# Patient Record
Sex: MALE | Race: BLACK OR AFRICAN AMERICAN | URBAN - METROPOLITAN AREA
[De-identification: names, ages, dates, MRNs, and addresses within clinical notes are randomized per-mention and may not be internally consistent; named-entity substitution may affect disease eponyms.]

---

## 2017-01-04 DIAGNOSIS — R53.1 LEFT-SIDED WEAKNESS: ICD-10-CM

## 2017-01-05 RX ORDER — GABAPENTIN 300 MG/1
CAPSULE ORAL
Qty: 90 CAPSULE | Refills: 0 | Status: SHIPPED | OUTPATIENT
Start: 2017-01-05 | End: 2017-02-11 | Stop reason: SDUPTHER

## 2017-01-05 RX ORDER — GABAPENTIN 300 MG/1
CAPSULE ORAL
Qty: 90 CAPSULE | Refills: 0 | Status: SHIPPED | OUTPATIENT
Start: 2017-01-05 | End: 2017-04-10 | Stop reason: SDUPTHER

## 2017-01-12 ENCOUNTER — OFFICE VISIT (OUTPATIENT)
Dept: INTERNAL MEDICINE CLINIC | Age: 52
End: 2017-01-12

## 2017-01-12 VITALS
SYSTOLIC BLOOD PRESSURE: 140 MMHG | OXYGEN SATURATION: 98 % | WEIGHT: 223 LBS | DIASTOLIC BLOOD PRESSURE: 80 MMHG | HEIGHT: 74 IN | BODY MASS INDEX: 28.62 KG/M2 | HEART RATE: 85 BPM

## 2017-01-12 DIAGNOSIS — F17.200 TOBACCO DEPENDENCE SYNDROME: ICD-10-CM

## 2017-01-12 DIAGNOSIS — E11.22 TYPE 2 DIABETES MELLITUS WITH STAGE 3 CHRONIC KIDNEY DISEASE, WITH LONG-TERM CURRENT USE OF INSULIN (HCC): Primary | Chronic | ICD-10-CM

## 2017-01-12 DIAGNOSIS — N52.01 ERECTILE DYSFUNCTION DUE TO ARTERIAL INSUFFICIENCY: ICD-10-CM

## 2017-01-12 DIAGNOSIS — S22.32XD CLOSED FRACTURE OF ONE RIB OF LEFT SIDE WITH ROUTINE HEALING, SUBSEQUENT ENCOUNTER: ICD-10-CM

## 2017-01-12 DIAGNOSIS — I10 ESSENTIAL HYPERTENSION: Chronic | ICD-10-CM

## 2017-01-12 DIAGNOSIS — I42.9 CARDIOMYOPATHY (HCC): ICD-10-CM

## 2017-01-12 DIAGNOSIS — N18.30 TYPE 2 DIABETES MELLITUS WITH STAGE 3 CHRONIC KIDNEY DISEASE, WITH LONG-TERM CURRENT USE OF INSULIN (HCC): Primary | Chronic | ICD-10-CM

## 2017-01-12 DIAGNOSIS — Z79.4 TYPE 2 DIABETES MELLITUS WITH STAGE 3 CHRONIC KIDNEY DISEASE, WITH LONG-TERM CURRENT USE OF INSULIN (HCC): Primary | Chronic | ICD-10-CM

## 2017-01-12 DIAGNOSIS — R09.81 NASAL CONGESTION: ICD-10-CM

## 2017-01-12 DIAGNOSIS — K59.09 OTHER CONSTIPATION: ICD-10-CM

## 2017-01-12 DIAGNOSIS — R53.1 LEFT-SIDED WEAKNESS: ICD-10-CM

## 2017-01-12 LAB
A/G RATIO: 1.4 (ref 1.1–2.2)
ALBUMIN SERPL-MCNC: 4.1 G/DL (ref 3.4–5)
ALP BLD-CCNC: 93 U/L (ref 40–129)
ALT SERPL-CCNC: 23 U/L (ref 10–40)
ANION GAP SERPL CALCULATED.3IONS-SCNC: 15 MMOL/L (ref 3–16)
AST SERPL-CCNC: 21 U/L (ref 15–37)
BILIRUB SERPL-MCNC: <0.2 MG/DL (ref 0–1)
BILIRUBIN URINE: NEGATIVE
BLOOD, URINE: NEGATIVE
BUN BLDV-MCNC: 26 MG/DL (ref 7–20)
CALCIUM SERPL-MCNC: 9.2 MG/DL (ref 8.3–10.6)
CHLORIDE BLD-SCNC: 102 MMOL/L (ref 99–110)
CHOLESTEROL, TOTAL: 181 MG/DL (ref 0–199)
CLARITY: ABNORMAL
CO2: 23 MMOL/L (ref 21–32)
COLOR: YELLOW
COMMENT UA: NORMAL
CREAT SERPL-MCNC: 2.1 MG/DL (ref 0.9–1.3)
EPITHELIAL CELLS, UA: 1 /HPF (ref 0–5)
GFR AFRICAN AMERICAN: 40
GFR NON-AFRICAN AMERICAN: 33
GLOBULIN: 3 G/DL
GLUCOSE BLD-MCNC: 165 MG/DL (ref 70–99)
GLUCOSE URINE: NEGATIVE MG/DL
HDLC SERPL-MCNC: 33 MG/DL (ref 40–60)
HYALINE CASTS: 1 /HPF (ref 0–8)
KETONES, URINE: NEGATIVE MG/DL
LDL CHOLESTEROL CALCULATED: ABNORMAL MG/DL
LDL CHOLESTEROL DIRECT: 103 MG/DL
LEUKOCYTE ESTERASE, URINE: NEGATIVE
MICROSCOPIC EXAMINATION: YES
NITRITE, URINE: NEGATIVE
PH UA: 5
POTASSIUM SERPL-SCNC: 4.8 MMOL/L (ref 3.5–5.1)
PRO-BNP: 390 PG/ML (ref 0–124)
PROTEIN UA: 30 MG/DL
RBC UA: 2 /HPF (ref 0–4)
SODIUM BLD-SCNC: 140 MMOL/L (ref 136–145)
SPECIFIC GRAVITY UA: 1.01
TOTAL PROTEIN: 7.1 G/DL (ref 6.4–8.2)
TRIGL SERPL-MCNC: 317 MG/DL (ref 0–150)
URINE TYPE: ABNORMAL
UROBILINOGEN, URINE: 0.2 E.U./DL
VLDLC SERPL CALC-MCNC: ABNORMAL MG/DL
WBC UA: 1 /HPF (ref 0–5)

## 2017-01-12 PROCEDURE — 99214 OFFICE O/P EST MOD 30 MIN: CPT | Performed by: INTERNAL MEDICINE

## 2017-01-12 RX ORDER — SILDENAFIL CITRATE 20 MG/1
100 TABLET ORAL PRN
Qty: 15 TABLET | Refills: 3 | Status: SHIPPED | OUTPATIENT
Start: 2017-01-12 | End: 2018-04-30 | Stop reason: SDUPTHER

## 2017-01-12 RX ORDER — DIAZEPAM 5 MG/1
5 TABLET ORAL EVERY 12 HOURS PRN
Qty: 30 TABLET | Refills: 0 | Status: SHIPPED | OUTPATIENT
Start: 2017-01-12 | End: 2017-03-16 | Stop reason: SDUPTHER

## 2017-01-12 ASSESSMENT — ENCOUNTER SYMPTOMS
RESPIRATORY NEGATIVE: 1
EYES NEGATIVE: 1
GASTROINTESTINAL NEGATIVE: 1

## 2017-01-13 LAB
ESTIMATED AVERAGE GLUCOSE: 197.3 MG/DL
HBA1C MFR BLD: 8.5 %

## 2017-01-14 DIAGNOSIS — I10 ESSENTIAL HYPERTENSION: Chronic | ICD-10-CM

## 2017-01-15 DIAGNOSIS — S22.32XD CLOSED FRACTURE OF ONE RIB OF LEFT SIDE WITH ROUTINE HEALING, SUBSEQUENT ENCOUNTER: ICD-10-CM

## 2017-01-16 DIAGNOSIS — S22.32XD CLOSED FRACTURE OF ONE RIB OF LEFT SIDE WITH ROUTINE HEALING, SUBSEQUENT ENCOUNTER: ICD-10-CM

## 2017-01-16 DIAGNOSIS — I10 ESSENTIAL HYPERTENSION: Chronic | ICD-10-CM

## 2017-01-16 RX ORDER — CLONIDINE HYDROCHLORIDE 0.2 MG/1
TABLET ORAL
Qty: 90 TABLET | Refills: 0 | Status: SHIPPED | OUTPATIENT
Start: 2017-01-16 | End: 2017-02-02 | Stop reason: SDUPTHER

## 2017-01-18 RX ORDER — TRAMADOL HYDROCHLORIDE 50 MG/1
50 TABLET ORAL EVERY 8 HOURS PRN
Qty: 90 TABLET | Refills: 0 | OUTPATIENT
Start: 2017-01-18

## 2017-01-18 RX ORDER — TRAMADOL HYDROCHLORIDE 50 MG/1
TABLET ORAL
Qty: 90 TABLET | Refills: 0 | Status: ON HOLD | OUTPATIENT
Start: 2017-01-18 | End: 2018-09-10

## 2017-01-18 RX ORDER — CLONIDINE HYDROCHLORIDE 0.2 MG/1
TABLET ORAL
Qty: 90 TABLET | Refills: 0 | OUTPATIENT
Start: 2017-01-18

## 2017-01-21 DIAGNOSIS — E11.49 TYPE 2 DIABETES MELLITUS WITH OTHER DIABETIC NEUROLOGICAL COMPLICATION (HCC): ICD-10-CM

## 2017-01-21 RX ORDER — GLIPIZIDE 5 MG/1
TABLET ORAL
Qty: 60 TABLET | Refills: 0 | Status: SHIPPED | OUTPATIENT
Start: 2017-01-21 | End: 2018-01-29 | Stop reason: SDUPTHER

## 2017-02-01 ENCOUNTER — TELEPHONE (OUTPATIENT)
Dept: INTERNAL MEDICINE CLINIC | Age: 52
End: 2017-02-01

## 2017-02-02 DIAGNOSIS — I10 ESSENTIAL HYPERTENSION: Chronic | ICD-10-CM

## 2017-02-03 RX ORDER — CLONIDINE HYDROCHLORIDE 0.2 MG/1
TABLET ORAL
Qty: 90 TABLET | Refills: 0 | Status: SHIPPED | OUTPATIENT
Start: 2017-02-03 | End: 2017-07-14 | Stop reason: SDUPTHER

## 2017-02-11 DIAGNOSIS — R53.1 LEFT-SIDED WEAKNESS: ICD-10-CM

## 2017-02-13 RX ORDER — GABAPENTIN 300 MG/1
CAPSULE ORAL
Qty: 90 CAPSULE | Refills: 0 | Status: SHIPPED | OUTPATIENT
Start: 2017-02-13 | End: 2017-03-14 | Stop reason: SDUPTHER

## 2017-03-08 DIAGNOSIS — I10 MALIGNANT HYPERTENSION: ICD-10-CM

## 2017-03-09 RX ORDER — HYDRALAZINE HYDROCHLORIDE 100 MG/1
TABLET, FILM COATED ORAL
Qty: 90 TABLET | Refills: 0 | Status: SHIPPED | OUTPATIENT
Start: 2017-03-09 | End: 2017-04-16 | Stop reason: SDUPTHER

## 2017-03-09 RX ORDER — ISOSORBIDE MONONITRATE 30 MG/1
TABLET, EXTENDED RELEASE ORAL
Qty: 30 TABLET | Refills: 0 | Status: SHIPPED | OUTPATIENT
Start: 2017-03-09 | End: 2017-04-16 | Stop reason: SDUPTHER

## 2017-03-14 DIAGNOSIS — R53.1 LEFT-SIDED WEAKNESS: ICD-10-CM

## 2017-03-15 ENCOUNTER — TELEPHONE (OUTPATIENT)
Dept: INTERNAL MEDICINE CLINIC | Age: 52
End: 2017-03-15

## 2017-03-15 DIAGNOSIS — S22.32XD CLOSED FRACTURE OF ONE RIB OF LEFT SIDE WITH ROUTINE HEALING, SUBSEQUENT ENCOUNTER: ICD-10-CM

## 2017-03-15 RX ORDER — GABAPENTIN 300 MG/1
CAPSULE ORAL
Qty: 90 CAPSULE | Refills: 0 | Status: SHIPPED | OUTPATIENT
Start: 2017-03-15 | End: 2017-04-09 | Stop reason: SDUPTHER

## 2017-03-16 RX ORDER — DIAZEPAM 5 MG/1
5 TABLET ORAL EVERY 12 HOURS PRN
Qty: 30 TABLET | Refills: 0 | Status: SHIPPED | OUTPATIENT
Start: 2017-03-16 | End: 2017-05-19 | Stop reason: SDUPTHER

## 2017-03-27 DIAGNOSIS — I42.9 CARDIOMYOPATHY (HCC): ICD-10-CM

## 2017-03-27 DIAGNOSIS — I10 ESSENTIAL HYPERTENSION: ICD-10-CM

## 2017-03-27 RX ORDER — LABETALOL 200 MG/1
200 TABLET, FILM COATED ORAL EVERY 8 HOURS SCHEDULED
Qty: 90 TABLET | Refills: 2 | Status: SHIPPED | OUTPATIENT
Start: 2017-03-27 | End: 2017-08-30 | Stop reason: SDUPTHER

## 2017-04-02 DIAGNOSIS — I42.9 CARDIOMYOPATHY (HCC): ICD-10-CM

## 2017-04-03 RX ORDER — CLOPIDOGREL BISULFATE 75 MG/1
TABLET ORAL
Qty: 30 TABLET | Refills: 0 | Status: SHIPPED | OUTPATIENT
Start: 2017-04-03 | End: 2017-05-05 | Stop reason: SDUPTHER

## 2017-04-09 DIAGNOSIS — R53.1 LEFT-SIDED WEAKNESS: ICD-10-CM

## 2017-04-10 ENCOUNTER — OFFICE VISIT (OUTPATIENT)
Dept: INTERNAL MEDICINE CLINIC | Age: 52
End: 2017-04-10

## 2017-04-10 ENCOUNTER — TELEPHONE (OUTPATIENT)
Dept: INTERNAL MEDICINE CLINIC | Age: 52
End: 2017-04-10

## 2017-04-10 VITALS
HEART RATE: 81 BPM | BODY MASS INDEX: 28 KG/M2 | DIASTOLIC BLOOD PRESSURE: 84 MMHG | WEIGHT: 218.2 LBS | OXYGEN SATURATION: 98 % | HEIGHT: 74 IN | SYSTOLIC BLOOD PRESSURE: 122 MMHG

## 2017-04-10 DIAGNOSIS — N18.30 TYPE 2 DIABETES MELLITUS WITH STAGE 3 CHRONIC KIDNEY DISEASE, WITH LONG-TERM CURRENT USE OF INSULIN (HCC): Primary | Chronic | ICD-10-CM

## 2017-04-10 DIAGNOSIS — N18.2 CKD STAGE 2 DUE TO TYPE 2 DIABETES MELLITUS (HCC): ICD-10-CM

## 2017-04-10 DIAGNOSIS — I63.9 CEREBRAL INFARCTION, UNSPECIFIED MECHANISM (HCC): ICD-10-CM

## 2017-04-10 DIAGNOSIS — R53.1 LEFT-SIDED WEAKNESS: ICD-10-CM

## 2017-04-10 DIAGNOSIS — E11.22 CKD STAGE 2 DUE TO TYPE 2 DIABETES MELLITUS (HCC): ICD-10-CM

## 2017-04-10 DIAGNOSIS — N52.01 ERECTILE DYSFUNCTION DUE TO ARTERIAL INSUFFICIENCY: ICD-10-CM

## 2017-04-10 DIAGNOSIS — I10 ESSENTIAL HYPERTENSION: Chronic | ICD-10-CM

## 2017-04-10 DIAGNOSIS — E11.22 TYPE 2 DIABETES MELLITUS WITH STAGE 3 CHRONIC KIDNEY DISEASE, WITH LONG-TERM CURRENT USE OF INSULIN (HCC): Primary | Chronic | ICD-10-CM

## 2017-04-10 DIAGNOSIS — Z79.4 TYPE 2 DIABETES MELLITUS WITH STAGE 3 CHRONIC KIDNEY DISEASE, WITH LONG-TERM CURRENT USE OF INSULIN (HCC): Primary | Chronic | ICD-10-CM

## 2017-04-10 DIAGNOSIS — I42.9 CARDIOMYOPATHY (HCC): ICD-10-CM

## 2017-04-10 DIAGNOSIS — F17.200 TOBACCO DEPENDENCE SYNDROME: ICD-10-CM

## 2017-04-10 LAB
A/G RATIO: 1.1 (ref 1.1–2.2)
ALBUMIN SERPL-MCNC: 4 G/DL (ref 3.4–5)
ALP BLD-CCNC: 94 U/L (ref 40–129)
ALT SERPL-CCNC: 15 U/L (ref 10–40)
ANION GAP SERPL CALCULATED.3IONS-SCNC: 13 MMOL/L (ref 3–16)
AST SERPL-CCNC: 11 U/L (ref 15–37)
BASOPHILS ABSOLUTE: 0 K/UL (ref 0–0.2)
BASOPHILS RELATIVE PERCENT: 0.3 %
BILIRUB SERPL-MCNC: 0.3 MG/DL (ref 0–1)
BUN BLDV-MCNC: 25 MG/DL (ref 7–20)
CALCIUM SERPL-MCNC: 9 MG/DL (ref 8.3–10.6)
CHLORIDE BLD-SCNC: 98 MMOL/L (ref 99–110)
CO2: 24 MMOL/L (ref 21–32)
CREAT SERPL-MCNC: 2 MG/DL (ref 0.9–1.3)
EOSINOPHILS ABSOLUTE: 0.3 K/UL (ref 0–0.6)
EOSINOPHILS RELATIVE PERCENT: 2.4 %
GFR AFRICAN AMERICAN: 43
GFR NON-AFRICAN AMERICAN: 35
GLOBULIN: 3.7 G/DL
GLUCOSE BLD-MCNC: 333 MG/DL (ref 70–99)
HCT VFR BLD CALC: 37.9 % (ref 40.5–52.5)
HEMOGLOBIN: 12.1 G/DL (ref 13.5–17.5)
LYMPHOCYTES ABSOLUTE: 1.6 K/UL (ref 1–5.1)
LYMPHOCYTES RELATIVE PERCENT: 15.1 %
MCH RBC QN AUTO: 28.5 PG (ref 26–34)
MCHC RBC AUTO-ENTMCNC: 32 G/DL (ref 31–36)
MCV RBC AUTO: 89.2 FL (ref 80–100)
MONOCYTES ABSOLUTE: 0.6 K/UL (ref 0–1.3)
MONOCYTES RELATIVE PERCENT: 6 %
NEUTROPHILS ABSOLUTE: 8.2 K/UL (ref 1.7–7.7)
NEUTROPHILS RELATIVE PERCENT: 76.2 %
PARATHYROID HORMONE INTACT: 107.4 PG/ML (ref 14–72)
PDW BLD-RTO: 13.2 % (ref 12.4–15.4)
PLATELET # BLD: 223 K/UL (ref 135–450)
PMV BLD AUTO: 10.2 FL (ref 5–10.5)
POTASSIUM SERPL-SCNC: 4.9 MMOL/L (ref 3.5–5.1)
PROTEIN PROTEIN: 0.12 G/DL
PROTEIN PROTEIN: 121 MG/DL
RBC # BLD: 4.25 M/UL (ref 4.2–5.9)
SODIUM BLD-SCNC: 135 MMOL/L (ref 136–145)
TOTAL PROTEIN: 7.7 G/DL (ref 6.4–8.2)
WBC # BLD: 10.8 K/UL (ref 4–11)

## 2017-04-10 PROCEDURE — 99214 OFFICE O/P EST MOD 30 MIN: CPT | Performed by: INTERNAL MEDICINE

## 2017-04-10 RX ORDER — SILDENAFIL 100 MG/1
100 TABLET, FILM COATED ORAL PRN
Qty: 10 TABLET | Refills: 3 | Status: SHIPPED | OUTPATIENT
Start: 2017-04-10 | End: 2018-01-29 | Stop reason: SDUPTHER

## 2017-04-10 RX ORDER — GABAPENTIN 300 MG/1
300 CAPSULE ORAL 3 TIMES DAILY
Qty: 90 CAPSULE | Refills: 3 | Status: SHIPPED | OUTPATIENT
Start: 2017-04-10 | End: 2018-04-12 | Stop reason: SDUPTHER

## 2017-04-10 RX ORDER — ACETAMINOPHEN 500 MG
1000 TABLET ORAL EVERY 6 HOURS PRN
Qty: 120 TABLET | Refills: 3 | Status: SHIPPED | OUTPATIENT
Start: 2017-04-10 | End: 2017-08-11 | Stop reason: SDUPTHER

## 2017-04-10 RX ORDER — GABAPENTIN 300 MG/1
CAPSULE ORAL
Qty: 90 CAPSULE | Refills: 0 | Status: SHIPPED | OUTPATIENT
Start: 2017-04-10 | End: 2018-01-29 | Stop reason: SDUPTHER

## 2017-04-10 ASSESSMENT — ENCOUNTER SYMPTOMS
EYES NEGATIVE: 1
GASTROINTESTINAL NEGATIVE: 1
RESPIRATORY NEGATIVE: 1

## 2017-04-11 LAB
ESTIMATED AVERAGE GLUCOSE: 246 MG/DL
HBA1C MFR BLD: 10.2 %
KAPPA, FREE LIGHT CHAINS, SERUM: 109.75 MG/L (ref 3.3–19.4)
KAPPA/LAMBDA RATIO: 2.04 (ref 0.26–1.65)
KAPPA/LAMBDA TEST COMMENT: ABNORMAL
LAMBDA, FREE LIGHT CHAINS, SERUM: 53.79 MG/L (ref 5.71–26.3)

## 2017-04-12 LAB
ALBUMIN SERPL-MCNC: 3.6 G/DL (ref 3.1–4.9)
ALPHA-1-GLOBULIN: 0.2 G/DL (ref 0.2–0.4)
ALPHA-2-GLOBULIN: 0.9 G/DL (ref 0.4–1.1)
BETA GLOBULIN: 1 G/DL (ref 0.9–1.6)
GAMMA GLOBULIN: 2 G/DL (ref 0.6–1.8)
SPE/IFE INTERPRETATION: NORMAL
URINE ELECTROPHORESIS INTERP: NORMAL

## 2017-04-16 DIAGNOSIS — I10 MALIGNANT HYPERTENSION: ICD-10-CM

## 2017-04-17 RX ORDER — HYDRALAZINE HYDROCHLORIDE 100 MG/1
TABLET, FILM COATED ORAL
Qty: 90 TABLET | Refills: 0 | Status: SHIPPED | OUTPATIENT
Start: 2017-04-17 | End: 2017-06-16 | Stop reason: SDUPTHER

## 2017-04-17 RX ORDER — ISOSORBIDE MONONITRATE 30 MG/1
TABLET, EXTENDED RELEASE ORAL
Qty: 30 TABLET | Refills: 0 | Status: SHIPPED | OUTPATIENT
Start: 2017-04-17 | End: 2017-06-05 | Stop reason: SDUPTHER

## 2017-04-24 ENCOUNTER — TELEPHONE (OUTPATIENT)
Dept: INTERNAL MEDICINE CLINIC | Age: 52
End: 2017-04-24

## 2017-05-05 DIAGNOSIS — I42.9 CARDIOMYOPATHY (HCC): ICD-10-CM

## 2017-05-05 RX ORDER — CLOPIDOGREL BISULFATE 75 MG/1
TABLET ORAL
Qty: 30 TABLET | Refills: 0 | Status: SHIPPED | OUTPATIENT
Start: 2017-05-05 | End: 2017-06-05 | Stop reason: SDUPTHER

## 2017-05-19 DIAGNOSIS — I10 MALIGNANT HYPERTENSION: ICD-10-CM

## 2017-05-19 DIAGNOSIS — S22.32XD CLOSED FRACTURE OF ONE RIB OF LEFT SIDE WITH ROUTINE HEALING, SUBSEQUENT ENCOUNTER: ICD-10-CM

## 2017-05-19 RX ORDER — HYDRALAZINE HYDROCHLORIDE 100 MG/1
TABLET, FILM COATED ORAL
Qty: 90 TABLET | Refills: 0 | Status: SHIPPED | OUTPATIENT
Start: 2017-05-19 | End: 2017-08-30

## 2017-05-19 RX ORDER — DIAZEPAM 5 MG/1
TABLET ORAL
Qty: 30 TABLET | Refills: 0 | Status: SHIPPED | OUTPATIENT
Start: 2017-05-19 | End: 2017-09-08 | Stop reason: SDUPTHER

## 2017-06-05 DIAGNOSIS — I42.9 CARDIOMYOPATHY (HCC): ICD-10-CM

## 2017-06-05 DIAGNOSIS — I10 MALIGNANT HYPERTENSION: ICD-10-CM

## 2017-06-06 RX ORDER — CLOPIDOGREL BISULFATE 75 MG/1
TABLET ORAL
Qty: 30 TABLET | Refills: 0 | Status: SHIPPED | OUTPATIENT
Start: 2017-06-06 | End: 2017-07-01 | Stop reason: SDUPTHER

## 2017-06-06 RX ORDER — ISOSORBIDE MONONITRATE 30 MG/1
TABLET, EXTENDED RELEASE ORAL
Qty: 30 TABLET | Refills: 0 | Status: SHIPPED | OUTPATIENT
Start: 2017-06-06 | End: 2017-07-01 | Stop reason: SDUPTHER

## 2017-06-16 DIAGNOSIS — I10 MALIGNANT HYPERTENSION: ICD-10-CM

## 2017-06-16 DIAGNOSIS — E11.49 TYPE 2 DIABETES MELLITUS WITH OTHER DIABETIC NEUROLOGICAL COMPLICATION (HCC): ICD-10-CM

## 2017-06-16 RX ORDER — HUMAN INSULIN 100 [IU]/ML
INJECTION, SUSPENSION SUBCUTANEOUS
Qty: 10 VIAL | Refills: 5 | Status: ON HOLD | OUTPATIENT
Start: 2017-06-16 | End: 2018-09-13 | Stop reason: HOSPADM

## 2017-06-16 RX ORDER — HYDRALAZINE HYDROCHLORIDE 100 MG/1
TABLET, FILM COATED ORAL
Qty: 90 TABLET | Refills: 5 | Status: SHIPPED | OUTPATIENT
Start: 2017-06-16 | End: 2017-08-30 | Stop reason: SDUPTHER

## 2017-07-01 DIAGNOSIS — I10 MALIGNANT HYPERTENSION: ICD-10-CM

## 2017-07-01 DIAGNOSIS — E11.49 TYPE 2 DIABETES MELLITUS WITH OTHER DIABETIC NEUROLOGICAL COMPLICATION (HCC): ICD-10-CM

## 2017-07-01 DIAGNOSIS — I42.9 CARDIOMYOPATHY (HCC): ICD-10-CM

## 2017-07-03 RX ORDER — GLIPIZIDE 5 MG/1
TABLET ORAL
Qty: 60 TABLET | Refills: 0 | Status: SHIPPED | OUTPATIENT
Start: 2017-07-03 | End: 2017-09-04 | Stop reason: SDUPTHER

## 2017-07-03 RX ORDER — ISOSORBIDE MONONITRATE 30 MG/1
TABLET, EXTENDED RELEASE ORAL
Qty: 30 TABLET | Refills: 0 | Status: SHIPPED | OUTPATIENT
Start: 2017-07-03 | End: 2017-07-28 | Stop reason: SDUPTHER

## 2017-07-03 RX ORDER — CLOPIDOGREL BISULFATE 75 MG/1
TABLET ORAL
Qty: 30 TABLET | Refills: 0 | Status: SHIPPED | OUTPATIENT
Start: 2017-07-03 | End: 2017-08-20 | Stop reason: SDUPTHER

## 2017-07-09 DIAGNOSIS — I10 MALIGNANT HYPERTENSION: ICD-10-CM

## 2017-07-10 DIAGNOSIS — I10 MALIGNANT HYPERTENSION: ICD-10-CM

## 2017-07-10 RX ORDER — FUROSEMIDE 40 MG/1
TABLET ORAL
Qty: 90 TABLET | Refills: 0 | OUTPATIENT
Start: 2017-07-10

## 2017-07-10 RX ORDER — FUROSEMIDE 40 MG/1
40 TABLET ORAL DAILY
Qty: 90 TABLET | Refills: 3 | Status: SHIPPED | OUTPATIENT
Start: 2017-07-10 | End: 2017-08-30 | Stop reason: SDUPTHER

## 2017-07-14 DIAGNOSIS — I10 ESSENTIAL HYPERTENSION: Chronic | ICD-10-CM

## 2017-07-14 RX ORDER — CLONIDINE HYDROCHLORIDE 0.2 MG/1
TABLET ORAL
Qty: 150 TABLET | Refills: 0 | Status: SHIPPED | OUTPATIENT
Start: 2017-07-14 | End: 2017-08-08 | Stop reason: SDUPTHER

## 2017-07-28 DIAGNOSIS — I10 MALIGNANT HYPERTENSION: ICD-10-CM

## 2017-07-28 RX ORDER — ISOSORBIDE MONONITRATE 30 MG/1
TABLET, EXTENDED RELEASE ORAL
Qty: 30 TABLET | Refills: 0 | Status: SHIPPED | OUTPATIENT
Start: 2017-07-28 | End: 2017-08-30 | Stop reason: ALTCHOICE

## 2017-07-31 DIAGNOSIS — R53.1 LEFT-SIDED WEAKNESS: ICD-10-CM

## 2017-08-01 RX ORDER — GABAPENTIN 300 MG/1
CAPSULE ORAL
Qty: 90 CAPSULE | Refills: 0 | Status: SHIPPED | OUTPATIENT
Start: 2017-08-01 | End: 2017-08-28 | Stop reason: SDUPTHER

## 2017-08-08 DIAGNOSIS — I10 ESSENTIAL HYPERTENSION: Chronic | ICD-10-CM

## 2017-08-08 RX ORDER — CLONIDINE HYDROCHLORIDE 0.2 MG/1
TABLET ORAL
Qty: 150 TABLET | Refills: 3 | Status: SHIPPED | OUTPATIENT
Start: 2017-08-08 | End: 2017-08-30 | Stop reason: SDUPTHER

## 2017-08-11 DIAGNOSIS — R53.1 LEFT-SIDED WEAKNESS: ICD-10-CM

## 2017-08-11 RX ORDER — ACETAMINOPHEN 500 MG/1
TABLET ORAL
Qty: 120 TABLET | Refills: 3 | Status: SHIPPED | OUTPATIENT
Start: 2017-08-11 | End: 2018-01-06 | Stop reason: SDUPTHER

## 2017-08-20 DIAGNOSIS — I42.9 CARDIOMYOPATHY (HCC): ICD-10-CM

## 2017-08-20 DIAGNOSIS — I10 MALIGNANT HYPERTENSION: ICD-10-CM

## 2017-08-21 RX ORDER — ISOSORBIDE MONONITRATE 30 MG/1
TABLET, EXTENDED RELEASE ORAL
Qty: 30 TABLET | Refills: 0 | Status: SHIPPED | OUTPATIENT
Start: 2017-08-21 | End: 2017-09-25 | Stop reason: SDUPTHER

## 2017-08-21 RX ORDER — CLOPIDOGREL BISULFATE 75 MG/1
TABLET ORAL
Qty: 30 TABLET | Refills: 0 | Status: SHIPPED | OUTPATIENT
Start: 2017-08-21 | End: 2017-10-06 | Stop reason: SDUPTHER

## 2017-08-28 DIAGNOSIS — R53.1 LEFT-SIDED WEAKNESS: ICD-10-CM

## 2017-08-28 RX ORDER — GABAPENTIN 300 MG/1
CAPSULE ORAL
Qty: 90 CAPSULE | Refills: 0 | Status: SHIPPED | OUTPATIENT
Start: 2017-08-28 | End: 2017-09-22 | Stop reason: SDUPTHER

## 2017-08-30 ENCOUNTER — OFFICE VISIT (OUTPATIENT)
Dept: CARDIOLOGY CLINIC | Age: 52
End: 2017-08-30

## 2017-08-30 VITALS
OXYGEN SATURATION: 97 % | DIASTOLIC BLOOD PRESSURE: 64 MMHG | WEIGHT: 218 LBS | HEIGHT: 74 IN | HEART RATE: 79 BPM | SYSTOLIC BLOOD PRESSURE: 100 MMHG | BODY MASS INDEX: 27.98 KG/M2

## 2017-08-30 DIAGNOSIS — E11.9 CONTROLLED TYPE 2 DIABETES MELLITUS WITHOUT COMPLICATION, WITH LONG-TERM CURRENT USE OF INSULIN (HCC): ICD-10-CM

## 2017-08-30 DIAGNOSIS — I10 MALIGNANT HYPERTENSION: ICD-10-CM

## 2017-08-30 DIAGNOSIS — I63.9 CEREBROVASCULAR ACCIDENT (CVA), UNSPECIFIED MECHANISM (HCC): ICD-10-CM

## 2017-08-30 DIAGNOSIS — F17.200 SMOKING ADDICTION: ICD-10-CM

## 2017-08-30 DIAGNOSIS — Z79.4 CONTROLLED TYPE 2 DIABETES MELLITUS WITHOUT COMPLICATION, WITH LONG-TERM CURRENT USE OF INSULIN (HCC): ICD-10-CM

## 2017-08-30 DIAGNOSIS — I42.9 CARDIOMYOPATHY, UNSPECIFIED TYPE (HCC): Primary | ICD-10-CM

## 2017-08-30 DIAGNOSIS — E78.2 MIXED HYPERLIPIDEMIA: ICD-10-CM

## 2017-08-30 PROCEDURE — 93000 ELECTROCARDIOGRAM COMPLETE: CPT | Performed by: INTERNAL MEDICINE

## 2017-08-30 PROCEDURE — 99214 OFFICE O/P EST MOD 30 MIN: CPT | Performed by: INTERNAL MEDICINE

## 2017-08-30 RX ORDER — FUROSEMIDE 20 MG/1
40 TABLET ORAL DAILY
Qty: 180 TABLET | Refills: 3 | Status: SHIPPED | OUTPATIENT
Start: 2017-08-30 | End: 2018-09-24 | Stop reason: SDUPTHER

## 2017-08-30 RX ORDER — LABETALOL 200 MG/1
200 TABLET, FILM COATED ORAL EVERY 8 HOURS SCHEDULED
Qty: 90 TABLET | Refills: 3
Start: 2017-08-30 | End: 2018-01-29 | Stop reason: SDUPTHER

## 2017-08-30 RX ORDER — CLONIDINE HYDROCHLORIDE 0.2 MG/1
TABLET ORAL
Qty: 270 TABLET | Refills: 3
Start: 2017-08-30 | End: 2018-02-09 | Stop reason: SDUPTHER

## 2017-08-30 RX ORDER — HYDRALAZINE HYDROCHLORIDE 100 MG/1
TABLET, FILM COATED ORAL
Qty: 270 TABLET | Refills: 3
Start: 2017-08-30 | End: 2018-01-29 | Stop reason: SDUPTHER

## 2017-09-04 DIAGNOSIS — E11.49 TYPE 2 DIABETES MELLITUS WITH OTHER DIABETIC NEUROLOGICAL COMPLICATION (HCC): ICD-10-CM

## 2017-09-05 RX ORDER — GLIPIZIDE 5 MG/1
TABLET ORAL
Qty: 60 TABLET | Refills: 0 | Status: SHIPPED | OUTPATIENT
Start: 2017-09-05 | End: 2018-04-30 | Stop reason: SDUPTHER

## 2017-09-08 DIAGNOSIS — S22.32XD CLOSED FRACTURE OF ONE RIB OF LEFT SIDE WITH ROUTINE HEALING, SUBSEQUENT ENCOUNTER: ICD-10-CM

## 2017-09-08 RX ORDER — DIAZEPAM 5 MG/1
TABLET ORAL
Qty: 30 TABLET | Refills: 0 | Status: SHIPPED | OUTPATIENT
Start: 2017-09-08 | End: 2017-11-06 | Stop reason: SDUPTHER

## 2017-09-22 DIAGNOSIS — R53.1 LEFT-SIDED WEAKNESS: ICD-10-CM

## 2017-09-25 DIAGNOSIS — I10 MALIGNANT HYPERTENSION: ICD-10-CM

## 2017-09-25 RX ORDER — GABAPENTIN 300 MG/1
CAPSULE ORAL
Qty: 90 CAPSULE | Refills: 0 | Status: SHIPPED | OUTPATIENT
Start: 2017-09-25 | End: 2017-10-20 | Stop reason: SDUPTHER

## 2017-09-25 RX ORDER — ISOSORBIDE MONONITRATE 30 MG/1
30 TABLET, EXTENDED RELEASE ORAL DAILY
Qty: 30 TABLET | Refills: 5 | Status: SHIPPED | OUTPATIENT
Start: 2017-09-25 | End: 2018-03-09 | Stop reason: SDUPTHER

## 2017-10-06 DIAGNOSIS — I10 ESSENTIAL HYPERTENSION: ICD-10-CM

## 2017-10-06 DIAGNOSIS — I42.9 CARDIOMYOPATHY (HCC): ICD-10-CM

## 2017-10-06 RX ORDER — CLOPIDOGREL BISULFATE 75 MG/1
TABLET ORAL
Qty: 30 TABLET | Refills: 0 | Status: SHIPPED | OUTPATIENT
Start: 2017-10-06 | End: 2017-11-06 | Stop reason: SDUPTHER

## 2017-10-06 RX ORDER — LABETALOL 200 MG/1
TABLET, FILM COATED ORAL
Qty: 34 TABLET | Refills: 10 | Status: SHIPPED | OUTPATIENT
Start: 2017-10-06 | End: 2018-04-30 | Stop reason: SDUPTHER

## 2017-10-20 DIAGNOSIS — R53.1 LEFT-SIDED WEAKNESS: ICD-10-CM

## 2017-10-20 RX ORDER — GABAPENTIN 300 MG/1
CAPSULE ORAL
Qty: 90 CAPSULE | Refills: 0 | Status: SHIPPED | OUTPATIENT
Start: 2017-10-20 | End: 2017-11-17 | Stop reason: SDUPTHER

## 2017-11-02 DIAGNOSIS — I10 ESSENTIAL HYPERTENSION: Chronic | ICD-10-CM

## 2017-11-02 RX ORDER — CLONIDINE HYDROCHLORIDE 0.2 MG/1
TABLET ORAL
Qty: 150 TABLET | Refills: 3 | Status: SHIPPED | OUTPATIENT
Start: 2017-11-02 | End: 2018-02-09 | Stop reason: SDUPTHER

## 2017-11-06 DIAGNOSIS — S22.32XD CLOSED FRACTURE OF ONE RIB OF LEFT SIDE WITH ROUTINE HEALING, SUBSEQUENT ENCOUNTER: ICD-10-CM

## 2017-11-06 DIAGNOSIS — I42.9 CARDIOMYOPATHY (HCC): ICD-10-CM

## 2017-11-06 RX ORDER — CLOPIDOGREL BISULFATE 75 MG/1
TABLET ORAL
Qty: 30 TABLET | Refills: 0 | Status: SHIPPED | OUTPATIENT
Start: 2017-11-06 | End: 2017-12-15 | Stop reason: SDUPTHER

## 2017-11-06 RX ORDER — DIAZEPAM 5 MG/1
TABLET ORAL
Qty: 30 TABLET | Refills: 0 | Status: SHIPPED | OUTPATIENT
Start: 2017-11-06 | End: 2018-02-09 | Stop reason: SDUPTHER

## 2017-11-07 DIAGNOSIS — I10 MALIGNANT HYPERTENSION: ICD-10-CM

## 2017-11-08 RX ORDER — HYDRALAZINE HYDROCHLORIDE 100 MG/1
TABLET, FILM COATED ORAL
Qty: 90 TABLET | Refills: 0 | Status: SHIPPED | OUTPATIENT
Start: 2017-11-08 | End: 2017-11-28 | Stop reason: SDUPTHER

## 2017-11-17 DIAGNOSIS — R53.1 LEFT-SIDED WEAKNESS: ICD-10-CM

## 2017-11-17 RX ORDER — GABAPENTIN 300 MG/1
CAPSULE ORAL
Qty: 90 CAPSULE | Refills: 0 | Status: SHIPPED | OUTPATIENT
Start: 2017-11-17 | End: 2017-12-15 | Stop reason: SDUPTHER

## 2017-11-28 DIAGNOSIS — I10 MALIGNANT HYPERTENSION: ICD-10-CM

## 2017-11-28 RX ORDER — HYDRALAZINE HYDROCHLORIDE 100 MG/1
TABLET, FILM COATED ORAL
Qty: 90 TABLET | Refills: 0 | Status: SHIPPED | OUTPATIENT
Start: 2017-11-28 | End: 2017-12-15 | Stop reason: SDUPTHER

## 2017-12-15 DIAGNOSIS — R53.1 LEFT-SIDED WEAKNESS: ICD-10-CM

## 2017-12-15 DIAGNOSIS — I42.9 CARDIOMYOPATHY (HCC): ICD-10-CM

## 2017-12-15 DIAGNOSIS — I10 MALIGNANT HYPERTENSION: ICD-10-CM

## 2017-12-18 RX ORDER — GABAPENTIN 300 MG/1
CAPSULE ORAL
Qty: 90 CAPSULE | Refills: 0 | Status: SHIPPED | OUTPATIENT
Start: 2017-12-18 | End: 2018-01-06 | Stop reason: SDUPTHER

## 2017-12-18 RX ORDER — CLOPIDOGREL BISULFATE 75 MG/1
TABLET ORAL
Qty: 30 TABLET | Refills: 0 | Status: SHIPPED | OUTPATIENT
Start: 2017-12-18 | End: 2018-01-15 | Stop reason: SDUPTHER

## 2017-12-18 RX ORDER — HYDRALAZINE HYDROCHLORIDE 100 MG/1
TABLET, FILM COATED ORAL
Qty: 90 TABLET | Refills: 0 | Status: SHIPPED | OUTPATIENT
Start: 2017-12-18 | End: 2017-12-29 | Stop reason: SDUPTHER

## 2017-12-29 DIAGNOSIS — I10 MALIGNANT HYPERTENSION: ICD-10-CM

## 2017-12-29 RX ORDER — HYDRALAZINE HYDROCHLORIDE 100 MG/1
TABLET, FILM COATED ORAL
Qty: 90 TABLET | Refills: 0 | Status: SHIPPED | OUTPATIENT
Start: 2017-12-29 | End: 2018-01-25 | Stop reason: SDUPTHER

## 2018-01-06 DIAGNOSIS — R53.1 LEFT-SIDED WEAKNESS: ICD-10-CM

## 2018-01-08 RX ORDER — PSEUDOEPHED/ACETAMINOPH/DIPHEN 30MG-500MG
TABLET ORAL
Qty: 120 TABLET | Refills: 3 | Status: SHIPPED | OUTPATIENT
Start: 2018-01-08 | End: 2018-05-31

## 2018-01-08 RX ORDER — GABAPENTIN 300 MG/1
CAPSULE ORAL
Qty: 90 CAPSULE | Refills: 3 | Status: SHIPPED | OUTPATIENT
Start: 2018-01-08 | End: 2018-04-30 | Stop reason: SDUPTHER

## 2018-01-15 DIAGNOSIS — I42.9 CARDIOMYOPATHY (HCC): ICD-10-CM

## 2018-01-15 RX ORDER — CLOPIDOGREL BISULFATE 75 MG/1
TABLET ORAL
Qty: 30 TABLET | Refills: 0 | Status: SHIPPED | OUTPATIENT
Start: 2018-01-15 | End: 2018-02-09 | Stop reason: SDUPTHER

## 2018-01-25 DIAGNOSIS — I10 MALIGNANT HYPERTENSION: ICD-10-CM

## 2018-01-25 RX ORDER — HYDRALAZINE HYDROCHLORIDE 100 MG/1
TABLET, FILM COATED ORAL
Qty: 90 TABLET | Refills: 3 | Status: SHIPPED | OUTPATIENT
Start: 2018-01-25 | End: 2018-04-30 | Stop reason: SDUPTHER

## 2018-01-25 NOTE — TELEPHONE ENCOUNTER
hydrALAZINE (APRESOLINE) 100 MG tablet TAKE ONE TABLET BY MOUTH EVERY 8 HOURS     [facility] [address] [place] [address] - [address] [place] [address] -  [phone] - F [phone]    LOV 4/10/17

## 2018-01-29 ENCOUNTER — OFFICE VISIT (OUTPATIENT)
Dept: INTERNAL MEDICINE CLINIC | Age: 53
End: 2018-01-29

## 2018-01-29 VITALS
OXYGEN SATURATION: 98 % | TEMPERATURE: 97.3 F | HEART RATE: 91 BPM | BODY MASS INDEX: 25.8 KG/M2 | RESPIRATION RATE: 16 BRPM | DIASTOLIC BLOOD PRESSURE: 84 MMHG | SYSTOLIC BLOOD PRESSURE: 130 MMHG | WEIGHT: 201 LBS | HEIGHT: 74 IN

## 2018-01-29 DIAGNOSIS — I42.9 CARDIOMYOPATHY, UNSPECIFIED TYPE (HCC): ICD-10-CM

## 2018-01-29 DIAGNOSIS — Z79.4 TYPE 2 DIABETES MELLITUS WITH STAGE 3 CHRONIC KIDNEY DISEASE, WITH LONG-TERM CURRENT USE OF INSULIN (HCC): Chronic | ICD-10-CM

## 2018-01-29 DIAGNOSIS — N18.30 TYPE 2 DIABETES MELLITUS WITH STAGE 3 CHRONIC KIDNEY DISEASE, WITH LONG-TERM CURRENT USE OF INSULIN (HCC): Primary | Chronic | ICD-10-CM

## 2018-01-29 DIAGNOSIS — I10 MALIGNANT HYPERTENSION: ICD-10-CM

## 2018-01-29 DIAGNOSIS — N18.30 TYPE 2 DIABETES MELLITUS WITH STAGE 3 CHRONIC KIDNEY DISEASE, WITH LONG-TERM CURRENT USE OF INSULIN (HCC): Chronic | ICD-10-CM

## 2018-01-29 DIAGNOSIS — R53.1 LEFT-SIDED WEAKNESS: ICD-10-CM

## 2018-01-29 DIAGNOSIS — E11.22 TYPE 2 DIABETES MELLITUS WITH STAGE 3 CHRONIC KIDNEY DISEASE, WITH LONG-TERM CURRENT USE OF INSULIN (HCC): Primary | Chronic | ICD-10-CM

## 2018-01-29 DIAGNOSIS — E11.22 TYPE 2 DIABETES MELLITUS WITH STAGE 3 CHRONIC KIDNEY DISEASE, WITH LONG-TERM CURRENT USE OF INSULIN (HCC): Chronic | ICD-10-CM

## 2018-01-29 DIAGNOSIS — N52.01 ERECTILE DYSFUNCTION DUE TO ARTERIAL INSUFFICIENCY: ICD-10-CM

## 2018-01-29 DIAGNOSIS — Z79.4 TYPE 2 DIABETES MELLITUS WITH STAGE 3 CHRONIC KIDNEY DISEASE, WITH LONG-TERM CURRENT USE OF INSULIN (HCC): Primary | Chronic | ICD-10-CM

## 2018-01-29 DIAGNOSIS — N52.01 ERECTILE DYSFUNCTION DUE TO ARTERIAL INSUFFICIENCY: Primary | ICD-10-CM

## 2018-01-29 LAB
A/G RATIO: 1.3 (ref 1.1–2.2)
ALBUMIN SERPL-MCNC: 3.7 G/DL (ref 3.4–5)
ALP BLD-CCNC: 73 U/L (ref 40–129)
ALT SERPL-CCNC: 10 U/L (ref 10–40)
ANION GAP SERPL CALCULATED.3IONS-SCNC: 14 MMOL/L (ref 3–16)
AST SERPL-CCNC: 10 U/L (ref 15–37)
BASOPHILS ABSOLUTE: 0.1 K/UL (ref 0–0.2)
BASOPHILS RELATIVE PERCENT: 0.5 %
BILIRUB SERPL-MCNC: <0.2 MG/DL (ref 0–1)
BUN BLDV-MCNC: 23 MG/DL (ref 7–20)
CALCIUM SERPL-MCNC: 8.9 MG/DL (ref 8.3–10.6)
CHLORIDE BLD-SCNC: 104 MMOL/L (ref 99–110)
CHOLESTEROL, TOTAL: 169 MG/DL (ref 0–199)
CO2: 25 MMOL/L (ref 21–32)
CREAT SERPL-MCNC: 1.8 MG/DL (ref 0.9–1.3)
EOSINOPHILS ABSOLUTE: 0.6 K/UL (ref 0–0.6)
EOSINOPHILS RELATIVE PERCENT: 4.2 %
FOLATE: 10.05 NG/ML (ref 4.78–24.2)
GFR AFRICAN AMERICAN: 48
GFR NON-AFRICAN AMERICAN: 40
GLOBULIN: 2.8 G/DL
GLUCOSE BLD-MCNC: 112 MG/DL (ref 70–99)
HCT VFR BLD CALC: 33.7 % (ref 40.5–52.5)
HDLC SERPL-MCNC: 38 MG/DL (ref 40–60)
HEMOGLOBIN: 11.2 G/DL (ref 13.5–17.5)
LDL CHOLESTEROL CALCULATED: 100 MG/DL
LYMPHOCYTES ABSOLUTE: 2.1 K/UL (ref 1–5.1)
LYMPHOCYTES RELATIVE PERCENT: 15.8 %
MCH RBC QN AUTO: 29.4 PG (ref 26–34)
MCHC RBC AUTO-ENTMCNC: 33.4 G/DL (ref 31–36)
MCV RBC AUTO: 88.2 FL (ref 80–100)
MONOCYTES ABSOLUTE: 0.8 K/UL (ref 0–1.3)
MONOCYTES RELATIVE PERCENT: 5.8 %
NEUTROPHILS ABSOLUTE: 10 K/UL (ref 1.7–7.7)
NEUTROPHILS RELATIVE PERCENT: 73.7 %
PDW BLD-RTO: 13.8 % (ref 12.4–15.4)
PLATELET # BLD: 219 K/UL (ref 135–450)
PMV BLD AUTO: 10.3 FL (ref 5–10.5)
POTASSIUM SERPL-SCNC: 3.8 MMOL/L (ref 3.5–5.1)
PRO-BNP: 2716 PG/ML (ref 0–124)
RBC # BLD: 3.82 M/UL (ref 4.2–5.9)
SODIUM BLD-SCNC: 143 MMOL/L (ref 136–145)
TOTAL PROTEIN: 6.5 G/DL (ref 6.4–8.2)
TRIGL SERPL-MCNC: 153 MG/DL (ref 0–150)
TSH SERPL DL<=0.05 MIU/L-ACNC: 2.36 UIU/ML (ref 0.27–4.2)
VITAMIN B-12: 395 PG/ML (ref 211–911)
VLDLC SERPL CALC-MCNC: 31 MG/DL
WBC # BLD: 13.5 K/UL (ref 4–11)

## 2018-01-29 PROCEDURE — 99214 OFFICE O/P EST MOD 30 MIN: CPT | Performed by: INTERNAL MEDICINE

## 2018-01-29 RX ORDER — LABETALOL 200 MG/1
200 TABLET, FILM COATED ORAL EVERY 8 HOURS SCHEDULED
Qty: 90 TABLET | Refills: 5 | Status: ON HOLD | OUTPATIENT
Start: 2018-01-29 | End: 2018-09-13 | Stop reason: HOSPADM

## 2018-01-29 RX ORDER — GABAPENTIN 300 MG/1
CAPSULE ORAL
Qty: 90 CAPSULE | Refills: 5 | Status: SHIPPED | OUTPATIENT
Start: 2018-01-29 | End: 2018-04-30 | Stop reason: SDUPTHER

## 2018-01-29 RX ORDER — HYDRALAZINE HYDROCHLORIDE 100 MG/1
TABLET, FILM COATED ORAL
Qty: 270 TABLET | Refills: 3 | Status: SHIPPED | OUTPATIENT
Start: 2018-01-29 | End: 2018-04-30 | Stop reason: SDUPTHER

## 2018-01-29 RX ORDER — SILDENAFIL 100 MG/1
100 TABLET, FILM COATED ORAL PRN
Qty: 30 TABLET | Refills: 11 | Status: SHIPPED | OUTPATIENT
Start: 2018-01-29 | End: 2019-10-23 | Stop reason: SDUPTHER

## 2018-01-29 RX ORDER — GLIPIZIDE 5 MG/1
5 TABLET ORAL
Qty: 60 TABLET | Refills: 5 | Status: ON HOLD | OUTPATIENT
Start: 2018-01-29 | End: 2018-09-10 | Stop reason: ALTCHOICE

## 2018-01-29 ASSESSMENT — ENCOUNTER SYMPTOMS
EYES NEGATIVE: 1
GASTROINTESTINAL NEGATIVE: 1
RESPIRATORY NEGATIVE: 1

## 2018-01-29 ASSESSMENT — PATIENT HEALTH QUESTIONNAIRE - PHQ9
SUM OF ALL RESPONSES TO PHQ9 QUESTIONS 1 & 2: 0
2. FEELING DOWN, DEPRESSED OR HOPELESS: 0
SUM OF ALL RESPONSES TO PHQ QUESTIONS 1-9: 0
1. LITTLE INTEREST OR PLEASURE IN DOING THINGS: 0

## 2018-01-30 LAB
ESTIMATED AVERAGE GLUCOSE: 326.4 MG/DL
HBA1C MFR BLD: 13 %

## 2018-01-30 RX ORDER — VARDENAFIL HYDROCHLORIDE 20 MG/1
TABLET ORAL
Qty: 30 TABLET | Refills: 3 | Status: ON HOLD | COMMUNITY
Start: 2018-01-30 | End: 2018-09-10

## 2018-02-09 DIAGNOSIS — I42.9 CARDIOMYOPATHY (HCC): ICD-10-CM

## 2018-02-09 DIAGNOSIS — S22.32XD CLOSED FRACTURE OF ONE RIB OF LEFT SIDE WITH ROUTINE HEALING, SUBSEQUENT ENCOUNTER: ICD-10-CM

## 2018-02-09 DIAGNOSIS — I10 ESSENTIAL HYPERTENSION: Chronic | ICD-10-CM

## 2018-02-09 RX ORDER — CLONIDINE HYDROCHLORIDE 0.2 MG/1
TABLET ORAL
Qty: 270 TABLET | Refills: 5 | Status: SHIPPED | OUTPATIENT
Start: 2018-02-09 | End: 2018-12-24 | Stop reason: SDUPTHER

## 2018-02-09 RX ORDER — CLOPIDOGREL BISULFATE 75 MG/1
75 TABLET ORAL DAILY
Qty: 30 TABLET | Refills: 5 | Status: SHIPPED | OUTPATIENT
Start: 2018-02-09 | End: 2018-04-30 | Stop reason: SDUPTHER

## 2018-02-09 RX ORDER — DIAZEPAM 5 MG/1
TABLET ORAL
Qty: 30 TABLET | Refills: 5 | Status: SHIPPED | OUTPATIENT
Start: 2018-02-09 | End: 2018-03-11

## 2018-02-09 RX ORDER — CLOPIDOGREL BISULFATE 75 MG/1
TABLET ORAL
Qty: 30 TABLET | Refills: 0 | Status: SHIPPED | OUTPATIENT
Start: 2018-02-09 | End: 2018-04-30 | Stop reason: SDUPTHER

## 2018-02-09 RX ORDER — CLONIDINE HYDROCHLORIDE 0.2 MG/1
TABLET ORAL
Qty: 40 TABLET | Refills: 14 | Status: ON HOLD | OUTPATIENT
Start: 2018-02-09 | End: 2018-09-10

## 2018-03-09 DIAGNOSIS — I10 MALIGNANT HYPERTENSION: ICD-10-CM

## 2018-03-09 RX ORDER — ISOSORBIDE MONONITRATE 30 MG/1
TABLET, EXTENDED RELEASE ORAL
Qty: 30 TABLET | Refills: 5 | Status: SHIPPED | OUTPATIENT
Start: 2018-03-09 | End: 2018-06-15 | Stop reason: SDUPTHER

## 2018-04-12 DIAGNOSIS — R53.1 LEFT-SIDED WEAKNESS: ICD-10-CM

## 2018-04-12 RX ORDER — GABAPENTIN 300 MG/1
300 CAPSULE ORAL 3 TIMES DAILY
Qty: 90 CAPSULE | Refills: 2 | Status: SHIPPED | OUTPATIENT
Start: 2018-04-12 | End: 2018-07-06 | Stop reason: SDUPTHER

## 2018-04-30 ENCOUNTER — OFFICE VISIT (OUTPATIENT)
Dept: INTERNAL MEDICINE CLINIC | Age: 53
End: 2018-04-30

## 2018-04-30 VITALS
HEART RATE: 80 BPM | RESPIRATION RATE: 16 BRPM | WEIGHT: 206 LBS | DIASTOLIC BLOOD PRESSURE: 54 MMHG | SYSTOLIC BLOOD PRESSURE: 92 MMHG | OXYGEN SATURATION: 99 % | TEMPERATURE: 98.3 F | HEIGHT: 74 IN | BODY MASS INDEX: 26.44 KG/M2

## 2018-04-30 DIAGNOSIS — R53.1 LEFT-SIDED WEAKNESS: Primary | ICD-10-CM

## 2018-04-30 DIAGNOSIS — I42.9 CARDIOMYOPATHY (HCC): ICD-10-CM

## 2018-04-30 DIAGNOSIS — I42.9 CARDIOMYOPATHY, UNSPECIFIED TYPE (HCC): ICD-10-CM

## 2018-04-30 DIAGNOSIS — I10 MALIGNANT HYPERTENSION: ICD-10-CM

## 2018-04-30 DIAGNOSIS — Z79.4 TYPE 2 DIABETES MELLITUS WITH STAGE 3 CHRONIC KIDNEY DISEASE, WITH LONG-TERM CURRENT USE OF INSULIN (HCC): Chronic | ICD-10-CM

## 2018-04-30 DIAGNOSIS — E11.22 CKD STAGE 2 DUE TO TYPE 2 DIABETES MELLITUS (HCC): ICD-10-CM

## 2018-04-30 DIAGNOSIS — N18.30 TYPE 2 DIABETES MELLITUS WITH STAGE 3 CHRONIC KIDNEY DISEASE, WITH LONG-TERM CURRENT USE OF INSULIN (HCC): Chronic | ICD-10-CM

## 2018-04-30 DIAGNOSIS — N18.2 CKD STAGE 2 DUE TO TYPE 2 DIABETES MELLITUS (HCC): ICD-10-CM

## 2018-04-30 DIAGNOSIS — E11.22 TYPE 2 DIABETES MELLITUS WITH STAGE 3 CHRONIC KIDNEY DISEASE, WITH LONG-TERM CURRENT USE OF INSULIN (HCC): Chronic | ICD-10-CM

## 2018-04-30 PROCEDURE — 99214 OFFICE O/P EST MOD 30 MIN: CPT | Performed by: INTERNAL MEDICINE

## 2018-04-30 RX ORDER — HYDRALAZINE HYDROCHLORIDE 100 MG/1
TABLET, FILM COATED ORAL
Qty: 90 TABLET | Refills: 3 | Status: SHIPPED | OUTPATIENT
Start: 2018-04-30 | End: 2018-10-01 | Stop reason: SDUPTHER

## 2018-04-30 RX ORDER — CLOPIDOGREL BISULFATE 75 MG/1
TABLET ORAL
Qty: 90 TABLET | Refills: 3 | Status: SHIPPED | OUTPATIENT
Start: 2018-04-30 | End: 2019-02-22 | Stop reason: SDUPTHER

## 2018-04-30 ASSESSMENT — ENCOUNTER SYMPTOMS
RESPIRATORY NEGATIVE: 1
GASTROINTESTINAL NEGATIVE: 1
EYES NEGATIVE: 1

## 2018-05-02 RX ORDER — HYDRALAZINE HYDROCHLORIDE 100 MG/1
TABLET, FILM COATED ORAL
Qty: 45 TABLET | Refills: 1 | Status: ON HOLD | OUTPATIENT
Start: 2018-05-02 | End: 2018-09-10

## 2018-05-02 RX ORDER — CLOPIDOGREL BISULFATE 75 MG/1
TABLET ORAL
Qty: 30 TABLET | Refills: 0 | Status: ON HOLD | OUTPATIENT
Start: 2018-05-02 | End: 2018-09-10

## 2018-05-09 RX ORDER — VARDENAFIL HYDROCHLORIDE 20 MG/1
TABLET ORAL
Qty: 30 TABLET | Refills: 3 | Status: ON HOLD | COMMUNITY
Start: 2018-05-09 | End: 2018-09-10

## 2018-05-31 DIAGNOSIS — R53.1 LEFT-SIDED WEAKNESS: ICD-10-CM

## 2018-05-31 RX ORDER — PSEUDOEPHED/ACETAMINOPH/DIPHEN 30MG-500MG
TABLET ORAL
Qty: 120 TABLET | Refills: 3 | Status: SHIPPED | OUTPATIENT
Start: 2018-05-31 | End: 2019-01-24 | Stop reason: SDUPTHER

## 2018-06-15 DIAGNOSIS — I10 MALIGNANT HYPERTENSION: ICD-10-CM

## 2018-06-15 RX ORDER — ISOSORBIDE MONONITRATE 30 MG/1
TABLET, EXTENDED RELEASE ORAL
Qty: 30 TABLET | Refills: 5 | Status: ON HOLD | OUTPATIENT
Start: 2018-06-15 | End: 2018-09-13 | Stop reason: HOSPADM

## 2018-07-06 DIAGNOSIS — R53.1 LEFT-SIDED WEAKNESS: ICD-10-CM

## 2018-07-06 RX ORDER — GABAPENTIN 300 MG/1
300 CAPSULE ORAL 3 TIMES DAILY
Qty: 90 CAPSULE | Refills: 2 | Status: SHIPPED | OUTPATIENT
Start: 2018-07-06 | End: 2018-10-01 | Stop reason: SDUPTHER

## 2018-09-09 PROBLEM — T46.5X1A: Status: ACTIVE | Noted: 2018-09-09

## 2018-09-09 PROBLEM — R94.31 ACUTE ELECTROCARDIOGRAM CHANGES: Status: ACTIVE | Noted: 2018-09-09

## 2018-09-09 PROBLEM — T46.5X1A OVERDOSE OF ANTIHYPERTENSIVE AGENT: Status: ACTIVE | Noted: 2018-09-09

## 2018-09-10 PROBLEM — I10 UNCONTROLLED HYPERTENSION: Status: ACTIVE | Noted: 2018-09-10

## 2018-09-11 PROBLEM — I70.1 RENAL ARTERY STENOSIS (HCC): Status: ACTIVE | Noted: 2018-09-11

## 2018-09-13 ENCOUNTER — TELEPHONE (OUTPATIENT)
Dept: INTERNAL MEDICINE CLINIC | Age: 53
End: 2018-09-13

## 2018-09-13 DIAGNOSIS — I10 MALIGNANT HYPERTENSION: ICD-10-CM

## 2018-09-13 NOTE — TELEPHONE ENCOUNTER
Sindy calling in, cardiology and nephrology have agreed that it is ok for pt to be discharged.  Just a FYI      HA#[phone]

## 2018-09-14 RX ORDER — ISOSORBIDE MONONITRATE 30 MG/1
TABLET, EXTENDED RELEASE ORAL
Qty: 30 TABLET | Refills: 5 | Status: SHIPPED | OUTPATIENT
Start: 2018-09-14 | End: 2018-11-28 | Stop reason: SDUPTHER

## 2018-09-15 DIAGNOSIS — I10 MALIGNANT HYPERTENSION: ICD-10-CM

## 2018-09-17 RX ORDER — FUROSEMIDE 20 MG/1
TABLET ORAL
Qty: 17 TABLET | Refills: 42 | OUTPATIENT
Start: 2018-09-17

## 2018-11-28 DIAGNOSIS — I10 MALIGNANT HYPERTENSION: ICD-10-CM

## 2018-11-29 RX ORDER — ISOSORBIDE MONONITRATE 30 MG/1
TABLET, EXTENDED RELEASE ORAL
Qty: 90 TABLET | Refills: 1 | Status: ON HOLD | OUTPATIENT
Start: 2018-11-29 | End: 2019-02-05 | Stop reason: HOSPADM

## 2019-01-30 ENCOUNTER — HOSPITAL ENCOUNTER (OUTPATIENT)
Age: 54
Discharge: HOME | End: 2019-01-30

## 2019-01-30 ENCOUNTER — HOSPITAL ENCOUNTER (INPATIENT)
Age: 54
LOS: 6 days | Discharge: HOME | DRG: 074 | End: 2019-02-05
Attending: INTERNAL MEDICINE | Admitting: INTERNAL MEDICINE
Payer: COMMERCIAL

## 2019-01-30 VITALS
HEART RATE: 87 BPM | TEMPERATURE: 99.1 F | SYSTOLIC BLOOD PRESSURE: 160 MMHG | DIASTOLIC BLOOD PRESSURE: 78 MMHG | RESPIRATION RATE: 18 BRPM | OXYGEN SATURATION: 98 %

## 2019-01-30 PROBLEM — R11.0 NAUSEA: Status: ACTIVE | Noted: 2019-01-30

## 2019-01-30 PROBLEM — R73.9 HYPERGLYCEMIA: Status: ACTIVE | Noted: 2019-01-30

## 2019-01-30 PROBLEM — I50.32 CHRONIC DIASTOLIC CONGESTIVE HEART FAILURE (HCC): Status: ACTIVE | Noted: 2019-01-30

## 2019-01-30 LAB
A/G RATIO: 0.9 (ref 1.1–2.2)
ALBUMIN SERPL-MCNC: 3.5 G/DL (ref 3.4–5)
ALP BLD-CCNC: 88 U/L (ref 40–129)
ALT SERPL-CCNC: 10 U/L (ref 10–40)
ANION GAP SERPL CALCULATED.3IONS-SCNC: 14 MMOL/L (ref 3–16)
AST SERPL-CCNC: 9 U/L (ref 15–37)
BASOPHILS ABSOLUTE: 0 K/UL (ref 0–0.2)
BASOPHILS RELATIVE PERCENT: 0.3 %
BILIRUB SERPL-MCNC: 0.3 MG/DL (ref 0–1)
BILIRUBIN DIRECT: <0.2 MG/DL (ref 0–0.3)
BILIRUBIN, INDIRECT: NORMAL MG/DL (ref 0–1)
BUN BLDV-MCNC: 38 MG/DL (ref 7–20)
CALCIUM SERPL-MCNC: 9 MG/DL (ref 8.3–10.6)
CHLORIDE BLD-SCNC: 90 MMOL/L (ref 99–110)
CO2: 16 MMOL/L (ref 21–32)
CREAT SERPL-MCNC: 3.3 MG/DL (ref 0.9–1.3)
EOSINOPHILS ABSOLUTE: 0.2 K/UL (ref 0–0.6)
EOSINOPHILS RELATIVE PERCENT: 1.4 %
GFR AFRICAN AMERICAN: 24
GFR NON-AFRICAN AMERICAN: 20
GLOBULIN: 3.7 G/DL
GLUCOSE BLD-MCNC: 760 MG/DL (ref 70–99)
GLUCOSE BLD-MCNC: >600 MG/DL (ref 70–99)
GLUCOSE BLD-MCNC: >600 MG/DL (ref 70–99)
HCT VFR BLD CALC: 30.7 % (ref 40.5–52.5)
HEMOGLOBIN: 9.9 G/DL (ref 13.5–17.5)
LACTIC ACID: 1 MMOL/L (ref 0.4–2)
LYMPHOCYTES ABSOLUTE: 1.3 K/UL (ref 1–5.1)
LYMPHOCYTES RELATIVE PERCENT: 11 %
MAGNESIUM: 1.9 MG/DL (ref 1.8–2.4)
MCH RBC QN AUTO: 28.9 PG (ref 26–34)
MCHC RBC AUTO-ENTMCNC: 32.5 G/DL (ref 31–36)
MCV RBC AUTO: 89 FL (ref 80–100)
MONOCYTES ABSOLUTE: 0.7 K/UL (ref 0–1.3)
MONOCYTES RELATIVE PERCENT: 6.2 %
NEUTROPHILS ABSOLUTE: 9.3 K/UL (ref 1.7–7.7)
NEUTROPHILS RELATIVE PERCENT: 81.1 %
PDW BLD-RTO: 12.8 % (ref 12.4–15.4)
PERFORMED ON: ABNORMAL
PERFORMED ON: ABNORMAL
PLATELET # BLD: 206 K/UL (ref 135–450)
PMV BLD AUTO: 10.3 FL (ref 5–10.5)
POTASSIUM REFLEX MAGNESIUM: 4.4 MMOL/L (ref 3.5–5.1)
RBC # BLD: 3.44 M/UL (ref 4.2–5.9)
REASON FOR REJECTION: NORMAL
REJECTED TEST: NORMAL
SODIUM BLD-SCNC: 120 MMOL/L (ref 136–145)
TOTAL PROTEIN: 7.2 G/DL (ref 6.4–8.2)
WBC # BLD: 11.5 K/UL (ref 4–11)

## 2019-01-30 PROCEDURE — 83605 ASSAY OF LACTIC ACID: CPT

## 2019-01-30 PROCEDURE — 80053 COMPREHEN METABOLIC PANEL: CPT

## 2019-01-30 PROCEDURE — 99223 1ST HOSP IP/OBS HIGH 75: CPT | Performed by: INTERNAL MEDICINE

## 2019-01-30 PROCEDURE — 85025 COMPLETE CBC W/AUTO DIFF WBC: CPT

## 2019-01-30 PROCEDURE — 6370000000 HC RX 637 (ALT 250 FOR IP): Performed by: INTERNAL MEDICINE

## 2019-01-30 PROCEDURE — 83735 ASSAY OF MAGNESIUM: CPT

## 2019-01-30 PROCEDURE — 36415 COLL VENOUS BLD VENIPUNCTURE: CPT

## 2019-01-30 PROCEDURE — 2580000003 HC RX 258: Performed by: INTERNAL MEDICINE

## 2019-01-30 PROCEDURE — 1200000000 HC SEMI PRIVATE

## 2019-01-30 RX ORDER — INSULIN GLARGINE 100 [IU]/ML
20 INJECTION, SOLUTION SUBCUTANEOUS ONCE
Status: COMPLETED | OUTPATIENT
Start: 2019-01-30 | End: 2019-01-30

## 2019-01-30 RX ORDER — LABETALOL 200 MG/1
400 TABLET, FILM COATED ORAL EVERY 8 HOURS SCHEDULED
Status: DISCONTINUED | OUTPATIENT
Start: 2019-01-30 | End: 2019-02-01

## 2019-01-30 RX ORDER — DOXAZOSIN MESYLATE 4 MG/1
4 TABLET ORAL 2 TIMES DAILY
Status: DISCONTINUED | OUTPATIENT
Start: 2019-01-30 | End: 2019-01-31

## 2019-01-30 RX ORDER — GABAPENTIN 300 MG/1
300 CAPSULE ORAL 3 TIMES DAILY
Status: DISCONTINUED | OUTPATIENT
Start: 2019-01-30 | End: 2019-02-05 | Stop reason: HOSPADM

## 2019-01-30 RX ORDER — ACETAMINOPHEN 500 MG
500 TABLET ORAL EVERY 6 HOURS PRN
Status: DISCONTINUED | OUTPATIENT
Start: 2019-01-30 | End: 2019-02-05 | Stop reason: HOSPADM

## 2019-01-30 RX ORDER — CALCITRIOL 0.25 UG/1
0.25 CAPSULE, LIQUID FILLED ORAL DAILY
Status: DISCONTINUED | OUTPATIENT
Start: 2019-01-31 | End: 2019-02-05 | Stop reason: HOSPADM

## 2019-01-30 RX ORDER — LISINOPRIL 5 MG/1
5 TABLET ORAL DAILY
Status: DISCONTINUED | OUTPATIENT
Start: 2019-01-31 | End: 2019-01-31

## 2019-01-30 RX ORDER — HYDRALAZINE HYDROCHLORIDE 50 MG/1
100 TABLET, FILM COATED ORAL EVERY 8 HOURS SCHEDULED
Status: DISCONTINUED | OUTPATIENT
Start: 2019-01-30 | End: 2019-02-05 | Stop reason: HOSPADM

## 2019-01-30 RX ORDER — CLOPIDOGREL BISULFATE 75 MG/1
75 TABLET ORAL DAILY
Status: DISCONTINUED | OUTPATIENT
Start: 2019-01-31 | End: 2019-02-05 | Stop reason: HOSPADM

## 2019-01-30 RX ORDER — ISOSORBIDE MONONITRATE 30 MG/1
30 TABLET, EXTENDED RELEASE ORAL DAILY
Status: DISCONTINUED | OUTPATIENT
Start: 2019-01-31 | End: 2019-02-04

## 2019-01-30 RX ORDER — INSULIN GLARGINE 100 [IU]/ML
10 INJECTION, SOLUTION SUBCUTANEOUS NIGHTLY
Status: DISCONTINUED | OUTPATIENT
Start: 2019-01-30 | End: 2019-02-01

## 2019-01-30 RX ORDER — ATORVASTATIN CALCIUM 40 MG/1
40 TABLET, FILM COATED ORAL DAILY
Status: DISCONTINUED | OUTPATIENT
Start: 2019-01-31 | End: 2019-02-05 | Stop reason: HOSPADM

## 2019-01-30 RX ORDER — SODIUM CHLORIDE 9 MG/ML
INJECTION, SOLUTION INTRAVENOUS CONTINUOUS
Status: DISCONTINUED | OUTPATIENT
Start: 2019-01-30 | End: 2019-01-31

## 2019-01-30 RX ORDER — NICOTINE POLACRILEX 4 MG
15 LOZENGE BUCCAL PRN
Status: DISCONTINUED | OUTPATIENT
Start: 2019-01-30 | End: 2019-02-05 | Stop reason: HOSPADM

## 2019-01-30 RX ORDER — DEXTROSE MONOHYDRATE 50 MG/ML
100 INJECTION, SOLUTION INTRAVENOUS PRN
Status: DISCONTINUED | OUTPATIENT
Start: 2019-01-30 | End: 2019-02-05 | Stop reason: HOSPADM

## 2019-01-30 RX ORDER — CLONIDINE HYDROCHLORIDE 0.1 MG/1
0.2 TABLET ORAL 3 TIMES DAILY
Status: DISCONTINUED | OUTPATIENT
Start: 2019-01-30 | End: 2019-02-05 | Stop reason: HOSPADM

## 2019-01-30 RX ORDER — NICOTINE 21 MG/24HR
1 PATCH, TRANSDERMAL 24 HOURS TRANSDERMAL DAILY
Status: DISCONTINUED | OUTPATIENT
Start: 2019-01-30 | End: 2019-01-30

## 2019-01-30 RX ORDER — DEXTROSE MONOHYDRATE 25 G/50ML
12.5 INJECTION, SOLUTION INTRAVENOUS PRN
Status: DISCONTINUED | OUTPATIENT
Start: 2019-01-30 | End: 2019-02-05 | Stop reason: HOSPADM

## 2019-01-30 RX ORDER — SODIUM CHLORIDE 0.9 % (FLUSH) 0.9 %
10 SYRINGE (ML) INJECTION EVERY 12 HOURS SCHEDULED
Status: DISCONTINUED | OUTPATIENT
Start: 2019-01-30 | End: 2019-02-05 | Stop reason: HOSPADM

## 2019-01-30 RX ORDER — PROMETHAZINE HYDROCHLORIDE 25 MG/ML
25 INJECTION, SOLUTION INTRAMUSCULAR; INTRAVENOUS EVERY 4 HOURS PRN
Status: DISCONTINUED | OUTPATIENT
Start: 2019-01-30 | End: 2019-01-30

## 2019-01-30 RX ORDER — SODIUM CHLORIDE 0.9 % (FLUSH) 0.9 %
10 SYRINGE (ML) INJECTION PRN
Status: DISCONTINUED | OUTPATIENT
Start: 2019-01-30 | End: 2019-02-05 | Stop reason: HOSPADM

## 2019-01-30 RX ORDER — 0.9 % SODIUM CHLORIDE 0.9 %
1000 INTRAVENOUS SOLUTION INTRAVENOUS ONCE
Status: COMPLETED | OUTPATIENT
Start: 2019-01-30 | End: 2019-01-31

## 2019-01-30 RX ORDER — ONDANSETRON 2 MG/ML
4 INJECTION INTRAMUSCULAR; INTRAVENOUS EVERY 6 HOURS PRN
Status: DISCONTINUED | OUTPATIENT
Start: 2019-01-30 | End: 2019-01-31

## 2019-01-30 RX ADMIN — GABAPENTIN 300 MG: 300 CAPSULE ORAL at 23:29

## 2019-01-30 RX ADMIN — Medication 10 ML: at 23:42

## 2019-01-30 RX ADMIN — SODIUM CHLORIDE: 9 INJECTION, SOLUTION INTRAVENOUS at 23:41

## 2019-01-30 RX ADMIN — CLONIDINE HYDROCHLORIDE 0.2 MG: 0.1 TABLET ORAL at 23:40

## 2019-01-30 RX ADMIN — INSULIN LISPRO 10 UNITS: 100 INJECTION, SOLUTION INTRAVENOUS; SUBCUTANEOUS at 23:33

## 2019-01-30 RX ADMIN — HYDRALAZINE HYDROCHLORIDE 100 MG: 50 TABLET, FILM COATED ORAL at 23:28

## 2019-01-30 RX ADMIN — INSULIN LISPRO 9 UNITS: 100 INJECTION, SOLUTION INTRAVENOUS; SUBCUTANEOUS at 23:30

## 2019-01-30 RX ADMIN — LABETALOL HCL 400 MG: 200 TABLET, FILM COATED ORAL at 23:28

## 2019-01-30 RX ADMIN — SODIUM CHLORIDE 1000 ML: 9 INJECTION, SOLUTION INTRAVENOUS at 23:27

## 2019-01-30 RX ADMIN — INSULIN GLARGINE 20 UNITS: 100 INJECTION, SOLUTION SUBCUTANEOUS at 23:30

## 2019-01-30 RX ADMIN — INSULIN GLARGINE 10 UNITS: 100 INJECTION, SOLUTION SUBCUTANEOUS at 23:32

## 2019-01-31 ENCOUNTER — APPOINTMENT (OUTPATIENT)
Dept: ULTRASOUND IMAGING | Age: 54
DRG: 074 | End: 2019-01-31
Attending: INTERNAL MEDICINE
Payer: COMMERCIAL

## 2019-01-31 PROBLEM — E44.0 MODERATE MALNUTRITION (HCC): Chronic | Status: ACTIVE | Noted: 2019-01-31

## 2019-01-31 LAB
AMPHETAMINE SCREEN, URINE: POSITIVE
ANION GAP SERPL CALCULATED.3IONS-SCNC: 13 MMOL/L (ref 3–16)
ANION GAP SERPL CALCULATED.3IONS-SCNC: 13 MMOL/L (ref 3–16)
ANION GAP SERPL CALCULATED.3IONS-SCNC: 15 MMOL/L (ref 3–16)
BACTERIA: ABNORMAL /HPF
BARBITURATE SCREEN URINE: ABNORMAL
BENZODIAZEPINE SCREEN, URINE: ABNORMAL
BILIRUBIN URINE: NEGATIVE
BLOOD, URINE: NEGATIVE
BUN BLDV-MCNC: 36 MG/DL (ref 7–20)
BUN BLDV-MCNC: 36 MG/DL (ref 7–20)
BUN BLDV-MCNC: 38 MG/DL (ref 7–20)
CALCIUM SERPL-MCNC: 8.9 MG/DL (ref 8.3–10.6)
CALCIUM SERPL-MCNC: 9 MG/DL (ref 8.3–10.6)
CALCIUM SERPL-MCNC: 9.1 MG/DL (ref 8.3–10.6)
CANNABINOID SCREEN URINE: ABNORMAL
CHLORIDE BLD-SCNC: 100 MMOL/L (ref 99–110)
CHLORIDE BLD-SCNC: 101 MMOL/L (ref 99–110)
CHLORIDE BLD-SCNC: 102 MMOL/L (ref 99–110)
CHLORIDE URINE RANDOM: 29 MMOL/L
CLARITY: ABNORMAL
CO2: 19 MMOL/L (ref 21–32)
CO2: 20 MMOL/L (ref 21–32)
CO2: 21 MMOL/L (ref 21–32)
COCAINE METABOLITE SCREEN URINE: ABNORMAL
COLOR: YELLOW
CREAT SERPL-MCNC: 3.2 MG/DL (ref 0.9–1.3)
CREAT SERPL-MCNC: 3.2 MG/DL (ref 0.9–1.3)
CREAT SERPL-MCNC: 3.3 MG/DL (ref 0.9–1.3)
CREATININE URINE: 128.3 MG/DL (ref 39–259)
EPITHELIAL CELLS, UA: 1 /HPF (ref 0–5)
FERRITIN: 507.5 NG/ML (ref 30–400)
FOLATE: 15.91 NG/ML (ref 4.78–24.2)
GFR AFRICAN AMERICAN: 24
GFR AFRICAN AMERICAN: 25
GFR AFRICAN AMERICAN: 25
GFR NON-AFRICAN AMERICAN: 20
GLUCOSE BLD-MCNC: 253 MG/DL (ref 70–99)
GLUCOSE BLD-MCNC: 258 MG/DL (ref 70–99)
GLUCOSE BLD-MCNC: 266 MG/DL (ref 70–99)
GLUCOSE BLD-MCNC: 277 MG/DL (ref 70–99)
GLUCOSE BLD-MCNC: 278 MG/DL (ref 70–99)
GLUCOSE BLD-MCNC: 292 MG/DL (ref 70–99)
GLUCOSE BLD-MCNC: 372 MG/DL (ref 70–99)
GLUCOSE BLD-MCNC: 391 MG/DL (ref 70–99)
GLUCOSE URINE: 250 MG/DL
HYALINE CASTS: 10 /LPF (ref 0–8)
IRON SATURATION: 18 % (ref 20–50)
IRON: 44 UG/DL (ref 59–158)
KETONES, URINE: NEGATIVE MG/DL
LEUKOCYTE ESTERASE, URINE: NEGATIVE
Lab: ABNORMAL
METHADONE SCREEN, URINE: ABNORMAL
MICROSCOPIC EXAMINATION: YES
NITRITE, URINE: NEGATIVE
OPIATE SCREEN URINE: ABNORMAL
OXYCODONE URINE: ABNORMAL
PERFORMED ON: ABNORMAL
PH UA: 5
PH UA: 5
PHENCYCLIDINE SCREEN URINE: ABNORMAL
POTASSIUM SERPL-SCNC: 3.5 MMOL/L (ref 3.5–5.1)
POTASSIUM SERPL-SCNC: 3.9 MMOL/L (ref 3.5–5.1)
POTASSIUM SERPL-SCNC: 4 MMOL/L (ref 3.5–5.1)
POTASSIUM, UR: 21.5 MMOL/L
PROPOXYPHENE SCREEN: ABNORMAL
PROTEIN PROTEIN: 346 MG/DL
PROTEIN UA: >=300 MG/DL
PROTEIN/CREAT RATIO: 2.7 MG/DL
RBC UA: 0 /HPF (ref 0–4)
SODIUM BLD-SCNC: 133 MMOL/L (ref 136–145)
SODIUM BLD-SCNC: 134 MMOL/L (ref 136–145)
SODIUM BLD-SCNC: 134 MMOL/L (ref 136–145)
SODIUM BLD-SCNC: 136 MMOL/L (ref 136–145)
SODIUM URINE: 38 MMOL/L
SPECIFIC GRAVITY UA: 1.02
TOTAL IRON BINDING CAPACITY: 238 UG/DL (ref 260–445)
UROBILINOGEN, URINE: 0.2 E.U./DL
VITAMIN B-12: 685 PG/ML (ref 211–911)
WBC UA: 6 /HPF (ref 0–5)

## 2019-01-31 PROCEDURE — 84300 ASSAY OF URINE SODIUM: CPT

## 2019-01-31 PROCEDURE — 80048 BASIC METABOLIC PNL TOTAL CA: CPT

## 2019-01-31 PROCEDURE — 6370000000 HC RX 637 (ALT 250 FOR IP): Performed by: INTERNAL MEDICINE

## 2019-01-31 PROCEDURE — 36415 COLL VENOUS BLD VENIPUNCTURE: CPT

## 2019-01-31 PROCEDURE — 6360000002 HC RX W HCPCS: Performed by: INTERNAL MEDICINE

## 2019-01-31 PROCEDURE — 83540 ASSAY OF IRON: CPT

## 2019-01-31 PROCEDURE — 94760 N-INVAS EAR/PLS OXIMETRY 1: CPT

## 2019-01-31 PROCEDURE — 82436 ASSAY OF URINE CHLORIDE: CPT

## 2019-01-31 PROCEDURE — 2580000003 HC RX 258: Performed by: INTERNAL MEDICINE

## 2019-01-31 PROCEDURE — 81001 URINALYSIS AUTO W/SCOPE: CPT

## 2019-01-31 PROCEDURE — 82728 ASSAY OF FERRITIN: CPT

## 2019-01-31 PROCEDURE — 82746 ASSAY OF FOLIC ACID SERUM: CPT

## 2019-01-31 PROCEDURE — 83935 ASSAY OF URINE OSMOLALITY: CPT

## 2019-01-31 PROCEDURE — 99233 SBSQ HOSP IP/OBS HIGH 50: CPT | Performed by: INTERNAL MEDICINE

## 2019-01-31 PROCEDURE — 84156 ASSAY OF PROTEIN URINE: CPT

## 2019-01-31 PROCEDURE — 82570 ASSAY OF URINE CREATININE: CPT

## 2019-01-31 PROCEDURE — 84133 ASSAY OF URINE POTASSIUM: CPT

## 2019-01-31 PROCEDURE — 80307 DRUG TEST PRSMV CHEM ANLYZR: CPT

## 2019-01-31 PROCEDURE — 76770 US EXAM ABDO BACK WALL COMP: CPT

## 2019-01-31 PROCEDURE — 1200000000 HC SEMI PRIVATE

## 2019-01-31 PROCEDURE — 83550 IRON BINDING TEST: CPT

## 2019-01-31 PROCEDURE — 84295 ASSAY OF SERUM SODIUM: CPT

## 2019-01-31 PROCEDURE — 82607 VITAMIN B-12: CPT

## 2019-01-31 RX ORDER — ONDANSETRON 2 MG/ML
4 INJECTION INTRAMUSCULAR; INTRAVENOUS EVERY 6 HOURS PRN
Status: DISCONTINUED | OUTPATIENT
Start: 2019-01-31 | End: 2019-02-05 | Stop reason: HOSPADM

## 2019-01-31 RX ORDER — SODIUM CHLORIDE 450 MG/100ML
INJECTION, SOLUTION INTRAVENOUS CONTINUOUS
Status: DISCONTINUED | OUTPATIENT
Start: 2019-01-31 | End: 2019-02-01

## 2019-01-31 RX ORDER — AMLODIPINE BESYLATE 5 MG/1
5 TABLET ORAL DAILY
Status: DISCONTINUED | OUTPATIENT
Start: 2019-01-31 | End: 2019-02-01

## 2019-01-31 RX ADMIN — Medication 10 ML: at 20:58

## 2019-01-31 RX ADMIN — INSULIN LISPRO 15 UNITS: 100 INJECTION, SOLUTION INTRAVENOUS; SUBCUTANEOUS at 18:21

## 2019-01-31 RX ADMIN — CALCITRIOL 0.25 MCG: 0.25 CAPSULE ORAL at 09:28

## 2019-01-31 RX ADMIN — LABETALOL HCL 400 MG: 200 TABLET, FILM COATED ORAL at 17:28

## 2019-01-31 RX ADMIN — SODIUM CHLORIDE 125 ML/HR: 9 INJECTION, SOLUTION INTRAVENOUS at 09:26

## 2019-01-31 RX ADMIN — CLOPIDOGREL BISULFATE 75 MG: 75 TABLET ORAL at 09:28

## 2019-01-31 RX ADMIN — ONDANSETRON 4 MG: 2 INJECTION INTRAMUSCULAR; INTRAVENOUS at 11:28

## 2019-01-31 RX ADMIN — ISOSORBIDE MONONITRATE 30 MG: 30 TABLET, EXTENDED RELEASE ORAL at 09:27

## 2019-01-31 RX ADMIN — SODIUM CHLORIDE 100 ML/HR: 4.5 INJECTION, SOLUTION INTRAVENOUS at 18:20

## 2019-01-31 RX ADMIN — CLONIDINE HYDROCHLORIDE 0.2 MG: 0.1 TABLET ORAL at 17:26

## 2019-01-31 RX ADMIN — HYDRALAZINE HYDROCHLORIDE 100 MG: 50 TABLET, FILM COATED ORAL at 05:20

## 2019-01-31 RX ADMIN — INSULIN LISPRO 9 UNITS: 100 INJECTION, SOLUTION INTRAVENOUS; SUBCUTANEOUS at 09:28

## 2019-01-31 RX ADMIN — HYDRALAZINE HYDROCHLORIDE 100 MG: 50 TABLET, FILM COATED ORAL at 17:27

## 2019-01-31 RX ADMIN — PROMETHAZINE HYDROCHLORIDE 25 MG: 25 INJECTION INTRAMUSCULAR; INTRAVENOUS at 13:40

## 2019-01-31 RX ADMIN — GABAPENTIN 300 MG: 300 CAPSULE ORAL at 20:55

## 2019-01-31 RX ADMIN — CLONIDINE HYDROCHLORIDE 0.2 MG: 0.1 TABLET ORAL at 20:55

## 2019-01-31 RX ADMIN — LABETALOL HCL 400 MG: 200 TABLET, FILM COATED ORAL at 05:20

## 2019-01-31 RX ADMIN — AMLODIPINE BESYLATE 5 MG: 5 TABLET ORAL at 18:20

## 2019-01-31 RX ADMIN — ATORVASTATIN CALCIUM 40 MG: 40 TABLET, FILM COATED ORAL at 09:27

## 2019-01-31 RX ADMIN — LABETALOL HCL 400 MG: 200 TABLET, FILM COATED ORAL at 20:55

## 2019-01-31 RX ADMIN — GABAPENTIN 300 MG: 300 CAPSULE ORAL at 17:27

## 2019-01-31 RX ADMIN — PROMETHAZINE HYDROCHLORIDE 25 MG: 25 INJECTION INTRAMUSCULAR; INTRAVENOUS at 05:52

## 2019-01-31 RX ADMIN — INSULIN LISPRO 9 UNITS: 100 INJECTION, SOLUTION INTRAVENOUS; SUBCUTANEOUS at 13:39

## 2019-01-31 RX ADMIN — GABAPENTIN 300 MG: 300 CAPSULE ORAL at 09:27

## 2019-01-31 RX ADMIN — ONDANSETRON 4 MG: 2 INJECTION INTRAMUSCULAR; INTRAVENOUS at 21:10

## 2019-01-31 RX ADMIN — HYDRALAZINE HYDROCHLORIDE 100 MG: 50 TABLET, FILM COATED ORAL at 20:55

## 2019-01-31 RX ADMIN — LINAGLIPTIN 5 MG: 5 TABLET, FILM COATED ORAL at 09:27

## 2019-01-31 RX ADMIN — CLONIDINE HYDROCHLORIDE 0.2 MG: 0.1 TABLET ORAL at 09:28

## 2019-01-31 RX ADMIN — INSULIN LISPRO 5 UNITS: 100 INJECTION, SOLUTION INTRAVENOUS; SUBCUTANEOUS at 21:02

## 2019-01-31 RX ADMIN — INSULIN GLARGINE 10 UNITS: 100 INJECTION, SOLUTION SUBCUTANEOUS at 21:02

## 2019-01-31 RX ADMIN — LISINOPRIL 5 MG: 5 TABLET ORAL at 09:28

## 2019-01-31 RX ADMIN — ENOXAPARIN SODIUM 30 MG: 30 INJECTION SUBCUTANEOUS at 09:27

## 2019-01-31 RX ADMIN — DOXAZOSIN 4 MG: 4 TABLET ORAL at 09:27

## 2019-02-01 LAB
ALBUMIN SERPL-MCNC: 3.4 G/DL (ref 3.4–5)
ANION GAP SERPL CALCULATED.3IONS-SCNC: 11 MMOL/L (ref 3–16)
ANION GAP SERPL CALCULATED.3IONS-SCNC: 11 MMOL/L (ref 3–16)
ANION GAP SERPL CALCULATED.3IONS-SCNC: 12 MMOL/L (ref 3–16)
ANION GAP SERPL CALCULATED.3IONS-SCNC: 12 MMOL/L (ref 3–16)
ANION GAP SERPL CALCULATED.3IONS-SCNC: 13 MMOL/L (ref 3–16)
BILIRUBIN URINE: NEGATIVE
BLOOD, URINE: NEGATIVE
BUN BLDV-MCNC: 34 MG/DL (ref 7–20)
BUN BLDV-MCNC: 35 MG/DL (ref 7–20)
BUN BLDV-MCNC: 37 MG/DL (ref 7–20)
CALCIUM SERPL-MCNC: 8.6 MG/DL (ref 8.3–10.6)
CALCIUM SERPL-MCNC: 8.7 MG/DL (ref 8.3–10.6)
CALCIUM SERPL-MCNC: 8.7 MG/DL (ref 8.3–10.6)
CALCIUM SERPL-MCNC: 8.8 MG/DL (ref 8.3–10.6)
CALCIUM SERPL-MCNC: 9 MG/DL (ref 8.3–10.6)
CASTS UA: ABNORMAL /LPF
CHLORIDE BLD-SCNC: 103 MMOL/L (ref 99–110)
CHLORIDE BLD-SCNC: 104 MMOL/L (ref 99–110)
CHLORIDE BLD-SCNC: 104 MMOL/L (ref 99–110)
CHLORIDE URINE RANDOM: <20 MMOL/L
CLARITY: ABNORMAL
CO2: 18 MMOL/L (ref 21–32)
CO2: 18 MMOL/L (ref 21–32)
CO2: 21 MMOL/L (ref 21–32)
CO2: 21 MMOL/L (ref 21–32)
CO2: 22 MMOL/L (ref 21–32)
COLOR: YELLOW
COMMENT UA: ABNORMAL
CREAT SERPL-MCNC: 3 MG/DL (ref 0.9–1.3)
CREAT SERPL-MCNC: 3.1 MG/DL (ref 0.9–1.3)
EPITHELIAL CELLS, UA: ABNORMAL /HPF
ESTIMATED AVERAGE GLUCOSE: 372.3 MG/DL
FERRITIN: 358.1 NG/ML (ref 30–400)
GFR AFRICAN AMERICAN: 26
GFR AFRICAN AMERICAN: 27
GFR NON-AFRICAN AMERICAN: 21
GFR NON-AFRICAN AMERICAN: 22
GLUCOSE BLD-MCNC: 120 MG/DL (ref 70–99)
GLUCOSE BLD-MCNC: 125 MG/DL (ref 70–99)
GLUCOSE BLD-MCNC: 192 MG/DL (ref 70–99)
GLUCOSE BLD-MCNC: 216 MG/DL (ref 70–99)
GLUCOSE BLD-MCNC: 303 MG/DL (ref 70–99)
GLUCOSE BLD-MCNC: 304 MG/DL (ref 70–99)
GLUCOSE BLD-MCNC: 326 MG/DL (ref 70–99)
GLUCOSE BLD-MCNC: 350 MG/DL (ref 70–99)
GLUCOSE BLD-MCNC: 353 MG/DL (ref 70–99)
GLUCOSE URINE: 500 MG/DL
HBA1C MFR BLD: 14.6 %
KETONES, URINE: NEGATIVE MG/DL
LEUKOCYTE ESTERASE, URINE: NEGATIVE
MICROSCOPIC EXAMINATION: YES
NITRITE, URINE: NEGATIVE
OSMOLALITY URINE: 267 MOSM/KG (ref 390–1070)
OSMOLALITY URINE: 295 MOSM/KG (ref 390–1070)
PARATHYROID HORMONE INTACT: 49.8 PG/ML (ref 14–72)
PERFORMED ON: ABNORMAL
PH UA: 5
PHOSPHORUS: 4 MG/DL (ref 2.5–4.9)
POTASSIUM SERPL-SCNC: 3.8 MMOL/L (ref 3.5–5.1)
POTASSIUM SERPL-SCNC: 3.9 MMOL/L (ref 3.5–5.1)
POTASSIUM SERPL-SCNC: 4 MMOL/L (ref 3.5–5.1)
POTASSIUM SERPL-SCNC: 4.2 MMOL/L (ref 3.5–5.1)
POTASSIUM SERPL-SCNC: 4.2 MMOL/L (ref 3.5–5.1)
POTASSIUM, UR: 11.8 MMOL/L
PROTEIN UA: >=300 MG/DL
RBC UA: ABNORMAL /HPF (ref 0–2)
SODIUM BLD-SCNC: 134 MMOL/L (ref 136–145)
SODIUM BLD-SCNC: 134 MMOL/L (ref 136–145)
SODIUM BLD-SCNC: 135 MMOL/L (ref 136–145)
SODIUM BLD-SCNC: 136 MMOL/L (ref 136–145)
SODIUM BLD-SCNC: 137 MMOL/L (ref 136–145)
SODIUM BLD-SCNC: 139 MMOL/L (ref 136–145)
SODIUM URINE: 32 MMOL/L
SPECIFIC GRAVITY UA: 1.01
URINE TYPE: ABNORMAL
UROBILINOGEN, URINE: 0.2 E.U./DL
VITAMIN D 25-HYDROXY: 23.7 NG/ML
WBC UA: ABNORMAL /HPF (ref 0–5)

## 2019-02-01 PROCEDURE — 97530 THERAPEUTIC ACTIVITIES: CPT

## 2019-02-01 PROCEDURE — 97116 GAIT TRAINING THERAPY: CPT

## 2019-02-01 PROCEDURE — 99233 SBSQ HOSP IP/OBS HIGH 50: CPT | Performed by: INTERNAL MEDICINE

## 2019-02-01 PROCEDURE — 97162 PT EVAL MOD COMPLEX 30 MIN: CPT

## 2019-02-01 PROCEDURE — 97166 OT EVAL MOD COMPLEX 45 MIN: CPT

## 2019-02-01 PROCEDURE — 82306 VITAMIN D 25 HYDROXY: CPT

## 2019-02-01 PROCEDURE — 6370000000 HC RX 637 (ALT 250 FOR IP): Performed by: INTERNAL MEDICINE

## 2019-02-01 PROCEDURE — 83935 ASSAY OF URINE OSMOLALITY: CPT

## 2019-02-01 PROCEDURE — G8988 SELF CARE GOAL STATUS: HCPCS

## 2019-02-01 PROCEDURE — 82436 ASSAY OF URINE CHLORIDE: CPT

## 2019-02-01 PROCEDURE — 6360000002 HC RX W HCPCS: Performed by: INTERNAL MEDICINE

## 2019-02-01 PROCEDURE — 83970 ASSAY OF PARATHORMONE: CPT

## 2019-02-01 PROCEDURE — 2580000003 HC RX 258: Performed by: INTERNAL MEDICINE

## 2019-02-01 PROCEDURE — 36415 COLL VENOUS BLD VENIPUNCTURE: CPT

## 2019-02-01 PROCEDURE — G8987 SELF CARE CURRENT STATUS: HCPCS

## 2019-02-01 PROCEDURE — 82728 ASSAY OF FERRITIN: CPT

## 2019-02-01 PROCEDURE — 81001 URINALYSIS AUTO W/SCOPE: CPT

## 2019-02-01 PROCEDURE — 83036 HEMOGLOBIN GLYCOSYLATED A1C: CPT

## 2019-02-01 PROCEDURE — 94760 N-INVAS EAR/PLS OXIMETRY 1: CPT

## 2019-02-01 PROCEDURE — 1200000000 HC SEMI PRIVATE

## 2019-02-01 PROCEDURE — 97535 SELF CARE MNGMENT TRAINING: CPT

## 2019-02-01 PROCEDURE — 84295 ASSAY OF SERUM SODIUM: CPT

## 2019-02-01 PROCEDURE — 84133 ASSAY OF URINE POTASSIUM: CPT

## 2019-02-01 PROCEDURE — 80069 RENAL FUNCTION PANEL: CPT

## 2019-02-01 PROCEDURE — 84300 ASSAY OF URINE SODIUM: CPT

## 2019-02-01 RX ORDER — AMLODIPINE BESYLATE 10 MG/1
10 TABLET ORAL DAILY
Status: DISCONTINUED | OUTPATIENT
Start: 2019-02-01 | End: 2019-02-04

## 2019-02-01 RX ORDER — METOPROLOL TARTRATE 50 MG/1
50 TABLET, FILM COATED ORAL 2 TIMES DAILY
Status: DISCONTINUED | OUTPATIENT
Start: 2019-02-01 | End: 2019-02-05 | Stop reason: HOSPADM

## 2019-02-01 RX ORDER — INSULIN GLARGINE 100 [IU]/ML
24 INJECTION, SOLUTION SUBCUTANEOUS NIGHTLY
Status: DISCONTINUED | OUTPATIENT
Start: 2019-02-01 | End: 2019-02-02

## 2019-02-01 RX ORDER — TAMSULOSIN HYDROCHLORIDE 0.4 MG/1
0.4 CAPSULE ORAL NIGHTLY
Status: DISCONTINUED | OUTPATIENT
Start: 2019-02-01 | End: 2019-02-05 | Stop reason: HOSPADM

## 2019-02-01 RX ORDER — LOSARTAN POTASSIUM 25 MG/1
25 TABLET ORAL DAILY
Status: DISCONTINUED | OUTPATIENT
Start: 2019-02-01 | End: 2019-02-02

## 2019-02-01 RX ADMIN — METOPROLOL TARTRATE 50 MG: 50 TABLET ORAL at 11:00

## 2019-02-01 RX ADMIN — CLONIDINE HYDROCHLORIDE 0.2 MG: 0.1 TABLET ORAL at 20:45

## 2019-02-01 RX ADMIN — GABAPENTIN 300 MG: 300 CAPSULE ORAL at 20:45

## 2019-02-01 RX ADMIN — INSULIN GLARGINE 24 UNITS: 100 INJECTION, SOLUTION SUBCUTANEOUS at 20:45

## 2019-02-01 RX ADMIN — INSULIN LISPRO 8 UNITS: 100 INJECTION, SOLUTION INTRAVENOUS; SUBCUTANEOUS at 16:34

## 2019-02-01 RX ADMIN — SODIUM CHLORIDE: 4.5 INJECTION, SOLUTION INTRAVENOUS at 04:16

## 2019-02-01 RX ADMIN — AMLODIPINE BESYLATE 10 MG: 10 TABLET ORAL at 11:00

## 2019-02-01 RX ADMIN — INSULIN LISPRO 8 UNITS: 100 INJECTION, SOLUTION INTRAVENOUS; SUBCUTANEOUS at 12:12

## 2019-02-01 RX ADMIN — GABAPENTIN 300 MG: 300 CAPSULE ORAL at 14:46

## 2019-02-01 RX ADMIN — LINAGLIPTIN 5 MG: 5 TABLET, FILM COATED ORAL at 08:08

## 2019-02-01 RX ADMIN — INSULIN LISPRO 8 UNITS: 100 INJECTION, SOLUTION INTRAVENOUS; SUBCUTANEOUS at 10:18

## 2019-02-01 RX ADMIN — CLONIDINE HYDROCHLORIDE 0.2 MG: 0.1 TABLET ORAL at 08:08

## 2019-02-01 RX ADMIN — METOPROLOL TARTRATE 50 MG: 50 TABLET ORAL at 20:46

## 2019-02-01 RX ADMIN — TAMSULOSIN HYDROCHLORIDE 0.4 MG: 0.4 CAPSULE ORAL at 20:45

## 2019-02-01 RX ADMIN — HYDRALAZINE HYDROCHLORIDE 100 MG: 50 TABLET, FILM COATED ORAL at 14:46

## 2019-02-01 RX ADMIN — ONDANSETRON 4 MG: 2 INJECTION INTRAMUSCULAR; INTRAVENOUS at 14:47

## 2019-02-01 RX ADMIN — CALCITRIOL 0.25 MCG: 0.25 CAPSULE ORAL at 08:08

## 2019-02-01 RX ADMIN — CLOPIDOGREL BISULFATE 75 MG: 75 TABLET ORAL at 08:08

## 2019-02-01 RX ADMIN — CLONIDINE HYDROCHLORIDE 0.2 MG: 0.1 TABLET ORAL at 14:46

## 2019-02-01 RX ADMIN — LOSARTAN POTASSIUM 25 MG: 25 TABLET ORAL at 11:00

## 2019-02-01 RX ADMIN — INSULIN LISPRO 1 UNITS: 100 INJECTION, SOLUTION INTRAVENOUS; SUBCUTANEOUS at 20:45

## 2019-02-01 RX ADMIN — IRON SUCROSE 200 MG: 20 INJECTION, SOLUTION INTRAVENOUS at 11:12

## 2019-02-01 RX ADMIN — HYDRALAZINE HYDROCHLORIDE 100 MG: 50 TABLET, FILM COATED ORAL at 20:45

## 2019-02-01 RX ADMIN — INSULIN LISPRO 12 UNITS: 100 INJECTION, SOLUTION INTRAVENOUS; SUBCUTANEOUS at 08:04

## 2019-02-01 RX ADMIN — ATORVASTATIN CALCIUM 40 MG: 40 TABLET, FILM COATED ORAL at 08:08

## 2019-02-01 RX ADMIN — GABAPENTIN 300 MG: 300 CAPSULE ORAL at 08:08

## 2019-02-01 RX ADMIN — Medication 10 ML: at 20:50

## 2019-02-01 RX ADMIN — INSULIN LISPRO 8 UNITS: 100 INJECTION, SOLUTION INTRAVENOUS; SUBCUTANEOUS at 12:10

## 2019-02-01 RX ADMIN — ENOXAPARIN SODIUM 30 MG: 30 INJECTION SUBCUTANEOUS at 08:09

## 2019-02-01 RX ADMIN — HYDRALAZINE HYDROCHLORIDE 100 MG: 50 TABLET, FILM COATED ORAL at 06:21

## 2019-02-01 RX ADMIN — LABETALOL HCL 400 MG: 200 TABLET, FILM COATED ORAL at 06:21

## 2019-02-01 RX ADMIN — AMLODIPINE BESYLATE 5 MG: 5 TABLET ORAL at 08:08

## 2019-02-01 RX ADMIN — ISOSORBIDE MONONITRATE 30 MG: 30 TABLET, EXTENDED RELEASE ORAL at 08:09

## 2019-02-01 ASSESSMENT — PAIN SCALES - GENERAL: PAINLEVEL_OUTOF10: 0

## 2019-02-02 LAB
ANION GAP SERPL CALCULATED.3IONS-SCNC: 11 MMOL/L (ref 3–16)
ANION GAP SERPL CALCULATED.3IONS-SCNC: 12 MMOL/L (ref 3–16)
ANION GAP SERPL CALCULATED.3IONS-SCNC: 13 MMOL/L (ref 3–16)
ANION GAP SERPL CALCULATED.3IONS-SCNC: 15 MMOL/L (ref 3–16)
BUN BLDV-MCNC: 33 MG/DL (ref 7–20)
BUN BLDV-MCNC: 35 MG/DL (ref 7–20)
BUN BLDV-MCNC: 36 MG/DL (ref 7–20)
BUN BLDV-MCNC: 36 MG/DL (ref 7–20)
CALCIUM SERPL-MCNC: 8.7 MG/DL (ref 8.3–10.6)
CALCIUM SERPL-MCNC: 8.7 MG/DL (ref 8.3–10.6)
CALCIUM SERPL-MCNC: 8.9 MG/DL (ref 8.3–10.6)
CALCIUM SERPL-MCNC: 9 MG/DL (ref 8.3–10.6)
CHLORIDE BLD-SCNC: 104 MMOL/L (ref 99–110)
CHLORIDE BLD-SCNC: 105 MMOL/L (ref 99–110)
CHLORIDE BLD-SCNC: 105 MMOL/L (ref 99–110)
CHLORIDE BLD-SCNC: 107 MMOL/L (ref 99–110)
CO2: 19 MMOL/L (ref 21–32)
CO2: 19 MMOL/L (ref 21–32)
CO2: 21 MMOL/L (ref 21–32)
CO2: 22 MMOL/L (ref 21–32)
CREAT SERPL-MCNC: 2.8 MG/DL (ref 0.9–1.3)
CREAT SERPL-MCNC: 3 MG/DL (ref 0.9–1.3)
CREAT SERPL-MCNC: 3.2 MG/DL (ref 0.9–1.3)
CREAT SERPL-MCNC: 3.2 MG/DL (ref 0.9–1.3)
GFR AFRICAN AMERICAN: 25
GFR AFRICAN AMERICAN: 25
GFR AFRICAN AMERICAN: 27
GFR AFRICAN AMERICAN: 29
GFR NON-AFRICAN AMERICAN: 20
GFR NON-AFRICAN AMERICAN: 20
GFR NON-AFRICAN AMERICAN: 22
GFR NON-AFRICAN AMERICAN: 24
GLUCOSE BLD-MCNC: 209 MG/DL (ref 70–99)
GLUCOSE BLD-MCNC: 217 MG/DL (ref 70–99)
GLUCOSE BLD-MCNC: 233 MG/DL (ref 70–99)
GLUCOSE BLD-MCNC: 252 MG/DL (ref 70–99)
GLUCOSE BLD-MCNC: 274 MG/DL (ref 70–99)
GLUCOSE BLD-MCNC: 275 MG/DL (ref 70–99)
GLUCOSE BLD-MCNC: 296 MG/DL (ref 70–99)
GLUCOSE BLD-MCNC: 389 MG/DL (ref 70–99)
GLUCOSE BLD-MCNC: 392 MG/DL (ref 70–99)
GLUCOSE BLD-MCNC: 411 MG/DL (ref 70–99)
GLUCOSE BLD-MCNC: 62 MG/DL (ref 70–99)
PERFORMED ON: ABNORMAL
POTASSIUM SERPL-SCNC: 3.9 MMOL/L (ref 3.5–5.1)
POTASSIUM SERPL-SCNC: 4 MMOL/L (ref 3.5–5.1)
POTASSIUM SERPL-SCNC: 4 MMOL/L (ref 3.5–5.1)
POTASSIUM SERPL-SCNC: 4.1 MMOL/L (ref 3.5–5.1)
PRO-BNP: 1337 PG/ML (ref 0–124)
SODIUM BLD-SCNC: 135 MMOL/L (ref 136–145)
SODIUM BLD-SCNC: 136 MMOL/L (ref 136–145)
SODIUM BLD-SCNC: 137 MMOL/L (ref 136–145)
SODIUM BLD-SCNC: 137 MMOL/L (ref 136–145)
SODIUM BLD-SCNC: 139 MMOL/L (ref 136–145)
SODIUM BLD-SCNC: 140 MMOL/L (ref 136–145)
SODIUM BLD-SCNC: 141 MMOL/L (ref 136–145)
URIC ACID, SERUM: 7.8 MG/DL (ref 3.5–7.2)

## 2019-02-02 PROCEDURE — 6370000000 HC RX 637 (ALT 250 FOR IP): Performed by: INTERNAL MEDICINE

## 2019-02-02 PROCEDURE — 36415 COLL VENOUS BLD VENIPUNCTURE: CPT

## 2019-02-02 PROCEDURE — 94760 N-INVAS EAR/PLS OXIMETRY 1: CPT

## 2019-02-02 PROCEDURE — 80048 BASIC METABOLIC PNL TOTAL CA: CPT

## 2019-02-02 PROCEDURE — 51798 US URINE CAPACITY MEASURE: CPT

## 2019-02-02 PROCEDURE — 6360000002 HC RX W HCPCS: Performed by: INTERNAL MEDICINE

## 2019-02-02 PROCEDURE — 99233 SBSQ HOSP IP/OBS HIGH 50: CPT | Performed by: INTERNAL MEDICINE

## 2019-02-02 PROCEDURE — 84295 ASSAY OF SERUM SODIUM: CPT

## 2019-02-02 PROCEDURE — 1200000000 HC SEMI PRIVATE

## 2019-02-02 PROCEDURE — 83880 ASSAY OF NATRIURETIC PEPTIDE: CPT

## 2019-02-02 PROCEDURE — 2580000003 HC RX 258: Performed by: INTERNAL MEDICINE

## 2019-02-02 PROCEDURE — 84550 ASSAY OF BLOOD/URIC ACID: CPT

## 2019-02-02 RX ORDER — INSULIN GLARGINE 100 [IU]/ML
30 INJECTION, SOLUTION SUBCUTANEOUS NIGHTLY
Status: DISCONTINUED | OUTPATIENT
Start: 2019-02-02 | End: 2019-02-05 | Stop reason: HOSPADM

## 2019-02-02 RX ORDER — LOSARTAN POTASSIUM 50 MG/1
50 TABLET ORAL DAILY
Status: DISCONTINUED | OUTPATIENT
Start: 2019-02-03 | End: 2019-02-03

## 2019-02-02 RX ADMIN — INSULIN LISPRO 8 UNITS: 100 INJECTION, SOLUTION INTRAVENOUS; SUBCUTANEOUS at 08:44

## 2019-02-02 RX ADMIN — IRON SUCROSE 200 MG: 20 INJECTION, SOLUTION INTRAVENOUS at 13:02

## 2019-02-02 RX ADMIN — MAGNESIUM HYDROXIDE 30 ML: 400 SUSPENSION ORAL at 21:38

## 2019-02-02 RX ADMIN — HYDRALAZINE HYDROCHLORIDE 100 MG: 50 TABLET, FILM COATED ORAL at 05:03

## 2019-02-02 RX ADMIN — CLONIDINE HYDROCHLORIDE 0.2 MG: 0.1 TABLET ORAL at 13:02

## 2019-02-02 RX ADMIN — ONDANSETRON 4 MG: 2 INJECTION INTRAMUSCULAR; INTRAVENOUS at 05:03

## 2019-02-02 RX ADMIN — INSULIN LISPRO 12 UNITS: 100 INJECTION, SOLUTION INTRAVENOUS; SUBCUTANEOUS at 08:41

## 2019-02-02 RX ADMIN — INSULIN LISPRO 10 UNITS: 100 INJECTION, SOLUTION INTRAVENOUS; SUBCUTANEOUS at 16:33

## 2019-02-02 RX ADMIN — INSULIN LISPRO 3 UNITS: 100 INJECTION, SOLUTION INTRAVENOUS; SUBCUTANEOUS at 21:39

## 2019-02-02 RX ADMIN — CLOPIDOGREL BISULFATE 75 MG: 75 TABLET ORAL at 08:54

## 2019-02-02 RX ADMIN — Medication 10 ML: at 21:40

## 2019-02-02 RX ADMIN — ENOXAPARIN SODIUM 30 MG: 30 INJECTION SUBCUTANEOUS at 08:54

## 2019-02-02 RX ADMIN — MAGNESIUM HYDROXIDE 30 ML: 400 SUSPENSION ORAL at 14:25

## 2019-02-02 RX ADMIN — GABAPENTIN 300 MG: 300 CAPSULE ORAL at 08:54

## 2019-02-02 RX ADMIN — METOPROLOL TARTRATE 50 MG: 50 TABLET ORAL at 21:39

## 2019-02-02 RX ADMIN — Medication 10 ML: at 08:42

## 2019-02-02 RX ADMIN — TAMSULOSIN HYDROCHLORIDE 0.4 MG: 0.4 CAPSULE ORAL at 21:39

## 2019-02-02 RX ADMIN — GABAPENTIN 300 MG: 300 CAPSULE ORAL at 13:02

## 2019-02-02 RX ADMIN — INSULIN LISPRO 10 UNITS: 100 INJECTION, SOLUTION INTRAVENOUS; SUBCUTANEOUS at 12:54

## 2019-02-02 RX ADMIN — INSULIN LISPRO 4 UNITS: 100 INJECTION, SOLUTION INTRAVENOUS; SUBCUTANEOUS at 12:51

## 2019-02-02 RX ADMIN — ATORVASTATIN CALCIUM 40 MG: 40 TABLET, FILM COATED ORAL at 08:54

## 2019-02-02 RX ADMIN — INSULIN GLARGINE 30 UNITS: 100 INJECTION, SOLUTION SUBCUTANEOUS at 21:39

## 2019-02-02 RX ADMIN — INSULIN LISPRO 4 UNITS: 100 INJECTION, SOLUTION INTRAVENOUS; SUBCUTANEOUS at 16:33

## 2019-02-02 RX ADMIN — ISOSORBIDE MONONITRATE 30 MG: 30 TABLET, EXTENDED RELEASE ORAL at 08:54

## 2019-02-02 RX ADMIN — GABAPENTIN 300 MG: 300 CAPSULE ORAL at 21:38

## 2019-02-02 RX ADMIN — METOPROLOL TARTRATE 50 MG: 50 TABLET ORAL at 08:54

## 2019-02-02 RX ADMIN — LOSARTAN POTASSIUM 25 MG: 25 TABLET ORAL at 08:54

## 2019-02-02 RX ADMIN — AMLODIPINE BESYLATE 10 MG: 10 TABLET ORAL at 08:54

## 2019-02-02 RX ADMIN — CLONIDINE HYDROCHLORIDE 0.2 MG: 0.1 TABLET ORAL at 08:54

## 2019-02-02 RX ADMIN — HYDRALAZINE HYDROCHLORIDE 100 MG: 50 TABLET, FILM COATED ORAL at 21:38

## 2019-02-02 RX ADMIN — CLONIDINE HYDROCHLORIDE 0.2 MG: 0.1 TABLET ORAL at 21:38

## 2019-02-02 RX ADMIN — HYDRALAZINE HYDROCHLORIDE 100 MG: 50 TABLET, FILM COATED ORAL at 13:02

## 2019-02-02 RX ADMIN — CALCITRIOL 0.25 MCG: 0.25 CAPSULE ORAL at 08:54

## 2019-02-02 ASSESSMENT — PAIN SCALES - GENERAL: PAINLEVEL_OUTOF10: 0

## 2019-02-03 LAB
ANION GAP SERPL CALCULATED.3IONS-SCNC: 12 MMOL/L (ref 3–16)
BUN BLDV-MCNC: 32 MG/DL (ref 7–20)
CALCIUM SERPL-MCNC: 8.6 MG/DL (ref 8.3–10.6)
CHLORIDE BLD-SCNC: 107 MMOL/L (ref 99–110)
CHLORIDE URINE RANDOM: 29 MMOL/L
CO2: 20 MMOL/L (ref 21–32)
CREAT SERPL-MCNC: 2.7 MG/DL (ref 0.9–1.3)
GFR AFRICAN AMERICAN: 30
GFR NON-AFRICAN AMERICAN: 25
GLUCOSE BLD-MCNC: 124 MG/DL (ref 70–99)
GLUCOSE BLD-MCNC: 130 MG/DL (ref 70–99)
GLUCOSE BLD-MCNC: 159 MG/DL (ref 70–99)
GLUCOSE BLD-MCNC: 205 MG/DL (ref 70–99)
GLUCOSE BLD-MCNC: 223 MG/DL (ref 70–99)
GLUCOSE BLD-MCNC: 88 MG/DL (ref 70–99)
OSMOLALITY URINE: 304 MOSM/KG (ref 390–1070)
PERFORMED ON: ABNORMAL
PERFORMED ON: NORMAL
POTASSIUM SERPL-SCNC: 4.1 MMOL/L (ref 3.5–5.1)
POTASSIUM, UR: 24.1 MMOL/L
SODIUM BLD-SCNC: 139 MMOL/L (ref 136–145)
SODIUM BLD-SCNC: 139 MMOL/L (ref 136–145)
SODIUM URINE: 43 MMOL/L

## 2019-02-03 PROCEDURE — 94760 N-INVAS EAR/PLS OXIMETRY 1: CPT

## 2019-02-03 PROCEDURE — 6370000000 HC RX 637 (ALT 250 FOR IP): Performed by: INTERNAL MEDICINE

## 2019-02-03 PROCEDURE — 84295 ASSAY OF SERUM SODIUM: CPT

## 2019-02-03 PROCEDURE — 1200000000 HC SEMI PRIVATE

## 2019-02-03 PROCEDURE — 82436 ASSAY OF URINE CHLORIDE: CPT

## 2019-02-03 PROCEDURE — 6360000002 HC RX W HCPCS: Performed by: INTERNAL MEDICINE

## 2019-02-03 PROCEDURE — 80048 BASIC METABOLIC PNL TOTAL CA: CPT

## 2019-02-03 PROCEDURE — 2580000003 HC RX 258: Performed by: INTERNAL MEDICINE

## 2019-02-03 PROCEDURE — 84300 ASSAY OF URINE SODIUM: CPT

## 2019-02-03 PROCEDURE — 36415 COLL VENOUS BLD VENIPUNCTURE: CPT

## 2019-02-03 PROCEDURE — 83935 ASSAY OF URINE OSMOLALITY: CPT

## 2019-02-03 PROCEDURE — 84133 ASSAY OF URINE POTASSIUM: CPT

## 2019-02-03 PROCEDURE — 99232 SBSQ HOSP IP/OBS MODERATE 35: CPT | Performed by: INTERNAL MEDICINE

## 2019-02-03 RX ORDER — LOSARTAN POTASSIUM 50 MG/1
100 TABLET ORAL DAILY
Status: DISCONTINUED | OUTPATIENT
Start: 2019-02-04 | End: 2019-02-05 | Stop reason: HOSPADM

## 2019-02-03 RX ORDER — LOSARTAN POTASSIUM 50 MG/1
50 TABLET ORAL ONCE
Status: COMPLETED | OUTPATIENT
Start: 2019-02-03 | End: 2019-02-03

## 2019-02-03 RX ADMIN — ENOXAPARIN SODIUM 30 MG: 30 INJECTION SUBCUTANEOUS at 08:37

## 2019-02-03 RX ADMIN — INSULIN GLARGINE 30 UNITS: 100 INJECTION, SOLUTION SUBCUTANEOUS at 21:36

## 2019-02-03 RX ADMIN — Medication 10 ML: at 08:50

## 2019-02-03 RX ADMIN — HYDRALAZINE HYDROCHLORIDE 100 MG: 50 TABLET, FILM COATED ORAL at 21:35

## 2019-02-03 RX ADMIN — INSULIN LISPRO 10 UNITS: 100 INJECTION, SOLUTION INTRAVENOUS; SUBCUTANEOUS at 08:05

## 2019-02-03 RX ADMIN — GABAPENTIN 300 MG: 300 CAPSULE ORAL at 08:36

## 2019-02-03 RX ADMIN — CALCITRIOL 0.25 MCG: 0.25 CAPSULE ORAL at 08:36

## 2019-02-03 RX ADMIN — INSULIN LISPRO 10 UNITS: 100 INJECTION, SOLUTION INTRAVENOUS; SUBCUTANEOUS at 13:05

## 2019-02-03 RX ADMIN — LOSARTAN POTASSIUM 50 MG: 50 TABLET ORAL at 14:22

## 2019-02-03 RX ADMIN — HYDRALAZINE HYDROCHLORIDE 100 MG: 50 TABLET, FILM COATED ORAL at 06:20

## 2019-02-03 RX ADMIN — GABAPENTIN 300 MG: 300 CAPSULE ORAL at 21:35

## 2019-02-03 RX ADMIN — Medication 10 ML: at 21:36

## 2019-02-03 RX ADMIN — METOPROLOL TARTRATE 50 MG: 50 TABLET ORAL at 21:35

## 2019-02-03 RX ADMIN — CLOPIDOGREL BISULFATE 75 MG: 75 TABLET ORAL at 08:36

## 2019-02-03 RX ADMIN — ISOSORBIDE MONONITRATE 30 MG: 30 TABLET, EXTENDED RELEASE ORAL at 08:36

## 2019-02-03 RX ADMIN — IRON SUCROSE 200 MG: 20 INJECTION, SOLUTION INTRAVENOUS at 11:47

## 2019-02-03 RX ADMIN — LOSARTAN POTASSIUM 50 MG: 50 TABLET ORAL at 08:36

## 2019-02-03 RX ADMIN — TAMSULOSIN HYDROCHLORIDE 0.4 MG: 0.4 CAPSULE ORAL at 21:35

## 2019-02-03 RX ADMIN — AMLODIPINE BESYLATE 10 MG: 10 TABLET ORAL at 08:36

## 2019-02-03 RX ADMIN — CLONIDINE HYDROCHLORIDE 0.2 MG: 0.1 TABLET ORAL at 08:36

## 2019-02-03 RX ADMIN — GABAPENTIN 300 MG: 300 CAPSULE ORAL at 14:22

## 2019-02-03 RX ADMIN — INSULIN LISPRO 4 UNITS: 100 INJECTION, SOLUTION INTRAVENOUS; SUBCUTANEOUS at 08:00

## 2019-02-03 RX ADMIN — HYDRALAZINE HYDROCHLORIDE 100 MG: 50 TABLET, FILM COATED ORAL at 14:24

## 2019-02-03 RX ADMIN — CLONIDINE HYDROCHLORIDE 0.2 MG: 0.1 TABLET ORAL at 21:35

## 2019-02-03 RX ADMIN — CLONIDINE HYDROCHLORIDE 0.2 MG: 0.1 TABLET ORAL at 14:22

## 2019-02-03 RX ADMIN — METOPROLOL TARTRATE 50 MG: 50 TABLET ORAL at 08:36

## 2019-02-03 RX ADMIN — ATORVASTATIN CALCIUM 40 MG: 40 TABLET, FILM COATED ORAL at 08:36

## 2019-02-03 RX ADMIN — INSULIN LISPRO 10 UNITS: 100 INJECTION, SOLUTION INTRAVENOUS; SUBCUTANEOUS at 18:00

## 2019-02-03 ASSESSMENT — PAIN SCALES - GENERAL: PAINLEVEL_OUTOF10: 0

## 2019-02-04 ENCOUNTER — APPOINTMENT (OUTPATIENT)
Dept: NUCLEAR MEDICINE | Age: 54
DRG: 074 | End: 2019-02-04
Attending: INTERNAL MEDICINE
Payer: COMMERCIAL

## 2019-02-04 LAB
ANION GAP SERPL CALCULATED.3IONS-SCNC: 12 MMOL/L (ref 3–16)
BUN BLDV-MCNC: 29 MG/DL (ref 7–20)
CALCIUM SERPL-MCNC: 8.6 MG/DL (ref 8.3–10.6)
CHLORIDE BLD-SCNC: 108 MMOL/L (ref 99–110)
CO2: 18 MMOL/L (ref 21–32)
CREAT SERPL-MCNC: 2.6 MG/DL (ref 0.9–1.3)
GFR AFRICAN AMERICAN: 31
GFR NON-AFRICAN AMERICAN: 26
GLUCOSE BLD-MCNC: 108 MG/DL (ref 70–99)
GLUCOSE BLD-MCNC: 195 MG/DL (ref 70–99)
GLUCOSE BLD-MCNC: 240 MG/DL (ref 70–99)
GLUCOSE BLD-MCNC: 263 MG/DL (ref 70–99)
GLUCOSE BLD-MCNC: 66 MG/DL (ref 70–99)
GLUCOSE BLD-MCNC: 82 MG/DL (ref 70–99)
PERFORMED ON: ABNORMAL
PERFORMED ON: NORMAL
POTASSIUM SERPL-SCNC: 4.8 MMOL/L (ref 3.5–5.1)
SODIUM BLD-SCNC: 138 MMOL/L (ref 136–145)

## 2019-02-04 PROCEDURE — A9541 TC99M SULFUR COLLOID: HCPCS | Performed by: INTERNAL MEDICINE

## 2019-02-04 PROCEDURE — 2580000003 HC RX 258: Performed by: INTERNAL MEDICINE

## 2019-02-04 PROCEDURE — 1200000000 HC SEMI PRIVATE

## 2019-02-04 PROCEDURE — 6370000000 HC RX 637 (ALT 250 FOR IP): Performed by: INTERNAL MEDICINE

## 2019-02-04 PROCEDURE — 36415 COLL VENOUS BLD VENIPUNCTURE: CPT

## 2019-02-04 PROCEDURE — 6360000002 HC RX W HCPCS: Performed by: INTERNAL MEDICINE

## 2019-02-04 PROCEDURE — 3430000000 HC RX DIAGNOSTIC RADIOPHARMACEUTICAL: Performed by: INTERNAL MEDICINE

## 2019-02-04 PROCEDURE — 99233 SBSQ HOSP IP/OBS HIGH 50: CPT | Performed by: INTERNAL MEDICINE

## 2019-02-04 PROCEDURE — 97530 THERAPEUTIC ACTIVITIES: CPT

## 2019-02-04 PROCEDURE — 78264 GASTRIC EMPTYING IMG STUDY: CPT

## 2019-02-04 PROCEDURE — 94760 N-INVAS EAR/PLS OXIMETRY 1: CPT

## 2019-02-04 PROCEDURE — 97116 GAIT TRAINING THERAPY: CPT

## 2019-02-04 PROCEDURE — 80048 BASIC METABOLIC PNL TOTAL CA: CPT

## 2019-02-04 RX ORDER — NIFEDIPINE 90 MG/1
90 TABLET, EXTENDED RELEASE ORAL DAILY
Status: DISCONTINUED | OUTPATIENT
Start: 2019-02-04 | End: 2019-02-04

## 2019-02-04 RX ORDER — ISOSORBIDE MONONITRATE 60 MG/1
60 TABLET, EXTENDED RELEASE ORAL DAILY
Status: DISCONTINUED | OUTPATIENT
Start: 2019-02-04 | End: 2019-02-05 | Stop reason: HOSPADM

## 2019-02-04 RX ORDER — NIFEDIPINE 90 MG/1
90 TABLET, EXTENDED RELEASE ORAL 2 TIMES DAILY
Status: DISCONTINUED | OUTPATIENT
Start: 2019-02-04 | End: 2019-02-05 | Stop reason: HOSPADM

## 2019-02-04 RX ADMIN — INSULIN LISPRO 6 UNITS: 100 INJECTION, SOLUTION INTRAVENOUS; SUBCUTANEOUS at 13:22

## 2019-02-04 RX ADMIN — IRON SUCROSE 200 MG: 20 INJECTION, SOLUTION INTRAVENOUS at 20:20

## 2019-02-04 RX ADMIN — INSULIN GLARGINE 30 UNITS: 100 INJECTION, SOLUTION SUBCUTANEOUS at 23:07

## 2019-02-04 RX ADMIN — ONDANSETRON 4 MG: 2 INJECTION INTRAMUSCULAR; INTRAVENOUS at 20:16

## 2019-02-04 RX ADMIN — CLONIDINE HYDROCHLORIDE 0.2 MG: 0.1 TABLET ORAL at 13:31

## 2019-02-04 RX ADMIN — HYDRALAZINE HYDROCHLORIDE 100 MG: 50 TABLET, FILM COATED ORAL at 06:18

## 2019-02-04 RX ADMIN — HYDRALAZINE HYDROCHLORIDE 100 MG: 50 TABLET, FILM COATED ORAL at 20:19

## 2019-02-04 RX ADMIN — CLONIDINE HYDROCHLORIDE 0.2 MG: 0.1 TABLET ORAL at 20:19

## 2019-02-04 RX ADMIN — TAMSULOSIN HYDROCHLORIDE 0.4 MG: 0.4 CAPSULE ORAL at 20:19

## 2019-02-04 RX ADMIN — NIFEDIPINE 90 MG: 90 TABLET, FILM COATED, EXTENDED RELEASE ORAL at 23:18

## 2019-02-04 RX ADMIN — Medication 500 MICRO CURIE: at 08:30

## 2019-02-04 RX ADMIN — GABAPENTIN 300 MG: 300 CAPSULE ORAL at 20:18

## 2019-02-04 RX ADMIN — METOPROLOL TARTRATE 50 MG: 50 TABLET ORAL at 20:19

## 2019-02-04 RX ADMIN — HYDRALAZINE HYDROCHLORIDE 100 MG: 50 TABLET, FILM COATED ORAL at 13:31

## 2019-02-04 RX ADMIN — INSULIN LISPRO 10 UNITS: 100 INJECTION, SOLUTION INTRAVENOUS; SUBCUTANEOUS at 19:12

## 2019-02-04 RX ADMIN — GABAPENTIN 300 MG: 300 CAPSULE ORAL at 13:31

## 2019-02-04 RX ADMIN — Medication 10 ML: at 20:17

## 2019-02-04 RX ADMIN — INSULIN LISPRO 10 UNITS: 100 INJECTION, SOLUTION INTRAVENOUS; SUBCUTANEOUS at 13:23

## 2019-02-04 ASSESSMENT — PAIN SCALES - GENERAL
PAINLEVEL_OUTOF10: 0
PAINLEVEL_OUTOF10: 0

## 2019-02-05 VITALS
OXYGEN SATURATION: 99 % | TEMPERATURE: 97.9 F | RESPIRATION RATE: 18 BRPM | SYSTOLIC BLOOD PRESSURE: 166 MMHG | HEART RATE: 96 BPM | BODY MASS INDEX: 23.34 KG/M2 | DIASTOLIC BLOOD PRESSURE: 86 MMHG | WEIGHT: 181.88 LBS | HEIGHT: 74 IN

## 2019-02-05 LAB
ANION GAP SERPL CALCULATED.3IONS-SCNC: 11 MMOL/L (ref 3–16)
BUN BLDV-MCNC: 30 MG/DL (ref 7–20)
CALCIUM SERPL-MCNC: 8.4 MG/DL (ref 8.3–10.6)
CHLORIDE BLD-SCNC: 105 MMOL/L (ref 99–110)
CHLORIDE URINE RANDOM: 45 MMOL/L
CO2: 20 MMOL/L (ref 21–32)
CREAT SERPL-MCNC: 2.4 MG/DL (ref 0.9–1.3)
GFR AFRICAN AMERICAN: 34
GFR NON-AFRICAN AMERICAN: 28
GLUCOSE BLD-MCNC: 108 MG/DL (ref 70–99)
GLUCOSE BLD-MCNC: 230 MG/DL (ref 70–99)
GLUCOSE BLD-MCNC: 237 MG/DL (ref 70–99)
GLUCOSE BLD-MCNC: 257 MG/DL (ref 70–99)
GLUCOSE BLD-MCNC: 33 MG/DL (ref 70–99)
OSMOLALITY URINE: 305 MOSM/KG (ref 390–1070)
PERFORMED ON: ABNORMAL
POTASSIUM SERPL-SCNC: 4.6 MMOL/L (ref 3.5–5.1)
POTASSIUM, UR: 20.7 MMOL/L
SODIUM BLD-SCNC: 136 MMOL/L (ref 136–145)
SODIUM URINE: 57 MMOL/L

## 2019-02-05 PROCEDURE — 94760 N-INVAS EAR/PLS OXIMETRY 1: CPT

## 2019-02-05 PROCEDURE — 83935 ASSAY OF URINE OSMOLALITY: CPT

## 2019-02-05 PROCEDURE — 84133 ASSAY OF URINE POTASSIUM: CPT

## 2019-02-05 PROCEDURE — 2580000003 HC RX 258: Performed by: INTERNAL MEDICINE

## 2019-02-05 PROCEDURE — 6360000002 HC RX W HCPCS: Performed by: INTERNAL MEDICINE

## 2019-02-05 PROCEDURE — 99239 HOSP IP/OBS DSCHRG MGMT >30: CPT | Performed by: INTERNAL MEDICINE

## 2019-02-05 PROCEDURE — 80048 BASIC METABOLIC PNL TOTAL CA: CPT

## 2019-02-05 PROCEDURE — 82436 ASSAY OF URINE CHLORIDE: CPT

## 2019-02-05 PROCEDURE — 6370000000 HC RX 637 (ALT 250 FOR IP): Performed by: INTERNAL MEDICINE

## 2019-02-05 PROCEDURE — 36415 COLL VENOUS BLD VENIPUNCTURE: CPT

## 2019-02-05 PROCEDURE — 84300 ASSAY OF URINE SODIUM: CPT

## 2019-02-05 RX ORDER — METOPROLOL TARTRATE 50 MG/1
50 TABLET, FILM COATED ORAL 2 TIMES DAILY
Qty: 60 TABLET | Refills: 3 | Status: SHIPPED | OUTPATIENT
Start: 2019-02-05 | End: 2019-06-02 | Stop reason: SDUPTHER

## 2019-02-05 RX ORDER — TAMSULOSIN HYDROCHLORIDE 0.4 MG/1
0.4 CAPSULE ORAL NIGHTLY
Qty: 30 CAPSULE | Refills: 3 | Status: SHIPPED | OUTPATIENT
Start: 2019-02-05 | End: 2019-07-11 | Stop reason: SDUPTHER

## 2019-02-05 RX ORDER — INSULIN GLARGINE 100 [IU]/ML
30 INJECTION, SOLUTION SUBCUTANEOUS NIGHTLY
Qty: 1 VIAL | Refills: 3 | Status: SHIPPED | OUTPATIENT
Start: 2019-02-05 | End: 2019-08-02 | Stop reason: SDUPTHER

## 2019-02-05 RX ORDER — LOSARTAN POTASSIUM 100 MG/1
100 TABLET ORAL DAILY
Qty: 30 TABLET | Refills: 3 | Status: SHIPPED | OUTPATIENT
Start: 2019-02-06 | End: 2019-05-22 | Stop reason: SDUPTHER

## 2019-02-05 RX ORDER — ISOSORBIDE MONONITRATE 60 MG/1
60 TABLET, EXTENDED RELEASE ORAL DAILY
Qty: 30 TABLET | Refills: 3 | Status: SHIPPED | OUTPATIENT
Start: 2019-02-06 | End: 2019-05-22 | Stop reason: SDUPTHER

## 2019-02-05 RX ORDER — NIFEDIPINE 90 MG/1
90 TABLET, EXTENDED RELEASE ORAL 2 TIMES DAILY
Qty: 30 TABLET | Refills: 3 | Status: SHIPPED | OUTPATIENT
Start: 2019-02-05 | End: 2019-04-09 | Stop reason: SDUPTHER

## 2019-02-05 RX ADMIN — CALCITRIOL 0.25 MCG: 0.25 CAPSULE ORAL at 08:49

## 2019-02-05 RX ADMIN — NIFEDIPINE 90 MG: 90 TABLET, FILM COATED, EXTENDED RELEASE ORAL at 08:49

## 2019-02-05 RX ADMIN — ONDANSETRON 4 MG: 2 INJECTION INTRAMUSCULAR; INTRAVENOUS at 03:22

## 2019-02-05 RX ADMIN — Medication 10 ML: at 08:51

## 2019-02-05 RX ADMIN — Medication 10 ML: at 03:22

## 2019-02-05 RX ADMIN — GABAPENTIN 300 MG: 300 CAPSULE ORAL at 08:49

## 2019-02-05 RX ADMIN — HYDRALAZINE HYDROCHLORIDE 100 MG: 50 TABLET, FILM COATED ORAL at 05:24

## 2019-02-05 RX ADMIN — CLONIDINE HYDROCHLORIDE 0.2 MG: 0.1 TABLET ORAL at 08:58

## 2019-02-05 RX ADMIN — INSULIN LISPRO 4 UNITS: 100 INJECTION, SOLUTION INTRAVENOUS; SUBCUTANEOUS at 08:51

## 2019-02-05 RX ADMIN — ATORVASTATIN CALCIUM 40 MG: 40 TABLET, FILM COATED ORAL at 08:49

## 2019-02-05 RX ADMIN — LOSARTAN POTASSIUM 100 MG: 50 TABLET ORAL at 08:50

## 2019-02-05 RX ADMIN — CLOPIDOGREL BISULFATE 75 MG: 75 TABLET ORAL at 08:49

## 2019-02-05 RX ADMIN — METOPROLOL TARTRATE 50 MG: 50 TABLET ORAL at 08:50

## 2019-02-05 RX ADMIN — HYDRALAZINE HYDROCHLORIDE 100 MG: 50 TABLET, FILM COATED ORAL at 14:40

## 2019-02-05 RX ADMIN — ISOSORBIDE MONONITRATE 60 MG: 60 TABLET, EXTENDED RELEASE ORAL at 08:49

## 2019-02-05 RX ADMIN — INSULIN LISPRO 10 UNITS: 100 INJECTION, SOLUTION INTRAVENOUS; SUBCUTANEOUS at 12:05

## 2019-02-05 RX ADMIN — GABAPENTIN 300 MG: 300 CAPSULE ORAL at 14:40

## 2019-02-05 RX ADMIN — IRON SUCROSE 200 MG: 20 INJECTION, SOLUTION INTRAVENOUS at 12:05

## 2019-02-05 RX ADMIN — CLONIDINE HYDROCHLORIDE 0.2 MG: 0.1 TABLET ORAL at 14:40

## 2019-02-05 RX ADMIN — ENOXAPARIN SODIUM 30 MG: 30 INJECTION SUBCUTANEOUS at 08:51

## 2019-02-05 RX ADMIN — INSULIN LISPRO 10 UNITS: 100 INJECTION, SOLUTION INTRAVENOUS; SUBCUTANEOUS at 08:52

## 2019-02-05 ASSESSMENT — PAIN SCALES - GENERAL: PAINLEVEL_OUTOF10: 0

## 2019-03-04 DIAGNOSIS — E08.01 DIABETES MELLITUS DUE TO UNDERLYING CONDITION WITH HYPEROSMOLARITY AND COMA, WITHOUT LONG-TERM CURRENT USE OF INSULIN (HCC): ICD-10-CM

## 2019-07-09 ENCOUNTER — OFFICE VISIT (OUTPATIENT)
Dept: CARDIOLOGY CLINIC | Age: 54
End: 2019-07-09
Payer: COMMERCIAL

## 2019-07-09 VITALS
HEART RATE: 71 BPM | WEIGHT: 188 LBS | HEIGHT: 74 IN | BODY MASS INDEX: 24.13 KG/M2 | DIASTOLIC BLOOD PRESSURE: 70 MMHG | SYSTOLIC BLOOD PRESSURE: 128 MMHG | OXYGEN SATURATION: 97 %

## 2019-07-09 DIAGNOSIS — I10 ESSENTIAL HYPERTENSION: ICD-10-CM

## 2019-07-09 DIAGNOSIS — R07.9 CHEST PAIN, UNSPECIFIED TYPE: ICD-10-CM

## 2019-07-09 DIAGNOSIS — F17.210 CIGARETTE NICOTINE DEPENDENCE WITHOUT COMPLICATION: ICD-10-CM

## 2019-07-09 DIAGNOSIS — I49.8 FLUTTERING HEART: ICD-10-CM

## 2019-07-09 DIAGNOSIS — Z86.73 HISTORY OF STROKE: ICD-10-CM

## 2019-07-09 DIAGNOSIS — E78.2 MIXED HYPERLIPIDEMIA: ICD-10-CM

## 2019-07-09 DIAGNOSIS — I42.8 OTHER CARDIOMYOPATHY (HCC): Primary | ICD-10-CM

## 2019-07-09 PROCEDURE — 99214 OFFICE O/P EST MOD 30 MIN: CPT | Performed by: INTERNAL MEDICINE

## 2019-07-09 PROCEDURE — 93000 ELECTROCARDIOGRAM COMPLETE: CPT | Performed by: INTERNAL MEDICINE

## 2019-07-09 RX ORDER — NIFEDIPINE 90 MG/1
90 TABLET, EXTENDED RELEASE ORAL DAILY
Qty: 180 TABLET | Refills: 3 | Status: SHIPPED | OUTPATIENT
Start: 2019-07-09 | End: 2019-10-23 | Stop reason: SDUPTHER

## 2019-07-09 NOTE — PROGRESS NOTES
(IMDUR) 60 MG extended release tablet Take 1 tablet by mouth daily 30 tablet 3    losartan (COZAAR) 100 MG tablet Take 1 tablet by mouth daily 30 tablet 3    omeprazole (PRILOSEC) 20 MG delayed release capsule Take 1 capsule by mouth 2 times daily (before meals) 180 capsule 1    hydrALAZINE (APRESOLINE) 100 MG tablet TAKE 2 TABLET BY MOUTH EVERY 8 HOURS 180 tablet 3    atorvastatin (LIPITOR) 40 MG tablet TAKE 1 TABLET BY MOUTH ONCE DAILY 60 tablet 1    NIFEdipine (PROCARDIA XL) 90 MG extended release tablet TAKE 1 TABLET BY MOUTH TWICE DAILY 180 tablet 0    clopidogrel (PLAVIX) 75 MG tablet TAKE ONE TABLET BY MOUTH ONCE DAILY 90 tablet 3    Continuous Blood Gluc  (FREESTYLE CECI 14 DAY READER) LEV 1 each by Does not apply route daily 1 Device 0    Continuous Blood Gluc Sensor (FREESTYLE CECI 14 DAY SENSOR) MISC 1 each by Does not apply route daily 1 each 0    insulin glargine (LANTUS) 100 UNIT/ML injection vial Inject 30 Units into the skin nightly 1 vial 3    insulin lispro (HUMALOG) 100 UNIT/ML injection vial Inject 10 Units into the skin 3 times daily (with meals) 1 vial 3    tamsulosin (FLOMAX) 0.4 MG capsule Take 1 capsule by mouth nightly 30 capsule 3    furosemide (LASIX) 20 MG tablet Take 2 tablets by mouth daily 60 tablet 3    Insulin Pen Needle 31G X 6 MM MISC 1 each by Does not apply route daily 100 each 3    sildenafil (VIAGRA) 100 MG tablet Take 1 tablet by mouth as needed for Erectile Dysfunction 30 tablet 11    Insulin Syringe-Needle U-100 31G X 5/16\" 0.3 ML MISC 1 each by Does not apply route daily 100 each 3    Compression Stockings MISC by Does not apply route Bilateral knee high compression stockings, 15-20 mmHg daily for edema 2 each 1    Peak Flow Meter LEV 1 each by Does not apply route every 2 hours 1 Device 0    Respiratory Therapy Supplies (ONE FLOW SPIROMETER) ELV 1 each by Does not apply route every 2 hours 1 Device 0    Handicap Placard MISC by Does not apply route Five Year handicapped placard. 1 each 0    Glucose Blood (BLOOD GLUCOSE TEST STRIPS) STRP Inject 1 each into the skin 4 times daily 200 strip 0    gabapentin (NEURONTIN) 300 MG capsule Take 1 capsule by mouth 3 times daily for 30 days. 90 capsule 3     No current facility-administered medications for this visit. Labs: All reviewed   Lab Results   Component Value Date    HGB 9.9 01/30/2019    HCT 30.7 01/30/2019     Lab Results   Component Value Date     02/05/2019     Lab Results   Component Value Date    K 4.6 02/05/2019    K 4.4 01/30/2019     Lab Results   Component Value Date    BUN 30 02/05/2019    CREATININE 2.4 02/05/2019    No components found for: HGBA1C  Lab Results   Component Value Date    TSH 2.36 01/29/2018      Lab Results   Component Value Date    TRIG 86 06/05/2019    HDL 31 06/05/2019    LDLCALC 51 06/05/2019    LDLDIRECT 103 01/12/2017    LABVLDL 17 06/05/2019       Imaging: all reviewed     MPI 7/21/2015:  Dilated LV with reduced LV systolic function. There is normal isotope uptake at stress and rest. There is no evidence of   myocardial ischemia or scar. Non-diagnostic EKG response due to failure to reach target heart rate . ECHO 7/20/2015:  Enlarged left ventricle. Concentric left ventricle hypertrophy. Severely  decreased left ventricular systolic function with an estimated ejection  fraction of 25 to 30% . Global hypokinesis, no regional wall motion  abnormalities. Trivial MR. Bubble study negative for PFO/ASD    CAROTID DOPPLER 7/21/2015:  Duplex sonography with color flow enhancement was performed bilaterally on   the cervical carotid system. No evidence of hemodynamically significant   stenosis in the bilateral carotid and vertebral arteries.      ECHO:2/22/2016  Summary   Left ventricle size is normal.   Mild to moderate concentric left ventricular hypertrophy is present.   Ejection fraction is visually estimated to be 50-55 %.   Normal right ventricular participate in the care of your patient. Hilda Rudd MD, MPH      This note was scribed in the presence of Dr. Beny Ceron MD by Sergei Romero RN. Physician Attestation:  The scribes documentation has been prepared under my direction and personally reviewed by me in its entirety. I confirm that the note above accurately reflects all work, treatment, procedures, and medical decision making performed by me.

## 2019-07-09 NOTE — PATIENT INSTRUCTIONS
appointments, and call your doctor if you are having problems. It's also a good idea to know your test results and keep a list of the medicines you take. How can you care for yourself at home? Watch for changes in your weight and condition  · Weigh yourself without clothing at the same time each day. Record your weight. Call your doctor if you have sudden weight gain, such as more than 2 to 3 pounds in a day or 5 pounds in a week. (Your doctor may suggest a different range of weight gain.) A sudden weight gain may mean that your heart failure is getting worse. · Keep a daily record of your symptoms. Write down any changes in how you feel, such as new shortness of breath, cough, or problems eating. Also record if your ankles are more swollen than usual and if you feel more tired than usual. Note anything that you ate or did that could have triggered these changes. Limit sodium  Sodium causes your body to hold on to extra water. This may cause your heart failure symptoms to get worse. People get most of their sodium from processed foods. Fast food and restaurant meals also tend to be very high in sodium. · Your doctor may suggest that you limit sodium to 2,000 milligrams (mg) a day or less. That is less than 1 teaspoon of salt a day, including all the salt you eat in cooking or in packaged foods. · Read food labels on cans and food packages. They tell you how much sodium you get in one serving. Check the serving size. If you eat more than one serving, you are getting more sodium. · Be aware that sodium can come in forms other than salt, including monosodium glutamate (MSG), sodium citrate, and sodium bicarbonate (baking soda). MSG is often added to Asian food. You can sometimes ask for food without MSG or salt. · Slowly reducing salt will help you adjust to the taste. Take the salt shaker off the table. · Flavor your food with garlic, lemon juice, onion, vinegar, herbs, and spices instead of salt.  Do not use problems. Processed foods, fast food, and restaurant foods are the major sources of dietary sodium. The most common name for sodium is salt. Try to limit how much sodium you eat to less than 2,300 milligrams (mg) a day. If you limit your sodium to 1,500 mg a day, you can lower your blood pressure even more. This limit counts all the salt that you eat in foods you cook or in packaged foods. Keep a list of everything you eat and drink. Follow-up care is a key part of your treatment and safety. Be sure to make and go to all appointments, and call your doctor if you are having problems. It's also a good idea to know your test results and keep a list of the medicines you take. How can you care for yourself at home? Read ingredient lists on food labels  · Read the list of ingredients on food labels to help you find how much sodium is in a food. The label lists the ingredients in a food in descending order (from the most to the least). If salt or sodium is high on the list, there may be a lot of sodium in the food. · Know that sodium has different names. Sodium is also called monosodium glutamate (MSG, common in [facility] food), sodium citrate, sodium alginate, sodium hydroxide, and sodium phosphate. Read Nutrition Facts labels  · On most foods, there is a Nutrition Facts label. This will tell you how much sodium is in one serving of food. Look at both the serving size and the sodium amount. The serving size is located at the top of the label, usually right under the \"Nutrition Facts\" title. The amount of sodium is given in the list under the title. It is given in milligrams (mg). ? Check the serving size carefully. A single serving is often very small, and you may eat more than one serving. If this is the case, you will eat more sodium than listed on the label. For example, if the serving size for a canned soup is 1 cup and the sodium amount is 470 mg, if you have 2 cups you will eat 940 mg of sodium.   · The

## 2019-07-11 PROBLEM — Z86.73 HISTORY OF STROKE: Status: ACTIVE | Noted: 2019-07-11

## 2019-07-11 PROBLEM — E78.2 MIXED HYPERLIPIDEMIA: Status: ACTIVE | Noted: 2019-07-11

## 2019-07-11 PROBLEM — I49.8 FLUTTERING HEART: Status: ACTIVE | Noted: 2019-07-11

## 2019-10-08 ENCOUNTER — TELEPHONE (OUTPATIENT)
Dept: CARDIOLOGY CLINIC | Age: 54
End: 2019-10-08

## 2019-10-23 ENCOUNTER — OFFICE VISIT (OUTPATIENT)
Dept: INTERNAL MEDICINE CLINIC | Age: 54
End: 2019-10-23
Payer: COMMERCIAL

## 2019-10-23 ENCOUNTER — TELEPHONE (OUTPATIENT)
Dept: INTERNAL MEDICINE CLINIC | Age: 54
End: 2019-10-23

## 2019-10-23 VITALS
OXYGEN SATURATION: 98 % | HEIGHT: 72 IN | WEIGHT: 183.4 LBS | DIASTOLIC BLOOD PRESSURE: 96 MMHG | SYSTOLIC BLOOD PRESSURE: 176 MMHG | BODY MASS INDEX: 24.84 KG/M2 | TEMPERATURE: 99.1 F

## 2019-10-23 DIAGNOSIS — E78.2 MIXED HYPERLIPIDEMIA: ICD-10-CM

## 2019-10-23 DIAGNOSIS — N18.30 TYPE 2 DIABETES MELLITUS WITH STAGE 3 CHRONIC KIDNEY DISEASE, WITH LONG-TERM CURRENT USE OF INSULIN (HCC): Chronic | ICD-10-CM

## 2019-10-23 DIAGNOSIS — Z79.4 TYPE 2 DIABETES MELLITUS WITH STAGE 3 CHRONIC KIDNEY DISEASE, WITH LONG-TERM CURRENT USE OF INSULIN (HCC): Chronic | ICD-10-CM

## 2019-10-23 DIAGNOSIS — R53.1 LEFT-SIDED WEAKNESS: ICD-10-CM

## 2019-10-23 DIAGNOSIS — E11.22 TYPE 2 DIABETES MELLITUS WITH STAGE 3 CHRONIC KIDNEY DISEASE, WITH LONG-TERM CURRENT USE OF INSULIN (HCC): Chronic | ICD-10-CM

## 2019-10-23 DIAGNOSIS — N52.01 ERECTILE DYSFUNCTION DUE TO ARTERIAL INSUFFICIENCY: ICD-10-CM

## 2019-10-23 DIAGNOSIS — E11.49 OTHER DIABETIC NEUROLOGICAL COMPLICATION ASSOCIATED WITH TYPE 2 DIABETES MELLITUS (HCC): ICD-10-CM

## 2019-10-23 DIAGNOSIS — I50.32 CHRONIC DIASTOLIC CONGESTIVE HEART FAILURE (HCC): Primary | ICD-10-CM

## 2019-10-23 DIAGNOSIS — I10 ESSENTIAL HYPERTENSION: ICD-10-CM

## 2019-10-23 DIAGNOSIS — Z13.29 SCREENING FOR THYROID DISORDER: ICD-10-CM

## 2019-10-23 DIAGNOSIS — Z12.5 SCREENING PSA (PROSTATE SPECIFIC ANTIGEN): ICD-10-CM

## 2019-10-23 LAB
BASOPHILS ABSOLUTE: 0 K/UL (ref 0–0.2)
BASOPHILS RELATIVE PERCENT: 0.5 %
EOSINOPHILS ABSOLUTE: 0.2 K/UL (ref 0–0.6)
EOSINOPHILS RELATIVE PERCENT: 2.7 %
HBA1C MFR BLD: 5.4 %
HCT VFR BLD CALC: 30 % (ref 40.5–52.5)
HEMOGLOBIN: 9.8 G/DL (ref 13.5–17.5)
LYMPHOCYTES ABSOLUTE: 1.4 K/UL (ref 1–5.1)
LYMPHOCYTES RELATIVE PERCENT: 16.1 %
MCH RBC QN AUTO: 30.1 PG (ref 26–34)
MCHC RBC AUTO-ENTMCNC: 32.7 G/DL (ref 31–36)
MCV RBC AUTO: 91.9 FL (ref 80–100)
MONOCYTES ABSOLUTE: 0.6 K/UL (ref 0–1.3)
MONOCYTES RELATIVE PERCENT: 7 %
NEUTROPHILS ABSOLUTE: 6.4 K/UL (ref 1.7–7.7)
NEUTROPHILS RELATIVE PERCENT: 73.7 %
PDW BLD-RTO: 14.9 % (ref 12.4–15.4)
PLATELET # BLD: 225 K/UL (ref 135–450)
PMV BLD AUTO: 9.6 FL (ref 5–10.5)
RBC # BLD: 3.26 M/UL (ref 4.2–5.9)
WBC # BLD: 8.7 K/UL (ref 4–11)

## 2019-10-23 PROCEDURE — 36415 COLL VENOUS BLD VENIPUNCTURE: CPT | Performed by: NURSE PRACTITIONER

## 2019-10-23 PROCEDURE — 83036 HEMOGLOBIN GLYCOSYLATED A1C: CPT | Performed by: NURSE PRACTITIONER

## 2019-10-23 PROCEDURE — 99214 OFFICE O/P EST MOD 30 MIN: CPT | Performed by: NURSE PRACTITIONER

## 2019-10-23 RX ORDER — ISOSORBIDE MONONITRATE 60 MG/1
60 TABLET, EXTENDED RELEASE ORAL DAILY
Qty: 90 TABLET | Refills: 1 | Status: SHIPPED | OUTPATIENT
Start: 2019-10-23 | End: 2020-06-01

## 2019-10-23 RX ORDER — FUROSEMIDE 20 MG/1
20 TABLET ORAL DAILY
Qty: 90 TABLET | Refills: 1 | Status: SHIPPED | OUTPATIENT
Start: 2019-10-23 | End: 2020-05-26

## 2019-10-23 RX ORDER — GABAPENTIN 300 MG/1
300 CAPSULE ORAL 3 TIMES DAILY
Qty: 270 CAPSULE | Refills: 1 | Status: SHIPPED | OUTPATIENT
Start: 2019-10-23 | End: 2020-01-20 | Stop reason: SDUPTHER

## 2019-10-23 RX ORDER — INSULIN GLARGINE 100 [IU]/ML
30 INJECTION, SOLUTION SUBCUTANEOUS NIGHTLY
Qty: 1 VIAL | Refills: 3 | Status: SHIPPED | OUTPATIENT
Start: 2019-10-23 | End: 2019-11-13 | Stop reason: ALTCHOICE

## 2019-10-23 RX ORDER — CLONIDINE HYDROCHLORIDE 0.2 MG/1
TABLET ORAL
Qty: 270 TABLET | Refills: 0 | Status: SHIPPED | OUTPATIENT
Start: 2019-10-23 | End: 2019-12-17 | Stop reason: SDUPTHER

## 2019-10-23 RX ORDER — NIFEDIPINE 90 MG/1
90 TABLET, EXTENDED RELEASE ORAL DAILY
Qty: 180 TABLET | Refills: 3 | Status: SHIPPED | OUTPATIENT
Start: 2019-10-23 | End: 2020-01-20 | Stop reason: SDUPTHER

## 2019-10-23 RX ORDER — SILDENAFIL 100 MG/1
100 TABLET, FILM COATED ORAL PRN
Qty: 30 TABLET | Refills: 1 | Status: SHIPPED | OUTPATIENT
Start: 2019-10-23 | End: 2019-11-07

## 2019-10-23 RX ORDER — LOSARTAN POTASSIUM 100 MG/1
100 TABLET ORAL DAILY
Qty: 90 TABLET | Refills: 1 | Status: SHIPPED | OUTPATIENT
Start: 2019-10-23 | End: 2020-05-18

## 2019-10-23 SDOH — HEALTH STABILITY: MENTAL HEALTH: HOW OFTEN DO YOU HAVE A DRINK CONTAINING ALCOHOL?: MONTHLY OR LESS

## 2019-10-24 LAB
A/G RATIO: 1.5 (ref 1.1–2.2)
ALBUMIN SERPL-MCNC: 4.3 G/DL (ref 3.4–5)
ALP BLD-CCNC: 71 U/L (ref 40–129)
ALT SERPL-CCNC: 14 U/L (ref 10–40)
ANION GAP SERPL CALCULATED.3IONS-SCNC: 15 MMOL/L (ref 3–16)
AST SERPL-CCNC: 21 U/L (ref 15–37)
BILIRUB SERPL-MCNC: <0.2 MG/DL (ref 0–1)
BUN BLDV-MCNC: 35 MG/DL (ref 7–20)
CALCIUM SERPL-MCNC: 9.2 MG/DL (ref 8.3–10.6)
CHLORIDE BLD-SCNC: 109 MMOL/L (ref 99–110)
CHOLESTEROL, FASTING: 106 MG/DL (ref 0–199)
CO2: 18 MMOL/L (ref 21–32)
CREAT SERPL-MCNC: 3.6 MG/DL (ref 0.9–1.3)
GFR AFRICAN AMERICAN: 22
GFR NON-AFRICAN AMERICAN: 18
GLOBULIN: 2.9 G/DL
GLUCOSE FASTING: 94 MG/DL (ref 70–99)
HDLC SERPL-MCNC: 31 MG/DL (ref 40–60)
LDL CHOLESTEROL CALCULATED: 51 MG/DL
POTASSIUM SERPL-SCNC: 5.2 MMOL/L (ref 3.5–5.1)
PROSTATE SPECIFIC ANTIGEN: 0.28 NG/ML (ref 0–4)
SODIUM BLD-SCNC: 142 MMOL/L (ref 136–145)
TOTAL PROTEIN: 7.2 G/DL (ref 6.4–8.2)
TRIGLYCERIDE, FASTING: 118 MG/DL (ref 0–150)
TSH SERPL DL<=0.05 MIU/L-ACNC: 3.04 UIU/ML (ref 0.27–4.2)
VLDLC SERPL CALC-MCNC: 24 MG/DL

## 2019-10-24 ASSESSMENT — ENCOUNTER SYMPTOMS
DIARRHEA: 0
CONSTIPATION: 0
CHEST TIGHTNESS: 0
SHORTNESS OF BREATH: 0
ABDOMINAL PAIN: 0

## 2019-11-07 ENCOUNTER — TELEPHONE (OUTPATIENT)
Dept: INTERNAL MEDICINE CLINIC | Age: 54
End: 2019-11-07

## 2019-11-13 ENCOUNTER — OFFICE VISIT (OUTPATIENT)
Dept: INTERNAL MEDICINE CLINIC | Age: 54
End: 2019-11-13
Payer: COMMERCIAL

## 2019-11-13 VITALS
BODY MASS INDEX: 23.23 KG/M2 | SYSTOLIC BLOOD PRESSURE: 129 MMHG | HEIGHT: 74 IN | HEART RATE: 71 BPM | WEIGHT: 181 LBS | OXYGEN SATURATION: 99 % | DIASTOLIC BLOOD PRESSURE: 75 MMHG

## 2019-11-13 DIAGNOSIS — Z79.4 TYPE 2 DIABETES MELLITUS WITH STAGE 3 CHRONIC KIDNEY DISEASE, WITH LONG-TERM CURRENT USE OF INSULIN (HCC): Chronic | ICD-10-CM

## 2019-11-13 DIAGNOSIS — K31.84 GASTROPARESIS: ICD-10-CM

## 2019-11-13 DIAGNOSIS — I10 ESSENTIAL HYPERTENSION: Primary | Chronic | ICD-10-CM

## 2019-11-13 DIAGNOSIS — N18.30 TYPE 2 DIABETES MELLITUS WITH STAGE 3 CHRONIC KIDNEY DISEASE, WITH LONG-TERM CURRENT USE OF INSULIN (HCC): Chronic | ICD-10-CM

## 2019-11-13 DIAGNOSIS — E11.22 TYPE 2 DIABETES MELLITUS WITH STAGE 3 CHRONIC KIDNEY DISEASE, WITH LONG-TERM CURRENT USE OF INSULIN (HCC): Chronic | ICD-10-CM

## 2019-11-13 LAB — HBA1C MFR BLD: 5.3 %

## 2019-11-13 PROCEDURE — 99214 OFFICE O/P EST MOD 30 MIN: CPT | Performed by: NURSE PRACTITIONER

## 2019-11-13 PROCEDURE — 83036 HEMOGLOBIN GLYCOSYLATED A1C: CPT | Performed by: NURSE PRACTITIONER

## 2019-11-13 RX ORDER — METOPROLOL TARTRATE 50 MG/1
TABLET, FILM COATED ORAL
Qty: 180 TABLET | Refills: 1 | Status: SHIPPED | OUTPATIENT
Start: 2019-11-13 | End: 2020-05-08

## 2019-11-13 RX ORDER — INSULIN GLARGINE 100 [IU]/ML
20 INJECTION, SOLUTION SUBCUTANEOUS NIGHTLY
COMMUNITY
End: 2019-11-13 | Stop reason: SDUPTHER

## 2019-11-13 ASSESSMENT — ENCOUNTER SYMPTOMS
DIARRHEA: 1
SHORTNESS OF BREATH: 0
CONSTIPATION: 1
ABDOMINAL PAIN: 0
CHEST TIGHTNESS: 0

## 2019-11-14 DIAGNOSIS — E11.22 TYPE 2 DIABETES MELLITUS WITH STAGE 3 CHRONIC KIDNEY DISEASE, WITH LONG-TERM CURRENT USE OF INSULIN (HCC): Primary | Chronic | ICD-10-CM

## 2019-11-14 DIAGNOSIS — Z79.4 TYPE 2 DIABETES MELLITUS WITH STAGE 3 CHRONIC KIDNEY DISEASE, WITH LONG-TERM CURRENT USE OF INSULIN (HCC): Primary | Chronic | ICD-10-CM

## 2019-11-14 DIAGNOSIS — N18.30 TYPE 2 DIABETES MELLITUS WITH STAGE 3 CHRONIC KIDNEY DISEASE, WITH LONG-TERM CURRENT USE OF INSULIN (HCC): Primary | Chronic | ICD-10-CM

## 2019-12-09 DIAGNOSIS — I10 MALIGNANT HYPERTENSION: ICD-10-CM

## 2019-12-09 RX ORDER — HYDRALAZINE HYDROCHLORIDE 100 MG/1
100 TABLET, FILM COATED ORAL 3 TIMES DAILY
Qty: 90 TABLET | Refills: 1 | Status: CANCELLED | OUTPATIENT
Start: 2019-12-09

## 2019-12-10 DIAGNOSIS — I10 MALIGNANT HYPERTENSION: ICD-10-CM

## 2019-12-10 RX ORDER — HYDRALAZINE HYDROCHLORIDE 100 MG/1
100 TABLET, FILM COATED ORAL 3 TIMES DAILY
Qty: 90 TABLET | Refills: 3 | Status: SHIPPED | OUTPATIENT
Start: 2019-12-10 | End: 2020-01-20 | Stop reason: SDUPTHER

## 2019-12-16 NOTE — PATIENT INSTRUCTIONS
Patient Education        Learning About the Mediterranean Diet  What is the [address]? The Mediterranean diet is a style of eating rather than a diet plan. It features foods eaten in [place], [place], [place] and [place], and other countries along the [facility]. It emphasizes eating foods like fish, fruits, vegetables, beans, high-fiber breads and whole grains, nuts, and olive oil. This style of eating includes limited red meat, cheese, and sweets. Why choose the Mediterranean diet? A Mediterranean-style diet may improve heart health. It contains more fat than other heart-healthy diets. But the fats are mainly from nuts, unsaturated oils (such as fish oils and olive oil), and certain nut or seed oils (such as canola, soybean, or flaxseed oil). These fats may help protect the heart and blood vessels. How can you get started on the Mediterranean diet? Here are some things you can do to switch to a more Mediterranean way of eating. What to eat  · Eat a variety of fruits and vegetables each day, such as grapes, blueberries, tomatoes, broccoli, peppers, figs, olives, spinach, eggplant, beans, lentils, and chickpeas. · Eat a variety of whole-grain foods each day, such as oats, brown rice, and whole wheat bread, pasta, and couscous. · Eat fish at least 2 times a week. Try tuna, salmon, mackerel, lake trout, herring, or sardines. · Eat moderate amounts of low-fat dairy products, such as milk, cheese, or yogurt. · Eat moderate amounts of poultry and eggs. · Choose healthy (unsaturated) fats, such as nuts, olive oil, and certain nut or seed oils like canola, soybean, and flaxseed. · Limit unhealthy (saturated) fats, such as butter, palm oil, and coconut oil. And limit fats found in animal products, such as meat and dairy products made with whole milk. Try to eat red meat only a few times a month in very small amounts. · Limit sweets and desserts to only a few times a week.  This includes [Mucoid Discharge] : mucoid discharge [NL] : warm [de-identified] : PND [FreeTextEntry2] : full ROM, negative Brudzinski and negative Kernig

## 2019-12-18 RX ORDER — CLONIDINE HYDROCHLORIDE 0.2 MG/1
TABLET ORAL
Qty: 270 TABLET | Refills: 0 | Status: SHIPPED | OUTPATIENT
Start: 2019-12-18 | End: 2020-02-10

## 2020-01-22 RX ORDER — HYDRALAZINE HYDROCHLORIDE 100 MG/1
100 TABLET, FILM COATED ORAL 3 TIMES DAILY
Qty: 90 TABLET | Refills: 1 | Status: SHIPPED | OUTPATIENT
Start: 2020-01-22 | End: 2020-03-12

## 2020-01-22 RX ORDER — GABAPENTIN 300 MG/1
300 CAPSULE ORAL 3 TIMES DAILY
Qty: 270 CAPSULE | Refills: 1 | Status: SHIPPED | OUTPATIENT
Start: 2020-01-22 | End: 2020-11-30

## 2020-01-22 RX ORDER — NIFEDIPINE 90 MG/1
90 TABLET, EXTENDED RELEASE ORAL DAILY
Qty: 180 TABLET | Refills: 1 | Status: SHIPPED | OUTPATIENT
Start: 2020-01-22 | End: 2020-07-01 | Stop reason: SDUPTHER

## 2020-01-22 NOTE — TELEPHONE ENCOUNTER
LOV: 16-79-20    Future Appointments   Date Time Provider Goran Roche   2/12/2020  9:15 AM ROQUE Garza CNP   2/13/2020  9:10 AM Mariella Velasquez MD  [facility]

## 2020-02-10 RX ORDER — CLONIDINE HYDROCHLORIDE 0.2 MG/1
TABLET ORAL
Qty: 90 TABLET | Refills: 0 | Status: SHIPPED | OUTPATIENT
Start: 2020-02-10 | End: 2020-02-13 | Stop reason: SDUPTHER

## 2020-02-10 NOTE — TELEPHONE ENCOUNTER
Lov: 86-34-87    Future Appointments   Date Time Provider Goran Roche   2/12/2020  9:15 AM Jorge Hilts, APRN - CNP [facility]   2/13/2020  9:10 AM MD FELICIA Friend [facility]

## 2020-02-13 ENCOUNTER — OFFICE VISIT (OUTPATIENT)
Dept: ENDOCRINOLOGY | Age: 55
End: 2020-02-13
Payer: COMMERCIAL

## 2020-02-13 VITALS
OXYGEN SATURATION: 98 % | BODY MASS INDEX: 24.13 KG/M2 | HEART RATE: 70 BPM | SYSTOLIC BLOOD PRESSURE: 136 MMHG | DIASTOLIC BLOOD PRESSURE: 78 MMHG | RESPIRATION RATE: 16 BRPM | WEIGHT: 188 LBS | HEIGHT: 74 IN

## 2020-02-13 LAB — HBA1C MFR BLD: 5.9 %

## 2020-02-13 PROCEDURE — 83036 HEMOGLOBIN GLYCOSYLATED A1C: CPT | Performed by: INTERNAL MEDICINE

## 2020-02-13 PROCEDURE — 99204 OFFICE O/P NEW MOD 45 MIN: CPT | Performed by: INTERNAL MEDICINE

## 2020-02-13 RX ORDER — FLASH GLUCOSE SCANNING READER
EACH MISCELLANEOUS
Qty: 1 DEVICE | Refills: 0 | Status: SHIPPED | OUTPATIENT
Start: 2020-02-13 | End: 2020-05-26

## 2020-02-13 RX ORDER — FLASH GLUCOSE SENSOR
KIT MISCELLANEOUS
Qty: 2 EACH | Refills: 2 | Status: SHIPPED | OUTPATIENT
Start: 2020-02-13 | End: 2020-07-30

## 2020-02-13 RX ORDER — CLONIDINE HYDROCHLORIDE 0.2 MG/1
TABLET ORAL
Qty: 90 TABLET | Refills: 1 | Status: CANCELLED | OUTPATIENT
Start: 2020-02-13

## 2020-02-13 RX ORDER — CLONIDINE HYDROCHLORIDE 0.2 MG/1
TABLET ORAL
Qty: 90 TABLET | Refills: 1 | Status: SHIPPED | OUTPATIENT
Start: 2020-02-13 | End: 2020-03-18 | Stop reason: SDUPTHER

## 2020-02-13 NOTE — PROGRESS NOTES
Seen as new patient for diabetes    Diagnosed with Type 2 diabetes mellitus in  2002  Known diabetic complications: Nephropathy, neuropathy, Retinopathy  Uncontrolled    Current diabetic medications     On insulin since 2016    Lantus 20-30 units    Has been on humalog in the past    Last A1c   5.3 % <--- 5.8 <--- 14.6 <------11.1    Prior visit with dietician: Yes   Current diet: on average, 3 meals per day   Current exercise: walking   Current monitoring regimen: home blood tests - 1-3 times daily     Has brought blood glucose log/meter: No   Home blood sugar records: 100s  Any episodes of hypoglycemia? 46s    H/o CVA, and CHF    Hyperlipidemia:   For   Years  Takes  Controlled  LDL 51 on 10.19    Hypertension for yrs  Stable  Takes Nifedipine 90mg clonidine 0.2mg TID hydralazine 100mg TID Losartan 100mg metoprolol 50mg BID    Last eye exam: 12/19  Last foot exam: 2/20  Last microalbumin to creatinine ratio:  CKD  Cr 3.6    Reports gastroparesis    Gastric emptying is normal within the first 2 hours however subsequently   there is only minimal gastric emptying with increased retention noted at 4   hours as measured above. FH: Adopted    Past Medical History:   Diagnosis Date    [facility]     [facility]estive [facility] failure[facility]     CVA (cerebral infarction) 5/2015    Diabetes mellitus ([facility]     Gastroparesis     Hypercholesteremia     Hypertension     Kidney disease     Unspecified cerebral artery occlusion with cerebral infarction     5/15, 7/15     No past surgical history on file. Current Outpatient Medications   Medication Sig Dispense Refill    cloNIDine (CATAPRES) 0.2 MG tablet TAKE 1 TABLET BY MOUTH EVERY 8 HOURS BEFORE MEAL(S) SPACING OUT FROM ALL BLOOD PRESSURE MEDICATIONS 90 tablet 0    gabapentin (NEURONTIN) 300 MG capsule Take 1 capsule by mouth 3 times daily for 82 days.  270 capsule 1    NIFEdipine (PROCARDIA XL) 90 MG extended release tablet Take 1 tablet by mouth daily 180 tablet 1    insulin glargine (LANTUS) 100 UNIT/ML injection pen Inject 20 Units into the skin nightly 5 pen 1    hydrALAZINE (APRESOLINE) 100 MG tablet Take 1 tablet by mouth 3 times daily 90 tablet 1    clopidogrel (PLAVIX) 75 MG tablet TAKE ONE TABLET BY MOUTH ONCE DAILY 90 tablet 3    calcitRIOL (ROCALTROL) 0.25 MCG capsule Take 1 capsule by mouth daily 30 capsule 3    atorvastatin (LIPITOR) 40 MG tablet TAKE 1 TABLET BY MOUTH ONCE DAILY 60 tablet 3    Insulin Pen Needle 31G X 6 MM MISC 1 each by Does not apply route daily 100 each 3    metoprolol tartrate (LOPRESSOR) 50 MG tablet TAKE 1 TABLET BY MOUTH TWICE DAILY 180 tablet 1    Famotidine (PEPCID AC PO) Take by mouth daily      furosemide (LASIX) 20 MG tablet Take 1 tablet by mouth daily 90 tablet 1    isosorbide mononitrate (IMDUR) 60 MG extended release tablet Take 1 tablet by mouth daily 90 tablet 1    losartan (COZAAR) 100 MG tablet Take 1 tablet by mouth daily 90 tablet 1    ergocalciferol (ERGOCALCIFEROL) 53178 units capsule Take 1 capsule by mouth once a week 12 capsule 3    acetaminophen (ACETAMINOPHEN EXTRA STRENGTH) 500 MG tablet TAKE TWO TABLETS BY MOUTH EVERY 6 HOURS AS NEEDED FOR PAIN (Patient taking differently: Take 500 mg by mouth every 6 hours as needed TAKE TWO TABLETS BY MOUTH EVERY 6 HOURS AS NEEDED FOR PAIN) 120 tablet 3    Insulin Syringe-Needle U-100 31G X 5/16\" 0.3 ML MISC 1 each by Does not apply route daily 100 each 3    Handicap Placard MISC by Does not apply route Five Year handicapped placard. 1 each 0     No current facility-administered medications for this visit. Review of Systems  Please see scanned document dated and signed      Objective: There were no vitals taken for this visit.   Wt Readings from Last 3 Encounters:   11/13/19 181 lb (82.1 kg)   10/23/19 183 lb 6.4 oz (83.2 kg)   07/09/19 188 lb (85.3 kg)     Constitutional: Well-developed, alert, appears stated age, cooperative, in no acute distress  H/E/N/M/T:atraumatic, normocephalic, external ears, nose, lips normal without lesions  Eyes: Arcus Senilis is not present, extraocular muscles are intact  Neck: supple, trachea midline, acanthosis nigricance is not present. Thyroid: gland size is normal, non-tender on palpation  Respiratory: breathing is unlabored, lungs are clear to auscultations. Cardiovascular: regular rate and rhythm, S1, S2, regular rate and rhythm, no murmur, rub or gallop. Skeletal muscular: no kyphosis, no gross abnormalities  Skin: Xanthoma/Xanthelasmas are  not present  Psychiatric: Judgement and Insight:  judgement and insight appear normal  Neuro: Normal without focal findings, speech is spontaneous, and movements are coordinated, alert and oriented x3   Skeletal foot exam is normal, no skin lesions, toenails are normal, 10 g monofilament is 0/10    Lab Reviewed   No components found for: CHLPL  Lab Results   Component Value Date    TRIG 86 06/05/2019    TRIG 153 (H) 01/29/2018    TRIG 317 (H) 01/12/2017     Lab Results   Component Value Date    HDL 31 (L) 10/23/2019    HDL 31 (L) 06/05/2019    HDL 38 (L) 01/29/2018     Lab Results   Component Value Date    LDLCALC 51 10/23/2019    LDLCALC 51 06/05/2019    LDLCALC 100 (H) 01/29/2018     Lab Results   Component Value Date    LABVLDL 24 10/23/2019    LABVLDL 17 06/05/2019    LABVLDL 31 01/29/2018     Lab Results   Component Value Date    LABA1C 5.3 11/13/2019       Assessment:     Shilpa Zamudio is a 48 y.o. male with :    1.T2DM : Longstanding, controlled. Discussed goals. He has frequent lows. Needs lower dose of basal. Will adjust with self titration. Advised low CHO diet. Check CGM coverage given difficulty checking  2. HTN : Blood pressure at goal  3. HLD ; LDL at goal. HDL low. Advise exercise and smoking cessation  4. H/o CVA  5. CHF  6. CKD  7. Neuropathy: Advised foot care    Plan:      Lantus 20 units   Advised self titration   Advised to check blood sugar 2 times a day   Patient to send blood sugar log for titration. . Advise to low simple carbohydrate and protein with each  meal diet. Diabetes Care: recommend yearly eye exam, foot exam and urine microalbumin to   creatinine ratio.

## 2020-02-14 RX ORDER — CLONIDINE HYDROCHLORIDE 0.2 MG/1
TABLET ORAL
Qty: 90 TABLET | Refills: 1 | Status: CANCELLED | OUTPATIENT
Start: 2020-02-14

## 2020-03-12 RX ORDER — HYDRALAZINE HYDROCHLORIDE 100 MG/1
TABLET, FILM COATED ORAL
Qty: 90 TABLET | Refills: 0 | Status: SHIPPED | OUTPATIENT
Start: 2020-03-12 | End: 2020-03-18

## 2020-03-18 ENCOUNTER — TELEPHONE (OUTPATIENT)
Dept: INTERNAL MEDICINE CLINIC | Age: 55
End: 2020-03-18

## 2020-03-18 ENCOUNTER — OFFICE VISIT (OUTPATIENT)
Dept: INTERNAL MEDICINE CLINIC | Age: 55
End: 2020-03-18
Payer: COMMERCIAL

## 2020-03-18 VITALS
BODY MASS INDEX: 23.33 KG/M2 | RESPIRATION RATE: 12 BRPM | TEMPERATURE: 98.4 F | SYSTOLIC BLOOD PRESSURE: 132 MMHG | HEART RATE: 68 BPM | HEIGHT: 74 IN | OXYGEN SATURATION: 96 % | DIASTOLIC BLOOD PRESSURE: 74 MMHG | WEIGHT: 181.8 LBS

## 2020-03-18 PROBLEM — R73.9 HYPERGLYCEMIA: Status: RESOLVED | Noted: 2019-01-30 | Resolved: 2020-03-18

## 2020-03-18 PROBLEM — T46.5X1A: Status: RESOLVED | Noted: 2018-09-09 | Resolved: 2020-03-18

## 2020-03-18 PROBLEM — R11.0 NAUSEA: Status: RESOLVED | Noted: 2019-01-30 | Resolved: 2020-03-18

## 2020-03-18 PROCEDURE — 99214 OFFICE O/P EST MOD 30 MIN: CPT | Performed by: NURSE PRACTITIONER

## 2020-03-18 RX ORDER — METHOCARBAMOL 500 MG/1
500 TABLET, FILM COATED ORAL 2 TIMES DAILY PRN
Qty: 40 TABLET | Refills: 0 | Status: SHIPPED | OUTPATIENT
Start: 2020-03-18 | End: 2020-03-28

## 2020-03-18 RX ORDER — HYDRALAZINE HYDROCHLORIDE 100 MG/1
200 TABLET, FILM COATED ORAL 3 TIMES DAILY
Qty: 270 TABLET | Refills: 1 | Status: SHIPPED | OUTPATIENT
Start: 2020-03-18 | End: 2020-07-01 | Stop reason: SDUPTHER

## 2020-03-18 RX ORDER — CLONIDINE HYDROCHLORIDE 0.2 MG/1
TABLET ORAL
Qty: 270 TABLET | Refills: 1 | Status: SHIPPED | OUTPATIENT
Start: 2020-03-18 | End: 2020-07-01 | Stop reason: SDUPTHER

## 2020-03-18 SDOH — ECONOMIC STABILITY: TRANSPORTATION INSECURITY
IN THE PAST 12 MONTHS, HAS THE LACK OF TRANSPORTATION KEPT YOU FROM MEDICAL APPOINTMENTS OR FROM GETTING MEDICATIONS?: NO

## 2020-03-18 SDOH — ECONOMIC STABILITY: INCOME INSECURITY: HOW HARD IS IT FOR YOU TO PAY FOR THE VERY BASICS LIKE FOOD, HOUSING, MEDICAL CARE, AND HEATING?: NOT HARD AT ALL

## 2020-03-18 SDOH — ECONOMIC STABILITY: FOOD INSECURITY: WITHIN THE PAST 12 MONTHS, THE FOOD YOU BOUGHT JUST DIDN'T LAST AND YOU DIDN'T HAVE MONEY TO GET MORE.: NEVER TRUE

## 2020-03-18 SDOH — ECONOMIC STABILITY: FOOD INSECURITY: WITHIN THE PAST 12 MONTHS, YOU WORRIED THAT YOUR FOOD WOULD RUN OUT BEFORE YOU GOT MONEY TO BUY MORE.: NEVER TRUE

## 2020-03-18 SDOH — ECONOMIC STABILITY: TRANSPORTATION INSECURITY
IN THE PAST 12 MONTHS, HAS LACK OF TRANSPORTATION KEPT YOU FROM MEETINGS, WORK, OR FROM GETTING THINGS NEEDED FOR DAILY LIVING?: NO

## 2020-03-18 ASSESSMENT — ENCOUNTER SYMPTOMS
CHEST TIGHTNESS: 0
SHORTNESS OF BREATH: 0
ABDOMINAL PAIN: 0
BACK PAIN: 1
DIARRHEA: 0
BOWEL INCONTINENCE: 0
CONSTIPATION: 0

## 2020-03-18 ASSESSMENT — PATIENT HEALTH QUESTIONNAIRE - PHQ9
1. LITTLE INTEREST OR PLEASURE IN DOING THINGS: 0
SUM OF ALL RESPONSES TO PHQ QUESTIONS 1-9: 0
SUM OF ALL RESPONSES TO PHQ QUESTIONS 1-9: 0
2. FEELING DOWN, DEPRESSED OR HOPELESS: 0
SUM OF ALL RESPONSES TO PHQ9 QUESTIONS 1 & 2: 0

## 2020-03-18 NOTE — TELEPHONE ENCOUNTER
Pt was prescribed hydrALAZINE normal max is 100ml a day the script was 600ml a normal amount was 300 ml scripted pharmacy wanted to know if that was the right dosage

## 2020-03-18 NOTE — PROGRESS NOTES
[address] [facility]  [address] [address]  Dept: [phone]       3/18/2020     Suman Baum (:  1965)is a 48 y.o. male, here for evaluation of the following medical concerns:    Back Pain   This is a new problem. The current episode started in the past 7 days. The problem occurs daily. The pain is present in the thoracic spine. The pain is moderate. The pain is worse during the day. The symptoms are aggravated by bending. Pertinent negatives include no abdominal pain, bladder incontinence, bowel incontinence, chest pain, dysuria, fever, headaches, leg pain, paresis, perianal numbness or weakness. Treatment Adherence:   Medication compliance:  compliant most of the time  Diet compliance:  compliant most of the time  Weight trend: stable  Current exercise: no regular exercise  Barriers: difficulty ambulating due to CVA    Diabetes Mellitus Type 2: Current symptoms/problems include none. Home blood sugar records: fasting range: 120's  Any episodes of hypoglycemia? no  Eye exam current (within one year): unknown  Tobacco history: He  reports that he has been smoking cigars. He started smoking about 41 years ago. He has been smoking about 0.00 packs per day. He has never used smokeless tobacco.   Daily Aspirin? Yes    Hypertension:  Home blood pressure monitoring: Yes - WNL. He is adherent to a low sodium diet. Patient denies chest pain, shortness of breath, lightheadedness, peripheral edema and dry cough. Antihypertensive medication side effects: no medication side effects noted. Use of agents associated with hypertension: none. Hyperlipidemia:  No new myalgias or GI upset on atorvastatin (Lipitor).        Lab Results   Component Value Date    LABA1C 5.9 2020    LABA1C 5.3 2019    LABA1C 5.4 10/23/2019     Lab Results   Component Value Date    LABMICR YES 2019    CREATININE 3.6 (H) 10/23/2019     Lab Results   Component Value Date    ALT 14 10/23/2019    AST 21 10/23/2019     Lab Results   Component Value Date    CHOL 99 06/05/2019    TRIG 86 06/05/2019    HDL 31 (L) 10/23/2019    LDLCALC 51 10/23/2019    LDLDIRECT 103 (H) 01/12/2017        Lab Results   Component Value Date    CHOL 99 06/05/2019    CHOL 169 01/29/2018    CHOL 181 01/12/2017     Lab Results   Component Value Date    TRIG 86 06/05/2019    TRIG 153 (H) 01/29/2018    TRIG 317 (H) 01/12/2017     Lab Results   Component Value Date    HDL 31 (L) 10/23/2019    HDL 31 (L) 06/05/2019    HDL 38 (L) 01/29/2018     Lab Results   Component Value Date    LDLCALC 51 10/23/2019    LDLCALC 51 06/05/2019    LDLCALC 100 (H) 01/29/2018     Lab Results   Component Value Date    LABVLDL 24 10/23/2019    LABVLDL 17 06/05/2019    LABVLDL 31 01/29/2018       Lab Results   Component Value Date    WBC 8.7 10/23/2019    HGB 9.8 (L) 10/23/2019    HCT 30.0 (L) 10/23/2019    MCV 91.9 10/23/2019     10/23/2019     Lab Results   Component Value Date     10/23/2019    K 5.2 (H) 10/23/2019     10/23/2019    CO2 18 (L) 10/23/2019    BUN 35 (H) 10/23/2019    CREATININE 3.6 (H) 10/23/2019    GLUCOSE 237 (H) 02/05/2019    CALCIUM 9.2 10/23/2019    PROT 7.2 10/23/2019    LABALBU 4.3 10/23/2019    BILITOT <0.2 10/23/2019    ALKPHOS 71 10/23/2019    AST 21 10/23/2019    ALT 14 10/23/2019    LABGLOM 18 (A) 10/23/2019    GFRAA 22 (A) 10/23/2019    AGRATIO 1.5 10/23/2019    GLOB 2.9 10/23/2019       Review of Systems   Constitutional: Negative for activity change and fever. HENT: Negative for congestion. Eyes: Negative for visual disturbance. Respiratory: Negative for chest tightness and shortness of breath. Cardiovascular: Negative for chest pain, palpitations and leg swelling. Gastrointestinal: Negative for abdominal pain, bowel incontinence, constipation and diarrhea. Endocrine: Negative for polyuria. Genitourinary: Negative for bladder incontinence and dysuria. Musculoskeletal: Positive for back pain. Negative for arthralgias and myalgias. Skin: Negative for rash. Neurological: Negative for dizziness, weakness, light-headedness and headaches. Psychiatric/Behavioral: Negative for agitation, decreased concentration and sleep disturbance. The patient is not nervous/anxious. Prior to Visit Medications    Medication Sig Taking? Authorizing Provider   cloNIDine (CATAPRES) 0.2 MG tablet Take 2 tab by mouth every 8 hours before meals spacing out from all blood pressure medications Yes ROQUE Varela CNP   hydrALAZINE (APRESOLINE) 100 MG tablet Take 2 tablets by mouth 3 times daily Yes ROQUE Varela CNP   methocarbamol (ROBAXIN) 500 MG tablet Take 1 tablet by mouth 2 times daily as needed (spasm) Yes ROQUE Varela CNP   Continuous Blood Gluc  (FREESTYLE CECI 14 DAY READER) LEV As needed Yes Darrion Smith MD   Continuous Blood Gluc Sensor (FREESTYLE CECI 14 DAY SENSOR) MISC As needed Yes Darrion Smith MD   gabapentin (NEURONTIN) 300 MG capsule Take 1 capsule by mouth 3 times daily for 82 days.  Yes ROQUE Varela CNP   NIFEdipine (PROCARDIA XL) 90 MG extended release tablet Take 1 tablet by mouth daily Yes ROQUE Varela CNP   insulin glargine (LANTUS) 100 UNIT/ML injection pen Inject 20 Units into the skin nightly  Patient taking differently: Inject 20 Units into the skin nightly 20 to 30 units depending on blood sugar Yes ROQUE Varela CNP   clopidogrel (PLAVIX) 75 MG tablet TAKE ONE TABLET BY MOUTH ONCE DAILY Yes ROQUE Varela CNP   calcitRIOL (ROCALTROL) 0.25 MCG capsule Take 1 capsule by mouth daily Yes ROQUE Varela CNP   atorvastatin (LIPITOR) 40 MG tablet TAKE 1 TABLET BY MOUTH ONCE DAILY Yes ROQUE Varela CNP   Insulin Pen Needle 31G X 6 MM MISC 1 each by Does not apply route daily Yes ROQUE Varela CNP   metoprolol tartrate (LOPRESSOR) 50 MG tablet TAKE 1 TABLET BY MOUTH TWICE DAILY Yes Baltazar Guzman APRN - CNP   Famotidine (PEPCID AC PO) Take by mouth daily Yes Historical Provider, MD   furosemide (LASIX) 20 MG tablet Take 1 tablet by mouth daily Yes Baltazar Guzman APRN - ANDREA   isosorbide mononitrate (IMDUR) 60 MG extended release tablet Take 1 tablet by mouth daily Yes Baltazar Guzman APRN - CNP   losartan (COZAAR) 100 MG tablet Take 1 tablet by mouth daily Yes [place] MYRANDA GuzmanN - CNP   acetaminophen (ACETAMINOPHEN EXTRA STRENGTH) 500 MG tablet TAKE TWO TABLETS BY MOUTH EVERY 6 HOURS AS NEEDED FOR PAIN  Patient taking differently: Take 500 mg by mouth every 6 hours as needed TAKE TWO TABLETS BY MOUTH EVERY 6 HOURS AS NEEDED FOR PAIN Yes Raf Pina MD   Insulin Syringe-Needle U-100 31G X \" 0.3 ML MISC 1 each by Does not apply route daily Yes Jacob Sosa MD   Handicap Placard MISC by Does not apply route Five Year handicapped placard. Yes Jacob Sosa MD        Social History     Tobacco Use    Smoking status: Current Some Day Smoker     Packs/day: 0.00     Types: Cigars     Start date: 1/3/1979     Last attempt to quit: 2019     Years since quittin.1    Smokeless tobacco: Never Used    Tobacco comment: 3 black and milds a week   Substance Use Topics    Alcohol use: Yes     Frequency: Monthly or less     Comment: occasional        Vitals:    20 1328   BP: 132/74   Site: Left Upper Arm   Position: Sitting   Cuff Size: Medium Adult   Pulse: 68   Resp: 12   Temp: 98.4 °F (36.9 °C)   TempSrc: Oral   SpO2: 96%   Weight: 181 lb 12.8 oz (82.5 kg)   Height: 6' 2\" (1.88 m)     Estimated body mass index is 23.34 kg/m² as calculated from the following:    Height as of this encounter: 6' 2\" (1.88 m). Weight as of this encounter: 181 lb 12.8 oz (82.5 kg). Physical Exam  Vitals signs reviewed. Constitutional:       Appearance: He is well-developed. He is not diaphoretic.    HENT: Head: Normocephalic. Right Ear: Hearing and external ear normal. No tenderness. Left Ear: Hearing and external ear normal. No tenderness. Nose: Nose normal.   Eyes:      General: Lids are normal.   Cardiovascular:      Rate and Rhythm: Normal rate and regular rhythm. Heart sounds: Normal heart sounds, S1 normal and S2 normal.   Pulmonary:      Effort: Pulmonary effort is normal.      Breath sounds: Normal breath sounds. Musculoskeletal: Normal range of motion. Right shoulder: He exhibits tenderness. He exhibits normal range of motion, no swelling, no effusion, no crepitus and no deformity. Arms:    Lymphadenopathy:      Head:      Right side of head: No submental or submandibular adenopathy. Left side of head: No submental or submandibular adenopathy. Cervical:      Right cervical: No superficial cervical adenopathy. Left cervical: No superficial cervical adenopathy. Skin:     General: Skin is warm and dry. Findings: No rash. Nails: There is no clubbing. Neurological:      Mental Status: He is alert and oriented to person, place, and time. GCS: GCS eye subscore is 4. GCS verbal subscore is 5. GCS motor subscore is 6. Psychiatric:         Speech: Speech normal.         Behavior: Behavior normal. Behavior is cooperative. Judgment: Judgment normal.         ASSESSMENT/PLAN:  1. Essential hypertension  -Continue current medications. - cloNIDine (CATAPRES) 0.2 MG tablet; Take 2 tab by mouth every 8 hours before meals spacing out from all blood pressure medications  Dispense: 270 tablet; Refill: 1  - hydrALAZINE (APRESOLINE) 100 MG tablet; Take 2 tablets by mouth 3 times daily  Dispense: 270 tablet; Refill: 1    2. Type 2 diabetes mellitus with stage 3 chronic kidney disease, with long-term current use of insulin (HCC)  -Continue current medications. 3. Mixed hyperlipidemia  -Continue current medications.     4. Acute right-sided thoracic back pain    - methocarbamol (ROBAXIN) 500 MG tablet; Take 1 tablet by mouth 2 times daily as needed (spasm)  Dispense: 40 tablet;  Refill: 0      Return in about 3 months (around 6/18/2020) for HTN.        --ROQUE De La Torre - CNP on 3/18/2020 at 3:22 PM

## 2020-05-08 RX ORDER — METOPROLOL TARTRATE 50 MG/1
TABLET, FILM COATED ORAL
Qty: 180 TABLET | Refills: 1 | Status: SHIPPED | OUTPATIENT
Start: 2020-05-08 | End: 2020-11-19 | Stop reason: SDUPTHER

## 2020-05-18 RX ORDER — LOSARTAN POTASSIUM 100 MG/1
100 TABLET ORAL DAILY
Qty: 90 TABLET | Refills: 1 | Status: ON HOLD | OUTPATIENT
Start: 2020-05-18 | End: 2020-11-03 | Stop reason: HOSPADM

## 2020-05-26 RX ORDER — FUROSEMIDE 20 MG/1
20 TABLET ORAL DAILY
Qty: 90 TABLET | Refills: 1 | Status: ON HOLD | OUTPATIENT
Start: 2020-05-26 | End: 2020-11-03 | Stop reason: HOSPADM

## 2020-06-01 RX ORDER — ISOSORBIDE MONONITRATE 60 MG/1
TABLET, EXTENDED RELEASE ORAL
Qty: 90 TABLET | Refills: 0 | Status: SHIPPED | OUTPATIENT
Start: 2020-06-01 | End: 2020-09-29 | Stop reason: SDUPTHER

## 2020-06-08 ENCOUNTER — HOSPITAL ENCOUNTER (OUTPATIENT)
Dept: NON INVASIVE DIAGNOSTICS | Age: 55
Discharge: HOME | End: 2020-06-08
Payer: MEDICARE

## 2020-06-08 LAB
LV EF: 38 %
LVEF MODALITY: NORMAL

## 2020-06-08 PROCEDURE — 93306 TTE W/DOPPLER COMPLETE: CPT

## 2020-07-01 ENCOUNTER — VIRTUAL VISIT (OUTPATIENT)
Dept: INTERNAL MEDICINE CLINIC | Age: 55
End: 2020-07-01
Payer: MEDICARE

## 2020-07-01 PROCEDURE — 3044F HG A1C LEVEL LT 7.0%: CPT | Performed by: NURSE PRACTITIONER

## 2020-07-01 PROCEDURE — G8420 CALC BMI NORM PARAMETERS: HCPCS | Performed by: NURSE PRACTITIONER

## 2020-07-01 PROCEDURE — 2022F DILAT RTA XM EVC RTNOPTHY: CPT | Performed by: NURSE PRACTITIONER

## 2020-07-01 PROCEDURE — 99214 OFFICE O/P EST MOD 30 MIN: CPT | Performed by: NURSE PRACTITIONER

## 2020-07-01 PROCEDURE — G8427 DOCREV CUR MEDS BY ELIG CLIN: HCPCS | Performed by: NURSE PRACTITIONER

## 2020-07-01 PROCEDURE — 4004F PT TOBACCO SCREEN RCVD TLK: CPT | Performed by: NURSE PRACTITIONER

## 2020-07-01 PROCEDURE — 3017F COLORECTAL CA SCREEN DOC REV: CPT | Performed by: NURSE PRACTITIONER

## 2020-07-01 RX ORDER — FUROSEMIDE 20 MG/1
20 TABLET ORAL DAILY
Qty: 90 TABLET | Refills: 1 | Status: CANCELLED | OUTPATIENT
Start: 2020-07-01

## 2020-07-01 RX ORDER — HYDRALAZINE HYDROCHLORIDE 100 MG/1
200 TABLET, FILM COATED ORAL 3 TIMES DAILY
Qty: 270 TABLET | Refills: 1 | Status: SHIPPED | OUTPATIENT
Start: 2020-07-01 | End: 2020-11-19 | Stop reason: SDUPTHER

## 2020-07-01 RX ORDER — ATORVASTATIN CALCIUM 40 MG/1
TABLET, FILM COATED ORAL
Qty: 60 TABLET | Refills: 3 | Status: SHIPPED | OUTPATIENT
Start: 2020-07-01 | End: 2021-04-07 | Stop reason: SDUPTHER

## 2020-07-01 RX ORDER — NIFEDIPINE 90 MG/1
90 TABLET, EXTENDED RELEASE ORAL DAILY
Qty: 180 TABLET | Refills: 1 | Status: SHIPPED | OUTPATIENT
Start: 2020-07-01 | End: 2020-09-29 | Stop reason: SDUPTHER

## 2020-07-01 RX ORDER — CLONIDINE HYDROCHLORIDE 0.2 MG/1
TABLET ORAL
Qty: 270 TABLET | Refills: 1 | Status: SHIPPED | OUTPATIENT
Start: 2020-07-01 | End: 2020-10-20

## 2020-07-01 ASSESSMENT — PATIENT HEALTH QUESTIONNAIRE - PHQ9
2. FEELING DOWN, DEPRESSED OR HOPELESS: 0
SUM OF ALL RESPONSES TO PHQ QUESTIONS 1-9: 0
1. LITTLE INTEREST OR PLEASURE IN DOING THINGS: 0
SUM OF ALL RESPONSES TO PHQ9 QUESTIONS 1 & 2: 0
SUM OF ALL RESPONSES TO PHQ QUESTIONS 1-9: 0

## 2020-07-01 NOTE — PATIENT INSTRUCTIONS
Treat low blood sugar (hypoglycemia) with juice- below 100  Discuss with Endocrinology Lispro dosage  Only take Lispro with meals  Check blood sugar before meals or at least 2 hours after meal  Monitor BP daily  Schedule appointment for mobility scooter in 4 weeks  Make appointment for 3 months OV  Have handicap placard-due to his Cardiomyopathy,  Congestive Heart Failure and CVA with left-sided weakness he is unable to ambulate greater than 200 ft.

## 2020-07-01 NOTE — PROGRESS NOTES
[address]   [address]  [address] [facility] [place] [address]  Dept: [phone]       2020   Due to the COVID-19 pandemic restrictions on close contact interactions as recommended by CDC and health authorities, the patient's visit was conducted via telemedicine in lieu of a face to face visit. Patient verbally consented and agreed to proceed    Magali Alston (:  1965)is a 48 y.o. male, here for evaluation of the following medical concerns:  Hypertension, Diabetes, Hyperlipidemia. He states he is submitting paperwork for social security. He is requesting a renewal of his handicap placard and requesting an evaluation for an electric scooter. HPI   Treatment Adherence:   Medication compliance:  compliant most of the time  Diet compliance:  compliant most of the time  Weight trend: unable to access due to virtual visit  Current exercise: he reports walking regularly  Barriers: none    Diabetes Mellitus Type 2: Current symptoms/problems include none. Home blood sugar records: fasting range: 161-166, 122, 146  Any episodes of hypoglycemia? He is not reporting any true hypoglycemic events, but is treating blood sugars from 120-150 with orange juice believing these are too low. He is not symptomatic. He also states he is taking 5 units of lispro between meals for elevated blood sugars @ 250. He is monitoring his blood sugar several times daily sometimes within 1 hour of eating and treating with additional insulin. Eye exam current (within one year): no  Tobacco history: He  reports that he has been smoking cigars. He started smoking about 41 years ago. He has been smoking about 0.00 packs per day. He has never used smokeless tobacco.   Daily Aspirin? Yes    Hypertension:  Home blood pressure monitoring: yes, 161/91 P 71. He is adherent to a low sodium diet. Patient denies chest pain, headache, peripheral edema and dry cough.   Antihypertensive medication side effects: no medication side effects noted. Use of agents associated with hypertension: none. Hyperlipidemia:  No new myalgias or GI upset on atorvastatin (Lipitor).      Social History     Socioeconomic History    Marital status:      Spouse name: Not on file    Number of children: 11    Years of education: Not on file    Highest education level: Not on file   Occupational History    Not on file   Social Needs    Financial resource strain: Not hard at all   [place] insecurity     Worry: Never true     Inability: Never true   [employer] needs     Medical: No     Non-medical: No   Tobacco Use    Smoking status: Current Some Day Smoker     Packs/day: 0.00     Types: Cigars     Start date: 1/3/1979     Last attempt to quit: 2019     Years since quittin.4    Smokeless tobacco: Never Used    Tobacco comment: 3 black and milds a week   Substance and Sexual Activity    Alcohol use: Yes     Frequency: Monthly or less     Comment: occasional    Drug use: Yes     Types: Marijuana     Comment: previously used cocaine, stopped May 2015    Sexual activity: Yes     Partners: Female   Lifestyle    Physical activity     Days per week: Not on file     Minutes per session: Not on file    Stress: Not on file   Relationships    Social connections     Talks on phone: Not on file     Gets together: Not on file     Attends Rastafarian service: Not on file     Active member of club or organization: Not on file     Attends meetings of clubs or organizations: Not on file     Relationship status: Not on file    Intimate partner violence     Fear of current or ex partner: Not on file     Emotionally abused: Not on file     Physically abused: Not on file     Forced sexual activity: Not on file   Other Topics Concern    Not on file   Social History Narrative    Lives with my spouse, cousin, daughter     Family History   Adopted: Yes     Lab Results   Component Value Date    LABA1C 5.9 2020    LABA1C 5.3 11/13/2019    LABA1C 5.4 10/23/2019     Lab Results   Component Value Date    LABMICR YES 02/01/2019    CREATININE 3.6 (H) 10/23/2019     Lab Results   Component Value Date    ALT 14 10/23/2019    AST 21 10/23/2019     Lab Results   Component Value Date    CHOL 99 06/05/2019    TRIG 86 06/05/2019    HDL 31 (L) 10/23/2019    LDLCALC 51 10/23/2019    LDLDIRECT 103 (H) 01/12/2017          Lab Results   Component Value Date    CHOL 99 06/05/2019    CHOL 169 01/29/2018    CHOL 181 01/12/2017     Lab Results   Component Value Date    TRIG 86 06/05/2019    TRIG 153 (H) 01/29/2018    TRIG 317 (H) 01/12/2017     Lab Results   Component Value Date    HDL 31 (L) 10/23/2019    HDL 31 (L) 06/05/2019    HDL 38 (L) 01/29/2018     Lab Results   Component Value Date    LDLCALC 51 10/23/2019    LDLCALC 51 06/05/2019    LDLCALC 100 (H) 01/29/2018     Lab Results   Component Value Date    LABVLDL 24 10/23/2019    LABVLDL 17 06/05/2019    LABVLDL 31 01/29/2018     Lab Results   Component Value Date    WBC 8.7 10/23/2019    HGB 9.8 (L) 10/23/2019    HCT 30.0 (L) 10/23/2019    MCV 91.9 10/23/2019     10/23/2019     Lab Results   Component Value Date     10/23/2019    K 5.2 (H) 10/23/2019     10/23/2019    CO2 18 (L) 10/23/2019    BUN 35 (H) 10/23/2019    CREATININE 3.6 (H) 10/23/2019    GLUCOSE 237 (H) 02/05/2019    CALCIUM 9.2 10/23/2019    PROT 7.2 10/23/2019    LABALBU 4.3 10/23/2019    BILITOT <0.2 10/23/2019    ALKPHOS 71 10/23/2019    AST 21 10/23/2019    ALT 14 10/23/2019    LABGLOM 18 (A) 10/23/2019    GFRAA 22 (A) 10/23/2019    AGRATIO 1.5 10/23/2019    GLOB 2.9 10/23/2019       Review of Systems   Constitutional: Negative for activity change and fever. HENT: Negative for congestion. Eyes: Negative for visual disturbance. Respiratory: Negative for chest tightness and shortness of breath. Cardiovascular: Negative for chest pain, palpitations and leg swelling.    Gastrointestinal: Negative for abdominal pain, constipation and diarrhea. Endocrine: Negative for polyuria. Genitourinary: Negative for dysuria. Musculoskeletal: Negative for arthralgias and myalgias. Skin: Negative for rash. Neurological: Negative for dizziness, light-headedness and headaches. Psychiatric/Behavioral: Negative for agitation, decreased concentration and sleep disturbance. The patient is not nervous/anxious. Prior to Visit Medications    Medication Sig Taking? Authorizing Provider   cloNIDine (CATAPRES) 0.2 MG tablet Take 2 tab by mouth every 8 hours before meals spacing out from all blood pressure medications Yes ROQUE Shirley CNP   hydrALAZINE (APRESOLINE) 100 MG tablet Take 2 tablets by mouth 3 times daily Yes ROQUE Shirley CNP   NIFEdipine (PROCARDIA XL) 90 MG extended release tablet Take 1 tablet by mouth daily Yes ROQUE Shirley CNP   atorvastatin (LIPITOR) 40 MG tablet TAKE 1 TABLET BY MOUTH ONCE DAILY Yes ROQUE Shirley CNP   insulin glargine (LANTUS;BASAGLAR) 100 UNIT/ML injection pen Inject 20 Units into the skin nightly Yes ROQUE Shirley CNP   isosorbide mononitrate (IMDUR) 60 MG extended release tablet Take 1 tablet by mouth once daily  ROQUE Shirley CNP   furosemide (LASIX) 20 MG tablet Take 1 tablet by mouth daily  ROQUE Shirley CNP   losartan (COZAAR) 100 MG tablet Take 1 tablet by mouth daily  ROQUE Shirley CNP   metoprolol tartrate (LOPRESSOR) 50 MG tablet Take 1 tablet by mouth twice daily  ROQUE Shirley CNP   Continuous Blood Gluc Sensor (FREESTYLE CECI 14 DAY SENSOR) [facility] As [facility]  Howard Vasquez MD   gabapentin (NEURONTIN) 300 MG capsule Take 1 capsule by mouth 3 times daily for 82 days.   ROQUE Shirley CNP   clopidogrel (PLAVIX) 75 MG tablet TAKE ONE TABLET BY MOUTH ONCE DAILY  ROQUE Shirley CNP   calcitRIOL (ROCALTROL) 0.25 MCG capsule Take 1 capsule by mouth daily  ROQUE Ford - CNP   Insulin Pen Needle 31G X 6 MM MISC 1 each by Does not apply route daily  ROQUE Frod - CNP   Famotidine (PEPCID AC PO) Take by mouth daily  Historical Provider, MD   acetaminophen (ACETAMINOPHEN EXTRA STRENGTH) 500 MG tablet TAKE TWO TABLETS BY MOUTH EVERY 6 HOURS AS NEEDED FOR PAIN  Patient taking differently: Take 500 mg by mouth every 6 hours as needed TAKE TWO TABLETS BY MOUTH EVERY 6 HOURS AS NEEDED FOR PAIN  Raina Banuelos MD   Insulin Syringe-Needle U-100 31G X \" 0.3 ML MISC 1 each by Does not apply route daily  Alexandra Mcmahon MD        Social History     Tobacco Use    Smoking status: Current Some Day Smoker     Packs/day: 0.00     Types: Cigars     Start date: 1/3/1979     Last attempt to quit: 2019     Years since quittin.4    Smokeless tobacco: Never Used    Tobacco comment: 3 black and milds a week   Substance Use Topics    Alcohol use: Yes     Frequency: Monthly or less     Comment: occasional        There were no vitals filed for this visit. Estimated body mass index is 23.34 kg/m² as calculated from the following:    Height as of 3/18/20: 6' 2\" (1.88 m). Weight as of 3/18/20: 181 lb 12.8 oz (82.5 kg). Physical exam limited due to virtual visit  Physical Exam  Constitutional:       Appearance: Normal appearance. He is well-developed and well-groomed. HENT:      Head: Normocephalic. Neurological:      Mental Status: He is alert and oriented to person, place, and time. GCS: GCS eye subscore is 4. GCS verbal subscore is 5. GCS motor subscore is 6. Psychiatric:         Attention and Perception: Attention normal.         Mood and Affect: Mood normal.         Speech: Speech normal.         Behavior: Behavior is cooperative. ASSESSMENT/PLAN:  1. Essential hypertension  -Continue current medications. - cloNIDine (CATAPRES) 0.2 MG tablet;  Take 2 tab by mouth every 8 hours before meals spacing out from all blood pressure medications  Dispense: 270 tablet; Refill: 1  - hydrALAZINE (APRESOLINE) 100 MG tablet; Take 2 tablets by mouth 3 times daily  Dispense: 270 tablet; Refill: 1  - NIFEdipine (PROCARDIA XL) 90 MG extended release tablet; Take 1 tablet by mouth daily  Dispense: 180 tablet; Refill: 1  - BASIC METABOLIC PANEL; Future    2. Chronic diastolic congestive heart failure (HCC)  -Continue current medications. - hydrALAZINE (APRESOLINE) 100 MG tablet; Take 2 tablets by mouth 3 times daily  Dispense: 270 tablet; Refill: 1  - NIFEdipine (PROCARDIA XL) 90 MG extended release tablet; Take 1 tablet by mouth daily  Dispense: 180 tablet; Refill: 1    3. Type 2 diabetes mellitus with other diabetic neurological complication (HCC)  -Continue current medications. - insulin glargine (LANTUS;BASAGLAR) 100 UNIT/ML injection pen; Inject 20 Units into the skin nightly  Dispense: 5 pen; Refill: 1  - Hemoglobin A1C; Future  -Monitor fasting blood sugars (NPO for at least 4 hours)  -If taking Postprandial blood sugar await at least 2 hours after meal.  -Do not take Lispro insulin especially without meal.  -Follow up with Endocrinologist    4. Mixed hyperlipidemia  -Continue current medications. - atorvastatin (LIPITOR) 40 MG tablet; TAKE 1 TABLET BY MOUTH ONCE DAILY  Dispense: 60 tablet; Refill: 3  - Lipid Panel; Future      No follow-ups on file. Zeynep Reyna is a 48 y.o. male being evaluated by a Virtual Visit (video visit) encounter to address concerns as mentioned above. A caregiver was present when appropriate. Due to this being a TeleHealth encounter (During GUAFE-72 public health emergency), evaluation of the following organ systems was limited: Vitals/Constitutional/EENT/Resp/CV/GI//MS/Neuro/Skin/Heme-Lymph-Imm.   Pursuant to the emergency declaration under the [address] [place] [address], [address] [place] [address] and the [employer] General Act, this Virtual Visit was conducted with patient's (and/or legal guardian's) consent, to reduce the patient's risk of exposure to COVID-19 and provide necessary medical care. The patient (and/or legal guardian) has also been advised to contact this office for worsening conditions or problems, and seek emergency medical treatment and/or call 911 if deemed necessary. Patient identification was verified at the start of the visit: Yes    Total time spent for this encounter: Not billed by time    Services were provided through a video synchronous discussion virtually to substitute for in-person clinic visit. Patient and provider were located at their individual homes. An electronic signature was used to authenticate this note.     --ROQUE Pak - CNP on 7/5/2020 at 10:57 AM

## 2020-07-05 ASSESSMENT — ENCOUNTER SYMPTOMS
ABDOMINAL PAIN: 0
DIARRHEA: 0
CHEST TIGHTNESS: 0
SHORTNESS OF BREATH: 0
CONSTIPATION: 0

## 2020-07-10 ENCOUNTER — TELEPHONE (OUTPATIENT)
Dept: INTERNAL MEDICINE CLINIC | Age: 55
End: 2020-07-10

## 2020-07-10 NOTE — TELEPHONE ENCOUNTER
Called patient. Needs appointment!  Left message on machine per HIPPA  Please transfer call to office if the call back

## 2020-07-28 NOTE — PROGRESS NOTES
[address] [facility] - Cardiology      CC: \"I am doing well. \"     History of present illness: Yadira Sinhg is a 48 y.o. male with past medical history significant for diabetes mellitus II and presents for management of his hypertension, hyperlipidemia, dilated CMP,   CVA 2015 with residual left sided weakness and paresthesias. Today, he is here accompanied by his wife. He says that he has been checking his blood pressure at home and it is stable for the most part. His blood sugar has been running low. He does have shortness of breath with exertion at times. Patient denies exertional chest pain/pressure, dyspnea at rest,PND, orthopnea, palpitations, lightheadedness, weight changes, changes in LE edema, and syncope. Patient reports compliance to his medications. Past Medical History:   Diagnosis Date    Cardiomyopathy [facility])     [facility] [address]     CVA (cerebral infarction) 2015    Diabetes mellitus ([facility] [address]     Gastroparesis     Hypercholesteremia     Hypertension     Kidney disease     Unspecified cerebral artery occlusion with cerebral infarction     5/15, 7/15     No past surgical history on file. Social History:  Social History     Tobacco Use    Smoking status: Current Some Day Smoker     Packs/day: 0.00     Types: Cigars     Start date: 1/3/1979     Last attempt to quit: 2019     Years since quittin.5    Smokeless tobacco: Never Used    Tobacco comment: 3 black and milds a week   Substance Use Topics    Alcohol use: Yes     Frequency: Monthly or less     Comment: occasional       Review of Systems:   Constitutional: No night sweats or fever. HEENT: No new vision difficulties or ringing in the ears. Respiratory: + BROWN, resolves with rest. No new SOB, PND, orthopnea or cough. Cardiovascular: See HPI. GI: No n/v, diarrhea, constipation, abdominal pain or changes in bowel habits.   No melena, no hematochezia  : No urinary frequency, urgency, incontinence, hematuria or dysuria. Skin: No cyanosis or skin lesions. Musculoskeletal: No new muscle or joint pain. Mild ankle edema BLE. Neurological: No syncope or TIA-like symptoms. Psychiatric: No insomnia or depression    Physical Exam:  /88   Pulse 68   Temp 97.9 °F (36.6 °C)   Ht 6' 2\" (1.88 m)   Wt 180 lb (81.6 kg)   SpO2 97%   BMI 23.11 kg/m²     General:  Awake, alert, oriented in NAD  Skin:  Warm and dry. No excessive bruising. No rash  Neck:  Supple. No JVD or carotid bruit appreciated  Chest:  Normal effort. Clear to auscultation, no wheezes/rhonchi/rales  Cardiovascular:  RRR, normal S1/S2. No murmur/gallop/rub  Abdomen:  Soft, nontender, +bowel sounds  Extremities:  No edema  Neurological:  Left sided weakness  Psychological: Normal mood and affect      Current Outpatient Medications   Medication Sig Dispense Refill    gabapentin (NEURONTIN) 300 MG capsule Take 300 mg by mouth 3 times daily.       cloNIDine (CATAPRES) 0.2 MG tablet Take 2 tab by mouth every 8 hours before meals spacing out from all blood pressure medications 270 tablet 1    hydrALAZINE (APRESOLINE) 100 MG tablet Take 2 tablets by mouth 3 times daily 270 tablet 1    NIFEdipine (PROCARDIA XL) 90 MG extended release tablet Take 1 tablet by mouth daily 180 tablet 1    atorvastatin (LIPITOR) 40 MG tablet TAKE 1 TABLET BY MOUTH ONCE DAILY 60 tablet 3    insulin glargine (LANTUS;BASAGLAR) 100 UNIT/ML injection pen Inject 20 Units into the skin nightly 5 pen 1    isosorbide mononitrate (IMDUR) 60 MG extended release tablet Take 1 tablet by mouth once daily 90 tablet 0    furosemide (LASIX) 20 MG tablet Take 1 tablet by mouth daily 90 tablet 1    losartan (COZAAR) 100 MG tablet Take 1 tablet by mouth daily 90 tablet 1    metoprolol tartrate (LOPRESSOR) 50 MG tablet Take 1 tablet by mouth twice daily 180 tablet 1    clopidogrel (PLAVIX) 75 MG tablet TAKE ONE TABLET BY MOUTH ONCE DAILY 90 tablet 3    Insulin Pen Needle 31G X 6 MM MISC 1 each by Does not apply route daily 100 each 3    acetaminophen (ACETAMINOPHEN EXTRA STRENGTH) 500 MG tablet TAKE TWO TABLETS BY MOUTH EVERY 6 HOURS AS NEEDED FOR PAIN (Patient taking differently: Take 500 mg by mouth every 6 hours as needed TAKE TWO TABLETS BY MOUTH EVERY 6 HOURS AS NEEDED FOR PAIN) 120 tablet 3    Insulin Syringe-Needle U-100 31G X 5/16\" 0.3 ML MISC 1 each by Does not apply route daily 100 each 3    gabapentin (NEURONTIN) 300 MG capsule Take 1 capsule by mouth 3 times daily for 82 days. 270 capsule 1     No current facility-administered medications for this visit. Labs: All reviewed   Lab Results   Component Value Date    HGB 9.8 10/23/2019    HCT 30.0 10/23/2019     Lab Results   Component Value Date     10/23/2019     Lab Results   Component Value Date    K 5.2 10/23/2019    K 4.4 01/30/2019     Lab Results   Component Value Date    BUN 35 10/23/2019    CREATININE 3.6 10/23/2019    No components found for: HGBA1C  Lab Results   Component Value Date    TSH 3.04 10/23/2019      Lab Results   Component Value Date    TRIG 86 06/05/2019    HDL 31 10/23/2019    LDLCALC 51 10/23/2019    LDLDIRECT 103 01/12/2017    LABVLDL 24 10/23/2019       Imaging: all reviewed     MPI 7/21/2015:  Dilated LV with reduced LV systolic function. There is normal isotope uptake at stress and rest. There is no evidence of   myocardial ischemia or scar. Non-diagnostic EKG response due to failure to reach target heart rate . ECHO 7/20/2015:  Enlarged left ventricle. Concentric left ventricle hypertrophy. Severely  decreased left ventricular systolic function with an estimated ejection  fraction of 25 to 30% . Global hypokinesis, no regional wall motion  abnormalities. Trivial MR.   Bubble study negative for PFO/ASD    CAROTID DOPPLER 7/21/2015:  Duplex sonography with color flow enhancement was performed bilaterally on   the cervical carotid system. No evidence of hemodynamically significant   stenosis in the bilateral carotid and vertebral arteries. ECHO:2/22/2016  Summary  Left ventricle size is normal.  Mild to moderate concentric left ventricular hypertrophy is present. Ejection fraction is visually estimated to be 50-55 %. Normal right ventricular size.     ECHO:9/10/18  Summary  Normal left ventricular chamber size. Moderate left ventricular hypertrophy. Slight global decrease in wall motion. Ejection fraction is visually estimated to be 50%. The right ventricle is normal in size and function. ECHO 6/8/2020  Overall left ventricular systolic function appears moderately reduced. Ejection fraction is visually estimated to be 35-40% with diffuse  hypokinesis. There is moderately increased left ventricular wall thickness. Diastolic filling parameters suggest grade II diastolic dysfunction. Normal right ventricular size and function. The left atrium is mildly dilated. Mild mitral and tricuspid regurgitation. Trivial aortic regurgitation. Moderate sized pericardial effusion. Angiography:9/10/18  ANGIOGRAPHY FINDINGS:  1. Left renal artery is normal without any significant stenosis. 2.  Right renal artery is normal without any significant stenosis. SUMMARY:  Normal renal artery without any significant stenosis. Assessment & Plan:    Cardiomyopathy. LVEF 35-40%. Most likely due to uncontrolled hypertension. Continue Losartan, b-blocker and lasix. Will get exercise nuclear stress test to assess for ischemia. Hypertension, essential   Controlled. Continue current medical management. Multiple CVA. Believed most likely related to HTN and small vessel disease. Continue risk factor modifciations including statin and Plavix (per neurologist recommendations). BP control     Hyperlipidemia  Continue high intensity statin therapy.      Nicotine addiction   We again discussed smoking cessation and recommend maintaining a smoke-free lifestyle. Diabetes Mellitus II  Managed by PCP. Follow up in 6 months. Thank you very much for allowing me to participate in the care of your patient. Maura Fang MD, MPH    This note was scribed in the presence of Dr Sam Eaton, by Tae Plaza RN  Physician Attestation:  The scribes documentation has been prepared under my direction and personally reviewed by me in its entirety. I confirm that the note above accurately reflects all work, treatment, procedures, and medical decision making performed by me.

## 2020-07-30 ENCOUNTER — OFFICE VISIT (OUTPATIENT)
Dept: CARDIOLOGY CLINIC | Age: 55
End: 2020-07-30
Payer: MEDICARE

## 2020-07-30 VITALS
OXYGEN SATURATION: 97 % | DIASTOLIC BLOOD PRESSURE: 88 MMHG | TEMPERATURE: 97.9 F | HEART RATE: 68 BPM | WEIGHT: 180 LBS | HEIGHT: 74 IN | SYSTOLIC BLOOD PRESSURE: 138 MMHG | BODY MASS INDEX: 23.1 KG/M2

## 2020-07-30 PROCEDURE — G8427 DOCREV CUR MEDS BY ELIG CLIN: HCPCS | Performed by: INTERNAL MEDICINE

## 2020-07-30 PROCEDURE — 4004F PT TOBACCO SCREEN RCVD TLK: CPT | Performed by: INTERNAL MEDICINE

## 2020-07-30 PROCEDURE — 3017F COLORECTAL CA SCREEN DOC REV: CPT | Performed by: INTERNAL MEDICINE

## 2020-07-30 PROCEDURE — 93000 ELECTROCARDIOGRAM COMPLETE: CPT | Performed by: INTERNAL MEDICINE

## 2020-07-30 PROCEDURE — 99213 OFFICE O/P EST LOW 20 MIN: CPT | Performed by: INTERNAL MEDICINE

## 2020-07-30 PROCEDURE — G8420 CALC BMI NORM PARAMETERS: HCPCS | Performed by: INTERNAL MEDICINE

## 2020-07-30 RX ORDER — HYDRALAZINE HYDROCHLORIDE 100 MG/1
100 TABLET, FILM COATED ORAL 2 TIMES DAILY
Qty: 60 TABLET | Refills: 3 | Status: SHIPPED | OUTPATIENT
Start: 2020-07-30 | End: 2020-09-29 | Stop reason: CLARIF

## 2020-07-30 RX ORDER — GABAPENTIN 300 MG/1
300 CAPSULE ORAL 2 TIMES DAILY
Qty: 180 CAPSULE | Refills: 1 | Status: CANCELLED | OUTPATIENT
Start: 2020-07-30 | End: 2021-01-26

## 2020-07-30 RX ORDER — CLOPIDOGREL BISULFATE 75 MG/1
75 TABLET ORAL DAILY
Qty: 90 TABLET | Refills: 5 | Status: SHIPPED | OUTPATIENT
Start: 2020-07-30 | End: 2021-02-21 | Stop reason: ALTCHOICE

## 2020-07-30 RX ORDER — GABAPENTIN 300 MG/1
300 CAPSULE ORAL 3 TIMES DAILY
COMMUNITY
End: 2020-08-20

## 2020-08-03 RX ORDER — INSULIN GLARGINE 100 [IU]/ML
20 INJECTION, SOLUTION SUBCUTANEOUS NIGHTLY
Qty: 6 ML | Refills: 2 | Status: SHIPPED | OUTPATIENT
Start: 2020-08-03 | End: 2020-11-19

## 2020-08-15 NOTE — PROGRESS NOTES
throughout the day. Lab Results   Component Value Date    CHOL 99 06/05/2019    CHOL 169 01/29/2018    CHOL 181 01/12/2017     Lab Results   Component Value Date    TRIG 86 06/05/2019    TRIG 153 (H) 01/29/2018    TRIG 317 (H) 01/12/2017     Lab Results   Component Value Date    HDL 31 (L) 10/23/2019    HDL 31 (L) 06/05/2019    HDL 38 (L) 01/29/2018     Lab Results   Component Value Date    LDLCALC 51 10/23/2019    LDLCALC 51 06/05/2019    LDLCALC 100 (H) 01/29/2018     Lab Results   Component Value Date    LABVLDL 24 10/23/2019    LABVLDL 17 06/05/2019    LABVLDL 31 01/29/2018     Lab Results   Component Value Date    WBC 8.7 10/23/2019    HGB 9.8 (L) 10/23/2019    HCT 30.0 (L) 10/23/2019    MCV 91.9 10/23/2019     10/23/2019     Lab Results   Component Value Date     10/23/2019    K 5.2 (H) 10/23/2019     10/23/2019    CO2 18 (L) 10/23/2019    BUN 35 (H) 10/23/2019    CREATININE 3.6 (H) 10/23/2019    GLUCOSE 237 (H) 02/05/2019    CALCIUM 9.2 10/23/2019    PROT 7.2 10/23/2019    LABALBU 4.3 10/23/2019    BILITOT <0.2 10/23/2019    ALKPHOS 71 10/23/2019    AST 21 10/23/2019    ALT 14 10/23/2019    LABGLOM 18 (A) 10/23/2019    GFRAA 22 (A) 10/23/2019    AGRATIO 1.5 10/23/2019    GLOB 2.9 10/23/2019       Review of Systems   Constitutional: Negative for activity change and fever. HENT: Negative for congestion. Eyes: Negative for visual disturbance. Respiratory: Negative for chest tightness and shortness of breath. Cardiovascular: Positive for leg swelling (trace ankle edema). Negative for chest pain and palpitations. Gastrointestinal: Negative for abdominal pain, constipation and diarrhea. Endocrine: Negative for polyuria. Genitourinary: Negative for dysuria. Musculoskeletal: Negative for arthralgias and myalgias. Skin: Negative for rash. Neurological: Negative for dizziness, light-headedness and headaches.    Psychiatric/Behavioral: Negative for agitation, decreased concentration and sleep disturbance. The patient is not nervous/anxious. Prior to Visit Medications    Medication Sig Taking? Authorizing Provider   insulin glargine (LANTUS) 100 UNIT/ML injection vial Inject 20 Units into the skin nightly Yes ROQUE Barbosa CNP   clopidogrel (PLAVIX) 75 MG tablet Take 1 tablet by mouth daily  Maura Fang MD   hydrALAZINE (APRESOLINE) 100 MG tablet Take 1 tablet by mouth 2 times daily  Maura Fang MD   cloNIDine (CATAPRES) 0.2 MG tablet Take 2 tab by mouth every 8 hours before meals spacing out from all blood pressure medications  ROQUE Barbosa CNP   hydrALAZINE (APRESOLINE) 100 MG tablet Take 2 tablets by mouth 3 times daily  ROQUE Barbosa CNP   NIFEdipine (PROCARDIA XL) 90 MG extended release tablet Take 1 tablet by mouth daily  ROQUE Barbosa CNP   atorvastatin (LIPITOR) 40 MG tablet TAKE 1 TABLET BY MOUTH ONCE DAILY  ROQUE Barbosa CNP   isosorbide mononitrate (IMDUR) 60 MG extended release tablet Take 1 tablet by mouth once daily  ROQUE Barbosa CNP   furosemide (LASIX) 20 MG tablet Take 1 tablet by mouth daily  ROQUE Barbosa CNP   losartan (COZAAR) 100 MG tablet Take 1 tablet by mouth daily  ROQUE Barbosa CNP   metoprolol tartrate (LOPRESSOR) 50 MG tablet Take 1 tablet by mouth twice daily  ROQUE Barbosa CNP   gabapentin (NEURONTIN) 300 MG capsule Take 1 capsule by mouth 3 times daily for 82 days.   ROQUE Barbosa CNP   Insulin Pen Needle 31G X 6 MM MISC 1 each by Does not apply route daily  ROQUE Barbosa CNP   acetaminophen (ACETAMINOPHEN EXTRA STRENGTH) 500 MG tablet TAKE TWO TABLETS BY MOUTH EVERY 6 HOURS AS NEEDED FOR PAIN  Patient taking differently: Take 500 mg by mouth every 6 hours as needed TAKE TWO TABLETS BY MOUTH EVERY 6 HOURS AS NEEDED FOR PAIN  Chiki Sousa MD   Insulin Syringe-Needle U-100 31G X 5/16\" 0.3 ML MISC 1 each by Does not apply route daily  Abner Ruffin MD        Social History     Tobacco Use    Smoking status: Current Some Day Smoker     Packs/day: 0.00     Types: Cigars     Start date: 1/3/1979     Last attempt to quit: 2019     Years since quittin.5    Smokeless tobacco: Never Used    Tobacco comment: 3 black and milds a week   Substance Use Topics    Alcohol use: Yes     Frequency: Monthly or less     Comment: occasional        Vitals:    20 1419   BP: 132/66   Pulse: 70   Resp: 18   Temp: 99 °F (37.2 °C)   TempSrc: Temporal   SpO2: 98%   Weight: 178 lb (80.7 kg)     Estimated body mass index is 22.85 kg/m² as calculated from the following:    Height as of 20: 6' 2\" (1.88 m). Weight as of this encounter: 178 lb (80.7 kg). Physical Exam  Vitals signs reviewed. Constitutional:       Appearance: He is well-developed. He is not diaphoretic. HENT:      Head: Normocephalic. Right Ear: Hearing and external ear normal. No tenderness. Left Ear: Hearing and external ear normal. No tenderness. Nose: Nose normal.   Eyes:      General: Lids are normal.   Cardiovascular:      Rate and Rhythm: Normal rate and regular rhythm. Heart sounds: S1 normal and S2 normal. Murmur present. Systolic murmur present with a grade of 1/6. Pulmonary:      Effort: Pulmonary effort is normal.      Breath sounds: Normal breath sounds. Musculoskeletal: Normal range of motion. Lymphadenopathy:      Head:      Right side of head: No submental or submandibular adenopathy. Left side of head: No submental or submandibular adenopathy. Cervical:      Right cervical: No superficial cervical adenopathy. Left cervical: No superficial cervical adenopathy. Skin:     General: Skin is warm and dry. Findings: No rash. Nails: There is no clubbing. Neurological:      Mental Status: He is alert and oriented to person, place, and time. GCS: GCS eye subscore is 4.  GCS verbal subscore is 5. GCS motor subscore is 6. Psychiatric:         Speech: Speech normal.         Behavior: Behavior normal. Behavior is cooperative. Judgment: Judgment normal.         ASSESSMENT/PLAN:  1. Type 2 diabetes mellitus with stage 3 chronic kidney disease, with long-term current use of insulin (HCC)  -Continue current medications. -Make appointment with Endocrinology  - POCT glycosylated hemoglobin (Hb A1C): 6.0% today    2. Chronic diastolic congestive heart failure (HCC)  -Continue current medications. [facility] Out[facility] Physical Therapy [facility]    3. Left-sided weakness  -Continue current medications. [facility] Out[facility] Physical Therapy [facility]    4. Cardiomyopathy, unspecified type ([address]  -Continue current medications. - [facility] - [facility]      Return in about 3 months (around 11/20/2020) for HTN, DIABETES. Reviewed patient's pertinent medical history, relevant laboratory results, imaging studies, and health maintenance. Medications have been reviewed and discussed with the patient, refills otherwise up-to-date. Discussed the importance of adhering to current medication regimen. Advised:  (1) continue to work on eating a healthy balanced diet; (2) stay active by exercising within your personal limits. Patient was advised to keep future appointments with their respective specialty care team(s). Questions and concerns addressed, care plan reviewed and patient is agreeable with the care plan following today's visit.       --ROQUE Millan - CNP on 8/21/2020 at 10:38 AM

## 2020-08-20 ENCOUNTER — OFFICE VISIT (OUTPATIENT)
Dept: INTERNAL MEDICINE CLINIC | Age: 55
End: 2020-08-20
Payer: MEDICARE

## 2020-08-20 VITALS
BODY MASS INDEX: 22.85 KG/M2 | RESPIRATION RATE: 18 BRPM | SYSTOLIC BLOOD PRESSURE: 132 MMHG | WEIGHT: 178 LBS | OXYGEN SATURATION: 98 % | DIASTOLIC BLOOD PRESSURE: 66 MMHG | HEART RATE: 70 BPM | TEMPERATURE: 99 F

## 2020-08-20 LAB — HBA1C MFR BLD: 6 %

## 2020-08-20 PROCEDURE — 4004F PT TOBACCO SCREEN RCVD TLK: CPT | Performed by: NURSE PRACTITIONER

## 2020-08-20 PROCEDURE — 83036 HEMOGLOBIN GLYCOSYLATED A1C: CPT | Performed by: NURSE PRACTITIONER

## 2020-08-20 PROCEDURE — 2022F DILAT RTA XM EVC RTNOPTHY: CPT | Performed by: NURSE PRACTITIONER

## 2020-08-20 PROCEDURE — 99214 OFFICE O/P EST MOD 30 MIN: CPT | Performed by: NURSE PRACTITIONER

## 2020-08-20 PROCEDURE — G8427 DOCREV CUR MEDS BY ELIG CLIN: HCPCS | Performed by: NURSE PRACTITIONER

## 2020-08-20 PROCEDURE — G8420 CALC BMI NORM PARAMETERS: HCPCS | Performed by: NURSE PRACTITIONER

## 2020-08-20 PROCEDURE — 3017F COLORECTAL CA SCREEN DOC REV: CPT | Performed by: NURSE PRACTITIONER

## 2020-08-20 PROCEDURE — 3044F HG A1C LEVEL LT 7.0%: CPT | Performed by: NURSE PRACTITIONER

## 2020-08-20 ASSESSMENT — ENCOUNTER SYMPTOMS
ABDOMINAL PAIN: 0
SHORTNESS OF BREATH: 0
CONSTIPATION: 0
CHEST TIGHTNESS: 0
DIARRHEA: 0

## 2020-08-24 ENCOUNTER — HOSPITAL ENCOUNTER (OUTPATIENT)
Dept: PHYSICAL THERAPY | Age: 55
Setting detail: THERAPIES SERIES
Discharge: HOME | End: 2020-08-24
Payer: MEDICARE

## 2020-08-24 PROCEDURE — 97162 PT EVAL MOD COMPLEX 30 MIN: CPT

## 2020-08-24 PROCEDURE — 97530 THERAPEUTIC ACTIVITIES: CPT

## 2020-08-24 PROCEDURE — 97110 THERAPEUTIC EXERCISES: CPT

## 2020-08-24 NOTE — PROGRESS NOTES
endurance         Plan   Plan  Times per week: 2x/week x 8 weeks  Current Treatment Recommendations: Strengthening, Home Exercise Program, Safety Education & Training, Balance Training, Functional Mobility Training, Gait Training, Transfer Training    OutComes Score  LEFS Total Score: 25 (08/24/20 1413)                Goals  Short term goals  Time Frame for Short term goals: 8 weeks  Short term goal 1:  Increase LE strength to 4+/5 to get up and down from chair without UE support  Short term goal 2: Modified tandem balance 5 sec to decrease risk of falls during gait  Patient Goals   Patient goals : Regain some of my strenght to be able to have more function and be able to drive/walk more        Delmus Necessary, PT 0004 (DPT, MS)

## 2020-08-24 NOTE — FLOWSHEET NOTE
Physical Therapy Daily Treatment Note  Date:  2020    Patient Name:  Ana Lilia Guillaume    :  1965  MRN: [id number]    Restrictions/Precautions: Position Activity Restriction  Other position/activity restrictions: high fall risk      Pertinent Medical History: Additional Pertinent Hx: DM. CKD, CHF, CVA with left sided weakness, gastroparesis, cardiomyopathy, neuropathy, glaucoma    Medical/Treatment Diagnosis Information:  · Diagnosis: General weakness  · Treatment Diagnosis: Decreased functional mobility 2/2 weakness/ imbalance    Insurance/Certification information:  PT Insurance Information: [employer] [employer]  Physician Information:  Referring Practitioner: Asuncion Rodriguez CNP  Plan of care signed (Y/N):  sent on eval date    Visit# / total visits:    Pain level: 4/10     History of Injury:  Subjective: Seenn my MD , working on assessment for [employer]. Referred to PT for strengthening and conditioning. Subjective:   Has been having issues with general weakness,  imbalance for years 2/2 multiple medical issues. Has lost weight and been feeling weaker for the past few months. Objective:   Decreased LE strength.   Fair balance, gait is unstable., poor core stabiltiy    TUG test - unable to perform    Exercise/Equipment Resistance/Repetitions Other comments        Supine  Bridge  SLR  clamshell   2 x 5  2 x 5  add         Sit to stand  22\" witihout UE x 5         Step ups Add 2-4\"         LegPress add    Knee extension add    Knee flexion add          ll bars  Side step  Tandem-modified  Gait       Seated on Mat Rocker board bal add Or blue disc   Seated reclines add    Quadruped  Extremity extension  Opp UE/LE   2 each  Add as francesca Try on floor on mat   Tall kneeling  To heels and up Able  unable         Nustep UE 12, seat 16 5 min      HEP Slr, bridge, eccentric stand to sit with wife next to him           Other Therapeutic Activities:  Pt was educated on PT POC, Diagnosis, Prognosis, pathomechanics as well as frequency and duration of scheduling future physical therapy appointments. Time was also taken on this day to answer all patient questions and participation in PT. Reviewed appointment policy in detail with patient and patient verbalized understanding. Home Exercise Program:  Patient instructed in the following for HEP as noted above. Patient verbalized/demonstrated understanding . Treatment/Activity Tolerance:  [] Patient tolerated treatment well [] Patient limited by fatigue  [] Patient limited by pain  [] Patient limited by other medical complications  [] Other:     Functional Progress - see Subjective    Prognosis: [x] Good [] Fair  [] Poor    Patient Requires Follow-up: [x] Yes  [] No    Plan:   [] Continue per plan of care [] Alter current plan (see comments)  [x] Plan of care initiated [] Hold pending MD visit [] Discharge    Plan for Next Session:  Add above as stated      Goals:    Short term goals  Time Frame for Short term goals: 8 weeks  Short term goal 1: Increase LE strength to 4+/5 to get up and down from chair without UE support  Short term goal 2: Modified tandem balance 5 sec to decrease risk of falls during gait              Functional Assessment:    25      Charges: Therapeutic Exercise:  [x] (99310) Provided verbal/tactile cueing for activities to restore or maintain strength, flexibility, endurance, ROM for improvements with self-care, mobility, lifting and ambulation. Neuromuscular Re-Education  [x] (09276) Provided verbal/tactile cueing for activities to restore or maintain balance, coordination, kinesthetic sense, posture, motor skill, proprioception for self-care, mobility, lifting, and ambulation. Therapeutic Activities:    [x] (28571) Provided verbal/tactile cueing to address functional limitations related to loss of mobility, strength, balance, and coordination.      Gait Training:  [x] (20074) Provided training and instruction to the

## 2020-08-26 PROBLEM — I49.8 FLUTTERING HEART: Status: RESOLVED | Noted: 2019-07-11 | Resolved: 2020-08-26

## 2020-08-26 PROBLEM — I10 UNCONTROLLED HYPERTENSION: Status: RESOLVED | Noted: 2018-09-10 | Resolved: 2020-08-26

## 2020-08-26 PROBLEM — R94.31 ACUTE ELECTROCARDIOGRAM CHANGES: Status: RESOLVED | Noted: 2018-09-09 | Resolved: 2020-08-26

## 2020-08-26 PROBLEM — E44.0 MODERATE MALNUTRITION (HCC): Chronic | Status: RESOLVED | Noted: 2019-01-31 | Resolved: 2020-08-26

## 2020-08-26 PROBLEM — T46.5X1A OVERDOSE OF ANTIHYPERTENSIVE AGENT: Status: RESOLVED | Noted: 2018-09-09 | Resolved: 2020-08-26

## 2020-08-27 PROBLEM — F33.1 MAJOR DEPRESSIVE DISORDER, RECURRENT EPISODE, MODERATE (HCC): Status: ACTIVE | Noted: 2020-08-27

## 2020-08-28 ENCOUNTER — HOSPITAL ENCOUNTER (OUTPATIENT)
Dept: PHYSICAL THERAPY | Age: 55
Setting detail: THERAPIES SERIES
Discharge: HOME | End: 2020-08-28
Payer: MEDICARE

## 2020-08-28 NOTE — FLOWSHEET NOTE
Physical Therapy  Cancellation-late cancel  low blood sugar       Patient Name:  He Rinaldi  :  1965   Date:  2020  Cancelled visits to date: 0  No-shows to date: 0    For today's appointment patient:  [x]  Cancelled  []  Rescheduled appointment  []  No-show     Reason given by patient:  [x]  Patient ill  []  Conflicting appointment  []  No transportation    []  Conflict with work  []  No reason given  []  Other:     Comments:      Electronically signed by:  Afsaneh Hernandez, [address] [facility]

## 2020-09-01 ENCOUNTER — HOSPITAL ENCOUNTER (OUTPATIENT)
Dept: PHYSICAL THERAPY | Age: 55
Setting detail: THERAPIES SERIES
Discharge: HOME | End: 2020-09-01
Payer: MEDICARE

## 2020-09-01 PROCEDURE — 97110 THERAPEUTIC EXERCISES: CPT

## 2020-09-01 NOTE — FLOWSHEET NOTE
Physical Therapy Daily Treatment Note  Date:  2020    Patient Name:  Zeynep Reyna    :  1965  MRN: [id number]    Restrictions/Precautions: Position Activity Restriction  Other position/activity restrictions: high fall risk      Pertinent Medical History: Additional Pertinent Hx: DM. 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 Referred to PT for strengthening and conditioning. Subjective:   Has been having issues with general weakness,  imbalance for years 2/2 multiple medical issues. Has lost weight and been feeling weaker for the past few months. Objective:   Decreased LE strength. Fair balance, gait is unstable., poor core stabiltiy    TUG test - unable to perform    Exercise/Equipment Resistance/Repetitions Other comments     General weakness     Nustep Seat 16 UE 12, self paced 5 min  0 resistance Pt needs rests in bet ex and water.  - to bring H2O bottle        GSs incline 30 sec x 2    HSS 30 sec x 2         fitter 1 thin f/b, s/s x 10  R/L         Leg Press  Seat 6 30#  X 10         Mat on floor  Quadruped    Extend UE and LE each x 3         Tall kneeling Assumes with min assist Wobbly throughout    Turn head R/L slowly    With hands on PT shoulders Buttocks towards heels and back up with min assist         From floor to mat With min assist         Sit to stand From chair with arms using arms - then do eccentric sitting without UE's  X 5 Supine bridge  SLR ASSESS next visit    Seated reclines add         Supine  Bridge  SLR  clamshell   2 x 5  2 x 5  add ADD these exercises as tolerated        Sit to stand  22\" witihout UE x 5         Step ups Add 2-4\"              Knee extension add    Knee flexion add          ll bars  Side step  Tandem-modified  Gait       Seated on Mat Rocker board bal add Or blue disc   Seated reclines add    Quadruped  Extremity extension  Opp UE/LE   2 each  Add as francesca Try on floor on mat   Tall kneeling  To heels and up Able  unable              HEP Slr, bridge, eccentric stand to sit with wife next to him           Other Therapeutic Activities:  Pt was educated on PT POC, Diagnosis, Prognosis, pathomechanics as well as frequency and duration of scheduling future physical therapy appointments. Time was also taken on this day to answer all patient questions and participation in PT. Reviewed appointment policy in detail with patient and patient verbalized understanding. Home Exercise Program:  Patient instructed in the following for HEP as noted above. Patient verbalized/demonstrated understanding . Treatment/Activity Tolerance:  [] Patient tolerated treatment well [] Patient limited by fatigue  [] Patient limited by pain  [] Patient limited by other medical complications  [] Other:     Functional Progress - see Subjective    Prognosis: [x] Good [] Fair  [] Poor    Patient Requires Follow-up: [x] Yes  [] No    Plan:   [] Continue per plan of care [] Alter current plan (see comments)  [x] Plan of care initiated [] Hold pending MD visit [] Discharge    Plan for Next Session:  Add above as stated      Goals:    Short term goals  Time Frame for Short term goals: 8 weeks  Short term goal 1: Increase LE strength to 4+/5 to get up and down from chair without UE support  Short term goal 2: Modified tandem balance 5 sec to decrease risk of falls during gait              Functional Assessment:    25      Charges:   Therapeutic Exercise:  [x] (11209) Provided verbal/tactile cueing for activities to restore or maintain strength, flexibility, endurance, ROM for improvements with self-care, mobility, lifting and ambulation. Neuromuscular Re-Education  [x] (25783) Provided verbal/tactile cueing for activities to restore or maintain balance, coordination, kinesthetic sense, posture, motor skill, proprioception for self-care, mobility, lifting, and ambulation. Therapeutic Activities:    [x] (61851) Provided verbal/tactile cueing to address functional limitations related to loss of mobility, strength, balance, and coordination. Gait Training:  [x] (98211) Provided training and instruction to the patient for proper postural muscle recruitment and positioning with ambulation re-education     Home Exercise Program:    [x] (16545) Reviewed/Progressed HEP activities related to strengthening, flexibility, endurance, ROM for functional self-care, mobility, lifting and ambulation   [] (70931) Reviewed/Progressed HEP activities related to improving balance, coordination, kinesthetic sense, posture, motor skill, proprioception for self-care, mobility, lifting, and ambulation      Manual Treatments:  MFR / STM / Oscillations-Mobs:  G-I, II, III, IV / Manipulation / MLD  [] (50203) Provided manual therapy to mobilize  soft tissue/joints/fluid for the purpose of modulating pain, promoting relaxation, increasing ROM, reducing/eliminating soft tissue swelling/inflammation/restriction, improving soft tissue extensibility and allowing for proper ROM for normal function with self- care, mobility, lifting and ambulation.       Timed Code Treatment Minutes: 45   Total Treatment Minutes: 45     [] EVAL (LOW) 47783   [] EVAL (MOD) 22320   [] EVAL (HIGH) 11305   [] RE-EVAL   [x] TE (37043) x   3  [] Aquatic (17332) x  [] NMR (99833)   x  [] Aquatic Group (36415) x  [] Manual (35087) x    [] Ultrasound (53821) x  [x] TA (76832) x  2  [] Mech Traction (73188)  [] Ionto (58605)           [] ES () (09533):   [] Vasopump (63527) [] Other:            Electronically signed by:  MISTY Dalton.Ken (DPT, MS)

## 2020-09-03 ENCOUNTER — HOSPITAL ENCOUNTER (OUTPATIENT)
Dept: PHYSICAL THERAPY | Age: 55
Setting detail: THERAPIES SERIES
Discharge: HOME | End: 2020-09-03
Payer: MEDICARE

## 2020-09-03 PROCEDURE — 97110 THERAPEUTIC EXERCISES: CPT | Performed by: CHIROPRACTOR

## 2020-09-03 NOTE — FLOWSHEET NOTE
Physical Therapy Daily Treatment Note  Date:  9/3/2020    Patient Name:  Herrera Guerra    :  1965  MRN: [id number]    Restrictions/Precautions: Position Activity Restriction  Other position/activity restrictions: high fall risk      Pertinent Medical History: Additional Pertinent Hx: DM. 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 Referred to PT for strengthening and conditioning. Subjective:   Has been having issues with general weakness,  imbalance for years 2/2 multiple medical issues. Has lost weight and been feeling weaker for the past few months. Objective:   Decreased LE strength. Fair balance, gait is unstable., poor core stabiltiy    TUG test - unable to perform    Exercise/Equipment Resistance/Repetitions Other comments     General weakness     Nustep Seat 14 UE 10, self paced 5 min  0 resistance Pt needs rests in bet ex and water.  - to bring H2O bottle        GSs incline 30 sec x 2    HSS 30 sec x 2         fitter 1 thin f/b, s/s x 15  R/L         Leg Press  Seat 6 30#  X 10         Mat on floor  Quadruped    Extend UE and LE each 2x3         Tall kneeling Assumes with min assist Wobbly throughout    Turn head R/L slowly    With hands on PT shoulders Buttocks towards heels and back up with min assist x2         From floor to mat With min assist         Sit to stand From chair with arms using arms - then do eccentric sitting without UE's  X 5 verbal/tactile cueing for activities to restore or maintain strength, flexibility, endurance, ROM for improvements with self-care, mobility, lifting and ambulation. Neuromuscular Re-Education  [x] (94473) Provided verbal/tactile cueing for activities to restore or maintain balance, coordination, kinesthetic sense, posture, motor skill, proprioception for self-care, mobility, lifting, and ambulation. Therapeutic Activities:    [x] (17178) Provided verbal/tactile cueing to address functional limitations related to loss of mobility, strength, balance, and coordination. Gait Training:  [x] (93053) Provided training and instruction to the patient for proper postural muscle recruitment and positioning with ambulation re-education     Home Exercise Program:    [x] (57318) Reviewed/Progressed HEP activities related to strengthening, flexibility, endurance, ROM for functional self-care, mobility, lifting and ambulation   [] (71602) Reviewed/Progressed HEP activities related to improving balance, coordination, kinesthetic sense, posture, motor skill, proprioception for self-care, mobility, lifting, and ambulation      Manual Treatments:  MFR / STM / Oscillations-Mobs:  G-I, II, III, IV / Manipulation / MLD  [] (16827) Provided manual therapy to mobilize  soft tissue/joints/fluid for the purpose of modulating pain, promoting relaxation, increasing ROM, reducing/eliminating soft tissue swelling/inflammation/restriction, improving soft tissue extensibility and allowing for proper ROM for normal function with self- care, mobility, lifting and ambulation.       Timed Code Treatment Minutes: 45   Total Treatment Minutes: 45     [] EVAL (LOW) 69335   [] EVAL (MOD) 30410   [] EVAL (HIGH) 56260   [] RE-EVAL   [x] TE (92478) x   3  [] Aquatic (34207) x  [] NMR (81150)   x  [] Aquatic Group (79656) x  [] Manual (39946) x    [] Ultrasound (29383) x  [] TA (69233) x    [] Mech Traction (33662)  [] Ionto (52747)           [] ES (un) (88334):   [] Vasopump (17436) [] Other:            Electronically signed by:  Angeli Kwan  #[id number]

## 2020-09-08 ENCOUNTER — HOSPITAL ENCOUNTER (OUTPATIENT)
Dept: PHYSICAL THERAPY | Age: 55
Setting detail: THERAPIES SERIES
Discharge: HOME | End: 2020-09-08
Payer: MEDICARE

## 2020-09-08 PROCEDURE — 97110 THERAPEUTIC EXERCISES: CPT

## 2020-09-11 ENCOUNTER — HOSPITAL ENCOUNTER (OUTPATIENT)
Dept: PHYSICAL THERAPY | Age: 55
Setting detail: THERAPIES SERIES
Discharge: HOME | End: 2020-09-11
Payer: MEDICARE

## 2020-09-11 PROCEDURE — 97110 THERAPEUTIC EXERCISES: CPT

## 2020-09-11 NOTE — FLOWSHEET NOTE
Physical Therapy Daily Treatment Note  Date:  2020    Patient Name:  Magali Alston    :  1965  MRN: [id number]    Restrictions/Precautions: Position Activity Restriction  Other position/activity restrictions: high fall risk      Pertinent Medical History: Additional Pertinent Hx: DM. 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 Referred to PT for strengthening and conditioning. Subjective:   Has been having issues with general weakness,  imbalance for years 2/2 multiple medical issues. Has lost weight and been feeling weaker for the past few months. 20: Normal soreness L lateral side. Feels he might have pushed it a little too much last time and was pretty tired after session. Pt very interested in Aquatic therapy. Objective:   Decreased LE strength. Fair balance, gait is unstable., poor core stabiltiy    TUG test - unable to perform    Exercise/Equipment Resistance/Repetitions Other comments     General weakness     Nustep Seat 14 UE 10, self paced 6 min  0 resistance Pt needs rests in bet ex and water.  - to bring H2O bottle        GSs incline 30 sec x 2    HSS 30 sec x 2         fitter 1 thin f/b, s/s x 15  R/L         Leg Press  Seat 6 30#  X 10         Mat on floor  Quadruped    Extend UE and LE each 1x5 each and then 1x3 each    Prone on elbows Chest push up x 5    Tall kneeling Assumes with min assist More stable    Turn head R/L slowly x 10    With hands on PT shoulders      Walk fwd and back 4 ft x 2 fwd 4 feet back    Roll side to side   Buttocks towards heels and back up with min assist x2      With CG      X 5 R/L with VC/PC         From floor to mat With min assist         Sit to stand From chair with arms using arms - then do eccentric sitting without UE's  X 5    Supine bridge  SLR ASSESS next visit    Seated reclines  Seated opp elbow to knee 2 x 5  2 x 5         Supine  Bridge  SLR  clamshell   1x10  0# - 2x7   R/L  add    abd cr level 1 2 x 5      Sit to stand  22\" witihout UE x 5         Step ups Add 2-4\"              Knee extension add    Knee flexion add          ll bars  Side step  Tandem-modified  Gait       Seated on Mat Blue disc      S/s. f/b    Seated reclines add    Quadruped  Extremity extension  Opp UE/LE   2 each  Add as francesca Try on floor on mat   Tall kneeling  To heels and up Able  unable              HEP Slr, bridge, eccentric stand to sit with wife next to him           Other Therapeutic Activities:  Pt was educated on PT POC, Diagnosis, Prognosis, pathomechanics as well as frequency and duration of scheduling future physical therapy appointments. Time was also taken on this day to answer all patient questions and participation in PT. Reviewed appointment policy in detail with patient and patient verbalized understanding. Home Exercise Program:  Patient instructed in the following for HEP as noted above. Patient verbalized/demonstrated understanding .     Treatment/Activity Tolerance:  [] Patient tolerated treatment well [] Patient limited by fatigue  [] Patient limited by pain  [] Patient limited by other medical complications  [] Other:     Functional Progress - see Subjective    Prognosis: [x] Good [] Fair  [] Poor    Patient Requires Follow-up: [x] Yes  [] No    Plan:   [] Continue per plan of care [] Alter current plan (see comments)  [x] Plan of care initiated [] Hold pending MD visit [] Discharge    Plan for Next Session:  Add above as stated      Goals:    Short term goals  Time Frame for Short term goals: 8 weeks  Short term goal 1: Increase LE strength to 4+/5 to get up and down from chair without UE support  Short term goal 2: Modified tandem balance 5 sec to decrease risk of falls during gait              Functional Assessment:    25      Charges: Therapeutic Exercise:  [x] (33873) Provided verbal/tactile cueing for activities to restore or maintain strength, flexibility, endurance, ROM for improvements with self-care, mobility, lifting and ambulation. Neuromuscular Re-Education  [x] (33491) Provided verbal/tactile cueing for activities to restore or maintain balance, coordination, kinesthetic sense, posture, motor skill, proprioception for self-care, mobility, lifting, and ambulation. Therapeutic Activities:    [x] (10794) Provided verbal/tactile cueing to address functional limitations related to loss of mobility, strength, balance, and coordination.      Gait Training:  [x] (47433) Provided training and instruction to the patient for proper postural muscle recruitment and positioning with ambulation re-education     Home Exercise Program:    [x] (48224) Reviewed/Progressed HEP activities related to strengthening, flexibility, endurance, ROM for functional self-care, mobility, lifting and ambulation   [] (17047) Reviewed/Progressed HEP activities related to improving balance, coordination, kinesthetic sense, posture, motor skill, proprioception for self-care, mobility, lifting, and ambulation      Manual Treatments:  MFR / STM / Oscillations-Mobs:  G-I, II, III, IV / Manipulation / MLD  [] (28350) Provided manual therapy to mobilize  soft tissue/joints/fluid for the purpose of modulating pain, promoting relaxation, increasing ROM, reducing/eliminating soft tissue swelling/inflammation/restriction, improving soft tissue extensibility and allowing for proper ROM for normal function with self- care, mobility, lifting and ambulation.       Timed Code Treatment Minutes: 45   Total Treatment Minutes: 45     [] EVAL (LOW) 22732   [] EVAL (MOD) 53971   [] EVAL (HIGH) 14351   [] RE-EVAL   [x] TE (48360) x   3  [] Aquatic (74654) x  [] NMR (14923)   x  [] Aquatic Group (31566) x  [] Manual (58076) x    [] Ultrasound (89111) x  [] TA (29969) x    [] Mech Traction (73089)  [] Ionto (01709)           [] ES (un) (45387):   [] Vasopump (82963) [] Other:            Electronically signed by:  Jg Dunn, [address] [facility] [place]

## 2020-09-14 ENCOUNTER — HOSPITAL ENCOUNTER (OUTPATIENT)
Dept: NON INVASIVE DIAGNOSTICS | Age: 55
Discharge: HOME | End: 2020-09-14
Payer: MEDICARE

## 2020-09-14 LAB
LV EF: 51 %
LVEF MODALITY: NORMAL

## 2020-09-14 PROCEDURE — 6360000002 HC RX W HCPCS: Performed by: INTERNAL MEDICINE

## 2020-09-14 PROCEDURE — A9502 TC99M TETROFOSMIN: HCPCS | Performed by: INTERNAL MEDICINE

## 2020-09-14 PROCEDURE — 3430000000 HC RX DIAGNOSTIC RADIOPHARMACEUTICAL: Performed by: INTERNAL MEDICINE

## 2020-09-14 PROCEDURE — 93017 CV STRESS TEST TRACING ONLY: CPT

## 2020-09-14 PROCEDURE — 78452 HT MUSCLE IMAGE SPECT MULT: CPT

## 2020-09-14 RX ADMIN — REGADENOSON 0.4 MG: 0.08 INJECTION, SOLUTION INTRAVENOUS at 09:36

## 2020-09-14 RX ADMIN — TETROFOSMIN 10 MILLICURIE: 1.38 INJECTION, POWDER, LYOPHILIZED, FOR SOLUTION INTRAVENOUS at 08:17

## 2020-09-14 RX ADMIN — TETROFOSMIN 30 MILLICURIE: 1.38 INJECTION, POWDER, LYOPHILIZED, FOR SOLUTION INTRAVENOUS at 09:34

## 2020-09-15 ENCOUNTER — HOSPITAL ENCOUNTER (OUTPATIENT)
Dept: PHYSICAL THERAPY | Age: 55
Setting detail: THERAPIES SERIES
Discharge: HOME | End: 2020-09-15
Payer: MEDICARE

## 2020-09-15 PROCEDURE — 97530 THERAPEUTIC ACTIVITIES: CPT

## 2020-09-15 PROCEDURE — 97110 THERAPEUTIC EXERCISES: CPT

## 2020-09-15 NOTE — FLOWSHEET NOTE
Physical Therapy Daily Treatment Note  Date:  9/15/2020    Patient Name:  Rex Bob    :  1965  MRN: [id number]    Restrictions/Precautions: Position Activity Restriction  Other position/activity restrictions: high fall risk      Pertinent Medical History: Additional Pertinent Hx: DM. 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 Referred to PT for strengthening and conditioning. Subjective:   Has been having issues with general weakness,  imbalance for years 2/2 multiple medical issues. Has lost weight and been feeling weaker for the past few months. 20: Normal soreness L lateral side. Feels he might have pushed it a little too much last time and was pretty tired after session. Pt very interested in Aquatic therapy. Objective:   Decreased LE strength. Fair balance, gait is unstable., poor core stabiltiy    TUG test - unable to perform    Exercise/Equipment Resistance/Repetitions Other comments     General weakness     Nustep Seat 14 UE 10, self paced 6 min  0 resistance Pt needs rests in bet ex and water.  - to bring H2O bottle        GSs incline 30 sec x 2    HSS 30 sec x 2         fitter 1 thin f/b, s/s x 15  R/L         Leg Press  Seat 6 30#  X 10         Mat on floor  Quadruped    Extend UE and LE each 1x5 each and then 1x3 each    Prone on elbows Chest push up x 5    Tall kneeling Assumes with min assist More stable    Turn head R/L relaxation, increasing ROM, reducing/eliminating soft tissue swelling/inflammation/restriction, improving soft tissue extensibility and allowing for proper ROM for normal function with self- care, mobility, lifting and ambulation.       Timed Code Treatment Minutes: 60   Total Treatment Minutes: 60     [] EVAL (LOW) 63103   [] EVAL (MOD) 03639   [] EVAL (HIGH) 90948   [] RE-EVAL   [x] TE (28706) x   3  [] Aquatic (06285) x  [] NMR (95381)   x  [] Aquatic Group (99443) x  [] Manual (51000) x    [] Ultrasound (24115) x  [x] TA (54267) x  1   [] Mech Traction (22693)  [] Ionto (79427)           [] ES (un) (60790):   [] Vasopump (50904) [] Other:            Electronically signed by:  Evin Cordero, PT DPT, [address]

## 2020-09-18 ENCOUNTER — HOSPITAL ENCOUNTER (OUTPATIENT)
Dept: PHYSICAL THERAPY | Age: 55
Setting detail: THERAPIES SERIES
Discharge: HOME | End: 2020-09-18
Payer: MEDICARE

## 2020-09-22 ENCOUNTER — HOSPITAL ENCOUNTER (OUTPATIENT)
Dept: PHYSICAL THERAPY | Age: 55
Setting detail: THERAPIES SERIES
Discharge: HOME | End: 2020-09-22
Payer: MEDICARE

## 2020-09-22 PROCEDURE — 97113 AQUATIC THERAPY/EXERCISES: CPT

## 2020-09-22 NOTE — FLOWSHEET NOTE
Physical Therapy Aquatic Flow Sheet   Date:  2020    Patient Name:  Herrera Guerra    :  1965     Restrictions/Precautions: Position Activity Restriction  Other position/activity restrictions: high fall risk      Pertinent Medical History: Additional Pertinent Hx: DM. 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 Referred to PT for strengthening and conditioning. Progress Note: []  Yes  [x]  No      Subjective:   Reports has not requested Motorized scooter yet. C/o of L UE pain. Objective:   To Pool w/o AD   Observation:  See eval   Test measurements:      Key  B= Belt DB= Dumbells T= Theratube   G=Gloves N= Noodles W= Weights   P= Paddles WW=Water Walker K= Kickboard        Transfers:   lift chair for safety      % Immersion: 75%            Ambulation:  laps  Exercises UE:  standing balance/ erect posture    Forwards x1 HHA  x2  Min Assist for balance Shoulder Shrugs     Lateral  Shoulder circles     Marching    Scapular Retraction      Retro   Rolling  10    [place]  Push Downs 10     IR/ER 10     Punching    Stretching: B Rowing 10   Gastroc/Soleus   Elbow Flex/Ext 10   Hamstring   Shldr Flex/Ext     Knee flex stretch   Shldr Abd/Add    Piriformis   Horiz Abd/Add     Hip flexor    Arm Circles     SKTC   PNF Diagonals    DKTC  UE oscillations f/b, s/s    ITB   Wall Push-ups    Quad  Figure 8's    Mid back   [place] pull/push downs    UT  Tband rows    Rhom  Tband lats    Post Shoulder  Lumbar Rot w/ paddles    Pec   Small ball pull downs                   diagonals             Cervical:       AROM Flex       AROM Extension     LEs exercises:  B AROM Side Bending    Marching  10 AROM Rotation    Heel Raises  10 Chin tucks    Toe Raises  10 Chin nods     Squats  10      Hamstring Curls  10      Hip Flexion  10 Balance:      Hip Abduction 10 SLS    Hip Circles 10 Tandem stance Modified 30 sec r/L   TA set  WBOS eyes open               Eyes closed 30 sec  30 sec    Glut Set  NBOS eyes open 30 sec    Hip Extension  Hand to Opposite Knee    Hip Adduction    Box Step     Hip IR   Noodle Stance     Hip ER  Stop/Go Gait    Fig 8's  Switch Gait                Seated: B Functional:    Ankle Pumps 10 Step up forward    Ankle circles         cw 10 Step up lateral    Knee flex & ext 10 Step down    Hip Abd & Adduction 10 [place] squats    Bicycle  10 Crate Lifts    Add Set with ball 10 Lunges forward    LX stab with med ball throws  Lunges lateral    Ankle INV  Lunges retro    Ankle EV  Lower ab curl with noodle      Upper ab curl with ball      Med ball straight lifts      Med ball diagonal lifts      Hydrorider          Noodle:      Leg Press  Deep Water:    Noodle hang at wall  Jog    Noodle hang deep water  Jumping Jacks    Noodle Bicycle  Heel to toe      Hand to opposite knee      Cross country skier      Rocking Horse        Charges:  Individual Aquatic Therapy:  [] (14407) Provided verbal and tactile cueing for strengthening, flexibility, ROM using the therapeutic properties of water (buoyancy, resistance) for improvements in core control, mobility and ambulation     Aquatic Group:  [] (71774) Provided intermittent verbal and tactile cueing for strengthening, endurance, flexibility and ROM for 2-4 individuals that do not require one-on one patient contact by the therapist     Individual Aquatic Minutes: 45   Aquatic Group Minutes:      [x] Aquatic (79239) x3  [] Aquatic Group ([phone]) x    Treatment/Activity Tolerance:  [x] Patient tolerated treatment well [] Patient limited by fatique  [] Patient limited by pain  [] Patient limited by other medical complications  [x] Other:  Repetitive cues for proper tech with ex, erect standing posture, min assist/ CGA with gt due to balance/ buoyancy,    Prognosis: [x] Good [] Fair  [] Poor    Patient Requires Follow-up: [x] Yes  [] No    Plan:   [x] Continue per plan of care [] Alter current plan (see comments)  [] Plan of care initiated [] Hold pending MD visit [] Discharge    Plan for Next Session:   Increase seated ex, UE ex with standing balance.      Electronically signed by: Shaye Gómez,

## 2020-09-23 RX ORDER — HYDRALAZINE HYDROCHLORIDE 100 MG/1
TABLET, FILM COATED ORAL
Qty: 270 TABLET | Refills: 0 | OUTPATIENT
Start: 2020-09-23

## 2020-09-24 ENCOUNTER — HOSPITAL ENCOUNTER (OUTPATIENT)
Dept: PHYSICAL THERAPY | Age: 55
Setting detail: THERAPIES SERIES
Discharge: HOME | End: 2020-09-24
Payer: MEDICARE

## 2020-09-24 PROCEDURE — 97113 AQUATIC THERAPY/EXERCISES: CPT

## 2020-09-24 NOTE — FLOWSHEET NOTE
Physical Therapy Aquatic Flow Sheet   Date:  2020    Patient Name:  Yadira Singh    :  1965     Restrictions/Precautions: Position Activity Restriction  Other position/activity restrictions: high fall risk      Pertinent Medical History: Additional Pertinent Hx: DM. CKD, CHF, CVA with left sided weakness, gastroparesis, cardiomyopathy, neuropathy, glaucoma     Medical/Treatment Diagnosis Information:  · Diagnosis: General weakness  · Treatment Diagnosis: Decreased functional mobility 2/2 weakness/ imbalance     Insurance/Certification information:  PT Insurance Information: [place] Advantage  Physician Information:  Referring Practitioner: Sol Meza CNP  Plan of care signed (Y/N):  sent on eval date     Visit# / total visits:   Pain level:    L UE   2/10           After Rx 2/10     History of Injury:  Subjective: Seenn my MD , working on assessment for [employer]. Referred to PT for strengthening and conditioning. Progress Note: []  Yes  [x]  No      Subjective:  francesca first aquatic ex well. Tired afterwards. Objective:   To Pool w/o AD   Observation:  See eval   Test measurements:      Key  B= Belt DB= Dumbells T= Theratube   G=Gloves N= Noodles W= Weights   P= Paddles WW=Water Walker K= Kickboard        Transfers:   lift chair for safety      % Immersion: 75%            Ambulation:  laps  Exercises UE:  standing balance/ erect posture    Forwards   x2  Min Assist for balance Shoulder Shrugs     Lateral  Shoulder circles     Marching    Scapular Retraction      Retro   Rolling  10    Cariocas  Push Downs 10     IR/ER 10     Punching    Stretching:  Rowing 10   Gastroc/Soleus   Elbow Flex/Ext 10   Hamstring   Shldr Flex/Ext  10   Knee flex stretch   Shldr Abd/Add 10   Piriformis   Horiz Abd/Add  10   Hip flexor    Arm Circles  10   SKTC   PNF Diagonals 10   DKTC  UE oscillations f/b, s/s    ITB   Wall Push-ups    Quad  Figure 8's    Mid back   [employer] downs [place]  Tband rows    Rhom  Tband lats    Post Shoulder  Lumbar Rot w/ paddles    Pec   Small ball pull downs                   diagonals             Cervical:       AROM Flex       AROM Extension     LEs exercises:  B AROM Side Bending    Marching  10 AROM Rotation    Heel Raises  10 Chin tucks    Toe Raises  10 Chin nods     Squats  10      Hamstring Curls  10      Hip Flexion  10 Balance:      Hip Abduction 10 SLS    Hip Circles 10 Tandem stance Modified 30 sec r/L   TA set  WBOS eyes open               Eyes closed 30 sec  30 sec    Glut Set  NBOS eyes open 30 sec    Hip Extension 10 Hand to Opposite Knee    Hip Adduction    Box Step     Hip IR   Noodle Stance     Hip ER  Stop/Go Gait    Fig 8's  Switch Gait                Seated: B Functional: B   Ankle Pumps 15 Step up forward    Ankle circles    cw/ccw 10 Step up lateral    Knee flex & ext 15 Step down    Hip Abd & Adduction 15 [place] squats    Bicycle  15 Crate Lifts    Add Set with ball 10 Lunges forward    LX stab with med ball throws  Lunges lateral    Ankle INV  Lunges retro    Ankle EV  Lower ab curl with noodle      Upper ab curl with ball      Med ball straight lifts      Med ball diagonal lifts      Hydrorider          Noodle:      Leg Press  Deep Water:    Noodle hang at wall  Jog    Noodle hang deep water  Jumping Jacks    Noodle Bicycle  Heel to toe      Hand to opposite knee      Cross country skier      Rocking Horse        Charges:  Individual Aquatic Therapy:  [] (92996) Provided verbal and tactile cueing for strengthening, flexibility, ROM using the therapeutic properties of water (buoyancy, resistance) for improvements in core control, mobility and ambulation     Aquatic Group:  [] ([id number]) Provided intermittent verbal and tactile cueing for strengthening, endurance, flexibility and ROM for 2-4 individuals that do not require one-on one patient contact by the therapist     Individual Aquatic Minutes: 45   Aquatic Group Minutes:      [x] Aquatic ([id number]) x3  [] Aquatic Group ([id number]) x    Treatment/Activity Tolerance:  [x] Patient tolerated treatment well [] Patient limited by fatique  [] Patient limited by pain  [] Patient limited by other medical complications  [x] Other:  Repetitive cues for proper tech with ex, erect standing posture, min assist/ CGA with gt due to balance/ buoyancy. occasional LOB with standing UE's    Prognosis: [x] Good [] Fair  [] Poor    Patient Requires Follow-up: [x] Yes  [] No    Plan:   [x] Continue per plan of care [] Alter current plan (see comments)  [] Plan of care initiated [] Hold pending MD visit [] Discharge    Plan for Next Session:   Increase seated ex, step ups fwds.  Step stance     Electronically signed by: Nayan Davis,

## 2020-09-25 ENCOUNTER — APPOINTMENT (OUTPATIENT)
Dept: PHYSICAL THERAPY | Age: 55
End: 2020-09-25
Payer: MEDICARE

## 2020-09-29 ENCOUNTER — OFFICE VISIT (OUTPATIENT)
Dept: INTERNAL MEDICINE CLINIC | Age: 55
End: 2020-09-29
Payer: MEDICARE

## 2020-09-29 VITALS
OXYGEN SATURATION: 98 % | WEIGHT: 173.8 LBS | DIASTOLIC BLOOD PRESSURE: 58 MMHG | BODY MASS INDEX: 22.31 KG/M2 | HEART RATE: 68 BPM | SYSTOLIC BLOOD PRESSURE: 124 MMHG | RESPIRATION RATE: 18 BRPM | TEMPERATURE: 97.4 F

## 2020-09-29 PROCEDURE — G8427 DOCREV CUR MEDS BY ELIG CLIN: HCPCS | Performed by: NURSE PRACTITIONER

## 2020-09-29 PROCEDURE — 3017F COLORECTAL CA SCREEN DOC REV: CPT | Performed by: NURSE PRACTITIONER

## 2020-09-29 PROCEDURE — 99213 OFFICE O/P EST LOW 20 MIN: CPT | Performed by: NURSE PRACTITIONER

## 2020-09-29 PROCEDURE — 3044F HG A1C LEVEL LT 7.0%: CPT | Performed by: NURSE PRACTITIONER

## 2020-09-29 PROCEDURE — 4004F PT TOBACCO SCREEN RCVD TLK: CPT | Performed by: NURSE PRACTITIONER

## 2020-09-29 PROCEDURE — G8420 CALC BMI NORM PARAMETERS: HCPCS | Performed by: NURSE PRACTITIONER

## 2020-09-29 PROCEDURE — 2022F DILAT RTA XM EVC RTNOPTHY: CPT | Performed by: NURSE PRACTITIONER

## 2020-09-29 RX ORDER — NIFEDIPINE 90 MG/1
90 TABLET, EXTENDED RELEASE ORAL DAILY
Qty: 90 TABLET | Refills: 1 | Status: SHIPPED | OUTPATIENT
Start: 2020-09-29 | End: 2021-05-11 | Stop reason: SDUPTHER

## 2020-09-29 RX ORDER — ISOSORBIDE MONONITRATE 60 MG/1
60 TABLET, EXTENDED RELEASE ORAL DAILY
Qty: 90 TABLET | Refills: 1 | Status: SHIPPED | OUTPATIENT
Start: 2020-09-29 | End: 2021-02-18 | Stop reason: SDUPTHER

## 2020-09-29 NOTE — PROGRESS NOTES
[address] [facility]  [address] [address]  Dept: [phone]       2020     Nancy Paiz (:  1965)is a 48 y.o. male, here for evaluation of an electric mobility scooter. HPI   I performed a specialty face to face examination on Nancy Paiz for a power mobility scooter. Nancy Paiz has a medical history of Chronic Diastolic Congestive Heart Failure and Cardiomyopathy causing dyspnea on exertion; Cerebral Infarct with residual left sided weakness; and type II Diabetes Mellitus with chronic kidney disease and peripheral neuropathy. This patients co-morbidities causes difficulty with ambulation and  shortness of breath with prolonged ambulation to the point where assistive equipment is required. Patient cannot accomplish the following mobility activities outside the home:  frequent medical appointments, grocery shopping, within a reasonable time and with a degree of safety. Due to left sided weakness the patient cannot sufficiently ambulate with the help of an appropriately fitted cane or a walker. Due to decreased endurance, the patient is unable to self-propel an optimally configured manual wheelchair. Patient is capable of transfering safely to and from [facility] and can maintain postural stability. Patient has the mental and physical capabilities to operate an electric scooter and is willing to use the scooter.          Lab Results   Component Value Date    CHOL 99 2019    CHOL 169 2018    CHOL 181 2017     Lab Results   Component Value Date    TRIG 86 2019    TRIG 153 (H) 2018    TRIG 317 (H) 2017     Lab Results   Component Value Date    HDL 31 (L) 10/23/2019    HDL 31 (L) 2019    HDL 38 (L) 2018     Lab Results   Component Value Date    LDLCALC 51 10/23/2019    LDLCALC 51 2019    LDLCALC 100 (H) 2018     Lab Results   Component Value Date    LABVLDL 24 10/23/2019    LABVLDL 17 06/05/2019    LABVLDL 31 01/29/2018     No results found for: [facility]  Lab Results   Component Value Date    WBC 8.7 10/23/2019    HGB 9.8 (L) 10/23/2019    HCT 30.0 (L) 10/23/2019    MCV 91.9 10/23/2019     10/23/2019     Lab Results   Component Value Date     10/23/2019    K 5.2 (H) 10/23/2019     10/23/2019    CO2 18 (L) 10/23/2019    BUN 35 (H) 10/23/2019    CREATININE 3.6 (H) 10/23/2019    GLUCOSE 237 (H) 02/05/2019    CALCIUM 9.2 10/23/2019    PROT 7.2 10/23/2019    LABALBU 4.3 10/23/2019    BILITOT <0.2 10/23/2019    ALKPHOS 71 10/23/2019    AST 21 10/23/2019    ALT 14 10/23/2019    LABGLOM 18 (A) 10/23/2019    GFRAA 22 (A) 10/23/2019    AGRATIO 1.5 10/23/2019    GLOB 2.9 10/23/2019       Review of Systems   Respiratory: Positive for shortness of breath (dyspnea on exertion). Negative for chest tightness. Cardiovascular: Negative for chest pain and leg swelling. Neurological: Positive for weakness (left sided weakness) and numbness (feet). Prior to Visit Medications    Medication Sig Taking?  Authorizing Provider   isosorbide mononitrate (IMDUR) 60 MG extended release tablet Take 1 tablet by mouth daily Yes ORQUE Luna CNP   NIFEdipine (PROCARDIA XL) 90 MG extended release tablet Take 1 tablet by mouth daily Yes ROQUE Luna CNP   clopidogrel (PLAVIX) 75 MG tablet Take 1 tablet by mouth daily Yes Ti Robles MD   cloNIDine (CATAPRES) 0.2 MG tablet Take 2 tab by mouth every 8 hours before meals spacing out from all blood pressure medications Yes ROQUE Luna CNP   atorvastatin (LIPITOR) 40 MG tablet TAKE 1 TABLET BY MOUTH ONCE DAILY Yes ROQUE Luna CNP   furosemide (LASIX) 20 MG tablet Take 1 tablet by mouth daily Yes ROQUE Luna CNP   insulin glargine (LANTUS) 100 UNIT/ML injection vial Inject 20 Units into the skin nightly  ROQUE Luna - CNP   hydrALAZINE (APRESOLINE) 100 MG tablet Take 2 Breath sounds: Normal breath sounds. Neurological:      General: No focal deficit present. Mental Status: He is alert and oriented to person, place, and time. GCS: GCS eye subscore is 4. GCS verbal subscore is 5. GCS motor subscore is 6. Motor: Weakness (left arm and leg) present. Gait: Gait abnormal.   Psychiatric:         Attention and Perception: Attention normal.         Mood and Affect: Mood normal.         Speech: Speech normal.         Behavior: Behavior is cooperative. Thought Content: Thought content normal.         Cognition and Memory: Cognition and memory normal.         ASSESSMENT/PLAN:  1. Chronic diastolic congestive heart failure (HCC)    - isosorbide mononitrate (IMDUR) 60 MG extended release tablet; Take 1 tablet by mouth daily  Dispense: 90 tablet; Refill: 1  - NIFEdipine (PROCARDIA XL) 90 MG extended release tablet; Take 1 tablet by mouth daily  Dispense: 90 tablet; Refill: 1    2. Cerebral infarction, unspecified mechanism ([address]      3. Left-sided weakness      4. Type 2 diabetes mellitus with stage 3 chronic kidney disease, with long-term current use of insulin, unspecified whether stage 3a or 3b CKD ([address]      5. Cardiomyopathy, unspecified type ([address]        Return in about 3 months (around 12/29/2020) for HTN, DIABETES.         --ROQUE Steel - CNP on 10/13/2020 at 8:36 AM

## 2020-10-01 ENCOUNTER — HOSPITAL ENCOUNTER (OUTPATIENT)
Dept: PHYSICAL THERAPY | Age: 55
Setting detail: THERAPIES SERIES
Discharge: HOME | End: 2020-10-01
Payer: MEDICARE

## 2020-10-01 PROCEDURE — 97113 AQUATIC THERAPY/EXERCISES: CPT

## 2020-10-01 NOTE — FLOWSHEET NOTE
Physical Therapy Aquatic Flow Sheet   Date:  10/1/2020    Patient Name:  Isabella Wallace    :  1965     Restrictions/Precautions: Position Activity Restriction  Other position/activity restrictions: high fall risk      Pertinent Medical History: Additional Pertinent Hx: DM. CKD, CHF, CVA with left sided weakness, gastroparesis, cardiomyopathy, neuropathy, glaucoma     Medical/Treatment Diagnosis Information:  · Diagnosis: General weakness  · Treatment Diagnosis: Decreased functional mobility 2/2 weakness/ imbalance     Insurance/Certification information:  PT Insurance Information: [place] [employer]age  Physician Information:  Referring Practitioner: Diandra El CNP  Plan of care signed (Y/N):  sent on eval date     Visit# / total visits:   Pain level:    L UE   2/10           After Rx 2/10     History of Injury:  Subjective: Seenn my MD , working on assessment for [employer]. Referred to PT for strengthening and conditioning. Progress Note: []  Yes  [x]  No      Subjective:  Reports tired after aquatic ex, with some difficulty walking to car due to L leg fatigue. Reports at home improved walking endurance and able to do some yard work easier. Objective: To Pool w/o AD   Observation:  See eval   Test measurements:      Key  B= Belt DB= Dumbells T= Theratube   G=Gloves N= Noodles W= Weights   P= Paddles WW=Water Walker K= Kickboard        Transfers:   lift chair for safety      % Immersion: 75%            Ambulation:  laps  Exercises UE:  standing balance/ erect posture    Forwards   x2  Min Assist for balance  Cues to keep legs from adducting.   Shoulder Shrugs 10    Lateral  Shoulder circles 10    Marching    Scapular Retraction  10    Retro   Rolling  10    Cariocas  Push Downs 10     IR/ER 10     Punching    Stretching:  Rowing 10   Gastroc/Soleus   Elbow Flex/Ext 10   Hamstring   Shldr Flex/Ext  10   Knee flex stretch   Shldr Abd/Add 10   Piriformis   Horiz Abd/Add  10 Hip flexor    Arm Circles  10   SKTC   PNF Diagonals 10   DKTC  UE oscillations f/b, s/s    ITB   Wall Push-ups    Quad  Figure 8's    Mid back   Buoy pull/push downs    UT  Tband rows    Rhom  Tband lats    Post Shoulder  Lumbar Rot w/ paddles    Pec   Small ball pull downs                   diagonals             Cervical:       AROM Flex       AROM Extension     LEs exercises:  B AROM Side Bending    Marching  10 AROM Rotation    Heel Raises  10 Chin tucks    Toe Raises  10 Chin nods     Squats  10      Hamstring Curls  10      Hip Flexion  10 Balance:      Hip Abduction 10 SLS    Hip Circles 10 Tandem stance Modified 30 sec R/L   TA set  WBOS eyes open               Eyes closed 30 sec  30 sec    Glut Set  NBOS eyes open 30 sec    Hip Extension 10 Hand to Opposite Knee    Hip Adduction    Box Step     Hip IR   Noodle Stance     Hip ER  Stop/Go Gait    Fig 8's  Switch Gait      Step stance 30 sec R/L  More difficulty L         Seated: B Functional: B   Ankle Pumps 20 Step up forward 5 R/L   Ankle circles    cw/ccw 10 Step up lateral    Knee flex & ext 20 Step down    Hip Abd & Adduction 20 [place] squats    Bicycle  20 Crate Lifts    Add Set with ball 10 Lunges forward    LX stab with med ball throws  Lunges lateral    Ankle INV  Lunges retro    Ankle EV  Lower ab curl with noodle      Upper ab curl with ball      Med ball straight lifts      Med ball diagonal lifts      Hydrorider          Noodle:      Leg Press  Deep Water:    Noodle hang at wall  Jog    Noodle hang deep water  Jumping Jacks    Noodle Bicycle  Heel to toe      Hand to opposite knee      Cross country skier      Rocking Horse        Charges:  Individual Aquatic Therapy:  [] ([id number]) Provided verbal and tactile cueing for strengthening, flexibility, ROM using the therapeutic properties of water (buoyancy, resistance) for improvements in core control, mobility and ambulation     Aquatic Group:  [] ([id number]) Provided intermittent verbal and tactile cueing for strengthening, endurance, flexibility and ROM for 2-4 individuals that do not require one-on one patient contact by the therapist     Individual Aquatic Minutes: 45   Aquatic Group Minutes:      [x] Aquatic ([id number])   x3  [] Aquatic Group ([id number]) x    Treatment/Activity Tolerance:  [x] Patient tolerated treatment well [] Patient limited by fatique  [] Patient limited by pain  [] Patient limited by other medical complications  [x] Other:  Repetitive cues for proper tech with ex, erect standing posture, min assist/ CGA with gt due to balance/ buoyancy, tendency to adduct LE's. occasional LOB with standing UE's    Prognosis: [x] Good [] Fair  [] Poor    Patient Requires Follow-up: [x] Yes  [] No    Plan:   [x] Continue per plan of care [] Alter current plan (see comments)  [] Plan of care initiated [] Hold pending MD visit [] Discharge    Plan for Next Session:   Increase seated ex, step ups fwds.  Squats, box step, alternate step taps as tolerated     Electronically signed by: Urbano Gaines,

## 2020-10-02 ENCOUNTER — APPOINTMENT (OUTPATIENT)
Dept: PHYSICAL THERAPY | Age: 55
End: 2020-10-02
Payer: MEDICARE

## 2020-10-06 ENCOUNTER — HOSPITAL ENCOUNTER (OUTPATIENT)
Dept: PHYSICAL THERAPY | Age: 55
Setting detail: THERAPIES SERIES
Discharge: HOME | End: 2020-10-06
Payer: MEDICARE

## 2020-10-06 ENCOUNTER — APPOINTMENT (OUTPATIENT)
Dept: PHYSICAL THERAPY | Age: 55
End: 2020-10-06
Payer: MEDICARE

## 2020-10-06 PROCEDURE — 97113 AQUATIC THERAPY/EXERCISES: CPT

## 2020-10-06 NOTE — FLOWSHEET NOTE
Physical Therapy Aquatic Flow Sheet   Date:  10/6/2020    Patient Name:  Raissa Valdes    :  1965     Restrictions/Precautions: Position Activity Restriction  Other position/activity restrictions: high fall risk      Pertinent Medical History: Additional Pertinent Hx: DM. CKD, CHF, CVA with left sided weakness, gastroparesis, cardiomyopathy, neuropathy, glaucoma     Medical/Treatment Diagnosis Information:  · Diagnosis: General weakness  · Treatment Diagnosis: Decreased functional mobility 2/2 weakness/ imbalance     Insurance/Certification information:  PT Insurance Information: [place] [employer]age  Physician Information:  Referring Practitioner: Mark Blackmon CNP  Plan of care signed (Y/N):  sent on eval date     Visit# / total visits: 10/16  Pain level:    L UE   2/10           After Rx 2/10     History of Injury:  Subjective: Seen my MD , working on assessment for [employer]. Referred to PT for strengthening and conditioning. Progress Note: []  Yes  [x]  No      Subjective:  Reports tired after aquatic ex, with some difficulty walking to car due to L leg fatigue. Reports at home improved walking endurance and able to do some yard work easier. 10/6/20: Sore after therapy, but it was the good soreness    Objective: To Pool w/o AD   Observation:  See eval   Test measurements:      Key  B= Belt DB= Dumbells T= Theratube   G=Gloves N= Noodles W= Weights   P= Paddles WW=Water Walker K= Kickboard        Transfers:   lift chair for safety      % Immersion: 75%            Ambulation:  laps  Exercises UE:  standing balance/ erect posture    Forwards   x2  Min Assist for balance  Cues to keep legs from adducting.   Shoulder Shrugs 10    Lateral  Shoulder circles 10    Marching    Scapular Retraction  10    Retro   Rolling  10    Cariocas  Push Downs 10     IR/ER 10     Punching 10   Stretching:  Rowing 10   Gastroc/Soleus   Elbow Flex/Ext 10   Hamstring   Shldr Flex/Ext  10   Knee flex stretch   Shldr Abd/Add 10   Piriformis   Horiz Abd/Add  10   Hip flexor    Arm Circles  10   SKTC   PNF Diagonals 10   DKTC  UE oscillations f/b, s/s    ITB   Wall Push-ups    Quad  Figure 8's    Mid back   Buoy pull/push downs    UT  Tband rows    Rhom  Tband lats    Post Shoulder  Lumbar Rot w/ paddles    Pec   Small ball pull downs                   diagonals             Cervical:       AROM Flex       AROM Extension     LEs exercises:  B AROM Side Bending    Marching  10 AROM Rotation    Heel Raises  10 Chin tucks    Toe Raises  10 Chin nods     Squats  10      Hamstring Curls  10      Hip Flexion  10 Balance:      Hip Abduction 10 SLS    Hip Circles 10 Tandem stance Modified 30 sec R/L   TA set  WBOS eyes open               Eyes closed 30 sec  30 sec    Glut Set  NBOS eyes open 30 sec    Hip Extension 10 Hand to Opposite Knee    Hip Adduction    Box Step     Hip IR   Noodle Stance     Hip ER  Stop/Go Gait    Fig 8's  Switch Gait      Step stance 30 sec R/L  More difficulty L         Seated: B Functional: B   Ankle Pumps 30 Step up forward 5 R/L   Ankle circles    cw/ccw 10 Step up lateral 5 R/L (step over and back)   Knee flex & ext 30 Step down    Hip Abd & Adduction 30 [place] squats    Bicycle  30 Crate Lifts    Add Set with ball 10 Lunges forward    LX stab with med ball throws  Lunges lateral    Ankle INV  Lunges retro    Ankle EV  Lower ab curl with noodle      Upper ab curl with ball      Med ball straight lifts      Med ball diagonal lifts      Hydrorider          Noodle:      Leg Press  Deep Water:    Noodle hang at wall  Jog    Noodle hang deep water  Jumping Jacks    Noodle Bicycle  Heel to toe      Hand to opposite knee      Cross country skier      Rocking Horse        Charges:  Individual Aquatic Therapy:  [] ([id number]) Provided verbal and tactile cueing for strengthening, flexibility, ROM using the therapeutic properties of water (buoyancy, resistance) for improvements in core control, mobility and ambulation     Aquatic Group:  [] ([id number]) Provided intermittent verbal and tactile cueing for strengthening, endurance, flexibility and ROM for 2-4 individuals that do not require one-on one patient contact by the therapist     Individual Aquatic Minutes: 50   Aquatic Group Minutes:      [x] Aquatic ([id number])   x3  [] Aquatic Group ([id number]) x    Treatment/Activity Tolerance:  [x] Patient tolerated treatment well [] Patient limited by fatique  [] Patient limited by pain  [] Patient limited by other medical complications  [x] Other:  Repetitive cues for proper tech with ex, erect standing posture, min assist/ CGA with gt due to balance/ buoyancy, tendency to adduct LE's.    occasional LOB with standing UE's    Prognosis: [x] Good [] Fair  [] Poor    Patient Requires Follow-up: [x] Yes  [] No    Plan:   [x] Continue per plan of care [] Alter current plan (see comments)  [] Plan of care initiated [] Hold pending MD visit [] Discharge    Plan for Next Session:    Squats, box step, alternate step taps as tolerated     Electronically signed by: Araceli Smith PTA

## 2020-10-09 ENCOUNTER — APPOINTMENT (OUTPATIENT)
Dept: PHYSICAL THERAPY | Age: 55
End: 2020-10-09
Payer: MEDICARE

## 2020-10-13 ENCOUNTER — HOSPITAL ENCOUNTER (OUTPATIENT)
Dept: PHYSICAL THERAPY | Age: 55
Setting detail: THERAPIES SERIES
Discharge: HOME | End: 2020-10-13
Payer: MEDICARE

## 2020-10-13 PROCEDURE — 97113 AQUATIC THERAPY/EXERCISES: CPT

## 2020-10-13 PROCEDURE — 97164 PT RE-EVAL EST PLAN CARE: CPT

## 2020-10-13 PROCEDURE — 97110 THERAPEUTIC EXERCISES: CPT

## 2020-10-13 PROCEDURE — 97112 NEUROMUSCULAR REEDUCATION: CPT

## 2020-10-13 ASSESSMENT — ENCOUNTER SYMPTOMS
CHEST TIGHTNESS: 0
SHORTNESS OF BREATH: 1

## 2020-10-13 NOTE — FLOWSHEET NOTE
Flex/Ext 10   Hamstring   Shldr Flex/Ext  10   Knee flex stretch   Shldr Abd/Add 10   Piriformis   Horiz Abd/Add  10   Hip flexor    Arm Circles  10   SKTC   PNF Diagonals 10   DKTC  UE oscillations f/b, s/s    ITB   Wall Push-ups 10   Quad  Figure 8's    Mid back   Buoy pull/push downs    UT  Tband rows    Rhom  Tband lats    Post Shoulder  Lumbar Rot w/ paddles    Pec   Small ball pull downs                   diagonals             Cervical:       AROM Flex       AROM Extension     LEs exercises:  B AROM Side Bending    Marching  10 AROM Rotation    Heel Raises  10 Chin tucks    Toe Raises  10 Chin nods     Squats  10      Hamstring Curls  10      Hip Flexion  10 Balance:      Hip Abduction 10 SLS    Hip Circles 10 Tandem stance Modified 30 sec R/L   TA set  WBOS eyes open               Eyes closed 30 sec  30 sec    Glut Set  NBOS eyes open 30 sec    Hip Extension 10 Hand to Opposite Knee    Hip Adduction    Box Step     Hip IR   Noodle Stance     Hip ER  Stop/Go Gait    Fig 8's  Switch Gait      Step stance 30 sec R/L  More difficulty L     Step Taps 10   Seated: B Functional: B   Ankle Pumps 30 Step up forward 5 R/L   Ankle circles    cw/ccw 10 Step up lateral 5 R/L (step over and back)   Knee flex & ext 30 Step down    Hip Abd & Adduction 30 [place] squats    Bicycle  30 Crate Lifts    Add Set with ball 10 Lunges forward    LX stab with med ball throws  Lunges lateral    Ankle INV  Lunges retro    Ankle EV  Lower ab curl with noodle      Upper ab curl with ball      Med ball straight lifts      Med ball diagonal lifts      Hydrorider          Noodle:      Leg Press  Deep Water:    Noodle hang at wall  Jog    Noodle hang deep water  Jumping Jacks    Noodle Bicycle  Heel to toe      Hand to opposite knee      Cross country skier      Rocking Horse        Charges:  Individual Aquatic Therapy:  [] ([id number]) Provided verbal and tactile cueing for strengthening, flexibility, ROM using the therapeutic properties of water (buoyancy, resistance) for improvements in core control, mobility and ambulation     Aquatic Group:  [] ([id number]) Provided intermittent verbal and tactile cueing for strengthening, endurance, flexibility and ROM for 2-4 individuals that do not require one-on one patient contact by the therapist     Individual Aquatic Minutes: 45   Aquatic Group Minutes:      [x] Aquatic ([id number])   x3  [] Aquatic Group ([id number]) x    Treatment/Activity Tolerance:  [x] Patient tolerated treatment well [] Patient limited by fatique  [] Patient limited by pain  [] Patient limited by other medical complications  [x] Other:  Repetitive cues for proper tech with ex, erect standing posture, min assist/ CGA with gt due to balance/ buoyancy, tendency to adduct LE's.    occasional LOB with standing UE's    Prognosis: [x] Good [] Fair  [] Poor    Patient Requires Follow-up: [x] Yes  [] No    Plan:   [x] Continue per plan of care [] Alter current plan (see comments)  [] Plan of care initiated [] Hold pending MD visit [] Discharge    Plan for Next Session:    Squats, box step as tolerated     Electronically signed by: Austen Van, PTA

## 2020-10-13 NOTE — FLOWSHEET NOTE
Physical Therapy Daily Treatment Note  Date:  10/13/2020    Patient Name:  Jose C Tidwell    :  1965  MRN: [id number]    Restrictions/Precautions: Position Activity Restriction  Other position/activity restrictions: high fall risk      Pertinent Medical History: Additional Pertinent Hx: DM. CKD, CHF, CVA with left sided weakness, gastroparesis, cardiomyopathy, neuropathy, glaucoma    Medical/Treatment Diagnosis Information:  · Diagnosis: General weakness  · Treatment Diagnosis: Decreased functional mobility 2/2 weakness/ imbalance    Insurance/Certification information:  PT Insurance Information: [place] [employer]  Physician Information:  Referring Practitioner: Camron Noguera CNP  Plan of care signed (Y/N):  sent on eval date    Visit# / total visits:   Pain level: 3/10     History of Injury:  Subjective: Seen by MD , working on assessment for [employer]. Referred to PT for strengthening and conditioning. Subjective:      10/13 - notes that he can walk from the parking lot to the pool and to the gym - is feeling a little stronger  Feels that the neuropathy is  Limiting him the most at this time. Objective:   Decreased LE strength. Fair balance, gait is unstable., poor core stabiltiy  Hip flexion 4/5, knee extension  4/5,  Ankle df 4-/5 maximo. (all improved 1/2 grade)   Sit to stand Mod I with arms of chair for leverage  Balance - feet together - no sway.       Exercise/Equipment Resistance/Repetitions Other comments     General weakness     Mat on floor  Quadruped    Extend UE and LE each 1x5 each and then 1x3 each 10/13  Pt able to assume and maintain indep        Tall kneeling Able to assume and maintain without assist   indep    Turn head R/L slowly x 10    good stability          Bridge   Buttocks towards heels and back up without assist.    X 10                     From floor to mat   Without assist    Supine  Bridge  SLR  clamshell   1x10  0# - 2x7   R/L  5 HEP-  Review of exercises to the left with patient - to be continued. Review of pools in the area for continued exercise at DC   abd cr level 1 2 x 5           HEP Slr, bridge, eccentric stand to sit with wife next to him           Other Therapeutic Activities:  Pt was educated on PT POC, Diagnosis, Prognosis, pathomechanics as well as frequency and duration of scheduling future physical therapy appointments. Time was also taken on this day to answer all patient questions and participation in PT. Reviewed appointment policy in detail with patient and patient verbalized understanding. Home Exercise Program:  Patient instructed in the following for HEP as noted above. Patient verbalized/demonstrated understanding . Treatment/Activity Tolerance:  [] Patient tolerated treatment well [] Patient limited by fatigue  [] Patient limited by pain  [] Patient limited by other medical complications  [] Other:     Functional Progress - see Subjective    Prognosis: [x] Good [] Fair  [] Poor    Patient Requires Follow-up: [x] Yes  [] No    Plan:   [] Continue per plan of care [] Alter current plan (see comments)  [x] Plan of care initiated [] Hold pending MD visit [] Discharge    Plan for Next Session:  Add above as stated      Goals:    Short term goals  Time Frame for Short term goals: 8 weeks  Short term goal 1: Increase LE strength to 4+/5 to get up and down from chair without UE support  Short term goal 2: Modified tandem balance 5 sec to decrease risk of falls during gait              Functional Assessment:    25      Charges: Therapeutic Exercise:  [x] (27087) Provided verbal/tactile cueing for activities to restore or maintain strength, flexibility, endurance, ROM for improvements with self-care, mobility, lifting and ambulation.     Neuromuscular Re-Education  [x] (21871) Provided verbal/tactile cueing for activities to restore or maintain balance, coordination, kinesthetic sense, posture, motor skill, proprioception for

## 2020-10-20 ENCOUNTER — HOSPITAL ENCOUNTER (OUTPATIENT)
Dept: PHYSICAL THERAPY | Age: 55
Setting detail: THERAPIES SERIES
Discharge: HOME | End: 2020-10-20
Payer: MEDICARE

## 2020-10-20 PROCEDURE — 97150 GROUP THERAPEUTIC PROCEDURES: CPT

## 2020-10-20 PROCEDURE — 97113 AQUATIC THERAPY/EXERCISES: CPT

## 2020-10-20 RX ORDER — CLONIDINE HYDROCHLORIDE 0.2 MG/1
TABLET ORAL
Qty: 90 TABLET | Refills: 1 | Status: ON HOLD | OUTPATIENT
Start: 2020-10-20 | End: 2020-11-03 | Stop reason: HOSPADM

## 2020-10-20 NOTE — FLOWSHEET NOTE
Physical Therapy Aquatic Flow Sheet   Date:  10/20/2020    Patient Name:  Willy Wang    :  1965     Restrictions/Precautions: Position Activity Restriction  Other position/activity restrictions: high fall risk      Pertinent Medical History: Additional Pertinent Hx: DM. CKD, CHF, CVA with left sided weakness, gastroparesis, cardiomyopathy, neuropathy, glaucoma     Medical/Treatment Diagnosis Information:  · Diagnosis: General weakness  · Treatment Diagnosis: Decreased functional mobility 2/2 weakness/ imbalance     Insurance/Certification information:  PT Insurance Information: [place] [employer]  Physician Information:  Referring Practitioner: Todd King CNP  Plan of care signed (Y/N):  sent on eval date     Visit# / total visits:   30 min early  Pain level:    L UE   2/10           After Rx 2/10     History of Injury:  Subjective: Seen my MD , working on assessment for [employer]. Referred to PT for strengthening and conditioning. Progress Note: []  Yes  [x]  No      Subjective:  Reports tired after aquatic ex, with some difficulty walking to car due to L leg fatigue. Reports at home improved walking endurance and able to do some yard work easier. 10/6/20: Sore after therapy, but it was the good soreness  10/13/20:  Left leg is tired. More tired from having re-eval today  10-20-20 reports less fatigue with ADL's. Objective: To Pool w/o AD   Observation:  See eval   Test measurements:      Key  B= Belt DB= Dumbells T= Theratube   G=Gloves N= Noodles W= Weights   P= Paddles WW=Water Walker K= Kickboard        Transfers:   Steps in w/CGA/Lift out      % Immersion: 75%            Ambulation:  laps  Exercises UE:  standing balance/ erect posture    Forwards   x2 CGA/SBA  Cues to keep legs from adducting.   Shoulder Shrugs 10    Lateral x1 CGA reports L leg fatugue Shoulder circles 10    Marching    Scapular Retraction  10    Retro   Rolling  10    [place]  Push Noreen Spicer 10     IR/ER 10     Punching 10   Stretching:  Rowing 10   Gastroc/Soleus   Elbow Flex/Ext 10   Hamstring   Shldr Flex/Ext  10   Knee flex stretch   Shldr Abd/Add 10   Piriformis   Horiz Abd/Add  10   Hip flexor    Arm Circles  10   SKTC   PNF Diagonals 10   DKTC  UE oscillations f/b, s/s    ITB   Wall Push-ups 10   Quad  Figure 8's    Mid back   Buoy pull/push downs    UT  Tband rows    Rhom  Tband lats    Post Shoulder  Lumbar Rot w/ paddles    Pec   Small ball pull downs                   diagonals             Cervical:       AROM Flex       AROM Extension     LEs exercises:  B AROM Side Bending    Marching  10 AROM Rotation    Heel Raises  10 Chin tucks    Toe Raises  10 Chin nods     Squats  10      Hamstring Curls  10      Hip Flexion  10 Balance:      Hip Abduction 10 SLS    Hip Circles 10 Tandem stance Modified 30 sec R/L   TA set  WBOS eyes open               Eyes closed 30 sec  30 sec    Glut Set  NBOS eyes open 30 sec    Hip Extension 10 Hand to Opposite Knee    Hip Adduction    Box Step     Hip IR   Noodle Stance     Hip ER  Stop/Go Gait    Fig 8's  Switch Gait      Step stance 30 sec R/L  More difficulty L     Step Taps 10   Seated: B Functional: B   Ankle Pumps 30 Step up forward 5 R/L   Ankle circles    cw/ccw 10 Step up lateral 5 R/L (step over and back)   Knee flex & ext 30 Step down 5 R/L   Hip Abd & Adduction 30 [place] squats 10    Bicycle  30 [place] Lifts    Add Set with ball 10 Lunges forward    LX stab with med ball throws  Lunges lateral    Ankle INV  Lunges retro    Ankle EV  Lower ab curl with noodle      Upper ab curl with ball      Med ball straight lifts      Med ball diagonal lifts      Hydrorider          Noodle:      Leg Press  Deep Water:    Noodle hang at wall  Jog    Noodle hang deep water  Jumping Jacks    Noodle Bicycle  Heel to toe      Hand to opposite knee      Cross country skier      Rocking Horse        Charges:  Individual Aquatic Therapy:  [] (05626) Provided verbal and tactile cueing for strengthening, flexibility, ROM using the therapeutic properties of water (buoyancy, resistance) for improvements in core control, mobility and ambulation     Aquatic Group:  [] (47429) Provided intermittent verbal and tactile cueing for strengthening, endurance, flexibility and ROM for 2-4 individuals that do not require one-on one patient contact by the therapist     Individual Aquatic Minutes: 45   Aquatic Group Minutes:      [x] Aquatic (37412)   X 1  [x] Aquatic Group (10212) x1    Treatment/Activity Tolerance:  [x] Patient tolerated treatment well [] Patient limited by fatique  [] Patient limited by pain  [] Patient limited by other medical complications  [x] Other:  Repetitive cues for proper tech with ex, erect standing posture, min assist/ CGA with gt due to balance/ buoyancy, tendency to adduct LE's. occasional LOB with standing UE's    Prognosis: [x] Good [] Fair  [] Poor    Patient Requires Follow-up: [x] Yes  [] No    Plan:   [x] Continue per plan of care [] Alter current plan (see comments)  [] Plan of care initiated [] Hold pending MD visit [] Discharge    Plan for Next Session:    Issue aquatic ex written HEP.   Pt to cont aquatic ex at [facility] upon D/C per PT    Electronically signed by: Diane Griffin, PTA  [id number]

## 2020-10-22 ENCOUNTER — HOSPITAL ENCOUNTER (OUTPATIENT)
Dept: PHYSICAL THERAPY | Age: 55
Setting detail: THERAPIES SERIES
Discharge: HOME | End: 2020-10-22
Payer: MEDICARE

## 2020-10-22 NOTE — FLOWSHEET NOTE
Physical Therapy                                                     Cancellation - upset stomach    Patient Name:  Deepti Lao  :  1965   Date:  10/22/2020  Cancelled visits to date: 4  No-shows to date: 1    For today's appointment patient:  [x]  Cancelled  []  Rescheduled appointment  []  No-show     Reason given by patient:  []  Patient ill - upset stomach  []  Conflicting appointment  []  No transportation    []  Conflict with work  []  No reason given  []  Other:     Comments: If pt does not re-schedule last aquatic ex, send written aquatic HEP and [facility] 30 day pass in mail.         Electronically signed by:  Ana Tolbert, PTA  [id number]

## 2020-10-25 ENCOUNTER — HOSPITAL ENCOUNTER (INPATIENT)
Age: 55
LOS: 9 days | Discharge: HOME | DRG: 444 | End: 2020-11-03
Attending: EMERGENCY MEDICINE | Admitting: INTERNAL MEDICINE
Payer: MEDICARE

## 2020-10-25 ENCOUNTER — APPOINTMENT (OUTPATIENT)
Dept: CT IMAGING | Age: 55
DRG: 444 | End: 2020-10-25
Payer: MEDICARE

## 2020-10-25 PROBLEM — I31.39 PERICARDIAL EFFUSION: Status: ACTIVE | Noted: 2020-10-25

## 2020-10-25 PROBLEM — N18.9 ACUTE RENAL FAILURE SUPERIMPOSED ON CHRONIC KIDNEY DISEASE, ON CHRONIC DIALYSIS (HCC): Status: ACTIVE | Noted: 2020-10-25

## 2020-10-25 PROBLEM — N18.9 ACUTE ON CHRONIC RENAL FAILURE (HCC): Status: ACTIVE | Noted: 2020-10-25

## 2020-10-25 PROBLEM — Z99.2 ACUTE RENAL FAILURE SUPERIMPOSED ON CHRONIC KIDNEY DISEASE, ON CHRONIC DIALYSIS (HCC): Status: ACTIVE | Noted: 2020-10-25

## 2020-10-25 PROBLEM — N17.9 ACUTE RENAL FAILURE SUPERIMPOSED ON CHRONIC KIDNEY DISEASE, ON CHRONIC DIALYSIS (HCC): Status: ACTIVE | Noted: 2020-10-25

## 2020-10-25 PROBLEM — N17.9 ACUTE ON CHRONIC RENAL FAILURE (HCC): Status: ACTIVE | Noted: 2020-10-25

## 2020-10-25 LAB
A/G RATIO: 1.1 (ref 1.1–2.2)
ALBUMIN SERPL-MCNC: 4.3 G/DL (ref 3.4–5)
ALP BLD-CCNC: 84 U/L (ref 40–129)
ALT SERPL-CCNC: 10 U/L (ref 10–40)
ANION GAP SERPL CALCULATED.3IONS-SCNC: 16 MMOL/L (ref 3–16)
AST SERPL-CCNC: 10 U/L (ref 15–37)
BASOPHILS ABSOLUTE: 0.1 K/UL (ref 0–0.2)
BASOPHILS RELATIVE PERCENT: 0.8 %
BILIRUB SERPL-MCNC: <0.2 MG/DL (ref 0–1)
BILIRUBIN URINE: NEGATIVE
BLOOD, URINE: NEGATIVE
BUN BLDV-MCNC: 67 MG/DL (ref 7–20)
CALCIUM SERPL-MCNC: 8.7 MG/DL (ref 8.3–10.6)
CHLORIDE BLD-SCNC: 111 MMOL/L (ref 99–110)
CLARITY: CLEAR
CO2: 15 MMOL/L (ref 21–32)
COLOR: YELLOW
COMMENT UA: NORMAL
CREAT SERPL-MCNC: 5 MG/DL (ref 0.9–1.3)
EOSINOPHILS ABSOLUTE: 0.1 K/UL (ref 0–0.6)
EOSINOPHILS RELATIVE PERCENT: 0.7 %
EPITHELIAL CELLS, UA: 1 /HPF (ref 0–5)
GFR AFRICAN AMERICAN: 15
GFR NON-AFRICAN AMERICAN: 12
GLOBULIN: 4 G/DL
GLUCOSE BLD-MCNC: 147 MG/DL (ref 70–99)
GLUCOSE BLD-MCNC: 201 MG/DL (ref 70–99)
GLUCOSE BLD-MCNC: 232 MG/DL (ref 70–99)
GLUCOSE URINE: NEGATIVE MG/DL
HCT VFR BLD CALC: 25.9 % (ref 40.5–52.5)
HEMOGLOBIN: 8.1 G/DL (ref 13.5–17.5)
HYALINE CASTS: 2 /LPF (ref 0–8)
KETONES, URINE: NEGATIVE MG/DL
LEUKOCYTE ESTERASE, URINE: NEGATIVE
LYMPHOCYTES ABSOLUTE: 0.4 K/UL (ref 1–5.1)
LYMPHOCYTES RELATIVE PERCENT: 3.2 %
MCH RBC QN AUTO: 28.3 PG (ref 26–34)
MCHC RBC AUTO-ENTMCNC: 31.3 G/DL (ref 31–36)
MCV RBC AUTO: 90.4 FL (ref 80–100)
MICROSCOPIC EXAMINATION: YES
MONOCYTES ABSOLUTE: 0.5 K/UL (ref 0–1.3)
MONOCYTES RELATIVE PERCENT: 4.5 %
NEUTROPHILS ABSOLUTE: 10.7 K/UL (ref 1.7–7.7)
NEUTROPHILS RELATIVE PERCENT: 90.8 %
NITRITE, URINE: NEGATIVE
PDW BLD-RTO: 14.3 % (ref 12.4–15.4)
PERFORMED ON: ABNORMAL
PERFORMED ON: ABNORMAL
PH UA: 5 (ref 5–8)
PLATELET # BLD: 157 K/UL (ref 135–450)
PMV BLD AUTO: 9.9 FL (ref 5–10.5)
POTASSIUM REFLEX MAGNESIUM: 4.8 MMOL/L (ref 3.5–5.1)
PRO-BNP: 3063 PG/ML (ref 0–124)
PROTEIN UA: >=300 MG/DL
RBC # BLD: 2.87 M/UL (ref 4.2–5.9)
RBC UA: 4 /HPF (ref 0–4)
SODIUM BLD-SCNC: 142 MMOL/L (ref 136–145)
SPECIFIC GRAVITY UA: 1.01 (ref 1–1.03)
TOTAL PROTEIN: 8.3 G/DL (ref 6.4–8.2)
TROPONIN: 0.08 NG/ML
TROPONIN: 0.1 NG/ML
URINE TYPE: ABNORMAL
UROBILINOGEN, URINE: 0.2 E.U./DL
WBC # BLD: 11.8 K/UL (ref 4–11)
WBC UA: 3 /HPF (ref 0–5)

## 2020-10-25 PROCEDURE — 36415 COLL VENOUS BLD VENIPUNCTURE: CPT

## 2020-10-25 PROCEDURE — 2580000003 HC RX 258: Performed by: EMERGENCY MEDICINE

## 2020-10-25 PROCEDURE — 85025 COMPLETE CBC W/AUTO DIFF WBC: CPT

## 2020-10-25 PROCEDURE — 96374 THER/PROPH/DIAG INJ IV PUSH: CPT

## 2020-10-25 PROCEDURE — 83036 HEMOGLOBIN GLYCOSYLATED A1C: CPT

## 2020-10-25 PROCEDURE — 83880 ASSAY OF NATRIURETIC PEPTIDE: CPT

## 2020-10-25 PROCEDURE — 2580000003 HC RX 258: Performed by: INTERNAL MEDICINE

## 2020-10-25 PROCEDURE — 6370000000 HC RX 637 (ALT 250 FOR IP): Performed by: INTERNAL MEDICINE

## 2020-10-25 PROCEDURE — 1200000000 HC SEMI PRIVATE

## 2020-10-25 PROCEDURE — 84484 ASSAY OF TROPONIN QUANT: CPT

## 2020-10-25 PROCEDURE — 6360000002 HC RX W HCPCS: Performed by: NURSE PRACTITIONER

## 2020-10-25 PROCEDURE — 74176 CT ABD & PELVIS W/O CONTRAST: CPT

## 2020-10-25 PROCEDURE — 81001 URINALYSIS AUTO W/SCOPE: CPT

## 2020-10-25 PROCEDURE — 80053 COMPREHEN METABOLIC PANEL: CPT

## 2020-10-25 PROCEDURE — 93005 ELECTROCARDIOGRAM TRACING: CPT | Performed by: EMERGENCY MEDICINE

## 2020-10-25 PROCEDURE — 99285 EMERGENCY DEPT VISIT HI MDM: CPT

## 2020-10-25 PROCEDURE — 6360000002 HC RX W HCPCS: Performed by: INTERNAL MEDICINE

## 2020-10-25 PROCEDURE — 6360000004 HC RX CONTRAST MEDICATION: Performed by: EMERGENCY MEDICINE

## 2020-10-25 PROCEDURE — 6370000000 HC RX 637 (ALT 250 FOR IP)

## 2020-10-25 PROCEDURE — 6360000002 HC RX W HCPCS: Performed by: EMERGENCY MEDICINE

## 2020-10-25 PROCEDURE — 96375 TX/PRO/DX INJ NEW DRUG ADDON: CPT

## 2020-10-25 RX ORDER — MORPHINE SULFATE 4 MG/ML
4 INJECTION, SOLUTION INTRAMUSCULAR; INTRAVENOUS ONCE
Status: COMPLETED | OUTPATIENT
Start: 2020-10-25 | End: 2020-10-25

## 2020-10-25 RX ORDER — ATORVASTATIN CALCIUM 40 MG/1
40 TABLET, FILM COATED ORAL DAILY
Status: DISCONTINUED | OUTPATIENT
Start: 2020-10-25 | End: 2020-11-03 | Stop reason: HOSPADM

## 2020-10-25 RX ORDER — ACETAMINOPHEN 650 MG/1
650 SUPPOSITORY RECTAL EVERY 6 HOURS PRN
Status: DISCONTINUED | OUTPATIENT
Start: 2020-10-25 | End: 2020-11-03 | Stop reason: HOSPADM

## 2020-10-25 RX ORDER — CLONIDINE HYDROCHLORIDE 0.1 MG/1
0.1 TABLET ORAL 3 TIMES DAILY
Status: DISCONTINUED | OUTPATIENT
Start: 2020-10-25 | End: 2020-10-27

## 2020-10-25 RX ORDER — HEPARIN SODIUM 5000 [USP'U]/ML
5000 INJECTION, SOLUTION INTRAVENOUS; SUBCUTANEOUS EVERY 8 HOURS SCHEDULED
Status: DISCONTINUED | OUTPATIENT
Start: 2020-10-25 | End: 2020-11-03 | Stop reason: HOSPADM

## 2020-10-25 RX ORDER — GABAPENTIN 300 MG/1
300 CAPSULE ORAL 3 TIMES DAILY
Status: DISCONTINUED | OUTPATIENT
Start: 2020-10-25 | End: 2020-11-03 | Stop reason: HOSPADM

## 2020-10-25 RX ORDER — NIFEDIPINE 90 MG/1
90 TABLET, EXTENDED RELEASE ORAL DAILY
Status: DISCONTINUED | OUTPATIENT
Start: 2020-10-25 | End: 2020-10-26

## 2020-10-25 RX ORDER — POLYETHYLENE GLYCOL 3350 17 G/17G
17 POWDER, FOR SOLUTION ORAL DAILY
Status: DISCONTINUED | OUTPATIENT
Start: 2020-10-26 | End: 2020-10-27

## 2020-10-25 RX ORDER — SODIUM CHLORIDE 0.9 % (FLUSH) 0.9 %
10 SYRINGE (ML) INJECTION PRN
Status: DISCONTINUED | OUTPATIENT
Start: 2020-10-25 | End: 2020-11-03 | Stop reason: HOSPADM

## 2020-10-25 RX ORDER — SENNA AND DOCUSATE SODIUM 50; 8.6 MG/1; MG/1
2 TABLET, FILM COATED ORAL 2 TIMES DAILY PRN
Status: DISCONTINUED | OUTPATIENT
Start: 2020-10-25 | End: 2020-11-03 | Stop reason: HOSPADM

## 2020-10-25 RX ORDER — DEXTROSE MONOHYDRATE 25 G/50ML
12.5 INJECTION, SOLUTION INTRAVENOUS PRN
Status: DISCONTINUED | OUTPATIENT
Start: 2020-10-25 | End: 2020-11-03 | Stop reason: HOSPADM

## 2020-10-25 RX ORDER — ONDANSETRON 2 MG/ML
4 INJECTION INTRAMUSCULAR; INTRAVENOUS EVERY 6 HOURS PRN
Status: DISCONTINUED | OUTPATIENT
Start: 2020-10-25 | End: 2020-11-03 | Stop reason: HOSPADM

## 2020-10-25 RX ORDER — ISOSORBIDE MONONITRATE 60 MG/1
60 TABLET, EXTENDED RELEASE ORAL DAILY
Status: DISCONTINUED | OUTPATIENT
Start: 2020-10-25 | End: 2020-11-03 | Stop reason: HOSPADM

## 2020-10-25 RX ORDER — KETOROLAC TROMETHAMINE 30 MG/ML
15 INJECTION, SOLUTION INTRAMUSCULAR; INTRAVENOUS ONCE
Status: COMPLETED | OUTPATIENT
Start: 2020-10-25 | End: 2020-10-25

## 2020-10-25 RX ORDER — ATORVASTATIN CALCIUM 40 MG/1
TABLET, FILM COATED ORAL
Status: COMPLETED
Start: 2020-10-25 | End: 2020-10-25

## 2020-10-25 RX ORDER — PROMETHAZINE HYDROCHLORIDE 25 MG/1
12.5 TABLET ORAL EVERY 6 HOURS PRN
Status: DISCONTINUED | OUTPATIENT
Start: 2020-10-25 | End: 2020-11-03 | Stop reason: HOSPADM

## 2020-10-25 RX ORDER — INSULIN GLARGINE 100 [IU]/ML
20 INJECTION, SOLUTION SUBCUTANEOUS NIGHTLY
Status: DISCONTINUED | OUTPATIENT
Start: 2020-10-25 | End: 2020-11-03

## 2020-10-25 RX ORDER — POLYETHYLENE GLYCOL 3350 17 G/17G
17 POWDER, FOR SOLUTION ORAL DAILY PRN
Status: DISCONTINUED | OUTPATIENT
Start: 2020-10-25 | End: 2020-11-03 | Stop reason: HOSPADM

## 2020-10-25 RX ORDER — NICOTINE POLACRILEX 4 MG
15 LOZENGE BUCCAL PRN
Status: DISCONTINUED | OUTPATIENT
Start: 2020-10-25 | End: 2020-11-03 | Stop reason: HOSPADM

## 2020-10-25 RX ORDER — CLOPIDOGREL BISULFATE 75 MG/1
75 TABLET ORAL DAILY
Status: DISCONTINUED | OUTPATIENT
Start: 2020-10-25 | End: 2020-10-27

## 2020-10-25 RX ORDER — DEXTROSE MONOHYDRATE 50 MG/ML
100 INJECTION, SOLUTION INTRAVENOUS PRN
Status: DISCONTINUED | OUTPATIENT
Start: 2020-10-25 | End: 2020-11-03 | Stop reason: HOSPADM

## 2020-10-25 RX ORDER — ACETAMINOPHEN 325 MG/1
650 TABLET ORAL EVERY 6 HOURS PRN
Status: DISCONTINUED | OUTPATIENT
Start: 2020-10-25 | End: 2020-11-03 | Stop reason: HOSPADM

## 2020-10-25 RX ORDER — ONDANSETRON 2 MG/ML
4 INJECTION INTRAMUSCULAR; INTRAVENOUS ONCE
Status: COMPLETED | OUTPATIENT
Start: 2020-10-25 | End: 2020-10-25

## 2020-10-25 RX ORDER — METOPROLOL TARTRATE 50 MG/1
50 TABLET, FILM COATED ORAL 2 TIMES DAILY
Status: DISCONTINUED | OUTPATIENT
Start: 2020-10-25 | End: 2020-11-03 | Stop reason: HOSPADM

## 2020-10-25 RX ORDER — SODIUM CHLORIDE 0.9 % (FLUSH) 0.9 %
10 SYRINGE (ML) INJECTION EVERY 12 HOURS SCHEDULED
Status: DISCONTINUED | OUTPATIENT
Start: 2020-10-25 | End: 2020-11-03 | Stop reason: HOSPADM

## 2020-10-25 RX ORDER — 0.9 % SODIUM CHLORIDE 0.9 %
1000 INTRAVENOUS SOLUTION INTRAVENOUS ONCE
Status: COMPLETED | OUTPATIENT
Start: 2020-10-25 | End: 2020-10-25

## 2020-10-25 RX ADMIN — ATORVASTATIN CALCIUM: 40 TABLET, FILM COATED ORAL at 16:09

## 2020-10-25 RX ADMIN — SODIUM CHLORIDE, PRESERVATIVE FREE 10 ML: 5 INJECTION INTRAVENOUS at 21:37

## 2020-10-25 RX ADMIN — INSULIN GLARGINE 20 UNITS: 100 INJECTION, SOLUTION SUBCUTANEOUS at 21:33

## 2020-10-25 RX ADMIN — GABAPENTIN 300 MG: 300 CAPSULE ORAL at 21:17

## 2020-10-25 RX ADMIN — INSULIN LISPRO 2 UNITS: 100 INJECTION, SOLUTION INTRAVENOUS; SUBCUTANEOUS at 17:52

## 2020-10-25 RX ADMIN — CLOPIDOGREL BISULFATE 75 MG: 75 TABLET ORAL at 16:06

## 2020-10-25 RX ADMIN — ACETAMINOPHEN 650 MG: 325 TABLET ORAL at 18:08

## 2020-10-25 RX ADMIN — KETOROLAC TROMETHAMINE 15 MG: 30 INJECTION, SOLUTION INTRAMUSCULAR at 11:39

## 2020-10-25 RX ADMIN — INSULIN LISPRO 1 UNITS: 100 INJECTION, SOLUTION INTRAVENOUS; SUBCUTANEOUS at 21:34

## 2020-10-25 RX ADMIN — METOPROLOL TARTRATE 50 MG: 50 TABLET, FILM COATED ORAL at 21:17

## 2020-10-25 RX ADMIN — CLONIDINE HYDROCHLORIDE 0.1 MG: 0.1 TABLET ORAL at 21:17

## 2020-10-25 RX ADMIN — ATORVASTATIN CALCIUM 40 MG: 40 TABLET, FILM COATED ORAL at 16:10

## 2020-10-25 RX ADMIN — ISOSORBIDE MONONITRATE 60 MG: 60 TABLET, EXTENDED RELEASE ORAL at 16:06

## 2020-10-25 RX ADMIN — ONDANSETRON 4 MG: 2 INJECTION INTRAMUSCULAR; INTRAVENOUS at 11:39

## 2020-10-25 RX ADMIN — CLONIDINE HYDROCHLORIDE 0.1 MG: 0.1 TABLET ORAL at 16:06

## 2020-10-25 RX ADMIN — GABAPENTIN 300 MG: 300 CAPSULE ORAL at 16:06

## 2020-10-25 RX ADMIN — NIFEDIPINE 90 MG: 90 TABLET, FILM COATED, EXTENDED RELEASE ORAL at 16:08

## 2020-10-25 RX ADMIN — MORPHINE SULFATE 4 MG: 4 INJECTION INTRAVENOUS at 21:18

## 2020-10-25 RX ADMIN — HEPARIN SODIUM 5000 UNITS: 5000 INJECTION INTRAVENOUS; SUBCUTANEOUS at 21:35

## 2020-10-25 RX ADMIN — IOHEXOL 50 ML: 240 INJECTION, SOLUTION INTRATHECAL; INTRAVASCULAR; INTRAVENOUS; ORAL at 11:28

## 2020-10-25 RX ADMIN — SODIUM CHLORIDE 1000 ML: 9 INJECTION, SOLUTION INTRAVENOUS at 11:34

## 2020-10-25 ASSESSMENT — PAIN DESCRIPTION - LOCATION
LOCATION: BACK;ABDOMEN
LOCATION: BACK
LOCATION: BACK
LOCATION: FLANK;ABDOMEN

## 2020-10-25 ASSESSMENT — PAIN DESCRIPTION - PROGRESSION
CLINICAL_PROGRESSION: GRADUALLY WORSENING
CLINICAL_PROGRESSION: GRADUALLY IMPROVING
CLINICAL_PROGRESSION: NOT CHANGED
CLINICAL_PROGRESSION: GRADUALLY WORSENING

## 2020-10-25 ASSESSMENT — PAIN DESCRIPTION - DESCRIPTORS
DESCRIPTORS: SHARP;STABBING

## 2020-10-25 ASSESSMENT — PAIN DESCRIPTION - ORIENTATION
ORIENTATION: LOWER

## 2020-10-25 ASSESSMENT — PAIN SCALES - GENERAL
PAINLEVEL_OUTOF10: 6
PAINLEVEL_OUTOF10: 7
PAINLEVEL_OUTOF10: 8
PAINLEVEL_OUTOF10: 5
PAINLEVEL_OUTOF10: 8

## 2020-10-25 ASSESSMENT — PAIN DESCRIPTION - PAIN TYPE
TYPE: ACUTE PAIN

## 2020-10-25 ASSESSMENT — PAIN DESCRIPTION - FREQUENCY
FREQUENCY: CONTINUOUS

## 2020-10-25 ASSESSMENT — PAIN - FUNCTIONAL ASSESSMENT
PAIN_FUNCTIONAL_ASSESSMENT: PREVENTS OR INTERFERES SOME ACTIVE ACTIVITIES AND ADLS
PAIN_FUNCTIONAL_ASSESSMENT: PREVENTS OR INTERFERES WITH MANY ACTIVE NOT PASSIVE ACTIVITIES
PAIN_FUNCTIONAL_ASSESSMENT: PREVENTS OR INTERFERES SOME ACTIVE ACTIVITIES AND ADLS
PAIN_FUNCTIONAL_ASSESSMENT: PREVENTS OR INTERFERES SOME ACTIVE ACTIVITIES AND ADLS

## 2020-10-25 ASSESSMENT — PAIN DESCRIPTION - ONSET
ONSET: ON-GOING

## 2020-10-25 NOTE — PROGRESS NOTES
Patient to room [id number] from ED by [address] transport . Patient is alert and oriented X4. Fluids: infusing. Vitals: he is hypertensive other viatals stable. Review of floor, room, call light, phone and hospital policies complete. Patient and family verbalized understanding. All questions have been answered.  Will monitor

## 2020-10-25 NOTE — H&P
Hospital Medicine History & Physical      PCP: ROQUE Adams CNP    Date of Admission: 10/25/2020    Date of Service: Pt seen/examined on 10/25/2020 and Admitted to Inpatient with expected LOS greater than two midnights due to medical therapy. Chief Complaint:  Abdominal pain, nausea, vomiting      History Of Present Illness:      48 y.o. male who presented to [facility] with 1 day of abdominal pain, associated with nausea and vomiting and generalized weakness, abdominal pain vague 2-3 out of 10 nonradiating no obvious alleviating or aggravating factors associated with constipation also, did have 1 or 2 episodes of vomiting this morning no blood in the vomit, he has been constipated for the last few days, denies any recent change to his medication denies fever headache chest pain or shortness of breath. In emergency department found to have creatinine above his baseline mildly elevated troponin and CT scan did show pericardial effusion. Past Medical History:          Diagnosis Date    Cardiomyopathy [facility])     [facility]     CVA (cerebral infarction) 5/2015    Diabetes mellitus [facility]     Gastroparesis     Hypercholesteremia     Hypertension     Kidney disease     Unspecified cerebral artery occlusion with cerebral infarction     5/15, 7/15       Past Surgical History:      History reviewed. No pertinent surgical history. Medications Prior to Admission:      Prior to Admission medications    Medication Sig Start Date End Date Taking?  Authorizing Provider   cloNIDine (CATAPRES) 0.2 MG tablet TAKE 1 TABLET BY MOUTH EVERY 8 HOURS BEFORE MEALS SPACING OUT FROM ALL BLOOD PRESSUE MEDICATIONS 10/20/20   ROQUE Adams CNP   isosorbide mononitrate (IMDUR) 60 MG extended release tablet Take 1 tablet by mouth daily 9/29/20   ROQUE Adams CNP   NIFEdipine (PROCARDIA XL) 90 MG extended release tablet Take 1 tablet by mouth daily 9/29/20 [facility], APRN - CNP   insulin glargine (LANTUS) 100 UNIT/ML injection vial Inject 20 Units into the skin nightly 8/3/20   [facility], APRN - CNP   clopidogrel (PLAVIX) 75 MG tablet Take 1 tablet by mouth daily 7/30/20   Elliott White MD   hydrALAZINE (APRESOLINE) 100 MG tablet Take 2 tablets by mouth 3 times daily 7/1/20   [facility], ROQUE Owusu CNP   atorvastatin (LIPITOR) 40 MG tablet TAKE 1 TABLET BY MOUTH ONCE DAILY 7/1/20   [facility], ROQUE - CNP   furosemide (LASIX) 20 MG tablet Take 1 tablet by mouth daily 5/26/20   [facility], ROQUE - CNP   losartan (COZAAR) 100 MG tablet Take 1 tablet by mouth daily 5/18/20   [facility], ROQUE - ANDREA   metoprolol tartrate (LOPRESSOR) 50 MG tablet Take 1 tablet by mouth twice daily 5/8/20   [facility], APRN - CNP   gabapentin (NEURONTIN) 300 MG capsule Take 1 capsule by mouth 3 times daily for 82 days. 1/22/20 4/13/20  [facility] ROQUE Mcintyre CNP   Insulin Pen Needle 31G X 6 MM MISC 1 each by Does not apply route daily 11/14/19   [facility]   acetaminophen (ACETAMINOPHEN EXTRA STRENGTH) 500 MG tablet TAKE TWO TABLETS BY MOUTH EVERY 6 HOURS AS NEEDED FOR PAIN  Patient taking differently: Take 500 mg by mouth every 6 hours as needed TAKE TWO TABLETS BY MOUTH EVERY 6 HOURS AS NEEDED FOR PAIN 6/22/19   Alka Barajas MD   Insulin Syringe-Needle U-100 31G X 5/16\" 0.3 ML MISC 1 each by Does not apply route daily 8/11/16   Elliott Soliman MD       Allergies:  Patient has no known allergies. Social History:      The patient currently lives at home    TOBACCO:   reports that he has been smoking cigars. He started smoking about 41 years ago. He has been smoking about 0.00 packs per day. He has never used smokeless tobacco.  ETOH:   reports current alcohol use. E-Cigarettes Vaping or Juuling     Questions Responses    Vaping Use Never User    Start Date     Does device contain nicotine?      Quit Date     Vaping Type Family History:      Reviewed in detail and negative for DM, CAD        Adopted: Yes       REVIEW OF SYSTEMS:   Pertinent positives as noted in the HPI. All other systems reviewed and negative. PHYSICAL EXAM PERFORMED:    BP (!) 177/79   Pulse 80   Temp 99 °F (37.2 °C)   Resp 15   Ht 6' 2\" (1.88 m)   Wt 177 lb (80.3 kg)   SpO2 99%   BMI 22.73 kg/m²     General appearance:  No apparent distress  HEENT:  Normal cephalic  Neck: Supple  Respiratory:  Normal respiratory effort. Clear to auscultation, bilaterally without Rales/Wheezes/Rhonchi. Cardiovascular:  Regular rate and rhythm with normal S1/S2 without murmurs, rubs or gallops. Abdomen: Soft, non-tender, non-distended   Musculoskeletal:  No clubbing, cyanosis   Skin: Skin color, texture, turgor normal.  No rashes or lesions. Neurologic:  No focal weakness   Psychiatric:  Alert and oriented  Capillary Refill: Brisk,< 3 seconds   Peripheral Pulses: +2 palpable, equal bilaterally       Labs:     Recent Labs     10/25/20  1137   WBC 11.8*   HGB 8.1*   HCT 25.9*        Recent Labs     10/25/20  1137      K 4.8   *   CO2 15*   BUN 67*   CREATININE 5.0*   CALCIUM 8.7     Recent Labs     10/25/20  1137   AST 10*   ALT 10   BILITOT <0.2   ALKPHOS 84     No results for input(s): INR in the last 72 hours. Recent Labs     10/25/20  1137   TROPONINI 0.10*       Urinalysis:      Lab Results   Component Value Date    NITRU Negative 10/25/2020    WBCUA 3 10/25/2020    BACTERIA Rare 01/31/2019    RBCUA 4 10/25/2020    BLOODU Negative 10/25/2020    SPECGRAV 1.012 10/25/2020    GLUCOSEU Negative 10/25/2020    GLUCOSEU 250 12/14/2010       Radiology:     CXR: I have reviewed the CXR with the following interpretation: no infiltrate  EKG:  I have reviewed the EKG with the following interpretation: no ST elevation     CT CHEST ABDOMEN PELVIS WO CONTRAST   Final Result   Chest-      Moderate to large pericardial effusion.       Mild atelectasis in the lingula and left lower lobe. Pneumonia is considered   less likely. Abdomen pelvis-      No acute inflammatory abnormality is identified in the abdomen or pelvis. Mild prominence of stool in the colon. Correlation for constipation   recommended. Polycystic kidney disease. Bilateral nonobstructive nephrolithiasis. Caliceal stones measure up to 5 mm   on the left. ASSESSMENT:    Active Hospital Problems    Diagnosis Date Noted    Acute on chronic renal failure ([facility] [N17.9, N18.9] 10/25/2020    Pericardial effusion [I31.3] 10/25/2020    Acute renal failure superimposed on chronic kidney disease, on chronic dialysis ([facility] [N17.9, N18.9, Z99.2] 10/25/2020    History of stroke [Z86.73] 07/11/2019    Mixed hyperlipidemia [E78.2] 07/11/2019    Cardiomyopathy ([facility] [I42.9] 07/21/2015    Essential hypertension [I10] 07/20/2015    Type 2 diabetes mellitus with stage 3 chronic kidney disease, with long-term current use of insulin [facility] [E11.22, N18.30, Z79.4] 07/20/2015         PLAN:    1. Acute to chronic renal failure, likely due to to decreased p.o. intake and vomiting, will hold diuretics for now we will hold losartan we will not start IV fluid will repeat labs in a.m. based on that we will decide, will also consult nephrology at this time, repeat complete metabolic panel in a.m., plan of care discussed with patient in detail, all questions answered. 2.  Pericardial effusion, appears chronic, hemodynamically stable, will consult cardiology. 3.  Diabetes mellitus, short and long-acting insulin  4. Essential hypertension, resume p.o. medications hold losartan though  5.  Elevated troponin, due to acute on chronic kidney disease, no chest pain at this time  6. Cardiomyopathy, with systolic and diastolic heart failure, appears compensated clinically, even though BNP above baseline at this time, I will hold on diuretics  7. Hyperlipidemia, on statin  8.   Kidney stone, nonobstructing, follow-up as outpatient    DVT Prophylaxis: heparin  Diet: No diet orders on file  Code Status: Prior        Dispo - inpatient 2-3 days       Williams Shi MD    Thank you ROQUE Brandon CNP for the opportunity to be involved in this patient's care. If you have any questions or concerns please feel free to contact me at [phone].

## 2020-10-25 NOTE — ED PROVIDER NOTES
No nasal congestion. No sore throat. Respiratory: No cough, No shortness of breath, No sputum production. Cardiovascular: No chest pain. No palpitations. Gastrointestinal: Lower abdominal pain. Nausea and vomiting  Genitourinary: No dysuria. No hematuria. Hematology/Lymphatics: No bleeding or bruising tendency. Immunologic: No malaise. No swollen glands. Musculoskeletal: Right lower back pain. No joint pain. Integumentary: No rash. No abrasions. Neurologic: No headache. No focal numbness or weakness. PAST MEDICAL HISTORY     Past Medical History:   Diagnosis Date    Cardiomyopathy [facility])     [facility]     CVA (cerebral infarction) 5/2015    Diabetes mellitus ([facility] [address] [facility]     Gastroparesis     Hypercholesteremia     Hypertension     Kidney disease     Unspecified cerebral artery occlusion with cerebral infarction     5/15, 7/15         SURGICALHISTORY     History reviewed. No pertinent surgical history. CURRENT MEDICATIONS       Previous Medications    ACETAMINOPHEN (ACETAMINOPHEN EXTRA STRENGTH) 500 MG TABLET    TAKE TWO TABLETS BY MOUTH EVERY 6 HOURS AS NEEDED FOR PAIN    ATORVASTATIN (LIPITOR) 40 MG TABLET    TAKE 1 TABLET BY MOUTH ONCE DAILY    CLONIDINE (CATAPRES) 0.2 MG TABLET    TAKE 1 TABLET BY MOUTH EVERY 8 HOURS BEFORE MEALS SPACING OUT FROM ALL BLOOD PRESSUE MEDICATIONS    CLOPIDOGREL (PLAVIX) 75 MG TABLET    Take 1 tablet by mouth daily    FUROSEMIDE (LASIX) 20 MG TABLET    Take 1 tablet by mouth daily    GABAPENTIN (NEURONTIN) 300 MG CAPSULE    Take 1 capsule by mouth 3 times daily for 82 days.     HYDRALAZINE (APRESOLINE) 100 MG TABLET    Take 2 tablets by mouth 3 times daily    INSULIN GLARGINE (LANTUS) 100 UNIT/ML INJECTION VIAL    Inject 20 Units into the skin nightly    INSULIN PEN NEEDLE 31G X 6 MM MISC    1 each by Does not apply route daily    INSULIN SYRINGE-NEEDLE U-100 31G X 5/16\" 0.3 ML MISC    1 each by Does not apply route daily    ISOSORBIDE Narrative    Lives with my spouse, cousin, daughter       SCREENINGS             PHYSICAL EXAM    (up to 7 for level 4, 8 or more for level 5)     ED Triage Vitals [10/25/20 1110]   BP Temp Temp Source Pulse Resp SpO2 Height Weight   (!) 180/85 99.2 °F (37.3 °C) Oral 81 18 98 % 6' 2\" (1.88 m) 177 lb (80.3 kg)       General: Alert and oriented, No acute distress. Eye: Normal conjunctiva. Pupils equal and reactive. HENT: Oral mucosa is moist.  Respiratory: Lungs are clear to auscultation, Respirations are non-labored. Cardiovascular: Normal rate, Regular rhythm. Gastrointestinal: Soft,  Non-distended. Mild tenderness palpation in the suprapubic region, no other abdominal tenderness noted. No rebound or guarding. Tenderness to palpation in the right CVA region as well  Musculoskeletal: No line spinal tenderness or step-off. Strength and sensation normal in the bilateral lower extremities. Integumentary: Warm, Dry. Neurologic: Alert, Oriented, No focal defects. Psychiatric: Cooperative. DIAGNOSTIC RESULTS     EKG: All EKG's are interpreted by the Emergency Department Physician who either signs or Co-signsthis chart in the absence of a cardiologist.    Per my interpretation:    Electrocardiogram (ECG) 10/25/2020 1238  RATE: normal  RHYTHM: normal sinus  AXIS: normal  INTERVALS: normal  ST-T WAVE CHANGES: No evidence of ST segment elevation or T-wave inversion Q waves V1 V2 not changed from prior  Prior for comparison 7/30/2020    RADIOLOGY:   Non-plain filmimages such as CT, Ultrasound and MRI are read by the radiologist. Plain radiographic images are visualized and preliminarily interpreted by the emergency physician with the below findings:      Interpretation per the Radiologist below, if available at the time ofthis note:    CT CHEST [address]   Final Result   Chest-      Moderate to large pericardial effusion. Mild atelectasis in the lingula and left lower lobe.   Pneumonia is considered   less likely. Abdomen pelvis-      No acute inflammatory abnormality is identified in the abdomen or pelvis. Mild prominence of stool in the colon. Correlation for constipation   recommended. Polycystic kidney disease. Bilateral nonobstructive nephrolithiasis. Caliceal stones measure up to 5 mm   on the left.                ED BEDSIDE ULTRASOUND:   Performed by ED Physician - none    LABS:  Labs Reviewed   CBC WITH AUTO DIFFERENTIAL - Abnormal; Notable for the following components:       Result Value    WBC 11.8 (*)     RBC 2.87 (*)     Hemoglobin 8.1 (*)     Hematocrit 25.9 (*)     Neutrophils Absolute 10.7 (*)     Lymphocytes Absolute 0.4 (*)     All other components within normal limits    Narrative:     Performed at:  [facility]  [address] [employer]   Phone [phone]   COMPREHENSIVE METABOLIC PANEL W/ REFLEX TO MG FOR LOW K - Abnormal; Notable for the following components:    Chloride 111 (*)     CO2 15 (*)     Glucose 147 (*)     BUN 67 (*)     CREATININE 5.0 (*)     GFR Non- 12 (*)     GFR  15 (*)     Total Protein 8.3 (*)     AST 10 (*)     All other components within normal limits    Narrative:     Performed at:  [facility]  [address], [address] [employer] [address]   Phone [phone]   URINALYSIS - Abnormal; Notable for the following components:    Protein, UA >=300 (*)     All other components within normal limits    Narrative:     Performed at:  [facility]  [address], [address] [employer] [address]   Phone [phone]   BRAIN NATRIURETIC PEPTIDE - Abnormal; Notable for the following components:    Pro-BNP 3,063 (*)     All other components within normal limits    Narrative:     Performed at:  [facility]  [address], [address] [employer] [address]   Phone [phone] history of polycystic kidney disease however previous creatinines in the systems have been 2-3. BUN is elevated at 67, so may have a component of dehydration as he has been vomiting. Received 1 L of IV fluids in the emergency department. Otherwise electrolytes are normal.  Urinalysis was without evidence of infection. CT of the abdomen and pelvis was ordered. I did receive a call from the CT technician that while the patient was having a CT of his abdomen performed, they noted that the base of the heart was significantly enlarged and there was fluid in this area. Therefore, we did decide to proceed with CT imaging of the chest as well. CT shows a moderate to large pericardial effusion. Medical record was reviewed, from echocardiogram in June 2020 there was a moderate pericardial effusion noted at that time as well. EF of 35 to 40%, therefore, this appears to be chronic. There is no acute inflammatory abnormality in the abdomen or pelvis, constipation and findings consistent with polycystic kidney disease are noted. EKG shows no evidence of acute ischemia. I did add a troponin and BNP after CT findings, BNP is elevated at 3000, which does appear higher than the patient's baseline. Troponin is 0.10. Patient has had a chronically elevated troponin, but this is slightly above his baseline as well. Admitting for serial troponins, further management of acute renal insufficiency. CRITICAL CARE TIME   Total Critical Care time was 0 minutes, excluding separately reportable procedures. There was a high probability of clinically significant/life threatening deterioration in the patient's condition which required my urgent intervention. CONSULTS:  None    PROCEDURES:  Unless otherwise noted below, none         FINAL IMPRESSION      1. Lower abdominal pain    2. KISHORE (acute kidney injury) ([address]    3. Pericardial effusion    4.  Elevated troponin          DISPOSITION/PLAN   DISPOSITION Decision To Admit 10/25/2020 02:19:57 PM      PATIENT REFERRED TO:  No follow-up provider specified.     DISCHARGE MEDICATIONS:  New Prescriptions    No medications on file          (Please note that portions of this note were completed with a voice recognition program.Efforts were made to edit the dictations but occasionally words are mis-transcribed.)    Vivienne Harrington MD (electronically signed)  Attending Emergency Physician        Vivienne Harrington MD  10/25/20 9331

## 2020-10-25 NOTE — ED TRIAGE NOTES
Pt here with complaints of right lower back pain that started about a week ago, with pain to his lower abdomen and some vomiting after taking some OTC relief medications, denies constipation and or diarrhea. He denies any injury to the back.     He is mobile

## 2020-10-26 LAB
A/G RATIO: 0.9 (ref 1.1–2.2)
ALBUMIN SERPL-MCNC: 3.5 G/DL (ref 3.4–5)
ALP BLD-CCNC: 75 U/L (ref 40–129)
ALT SERPL-CCNC: 7 U/L (ref 10–40)
ANION GAP SERPL CALCULATED.3IONS-SCNC: 13 MMOL/L (ref 3–16)
AST SERPL-CCNC: 8 U/L (ref 15–37)
BASOPHILS ABSOLUTE: 0 K/UL (ref 0–0.2)
BASOPHILS RELATIVE PERCENT: 0.5 %
BILIRUB SERPL-MCNC: <0.2 MG/DL (ref 0–1)
BUN BLDV-MCNC: 62 MG/DL (ref 7–20)
C-REACTIVE PROTEIN: 2.9 MG/L (ref 0–5.1)
C3 COMPLEMENT: 88 MG/DL (ref 90–180)
C4 COMPLEMENT: 26.9 MG/DL (ref 10–40)
CALCIUM SERPL-MCNC: 8.3 MG/DL (ref 8.3–10.6)
CHLORIDE BLD-SCNC: 112 MMOL/L (ref 99–110)
CO2: 15 MMOL/L (ref 21–32)
CREAT SERPL-MCNC: 5 MG/DL (ref 0.9–1.3)
EKG ATRIAL RATE: 85 BPM
EKG DIAGNOSIS: NORMAL
EKG P AXIS: 51 DEGREES
EKG P-R INTERVAL: 174 MS
EKG Q-T INTERVAL: 372 MS
EKG QRS DURATION: 94 MS
EKG QTC CALCULATION (BAZETT): 442 MS
EKG R AXIS: 32 DEGREES
EKG T AXIS: 103 DEGREES
EKG VENTRICULAR RATE: 85 BPM
EOSINOPHILS ABSOLUTE: 0.3 K/UL (ref 0–0.6)
EOSINOPHILS RELATIVE PERCENT: 2.9 %
ESTIMATED AVERAGE GLUCOSE: 137 MG/DL
GFR AFRICAN AMERICAN: 15
GFR NON-AFRICAN AMERICAN: 12
GLOBULIN: 3.7 G/DL
GLUCOSE BLD-MCNC: 118 MG/DL (ref 70–99)
GLUCOSE BLD-MCNC: 122 MG/DL (ref 70–99)
GLUCOSE BLD-MCNC: 128 MG/DL (ref 70–99)
GLUCOSE BLD-MCNC: 129 MG/DL (ref 70–99)
GLUCOSE BLD-MCNC: 134 MG/DL (ref 70–99)
HBA1C MFR BLD: 6.4 %
HCT VFR BLD CALC: 24.2 % (ref 40.5–52.5)
HEMOGLOBIN: 7.8 G/DL (ref 13.5–17.5)
IRON SATURATION: 22 % (ref 20–50)
IRON: 61 UG/DL (ref 59–158)
LV EF: 48 %
LVEF MODALITY: NORMAL
LYMPHOCYTES ABSOLUTE: 1.2 K/UL (ref 1–5.1)
LYMPHOCYTES RELATIVE PERCENT: 14 %
MCH RBC QN AUTO: 28.9 PG (ref 26–34)
MCHC RBC AUTO-ENTMCNC: 32.2 G/DL (ref 31–36)
MCV RBC AUTO: 89.8 FL (ref 80–100)
MONOCYTES ABSOLUTE: 0.8 K/UL (ref 0–1.3)
MONOCYTES RELATIVE PERCENT: 8.5 %
NEUTROPHILS ABSOLUTE: 6.6 K/UL (ref 1.7–7.7)
NEUTROPHILS RELATIVE PERCENT: 74.1 %
PDW BLD-RTO: 14.4 % (ref 12.4–15.4)
PERFORMED ON: ABNORMAL
PLATELET # BLD: 144 K/UL (ref 135–450)
PMV BLD AUTO: 10.3 FL (ref 5–10.5)
POTASSIUM REFLEX MAGNESIUM: 4.9 MMOL/L (ref 3.5–5.1)
RBC # BLD: 2.7 M/UL (ref 4.2–5.9)
SEDIMENTATION RATE, ERYTHROCYTE: 64 MM/HR (ref 0–20)
SODIUM BLD-SCNC: 140 MMOL/L (ref 136–145)
TOTAL IRON BINDING CAPACITY: 281 UG/DL (ref 260–445)
TOTAL PROTEIN: 7.2 G/DL (ref 6.4–8.2)
WBC # BLD: 8.9 K/UL (ref 4–11)

## 2020-10-26 PROCEDURE — 83540 ASSAY OF IRON: CPT

## 2020-10-26 PROCEDURE — 86160 COMPLEMENT ANTIGEN: CPT

## 2020-10-26 PROCEDURE — 80053 COMPREHEN METABOLIC PANEL: CPT

## 2020-10-26 PROCEDURE — 94760 N-INVAS EAR/PLS OXIMETRY 1: CPT

## 2020-10-26 PROCEDURE — 80074 ACUTE HEPATITIS PANEL: CPT

## 2020-10-26 PROCEDURE — 93010 ELECTROCARDIOGRAM REPORT: CPT | Performed by: INTERNAL MEDICINE

## 2020-10-26 PROCEDURE — 6370000000 HC RX 637 (ALT 250 FOR IP): Performed by: INTERNAL MEDICINE

## 2020-10-26 PROCEDURE — 2500000003 HC RX 250 WO HCPCS: Performed by: INTERNAL MEDICINE

## 2020-10-26 PROCEDURE — 97116 GAIT TRAINING THERAPY: CPT

## 2020-10-26 PROCEDURE — 86780 TREPONEMA PALLIDUM: CPT

## 2020-10-26 PROCEDURE — 36415 COLL VENOUS BLD VENIPUNCTURE: CPT

## 2020-10-26 PROCEDURE — 93306 TTE W/DOPPLER COMPLETE: CPT

## 2020-10-26 PROCEDURE — 86140 C-REACTIVE PROTEIN: CPT

## 2020-10-26 PROCEDURE — 2580000003 HC RX 258: Performed by: INTERNAL MEDICINE

## 2020-10-26 PROCEDURE — 6360000002 HC RX W HCPCS: Performed by: INTERNAL MEDICINE

## 2020-10-26 PROCEDURE — 97165 OT EVAL LOW COMPLEX 30 MIN: CPT

## 2020-10-26 PROCEDURE — 97161 PT EVAL LOW COMPLEX 20 MIN: CPT

## 2020-10-26 PROCEDURE — 97530 THERAPEUTIC ACTIVITIES: CPT

## 2020-10-26 PROCEDURE — 85025 COMPLETE CBC W/AUTO DIFF WBC: CPT

## 2020-10-26 PROCEDURE — 1200000000 HC SEMI PRIVATE

## 2020-10-26 PROCEDURE — 86038 ANTINUCLEAR ANTIBODIES: CPT

## 2020-10-26 PROCEDURE — 99254 IP/OBS CNSLTJ NEW/EST MOD 60: CPT | Performed by: INTERNAL MEDICINE

## 2020-10-26 PROCEDURE — 85652 RBC SED RATE AUTOMATED: CPT

## 2020-10-26 PROCEDURE — 0065U SYFLS TST NONTREPONEMAL ANTB: CPT

## 2020-10-26 PROCEDURE — 83550 IRON BINDING TEST: CPT

## 2020-10-26 RX ORDER — METHOCARBAMOL 500 MG/1
500 TABLET, FILM COATED ORAL 3 TIMES DAILY
Status: DISCONTINUED | OUTPATIENT
Start: 2020-10-26 | End: 2020-11-03 | Stop reason: HOSPADM

## 2020-10-26 RX ORDER — TORSEMIDE 20 MG/1
20 TABLET ORAL DAILY
Status: DISCONTINUED | OUTPATIENT
Start: 2020-10-26 | End: 2020-11-02

## 2020-10-26 RX ORDER — SODIUM BICARBONATE 650 MG/1
1300 TABLET ORAL 4 TIMES DAILY
Status: DISCONTINUED | OUTPATIENT
Start: 2020-10-26 | End: 2020-10-28

## 2020-10-26 RX ORDER — DOXAZOSIN MESYLATE 4 MG/1
4 TABLET ORAL DAILY
Status: DISCONTINUED | OUTPATIENT
Start: 2020-10-26 | End: 2020-10-27

## 2020-10-26 RX ORDER — LABETALOL HYDROCHLORIDE 5 MG/ML
10 INJECTION, SOLUTION INTRAVENOUS ONCE
Status: COMPLETED | OUTPATIENT
Start: 2020-10-26 | End: 2020-10-26

## 2020-10-26 RX ORDER — NIFEDIPINE 90 MG/1
90 TABLET, EXTENDED RELEASE ORAL 2 TIMES DAILY
Status: DISCONTINUED | OUTPATIENT
Start: 2020-10-26 | End: 2020-11-03 | Stop reason: HOSPADM

## 2020-10-26 RX ORDER — LIDOCAINE 4 G/G
1 PATCH TOPICAL DAILY
Status: DISCONTINUED | OUTPATIENT
Start: 2020-10-26 | End: 2020-11-03 | Stop reason: HOSPADM

## 2020-10-26 RX ADMIN — NIFEDIPINE 90 MG: 90 TABLET, FILM COATED, EXTENDED RELEASE ORAL at 08:57

## 2020-10-26 RX ADMIN — GABAPENTIN 300 MG: 300 CAPSULE ORAL at 14:04

## 2020-10-26 RX ADMIN — SODIUM BICARBONATE 1300 MG: 650 TABLET ORAL at 21:47

## 2020-10-26 RX ADMIN — SODIUM CHLORIDE, PRESERVATIVE FREE 10 ML: 5 INJECTION INTRAVENOUS at 22:58

## 2020-10-26 RX ADMIN — METOPROLOL TARTRATE 50 MG: 50 TABLET, FILM COATED ORAL at 08:58

## 2020-10-26 RX ADMIN — TORSEMIDE 20 MG: 20 TABLET ORAL at 14:04

## 2020-10-26 RX ADMIN — CLONIDINE HYDROCHLORIDE 0.1 MG: 0.1 TABLET ORAL at 14:04

## 2020-10-26 RX ADMIN — METHOCARBAMOL TABLETS 500 MG: 500 TABLET, COATED ORAL at 14:04

## 2020-10-26 RX ADMIN — GABAPENTIN 300 MG: 300 CAPSULE ORAL at 08:56

## 2020-10-26 RX ADMIN — PROMETHAZINE HYDROCHLORIDE 12.5 MG: 25 TABLET ORAL at 11:24

## 2020-10-26 RX ADMIN — LABETALOL HYDROCHLORIDE 10 MG: 5 INJECTION INTRAVENOUS at 13:02

## 2020-10-26 RX ADMIN — SODIUM CHLORIDE, PRESERVATIVE FREE 10 ML: 5 INJECTION INTRAVENOUS at 08:59

## 2020-10-26 RX ADMIN — SODIUM BICARBONATE 1300 MG: 650 TABLET ORAL at 18:27

## 2020-10-26 RX ADMIN — POLYETHYLENE GLYCOL 3350 17 G: 17 POWDER, FOR SOLUTION ORAL at 08:59

## 2020-10-26 RX ADMIN — CLONIDINE HYDROCHLORIDE 0.1 MG: 0.1 TABLET ORAL at 21:47

## 2020-10-26 RX ADMIN — METHOCARBAMOL TABLETS 500 MG: 500 TABLET, COATED ORAL at 21:47

## 2020-10-26 RX ADMIN — ACETAMINOPHEN 650 MG: 325 TABLET ORAL at 11:16

## 2020-10-26 RX ADMIN — CLOPIDOGREL BISULFATE 75 MG: 75 TABLET ORAL at 08:58

## 2020-10-26 RX ADMIN — METOPROLOL TARTRATE 50 MG: 50 TABLET, FILM COATED ORAL at 21:47

## 2020-10-26 RX ADMIN — ATORVASTATIN CALCIUM 40 MG: 40 TABLET, FILM COATED ORAL at 08:58

## 2020-10-26 RX ADMIN — ISOSORBIDE MONONITRATE 60 MG: 60 TABLET, EXTENDED RELEASE ORAL at 08:56

## 2020-10-26 RX ADMIN — HEPARIN SODIUM 5000 UNITS: 5000 INJECTION INTRAVENOUS; SUBCUTANEOUS at 14:04

## 2020-10-26 RX ADMIN — HEPARIN SODIUM 5000 UNITS: 5000 INJECTION INTRAVENOUS; SUBCUTANEOUS at 06:00

## 2020-10-26 RX ADMIN — GABAPENTIN 300 MG: 300 CAPSULE ORAL at 21:47

## 2020-10-26 RX ADMIN — NIFEDIPINE 90 MG: 90 TABLET, FILM COATED, EXTENDED RELEASE ORAL at 21:47

## 2020-10-26 RX ADMIN — INSULIN GLARGINE 20 UNITS: 100 INJECTION, SOLUTION SUBCUTANEOUS at 22:58

## 2020-10-26 RX ADMIN — EPOETIN ALFA-EPBX 10000 UNITS: 10000 INJECTION, SOLUTION INTRAVENOUS; SUBCUTANEOUS at 16:10

## 2020-10-26 RX ADMIN — CLONIDINE HYDROCHLORIDE 0.1 MG: 0.1 TABLET ORAL at 08:56

## 2020-10-26 ASSESSMENT — PAIN DESCRIPTION - ORIENTATION
ORIENTATION: LOWER

## 2020-10-26 ASSESSMENT — PAIN DESCRIPTION - ONSET
ONSET: ON-GOING

## 2020-10-26 ASSESSMENT — PAIN DESCRIPTION - FREQUENCY
FREQUENCY: CONTINUOUS
FREQUENCY: CONTINUOUS
FREQUENCY: INTERMITTENT
FREQUENCY: CONTINUOUS
FREQUENCY: INTERMITTENT

## 2020-10-26 ASSESSMENT — PAIN SCALES - GENERAL
PAINLEVEL_OUTOF10: 8
PAINLEVEL_OUTOF10: 8
PAINLEVEL_OUTOF10: 3
PAINLEVEL_OUTOF10: 0
PAINLEVEL_OUTOF10: 7
PAINLEVEL_OUTOF10: 3
PAINLEVEL_OUTOF10: 3
PAINLEVEL_OUTOF10: 7

## 2020-10-26 ASSESSMENT — PAIN DESCRIPTION - PAIN TYPE
TYPE: ACUTE PAIN

## 2020-10-26 ASSESSMENT — PAIN DESCRIPTION - LOCATION
LOCATION: BACK

## 2020-10-26 ASSESSMENT — PAIN DESCRIPTION - DESCRIPTORS
DESCRIPTORS: STABBING

## 2020-10-26 ASSESSMENT — PAIN DESCRIPTION - PROGRESSION
CLINICAL_PROGRESSION: GRADUALLY WORSENING
CLINICAL_PROGRESSION: GRADUALLY WORSENING
CLINICAL_PROGRESSION: NOT CHANGED
CLINICAL_PROGRESSION: GRADUALLY WORSENING
CLINICAL_PROGRESSION: NOT CHANGED

## 2020-10-26 NOTE — PROGRESS NOTES
Pt's blood pressure still running high at 198/94 after receiving morning blood pressure meds. Dr. Raya Gomes made aware. Awaiting a response.   Electronically signed by Dwayne Guthrie RN on 10/26/2020 at 11:59 AM

## 2020-10-26 NOTE — PROGRESS NOTES
Occupational Therapy   Occupational Therapy Initial Assessment and Discharge  Date: 10/26/2020   Patient Name: Mark Garza  MRN: [id number]     : 1965    Date of Service: 10/26/2020    Discharge Recommendations:  Home with assist PRN  OT Equipment Recommendations  Equipment Needed: No     Mark Garza scored a 24/24 on the AM-PAC ADL Inpatient form. At this time, no further OT is recommended upon discharge due to pt functioning at/near baseline. Recommend patient returns to prior setting with prior services. Assessment   Assessment: Pt presents to be functioning at baseline level of occupational performance despite c/o back pain, and does not require any additional acute OT. Pt UAL in room managing ADLs independently and safe to return home with wife's assist prn. No acute OT services indicated, will discharge. Prognosis: Good  Decision Making: Low Complexity  History: see above for PMHx. Social hx: Pt lives with wife and independent ADLs and fxl mobility  Exam: self-care, ROM, balance/fxl mobility  Assistance / Modification: Independent  OT Education: OT Role  No Skilled OT: Independent with functional mobility; Independent with ADL's;At baseline function; Safe to return home; No OT goals identified  REQUIRES OT FOLLOW UP: No  Activity Tolerance  Activity Tolerance: Patient Tolerated treatment well  Safety Devices  Safety Devices in place: Not Applicable(pt UAL, left seated on couch as found upon OT arrival)           Patient Diagnosis(es): The primary encounter diagnosis was Lower abdominal pain. Diagnoses of KISHORE (acute kidney injury) ([address] Pericardial effusion, and Elevated troponin were also pertinent to this visit.      has a past medical history of Cardiomyopathy ([address] CHF (congestive heart failure) ([address] CVA (cerebral infarction), Diabetes mellitus ([address] Gastroparesis, Hypercholesteremia, Hypertension, Kidney disease, and Unspecified cerebral artery occlusion with cerebral infarction. has no past surgical history on file. Restrictions  Restrictions/Precautions  Restrictions/Precautions: Up [address] [place]    Subjective   General  Chart Reviewed: Yes  Additional Pertinent Hx: Per H&P: \"49 y.o. male who presented to [facility] with 1 day of abdominal pain, associated with nausea and vomiting and generalized weakness, abdominal pain vague 2-3 out of 10 nonradiating no obvious alleviating or aggravating factors associated with constipation also, did have 1 or 2 episodes of vomiting this morning no blood in the vomit, he has been constipated for the last few days, denies any recent change to his medication denies fever headache chest pain or shortness of breath. In emergency department found to have creatinine above his baseline mildly elevated troponin and CT scan did show pericardial effusion. \"  Family / Caregiver Present: Yes(wife)  Referring Practitioner: Camelia Vaughn MD  Subjective  Subjective: Pt met b/s for OT eval/tx with PT. Pt seated on couch on arrival, agreeable to participate in therapy. Pt c/o back pain d/t pulled muscle, did not rate but wife rubbing vicks vapor rub on pt's back. Pt requesting heating pad, RN notified. Social/Functional History  Social/Functional History  Lives With: Spouse(and dgt)  Type of Home: House  Home Layout: One level  Home Access: Level entry  Bathroom Shower/Tub: Walk-in shower  Bathroom Toilet: Standard(vanity close by)  [employer]: Built-in shower seat, Grab bars in shower  Bathroom Accessibility: Walker accessible  Home Equipment: Rolling walker, Cane  ADL Assistance: Independent  Homemaking Assistance: (uses electric scooter in store, pt and wife share laundry, cooking.  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 ADL  LE Dressing: Independent(to don/doff socks)  Toileting: (pt demo'd toilet transfer independently, anticipate independent toileting)  Additional Comments: Anticipate pt is independent/modified independent with all ADLs based on ROM/strength, balance, endurance. Bed mobility  Comment: n/a, the pt sleeps in a recliner at home     Transfers  Sit to stand: Independent  Stand to sit: Independent   Toilet Transfers  Toilet - Technique: Ambulating  Toilet Transfer: Independent    Cognition  Overall Cognitive Status: WFL     Sensation  Overall Sensation Status: Impaired  Additional Comments: reports bottoms of feet are numb from neuropathy     LUE AROM (degrees)  LUE AROM : WFL  RUE AROM (degrees)  RUE AROM : WFL     LUE Strength  LUE Strength Comment: deferred d/t back pain  RUE Strength  RUE Strength Comment: deferred d/t back pain                   Plan   Plan  Times per week: d/c acute OT      [facility] Score        [facility] Inpatient Daily Activity Raw Score: 24 (10/26/20 1545)  AM-PAC Inpatient ADL T-Scale Score : 57.54 (10/26/20 1545)  ADL Inpatient CMS 0-100% Score: 0 (10/26/20 1545)  ADL Inpatient CMS G-Code Modifier : CH (10/26/20 1545)    Goals  Short term goals  Time Frame for Short term goals: no acute OT goals identified.        Therapy Time   Individual Concurrent Group Co-treatment   Time In 1520         Time Out 1544         Minutes 24 Timed Code Treatment Minutes: [facility], OTR/L 7905

## 2020-10-26 NOTE — CONSULTS
fair.  Rest of the review of systems negative. Past Medical History:   Diagnosis Date    Cardiomyopathy [facility])     [facility] [address] [facility]     CVA (cerebral infarction) 2015    Diabetes mellitus ([facility] [address]     Gastroparesis     Hypercholesteremia     Hypertension     Kidney disease     Unspecified cerebral artery occlusion with cerebral infarction     5/15, 7/15       History reviewed. No pertinent surgical history.     Social History     Socioeconomic History    Marital status:      Spouse name: Not on file    Number of children: 11    Years of education: Not on file    Highest education level: Not on file   Occupational History    Not on file   Social Needs    Financial resource strain: Not hard at all   [place] insecurity     Worry: Never true     Inability: Never true   [place] needs     Medical: No     Non-medical: No   Tobacco Use    Smoking status: Current Some Day Smoker     Packs/day: 0.00     Types: Cigars     Start date: 1/3/1979     Last attempt to quit: 2019     Years since quittin.7    Smokeless tobacco: Never Used    Tobacco comment: 3 black and milds a week   Substance and Sexual Activity    Alcohol use: Yes     Frequency: Monthly or less     Comment: occasional    Drug use: Yes     Types: Marijuana     Comment: previously used cocaine, stopped May 2015    Sexual activity: Yes     Partners: Female   Lifestyle    Physical activity     Days per week: Not on file     Minutes per session: Not on file    Stress: Not on file   Relationships    Social connections     Talks on phone: Not on file     Gets together: Not on file     Attends Anabaptism service: Not on file     Active member of club or organization: Not on file     Attends meetings of clubs or organizations: Not on file     Relationship status: Not on file    Intimate partner violence     Fear of current or ex partner: Not on file     Emotionally abused: Not on file     Physically abused: (!) 198/94 (!) 199/99   Pulse: 79 79 100 99   Resp: 14  16    Temp: 98.3 °F (36.8 °C)  99.5 °F (37.5 °C)    TempSrc: Oral  Oral    SpO2: 92%  90%    Weight: 179 lb 0.2 oz (81.2 kg)      Height:           Gen appearance: Alert, appears stated age and cooperative   Eyes: Eyelids,conjunctiva and pupils look normal   ENT: External inspection of the ears and nose are within normal limits             Oral mucosa  Is moist   Neuro: Grossly no focal neurological deficits, normal sensation, grossly cranial nerves intact   Neck:  No JVD, no mass, no thyroid enlargement   Cardio: S1 S2 normal, No added sounds   Resp: normal effort, clear to auscultation     GI:  Soft, mildly distended, nontender no rebound tenderness, non-tender, BS +          No palpable kidney, no renal angle tenderness   MS: No swollen or tender joints, no cyanosis, clubbing   DERM: no rashes, thickening   EDEMA: Trace pedal edema. I/Os:          Intake/Output Summary (Last 24 hours) at 10/26/2020 1302  Last data filed at 10/26/2020 1120  Gross per 24 hour   Intake 1960 ml   Output 625 ml   Net 1335 ml        In: -   Out: 250 [Urine:250]     I/O last 3 completed shifts: In: 1960 [P.O.:960; IV Piggyback:1000]  Out: 375 [Urine:375]    LABS:    CBC:   Recent Labs     10/25/20  1137 10/26/20  0711   WBC 11.8* 8.9   HGB 8.1* 7.8*   HCT 25.9* 24.2*    144     BMP:    Recent Labs     10/25/20  1137 10/26/20  0711    140   K 4.8 4.9   * 112*   CO2 15* 15*   BUN 67* 62*   CREATININE 5.0* 5.0*   GLUCOSE 147* 118*     ABGs: No results found for: PHART, PO2ART, NRS2IKF      Assessment / Plan:    - KISHORE / CKD. Baseline 3.6 (10/2019)  Has polycystic kidney disease. Likely diastolic progression with uncontrolled hypertension use of cocaine among other things. UA bland except for significant proteinuria. To be quantified. No peripheral eosinophilia. CT scan with PKD. Some indeterminate cysts. Bladder scan. R/o obstructive uropathy. Serologies: AUDRA, ANCA, C3, C4, previously negative  Hepatitis panel,   SPEP negative 2018. Avoid nephrotoxins. No NSAIDS. No IV dyes. Avoid hypotension, keep SBP > 110 or above. Strict i/o charting. Daily weights. No acute indication of HD at this time. I did discuss with him that is a kidney function getting advanced and he will think about renal replacement therapy including dialysis. Because of his addiction issue we did not refer him to kidney transplant but now that he has been sober for the last 7 to 8 months this can become an option to. I think he would be an okay candidate for peritoneal dialysis. We will talk to him about home modalities including peritoneal dialysis and home hemodialysis. Hypertension. Noncompliant. Tells me that he is off cocaine for the last several months. Check drug screen. Increase nifedipine 90 mg twice a day. Add Cardura 4 mg nightly  Taper off clonidine as patient is already noncompliant will not be able to take it 3 times a day and we will run into the problem with reflux hypertension. Proteinuria quantified. May benefit from ACE inhibitor as well have to watch regarding hyperkalemia. Significant anemia. Check nutrients. May need Epogen. Previous history of stroke x2 because of cocaine abuse was hypertensive at that time    Large pericardial effusion. AUDRA previously negative. May need pericardiocentesis. Smoker counseled. Constipation abdominal pain. Okay to give enema. Full code. I thank Dr. Carlos Leach MD for consulting [place] [id number]. [facility] in the care of your patient. Please call with any questions or concerns. [place]. [facility]. Off: [phone]  Cell: [phone].  ( until 5 pm)

## 2020-10-26 NOTE — PROGRESS NOTES
Progress Note  Admit Date: 10/25/2020      PCP: ROQUE Aden CNP     CC: F/U for nominal pain nausea vomiting    Days in hospital:  1    SUBJECTIVE / Interval History:  Patient complaining of severe P-sided lower back pain which limits his mobility    No other complaints        Allergies  Patient has no known allergies. Medications    Scheduled Meds:   atorvastatin  40 mg Oral Daily    cloNIDine  0.1 mg Oral TID    clopidogrel  75 mg Oral Daily    gabapentin  300 mg Oral TID    insulin glargine  20 Units Subcutaneous Nightly    isosorbide mononitrate  60 mg Oral Daily    metoprolol tartrate  50 mg Oral BID    NIFEdipine  90 mg Oral Daily    sodium chloride flush  10 mL Intravenous 2 times per day    heparin (porcine)  5,000 Units Subcutaneous 3 times per day    insulin lispro  0-6 Units Subcutaneous TID WC    insulin lispro  0-3 Units Subcutaneous Nightly    polyethylene glycol  17 g Oral Daily     Continuous Infusions:   dextrose         PRN Meds:  sodium chloride flush, acetaminophen **OR** acetaminophen, polyethylene glycol, promethazine **OR** ondansetron, glucose, dextrose, glucagon (rDNA), dextrose, sennosides-docusate sodium    Vitals    BP (!) 199/91   Pulse 79   Temp 98.3 °F (36.8 °C) (Oral)   Resp 14   Ht 6' 2\" (1.88 m)   Wt 179 lb 0.2 oz (81.2 kg)   SpO2 92%   BMI 22.98 kg/m²     Exam:    Gen: No distress. Eyes: PERRL. No sclera icterus. No conjunctival injection. ENT: No discharge. Pharynx clear. External appearance of ears and nose normal.  Neck: Trachea midline. No obvious mass. Resp: No accessory muscle use. No crackles. No wheezes. No rhonchi. No dullness on percussion. CV: Regular rate. Regular rhythm. No murmur or rub. No edema. GI: Non-tender. Non-distended. No hernia. Skin: Warm, dry, normal texture and turgor. No nodule on exposed extremities. Lymph: No cervical LAD. No supraclavicular LAD. M/S: No cyanosis. No clubbing.   Tenderness present on palpation of right lower lumbar region  Neuro: Moves all four extremities. CN 2-12 tested, no defect noted. Psych: Oriented x 3. No anxiety. Awake. Alert. Intact judgement and insight. Data    LABS  CBC:   Recent Labs     10/25/20  1137 10/26/20  0711   WBC 11.8* 8.9   HGB 8.1* 7.8*   HCT 25.9* 24.2*   MCV 90.4 89.8    144     BMP:   Recent Labs     10/25/20  1137 10/26/20  0711    140   K 4.8 4.9   * 112*   CO2 15* 15*   BUN 67* 62*   CREATININE 5.0* 5.0*   GLUCOSE 147* 118*     POC GLUCOSE:    Recent Labs     10/25/20  1613 10/25/20  2111 10/26/20  0817   POCGLU 201* 232* 134*     LIVER PROFILE:   Recent Labs     10/25/20  1137 10/26/20  0711   AST 10* 8*   ALT 10 7*   LABALBU 4.3 3.5   BILITOT <0.2 <0.2   ALKPHOS 84 75     PT/INR: No results for input(s): PROTIME, INR in the last 72 hours. APTT: No results for input(s): APTT in the last 72 hours. UA:  Recent Labs     10/25/20  1137   COLORU YELLOW   PHUR 5.0   WBCUA 3   RBCUA 4   CLARITYU Clear   SPECGRAV 1.012   LEUKOCYTESUR Negative   UROBILINOGEN 0.2   BILIRUBINUR Negative   BLOODU Negative   GLUCOSEU Negative   KETUA Negative     Microbiology:  Wound Culture: No results for input(s): WNDABS, ORG in the last 72 hours. Invalid input(s):  LABGRAM  Nasal Culture: No results for input(s): ORG, MRSAPCR in the last 72 hours. Blood Culture: No results for input(s): BC, BLOODCULT2 in the last 72 hours. Fungal Culture:   No results for input(s): FUNGSM in the last 72 hours. No results for input(s): FUNCXBLD in the last 72 hours. CSF Culture:  No results for input(s): COLORCSF, APPEARCSF, CFTUBE, CLOTCSF, WBCCSF, RBCCSF, NEUTCSF, NUMCELLSCSF, LYMPHSCSF, MONOCSF, GLUCCSF, VOLCSF in the last 72 hours. Respiratory Culture:  No results for input(s): Magi Leong in the last 72 hours. AFB:No results for input(s): AFBSMEAR in the last 72 hours. Urine Culture  No results for input(s): LABURIN in the last 72 hours.     RADIOLOGY:    CT CHEST ABDOMEN PELVIS WO CONTRAST   Final Result   Chest-      Moderate to large pericardial effusion. Mild atelectasis in the lingula and left lower lobe. Pneumonia is considered   less likely. Abdomen pelvis-      No acute inflammatory abnormality is identified in the abdomen or pelvis. Mild prominence of stool in the colon. Correlation for constipation   recommended. Polycystic kidney disease. Bilateral nonobstructive nephrolithiasis. Caliceal stones measure up to 5 mm   on the left. CONSULTS:    IP CONSULT TO NEPHROLOGY  IP CONSULT TO CARDIOLOGY    ASSESSMENT AND PLAN:      Principal Problem:    Acute renal failure superimposed on chronic kidney disease, on chronic dialysis ([facility])  Active Problems:    Essential hypertension    Type 2 diabetes mellitus with stage 3 chronic kidney disease, with long-term current use of insulin ([facility])    Cardiomyopathy ([facility] [address]    History of stroke    Mixed hyperlipidemia    Acute on chronic renal failure ([facility])    Pericardial effusion  Resolved Problems:    * No resolved hospital problems. *    Patient is a 64-year-old male with a past medical history of CHF, CVA, diabetes, gastroparesis who presented with vague abdominal pain with vomiting and constipation. In the ER patient was found to have an elevated creatinine and CT scan showed pericardial effusion    Acute on chronic renal failure 3  -Baseline creatinine is around 3. On admission creatinine 5. Will consult nephrology  -Hold nephrotoxic medication  -Patient got Toradol in the ER.   Hold off any NSAIDs    Pericardial effusion  -CT shows moderate to large pericardial effusion  -Echo ordered  -Cardiology consulted    Diabetes mellitus  -Continue Lantus and sliding scale  -Monitor blood sugars at least like every time I tolerate-    Hypertension-uncontrolled  -Unclear if patient is compliant with his medications  -Monitor blood pressure  -Continue home meds  -Urine drug screen to rule out cocaine abuse    Elevated troponin  -Most likely secondary to CKD  --Normal recent stress test    Cardiomyopathy  -Last echo on 6/20 showed a EF of 35 to 40% with grade 2 diastolic dysfunction  -Hold diuresis and ACE/ARB due to KISHORE  -cardiology following    Hyperlipidemia  -Tinea statin    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  -Continue home meds    Right lower back pain  -Seems more musculoskeletal  -We will add muscle relaxer and Lidoderm patch    DVT Prophylaxis: lovenox  Diet: DIET CARB CONTROL; Low Sodium (2 GM); Daily Fluid Restriction: 2000 ml  Code Status: Full Code    PT/OT Eval Status:ordered    Discharge plan -continue care    The patient and / or the family were informed of the results of any tests, a time was given to answer questions, a plan was proposed and they agreed with plan. Discussed with consulting physicians, nursing and social work     The note was completed using EMR. Every effort was made to ensure accuracy; however, inadvertent computerized transcription errors may be present.        Leeann Villagomez MD

## 2020-10-26 NOTE — PROGRESS NOTES
Medication Reconciliation    List of medications patient is currently taking is complete. Source of information: 1. Conversation with patient and patient's family at bedside                                      2. EPIC records      Allergies  Patient has no known allergies. Notes regarding home medications:   1. Patient states he is now taking clonidine 0.4 mg TID (taking 2 tablets of 0.2 mg TID).     Michelle Canada, PharmD, 10/26/2020 1:49 PM

## 2020-10-26 NOTE — PROGRESS NOTES
Physical Therapy    Facility/Department: [address] MED SURG  Initial and Discharge Assessment    NAME: Nelson Lynn  : 1965  MRN: [id number]    Date of Service: 10/26/2020    Discharge Recommendations:  Home with assist PRN   Nelson Lynn scored a 23/24 on the AM-PAC short mobility form. At this time, no further PT is recommended upon discharge. Recommend patient returns to prior setting with prior services. PT Equipment Recommendations  Equipment Needed: No    Assessment   Assessment: The pt is a 49 yo male who came to the ED \"with 1 day of abdominal pain, associated with nausea and vomiting and generalized weakness\". \"In emergency department found to have creatinine above his baseline mildly elevated troponin and CT scan did show pericardial effusion\"  The pt reports he lives with his family in a ranch style house; he reports he is indep in mobility and in self-care PTA. He reports he does not work any longer and is applying for disability. PMHx: cardiomyopathy, CHF, CVA, DM, gastroparesis, HTN     Today, the pt demonstrated that he is functioning slightly below his baseline due to back pain but anticiate that he will be safe for home at d/c and he does not need further skilled PT services. Will d/c from acute care PT services. Prognosis: Good  Decision Making: Low Complexity  History: see above  Exam: see above  Clinical Presentation: stable  PT Education: PT Role;General Safety  Barriers to Learning: none  No Skilled PT: Safe to return home  REQUIRES PT FOLLOW UP: No  Activity Tolerance  Activity Tolerance: Patient Tolerated treatment well;Patient limited by pain  Activity Tolerance: limited by back pain       Patient Diagnosis(es): The primary encounter diagnosis was Lower abdominal pain. Diagnoses of KISHORE (acute kidney injury) ([address] Pericardial effusion, and Elevated troponin were also pertinent to this visit.      has a past medical history of Cardiomyopathy [facility]), CHF (congestive heart failure) ([facility] [address] CVA (cerebral infarction), Diabetes mellitus ([facility] [address] Gastroparesis, Hypercholesteremia, Hypertension, Kidney disease, and Unspecified cerebral artery occlusion with cerebral infarction. has no past surgical history on file. Restrictions  Restrictions/Precautions  Restrictions/Precautions: Up Ad Lucrecia  Vision/Hearing  Vision: Impaired  Vision Exceptions: Wears glasses for distance  Hearing: Within functional limits     Subjective  General  Chart Reviewed: Yes  Additional Pertinent Hx: Per H&P on 10- of Cherrie Landaverde MD: The pt is a 49 yo male who came to the ED \"with 1 day of abdominal pain, associated with nausea and vomiting and generalized weakness\". \"In emergency department found to have creatinine above his baseline mildly elevated troponin and CT scan did show pericardial effusion\"     PMHx: cardiomyopathy, CHF, CVA, DM, gastroparesis, HTN  Response To Previous Treatment: Not applicable  Family / Caregiver Present: Yes(wife)  Referring Practitioner: Cherrie Landaverde MD  Referral Date : 10/26/20  Diagnosis: Acute to chronic renal failure, Elevated troponin, due to acute on chronic kidney disease  Follows Commands: Within Functional Limits  Subjective  Subjective: the pt foudn to be on the couch with his wife; the pt reporting pain in his back from a pulled muscle; did not rate  Pain Screening  Patient Currently in Pain: Yes          Orientation  Orientation  Overall Orientation Status: Within Functional Limits  Social/Functional History  Social/Functional History  Lives With: Spouse(and dgt)  Type of Home: House  Home Layout: One level  Home Access: Level entry  Bathroom Shower/Tub: Walk-in shower  Bathroom Toilet: Standard(vanity close by)  [employer]: Built-in shower seat, Grab bars in shower  Bathroom Accessibility: Walker accessible  Home Equipment: Rolling walker, Cane  ADL Assistance: Independent  Homemaking Assistance: (uses electric scooter in store, pt and wife share laundry, cooking. Daughter does cleaning.)  Ambulation Assistance: Independent(cane in community)  Transfer Assistance: Independent  Active : No  Patient's  Info: wife drives   Occupation: On disability  Type of occupation: customer service dispatch  Cognition   Cognition  Overall Cognitive Status: WFL    Objective  AROM RLE (degrees)  RLE AROM: WFL  AROM LLE (degrees)  LLE AROM : WFL  Strength RLE  Strength RLE: WFL  Strength LLE  Strength LLE: WFL     Sensation  Overall Sensation Status: Impaired  Additional Comments: reports bottoms of feet are numb from neuropathy  Bed mobility  Comment: n/a, the pt sleeps in reclinerat home  Transfers  Sit to Stand: Independent  Stand to sit:  Independent  Ambulation  Ambulation?: Yes  Ambulation 1  Surface: level tile  Device: No Device  Assistance: Supervision  Quality of Gait: the pt demonstrated that he is safe to be up ad avery in the room and was able to walk to the bathroom and back; had one small LOB when entering the bathroom but able to self-correct  Distance: short distances in the room  Stairs/Curb  Stairs?: No     Balance  Sitting - Static: Good  Sitting - Dynamic: Good  Standing - Static: Good  Standing - Dynamic: Good;-        Plan   Plan  Plan Comment: d/c from acute care PT  Safety Devices  Type of devices: Call light within reach, Nurse notified(the pt was left on the couch with wife and he is up ad avery in the room)      AM-PAC Score  AM-PAC Inpatient Mobility Raw Score : 23 (10/26/20 1541)  AM-PAC Inpatient T-Scale Score : 56.93 (10/26/20 1541)  Mobility Inpatient CMS 0-100% Score: 11.2 (10/26/20 1541)  Mobility Inpatient CMS G-Code Modifier : CI (10/26/20 1541)          Goals  Short term goals  Time Frame for Short term goals: no acute care PT goals identified  Patient Goals   Patient goals : to go home       Therapy Time   Individual Concurrent Group Co-treatment   Time In 1520         Time Out 1545         Minutes 25         Timed Code Treatment

## 2020-10-26 NOTE — CARE COORDINATION
INITIAL CASE MANAGEMENT ASSESSMENT    Reviewed chart, spoke with patients sposue to assess possible discharge needs. Explained Case Management role/services. Living Situation: pt lives with spouse in ranch style home, no steps to enter. ADLs: independent     DME: pt has 2 wheeled walker, cane, accucheck, needled and B/P cuff. PT/OT Recs: none     Active Services: none     Transportation: pt is a non  per spouse. Spouse will transport pt home at dc. Medications: [employer] on [address]. PCP: Tesfaye Sotomayor NP      HD/PD: per spouse , none needed at this time. PLAN/COMMENTS: pt plans to return home with spouse at dc,  Denies any dc needs at this time. ,Electronically signed by DANDY Gates on 10/26/2020 at 11:50 AM    SW/JOSE provided contact information for patient or family to call with any questions. KAVON/CM will follow and assist as needed.

## 2020-10-26 NOTE — CONSULTS
[facility]  Cardiology Consult    Anamika Quinn  1965    October 26, 2020      CC: Abdominal pain and nausea and vomiting       Subjective:     History of Present Illness:    Anamika Quinn is a 48 y.o. patient with a PMH significant for HTN, LVH, CMP, CKD  presented with complaints of nausea vomiting. Lower back pain. No chest pain shortness breath palpitation. Past Medical History:   has a past medical history of Cardiomyopathy ([address] CHF (congestive heart failure) ([facility] [address] CVA (cerebral infarction), Diabetes mellitus ([facility] [address] Gastroparesis, Hypercholesteremia, Hypertension, Kidney disease, and Unspecified cerebral artery occlusion with cerebral infarction. Surgical History:   has no past surgical history on file. Social History:   reports that he has been smoking cigars. He started smoking about 41 years ago. He has been smoking about 0.00 packs per day. He has never used smokeless tobacco. He reports current alcohol use. He reports current drug use. Drug: Marijuana. Family History:  family history is not on file. He was adopted. Home Medications:  Were reviewed and are listed in nursing record and/or below  Prior to Admission medications    Medication Sig Start Date End Date Taking?  Authorizing Provider   cloNIDine (CATAPRES) 0.2 MG tablet TAKE 1 TABLET BY MOUTH EVERY 8 HOURS BEFORE MEALS SPACING OUT FROM ALL BLOOD PRESSUE MEDICATIONS  Patient taking differently: Take 0.4 mg by mouth every 8 hours TAKE 1 TABLET BY MOUTH EVERY 8 HOURS BEFORE MEALS SPACING OUT FROM ALL BLOOD PRESSUE MEDICATIONS 10/20/20  Yes ROQUE Galvan CNP   isosorbide mononitrate (IMDUR) 60 MG extended release tablet Take 1 tablet by mouth daily  Patient taking differently: Take 60 mg by mouth nightly  9/29/20  Yes ROQUE Galvan CNP   NIFEdipine (PROCARDIA XL) 90 MG extended release tablet Take 1 tablet by mouth daily 9/29/20  Yes ROQUE Galvan CNP   insulin glargine (LANTUS) 100 UNIT/ML injection vial Inject 20 Units into the skin nightly 8/3/20  Yes ROQUE Medrano CNP   clopidogrel (PLAVIX) 75 MG tablet Take 1 tablet by mouth daily  Patient taking differently: Take 75 mg by mouth nightly  7/30/20  Yes Milton Dillard MD   hydrALAZINE (APRESOLINE) 100 MG tablet Take 2 tablets by mouth 3 times daily 7/1/20  Yes ROQUE Medrano CNP   atorvastatin (LIPITOR) 40 MG tablet TAKE 1 TABLET BY MOUTH ONCE DAILY  Patient taking differently: Take 40 mg by mouth nightly TAKE 1 TABLET BY MOUTH ONCE DAILY 7/1/20  Yes ROQUE Medrano CNP   furosemide (LASIX) 20 MG tablet Take 1 tablet by mouth daily  Patient taking differently: Take 40 mg by mouth daily  5/26/20  Yes ROQUE Medrano CNP   losartan (COZAAR) 100 MG tablet Take 1 tablet by mouth daily  Patient taking differently: Take 100 mg by mouth nightly  5/18/20  Yes ROQUE Medrano CNP   metoprolol tartrate (LOPRESSOR) 50 MG tablet Take 1 tablet by mouth twice daily 5/8/20  Yes ROQUE Medrano CNP   gabapentin (NEURONTIN) 300 MG capsule Take 1 capsule by mouth 3 times daily for 82 days.  1/22/20 10/26/20 Yes ROQUE Medrano CNP   acetaminophen (ACETAMINOPHEN EXTRA STRENGTH) 500 MG tablet TAKE TWO TABLETS BY MOUTH EVERY 6 HOURS AS NEEDED FOR PAIN 6/22/19  Yes Álvaro Cunha MD   Insulin Pen Needle 31G X 6 MM MISC 1 each by Does not apply route daily 11/14/19   ROQUE Medrano CNP   Insulin Syringe-Needle U-100 31G X 5/16\" 0.3 ML MISC 1 each by Does not apply route daily 8/11/16   Qiana Mandujano MD        CURRENT Medications:  NIFEdipine (PROCARDIA XL) extended release tablet 90 mg, BID  doxazosin (CARDURA) tablet 4 mg, Daily  sodium bicarbonate tablet 1,300 mg, 4x Daily  epoetin robert-epbx (RETACRIT) injection 10,000 Units, Once per day on Mon Wed Fri  torsemide (DEMADEX) tablet 20 mg, Daily  methocarbamol (ROBAXIN) tablet 500 mg, TID  lidocaine 4 % external patch 1 patch, Daily  atorvastatin (LIPITOR) tablet 40 mg, Daily  cloNIDine (CATAPRES) tablet 0.1 mg, TID  clopidogrel (PLAVIX) tablet 75 mg, Daily  gabapentin (NEURONTIN) capsule 300 mg, TID  insulin glargine (LANTUS) injection vial 20 Units, Nightly  isosorbide mononitrate (IMDUR) extended release tablet 60 mg, Daily  metoprolol tartrate (LOPRESSOR) tablet 50 mg, BID  sodium chloride flush 0.9 % injection 10 mL, 2 times per day  sodium chloride flush 0.9 % injection 10 mL, PRN  acetaminophen (TYLENOL) tablet 650 mg, Q6H PRN    Or  acetaminophen (TYLENOL) suppository 650 mg, Q6H PRN  polyethylene glycol (GLYCOLAX) packet 17 g, Daily PRN  promethazine (PHENERGAN) tablet 12.5 mg, Q6H PRN    Or  ondansetron (ZOFRAN) injection 4 mg, Q6H PRN  glucose (GLUTOSE) 40 % oral gel 15 g, PRN  dextrose 50 % IV solution, PRN  glucagon (rDNA) injection 1 mg, PRN  dextrose 5 % solution, PRN  heparin (porcine) injection 5,000 Units, 3 times per day  insulin lispro (HUMALOG) injection vial 0-6 Units, TID WC  insulin lispro (HUMALOG) injection vial 0-3 Units, Nightly  sennosides-docusate sodium (SENOKOT-S) 8.6-50 MG tablet 2 tablet, BID PRN  polyethylene glycol (GLYCOLAX) packet 17 g, Daily        Allergies:  Patient has no known allergies. Review of Systems: SEE HPI   · Constitutional: no unanticipated weight loss. There's been no change in energy level, sleep pattern, or activity level. No fevers, chills. · Eyes: No visual changes or diplopia. No scleral icterus. · ENT: No Headaches, hearing loss or vertigo. No mouth sores or sore throat. · Cardiovascular: No Chest pain, tightness or discomfort.  No Shortness of breath. No Dyspnea on exertion, Orthopnea, Paroxysmal nocturnal dyspnea or breathlessness at rest.   No Palpitations.  No Syncope ('blackouts', 'faints', 'collapse') or dizziness. · Respiratory: No cough or wheezing, no sputum production. No hematemesis.     · Gastrointestinal: No abdominal pain, appetite Normal gross motor and sensory exam.       Labs     All labs have been reviewed    Lab Results   Component Value Date    WBC 8.9 10/26/2020    RBC 2.70 10/26/2020    HGB 7.8 10/26/2020    HCT 24.2 10/26/2020    MCV 89.8 10/26/2020    RDW 14.4 10/26/2020     10/26/2020     Lab Results   Component Value Date     10/26/2020    K 4.9 10/26/2020     10/26/2020    CO2 15 10/26/2020    BUN 62 10/26/2020    CREATININE 5.0 10/26/2020    GFRAA 15 10/26/2020    GFRAA >60 12/14/2010    AGRATIO 0.9 10/26/2020    LABGLOM 12 10/26/2020    GLUCOSE 118 10/26/2020    PROT 7.2 10/26/2020    CALCIUM 8.3 10/26/2020    BILITOT <0.2 10/26/2020    ALKPHOS 75 10/26/2020    AST 8 10/26/2020    ALT 7 10/26/2020     No results found for: PTINR  Lab Results   Component Value Date    LABA1C 6.4 10/25/2020     Lab Results   Component Value Date    TROPONINI 0.08 (H) 10/25/2020       Cardiac, Vascular and Imaging Data all Personally Reviewed in Detail by Myself      EKG: Normal sinus rhythmPossible Left atrial enlargementSeptal infarct (cited on or before 18-AUG-2015)Nonspecific T wave abnormalityConfirmed by HOLLY YE, Thomas White ([id number]) on 10/26/2020 9:05:31 AM    Echocardiogram: Left ventricular cavity size is dilated. There is moderate concentric left ventricular hypertrophy. Ejection fraction is visually estimated to be 45-50%. There is moderate diffuse hypokinesis. Diastolic filling parameters suggest grade II diastolic dysfunction. The left atrium is severely dilated. The right ventricle is normal   Right ventricular systolic function is normal.   mild-Moderate circumferential pericardial effusion without tamponade   physiology. Assessment and Plan     -Moderate pericardial effusion seen on echocardiogram which is not new. He has no symptoms of pericardial effusion, pericardial tamponade. Continue to monitor. No intervention required at present.     Dilated hypertensive cardiomyopathy secondary to uncontrolled hypertension. Essential hypertension. Continue blood pressure management and control. Ejection fraction seems to have improved from 35 to 45%. Acute on chronic kidney injury followed by nephrology. Thank you for allowing us to participate in the care of [facility] Please do not hesitate to contact me if you have any questions.     Darrin Ayala MD, MPH    [address]   [place] [address]  Ph: [phone]  Fax: [phone]    10/26/2020 2:19 PM

## 2020-10-27 LAB
AMPHETAMINE SCREEN, URINE: ABNORMAL
ANION GAP SERPL CALCULATED.3IONS-SCNC: 14 MMOL/L (ref 3–16)
ANTI-NUCLEAR ANTIBODY (ANA): NEGATIVE
BARBITURATE SCREEN URINE: ABNORMAL
BENZODIAZEPINE SCREEN, URINE: ABNORMAL
BUN BLDV-MCNC: 66 MG/DL (ref 7–20)
CALCIUM SERPL-MCNC: 9 MG/DL (ref 8.3–10.6)
CANNABINOID SCREEN URINE: ABNORMAL
CHLORIDE BLD-SCNC: 110 MMOL/L (ref 99–110)
CO2: 18 MMOL/L (ref 21–32)
COCAINE METABOLITE SCREEN URINE: ABNORMAL
CREAT SERPL-MCNC: 5.8 MG/DL (ref 0.9–1.3)
FERRITIN: 230.3 NG/ML (ref 30–400)
GFR AFRICAN AMERICAN: 12
GFR NON-AFRICAN AMERICAN: 10
GLUCOSE BLD-MCNC: 152 MG/DL (ref 70–99)
GLUCOSE BLD-MCNC: 168 MG/DL (ref 70–99)
GLUCOSE BLD-MCNC: 181 MG/DL (ref 70–99)
GLUCOSE BLD-MCNC: 211 MG/DL (ref 70–99)
GLUCOSE BLD-MCNC: 274 MG/DL (ref 70–99)
HAV IGM SER IA-ACNC: ABNORMAL
HEPATITIS B CORE IGM ANTIBODY: ABNORMAL
HEPATITIS B SURFACE ANTIGEN INTERPRETATION: ABNORMAL
HEPATITIS C ANTIBODY INTERPRETATION: REACTIVE
Lab: ABNORMAL
METHADONE SCREEN, URINE: ABNORMAL
OPIATE SCREEN URINE: POSITIVE
OXYCODONE URINE: ABNORMAL
PERFORMED ON: ABNORMAL
PH UA: 6
PHENCYCLIDINE SCREEN URINE: ABNORMAL
POTASSIUM SERPL-SCNC: 4.8 MMOL/L (ref 3.5–5.1)
PROPOXYPHENE SCREEN: ABNORMAL
SARS-COV-2, NAAT: NOT DETECTED
SODIUM BLD-SCNC: 142 MMOL/L (ref 136–145)
TOTAL SYPHILLIS IGG/IGM: NORMAL

## 2020-10-27 PROCEDURE — 99253 IP/OBS CNSLTJ NEW/EST LOW 45: CPT | Performed by: SURGERY

## 2020-10-27 PROCEDURE — 6370000000 HC RX 637 (ALT 250 FOR IP): Performed by: INTERNAL MEDICINE

## 2020-10-27 PROCEDURE — 80048 BASIC METABOLIC PNL TOTAL CA: CPT

## 2020-10-27 PROCEDURE — 94760 N-INVAS EAR/PLS OXIMETRY 1: CPT

## 2020-10-27 PROCEDURE — 82728 ASSAY OF FERRITIN: CPT

## 2020-10-27 PROCEDURE — 1200000000 HC SEMI PRIVATE

## 2020-10-27 PROCEDURE — 6360000002 HC RX W HCPCS: Performed by: INTERNAL MEDICINE

## 2020-10-27 PROCEDURE — U0002 COVID-19 LAB TEST NON-CDC: HCPCS

## 2020-10-27 PROCEDURE — 99232 SBSQ HOSP IP/OBS MODERATE 35: CPT | Performed by: NURSE PRACTITIONER

## 2020-10-27 PROCEDURE — 80307 DRUG TEST PRSMV CHEM ANLYZR: CPT

## 2020-10-27 PROCEDURE — 36415 COLL VENOUS BLD VENIPUNCTURE: CPT

## 2020-10-27 PROCEDURE — 2580000003 HC RX 258: Performed by: INTERNAL MEDICINE

## 2020-10-27 RX ORDER — POLYETHYLENE GLYCOL 3350 17 G/17G
17 POWDER, FOR SOLUTION ORAL 2 TIMES DAILY
Status: DISCONTINUED | OUTPATIENT
Start: 2020-10-27 | End: 2020-11-03 | Stop reason: HOSPADM

## 2020-10-27 RX ORDER — BISACODYL 10 MG
10 SUPPOSITORY, RECTAL RECTAL DAILY
Status: DISPENSED | OUTPATIENT
Start: 2020-10-27 | End: 2020-10-29

## 2020-10-27 RX ORDER — CLONIDINE HYDROCHLORIDE 0.1 MG/1
0.2 TABLET ORAL 3 TIMES DAILY
Status: DISCONTINUED | OUTPATIENT
Start: 2020-10-27 | End: 2020-10-27

## 2020-10-27 RX ORDER — CLONIDINE HYDROCHLORIDE 0.1 MG/1
0.1 TABLET ORAL 3 TIMES DAILY
Status: DISCONTINUED | OUTPATIENT
Start: 2020-10-27 | End: 2020-11-02

## 2020-10-27 RX ORDER — DOXAZOSIN MESYLATE 4 MG/1
4 TABLET ORAL EVERY 12 HOURS SCHEDULED
Status: DISCONTINUED | OUTPATIENT
Start: 2020-10-27 | End: 2020-11-03 | Stop reason: HOSPADM

## 2020-10-27 RX ORDER — HYDRALAZINE HYDROCHLORIDE 50 MG/1
50 TABLET, FILM COATED ORAL EVERY 8 HOURS SCHEDULED
Status: DISCONTINUED | OUTPATIENT
Start: 2020-10-27 | End: 2020-10-30

## 2020-10-27 RX ADMIN — DOXAZOSIN 4 MG: 4 TABLET ORAL at 21:48

## 2020-10-27 RX ADMIN — NIFEDIPINE 90 MG: 90 TABLET, FILM COATED, EXTENDED RELEASE ORAL at 09:14

## 2020-10-27 RX ADMIN — NIFEDIPINE 90 MG: 90 TABLET, FILM COATED, EXTENDED RELEASE ORAL at 21:48

## 2020-10-27 RX ADMIN — HYDRALAZINE HYDROCHLORIDE 50 MG: 50 TABLET, FILM COATED ORAL at 15:13

## 2020-10-27 RX ADMIN — POLYETHYLENE GLYCOL 3350 17 G: 17 POWDER, FOR SOLUTION ORAL at 09:16

## 2020-10-27 RX ADMIN — GABAPENTIN 300 MG: 300 CAPSULE ORAL at 21:48

## 2020-10-27 RX ADMIN — SODIUM BICARBONATE 1300 MG: 650 TABLET ORAL at 12:52

## 2020-10-27 RX ADMIN — SODIUM CHLORIDE, PRESERVATIVE FREE 10 ML: 5 INJECTION INTRAVENOUS at 21:58

## 2020-10-27 RX ADMIN — SODIUM BICARBONATE 1300 MG: 650 TABLET ORAL at 09:14

## 2020-10-27 RX ADMIN — CLONIDINE HYDROCHLORIDE 0.1 MG: 0.1 TABLET ORAL at 09:15

## 2020-10-27 RX ADMIN — HEPARIN SODIUM 5000 UNITS: 5000 INJECTION INTRAVENOUS; SUBCUTANEOUS at 21:47

## 2020-10-27 RX ADMIN — METOPROLOL TARTRATE 50 MG: 50 TABLET, FILM COATED ORAL at 09:15

## 2020-10-27 RX ADMIN — CLONIDINE HYDROCHLORIDE 0.1 MG: 0.1 TABLET ORAL at 15:13

## 2020-10-27 RX ADMIN — POLYETHYLENE GLYCOL 3350 17 G: 17 POWDER, FOR SOLUTION ORAL at 21:48

## 2020-10-27 RX ADMIN — INSULIN LISPRO 1 UNITS: 100 INJECTION, SOLUTION INTRAVENOUS; SUBCUTANEOUS at 12:52

## 2020-10-27 RX ADMIN — ATORVASTATIN CALCIUM 40 MG: 40 TABLET, FILM COATED ORAL at 09:15

## 2020-10-27 RX ADMIN — METHOCARBAMOL TABLETS 500 MG: 500 TABLET, COATED ORAL at 21:48

## 2020-10-27 RX ADMIN — SODIUM BICARBONATE 1300 MG: 650 TABLET ORAL at 17:05

## 2020-10-27 RX ADMIN — METHOCARBAMOL TABLETS 500 MG: 500 TABLET, COATED ORAL at 09:16

## 2020-10-27 RX ADMIN — METHOCARBAMOL TABLETS 500 MG: 500 TABLET, COATED ORAL at 15:14

## 2020-10-27 RX ADMIN — METOPROLOL TARTRATE 50 MG: 50 TABLET, FILM COATED ORAL at 21:48

## 2020-10-27 RX ADMIN — INSULIN LISPRO 1 UNITS: 100 INJECTION, SOLUTION INTRAVENOUS; SUBCUTANEOUS at 21:47

## 2020-10-27 RX ADMIN — SODIUM CHLORIDE, PRESERVATIVE FREE 10 ML: 5 INJECTION INTRAVENOUS at 09:18

## 2020-10-27 RX ADMIN — ISOSORBIDE MONONITRATE 60 MG: 60 TABLET, EXTENDED RELEASE ORAL at 09:14

## 2020-10-27 RX ADMIN — GABAPENTIN 300 MG: 300 CAPSULE ORAL at 15:14

## 2020-10-27 RX ADMIN — INSULIN GLARGINE 20 UNITS: 100 INJECTION, SOLUTION SUBCUTANEOUS at 21:47

## 2020-10-27 RX ADMIN — HEPARIN SODIUM 5000 UNITS: 5000 INJECTION INTRAVENOUS; SUBCUTANEOUS at 15:14

## 2020-10-27 RX ADMIN — CLOPIDOGREL BISULFATE 75 MG: 75 TABLET ORAL at 09:15

## 2020-10-27 RX ADMIN — CLONIDINE HYDROCHLORIDE 0.1 MG: 0.1 TABLET ORAL at 21:48

## 2020-10-27 RX ADMIN — SODIUM BICARBONATE 1300 MG: 650 TABLET ORAL at 21:48

## 2020-10-27 RX ADMIN — TORSEMIDE 20 MG: 20 TABLET ORAL at 09:15

## 2020-10-27 RX ADMIN — INSULIN LISPRO 3 UNITS: 100 INJECTION, SOLUTION INTRAVENOUS; SUBCUTANEOUS at 17:06

## 2020-10-27 RX ADMIN — GABAPENTIN 300 MG: 300 CAPSULE ORAL at 09:14

## 2020-10-27 RX ADMIN — INSULIN LISPRO 1 UNITS: 100 INJECTION, SOLUTION INTRAVENOUS; SUBCUTANEOUS at 09:16

## 2020-10-27 RX ADMIN — HYDRALAZINE HYDROCHLORIDE 50 MG: 50 TABLET, FILM COATED ORAL at 21:48

## 2020-10-27 RX ADMIN — POLYETHYLENE GLYCOL 3350 17 G: 17 POWDER, FOR SOLUTION ORAL at 12:52

## 2020-10-27 ASSESSMENT — PAIN DESCRIPTION - PAIN TYPE
TYPE: ACUTE PAIN

## 2020-10-27 ASSESSMENT — PAIN DESCRIPTION - PROGRESSION
CLINICAL_PROGRESSION: GRADUALLY WORSENING
CLINICAL_PROGRESSION: NOT CHANGED
CLINICAL_PROGRESSION: GRADUALLY WORSENING
CLINICAL_PROGRESSION: NOT CHANGED
CLINICAL_PROGRESSION: GRADUALLY WORSENING
CLINICAL_PROGRESSION: NOT CHANGED
CLINICAL_PROGRESSION: GRADUALLY WORSENING

## 2020-10-27 ASSESSMENT — PAIN DESCRIPTION - FREQUENCY
FREQUENCY: CONTINUOUS

## 2020-10-27 ASSESSMENT — PAIN DESCRIPTION - DESCRIPTORS
DESCRIPTORS: STABBING
DESCRIPTORS: THROBBING
DESCRIPTORS: STABBING

## 2020-10-27 ASSESSMENT — PAIN DESCRIPTION - ONSET
ONSET: ON-GOING

## 2020-10-27 ASSESSMENT — PAIN DESCRIPTION - LOCATION
LOCATION: BACK

## 2020-10-27 ASSESSMENT — PAIN DESCRIPTION - ORIENTATION
ORIENTATION: LOWER

## 2020-10-27 ASSESSMENT — PAIN - FUNCTIONAL ASSESSMENT
PAIN_FUNCTIONAL_ASSESSMENT: PREVENTS OR INTERFERES SOME ACTIVE ACTIVITIES AND ADLS

## 2020-10-27 ASSESSMENT — PAIN SCALES - GENERAL
PAINLEVEL_OUTOF10: 7
PAINLEVEL_OUTOF10: 6
PAINLEVEL_OUTOF10: 7
PAINLEVEL_OUTOF10: 0
PAINLEVEL_OUTOF10: 8
PAINLEVEL_OUTOF10: 7
PAINLEVEL_OUTOF10: 8
PAINLEVEL_OUTOF10: 8

## 2020-10-27 NOTE — PROGRESS NOTES
Patient Name: Ad Leggett                                                    Primary Physician: Damian Dan MD  Admitting Dx: [facility]                 Inpatient Progress Note                                         Plan for today:     BP Still high,. Inc. cardura further. May need inc. Clonidine in the short term. Continue diuretics. Appreciate caradiology assistance. No need  For pericardiocentesis. No tamponade physiology. Creat 5.0--> 5.8  Hep C ab positive, C3 low 88. Check cryo. Check Hep C PCR. Pt does not remember if he was treated. Getting closer to needing HD and anticipate further reduction in GFR with BP control. He wants to avoid needles and wants to do home modality. abd benign. Never had surgeries done. Will get surgery involved for eval of PD catheter placement. Prefer inpatient. Cardio will need cardio clearance for surgery. He has good support system at home. Wife does not work. Renting a house 2 bedroom. Has storage place  Dogs will have to be barred from bedroom. Also interested in transplant. Check urien drug screen. Assessment / Plan:       KISHORE / CKD. Baseline 3.6 (10/2019)  Has polycystic kidney disease. Likely diastolic progression with uncontrolled hypertension use of cocaine among other things.     UA bland except for significant proteinuria. To be quantified. No peripheral eosinophilia. CT scan with PKD. Some indeterminate cysts. Bladder scan. R/o obstructive uropathy. Serologies: AUDRA, ANCA, C3, C4, previously negative  Hepatitis panel,   SPEP negative 2018. Avoid nephrotoxins. No NSAIDS. No IV dyes. Avoid hypotension, keep SBP > 110 or above. Strict i/o charting. Daily weights. No acute indication of HD at this time.     I did discuss with him that is a kidney function getting advanced and he will think about renal replacement therapy including dialysis.   Because of his addiction issue we did not refer him to kidney transplant but now that he has been sober for the last 7 to 8 months this can become an option to. I think he would be an okay candidate for peritoneal dialysis. We will talk to him about home modalities including peritoneal dialysis and home hemodialysis.       Hypertension. Noncompliant. Tells me that he is off cocaine for the last several months. Check drug screen. Increase nifedipine 90 mg twice a day. Add Cardura 4 mg nightly  Taper off clonidine as patient is already noncompliant will not be able to take it 3 times a day and we will run into the problem with reflux hypertension.     Proteinuria quantified. May benefit from ACE inhibitor as well have to watch regarding hyperkalemia.     Significant anemia. Check nutrients. May need Epogen.     Previous history of stroke x2 because of cocaine abuse was hypertensive at that time     Large pericardial effusion. AUDRA previously negative. May need pericardiocentesis.     Smoker counseled. Constipation abdominal pain. Okay to give enema. Full code. Please call with any questions or concerns. [phone]. Flori Chang        Subjective: Interval hx:   Seen in room   still with abd pain and constipated. Tells me may feel better if able to have BM  Afebrile    ml.   creat 5.0--> 5.8. BP still high. CC:  KISHORE ,abd pain, nausea. HPI: The patient is a 52-year-old -American male with a past medical history of hypertension, hyperlipidemia, history of polycystic kidney disease, previous history of stroke x2 was thought to be because of cocaine abuse I saw him more than a year and a half ago at that time creatinine was 3.5 estimated GFR of around 20 at that time he was then lost to follow-up. Showed up to the ER yesterday with abdominal pain right groin pain and flank pain and concern for a kidney stone.   Was found to be constipated did not show any hydronephrosis or stones but does have significant polycystic kidney disease and kidney stones but no hydronephrosis. He tells me that he has been off of cocaine for the last 8 months. Creatinine is up to 5.0    PMH:  Past Medical History:   Diagnosis Date    Cardiomyopathy [facility])     CHF (congestive heart failure) (HCC)     CVA (cerebral infarction) 5/2015    Diabetes mellitus ([facility] [address]     Gastroparesis     Hypercholesteremia     Hypertension     Kidney disease     Unspecified cerebral artery occlusion with cerebral infarction     5/15, 7/15         Objective:     Vitals:   Vitals:    10/27/20 0600 10/27/20 0909 10/27/20 1000 10/27/20 1043   BP:  (!) 179/99 (!) 174/86    Pulse:  92 92    Resp:   18    Temp:   99.5 °F (37.5 °C)    TempSrc:   Oral    SpO2:   92% 92%   Weight: 181 lb 7 oz (82.3 kg)      Height:             PE:  Gen appearance: Alert, appears stated age and cooperative   Eyes: Eyelids,conjunctiva and pupils look normal   ENT: External inspection of the ears and nose are within normal limits             Oral mucosa  Is moist   Neuro: Grossly no focal neurological deficits, normal sensation, grossly cranial nerves intact   Neck:  No JVD, no mass, no thyroid enlargement   Cardio: S1 S2 normal, No added sounds   Resp: normal effort, clear to auscultation     GI:  Soft, mildly distended, nontender no rebound tenderness, non-tender, BS +          No palpable kidney, no renal angle tenderness   MS: No swollen or tender joints, no cyanosis, clubbing   DERM: no rashes, thickening   EDEMA: Trace pedal edema.       I/Os:         Intake/Output Summary (Last 24 hours) at 10/27/2020 1152  Last data filed at 10/27/2020 0757  Gross per 24 hour   Intake 1494 ml   Output 800 ml   Net 694 ml       Meds:     Scheduled Meds:   hydrALAZINE  50 mg Oral 3 times per day    cloNIDine  0.2 mg Oral TID    doxazosin  4 mg Oral 2 times per day    NIFEdipine  90 mg Oral BID    sodium bicarbonate  1,300 mg Oral 4x Daily    epoetin robert-epbx  10,000 Units Subcutaneous Once per day on Mon Wed Fri    torsemide  20 mg Oral Daily    methocarbamol  500 mg Oral TID    lidocaine  1 patch Transdermal Daily    atorvastatin  40 mg Oral Daily    clopidogrel  75 mg Oral Daily    gabapentin  300 mg Oral TID    insulin glargine  20 Units Subcutaneous Nightly    isosorbide mononitrate  60 mg Oral Daily    metoprolol tartrate  50 mg Oral BID    sodium chloride flush  10 mL Intravenous 2 times per day    heparin (porcine)  5,000 Units Subcutaneous 3 times per day    insulin lispro  0-6 Units Subcutaneous TID     insulin lispro  0-3 Units Subcutaneous Nightly    polyethylene glycol  17 g Oral Daily     Continuous Infusions:   dextrose       PRN Meds:sodium chloride flush, acetaminophen **OR** acetaminophen, polyethylene glycol, promethazine **OR** ondansetron, glucose, dextrose, glucagon (rDNA), dextrose, sennosides-docusate sodium    Diet:  DIET CARB CONTROL; Low Sodium (2 GM); Daily Fluid Restriction: 2000 ml    CBC:   Recent Labs     10/25/20  1137 10/26/20  0711   WBC 11.8* 8.9   HGB 8.1* 7.8*   HCT 25.9* 24.2*    144     BMP:    Recent Labs     10/25/20  1137 10/26/20  0711 10/27/20  0929    140 142   K 4.8 4.9 4.8   * 112* 110   CO2 15* 15* 18*   BUN 67* 62* 66*   CREATININE 5.0* 5.0* 5.8*   GLUCOSE 147* 118* 152*     ABGs: No results found for: PHART, PO2ART, [facility]. [facility]. Off: [phone]  Cell: [phone].  ( until 5 pm)

## 2020-10-27 NOTE — PROGRESS NOTES
Progress Note  Admit Date: 10/25/2020      PCP: ROQUE Infante CNP     CC: F/U for nominal pain nausea vomiting    Days in hospital:  2    SUBJECTIVE / Interval History:  Patient still complaining of a lot of back pain. Otherwise not much complaint    No other complaints        Allergies  Patient has no known allergies. Medications    Scheduled Meds:   hydrALAZINE  50 mg Oral 3 times per day    NIFEdipine  90 mg Oral BID    doxazosin  4 mg Oral Daily    sodium bicarbonate  1,300 mg Oral 4x Daily    epoetin robert-epbx  10,000 Units Subcutaneous Once per day on Mon Wed Fri    torsemide  20 mg Oral Daily    methocarbamol  500 mg Oral TID    lidocaine  1 patch Transdermal Daily    atorvastatin  40 mg Oral Daily    cloNIDine  0.1 mg Oral TID    clopidogrel  75 mg Oral Daily    gabapentin  300 mg Oral TID    insulin glargine  20 Units Subcutaneous Nightly    isosorbide mononitrate  60 mg Oral Daily    metoprolol tartrate  50 mg Oral BID    sodium chloride flush  10 mL Intravenous 2 times per day    heparin (porcine)  5,000 Units Subcutaneous 3 times per day    insulin lispro  0-6 Units Subcutaneous TID     insulin lispro  0-3 Units Subcutaneous Nightly    polyethylene glycol  17 g Oral Daily     Continuous Infusions:   dextrose         PRN Meds:  sodium chloride flush, acetaminophen **OR** acetaminophen, polyethylene glycol, promethazine **OR** ondansetron, glucose, dextrose, glucagon (rDNA), dextrose, sennosides-docusate sodium    Vitals    BP (!) 179/99   Pulse 92   Temp 98.2 °F (36.8 °C) (Oral)   Resp 18   Ht 6' 2\" (1.88 m)   Wt 181 lb 7 oz (82.3 kg)   SpO2 90%   BMI 23.30 kg/m²     Exam:    Gen: No distress. Eyes: PERRL. No sclera icterus. No conjunctival injection. ENT: No discharge. Pharynx clear. External appearance of ears and nose normal.  Neck: Trachea midline. No obvious mass. Resp: No accessory muscle use. No crackles. No wheezes. No rhonchi.  No dullness on percussion. CV: Regular rate. Regular rhythm. No murmur or rub. No edema. GI: Non-tender. Non-distended. No hernia. Skin: Warm, dry, normal texture and turgor. No nodule on exposed extremities. Lymph: No cervical LAD. No supraclavicular LAD. M/S: No cyanosis. No clubbing. Tenderness present on palpation of right lower lumbar region  Neuro: Moves all four extremities. CN 2-12 tested, no defect noted. Psych: Oriented x 3. No anxiety. Awake. Alert. Intact judgement and insight. Data    LABS  CBC:   Recent Labs     10/25/20  1137 10/26/20  0711   WBC 11.8* 8.9   HGB 8.1* 7.8*   HCT 25.9* 24.2*   MCV 90.4 89.8    144     BMP:   Recent Labs     10/25/20  1137 10/26/20  0711    140   K 4.8 4.9   * 112*   CO2 15* 15*   BUN 67* 62*   CREATININE 5.0* 5.0*   GLUCOSE 147* 118*     POC GLUCOSE:    Recent Labs     10/26/20  0817 10/26/20  1211 10/26/20  1643 10/26/20  2027 10/27/20  0740   POCGLU 134* 128* 122* 129* 181*     LIVER PROFILE:   Recent Labs     10/25/20  1137 10/26/20  0711   AST 10* 8*   ALT 10 7*   LABALBU 4.3 3.5   BILITOT <0.2 <0.2   ALKPHOS 84 75     PT/INR: No results for input(s): PROTIME, INR in the last 72 hours. APTT: No results for input(s): APTT in the last 72 hours. UA:  Recent Labs     10/25/20  1137   COLORU YELLOW   PHUR 5.0   WBCUA 3   RBCUA 4   CLARITYU Clear   SPECGRAV 1.012   LEUKOCYTESUR Negative   UROBILINOGEN 0.2   BILIRUBINUR Negative   BLOODU Negative   GLUCOSEU Negative   KETUA Negative     Microbiology:  Wound Culture: No results for input(s): WNDABS, ORG in the last 72 hours. Invalid input(s):  LABGRAM  Nasal Culture: No results for input(s): ORG, MRSAPCR in the last 72 hours. Blood Culture: No results for input(s): BC, BLOODCULT2 in the last 72 hours. Fungal Culture:   No results for input(s): FUNGSM in the last 72 hours. No results for input(s): FUNCXBLD in the last 72 hours.   CSF Culture:  No results for input(s): COLORCSF, APPEARCSF, CFTUBE, CLOTCSF, WBCCSF, RBCCSF, NEUTCSF, NUMCELLSCSF, LYMPHSCSF, MONOCSF, GLUCCSF, VOLCSF in the last 72 hours. Respiratory Culture:  No results for input(s): Miriam Pore in the last 72 hours. AFB:No results for input(s): AFBSMEAR in the last 72 hours. Urine Culture  No results for input(s): LABURIN in the last 72 hours. RADIOLOGY:    CT CHEST ABDOMEN PELVIS WO CONTRAST   Final Result   Chest-      Moderate to large pericardial effusion. Mild atelectasis in the lingula and left lower lobe. Pneumonia is considered   less likely. Abdomen pelvis-      No acute inflammatory abnormality is identified in the abdomen or pelvis. Mild prominence of stool in the colon. Correlation for constipation   recommended. Polycystic kidney disease. Bilateral nonobstructive nephrolithiasis. Caliceal stones measure up to 5 mm   on the left. CONSULTS:    IP CONSULT TO NEPHROLOGY  IP CONSULT TO CARDIOLOGY    ASSESSMENT AND PLAN:      Principal Problem:    Acute renal failure superimposed on chronic kidney disease, on chronic dialysis ([facility])  Active Problems:    Essential hypertension    Type 2 diabetes mellitus with stage 3 chronic kidney disease, with long-term current use of insulin ([facility])    Cardiomyopathy ([facility] [address]    History of stroke    Mixed hyperlipidemia    Acute on chronic renal failure ([facility])    Pericardial effusion  Resolved Problems:    * No resolved hospital problems. *    Patient is a 27-year-old male with a past medical history of CHF, CVA, diabetes, gastroparesis who presented with vague abdominal pain with vomiting and constipation. In the ER patient was found to have an elevated creatinine and CT scan showed pericardial effusion    Acute on chronic renal failure 3- creatinine trending up  -Baseline creatinine is around 3. On admission creatinine 5. Will consult nephrology  -Hold nephrotoxic medication  -Patient got Toradol in the ER.   Hold off any NSAIDs    Pericardial effusion  -CT shows moderate to large pericardial effusion,Echo ordered shows mild to moderate effusion with no tamponade-Cardiology consulted    Diabetes mellitus  -Continue Lantus and sliding scale  -Monitor blood sugars     Hypertension-uncontrolled, add hydralazine. Try to wean off clonidine  -Unclear if patient is compliant with his medications  -Monitor blood pressure  -Continue home meds  -Urine drug screen to rule out cocaine abuse    Elevated troponin  -Most likely secondary to CKD  --Normal recent stress test    Cardiomyopathy  -Last echo on 6/20 showed a EF of 35 to 40% with grade 2 diastolic dysfunction  -Hold diuresis and ACE/ARB due to KISHORE  -cardiology following    Hyperlipidemia  -Tinea statin    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  -Continue home meds    Right lower back pain  -Seems more musculoskeletal  -We will add muscle relaxer and Lidoderm patch    DVT Prophylaxis: lovenox  Diet: DIET CARB CONTROL; Low Sodium (2 GM); Daily Fluid Restriction: 2000 ml  Code Status: Full Code    PT/OT Eval Status:ordered    Discharge plan -continue care    The patient and / or the family were informed of the results of any tests, a time was given to answer questions, a plan was proposed and they agreed with plan. Discussed with consulting physicians, nursing and social work     The note was completed using EMR. Every effort was made to ensure accuracy; however, inadvertent computerized transcription errors may be present.        Byron Douglas MD

## 2020-10-27 NOTE — PLAN OF CARE
Problem: Falls - Risk of:  Goal: Will remain free from falls  Description: Will remain free from falls  Outcome: Ongoing     Problem: Falls - Risk of:  Goal: Absence of physical injury  Description: Absence of physical injury  Outcome: Ongoing     Problem: OXYGENATION/RESPIRATORY FUNCTION  Goal: Patient will maintain patent airway  Outcome: Ongoing     Problem: OXYGENATION/RESPIRATORY FUNCTION  Goal: Patient will achieve/maintain normal respiratory rate/effort  Description: Respiratory rate and effort will be within normal limits for the patient  Outcome: Ongoing     Problem: HEMODYNAMIC STATUS  Goal: Patient has stable vital signs and fluid balance  Outcome: Ongoing     Problem: FLUID AND ELECTROLYTE IMBALANCE  Goal: Fluid and electrolyte balance are achieved/maintained  Outcome: Ongoing     Problem: ACTIVITY INTOLERANCE/IMPAIRED MOBILITY  Goal: Mobility/activity is maintained at optimum level for patient  Outcome: Ongoing     Problem: Pain:  Goal: Pain level will decrease  Description: Pain level will decrease  Outcome: Ongoing     Problem: Pain:  Goal: Control of acute pain  Description: Control of acute pain  Outcome: Ongoing     Problem: Pain:  Goal: Control of chronic pain  Description: Control of chronic pain  Outcome: Ongoing

## 2020-10-27 NOTE — PROGRESS NOTES
Pt with critical creatinine of 5.8. Dr. Shira Wells made aware. Awaiting orders. Pt's BP is also still running high after his morning BP meds; new order for hydralazine. Pt had 7 beats of v-tach this morning; asymptomatic. Dr. Shira Wells made aware. Will continue to monitor.   Electronically signed by Joslyn Shaver RN on 10/27/2020 at 10:33 AM

## 2020-10-27 NOTE — CONSULTS
Surgery Consult Note     Ashley Miller MD  Pt Name: Gege De La Rosa  MRN: [id number]  [place]: 1965  Date of evaluation: 10/27/2020  Primary Care Physician: ROQUE Kaye - CNP  Referring Physician. Samina Carlos  Reason for Consultation: Need for access for dialysis  Chief Complaint: Admitted with abdominal pain nausea and vomiting  IMPRESSIONS:   1. Worsening renal failure GFR of 10 lack of access for peritoneal or hemodialysis  2. Gastroparesis  3. Constipation  4. Nausea and vomiting  5. Fluid around his heart seen on CAT scan cardiology on board no paracentesis recommended  6. does not have any pertinent problems on file. PLANS:   1. Have secured a time for Thursday morning at 7:30 AM for laparoscopic placement of peritoneal dialysis catheter and umbilical hernia repair  SUBJECTIVE:   History of Chief Complaint:    Gege De La Rosa is a 48 y.o. male who presents with abdominal pain nausea and vomiting. Symptom onset has been acute for a time period of 3 day(s). Severity is described as moderate to severe. Course of his symptoms over time is acute. . This has been occuring for 3 days, decreasing and have gradually worsened. Aggravating factors include eating. Alleviating factors include none. Associated symptoms include constipation. He admits to history of alcohol abuse seems to have stopped Diabetes and polycystic kidney and denies history of hepatitis, inflammatory bowel disease, pancreatitis, jaundice, colitis and ulcer disease. Previous studies include CT scan. Past Medical History  Reviewed  has a past medical history of Cardiomyopathy ([address] CHF (congestive heart failure) ([address] CVA (cerebral infarction), Diabetes mellitus ([address] Gastroparesis, Hypercholesteremia, Hypertension, Kidney disease, and Unspecified cerebral artery occlusion with cerebral infarction. Past Surgical History  Reviewed has no past surgical history on file.   Medications  Prior to Admission medications    Medication (ACETAMINOPHEN EXTRA STRENGTH) 500 MG tablet TAKE TWO TABLETS BY MOUTH EVERY 6 HOURS AS NEEDED FOR PAIN 6/22/19  Yes Myke Kendrick MD   Insulin Pen Needle 31G X 6 MM MISC 1 each by Does not apply route daily 11/14/19   ROQUE Alves - CNP   Insulin Syringe-Needle U-100 31G X 5/16\" 0.3 ML MISC 1 each by Does not apply route daily 8/11/16   Erik Maza MD    Scheduled Meds:   hydrALAZINE  50 mg Oral 3 times per day    doxazosin  4 mg Oral 2 times per day    polyethylene glycol  17 g Oral BID    bisacodyl  10 mg Rectal Daily    cloNIDine  0.1 mg Oral TID    NIFEdipine  90 mg Oral BID    sodium bicarbonate  1,300 mg Oral 4x Daily    epoetin robert-epbx  10,000 Units Subcutaneous Once per day on Mon Wed Fri    torsemide  20 mg Oral Daily    methocarbamol  500 mg Oral TID    lidocaine  1 patch Transdermal Daily    atorvastatin  40 mg Oral Daily    gabapentin  300 mg Oral TID    insulin glargine  20 Units Subcutaneous Nightly    isosorbide mononitrate  60 mg Oral Daily    metoprolol tartrate  50 mg Oral BID    sodium chloride flush  10 mL Intravenous 2 times per day    heparin (porcine)  5,000 Units Subcutaneous 3 times per day    insulin lispro  0-6 Units Subcutaneous TID WC    insulin lispro  0-3 Units Subcutaneous Nightly     Continuous Infusions:   dextrose       PRN Meds:.sodium chloride flush, acetaminophen **OR** acetaminophen, polyethylene glycol, promethazine **OR** ondansetron, glucose, dextrose, glucagon (rDNA), dextrose, sennosides-docusate sodium  Allergies  has No Known Allergies. Family History  Reviewedfamily history is not on file. He was adopted. Social History   reports that he has been smoking cigars. He started smoking about 41 years ago. He has been smoking about 0.00 packs per day. He has never used smokeless tobacco. He reports current alcohol use. He reports current drug use. Drug: Marijuana.   EDUCATION  Patient educated about their illness/diagnosis, stated above, and all questions answered. We discussed the importance of nutrition, medications they are taking, and healthy lifestyle. Review of Systems:  General Denies any fever or chills  HEENT Denies any diplopia, tinnitus or vertigo  Resp Denies any shortness of breath, cough or wheezing  Cardiac Denies any chest pain, palpitations, claudication or edema  GI Denies any melena, hematochezia, hematemesis or pyrosis   Denies any frequency, urgency, hesitancy or incontinence  Heme Denies bruising or bleeding easily  Neuro Denies any focal motor or sensory deficits  OBJECTIVE:   VITALS:  height is 6' 2\" (1.88 m) and weight is 181 lb 7 oz (82.3 kg). His oral temperature is 98.5 °F (36.9 °C). His blood pressure is 181/84 (abnormal) and his pulse is 96. His respiration is 18 and oxygen saturation is 95%. CONSTITUTIONAL: Alert and oriented times 3, no acute distress and cooperative to examination with proper mood and affect. SKIN: Skin color, texture, turgor normal. No rashes or lesions. LYMPH: no cervical nodes, no inguinal nodes  HEENT: Head is normocephalic, atraumatic. EOMI, PERRLA. NECK: Supple, symmetrical, trachea midline, no adenopathy, thyroid symmetric, not enlarged and no tenderness, skin normal.  CHEST/LUNGS: chest symmetric with normal A/P diameter, normal respiratory rate and rhythm, lungs clear to auscultation without wheezes, rales or rhonchi. No accessory muscle use. Scars None   CARDIOVASCULAR: Heart sounds are normal.  Regular rate and rhythm without murmur, gallop or rub. Normal S1 and S2. Benedetta Ou Distant carotid and femoral pulses 2+/4 and equal bilaterally popliteals +1 bilaterally no pedal pulses appreciated. ABDOMEN: Normal shape. No and Laparoscopic scar(s) present. Normal bowel sounds. No bruits. . soft, nontender, nondistended, no masses or organomegaly. umbilical hernia, small reducible. Percussion: Normal without hepatosplenomegally. Tenderness: absent.   RECTAL: deferred, not clinically report  Thank you for the interesting evaluation. Further recommendations to follow.       General and Vascular Surgery [phone]  Electronically signed by Kaley King MD on 10/27/2020 at 2:34 PM

## 2020-10-27 NOTE — PROGRESS NOTES
Sent to hospitalist:    pt bp at start of my shift was 200/90 manual. I gave scheduled bp meds. midnight vitals his bp is 195/91. Pt states he takes hydralazine 3x per day at home and we are not giving it to him here. .. His home med list even shows he takes 100mg 3x per day.  His pressure has been through the roof since admission

## 2020-10-27 NOTE — PROGRESS NOTES
Physician Progress Note      PATIENT:               Krzysztof Guzman  CSN #:                  [id number]  :                       1965  ADMIT DATE:       10/25/2020 11:10 AM  [address] [place] DATE:  RESPONDING  PROVIDER #:        Mulu Rothman MD          QUERY TEXT:    Dear Dr. Shaylee Patel,  Pt admitted with KISHORE on CKD 3 and has Hx CHF documented. If possible, please   document in progress notes and discharge summary further specificity regarding   the type and acuity of CHF:    The medical record reflects the following:  Risk Factors: Hx CKD, Pericardial effusion, DM, CHF, Cardiomyopathy, HTN  Clinical Indicators: last ECHO 2020 EF=35-40% with grade 2 diastolic   dysfunction, Cardiology notes Hx CHF and \"Dilated hypertensive cardiomyopathy   secondary to uncontrolled hypertension\"  Treatment: on Demadex, 10/26 PCP notes Hold diuresis and ACE/ARB due to KISHORE,   Cardiology consult  Thank you,  Heide Ruiz RN, CDS  [email or web address]  Options provided:  -- Chronic Systolic and Diastolic CHF  -- Chronic Systolic CHF/HFrEF  -- Chronic Diastolic CHF/HFpEF  -- Other - I will add my own diagnosis  -- Disagree - Not applicable / Not valid  -- Disagree - Clinically unable to determine / Unknown  -- Refer to Clinical Documentation Reviewer    PROVIDER RESPONSE TEXT:    This patient has chronic systolic and diastolic CHF. Query created by:  [id number] [place] on 10/27/2020 12:03 PM      Electronically signed by:  Mulu Rothman MD 10/27/2020 12:43 PM

## 2020-10-27 NOTE — PROGRESS NOTES
[facility] [place]   Daily Progress Note      Admit Date:  10/25/2020    Reason for follow up visit: Pericardial effusion    CC: \"I'm constipated and would feel a lot better if I could go. \"    47 y/o male with PMH significant for HTN, LVH, cardiomyopathy and CKD admitted after presenting with mid back pain, N/V and constipation. He has known cardiomyopathy and echo this admit demonstrated pericardial effusion and asked to evaluate. Interval History:  Pt. seen and examined; records reviewed  BP elevated. Cr elevated: nephrology following  + constipation  + back pain  Denies SOB or chest pain    Subjective:  Pt with no acute overnight cardiac events. Denies chest pain, SOB, palpitations or dizziness    Review of Systems:   · Constitutional:+ fatigue and weight loss. No fevers, chills. · Eyes: No visual changes or diplopia. No scleral icterus. · ENT: No Headaches, hearing loss or vertigo. No mouth sores or sore throat. · Cardiovascular: as reviewed in HPI  · Respiratory: No cough or wheezing, no sputum production. No hematemesis. · Gastrointestinal: + abdominal pain, N/V and constipation. · Genitourinary: No dysuria, trouble voiding, or hematuria. · Musculoskeletal:  No gait disturbance. + back pain  · Integumentary: No rash or pruritis. · Neurological: No headache, diplopia, change in muscle strength, numbness or tingling. · Psychiatric: No anxiety or depression. · Endocrine: No temperature intolerance. No excessive thirst, fluid intake, or urination. No tremor. · Hematologic/Lymphatic: No abnormal bruising or bleeding, blood clots or swollen lymph nodes. · Allergic/Immunologic: No nasal congestion or hives.     Objective:   BP (!) 174/86 Comment: Rn notified  Pulse 92 Comment: RN notified  Temp 99.5 °F (37.5 °C) (Oral)   Resp 18   Ht 6' 2\" (1.88 m)   Wt 181 lb 7 oz (82.3 kg)   SpO2 92%   BMI 23.30 kg/m²       Intake/Output Summary (Last 24 hours) at 10/27/2020 1054  Last data filed at 10/27/2020 0757  Gross per 24 hour   Intake 1494 ml   Output 1050 ml   Net 444 ml     Wt Readings from Last 3 Encounters:   10/27/20 181 lb 7 oz (82.3 kg)   09/29/20 173 lb 12.8 oz (78.8 kg)   08/20/20 178 lb (80.7 kg)       Physical Exam:  General: In no acute distress. Awake, alert, and oriented X4. Resting in bed  Skin:  Warm and dry. No new appearing rashes or lesions. Neck:  Supple. No JVD or carotid bruit appreciated. Chest: Lungs clear to auscultation. No wheezes/rhonchi/rales  Cardiovascular:  RRR. Normal S1 and S2. No murmur/gallop or rub  Abdomen:  soft, nontender, +bowel sounds. No hepatomegaly  Extremities:  No LE edema. No clubbing or cyanosis. 2+ bilateral radial/DP/PT pulses. Cap refill brisk.      Medications:    hydrALAZINE  50 mg Oral 3 times per day    NIFEdipine  90 mg Oral BID    doxazosin  4 mg Oral Daily    sodium bicarbonate  1,300 mg Oral 4x Daily    epoetin robert-epbx  10,000 Units Subcutaneous Once per day on Mon Wed Fri    torsemide  20 mg Oral Daily    methocarbamol  500 mg Oral TID    lidocaine  1 patch Transdermal Daily    atorvastatin  40 mg Oral Daily    cloNIDine  0.1 mg Oral TID    clopidogrel  75 mg Oral Daily    gabapentin  300 mg Oral TID    insulin glargine  20 Units Subcutaneous Nightly    isosorbide mononitrate  60 mg Oral Daily    metoprolol tartrate  50 mg Oral BID    sodium chloride flush  10 mL Intravenous 2 times per day    heparin (porcine)  5,000 Units Subcutaneous 3 times per day    insulin lispro  0-6 Units Subcutaneous TID     insulin lispro  0-3 Units Subcutaneous Nightly    polyethylene glycol  17 g Oral Daily      dextrose       sodium chloride flush, acetaminophen **OR** acetaminophen, polyethylene glycol, promethazine **OR** ondansetron, glucose, dextrose, glucagon (rDNA), dextrose, sennosides-docusate sodium    Lab Data:  CBC:   Recent Labs     10/25/20  1137 10/26/20  0711   WBC 11.8* 8.9   HGB 8.1* 7.8*    144     BMP: Recent Labs     10/25/20  1137 10/26/20  0711 10/27/20  0929    140 142   K 4.8 4.9 4.8   CO2 15* 15* 18*   BUN 67* 62* 66*   CREATININE 5.0* 5.0* 5.8*     LIVR:   Recent Labs     10/25/20  1137 10/26/20  0711   AST 10* 8*   ALT 10 7*     Results for Tan Mask (MRN [id number]) as of 10/27/2020 11:01   Ref. Range 10/25/2020 11:37 10/25/2020 15:57   Pro-BNP Latest Ref Range: 0 - 124 pg/mL 3,063 (H)    Troponin Latest Ref Range: <0.01 ng/mL 0.10 (H) 0.08 (H)       10/26/2020 Echo:   Left ventricular cavity size is dilated. There is moderate concentric left ventricular hypertrophy. Ejection fraction is visually estimated to be 45-50%. There is moderate diffuse hypokinesis. Diastolic filling parameters suggest grade II diastolic dysfunction. The left atrium is severely dilated. The right ventricle is normal   Right ventricular systolic function is normal.   mild-Moderate circumferential pericardial effusion without tamponade   physiology. 10/25/2020 CT abdomen: Moderate to large pericardial effusion.         Mild atelectasis in the lingula and left lower lobe.  Pneumonia is considered    less likely.         Abdomen pelvis-         No acute inflammatory abnormality is identified in the abdomen or pelvis.         Mild prominence of stool in the colon.  Correlation for constipation    recommended.         Polycystic kidney disease.         Bilateral nonobstructive nephrolithiasis.  Caliceal stones measure up to 5 mm    on the left. 9/14/2020 Lexiscan Myoview:  Pharmacological Stress/MPI Results:          1. Technically a satisfactory study.     2. Normal pharmacological stress portion of the study.     3. No evidence of Ischemia by Myocardial Perfusion Imaging.     4. Gated Study shows Dilated LV: EF is 51 %. 6/8/2020 Echo:  Overall left ventricular systolic function appears moderately reduced. Ejection fraction is visually estimated to be 35-40% with diffuse   hypokinesis.

## 2020-10-28 ENCOUNTER — ANESTHESIA EVENT (OUTPATIENT)
Dept: OPERATING ROOM | Age: 55
DRG: 444 | End: 2020-10-28
Payer: MEDICARE

## 2020-10-28 LAB
ANION GAP SERPL CALCULATED.3IONS-SCNC: 16 MMOL/L (ref 3–16)
BUN BLDV-MCNC: 69 MG/DL (ref 7–20)
CALCIUM SERPL-MCNC: 8.4 MG/DL (ref 8.3–10.6)
CHLORIDE BLD-SCNC: 107 MMOL/L (ref 99–110)
CO2: 15 MMOL/L (ref 21–32)
CREAT SERPL-MCNC: 6.9 MG/DL (ref 0.9–1.3)
GFR AFRICAN AMERICAN: 10
GFR NON-AFRICAN AMERICAN: 8
GLUCOSE BLD-MCNC: 117 MG/DL (ref 70–99)
GLUCOSE BLD-MCNC: 135 MG/DL (ref 70–99)
GLUCOSE BLD-MCNC: 135 MG/DL (ref 70–99)
GLUCOSE BLD-MCNC: 189 MG/DL (ref 70–99)
GLUCOSE BLD-MCNC: 194 MG/DL (ref 70–99)
PERFORMED ON: ABNORMAL
POTASSIUM SERPL-SCNC: 5.2 MMOL/L (ref 3.5–5.1)
SODIUM BLD-SCNC: 138 MMOL/L (ref 136–145)

## 2020-10-28 PROCEDURE — 2580000003 HC RX 258: Performed by: INTERNAL MEDICINE

## 2020-10-28 PROCEDURE — 99232 SBSQ HOSP IP/OBS MODERATE 35: CPT | Performed by: NURSE PRACTITIONER

## 2020-10-28 PROCEDURE — 82595 ASSAY OF CRYOGLOBULIN: CPT

## 2020-10-28 PROCEDURE — 6370000000 HC RX 637 (ALT 250 FOR IP): Performed by: INTERNAL MEDICINE

## 2020-10-28 PROCEDURE — 6360000002 HC RX W HCPCS: Performed by: INTERNAL MEDICINE

## 2020-10-28 PROCEDURE — 87522 HEPATITIS C REVRS TRNSCRPJ: CPT

## 2020-10-28 PROCEDURE — 1200000000 HC SEMI PRIVATE

## 2020-10-28 PROCEDURE — 36415 COLL VENOUS BLD VENIPUNCTURE: CPT

## 2020-10-28 PROCEDURE — 80048 BASIC METABOLIC PNL TOTAL CA: CPT

## 2020-10-28 PROCEDURE — 94760 N-INVAS EAR/PLS OXIMETRY 1: CPT

## 2020-10-28 PROCEDURE — 2500000003 HC RX 250 WO HCPCS: Performed by: INTERNAL MEDICINE

## 2020-10-28 RX ADMIN — CLONIDINE HYDROCHLORIDE 0.1 MG: 0.1 TABLET ORAL at 08:42

## 2020-10-28 RX ADMIN — GABAPENTIN 300 MG: 300 CAPSULE ORAL at 21:24

## 2020-10-28 RX ADMIN — METOPROLOL TARTRATE 50 MG: 50 TABLET, FILM COATED ORAL at 21:24

## 2020-10-28 RX ADMIN — METOPROLOL TARTRATE 50 MG: 50 TABLET, FILM COATED ORAL at 08:42

## 2020-10-28 RX ADMIN — ATORVASTATIN CALCIUM 40 MG: 40 TABLET, FILM COATED ORAL at 08:42

## 2020-10-28 RX ADMIN — HEPARIN SODIUM 5000 UNITS: 5000 INJECTION INTRAVENOUS; SUBCUTANEOUS at 06:50

## 2020-10-28 RX ADMIN — SODIUM ZIRCONIUM CYCLOSILICATE 10 G: 5 POWDER, FOR SUSPENSION ORAL at 21:25

## 2020-10-28 RX ADMIN — SODIUM BICARBONATE: 84 INJECTION, SOLUTION INTRAVENOUS at 18:50

## 2020-10-28 RX ADMIN — METHOCARBAMOL TABLETS 500 MG: 500 TABLET, COATED ORAL at 08:42

## 2020-10-28 RX ADMIN — GABAPENTIN 300 MG: 300 CAPSULE ORAL at 08:42

## 2020-10-28 RX ADMIN — DOXAZOSIN 4 MG: 4 TABLET ORAL at 08:42

## 2020-10-28 RX ADMIN — CLONIDINE HYDROCHLORIDE 0.1 MG: 0.1 TABLET ORAL at 16:38

## 2020-10-28 RX ADMIN — METHOCARBAMOL TABLETS 500 MG: 500 TABLET, COATED ORAL at 16:39

## 2020-10-28 RX ADMIN — HEPARIN SODIUM 5000 UNITS: 5000 INJECTION INTRAVENOUS; SUBCUTANEOUS at 21:25

## 2020-10-28 RX ADMIN — POLYETHYLENE GLYCOL 3350 17 G: 17 POWDER, FOR SOLUTION ORAL at 21:24

## 2020-10-28 RX ADMIN — HYDRALAZINE HYDROCHLORIDE 50 MG: 50 TABLET, FILM COATED ORAL at 21:24

## 2020-10-28 RX ADMIN — SODIUM BICARBONATE 1300 MG: 650 TABLET ORAL at 16:38

## 2020-10-28 RX ADMIN — NIFEDIPINE 90 MG: 90 TABLET, FILM COATED, EXTENDED RELEASE ORAL at 08:42

## 2020-10-28 RX ADMIN — ISOSORBIDE MONONITRATE 60 MG: 60 TABLET, EXTENDED RELEASE ORAL at 08:42

## 2020-10-28 RX ADMIN — HEPARIN SODIUM 5000 UNITS: 5000 INJECTION INTRAVENOUS; SUBCUTANEOUS at 16:39

## 2020-10-28 RX ADMIN — INSULIN LISPRO 1 UNITS: 100 INJECTION, SOLUTION INTRAVENOUS; SUBCUTANEOUS at 08:46

## 2020-10-28 RX ADMIN — METHOCARBAMOL TABLETS 500 MG: 500 TABLET, COATED ORAL at 21:24

## 2020-10-28 RX ADMIN — POLYETHYLENE GLYCOL 3350 17 G: 17 POWDER, FOR SOLUTION ORAL at 08:41

## 2020-10-28 RX ADMIN — CLONIDINE HYDROCHLORIDE 0.1 MG: 0.1 TABLET ORAL at 21:23

## 2020-10-28 RX ADMIN — HYDRALAZINE HYDROCHLORIDE 50 MG: 50 TABLET, FILM COATED ORAL at 16:38

## 2020-10-28 RX ADMIN — INSULIN GLARGINE 20 UNITS: 100 INJECTION, SOLUTION SUBCUTANEOUS at 21:25

## 2020-10-28 RX ADMIN — NIFEDIPINE 90 MG: 90 TABLET, FILM COATED, EXTENDED RELEASE ORAL at 21:24

## 2020-10-28 RX ADMIN — HYDRALAZINE HYDROCHLORIDE 50 MG: 50 TABLET, FILM COATED ORAL at 06:50

## 2020-10-28 RX ADMIN — INSULIN LISPRO 1 UNITS: 100 INJECTION, SOLUTION INTRAVENOUS; SUBCUTANEOUS at 18:50

## 2020-10-28 RX ADMIN — SODIUM BICARBONATE 1300 MG: 650 TABLET ORAL at 08:42

## 2020-10-28 RX ADMIN — GABAPENTIN 300 MG: 300 CAPSULE ORAL at 16:38

## 2020-10-28 RX ADMIN — TORSEMIDE 20 MG: 20 TABLET ORAL at 08:42

## 2020-10-28 RX ADMIN — EPOETIN ALFA-EPBX 10000 UNITS: 10000 INJECTION, SOLUTION INTRAVENOUS; SUBCUTANEOUS at 10:36

## 2020-10-28 ASSESSMENT — PAIN SCALES - GENERAL: PAINLEVEL_OUTOF10: 0

## 2020-10-28 NOTE — PROGRESS NOTES
history on file. Medications  Prior to Admission medications    Medication Sig Start Date End Date Taking? Authorizing Provider   cloNIDine (CATAPRES) 0.2 MG tablet TAKE 1 TABLET BY MOUTH EVERY 8 HOURS BEFORE MEALS SPACING OUT FROM ALL BLOOD PRESSUE MEDICATIONS  Patient taking differently: Take 0.4 mg by mouth every 8 hours TAKE 1 TABLET BY MOUTH EVERY 8 HOURS BEFORE MEALS SPACING OUT FROM ALL BLOOD PRESSUE MEDICATIONS 10/20/20  Yes ROQUE Rausch CNP   isosorbide mononitrate (IMDUR) 60 MG extended release tablet Take 1 tablet by mouth daily  Patient taking differently: Take 60 mg by mouth nightly  9/29/20  Yes ROQUE Rausch CNP   NIFEdipine (PROCARDIA XL) 90 MG extended release tablet Take 1 tablet by mouth daily 9/29/20  Yes ROQUE Rausch CNP   insulin glargine (LANTUS) 100 UNIT/ML injection vial Inject 20 Units into the skin nightly 8/3/20  Yes ROQUE Rausch CNP   clopidogrel (PLAVIX) 75 MG tablet Take 1 tablet by mouth daily  Patient taking differently: Take 75 mg by mouth nightly  7/30/20  Yes Fallon Rodriguez MD   hydrALAZINE (APRESOLINE) 100 MG tablet Take 2 tablets by mouth 3 times daily 7/1/20  Yes ROQUE Rausch CNP   atorvastatin (LIPITOR) 40 MG tablet TAKE 1 TABLET BY MOUTH ONCE DAILY  Patient taking differently: Take 40 mg by mouth nightly TAKE 1 TABLET BY MOUTH ONCE DAILY 7/1/20  Yes ROQUE Rausch CNP   furosemide (LASIX) 20 MG tablet Take 1 tablet by mouth daily  Patient taking differently: Take 40 mg by mouth daily  5/26/20  Yes ROQUE Rausch CNP   losartan (COZAAR) 100 MG tablet Take 1 tablet by mouth daily  Patient taking differently: Take 100 mg by mouth nightly  5/18/20  Yes ROQUE Rausch CNP   metoprolol tartrate (LOPRESSOR) 50 MG tablet Take 1 tablet by mouth twice daily 5/8/20  Yes ROQUE Rausch CNP   gabapentin (NEURONTIN) 300 MG capsule Take 1 capsule by mouth 3 times daily for 82 days. 1/22/20 10/26/20 Yes ROQUE Alberto CNP   acetaminophen (ACETAMINOPHEN EXTRA STRENGTH) 500 MG tablet TAKE TWO TABLETS BY MOUTH EVERY 6 HOURS AS NEEDED FOR PAIN 6/22/19  Yes Emerson Guzman MD   Insulin Pen Needle 31G X 6 MM MISC 1 each by Does not apply route daily 11/14/19   ROQUE Alberto CNP   Insulin Syringe-Needle U-100 31G X 5/16\" 0.3 ML MISC 1 each by Does not apply route daily 8/11/16   Mar Dailey MD    Scheduled Meds:   hydrALAZINE  50 mg Oral 3 times per day    doxazosin  4 mg Oral 2 times per day    polyethylene glycol  17 g Oral BID    bisacodyl  10 mg Rectal Daily    cloNIDine  0.1 mg Oral TID    NIFEdipine  90 mg Oral BID    sodium bicarbonate  1,300 mg Oral 4x Daily    epoetin robert-epbx  10,000 Units Subcutaneous Once per day on Mon Wed Fri    torsemide  20 mg Oral Daily    methocarbamol  500 mg Oral TID    lidocaine  1 patch Transdermal Daily    atorvastatin  40 mg Oral Daily    gabapentin  300 mg Oral TID    insulin glargine  20 Units Subcutaneous Nightly    isosorbide mononitrate  60 mg Oral Daily    metoprolol tartrate  50 mg Oral BID    sodium chloride flush  10 mL Intravenous 2 times per day    heparin (porcine)  5,000 Units Subcutaneous 3 times per day    insulin lispro  0-6 Units Subcutaneous TID WC    insulin lispro  0-3 Units Subcutaneous Nightly     Continuous Infusions:   dextrose       PRN Meds:.sodium chloride flush, acetaminophen **OR** acetaminophen, polyethylene glycol, promethazine **OR** ondansetron, glucose, dextrose, glucagon (rDNA), dextrose, sennosides-docusate sodium  Allergies  has No Known Allergies. Family History  Reviewedfamily history is not on file. He was adopted. Social History   reports that he has been smoking cigars. He started smoking about 41 years ago. He has been smoking about 0.00 packs per day. He has never used smokeless tobacco. He reports current alcohol use. He reports current drug use.  Drug: Marijuana. EDUCATION  Patient educated about their illness/diagnosis, stated above, and all questions answered. We discussed the importance of nutrition, medications they are taking, and healthy lifestyle. Review of Systems:  General Denies any fever or chills  HEENT Denies any diplopia, tinnitus or vertigo  Resp Denies any shortness of breath, cough or wheezing  Cardiac Denies any chest pain, palpitations, claudication or edema  GI Denies any melena, hematochezia, hematemesis or pyrosis   Denies any frequency, urgency, hesitancy or incontinence  Heme Denies bruising or bleeding easily  Neuro Denies any focal motor or sensory deficits  OBJECTIVE:   VITALS:  height is 6' 2\" (1.88 m) and weight is 170 lb 13.7 oz (77.5 kg). His oral temperature is 98.4 °F (36.9 °C). His blood pressure is 112/65 and his pulse is 84. His respiration is 18 and oxygen saturation is 94%. CONSTITUTIONAL: Alert and oriented times 3, no acute distress and cooperative to examination with proper mood and affect. SKIN: Skin color, texture, turgor normal. No rashes or lesions. LYMPH: no cervical nodes, no inguinal nodes  HEENT: Head is normocephalic, atraumatic. EOMI, PERRLA. NECK: Supple, symmetrical, trachea midline, no adenopathy, thyroid symmetric, not enlarged and no tenderness, skin normal.  CHEST/LUNGS: chest symmetric with normal A/P diameter, normal respiratory rate and rhythm, lungs clear to auscultation without wheezes, rales or rhonchi. No accessory muscle use. Scars None   CARDIOVASCULAR: Heart sounds are normal.  Regular rate and rhythm without murmur, gallop or rub. Normal S1 and S2. Suzanne Graven Distant carotid and femoral pulses 2+/4 and equal bilaterally popliteals +1 bilaterally no pedal pulses appreciated. ABDOMEN: Normal shape. No and Laparoscopic scar(s) present. Normal bowel sounds. No bruits. . soft, nontender, nondistended, no masses or organomegaly. umbilical hernia, small reducible.  Percussion: Normal without hepatosplenomegally. Tenderness: absent. RECTAL: deferred, not clinically indicated  NEUROLOGIC: There are no focalizing motor or sensory deficits. CN II-XII are grossly intact. [facility] EXTREMITIES: no cyanosis, no clubbing and no edema.   LABS:     Recent Labs     10/25/20  1137 10/26/20  0711 10/27/20  0929   WBC 11.8* 8.9  --    HGB 8.1* 7.8*  --    HCT 25.9* 24.2*  --     144  --     140 142   K 4.8 4.9 4.8   * 112* 110   CO2 15* 15* 18*   BUN 67* 62* 66*   CREATININE 5.0* 5.0* 5.8*   CALCIUM 8.7 8.3 9.0   AST 10* 8*  --    ALT 10 7*  --    BILITOT <0.2 <0.2  --    NITRU Negative  --   --    COLORU YELLOW  --   --      Recent Labs     10/26/20  0711   ALKPHOS 75   ALT 7*   AST 8*   BILITOT <0.2   LABALBU 3.5     CBC:   Lab Results   Component Value Date    WBC 8.9 10/26/2020    RBC 2.70 10/26/2020    HGB 7.8 10/26/2020    HCT 24.2 10/26/2020    MCV 89.8 10/26/2020    MCH 28.9 10/26/2020    MCHC 32.2 10/26/2020    RDW 14.4 10/26/2020     10/26/2020    MPV 10.3 10/26/2020     CMP:    Lab Results   Component Value Date     10/27/2020    K 4.8 10/27/2020    K 4.9 10/26/2020     10/27/2020    CO2 18 10/27/2020    BUN 66 10/27/2020    CREATININE 5.8 10/27/2020    GFRAA 12 10/27/2020    GFRAA >60 12/14/2010    AGRATIO 0.9 10/26/2020    LABGLOM 10 10/27/2020    GLUCOSE 152 10/27/2020    PROT 7.2 10/26/2020    LABALBU 3.5 10/26/2020    CALCIUM 9.0 10/27/2020    BILITOT <0.2 10/26/2020    ALKPHOS 75 10/26/2020    AST 8 10/26/2020    ALT 7 10/26/2020     BMP:    Lab Results   Component Value Date     10/27/2020    K 4.8 10/27/2020    K 4.9 10/26/2020     10/27/2020    CO2 18 10/27/2020    BUN 66 10/27/2020    LABALBU 3.5 10/26/2020    CREATININE 5.8 10/27/2020    CALCIUM 9.0 10/27/2020    GFRAA 12 10/27/2020    GFRAA >60 12/14/2010    LABGLOM 10 10/27/2020    GLUCOSE 152 10/27/2020     Urine Culture:  No components found for: TOM  RADIOLOGY:     CT scan abd/pelvis: Films reviewed dilated bladder noted umbilical hernia confirmed see radiology report  Thank you for the interesting evaluation. Further recommendations to follow.       General and Vascular Surgery [phone]  Electronically signed by Kaley King MD on 10/28/2020 at 7:32 AM

## 2020-10-28 NOTE — PROGRESS NOTES
including dialysis. Because of his addiction issue we did not refer him to kidney transplant but now that he has been sober for the last 7 to 8 months this can become an option to. I think he would be an okay candidate for peritoneal dialysis. We will talk to him about home modalities including peritoneal dialysis and home hemodialysis.       Hypertension. Noncompliant. Tells me that he is off cocaine for the last several months. Check drug screen. Increase nifedipine 90 mg twice a day. Add Cardura 4 mg nightly  Taper off clonidine as patient is already noncompliant will not be able to take it 3 times a day and we will run into the problem with reflux hypertension.     Proteinuria quantified. May benefit from ACE inhibitor as well have to watch regarding hyperkalemia.     Significant anemia. Check nutrients. May need Epogen.     Previous history of stroke x2 because of cocaine abuse was hypertensive at that time     Large pericardial effusion. AUDRA previously negative. May need pericardiocentesis.     Smoker counseled. Constipation abdominal pain. Okay to give enema. Full code. Please call with any questions or concerns. [phone]. Flori Chang        Subjective: Interval hx:   Seen in room   still with abd pain and constipated. Tells me may feel better if able to have BM  Afebrile    ml.   creat 5.0--> 5.8. BP still high. CC:  KISHORE ,abd pain, nausea. HPI: The patient is a 60-year-old -American male with a past medical history of hypertension, hyperlipidemia, history of polycystic kidney disease, previous history of stroke x2 was thought to be because of cocaine abuse I saw him more than a year and a half ago at that time creatinine was 3.5 estimated GFR of around 20 at that time he was then lost to follow-up. Showed up to the ER yesterday with abdominal pain right groin pain and flank pain and concern for a kidney stone.   Was found to be constipated did not show any hydronephrosis or stones but does have significant polycystic kidney disease and kidney stones but no hydronephrosis. He tells me that he has been off of cocaine for the last 8 months. Creatinine is up to 5.0    PMH:  Past Medical History:   Diagnosis Date    Cardiomyopathy [facility])     [facility] [address]     CVA (cerebral infarction) 5/2015    Diabetes mellitus ([facility] [address] [facility]     Gastroparesis     Hypercholesteremia     Hypertension     Kidney disease     Unspecified cerebral artery occlusion with cerebral infarction     5/15, 7/15         Objective:     Vitals:   Vitals:    10/28/20 0840 10/28/20 1108 10/28/20 1413 10/28/20 1710   BP:  117/74 135/78 (!) 153/78   Pulse:  78 84 84   Resp:  16 16 16   Temp:  98.4 °F (36.9 °C) 98 °F (36.7 °C) 98.6 °F (37 °C)   TempSrc:  Oral Oral Oral   SpO2: 95% 96% 93% 91%   Weight:       Height:             PE:  Gen appearance: Alert, appears stated age and cooperative   Eyes: Eyelids,conjunctiva and pupils look normal   ENT: External inspection of the ears and nose are within normal limits             Oral mucosa  Is moist   Neuro: Grossly no focal neurological deficits, normal sensation, grossly cranial nerves intact   Neck:  No JVD, no mass, no thyroid enlargement   Cardio: S1 S2 normal, No added sounds   Resp: normal effort, clear to auscultation     GI:  Soft, mildly distended, nontender no rebound tenderness, non-tender, BS +          No palpable kidney, no renal angle tenderness   MS: No swollen or tender joints, no cyanosis, clubbing   DERM: no rashes, thickening   EDEMA: Trace pedal edema.       I/Os:         Intake/Output Summary (Last 24 hours) at 10/28/2020 1713  Last data filed at 10/27/2020 1822  Gross per 24 hour   Intake 300 ml   Output --   Net 300 ml       Meds:     Scheduled Meds:   hydrALAZINE  50 mg Oral 3 times per day    doxazosin  4 mg Oral 2 times per day    polyethylene glycol  17 g Oral BID    bisacodyl  10 mg Rectal Daily    cloNIDine  0.1 mg Oral TID    NIFEdipine  90 mg Oral BID    sodium bicarbonate  1,300 mg Oral 4x Daily    epoetin orbert-epbx  10,000 Units Subcutaneous Once per day on Mon Wed Fri    torsemide  20 mg Oral Daily    methocarbamol  500 mg Oral TID    lidocaine  1 patch Transdermal Daily    atorvastatin  40 mg Oral Daily    gabapentin  300 mg Oral TID    insulin glargine  20 Units Subcutaneous Nightly    isosorbide mononitrate  60 mg Oral Daily    metoprolol tartrate  50 mg Oral BID    sodium chloride flush  10 mL Intravenous 2 times per day    heparin (porcine)  5,000 Units Subcutaneous 3 times per day    insulin lispro  0-6 Units Subcutaneous TID WC    insulin lispro  0-3 Units Subcutaneous Nightly     Continuous Infusions:   dextrose       PRN Meds:sodium chloride flush, acetaminophen **OR** acetaminophen, polyethylene glycol, promethazine **OR** ondansetron, glucose, dextrose, glucagon (rDNA), dextrose, sennosides-docusate sodium    Diet:  DIET CARB CONTROL; Low Sodium (2 GM); Daily Fluid Restriction: 2000 ml    CBC:   Recent Labs     10/26/20  0711   WBC 8.9   HGB 7.8*   HCT 24.2*        BMP:    Recent Labs     10/26/20  0711 10/27/20  0929 10/28/20  0607    142 138   K 4.9 4.8 5.2*   * 110 107   CO2 15* 18* 15*   BUN 62* 66* 69*   CREATININE 5.0* 5.8* 6.9*   GLUCOSE 118* 152* 135*     ABGs: No results found for: PHART, PO2ART, [address] [facility]. [address] [facility]. Off: [phone]  Cell: [phone].  ( until 5 pm)

## 2020-10-28 NOTE — PROGRESS NOTES
[facility]   Daily Progress Note      Admit Date:  10/25/2020    Reason for follow up visit: Pericardial effusion    CC: \"I'm fine. \"    47 y/o male with PMH significant for HTN, LVH, cardiomyopathy and CKD admitted after presenting with mid back pain, N/V and constipation. He has known cardiomyopathy and echo this admit demonstrated pericardial effusion and asked to evaluate. Nephrology following and plan is for peritoneal dialysis catheter tomorrow. Interval History:  Pt. seen and examined; records reviewed  BP stable. Awaiting PD cath placement today  Denies chest pain  SOB improving    Subjective:  Pt with no acute overnight cardiac events. Denies chest pain, SOB, cough, palpitations or dizziness. Review of Systems:   · Constitutional:+ fatigue and weight loss. There's been no change in energy level, sleep pattern, or activity level. No fevers, chills. · Eyes: No visual changes or diplopia. No scleral icterus. · ENT: No Headaches, hearing loss or vertigo. No mouth sores or sore throat. · Cardiovascular: as reviewed in HPI  · Respiratory: No cough or wheezing, no sputum production. No hematemesis. · Gastrointestinal: + abdominal pain, N/V and constipation  · Genitourinary: No dysuria, trouble voiding, or hematuria. · Musculoskeletal:  No gait disturbance, no joint complaints. + back pain  · Integumentary: No rash or pruritis. · Neurological: No headache, diplopia, change in muscle strength, numbness or tingling. · Psychiatric: No anxiety or depression. · Endocrine: No temperature intolerance. No excessive thirst, fluid intake, or urination. No tremor. · Hematologic/Lymphatic: No abnormal bruising or bleeding, blood clots or swollen lymph nodes. · Allergic/Immunologic: No nasal congestion or hives.     Objective:   /65   Pulse 84   Temp 98.4 °F (36.9 °C) (Oral)   Resp 18   Ht 6' 2\" (1.88 m)   Wt 170 lb 13.7 oz (77.5 kg)   SpO2 95%   BMI 21.94 kg/m² Intake/Output Summary (Last 24 hours) at 10/28/2020 1018  Last data filed at 10/27/2020 1822  Gross per 24 hour   Intake 540 ml   Output --   Net 540 ml     Wt Readings from Last 3 Encounters:   10/28/20 170 lb 13.7 oz (77.5 kg)   09/29/20 173 lb 12.8 oz (78.8 kg)   08/20/20 178 lb (80.7 kg)       Physical Exam:  General: In no acute distress. Awake, alert, and oriented X4. Drowsy and opens eyes to verbal stimuli. Skin:  Warm and dry. No new appearing rashes or lesions. Neck:  Supple. No JVD appreciated. Chest: Lungs clear to auscultation. No wheezes/rhonchi/rales  Cardiovascular:  RRR. Normal S1 and S2. No murmur/gallop or rub   Abdomen:  soft, nontender, nondistended, +bowel sounds. No hepatomegaly  Extremities:  No LE edema. No clubbing or cyanosis.  2+ bilateral radial/DP/PT pulses    Medications:    hydrALAZINE  50 mg Oral 3 times per day    doxazosin  4 mg Oral 2 times per day    polyethylene glycol  17 g Oral BID    bisacodyl  10 mg Rectal Daily    cloNIDine  0.1 mg Oral TID    NIFEdipine  90 mg Oral BID    sodium bicarbonate  1,300 mg Oral 4x Daily    epoetin robret-epbx  10,000 Units Subcutaneous Once per day on Mon Wed Fri    torsemide  20 mg Oral Daily    methocarbamol  500 mg Oral TID    lidocaine  1 patch Transdermal Daily    atorvastatin  40 mg Oral Daily    gabapentin  300 mg Oral TID    insulin glargine  20 Units Subcutaneous Nightly    isosorbide mononitrate  60 mg Oral Daily    metoprolol tartrate  50 mg Oral BID    sodium chloride flush  10 mL Intravenous 2 times per day    heparin (porcine)  5,000 Units Subcutaneous 3 times per day    insulin lispro  0-6 Units Subcutaneous TID WC    insulin lispro  0-3 Units Subcutaneous Nightly      dextrose       sodium chloride flush, acetaminophen **OR** acetaminophen, polyethylene glycol, promethazine **OR** ondansetron, glucose, dextrose, glucagon (rDNA), dextrose, sennosides-docusate sodium    Lab Data:  CBC:   Recent Labs 10/25/20  1137 10/26/20  0711   WBC 11.8* 8.9   HGB 8.1* 7.8*    144     BMP:    Recent Labs     10/25/20  1137 10/26/20  0711 10/27/20  0929    140 142   K 4.8 4.9 4.8   CO2 15* 15* 18*   BUN 67* 62* 66*   CREATININE 5.0* 5.0* 5.8*     LIVR:   Recent Labs     10/25/20  1137 10/26/20  0711   AST 10* 8*   ALT 10 7*     10/26/2020 Echo:   Left ventricular cavity size is dilated.   There is moderate concentric left ventricular hypertrophy.   Ejection fraction is visually estimated to be 45-50%.   There is moderate diffuse hypokinesis.   Diastolic filling parameters suggest grade II diastolic dysfunction.   The left atrium is severely dilated.   The right ventricle is normal   Right ventricular systolic function is normal.   mild-Moderate circumferential pericardial effusion without tamponade   physiology.     10/25/2020 CT abdomen: Moderate to large pericardial effusion.         Mild atelectasis in the lingula and left lower lobe.  Pneumonia is considered    less likely.         Abdomen pelvis-         No acute inflammatory abnormality is identified in the abdomen or pelvis.         Mild prominence of stool in the colon.  Correlation for constipation    recommended.         Polycystic kidney disease.         Bilateral nonobstructive nephrolithiasis.  Caliceal stones measure up to 5 mm    on the left.          9/14/2020 Lexiscan Myoview:  Pharmacological Stress/MPI Results:          1. Technically a satisfactory study.     2. Normal pharmacological stress portion of the study.     3. No evidence of Ischemia by Myocardial Perfusion Imaging.     4. Gated Study shows Dilated LV: EF is 51 %.    6/8/2020 Echo:  Overall left ventricular systolic function appears moderately reduced.   Ejection fraction is visually estimated to be 35-40% with diffuse   hypokinesis.  There is moderately increased left ventricular wall thickness.   Diastolic filling parameters suggest grade II diastolic dysfunction.   Normal right ventricular size and function.   The left atrium is mildly dilated.   Mild mitral and tricuspid regurgitation.   Trivial aortic regurgitation.   Moderate sized pericardial effusion.     9/12/2018 Renal angiogram:  ANGIOGRAPHY FINDINGS:  1.  Left renal artery is normal without any significant stenosis. 2.  Right renal artery is normal without any significant stenosis.     SUMMARY:  Normal renal artery without any significant stenosis. Telemetry: Sinus rhythm    Assessment/Plan:    1. Pericardial effusion  -moderate on echo (noted on echo in June 2020)  -on evidence of tamponade on echo  -no intervention at this time     2. Hypertensive cardiomyopathy/Diastolic dysfunction (grade II on echo)  -LVEF 45-50% ( improved from 35-40%)  -goal BP < 130/80  -BP improving  -continue medical management: continue imdur, BB and hydralazine     3. Acute on chronic kidney disease  -nephrology following  -plan is for placement of PD catheter     4. Elevated troponin  -secondary to elevated Cr/CKD  -no evidence of an ACS  -negative Lexiscan-Myoview in Sept. 2020    Stable from cardiac standpoint. Awaiting placement of PD catheter. Will follow more peripherally as an inpatient.      Continue Imdur, metoprolol, procardia, torsemide and hydralazine    Will arrange for outpatient follow up in our office on discharge    Electronically signed by RQOUE Marcelino CNP on 10/28/2020 at 10:18 AM

## 2020-10-28 NOTE — PLAN OF CARE
Problem: Falls - Risk of:  Goal: Will remain free from falls  Description: Will remain free from falls  Outcome: Ongoing     Problem: OXYGENATION/RESPIRATORY FUNCTION  Goal: Patient will achieve/maintain normal respiratory rate/effort  Description: Respiratory rate and effort will be within normal limits for the patient  Outcome: Ongoing     Problem: ACTIVITY INTOLERANCE/IMPAIRED MOBILITY  Goal: Mobility/activity is maintained at optimum level for patient  Outcome: Ongoing     Problem: Pain:  Goal: Control of acute pain  Description: Control of acute pain  Outcome: Ongoing

## 2020-10-28 NOTE — PROGRESS NOTES
Progress Note  Admit Date: 10/25/2020      PCP: ROQUE Ness CNP     CC: F/U for nominal pain nausea vomiting    Days in hospital:  3    SUBJECTIVE / Interval History:  Patient does not have much complaints. Apprehensive about his condition          Allergies  Patient has no known allergies. Medications    Scheduled Meds:   hydrALAZINE  50 mg Oral 3 times per day    doxazosin  4 mg Oral 2 times per day    polyethylene glycol  17 g Oral BID    bisacodyl  10 mg Rectal Daily    cloNIDine  0.1 mg Oral TID    NIFEdipine  90 mg Oral BID    sodium bicarbonate  1,300 mg Oral 4x Daily    epoetin robert-epbx  10,000 Units Subcutaneous Once per day on Mon Wed Fri    torsemide  20 mg Oral Daily    methocarbamol  500 mg Oral TID    lidocaine  1 patch Transdermal Daily    atorvastatin  40 mg Oral Daily    gabapentin  300 mg Oral TID    insulin glargine  20 Units Subcutaneous Nightly    isosorbide mononitrate  60 mg Oral Daily    metoprolol tartrate  50 mg Oral BID    sodium chloride flush  10 mL Intravenous 2 times per day    heparin (porcine)  5,000 Units Subcutaneous 3 times per day    insulin lispro  0-6 Units Subcutaneous TID WC    insulin lispro  0-3 Units Subcutaneous Nightly     Continuous Infusions:   dextrose         PRN Meds:  sodium chloride flush, acetaminophen **OR** acetaminophen, polyethylene glycol, promethazine **OR** ondansetron, glucose, dextrose, glucagon (rDNA), dextrose, sennosides-docusate sodium    Vitals    /65   Pulse 84   Temp 98.4 °F (36.9 °C) (Oral)   Resp 18   Ht 6' 2\" (1.88 m)   Wt 170 lb 13.7 oz (77.5 kg)   SpO2 95%   BMI 21.94 kg/m²     Exam:    Gen: No distress. Eyes: PERRL. No sclera icterus. No conjunctival injection. ENT: No discharge. Pharynx clear. External appearance of ears and nose normal.  Neck: Trachea midline. No obvious mass. Resp: No accessory muscle use. No crackles. No wheezes. No rhonchi. No dullness on percussion.   CV: Regular rate. Regular rhythm. No murmur or rub. No edema. GI: Non-tender. Non-distended. No hernia. Skin: Warm, dry, normal texture and turgor. No nodule on exposed extremities. Lymph: No cervical LAD. No supraclavicular LAD. M/S: No cyanosis. No clubbing. Tenderness present on palpation of right lower lumbar region  Neuro: Moves all four extremities. CN 2-12 tested, no defect noted. Psych: Oriented x 3. No anxiety. Awake. Alert. Intact judgement and insight. Data    LABS  CBC:   Recent Labs     10/25/20  1137 10/26/20  0711   WBC 11.8* 8.9   HGB 8.1* 7.8*   HCT 25.9* 24.2*   MCV 90.4 89.8    144     BMP:   Recent Labs     10/25/20  1137 10/26/20  0711 10/27/20  0929    140 142   K 4.8 4.9 4.8   * 112* 110   CO2 15* 15* 18*   BUN 67* 62* 66*   CREATININE 5.0* 5.0* 5.8*   GLUCOSE 147* 118* 152*     POC GLUCOSE:    Recent Labs     10/27/20  0740 10/27/20  1140 10/27/20  1655 10/27/20  2057 10/28/20  0759   POCGLU 181* 168* 274* 211* 189*     LIVER PROFILE:   Recent Labs     10/25/20  1137 10/26/20  0711   AST 10* 8*   ALT 10 7*   LABALBU 4.3 3.5   BILITOT <0.2 <0.2   ALKPHOS 84 75     PT/INR: No results for input(s): PROTIME, INR in the last 72 hours. APTT: No results for input(s): APTT in the last 72 hours. UA:  Recent Labs     10/25/20  1137 10/27/20  1520   COLORU YELLOW  --    PHUR 5.0 6.0   WBCUA 3  --    RBCUA 4  --    CLARITYU Clear  --    SPECGRAV 1.012  --    LEUKOCYTESUR Negative  --    UROBILINOGEN 0.2  --    BILIRUBINUR Negative  --    BLOODU Negative  --    GLUCOSEU Negative  --    KETUA Negative  --      Microbiology:  Wound Culture: No results for input(s): WNDABS, ORG in the last 72 hours. Invalid input(s):  LABGRAM  Nasal Culture: No results for input(s): ORG, MRSAPCR in the last 72 hours. Blood Culture: No results for input(s): BC, BLOODCULT2 in the last 72 hours. Fungal Culture:   No results for input(s): FUNGSM in the last 72 hours.   No results for input(s): FUNCXBLD in the last 72 hours. CSF Culture:  No results for input(s): COLORCSF, APPEARCSF, CFTUBE, CLOTCSF, WBCCSF, RBCCSF, NEUTCSF, NUMCELLSCSF, LYMPHSCSF, MONOCSF, GLUCCSF, VOLCSF in the last 72 hours. Respiratory Culture:  No results for input(s): Wilhelminia Rhody in the last 72 hours. AFB:No results for input(s): AFBSMEAR in the last 72 hours. Urine Culture  No results for input(s): LABURIN in the last 72 hours. RADIOLOGY:    CT CHEST ABDOMEN PELVIS WO CONTRAST   Final Result   Chest-      Moderate to large pericardial effusion. Mild atelectasis in the lingula and left lower lobe. Pneumonia is considered   less likely. Abdomen pelvis-      No acute inflammatory abnormality is identified in the abdomen or pelvis. Mild prominence of stool in the colon. Correlation for constipation   recommended. Polycystic kidney disease. Bilateral nonobstructive nephrolithiasis. Caliceal stones measure up to 5 mm   on the left. VL PRE OP VEIN MAPPING    (Results Pending)       CONSULTS:    IP CONSULT TO NEPHROLOGY  IP CONSULT TO CARDIOLOGY  IP CONSULT TO VASCULAR SURGERY    ASSESSMENT AND PLAN:      Principal Problem:    Acute renal failure superimposed on chronic kidney disease, on chronic dialysis (HCC)  Active Problems:    Essential hypertension    Type 2 diabetes mellitus with stage 3 chronic kidney disease, with long-term current use of insulin (HCC)    Cardiomyopathy ([facility] [address]    Elevated troponin    History of stroke    Mixed hyperlipidemia    Acute on chronic renal failure (HCC)    Pericardial effusion    Lower abdominal pain  Resolved Problems:    * No resolved hospital problems. *    Patient is a 67-year-old male with a past medical history of CHF, CVA, diabetes, gastroparesis who presented with vague abdominal pain with vomiting and constipation.   In the ER patient was found to have an elevated creatinine and CT scan showed pericardial effusion    Acute on chronic renal failure 3- am labs pending, For PD cath placement tomorrow  -Baseline creatinine is around 3. On admission creatinine 5.   -nephrology consult appreciated   -Hold nephrotoxic medication  -Patient got Toradol in the ER. Hold off any NSAIDs    Pericardial effusion- no active management, ct diuretic  -CT shows moderate to large pericardial effusion,Echo ordered shows mild to moderate effusion with no tamponade-Cardiology consulted    Diabetes mellitus  -Continue Lantus and sliding scale  -Monitor blood sugars     Hypertension- now improving   -Unclear if patient is compliant with his medications  -Monitor blood pressure  -Continue home meds    Elevated troponin  -Most likely secondary to CKD  --Normal recent stress test    Cardiomyopathy  -Last echo on 6/20 showed a EF of 35 to 40% with grade 2 diastolic dysfunction  -Hold  ACE/ARB due to KISHORE  -cardiology following    Hyperlipidemia  -Tinea statin    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  -Continue home meds    Right lower back pain  -Seems more musculoskeletal  -We will add muscle relaxer and Lidoderm patch    Hep c ab +  -Rna pending    DVT Prophylaxis: lovenox  Diet: DIET CARB CONTROL; Low Sodium (2 GM); Daily Fluid Restriction: 2000 ml  Code Status: Full Code    PT/OT Eval Status:ordered    Discharge plan -continue care    The patient and / or the family were informed of the results of any tests, a time was given to answer questions, a plan was proposed and they agreed with plan. Discussed with consulting physicians, nursing and social work     The note was completed using EMR. Every effort was made to ensure accuracy; however, inadvertent computerized transcription errors may be present.        Quique Johnson MD

## 2020-10-29 ENCOUNTER — APPOINTMENT (OUTPATIENT)
Dept: GENERAL RADIOLOGY | Age: 55
DRG: 444 | End: 2020-10-29
Payer: MEDICARE

## 2020-10-29 ENCOUNTER — ANESTHESIA (OUTPATIENT)
Dept: OPERATING ROOM | Age: 55
DRG: 444 | End: 2020-10-29
Payer: MEDICARE

## 2020-10-29 VITALS
SYSTOLIC BLOOD PRESSURE: 127 MMHG | OXYGEN SATURATION: 100 % | DIASTOLIC BLOOD PRESSURE: 76 MMHG | RESPIRATION RATE: 3 BRPM

## 2020-10-29 LAB
ANION GAP SERPL CALCULATED.3IONS-SCNC: 16 MMOL/L (ref 3–16)
BUN BLDV-MCNC: 63 MG/DL (ref 7–20)
CALCIUM SERPL-MCNC: 8.3 MG/DL (ref 8.3–10.6)
CHLORIDE BLD-SCNC: 105 MMOL/L (ref 99–110)
CO2: 17 MMOL/L (ref 21–32)
CREAT SERPL-MCNC: 6.8 MG/DL (ref 0.9–1.3)
GFR AFRICAN AMERICAN: 10
GFR NON-AFRICAN AMERICAN: 8
GLUCOSE BLD-MCNC: 124 MG/DL (ref 70–99)
GLUCOSE BLD-MCNC: 124 MG/DL (ref 70–99)
GLUCOSE BLD-MCNC: 126 MG/DL (ref 70–99)
GLUCOSE BLD-MCNC: 178 MG/DL (ref 70–99)
GLUCOSE BLD-MCNC: 187 MG/DL (ref 70–99)
GLUCOSE BLD-MCNC: 202 MG/DL (ref 70–99)
GLUCOSE BLD-MCNC: 216 MG/DL (ref 70–99)
PERFORMED ON: ABNORMAL
POTASSIUM SERPL-SCNC: 4.8 MMOL/L (ref 3.5–5.1)
SODIUM BLD-SCNC: 138 MMOL/L (ref 136–145)

## 2020-10-29 PROCEDURE — 6370000000 HC RX 637 (ALT 250 FOR IP): Performed by: SURGERY

## 2020-10-29 PROCEDURE — 2500000003 HC RX 250 WO HCPCS: Performed by: NURSE ANESTHETIST, CERTIFIED REGISTERED

## 2020-10-29 PROCEDURE — 2580000003 HC RX 258: Performed by: SURGERY

## 2020-10-29 PROCEDURE — 5A1D70Z PERFORMANCE OF URINARY FILTRATION, INTERMITTENT, LESS THAN 6 HOURS PER DAY: ICD-10-PCS | Performed by: INTERNAL MEDICINE

## 2020-10-29 PROCEDURE — 7100000001 HC PACU RECOVERY - ADDTL 15 MIN: Performed by: SURGERY

## 2020-10-29 PROCEDURE — 49585 REPAIR UMBILICAL HERN,5+Y/O,REDUC: CPT | Performed by: SURGERY

## 2020-10-29 PROCEDURE — 0WHG43Z INSERTION OF INFUSION DEVICE INTO PERITONEAL CAVITY, PERCUTANEOUS ENDOSCOPIC APPROACH: ICD-10-PCS | Performed by: SURGERY

## 2020-10-29 PROCEDURE — 36415 COLL VENOUS BLD VENIPUNCTURE: CPT

## 2020-10-29 PROCEDURE — 3600000004 HC SURGERY LEVEL 4 BASE: Performed by: SURGERY

## 2020-10-29 PROCEDURE — 90935 HEMODIALYSIS ONE EVALUATION: CPT

## 2020-10-29 PROCEDURE — 7100000000 HC PACU RECOVERY - FIRST 15 MIN: Performed by: SURGERY

## 2020-10-29 PROCEDURE — 6370000000 HC RX 637 (ALT 250 FOR IP): Performed by: INTERNAL MEDICINE

## 2020-10-29 PROCEDURE — 6360000002 HC RX W HCPCS: Performed by: SURGERY

## 2020-10-29 PROCEDURE — 94761 N-INVAS EAR/PLS OXIMETRY MLT: CPT

## 2020-10-29 PROCEDURE — 2500000003 HC RX 250 WO HCPCS

## 2020-10-29 PROCEDURE — 2720000010 HC SURG SUPPLY STERILE: Performed by: SURGERY

## 2020-10-29 PROCEDURE — 71045 X-RAY EXAM CHEST 1 VIEW: CPT

## 2020-10-29 PROCEDURE — 6360000002 HC RX W HCPCS: Performed by: NURSE ANESTHETIST, CERTIFIED REGISTERED

## 2020-10-29 PROCEDURE — 1200000000 HC SEMI PRIVATE

## 2020-10-29 PROCEDURE — C1881 DIALYSIS ACCESS SYSTEM: HCPCS | Performed by: SURGERY

## 2020-10-29 PROCEDURE — 2500000003 HC RX 250 WO HCPCS: Performed by: SURGERY

## 2020-10-29 PROCEDURE — 88302 TISSUE EXAM BY PATHOLOGIST: CPT

## 2020-10-29 PROCEDURE — 0WQF4ZZ REPAIR ABDOMINAL WALL, PERCUTANEOUS ENDOSCOPIC APPROACH: ICD-10-PCS | Performed by: SURGERY

## 2020-10-29 PROCEDURE — C1750 CATH, HEMODIALYSIS,LONG-TERM: HCPCS | Performed by: SURGERY

## 2020-10-29 PROCEDURE — 3600000014 HC SURGERY LEVEL 4 ADDTL 15MIN: Performed by: SURGERY

## 2020-10-29 PROCEDURE — 0JH63XZ INSERTION OF TUNNELED VASCULAR ACCESS DEVICE INTO CHEST SUBCUTANEOUS TISSUE AND FASCIA, PERCUTANEOUS APPROACH: ICD-10-PCS | Performed by: SURGERY

## 2020-10-29 PROCEDURE — 2700000000 HC OXYGEN THERAPY PER DAY

## 2020-10-29 PROCEDURE — 2580000003 HC RX 258: Performed by: NURSE ANESTHETIST, CERTIFIED REGISTERED

## 2020-10-29 PROCEDURE — 36558 INSERT TUNNELED CV CATH: CPT | Performed by: SURGERY

## 2020-10-29 PROCEDURE — 3700000000 HC ANESTHESIA ATTENDED CARE: Performed by: SURGERY

## 2020-10-29 PROCEDURE — 6360000002 HC RX W HCPCS: Performed by: ANESTHESIOLOGY

## 2020-10-29 PROCEDURE — 2709999900 HC NON-CHARGEABLE SUPPLY: Performed by: SURGERY

## 2020-10-29 PROCEDURE — 80048 BASIC METABOLIC PNL TOTAL CA: CPT

## 2020-10-29 PROCEDURE — 3700000001 HC ADD 15 MINUTES (ANESTHESIA): Performed by: SURGERY

## 2020-10-29 PROCEDURE — 77001 FLUOROGUIDE FOR VEIN DEVICE: CPT

## 2020-10-29 PROCEDURE — 49324 LAP INSERT TUNNEL IP CATH: CPT | Performed by: SURGERY

## 2020-10-29 RX ORDER — SODIUM CHLORIDE 9 MG/ML
INJECTION, SOLUTION INTRAVENOUS CONTINUOUS
Status: DISCONTINUED | OUTPATIENT
Start: 2020-10-29 | End: 2020-10-29

## 2020-10-29 RX ORDER — HEPARIN SODIUM (PORCINE) LOCK FLUSH IV SOLN 100 UNIT/ML 100 UNIT/ML
SOLUTION INTRAVENOUS
Status: COMPLETED | OUTPATIENT
Start: 2020-10-29 | End: 2020-10-29

## 2020-10-29 RX ORDER — SODIUM CHLORIDE 0.9 % (FLUSH) 0.9 %
10 SYRINGE (ML) INJECTION EVERY 12 HOURS SCHEDULED
Status: DISCONTINUED | OUTPATIENT
Start: 2020-10-29 | End: 2020-10-29 | Stop reason: HOSPADM

## 2020-10-29 RX ORDER — PROMETHAZINE HYDROCHLORIDE 25 MG/ML
6.25 INJECTION, SOLUTION INTRAMUSCULAR; INTRAVENOUS
Status: DISCONTINUED | OUTPATIENT
Start: 2020-10-29 | End: 2020-10-29 | Stop reason: HOSPADM

## 2020-10-29 RX ORDER — HYDROCODONE BITARTRATE AND ACETAMINOPHEN 5; 325 MG/1; MG/1
2 TABLET ORAL PRN
Status: DISCONTINUED | OUTPATIENT
Start: 2020-10-29 | End: 2020-10-29 | Stop reason: HOSPADM

## 2020-10-29 RX ORDER — SODIUM CHLORIDE 0.9 % (FLUSH) 0.9 %
10 SYRINGE (ML) INJECTION PRN
Status: DISCONTINUED | OUTPATIENT
Start: 2020-10-29 | End: 2020-10-29 | Stop reason: HOSPADM

## 2020-10-29 RX ORDER — MORPHINE SULFATE 2 MG/ML
2 INJECTION, SOLUTION INTRAMUSCULAR; INTRAVENOUS EVERY 4 HOURS PRN
Status: DISCONTINUED | OUTPATIENT
Start: 2020-10-29 | End: 2020-10-30

## 2020-10-29 RX ORDER — OXYCODONE HYDROCHLORIDE AND ACETAMINOPHEN 5; 325 MG/1; MG/1
2 TABLET ORAL EVERY 4 HOURS PRN
Status: DISCONTINUED | OUTPATIENT
Start: 2020-10-29 | End: 2020-10-30

## 2020-10-29 RX ORDER — LIDOCAINE HYDROCHLORIDE 20 MG/ML
INJECTION, SOLUTION EPIDURAL; INFILTRATION; INTRACAUDAL; PERINEURAL PRN
Status: DISCONTINUED | OUTPATIENT
Start: 2020-10-29 | End: 2020-10-29 | Stop reason: SDUPTHER

## 2020-10-29 RX ORDER — FENTANYL CITRATE 50 UG/ML
INJECTION, SOLUTION INTRAMUSCULAR; INTRAVENOUS PRN
Status: DISCONTINUED | OUTPATIENT
Start: 2020-10-29 | End: 2020-10-29 | Stop reason: SDUPTHER

## 2020-10-29 RX ORDER — OXYCODONE HYDROCHLORIDE AND ACETAMINOPHEN 5; 325 MG/1; MG/1
2 TABLET ORAL EVERY 4 HOURS PRN
Status: DISCONTINUED | OUTPATIENT
Start: 2020-10-29 | End: 2020-10-29

## 2020-10-29 RX ORDER — DEXAMETHASONE SODIUM PHOSPHATE 4 MG/ML
INJECTION, SOLUTION INTRA-ARTICULAR; INTRALESIONAL; INTRAMUSCULAR; INTRAVENOUS; SOFT TISSUE PRN
Status: DISCONTINUED | OUTPATIENT
Start: 2020-10-29 | End: 2020-10-29 | Stop reason: SDUPTHER

## 2020-10-29 RX ORDER — HYDRALAZINE HYDROCHLORIDE 20 MG/ML
5 INJECTION INTRAMUSCULAR; INTRAVENOUS EVERY 10 MIN PRN
Status: DISCONTINUED | OUTPATIENT
Start: 2020-10-29 | End: 2020-10-29 | Stop reason: HOSPADM

## 2020-10-29 RX ORDER — HYDROCODONE BITARTRATE AND ACETAMINOPHEN 5; 325 MG/1; MG/1
1 TABLET ORAL PRN
Status: DISCONTINUED | OUTPATIENT
Start: 2020-10-29 | End: 2020-10-29 | Stop reason: HOSPADM

## 2020-10-29 RX ORDER — MIDAZOLAM HYDROCHLORIDE 1 MG/ML
INJECTION INTRAMUSCULAR; INTRAVENOUS PRN
Status: DISCONTINUED | OUTPATIENT
Start: 2020-10-29 | End: 2020-10-29 | Stop reason: SDUPTHER

## 2020-10-29 RX ORDER — SODIUM CHLORIDE 9 MG/ML
INJECTION, SOLUTION INTRAVENOUS CONTINUOUS PRN
Status: DISCONTINUED | OUTPATIENT
Start: 2020-10-29 | End: 2020-10-29 | Stop reason: SDUPTHER

## 2020-10-29 RX ORDER — MAGNESIUM HYDROXIDE 1200 MG/15ML
LIQUID ORAL CONTINUOUS PRN
Status: COMPLETED | OUTPATIENT
Start: 2020-10-29 | End: 2020-10-29

## 2020-10-29 RX ORDER — FENTANYL CITRATE 50 UG/ML
50 INJECTION, SOLUTION INTRAMUSCULAR; INTRAVENOUS EVERY 5 MIN PRN
Status: DISCONTINUED | OUTPATIENT
Start: 2020-10-29 | End: 2020-10-29 | Stop reason: HOSPADM

## 2020-10-29 RX ORDER — MEPERIDINE HYDROCHLORIDE 25 MG/ML
12.5 INJECTION INTRAMUSCULAR; INTRAVENOUS; SUBCUTANEOUS EVERY 5 MIN PRN
Status: DISCONTINUED | OUTPATIENT
Start: 2020-10-29 | End: 2020-10-29 | Stop reason: HOSPADM

## 2020-10-29 RX ORDER — PROPOFOL 10 MG/ML
INJECTION, EMULSION INTRAVENOUS PRN
Status: DISCONTINUED | OUTPATIENT
Start: 2020-10-29 | End: 2020-10-29 | Stop reason: SDUPTHER

## 2020-10-29 RX ORDER — FENTANYL CITRATE 50 UG/ML
25 INJECTION, SOLUTION INTRAMUSCULAR; INTRAVENOUS EVERY 5 MIN PRN
Status: DISCONTINUED | OUTPATIENT
Start: 2020-10-29 | End: 2020-10-29 | Stop reason: HOSPADM

## 2020-10-29 RX ORDER — ONDANSETRON 2 MG/ML
INJECTION INTRAMUSCULAR; INTRAVENOUS PRN
Status: DISCONTINUED | OUTPATIENT
Start: 2020-10-29 | End: 2020-10-29 | Stop reason: SDUPTHER

## 2020-10-29 RX ORDER — BUPIVACAINE HYDROCHLORIDE 5 MG/ML
INJECTION, SOLUTION EPIDURAL; INTRACAUDAL
Status: COMPLETED | OUTPATIENT
Start: 2020-10-29 | End: 2020-10-29

## 2020-10-29 RX ORDER — ROCURONIUM BROMIDE 10 MG/ML
INJECTION, SOLUTION INTRAVENOUS PRN
Status: DISCONTINUED | OUTPATIENT
Start: 2020-10-29 | End: 2020-10-29 | Stop reason: SDUPTHER

## 2020-10-29 RX ORDER — OXYCODONE HYDROCHLORIDE AND ACETAMINOPHEN 5; 325 MG/1; MG/1
1 TABLET ORAL EVERY 4 HOURS PRN
Status: DISCONTINUED | OUTPATIENT
Start: 2020-10-29 | End: 2020-11-03 | Stop reason: HOSPADM

## 2020-10-29 RX ORDER — ONDANSETRON 2 MG/ML
4 INJECTION INTRAMUSCULAR; INTRAVENOUS
Status: DISCONTINUED | OUTPATIENT
Start: 2020-10-29 | End: 2020-10-29 | Stop reason: HOSPADM

## 2020-10-29 RX ADMIN — MORPHINE SULFATE 2 MG: 2 INJECTION, SOLUTION INTRAMUSCULAR; INTRAVENOUS at 11:07

## 2020-10-29 RX ADMIN — NIFEDIPINE 90 MG: 90 TABLET, FILM COATED, EXTENDED RELEASE ORAL at 21:54

## 2020-10-29 RX ADMIN — SUGAMMADEX 150 MG: 100 INJECTION, SOLUTION INTRAVENOUS at 08:56

## 2020-10-29 RX ADMIN — METOPROLOL TARTRATE 50 MG: 50 TABLET, FILM COATED ORAL at 21:54

## 2020-10-29 RX ADMIN — SODIUM CHLORIDE: 9 INJECTION, SOLUTION INTRAVENOUS at 06:59

## 2020-10-29 RX ADMIN — ISOSORBIDE MONONITRATE 60 MG: 60 TABLET, EXTENDED RELEASE ORAL at 16:37

## 2020-10-29 RX ADMIN — HEPARIN SODIUM 5000 UNITS: 5000 INJECTION INTRAVENOUS; SUBCUTANEOUS at 21:54

## 2020-10-29 RX ADMIN — GABAPENTIN 300 MG: 300 CAPSULE ORAL at 21:54

## 2020-10-29 RX ADMIN — POLYETHYLENE GLYCOL 3350 17 G: 17 POWDER, FOR SOLUTION ORAL at 21:55

## 2020-10-29 RX ADMIN — CLONIDINE HYDROCHLORIDE 0.1 MG: 0.1 TABLET ORAL at 21:54

## 2020-10-29 RX ADMIN — INSULIN LISPRO 2 UNITS: 100 INJECTION, SOLUTION INTRAVENOUS; SUBCUTANEOUS at 18:09

## 2020-10-29 RX ADMIN — FENTANYL CITRATE 50 MCG: 50 INJECTION, SOLUTION INTRAMUSCULAR; INTRAVENOUS at 09:30

## 2020-10-29 RX ADMIN — ONDANSETRON 4 MG: 2 INJECTION INTRAMUSCULAR; INTRAVENOUS at 08:49

## 2020-10-29 RX ADMIN — FENTANYL CITRATE 100 MCG: 50 INJECTION INTRAMUSCULAR; INTRAVENOUS at 07:29

## 2020-10-29 RX ADMIN — FENTANYL CITRATE 50 MCG: 50 INJECTION, SOLUTION INTRAMUSCULAR; INTRAVENOUS at 09:40

## 2020-10-29 RX ADMIN — EPOETIN ALFA-EPBX 10000 UNITS: 10000 INJECTION, SOLUTION INTRAVENOUS; SUBCUTANEOUS at 15:00

## 2020-10-29 RX ADMIN — INSULIN LISPRO 1 UNITS: 100 INJECTION, SOLUTION INTRAVENOUS; SUBCUTANEOUS at 12:14

## 2020-10-29 RX ADMIN — PROPOFOL 160 MG: 10 INJECTION, EMULSION INTRAVENOUS at 07:33

## 2020-10-29 RX ADMIN — ATORVASTATIN CALCIUM 40 MG: 40 TABLET, FILM COATED ORAL at 16:37

## 2020-10-29 RX ADMIN — ROCURONIUM BROMIDE 40 MG: 10 INJECTION INTRAVENOUS at 07:33

## 2020-10-29 RX ADMIN — SODIUM ZIRCONIUM CYCLOSILICATE 10 G: 5 POWDER, FOR SUSPENSION ORAL at 16:38

## 2020-10-29 RX ADMIN — MEPERIDINE HYDROCHLORIDE 12.5 MG: 25 INJECTION INTRAMUSCULAR; INTRAVENOUS; SUBCUTANEOUS at 09:27

## 2020-10-29 RX ADMIN — CEFAZOLIN 2 G: 10 INJECTION, POWDER, FOR SOLUTION INTRAVENOUS at 07:28

## 2020-10-29 RX ADMIN — LIDOCAINE HYDROCHLORIDE 100 MG: 20 INJECTION, SOLUTION EPIDURAL; INFILTRATION; INTRACAUDAL; PERINEURAL at 07:33

## 2020-10-29 RX ADMIN — METHOCARBAMOL TABLETS 500 MG: 500 TABLET, COATED ORAL at 16:37

## 2020-10-29 RX ADMIN — INSULIN GLARGINE 20 UNITS: 100 INJECTION, SOLUTION SUBCUTANEOUS at 21:56

## 2020-10-29 RX ADMIN — HYDRALAZINE HYDROCHLORIDE 50 MG: 50 TABLET, FILM COATED ORAL at 21:54

## 2020-10-29 RX ADMIN — MIDAZOLAM 2 MG: 1 INJECTION INTRAMUSCULAR; INTRAVENOUS at 07:25

## 2020-10-29 RX ADMIN — ACETAMINOPHEN 650 MG: 325 TABLET ORAL at 00:15

## 2020-10-29 RX ADMIN — OXYCODONE HYDROCHLORIDE AND ACETAMINOPHEN 2 TABLET: 5; 325 TABLET ORAL at 14:44

## 2020-10-29 RX ADMIN — CLONIDINE HYDROCHLORIDE 0.1 MG: 0.1 TABLET ORAL at 16:37

## 2020-10-29 RX ADMIN — METHOCARBAMOL TABLETS 500 MG: 500 TABLET, COATED ORAL at 21:54

## 2020-10-29 RX ADMIN — HYDRALAZINE HYDROCHLORIDE 50 MG: 50 TABLET, FILM COATED ORAL at 16:37

## 2020-10-29 RX ADMIN — OXYCODONE HYDROCHLORIDE AND ACETAMINOPHEN 2 TABLET: 5; 325 TABLET ORAL at 21:55

## 2020-10-29 RX ADMIN — SODIUM CHLORIDE, PRESERVATIVE FREE 10 ML: 5 INJECTION INTRAVENOUS at 21:55

## 2020-10-29 RX ADMIN — GABAPENTIN 300 MG: 300 CAPSULE ORAL at 16:37

## 2020-10-29 RX ADMIN — DEXAMETHASONE SODIUM PHOSPHATE 8 MG: 4 INJECTION, SOLUTION INTRAMUSCULAR; INTRAVENOUS at 07:41

## 2020-10-29 RX ADMIN — INSULIN LISPRO 1 UNITS: 100 INJECTION, SOLUTION INTRAVENOUS; SUBCUTANEOUS at 21:55

## 2020-10-29 ASSESSMENT — PAIN DESCRIPTION - DESCRIPTORS
DESCRIPTORS: ACHING;CONSTANT;DISCOMFORT
DESCRIPTORS: ACHING;BURNING

## 2020-10-29 ASSESSMENT — PULMONARY FUNCTION TESTS
PIF_VALUE: 17
PIF_VALUE: 18
PIF_VALUE: 18
PIF_VALUE: 17
PIF_VALUE: 1
PIF_VALUE: 17
PIF_VALUE: 26
PIF_VALUE: 23
PIF_VALUE: 25
PIF_VALUE: 18
PIF_VALUE: 18
PIF_VALUE: 17
PIF_VALUE: 20
PIF_VALUE: 17
PIF_VALUE: 17
PIF_VALUE: 0
PIF_VALUE: 17
PIF_VALUE: 27
PIF_VALUE: 6
PIF_VALUE: 17
PIF_VALUE: 20
PIF_VALUE: 17
PIF_VALUE: 27
PIF_VALUE: 17
PIF_VALUE: 17
PIF_VALUE: 0
PIF_VALUE: 21
PIF_VALUE: 17
PIF_VALUE: 18
PIF_VALUE: 17
PIF_VALUE: 17
PIF_VALUE: 18
PIF_VALUE: 1
PIF_VALUE: 20
PIF_VALUE: 1
PIF_VALUE: 18
PIF_VALUE: 18
PIF_VALUE: 17
PIF_VALUE: 2
PIF_VALUE: 18
PIF_VALUE: 26
PIF_VALUE: 17
PIF_VALUE: 17
PIF_VALUE: 18
PIF_VALUE: 25
PIF_VALUE: 17
PIF_VALUE: 17
PIF_VALUE: 20
PIF_VALUE: 14
PIF_VALUE: 18
PIF_VALUE: 0
PIF_VALUE: 18
PIF_VALUE: 25
PIF_VALUE: 0
PIF_VALUE: 17
PIF_VALUE: 18
PIF_VALUE: 17
PIF_VALUE: 17
PIF_VALUE: 25
PIF_VALUE: 17
PIF_VALUE: 0
PIF_VALUE: 24
PIF_VALUE: 17
PIF_VALUE: 17
PIF_VALUE: 18
PIF_VALUE: 17
PIF_VALUE: 25
PIF_VALUE: 18
PIF_VALUE: 17
PIF_VALUE: 19
PIF_VALUE: 5
PIF_VALUE: 26
PIF_VALUE: 18
PIF_VALUE: 17
PIF_VALUE: 18
PIF_VALUE: 17
PIF_VALUE: 18
PIF_VALUE: 7
PIF_VALUE: 25
PIF_VALUE: 0
PIF_VALUE: 17
PIF_VALUE: 18
PIF_VALUE: 19
PIF_VALUE: 17
PIF_VALUE: 3
PIF_VALUE: 1
PIF_VALUE: 17

## 2020-10-29 ASSESSMENT — LIFESTYLE VARIABLES: SMOKING_STATUS: 1

## 2020-10-29 ASSESSMENT — PAIN DESCRIPTION - PROGRESSION
CLINICAL_PROGRESSION: GRADUALLY IMPROVING
CLINICAL_PROGRESSION: NOT CHANGED

## 2020-10-29 ASSESSMENT — PAIN SCALES - GENERAL
PAINLEVEL_OUTOF10: 8
PAINLEVEL_OUTOF10: 0
PAINLEVEL_OUTOF10: 7
PAINLEVEL_OUTOF10: 0
PAINLEVEL_OUTOF10: 0
PAINLEVEL_OUTOF10: 8
PAINLEVEL_OUTOF10: 8
PAINLEVEL_OUTOF10: 0
PAINLEVEL_OUTOF10: 7
PAINLEVEL_OUTOF10: 8
PAINLEVEL_OUTOF10: 8
PAINLEVEL_OUTOF10: 7
PAINLEVEL_OUTOF10: 0
PAINLEVEL_OUTOF10: 5

## 2020-10-29 ASSESSMENT — PAIN DESCRIPTION - PAIN TYPE
TYPE: SURGICAL PAIN

## 2020-10-29 ASSESSMENT — PAIN DESCRIPTION - LOCATION
LOCATION: NECK
LOCATION: ABDOMEN

## 2020-10-29 ASSESSMENT — PAIN DESCRIPTION - ORIENTATION
ORIENTATION: MID

## 2020-10-29 ASSESSMENT — PAIN DESCRIPTION - ONSET
ONSET: ON-GOING
ONSET: GRADUAL
ONSET: ON-GOING
ONSET: ON-GOING

## 2020-10-29 ASSESSMENT — PAIN DESCRIPTION - FREQUENCY
FREQUENCY: CONTINUOUS

## 2020-10-29 ASSESSMENT — PAIN - FUNCTIONAL ASSESSMENT: PAIN_FUNCTIONAL_ASSESSMENT: PREVENTS OR INTERFERES SOME ACTIVE ACTIVITIES AND ADLS

## 2020-10-29 NOTE — PROGRESS NOTES
Patient Name: Keven uDarte                                                    Primary Physician: Rosaura Guthrie MD  Admitting Dx: [facility]                 Inpatient Progress Note                                         Plan for today:     Seen in PACU got PD and HD catheter without incidence. In some pain, feeling cold. BMP pending. Will arrange for HD today and tomorrow. HD unit notified. Hopefully can be discharged either Friday or Saturday. Hep C ab positive, C3 low 88. Check cryo. Check Hep C PCR. Pt does not know if he had it and for how long. He has good support system at home. Wife does not work. Will have office place close to [address] [place] [address]. Assessment / Plan:       KISHORE / CKD. Baseline 3.6 (10/2019)  Has polycystic kidney disease. Likely diastolic progression with uncontrolled hypertension use of cocaine among other things.     UA bland except for significant proteinuria. To be quantified. No peripheral eosinophilia. CT scan with PKD. Some indeterminate cysts. Bladder scan. R/o obstructive uropathy. Serologies: AUDRA, ANCA, C3, C4, previously negative  Hepatitis panel,   SPEP negative 2018. Avoid nephrotoxins. No NSAIDS. No IV dyes. Avoid hypotension, keep SBP > 110 or above. Strict i/o charting. Daily weights. No acute indication of HD at this time.     I did discuss with him that is a kidney function getting advanced and he will think about renal replacement therapy including dialysis. Because of his addiction issue we did not refer him to kidney transplant but now that he has been sober for the last 7 to 8 months this can become an option to. I think he would be an okay candidate for peritoneal dialysis. We will talk to him about home modalities including peritoneal dialysis and home hemodialysis.       Hypertension. Noncompliant. Tells me that he is off cocaine for the last several months.   Check drug screen. Increase nifedipine 90 mg twice a day. Add Cardura 4 mg nightly  Taper off clonidine as patient is already noncompliant will not be able to take it 3 times a day and we will run into the problem with reflux hypertension.     Proteinuria quantified. May benefit from ACE inhibitor as well have to watch regarding hyperkalemia.     Significant anemia. Check nutrients. May need Epogen.     Previous history of stroke x2 because of cocaine abuse was hypertensive at that time     Large pericardial effusion. AUDRA previously negative. May need pericardiocentesis.     Smoker counseled. Constipation abdominal pain. Okay to give enema. Full code. Please call with any questions or concerns. [phone]. Flori Chang        Subjective: Interval hx:   Seen in PACU  UOP 1025 ml.   creat 5.0--> 5.8--> 6.9.   BP still high. CC:  KISHORE ,abd pain, nausea. HPI: The patient is a 64-year-old -American male with a past medical history of hypertension, hyperlipidemia, history of polycystic kidney disease, previous history of stroke x2 was thought to be because of cocaine abuse I saw him more than a year and a half ago at that time creatinine was 3.5 estimated GFR of around 20 at that time he was then lost to follow-up. Showed up to the ER yesterday with abdominal pain right groin pain and flank pain and concern for a kidney stone. Was found to be constipated did not show any hydronephrosis or stones but does have significant polycystic kidney disease and kidney stones but no hydronephrosis. He tells me that he has been off of cocaine for the last 8 months.   Creatinine is up to 5.0    PMH:  Past Medical History:   Diagnosis Date    Cardiomyopathy [facility])     [facility] [address]     CVA (cerebral infarction) 5/2015    Diabetes mellitus ([facility] [address]     Gastroparesis     Hypercholesteremia     Hypertension     Kidney disease     Unspecified cerebral artery occlusion with cerebral mononitrate  60 mg Oral Daily    metoprolol tartrate  50 mg Oral BID    sodium chloride flush  10 mL Intravenous 2 times per day    heparin (porcine)  5,000 Units Subcutaneous 3 times per day    insulin lispro  0-6 Units Subcutaneous TID WC    insulin lispro  0-3 Units Subcutaneous Nightly     Continuous Infusions:   sodium chloride      sodium bicarbonate infusion Stopped (10/29/20 0625)    dextrose       PRN Meds:sodium chloride flush, fentanNYL, fentanNYL, HYDROcodone 5 mg - acetaminophen **OR** HYDROcodone 5 mg - acetaminophen, ondansetron, promethazine, hydrALAZINE, meperidine, sodium chloride flush, acetaminophen **OR** acetaminophen, polyethylene glycol, promethazine **OR** ondansetron, glucose, dextrose, glucagon (rDNA), dextrose, sennosides-docusate sodium    Diet:  DIET RENAL; Low Sodium (2 GM); Daily Fluid Restriction: 2000 ml    CBC:   No results for input(s): WBC, HGB, HCT, PLT in the last 72 hours. BMP:    Recent Labs     10/27/20  0929 10/28/20  0607    138   K 4.8 5.2*    107   CO2 18* 15*   BUN 66* 69*   CREATININE 5.8* 6.9*   GLUCOSE 152* 135*     ABGs: No results found for: PHART, ART, [address] [facility]. Off: [phone]  Cell: [phone].  ( until 5 pm)

## 2020-10-29 NOTE — BRIEF OP NOTE
Brief Postoperative Note      Patient: Mark Garza  YOB: 1965  MRN: [id number]    Date of Procedure: 10/29/2020    Pre-Op Diagnosis: ACUTE RENAL FAILURE SUPERIMPOSED ON CHRONIC KIDNEY DISEASE, NOT ON CHRONIC DIALYSIS    Post-Op Diagnosis: Same       Procedure(s):  LAPAROSCOPIC PERITONEAL DIALYSIS CATHETER PLACEMENT  UMBILICAL HERNIA REPAIR  PLACEMENT OF A TUNNELED DIALYSIS CATHETER c US and flouroscopy    Surgeon(s):  Madeleine Hargrove MD    Assistant:  Surgical Assistant: María Maradiaga    Anesthesia: General    Estimated Blood Loss (mL): Minimal    Complications: None    Specimens:   ID Type Source Tests Collected by Time Destination   A : A  umbilical hernia contents Tissue Tissue SURGICAL PATHOLOGY Madeleine Hargrove MD 10/29/2020 4915        Implants:  * No implants in log *      Drains: * No LDAs found *    Findings: one adhesion taken down    Electronically signed by Madeleine Hargrove MD on 10/29/2020 at 8:56 AM

## 2020-10-29 NOTE — PLAN OF CARE
Problem: Falls - Risk of:  Goal: Will remain free from falls  Description: Will remain free from falls  Outcome: Ongoing  Goal: Absence of physical injury  Description: Absence of physical injury  Outcome: Ongoing     Problem: OXYGENATION/RESPIRATORY FUNCTION  Goal: Patient will maintain patent airway  Outcome: Ongoing  Goal: Patient will achieve/maintain normal respiratory rate/effort  Description: Respiratory rate and effort will be within normal limits for the patient  Outcome: Ongoing     Problem: HEMODYNAMIC STATUS  Goal: Patient has stable vital signs and fluid balance  Outcome: Ongoing     Problem: FLUID AND ELECTROLYTE IMBALANCE  Goal: Fluid and electrolyte balance are achieved/maintained  Outcome: Ongoing     Problem: ACTIVITY INTOLERANCE/IMPAIRED MOBILITY  Goal: Mobility/activity is maintained at optimum level for patient  Outcome: Ongoing     Problem: Pain:  Goal: Pain level will decrease  Description: Pain level will decrease  Outcome: Ongoing  Goal: Control of acute pain  Description: Control of acute pain  Outcome: Ongoing  Goal: Control of chronic pain  Description: Control of chronic pain  Outcome: Ongoing

## 2020-10-29 NOTE — PLAN OF CARE
Problem: Falls - Risk of:  Goal: Will remain free from falls  Description: Will remain free from falls  10/29/2020 1132 by Corwin Schultz RN  Outcome: Ongoing  Note: Pt free from falls this shift. Fall precautions in place at all times. Call light always within reach. Pt able and agreeable to contact for safety appropriately. Problem: OXYGENATION/RESPIRATORY FUNCTION  Goal: Patient will maintain patent airway  10/29/2020 1132 by Corwin Schultz RN  Outcome: Ongoing  Note: Pt's o2 sats will be checked every 4 hours and as needed. Pt will be provided with supplemental oxygen as needed. Problem: Pain:  Goal: Control of acute pain  Description: Control of acute pain  10/29/2020 1132 by Corwin Schultz RN  Outcome: Ongoing  Note: Pain/discomfort being managed with PRN analgesics per MD orders. Pt able to express presence and absence of pain and rate pain appropriately using numerical scale.

## 2020-10-29 NOTE — ANESTHESIA POSTPROCEDURE EVALUATION
Department of Anesthesiology  Postprocedure Note    Patient: Ad Leggett  MRN: [id number]  YOB: 1965  Date of evaluation: 10/29/2020  Time:  12:03 PM     Procedure Summary     Date:  10/29/20 Room / Location:  [address]    Anesthesia Start:  2656 Anesthesia Stop:  0918    Procedures:       LAPAROSCOPIC PERITONEAL DIALYSIS CATHETER PLACEMENT WITH FLEURO AND ULTRASOUND (N/A Abdomen)      UMBILICAL HERNIA REPAIR (N/A Abdomen)      PLACEMENT OF A TUNNELED DIALYSIS CATHETER WITH FLEURO AND ULTRASOUND (N/A Chest) Diagnosis:       Acute renal failure superimposed on stage 4 chronic kidney disease, unspecified acute renal failure type ([facility] [address]      Chronic kidney disease, unspecified CKD stage      Encounter for dialysis ([facility] [address]      (ACUTE RENAL FAILURE SUPERIMPOSED ON CHRONIC KIDNEY DISEASE, ON CHRONIC DIALYSIS)    Surgeon:  Cici Sanchez MD Responsible Provider:  Jese Bee MD    Anesthesia Type:  general ASA Status:  3          Anesthesia Type: general    Kasey Phase I: Kasey Score: 8    Kasey Phase II:      Last vitals: Reviewed and per EMR flowsheets.        Anesthesia Post Evaluation    Patient location during evaluation: PACU  Patient participation: complete - patient participated  Level of consciousness: awake and alert  Pain score: 3  Airway patency: patent  Nausea & Vomiting: no nausea and no vomiting  Complications: no  Cardiovascular status: blood pressure returned to baseline  Respiratory status: acceptable  Hydration status: euvolemic

## 2020-10-29 NOTE — FLOWSHEET NOTE
10/29/20 1307 10/29/20 1509   Treatment   Time On 1307  --    Time Off  --  1509   Treatment Goal 1000  --    Observations & Evaluations   Level of Consciousness 0 0   Oriented X 3 3   Vital Signs   BP (!) 172/87 (!) 191/92   Temp 99.2 °F (37.3 °C) 99.4 °F (37.4 °C)   Pulse 88 89   Resp 20 20   Weight 179 lb 10.8 oz (81.5 kg) 178 lb 9.2 oz (81 kg)   Weight Method Bed scale Bed scale   Percent Weight Change 3.3 -0.61   Dry Weight 170 lb 13.7 oz (77.5 kg)  --    Pain Assessment   Pain Assessment 0-10 0-10   Pain Level 0 0   Technical Checks   RO Machine Log Sheet Completed Yes  --    Machine Alarm Self Test Completed  --    Machine Autotest Completed  --    Air Foam Detector Tested  --    Extracorporeal Circuit Tested for Integrity Yes  --    Treatment Initiation   Dialyze Hours 2  --    Treatment  Initiation Universal Precautions maintained;Lines secured to patient; Connections secured;Prime given;Venous Parameters set; Arterial Parameters set; Air foam detector engaged; Hemosafe Device; Saline line double clamped; Hemo-Safe Applied;Dialyzer;F160  --    During Hemodialysis Assessment   Blood Flow Rate (ml/min) 250 ml/min  --    Ultrafiltration Rate (ml/hr) 700 ml/hr  --    Arterial Pressure (mmHg) -70 mmHg  --    Venous Pressure (mmHg) 70  --    TMP 70  --      --    Access Visible Yes  --    Dialysis Bath   K+ (Potassium) 3  --    Ca+ (Calcium) 2.5  --    Na+ (Sodium) 138  --    HCO3 (Bicarb) 32  --    Post-Hemodialysis Assessment   Post-Treatment Procedures  --  Blood returned;Catheter capped, clamped and heparinized x 2 ports   Machine Disinfection Process  --  Acid/Vinegar Clean;Heat Disinfect; Exterior Machine Disinfection   Rinseback Volume (ml)  --  400 ml   Total Liters Processed (l/min)  --  29.4 l/min   Dialyzer Clearance  --  Lightly streaked   Duration of Treatment (minutes)  --  120 minutes   Hemodialysis Intake (ml)  --  400 ml   Hemodialysis Output (ml)  --  1396 ml   NET Removed (ml)  --  996 ml Tolerated Treatment  --  Good

## 2020-10-29 NOTE — PROGRESS NOTES
Progress Note  Admit Date: 10/25/2020      PCP: ROQUE Morris CNP     CC: F/U for nominal pain nausea vomiting    Days in hospital:  4    SUBJECTIVE / Interval History:  Patient feels okay.   S/p lap PD cath placement  Some bleeding from the Vas-Cath site          Allergies  Doxazosin    Medications    Scheduled Meds:   sodium chloride flush  10 mL Intravenous 2 times per day    sodium zirconium cyclosilicate  10 g Oral Daily    hydrALAZINE  50 mg Oral 3 times per day    doxazosin  4 mg Oral 2 times per day    polyethylene glycol  17 g Oral BID    cloNIDine  0.1 mg Oral TID    NIFEdipine  90 mg Oral BID    epoetin robert-epbx  10,000 Units Subcutaneous Once per day on Mon Wed Fri    torsemide  20 mg Oral Daily    methocarbamol  500 mg Oral TID    lidocaine  1 patch Transdermal Daily    atorvastatin  40 mg Oral Daily    gabapentin  300 mg Oral TID    insulin glargine  20 Units Subcutaneous Nightly    isosorbide mononitrate  60 mg Oral Daily    metoprolol tartrate  50 mg Oral BID    sodium chloride flush  10 mL Intravenous 2 times per day    heparin (porcine)  5,000 Units Subcutaneous 3 times per day    insulin lispro  0-6 Units Subcutaneous TID WC    insulin lispro  0-3 Units Subcutaneous Nightly     Continuous Infusions:   sodium chloride      sodium chloride      sodium bicarbonate infusion Stopped (10/29/20 0625)    dextrose         PRN Meds:  sodium chloride flush, fentanNYL, fentanNYL, HYDROcodone 5 mg - acetaminophen **OR** HYDROcodone 5 mg - acetaminophen, ondansetron, promethazine, hydrALAZINE, meperidine, sodium chloride, sodium chloride flush, acetaminophen **OR** acetaminophen, polyethylene glycol, promethazine **OR** ondansetron, glucose, dextrose, glucagon (rDNA), dextrose, sennosides-docusate sodium    Vitals    BP (!) 177/88   Pulse 86   Temp 99.5 °F (37.5 °C) (Oral)   Resp 16   Ht 6' 2\" (1.88 m)   Wt 173 lb 15.1 oz (78.9 kg)   SpO2 99%   BMI 22.33 kg/m² Exam:    Gen: No distress. Eyes: PERRL. No sclera icterus. No conjunctival injection. ENT: No discharge. Pharynx clear. External appearance of ears and nose normal.  Neck: Trachea midline. No obvious mass. Resp: No accessory muscle use. No crackles. No wheezes. No rhonchi. No dullness on percussion. CV: Regular rate. Regular rhythm. No murmur or rub. No edema. GI: Non-tender. Non-distended. No hernia. Skin: Warm, dry, normal texture and turgor. No nodule on exposed extremities. Lymph: No cervical LAD. No supraclavicular LAD. M/S: No cyanosis. No clubbing. Tenderness present on palpation of right lower lumbar region  Neuro: Moves all four extremities. CN 2-12 tested, no defect noted. Psych: Oriented x 3. No anxiety. Awake. Alert. Intact judgement and insight. Data    LABS  CBC:   No results for input(s): WBC, HGB, HCT, MCV, PLT in the last 72 hours. BMP:   Recent Labs     10/27/20  0929 10/28/20  0607    138   K 4.8 5.2*    107   CO2 18* 15*   BUN 66* 69*   CREATININE 5.8* 6.9*   GLUCOSE 152* 135*     POC GLUCOSE:    Recent Labs     10/28/20  0759 10/28/20  1223 10/28/20  1704 10/28/20  2007 10/29/20  0657   POCGLU 189* 117* 194* 135* 126*     LIVER PROFILE:   No results for input(s): AST, ALT, LIPASE, AMYLASE, LABALBU, BILIDIR, BILITOT, ALKPHOS in the last 72 hours. PT/INR: No results for input(s): PROTIME, INR in the last 72 hours. APTT: No results for input(s): APTT in the last 72 hours. UA:  Recent Labs     10/27/20  1520   PHUR 6.0     Microbiology:  Wound Culture: No results for input(s): WNDABS, ORG in the last 72 hours. Invalid input(s):  LABGRAM  Nasal Culture: No results for input(s): ORG, MRSAPCR in the last 72 hours. Blood Culture: No results for input(s): BC, BLOODCULT2 in the last 72 hours. Fungal Culture:   No results for input(s): FUNGSM in the last 72 hours. No results for input(s): FUNCXBLD in the last 72 hours.   CSF Culture:  No results for input(s): COLORCSF, APPEARCSF, CFTUBE, CLOTCSF, WBCCSF, RBCCSF, NEUTCSF, NUMCELLSCSF, LYMPHSCSF, MONOCSF, GLUCCSF, VOLCSF in the last 72 hours. Respiratory Culture:  No results for input(s): Rosalia Janes in the last 72 hours. AFB:No results for input(s): AFBSMEAR in the last 72 hours. Urine Culture  No results for input(s): LABURIN in the last 72 hours. RADIOLOGY:    FL VASCULAR ACCESS GUIDANCE   Final Result      CT CHEST ABDOMEN PELVIS WO CONTRAST   Final Result   Chest-      Moderate to large pericardial effusion. Mild atelectasis in the lingula and left lower lobe. Pneumonia is considered   less likely. Abdomen pelvis-      No acute inflammatory abnormality is identified in the abdomen or pelvis. Mild prominence of stool in the colon. Correlation for constipation   recommended. Polycystic kidney disease. Bilateral nonobstructive nephrolithiasis. Caliceal stones measure up to 5 mm   on the left. CONSULTS:    IP CONSULT TO NEPHROLOGY  IP CONSULT TO CARDIOLOGY  IP CONSULT TO VASCULAR SURGERY  IP CONSULT TO PHARMACY    ASSESSMENT AND PLAN:      Principal Problem:    Acute renal failure superimposed on chronic kidney disease, on chronic dialysis (HCC)  Active Problems:    Essential hypertension    Type 2 diabetes mellitus with stage 3 chronic kidney disease, with long-term current use of insulin (HCC)    Cardiomyopathy ([facility] [address]    Elevated troponin    History of stroke    Mixed hyperlipidemia    Acute on chronic renal failure (HCC)    Pericardial effusion    Lower abdominal pain  Resolved Problems:    * No resolved hospital problems. *    Patient is a 61-year-old male with a past medical history of CHF, CVA, diabetes, gastroparesis who presented with vague abdominal pain with vomiting and constipation. In the ER patient was found to have an elevated creatinine and CT scan showed pericardial effusion patient was admitted with KISHORE on CKD.   Creatinine continued to go up was in the hospital.  He underwent PD and HD catheter placement on 10/29/2020    Acute on chronic renal failure 30 for dialysis today  - s/p  PD and hemodialysis cath placement 10/29/2020  -Baseline creatinine is around 3. On admission creatinine 5.   -nephrology consult appreciated   -Hold nephrotoxic medication  -Patient got Toradol in the ER. Hold off any NSAIDs    Pericardial effusion- no active management  -CT shows moderate to large pericardial effusion,Echo ordered shows mild to moderate effusion with no tamponade-Cardiology consulted    Diabetes mellitus  -Continue Lantus and sliding scale  -Monitor blood sugars     Hypertension- now improving   -Unclear if patient is compliant with his medications  -Monitor blood pressure  -Continue home meds    Elevated troponin  -Most likely secondary to CKD  --Normal recent stress test    Cardiomyopathy  -Last echo on 6/20 showed a EF of 35 to 40% with grade 2 diastolic dysfunction  -Hold  ACE/ARB due to KISHORE  -hold diuretic  -cardiology following    Hyperlipidemia  -Tinea statin    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  -Continue home meds    Right lower back pain  -Seems more musculoskeletal  -We will add muscle relaxer and Lidoderm patch    Hep c ab +  -Rna pending    Slight bleeding from the hemodialysis catheter site  -Change dressing monitor    DVT Prophylaxis: lovenox  Diet: DIET RENAL; Low Sodium (2 GM); Daily Fluid Restriction: 2000 ml  Code Status: Full Code    PT/OT Eval Status:ordered    Discharge plan -continue care    The patient and / or the family were informed of the results of any tests, a time was given to answer questions, a plan was proposed and they agreed with plan. Discussed with consulting physicians, nursing and social work     The note was completed using EMR. Every effort was made to ensure accuracy; however, inadvertent computerized transcription errors may be present.        Esteban Smith MD

## 2020-10-29 NOTE — PROGRESS NOTES
Pt returned from OR. Pt alert and oriented, c/o pain. Pt to be medicated per orders. R chest vas cath with bloody drainage, Dr. Aditya Lmeus aware. Abdominal dressing clean, dry, and intact. Umbilical incision clean, dry, and intact. Family at bedside. Call light with in reach. Bed alarm on. Will cont to monitor. Electronically signed by Omer Bell RN on 10/29/2020 at 11:20 AM

## 2020-10-29 NOTE — PROGRESS NOTES
Agree with H&P from Int notes, no changes noted . The risk, benefits, and alternatives of the proposed procedure have been explained to the patient (or appropriate guardian) and understanding verbalized. All questions answered. Patient wishes to proceed. Plan tunneled line and PD cath today

## 2020-10-29 NOTE — PROGRESS NOTES
Pt given percocet for pain, while in dialysis. Will monitor when pt returns to room. .Electronically signed by Martita Morse RN on 10/29/2020 at 2:47 PM

## 2020-10-29 NOTE — PROGRESS NOTES
Pt's wife concerned pt maybe over medicated. Pt awake and alert, making jokes, staring off into space at times. . Pt states he is comfortable with no pain at the moment. Pt forgets he has surgical incisions attempting to scratch at those areas. Call out to Dr. Mary Michel, Dr. Diony Dempsey on call to adjust pain medicine. Awaiting return phone call.

## 2020-10-29 NOTE — ANESTHESIA PRE PROCEDURE
milds a week   Substance Use Topics    Alcohol use: Yes     Frequency: Monthly or less     Comment: occasional    Drug use: Yes     Types: Marijuana     Comment: previously used cocaine, stopped May 2015     Medications  No current facility-administered medications on file prior to encounter. Current Outpatient Medications on File Prior to Encounter   Medication Sig Dispense Refill    cloNIDine (CATAPRES) 0.2 MG tablet TAKE 1 TABLET BY MOUTH EVERY 8 HOURS BEFORE MEALS SPACING OUT FROM ALL BLOOD PRESSUE MEDICATIONS (Patient taking differently: Take 0.4 mg by mouth every 8 hours TAKE 1 TABLET BY MOUTH EVERY 8 HOURS BEFORE MEALS SPACING OUT FROM ALL BLOOD PRESSUE MEDICATIONS) 90 tablet 1    isosorbide mononitrate (IMDUR) 60 MG extended release tablet Take 1 tablet by mouth daily (Patient taking differently: Take 60 mg by mouth nightly ) 90 tablet 1    NIFEdipine (PROCARDIA XL) 90 MG extended release tablet Take 1 tablet by mouth daily 90 tablet 1    insulin glargine (LANTUS) 100 UNIT/ML injection vial Inject 20 Units into the skin nightly 6 mL 2    clopidogrel (PLAVIX) 75 MG tablet Take 1 tablet by mouth daily (Patient taking differently: Take 75 mg by mouth nightly ) 90 tablet 5    hydrALAZINE (APRESOLINE) 100 MG tablet Take 2 tablets by mouth 3 times daily 270 tablet 1    atorvastatin (LIPITOR) 40 MG tablet TAKE 1 TABLET BY MOUTH ONCE DAILY (Patient taking differently: Take 40 mg by mouth nightly TAKE 1 TABLET BY MOUTH ONCE DAILY) 60 tablet 3    furosemide (LASIX) 20 MG tablet Take 1 tablet by mouth daily (Patient taking differently: Take 40 mg by mouth daily ) 90 tablet 1    losartan (COZAAR) 100 MG tablet Take 1 tablet by mouth daily (Patient taking differently: Take 100 mg by mouth nightly ) 90 tablet 1    metoprolol tartrate (LOPRESSOR) 50 MG tablet Take 1 tablet by mouth twice daily 180 tablet 1    gabapentin (NEURONTIN) 300 MG capsule Take 1 capsule by mouth 3 times daily for 82 days.  [address] capsule 1    acetaminophen (ACETAMINOPHEN EXTRA STRENGTH) 500 MG tablet TAKE TWO TABLETS BY MOUTH EVERY 6 HOURS AS NEEDED FOR PAIN 120 tablet 3    Insulin Pen Needle 31G X 6 MM MISC 1 each by Does not apply route daily 100 each 3    Insulin Syringe-Needle U-100 31G X 5/16\" 0.3 ML MISC 1 each by Does not apply route daily 100 each 3     Current Facility-Administered Medications   Medication Dose Route Frequency Provider Last Rate Last Dose    sodium bicarbonate 100 mEq in dextrose 5 % 1,000 mL infusion   Intravenous Continuous Stephanie Rice MD   Stopped at 10/29/20 1577    sodium zirconium cyclosilicate (LOKELMA) oral suspension 10 g  10 g Oral Daily Stephanie Rice MD   10 g at 10/28/20 2125    hydrALAZINE (APRESOLINE) tablet 50 mg  50 mg Oral 3 times per day Weston Boykin MD   50 mg at 10/28/20 2124    doxazosin (CARDURA) tablet 4 mg  4 mg Oral 2 times per day Stephanie Rice MD   4 mg at 10/28/20 0842    polyethylene glycol (GLYCOLAX) packet 17 g  17 g Oral BID Stephanie Rice MD   17 g at 10/28/20 2124    bisacodyl (DULCOLAX) suppository 10 mg  10 mg Rectal Daily Stephanie Rice MD        cloNIDine (CATAPRES) tablet 0.1 mg  0.1 mg Oral TID Stephanie Rice MD   0.1 mg at 10/28/20 2123    NIFEdipine (PROCARDIA XL) extended release tablet 90 mg  90 mg Oral BID Stephanie Rice MD   90 mg at 10/28/20 2124    epoetin robert-epbx (RETACRIT) injection 10,000 Units  10,000 Units Subcutaneous Once per day on Mon Wed Fri Stephanie Rice MD   10,000 Units at 10/28/20 1036    torsemide (DEMADEX) tablet 20 mg  20 mg Oral Daily Stephanie Rice MD   20 mg at 10/28/20 9809    methocarbamol (ROBAXIN) tablet 500 mg  500 mg Oral TID Weston Boykin MD   500 mg at 10/28/20 2124    lidocaine 4 % external patch 1 patch  1 patch Transdermal Daily Weston Boykin MD   1 patch at 10/27/20 0916    atorvastatin (LIPITOR) tablet 40 mg  40 mg Oral Daily Jermaine Rivera MD 40 mg at 10/28/20 0842    gabapentin (NEURONTIN) capsule 300 mg  300 mg Oral TID Jermaine Silva MD   300 mg at 10/28/20 2124    insulin glargine (LANTUS) injection vial 20 Units  20 Units Subcutaneous Nightly Jermaine Silva MD   20 Units at 10/28/20 2125    isosorbide mononitrate (IMDUR) extended release tablet 60 mg  60 mg Oral Daily Jermaine Silva MD   60 mg at 10/28/20 0842    metoprolol tartrate (LOPRESSOR) tablet 50 mg  50 mg Oral BID Jermaine Silva MD   50 mg at 10/28/20 2124    sodium chloride flush 0.9 % injection 10 mL  10 mL Intravenous 2 times per day Jermaine Mathis MD   10 mL at 10/27/20 2158    sodium chloride flush 0.9 % injection 10 mL  10 mL Intravenous PRN Jermaine Silva MD        acetaminophen (TYLENOL) tablet 650 mg  650 mg Oral Q6H PRN Jermaine Silva MD   650 mg at 10/29/20 0015    Or    acetaminophen (TYLENOL) suppository 650 mg  650 mg Rectal Q6H PRN Jermaine Silva MD        polyethylene glycol (GLYCOLAX) packet 17 g  17 g Oral Daily PRN Jermaine Silva MD        promethazine (PHENERGAN) tablet 12.5 mg  12.5 mg Oral Q6H PRN Jermaine Silva MD   12.5 mg at 10/26/20 1124    Or    ondansetron (ZOFRAN) injection 4 mg  4 mg Intravenous Q6H PRN Jermaine Silva MD        glucose (GLUTOSE) 40 % oral gel 15 g  15 g Oral PRN Jermaine Silva MD        dextrose 50 % IV solution  12.5 g Intravenous PRN Jermaine Silva MD        glucagon (rDNA) injection 1 mg  1 mg Intramuscular PRN Jermaine Silva MD        dextrose 5 % solution  100 mL/hr Intravenous PRN Jermaine Silva MD        heparin (porcine) injection 5,000 Units  5,000 Units Subcutaneous 3 times per day Dana Marx MD   5,000 Units at 10/28/20 2125    insulin lispro (HUMALOG) injection vial 0-6 Units  0-6 Units Subcutaneous TID  Jermaine Silva MD   1 Units at 10/28/20 1850    insulin lispro (HUMALOG) injection vial 0-3 Units  0-3 Units Subcutaneous Nightly Jermaine (+) CVA (L sided weakness): residual symptoms, depression/anxiety    (-) seizures           GI/Hepatic/Renal:   (+) renal disease: ESRD,      (-) GERD and PUD       Endo/Other:    (+) DiabetesType II DM, , .    (-) hypothyroidism               Abdominal:           Vascular:     - DVT and PE. Anesthesia Plan      general     ASA 3       Induction: intravenous. MIPS: Postoperative opioids intended and Prophylactic antiemetics administered. Anesthetic plan and risks discussed with patient. Plan discussed with CRNA. This pre-anesthesia assessment may be used as a history and physical.    DOS STAFF ADDENDUM:    Pt seen and examined, chart reviewed (including anesthesia, drug and allergy history). No interval changes to history and physical examination. Anesthetic plan, risks, benefits, alternatives, and personnel involved discussed with patient. Patient verbalized an understanding and agrees to proceed.       Jese Bee MD  October 29, 2020  6:43 AM

## 2020-10-30 LAB
ABO/RH: NORMAL
ALBUMIN SERPL-MCNC: 3.4 G/DL (ref 3.4–5)
ANION GAP SERPL CALCULATED.3IONS-SCNC: 10 MMOL/L (ref 3–16)
ANION GAP SERPL CALCULATED.3IONS-SCNC: 11 MMOL/L (ref 3–16)
ANTIBODY SCREEN: NORMAL
ANTIBODY SCREEN: NORMAL
BASOPHILS ABSOLUTE: 0 K/UL (ref 0–0.2)
BASOPHILS RELATIVE PERCENT: 0.3 %
BLOOD BANK DISPENSE STATUS: NORMAL
BLOOD BANK PRODUCT CODE: NORMAL
BPU ID: NORMAL
BUN BLDV-MCNC: 48 MG/DL (ref 7–20)
BUN BLDV-MCNC: 48 MG/DL (ref 7–20)
CALCIUM SERPL-MCNC: 8.4 MG/DL (ref 8.3–10.6)
CALCIUM SERPL-MCNC: 8.4 MG/DL (ref 8.3–10.6)
CHLORIDE BLD-SCNC: 100 MMOL/L (ref 99–110)
CHLORIDE BLD-SCNC: 102 MMOL/L (ref 99–110)
CO2: 22 MMOL/L (ref 21–32)
CO2: 23 MMOL/L (ref 21–32)
CREAT SERPL-MCNC: 6.1 MG/DL (ref 0.9–1.3)
CREAT SERPL-MCNC: 6.1 MG/DL (ref 0.9–1.3)
DESCRIPTION BLOOD BANK: NORMAL
EOSINOPHILS ABSOLUTE: 0 K/UL (ref 0–0.6)
EOSINOPHILS RELATIVE PERCENT: 0.3 %
GFR AFRICAN AMERICAN: 12
GFR AFRICAN AMERICAN: 12
GFR NON-AFRICAN AMERICAN: 10
GFR NON-AFRICAN AMERICAN: 10
GLUCOSE BLD-MCNC: 102 MG/DL (ref 70–99)
GLUCOSE BLD-MCNC: 108 MG/DL (ref 70–99)
GLUCOSE BLD-MCNC: 110 MG/DL (ref 70–99)
GLUCOSE BLD-MCNC: 145 MG/DL (ref 70–99)
GLUCOSE BLD-MCNC: 88 MG/DL (ref 70–99)
GLUCOSE BLD-MCNC: 90 MG/DL (ref 70–99)
HCT VFR BLD CALC: 20.4 % (ref 40.5–52.5)
HEMOGLOBIN: 6.7 G/DL (ref 13.5–17.5)
LYMPHOCYTES ABSOLUTE: 1.4 K/UL (ref 1–5.1)
LYMPHOCYTES RELATIVE PERCENT: 13.4 %
MCH RBC QN AUTO: 29 PG (ref 26–34)
MCHC RBC AUTO-ENTMCNC: 32.8 G/DL (ref 31–36)
MCV RBC AUTO: 88.3 FL (ref 80–100)
MONOCYTES ABSOLUTE: 1.2 K/UL (ref 0–1.3)
MONOCYTES RELATIVE PERCENT: 11.8 %
NEUTROPHILS ABSOLUTE: 7.8 K/UL (ref 1.7–7.7)
NEUTROPHILS RELATIVE PERCENT: 74.2 %
PDW BLD-RTO: 14.2 % (ref 12.4–15.4)
PERFORMED ON: ABNORMAL
PHOSPHORUS: 3.6 MG/DL (ref 2.5–4.9)
PLATELET # BLD: 153 K/UL (ref 135–450)
PMV BLD AUTO: 10.9 FL (ref 5–10.5)
POTASSIUM SERPL-SCNC: 4.6 MMOL/L (ref 3.5–5.1)
POTASSIUM SERPL-SCNC: 4.7 MMOL/L (ref 3.5–5.1)
RBC # BLD: 2.31 M/UL (ref 4.2–5.9)
SODIUM BLD-SCNC: 133 MMOL/L (ref 136–145)
SODIUM BLD-SCNC: 135 MMOL/L (ref 136–145)
WBC # BLD: 10.5 K/UL (ref 4–11)

## 2020-10-30 PROCEDURE — 6370000000 HC RX 637 (ALT 250 FOR IP): Performed by: SURGERY

## 2020-10-30 PROCEDURE — APPSS30 APP SPLIT SHARED TIME 16-30 MINUTES: Performed by: NURSE PRACTITIONER

## 2020-10-30 PROCEDURE — 86901 BLOOD TYPING SEROLOGIC RH(D): CPT

## 2020-10-30 PROCEDURE — 90935 HEMODIALYSIS ONE EVALUATION: CPT

## 2020-10-30 PROCEDURE — P9016 RBC LEUKOCYTES REDUCED: HCPCS

## 2020-10-30 PROCEDURE — 6360000002 HC RX W HCPCS: Performed by: SURGERY

## 2020-10-30 PROCEDURE — 1200000000 HC SEMI PRIVATE

## 2020-10-30 PROCEDURE — 86900 BLOOD TYPING SEROLOGIC ABO: CPT

## 2020-10-30 PROCEDURE — 80069 RENAL FUNCTION PANEL: CPT

## 2020-10-30 PROCEDURE — 86850 RBC ANTIBODY SCREEN: CPT

## 2020-10-30 PROCEDURE — 2580000003 HC RX 258: Performed by: INTERNAL MEDICINE

## 2020-10-30 PROCEDURE — 86923 COMPATIBILITY TEST ELECTRIC: CPT

## 2020-10-30 PROCEDURE — 36430 TRANSFUSION BLD/BLD COMPNT: CPT

## 2020-10-30 PROCEDURE — 99024 POSTOP FOLLOW-UP VISIT: CPT | Performed by: SURGERY

## 2020-10-30 PROCEDURE — 85025 COMPLETE CBC W/AUTO DIFF WBC: CPT

## 2020-10-30 PROCEDURE — APPNB30 APP NON BILLABLE TIME 0-30 MINS: Performed by: NURSE PRACTITIONER

## 2020-10-30 PROCEDURE — 36415 COLL VENOUS BLD VENIPUNCTURE: CPT

## 2020-10-30 PROCEDURE — 6370000000 HC RX 637 (ALT 250 FOR IP): Performed by: INTERNAL MEDICINE

## 2020-10-30 RX ORDER — 0.9 % SODIUM CHLORIDE 0.9 %
20 INTRAVENOUS SOLUTION INTRAVENOUS ONCE
Status: COMPLETED | OUTPATIENT
Start: 2020-10-30 | End: 2020-10-30

## 2020-10-30 RX ORDER — HYDRALAZINE HYDROCHLORIDE 50 MG/1
100 TABLET, FILM COATED ORAL EVERY 8 HOURS SCHEDULED
Status: DISCONTINUED | OUTPATIENT
Start: 2020-10-30 | End: 2020-11-03 | Stop reason: HOSPADM

## 2020-10-30 RX ORDER — HEPARIN SODIUM 1000 [USP'U]/ML
4300 INJECTION, SOLUTION INTRAVENOUS; SUBCUTANEOUS PRN
Status: DISCONTINUED | OUTPATIENT
Start: 2020-10-30 | End: 2020-11-03 | Stop reason: HOSPADM

## 2020-10-30 RX ADMIN — HEPARIN SODIUM 5000 UNITS: 5000 INJECTION INTRAVENOUS; SUBCUTANEOUS at 05:49

## 2020-10-30 RX ADMIN — OXYCODONE HYDROCHLORIDE AND ACETAMINOPHEN 1 TABLET: 5; 325 TABLET ORAL at 16:39

## 2020-10-30 RX ADMIN — CLONIDINE HYDROCHLORIDE 0.1 MG: 0.1 TABLET ORAL at 14:12

## 2020-10-30 RX ADMIN — SODIUM CHLORIDE 20 ML: 9 INJECTION, SOLUTION INTRAVENOUS at 16:26

## 2020-10-30 RX ADMIN — METHOCARBAMOL TABLETS 500 MG: 500 TABLET, COATED ORAL at 14:12

## 2020-10-30 RX ADMIN — NIFEDIPINE 90 MG: 90 TABLET, FILM COATED, EXTENDED RELEASE ORAL at 22:45

## 2020-10-30 RX ADMIN — HYDRALAZINE HYDROCHLORIDE 100 MG: 50 TABLET, FILM COATED ORAL at 22:18

## 2020-10-30 RX ADMIN — GABAPENTIN 300 MG: 300 CAPSULE ORAL at 14:12

## 2020-10-30 RX ADMIN — ISOSORBIDE MONONITRATE 60 MG: 60 TABLET, EXTENDED RELEASE ORAL at 12:28

## 2020-10-30 RX ADMIN — DOXAZOSIN 4 MG: 4 TABLET ORAL at 22:18

## 2020-10-30 RX ADMIN — HYDRALAZINE HYDROCHLORIDE 50 MG: 50 TABLET, FILM COATED ORAL at 14:12

## 2020-10-30 RX ADMIN — OXYCODONE HYDROCHLORIDE AND ACETAMINOPHEN 2 TABLET: 5; 325 TABLET ORAL at 12:28

## 2020-10-30 RX ADMIN — INSULIN LISPRO 1 UNITS: 100 INJECTION, SOLUTION INTRAVENOUS; SUBCUTANEOUS at 18:02

## 2020-10-30 RX ADMIN — CLONIDINE HYDROCHLORIDE 0.1 MG: 0.1 TABLET ORAL at 22:18

## 2020-10-30 RX ADMIN — METHOCARBAMOL TABLETS 500 MG: 500 TABLET, COATED ORAL at 22:18

## 2020-10-30 RX ADMIN — POLYETHYLENE GLYCOL 3350 17 G: 17 POWDER, FOR SOLUTION ORAL at 22:18

## 2020-10-30 RX ADMIN — HEPARIN SODIUM 5000 UNITS: 5000 INJECTION INTRAVENOUS; SUBCUTANEOUS at 22:19

## 2020-10-30 RX ADMIN — HEPARIN SODIUM 5000 UNITS: 5000 INJECTION INTRAVENOUS; SUBCUTANEOUS at 14:13

## 2020-10-30 RX ADMIN — OXYCODONE HYDROCHLORIDE AND ACETAMINOPHEN 1 TABLET: 5; 325 TABLET ORAL at 02:22

## 2020-10-30 RX ADMIN — EPOETIN ALFA-EPBX 10000 UNITS: 10000 INJECTION, SOLUTION INTRAVENOUS; SUBCUTANEOUS at 14:13

## 2020-10-30 RX ADMIN — ATORVASTATIN CALCIUM 40 MG: 40 TABLET, FILM COATED ORAL at 12:28

## 2020-10-30 RX ADMIN — GABAPENTIN 300 MG: 300 CAPSULE ORAL at 22:18

## 2020-10-30 RX ADMIN — TORSEMIDE 20 MG: 20 TABLET ORAL at 12:28

## 2020-10-30 RX ADMIN — INSULIN GLARGINE 20 UNITS: 100 INJECTION, SOLUTION SUBCUTANEOUS at 22:20

## 2020-10-30 RX ADMIN — HYDRALAZINE HYDROCHLORIDE 50 MG: 50 TABLET, FILM COATED ORAL at 05:49

## 2020-10-30 RX ADMIN — METOPROLOL TARTRATE 50 MG: 50 TABLET, FILM COATED ORAL at 22:44

## 2020-10-30 ASSESSMENT — PAIN - FUNCTIONAL ASSESSMENT
PAIN_FUNCTIONAL_ASSESSMENT: PREVENTS OR INTERFERES SOME ACTIVE ACTIVITIES AND ADLS
PAIN_FUNCTIONAL_ASSESSMENT: PREVENTS OR INTERFERES SOME ACTIVE ACTIVITIES AND ADLS

## 2020-10-30 ASSESSMENT — PAIN DESCRIPTION - LOCATION
LOCATION: NECK
LOCATION: ABDOMEN
LOCATION: NECK
LOCATION: ABDOMEN

## 2020-10-30 ASSESSMENT — PAIN SCALES - GENERAL
PAINLEVEL_OUTOF10: 0
PAINLEVEL_OUTOF10: 3
PAINLEVEL_OUTOF10: 9
PAINLEVEL_OUTOF10: 0
PAINLEVEL_OUTOF10: 0
PAINLEVEL_OUTOF10: 6
PAINLEVEL_OUTOF10: 10
PAINLEVEL_OUTOF10: 0
PAINLEVEL_OUTOF10: 0

## 2020-10-30 ASSESSMENT — PAIN DESCRIPTION - FREQUENCY
FREQUENCY: CONTINUOUS
FREQUENCY: CONTINUOUS

## 2020-10-30 ASSESSMENT — PAIN DESCRIPTION - ORIENTATION: ORIENTATION: MID

## 2020-10-30 ASSESSMENT — PAIN DESCRIPTION - PAIN TYPE
TYPE: SURGICAL PAIN

## 2020-10-30 ASSESSMENT — PAIN DESCRIPTION - PROGRESSION
CLINICAL_PROGRESSION: GRADUALLY IMPROVING
CLINICAL_PROGRESSION: GRADUALLY IMPROVING

## 2020-10-30 ASSESSMENT — PAIN DESCRIPTION - DESCRIPTORS
DESCRIPTORS: CONSTANT;ACHING
DESCRIPTORS: ACHING
DESCRIPTORS: ACHING
DESCRIPTORS: CONSTANT;ACHING

## 2020-10-30 NOTE — PLAN OF CARE
Problem: Falls - Risk of:  Goal: Will remain free from falls  Description: Will remain free from falls  10/29/2020 2339 by Kasey Gerardo RN  Outcome: Ongoing  10/29/2020 1132 by Glenna Schultz RN  Outcome: Ongoing  Note: Pt free from falls this shift. Fall precautions in place at all times. Call light always within reach. Pt able and agreeable to contact for safety appropriately. Goal: Absence of physical injury  Description: Absence of physical injury  10/29/2020 2339 by Kasey Gerardo RN  Outcome: Ongoing  10/29/2020 1132 by Glenna Schultz RN  Outcome: Ongoing     Problem: HEMODYNAMIC STATUS  Goal: Patient has stable vital signs and fluid balance  10/29/2020 2339 by Kasey Gerardo RN  Outcome: Ongoing  10/29/2020 1132 by Glenna Schultz RN  Outcome: Ongoing     Problem: Pain:  Goal: Pain level will decrease  Description: Pain level will decrease  10/29/2020 2339 by Kasey Gerardo RN  Outcome: Ongoing  10/29/2020 1132 by Glenna Schultz RN  Outcome: Ongoing  Goal: Control of acute pain  Description: Control of acute pain  10/29/2020 2339 by Kasey Gerardo RN  Outcome: Ongoing  10/29/2020 1132 by Glenna Schultz RN  Outcome: Ongoing  Note: Pain/discomfort being managed with PRN analgesics per MD orders. Pt able to express presence and absence of pain and rate pain appropriately using numerical scale.    Goal: Control of chronic pain  Description: Control of chronic pain  10/29/2020 2339 by Kasey Gerardo RN  Outcome: Ongoing  10/29/2020 1132 by Glenna Schultz RN  Outcome: Ongoing

## 2020-10-30 NOTE — PROGRESS NOTES
[facility]                                                 [phone]                                      [facility]                                        Inpatient Progress note    Summary:   Ad Leggett is being seen by nephrology for KISHORE on CKD. Admitted with abdominal pain nausea and vomiting. Had been having issues with consitpation. He was found to have worsening kidney function at that time. He has a h/o polycystic kidney disease, HTN, prior strokes, cocaine use. Interval History  S/p TDC and PD catheter placement 10/29. Having a little abdominal discomfort. No nausea or vomiting. No SOB or chest pain. No edema. /87  UF 1.5 L     Plan:   - HD today, 2nd session.   - HD tomorrow for 3rd session.   - PD catheter has been placed. - BP elevated, exacerbated by pain issues. - strict IO  - renal diet    - increased hydral to 100 mg TID. - ideally would like him to not be on BB and clonidine. Will try to adjust this over the coming days. Assessment:   Renal function:   KISHORE / CKD. Baseline 3.6 (10/2019)  Has polycystic kidney disease. Likely diastolic progression with uncontrolled hypertension use of cocaine among other things. UA bland except for significant proteinuria. No peripheral eosinophilia. CT scan with PKD. Some indeterminate cysts. Bladder scan. R/o obstructive uropathy. Serologies: AUDRA, ANCA, C3, C4, previously negative  Hepatitis panel,   SPEP negative 2018. Avoid nephrotoxins. No NSAIDS.    Because of his addiction issue we did not refer him to kidney transplant but now that he has been sober for the last 7 to 8 months this can become an option to. I think he would be an okay candidate for peritoneal dialysis. S/p PD catheter placement. HD started 10/29, 10/30    Electrolytes/acid base:   Hyponatremia  No other issues. CKD-BMD:   Needs updated PTH.  And vit D   Lab Results   Component Value Date    PTH 49.8 02/01/2019    CALCIUM rise.   Cardiovascular: Ausculation- No M/R/G, no edema  Abdomen: No visible mass or tenderness, PD catheter  Musculoskeletal:  No clubbing,cyanosis, joints with no swelling or deformity. Skin: no rashes, ulcers, induration, no jaundice. Neuro: face symmetric, no focal deficits. Appropriate responses.  CN 2-12 grossly intact  TDC right chest

## 2020-10-30 NOTE — PROGRESS NOTES
Progress Note  Admit Date: 10/25/2020      PCP: ORQUE Ahumada - CNP     CC: F/U for nominal pain nausea vomiting    Days in hospital:  5    SUBJECTIVE / Interval History:    Patient seen in dialysis. Has no complaints      Allergies  Doxazosin    Medications    Scheduled Meds:   hydrALAZINE  50 mg Oral 3 times per day    doxazosin  4 mg Oral 2 times per day    polyethylene glycol  17 g Oral BID    cloNIDine  0.1 mg Oral TID    NIFEdipine  90 mg Oral BID    epoetin robert-epbx  10,000 Units Subcutaneous Once per day on Mon Wed Fri    torsemide  20 mg Oral Daily    methocarbamol  500 mg Oral TID    lidocaine  1 patch Transdermal Daily    atorvastatin  40 mg Oral Daily    gabapentin  300 mg Oral TID    insulin glargine  20 Units Subcutaneous Nightly    isosorbide mononitrate  60 mg Oral Daily    metoprolol tartrate  50 mg Oral BID    sodium chloride flush  10 mL Intravenous 2 times per day    heparin (porcine)  5,000 Units Subcutaneous 3 times per day    insulin lispro  0-6 Units Subcutaneous TID WC    insulin lispro  0-3 Units Subcutaneous Nightly     Continuous Infusions:   dextrose         PRN Meds:  morphine, oxyCODONE-acetaminophen **OR** oxyCODONE-acetaminophen, sodium chloride flush, acetaminophen **OR** acetaminophen, polyethylene glycol, promethazine **OR** ondansetron, glucose, dextrose, glucagon (rDNA), dextrose, sennosides-docusate sodium    Vitals    BP (!) 166/85   Pulse 76   Temp 98.7 °F (37.1 °C) (Oral)   Resp 18   Ht 6' 2\" (1.88 m)   Wt 170 lb 13.7 oz (77.5 kg)   SpO2 91%   BMI 21.94 kg/m²     Exam:    Gen: No distress. Eyes: PERRL. No sclera icterus. No conjunctival injection. ENT: No discharge. Pharynx clear. External appearance of ears and nose normal.  Neck: Trachea midline. No obvious mass. Resp: No accessory muscle use. No crackles. No wheezes. No rhonchi. No dullness on percussion. CV: Regular rate. Regular rhythm. No murmur or rub. No edema.    GI: Non-tender. Non-distended. No hernia. Skin: Warm, dry, normal texture and turgor. No nodule on exposed extremities. Lymph: No cervical LAD. No supraclavicular LAD. M/S: No cyanosis. No clubbing. Tenderness present on palpation of right lower lumbar region  Neuro: Moves all four extremities. CN 2-12 tested, no defect noted. Psych: Oriented x 3. No anxiety. Awake. Alert. Intact judgement and insight. Data    LABS  CBC:   No results for input(s): WBC, HGB, HCT, MCV, PLT in the last 72 hours. BMP:   Recent Labs     10/27/20  0929 10/28/20  0607 10/29/20  1000    138 138   K 4.8 5.2* 4.8    107 105   CO2 18* 15* 17*   BUN 66* 69* 63*   CREATININE 5.8* 6.9* 6.8*   GLUCOSE 152* 135* 124*     POC GLUCOSE:    Recent Labs     10/29/20  0918 10/29/20  1041 10/29/20  1120 10/29/20  1702 10/29/20  2146   POCGLU 124* 178* 187* 202* 216*     LIVER PROFILE:   No results for input(s): AST, ALT, LIPASE, AMYLASE, LABALBU, BILIDIR, BILITOT, ALKPHOS in the last 72 hours. PT/INR: No results for input(s): PROTIME, INR in the last 72 hours. APTT: No results for input(s): APTT in the last 72 hours. UA:  Recent Labs     10/27/20  1520   PHUR 6.0     Microbiology:  Wound Culture: No results for input(s): WNDABS, ORG in the last 72 hours. Invalid input(s):  LABGRAM  Nasal Culture: No results for input(s): ORG, MRSAPCR in the last 72 hours. Blood Culture: No results for input(s): BC, BLOODCULT2 in the last 72 hours. Fungal Culture:   No results for input(s): FUNGSM in the last 72 hours. No results for input(s): FUNCXBLD in the last 72 hours. CSF Culture:  No results for input(s): COLORCSF, APPEARCSF, CFTUBE, CLOTCSF, WBCCSF, RBCCSF, NEUTCSF, NUMCELLSCSF, LYMPHSCSF, MONOCSF, GLUCCSF, VOLCSF in the last 72 hours. Respiratory Culture:  No results for input(s): Shelly Lakeashi in the last 72 hours. AFB:No results for input(s): AFBSMEAR in the last 72 hours.   Urine Culture  No results for input(s): LABURIN in the last 72 hours. RADIOLOGY:    XR CHEST PORTABLE   Final Result   Right internal jugular tunneled hemodialysis catheter with tip at the   cavoatrial junction. Cardiomegaly with bilateral airspace disease suggestive of mild edema. FL VASCULAR ACCESS GUIDANCE   Final Result      CT CHEST ABDOMEN PELVIS WO CONTRAST   Final Result   Chest-      Moderate to large pericardial effusion. Mild atelectasis in the lingula and left lower lobe. Pneumonia is considered   less likely. Abdomen pelvis-      No acute inflammatory abnormality is identified in the abdomen or pelvis. Mild prominence of stool in the colon. Correlation for constipation   recommended. Polycystic kidney disease. Bilateral nonobstructive nephrolithiasis. Caliceal stones measure up to 5 mm   on the left. CONSULTS:    IP CONSULT TO NEPHROLOGY  IP CONSULT TO CARDIOLOGY  IP CONSULT TO VASCULAR SURGERY  IP CONSULT TO PHARMACY    ASSESSMENT AND PLAN:      Principal Problem:    Acute renal failure superimposed on chronic kidney disease, on chronic dialysis ([facility])  Active Problems:    Essential hypertension    Type 2 diabetes mellitus with stage 3 chronic kidney disease, with long-term current use of insulin ([facility])    Cardiomyopathy ([facility])    Elevated troponin    History of stroke    Mixed hyperlipidemia    Acute on chronic renal failure ([facility])    Pericardial effusion    Lower abdominal pain  Resolved Problems:    * No resolved hospital problems. *    Patient is a 71-year-old male with a past medical history of CHF, CVA, diabetes, gastroparesis who presented with vague abdominal pain with vomiting and constipation. In the ER patient was found to have an elevated creatinine and CT scan showed pericardial effusion patient was admitted with KISHORE on CKD. Creatinine continued to go up was in the hospital.  He underwent PD and HD catheter placement on 10/29/2020. Hemodialysis is initiated.   Plans to transition to peritoneal dialysis in outpatient    Acute on chronic renal failure-   - s/p  PD and hemodialysis cath placement 10/29/2020  -Baseline creatinine is around 3. On admission creatinine 5. Peaked at around 6.9  -nephrology consult appreciated   -Hold nephrotoxic medication  -Patient got Toradol in the ER. Hold off any NSAIDs    Acute blood loss anemia  -Patient had a lot of postop bleeding from the tunneled dialysis catheter.  -Hemoglobin is less than 7. Will transfuse 1 unit  -Baseline hemoglobin around 8      Pericardial effusion- no active management  -CT shows moderate to large pericardial effusion,Echo ordered shows mild to moderate effusion with no tamponade-Cardiology consulted    Diabetes mellitus  -Continue Lantus and sliding scale  -Monitor blood sugars     Hypertension- Bp on the higher side  -Unclear if patient is compliant with his medications  -Monitor blood pressure  -Continue home meds    Elevated troponin  -Most likely secondary to CKD  --Normal recent stress test    Cardiomyopathy  -Last echo on 6/20 showed a EF of 35 to 40% with grade 2 diastolic dysfunction  -Hold  ACE/ARB due to KISHORE  -hold diuretic  -cardiology following    Hyperlipidemia  -Tinea statin    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  -Continue home meds    Right lower back pain- improved   -Seems more musculoskeletal  -We will add muscle relaxer and Lidoderm patch    Hep c ab +  -Rna pending    Slight bleeding from the hemodialysis catheter site  -Change dressing monitor    DVT Prophylaxis: lovenox  Diet: DIET RENAL; Low Sodium (2 GM); Daily Fluid Restriction: 2000 ml  Code Status: Full Code    PT/OT Eval Status:ordered    Discharge plan -needs dialysis spot    The patient and / or the family were informed of the results of any tests, a time was given to answer questions, a plan was proposed and they agreed with plan. Discussed with consulting physicians, nursing and social work     The note was completed using EMR.  Every effort was made to ensure accuracy; however, inadvertent computerized transcription errors may be present.        Lorene Fallon MD

## 2020-10-30 NOTE — OP NOTE
[address] [place] [address]                                OPERATIVE REPORT    PATIENT NAME: Beatrice Sigala                    :        1965  MED REC NO:   [id number]                          ROOM:       4118  ACCOUNT NO:   [de-identified]                           ADMIT DATE: 10/25/2020  PROVIDER:     Kaley King MD    DATE OF PROCEDURE:  10/29/2020    PREOPERATIVE DIAGNOSIS:  Renal failure. POSTOPERATIVE DIAGNOSIS:  Renal failure. OPERATION PERFORMED:  Placement of right jugular tunneled dialysis  catheter; placement of left peritoneal dialysis catheter, curl cath,  double-cuffed;and repair of umbilical hernia. SURGEON:  Lenny Silva. Bisi Ramirez MD    ASSISTANT:  Ana Cavanaugh. ANESTHESIA:  General endotracheal.    ESTIMATED BLOOD LOSS:  Minimal.    INDICATIONS:  This is a very pleasant 23-year-old black male, born on  1965, who has worsening polycystic kidney disease leading to renal  failure. He is here because his GFR has dropped, his creatinine has  gone up and his potassium started to go up. The plan was to do a  peritoneal dialysis catheter. We found a hernia in the umbilicus, so we  figured we would fix this at the same time. In addition, we were asked  to put in a tunneled dialysis line, so they could start hemodialysis  rather urgently. He is a patient of Dr. Delio Matthew. OPERATIVE PROCEDURE:  We started with the neck, first scanned it with  the ultrasound, made sure the jugular vein was open and it was. He has  had no previous manipulation of this vein. So, the right neck was  prepped and draped in the usual sterile fashion. Using sterile  ultrasound guidance, we were able to see the jugular vein and put the  large needle directly into the vein per stick with good blood return. I  manipulated the wire down, checked with fluoros in good position.     We then made an incision in the neck and catheter onto this and straightened it out  bearing the second cuff in the subcutaneous tissue. We then connected the tube to the liter of saline, ran in easily while  we closed the umbilical hernia, freed up a little more dissection. When  we looked around the abdomen, the only part that was adhesed to the  front wall was this hernia because there was still attachment to the  left side. We freed this up, allowed it to drop back in, let me close  the hernia with 2-0 Ethibond sutures starting at each corner, running  towards middle and tying across. We then tacked the umbilical skin down with 3-0 Vicryl x2, subcu with  3-0 Vicryl, skin with 4-0 Vicryl subcuticular, followed by glue and then  the 8-mm incision closed with two layers of 4-0 Vicryl and followed by  glue. The fluid ran in and then drained well. We capped, clamped, it  coiled and secured it to the abdomen, so the patient would not have to  manipulate the catheter.         Lisy Campos MD    D: 10/29/2020 13:21:49       T: 10/29/2020 13:54:17     [email or web address]  Job#: [id number]     Doc#: [id number]    CC:  MD Kaley Quinn MD       _____ Dr. Yaz Awan

## 2020-10-30 NOTE — PROGRESS NOTES
[facility]       Post-op note        Patient Identification  Raissa Valdes  :  1965  Admit Date:  10/25/2020    CC: surgical pain    Post-op Day #1    Subjective:      No acute events overnight    Pain is : Mild        Objective:  Patient Vitals for the past 24 hrs:   BP Temp Temp src Pulse Resp SpO2 Weight   10/30/20 0520 -- -- -- -- -- -- 170 lb 13.7 oz (77.5 kg)   10/30/20 0453 (!) 166/85 98.7 °F (37.1 °C) Oral 76 18 91 % --   10/30/20 0110 (!) 181/82 99.5 °F (37.5 °C) -- 85 -- 94 % --   10/30/20 0017 (!) 182/92 100 °F (37.8 °C) Oral 89 18 90 % --   10/29/20 2139 (!) 193/89 98.7 °F (37.1 °C) Oral 92 18 91 % --   10/29/20 1758 (!) 197/104 98.7 °F (37.1 °C) Oral 90 16 90 % --   10/29/20 1602 (!) 165/87 98.8 °F (37.1 °C) Oral 96 18 91 % --   10/29/20 1509 (!) 191/92 99.4 °F (37.4 °C) -- 89 20 -- 178 lb 9.2 oz (81 kg)   10/29/20 1307 (!) 172/87 99.2 °F (37.3 °C) -- 88 20 -- 179 lb 10.8 oz (81.5 kg)   10/29/20 1036 (!) 174/89 98.2 °F (36.8 °C) Oral 91 16 96 % --   10/29/20 1015 (!) 164/78 -- -- 89 16 98 % --   10/29/20 1010 (!) 161/83 -- -- 90 11 97 % --   10/29/20 1005 (!) 171/77 97 °F (36.1 °C) Temporal 96 23 92 % --   10/29/20 1000 (!) 166/85 -- -- 98 19 -- --   10/29/20 0955 (!) 163/88 -- -- 95 16 -- --   10/29/20 0950 (!) 177/90 -- -- 91 13 92 % --   10/29/20 0945 (!) 157/90 -- -- 93 18 90 % --   10/29/20 0940 (!) 163/126 -- -- 96 16 94 % --   10/29/20 0935 (!) 174/86 -- -- 100 11 94 % --   10/29/20 0930 (!) 176/83 -- -- 103 21 97 % --   10/29/20 0925 (!) 198/80 -- -- 105 16 97 % --   10/29/20 0920 (!) 198/80 -- -- 102 18 94 % --   10/29/20 0915 (!) 178/86 -- -- 99 15 (!) 87 % --   10/29/20 0913 (!) 178/86 98 °F (36.7 °C) Temporal 100 16 94 % --        Drain/tube Output:         General:  alert, no apparent distress and normal weight  Resp: no increased WOB  Abd: soft, NT  Wound: umbilical incision well approx, dressing over PD cath c/d/i, right TDC working, dressing sennosides-docusate sodium    Assessment/Plan:      48 y.o. male admitted with   1. Lower abdominal pain    2. KISHORE (acute kidney injury) ([address]    3. Pericardial effusion    4. Elevated troponin    5. Acute renal failure superimposed on stage 4 chronic kidney disease, unspecified acute renal failure type ([address]    6. Chronic kidney disease, unspecified CKD stage    7. Encounter for dialysis [facility])     with a history of    Patient Active Problem List   Diagnosis    Left-sided weakness    Chest pain    Essential hypertension    Type 2 diabetes mellitus with stage 3 chronic kidney disease, with long-term current use of insulin ([address]    Cardiomyopathy ([address]    Cerebral infarction ([address]    Cigarette nicotine dependence without complication    Foot ulcer, left ([address]    Erectile dysfunction due to arterial insufficiency    Elevated troponin    Renal artery stenosis (HCC)    Chronic diastolic congestive heart failure (HCC)    History of stroke    Mixed hyperlipidemia    Major depressive disorder, recurrent episode, moderate (HCC)    Acute on chronic renal failure (HCC)    Pericardial effusion    Acute renal failure superimposed on chronic kidney disease, on chronic dialysis ([address]    Lower abdominal pain       status post LAPAROSCOPIC PERITONEAL DIALYSIS CATHETER PLACEMENT  UMBILICAL HERNIA REPAIR  PLACEMENT OF A TUNNELED DIALYSIS CATHETER c US and flouroscopy       tolerating HD - no s/s of bleeding at [facility] site, can change dry dressing  abd soft, minimally tender  Prn pain meds, pain fairly well controlled  Continue medical care  DC when medically stable      Discussed with patient and nursing. Discussed with Dr. Flores [place]:  Educated patient on plan of care and disease process. - all questions answered    · Recommend a smoke-free lifestyle. Yazan Schuler CNP  Pt seen and examined. for DIAGNOSIS OF failure agree with ROQUE Haynes note. Labs reviewed. Vitals   Vitals:    10/30/20 1135 10/30/20 1342 10/30/20 1511 10/30/20 1606   BP: (!) 194/93  (!) 173/86 (!) 179/86   Pulse: 93  79 81   Resp:   16 18   Temp: 99.1 °F (37.3 °C)  98.5 °F (36.9 °C) 99.1 °F (37.3 °C)   TempSrc:   Oral Oral   SpO2:   93% 96%   Weight: 167 lb 15.9 oz (76.2 kg)      Height:  6' 2\" (1.88 m)     Multiple questions regarding his dialysis treatments and I deferred to nephrology we will not need to see him over the weekend can be discharged when medically stable

## 2020-10-30 NOTE — PLAN OF CARE
Problem: Falls - Risk of:  Goal: Will remain free from falls  Description: Will remain free from falls  10/30/2020 0151 by Cara South RN  Outcome: Ongoing     Problem: Falls - Risk of:  Goal: Absence of physical injury  Description: Absence of physical injury  10/30/2020 0151 by Cara South RN  Outcome: Ongoing     Problem: OXYGENATION/RESPIRATORY FUNCTION  Goal: Patient will maintain patent airway  10/30/2020 0151 by Cara South RN  Outcome: Ongoing     Problem: OXYGENATION/RESPIRATORY FUNCTION  Goal: Patient will achieve/maintain normal respiratory rate/effort  Description: Respiratory rate and effort will be within normal limits for the patient  10/30/2020 0151 by Cara South RN  Outcome: Ongoing     Problem: HEMODYNAMIC STATUS  Goal: Patient has stable vital signs and fluid balance  10/30/2020 0151 by Cara South RN  Outcome: Ongoing     Problem: FLUID AND ELECTROLYTE IMBALANCE  Goal: Fluid and electrolyte balance are achieved/maintained  10/30/2020 0151 by Cara South RN  Outcome: Ongoing     Problem: ACTIVITY INTOLERANCE/IMPAIRED MOBILITY  Goal: Mobility/activity is maintained at optimum level for patient  10/30/2020 0151 by Cara South RN  Outcome: Ongoing     Problem: Pain:  Goal: Pain level will decrease  Description: Pain level will decrease  10/30/2020 0151 by Cara South RN  Outcome: Ongoing     Problem: Pain:  Goal: Control of acute pain  Description: Control of acute pain  10/30/2020 0151 by Cara South RN  Outcome: Ongoing     Problem: Pain:  Goal: Control of chronic pain  Description: Control of chronic pain  10/30/2020 0151 by Cara South RN  Outcome: Ongoing

## 2020-10-30 NOTE — FLOWSHEET NOTE
Treatment time: 2. 5 hours   Net UF: 1 liter    Pre weight: 77.2 kg   Post weight: 76.2 kg   EDW: tbd    Access used: R TDC   Access function: good, new dressing appolied     Medications or blood products given: none     Regular outpatient schedule: New start, next HD tmro     Summary of response to treatment:1 liter removed. Pt tolerated tx well. No meds given. Post VSS   Report to primary nurse Princess Interiano RN     Copy of dialysis treatment record placed in chart, to be scanned into EMR. 10/30/20 0858 10/30/20 1135   Vital Signs   BP (!) 165/84 (!) 194/93   Temp 98.8 °F (37.1 °C) 99.1 °F (37.3 °C)   Pulse 78 93   Weight 170 lb 3.1 oz (77.2 kg) 167 lb 15.9 oz (76.2 kg)   Weight Method Standing scale Bed scale   Treatment Initiation   Dialyze Hours 2.5  --    Treatment  Initiation Universal Precautions maintained;Lines secured to patient; Connections secured;Prime given;Venous Parameters set; Arterial Parameters set; Air foam detector engaged; Hemosafe Device; Dialysate;Saline line double clamped; Hemo-Safe Applied;F160  --    Post-Hemodialysis Assessment   Hemodialysis Intake (ml)  --  500 ml   Hemodialysis Output (ml)  --  1500 ml   NET Removed (ml)  --  1000 ml   Tolerated Treatment  --  Good

## 2020-10-30 NOTE — PLAN OF CARE
Problem: Falls - Risk of:  Goal: Will remain free from falls  Description: Will remain free from falls  10/30/2020 0838 by Abi Diane RN  Outcome: Ongoing  10/30/2020 0151 by Yuri Evans RN  Outcome: Ongoing  10/29/2020 2339 by Ezequiel Sullivan RN  Outcome: Ongoing  Goal: Absence of physical injury  Description: Absence of physical injury  10/30/2020 0838 by Abi Diane RN  Outcome: Ongoing  10/30/2020 0151 by Yuri Evans RN  Outcome: Ongoing  10/29/2020 2339 by Ezequiel Sullivan RN  Outcome: Ongoing     Problem: OXYGENATION/RESPIRATORY FUNCTION  Goal: Patient will maintain patent airway  10/30/2020 0838 by Abi Diane RN  Outcome: Ongoing  10/30/2020 0151 by Yuri Evans RN  Outcome: Ongoing  10/29/2020 2339 by Ezequiel Sullivan RN  Outcome: Ongoing  Goal: Patient will achieve/maintain normal respiratory rate/effort  Description: Respiratory rate and effort will be within normal limits for the patient  10/30/2020 2515 by Abi Diane RN  Outcome: Ongoing  10/30/2020 0151 by Yuri Evans RN  Outcome: Ongoing  10/29/2020 2339 by Ezequiel Sullivan RN  Outcome: Ongoing     Problem: HEMODYNAMIC STATUS  Goal: Patient has stable vital signs and fluid balance  10/30/2020 0838 by Abi Diane RN  Outcome: Ongoing  10/30/2020 0151 by Yuri Evans RN  Outcome: Ongoing  10/29/2020 2339 by Ezequiel Sullivan RN  Outcome: Ongoing     Problem: FLUID AND ELECTROLYTE IMBALANCE  Goal: Fluid and electrolyte balance are achieved/maintained  10/30/2020 0838 by Abi Diane RN  Outcome: Ongoing  10/30/2020 0151 by Yuri Evans RN  Outcome: Ongoing  10/29/2020 2339 by Ezequiel Sullivan RN  Outcome: Ongoing     Problem: ACTIVITY INTOLERANCE/IMPAIRED MOBILITY  Goal: Mobility/activity is maintained at optimum level for patient  10/30/2020 7862 by Abi Diane RN  Outcome: Ongoing  10/30/2020 0151 by Yuri Evans RN  Outcome: Ongoing  10/29/2020 2339 by Ezequiel Sullivan RN  Outcome: Ongoing     Problem: Pain:  Goal: Pain level will decrease  Description: Pain level will decrease  10/30/2020 0838 by Abi Diane RN  Outcome: Ongoing  10/30/2020 0151 by Yuri Evans RN  Outcome: Ongoing  10/29/2020 2339 by Ezequiel Sullivan RN  Outcome: Ongoing  Goal: Control of acute pain  Description: Control of acute pain  10/30/2020 0838 by Abi Diane RN  Outcome: Ongoing  10/30/2020 0151 by Yuri Evans RN  Outcome: Ongoing  10/29/2020 2339 by Ezequiel Sullivan RN  Outcome: Ongoing  Goal: Control of chronic pain  Description: Control of chronic pain  10/30/2020 0838 by Abi Diane RN  Outcome: Ongoing  10/30/2020 0151 by Yuri Evans RN  Outcome: Ongoing  10/29/2020 2339 by Ezequiel Sullivan RN  Outcome: Ongoing

## 2020-10-31 LAB
ALBUMIN SERPL-MCNC: 3.5 G/DL (ref 3.4–5)
ANION GAP SERPL CALCULATED.3IONS-SCNC: 13 MMOL/L (ref 3–16)
ANION GAP SERPL CALCULATED.3IONS-SCNC: 14 MMOL/L (ref 3–16)
BUN BLDV-MCNC: 33 MG/DL (ref 7–20)
BUN BLDV-MCNC: 35 MG/DL (ref 7–20)
CALCIUM SERPL-MCNC: 8.4 MG/DL (ref 8.3–10.6)
CALCIUM SERPL-MCNC: 8.4 MG/DL (ref 8.3–10.6)
CHLORIDE BLD-SCNC: 100 MMOL/L (ref 99–110)
CHLORIDE BLD-SCNC: 98 MMOL/L (ref 99–110)
CO2: 22 MMOL/L (ref 21–32)
CO2: 23 MMOL/L (ref 21–32)
CREAT SERPL-MCNC: 5 MG/DL (ref 0.9–1.3)
CREAT SERPL-MCNC: 5 MG/DL (ref 0.9–1.3)
GFR AFRICAN AMERICAN: 15
GFR AFRICAN AMERICAN: 15
GFR NON-AFRICAN AMERICAN: 12
GFR NON-AFRICAN AMERICAN: 12
GLUCOSE BLD-MCNC: 127 MG/DL (ref 70–99)
GLUCOSE BLD-MCNC: 143 MG/DL (ref 70–99)
GLUCOSE BLD-MCNC: 74 MG/DL (ref 70–99)
GLUCOSE BLD-MCNC: 79 MG/DL (ref 70–99)
GLUCOSE BLD-MCNC: 82 MG/DL (ref 70–99)
GLUCOSE BLD-MCNC: 94 MG/DL (ref 70–99)
HCT VFR BLD CALC: 25.1 % (ref 40.5–52.5)
HEMOGLOBIN: 8.2 G/DL (ref 13.5–17.5)
MCH RBC QN AUTO: 29 PG (ref 26–34)
MCHC RBC AUTO-ENTMCNC: 32.8 G/DL (ref 31–36)
MCV RBC AUTO: 88.3 FL (ref 80–100)
PDW BLD-RTO: 14.8 % (ref 12.4–15.4)
PERFORMED ON: ABNORMAL
PERFORMED ON: ABNORMAL
PERFORMED ON: NORMAL
PERFORMED ON: NORMAL
PHOSPHORUS: 3.3 MG/DL (ref 2.5–4.9)
PLATELET # BLD: 173 K/UL (ref 135–450)
PMV BLD AUTO: 9.9 FL (ref 5–10.5)
POTASSIUM SERPL-SCNC: 4.5 MMOL/L (ref 3.5–5.1)
POTASSIUM SERPL-SCNC: 4.8 MMOL/L (ref 3.5–5.1)
RBC # BLD: 2.84 M/UL (ref 4.2–5.9)
SODIUM BLD-SCNC: 134 MMOL/L (ref 136–145)
SODIUM BLD-SCNC: 136 MMOL/L (ref 136–145)
WBC # BLD: 9.5 K/UL (ref 4–11)

## 2020-10-31 PROCEDURE — 6360000002 HC RX W HCPCS: Performed by: SURGERY

## 2020-10-31 PROCEDURE — 85027 COMPLETE CBC AUTOMATED: CPT

## 2020-10-31 PROCEDURE — 90935 HEMODIALYSIS ONE EVALUATION: CPT

## 2020-10-31 PROCEDURE — 80069 RENAL FUNCTION PANEL: CPT

## 2020-10-31 PROCEDURE — 6370000000 HC RX 637 (ALT 250 FOR IP): Performed by: SURGERY

## 2020-10-31 PROCEDURE — 2580000003 HC RX 258: Performed by: SURGERY

## 2020-10-31 PROCEDURE — 1200000000 HC SEMI PRIVATE

## 2020-10-31 PROCEDURE — 36415 COLL VENOUS BLD VENIPUNCTURE: CPT

## 2020-10-31 PROCEDURE — 6370000000 HC RX 637 (ALT 250 FOR IP): Performed by: INTERNAL MEDICINE

## 2020-10-31 PROCEDURE — 80048 BASIC METABOLIC PNL TOTAL CA: CPT

## 2020-10-31 RX ADMIN — CLONIDINE HYDROCHLORIDE 0.1 MG: 0.1 TABLET ORAL at 14:02

## 2020-10-31 RX ADMIN — HYDRALAZINE HYDROCHLORIDE 100 MG: 50 TABLET, FILM COATED ORAL at 04:50

## 2020-10-31 RX ADMIN — METHOCARBAMOL TABLETS 500 MG: 500 TABLET, COATED ORAL at 21:26

## 2020-10-31 RX ADMIN — DOXAZOSIN 4 MG: 4 TABLET ORAL at 21:28

## 2020-10-31 RX ADMIN — GABAPENTIN 300 MG: 300 CAPSULE ORAL at 14:04

## 2020-10-31 RX ADMIN — NIFEDIPINE 90 MG: 90 TABLET, FILM COATED, EXTENDED RELEASE ORAL at 13:30

## 2020-10-31 RX ADMIN — TORSEMIDE 20 MG: 20 TABLET ORAL at 13:33

## 2020-10-31 RX ADMIN — HYDRALAZINE HYDROCHLORIDE 100 MG: 50 TABLET, FILM COATED ORAL at 21:25

## 2020-10-31 RX ADMIN — CLONIDINE HYDROCHLORIDE 0.1 MG: 0.1 TABLET ORAL at 21:28

## 2020-10-31 RX ADMIN — ACETAMINOPHEN 650 MG: 325 TABLET ORAL at 08:21

## 2020-10-31 RX ADMIN — NIFEDIPINE 90 MG: 90 TABLET, FILM COATED, EXTENDED RELEASE ORAL at 21:28

## 2020-10-31 RX ADMIN — HEPARIN SODIUM 5000 UNITS: 5000 INJECTION INTRAVENOUS; SUBCUTANEOUS at 04:51

## 2020-10-31 RX ADMIN — OXYCODONE HYDROCHLORIDE AND ACETAMINOPHEN 1 TABLET: 5; 325 TABLET ORAL at 21:26

## 2020-10-31 RX ADMIN — ISOSORBIDE MONONITRATE 60 MG: 60 TABLET, EXTENDED RELEASE ORAL at 13:34

## 2020-10-31 RX ADMIN — OXYCODONE HYDROCHLORIDE AND ACETAMINOPHEN 1 TABLET: 5; 325 TABLET ORAL at 14:03

## 2020-10-31 RX ADMIN — SODIUM CHLORIDE, PRESERVATIVE FREE 10 ML: 5 INJECTION INTRAVENOUS at 14:21

## 2020-10-31 RX ADMIN — SODIUM CHLORIDE, PRESERVATIVE FREE 10 ML: 5 INJECTION INTRAVENOUS at 21:29

## 2020-10-31 RX ADMIN — GABAPENTIN 300 MG: 300 CAPSULE ORAL at 21:25

## 2020-10-31 RX ADMIN — METHOCARBAMOL TABLETS 500 MG: 500 TABLET, COATED ORAL at 14:04

## 2020-10-31 RX ADMIN — HEPARIN SODIUM 5000 UNITS: 5000 INJECTION INTRAVENOUS; SUBCUTANEOUS at 15:00

## 2020-10-31 RX ADMIN — METOPROLOL TARTRATE 50 MG: 50 TABLET, FILM COATED ORAL at 21:28

## 2020-10-31 RX ADMIN — OXYCODONE HYDROCHLORIDE AND ACETAMINOPHEN 1 TABLET: 5; 325 TABLET ORAL at 04:51

## 2020-10-31 RX ADMIN — HYDRALAZINE HYDROCHLORIDE 100 MG: 50 TABLET, FILM COATED ORAL at 14:02

## 2020-10-31 RX ADMIN — POLYETHYLENE GLYCOL 3350 17 G: 17 POWDER, FOR SOLUTION ORAL at 14:05

## 2020-10-31 ASSESSMENT — PAIN DESCRIPTION - PAIN TYPE
TYPE: SURGICAL PAIN

## 2020-10-31 ASSESSMENT — PAIN DESCRIPTION - FREQUENCY
FREQUENCY: INTERMITTENT
FREQUENCY: INTERMITTENT

## 2020-10-31 ASSESSMENT — PAIN SCALES - GENERAL
PAINLEVEL_OUTOF10: 0
PAINLEVEL_OUTOF10: 6
PAINLEVEL_OUTOF10: 0
PAINLEVEL_OUTOF10: 8
PAINLEVEL_OUTOF10: 3
PAINLEVEL_OUTOF10: 10
PAINLEVEL_OUTOF10: 0
PAINLEVEL_OUTOF10: 3

## 2020-10-31 ASSESSMENT — PAIN DESCRIPTION - ORIENTATION
ORIENTATION: LOWER
ORIENTATION: MID;LOWER

## 2020-10-31 ASSESSMENT — PAIN DESCRIPTION - DESCRIPTORS
DESCRIPTORS: ACHING;CRAMPING
DESCRIPTORS: ACHING
DESCRIPTORS: SHARP

## 2020-10-31 ASSESSMENT — PAIN - FUNCTIONAL ASSESSMENT
PAIN_FUNCTIONAL_ASSESSMENT: ACTIVITIES ARE NOT PREVENTED
PAIN_FUNCTIONAL_ASSESSMENT: ACTIVITIES ARE NOT PREVENTED

## 2020-10-31 ASSESSMENT — PAIN DESCRIPTION - ONSET
ONSET: GRADUAL
ONSET: GRADUAL

## 2020-10-31 ASSESSMENT — PAIN DESCRIPTION - LOCATION
LOCATION: ABDOMEN

## 2020-10-31 ASSESSMENT — PAIN DESCRIPTION - PROGRESSION
CLINICAL_PROGRESSION: GRADUALLY IMPROVING
CLINICAL_PROGRESSION: GRADUALLY IMPROVING

## 2020-10-31 NOTE — PROGRESS NOTES
Pt francesca earlier transfusion well. No c/o pain at present. RT chest vasc cath intact,dressing dry. LT abd dressing intact Monitor showing NSR. I have attempted to contact this patient by phone with the following results: . LIGHT IN REACH.urinal at bedside.

## 2020-10-31 NOTE — PROGRESS NOTES
[facility]                                                 [phone]                                      [facility] [place]                                        Inpatient Progress note    Summary:   Raissa Valdes is being seen by nephrology for KISHORE on CKD. Admitted with abdominal pain nausea and vomiting. Had been having issues with consitpation. He was found to have worsening kidney function at that time. He has a h/o polycystic kidney disease, HTN, prior strokes, cocaine use. Interval History  Seen and examined at bedside. Has no new complaints. No SOB. No chest pain. /78  UF 1.5 L     Plan:   - no acute indications for dialysis today   - BP acceptable. - strict IO  - renal diet          Assessment:   Renal function:   KISHORE / CKD. Baseline 3.6 (10/2019)  Has polycystic kidney disease. Likely diastolic progression with uncontrolled hypertension use of cocaine among other things. UA bland except for significant proteinuria. No peripheral eosinophilia. CT scan with PKD. Some indeterminate cysts. Bladder scan. R/o obstructive uropathy. Serologies: AUDRA, ANCA, C3, C4, previously negative  Hepatitis panel,   SPEP negative 2018. Avoid nephrotoxins. No NSAIDS.    Because of his addiction issue we did not refer him to kidney transplant but now that he has been sober for the last 7 to 8 months this can become an option to. I think he would be an okay candidate for peritoneal dialysis. S/p PD catheter placement. HD started 10/29, 10/30    Electrolytes/acid base:   Hyponatremia  No other issues. CKD-BMD:   Needs updated PTH. And vit D   Lab Results   Component Value Date    PTH 49.8 02/01/2019    CALCIUM 8.4 10/30/2020    CALCIUM 8.4 10/30/2020    PHOS 3.6 10/30/2020         BP/volume:   Not at goal  No acute volume issue.s   On room air. Procardia 90 mG bid, hydral 100 TID, metop 50 mg BID, clonidine 01. Mg TID, inmdur 60 daily. cardura 4 mg BID.   Torsemide 20 mg Heme:  Severe anemia  Hgb 6.7 s/p blood   Platelets ok   retacrit 10 K units TIW    [facility] thanks you for the opportunity to serve this patient. Please call with any questions of concerns. Denis Thompson MD  [facility]  [address], [address]  Fax: [phone]  Cell: [phone]  24 hrs answering service [phone]      ROS:   [address]. All other remaining systems are negative. Constitutional:  fever, chills, weakness, weight change, fatigue,      Skin:  rash, pruritus, hair loss, bruising, dry skin, petechiae. Head, Face, Neck   headaches, swelling,  cervical adenopathy. Respiratory: shortness of breath, cough, or wheezing  Cardiovascular: chest pain, palpitations, dizzy, edema  Gastrointestinal: nausea, vomiting, diarrhea, constipation,belly pain    Yellow skin, blood in stool  Musculoskeletal:  back pain, muscle weakness, gait problems,       joint pain or swelling. Genitourinary:  dysuria, poor urine flow, flank pain, blood in urine  Neurologic:  vertigo, TIA'S, syncope, seizures, focal weakness  Psychosocial:  insomnia, anxiety, or depression. Additional positive findings: -       Past medical history, surgical history, social history, family history are reviewed and updated as appropriate. Reviewed current medication list.     Vitals:    10/31/20 0405   BP: 135/73   Pulse: 79   Resp: 16   Temp: 98 °F (36.7 °C)   SpO2: 95%       General appearance: male in NAD, fully alert and oriented. Comfortable. HEENT: EOM intact, no icterus. Trachea is midline. Neck : No mass, appears symmetrical, no JVD   Respiratory: Respiratory effort appears normal, bilateral equal chest rise. Cardiovascular: Ausculation- No M/R/G, no edema  Abdomen: No visible mass or tenderness, PD catheter  Musculoskeletal:  No clubbing,cyanosis, joints with no swelling or deformity. Skin: no rashes, ulcers, induration, no jaundice.    Neuro: face symmetric, no focal deficits. Appropriate responses.  CN 2-12 grossly intact  TDC right chest

## 2020-10-31 NOTE — PROGRESS NOTES
Progress Note  Admit Date: 10/25/2020      PCP: ROQUE Colvin CNP     CC: F/U for nominal pain nausea vomiting    Days in hospital:  6    SUBJECTIVE / Interval History:  Patient seen in dialysis. Has no complaints      Allergies  Doxazosin    Medications    Scheduled Meds:   hydrALAZINE  100 mg Oral 3 times per day    doxazosin  4 mg Oral 2 times per day    polyethylene glycol  17 g Oral BID    cloNIDine  0.1 mg Oral TID    NIFEdipine  90 mg Oral BID    epoetin robert-epbx  10,000 Units Subcutaneous Once per day on Mon Wed Fri    torsemide  20 mg Oral Daily    methocarbamol  500 mg Oral TID    lidocaine  1 patch Transdermal Daily    atorvastatin  40 mg Oral Daily    gabapentin  300 mg Oral TID    insulin glargine  20 Units Subcutaneous Nightly    isosorbide mononitrate  60 mg Oral Daily    metoprolol tartrate  50 mg Oral BID    sodium chloride flush  10 mL Intravenous 2 times per day    heparin (porcine)  5,000 Units Subcutaneous 3 times per day    insulin lispro  0-6 Units Subcutaneous TID WC    insulin lispro  0-3 Units Subcutaneous Nightly     Continuous Infusions:   dextrose         PRN Meds:  heparin (porcine), oxyCODONE-acetaminophen **OR** [DISCONTINUED] oxyCODONE-acetaminophen, sodium chloride flush, acetaminophen **OR** acetaminophen, polyethylene glycol, promethazine **OR** ondansetron, glucose, dextrose, glucagon (rDNA), dextrose, sennosides-docusate sodium    Vitals    /73   Pulse 79   Temp 98 °F (36.7 °C) (Oral)   Resp 16   Ht 6' 2\" (1.88 m)   Wt 170 lb 10.2 oz (77.4 kg)   SpO2 95%   BMI 21.91 kg/m²     Exam:    Gen: No distress. Eyes: PERRL. No sclera icterus. No conjunctival injection. ENT: No discharge. Pharynx clear. External appearance of ears and nose normal.  Neck: Trachea midline. No obvious mass. Resp: No accessory muscle use. No crackles. No wheezes. No rhonchi. No dullness on percussion. CV: Regular rate. Regular rhythm. No murmur or rub. No edema. GI: Non-tender. Non-distended. No hernia. Skin: Warm, dry, normal texture and turgor. No nodule on exposed extremities. Lymph: No cervical LAD. No supraclavicular LAD. M/S: No cyanosis. No clubbing. Tenderness present on palpation of right lower lumbar region  Neuro: Moves all four extremities. CN 2-12 tested, no defect noted. Psych: Oriented x 3. No anxiety. Awake. Alert. Intact judgement and insight. Data    LABS  CBC:   Recent Labs     10/30/20  0801   WBC 10.5   HGB 6.7*   HCT 20.4*   MCV 88.3        BMP:   Recent Labs     10/29/20  1000 10/30/20  0801 10/31/20  0734    133*  135* 134*  136   K 4.8 4.6  4.7 4.8  4.5    100  102 98*  100   CO2 17* 22  23 23  22   PHOS  --  3.6 3.3   BUN 63* 48*  48* 35*  33*   CREATININE 6.8* 6.1*  6.1* 5.0*  5.0*   GLUCOSE 124* 90  88 74  79     POC GLUCOSE:    Recent Labs     10/30/20  0822 10/30/20  1215 10/30/20  1651 10/30/20  2034 10/31/20  0802   POCGLU 102* 108* 145* 110* 82     LIVER PROFILE:   Recent Labs     10/30/20  0801 10/31/20  0734   LABALBU 3.4 3.5     PT/INR: No results for input(s): PROTIME, INR in the last 72 hours. APTT: No results for input(s): APTT in the last 72 hours. UA:  No results for input(s): NITRITE, COLORU, PHUR, LABCAST, WBCUA, RBCUA, MUCUS, TRICHOMONAS, YEAST, BACTERIA, CLARITYU, SPECGRAV, LEUKOCYTESUR, UROBILINOGEN, BILIRUBINUR, BLOODU, GLUCOSEU, KETUA, AMORPHOUS in the last 72 hours. Microbiology:  Wound Culture: No results for input(s): WNDABS, ORG in the last 72 hours. Invalid input(s):  LABGRAM  Nasal Culture: No results for input(s): ORG, MRSAPCR in the last 72 hours. Blood Culture: No results for input(s): BC, BLOODCULT2 in the last 72 hours. Fungal Culture:   No results for input(s): FUNGSM in the last 72 hours. No results for input(s): FUNCXBLD in the last 72 hours.   CSF Culture:  No results for input(s): COLORCSF, APPEARCSF, CFTUBE, CLOTCSF, WBCCSF, RBCCSF, NEUTCSF, NUMCELLSCSF, LYMPHSCSF, MONOCSF, GLUCCSF, VOLCSF in the last 72 hours. Respiratory Culture:  No results for input(s): Veronica Maria G in the last 72 hours. AFB:No results for input(s): AFBSMEAR in the last 72 hours. Urine Culture  No results for input(s): LABURIN in the last 72 hours. RADIOLOGY:    XR CHEST PORTABLE   Final Result   Right internal jugular tunneled hemodialysis catheter with tip at the   cavoatrial junction. Cardiomegaly with bilateral airspace disease suggestive of mild edema. FL VASCULAR ACCESS GUIDANCE   Final Result      CT CHEST ABDOMEN PELVIS WO CONTRAST   Final Result   Chest-      Moderate to large pericardial effusion. Mild atelectasis in the lingula and left lower lobe. Pneumonia is considered   less likely. Abdomen pelvis-      No acute inflammatory abnormality is identified in the abdomen or pelvis. Mild prominence of stool in the colon. Correlation for constipation   recommended. Polycystic kidney disease. Bilateral nonobstructive nephrolithiasis. Caliceal stones measure up to 5 mm   on the left. CONSULTS:    IP CONSULT TO NEPHROLOGY  IP CONSULT TO CARDIOLOGY  IP CONSULT TO VASCULAR SURGERY  IP CONSULT TO PHARMACY    ASSESSMENT AND PLAN:      Principal Problem:    Acute renal failure superimposed on chronic kidney disease, on chronic dialysis (HCC)  Active Problems:    Essential hypertension    Type 2 diabetes mellitus with stage 3 chronic kidney disease, with long-term current use of insulin ([facility])    Cardiomyopathy ([facility] [address]    Elevated troponin    History of stroke    Mixed hyperlipidemia    Acute on chronic renal failure (HCC)    Pericardial effusion    Lower abdominal pain  Resolved Problems:    * No resolved hospital problems. *    Patient is a 61-year-old male with a past medical history of CHF, CVA, diabetes, gastroparesis who presented with vague abdominal pain with vomiting and constipation.   In the ER patient was found to have an elevated creatinine and CT scan showed pericardial effusion patient was admitted with KISHORE on CKD. Creatinine continued to go up was in the hospital.  He underwent PD and HD catheter placement on 10/29/2020. Hemodialysis is initiated. Plans to transition to peritoneal dialysis in outpatient    Acute on chronic renal failure-for dialysis today  - s/p  PD and hemodialysis cath placement 10/29/2020  -Baseline creatinine is around 3. On admission creatinine 5. Peaked at around 6.9  -nephrology consult appreciated   -Hold nephrotoxic medication      Acute blood loss anemia-posttransfusion hemoglobin pending, a.m. labs ordered  -Patient had a lot of postop bleeding from the tunneled dialysis catheter. No bleeding at present  -Hemoglobin is less than 7. Will transfuse 1 unit  -Baseline hemoglobin around 8      Pericardial effusion- no active management  -CT shows moderate to large pericardial effusion,Echo ordered shows mild to moderate effusion with no tamponade-Cardiology consulted    Diabetes mellitus  -Continue Lantus and sliding scale  -Monitor blood sugars     Hypertension- Bp on the higher side, Acacian suggested  -Unclear if patient is compliant with his medications  -Monitor blood pressure  -Continue home meds    Elevated troponin  -Most likely secondary to CKD  --Normal recent stress test    Cardiomyopathy  -Last echo on 6/20 showed a EF of 35 to 40% with grade 2 diastolic dysfunction  -Hold  ACE/ARB due to KISHORE  -hold diuretic  -cardiology following    Hyperlipidemia  -Tinea statin    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 Status: Full Code    PT/OT Eval Status:ordered    Discharge plan -needs dialysis spot    The patient and / or the family were informed of the results of any tests, a time was given to answer questions, a plan was proposed and they agreed with plan. Discussed with consulting physicians, nursing and social work     The note was completed using EMR. Every effort was made to ensure accuracy; however, inadvertent computerized transcription errors may be present.        Sj Fletcher MD

## 2020-10-31 NOTE — PLAN OF CARE
Problem: Falls - Risk of:  Goal: Will remain free from falls  Description: Will remain free from falls  Outcome: Ongoing  Goal: Absence of physical injury  Description: Absence of physical injury  Outcome: Ongoing     Problem: OXYGENATION/RESPIRATORY FUNCTION  Goal: Patient will maintain patent airway  Outcome: Ongoing  Goal: Patient will achieve/maintain normal respiratory rate/effort  Description: Respiratory rate and effort will be within normal limits for the patient  Outcome: Ongoing     Problem: HEMODYNAMIC STATUS  Goal: Patient has stable vital signs and fluid balance  Outcome: Ongoing     Problem: FLUID AND ELECTROLYTE IMBALANCE  Goal: Fluid and electrolyte balance are achieved/maintained  Outcome: Ongoing     Problem: ACTIVITY INTOLERANCE/IMPAIRED MOBILITY  Goal: Mobility/activity is maintained at optimum level for patient  Outcome: Ongoing     Problem: Pain:  Goal: Pain level will decrease  Description: Pain level will decrease  Outcome: Ongoing  Goal: Control of acute pain  Description: Control of acute pain  Outcome: Ongoing  Goal: Control of chronic pain  Description: Control of chronic pain  Outcome: Ongoing     Problem: Nutrition  Goal: Optimal nutrition therapy  Outcome: Ongoing

## 2020-10-31 NOTE — FLOWSHEET NOTE
Treatment time: 3 hrs     Net UF: 0.6 kg     Pre weight: 78.6 kg  Post weight: 78.0 kg   EDW: 77.5 kg     Access used: R CVC   Access function: Well     Medications or blood products given: None     Regular outpatient schedule: TBD     Summary of response to treatment: Pt tolerated tx well;     Copy of dialysis treatment record placed in chart, to be scanned into EMR. 10/31/20 1227   Dialysis Bath   HCO3 (Bicarb) 32   Post-Hemodialysis Assessment   Rinseback Volume (ml) 400 ml   Total Liters Processed (l/min) 40.8 l/min   Dialyzer Clearance Lightly streaked   Duration of Treatment (minutes) 180 minutes   Hemodialysis Intake (ml) 500 ml   Hemodialysis Output (ml) 1110 ml   NET Removed (ml) 600 ml   Tolerated Treatment Good   Physician Notified?  Yes

## 2020-11-01 LAB
ANION GAP SERPL CALCULATED.3IONS-SCNC: 15 MMOL/L (ref 3–16)
BUN BLDV-MCNC: 23 MG/DL (ref 7–20)
CALCIUM SERPL-MCNC: 8.7 MG/DL (ref 8.3–10.6)
CHLORIDE BLD-SCNC: 95 MMOL/L (ref 99–110)
CO2: 23 MMOL/L (ref 21–32)
CREAT SERPL-MCNC: 4.5 MG/DL (ref 0.9–1.3)
CRYOGLOBULIN, QUALITATIVE: NORMAL
GFR AFRICAN AMERICAN: 17
GFR NON-AFRICAN AMERICAN: 14
GLUCOSE BLD-MCNC: 115 MG/DL (ref 70–99)
GLUCOSE BLD-MCNC: 122 MG/DL (ref 70–99)
GLUCOSE BLD-MCNC: 124 MG/DL (ref 70–99)
GLUCOSE BLD-MCNC: 77 MG/DL (ref 70–99)
GLUCOSE BLD-MCNC: 79 MG/DL (ref 70–99)
HCT VFR BLD CALC: 26.2 % (ref 40.5–52.5)
HEMOGLOBIN: 8.6 G/DL (ref 13.5–17.5)
MCH RBC QN AUTO: 28.8 PG (ref 26–34)
MCHC RBC AUTO-ENTMCNC: 32.7 G/DL (ref 31–36)
MCV RBC AUTO: 87.8 FL (ref 80–100)
PDW BLD-RTO: 14.5 % (ref 12.4–15.4)
PERFORMED ON: ABNORMAL
PERFORMED ON: NORMAL
PLATELET # BLD: 195 K/UL (ref 135–450)
PMV BLD AUTO: 9.9 FL (ref 5–10.5)
POTASSIUM SERPL-SCNC: 3.8 MMOL/L (ref 3.5–5.1)
RBC # BLD: 2.98 M/UL (ref 4.2–5.9)
SODIUM BLD-SCNC: 133 MMOL/L (ref 136–145)
WBC # BLD: 9.3 K/UL (ref 4–11)

## 2020-11-01 PROCEDURE — 6360000002 HC RX W HCPCS: Performed by: SURGERY

## 2020-11-01 PROCEDURE — 6370000000 HC RX 637 (ALT 250 FOR IP): Performed by: SURGERY

## 2020-11-01 PROCEDURE — 36415 COLL VENOUS BLD VENIPUNCTURE: CPT

## 2020-11-01 PROCEDURE — 1200000000 HC SEMI PRIVATE

## 2020-11-01 PROCEDURE — 2580000003 HC RX 258: Performed by: SURGERY

## 2020-11-01 PROCEDURE — 6370000000 HC RX 637 (ALT 250 FOR IP): Performed by: INTERNAL MEDICINE

## 2020-11-01 PROCEDURE — 80048 BASIC METABOLIC PNL TOTAL CA: CPT

## 2020-11-01 PROCEDURE — 85027 COMPLETE CBC AUTOMATED: CPT

## 2020-11-01 RX ADMIN — NIFEDIPINE 90 MG: 90 TABLET, FILM COATED, EXTENDED RELEASE ORAL at 09:17

## 2020-11-01 RX ADMIN — HEPARIN SODIUM 5000 UNITS: 5000 INJECTION INTRAVENOUS; SUBCUTANEOUS at 14:12

## 2020-11-01 RX ADMIN — GABAPENTIN 300 MG: 300 CAPSULE ORAL at 09:14

## 2020-11-01 RX ADMIN — CLONIDINE HYDROCHLORIDE 0.1 MG: 0.1 TABLET ORAL at 14:07

## 2020-11-01 RX ADMIN — HEPARIN SODIUM 5000 UNITS: 5000 INJECTION INTRAVENOUS; SUBCUTANEOUS at 06:55

## 2020-11-01 RX ADMIN — HYDRALAZINE HYDROCHLORIDE 100 MG: 50 TABLET, FILM COATED ORAL at 06:55

## 2020-11-01 RX ADMIN — METHOCARBAMOL TABLETS 500 MG: 500 TABLET, COATED ORAL at 09:17

## 2020-11-01 RX ADMIN — SODIUM CHLORIDE, PRESERVATIVE FREE 10 ML: 5 INJECTION INTRAVENOUS at 22:46

## 2020-11-01 RX ADMIN — GABAPENTIN 300 MG: 300 CAPSULE ORAL at 22:45

## 2020-11-01 RX ADMIN — INSULIN GLARGINE 20 UNITS: 100 INJECTION, SOLUTION SUBCUTANEOUS at 01:03

## 2020-11-01 RX ADMIN — METHOCARBAMOL TABLETS 500 MG: 500 TABLET, COATED ORAL at 22:45

## 2020-11-01 RX ADMIN — METOPROLOL TARTRATE 50 MG: 50 TABLET, FILM COATED ORAL at 22:46

## 2020-11-01 RX ADMIN — HYDRALAZINE HYDROCHLORIDE 100 MG: 50 TABLET, FILM COATED ORAL at 22:46

## 2020-11-01 RX ADMIN — OXYCODONE HYDROCHLORIDE AND ACETAMINOPHEN 1 TABLET: 5; 325 TABLET ORAL at 01:27

## 2020-11-01 RX ADMIN — METHOCARBAMOL TABLETS 500 MG: 500 TABLET, COATED ORAL at 14:07

## 2020-11-01 RX ADMIN — TORSEMIDE 20 MG: 20 TABLET ORAL at 09:17

## 2020-11-01 RX ADMIN — SODIUM CHLORIDE, PRESERVATIVE FREE 10 ML: 5 INJECTION INTRAVENOUS at 09:17

## 2020-11-01 RX ADMIN — DOXAZOSIN 4 MG: 4 TABLET ORAL at 09:17

## 2020-11-01 RX ADMIN — HEPARIN SODIUM 5000 UNITS: 5000 INJECTION INTRAVENOUS; SUBCUTANEOUS at 22:48

## 2020-11-01 RX ADMIN — METOPROLOL TARTRATE 50 MG: 50 TABLET, FILM COATED ORAL at 09:17

## 2020-11-01 RX ADMIN — POLYETHYLENE GLYCOL 3350 17 G: 17 POWDER, FOR SOLUTION ORAL at 09:13

## 2020-11-01 RX ADMIN — CLONIDINE HYDROCHLORIDE 0.1 MG: 0.1 TABLET ORAL at 22:46

## 2020-11-01 RX ADMIN — HYDRALAZINE HYDROCHLORIDE 100 MG: 50 TABLET, FILM COATED ORAL at 14:07

## 2020-11-01 RX ADMIN — INSULIN GLARGINE 20 UNITS: 100 INJECTION, SOLUTION SUBCUTANEOUS at 22:48

## 2020-11-01 RX ADMIN — GABAPENTIN 300 MG: 300 CAPSULE ORAL at 14:07

## 2020-11-01 RX ADMIN — NIFEDIPINE 90 MG: 90 TABLET, FILM COATED, EXTENDED RELEASE ORAL at 22:46

## 2020-11-01 RX ADMIN — ATORVASTATIN CALCIUM 40 MG: 40 TABLET, FILM COATED ORAL at 09:17

## 2020-11-01 RX ADMIN — HEPARIN SODIUM 5000 UNITS: 5000 INJECTION INTRAVENOUS; SUBCUTANEOUS at 01:04

## 2020-11-01 RX ADMIN — POLYETHYLENE GLYCOL 3350 17 G: 17 POWDER, FOR SOLUTION ORAL at 22:47

## 2020-11-01 RX ADMIN — DOXAZOSIN 4 MG: 4 TABLET ORAL at 22:46

## 2020-11-01 RX ADMIN — ISOSORBIDE MONONITRATE 60 MG: 60 TABLET, EXTENDED RELEASE ORAL at 09:17

## 2020-11-01 RX ADMIN — CLONIDINE HYDROCHLORIDE 0.1 MG: 0.1 TABLET ORAL at 09:14

## 2020-11-01 ASSESSMENT — PAIN SCALES - GENERAL
PAINLEVEL_OUTOF10: 6
PAINLEVEL_OUTOF10: 0
PAINLEVEL_OUTOF10: 8

## 2020-11-01 NOTE — PROGRESS NOTES
Progress Note  Admit Date: 10/25/2020      PCP: ROQUE Alcaraz CNP     CC: F/U for nominal pain nausea vomiting    Days in hospital:  7    SUBJECTIVE / Interval History:    Has some abdominal pain at the PD catheter site otherwise no complaints      Allergies  Doxazosin    Medications    Scheduled Meds:   hydrALAZINE  100 mg Oral 3 times per day    doxazosin  4 mg Oral 2 times per day    polyethylene glycol  17 g Oral BID    cloNIDine  0.1 mg Oral TID    NIFEdipine  90 mg Oral BID    epoetin robert-epbx  10,000 Units Subcutaneous Once per day on Mon Wed Fri    torsemide  20 mg Oral Daily    methocarbamol  500 mg Oral TID    lidocaine  1 patch Transdermal Daily    atorvastatin  40 mg Oral Daily    gabapentin  300 mg Oral TID    insulin glargine  20 Units Subcutaneous Nightly    isosorbide mononitrate  60 mg Oral Daily    metoprolol tartrate  50 mg Oral BID    sodium chloride flush  10 mL Intravenous 2 times per day    heparin (porcine)  5,000 Units Subcutaneous 3 times per day    insulin lispro  0-6 Units Subcutaneous TID WC    insulin lispro  0-3 Units Subcutaneous Nightly     Continuous Infusions:   dextrose         PRN Meds:  heparin (porcine), oxyCODONE-acetaminophen **OR** [DISCONTINUED] oxyCODONE-acetaminophen, sodium chloride flush, acetaminophen **OR** acetaminophen, polyethylene glycol, promethazine **OR** ondansetron, glucose, dextrose, glucagon (rDNA), dextrose, sennosides-docusate sodium    Vitals    /70   Pulse 76   Temp 98.5 °F (36.9 °C) (Oral)   Resp 18   Ht 6' 2\" (1.88 m)   Wt 158 lb 8.2 oz (71.9 kg)   SpO2 96%   BMI 20.35 kg/m²     Exam:    Gen: No distress. Eyes: PERRL. No sclera icterus. No conjunctival injection. ENT: No discharge. Pharynx clear. External appearance of ears and nose normal.  Neck: Trachea midline. No obvious mass. Resp: No accessory muscle use. No crackles. No wheezes. No rhonchi. No dullness on percussion. CV: Regular rate. Regular rhythm. No murmur or rub. No edema. GI: Non-tender. Non-distended. No hernia. Skin: Warm, dry, normal texture and turgor. No nodule on exposed extremities. Lymph: No cervical LAD. No supraclavicular LAD. M/S: No cyanosis. No clubbing. Tenderness present on palpation of right lower lumbar region  Neuro: Moves all four extremities. CN 2-12 tested, no defect noted. Psych: Oriented x 3. No anxiety. Awake. Alert. Intact judgement and insight. Data    LABS  CBC:   Recent Labs     10/30/20  0801 10/31/20  0921   WBC 10.5 9.5   HGB 6.7* 8.2*   HCT 20.4* 25.1*   MCV 88.3 88.3    173     BMP:   Recent Labs     10/30/20  0801 10/31/20  0734   *  135* 134*  136   K 4.6  4.7 4.8  4.5     102 98*  100   CO2 22  23 23  22   PHOS 3.6 3.3   BUN 48*  48* 35*  33*   CREATININE 6.1*  6.1* 5.0*  5.0*   GLUCOSE 90  88 74  79     POC GLUCOSE:    Recent Labs     10/31/20  0802 10/31/20  1408 10/31/20  1651 10/31/20  2140 11/01/20  0812   POCGLU 82 94 127* 143* 79     LIVER PROFILE:   Recent Labs     10/30/20  0801 10/31/20  0734   LABALBU 3.4 3.5     PT/INR: No results for input(s): PROTIME, INR in the last 72 hours. APTT: No results for input(s): APTT in the last 72 hours. UA:  No results for input(s): NITRITE, COLORU, PHUR, LABCAST, WBCUA, RBCUA, MUCUS, TRICHOMONAS, YEAST, BACTERIA, CLARITYU, SPECGRAV, LEUKOCYTESUR, UROBILINOGEN, BILIRUBINUR, BLOODU, GLUCOSEU, KETUA, AMORPHOUS in the last 72 hours. Microbiology:  Wound Culture: No results for input(s): WNDABS, ORG in the last 72 hours. Invalid input(s):  LABGRAM  Nasal Culture: No results for input(s): ORG, MRSAPCR in the last 72 hours. Blood Culture: No results for input(s): BC, BLOODCULT2 in the last 72 hours. Fungal Culture:   No results for input(s): FUNGSM in the last 72 hours. No results for input(s): FUNCXBLD in the last 72 hours.   CSF Culture:  No results for input(s): COLORCSF, APPEARCSF, CFTUBE, CLOTCSF, WBCCSF, patient was found to have an elevated creatinine and CT scan showed pericardial effusion patient was admitted with KISHORE on CKD. Creatinine continued to go up was in the hospital.  He underwent PD and HD catheter placement on 10/29/2020. Hemodialysis is initiated. Plans to transition to peritoneal dialysis in outpatient    Acute on chronic renal failure-dialysis per nephrology  - s/p  PD and hemodialysis cath placement 10/29/2020  -Baseline creatinine is around 3. On admission creatinine 5. Peaked at around 6.9  -nephrology consult appreciated   -Hold nephrotoxic medication      Acute blood loss anemia-posttransfusion hemoglobin stable  -Patient had a lot of postop bleeding from the tunneled dialysis catheter. No bleeding at present  -Hemoglobin is less than 7. Will transfuse 1 unit  -Baseline hemoglobin around 8      Pericardial effusion- no active management  -CT shows moderate to large pericardial effusion,Echo ordered shows mild to moderate effusion with no tamponade-Cardiology consulted    Diabetes mellitus  -Continue Lantus and sliding scale  -Monitor blood sugars     Hypertension- Bp better  -Unclear if patient is compliant with his medications  -Monitor blood pressure  -Continue home meds    Elevated troponin  -Most likely secondary to CKD  --Normal recent stress test    Cardiomyopathy  -Last echo on 6/20 showed a EF of 35 to 40% with grade 2 diastolic dysfunction  -Hold  ACE/ARB due to KISHORE  -hold diuretic  -cardiology following    Hyperlipidemia  -Tinea statin    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

## 2020-11-01 NOTE — PLAN OF CARE
Problem: Pain:  Description: Pain management should include both nonpharmacologic and pharmacologic interventions. Goal: Pain level will decrease  Description: Pain level will decrease  Outcome: Ongoing  Goal: Control of acute pain  Description: Control of acute pain  Outcome: Ongoing  Goal: Control of chronic pain  Description: Control of chronic pain  Outcome: Ongoing  Pain level assessed. Pt able to rate pain on a scale of 0-10. Administered PRN analgesics per order. Encouraged to use heat/ cold and reposition for comfort. Reassessed for effectiveness. Will continue to monitor.

## 2020-11-02 LAB
ANION GAP SERPL CALCULATED.3IONS-SCNC: 12 MMOL/L (ref 3–16)
BUN BLDV-MCNC: 27 MG/DL (ref 7–20)
CALCIUM SERPL-MCNC: 8.8 MG/DL (ref 8.3–10.6)
CHLORIDE BLD-SCNC: 99 MMOL/L (ref 99–110)
CO2: 24 MMOL/L (ref 21–32)
CREAT SERPL-MCNC: 6.1 MG/DL (ref 0.9–1.3)
GFR AFRICAN AMERICAN: 12
GFR NON-AFRICAN AMERICAN: 10
GLUCOSE BLD-MCNC: 110 MG/DL (ref 70–99)
GLUCOSE BLD-MCNC: 127 MG/DL (ref 70–99)
GLUCOSE BLD-MCNC: 163 MG/DL (ref 70–99)
GLUCOSE BLD-MCNC: 91 MG/DL (ref 70–99)
HCT VFR BLD CALC: 24.4 % (ref 40.5–52.5)
HCV QNT BY NAAT IU/ML: ABNORMAL
HCV QNT BY NAAT LOG IU/ML: 5.42 LOG IU/ML
HEMOGLOBIN: 8.1 G/DL (ref 13.5–17.5)
INTERPRETATION: DETECTED
MCH RBC QN AUTO: 29.2 PG (ref 26–34)
MCHC RBC AUTO-ENTMCNC: 33.2 G/DL (ref 31–36)
MCV RBC AUTO: 88 FL (ref 80–100)
PDW BLD-RTO: 14.3 % (ref 12.4–15.4)
PERFORMED ON: ABNORMAL
PERFORMED ON: ABNORMAL
PERFORMED ON: NORMAL
PLATELET # BLD: 209 K/UL (ref 135–450)
PMV BLD AUTO: 10.3 FL (ref 5–10.5)
POTASSIUM SERPL-SCNC: 4.2 MMOL/L (ref 3.5–5.1)
RBC # BLD: 2.78 M/UL (ref 4.2–5.9)
SODIUM BLD-SCNC: 135 MMOL/L (ref 136–145)
WBC # BLD: 8.3 K/UL (ref 4–11)

## 2020-11-02 PROCEDURE — 6370000000 HC RX 637 (ALT 250 FOR IP): Performed by: INTERNAL MEDICINE

## 2020-11-02 PROCEDURE — 6370000000 HC RX 637 (ALT 250 FOR IP): Performed by: SURGERY

## 2020-11-02 PROCEDURE — 2580000003 HC RX 258: Performed by: SURGERY

## 2020-11-02 PROCEDURE — 36415 COLL VENOUS BLD VENIPUNCTURE: CPT

## 2020-11-02 PROCEDURE — 6360000002 HC RX W HCPCS: Performed by: SURGERY

## 2020-11-02 PROCEDURE — 99024 POSTOP FOLLOW-UP VISIT: CPT | Performed by: SURGERY

## 2020-11-02 PROCEDURE — 80048 BASIC METABOLIC PNL TOTAL CA: CPT

## 2020-11-02 PROCEDURE — 85027 COMPLETE CBC AUTOMATED: CPT

## 2020-11-02 PROCEDURE — 1200000000 HC SEMI PRIVATE

## 2020-11-02 PROCEDURE — 90935 HEMODIALYSIS ONE EVALUATION: CPT

## 2020-11-02 PROCEDURE — 94760 N-INVAS EAR/PLS OXIMETRY 1: CPT

## 2020-11-02 RX ORDER — TORSEMIDE 20 MG/1
40 TABLET ORAL DAILY
Status: DISCONTINUED | OUTPATIENT
Start: 2020-11-03 | End: 2020-11-03 | Stop reason: HOSPADM

## 2020-11-02 RX ORDER — CLONIDINE HYDROCHLORIDE 0.1 MG/1
0.1 TABLET ORAL 2 TIMES DAILY
Status: DISCONTINUED | OUTPATIENT
Start: 2020-11-02 | End: 2020-11-03 | Stop reason: HOSPADM

## 2020-11-02 RX ORDER — SPIRONOLACTONE 25 MG/1
50 TABLET ORAL DAILY
Status: DISCONTINUED | OUTPATIENT
Start: 2020-11-02 | End: 2020-11-03 | Stop reason: HOSPADM

## 2020-11-02 RX ADMIN — HEPARIN SODIUM 5000 UNITS: 5000 INJECTION INTRAVENOUS; SUBCUTANEOUS at 21:34

## 2020-11-02 RX ADMIN — TORSEMIDE 20 MG: 20 TABLET ORAL at 08:43

## 2020-11-02 RX ADMIN — HEPARIN SODIUM 5000 UNITS: 5000 INJECTION INTRAVENOUS; SUBCUTANEOUS at 06:15

## 2020-11-02 RX ADMIN — HYDRALAZINE HYDROCHLORIDE 100 MG: 50 TABLET, FILM COATED ORAL at 21:33

## 2020-11-02 RX ADMIN — SODIUM CHLORIDE, PRESERVATIVE FREE 10 ML: 5 INJECTION INTRAVENOUS at 08:42

## 2020-11-02 RX ADMIN — NIFEDIPINE 90 MG: 90 TABLET, FILM COATED, EXTENDED RELEASE ORAL at 21:33

## 2020-11-02 RX ADMIN — CLONIDINE HYDROCHLORIDE 0.1 MG: 0.1 TABLET ORAL at 21:33

## 2020-11-02 RX ADMIN — NIFEDIPINE 90 MG: 90 TABLET, FILM COATED, EXTENDED RELEASE ORAL at 08:43

## 2020-11-02 RX ADMIN — CLONIDINE HYDROCHLORIDE 0.1 MG: 0.1 TABLET ORAL at 08:42

## 2020-11-02 RX ADMIN — ISOSORBIDE MONONITRATE 60 MG: 60 TABLET, EXTENDED RELEASE ORAL at 08:43

## 2020-11-02 RX ADMIN — METHOCARBAMOL TABLETS 500 MG: 500 TABLET, COATED ORAL at 08:43

## 2020-11-02 RX ADMIN — GABAPENTIN 300 MG: 300 CAPSULE ORAL at 08:43

## 2020-11-02 RX ADMIN — GABAPENTIN 300 MG: 300 CAPSULE ORAL at 21:33

## 2020-11-02 RX ADMIN — POLYETHYLENE GLYCOL 3350 17 G: 17 POWDER, FOR SOLUTION ORAL at 08:42

## 2020-11-02 RX ADMIN — METOPROLOL TARTRATE 50 MG: 50 TABLET, FILM COATED ORAL at 21:33

## 2020-11-02 RX ADMIN — ATORVASTATIN CALCIUM 40 MG: 40 TABLET, FILM COATED ORAL at 08:43

## 2020-11-02 RX ADMIN — INSULIN LISPRO 1 UNITS: 100 INJECTION, SOLUTION INTRAVENOUS; SUBCUTANEOUS at 21:34

## 2020-11-02 RX ADMIN — SPIRONOLACTONE 50 MG: 25 TABLET ORAL at 10:55

## 2020-11-02 RX ADMIN — OXYCODONE HYDROCHLORIDE AND ACETAMINOPHEN 1 TABLET: 5; 325 TABLET ORAL at 20:10

## 2020-11-02 RX ADMIN — HYDRALAZINE HYDROCHLORIDE 100 MG: 50 TABLET, FILM COATED ORAL at 06:15

## 2020-11-02 RX ADMIN — ACETAMINOPHEN 650 MG: 325 TABLET ORAL at 21:30

## 2020-11-02 RX ADMIN — DOXAZOSIN 4 MG: 4 TABLET ORAL at 21:33

## 2020-11-02 RX ADMIN — POLYETHYLENE GLYCOL 3350 17 G: 17 POWDER, FOR SOLUTION ORAL at 21:32

## 2020-11-02 RX ADMIN — METHOCARBAMOL TABLETS 500 MG: 500 TABLET, COATED ORAL at 21:33

## 2020-11-02 RX ADMIN — DOXAZOSIN 4 MG: 4 TABLET ORAL at 08:43

## 2020-11-02 RX ADMIN — EPOETIN ALFA-EPBX 10000 UNITS: 10000 INJECTION, SOLUTION INTRAVENOUS; SUBCUTANEOUS at 08:43

## 2020-11-02 RX ADMIN — SODIUM CHLORIDE, PRESERVATIVE FREE 10 ML: 5 INJECTION INTRAVENOUS at 21:34

## 2020-11-02 RX ADMIN — INSULIN GLARGINE 20 UNITS: 100 INJECTION, SOLUTION SUBCUTANEOUS at 21:35

## 2020-11-02 RX ADMIN — METOPROLOL TARTRATE 50 MG: 50 TABLET, FILM COATED ORAL at 08:43

## 2020-11-02 ASSESSMENT — PAIN DESCRIPTION - PAIN TYPE
TYPE: ACUTE PAIN

## 2020-11-02 ASSESSMENT — PAIN DESCRIPTION - ORIENTATION
ORIENTATION: RIGHT;UPPER
ORIENTATION: MID
ORIENTATION: MID

## 2020-11-02 ASSESSMENT — PAIN DESCRIPTION - PROGRESSION
CLINICAL_PROGRESSION: NOT CHANGED
CLINICAL_PROGRESSION: GRADUALLY WORSENING
CLINICAL_PROGRESSION: GRADUALLY WORSENING
CLINICAL_PROGRESSION: NOT CHANGED

## 2020-11-02 ASSESSMENT — PAIN SCALES - GENERAL
PAINLEVEL_OUTOF10: 9
PAINLEVEL_OUTOF10: 6
PAINLEVEL_OUTOF10: 0
PAINLEVEL_OUTOF10: 8
PAINLEVEL_OUTOF10: 3
PAINLEVEL_OUTOF10: 3

## 2020-11-02 ASSESSMENT — PAIN DESCRIPTION - DIRECTION
RADIATING_TOWARDS: MID
RADIATING_TOWARDS: MID

## 2020-11-02 ASSESSMENT — PAIN DESCRIPTION - LOCATION
LOCATION: ABDOMEN;CHEST

## 2020-11-02 ASSESSMENT — PAIN DESCRIPTION - DESCRIPTORS
DESCRIPTORS: ACHING;DULL;DISCOMFORT;SORE
DESCRIPTORS: ACHING;DISCOMFORT;DULL;SHARP
DESCRIPTORS: SHARP;STABBING

## 2020-11-02 ASSESSMENT — PAIN DESCRIPTION - ONSET
ONSET: ON-GOING

## 2020-11-02 ASSESSMENT — PAIN DESCRIPTION - FREQUENCY
FREQUENCY: CONTINUOUS
FREQUENCY: INTERMITTENT
FREQUENCY: INTERMITTENT

## 2020-11-02 ASSESSMENT — PAIN - FUNCTIONAL ASSESSMENT
PAIN_FUNCTIONAL_ASSESSMENT: ACTIVITIES ARE NOT PREVENTED
PAIN_FUNCTIONAL_ASSESSMENT: PREVENTS OR INTERFERES WITH MANY ACTIVE NOT PASSIVE ACTIVITIES
PAIN_FUNCTIONAL_ASSESSMENT: ACTIVITIES ARE NOT PREVENTED

## 2020-11-02 NOTE — FLOWSHEET NOTE
Treatment time: 4 HOURS   Net Uf: 1582 ml     Pre weight: 77.9 kg   Post weight: 75.8 kg  EDW: TBD kg     Access used: R CVC  Access function: 300     Medications or blood products given: NONE    Regular outpatient schedule: MWF     Summary of response to treatment: TOLERATED [facility]. SYSTEM CLOTTED ABLE TO RINSE BLOOD BACK NO BLOOD LOSS. Copy of dialysis treatment record placed in chart, to be scanned into EMR.      11/02/20 1419 11/02/20 1808   Treatment   Time Off 1419 1808   Treatment Goal 2400 2260   Vital Signs   BP (!) 156/95 (!) 160/88   Temp 98 °F (36.7 °C) 97.6 °F (36.4 °C)   Pulse 77 71   Resp 18 18   Weight 171 lb 11.8 oz (77.9 kg) 167 lb 1.7 oz (75.8 kg)   Percent Weight Change 3.04 -2.7

## 2020-11-02 NOTE — PLAN OF CARE
Problem: OXYGENATION/RESPIRATORY FUNCTION  Goal: Patient will maintain patent airway  Outcome: Ongoing  Goal: Patient will achieve/maintain normal respiratory rate/effort  Description: Respiratory rate and effort will be within normal limits for the patient  Outcome: Ongoing     Problem: HEMODYNAMIC STATUS  Goal: Patient has stable vital signs and fluid balance  Outcome: Ongoing     Problem: FLUID AND ELECTROLYTE IMBALANCE  Goal: Fluid and electrolyte balance are achieved/maintained  Outcome: Ongoing  2000 ml fluid restriction        Problem: Pain:  Description: Pain management should include both nonpharmacologic and pharmacologic interventions. Goal: Pain level will decrease  Description: Pain level will decrease  Outcome: Ongoing  Goal: Control of acute pain  Description: Control of acute pain  Outcome: Ongoing  Goal: Control of chronic pain  Description: Control of chronic pain  Outcome: Ongoing  Pain level assessed. Pt able to rate pain on a scale of 0-10. Administered PRN analgesics per order. Reassessed for effectiveness. Will continue to monitor.

## 2020-11-02 NOTE — PROGRESS NOTES
[facility]       Post-op note        Patient Identification  Ranjana Dudley  :  1965  Admit Date:  10/25/2020    CC: surgical pain    Post-op Day #1    Subjective:      No acute events overnight    Pain is : Mild        Objective:  Patient Vitals for the past 24 hrs:   BP Temp Temp src Pulse Resp SpO2 Weight   20 0842 (!) 164/75 98.3 °F (36.8 °C) Oral 89 18 92 % --   20 0615 -- -- -- -- -- -- 166 lb 10.7 oz (75.6 kg)   20 0605 116/67 98.9 °F (37.2 °C) Oral 84 18 100 % --   20 0011 115/67 98.3 °F (36.8 °C) Oral 72 18 97 % --   20 1947 131/70 98.5 °F (36.9 °C) Oral 82 18 97 % --   20 1830 (!) 156/83 97.5 °F (36.4 °C) Axillary 89 16 97 % --   20 1400 (!) 156/78 98.2 °F (36.8 °C) Oral 86 16 96 % --   20 0913 130/74 98.2 °F (36.8 °C) Oral 86 16 97 % --        Drain/tube Output:         General:  alert, no apparent distress and normal weight  Resp: no increased WOB  Abd: soft, NT  Wound: umbilical incision well approx, dressing over PD cath c/d/i, right [facility] working, dressing with old bloody drainage. CBC:   Recent Labs     10/31/20  0921 11/01/20  0924   WBC 9.5 9.3   HGB 8.2* 8.6*   HCT 25.1* 26.2*    195     BMP:    Recent Labs     10/31/20  0734 20   *  136 133*   K 4.8  4.5 3.8   CL 98*  100 95*   CO2 23  22 23   BUN 35*  33* 23*   CREATININE 5.0*  5.0* 4.5*   GLUCOSE 74  79 77     Hepatic: No results for input(s): AST, ALT, ALB, BILITOT, ALKPHOS in the last 72 hours. Mag:    No results for input(s): MG in the last 72 hours. Phos:     Recent Labs     10/31/20  0734   PHOS 3.3      INR: No results for input(s): INR in the last 72 hours. IMAGING: reviewed    CULTURES:   Anaerobic culture  No results for input(s): LABANAE in the last 72 hours. Blood culture  No results for input(s): BC in the last 72 hours.     Wound abscess  No results for input(s): WNDABS in the last 72 hours.    MEDICATIONS:  Scheduled Meds:   hydrALAZINE  100 mg Oral 3 times per day    doxazosin  4 mg Oral 2 times per day    polyethylene glycol  17 g Oral BID    cloNIDine  0.1 mg Oral TID    NIFEdipine  90 mg Oral BID    epoetin robert-epbx  10,000 Units Subcutaneous Once per day on Mon Wed Fri    torsemide  20 mg Oral Daily    methocarbamol  500 mg Oral TID    lidocaine  1 patch Transdermal Daily    atorvastatin  40 mg Oral Daily    gabapentin  300 mg Oral TID    insulin glargine  20 Units Subcutaneous Nightly    isosorbide mononitrate  60 mg Oral Daily    metoprolol tartrate  50 mg Oral BID    sodium chloride flush  10 mL Intravenous 2 times per day    heparin (porcine)  5,000 Units Subcutaneous 3 times per day    insulin lispro  0-6 Units Subcutaneous TID WC    insulin lispro  0-3 Units Subcutaneous Nightly     Continuous Infusions:   dextrose       PRN Meds:.heparin (porcine), oxyCODONE-acetaminophen **OR** [DISCONTINUED] oxyCODONE-acetaminophen, sodium chloride flush, acetaminophen **OR** acetaminophen, polyethylene glycol, promethazine **OR** ondansetron, glucose, dextrose, glucagon (rDNA), dextrose, sennosides-docusate sodium    Assessment/Plan:      48 y.o. male admitted with   1. Lower abdominal pain    2. KISHORE (acute kidney injury) ([address]    3. Pericardial effusion    4. Elevated troponin    5. Acute renal failure superimposed on stage 4 chronic kidney disease, unspecified acute renal failure type ([address]    6. Chronic kidney disease, unspecified CKD stage    7.  Encounter for dialysis [facility])     with a history of    Patient Active Problem List   Diagnosis    Left-sided weakness    Chest pain    Essential hypertension    Type 2 diabetes mellitus with stage 3 chronic kidney disease, with long-term current use of insulin ([address]    Cardiomyopathy ([address]    Cerebral infarction ([address]    Cigarette nicotine dependence without complication    Foot ulcer, left ([address]    Erectile dysfunction due to arterial insufficiency    Elevated troponin    Renal artery stenosis (HCC)    Chronic diastolic congestive heart failure (HCC)    History of stroke    Mixed hyperlipidemia    Major depressive disorder, recurrent episode, moderate (HCC)    Acute on chronic renal failure (HCC)    Pericardial effusion    Acute renal failure superimposed on chronic kidney disease, on chronic dialysis ([address]    Lower abdominal pain       status post LAPAROSCOPIC PERITONEAL DIALYSIS CATHETER PLACEMENT  UMBILICAL HERNIA REPAIR  PLACEMENT OF A TUNNELED DIALYSIS CATHETER c US and flouroscopy       tolerating HD - no s/s of bleeding at [address] site, can change dry dressing  abd soft, minimally tender  Prn pain meds, pain fairly well controlled  Continue medical care  DC when medically stable      Discussed with patient and nursing. Discussed with Dr. Robin Zurita:  Educated patient on plan of care and disease process. - all questions answered    · Recommend a smoke-free lifestyle. Margoth Ferreira MD  Pt seen and examined. for DIAGNOSIS OF failure agree with ROQUE Ying note. Labs reviewed. Vitals abdominal wounds look good no sign of infection still sealed with 'superglue'  Vitals:    11/02/20 0011 11/02/20 0605 11/02/20 0615 11/02/20 0842   BP: 115/67 116/67  (!) 164/75   Pulse: 72 84  89   Resp: 18 18  18   Temp: 98.3 °F (36.8 °C) 98.9 °F (37.2 °C)  98.3 °F (36.8 °C)   TempSrc: Oral Oral  Oral   SpO2: 97% 100%  92%   Weight:   166 lb 10.7 oz (75.6 kg)    Height:       Multiple questions regarding his dialysis treatments and I deferred to nephrology we will  can be discharged when medically stable  As are still waiting for a place to do dialysis as an outpatient

## 2020-11-02 NOTE — PROGRESS NOTES
[facility]                                                 [phone]                                      [facility] [employer]                                        Inpatient Progress note    Summary:   Ranjana Courtney is being seen by nephrology for KISHORE on CKD. Admitted with abdominal pain nausea and vomiting. Had been having issues with consitpation. He was found to have worsening kidney function at that time. He has a h/o polycystic kidney disease, HTN, prior strokes, cocaine use. Interval History  Seen and examined in his room. He has no complaints this AM. He is very slow with his speech and seems to repeat the same questions over and over again. I explained his long term dialysis plan to him again. He will be on HD until PD catheter ready to use and he gets training etc.     /80  No new labs today  He made about 500 cc UO this AM. Not recorded but saw in urinal.   He is not SOB and has no chest pain. Plan:   - HD today, will keep MWF schedule here  - UF only 1 L he is still making some urine.   - Awaiting AM labs. - BP is not at goal. Will reduce clonidine and add aldactone with the goal of coming off clonidine all together.   - send PTH and vit D for tomorrow. - working on outpatient unit confirmation to be done prior to discharge. Assessment:   KISHORE / CKD. Baseline 3.6 (10/2019)  Has polycystic kidney disease. Likely diastolic progression with uncontrolled hypertension use of cocaine among other things. UA bland except for significant proteinuria. No peripheral eosinophilia. CT scan with PKD. Some indeterminate cysts. Serologies: AUDRA, ANCA, C3, C4, previously negative  Hepatitis panel,   SPEP negative 2018. Avoid nephrotoxins. No NSAIDS. Because of his addiction issue we did not refer him to kidney transplant but now that he has been sober for the last 7 to 8 months this can become an option as well. S/p PD catheter placement.    HD started 10/29, 10/30, 10/31  Now MWF    Electrolytes/acid base:   Awaiting AM labs    CKD-BMD:   Needs updated PTH. And vit D >ordered. BP/volume:   BP improved but not at goal.   No acute volume issues   Home regimen: Procardia 90 mg bid, hydral 100 TID, metop 50 mg BID, clonidine 01. Mg TID, inmdur 60 daily. cardura 4 mg BID. Torsemide 20 mg   Changes today > decreased clonidine to BID and added aldactone 25 mg daily. Heme:  Severe anemia, s/p transfusion this admission. Platelets ok   retacrit 10 K units TIW    [facility] thanks you for the opportunity to serve this patient. Please call with any questions of concerns. Sandra Zavala MD  [facility]  [address], [address]  Fax: [phone]  Cell: [phone]  24 hrs answering service [phone]      ROS:   [address]. All other remaining systems are negative. Constitutional:  fever, chills, weakness, weight change, fatigue,      Skin:  rash, pruritus, hair loss, bruising, dry skin, petechiae. Head, Face, Neck   headaches, swelling,  cervical adenopathy. Respiratory: shortness of breath, cough, or wheezing  Cardiovascular: chest pain, palpitations, dizzy, edema  Gastrointestinal: nausea, vomiting, diarrhea, constipation,belly pain    Yellow skin, blood in stool  Musculoskeletal:  back pain, muscle weakness, gait problems,       joint pain or swelling. Genitourinary:  dysuria, poor urine flow, flank pain, blood in urine  Neurologic:  vertigo, TIA'S, syncope, seizures, focal weakness  Psychosocial:  insomnia, anxiety, or depression. Additional positive findings: -         Past medical history, surgical history, social history, family history are reviewed and updated as appropriate. Reviewed current medication list.     Vitals:    11/02/20 0842   BP: (!) 164/75   Pulse: 89   Resp: 18   Temp: 98.3 °F (36.8 °C)   SpO2: 92%     General appearance: male  in NAD, fully alert and oriented. Comfortable. HEENT: EOM intact, no icterus. Trachea is midline. Neck : No mass, appears symmetrical, no JVD   Respiratory: Respiratory effort appears normal, no wheeze, no crackles  Cardiovascular: Ausculation- No M/R/G, no edema  Abdomen: soft non distended abdomen. PD catheter in place. Musculoskeletal:  No clubbing,cyanosis, joints with no swelling or deformity. Skin:no rashes, ulcers, induration, no jaundice. Neuro: face symmetric, no focal deficits. Appropriate responses.  CN 2-12 grossly intact

## 2020-11-02 NOTE — PROGRESS NOTES
[facility]                                                 [phone]                                      [facility] [employer]                                        Inpatient Progress note    Summary:   Antony Qureshi is being seen by nephrology for KISHORE on CKD. Admitted with abdominal pain nausea and vomiting. Had been having issues with consitpation. He was found to have worsening kidney function at that time. He has a h/o polycystic kidney disease, HTN, prior strokes, cocaine use. Interval History  Seen and examined at bedside. Has no new complaints. No SOB. No chest pain. Plan:   - no acute indications for dialysis today   - BP acceptable. - strict IO  - renal diet          Assessment:   Renal function:   KISHORE / CKD. Baseline 3.6 (10/2019)  Has polycystic kidney disease. Likely diastolic progression with uncontrolled hypertension use of cocaine among other things. UA bland except for significant proteinuria. No peripheral eosinophilia. CT scan with PKD. Some indeterminate cysts. Bladder scan. R/o obstructive uropathy. Serologies: AUDRA, ANCA, C3, C4, previously negative  Hepatitis panel,   SPEP negative 2018. Avoid nephrotoxins. No NSAIDS.    Because of his addiction issue we did not refer him to kidney transplant but now that he has been sober for the last 7 to 8 months this can become an option to. I think he would be an okay candidate for peritoneal dialysis. S/p PD catheter placement. HD started 10/29, 10/30    Electrolytes/acid base:   Hyponatremia  No other issues. CKD-BMD:   Needs updated PTH. And vit D   Lab Results   Component Value Date    PTH 49.8 02/01/2019    CALCIUM 8.7 11/01/2020    PHOS 3.3 10/31/2020         BP/volume:   Not at goal  No acute volume issue.s   On room air. Procardia 90 mG bid, hydral 100 TID, metop 50 mg BID, clonidine 01. Mg TID, inmdur 60 daily. cardura 4 mg BID.   Torsemide 20 mg     Heme:  Severe anemia  Hgb 6.7 s/p blood CN 2-12 grossly intact  TDC right chest

## 2020-11-02 NOTE — PLAN OF CARE
Problem: Falls - Risk of:  Goal: Will remain free from falls  Description: Will remain free from falls  11/2/2020 1211 by Levi Huston RN  Outcome: Ongoing  11/2/2020 0031 by Tate Johnson RN  Outcome: Ongoing  Goal: Absence of physical injury  Description: Absence of physical injury  11/2/2020 1211 by Levi Huston RN  Outcome: Ongoing  11/2/2020 0031 by Tate Johnson RN  Outcome: Ongoing     Problem: OXYGENATION/RESPIRATORY FUNCTION  Goal: Patient will maintain patent airway  11/2/2020 1211 by Levi Huston RN  Outcome: Ongoing  11/2/2020 0031 by Tate Johnson RN  Outcome: Ongoing  Goal: Patient will achieve/maintain normal respiratory rate/effort  Description: Respiratory rate and effort will be within normal limits for the patient  11/2/2020 1211 by Levi Huston RN  Outcome: Ongoing  11/2/2020 0031 by Tate Johnson RN  Outcome: Ongoing     Problem: HEMODYNAMIC STATUS  Goal: Patient has stable vital signs and fluid balance  11/2/2020 1211 by Levi Huston RN  Outcome: Ongoing  11/2/2020 0031 by Tate Johnson RN  Outcome: Ongoing     Problem: FLUID AND ELECTROLYTE IMBALANCE  Goal: Fluid and electrolyte balance are achieved/maintained  11/2/2020 1211 by Levi Huston RN  Outcome: Ongoing  11/2/2020 0031 by Tate Johnson RN  Outcome: Ongoing     Problem: ACTIVITY INTOLERANCE/IMPAIRED MOBILITY  Goal: Mobility/activity is maintained at optimum level for patient  11/2/2020 1211 by Levi Huston RN  Outcome: Ongoing  11/2/2020 0031 by Tate Johnson RN  Outcome: Ongoing     Problem: Pain:  Goal: Pain level will decrease  Description: Pain level will decrease  11/2/2020 1211 by Levi Huston RN  Outcome: Ongoing  11/2/2020 0031 by Tate Johnson RN  Outcome: Ongoing  Goal: Control of acute pain  Description: Control of acute pain  11/2/2020 1211 by Levi Huston RN  Outcome: Ongoing  11/2/2020 0031 by Tate Johnson RN  Outcome: Ongoing  Goal: Control of chronic pain  Description: Control of chronic pain  11/2/2020 1211 by Sondra Shaw RN  Outcome: Ongoing  11/2/2020 0031 by Terri Prado RN  Outcome: Ongoing     Problem: Nutrition  Goal: Optimal nutrition therapy  11/2/2020 1211 by Sondra Shaw RN  Outcome: Ongoing  11/2/2020 0031 by Terri Prado RN  Outcome: Ongoing

## 2020-11-02 NOTE — CARE COORDINATION
I spoke with CHAGO KANG [facility] [phone] [phone]  pt has chair spot on tue thurs sat at 600 am. I am to call [phone] when pt is discharged. They do not arrange transportation.   Electronically signed by DANDY Orellana on 11/2/2020 at 3:02 PM

## 2020-11-02 NOTE — PROGRESS NOTES
Progress Note  Admit Date: 10/25/2020      PCP: ROQUE Langley CNP     CC: F/U for nominal pain nausea vomiting    Days in hospital:  8    SUBJECTIVE / Interval History:    Has some abdominal pain at the PD catheter site otherwise no complaints. Not saying much today      Allergies  Doxazosin    Medications    Scheduled Meds:   hydrALAZINE  100 mg Oral 3 times per day    doxazosin  4 mg Oral 2 times per day    polyethylene glycol  17 g Oral BID    cloNIDine  0.1 mg Oral TID    NIFEdipine  90 mg Oral BID    epoetin robert-epbx  10,000 Units Subcutaneous Once per day on Mon Wed Fri    torsemide  20 mg Oral Daily    methocarbamol  500 mg Oral TID    lidocaine  1 patch Transdermal Daily    atorvastatin  40 mg Oral Daily    gabapentin  300 mg Oral TID    insulin glargine  20 Units Subcutaneous Nightly    isosorbide mononitrate  60 mg Oral Daily    metoprolol tartrate  50 mg Oral BID    sodium chloride flush  10 mL Intravenous 2 times per day    heparin (porcine)  5,000 Units Subcutaneous 3 times per day    insulin lispro  0-6 Units Subcutaneous TID WC    insulin lispro  0-3 Units Subcutaneous Nightly     Continuous Infusions:   dextrose         PRN Meds:  heparin (porcine), oxyCODONE-acetaminophen **OR** [DISCONTINUED] oxyCODONE-acetaminophen, sodium chloride flush, acetaminophen **OR** acetaminophen, polyethylene glycol, promethazine **OR** ondansetron, glucose, dextrose, glucagon (rDNA), dextrose, sennosides-docusate sodium    Vitals    BP (!) 164/75   Pulse 89   Temp 98.3 °F (36.8 °C) (Oral)   Resp 18   Ht 6' 2\" (1.88 m)   Wt 166 lb 10.7 oz (75.6 kg)   SpO2 92%   BMI 21.40 kg/m²     Exam:    Gen: No distress. Eyes: PERRL. No sclera icterus. No conjunctival injection. ENT: No discharge. Pharynx clear. External appearance of ears and nose normal.  Neck: Trachea midline. No obvious mass. Resp: No accessory muscle use. No crackles. No wheezes. No rhonchi.  No dullness on VOLCSF in the last 72 hours. Respiratory Culture:  No results for input(s): [place] in the last 72 hours. AFB:No results for input(s): AFBSMEAR in the last 72 hours. Urine Culture  No results for input(s): LABURIN in the last 72 hours. RADIOLOGY:    XR CHEST PORTABLE   Final Result   Right internal jugular tunneled hemodialysis catheter with tip at the   cavoatrial junction. Cardiomegaly with bilateral airspace disease suggestive of mild edema. FL VASCULAR ACCESS GUIDANCE   Final Result      CT CHEST ABDOMEN PELVIS WO CONTRAST   Final Result   Chest-      Moderate to large pericardial effusion. Mild atelectasis in the lingula and left lower lobe. Pneumonia is considered   less likely. Abdomen pelvis-      No acute inflammatory abnormality is identified in the abdomen or pelvis. Mild prominence of stool in the colon. Correlation for constipation   recommended. Polycystic kidney disease. Bilateral nonobstructive nephrolithiasis. Caliceal stones measure up to 5 mm   on the left. CONSULTS:    IP CONSULT TO NEPHROLOGY  IP CONSULT TO CARDIOLOGY  IP CONSULT TO VASCULAR SURGERY  IP CONSULT TO PHARMACY    ASSESSMENT AND PLAN:      Principal Problem:    Acute renal failure superimposed on chronic kidney disease, on chronic dialysis (HCC)  Active Problems:    Essential hypertension    Type 2 diabetes mellitus with stage 3 chronic kidney disease, with long-term current use of insulin (HCC)    Cardiomyopathy ([facility]    Elevated troponin    History of stroke    Mixed hyperlipidemia    Acute on chronic renal failure (HCC)    Pericardial effusion    Lower abdominal pain  Resolved Problems:    * No resolved hospital problems. *    Patient is a 67-year-old male with a past medical history of CHF, CVA, diabetes, gastroparesis who presented with vague abdominal pain with vomiting and constipation.   In the ER patient was found to have an elevated creatinine and CT scan showed pericardial effusion patient was admitted with KISHORE on CKD. Creatinine continued to go up was in the hospital.  He underwent PD and HD catheter placement on 10/29/2020. Hemodialysis is initiated. Plans to transition to peritoneal dialysis in outpatient    Acute on chronic renal failure-dialysis per nephrology, needs outpatient dialysis slot  - s/p  PD and hemodialysis cath placement 10/29/2020t  -Baseline creatinine is around 3. On admission creatinine 5. Peaked at around 6.9  -nephrology consult appreciated   -Hold nephrotoxic medication      Acute blood loss anemia-posttransfusion hemoglobin stable  -Patient had a lot of postop bleeding from the tunneled dialysis catheter. No bleeding at present  -Hemoglobin is less than 7. Will transfuse 1 unit  -Baseline hemoglobin around 8      Pericardial effusion- no active management  -CT shows moderate to large pericardial effusion,Echo ordered shows mild to moderate effusion with no tamponade-Cardiology consulted    Diabetes mellitus  -Continue Lantus and sliding scale  -Monitor blood sugars     Hypertension- Bp better  -Unclear if patient is compliant with his medications  -Monitor blood pressure  -Continue home meds    Elevated troponin  -Most likely secondary to CKD  --Normal recent stress test    Cardiomyopathy  -Last echo on 6/20 showed a EF of 35 to 40% with grade 2 diastolic dysfunction  -Hold  ACE/ARB due to KISHORE  -On Aldactone  -ct diuretic  -cardiology following    Hyperlipidemia  -statin    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  -Continue home meds    Right lower back pain- improved   -Seems more musculoskeletal  -ct muscle relaxer and Lidoderm patch    Hep c ab +  -Rna +  -Consult GI for further management    Slight bleeding from the hemodialysis catheter site- resolved   -Change dressing monitor    DVT Prophylaxis: lovenox  Diet: DIET RENAL; Low Sodium (2 GM);  Daily Fluid Restriction: 2000 ml  Dietary Nutrition Supplements: Renal Oral Supplement  Code Status: Full Code    PT/OT Eval Status:ordered    Discharge plan -needs dialysis spot    The patient and / or the family were informed of the results of any tests, a time was given to answer questions, a plan was proposed and they agreed with plan. Discussed with consulting physicians, nursing and social work     The note was completed using EMR. Every effort was made to ensure accuracy; however, inadvertent computerized transcription errors may be present.        Sosa Cunha MD

## 2020-11-03 VITALS
TEMPERATURE: 98.7 F | BODY MASS INDEX: 21.73 KG/M2 | HEART RATE: 84 BPM | SYSTOLIC BLOOD PRESSURE: 142 MMHG | DIASTOLIC BLOOD PRESSURE: 76 MMHG | WEIGHT: 169.31 LBS | HEIGHT: 74 IN | OXYGEN SATURATION: 97 % | RESPIRATION RATE: 18 BRPM

## 2020-11-03 LAB
ANION GAP SERPL CALCULATED.3IONS-SCNC: 14 MMOL/L (ref 3–16)
BUN BLDV-MCNC: 18 MG/DL (ref 7–20)
CALCIUM SERPL-MCNC: 8.6 MG/DL (ref 8.3–10.6)
CHLORIDE BLD-SCNC: 96 MMOL/L (ref 99–110)
CO2: 25 MMOL/L (ref 21–32)
CREAT SERPL-MCNC: 4.1 MG/DL (ref 0.9–1.3)
GFR AFRICAN AMERICAN: 18
GFR NON-AFRICAN AMERICAN: 15
GLUCOSE BLD-MCNC: 160 MG/DL (ref 70–99)
GLUCOSE BLD-MCNC: 66 MG/DL (ref 70–99)
GLUCOSE BLD-MCNC: 83 MG/DL (ref 70–99)
HBV SURFACE AB TITR SER: <3.5 MIU/ML
HCT VFR BLD CALC: 27.1 % (ref 40.5–52.5)
HEMOGLOBIN: 9 G/DL (ref 13.5–17.5)
MCH RBC QN AUTO: 29.1 PG (ref 26–34)
MCHC RBC AUTO-ENTMCNC: 33.1 G/DL (ref 31–36)
MCV RBC AUTO: 87.9 FL (ref 80–100)
PDW BLD-RTO: 14.2 % (ref 12.4–15.4)
PERFORMED ON: ABNORMAL
PERFORMED ON: NORMAL
PLATELET # BLD: 205 K/UL (ref 135–450)
PMV BLD AUTO: 9.3 FL (ref 5–10.5)
POTASSIUM SERPL-SCNC: 4.3 MMOL/L (ref 3.5–5.1)
RBC # BLD: 3.08 M/UL (ref 4.2–5.9)
SODIUM BLD-SCNC: 135 MMOL/L (ref 136–145)
WBC # BLD: 8.3 K/UL (ref 4–11)

## 2020-11-03 PROCEDURE — 85027 COMPLETE CBC AUTOMATED: CPT

## 2020-11-03 PROCEDURE — 6360000002 HC RX W HCPCS: Performed by: SURGERY

## 2020-11-03 PROCEDURE — 6370000000 HC RX 637 (ALT 250 FOR IP): Performed by: SURGERY

## 2020-11-03 PROCEDURE — 86706 HEP B SURFACE ANTIBODY: CPT

## 2020-11-03 PROCEDURE — 6370000000 HC RX 637 (ALT 250 FOR IP): Performed by: INTERNAL MEDICINE

## 2020-11-03 PROCEDURE — 80048 BASIC METABOLIC PNL TOTAL CA: CPT

## 2020-11-03 PROCEDURE — 36415 COLL VENOUS BLD VENIPUNCTURE: CPT

## 2020-11-03 PROCEDURE — 2580000003 HC RX 258: Performed by: SURGERY

## 2020-11-03 RX ORDER — TORSEMIDE 20 MG/1
40 TABLET ORAL DAILY
Qty: 30 TABLET | Refills: 3 | Status: SHIPPED | OUTPATIENT
Start: 2020-11-04 | End: 2021-04-23 | Stop reason: ALTCHOICE

## 2020-11-03 RX ORDER — DOXAZOSIN MESYLATE 4 MG/1
4 TABLET ORAL EVERY 12 HOURS SCHEDULED
Qty: 30 TABLET | Refills: 3 | Status: SHIPPED | OUTPATIENT
Start: 2020-11-03 | End: 2021-01-29

## 2020-11-03 RX ORDER — INSULIN GLARGINE 100 [IU]/ML
18 INJECTION, SOLUTION SUBCUTANEOUS NIGHTLY
Status: DISCONTINUED | OUTPATIENT
Start: 2020-11-03 | End: 2020-11-03 | Stop reason: HOSPADM

## 2020-11-03 RX ORDER — SPIRONOLACTONE 50 MG/1
50 TABLET, FILM COATED ORAL DAILY
Qty: 30 TABLET | Refills: 3 | Status: SHIPPED | OUTPATIENT
Start: 2020-11-04 | End: 2021-03-19 | Stop reason: SDUPTHER

## 2020-11-03 RX ORDER — OXYCODONE HYDROCHLORIDE AND ACETAMINOPHEN 5; 325 MG/1; MG/1
1 TABLET ORAL EVERY 8 HOURS PRN
Qty: 12 TABLET | Refills: 0 | Status: SHIPPED | OUTPATIENT
Start: 2020-11-03 | End: 2020-11-07

## 2020-11-03 RX ORDER — CLONIDINE HYDROCHLORIDE 0.1 MG/1
0.1 TABLET ORAL 2 TIMES DAILY
Qty: 60 TABLET | Refills: 3 | Status: SHIPPED | OUTPATIENT
Start: 2020-11-03 | End: 2021-04-01 | Stop reason: SDUPTHER

## 2020-11-03 RX ADMIN — CLONIDINE HYDROCHLORIDE 0.1 MG: 0.1 TABLET ORAL at 08:29

## 2020-11-03 RX ADMIN — METHOCARBAMOL TABLETS 500 MG: 500 TABLET, COATED ORAL at 08:29

## 2020-11-03 RX ADMIN — POLYETHYLENE GLYCOL 3350 17 G: 17 POWDER, FOR SOLUTION ORAL at 12:18

## 2020-11-03 RX ADMIN — SPIRONOLACTONE 50 MG: 25 TABLET ORAL at 08:29

## 2020-11-03 RX ADMIN — NIFEDIPINE 90 MG: 90 TABLET, FILM COATED, EXTENDED RELEASE ORAL at 08:29

## 2020-11-03 RX ADMIN — DOCUSATE SODIUM 50 MG AND SENNOSIDES 8.6 MG 2 TABLET: 8.6; 5 TABLET, FILM COATED ORAL at 12:18

## 2020-11-03 RX ADMIN — TORSEMIDE 40 MG: 20 TABLET ORAL at 08:29

## 2020-11-03 RX ADMIN — ATORVASTATIN CALCIUM 40 MG: 40 TABLET, FILM COATED ORAL at 08:29

## 2020-11-03 RX ADMIN — DOXAZOSIN 4 MG: 4 TABLET ORAL at 08:29

## 2020-11-03 RX ADMIN — METOPROLOL TARTRATE 50 MG: 50 TABLET, FILM COATED ORAL at 08:29

## 2020-11-03 RX ADMIN — OXYCODONE HYDROCHLORIDE AND ACETAMINOPHEN 1 TABLET: 5; 325 TABLET ORAL at 08:29

## 2020-11-03 RX ADMIN — GABAPENTIN 300 MG: 300 CAPSULE ORAL at 08:29

## 2020-11-03 RX ADMIN — ISOSORBIDE MONONITRATE 60 MG: 60 TABLET, EXTENDED RELEASE ORAL at 08:29

## 2020-11-03 RX ADMIN — INSULIN LISPRO 1 UNITS: 100 INJECTION, SOLUTION INTRAVENOUS; SUBCUTANEOUS at 08:30

## 2020-11-03 RX ADMIN — SODIUM CHLORIDE, PRESERVATIVE FREE 10 ML: 5 INJECTION INTRAVENOUS at 08:30

## 2020-11-03 RX ADMIN — HEPARIN SODIUM 5000 UNITS: 5000 INJECTION INTRAVENOUS; SUBCUTANEOUS at 07:02

## 2020-11-03 ASSESSMENT — PAIN DESCRIPTION - LOCATION: LOCATION: ABDOMEN;CHEST

## 2020-11-03 ASSESSMENT — PAIN SCALES - GENERAL
PAINLEVEL_OUTOF10: 9
PAINLEVEL_OUTOF10: 6

## 2020-11-03 ASSESSMENT — PAIN DESCRIPTION - ONSET: ONSET: ON-GOING

## 2020-11-03 ASSESSMENT — PAIN DESCRIPTION - FREQUENCY: FREQUENCY: INTERMITTENT

## 2020-11-03 ASSESSMENT — PAIN DESCRIPTION - PROGRESSION: CLINICAL_PROGRESSION: NOT CHANGED

## 2020-11-03 ASSESSMENT — PAIN DESCRIPTION - ORIENTATION: ORIENTATION: LEFT;LOWER;RIGHT;UPPER

## 2020-11-03 ASSESSMENT — PAIN DESCRIPTION - DESCRIPTORS: DESCRIPTORS: STABBING;ACHING;DISCOMFORT

## 2020-11-03 ASSESSMENT — PAIN - FUNCTIONAL ASSESSMENT: PAIN_FUNCTIONAL_ASSESSMENT: ACTIVITIES ARE NOT PREVENTED

## 2020-11-03 ASSESSMENT — PAIN DESCRIPTION - PAIN TYPE: TYPE: ACUTE PAIN

## 2020-11-03 NOTE — PROGRESS NOTES
Progress Note  Admit Date: 10/25/2020      PCP: ROQUE Zamorano CNP     CC: F/U for nominal pain nausea vomiting    Days in hospital:  9    SUBJECTIVE / Interval History:    Patient feels okay. Has no complaints today      Allergies  Doxazosin    Medications    Scheduled Meds:   insulin glargine  18 Units Subcutaneous Nightly    spironolactone  50 mg Oral Daily    cloNIDine  0.1 mg Oral BID    torsemide  40 mg Oral Daily    hydrALAZINE  100 mg Oral 3 times per day    doxazosin  4 mg Oral 2 times per day    polyethylene glycol  17 g Oral BID    NIFEdipine  90 mg Oral BID    epoetin robert-epbx  10,000 Units Subcutaneous Once per day on Mon Wed Fri    methocarbamol  500 mg Oral TID    lidocaine  1 patch Transdermal Daily    atorvastatin  40 mg Oral Daily    gabapentin  300 mg Oral TID    isosorbide mononitrate  60 mg Oral Daily    metoprolol tartrate  50 mg Oral BID    sodium chloride flush  10 mL Intravenous 2 times per day    heparin (porcine)  5,000 Units Subcutaneous 3 times per day    insulin lispro  0-6 Units Subcutaneous TID WC    insulin lispro  0-3 Units Subcutaneous Nightly     Continuous Infusions:   dextrose         PRN Meds:  heparin (porcine), oxyCODONE-acetaminophen **OR** [DISCONTINUED] oxyCODONE-acetaminophen, sodium chloride flush, acetaminophen **OR** acetaminophen, polyethylene glycol, promethazine **OR** ondansetron, glucose, dextrose, glucagon (rDNA), dextrose, sennosides-docusate sodium    Vitals    BP (!) 142/76 Comment: RN notified  Pulse 84   Temp 98.7 °F (37.1 °C) (Oral)   Resp 18   Ht 6' 2\" (1.88 m)   Wt 169 lb 5 oz (76.8 kg)   SpO2 97%   BMI 21.74 kg/m²     Exam:    Gen: No distress. Eyes: PERRL. No sclera icterus. No conjunctival injection. ENT: No discharge. Pharynx clear. External appearance of ears and nose normal.  Neck: Trachea midline. No obvious mass. Resp: No accessory muscle use. No crackles. No wheezes. No rhonchi.  No dullness on percussion. CV: Regular rate. Regular rhythm. No murmur or rub. No edema. GI: Non-tender. Non-distended. No hernia. Skin: Warm, dry, normal texture and turgor. No nodule on exposed extremities. Lymph: No cervical LAD. No supraclavicular LAD. M/S: No cyanosis. No clubbing. Tenderness present on palpation of right lower lumbar region  Neuro: Moves all four extremities. CN 2-12 tested, no defect noted. Psych: Oriented x 3. No anxiety. Awake. Alert. Data    LABS  CBC:   Recent Labs     11/01/20  0924 11/02/20  1151 11/03/20  0556   WBC 9.3 8.3 8.3   HGB 8.6* 8.1* 9.0*   HCT 26.2* 24.4* 27.1*   MCV 87.8 88.0 87.9    209 205     BMP:   Recent Labs     11/01/20  0924 11/02/20  1151 11/03/20  0556   * 135* 135*   K 3.8 4.2 4.3   CL 95* 99 96*   CO2 23 24 25   BUN 23* 27* 18   CREATININE 4.5* 6.1* 4.1*   GLUCOSE 77 110* 66*     POC GLUCOSE:    Recent Labs     11/02/20  0734 11/02/20  1149 11/02/20  2046 11/03/20  0736 11/03/20  1130   POCGLU 91 127* 163* 160* 83     LIVER PROFILE:   No results for input(s): AST, ALT, LIPASE, AMYLASE, LABALBU, BILIDIR, BILITOT, ALKPHOS in the last 72 hours. PT/INR: No results for input(s): PROTIME, INR in the last 72 hours. APTT: No results for input(s): APTT in the last 72 hours. UA:  No results for input(s): NITRITE, COLORU, PHUR, LABCAST, WBCUA, RBCUA, MUCUS, TRICHOMONAS, YEAST, BACTERIA, CLARITYU, SPECGRAV, LEUKOCYTESUR, UROBILINOGEN, BILIRUBINUR, BLOODU, GLUCOSEU, KETUA, AMORPHOUS in the last 72 hours. Microbiology:  Wound Culture: No results for input(s): WNDABS, ORG in the last 72 hours. Invalid input(s):  LABGRAM  Nasal Culture: No results for input(s): ORG, MRSAPCR in the last 72 hours. Blood Culture: No results for input(s): BC, BLOODCULT2 in the last 72 hours. Fungal Culture:   No results for input(s): FUNGSM in the last 72 hours. No results for input(s): FUNCXBLD in the last 72 hours.   CSF Culture:  No results for input(s): COLORCSF, APPEARCSF, CFTUBE, CLOTCSF, WBCCSF, RBCCSF, NEUTCSF, NUMCELLSCSF, LYMPHSCSF, MONOCSF, GLUCCSF, VOLCSF in the last 72 hours. Respiratory Culture:  No results for input(s): Rosalia Janes in the last 72 hours. AFB:No results for input(s): AFBSMEAR in the last 72 hours. Urine Culture  No results for input(s): LABURIN in the last 72 hours. RADIOLOGY:    XR CHEST PORTABLE   Final Result   Right internal jugular tunneled hemodialysis catheter with tip at the   cavoatrial junction. Cardiomegaly with bilateral airspace disease suggestive of mild edema. FL VASCULAR ACCESS GUIDANCE   Final Result      CT CHEST ABDOMEN PELVIS WO CONTRAST   Final Result   Chest-      Moderate to large pericardial effusion. Mild atelectasis in the lingula and left lower lobe. Pneumonia is considered   less likely. Abdomen pelvis-      No acute inflammatory abnormality is identified in the abdomen or pelvis. Mild prominence of stool in the colon. Correlation for constipation   recommended. Polycystic kidney disease. Bilateral nonobstructive nephrolithiasis. Caliceal stones measure up to 5 mm   on the left. CONSULTS:    IP CONSULT TO NEPHROLOGY  IP CONSULT TO CARDIOLOGY  IP CONSULT TO VASCULAR SURGERY  IP CONSULT TO PHARMACY  IP CONSULT TO GI    ASSESSMENT AND PLAN:      Principal Problem:    Acute renal failure superimposed on chronic kidney disease, on chronic dialysis (HCC)  Active Problems:    Essential hypertension    Type 2 diabetes mellitus with stage 3 chronic kidney disease, with long-term current use of insulin (HCC)    Cardiomyopathy ([facility] [address]    Elevated troponin    History of stroke    Mixed hyperlipidemia    Acute on chronic renal failure (HCC)    Pericardial effusion    Lower abdominal pain  Resolved Problems:    * No resolved hospital problems.  *    Patient is a 60-year-old male with a past medical history of CHF, CVA, diabetes, gastroparesis who presented with vague

## 2020-11-03 NOTE — DISCHARGE SUMMARY
Hospital Medicine Discharge Summary      Patient ID: Evette Cooper      Patient's PCP: ROQUE Medrano CNP    Admit Date: 10/25/2020     Discharge Date: 11/3/2020  The patient was seen and examined on day of discharge and this discharge summary is in conjunction with any daily progress note from day of discharge. Admitting Physician: Fabiola Erazo MD    Discharge Physician: Flora John MD     Admitted for   Chief Complaint   Patient presents with    Back Pain     low back pain x 2 days    Abdominal Pain     low abdomnal pain today. + nausea/vomiting       Admitting Diagnosis Acute renal failure superimposed on chronic kidney disease, on chronic dialysis, unspecified acute renal failure type ([facility] [address] [N17.9, N18.9, Z99.2]    Discharge Diagnoses: Active Hospital Problems    Diagnosis Date Noted    Lower abdominal pain [R10.30]     Acute on chronic renal failure (HCC) [N17.9, N18.9] 10/25/2020    Pericardial effusion [I31.3] 10/25/2020    Acute renal failure superimposed on chronic kidney disease, on chronic dialysis ([facility] [address] [N17.9, N18.9, Z99.2] 10/25/2020    History of stroke [Z86.73] 07/11/2019    Mixed hyperlipidemia [E78.2] 07/11/2019    Elevated troponin [R77.8]     Cardiomyopathy ([facility] [address] [I42.9] 07/21/2015    Essential hypertension [I10] 07/20/2015    Type 2 diabetes mellitus with stage 3 chronic kidney disease, with long-term current use of insulin ([facility] [address] [E11.22, N18.30, Z79.4] 07/20/2015       Follow Up: Primary Care Physician in one week    PCP to Follow up on   Post discharge follow-up          Hospital Course:     Patient is a 61-year-old male with a past medical history of CHF, CVA, diabetes, gastroparesis who presented with vague abdominal pain with vomiting and constipation. In the ER patient was found to have an elevated creatinine and CT scan showed pericardial effusion patient was admitted with KISHORE on CKD.   Creatinine continued to go up was in the hospital. He underwent PD and HD catheter placement on 10/29/2020. Hemodialysis is initiated. Dialysis scheduled for Tuesday Thursday Saturday. Plans to transition to peritoneal dialysis in outpatient     Acute on chronic renal failure-dialysis per nephrology,  - s/p  PD and hemodialysis cath placement 10/29/2020t  -Baseline creatinine is around 3. On admission creatinine 5. Peaked at around 6.9  -nephrology consult appreciated  -Due to recent PD catheter placement patient is discharged on pain medicines for 4 days           Acute blood loss anemia-posttransfusion hemoglobin stable  -Patient had a lot of postop bleeding from the tunneled dialysis catheter. No bleeding at present  -Hemoglobin is less than 7. Will transfuse 1 unit  -Baseline hemoglobin around 8        Pericardial effusion- no active management  -CT shows moderate to large pericardial effusion,Echo ordered shows mild to moderate effusion with no tamponade-Cardiology consulted     Diabetes mellitus  -Continue Lantus and sliding scale  -Monitor blood sugars      Hypertension- Bp better  -Medications adjusted. Doxazosin started per nephrology.   Patient has a documented allergy but is tolerating it okay hence we will continue on discharge  -Monitor blood pressure  -Continue home meds     Elevated troponin  -Most likely secondary to CKD  --Normal recent stress test     Cardiomyopathy  -Last echo on 6/20 showed a EF of 35 to 40% with grade 2 diastolic dysfunction  -Hold  ACE/ARB due to KISHORE  -On Aldactone  -ct diuretic  -cardiology following     Hyperlipidemia  -statin     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  -Continue home meds     Right lower back pain- improved        Hep c ab +  -Rna +  -Consult GI for further management, will need outpatient follow-up     Slight bleeding from the hemodialysis catheter site- resolved   -Change dressing monitor      Consults:     IP CONSULT TO NEPHROLOGY  IP CONSULT TO CARDIOLOGY  IP CONSULT TO VASCULAR SURGERY  IP CONSULT TO PHARMACY  IP CONSULT TO GI        Disposition: home    Discharged Condition: Stable    Code Status: Full Code    Activity: activity as tolerated    Diet: renal diet          Labs: For convenience and continuity at follow-up the following most recent labs are provided:    CBC:   Lab Results   Component Value Date    WBC 8.3 11/03/2020    HGB 9.0 11/03/2020    HCT 27.1 11/03/2020     11/03/2020       RENAL:   Lab Results   Component Value Date     11/03/2020    K 4.3 11/03/2020    K 4.9 10/26/2020    CL 96 11/03/2020    CO2 25 11/03/2020    BUN 18 11/03/2020    CREATININE 4.1 11/03/2020           Discharge Medications:    Madison Leon   Home Medication Instructions EVJ:[id number]    Printed on:11/03/20 1218   Medication Information                      acetaminophen (ACETAMINOPHEN EXTRA STRENGTH) 500 MG tablet  TAKE TWO TABLETS BY MOUTH EVERY 6 HOURS AS NEEDED FOR PAIN             atorvastatin (LIPITOR) 40 MG tablet  TAKE 1 TABLET BY MOUTH ONCE DAILY             cloNIDine (CATAPRES) 0.1 MG tablet  Take 1 tablet by mouth 2 times daily             clopidogrel (PLAVIX) 75 MG tablet  Take 1 tablet by mouth daily             doxazosin (CARDURA) 4 MG tablet  Take 1 tablet by mouth every 12 hours             gabapentin (NEURONTIN) 300 MG capsule  Take 1 capsule by mouth 3 times daily for 82 days.              hydrALAZINE (APRESOLINE) 100 MG tablet  Take 2 tablets by mouth 3 times daily             insulin glargine (LANTUS) 100 UNIT/ML injection vial  Inject 20 Units into the skin nightly             Insulin Pen Needle 31G X 6 MM MISC  1 each by Does not apply route daily             Insulin Syringe-Needle U-100 31G X 5/16\" 0.3 ML MISC  1 each by Does not apply route daily             isosorbide mononitrate (IMDUR) 60 MG extended release tablet  Take 1 tablet by mouth daily             metoprolol tartrate (LOPRESSOR) 50 MG tablet  Take 1 tablet by mouth twice daily             NIFEdipine (PROCARDIA XL) 90 MG extended release tablet  Take 1 tablet by mouth daily             oxyCODONE-acetaminophen (PERCOCET) 5-325 MG per tablet  Take 1 tablet by mouth every 8 hours as needed for Pain for up to 4 days. spironolactone (ALDACTONE) 50 MG tablet  Take 1 tablet by mouth daily             torsemide (DEMADEX) 20 MG tablet  Take 2 tablets by mouth daily                 Future Appointments   Date Time Provider Goran Kaley   11/19/2020  2:30 PM Randee Bryan, APRN - CNP [facility]       Time Spent on discharge is more than 45 minutes in the examination, evaluation, counseling and review of medications and discharge plan. Signed:  Angel Anderson MD   11/3/2020    The note was completed using EMR. Every effort was made to ensure accuracy; however, inadvertent computerized transcription errors may be present. Thank you ROQUE Sher CNP for the opportunity to be involved in this patient's care. If you have any questions or concerns please feel free to contact me at [phone].

## 2020-11-03 NOTE — PROGRESS NOTES
Pharmacy Heart Failure Medication Reconciliation Note    Pt discharged from [facility] today after admission for Acute RF on CKD. Rita Pratt has a diagnosis of grade 2 diastolic dysfunction heart failure (last EF = 45-50% on 10/25/20). Pertinent Labs:  BMP:   Lab Results   Component Value Date     11/03/2020    K 4.3 11/03/2020    K 4.9 10/26/2020    CL 96 11/03/2020    CO2 25 11/03/2020    BUN 18 11/03/2020    CREATININE 4.1 11/03/2020     BNP:   Lab Results   Component Value Date    PROBNP 3,063 10/25/2020    PROBNP 1,337 02/02/2019    PROBNP 2,496 04/30/2018       Patient taking an ACEI / ARB / Entresto:  No: EF > 40%   Patient taking a BETA BLOCKER:  Yes EF > 40%  Patient taking a LOOP DIURETIC: Yes  Patient taking a ALDOSTERONE RECEPTOR ANTAGONIST: Yes    Patient has a diagnosis of Atrial Fibrillation: no. If yes, patient is on appropriate anticoagulation:     Patient has a diagnosis of type 2 diabetes:  Yes. If yes, patient prescribed the following anti-hyperglycemic medication at discharge: Insulin. A/C renal failure       Corrections to discharge medications include:      Discharge Medications:         Medication List      START taking these medications    doxazosin 4 MG tablet  Commonly known as:  CARDURA  Take 1 tablet by mouth every 12 hours     oxyCODONE-acetaminophen 5-325 MG per tablet  Commonly known as:  PERCOCET  Take 1 tablet by mouth every 8 hours as needed for Pain for up to 4 days.      spironolactone 50 MG tablet  Commonly known as:  ALDACTONE  Take 1 tablet by mouth daily  Start taking on:  November 4, 2020     torsemide 20 MG tablet  Commonly known as:  DEMADEX  Take 2 tablets by mouth daily  Start taking on:  November 4, 2020        CHANGE how you take these medications    atorvastatin 40 MG tablet  Commonly known as:  LIPITOR  TAKE 1 TABLET BY MOUTH ONCE DAILY  What changed:    · how much to take  · how to take this  · when to take this     cloNIDine 0.1 MG tablet  Commonly known as: CATAPRES  Take 1 tablet by mouth 2 times daily  What changed:    · medication strength  · how much to take  · how to take this  · when to take this  · additional instructions     clopidogrel 75 MG tablet  Commonly known as:  PLAVIX  Take 1 tablet by mouth daily  What changed:  when to take this     isosorbide mononitrate 60 MG extended release tablet  Commonly known as:  IMDUR  Take 1 tablet by mouth daily  What changed:  when to take this        CONTINUE taking these medications    acetaminophen 500 MG tablet  Commonly known as:  Acetaminophen Extra Strength  TAKE TWO TABLETS BY MOUTH EVERY 6 HOURS AS NEEDED FOR PAIN     gabapentin 300 MG capsule  Commonly known as:  NEURONTIN  Take 1 capsule by mouth 3 times daily for 82 days.      hydrALAZINE 100 MG tablet  Commonly known as:  APRESOLINE  Take 2 tablets by mouth 3 times daily     insulin glargine 100 UNIT/ML injection vial  Commonly known as:  Lantus  Inject 20 Units into the skin nightly     Insulin Pen Needle 31G X 6 MM Misc  1 each by Does not apply route daily     Insulin Syringe-Needle U-100 31G X 5/16\" 0.3 ML Misc  1 each by Does not apply route daily     metoprolol tartrate 50 MG tablet  Commonly known as:  LOPRESSOR  Take 1 tablet by mouth twice daily     NIFEdipine 90 MG extended release tablet  Commonly known as:  PROCARDIA XL  Take 1 tablet by mouth daily        STOP taking these medications    furosemide 20 MG tablet  Commonly known as:  LASIX     losartan 100 MG tablet  Commonly known as:  COZAAR           Where to Get Your Medications      These medications were sent to [facility] [address] [place] [address] [phone] Jimmy London [phone] [id number]    Phone:  [phone]   · cloNIDine 0.1 MG tablet  · doxazosin 4 MG tablet  · oxyCODONE-acetaminophen 5-325 MG per tablet  · spironolactone 50 MG tablet  · torsemide 20 MG tablet         DANYA Christopher [facility]  Heart Failure Discharge Medication Reconciliation Program  [phone]

## 2020-11-03 NOTE — PROGRESS NOTES
[facility]                                                 [phone]                                      [facility] [employer]                                        Inpatient Progress note    Summary:   Klaudia Santiago is being seen by nephrology for KISHORE on CKD. Admitted with abdominal pain nausea and vomiting. Had been having issues with consitpation. He was found to have worsening kidney function at that time. He has a h/o polycystic kidney disease, HTN, prior strokes, cocaine use. Interval History  Seen and examined in his room. Says he still has not had BM. Spent about 15 minutes counseling him and his wife about dialysis, PD and next steps with possible transplant referral.   Last HD Monday with 2 L UF  He is making urine. BP much improved.   k 4.3, bicarb 25  He is not SOB and has no chest pain. Plan:   - next HD will be on Thursday at [facility] 1st shift   - increased torsemide to 40 mg daily. - ok to discharge. Assessment:   KISHORE / CKD. Baseline 3.6 (10/2019)  Has polycystic kidney disease. Likely diastolic progression with uncontrolled hypertension use of cocaine among other things. UA bland except for significant proteinuria. No peripheral eosinophilia. CT scan with PKD. Some indeterminate cysts. Serologies: AUDRA, ANCA, C3, C4, previously negative  Hepatitis panel,   SPEP negative 2018. Avoid nephrotoxins. No NSAIDS. Because of his addiction issue we did not refer him to kidney transplant but now that he has been sober for the last 7 to 8 months this can become an option as well. S/p PD catheter placement. HD started 10/29, 10/30, 10/31  Now MWF    Electrolytes/acid base:   No issues.      CKD-BMD:   Ca wnl  Needs PTH and vit D > ordered not done   Lab Results   Component Value Date    PTH 49.8 02/01/2019    CALCIUM 8.6 11/03/2020    PHOS 3.3 10/31/2020         BP/volume:   BP improved  No acute volume issues   Dc regimen: Procardia 90 mg bid, hydral 100 TID, metop 50 mg BID, clonidine 01. Mg BID, inmdur 60 daily. cardura 4 mg BID. Torsemide 40 mg   added aldactone 25 mg daily. Heme:  Severe anemia, s/p transfusion this admission. Platelets ok   retacrit 10 K units TIW    [facility] thanks you for the opportunity to serve this patient. Please call with any questions of concerns. Alivia Palacio MD  [facility]  [address], [address]  Fax: [phone]  Cell: [phone]  24 hrs answering service [phone]      ROS:   [address]. All other remaining systems are negative. Constitutional:  fever, chills, weakness, weight change, fatigue,      Skin:  rash, pruritus, hair loss, bruising, dry skin, petechiae. Head, Face, Neck   headaches, swelling,  cervical adenopathy. Respiratory: shortness of breath, cough, or wheezing  Cardiovascular: chest pain, palpitations, dizzy, edema  Gastrointestinal: nausea, vomiting, diarrhea, constipation,belly pain    Yellow skin, blood in stool  Musculoskeletal:  back pain, muscle weakness, gait problems,       joint pain or swelling. Genitourinary:  dysuria, poor urine flow, flank pain, blood in urine  Neurologic:  vertigo, TIA'S, syncope, seizures, focal weakness  Psychosocial:  insomnia, anxiety, or depression. Additional positive findings: -         Past medical history, surgical history, social history, family history are reviewed and updated as appropriate. Reviewed current medication list.     Vitals:    11/03/20 0900   BP: (!) 142/76   Pulse: 84   Resp: 18   Temp: 98.7 °F (37.1 °C)   SpO2: 97%     General appearance: male  in NAD, fully alert and oriented. Comfortable. HEENT: EOM intact, no icterus. Trachea is midline. Neck : No mass, appears symmetrical, no JVD   Respiratory: Respiratory effort appears normal, no wheeze, no crackles  Cardiovascular: Ausculation- No M/R/G, no edema  Abdomen: soft non distended abdomen. PD catheter in place. Musculoskeletal:  No clubbing,cyanosis, joints with no swelling or deformity. Skin:no rashes, ulcers, induration, no jaundice. Neuro: face symmetric, no focal deficits. Appropriate responses.  CN 2-12 grossly intact

## 2020-11-03 NOTE — PLAN OF CARE
Problem: Falls - Risk of:  Goal: Will remain free from falls  Description: Will remain free from falls  Outcome: Ongoing  Goal: Absence of physical injury  Description: Absence of physical injury  Outcome: Ongoing   Pt free from falls this shift. Fall precautions in place at all times. Call light always within reach. Pt able and agreeable to contact for safety appropriately. Problem: OXYGENATION/RESPIRATORY FUNCTION  Goal: Patient will maintain patent airway  Outcome: Ongoing  Goal: Patient will achieve/maintain normal respiratory rate/effort  Description: Respiratory rate and effort will be within normal limits for the patient  Outcome: Ongoing   On room air. Problem: HEMODYNAMIC STATUS  Goal: Patient has stable vital signs and fluid balance  Outcome: Ongoing   Monitored every shift and prn. Problem: FLUID AND ELECTROLYTE IMBALANCE  Goal: Fluid and electrolyte balance are achieved/maintained  Outcome: Ongoing     Problem: ACTIVITY INTOLERANCE/IMPAIRED MOBILITY  Goal: Mobility/activity is maintained at optimum level for patient  Outcome: Ongoing     Problem: Pain:  Description: Pain management should include both nonpharmacologic and pharmacologic interventions. Goal: Pain level will decrease  Description: Pain level will decrease  Outcome: Ongoing  Goal: Control of acute pain  Description: Control of acute pain  Outcome: Ongoing  Goal: Control of chronic pain  Description: Control of chronic pain  Outcome: Ongoing   Pt able to express presence/absence of pain and rate pain appropriately using numerical scale. Pain/discomfort being managed with PRN analgesics per MD orders (see MAR). Pain assessed every shift and after interventions. Problem: Nutrition  Goal: Optimal nutrition therapy  Outcome: Ongoing   Poor appetite.

## 2020-11-03 NOTE — DISCHARGE INSTR - COC
Continuity of Care Form    Patient Name: Emily Garcia   :  1965  MRN:  [id number]    Admit date:  10/25/2020  Discharge date:  ***    Code Status Order: Full Code   Advance Directives:   Advance Care Flowsheet Documentation     Date/Time Healthcare Directive Type of Healthcare Directive Copy in [address] Agent's Name Healthcare Agent's Phone Number    10/29/20 0540  No, patient does not have an advance directive for healthcare treatment -- -- -- -- --    10/25/20 1918  No, patient does not have an advance directive for healthcare treatment -- -- -- -- --          Admitting Physician:  Dana Marx MD  PCP: ROQUE Rodriguez CNP    Discharging Nurse: [facility] Unit/Room#: H5Y-2827/8131-04  Discharging Unit Phone Number: ***    Emergency Contact:   Extended Emergency Contact Information  Primary Emergency Contact: Finesse Michelle  Address: [address] [place] [address] Phone: [phone]  Mobile Phone: [phone]  Relation: Other    Past Surgical History:  Past Surgical History:   Procedure Laterality Date    HC DIALYSIS CATHETER N/A 10/29/2020    PLACEMENT OF A TUNNELED DIALYSIS CATHETER WITH FLEURO AND ULTRASOUND performed by Rosendo Dave MD at [facility] 10/29/2020    [facility] performed by Rosendo Dave MD at [facility] N/A     [address] performed by Rosendo Dave MD at [facility] History:   Immunization History   Administered Date(s) Administered    Influenza Vaccine, unspecified formulation 2013    Influenza Virus Vaccine 2013, 2015    Influenza, Quadv, IM, PF (6 mo and older Fluzone, Flulaval, Fluarix, and 3 yrs and older Afluria) 10/15/2018    Pneumococcal Conjugate Vaccine 02/13/2015    Pneumococcal Polysaccharide (Anvninhhc94) 11/02/2009       Active Problems:  Patient Active Problem List   Diagnosis Code    Left-sided weakness R53.1    Chest pain R07.9    Essential hypertension I10    Type 2 diabetes mellitus with stage 3 chronic kidney disease, with long-term current use of insulin ([facility]) E11.22, N18.30, Z79.4    Cardiomyopathy ([address] I42.9    Cerebral infarction ([facility] I63.9    Cigarette nicotine dependence without complication T69.247    Foot ulcer, left ([facility] L97.529    Erectile dysfunction due to arterial insufficiency N52.01    Elevated troponin R77.8    Renal artery stenosis ([facility]) I70.1    Chronic diastolic congestive heart failure ([facility]) I50.32    History of stroke Z86.73    Mixed hyperlipidemia E78.2    Major depressive disorder, recurrent episode, moderate ([facility]) F33.1    Acute on chronic renal failure ([facility]) N17.9, N18.9    Pericardial effusion I31.3    Acute renal failure superimposed on chronic kidney disease, on chronic dialysis ([facility]) N17.9, N18.9, Z99.2    Lower abdominal pain R10.30       Isolation/Infection:   Isolation          No Isolation        Patient Infection Status     Infection Onset Added Last Indicated Last Indicated By Review Planned Expiration Resolved Resolved By    None active    Resolved    COVID-19 Rule Out 10/27/20 10/27/20 10/27/20 COVID-19 (Ordered)   10/27/20 Rule-Out Test Resulted          Nurse Assessment:  Last Vital Signs: BP (!) 142/76 Comment: RN notified  Pulse 84   Temp 98.7 °F (37.1 °C) (Oral)   Resp 18   Ht 6' 2\" (1.88 m)   Wt 169 lb 5 oz (76.8 kg)   SpO2 97%   BMI 21.74 kg/m²     Last documented pain score (0-10 scale): Pain Level: 6  Last Weight:   Wt Readings from Last 1 Encounters:   11/03/20 169 lb 5 oz (76.8 kg)     Mental Status:  {IP PT MENTAL STATUS:20030}    IV Access:  { JEFF IV ACCESS:736814386}    Nursing Mobility/ADLs:  Walking   {[facility] DME LVTO:827354424}  Transfer  {[facility] DME JYS:502835638}  Bathing  {[facility] DME AWAT:675148171}  Dressing  {[facility] DME OPTY:088641511}  Toileting  {[facility] DME NKAO:376629201}  Feeding  {[facility] DME FANQ:604441980}  Med Admin  {[facility] DME HGLS:933926652}  Med Delivery   { JEFF MED Delivery:814423657}    Wound Care Documentation and Therapy:        Elimination:  Continence:   · Bowel: {YES / GT:68968}  · Bladder: {YES / JR:48572}  Urinary Catheter: {Urinary Catheter:631901809}   Colostomy/Ileostomy/Ileal Conduit: {YES / ME:07480}       Date of Last BM: ***    Intake/Output Summary (Last 24 hours) at 11/3/2020 1144  Last data filed at 11/3/2020 0829  Gross per 24 hour   Intake 730 ml   Output --   Net 730 ml     I/O last 3 completed shifts:   In: 12 [P.O.:960]  Out: -     Safety Concerns:     [facility] Safety Concerns:562060505}    Impairments/Disabilities:      [address] [facility] Impairments/Disabilities:190166100}    Nutrition Therapy:  Current Nutrition Therapy:   [address] [facility] Diet List:245153048}    Routes of Feeding: {[facility] DME Other Feedings:545865658}  Liquids: {Slp liquid thickness:01578}  Daily Fluid Restriction: {[facility] DME Yes amt example:213586785}  Last Modified Barium Swallow with Video (Video Swallowing Test): {Done Not Done WYKL:362063521}    Treatments at the Time of Hospital Discharge:   Respiratory Treatments: ***  Oxygen Therapy:  {Therapy; copd oxygen:90327}  Ventilator:    { CC Vent JNOZ:421748212}    Rehab Therapies: {THERAPEUTIC INTERVENTION:3137397297}  Weight Bearing Status/Restrictions: [employer] Weight [employer]in}  Other Medical Equipment (for information only, NOT a DME order):  {EQUIPMENT:330142483}  Other Treatments: ***    Patient's personal belongings (please select all that are sent with patient):  {[facility] DME Belongings:897394001}    RN SIGNATURE:  {Esignature:758361218}    CASE MANAGEMENT/SOCIAL WORK SECTION    Inpatient Status Date: ***    Readmission Risk Assessment Score:  Readmission Risk              Risk of Unplanned Readmission:        17 Discharging to Facility/ Agency   · Name:   · Address:  · Phone:  · Fax:    Dialysis Facility (if applicable)   · Name:  · Address:  · Dialysis Schedule:  · Phone:  · Fax:    / signature: {Esignature:659151290}    PHYSICIAN SECTION    Prognosis: {Prognosis:0571954084}    Condition at Discharge: Alexia Garcia Patient Condition:056368384}    Rehab Potential (if transferring to Rehab): {Prognosis:9833478830}    Recommended Labs or Other Treatments After Discharge: ***    Physician Certification: I certify the above information and transfer of Raissa Valdes  is necessary for the continuing treatment of the diagnosis listed and that he requires {Admit to Appropriate Level of Care:06913} for {GREATER/LESS:657423834} 30 days.      Update Admission H&P: {[facility]P DME Changes in YLDDS:147598610}    PHYSICIAN SIGNATURE:  {Esignature:205963747}

## 2020-11-05 ENCOUNTER — TELEPHONE (OUTPATIENT)
Dept: INTERNAL MEDICINE CLINIC | Age: 55
End: 2020-11-05

## 2020-11-05 NOTE — TELEPHONE ENCOUNTER
[facility] Transitions Initial Follow Up Call    Outreach made within 2 business days of discharge: Yes    Patient: Arjun Padilla Patient : 1965   MRN: [id number]  Reason for Admission: There are no discharge diagnoses documented for the most recent discharge.   Discharge Date: 11/3/20       Spoke with: no voicemail available    Discharge department/facility: [facility]    Scheduled appointment with PCP within 7-14 days    Follow Up  Future Appointments   Date Time Provider Goran Roche   2020  2:30 PM ROQUE Monreal - CNP [place], [place]

## 2020-11-06 NOTE — FLOWSHEET NOTE
Physical Therapy Discharge  Date:  2020    Patient Name:  Pedro De Leon    :  1965  MRN: [id number]    Restrictions/Precautions: Position Activity Restriction  Other position/activity restrictions: high fall risk      Pertinent Medical History: Additional Pertinent Hx: DM. 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 Referred to PT for strengthening and conditioning. Subjective:      10/13 - notes that he can walk from the parking lot to the pool and to the gym - is feeling a little stronger  Feels that the neuropathy is  Limiting him the most at this time. Objective:   Decreased LE strength. Fair balance, gait is unstable., poor core stabiltiy  Hip flexion 4/5, knee extension  4/5,  Ankle df 4-/5 maximo. (all improved 1/2 grade)   Sit to stand Mod I with arms of chair for leverage  Balance - feet together - no sway.     Assessment -  PT goals met - Pt plans to continue independently with aquatic exercise in the [facility] and will continue with [address]        Electronically signed by:  Nadia Pablo DPT, [address]

## 2020-11-09 NOTE — PROGRESS NOTES
low during dialysis. He reports taking all his antihypertensive medications as prescribed, except Cardura. Admits to taking only once daily due to consistent low blood pressure. Hesitant to change any blood pressure medication, due to co-morbidities and recent hospitalization. He has a Nephrology appointment, will adjust Cardura until seen by Nephrology. Advised to monitor blood pressure closely, take readings to Nephrology appointment.     Hyperlipidemia:  No new myalgias or GI upset on atorvastatin (Lipitor). Lab Results   Component Value Date    CHOL 99 06/05/2019    CHOL 169 01/29/2018    CHOL 181 01/12/2017     Lab Results   Component Value Date    TRIG 86 06/05/2019    TRIG 153 (H) 01/29/2018    TRIG 317 (H) 01/12/2017     Lab Results   Component Value Date    HDL 31 (L) 10/23/2019    HDL 31 (L) 06/05/2019    HDL 38 (L) 01/29/2018     Lab Results   Component Value Date    LDLCALC 51 10/23/2019    LDLCALC 51 06/05/2019    LDLCALC 100 (H) 01/29/2018     Lab Results   Component Value Date    LABVLDL 24 10/23/2019    LABVLDL 17 06/05/2019    LABVLDL 31 01/29/2018     Lab Results   Component Value Date    WBC 8.3 11/03/2020    HGB 9.0 (L) 11/03/2020    HCT 27.1 (L) 11/03/2020    MCV 87.9 11/03/2020     11/03/2020     Lab Results   Component Value Date     (L) 11/03/2020    K 4.3 11/03/2020    CL 96 (L) 11/03/2020    CO2 25 11/03/2020    BUN 18 11/03/2020    CREATININE 4.1 (H) 11/03/2020    GLUCOSE 66 (L) 11/03/2020    CALCIUM 8.6 11/03/2020    PROT 7.2 10/26/2020    LABALBU 3.5 10/31/2020    BILITOT <0.2 10/26/2020    ALKPHOS 75 10/26/2020    AST 8 (L) 10/26/2020    ALT 7 (L) 10/26/2020    LABGLOM 15 (A) 11/03/2020    GFRAA 18 (A) 11/03/2020    AGRATIO 0.9 (L) 10/26/2020    GLOB 3.7 10/26/2020       Review of Systems   Constitutional: Negative for activity change and fever. HENT: Negative for congestion. Eyes: Negative for visual disturbance.    Respiratory: Negative for chest tightness and shortness of breath. Cardiovascular: Negative for chest pain, palpitations and leg swelling. Gastrointestinal: Negative for abdominal pain, blood in stool, constipation, diarrhea, nausea and vomiting. Endocrine: Negative for polyuria. Genitourinary: Negative for dysuria. Musculoskeletal: Negative for arthralgias and myalgias. Skin: Negative for rash. Neurological: Positive for dizziness and light-headedness. Negative for headaches. Psychiatric/Behavioral: Negative for agitation, decreased concentration and sleep disturbance. The patient is not nervous/anxious. Prior to Visit Medications    Medication Sig Taking?  Authorizing Provider   hydrALAZINE (APRESOLINE) 100 MG tablet Take 2 tablets by mouth 3 times daily Yes ROQUE Waggoner CNP   metoprolol tartrate (LOPRESSOR) 50 MG tablet Take twice daily Yes ROQUE Waggoner CNP   cloNIDine (CATAPRES) 0.1 MG tablet Take 1 tablet by mouth 2 times daily  Sj Fletcher MD   doxazosin (CARDURA) 4 MG tablet Take 1 tablet by mouth every 12 hours  Sj Fletcher MD   spironolactone (ALDACTONE) 50 MG tablet Take 1 tablet by mouth daily  Sj Fletcher MD   torsemide (DEMADEX) 20 MG tablet Take 2 tablets by mouth daily  Sj Fletcher MD   isosorbide mononitrate (IMDUR) 60 MG extended release tablet Take 1 tablet by mouth daily  Patient taking differently: Take 60 mg by mouth nightly   ROQUE Waggoner CNP   NIFEdipine (PROCARDIA XL) 90 MG extended release tablet Take 1 tablet by mouth daily  ROQUE Waggoner CNP   clopidogrel (PLAVIX) 75 MG tablet Take 1 tablet by mouth daily  Patient taking differently: Take 75 mg by mouth nightly   Hina Heart MD   atorvastatin (LIPITOR) 40 MG tablet TAKE 1 TABLET BY MOUTH ONCE DAILY  Patient taking differently: Take 40 mg by mouth nightly TAKE 1 TABLET BY MOUTH ONCE DAILY  ROQUE Waggoner CNP   gabapentin (NEURONTIN) 300 MG capsule Take 1 capsule by mouth 3 times daily for 82 days. Pat Connolly, APRN - CNP   acetaminophen (ACETAMINOPHEN EXTRA STRENGTH) 500 MG tablet TAKE TWO TABLETS BY MOUTH EVERY 6 HOURS AS NEEDED FOR PAIN  Rafi Pretty MD        Social History     Tobacco Use    Smoking status: Current Some Day Smoker     Packs/day: 0.00     Types: Cigars     Start date: 1/3/1979     Last attempt to quit: 2019     Years since quittin.8    Smokeless tobacco: Never Used    Tobacco comment: 3 black and milds a week   Substance Use Topics    Alcohol use: Yes     Frequency: Monthly or less     Comment: occasional        Vitals:    20 1447   BP: 110/70   Pulse: 85   Temp: 97.4 °F (36.3 °C)   TempSrc: Temporal   SpO2: 99%     Estimated body mass index is 21.74 kg/m² as calculated from the following:    Height as of 10/30/20: 6' 2\" (1.88 m). Weight as of 11/3/20: 169 lb 5 oz (76.8 kg). Physical Exam  Vitals signs reviewed. Constitutional:       Appearance: He is well-developed. He is not diaphoretic. HENT:      Head: Normocephalic. Right Ear: Hearing and external ear normal. No tenderness. Left Ear: Hearing and external ear normal. No tenderness. Nose: Nose normal.   Eyes:      General: Lids are normal.   Cardiovascular:      Rate and Rhythm: Normal rate and regular rhythm. Heart sounds: Normal heart sounds, S1 normal and S2 normal.   Pulmonary:      Effort: Pulmonary effort is normal.      Breath sounds: Normal breath sounds. Musculoskeletal: Normal range of motion. Lymphadenopathy:      Head:      Right side of head: No submental or submandibular adenopathy. Left side of head: No submental or submandibular adenopathy. Cervical:      Right cervical: No superficial cervical adenopathy. Left cervical: No superficial cervical adenopathy. Skin:     General: Skin is warm and dry. Findings: No rash. Nails: There is no clubbing.             Neurological:      Mental Status: He is alert and oriented to CNP on 11/24/2020 at 1:17 PM

## 2020-11-19 ENCOUNTER — OFFICE VISIT (OUTPATIENT)
Dept: INTERNAL MEDICINE CLINIC | Age: 55
End: 2020-11-19
Payer: MEDICARE

## 2020-11-19 VITALS
DIASTOLIC BLOOD PRESSURE: 70 MMHG | TEMPERATURE: 97.4 F | OXYGEN SATURATION: 99 % | SYSTOLIC BLOOD PRESSURE: 110 MMHG | HEART RATE: 85 BPM

## 2020-11-19 PROCEDURE — 99213 OFFICE O/P EST LOW 20 MIN: CPT | Performed by: NURSE PRACTITIONER

## 2020-11-19 RX ORDER — METOPROLOL TARTRATE 50 MG/1
TABLET, FILM COATED ORAL
Qty: 180 TABLET | Refills: 1 | Status: SHIPPED | OUTPATIENT
Start: 2020-11-19 | End: 2021-05-11 | Stop reason: SDUPTHER

## 2020-11-19 RX ORDER — HYDRALAZINE HYDROCHLORIDE 100 MG/1
200 TABLET, FILM COATED ORAL 3 TIMES DAILY
Qty: 270 TABLET | Refills: 1 | Status: SHIPPED | OUTPATIENT
Start: 2020-11-19 | End: 2021-02-06

## 2020-11-19 NOTE — PATIENT INSTRUCTIONS
Decrease Cardura to 2 mg daily for 3 days; then discontinue medication. Don't give Cardura if SBP is less than 100.   Make appointment with Cardiology and Nephrology

## 2020-11-24 ASSESSMENT — ENCOUNTER SYMPTOMS
CONSTIPATION: 0
DIARRHEA: 0
ABDOMINAL PAIN: 0
CHEST TIGHTNESS: 0
NAUSEA: 0
SHORTNESS OF BREATH: 0
BLOOD IN STOOL: 0
VOMITING: 0

## 2020-11-30 RX ORDER — GABAPENTIN 300 MG/1
CAPSULE ORAL
Qty: 270 CAPSULE | Refills: 0 | Status: SHIPPED | OUTPATIENT
Start: 2020-11-30 | End: 2021-03-01

## 2021-01-27 NOTE — PROGRESS NOTES
[facility] - Cardiology      CC: \"I need a kidney transplant\"     History of present illness: Daylin Naqvi is a 55 y.o. male with past medical history significant for diabetes mellitus II and presents for management of his hypertension, hyperlipidemia, dilated CMP,   CVA 2015 with residual left sided weakness and paresthesias. Today, he reports feeling well overall. He is now in the process for renal transplant through [facility]. Patient denies exertional chest pain/pressure, dyspnea at rest, BROWN, PND, orthopnea, palpitations, lightheadedness, weight changes, changes in LE edema, and syncope. He reports medication compliance and is tolerating.      Past Medical History:   Diagnosis Date    Cardiomyopathy [facility])     CHF (congestive heart failure) (HCC)     CVA (cerebral infarction) 2015    Diabetes mellitus ([facility]     Gastroparesis     Hypercholesteremia     Hypertension     Kidney disease     Unspecified cerebral artery occlusion with cerebral infarction     5/15, 7/15     Past Surgical History:   Procedure Laterality Date    HC DIALYSIS CATHETER N/A 10/29/2020    PLACEMENT OF A TUNNELED DIALYSIS CATHETER WITH FLEURO AND ULTRASOUND performed by Joselito Mckeon MD at [facility] N/A 10/29/2020    LAPAROSCOPIC PERITONEAL DIALYSIS CATHETER PLACEMENT WITH FLEURO AND ULTRASOUND performed by Joselito Mckeon MD at [facility] N/A     UMBILICAL HERNIA REPAIR performed by Joselito Mckeon MD at [facility] History:  Social History     Tobacco Use    Smoking status: Current Some Day Smoker     Packs/day: 0.00     Types: Cigars     Start date: 1/3/1979     Last attempt to quit: 2019     Years since quittin.0    Smokeless tobacco: Never Used    Tobacco comment: 3 black and milds a week   Substance Use Topics    Alcohol use: Yes     Frequency: Monthly or less     Comment: occasional       Review of Systems:   Constitutional: No night sweats or fever. HEENT: No new vision difficulties or ringing in the ears. Respiratory: + BROWN, resolves with rest. No new SOB, PND, orthopnea or cough. Cardiovascular: See HPI. GI: No n/v, diarrhea, constipation, abdominal pain or changes in bowel habits. No melena, no hematochezia  : No urinary frequency, urgency, incontinence, hematuria or dysuria. Skin: No cyanosis or skin lesions. Musculoskeletal: No new muscle or joint pain. Mild ankle edema BLE. Neurological: No syncope or TIA-like symptoms. Psychiatric: No insomnia or depression    Physical Exam:  /88   Pulse 77   Temp 97.7 °F (36.5 °C)   Ht 6' 2\" (1.88 m)   Wt 168 lb (76.2 kg)   SpO2 99%   BMI 21.57 kg/m²     General:  Awake, alert, oriented in NAD  Skin:  Warm and dry. No excessive bruising. No rash  Neck:  Supple. No JVD or carotid bruit appreciated  Chest:  Normal effort. Clear to auscultation, no wheezes/rhonchi/rales  Cardiovascular:  RRR, normal S1/S2.   No murmur/gallop/rub  Abdomen:  Soft, nontender, +bowel sounds  Extremities:  No edema  Neurological:  Left sided weakness  Psychological: Normal mood and affect      Current Outpatient Medications   Medication Sig Dispense Refill    gabapentin (NEURONTIN) 300 MG capsule TAKE 1 CAPSULE BY MOUTH THREE TIMES DAILY 270 capsule 0    hydrALAZINE (APRESOLINE) 100 MG tablet Take 2 tablets by mouth 3 times daily 270 tablet 1    metoprolol tartrate (LOPRESSOR) 50 MG tablet Take twice daily 180 tablet 1    cloNIDine (CATAPRES) 0.1 MG tablet Take 1 tablet by mouth 2 times daily 60 tablet 3    spironolactone (ALDACTONE) 50 MG tablet Take 1 tablet by mouth daily 30 tablet 3    torsemide (DEMADEX) 20 MG tablet Take 2 tablets by mouth daily 30 tablet 3    isosorbide mononitrate (IMDUR) 60 MG extended release tablet Take 1 tablet by mouth daily (Patient taking differently: Take 60 mg by mouth nightly ) 90 tablet 1    significant   stenosis in the bilateral carotid and vertebral arteries. ECHO:2/22/2016  Summary  Left ventricle size is normal.  Mild to moderate concentric left ventricular hypertrophy is present. Ejection fraction is visually estimated to be 50-55 %. Normal right ventricular size.     ECHO:9/10/18  Summary  Normal left ventricular chamber size. Moderate left ventricular hypertrophy. Slight global decrease in wall motion. Ejection fraction is visually estimated to be 50%. The right ventricle is normal in size and function. ECHO 6/8/2020  Overall left ventricular systolic function appears moderately reduced. Ejection fraction is visually estimated to be 35-40% with diffuse  hypokinesis. There is moderately increased left ventricular wall thickness. Diastolic filling parameters suggest grade II diastolic dysfunction. Normal right ventricular size and function. The left atrium is mildly dilated. Mild mitral and tricuspid regurgitation. Trivial aortic regurgitation. Moderate sized pericardial effusion. Echo 10/26/20   Left ventricular cavity size is dilated. There is moderate concentric left ventricular hypertrophy. Ejection fraction is visually estimated to be 45-50%. There is moderate diffuse hypokinesis. Diastolic filling parameters suggest grade II diastolic dysfunction. The left atrium is severely dilated. The right ventricle is normal   Right ventricular systolic function is normal.   mild-Moderate circumferential pericardial effusion without tamponade   physiology. Angiography:9/10/18  ANGIOGRAPHY FINDINGS:  1. Left renal artery is normal without any significant stenosis. 2.  Right renal artery is normal without any significant stenosis. SUMMARY:  Normal renal artery without any significant stenosis.     Stress perfusion 9/14/20      1. Technically a satisfactory study.    2. Normal pharmacological stress portion of the study.    3. No evidence of Ischemia by Myocardial Perfusion Imaging.    4. Gated Study shows Dilated LV: EF is 51 %. Assessment & Plan:    Cardiomyopathy. LVEF 35-40%. Most likely due to uncontrolled hypertension. Continue Losartan, b-blocker and lasix. Will get exercise nuclear stress test to assess for ischemia. Hypertension, essential   Controlled. Continue current medical management. Multiple CVA. Believed most likely related to HTN and small vessel disease. Continue risk factor modifciations including statin and Plavix (per neurologist recommendations). BP control     Hyperlipidemia  Continue high intensity statin therapy. Nicotine addiction   We again discussed smoking cessation and recommend maintaining a smoke-free lifestyle. Diabetes Mellitus II  Managed by PCP. CKD on dialysis   Now on kidney transplant list. He will need preoperative LHC to assess his coronaries. Follow up in 6 months. Thank you very much for allowing me to participate in the care of your patient. Hyacinth Wright MD, MPH      This note was scribed in the presence of Hyacinth Wright MD by General Short, RN. Physician Attestation:  The scribes documentation has been prepared under my direction and personally reviewed by me in its entirety. I confirm that the note above accurately reflects all work, treatment, procedures, and medical decision making performed by me.

## 2021-01-29 ENCOUNTER — OFFICE VISIT (OUTPATIENT)
Dept: CARDIOLOGY CLINIC | Age: 56
End: 2021-01-29
Payer: MEDICARE

## 2021-01-29 VITALS
SYSTOLIC BLOOD PRESSURE: 136 MMHG | HEIGHT: 74 IN | TEMPERATURE: 97.7 F | BODY MASS INDEX: 21.56 KG/M2 | DIASTOLIC BLOOD PRESSURE: 88 MMHG | WEIGHT: 168 LBS | HEART RATE: 77 BPM | OXYGEN SATURATION: 99 %

## 2021-01-29 DIAGNOSIS — Z86.73 H/O: CVA (CEREBROVASCULAR ACCIDENT): ICD-10-CM

## 2021-01-29 DIAGNOSIS — N18.6 CKD (CHRONIC KIDNEY DISEASE) REQUIRING CHRONIC DIALYSIS (HCC): ICD-10-CM

## 2021-01-29 DIAGNOSIS — E78.2 MIXED HYPERLIPIDEMIA: ICD-10-CM

## 2021-01-29 DIAGNOSIS — Z99.2 CKD (CHRONIC KIDNEY DISEASE) REQUIRING CHRONIC DIALYSIS (HCC): ICD-10-CM

## 2021-01-29 DIAGNOSIS — I42.8 NONISCHEMIC CARDIOMYOPATHY (HCC): Primary | ICD-10-CM

## 2021-01-29 DIAGNOSIS — I10 ESSENTIAL HYPERTENSION: ICD-10-CM

## 2021-01-29 PROCEDURE — G8420 CALC BMI NORM PARAMETERS: HCPCS | Performed by: INTERNAL MEDICINE

## 2021-01-29 PROCEDURE — 3017F COLORECTAL CA SCREEN DOC REV: CPT | Performed by: INTERNAL MEDICINE

## 2021-01-29 PROCEDURE — G8484 FLU IMMUNIZE NO ADMIN: HCPCS | Performed by: INTERNAL MEDICINE

## 2021-01-29 PROCEDURE — G8427 DOCREV CUR MEDS BY ELIG CLIN: HCPCS | Performed by: INTERNAL MEDICINE

## 2021-01-29 PROCEDURE — 99214 OFFICE O/P EST MOD 30 MIN: CPT | Performed by: INTERNAL MEDICINE

## 2021-01-29 PROCEDURE — 4004F PT TOBACCO SCREEN RCVD TLK: CPT | Performed by: INTERNAL MEDICINE

## 2021-02-02 ENCOUNTER — TELEPHONE (OUTPATIENT)
Dept: CARDIOLOGY CLINIC | Age: 56
End: 2021-02-02

## 2021-02-02 NOTE — TELEPHONE ENCOUNTER
Left message for patient to return the call regarding scheduling. Tentative date/time for left heart catheterization on 2/17/21 at 10:30 am with 9:00 arrival time. The morning of your procedure you will park in the hospital parking lot and report directly to the cath lab to check in.     Pre-Procedure Instructions   1. You will need to fast for at least 8 hours prior to procedure. No caffeine the morning of.   2. Hold your diuretic, torsemide the morning of procedure. 3. You will need to take 325 mg aspirin the morning of. If you are currently taking 81 mg please take 4 tablets that morning. 4. Do not use any lotions, creams or perfume the morning of procedure. 5. Pre-procedure lab work will need to be completed 5-7 days prior to procedure. 6. Please have a responsible adult to drive you home after procedure. We advise you have someone stay with you for the first 24 hours for precautionary measures. Depending on your procedure you may require an overnight stay. 7. Cath lab will provide you with all post procedure instructions. If you have any questions regarding the procedure itself or medications, please call [phone] and ask to speak with a nurse.

## 2021-02-03 NOTE — TELEPHONE ENCOUNTER
Discussed with Dr. Celeste Call and due to openings on his schedule for Friday 2/5/21 he would like to have patient complete LHC on this day. Spoke with Serge Joseph and he is agreeable to new date/time. Case updated on [employer] and new form sent to Ángel Allen in the cath lab.

## 2021-02-04 DIAGNOSIS — I42.8 NONISCHEMIC CARDIOMYOPATHY (HCC): ICD-10-CM

## 2021-02-04 LAB
ANION GAP SERPL CALCULATED.3IONS-SCNC: 14 MMOL/L (ref 3–16)
BUN BLDV-MCNC: 44 MG/DL (ref 7–20)
CALCIUM SERPL-MCNC: 8.5 MG/DL (ref 8.3–10.6)
CHLORIDE BLD-SCNC: 106 MMOL/L (ref 99–110)
CO2: 19 MMOL/L (ref 21–32)
CREAT SERPL-MCNC: 4.7 MG/DL (ref 0.9–1.3)
GFR AFRICAN AMERICAN: 16
GFR NON-AFRICAN AMERICAN: 13
GLUCOSE BLD-MCNC: 170 MG/DL (ref 70–99)
HCT VFR BLD CALC: 34.4 % (ref 40.5–52.5)
HEMOGLOBIN: 11.1 G/DL (ref 13.5–17.5)
MCH RBC QN AUTO: 28.7 PG (ref 26–34)
MCHC RBC AUTO-ENTMCNC: 32.2 G/DL (ref 31–36)
MCV RBC AUTO: 89.2 FL (ref 80–100)
PDW BLD-RTO: 16.7 % (ref 12.4–15.4)
PLATELET # BLD: 141 K/UL (ref 135–450)
PMV BLD AUTO: 10 FL (ref 5–10.5)
POTASSIUM SERPL-SCNC: 5.2 MMOL/L (ref 3.5–5.1)
RBC # BLD: 3.85 M/UL (ref 4.2–5.9)
SODIUM BLD-SCNC: 139 MMOL/L (ref 136–145)
WBC # BLD: 6.3 K/UL (ref 4–11)

## 2021-02-05 ENCOUNTER — HOSPITAL ENCOUNTER (OUTPATIENT)
Dept: CARDIAC CATH/INVASIVE PROCEDURES | Age: 56
Discharge: HOME | End: 2021-02-05
Payer: MEDICARE

## 2021-02-05 VITALS
OXYGEN SATURATION: 100 % | TEMPERATURE: 97.9 F | WEIGHT: 168 LBS | HEART RATE: 79 BPM | RESPIRATION RATE: 16 BRPM | HEIGHT: 74 IN | BODY MASS INDEX: 21.56 KG/M2 | SYSTOLIC BLOOD PRESSURE: 151 MMHG | DIASTOLIC BLOOD PRESSURE: 85 MMHG

## 2021-02-05 LAB
EKG ATRIAL RATE: 75 BPM
EKG DIAGNOSIS: NORMAL
EKG P AXIS: 72 DEGREES
EKG P-R INTERVAL: 164 MS
EKG Q-T INTERVAL: 410 MS
EKG QRS DURATION: 92 MS
EKG QTC CALCULATION (BAZETT): 457 MS
EKG R AXIS: 63 DEGREES
EKG T AXIS: 110 DEGREES
EKG VENTRICULAR RATE: 75 BPM

## 2021-02-05 PROCEDURE — C1894 INTRO/SHEATH, NON-LASER: HCPCS

## 2021-02-05 PROCEDURE — 99152 MOD SED SAME PHYS/QHP 5/>YRS: CPT

## 2021-02-05 PROCEDURE — 2500000003 HC RX 250 WO HCPCS

## 2021-02-05 PROCEDURE — 6360000002 HC RX W HCPCS

## 2021-02-05 PROCEDURE — 93010 ELECTROCARDIOGRAM REPORT: CPT | Performed by: INTERNAL MEDICINE

## 2021-02-05 PROCEDURE — 6360000004 HC RX CONTRAST MEDICATION: Performed by: INTERNAL MEDICINE

## 2021-02-05 PROCEDURE — C1769 GUIDE WIRE: HCPCS

## 2021-02-05 PROCEDURE — 93005 ELECTROCARDIOGRAM TRACING: CPT | Performed by: INTERNAL MEDICINE

## 2021-02-05 PROCEDURE — 6370000000 HC RX 637 (ALT 250 FOR IP)

## 2021-02-05 PROCEDURE — 93458 L HRT ARTERY/VENTRICLE ANGIO: CPT | Performed by: INTERNAL MEDICINE

## 2021-02-05 PROCEDURE — 2709999900 HC NON-CHARGEABLE SUPPLY

## 2021-02-05 PROCEDURE — 93458 L HRT ARTERY/VENTRICLE ANGIO: CPT

## 2021-02-05 RX ORDER — SODIUM CHLORIDE 0.9 % (FLUSH) 0.9 %
10 SYRINGE (ML) INJECTION EVERY 12 HOURS SCHEDULED
Status: DISCONTINUED | OUTPATIENT
Start: 2021-02-05 | End: 2021-02-06 | Stop reason: HOSPADM

## 2021-02-05 RX ORDER — ASPIRIN 325 MG
325 TABLET ORAL ONCE
Status: DISCONTINUED | OUTPATIENT
Start: 2021-02-05 | End: 2021-02-06 | Stop reason: HOSPADM

## 2021-02-05 RX ORDER — ONDANSETRON 2 MG/ML
4 INJECTION INTRAMUSCULAR; INTRAVENOUS EVERY 6 HOURS PRN
Status: DISCONTINUED | OUTPATIENT
Start: 2021-02-05 | End: 2021-02-06 | Stop reason: HOSPADM

## 2021-02-05 RX ORDER — SODIUM CHLORIDE 9 MG/ML
INJECTION, SOLUTION INTRAVENOUS CONTINUOUS
Status: DISCONTINUED | OUTPATIENT
Start: 2021-02-05 | End: 2021-02-06 | Stop reason: HOSPADM

## 2021-02-05 RX ORDER — SODIUM CHLORIDE 0.9 % (FLUSH) 0.9 %
10 SYRINGE (ML) INJECTION PRN
Status: DISCONTINUED | OUTPATIENT
Start: 2021-02-05 | End: 2021-02-06 | Stop reason: HOSPADM

## 2021-02-05 RX ORDER — INSULIN GLARGINE 100 [IU]/ML
20 INJECTION, SOLUTION SUBCUTANEOUS NIGHTLY
COMMUNITY
End: 2021-04-23 | Stop reason: ALTCHOICE

## 2021-02-05 RX ORDER — DIPHENHYDRAMINE HYDROCHLORIDE 50 MG/ML
25 INJECTION INTRAMUSCULAR; INTRAVENOUS ONCE
Status: DISCONTINUED | OUTPATIENT
Start: 2021-02-05 | End: 2021-02-06 | Stop reason: HOSPADM

## 2021-02-05 RX ORDER — METHYLPREDNISOLONE SODIUM SUCCINATE 125 MG/2ML
125 INJECTION, POWDER, LYOPHILIZED, FOR SOLUTION INTRAMUSCULAR; INTRAVENOUS ONCE
Status: DISCONTINUED | OUTPATIENT
Start: 2021-02-05 | End: 2021-02-06 | Stop reason: HOSPADM

## 2021-02-05 RX ORDER — ACETAMINOPHEN 325 MG/1
650 TABLET ORAL EVERY 4 HOURS PRN
Status: DISCONTINUED | OUTPATIENT
Start: 2021-02-05 | End: 2021-02-06 | Stop reason: HOSPADM

## 2021-02-05 RX ADMIN — IOPAMIDOL 70 ML: 755 INJECTION, SOLUTION INTRAVENOUS at 13:30

## 2021-02-05 NOTE — H&P
CC: \"I need a kidney transplant\"      History of present illness: Frank Martin is a 55 y.o. male with past medical history significant for diabetes mellitus II and presents for management of his hypertension, hyperlipidemia, dilated CMP,   CVA 2015 with residual left sided weakness and paresthesias.      Today, he reports feeling well overall. He is now in the process for renal transplant through [facility]. Patient denies exertional chest pain/pressure, dyspnea at rest, BROWN, PND, orthopnea, palpitations, lightheadedness, weight changes, changes in LE edema, and syncope. He reports medication compliance and is tolerating.      Past Medical History        Past Medical History:   Diagnosis Date    Cardiomyopathy [facility])      CHF (congestive heart failure) (HCC)      CVA (cerebral infarction) 2015    Diabetes mellitus ([facility] [address]      Gastroparesis      Hypercholesteremia      Hypertension      Kidney disease      Unspecified cerebral artery occlusion with cerebral infarction       5/15, 7/15         Past Surgical History         Past Surgical History:   Procedure Laterality Date    HC DIALYSIS CATHETER N/A 10/29/2020     PLACEMENT OF A TUNNELED DIALYSIS CATHETER WITH FLEURO AND ULTRASOUND performed by Satya Shah MD at [facility] N/A 10/29/2020     LAPAROSCOPIC PERITONEAL DIALYSIS CATHETER PLACEMENT WITH FLEURO AND ULTRASOUND performed by Satya Shah MD at [address] N/A      UMBILICAL HERNIA REPAIR performed by Satya Shah MD at [address] History:  Social History            Tobacco Use    Smoking status: Current Some Day Smoker       Packs/day: 0.00       Types: Cigars       Start date: 1/3/1979       Last attempt to quit: 2019       Years since quittin.0    Smokeless tobacco: Never Used    Tobacco comment: 3 black and milds a week   Substance Use Topics    Alcohol use:  Yes       Frequency: Monthly or less       Comment: occasional         Review of Systems:   Constitutional: No night sweats or fever. HEENT: No new vision difficulties or ringing in the ears. Respiratory: + BROWN, resolves with rest. No new SOB, PND, orthopnea or cough. Cardiovascular: See HPI. GI: No n/v, diarrhea, constipation, abdominal pain or changes in bowel habits. No melena, no hematochezia  : No urinary frequency, urgency, incontinence, hematuria or dysuria. Skin: No cyanosis or skin lesions. Musculoskeletal: No new muscle or joint pain. Mild ankle edema BLE. Neurological: No syncope or TIA-like symptoms. Psychiatric: No insomnia or depression     Physical Exam:  /88   Pulse 77   Temp 97.7 °F (36.5 °C)   Ht 6' 2\" (1.88 m)   Wt 168 lb (76.2 kg)   SpO2 99%   BMI 21.57 kg/m²      General:  Awake, alert, oriented in NAD  Skin:  Warm and dry. No excessive bruising. No rash  Neck:  Supple. No JVD or carotid bruit appreciated  Chest:  Normal effort. Clear to auscultation, no wheezes/rhonchi/rales  Cardiovascular:  RRR, normal S1/S2.   No murmur/gallop/rub  Abdomen:  Soft, nontender, +bowel sounds  Extremities:  No edema  Neurological:  Left sided weakness  Psychological: Normal mood and affect        Current Facility-Administered Medications          Current Outpatient Medications   Medication Sig Dispense Refill    gabapentin (NEURONTIN) 300 MG capsule TAKE 1 CAPSULE BY MOUTH THREE TIMES DAILY 270 capsule 0    hydrALAZINE (APRESOLINE) 100 MG tablet Take 2 tablets by mouth 3 times daily 270 tablet 1    metoprolol tartrate (LOPRESSOR) 50 MG tablet Take twice daily 180 tablet 1    cloNIDine (CATAPRES) 0.1 MG tablet Take 1 tablet by mouth 2 times daily 60 tablet 3    spironolactone (ALDACTONE) 50 MG tablet Take 1 tablet by mouth daily 30 tablet 3    torsemide (DEMADEX) 20 MG tablet Take 2 tablets by mouth daily 30 tablet 3    isosorbide mononitrate (IMDUR) 60 MG extended release tablet Take 1 tablet by 7/21/2015:  Duplex sonography with color flow enhancement was performed bilaterally on   the cervical carotid system. No evidence of hemodynamically significant   stenosis in the bilateral carotid and vertebral arteries.      ECHO:2/22/2016  Summary  Left ventricle size is normal.  Mild to moderate concentric left ventricular hypertrophy is present. Ejection fraction is visually estimated to be 50-55 %. Normal right ventricular size.     ECHO:9/10/18  Summary  Normal left ventricular chamber size. Moderate left ventricular hypertrophy. Slight global decrease in wall motion. Ejection fraction is visually estimated to be 50%. The right ventricle is normal in size and function.     ECHO 6/8/2020  Overall left ventricular systolic function appears moderately reduced. Ejection fraction is visually estimated to be 35-40% with diffuse  hypokinesis. There is moderately increased left ventricular wall thickness. Diastolic filling parameters suggest grade II diastolic dysfunction. Normal right ventricular size and function. The left atrium is mildly dilated. Mild mitral and tricuspid regurgitation. Trivial aortic regurgitation. Moderate sized pericardial effusion.     Echo 10/26/20   Left ventricular cavity size is dilated.   There is moderate concentric left ventricular hypertrophy.   Ejection fraction is visually estimated to be 45-50%.   There is moderate diffuse hypokinesis.   Diastolic filling parameters suggest grade II diastolic dysfunction.   The left atrium is severely dilated.   The right ventricle is normal   Right ventricular systolic function is normal.   mild-Moderate circumferential pericardial effusion without tamponade   physiology.     Angiography:9/10/18  ANGIOGRAPHY FINDINGS:  1.  Left renal artery is normal without any significant stenosis. 2.  Right renal artery is normal without any significant stenosis.   SUMMARY:  Normal renal artery without any significant stenosis.     Stress perfusion 9/14/20       1. Technically a satisfactory study.    2. Normal pharmacological stress portion of the study.    3. No evidence of Ischemia by Myocardial Perfusion Imaging.    4. Gated Study shows Dilated LV: EF is 51 %.       Assessment & Plan:     Cardiomyopathy. LVEF 35-40%. Most likely due to uncontrolled hypertension. Continue Losartan, b-blocker and lasix. Will get exercise nuclear stress test to assess for ischemia.      Hypertension, essential   Controlled. Continue current medical management.      Multiple CVA. Believed most likely related to HTN and small vessel disease. Continue risk factor modifciations including statin and Plavix (per neurologist recommendations). BP control      Hyperlipidemia  Continue high intensity statin therapy.      Nicotine addiction   We again discussed smoking cessation and recommend maintaining a smoke-free lifestyle.      Diabetes Mellitus II  Managed by PCP.       CKD on dialysis   Now on kidney transplant list. He will need preoperative LHC to assess his coronaries.         Follow up in 6 months.      Thank you very much for allowing me to participate in the care of your patient.  Evert Hatch MD, MPH

## 2021-02-09 ENCOUNTER — TELEPHONE (OUTPATIENT)
Dept: CARDIOLOGY CLINIC | Age: 56
End: 2021-02-09

## 2021-02-09 NOTE — TELEPHONE ENCOUNTER
Form received from kidney transplant team, required medical records attached. No recent carotid duplex. Placed in Dr. Theresa Taylor folder for review, further instructions, signature.

## 2021-02-16 NOTE — PROGRESS NOTES
while attached to dialysis machine.      Cleared by Dentist for transplant. Had angiogram, ceared by Cardiology for transplant. History of right sided Lacunar stroke in 2015. MRA of neck in 2015 WNL, MRA of head revealed no stenosis or aneurysm. Collaborated with Dr. Joelle Segura the need for continuing anticoagulation therapy. Since no CVA since 2015 and no significant CAD, Plavix can be discontinued and no additional  Anticoagulation medication recommended at this time. Will continue 81 mg ASA. CT HEAD 2018  No significant interval change from the prior.  If neurologic symptoms   continue, consider MRI. NM SCAN 9/2020  Summary    Pharmacological Stress/MPI Results:    1. Technically a satisfactory study.    2. Normal pharmacological stress portion of the study.    3. No evidence of Ischemia by Myocardial Perfusion Imaging.    4. Gated Study shows Dilated LV: EF is 51 %.        Lab Results   Component Value Date    CHOL 99 06/05/2019    CHOL 169 01/29/2018    CHOL 181 01/12/2017     Lab Results   Component Value Date    TRIG 86 06/05/2019    TRIG 153 (H) 01/29/2018    TRIG 317 (H) 01/12/2017     Lab Results   Component Value Date    HDL 31 (L) 10/23/2019    HDL 31 (L) 06/05/2019    HDL 38 (L) 01/29/2018     Lab Results   Component Value Date    LDLCALC 51 10/23/2019    LDLCALC 51 06/05/2019    LDLCALC 100 (H) 01/29/2018     Lab Results   Component Value Date    LABVLDL 24 10/23/2019    LABVLDL 17 06/05/2019    LABVLDL 31 01/29/2018     No results found for: [facility]  Lab Results   Component Value Date    WBC 6.3 02/04/2021    HGB 11.1 (L) 02/04/2021    HCT 34.4 (L) 02/04/2021    MCV 89.2 02/04/2021     02/04/2021     Lab Results   Component Value Date     02/04/2021    K 5.2 (H) 02/04/2021     02/04/2021    CO2 19 (L) 02/04/2021    BUN 44 (H) 02/04/2021    CREATININE 4.7 (H) 02/04/2021    GLUCOSE 170 (H) 02/04/2021    CALCIUM 8.5 02/04/2021    PROT 7.2 10/26/2020    LABALBU 3.5 10/31/2020 BILITOT <0.2 10/26/2020    ALKPHOS 75 10/26/2020    AST 8 (L) 10/26/2020    ALT 7 (L) 10/26/2020    LABGLOM 13 (A) 02/04/2021    GFRAA 16 (A) 02/04/2021    AGRATIO 0.9 (L) 10/26/2020    GLOB 3.7 10/26/2020       Review of Systems   Constitutional: Negative for activity change and fever. HENT: Negative for congestion. Had recent dental exam, requires deep cleaning. Eyes: Negative for visual disturbance. Respiratory: Negative for chest tightness and shortness of breath. Cardiovascular: Negative for chest pain, palpitations and leg swelling. Hypertension controlled on present medication  Hyperlipidemia managed on present medication     Gastrointestinal: Negative for abdominal pain, constipation and diarrhea. Endocrine: Negative for polyuria. Diabetes managed on insulin. Genitourinary: Negative for dysuria. ESRD, performs peritoneal dialysis nightly. Continues to have urinary output. Being evaluated for Kidney transplant. Musculoskeletal: Negative for arthralgias and myalgias. Skin: Negative for rash. Neurological: Negative for dizziness, light-headedness and headaches. History of Lacunar stroke in 2015. Was placed on Plavix at that time. No new CVA or weakness, Most recent CT WNL. Psychiatric/Behavioral: Negative for agitation, decreased concentration and sleep disturbance. The patient is not nervous/anxious. Prior to Visit Medications    Medication Sig Taking?  Authorizing Provider   aspirin EC 81 MG EC tablet Take 1 tablet by mouth daily Yes ROQUE Toribio CNP   hydrALAZINE (APRESOLINE) 100 MG tablet TAKE 2 TABLETS BY MOUTH THREE TIMES DAILY Yes ROQUE Toribio CNP   isosorbide mononitrate (IMDUR) 60 MG extended release tablet Take 1 tablet by mouth nightly Yes ROQUE Toribio CNP   insulin glargine (LANTUS) 100 UNIT/ML injection vial Inject into the skin nightly  Historical Provider, MD   insulin lispro (HUMALOG) 100 UNIT/ML injection vial Inject into the skin 3 times daily (before meals)  Historical Provider, MD   gabapentin (NEURONTIN) 300 MG capsule TAKE 1 CAPSULE BY MOUTH THREE TIMES DAILY  ROQUE Montana CNP   metoprolol tartrate (LOPRESSOR) 50 MG tablet Take twice daily  ROQUE Montana CNP   cloNIDine (CATAPRES) 0.1 MG tablet Take 1 tablet by mouth 2 times daily  Jes Lacy MD   spironolactone (ALDACTONE) 50 MG tablet Take 1 tablet by mouth daily  Jes Lacy MD   torsemide (DEMADEX) 20 MG tablet Take 2 tablets by mouth daily  Jes Lacy MD   NIFEdipine (PROCARDIA XL) 90 MG extended release tablet Take 1 tablet by mouth daily  ROQUE Montana CNP   atorvastatin (LIPITOR) 40 MG tablet TAKE 1 TABLET BY MOUTH ONCE DAILY  Patient taking differently: Take 40 mg by mouth nightly TAKE 1 TABLET BY MOUTH ONCE DAILY  ROQUE Montana CNP   acetaminophen (ACETAMINOPHEN EXTRA STRENGTH) 500 MG tablet TAKE TWO TABLETS BY MOUTH EVERY 6 HOURS AS NEEDED FOR PAIN  Vazquez Justin MD        Social History     Tobacco Use    Smoking status: Current Some Day Smoker     Packs/day: 0.00     Types: Cigars     Start date: 1/3/1979     Last attempt to quit: 2019     Years since quittin.1    Smokeless tobacco: Never Used    Tobacco comment: 3 black and milds a week   Substance Use Topics    Alcohol use: Yes     Frequency: Monthly or less     Comment: occasional        Vitals:    21 1130   BP: 137/83   Pulse: 77   Temp: 97.5 °F (36.4 °C)   TempSrc: Temporal   SpO2: 96%   Weight: 173 lb (78.5 kg)     Estimated body mass index is 22.21 kg/m² as calculated from the following:    Height as of 21: 6' 2\" (1.88 m). Weight as of this encounter: 173 lb (78.5 kg). Physical Exam  Vitals signs reviewed. Constitutional:       Appearance: He is well-developed. He is not diaphoretic. HENT:      Head: Normocephalic.       Right Ear: Hearing and external ear normal. No tenderness. Left Ear: Hearing and external ear normal. No tenderness. Nose: Nose normal.   Eyes:      General: Lids are normal.   Cardiovascular:      Rate and Rhythm: Normal rate and regular rhythm. Heart sounds: Normal heart sounds, S1 normal and S2 normal.   Pulmonary:      Effort: Pulmonary effort is normal.      Breath sounds: Normal breath sounds. Abdominal:          Comments: 1.5 and 2.5 bag. Musculoskeletal: Normal range of motion. Lymphadenopathy:      Head:      Right side of head: No submental or submandibular adenopathy. Left side of head: No submental or submandibular adenopathy. Cervical:      Right cervical: No superficial cervical adenopathy. Left cervical: No superficial cervical adenopathy. Skin:     General: Skin is warm and dry. Findings: No rash. Nails: There is no clubbing. Neurological:      Mental Status: He is alert and oriented to person, place, and time. GCS: GCS eye subscore is 4. GCS verbal subscore is 5. GCS motor subscore is 6. Psychiatric:         Speech: Speech normal.         Behavior: Behavior normal. Behavior is cooperative. Judgment: Judgment normal.         ASSESSMENT/PLAN:  1. Type 2 diabetes mellitus with stage 3 chronic kidney disease, with long-term current use of insulin, unspecified whether stage 3a or 3b CKD ([facility] [address]  -Continue current medications.  -Follow up with Endocrinology    2. Need for shingles vaccine      3. Screening for deficiency anemia      4. Essential hypertension  -Continue current medications. - hydrALAZINE (APRESOLINE) 100 MG tablet; TAKE 2 TABLETS BY MOUTH THREE TIMES DAILY  Dispense: 270 tablet; Refill: 1  - aspirin EC 81 MG EC tablet; Take 1 tablet by mouth daily  Dispense: 90 tablet; Refill: 1    5. Mixed hyperlipidemia  -Continue current medications. 6. Chronic diastolic congestive heart failure (HCC)  -Continue current medications.   - hydrALAZINE (APRESOLINE) 100 MG tablet; TAKE 2

## 2021-02-18 ENCOUNTER — OFFICE VISIT (OUTPATIENT)
Dept: PRIMARY CARE CLINIC | Age: 56
End: 2021-02-18
Payer: MEDICARE

## 2021-02-18 VITALS
BODY MASS INDEX: 22.21 KG/M2 | DIASTOLIC BLOOD PRESSURE: 83 MMHG | SYSTOLIC BLOOD PRESSURE: 137 MMHG | OXYGEN SATURATION: 96 % | TEMPERATURE: 97.5 F | HEART RATE: 77 BPM | WEIGHT: 173 LBS

## 2021-02-18 DIAGNOSIS — E78.2 MIXED HYPERLIPIDEMIA: ICD-10-CM

## 2021-02-18 DIAGNOSIS — I50.32 CHRONIC DIASTOLIC CONGESTIVE HEART FAILURE (HCC): ICD-10-CM

## 2021-02-18 DIAGNOSIS — Z23 NEED FOR SHINGLES VACCINE: ICD-10-CM

## 2021-02-18 DIAGNOSIS — Z79.4 TYPE 2 DIABETES MELLITUS WITH STAGE 3 CHRONIC KIDNEY DISEASE, WITH LONG-TERM CURRENT USE OF INSULIN, UNSPECIFIED WHETHER STAGE 3A OR 3B CKD (HCC): Primary | Chronic | ICD-10-CM

## 2021-02-18 DIAGNOSIS — Z13.0 SCREENING FOR DEFICIENCY ANEMIA: ICD-10-CM

## 2021-02-18 DIAGNOSIS — N18.30 TYPE 2 DIABETES MELLITUS WITH STAGE 3 CHRONIC KIDNEY DISEASE, WITH LONG-TERM CURRENT USE OF INSULIN, UNSPECIFIED WHETHER STAGE 3A OR 3B CKD (HCC): Primary | Chronic | ICD-10-CM

## 2021-02-18 DIAGNOSIS — I10 ESSENTIAL HYPERTENSION: Chronic | ICD-10-CM

## 2021-02-18 DIAGNOSIS — Z86.73 HISTORY OF STROKE: ICD-10-CM

## 2021-02-18 DIAGNOSIS — E11.22 TYPE 2 DIABETES MELLITUS WITH STAGE 3 CHRONIC KIDNEY DISEASE, WITH LONG-TERM CURRENT USE OF INSULIN, UNSPECIFIED WHETHER STAGE 3A OR 3B CKD (HCC): Primary | Chronic | ICD-10-CM

## 2021-02-18 PROCEDURE — 3046F HEMOGLOBIN A1C LEVEL >9.0%: CPT | Performed by: NURSE PRACTITIONER

## 2021-02-18 PROCEDURE — 3017F COLORECTAL CA SCREEN DOC REV: CPT | Performed by: NURSE PRACTITIONER

## 2021-02-18 PROCEDURE — G8420 CALC BMI NORM PARAMETERS: HCPCS | Performed by: NURSE PRACTITIONER

## 2021-02-18 PROCEDURE — G8427 DOCREV CUR MEDS BY ELIG CLIN: HCPCS | Performed by: NURSE PRACTITIONER

## 2021-02-18 PROCEDURE — G8484 FLU IMMUNIZE NO ADMIN: HCPCS | Performed by: NURSE PRACTITIONER

## 2021-02-18 PROCEDURE — 4004F PT TOBACCO SCREEN RCVD TLK: CPT | Performed by: NURSE PRACTITIONER

## 2021-02-18 PROCEDURE — 2022F DILAT RTA XM EVC RTNOPTHY: CPT | Performed by: NURSE PRACTITIONER

## 2021-02-18 PROCEDURE — 99213 OFFICE O/P EST LOW 20 MIN: CPT | Performed by: NURSE PRACTITIONER

## 2021-02-18 RX ORDER — ISOSORBIDE MONONITRATE 60 MG/1
60 TABLET, EXTENDED RELEASE ORAL NIGHTLY
Qty: 90 TABLET | Refills: 1 | Status: SHIPPED | OUTPATIENT
Start: 2021-02-18 | End: 2021-10-20

## 2021-02-18 RX ORDER — HYDRALAZINE HYDROCHLORIDE 100 MG/1
TABLET, FILM COATED ORAL
Qty: 270 TABLET | Refills: 1 | Status: SHIPPED | OUTPATIENT
Start: 2021-02-18 | End: 2021-03-01

## 2021-02-18 NOTE — PATIENT INSTRUCTIONS
Make appointment with Endocrinology  [facility] Endocrinology and Diabetes- Janes Leslie MD  [address] [place] [address]  Phone: [phone]  Schedule AWV  Stop Plavix  Take ASA 81 mg daily

## 2021-02-21 ENCOUNTER — TELEPHONE (OUTPATIENT)
Dept: PRIMARY CARE CLINIC | Age: 56
End: 2021-02-21

## 2021-02-21 RX ORDER — ASPIRIN 81 MG/1
81 TABLET ORAL DAILY
Qty: 90 TABLET | Refills: 1 | Status: SHIPPED | OUTPATIENT
Start: 2021-02-21 | End: 2021-09-24

## 2021-02-21 ASSESSMENT — PATIENT HEALTH QUESTIONNAIRE - PHQ9: 1. LITTLE INTEREST OR PLEASURE IN DOING THINGS: 0

## 2021-02-21 ASSESSMENT — ENCOUNTER SYMPTOMS
DIARRHEA: 0
CONSTIPATION: 0
ABDOMINAL PAIN: 0
SHORTNESS OF BREATH: 0
CHEST TIGHTNESS: 0

## 2021-02-21 NOTE — TELEPHONE ENCOUNTER
Advised to discontinue Plavix. Take ASA 81 mg daily. Maintain BP less than 130/80. Spoke with spouse and patient.

## 2021-02-22 ENCOUNTER — TELEPHONE (OUTPATIENT)
Dept: PRIMARY CARE CLINIC | Age: 56
End: 2021-02-22

## 2021-02-22 DIAGNOSIS — Z99.2 PERITONEAL DIALYSIS STATUS (HCC): Primary | ICD-10-CM

## 2021-02-22 DIAGNOSIS — I63.9 CEREBROVASCULAR ACCIDENT (CVA), UNSPECIFIED MECHANISM (HCC): ICD-10-CM

## 2021-02-22 DIAGNOSIS — N18.6 END STAGE RENAL DISEASE (HCC): ICD-10-CM

## 2021-02-22 NOTE — TELEPHONE ENCOUNTER
----- Message from ROQUE Riley CNP sent at 2/21/2021 12:08 PM EST -----  Regarding: bedside commode  Please place prescription for bedside commode, determine where we can send prescription.

## 2021-02-22 NOTE — TELEPHONE ENCOUNTER
I spoke with Maria Del Carmen Goldstein on the phone and he stated that he already has a bed side commode. However he is wanting to know how he could go about getting a mobility scooter.

## 2021-03-10 DIAGNOSIS — E11.49 TYPE 2 DIABETES MELLITUS WITH OTHER DIABETIC NEUROLOGICAL COMPLICATION (HCC): ICD-10-CM

## 2021-03-16 RX ORDER — INSULIN GLARGINE 100 [IU]/ML
INJECTION, SOLUTION SUBCUTANEOUS
Qty: 15 ML | Refills: 2 | Status: SHIPPED | OUTPATIENT
Start: 2021-03-16 | End: 2021-06-09

## 2021-03-19 RX ORDER — SPIRONOLACTONE 50 MG/1
50 TABLET, FILM COATED ORAL DAILY
Qty: 90 TABLET | Refills: 1 | Status: SHIPPED | OUTPATIENT
Start: 2021-03-19 | End: 2021-09-20

## 2021-03-31 ENCOUNTER — TELEPHONE (OUTPATIENT)
Dept: PRIMARY CARE CLINIC | Age: 56
End: 2021-03-31

## 2021-03-31 NOTE — TELEPHONE ENCOUNTER
Pt is in need of a refill on clonidine. The pharmacy does not have the refill on file.  Thank you   pls send to [facility]: [phone]

## 2021-04-01 RX ORDER — CLONIDINE HYDROCHLORIDE 0.1 MG/1
0.1 TABLET ORAL 2 TIMES DAILY
Qty: 60 TABLET | Refills: 3 | Status: SHIPPED | OUTPATIENT
Start: 2021-04-01 | End: 2021-07-19

## 2021-04-01 NOTE — TELEPHONE ENCOUNTER
Calling back in regards to the previous message:    Please return call to advise. Thanks    Pt shona: [phone]

## 2021-04-07 DIAGNOSIS — E78.2 MIXED HYPERLIPIDEMIA: ICD-10-CM

## 2021-04-07 RX ORDER — ATORVASTATIN CALCIUM 40 MG/1
40 TABLET, FILM COATED ORAL NIGHTLY
Qty: 90 TABLET | Refills: 1 | Status: SHIPPED | OUTPATIENT
Start: 2021-04-07 | End: 2021-10-20

## 2021-04-23 NOTE — PROGRESS NOTES
fingers or toes      For your safety, please do not wear any jewelry or body piercing's on the day of surgery. All jewelry must be removed. If you have dentures, they will be removed before going to operating room. For your convenience, we will provide you with a container. If you wear contact lenses or glasses, they will be removed, please bring a case for them. If you have a living will and a durable power of  for healthcare, please bring in a copy. As part of our patient safety program to minimize surgical site infections, we ask you to do the following:    · Please notify your surgeon if you develop any illness between         now and the  day of your surgery. · This includes a cough, cold, fever, sore throat, nausea,         or vomiting, and diarrhea, etc.  ·  Please notify your surgeon if you experience dizziness, shortness         of breath or blurred vision between now and the time of your surgery. Do not shave your operative site 96 hours prior to surgery. For face and neck surgery, men may use an electric razor 48 hours   prior to surgery. You may shower the night before surgery or the morning of   your surgery with an antibacterial soap. You will need to bring a photo ID and insurance card    [facility] has an onsite pharmacy, would you like to utilize our pharmacy     If you will be staying overnight and use a C-pap machine, please bring   your C-pap to hospital     Our goal is to provide you with excellent care, therefore, visitors will be limited to two(2) in the room at a time so that we may focus on providing this care for you. Please contact pre-admission testing if you have any further questions.                  [facility] phone number:  [phone] PAT fax number:  [phone]  Please note these are generalized instructions for all surgical cases, you may be provided with more specific instructions according to your surgery. Remember. Peter Chirinos Safety First! Call before you Fall Remember

## 2021-04-23 NOTE — PROGRESS NOTES
Preoperative Screening for Elective Surgery/Invasive Procedures While COVID-19 present in the community     Have you tested positive or have been told to self-isolate for COVID-19 like symptoms within the past 28 days? NO   Do you currently have any of the following symptoms? NO  o Fever >100.0 F or 99.9 F in immunocompromised patients? NO  o New onset cough, shortness of breath or difficulty breathing? NO  o New onset sore throat, myalgia (muscle aches and pains), headache, loss of taste/smell or diarrhea? NO   Have you had a potential exposure to COVID-19 within the past 14 days by: NO  o Close contact with a confirmed case? NO  o Close contact with a healthcare worker,  or essential infrastructure worker (grocery store, [employer], gas station, public utilities or transportation)? YES  o Do you reside in a congregate setting such as; skilled nursing facility, adult home, correctional facility, homeless shelter or other institutional setting? NO  o Have you had recent travel to a known COVID-19 hotspot? YES-DWA[place] [id number]    Indicate if the patient has a positive screen by answering yes to one or more of the above questions. Patients who test positive or screen positive prior to surgery or on the day of surgery should be evaluated in conjunction with the surgeon/proceduralist/anesthesiologist to determine the urgency of the procedure.

## 2021-04-26 ENCOUNTER — ANESTHESIA (OUTPATIENT)
Dept: ENDOSCOPY | Age: 56
End: 2021-04-26
Payer: MEDICARE

## 2021-04-26 ENCOUNTER — ANESTHESIA EVENT (OUTPATIENT)
Dept: ENDOSCOPY | Age: 56
End: 2021-04-26
Payer: MEDICARE

## 2021-04-26 ENCOUNTER — HOSPITAL ENCOUNTER (OUTPATIENT)
Age: 56
Setting detail: OUTPATIENT SURGERY
Discharge: HOME | End: 2021-04-26
Attending: INTERNAL MEDICINE | Admitting: INTERNAL MEDICINE
Payer: MEDICARE

## 2021-04-26 VITALS
TEMPERATURE: 97 F | SYSTOLIC BLOOD PRESSURE: 142 MMHG | HEIGHT: 74 IN | BODY MASS INDEX: 21.52 KG/M2 | HEART RATE: 71 BPM | OXYGEN SATURATION: 98 % | RESPIRATION RATE: 18 BRPM | WEIGHT: 167.66 LBS | DIASTOLIC BLOOD PRESSURE: 75 MMHG

## 2021-04-26 VITALS — DIASTOLIC BLOOD PRESSURE: 80 MMHG | OXYGEN SATURATION: 100 % | SYSTOLIC BLOOD PRESSURE: 131 MMHG

## 2021-04-26 LAB
GLUCOSE BLD-MCNC: 143 MG/DL (ref 70–99)
GLUCOSE BLD-MCNC: 156 MG/DL (ref 70–99)
PERFORMED ON: ABNORMAL
PERFORMED ON: ABNORMAL
SARS-COV-2, NAAT: NOT DETECTED

## 2021-04-26 PROCEDURE — 6370000000 HC RX 637 (ALT 250 FOR IP): Performed by: INTERNAL MEDICINE

## 2021-04-26 PROCEDURE — 7100000011 HC PHASE II RECOVERY - ADDTL 15 MIN: Performed by: INTERNAL MEDICINE

## 2021-04-26 PROCEDURE — 2500000003 HC RX 250 WO HCPCS: Performed by: NURSE ANESTHETIST, CERTIFIED REGISTERED

## 2021-04-26 PROCEDURE — 7100000010 HC PHASE II RECOVERY - FIRST 15 MIN: Performed by: INTERNAL MEDICINE

## 2021-04-26 PROCEDURE — 7100000001 HC PACU RECOVERY - ADDTL 15 MIN: Performed by: INTERNAL MEDICINE

## 2021-04-26 PROCEDURE — 3609017100 HC EGD: Performed by: INTERNAL MEDICINE

## 2021-04-26 PROCEDURE — 2709999900 HC NON-CHARGEABLE SUPPLY: Performed by: INTERNAL MEDICINE

## 2021-04-26 PROCEDURE — 3700000000 HC ANESTHESIA ATTENDED CARE: Performed by: INTERNAL MEDICINE

## 2021-04-26 PROCEDURE — 87635 SARS-COV-2 COVID-19 AMP PRB: CPT

## 2021-04-26 PROCEDURE — 2580000003 HC RX 258: Performed by: NURSE ANESTHETIST, CERTIFIED REGISTERED

## 2021-04-26 PROCEDURE — 6360000002 HC RX W HCPCS: Performed by: NURSE ANESTHETIST, CERTIFIED REGISTERED

## 2021-04-26 PROCEDURE — 7100000000 HC PACU RECOVERY - FIRST 15 MIN: Performed by: INTERNAL MEDICINE

## 2021-04-26 RX ORDER — SODIUM CHLORIDE 9 MG/ML
INJECTION, SOLUTION INTRAVENOUS CONTINUOUS PRN
Status: DISCONTINUED | OUTPATIENT
Start: 2021-04-26 | End: 2021-04-26 | Stop reason: SDUPTHER

## 2021-04-26 RX ORDER — SIMETHICONE 20 MG/.3ML
EMULSION ORAL PRN
Status: DISCONTINUED | OUTPATIENT
Start: 2021-04-26 | End: 2021-04-26 | Stop reason: ALTCHOICE

## 2021-04-26 RX ORDER — PROPOFOL 10 MG/ML
INJECTION, EMULSION INTRAVENOUS PRN
Status: DISCONTINUED | OUTPATIENT
Start: 2021-04-26 | End: 2021-04-26 | Stop reason: SDUPTHER

## 2021-04-26 RX ORDER — LIDOCAINE HYDROCHLORIDE 20 MG/ML
INJECTION, SOLUTION INFILTRATION; PERINEURAL PRN
Status: DISCONTINUED | OUTPATIENT
Start: 2021-04-26 | End: 2021-04-26 | Stop reason: SDUPTHER

## 2021-04-26 RX ADMIN — SODIUM CHLORIDE: 9 INJECTION, SOLUTION INTRAVENOUS at 12:20

## 2021-04-26 RX ADMIN — LIDOCAINE HYDROCHLORIDE 5 ML: 20 INJECTION, SOLUTION INFILTRATION; PERINEURAL at 12:17

## 2021-04-26 RX ADMIN — PROPOFOL 175 MG: 10 INJECTION, EMULSION INTRAVENOUS at 12:24

## 2021-04-26 ASSESSMENT — PAIN SCALES - GENERAL
PAINLEVEL_OUTOF10: 0
PAINLEVEL_OUTOF10: 0

## 2021-04-26 ASSESSMENT — LIFESTYLE VARIABLES: SMOKING_STATUS: 0

## 2021-04-26 ASSESSMENT — PULMONARY FUNCTION TESTS
PIF_VALUE: 1
PIF_VALUE: 1

## 2021-04-26 ASSESSMENT — PAIN - FUNCTIONAL ASSESSMENT: PAIN_FUNCTIONAL_ASSESSMENT: 0-10

## 2021-04-26 NOTE — OP NOTE
Endoscopy Note    Patient: Gregor Chase  : 1965  Acct#:     Procedure: Esophagogastroduodenoscopy                         Date:  2021     Surgeon:   Stephanie Rae MD    Referring Physician:  ROQUE Grider CNP    Indications: This is a 55y.o. year old male who presents today with Pretransplant work-up/Nausea    Postoperative Diagnosis:  1. Normal EGD 2. Melanosis Duodeni     Anesthesia: The patient was administered IV propofol per anesthesiology team.  Please see their operative records for full details. Consent:  The patient or their legal guardian has signed an informed consent, and is aware of the potential risks, benefits, alternatives, and potential complications of this procedure. These include, but are not limited to hemorrhage, bleeding, post procedural pain, perforation, phlebitis, aspiration, hypotension, hypoxia, cardiovascular events such as arryhthmia, and possibly death. Description of Procedure: The patient was then taken to the endoscopy suite, placed in the left lateral decubitus position and the above IV sedation was administrered. The Olympus video endoscope was placed through the patient's oropharynx without difficulty to the extent of the 2nd portion of the duodenum. Both forward and retroflexed views of the stomach were obtained. Findings:    Esophagus: The esophagus appeared normal without evidence of Savage's esophagus or reflux esophagitis. Stomach: The stomach appeared normal on forward and retroflexed views. Duodenum: The first and 2nd portions of the duodenum appeared normal with normal villous pattern. Melanosis duodeni noted. Estimated Blood Loss (mL): None     Complications: None    Specimens: None    The scope was then withdrawn back into the stomach, it was decompressed, and the scope was completely withdrawn.     The patient tolerated the procedure well and was taken to the post anesthesia care unit in good condition. Impression:   1) See post procedure diagnoses    Recommendations:   1) Patient needs a colonoscopy to complete pre-transplant work-up. Will set up with a bowel preparation and colonoscopy.      Laurie Irizarry MD  [address] and [address] [facility]

## 2021-04-26 NOTE — PROGRESS NOTES
Verbal understanding of discharge instructions, vital signs stable, IV d/c per protocol, no c/o at this time.

## 2021-04-26 NOTE — ANESTHESIA POSTPROCEDURE EVALUATION
Department of Anesthesiology  Postprocedure Note    Patient: Pza Hernandez  MRN: [id number]  YOB: 1965  Date of evaluation: 4/26/2021  Time:  1:50 PM     Procedure Summary     Date: 04/26/21 Room / Location: [address] [place] [address]    Anesthesia Start: 1478 Anesthesia Stop: 2864    Procedure: ESOPHAGOGASTRODUODENOSCOPY (N/A ) Diagnosis:       Constipation, unspecified constipation type      Nausea      (CONSTIPATION, NAUSEA)    Surgeons: Paul Pedroza MD Responsible Provider: Meli Saleh MD    Anesthesia Type: MAC ASA Status: 3          Anesthesia Type: MAC    Kasey Phase I: Kasey Score: 8    Kasey Phase II: Kasey Score: 10    Last vitals: Reviewed and per EMR flowsheets.        Anesthesia Post Evaluation    Patient location during evaluation: PACU  Patient participation: complete - patient participated  Level of consciousness: awake and alert  Pain score: 0  Airway patency: patent  Nausea & Vomiting: no nausea and no vomiting  Complications: no  Cardiovascular status: blood pressure returned to baseline  Respiratory status: acceptable  Hydration status: euvolemic

## 2021-04-26 NOTE — H&P
Pre-operative History and Physical    Patient: Gregor Chase  : 1965  Acct#:     Intended Procedure:  EGD/Colonoscopy     HISTORY OF PRESENT ILLNESS:  The patient is a 55 y.o. male  who presents for/due to Pre-Transplant work-up/Nausea. Past Medical History:        Diagnosis Date    Cardiomyopathy [facility])     [facility]     CVA (cerebral infarction) 2015    Diabetes mellitus ([facility]     Gastroparesis     Hemod[facility]     Hypercholesteremia     Hypertension     Kidney disease     Unspecified cerebral artery occlusion with cerebral infarction     5/15, 7/15 LEFT SIFE WEAKNESS     Past Surgical History:        Procedure Laterality Date    HC DIALYSIS CATHETER N/A 10/29/2020    PLACEMENT OF A TUNNELED DIALYSIS CATHETER WITH FLEURO AND ULTRASOUND performed by [facility]icki Zambrano [facility] N/A 10/29/2020    LAPAROSCOPIC PERITONEAL DIALYSIS CATHETER PLACEMENT WITH FLEURO AND ULTRASOUND performed by Vikci Zambrano MD at [address] [facility] N/A     UMBILICAL HERNIA REPAIR performed by Vicki Zambrano MD at [place] [address]     Medications Prior to Admission:   Prior to Admission medications    Medication Sig Start Date End Date Taking?  Authorizing Provider   atorvastatin (LIPITOR) 40 MG tablet Take 1 tablet by mouth nightly TAKE 1 TABLET BY MOUTH ONCE DAILY 21  Yes ROQUE Grider CNP   cloNIDine (CATAPRES) 0.1 MG tablet Take 1 tablet by mouth 2 times daily 21  Yes ROQUE Grider CNP   spironolactone (ALDACTONE) 50 MG tablet Take 1 tablet by mouth daily 3/19/21  Yes MD HAO Moss 100 UNIT/ML injection pen INJECT 20 UNITS SUBCUTANEOUSLY NIGHTLY 3/16/21  Yes ROQUE Grider CNP   gabapentin (NEURONTIN) 300 MG capsule TAKE 1 CAPSULE BY MOUTH THREE TIMES DAILY 3/1/21 4/23/21 Yes ROQUE Grider CNP   hydrALAZINE (APRESOLINE) 100 MG tablet TAKE 1 TABLET BY MOUTH THREE TIMES DAILY 3/1/21  Yes ROQUE Guillen CNP   aspirin EC 81 MG EC tablet Take 1 tablet by mouth daily 2/21/21  Yes ROQUE Guillen CNP   isosorbide mononitrate (IMDUR) 60 MG extended release tablet Take 1 tablet by mouth nightly 2/18/21  Yes ROQUE Guillen CNP   insulin lispro (HUMALOG) 100 UNIT/ML injection vial Inject into the skin 3 times daily (before meals) SLIDING SCALE   Yes Historical Provider, MD   metoprolol tartrate (LOPRESSOR) 50 MG tablet Take twice daily 11/19/20  Yes ROQUE Guillen CNP   NIFEdipine (PROCARDIA XL) 90 MG extended release tablet Take 1 tablet by mouth daily 9/29/20  Yes ROQUE Guillen CNP   acetaminophen (ACETAMINOPHEN EXTRA STRENGTH) 500 MG tablet TAKE TWO TABLETS BY MOUTH EVERY 6 HOURS AS NEEDED FOR PAIN 6/22/19  Yes Marissa Gonzales MD       Allergies:  Doxazosin and Coconut flavor    Social History:   TOBACCO:   reports that he has been smoking cigars. He started smoking about 42 years ago. He has been smoking about 0.00 packs per day. He has never used smokeless tobacco.  ETOH:   reports current alcohol use. DRUGS:   reports current drug use. Drug: Marijuana. PHYSICAL EXAM:      Vital Signs: Ht 6' 2\" (1.88 m)   Wt 175 lb (79.4 kg)   BMI 22.47 kg/m²    Airway: No stridor or wheezing noted. Good air movement  Pulmonary: without wheezes. Clear to auscultation  Cardiac:regular rate and rhythm without loud murmurs  Abdomen:soft, nontender,  Bowel sounds present    Pre-Procedure Assessment / Plan:  1) EGD/Colonoscopy     ASA Grade:  ASA 3 - Patient with moderate systemic disease with functional limitations  Mallampati Classification:  Class II    Level of Sedation Plan:Deep sedation    Post Procedure plan: Return to same level of care    I assessed the patient and find that the patient is in satisfactory condition to proceed with the planned procedure and sedation plan.     I have explained the risk, benefits, and alternatives to the procedure; the patient understands and agrees to proceed.        Judge Cornelia MD  4/26/2021

## 2021-04-26 NOTE — ANESTHESIA PRE PROCEDURE
[facility] Department of Anesthesiology  Pre-Anesthesia Evaluation/Consultation       Name:  Yariel Stroud  : 1965  Age:  55 y. o.                                            MRN:  [id number]  Date: 2021           Surgeon: Surgeon(s):  Mini Fish MD    Procedure: Procedure(s):  COLONOSCOPY  ESOPHAGOGASTRODUODENOSCOPY     Allergies   Allergen Reactions    Doxazosin Nausea And Vomiting     VIOLENT VOMITTING    Coconut Flavor Rash     Patient Active Problem List   Diagnosis    Left-sided weakness    Chest pain    Essential hypertension    Type 2 diabetes mellitus with stage 3 chronic kidney disease, with long-term current use of insulin ([address]    Cardiomyopathy ([address]    Cerebral infarction ([address]    Cigarette nicotine dependence without complication    Foot ulcer, left ([address]    Erectile dysfunction due to arterial insufficiency    Renal artery stenosis (HCC)    Chronic diastolic congestive heart failure (HCC)    History of stroke    Mixed hyperlipidemia    Major depressive disorder, recurrent episode, moderate (HCC)    Acute on chronic renal failure (HCC)    Pericardial effusion    Acute renal failure superimposed on chronic kidney disease, on chronic dialysis ([address]    Lower abdominal pain     Past Medical History:   Diagnosis Date    Cardiomyopathy ([address]     CHF (congestive heart failure) ([address]     CVA (cerebral infarction) 2015    Diabetes mellitus ([address]     Gastroparesis     Hemodialysis patient ([address]     Hypercholesteremia     Hypertension     Kidney disease     Unspecified cerebral artery occlusion with cerebral infarction     5/15, 7/15 LEFT SIFE WEAKNESS     Past Surgical History:   Procedure Laterality Date    HC DIALYSIS CATHETER N/A 10/29/2020    PLACEMENT OF A TUNNELED DIALYSIS CATHETER WITH FLEURO AND ULTRASOUND performed by Will Garcia MD at [facility] N/A 10/29/2020    LAPAROSCOPIC PERITONEAL DIALYSIS CATHETER PLACEMENT WITH FLEURO AND ULTRASOUND performed by Micah Mars MD at [address] N/A     UMBILICAL HERNIA REPAIR performed by Micah Mars MD at . [facility] History     Tobacco Use    Smoking status: Current Some Day Smoker     Packs/day: 0.00     Types: Cigars     Start date: 1/3/1979     Last attempt to quit: 2019     Years since quittin.2    Smokeless tobacco: Never Used    Tobacco comment: 3 black and milds a week   Substance Use Topics    Alcohol use: Yes     Frequency: Monthly or less     Comment: occasional    Drug use: Yes     Types: Marijuana     Comment: previously used cocaine, stopped May 2015 LAST USED MARIJUANA-2020     Medications  No current facility-administered medications on file prior to encounter.       Current Outpatient Medications on File Prior to Encounter   Medication Sig Dispense Refill    atorvastatin (LIPITOR) 40 MG tablet Take 1 tablet by mouth nightly TAKE 1 TABLET BY MOUTH ONCE DAILY 90 tablet 1    cloNIDine (CATAPRES) 0.1 MG tablet Take 1 tablet by mouth 2 times daily 60 tablet 3    spironolactone (ALDACTONE) 50 MG tablet Take 1 tablet by mouth daily 90 tablet 1    LANTUS SOLOSTAR 100 UNIT/ML injection pen INJECT 20 UNITS SUBCUTANEOUSLY NIGHTLY 15 mL 2    gabapentin (NEURONTIN) 300 MG capsule TAKE 1 CAPSULE BY MOUTH THREE TIMES DAILY 270 capsule 1    hydrALAZINE (APRESOLINE) 100 MG tablet TAKE 1 TABLET BY MOUTH THREE TIMES DAILY 270 tablet 1    aspirin EC 81 MG EC tablet Take 1 tablet by mouth daily 90 tablet 1    isosorbide mononitrate (IMDUR) 60 MG extended release tablet Take 1 tablet by mouth nightly 90 tablet 1    insulin lispro (HUMALOG) 100 UNIT/ML injection vial Inject into the skin 3 times daily (before meals) SLIDING SCALE      metoprolol tartrate (LOPRESSOR) 50 MG tablet Take twice daily 180 tablet 1    NIFEdipine (PROCARDIA XL) 90 MG extended release tablet Take 1 tablet by mouth daily 90 tablet 1    acetaminophen (ACETAMINOPHEN EXTRA STRENGTH) 500 MG tablet TAKE TWO TABLETS BY MOUTH EVERY 6 HOURS AS NEEDED FOR PAIN 120 tablet 3     No current facility-administered medications for this encounter. Vital Signs (Current)   Vitals:    21 1256 21   BP:  (!) 141/90   Pulse:  79   Resp:  18   Temp:  98.4 °F (36.9 °C)   TempSrc:  Temporal   SpO2:  99%   Weight: 175 lb (79.4 kg) 167 lb 10.6 oz (76.1 kg)   Height: 6' 2\" (1.88 m) 6' 2\" (1.88 m)                                          BP Readings from Last 3 Encounters:   21 (!) 141/90   21 13783   21 (!) 15185     Vital Signs Statistics (for past 48 hrs)     Temp  Av.4 °F (36.9 °C)  Min: 98.4 °F (36.9 °C)   Min taken time: 21  Max: 98.4 °F (36.9 °C)   Max taken time: 21  Pulse  Av  Min: 79   Min taken time: 21  Max: 78   Max taken time: 21  Resp  Av  Min: 18   Min taken time: 21  Max: 18   Max taken time: 21  BP  Min: 141/90   Min taken time: 21  Max: 141/90   Max taken time: 21  SpO2  Av %  Min: 99 %   Min taken time: 21  Max: 99 %   Max taken time: 21  BP Readings from Last 3 Encounters:   21 (!) 141/90   21 137/83   21 (!) 151/85       BMI  Body mass index is 21.53 kg/m². Estimated body mass index is 21.53 kg/m² as calculated from the following:    Height as of this encounter: 6' 2\" (1.88 m). Weight as of this encounter: 167 lb 10.6 oz (76.1 kg).     CBC   Lab Results   Component Value Date    WBC 6.3 2021    RBC 3.85 2021    HGB 11.1 2021    HCT 34.4 2021    MCV 89.2 2021    RDW 16.7 2021     2021     CMP    Lab Results   Component Value Date     2021    K 5.2 2021    K 4.9 10/26/2020     2021    CO2 19 2021    BUN 44 2021    CREATININE 4.7 2021 GFRAA 16 02/04/2021    GFRAA >60 12/14/2010    AGRATIO 0.9 10/26/2020    LABGLOM 13 02/04/2021    GLUCOSE 170 02/04/2021    PROT 7.2 10/26/2020    CALCIUM 8.5 02/04/2021    BILITOT <0.2 10/26/2020    ALKPHOS 75 10/26/2020    AST 8 10/26/2020    ALT 7 10/26/2020     BMP    Lab Results   Component Value Date     02/04/2021    K 5.2 02/04/2021    K 4.9 10/26/2020     02/04/2021    CO2 19 02/04/2021    BUN 44 02/04/2021    CREATININE 4.7 02/04/2021    CALCIUM 8.5 02/04/2021    GFRAA 16 02/04/2021    GFRAA >60 12/14/2010    LABGLOM 13 02/04/2021    GLUCOSE 170 02/04/2021     POCGlucose  No results for input(s): GLUCOSE in the last 72 hours. Coags    Lab Results   Component Value Date    PROTIME 10.5 08/16/2015    INR 0.97 08/16/2015    APTT 36.1 96/50/5553     HCG (If Applicable) No results found for: PREGTESTUR, PREGSERUM, HCG, HCGQUANT   ABGs No results found for: PHART, PO2ART, OOU4OOP, BTA6HDH, BEART, G0KYHUKD   Type & Screen (If Applicable)  No results found for: LABABO, LABRH                         BMI: Wt Readings from Last 3 Encounters:       NPO Status:   Date of last liquid consumption: 04/26/21   Time of last liquid consumption: 0815   Date of last solid food consumption: 04/25/21      Time of last solid consumption: 1800       Anesthesia Evaluation  Patient summary reviewed no history of anesthetic complications:   Airway: Mallampati: II  TM distance: >3 FB   Neck ROM: full  Mouth opening: > = 3 FB Dental: normal exam         Pulmonary: breath sounds clear to auscultation      (-) COPD, asthma, sleep apnea and not a current smoker                           Cardiovascular:    (+) hypertension:, CHF: diastolic, hyperlipidemia          Rate: normal                    Neuro/Psych:   (+) CVA: residual symptoms, psychiatric history:   (-) seizures and TIA           GI/Hepatic/Renal:   (+) renal disease: ESRD and dialysis,           Endo/Other:    (+) Diabetes, .                  Abdominal: Vascular:     - DVT and PE. Anesthesia Plan      MAC     ASA 3       Induction: intravenous. Anesthetic plan and risks discussed with patient. Plan discussed with CRNA. This pre-anesthesia assessment may be used as a history and physical.    DOS STAFF ADDENDUM:    Pt seen and examined, chart reviewed (including anesthesia, drug and allergy history). No interval changes to history and physical examination. Anesthetic plan, risks, benefits, alternatives, and personnel involved discussed with patient. Patient verbalized an understanding and agrees to proceed.       Donn Penny MD  April 26, 2021  10:33 AM

## 2021-05-05 ENCOUNTER — HOSPITAL ENCOUNTER (EMERGENCY)
Age: 56
Discharge: HOME | End: 2021-05-05
Payer: MEDICAID

## 2021-05-05 ENCOUNTER — APPOINTMENT (OUTPATIENT)
Dept: GENERAL RADIOLOGY | Age: 56
End: 2021-05-05
Payer: MEDICAID

## 2021-05-05 VITALS
BODY MASS INDEX: 21.82 KG/M2 | SYSTOLIC BLOOD PRESSURE: 136 MMHG | HEART RATE: 76 BPM | HEIGHT: 74 IN | RESPIRATION RATE: 16 BRPM | OXYGEN SATURATION: 98 % | WEIGHT: 170 LBS | DIASTOLIC BLOOD PRESSURE: 76 MMHG | TEMPERATURE: 98.1 F

## 2021-05-05 DIAGNOSIS — S22.080A COMPRESSION FRACTURE OF T12 VERTEBRA, INITIAL ENCOUNTER (HCC): Primary | ICD-10-CM

## 2021-05-05 LAB
BILIRUBIN URINE: NEGATIVE
BLOOD, URINE: NEGATIVE
CLARITY: CLEAR
COLOR: YELLOW
COMMENT UA: NORMAL
EPITHELIAL CELLS, UA: 0 /HPF (ref 0–5)
GLUCOSE URINE: NEGATIVE MG/DL
HYALINE CASTS: 1 /LPF (ref 0–8)
KETONES, URINE: NEGATIVE MG/DL
LEUKOCYTE ESTERASE, URINE: NEGATIVE
MICROSCOPIC EXAMINATION: YES
NITRITE, URINE: NEGATIVE
PH UA: 5.5 (ref 5–8)
PROTEIN UA: 100 MG/DL
RBC UA: 2 /HPF (ref 0–4)
SPECIFIC GRAVITY UA: 1.02 (ref 1–1.03)
URINE REFLEX TO CULTURE: ABNORMAL
URINE TYPE: ABNORMAL
UROBILINOGEN, URINE: 0.2 E.U./DL
WBC UA: 4 /HPF (ref 0–5)

## 2021-05-05 PROCEDURE — 6370000000 HC RX 637 (ALT 250 FOR IP): Performed by: NURSE PRACTITIONER

## 2021-05-05 PROCEDURE — 81001 URINALYSIS AUTO W/SCOPE: CPT

## 2021-05-05 PROCEDURE — 99284 EMERGENCY DEPT VISIT MOD MDM: CPT

## 2021-05-05 PROCEDURE — 72100 X-RAY EXAM L-S SPINE 2/3 VWS: CPT

## 2021-05-05 RX ORDER — IBUPROFEN 800 MG/1
800 TABLET ORAL EVERY 8 HOURS PRN
Qty: 20 TABLET | Refills: 0 | Status: SHIPPED | OUTPATIENT
Start: 2021-05-05 | End: 2021-11-10 | Stop reason: ALTCHOICE

## 2021-05-05 RX ORDER — LIDOCAINE 4 G/G
1 PATCH TOPICAL DAILY
Status: DISCONTINUED | OUTPATIENT
Start: 2021-05-05 | End: 2021-05-05 | Stop reason: HOSPADM

## 2021-05-05 RX ORDER — CYCLOBENZAPRINE HCL 10 MG
10 TABLET ORAL 3 TIMES DAILY PRN
Qty: 20 TABLET | Refills: 0 | Status: SHIPPED | OUTPATIENT
Start: 2021-05-05 | End: 2021-05-11 | Stop reason: SDUPTHER

## 2021-05-05 RX ORDER — HYDROCODONE BITARTRATE AND ACETAMINOPHEN 5; 325 MG/1; MG/1
1 TABLET ORAL EVERY 6 HOURS PRN
Qty: 10 TABLET | Refills: 0 | Status: SHIPPED | OUTPATIENT
Start: 2021-05-05 | End: 2021-05-08

## 2021-05-05 RX ORDER — IBUPROFEN 400 MG/1
800 TABLET ORAL ONCE
Status: COMPLETED | OUTPATIENT
Start: 2021-05-05 | End: 2021-05-05

## 2021-05-05 RX ORDER — LIDOCAINE 50 MG/G
1 PATCH TOPICAL DAILY
Qty: 10 PATCH | Refills: 0 | Status: SHIPPED | OUTPATIENT
Start: 2021-05-05 | End: 2021-05-15

## 2021-05-05 RX ORDER — CYCLOBENZAPRINE HCL 10 MG
10 TABLET ORAL ONCE
Status: COMPLETED | OUTPATIENT
Start: 2021-05-05 | End: 2021-05-05

## 2021-05-05 RX ORDER — HYDROCODONE BITARTRATE AND ACETAMINOPHEN 5; 325 MG/1; MG/1
1 TABLET ORAL ONCE
Status: COMPLETED | OUTPATIENT
Start: 2021-05-05 | End: 2021-05-05

## 2021-05-05 RX ADMIN — CYCLOBENZAPRINE 10 MG: 10 TABLET, FILM COATED ORAL at 17:18

## 2021-05-05 RX ADMIN — IBUPROFEN 800 MG: 400 TABLET, FILM COATED ORAL at 17:18

## 2021-05-05 RX ADMIN — HYDROCODONE BITARTRATE AND ACETAMINOPHEN 1 TABLET: 5; 325 TABLET ORAL at 17:18

## 2021-05-05 ASSESSMENT — PAIN SCALES - GENERAL: PAINLEVEL_OUTOF10: 10

## 2021-05-05 NOTE — ED NOTES
Bed: C-21  Expected date:   Expected time:   Means of arrival:   Comments:  Once clean lobby fall     Marilu Vasquez RN  05/05/21 2994

## 2021-05-05 NOTE — ED TRIAGE NOTES
Pt to ED after falling off of the back of a stationary [employer] truck while moving on Saturday. Pt states he did hit his head but no loss of consciousness. Pt complaint of severe back pain around thoracic spine.

## 2021-05-05 NOTE — ED PROVIDER NOTES
[address]  [address] [address]  Dept: [phone]  Loc: [address] ENCOUNTER        This patient was not seen or evaluated by the attending physician. I evaluated this patient, the attending physician was available for consultation. CHIEF COMPLAINT    Chief Complaint   Patient presents with   Lenice Freeze     Was pulling a box and fell back and off of the back of a moving truck on to the concrete. Happened Saturday pain is better, but still bad. Did hit his head, no LOC, no blood thinners       HPI    King Morataya is a 55 y.o. male who presents with back pain, localized in the bilateral lumbar region of the back, non-radiating. The onset was 5 days ago. The duration has been intermittent since the onset. The quality of the pain is sharp. The pain worsens with movement. The context is he was moving boxes off of [employer], 1 to handles broke and he fell backwards off of the [employer]. Did hit his head but no loss of consciousness, no post injury nausea or vomiting, no retrograde amnesia. Is not on any anticoagulants. Denies any dysuria, gross hematuria, urinary urgency or frequency. Has had pain in his lower back since the fall. Worsens with movements and getting up. Has been ambulatory. Has tried Naprosyn and APAP without any relief and therefore came to the ED for further evaluation and treatment. REVIEW OF SYSTEMS    General: No fevers or chills  GI: No abdominal pain or vomiting  : No dysuria or hematuria  Musculoskeletal: see HPI  Neurologic: No bowel or bladder incontinence, No saddle anesthesia, No leg weakness  All other systems reviewed and are negative.     PAST MEDICAL & SURGICAL HISTORY    Past Medical History:   Diagnosis Date    Cardiomyopathy [facility])     [facility]     CVA (cerebral infarction) 5/2015    Diabetes mellitus ([facility]     Gastroparesis  [facility]     Hypercholesteremia     Hypertension     Kidney disease     Unspecified cerebral artery occlusion with cerebral infarction     5/15, 7/15 LEFT SIFE WEAKNESS     Past Surgical History:   Procedure Laterality Date    HC DIALYSIS CATHETER N/A 10/29/2020    PLACEMENT OF A TUNNELED DIALYSIS CATHETER WITH FLEURO AND ULTRASOUND performed by Alonso Wilson MD at [address] [facility] N/A 10/29/2020    LAPAROSCOPIC PERITONEAL DIALYSIS CATHETER PLACEMENT WITH FLEURO AND ULTRASOUND performed by Alonso Wilson MD at [address] [place] [address] N/A 40/83/4164    UMBILICAL HERNIA REPAIR performed by Alonso Wilson MD at [address] N/A 4/26/2021    ESOPHAGOGASTRODUODENOSCOPY performed by Darby Stallworth MD at [address] [facility]  (may include discharge medications prescribed in the ED)  Current Outpatient Rx   Medication Sig Dispense Refill    HYDROcodone-acetaminophen (NORCO) 5-325 MG per tablet Take 1 tablet by mouth every 6 hours as needed for Pain for up to 3 days. Intended supply: 3 days. Take lowest dose possible to manage pain 10 tablet 0    cyclobenzaprine (FLEXERIL) 10 MG tablet Take 1 tablet by mouth 3 times daily as needed for Muscle spasms 20 tablet 0    ibuprofen (IBU) 800 MG tablet Take 1 tablet by mouth every 8 hours as needed for Pain 20 tablet 0    lidocaine (LIDODERM) 5 % Place 1 patch onto the skin daily for 10 days 12 hours on, 12 hours off.  10 patch 0    atorvastatin (LIPITOR) 40 MG tablet Take 1 tablet by mouth nightly TAKE 1 TABLET BY MOUTH ONCE DAILY 90 tablet 1    cloNIDine (CATAPRES) 0.1 MG tablet Take 1 tablet by mouth 2 times daily 60 tablet 3    spironolactone (ALDACTONE) 50 MG tablet Take 1 tablet by mouth daily 90 tablet 1    LANTUS SOLOSTAR 100 UNIT/ML injection pen INJECT 20 UNITS SUBCUTANEOUSLY NIGHTLY 15 mL 2    gabapentin (NEURONTIN) 300 MG capsule TAKE 1 CAPSULE BY MOUTH THREE TIMES DAILY 270 capsule 1    hydrALAZINE (APRESOLINE) 100 MG tablet TAKE 1 TABLET BY MOUTH THREE TIMES DAILY 270 tablet 1    aspirin EC 81 MG EC tablet Take 1 tablet by mouth daily 90 tablet 1    isosorbide mononitrate (IMDUR) 60 MG extended release tablet Take 1 tablet by mouth nightly 90 tablet 1    insulin lispro (HUMALOG) 100 UNIT/ML injection vial Inject into the skin 3 times daily (before meals) SLIDING SCALE      metoprolol tartrate (LOPRESSOR) 50 MG tablet Take twice daily 180 tablet 1    NIFEdipine (PROCARDIA XL) 90 MG extended release tablet Take 1 tablet by mouth daily 90 tablet 1    acetaminophen (ACETAMINOPHEN EXTRA STRENGTH) 500 MG tablet TAKE TWO TABLETS BY MOUTH EVERY 6 HOURS AS NEEDED FOR PAIN 120 tablet 3       ALLERGIES    Allergies   Allergen Reactions    Doxazosin Nausea And Vomiting     VIOLENT VOMITTING    Coconut Flavor Rash       SOCIAL HISTORY    Social History     Socioeconomic History    Marital status:      Spouse name: None    Number of children: 5    Years of education: None    Highest education level: None   Occupational History    None   Social Needs    Financial resource strain: Not hard at all   Lianne-Carlos insecurity     Worry: Never true     Inability: Never true    Transportation needs     Medical: No     Non-medical: No   Tobacco Use    Smoking status: Current Some Day Smoker     Packs/day: 0.00     Types: Cigars     Start date: 1/3/1979     Last attempt to quit: 2019     Years since quittin.3    Smokeless tobacco: Never Used    Tobacco comment: 3 black and milds a week   Substance and Sexual Activity    Alcohol use: Yes     Frequency: Monthly or less     Comment: occasional    Drug use: Yes     Types: Marijuana     Comment: previously used cocaine, stopped May 2015 LAST USED MARIJUANA-2020    Sexual activity: Yes     Partners: Female   Lifestyle    Physical activity     Days per week: None Minutes per session: None    Stress: None   Relationships    Social connections     Talks on phone: None     Gets together: None     Attends Muslim service: None     Active member of club or organization: None     Attends meetings of clubs or organizations: None     Relationship status: None    Intimate partner violence     Fear of current or ex partner: None     Emotionally abused: None     Physically abused: None     Forced sexual activity: None   Other Topics Concern    None   Social History Narrative    Lives with my spouse, cousin, daughter       PHYSICAL EXAM    VITAL SIGNS: /76   Pulse 76   Temp 98.1 °F (36.7 °C)   Resp 16   Ht 6' 2\" (1.88 m)   Wt 170 lb (77.1 kg)   SpO2 98%   BMI 21.83 kg/m²    Constitutional:  Well developed, well nourished, no acute distress  HENT:  Atraumatic, moist mucus membranes  Neck: No JVD, supple   Respiratory:  No respiratory distress, normal breath sounds  Cardiovascular:  regular rate, no murmurs  GI:  Soft, nontender, no pulsatile masses or bruits of the abdomen  Musculoskeletal:  No edema, no acute deformities    Back: + bilateral lumbar paraspinous tenderness to palpation, no bony midline tenderness, negative straight leg raise test bilaterally, no CVA tenderness. Was rolling around in the bed without any need for assistance  Integument:  Well hydrated, no rash  Vascular: Dorsalis pedis pulses are 2+ and equal bilaterally  Neurologic:  Motor testing reveals: intact thigh adduction, knee flexion and extension, ankle dorsiflexion, toe plantarflexion, and great toe dorsiflexion bilaterally, sensation to light touch is intact in the groin and lower extremities bilaterally    RADIOLOGY  XR LUMBAR SPINE (2-3 VIEWS)   Final Result   Acute superior endplate compression fracture T12             ED COURSE & MEDICAL DECISION MAKING    Please see EMR for medications administered in the ED.     Differential Diagnosis: Cauda Equina Syndrome, Spinal Cord Compression, musculoskeletal pain, ligamental strain/sprain, ruptured/dissecting Abdominal Aortic Aneurysm, Fracture or dislocation, other    Patient is afebrile and nontoxic in appearance. No evidence of neurological deficit on exam.    Due to his history of trauma, not responding to NSAIDs and APAP on an outpatient basis I will obtain plain films. Plain films as read by the radiologist as above    UA unremarkable. Due to the absence of neurological deficit, I have low suspicion at this time for epidural compression syndrome. I have a low suspicion for spinal epidural abscess due to the patient not having any red flags in the form of no history of uncontrolled diabetes, IV drug use, fevers or chills, bowel or bladder dysfunction, saddle anesthesia. Patient's pain is most likely musculoskeletal pain, although his pain can also be from the compression fracture noted on imaging. This is to be treated conservatively anyway. I believe the patient is safe for discharge at this time. I'll prescribe symptomatic medications. The patient was instructed to follow up as an outpatient in 2 days with primary care and will also give him the contact information for [facility]. The patient was instructed to return to the ED immediately for any new or worsening symptoms, including bowel or bladder incontinence, saddle anesthesia or leg weakness. The patient verbalized understanding. FINAL IMPRESSION    1.  Compression fracture of T12 vertebra, initial encounter [facility])        PLAN  Discharge with outpatient follow-up (see EMR)      (Please note that this note was completed with a voice recognition program.  Every attempt was made to edit the dictations, but inevitably there remain words that are mis-transcribed.)         Link ROQUE Art CNP  05/05/21 6021

## 2021-05-06 ENCOUNTER — CARE COORDINATION (OUTPATIENT)
Dept: CARE COORDINATION | Age: 56
End: 2021-05-06

## 2021-05-06 NOTE — CARE COORDINATION
ACM unable to  contact Arely Ryan to follow up on [facility] ED visit on 5/5/2021 dx:Compression fracture of T12 vertebra, initial encounter ([address]  Follow up:  1 Schedule an appointment with ROQUE Oropeza CNP (Nurse Practitioner Adult Health) in 2 days (5/7/2021)  2 Schedule an appointment with Blake Herrera in 2 days (5/7/2021)  3 Go to [facility] Emergency Department (Emergency Medicine); As needed, If symptoms worsen    Prescribed:   Cyclobenzaprine HCl 10 mg Oral 3 TIMES DAILY PRN     HYDROcodone-Acetaminophen 5-325 MG 1 tablet Oral EVERY 6 HOURS PRN, Intended supply: 3 days. Take lowest dose possible to manage pain     Ibuprofen 800 mg Oral EVERY 8 HOURS PRN     Lidocaine 5 % 1 patch Transdermal DAILY, 12 hours on, 12 hours off.

## 2021-05-11 ENCOUNTER — OFFICE VISIT (OUTPATIENT)
Dept: PRIMARY CARE CLINIC | Age: 56
End: 2021-05-11
Payer: MEDICAID

## 2021-05-11 VITALS
SYSTOLIC BLOOD PRESSURE: 155 MMHG | WEIGHT: 174 LBS | DIASTOLIC BLOOD PRESSURE: 83 MMHG | OXYGEN SATURATION: 98 % | HEART RATE: 84 BPM | BODY MASS INDEX: 22.33 KG/M2 | TEMPERATURE: 97.4 F | HEIGHT: 74 IN

## 2021-05-11 DIAGNOSIS — S22.080D COMPRESSION FRACTURE OF T12 VERTEBRA WITH ROUTINE HEALING, SUBSEQUENT ENCOUNTER: ICD-10-CM

## 2021-05-11 DIAGNOSIS — I50.32 CHRONIC DIASTOLIC CONGESTIVE HEART FAILURE (HCC): ICD-10-CM

## 2021-05-11 DIAGNOSIS — Z00.00 ROUTINE GENERAL MEDICAL EXAMINATION AT A HEALTH CARE FACILITY: Primary | ICD-10-CM

## 2021-05-11 DIAGNOSIS — E78.5 HYPERLIPIDEMIA, UNSPECIFIED HYPERLIPIDEMIA TYPE: ICD-10-CM

## 2021-05-11 DIAGNOSIS — I10 ESSENTIAL HYPERTENSION: ICD-10-CM

## 2021-05-11 DIAGNOSIS — E08.01 DIABETES MELLITUS DUE TO UNDERLYING CONDITION WITH HYPEROSMOLARITY AND COMA, WITHOUT LONG-TERM CURRENT USE OF INSULIN (HCC): ICD-10-CM

## 2021-05-11 PROCEDURE — G0438 PPPS, INITIAL VISIT: HCPCS | Performed by: NURSE PRACTITIONER

## 2021-05-11 PROCEDURE — 99214 OFFICE O/P EST MOD 30 MIN: CPT | Performed by: NURSE PRACTITIONER

## 2021-05-11 PROCEDURE — G8427 DOCREV CUR MEDS BY ELIG CLIN: HCPCS | Performed by: NURSE PRACTITIONER

## 2021-05-11 PROCEDURE — G8420 CALC BMI NORM PARAMETERS: HCPCS | Performed by: NURSE PRACTITIONER

## 2021-05-11 PROCEDURE — 4004F PT TOBACCO SCREEN RCVD TLK: CPT | Performed by: NURSE PRACTITIONER

## 2021-05-11 PROCEDURE — 3017F COLORECTAL CA SCREEN DOC REV: CPT | Performed by: NURSE PRACTITIONER

## 2021-05-11 RX ORDER — METOPROLOL TARTRATE 50 MG/1
TABLET, FILM COATED ORAL
Qty: 180 TABLET | Refills: 1 | Status: SHIPPED | OUTPATIENT
Start: 2021-05-11 | End: 2021-08-02 | Stop reason: SDUPTHER

## 2021-05-11 RX ORDER — NIFEDIPINE 90 MG/1
90 TABLET, EXTENDED RELEASE ORAL DAILY
Qty: 90 TABLET | Refills: 1 | Status: SHIPPED | OUTPATIENT
Start: 2021-05-11 | End: 2021-08-02 | Stop reason: SDUPTHER

## 2021-05-11 RX ORDER — CYCLOBENZAPRINE HCL 10 MG
10 TABLET ORAL 3 TIMES DAILY PRN
Qty: 90 TABLET | Refills: 1 | Status: SHIPPED | OUTPATIENT
Start: 2021-05-11 | End: 2021-08-10 | Stop reason: SDUPTHER

## 2021-05-11 ASSESSMENT — LIFESTYLE VARIABLES
HOW OFTEN DURING THE LAST YEAR HAVE YOU NEEDED AN ALCOHOLIC DRINK FIRST THING IN THE MORNING TO GET YOURSELF GOING AFTER A NIGHT OF HEAVY DRINKING: 0
HOW OFTEN DO YOU HAVE SIX OR MORE DRINKS ON ONE OCCASION: 0
HAVE YOU OR SOMEONE ELSE BEEN INJURED AS A RESULT OF YOUR DRINKING: 0
HOW OFTEN DO YOU HAVE A DRINK CONTAINING ALCOHOL: 1
AUDIT-C TOTAL SCORE: 1
HOW OFTEN DURING THE LAST YEAR HAVE YOU FAILED TO DO WHAT WAS NORMALLY EXPECTED FROM YOU BECAUSE OF DRINKING: 0

## 2021-05-11 ASSESSMENT — PATIENT HEALTH QUESTIONNAIRE - PHQ9
SUM OF ALL RESPONSES TO PHQ9 QUESTIONS 1 & 2: 0
SUM OF ALL RESPONSES TO PHQ QUESTIONS 1-9: 0
SUM OF ALL RESPONSES TO PHQ QUESTIONS 1-9: 0
2. FEELING DOWN, DEPRESSED OR HOPELESS: 0
1. LITTLE INTEREST OR PLEASURE IN DOING THINGS: 0
SUM OF ALL RESPONSES TO PHQ QUESTIONS 1-9: 0
1. LITTLE INTEREST OR PLEASURE IN DOING THINGS: 0

## 2021-05-11 ASSESSMENT — ENCOUNTER SYMPTOMS
CHEST TIGHTNESS: 0
BOWEL INCONTINENCE: 0
BACK PAIN: 1
ABDOMINAL PAIN: 0
CONSTIPATION: 0
SHORTNESS OF BREATH: 0
DIARRHEA: 0

## 2021-05-11 NOTE — PROGRESS NOTES
Medicare Annual Wellness Visit  Name: Cm Coronel Date: 2021   MRN: [id number] Sex: Male   Age: 55 y.o. Ethnicity: Non-/Non    : 1965 Race: Black      Paz Hernandez is here for Medicare AWV    Screenings for behavioral, psychosocial and functional/safety risks, and cognitive dysfunction are all negative except as indicated below. These results, as well as other patient data from the [address] [facility] [address] form, are documented in Flowsheets linked to this Encounter. Allergies   Allergen Reactions    Doxazosin Nausea And Vomiting     VIOLENT VOMITTING    Coconut Flavor Rash         Prior to Visit Medications    Medication Sig Taking? Authorizing Provider   cyclobenzaprine (FLEXERIL) 10 MG tablet Take 1 tablet by mouth 3 times daily as needed for Muscle spasms Yes ROQUE Yeboah CNP   metoprolol tartrate (LOPRESSOR) 50 MG tablet Take twice daily Yes ROQUE Yeboah - CNP   NIFEdipine (PROCARDIA XL) 90 MG extended release tablet Take 1 tablet by mouth daily Yes ROQUE Yeboah - CNP   ibuprofen (IBU) 800 MG tablet Take 1 tablet by mouth every 8 hours as needed for Pain  ROQUE Donald CNP   lidocaine (LIDODERM) 5 % Place 1 patch onto the skin daily for 10 days 12 hours on, 12 hours off.   ROQUE Donald CNP   atorvastatin (LIPITOR) 40 MG tablet Take 1 tablet by mouth nightly TAKE 1 TABLET BY MOUTH ONCE DAILY  ROQUE Yeboah CNP   cloNIDine (CATAPRES) 0.1 MG tablet Take 1 tablet by mouth 2 times daily  ROQUE Yeboah CNP   spironolactone (ALDACTONE) 50 MG tablet Take 1 tablet by mouth daily  MD HAO James SOLOSTAR 100 UNIT/ML injection pen INJECT 20 UNITS SUBCUTANEOUSLY NIGHTLY  ROQUE Yeboah CNP   gabapentin (NEURONTIN) 300 MG capsule TAKE 1 CAPSULE BY MOUTH THREE TIMES DAILY  ROQUE Yeboah CNP   hydrALAZINE (APRESOLINE) 100 MG tablet TAKE 1 TABLET BY MOUTH THREE TIMES DAILY  ROQUE Howard CNP   aspirin EC 81 MG EC tablet Take 1 tablet by mouth daily  ROQUE Howard CNP   isosorbide mononitrate (IMDUR) 60 MG extended release tablet Take 1 tablet by mouth nightly  ROQUE Howard CNP   insulin lispro (HUMALOG) 100 UNIT/ML injection vial Inject into the skin 3 times daily (before meals) [address] [facility]  Historical Provider, MD         Past Medical History:   Diagnosis Date    [facility]     CHF (congestive heart failure) [facility]     CVA (cerebral infarction) 5/2015    Diabetes mellitus ([facility]     Gastroparesis     Hemodialysis patient ([facility]     Hypercholesteremia     Hypertension     Kidney disease     Unspecified cerebral artery occlusion with cerebral infarction     5/15, 7/15 LEFT SIFE WEAKNESS       Past Surgical History:   Procedure Laterality Date    HC DIALYSIS CATHETER N/A 10/29/2020    PLACEMENT OF A TUNNELED DIALYSIS CATHETER WITH FLEURO AND ULTRASOUND performed by To Hull MD at [facility] N/A 10/29/2020    LAPAROSCOPIC PERITONEAL DIALYSIS CATHETER PLACEMENT WITH FLEURO AND ULTRASOUND performed by To Hull MD at [address] [place] [address] N/A 93/97/1726    UMBILICAL HERNIA REPAIR performed by To Hull MD at [address] [place] [address] N/A 4/26/2021    ESOPHAGOGASTRODUODENOSCOPY performed by Soheila Cifuentes MD at [address] [place] [address] History   Adopted: Yes       CareTeam (Including outside providers/suppliers regularly involved in providing care):   Patient Care Team:  ROQUE Howard CNP as PCP - General (Nurse Practitioner Adult Health)  ROQUE Howard CNP [facility]CP [facility]  Shonda Spicer RN as Care Transitions Nurse    Wt Readings from Last 3 Encounters:   05/11/21 174 lb (78.9 kg)   05/05/21 170 lb (77.1 kg)   04/26/21 167 lb 10.6 oz (76.1 kg)     Vitals:    05/11/21 1402 05/11/21 1420   BP: (!) 165/93 (!) 155/83   Pulse: 84    Temp: 97.4 °F (36.3 °C)    TempSrc: Temporal    SpO2: 98%    Weight: 174 lb (78.9 kg)    Height: 6' 2\" (1.88 m)      Body mass index is 22.34 kg/m². Based upon direct observation of the patient, evaluation of cognition reveals recent and remote memory intact. Patient's complete Health Risk Assessment and screening values have been reviewed and are found in Flowsheets. The following problems were reviewed today and where indicated follow up appointments were made and/or referrals ordered. Positive Risk Factor Screenings with Interventions:     Fall Risk:  2 or more falls in past year?: no  Fall with injury in past year?: (!) yes  Fall Risk Interventions:    · Home safety tips provided       Substance History:  Social History     Tobacco History     Smoking Status  Current Some Day Smoker Smoking Start Date  1/3/1979 Last attempt to quit  1/16/2019 Smoking Frequency  0 packs/day    Smoking Tobacco Type  Cigars    Smokeless Tobacco Use  Never Used    Tobacco Comment  3 black and milds a week          Alcohol History     Alcohol Use Status  Yes Comment  occasional          Drug Use     Drug Use Status  Yes Types  Marijuana Comment  previously used cocaine, stopped May 2015 LAST USED MARIJUANA-NOVEMBER 2020          Sexual Activity     Sexually Active  Yes Partners  Female               Alcohol Screening: Audit-C Score: 1  Total Score: 1    A score of 8 or more is associated with harmful or hazardous drinking. A score of 13 or more in women, and 15 or more in men, is likely to indicate alcohol dependence. Substance Abuse Interventions:  · smokes 1 cigar/day, has decreased significantly    General Health and ACP:  General  In general, how would you say your health is?: Fair  In the past 7 days, have you experienced any of the following?  New or Increased Pain, New or Increased Fatigue, Loneliness, Social Isolation, Stress or Anger?: (!) New or screen colonoscopy  Never done    Diabetic retinal exam  09/15/2018    Lipid screen  10/23/2020    Diabetic foot exam  2021    Shingles Vaccine (2 of 2) 2021    A1C test (Diabetic or Prediabetic)  10/25/2021    Potassium monitoring  2022    Creatinine monitoring  2022    Flu vaccine  Completed    Pneumococcal 0-64 years Vaccine  Completed    Hepatitis C screen  Completed    HIV screen  Completed    Hepatitis A vaccine  Aged Out    Hib vaccine  Aged Out    Meningococcal (ACWY) vaccine  Aged Out     Recommendations for [employer] Due: see orders and patient instructions/AVS.  . Recommended screening schedule for the next 5-10 years is provided to the patient in written form: see Patient Instructions/AVS.    Taniya Cox was seen today for medicare awv. Diagnoses and all orders for this visit:    Routine general medical examination at a health care facility    Essential hypertension  -     metoprolol tartrate (LOPRESSOR) 50 MG tablet; Take twice daily  -     NIFEdipine (PROCARDIA XL) 90 MG extended release tablet; Take 1 tablet by mouth daily    Hyperlipidemia, unspecified hyperlipidemia type    Compression fracture of T12 vertebra with routine healing, subsequent encounter  -     cyclobenzaprine (FLEXERIL) 10 MG tablet; Take 1 tablet by mouth 3 times daily as needed for Muscle spasms    Chronic diastolic congestive heart failure (HCC)  -     NIFEdipine (PROCARDIA XL) 90 MG extended release tablet; Take 1 tablet by mouth daily    Diabetes mellitus due to underlying condition with hyperosmolarity and coma, without long-term current use of insulin [facility])             [address] [id number]  Dept: [phone]    2021     Ofe Harper (:  1965)is a 55 y.o. male, here for evaluation of the following medical concerns:    Hypertension  This is a chronic problem.  The current episode started more than 1 year ago. Pertinent negatives include no anxiety, chest pain, headaches, malaise/fatigue, palpitations, PND or shortness of breath. Agents associated with hypertension include NSAIDs. Risk factors for coronary artery disease include diabetes mellitus, dyslipidemia and male gender. Past treatments include beta blockers, alpha 1 blockers, diuretics and direct vasodilators. The current treatment provides moderate improvement. Compliance problems include diet and exercise. Hypertensive end-organ damage includes kidney disease. Identifiable causes of hypertension include chronic renal disease. Hyperlipidemia  This is a chronic problem. The problem is controlled. Exacerbating diseases include chronic renal disease. Factors aggravating his hyperlipidemia include beta blockers. Pertinent negatives include no chest pain, leg pain, myalgias or shortness of breath. Current antihyperlipidemic treatment includes diet change, exercise and statins. Compliance problems include adherence to diet and adherence to exercise. Risk factors for coronary artery disease include dyslipidemia, family history, diabetes mellitus, hypertension and male sex. Diabetes  He presents for his follow-up diabetic visit. He has type 2 diabetes mellitus. Pertinent negatives for hypoglycemia include no dizziness, headaches, hunger, mood changes, nervousness/anxiousness or seizures. Pertinent negatives for diabetes include no chest pain, no foot paresthesias, no foot ulcerations, no polyuria and no weakness. There are no hypoglycemic complications. Symptoms are stable. There are no diabetic complications. Risk factors for coronary artery disease include diabetes mellitus, dyslipidemia, family history, male sex and hypertension. Current diabetic treatment includes insulin injections. He is following a diabetic diet. Meal planning includes avoidance of concentrated sweets. An ACE inhibitor/angiotensin II receptor blocker is not being taken.    Back Pain  This is a new problem. The current episode started in the past 7 days. The pain is mild. The symptoms are aggravated by bending and sitting. Pertinent negatives include no abdominal pain, bladder incontinence, bowel incontinence, chest pain, dysuria, fever, headaches, leg pain, paresthesias, pelvic pain or weakness. He has tried NSAIDs and muscle relaxant for the symptoms. The treatment provided mild relief. Reports he was pulling a box off a truck, the handle broke and he fell of the truck on 5/1. States pain increased and went to the emergency department on 5/5, diagnosed with T12 compression fracture. Prescribed Flexeril, Norco, Lidoderm patch, and Ibuprofen. Given referral to [address] [place] [address] for treatment. States he has called X1 and will call again. BP Readings from Last 3 Encounters:   05/11/21 (!) 155/83   05/05/21 136/76   04/26/21 (!) 142/75     He reports his Albumin was low per dialysis labs. A1C was 6.0% 4/19/2021. Dressing to RLQ dry and intact. Performs PD 5 days/week, 8.5 hours was decreased from 7 days/week.   Lab Results   Component Value Date    CHOL 99 06/05/2019    CHOL 169 01/29/2018    CHOL 181 01/12/2017     Lab Results   Component Value Date    TRIG 86 06/05/2019    TRIG 153 (H) 01/29/2018    TRIG 317 (H) 01/12/2017     Lab Results   Component Value Date    HDL 31 (L) 10/23/2019    HDL 31 (L) 06/05/2019    HDL 38 (L) 01/29/2018     Lab Results   Component Value Date    LDLCALC 51 10/23/2019    LDLCALC 51 06/05/2019    LDLCALC 100 (H) 01/29/2018     Lab Results   Component Value Date    LABVLDL 24 10/23/2019    LABVLDL 17 06/05/2019    LABVLDL 31 01/29/2018     Lab Results   Component Value Date    WBC 6.3 02/04/2021    HGB 11.1 (L) 02/04/2021    HCT 34.4 (L) 02/04/2021    MCV 89.2 02/04/2021     02/04/2021     Lab Results   Component Value Date     02/04/2021    K 5.2 (H) 02/04/2021     02/04/2021    CO2 19 (L) 02/04/2021    BUN 44 (H) 02/04/2021    CREATININE 4.7 (H) 02/04/2021    GLUCOSE 170 (H) 02/04/2021    CALCIUM 8.5 02/04/2021    PROT 7.2 10/26/2020    LABALBU 3.5 10/31/2020    BILITOT <0.2 10/26/2020    ALKPHOS 75 10/26/2020    AST 8 (L) 10/26/2020    ALT 7 (L) 10/26/2020    LABGLOM 13 (A) 02/04/2021    GFRAA 16 (A) 02/04/2021    AGRATIO 0.9 (L) 10/26/2020    GLOB 3.7 10/26/2020       Review of Systems   Constitutional: Negative for activity change, fever and malaise/fatigue. HENT: Negative for congestion. Had recent dental exam, requires deep cleaning. Eyes: Negative for visual disturbance. Respiratory: Negative for chest tightness and shortness of breath. Cardiovascular: Negative for chest pain, palpitations, leg swelling and PND. Hypertension controlled on present medication  Hyperlipidemia managed on present medication     Gastrointestinal: Negative for abdominal pain, bowel incontinence, constipation and diarrhea. Endocrine: Negative for polyuria. Diabetes managed on insulin. Genitourinary: Negative for bladder incontinence, dysuria and pelvic pain. ESRD, performs peritoneal dialysis nightly. Continues to have urinary output. Being evaluated for Kidney transplant. Musculoskeletal: Positive for back pain (from fall). Negative for arthralgias and myalgias. Skin: Negative for rash. Neurological: Negative for dizziness, seizures, weakness, light-headedness, headaches and paresthesias. History of Lacunar stroke in 2015. Was placed on Plavix at that time. No new CVA or weakness, Most recent CT WNL. Psychiatric/Behavioral: Negative for agitation, decreased concentration and sleep disturbance. The patient is not nervous/anxious. Prior to Visit Medications    Medication Sig Taking?  Authorizing Provider   cyclobenzaprine (FLEXERIL) 10 MG tablet Take 1 tablet by mouth 3 times daily as needed for Muscle spasms Yes ROQUE Grider - CNP   metoprolol tartrate (LOPRESSOR) 50 MG tablet Take twice daily Yes ROQUE Huang CNP   NIFEdipine (PROCARDIA XL) 90 MG extended release tablet Take 1 tablet by mouth daily Yes ROQUE Huang CNP   ibuprofen (IBU) 800 MG tablet Take 1 tablet by mouth every 8 hours as needed for Pain  Geroge NoseROQUE CNP   lidocaine (LIDODERM) 5 % Place 1 patch onto the skin daily for 10 days 12 hours on, 12 hours off.   Geroge NoseROQUE CNP   atorvastatin (LIPITOR) 40 MG tablet Take 1 tablet by mouth nightly TAKE 1 TABLET BY MOUTH ONCE DAILY  ROQUE Huang CNP   cloNIDine (CATAPRES) 0.1 MG tablet Take 1 tablet by mouth 2 times daily  ROQUE Huang CNP   spironolactone (ALDACTONE) 50 MG tablet Take 1 tablet by mouth daily  MD KIMBERLY SchwartzUS SOLOSTAR 100 UNIT/ML injection pen INJECT 20 UNITS SUBCUTANEOUSLY NIGHTLY  ROQUE Huang CNP   gabapentin (NEURONTIN) 300 MG capsule TAKE 1 CAPSULE BY MOUTH THREE TIMES DAILY  ROQUE Huang CNP   hydrALAZINE (APRESOLINE) 100 MG tablet TAKE 1 TABLET BY MOUTH THREE TIMES DAILY  ROQUE Huang CNP   aspirin EC 81 MG EC tablet Take 1 tablet by mouth daily  ROQUE Huang CNP   isosorbide mononitrate (IMDUR) 60 MG extended release tablet Take 1 tablet by mouth nightly  ROQUE Huang CNP   insulin lispro (HUMALOG) 100 UNIT/ML injection vial Inject into the skin 3 times daily (before meals) [facility]  Historical Provider, MD        Social History     Tobacco Use    Smoking status: Current Some Day Smoker     Packs/day: 0.00     Types: Cigars     Start date: 1/3/1979     Last attempt to quit: 2019     Years since quittin.3    Smokeless tobacco: Never Used    Tobacco comment: 3 black and milds a week   Substance Use Topics    Alcohol use: Yes     Frequency: Monthly or less     Comment: occasional        Vitals:    21 1402 21 1420   BP: (!) 165/93 (!) 155/83   Pulse: 84    Temp: 97.4 °F (36.3 °C)    TempSrc: Temporal    SpO2: 98%    Weight: 174 lb (78.9 kg)    Height: 6' 2\" (1.88 m)      Estimated body mass index is 22.34 kg/m² as calculated from the following:    Height as of this encounter: 6' 2\" (1.88 m). Weight as of this encounter: 174 lb (78.9 kg). Physical Exam  Vitals signs reviewed. Constitutional:       Appearance: He is well-developed. He is not diaphoretic. HENT:      Head: Normocephalic. Right Ear: Hearing and external ear normal. No tenderness. Left Ear: Hearing and external ear normal. No tenderness. Nose: Nose normal.   Eyes:      General: Lids are normal.   Cardiovascular:      Rate and Rhythm: Normal rate and regular rhythm. Heart sounds: Normal heart sounds, S1 normal and S2 normal.   Pulmonary:      Effort: Pulmonary effort is normal.      Breath sounds: Normal breath sounds. Musculoskeletal: Normal range of motion. Lumbar back: He exhibits tenderness, bony tenderness and pain. He exhibits no edema and no laceration. Lymphadenopathy:      Head:      Right side of head: No submental or submandibular adenopathy. Left side of head: No submental or submandibular adenopathy. Cervical:      Right cervical: No superficial cervical adenopathy. Left cervical: No superficial cervical adenopathy. Skin:     General: Skin is warm and dry. Findings: No rash. Nails: There is no clubbing. Neurological:      Mental Status: He is alert and oriented to person, place, and time. GCS: GCS eye subscore is 4. GCS verbal subscore is 5. GCS motor subscore is 6. Psychiatric:         Speech: Speech normal.         Behavior: Behavior normal. Behavior is cooperative. Judgment: Judgment normal.         ASSESSMENT/PLAN:  1. Routine general medical examination at a health care facility  -patient being worked up for kidney transplant    2. Essential hypertension  -Continue current medications. - metoprolol tartrate (LOPRESSOR) 50 MG tablet;  Take twice daily  Dispense: 180 tablet; Refill: 1  - NIFEdipine (PROCARDIA XL) 90 MG extended release tablet; Take 1 tablet by mouth daily  Dispense: 90 tablet; Refill: 1    3. Hyperlipidemia, unspecified hyperlipidemia type  -Continue current medications. 4. Compression fracture of T12 vertebra with routine healing, subsequent encounter  -Follow up with [address] [place] [address]  - cyclobenzaprine (FLEXERIL) 10 MG tablet; Take 1 tablet by mouth 3 times daily as needed for Muscle spasms  Dispense: 90 tablet; Refill: 1    5. Chronic diastolic congestive heart failure (HCC)  -Continue current medications.  - NIFEdipine (PROCARDIA XL) 90 MG extended release tablet; Take 1 tablet by mouth daily  Dispense: 90 tablet; Refill: 1    6. Diabetes mellitus due to underlying condition with hyperosmolarity and coma, without long-term current use of insulin (HCC)  -Continue current medications. Return for Medicare Annual Wellness Visit in 1 year. Reviewed patient's pertinent medical history, relevant laboratory results, imaging studies, and health maintenance. Medications have been reviewed and discussed with the patient, refills otherwise up-to-date. Discussed the importance of adhering to current medication regimen. Advised:  (1) continue to work on eating a healthy balanced diet; (2) stay active by exercising within your personal limits. Patient was advised to keep future appointments with their respective specialty care team(s). Questions and concerns addressed, care plan reviewed and patient is agreeable with the care plan following today's visit.     --Raúl Zeng, ROQUE - CNP on 5/11/2021 at 8:33 PM

## 2021-05-14 ENCOUNTER — ANESTHESIA EVENT (OUTPATIENT)
Dept: ENDOSCOPY | Age: 56
End: 2021-05-14
Payer: MEDICAID

## 2021-05-14 NOTE — PROGRESS NOTES
[facility] time__1030__________        Surgery time_1200___________    Take the following medications with a sip of water: Follow your MD/Surgeons pre-procedure instructions regarding your medications    Do not eat or drink anything after 12:00 midnight prior to your surgery. This includes water chewing gum, mints and ice chips. You may brush your teeth and gargle the morning of your surgery, but do not swallow the water     Please see your family doctor/pediatrician for a history and physical and/or concerning medications. Bring any test results/reports from your physicians office. If you are under the care of a heart doctor or specialist doctor, please be aware that you may be asked to them for clearance    You may be asked to stop blood thinners such as Coumadin, Plavix, Fragmin, Lovenox, etc., or any anti-inflammatories such as:  Aspirin, Ibuprofen, Advil, Naproxen prior to your surgery. We also ask that you stop any OTC medications such as fish oil, vitamin E, glucosamine, garlic, Multivitamins, COQ 10, etc.    We ask that you do not smoke 24 hours prior to surgery  We ask that you do not  drink any alcoholic beverages 24 hours prior to surgery     You must make arrangements for a responsible adult to take you home after your surgery. For your safety you will not be allowed to leave alone or drive yourself home. Your surgery will be cancelled if you do not have a ride home. Remember. [place] Safety First! Call before you Fall Remember  Also for your safety, it is strongly suggested that someone stay with you the first 24 hours after your surgery. A parent or legal guardian must accompany a child scheduled for surgery and plan to stay at the hospital until the child is discharged. Please do not bring other children with you. For your comfort, please wear simple loose fitting clothing to the hospital.  Please do not bring valuables.     Do not wear any make-up or nail polish on your fingers or toes      For your safety, please do not wear any jewelry or body piercing's on the day of surgery. All jewelry must be removed. If you have dentures, they will be removed before going to operating room. For your convenience, we will provide you with a container. If you wear contact lenses or glasses, they will be removed, please bring a case for them. If you have a living will and a durable power of  for healthcare, please bring in a copy. As part of our patient safety program to minimize surgical site infections, we ask you to do the following:    · Please notify your surgeon if you develop any illness between         now and the  day of your surgery. · This includes a cough, cold, fever, sore throat, nausea,         or vomiting, and diarrhea, etc.  ·  Please notify your surgeon if you experience dizziness, shortness         of breath or blurred vision between now and the time of your surgery. Do not shave your operative site 96 hours prior to surgery. For face and neck surgery, men may use an electric razor 48 hours   prior to surgery. You may shower the night before surgery or the morning of   your surgery with an antibacterial soap. You will need to bring a photo ID and insurance card    [facility] has an onsite pharmacy, would you like to utilize our pharmacy     If you will be staying overnight and use a C-pap machine, please bring   your C-pap to hospital     Our goal is to provide you with excellent care, therefore, visitors will be limited to two(2) in the room at a time so that we may focus on providing this care for you. Please contact pre-admission testing if you have any further questions.                  [facility] phone number:  [phone] PAT fax number:  [phone]  Please note these are generalized instructions for all surgical cases, you may be provided with more specific instructions according to your surgery.

## 2021-05-17 ENCOUNTER — HOSPITAL ENCOUNTER (OUTPATIENT)
Age: 56
Setting detail: OUTPATIENT SURGERY
Discharge: HOME | End: 2021-05-17
Attending: INTERNAL MEDICINE | Admitting: INTERNAL MEDICINE
Payer: MEDICAID

## 2021-05-17 ENCOUNTER — ANESTHESIA (OUTPATIENT)
Dept: ENDOSCOPY | Age: 56
End: 2021-05-17
Payer: MEDICAID

## 2021-05-17 VITALS
SYSTOLIC BLOOD PRESSURE: 166 MMHG | WEIGHT: 167.55 LBS | HEIGHT: 74 IN | OXYGEN SATURATION: 98 % | BODY MASS INDEX: 21.5 KG/M2 | HEART RATE: 74 BPM | DIASTOLIC BLOOD PRESSURE: 90 MMHG | RESPIRATION RATE: 18 BRPM | TEMPERATURE: 97.5 F

## 2021-05-17 VITALS
SYSTOLIC BLOOD PRESSURE: 134 MMHG | DIASTOLIC BLOOD PRESSURE: 74 MMHG | RESPIRATION RATE: 14 BRPM | OXYGEN SATURATION: 100 %

## 2021-05-17 DIAGNOSIS — Z12.11 COLON CANCER SCREENING: ICD-10-CM

## 2021-05-17 LAB
GLUCOSE BLD-MCNC: 132 MG/DL (ref 70–99)
GLUCOSE BLD-MCNC: 133 MG/DL (ref 70–99)
PERFORMED ON: ABNORMAL
PERFORMED ON: ABNORMAL
POTASSIUM, WHOLE BLOOD: 5.1 MMOL/L (ref 3.5–5.1)

## 2021-05-17 PROCEDURE — 3609010600 HC COLONOSCOPY POLYPECTOMY SNARE/COLD BIOPSY: Performed by: INTERNAL MEDICINE

## 2021-05-17 PROCEDURE — 7100000000 HC PACU RECOVERY - FIRST 15 MIN: Performed by: INTERNAL MEDICINE

## 2021-05-17 PROCEDURE — 6360000002 HC RX W HCPCS: Performed by: NURSE ANESTHETIST, CERTIFIED REGISTERED

## 2021-05-17 PROCEDURE — 2580000003 HC RX 258: Performed by: ANESTHESIOLOGY

## 2021-05-17 PROCEDURE — 88305 TISSUE EXAM BY PATHOLOGIST: CPT

## 2021-05-17 PROCEDURE — 36415 COLL VENOUS BLD VENIPUNCTURE: CPT

## 2021-05-17 PROCEDURE — 7100000010 HC PHASE II RECOVERY - FIRST 15 MIN: Performed by: INTERNAL MEDICINE

## 2021-05-17 PROCEDURE — 7100000011 HC PHASE II RECOVERY - ADDTL 15 MIN: Performed by: INTERNAL MEDICINE

## 2021-05-17 PROCEDURE — 2709999900 HC NON-CHARGEABLE SUPPLY: Performed by: INTERNAL MEDICINE

## 2021-05-17 PROCEDURE — 7100000001 HC PACU RECOVERY - ADDTL 15 MIN: Performed by: INTERNAL MEDICINE

## 2021-05-17 PROCEDURE — 84132 ASSAY OF SERUM POTASSIUM: CPT

## 2021-05-17 PROCEDURE — 3700000000 HC ANESTHESIA ATTENDED CARE: Performed by: INTERNAL MEDICINE

## 2021-05-17 PROCEDURE — 2500000003 HC RX 250 WO HCPCS: Performed by: NURSE ANESTHETIST, CERTIFIED REGISTERED

## 2021-05-17 PROCEDURE — 3700000001 HC ADD 15 MINUTES (ANESTHESIA): Performed by: INTERNAL MEDICINE

## 2021-05-17 RX ORDER — ONDANSETRON 2 MG/ML
4 INJECTION INTRAMUSCULAR; INTRAVENOUS
Status: DISCONTINUED | OUTPATIENT
Start: 2021-05-17 | End: 2021-05-17 | Stop reason: HOSPADM

## 2021-05-17 RX ORDER — SODIUM CHLORIDE 9 MG/ML
INJECTION, SOLUTION INTRAVENOUS CONTINUOUS
Status: DISCONTINUED | OUTPATIENT
Start: 2021-05-17 | End: 2021-05-17 | Stop reason: HOSPADM

## 2021-05-17 RX ORDER — LABETALOL HYDROCHLORIDE 5 MG/ML
5 INJECTION, SOLUTION INTRAVENOUS EVERY 10 MIN PRN
Status: DISCONTINUED | OUTPATIENT
Start: 2021-05-17 | End: 2021-05-17 | Stop reason: HOSPADM

## 2021-05-17 RX ORDER — SODIUM CHLORIDE 9 MG/ML
25 INJECTION, SOLUTION INTRAVENOUS PRN
Status: DISCONTINUED | OUTPATIENT
Start: 2021-05-17 | End: 2021-05-17 | Stop reason: HOSPADM

## 2021-05-17 RX ORDER — PROPOFOL 10 MG/ML
INJECTION, EMULSION INTRAVENOUS PRN
Status: DISCONTINUED | OUTPATIENT
Start: 2021-05-17 | End: 2021-05-17 | Stop reason: SDUPTHER

## 2021-05-17 RX ORDER — SODIUM CHLORIDE 0.9 % (FLUSH) 0.9 %
10 SYRINGE (ML) INJECTION PRN
Status: DISCONTINUED | OUTPATIENT
Start: 2021-05-17 | End: 2021-05-17 | Stop reason: HOSPADM

## 2021-05-17 RX ORDER — SODIUM CHLORIDE 0.9 % (FLUSH) 0.9 %
10 SYRINGE (ML) INJECTION EVERY 12 HOURS SCHEDULED
Status: DISCONTINUED | OUTPATIENT
Start: 2021-05-17 | End: 2021-05-17 | Stop reason: HOSPADM

## 2021-05-17 RX ORDER — PROPOFOL 10 MG/ML
INJECTION, EMULSION INTRAVENOUS CONTINUOUS PRN
Status: DISCONTINUED | OUTPATIENT
Start: 2021-05-17 | End: 2021-05-17 | Stop reason: SDUPTHER

## 2021-05-17 RX ORDER — PROMETHAZINE HYDROCHLORIDE 25 MG/ML
6.25 INJECTION, SOLUTION INTRAMUSCULAR; INTRAVENOUS
Status: DISCONTINUED | OUTPATIENT
Start: 2021-05-17 | End: 2021-05-17 | Stop reason: HOSPADM

## 2021-05-17 RX ORDER — LIDOCAINE HYDROCHLORIDE 20 MG/ML
INJECTION, SOLUTION EPIDURAL; INFILTRATION; INTRACAUDAL; PERINEURAL PRN
Status: DISCONTINUED | OUTPATIENT
Start: 2021-05-17 | End: 2021-05-17 | Stop reason: SDUPTHER

## 2021-05-17 RX ADMIN — LIDOCAINE HYDROCHLORIDE 50 MG: 20 INJECTION, SOLUTION EPIDURAL; INFILTRATION; INTRACAUDAL; PERINEURAL at 11:44

## 2021-05-17 RX ADMIN — PROPOFOL 80 MG: 10 INJECTION, EMULSION INTRAVENOUS at 11:44

## 2021-05-17 RX ADMIN — PROPOFOL 140 MCG/KG/MIN: 10 INJECTION, EMULSION INTRAVENOUS at 11:44

## 2021-05-17 RX ADMIN — SODIUM CHLORIDE: 9 INJECTION, SOLUTION INTRAVENOUS at 11:14

## 2021-05-17 ASSESSMENT — PULMONARY FUNCTION TESTS
PIF_VALUE: 1
PIF_VALUE: 0
PIF_VALUE: 1

## 2021-05-17 ASSESSMENT — PAIN DESCRIPTION - PAIN TYPE: TYPE: ACUTE PAIN

## 2021-05-17 ASSESSMENT — PAIN SCALES - GENERAL: PAINLEVEL_OUTOF10: 0

## 2021-05-17 NOTE — ANESTHESIA PRE PROCEDURE
[facility] Department of Anesthesiology  Pre-Anesthesia Evaluation/Consultation       Name:  Oracio Samuel  : 1965  Age:  55 y. o.                                            MRN:  [id number]  Date: 2021           Surgeon: Surgeon(s):  Triny Sandhu MD    Procedure: Procedure(s):  COLONOSCOPY     Allergies   Allergen Reactions    Doxazosin Nausea And Vomiting     VIOLENT VOMITTING    Coconut Flavor Rash     Patient Active Problem List   Diagnosis    Left-sided weakness    Chest pain    Essential hypertension    Type 2 diabetes mellitus with stage 3 chronic kidney disease, with long-term current use of insulin ([address]    Cardiomyopathy ([address]    Cerebral infarction ([address]    Cigarette nicotine dependence without complication    Foot ulcer, left ([address]    Erectile dysfunction due to arterial insufficiency    Renal artery stenosis (HCC)    Chronic diastolic congestive heart failure (HCC)    History of stroke    Mixed hyperlipidemia    Major depressive disorder, recurrent episode, moderate (HCC)    Acute on chronic renal failure (HCC)    Pericardial effusion    Acute renal failure superimposed on chronic kidney disease, on chronic dialysis ([address]    Lower abdominal pain    Diabetes mellitus due to underlying condition with hyperosmolarity and coma, without long-term current use of insulin (HCC)     Past Medical History:   Diagnosis Date    Cardiomyopathy ([address]     CHF (congestive heart failure) ([address]     CVA (cerebral infarction) 2015    Diabetes mellitus ([address]     Gastroparesis     Hemodialysis patient ([address]     Hypercholesteremia     Hypertension     Kidney disease     Unspecified cerebral artery occlusion with cerebral infarction     5/15, 7/15 LEFT SIDE WEAKNESS     Past Surgical History:   Procedure Laterality Date    HC DIALYSIS CATHETER N/A 10/29/2020    PLACEMENT OF A TUNNELED DIALYSIS CATHETER WITH FLEURO AND ULTRASOUND performed by Les Lisa MD at [facility]    LAPAROSCOPY INSERTION PERITONEAL CATHETER N/A 10/29/2020    LAPAROSCOPIC PERITONEAL DIALYSIS CATHETER PLACEMENT WITH FLEURO AND ULTRASOUND performed by Lupe Ferreira MD at [address] N/A 10/06/2579    UMBILICAL HERNIA REPAIR performed by Lupe Ferreira MD at [address] N/A 2021    ESOPHAGOGASTRODUODENOSCOPY performed by Eulalio Kay MD at [address] [facility] History     Tobacco Use    Smoking status: Current Some Day Smoker     Packs/day: 0.00     Types: Cigars     Start date: 1/3/1979     Last attempt to quit: 2019     Years since quittin.3    Smokeless tobacco: Never Used    Tobacco comment: 3 black and milds a week   Vaping Use    Vaping Use: Never used   Substance Use Topics    Alcohol use: Yes     Comment: occasional    Drug use: Yes     Types: Marijuana     Comment: previously used cocaine, stopped May 2015 LAST USED MARIJUANA-2020     Medications  No current facility-administered medications on file prior to encounter.      Current Outpatient Medications on File Prior to Encounter   Medication Sig Dispense Refill    cyclobenzaprine (FLEXERIL) 10 MG tablet Take 1 tablet by mouth 3 times daily as needed for Muscle spasms 90 tablet 1    metoprolol tartrate (LOPRESSOR) 50 MG tablet Take twice daily 180 tablet 1    NIFEdipine (PROCARDIA XL) 90 MG extended release tablet Take 1 tablet by mouth daily 90 tablet 1    ibuprofen (IBU) 800 MG tablet Take 1 tablet by mouth every 8 hours as needed for Pain 20 tablet 0    atorvastatin (LIPITOR) 40 MG tablet Take 1 tablet by mouth nightly TAKE 1 TABLET BY MOUTH ONCE DAILY 90 tablet 1    cloNIDine (CATAPRES) 0.1 MG tablet Take 1 tablet by mouth 2 times daily 60 tablet 3    spironolactone (ALDACTONE) 50 MG tablet Take 1 tablet by mouth daily 90 tablet 1    LANTUS SOLOSTAR 100 UNIT/ML injection pen INJECT 20 UNITS SUBCUTANEOUSLY NIGHTLY 15 mL 2    gabapentin (NEURONTIN) 300 MG capsule TAKE 1 CAPSULE BY MOUTH THREE TIMES DAILY 270 capsule 1    hydrALAZINE (APRESOLINE) 100 MG tablet TAKE 1 TABLET BY MOUTH THREE TIMES DAILY 270 tablet 1    aspirin EC 81 MG EC tablet Take 1 tablet by mouth daily 90 tablet 1    isosorbide mononitrate (IMDUR) 60 MG extended release tablet Take 1 tablet by mouth nightly 90 tablet 1    insulin lispro (HUMALOG) 100 UNIT/ML injection vial Inject into the skin 3 times daily (before meals) SLIDING SCALE       Current Facility-Administered Medications   Medication Dose Route Frequency Provider Last Rate Last Admin    0.9 % sodium chloride infusion   Intravenous Continuous Chandra Oakes MD        sodium chloride flush 0.9 % injection 10 mL  10 mL Intravenous 2 times per day Chandra Oakes MD        sodium chloride flush 0.9 % injection 10 mL  10 mL Intravenous PRN Chandra Oakes MD        0.9 % sodium chloride infusion  25 mL Intravenous PRN Chandra Oakes MD         Vital Signs (Current) There were no vitals filed for this visit. Vital Signs Statistics (for past 48 hrs)     No data recorded    BP Readings from Last 3 Encounters:   05/11/21 (!) 155/83   05/05/21 136/76   04/26/21 (!) 142/75     BMI  Body mass index is 22.34 kg/m². Estimated body mass index is 22.34 kg/m² as calculated from the following:    Height as of this encounter: 6' 2\" (1.88 m). Weight as of this encounter: 174 lb (78.9 kg).     CBC   Lab Results   Component Value Date    WBC 6.3 02/04/2021    RBC 3.85 02/04/2021    HGB 11.1 02/04/2021    HCT 34.4 02/04/2021    MCV 89.2 02/04/2021    RDW 16.7 02/04/2021     02/04/2021     CMP    Lab Results   Component Value Date     02/04/2021    K 5.2 02/04/2021    K 4.9 10/26/2020     02/04/2021    CO2 19 02/04/2021    BUN 44 02/04/2021    CREATININE 4.7 02/04/2021    GFRAA 16 02/04/2021    GFRAA >60 12/14/2010    AGRATIO 0.9 10/26/2020    LABGLOM 13 02/04/2021    GLUCOSE 170 02/04/2021    PROT 7.2 10/26/2020    CALCIUM 8.5 02/04/2021    BILITOT <0.2 10/26/2020    ALKPHOS 75 10/26/2020    AST 8 10/26/2020    ALT 7 10/26/2020     BMP    Lab Results   Component Value Date     02/04/2021    K 5.2 02/04/2021    K 4.9 10/26/2020     02/04/2021    CO2 19 02/04/2021    BUN 44 02/04/2021    CREATININE 4.7 02/04/2021    CALCIUM 8.5 02/04/2021    GFRAA 16 02/04/2021    GFRAA >60 12/14/2010    LABGLOM 13 02/04/2021    GLUCOSE 170 02/04/2021     POCGlucose  No results for input(s): GLUCOSE in the last 72 hours. Coags    Lab Results   Component Value Date    PROTIME 10.5 08/16/2015    INR 0.97 08/16/2015    APTT 36.1 95/34/2455     HCG (If Applicable) No results found for: PREGTESTUR, PREGSERUM, HCG, HCGQUANT   ABGs No results found for: PHART, PO2ART, SOI1JHF, VID2NKA, BEART, U2HFWCDT   Type & Screen (If Applicable)  No results found for: LABABO, LABRH                         BMI: Wt Readings from Last 3 Encounters:       NPO Status: food over 12 hours, water 5 hours                          Anesthesia Evaluation  Patient summary reviewed no history of anesthetic complications:   Airway: Mallampati: III  TM distance: >3 FB   Neck ROM: full   Dental:          Pulmonary:Negative Pulmonary ROS and normal exam                               Cardiovascular:  Exercise tolerance: good (>4 METS),   (+) hypertension:, CHF:,         Rhythm: regular  Rate: normal           Beta Blocker:  Dose within 24 Hrs         Neuro/Psych:   (+) CVA (left sided hemiparesis):, psychiatric history:            GI/Hepatic/Renal:   (+) GERD: well controlled, renal disease (PD last on Saturday): ESRD,           Endo/Other:    (+) DiabetesType II DM, using insulin, . Abdominal:           Vascular: negative vascular ROS. Anesthesia Plan      MAC     ASA 3       Induction: intravenous. Anesthetic plan and risks discussed with patient. Plan discussed with CRNA.               This pre-anesthesia assessment may be used as a history and physical.    DOS STAFF ADDENDUM:    Pt seen and examined, chart reviewed (including anesthesia, drug and allergy history). No interval changes to history and physical examination. Anesthetic plan, risks, benefits, alternatives, and personnel involved discussed with patient. Questions and concerns addressed. Patient(family) verbalized an understanding and agrees to proceed.       Raoul Urbina MD  May 17, 2021  10:55 AM

## 2021-05-17 NOTE — ANESTHESIA POSTPROCEDURE EVALUATION
[facility] Department of Anesthesiology  Post-Anesthesia Note       Name:  Alyssa King                                  Age:  55 y.o. MRN:  [id number]     Last Vitals & Oxygen Saturation: BP (!) 166/90   Pulse 74   Temp 97.5 °F (36.4 °C) (Temporal)   Resp 18   Ht 6' 2\" (1.88 m)   Wt 167 lb 8.8 oz (76 kg)   SpO2 98%   BMI 21.51 kg/m²   Patient Vitals for the past 4 hrs:   BP Temp Temp src Pulse Resp SpO2 Height Weight   05/17/21 1317 (!) 166/90 -- -- 74 18 98 % -- --   05/17/21 1254 (!) 162/85 97.5 °F (36.4 °C) Temporal 75 18 97 % -- --   05/17/21 1227 (!) 144/82 97.1 °F (36.2 °C) -- 78 11 98 % -- --   05/17/21 1222 131/80 -- -- 78 9 100 % -- --   05/17/21 1217 127/78 -- -- 79 10 100 % -- --   05/17/21 1212 131/78 97.2 °F (36.2 °C) Temporal 80 11 99 % -- --   05/17/21 1100 (!) 146/80 96.6 °F (35.9 °C) Temporal 80 16 98 % 6' 2\" (1.88 m) 167 lb 8.8 oz (76 kg)       Level of consciousness:  Awake, alert    Respiratory: Respirations easy, no distress. Stable. Cardiovascular: Hemodynamically stable. Hydration: Adequate. PONV: Adequately managed. Post-op pain: Adequately controlled. Post-op assessment: Tolerated anesthetic well without complication. Complications:  None.     Monroe Eid MD  May 17, 2021   2:14 PM

## 2021-05-17 NOTE — PROGRESS NOTES
Patient's visitor Oneyda Gomes verbalized understanding of discharge instructions. They have no questions at this time.

## 2021-05-17 NOTE — OP NOTE
Colonoscopy Procedure Note      Patient: Darya Swartz  : 1965  Acct#:     Procedure: Colonoscopy with polypectomy (cold snare)    Date:  2021    Surgeon:  Nelly Hernández MD    Referring Physician:  ROQUE Montoya CNP    Previous Colonoscopy: NO  Date: N/A  Greater than 3 years: N/A    Preoperative Diagnosis:  1. Screening     Postoperative Diagnosis:  1. Transverse Colon Polyp 2. Left Colon Polyps    Consent:  The patient or their legal guardian has signed a consent, and is aware of the potential risks, benefits, alternatives, and potential complications of this procedure. These include, but are not limited to hemorrhage, bleeding, post procedural pain, perforation, phlebitis, aspiration, hypotension, hypoxia, cardiovascular events such as arryhthmia, and possibly death. Additionally, the possibility of missed colonic polyps and interval colon cancer was discussed in the consent. Anesthesia: The patient was administered IV propofol per anesthesiology team.  Please see their operative records for full details. Procedure: An informed consent was obtained from the patient after explanation of indications, benefits, possible risks and complications of the procedure. The patient was then taken to the endoscopy suite, placed in the left lateral decubitus position, and the above IV anesthesia was administered. A digital rectal examination was performed and revealed negative without mass, lesions or tenderness. The Olympus video colonoscope was placed in the patient's rectum under digital direction and advanced to the cecum. The cecum was identified by characteristic anatomy and ballottment. The preparation was good. The ileocecal valve was identified. The scope was then withdrawn back through the cecum, ascending, transverse, descending, sigmoid colon, and rectum. Careful circumferential examination of the mucosa in these areas demonstrated:    1.  A 3 mm polyp in the transverse colon removed completely with cold snare polypectomy  2. A 4 mm polyp in the left colon removed completely with cold snare polypectomy  3. A 4 mm polyp in the left colon removed completely with cold snare polypectomy    The scope was then withdrawn into the rectum and retroflexed. The retroflexed view of the anal verge and rectum demonstrates normal rectum. The scope was straightened, the colon was decompressed and the scope was withdrawn from the patient. The patient tolerated the procedure well and was taken to the PACU in good condition. Estimated Blood Loss (mL): 5 (units unknown)    Complications: None    Specimens:   ID Type Source Tests Collected by Time Destination   A : transverse polyp, left colon polyp X2 Tissue Colon SURGICAL PATHOLOGY Nilda Segal MD 5/17/2021 1201        Impression:  See post-procedure diagnoses. Recommendations:  1. Await pathology results. 2. Recommend repeat colonoscopy in 3 years for surveillance purposes. 3. The patient had biopsies taken today. The patient should call for results in 7 days if they have not heard from our office. Our number is [phone].     [address]  5/17/2021  [phone]

## 2021-05-17 NOTE — H&P
Pre-operative History and Physical    Patient: Imani Jackson  : 1965  Acct#:     Intended Procedure:  Colonoscopy     HISTORY OF PRESENT ILLNESS:  The patient is a 55 y.o. male  who presents for/due to Screening     Past Medical History:        Diagnosis Date    [facility]     CHF (congestive heart failure) ([facility]     CVA (cerebral infarction) 2015    Diabetes mellitus ([facility]     Gastroparesis     Hemodialysis [facility] ([facility]     Hypercholesteremia     Hypertension     Kidney disease     Unspecified cerebral artery occlusion with cerebral infarction     5/15, 7/15 LEFT SIDE WEAKNESS     Past Surgical History:        Procedure Laterality Date    HC DIALYSIS CATHETER N/A 10/29/2020    PLACEMENT OF A TUNNELED DIALYSIS CATHETER WITH FLEURO AND ULTRASOUND performed by Crow Yi MD at [facility] N/A 10/29/2020    LAPAROSCOPIC PERITONEAL DIALYSIS CATHETER PLACEMENT WITH FLEURO AND ULTRASOUND performed by Crow Yi MD at [address] [place] [address] N/A     UMBILICAL HERNIA REPAIR performed by Crow Yi MD at [address] [place] [address] N/A 2021    ESOPHAGOGASTRODUODENOSCOPY performed by Jocelyn Gatuam MD at [address] [place] [address]     Medications Prior to Admission:   Prior to Admission medications    Medication Sig Start Date End Date Taking?  Authorizing Provider   cyclobenzaprine (FLEXERIL) 10 MG tablet Take 1 tablet by mouth 3 times daily as needed for Muscle spasms 5/11/21 6/10/21 Yes ROQUE David CNP   metoprolol tartrate (LOPRESSOR) 50 MG tablet Take twice daily 21  Yes ROQUE David CNP   NIFEdipine (PROCARDIA XL) 90 MG extended release tablet Take 1 tablet by mouth daily 21  Yes ROQUE David CNP   ibuprofen (IBU) 800 MG tablet Take 1 tablet by mouth every 8 hours as needed for Pain 21  Yes ROQUE Alejandro CNP atorvastatin (LIPITOR) 40 MG tablet Take 1 tablet by mouth nightly TAKE 1 TABLET BY MOUTH ONCE DAILY 4/7/21  Yes ROQUE Nieves CNP   cloNIDine (CATAPRES) 0.1 MG tablet Take 1 tablet by mouth 2 times daily 4/1/21  Yes ROQUE Nieves CNP   spironolactone (ALDACTONE) 50 MG tablet Take 1 tablet by mouth daily 3/19/21  Yes Dottie Anne MD   LANTUS SOLOSTAR 100 UNIT/ML injection pen INJECT 20 UNITS SUBCUTANEOUSLY NIGHTLY 3/16/21  Yes ROQUE Nieves CNP   gabapentin (NEURONTIN) 300 MG capsule TAKE 1 CAPSULE BY MOUTH THREE TIMES DAILY 3/1/21 5/14/21 Yes ROQUE Nieves CNP   hydrALAZINE (APRESOLINE) 100 MG tablet TAKE 1 TABLET BY MOUTH THREE TIMES DAILY 3/1/21  Yes ROQUE Nieves CNP   aspirin EC 81 MG EC tablet Take 1 tablet by mouth daily 2/21/21  Yes ROQUE Nieves CNP   isosorbide mononitrate (IMDUR) 60 MG extended release tablet Take 1 tablet by mouth nightly 2/18/21  Yes ROQUE Nieves CNP   insulin lispro (HUMALOG) 100 UNIT/ML injection vial Inject into the skin 3 times daily (before meals) SLIDING SCALE   Yes Historical Provider, MD       Allergies:  Doxazosin and Coconut flavor    Social History:   TOBACCO:   reports that he has been smoking cigars. He started smoking about 42 years ago. He has been smoking about 0.00 packs per day. He has never used smokeless tobacco.  ETOH:   reports current alcohol use. DRUGS:   reports current drug use. Drug: Marijuana. PHYSICAL EXAM:      Vital Signs: Ht 6' 2\" (1.88 m)   Wt 174 lb (78.9 kg)   BMI 22.34 kg/m²    Airway: No stridor or wheezing noted. Good air movement  Pulmonary: without wheezes.   Clear to auscultation  Cardiac:regular rate and rhythm without loud murmurs  Abdomen:soft, nontender,  Bowel sounds present    Pre-Procedure Assessment / Plan:  1) colonoscopy     ASA Grade:  ASA 3 - Patient with moderate systemic disease with functional limitations  Mallampati Classification: Class III    Level of Sedation Plan:Deep sedation    Post Procedure plan: Return to same level of care    I assessed the patient and find that the patient is in satisfactory condition to proceed with the planned procedure and sedation plan. I have explained the risk, benefits, and alternatives to the procedure; the patient understands and agrees to proceed.        Jocelyn Gautam MD  5/17/2021

## 2021-07-03 DIAGNOSIS — I50.32 CHRONIC DIASTOLIC CONGESTIVE HEART FAILURE (HCC): ICD-10-CM

## 2021-07-03 DIAGNOSIS — I10 ESSENTIAL HYPERTENSION: Chronic | ICD-10-CM

## 2021-07-07 NOTE — TELEPHONE ENCOUNTER
Medication:   Requested Prescriptions     Pending Prescriptions Disp Refills    hydrALAZINE (APRESOLINE) 100 MG tablet [Pharmacy Med Name: hydrALAZINE HCl 100 MG Oral Tablet] 270 tablet 0     Sig: TAKE 1 TABLET BY MOUTH THREE TIMES DAILY        Last Filled:      Patient Phone Number: [phone] (home)     Last appt: 5/11/2021   Next appt: 8/10/2021    Last OARRS:   RX Monitoring 2/9/2018   Attestation The Prescription Monitoring Report for this patient was reviewed today. Periodic Controlled Substance Monitoring No signs of potential drug abuse or diversion identified.

## 2021-07-08 RX ORDER — HYDRALAZINE HYDROCHLORIDE 100 MG/1
TABLET, FILM COATED ORAL
Qty: 270 TABLET | Refills: 0 | Status: SHIPPED | OUTPATIENT
Start: 2021-07-08 | End: 2021-10-02

## 2021-08-02 DIAGNOSIS — I50.32 CHRONIC DIASTOLIC CONGESTIVE HEART FAILURE (HCC): ICD-10-CM

## 2021-08-02 DIAGNOSIS — I10 ESSENTIAL HYPERTENSION: ICD-10-CM

## 2021-08-02 RX ORDER — METOPROLOL TARTRATE 50 MG/1
TABLET, FILM COATED ORAL
Qty: 180 TABLET | Refills: 1 | Status: SHIPPED | OUTPATIENT
Start: 2021-08-02

## 2021-08-02 RX ORDER — NIFEDIPINE 90 MG/1
90 TABLET, EXTENDED RELEASE ORAL DAILY
Qty: 90 TABLET | Refills: 1 | Status: SHIPPED | OUTPATIENT
Start: 2021-08-02 | End: 2022-02-11 | Stop reason: SDUPTHER

## 2021-08-02 NOTE — TELEPHONE ENCOUNTER
Medication:   Requested Prescriptions      No prescriptions requested or ordered in this encounter        Last Filled:      Patient Phone Number: [phone] (home)     Last appt: 5/11/2021   Next appt: 8/10/2021    Last OARRS:   RX Monitoring 2/9/2018   Attestation The Prescription Monitoring Report for this patient was reviewed today. Periodic Controlled Substance Monitoring No signs of potential drug abuse or diversion identified.

## 2021-08-07 NOTE — FLOWSHEET NOTE
Physical Therapy Daily Treatment Note  Date:  2020    Patient Name:  Ilda Balderrama    :  1965  MRN: [id number]    Restrictions/Precautions: Position Activity Restriction  Other position/activity restrictions: high fall risk      Pertinent Medical History: Additional Pertinent Hx: DM. 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 Referred to PT for strengthening and conditioning. Subjective:   Has been having issues with general weakness,  imbalance for years 2/2 multiple medical issues. Has lost weight and been feeling weaker for the past few months. 20: Normal soreness L lateral side. Feels he might have pushed it a little too much last time and was pretty tired after session. Pt very interested in Aquatic therapy. Objective:   Decreased LE strength. Fair balance, gait is unstable., poor core stabiltiy    TUG test - unable to perform    Exercise/Equipment Resistance/Repetitions Other comments     General weakness     Nustep Seat 14 UE 10, self paced 6 min  0 resistance Pt needs rests in bet ex and water.  - to bring H2O bottle        GSs incline 30 sec x 2    HSS 30 sec x 2         fitter 1 thin f/b, s/s x 15  R/L         Leg Press  Seat 6 30#  X 10         Mat on floor  Quadruped    Extend UE and LE each 1x5 each and then 1x3 each         Tall kneeling Assumes with min assist Wobbly throughout    Turn head R/L slowly x 10    With hands on PT shoulders Buttocks towards heels and back up with min assist x2         From floor to mat With min assist         Sit to stand From chair with arms using arms - then do eccentric sitting without UE's  X 5    Supine bridge  SLR ASSESS next visit    Seated reclines add         Supine  Bridge  SLR  clamshell   1x10  0# - 2x7   R/L  add         Sit to stand  22\" witihout UE x 5         Step ups Add 2-4\"              Knee extension add    Knee flexion add          ll bars  Side step  Tandem-modified  Gait       Seated on Mat Blue disc      S/s. f/b    Seated reclines add    Quadruped  Extremity extension  Opp UE/LE   2 each  Add as francesca Try on floor on mat   Tall kneeling  To heels and up Able  unable              HEP Slr, bridge, eccentric stand to sit with wife next to him           Other Therapeutic Activities:  Pt was educated on PT POC, Diagnosis, Prognosis, pathomechanics as well as frequency and duration of scheduling future physical therapy appointments. Time was also taken on this day to answer all patient questions and participation in PT. Reviewed appointment policy in detail with patient and patient verbalized understanding. Home Exercise Program:  Patient instructed in the following for HEP as noted above. Patient verbalized/demonstrated understanding . Treatment/Activity Tolerance:  [] Patient tolerated treatment well [] Patient limited by fatigue  [] Patient limited by pain  [] Patient limited by other medical complications  [] Other:     Functional Progress - see Subjective    Prognosis: [x] Good [] Fair  [] Poor    Patient Requires Follow-up: [x] Yes  [] No    Plan:   [] Continue per plan of care [] Alter current plan (see comments)  [x] Plan of care initiated [] Hold pending MD visit [] Discharge    Plan for Next Session:  Add above as stated      Goals:    Short term goals  Time Frame for Short term goals: 8 weeks  Short term goal 1:  Increase LE strength to 4+/5 to get up and down from chair without UE support  Short term goal 2: Modified tandem balance 5 sec to decrease risk of falls during gait              Functional Assessment:    25      Charges: Therapeutic Exercise:  [x] (78685) Provided verbal/tactile cueing for activities to restore or maintain strength, flexibility, endurance, ROM for improvements with self-care, mobility, lifting and ambulation. Neuromuscular Re-Education  [x] (89857) Provided verbal/tactile cueing for activities to restore or maintain balance, coordination, kinesthetic sense, posture, motor skill, proprioception for self-care, mobility, lifting, and ambulation. Therapeutic Activities:    [x] (87286) Provided verbal/tactile cueing to address functional limitations related to loss of mobility, strength, balance, and coordination. Gait Training:  [x] (64290) Provided training and instruction to the patient for proper postural muscle recruitment and positioning with ambulation re-education     Home Exercise Program:    [x] (56534) Reviewed/Progressed HEP activities related to strengthening, flexibility, endurance, ROM for functional self-care, mobility, lifting and ambulation   [] (88167) Reviewed/Progressed HEP activities related to improving balance, coordination, kinesthetic sense, posture, motor skill, proprioception for self-care, mobility, lifting, and ambulation      Manual Treatments:  MFR / STM / Oscillations-Mobs:  G-I, II, III, IV / Manipulation / MLD  [] (81519) Provided manual therapy to mobilize  soft tissue/joints/fluid for the purpose of modulating pain, promoting relaxation, increasing ROM, reducing/eliminating soft tissue swelling/inflammation/restriction, improving soft tissue extensibility and allowing for proper ROM for normal function with self- care, mobility, lifting and ambulation.       Timed Code Treatment Minutes: 45   Total Treatment Minutes: 45     [] EVAL (LOW) 57637   [] EVAL (MOD) 53221   [] EVAL (HIGH) 36915   [] RE-EVAL   [x] TE [phone]) x   3  [] Aquatic (55100) x  [] NMR (71393)   x  [] Aquatic Group (19160) x  [] Manual (18438) x    [] Ultrasound (13149) x  [] TA (66968) x    [] Mech Traction (86474)  [] Ionto (88367)           [] ES (un) (33598):   [] Vasopump (50198) [] Other:            Electronically signed by:  Sofi Yo, [address] [place] Abnormal WBC: < 4,000 OR > 12,000

## 2021-08-09 ASSESSMENT — ENCOUNTER SYMPTOMS
DIARRHEA: 0
CONSTIPATION: 0
SHORTNESS OF BREATH: 0
BACK PAIN: 1
CHEST TIGHTNESS: 0
ABDOMINAL PAIN: 0

## 2021-08-10 ENCOUNTER — OFFICE VISIT (OUTPATIENT)
Dept: PRIMARY CARE CLINIC | Age: 56
End: 2021-08-10
Payer: MEDICAID

## 2021-08-10 VITALS
BODY MASS INDEX: 22.59 KG/M2 | SYSTOLIC BLOOD PRESSURE: 144 MMHG | OXYGEN SATURATION: 98 % | HEART RATE: 79 BPM | HEIGHT: 74 IN | WEIGHT: 176 LBS | DIASTOLIC BLOOD PRESSURE: 89 MMHG | TEMPERATURE: 97.5 F

## 2021-08-10 DIAGNOSIS — I50.32 CHRONIC DIASTOLIC CONGESTIVE HEART FAILURE (HCC): ICD-10-CM

## 2021-08-10 DIAGNOSIS — Z99.2 ACUTE RENAL FAILURE SUPERIMPOSED ON CHRONIC KIDNEY DISEASE, ON CHRONIC DIALYSIS, UNSPECIFIED ACUTE RENAL FAILURE TYPE (HCC): ICD-10-CM

## 2021-08-10 DIAGNOSIS — Z79.4 TYPE 2 DIABETES MELLITUS WITH STAGE 3 CHRONIC KIDNEY DISEASE, WITH LONG-TERM CURRENT USE OF INSULIN, UNSPECIFIED WHETHER STAGE 3A OR 3B CKD (HCC): Chronic | ICD-10-CM

## 2021-08-10 DIAGNOSIS — N18.9 ACUTE RENAL FAILURE SUPERIMPOSED ON CHRONIC KIDNEY DISEASE, ON CHRONIC DIALYSIS, UNSPECIFIED ACUTE RENAL FAILURE TYPE (HCC): ICD-10-CM

## 2021-08-10 DIAGNOSIS — N17.9 ACUTE RENAL FAILURE SUPERIMPOSED ON CHRONIC KIDNEY DISEASE, ON CHRONIC DIALYSIS, UNSPECIFIED ACUTE RENAL FAILURE TYPE (HCC): ICD-10-CM

## 2021-08-10 DIAGNOSIS — E11.22 TYPE 2 DIABETES MELLITUS WITH STAGE 3 CHRONIC KIDNEY DISEASE, WITH LONG-TERM CURRENT USE OF INSULIN, UNSPECIFIED WHETHER STAGE 3A OR 3B CKD (HCC): Chronic | ICD-10-CM

## 2021-08-10 DIAGNOSIS — S22.080D COMPRESSION FRACTURE OF T12 VERTEBRA WITH ROUTINE HEALING, SUBSEQUENT ENCOUNTER: ICD-10-CM

## 2021-08-10 DIAGNOSIS — I10 ESSENTIAL HYPERTENSION: Primary | Chronic | ICD-10-CM

## 2021-08-10 DIAGNOSIS — E78.2 MIXED HYPERLIPIDEMIA: ICD-10-CM

## 2021-08-10 DIAGNOSIS — N18.30 TYPE 2 DIABETES MELLITUS WITH STAGE 3 CHRONIC KIDNEY DISEASE, WITH LONG-TERM CURRENT USE OF INSULIN, UNSPECIFIED WHETHER STAGE 3A OR 3B CKD (HCC): Chronic | ICD-10-CM

## 2021-08-10 LAB — HBA1C MFR BLD: 6.6 %

## 2021-08-10 PROCEDURE — 3017F COLORECTAL CA SCREEN DOC REV: CPT | Performed by: NURSE PRACTITIONER

## 2021-08-10 PROCEDURE — 83036 HEMOGLOBIN GLYCOSYLATED A1C: CPT | Performed by: NURSE PRACTITIONER

## 2021-08-10 PROCEDURE — 3044F HG A1C LEVEL LT 7.0%: CPT | Performed by: NURSE PRACTITIONER

## 2021-08-10 PROCEDURE — 4004F PT TOBACCO SCREEN RCVD TLK: CPT | Performed by: NURSE PRACTITIONER

## 2021-08-10 PROCEDURE — 2022F DILAT RTA XM EVC RTNOPTHY: CPT | Performed by: NURSE PRACTITIONER

## 2021-08-10 PROCEDURE — 99214 OFFICE O/P EST MOD 30 MIN: CPT | Performed by: NURSE PRACTITIONER

## 2021-08-10 PROCEDURE — G8420 CALC BMI NORM PARAMETERS: HCPCS | Performed by: NURSE PRACTITIONER

## 2021-08-10 PROCEDURE — G8427 DOCREV CUR MEDS BY ELIG CLIN: HCPCS | Performed by: NURSE PRACTITIONER

## 2021-08-10 RX ORDER — CYCLOBENZAPRINE HCL 10 MG
10 TABLET ORAL 3 TIMES DAILY PRN
Qty: 90 TABLET | Refills: 1 | Status: SHIPPED | OUTPATIENT
Start: 2021-08-10 | End: 2021-09-09

## 2021-08-10 RX ORDER — INSULIN DETEMIR 100 [IU]/ML
22 INJECTION, SOLUTION SUBCUTANEOUS NIGHTLY
Qty: 5 PEN | Refills: 3 | Status: SHIPPED | OUTPATIENT
Start: 2021-08-10 | End: 2022-04-11 | Stop reason: SDUPTHER

## 2021-08-10 RX ORDER — CLONIDINE HYDROCHLORIDE 0.1 MG/1
0.1 TABLET ORAL 3 TIMES DAILY
Qty: 90 TABLET | Refills: 3 | Status: SHIPPED | OUTPATIENT
Start: 2021-08-10 | End: 2021-11-10 | Stop reason: SDUPTHER

## 2021-08-10 SDOH — ECONOMIC STABILITY: FOOD INSECURITY: WITHIN THE PAST 12 MONTHS, THE FOOD YOU BOUGHT JUST DIDN'T LAST AND YOU DIDN'T HAVE MONEY TO GET MORE.: NEVER TRUE

## 2021-08-10 SDOH — ECONOMIC STABILITY: FOOD INSECURITY: WITHIN THE PAST 12 MONTHS, YOU WORRIED THAT YOUR FOOD WOULD RUN OUT BEFORE YOU GOT MONEY TO BUY MORE.: NEVER TRUE

## 2021-08-10 ASSESSMENT — SOCIAL DETERMINANTS OF HEALTH (SDOH): HOW HARD IS IT FOR YOU TO PAY FOR THE VERY BASICS LIKE FOOD, HOUSING, MEDICAL CARE, AND HEATING?: NOT HARD AT ALL

## 2021-08-10 NOTE — PROGRESS NOTES
Suellen Burks (:  1965) is a 55 y.o. male,Established patient, here for evaluation of the following chief complaint(s):  Hypertension and Diabetes         ASSESSMENT/PLAN:  1. Essential hypertension- Taking clonidine BID, reporting increased blood pressures at home, requesting to increase to 0.2mg- /89 today  Comments:  Increase Clonidine 0.1 mg TID  continue Lopressor  Contine Nifedipine  contiue Imdur  continue aldactone  Continue Hydralazine  2. Type 2 diabetes mellitus with stage 3 chronic kidney disease, with long-term current use of insulin, unspecified whether stage 3a or 3b CKD ([facility] [address]- A1C 6.6%  Comments:  Believes insurance fabiano l pay for Levemir over Lantus  Stop Lantus  Start Levemir  Orders:  -     POCT glycosylated hemoglobin (Hb A1C)6.6%  3. Chronic diastolic congestive heart failure (HCC)  Comments:  Lopressor  ASA  4. Mixed hyperlipidemia  Comments:  Atorvastatin  5. Compression fracture of T12 vertebra with routine healing, subsequent encounter- Intermittent pain from fracture  Comments:  -Refill flexiril to take as needed  Orders:  -     cyclobenzaprine (FLEXERIL) 10 MG tablet; Take 1 tablet by mouth 3 times daily as needed for Muscle spasms, Disp-90 tablet, R-1Normal  6. Acute renal failure superimposed on chronic kidney disease, on chronic dialysis, unspecified acute renal failure [facility]  Comments:  PD dialysis decrased to 5 days weekly  PD site witout redness, tenderness  Dressing not completely intact, provided new dressing  Being worked up for txp    I have discontinued [employer]. I have also changed his cloNIDine. Additionally, I am having him start on Levemir FlexTouch. Lastly, I am having him maintain his insulin lispro, isosorbide mononitrate, aspirin EC, gabapentin, spironolactone, atorvastatin, ibuprofen, hydrALAZINE, NIFEdipine, metoprolol tartrate, and cyclobenzaprine. Return in about 3 months (around 11/10/2021).          Subjective SUBJECTIVE/OBJECTIVE:  Hypertension  This is a chronic problem. The current episode started more than 1 year ago. The problem is uncontrolled. Pertinent negatives include no chest pain, headaches, palpitations or shortness of breath. Risk factors for coronary artery disease include dyslipidemia and diabetes mellitus. Past treatments include direct vasodilators, beta blockers, central alpha agonists and diuretics. The current treatment provides moderate improvement. Hypertensive end-organ damage includes kidney disease. Identifiable causes of hypertension include chronic renal disease. Hyperlipidemia  This is a chronic problem. The problem is controlled. Exacerbating diseases include chronic renal disease. Pertinent negatives include no chest pain, myalgias or shortness of breath. Current antihyperlipidemic treatment includes diet change, exercise and statins. The current treatment provides moderate improvement of lipids. Compliance problems include adherence to diet and adherence to exercise. Risk factors for coronary artery disease include diabetes mellitus, dyslipidemia, family history, male sex and hypertension. Diabetes  He presents for his follow-up diabetic visit. He has type 2 diabetes mellitus. There are no hypoglycemic associated symptoms. Pertinent negatives for hypoglycemia include no dizziness, headaches, nervousness/anxiousness or seizures. Pertinent negatives for diabetes include no chest pain, no polyuria and no weakness. There are no hypoglycemic complications. There are no diabetic complications. Risk factors for coronary artery disease include diabetes mellitus, dyslipidemia, male sex and hypertension. Current diabetic treatment includes insulin injections. He is compliant with treatment most of the time. His weight is increasing rapidly. He is following a generally healthy diet. He rarely participates in exercise. ESRD   Reports performing peritoneal dialysis every evening for 8 hours. States blood sugar increases depending on dialysis fluid concentration 1.5% vs 2.5%. Dressing to abdomen dry, no abdominal tenderness, fever, nausea. PD has been decreased to 5 days/week. Dry weight 79.4 kg     Cleared by Dentist for transplant.     Had angiogram, ceared by Cardiology for transplant.     History of right sided Lacunar stroke in 2015. MRA of neck in 2015 WNL, MRA of head revealed no stenosis or aneurysm. Collaborated with Dr. Carlene Apple the need for continuing anticoagulation therapy. No CVA since 2015 and no significant CAD, Plavix can be, no additional  Anticoagulation medication recommended at this time. Will continue 81 mg ASA. CT HEAD 2018  No significant interval change from the prior.  If neurologic symptoms   continue, consider MRI. NM SCAN 9/2020  Summary    Pharmacological Stress/MPI Results:    1. Technically a satisfactory study.    2. Normal pharmacological stress portion of the study.    3. No evidence of Ischemia by Myocardial Perfusion Imaging.    4. Gated Study shows Dilated LV: EF is 51 %.    Spouse is his support system, she works for [employer] in [place].      Review of Systems   Constitutional: Negative for activity change and fever. HENT: Negative for congestion. Had recent dental exam, requires deep cleaning. Eyes: Negative for visual disturbance. Respiratory: Negative for chest tightness and shortness of breath. Cardiovascular: Negative for chest pain, palpitations and leg swelling. Hypertension controlled on present medication  Hyperlipidemia managed on present medication     Gastrointestinal: Negative for abdominal pain, constipation and diarrhea. Endocrine: Negative for polyuria. Diabetes managed on insulin. Genitourinary: Negative for dysuria. ESRD, performs peritoneal dialysis nightly. Continues to have urinary output. Being evaluated for Kidney transplant. Musculoskeletal: Positive for back pain (from fall). week   Vaping Use    Vaping Use: Never used   Substance Use Topics    Alcohol use: Yes     Comment: occasional    Drug use: Yes     Types: Marijuana     Comment: previously used cocaine, stopped May 2015 LAST USED MARIJUANA-NOVEMBER 2020    height is 6' 2\" (1.88 m) and weight is 176 lb (79.8 kg). His temporal temperature is 97.5 °F (36.4 °C). His blood pressure is 144/89 (abnormal) and his pulse is 79. His oxygen saturation is 98%. Objective   Physical Exam  Vitals reviewed. Constitutional:       Appearance: Normal appearance. He is well-developed and well-groomed. HENT:      Head: Normocephalic. Right Ear: Hearing normal.      Left Ear: Hearing normal.   Eyes:      General: Lids are normal. Vision grossly intact. Neck:      Trachea: Trachea and phonation normal.   Cardiovascular:      Rate and Rhythm: Normal rate and regular rhythm. Heart sounds: Normal heart sounds, S1 normal and S2 normal.   Pulmonary:      Effort: Pulmonary effort is normal.      Breath sounds: Normal breath sounds. Abdominal:      General: Bowel sounds are normal.      Palpations: Abdomen is soft. Tenderness: There is no abdominal tenderness. Musculoskeletal:      Cervical back: Full passive range of motion without pain and normal range of motion. Neurological:      General: No focal deficit present. Mental Status: He is alert and oriented to person, place, and time. GCS: GCS eye subscore is 4. GCS verbal subscore is 5. GCS motor subscore is 6. Psychiatric:         Attention and Perception: Attention normal.         Mood and Affect: Mood normal.         Speech: Speech normal.         Behavior: Behavior normal. Behavior is cooperative.          Cognition and Memory: Cognition and memory normal.            On this date 8/10/2021 I have spent 25 minutes reviewing previous notes, test results and face to face with the patient discussing the diagnosis and importance of compliance with the treatment plan as well as documenting on the day of the visit. Reviewed patient's pertinent medical history, relevant laboratory results, imaging studies, and health maintenance. Medications have been reviewed and discussed with the patient, refills otherwise up-to-date. Discussed the importance of adhering to current medication regimen. Advised:  (1) continue to work on eating a healthy balanced diet; (2) stay active by exercising within your personal limits. Patient was advised to keep future appointments with their respective specialty care team(s). Questions and concerns addressed, care plan reviewed and patient is agreeable with the care plan following today's visit. An electronic signature was used to authenticate this note.     --ROQUE Adams - CNP

## 2021-08-16 ASSESSMENT — VISUAL ACUITY: OU: 1

## 2021-08-20 DIAGNOSIS — E11.49 OTHER DIABETIC NEUROLOGICAL COMPLICATION ASSOCIATED WITH TYPE 2 DIABETES MELLITUS (HCC): ICD-10-CM

## 2021-08-20 DIAGNOSIS — R53.1 LEFT-SIDED WEAKNESS: ICD-10-CM

## 2021-08-20 RX ORDER — GABAPENTIN 300 MG/1
CAPSULE ORAL
Qty: 270 CAPSULE | Refills: 3 | Status: SHIPPED | OUTPATIENT
Start: 2021-08-20 | End: 2022-08-01

## 2021-08-20 NOTE — TELEPHONE ENCOUNTER
Medication:   Requested Prescriptions     Pending Prescriptions Disp Refills    gabapentin (NEURONTIN) 300 MG capsule 270 capsule 2     Sig: TAKE 1 CAPSULE BY MOUTH THREE TIMES DAILY        Last Filled:      Patient Phone Number: [phone] (home)     Last appt: 8/10/2021   Next appt: 11/10/2021    Last OARRS:   RX Monitoring 2/9/2018   Attestation The Prescription Monitoring Report for this patient was reviewed today. Periodic Controlled Substance Monitoring No signs of potential drug abuse or diversion identified.

## 2021-09-20 RX ORDER — SPIRONOLACTONE 50 MG/1
TABLET, FILM COATED ORAL
Qty: 90 TABLET | Refills: 0 | Status: SHIPPED | OUTPATIENT
Start: 2021-09-20 | End: 2021-12-20

## 2021-09-20 NOTE — TELEPHONE ENCOUNTER
Medication:   Requested Prescriptions     Pending Prescriptions Disp Refills    spironolactone (ALDACTONE) 50 MG tablet [Pharmacy Med Name: Spironolactone 50 MG Oral Tablet] 90 tablet 0     Sig: Take 1 tablet by mouth once daily        Last Filled:      Patient Phone Number: [phone] (home)     Last appt: 8/10/2021   Next appt: 11/10/2021    Last OARRS:   RX Monitoring 2/9/2018   Attestation The Prescription Monitoring Report for this patient was reviewed today. Periodic Controlled Substance Monitoring No signs of potential drug abuse or diversion identified.

## 2021-10-20 DIAGNOSIS — I10 ESSENTIAL HYPERTENSION: Chronic | ICD-10-CM

## 2021-10-20 DIAGNOSIS — I50.32 CHRONIC DIASTOLIC CONGESTIVE HEART FAILURE (HCC): ICD-10-CM

## 2021-10-20 RX ORDER — HYDRALAZINE HYDROCHLORIDE 100 MG/1
TABLET, FILM COATED ORAL
Qty: 270 TABLET | Refills: 3 | Status: SHIPPED | OUTPATIENT
Start: 2021-10-20 | End: 2022-01-20

## 2021-11-10 ENCOUNTER — OFFICE VISIT (OUTPATIENT)
Dept: PRIMARY CARE CLINIC | Age: 56
End: 2021-11-10
Payer: MEDICAID

## 2021-11-10 VITALS
DIASTOLIC BLOOD PRESSURE: 87 MMHG | TEMPERATURE: 97.2 F | HEIGHT: 74 IN | BODY MASS INDEX: 23.15 KG/M2 | OXYGEN SATURATION: 95 % | HEART RATE: 101 BPM | WEIGHT: 180.4 LBS | SYSTOLIC BLOOD PRESSURE: 145 MMHG

## 2021-11-10 DIAGNOSIS — E78.2 DM TYPE 2 WITH DIABETIC MIXED HYPERLIPIDEMIA (HCC): ICD-10-CM

## 2021-11-10 DIAGNOSIS — E78.2 MIXED HYPERLIPIDEMIA: Primary | ICD-10-CM

## 2021-11-10 DIAGNOSIS — E11.59 HYPERTENSION ASSOCIATED WITH DIABETES (HCC): ICD-10-CM

## 2021-11-10 DIAGNOSIS — N18.4 DIABETES MELLITUS DUE TO UNDERLYING CONDITION WITH STAGE 4 CHRONIC KIDNEY DISEASE, WITH LONG-TERM CURRENT USE OF INSULIN (HCC): ICD-10-CM

## 2021-11-10 DIAGNOSIS — E11.69 DM TYPE 2 WITH DIABETIC MIXED HYPERLIPIDEMIA (HCC): ICD-10-CM

## 2021-11-10 DIAGNOSIS — I15.2 HYPERTENSION ASSOCIATED WITH DIABETES (HCC): ICD-10-CM

## 2021-11-10 DIAGNOSIS — R22.0 LEFT FACIAL SWELLING: ICD-10-CM

## 2021-11-10 DIAGNOSIS — Z79.4 DIABETES MELLITUS DUE TO UNDERLYING CONDITION WITH STAGE 4 CHRONIC KIDNEY DISEASE, WITH LONG-TERM CURRENT USE OF INSULIN (HCC): ICD-10-CM

## 2021-11-10 DIAGNOSIS — E08.22 DIABETES MELLITUS DUE TO UNDERLYING CONDITION WITH STAGE 4 CHRONIC KIDNEY DISEASE, WITH LONG-TERM CURRENT USE OF INSULIN (HCC): ICD-10-CM

## 2021-11-10 DIAGNOSIS — I10 ESSENTIAL HYPERTENSION: Chronic | ICD-10-CM

## 2021-11-10 PROCEDURE — G8420 CALC BMI NORM PARAMETERS: HCPCS | Performed by: NURSE PRACTITIONER

## 2021-11-10 PROCEDURE — 4004F PT TOBACCO SCREEN RCVD TLK: CPT | Performed by: NURSE PRACTITIONER

## 2021-11-10 PROCEDURE — G8484 FLU IMMUNIZE NO ADMIN: HCPCS | Performed by: NURSE PRACTITIONER

## 2021-11-10 PROCEDURE — 3017F COLORECTAL CA SCREEN DOC REV: CPT | Performed by: NURSE PRACTITIONER

## 2021-11-10 PROCEDURE — 99214 OFFICE O/P EST MOD 30 MIN: CPT | Performed by: NURSE PRACTITIONER

## 2021-11-10 PROCEDURE — G8427 DOCREV CUR MEDS BY ELIG CLIN: HCPCS | Performed by: NURSE PRACTITIONER

## 2021-11-10 PROCEDURE — 3044F HG A1C LEVEL LT 7.0%: CPT | Performed by: NURSE PRACTITIONER

## 2021-11-10 PROCEDURE — 2022F DILAT RTA XM EVC RTNOPTHY: CPT | Performed by: NURSE PRACTITIONER

## 2021-11-10 RX ORDER — ACETAMINOPHEN AND CODEINE PHOSPHATE 300; 30 MG/1; MG/1
TABLET ORAL
COMMUNITY
Start: 2021-11-04 | End: 2022-05-10

## 2021-11-10 RX ORDER — AMOXICILLIN 500 MG/1
500 CAPSULE ORAL 3 TIMES DAILY
Qty: 21 CAPSULE | Refills: 0 | Status: SHIPPED | OUTPATIENT
Start: 2021-11-10 | End: 2021-11-17

## 2021-11-10 RX ORDER — CLONIDINE HYDROCHLORIDE 0.1 MG/1
0.1 TABLET ORAL 3 TIMES DAILY
Qty: 90 TABLET | Refills: 3 | Status: SHIPPED | OUTPATIENT
Start: 2021-11-10 | End: 2022-02-11 | Stop reason: SDUPTHER

## 2021-11-10 ASSESSMENT — ENCOUNTER SYMPTOMS
CONSTIPATION: 0
BACK PAIN: 1
CHEST TIGHTNESS: 0
ABDOMINAL PAIN: 0
DIARRHEA: 0
SHORTNESS OF BREATH: 0

## 2021-11-10 NOTE — PROGRESS NOTES
Anthony Ball (:  1965) is a 55 y.o. male,Established patient, here for evaluation of the following chief complaint(s):  Follow-up         ASSESSMENT/PLAN:  1. Mixed hyperlipidemia  -Continue current medications. 2. Essential hypertension  -Continue current medications. -     cloNIDine (CATAPRES) 0.1 MG tablet; Take 1 tablet by mouth 3 times daily, Disp-90 tablet, R-3Normal  3. Diabetes mellitus due to underlying condition with stage 4 chronic kidney disease, with long-term current use of insulin (HCC)  -Continue current medications. 4. DM type 2 with diabetic mixed hyperlipidemia ([facility] [address]  -Continue current medications. 5. Hypertension associated with diabetes ([facility] [address]  -Continue current medications. 6. Left facial swelling- dental abscess vs generalized swelling  -     amoxicillin (AMOXIL) 500 MG capsule; Take 1 capsule by mouth 3 times daily for 7 days, Disp-21 capsule, R-0Normal  -follow up with Dentist      No follow-ups on file. Subjective   SUBJECTIVE/OBJECTIVE:  HPI  Had deep cleaning of teeth on left side last Thursday, prescribed Tylenol # 3. He is cooperative but drowsy today due to pain medication. Left sided facial swelling noted. Spouse did not go to dental appointment, unsure if he had additional work. This is required for him to be placed on transplant list. Mouth was too painful to open for inspection. Hypertension  This is a chronic problem. The current episode started more than 1 year ago. The problem is uncontrolled. Pertinent negatives include no chest pain, headaches, palpitations or shortness of breath. Risk factors for coronary artery disease include dyslipidemia and diabetes mellitus. Past treatments include direct vasodilators, beta blockers, central alpha agonists and diuretics. The current treatment provides moderate improvement. Hypertensive end-organ damage includes kidney disease. Identifiable causes of hypertension include chronic renal disease. Hyperlipidemia  This is a chronic problem. The problem is controlled. Exacerbating diseases include chronic renal disease. Pertinent negatives include no chest pain, myalgias or shortness of breath. Current antihyperlipidemic treatment includes diet change, exercise and statins. The current treatment provides moderate improvement of lipids. Compliance problems include adherence to diet and adherence to exercise. Risk factors for coronary artery disease include diabetes mellitus, dyslipidemia, family history, male sex and hypertension. Diabetes  He presents for his follow-up diabetic visit. He has type 2 diabetes mellitus. There are no hypoglycemic associated symptoms. Pertinent negatives for hypoglycemia include no dizziness, headaches, nervousness/anxiousness or seizures. Pertinent negatives for diabetes include no chest pain, no polyuria and no weakness. There are no hypoglycemic complications. There are no diabetic complications. Risk factors for coronary artery disease include diabetes mellitus, dyslipidemia, male sex and hypertension. Current diabetic treatment includes insulin injections. He is compliant with treatment most of the time. His weight is increasing rapidly. He is following a generally healthy diet. He rarely participates in exercise.      ESRD   Reports performing peritoneal dialysis every evening for 8 hours. States blood sugar increases depending on dialysis fluid concentration 1.5% vs 2.5%. Dressing to abdomen dry, no abdominal tenderness, fever, nausea. PD has been decreased to 5 days/week. Increased  Dry weight 79.4--> 81 kg     Cleared by Dentist for transplant.     Had angiogram, ceared by Cardiology for transplant.     History of right sided Lacunar stroke in 2015. MRA of neck in 2015 WNL, MRA of head revealed no stenosis or aneurysm. Collaborated with Dr. Lopez Nap the need for continuing anticoagulation therapy.  No CVA since 2015 and no significant CAD, Plavix can be, no additional  Anticoagulation medication recommended at this time. Will continue 81 mg ASA. CT HEAD 2018  No significant interval change from the prior.  If neurologic symptoms   continue, consider MRI. NM SCAN 9/2020  Summary    Pharmacological Stress/MPI Results:    1. Technically a satisfactory study.    2. Normal pharmacological stress portion of the study.    3. No evidence of Ischemia by Myocardial Perfusion Imaging.    4. Gated Study shows Dilated LV: EF is 51 %.    Spouse is his support system, she works for [employer] in [employer].           Review of Systems   Constitutional: Negative for activity change and fever. HENT: Positive for facial swelling (left and pain). Negative for congestion. Had recent dental exam, requires deep cleaning. Eyes: Negative for visual disturbance. Respiratory: Negative for chest tightness and shortness of breath. Cardiovascular: Negative for chest pain, palpitations and leg swelling. Hypertension controlled on present medication  Hyperlipidemia managed on present medication     Gastrointestinal: Negative for abdominal pain, constipation and diarrhea. Endocrine: Negative for polyuria. Diabetes managed on insulin. Genitourinary: Negative for dysuria. ESRD, performs peritoneal dialysis nightly. Continues to have urinary output. Being evaluated for Kidney transplant. Musculoskeletal: Positive for back pain (from fall). Negative for arthralgias and myalgias. Skin: Negative for rash. Neurological: Negative for dizziness, seizures, weakness, light-headedness and headaches. History of Lacunar stroke in 2015. Was placed on Plavix at that time. No new CVA or weakness, Most recent CT WNL. Psychiatric/Behavioral: Negative for agitation, decreased concentration and sleep disturbance. The patient is not nervous/anxious.            Objective    Past Medical History:   Diagnosis Date    [facility] [address]     [facility]congestive heart failure) ([facility] [address] [facility]     CVA (cerebral infarction) 2015    Diabetes mellitus ([facility] [address] [facility]     Foot ulcer, left ([facility] [address] 2016    Gastroparesis     Hemodialysis patient ([facility] [address]     Hypercholesteremia     Hypertension     Kidney disease     Unspecified cerebral artery occlusion with cerebral infarction     5/15, 7/15 LEFT SIDE WEAKNESS     Past Surgical History:   Procedure Laterality Date    COLONOSCOPY N/A 2021    COLONOSCOPY POLYPECTOMY SNARE/COLD BIOPSY performed by Melburn Ganser, MD at [address] [place] [address] N/A 10/29/2020    PLACEMENT OF A TUNNELED DIALYSIS CATHETER WITH FLEURO AND ULTRASOUND performed by Isis Chavira MD at [facility] N/A 10/29/2020    LAPAROSCOPIC PERITONEAL DIALYSIS CATHETER PLACEMENT WITH FLEURO AND ULTRASOUND performed by Isis Chavira MD at [address] [place] [address] N/A     UMBILICAL HERNIA REPAIR performed by Isis Chavira MD at [address] [address] N/A 2021    ESOPHAGOGASTRODUODENOSCOPY performed by Melburn Ganser, MD at [address] [place] [address] History   Adopted: Yes     Social History     Tobacco Use    Smoking status: Current Some Day Smoker     Packs/day: 0.00     Years: 30.00     Pack years: 0.00     Types: Cigars     Start date: 1/3/1979     Last attempt to quit: 2019     Years since quittin.8    Smokeless tobacco: Never Used    Tobacco comment: 3 black and milds a week   Vaping Use    Vaping Use: Never used   Substance Use Topics    Alcohol use: Yes     Comment: occasional    Drug use: Yes     Types: Marijuana Valdene Pinto)     Comment: previously used cocaine, stopped May 2015 LAST USED MARIJUANA-2020    height is 6' 2\" (1.88 m) and weight is 180 lb 6.4 oz (81.8 kg). His temperature is 97.2 °F (36.2 °C). His blood pressure is 145/87 (abnormal) and his pulse is 101. His oxygen saturation is 95%.    I have discontinued Abel Emmanuel's ibuprofen. I am also having him start on amoxicillin. Additionally, I am having him maintain his insulin lispro, NIFEdipine, metoprolol tartrate, Levemir FlexTouch, gabapentin, spironolactone, EQ Aspirin Adult Low Dose, atorvastatin, isosorbide mononitrate, hydrALAZINE, acetaminophen-codeine, and cloNIDine. Physical Exam  Vitals reviewed. Constitutional:       Appearance: He is well-developed. He is not diaphoretic. HENT:      Head: Normocephalic. Jaw: Swelling present. Right Ear: Hearing and external ear normal. No tenderness. Left Ear: Hearing and external ear normal. No tenderness. Nose: Nose normal.   Eyes:      General: Lids are normal.   Cardiovascular:      Rate and Rhythm: Regular rhythm. Tachycardia present. Heart sounds: Normal heart sounds, S1 normal and S2 normal.   Pulmonary:      Effort: Pulmonary effort is normal.      Breath sounds: Normal breath sounds. Abdominal:      General: Bowel sounds are normal.      Palpations: Abdomen is soft. Musculoskeletal:         General: Normal range of motion. Lymphadenopathy:      Head:      Right side of head: No submental or submandibular adenopathy. Left side of head: No submental or submandibular adenopathy. Cervical:      Right cervical: No superficial cervical adenopathy. Left cervical: No superficial cervical adenopathy. Skin:     General: Skin is warm and dry. Findings: No rash. Nails: There is no clubbing. Neurological:      Mental Status: He is alert and oriented to person, place, and time. GCS: GCS eye subscore is 4. GCS verbal subscore is 5. GCS motor subscore is 6. Psychiatric:         Speech: Speech normal.         Behavior: Behavior normal. Behavior is cooperative.          Judgment: Judgment normal.            On this date 11/10/2021 I have spent 25 minutes reviewing previous notes, test results and face to face with the patient discussing the diagnosis and importance of compliance with the treatment plan as well as documenting on the day of the visit. An electronic signature was used to authenticate this note.     --ROQUE Tinajero - CNP

## 2021-11-10 NOTE — LETTER
[facility]  [id number] [id number]  Phone: [phone]  Fax: [address] [place] [address] ROQUE Burnette CNP         November 11, 2021     Patient: Shruthi Lucero   YOB: 1965   Date of Visit: 11/10/2021       To Whom It May Concern: It is my medical opinion that Shruthi Lucero requires a disability parking placard for the following reasons:  He cannot walk 200 feet without stopping to rest.  Duration of need: 5 years    If you have any questions or concerns, please don't hesitate to call.     Sincerely,        ROQUE Umaña Mc CNP

## 2021-11-11 PROBLEM — E11.59 HYPERTENSION ASSOCIATED WITH DIABETES (HCC): Status: ACTIVE | Noted: 2021-11-11

## 2021-11-11 PROBLEM — E08.22 DIABETES MELLITUS DUE TO UNDERLYING CONDITION WITH STAGE 4 CHRONIC KIDNEY DISEASE, WITH LONG-TERM CURRENT USE OF INSULIN (HCC): Status: ACTIVE | Noted: 2021-11-11

## 2021-11-11 PROBLEM — E11.69 DM TYPE 2 WITH DIABETIC MIXED HYPERLIPIDEMIA (HCC): Status: ACTIVE | Noted: 2021-11-11

## 2021-11-11 PROBLEM — I15.2 HYPERTENSION ASSOCIATED WITH DIABETES (HCC): Status: ACTIVE | Noted: 2021-11-11

## 2021-11-11 PROBLEM — N18.4 DIABETES MELLITUS DUE TO UNDERLYING CONDITION WITH STAGE 4 CHRONIC KIDNEY DISEASE, WITH LONG-TERM CURRENT USE OF INSULIN (HCC): Status: ACTIVE | Noted: 2021-11-11

## 2021-11-11 PROBLEM — E08.01 DIABETES MELLITUS DUE TO UNDERLYING CONDITION WITH HYPEROSMOLARITY AND COMA, WITHOUT LONG-TERM CURRENT USE OF INSULIN (HCC): Status: RESOLVED | Noted: 2021-05-11 | Resolved: 2021-11-11

## 2021-11-11 PROBLEM — Z79.4 DIABETES MELLITUS DUE TO UNDERLYING CONDITION WITH STAGE 4 CHRONIC KIDNEY DISEASE, WITH LONG-TERM CURRENT USE OF INSULIN (HCC): Status: ACTIVE | Noted: 2021-11-11

## 2021-11-11 PROBLEM — Z86.73 HISTORY OF STROKE: Status: RESOLVED | Noted: 2019-07-11 | Resolved: 2021-11-11

## 2021-11-11 PROBLEM — E78.2 DM TYPE 2 WITH DIABETIC MIXED HYPERLIPIDEMIA (HCC): Status: ACTIVE | Noted: 2021-11-11

## 2021-11-11 ASSESSMENT — ENCOUNTER SYMPTOMS: FACIAL SWELLING: 1

## 2021-12-07 ENCOUNTER — TELEPHONE (OUTPATIENT)
Dept: CARDIOLOGY CLINIC | Age: 56
End: 2021-12-07

## 2021-12-07 DIAGNOSIS — I42.0 DILATED CARDIOMYOPATHY (HCC): Primary | ICD-10-CM

## 2021-12-07 NOTE — TELEPHONE ENCOUNTER
John Krueger is calling in stating that it has been a year since he has had an echo and stress test. He is on the kidney transplant list and needs to redo them. Please place orders. John Krueger states when scheduling if it is before the end of the year he will need a Wednesday or Thursday afternoon. If it is after the first of the year he will need them to be completed on Monday or a Tuesday afternoon.

## 2021-12-07 NOTE — TELEPHONE ENCOUNTER
I have Lon Sparks scheduled for Tuesday 2/1/22 at [facility] arriving at 9:15 am.     I called and went over date/time an instructions with Lon Sparks, he v/u

## 2021-12-20 RX ORDER — SPIRONOLACTONE 50 MG/1
TABLET, FILM COATED ORAL
Qty: 90 TABLET | Refills: 0 | OUTPATIENT
Start: 2021-12-20

## 2021-12-20 RX ORDER — SPIRONOLACTONE 50 MG/1
TABLET, FILM COATED ORAL
Qty: 90 TABLET | Refills: 1 | Status: SHIPPED | OUTPATIENT
Start: 2021-12-20 | End: 2022-10-11

## 2022-02-01 ENCOUNTER — HOSPITAL ENCOUNTER (OUTPATIENT)
Dept: NON INVASIVE DIAGNOSTICS | Age: 57
Discharge: HOME | End: 2022-02-01
Payer: COMMERCIAL

## 2022-02-01 DIAGNOSIS — I42.0 DILATED CARDIOMYOPATHY (HCC): ICD-10-CM

## 2022-02-01 LAB
LV EF: 40 %
LV EF: 44 %
LVEF MODALITY: NORMAL
LVEF MODALITY: NORMAL

## 2022-02-01 PROCEDURE — 6360000002 HC RX W HCPCS: Performed by: INTERNAL MEDICINE

## 2022-02-01 PROCEDURE — 93017 CV STRESS TEST TRACING ONLY: CPT

## 2022-02-01 PROCEDURE — 93306 TTE W/DOPPLER COMPLETE: CPT

## 2022-02-01 PROCEDURE — 3430000000 HC RX DIAGNOSTIC RADIOPHARMACEUTICAL: Performed by: INTERNAL MEDICINE

## 2022-02-01 PROCEDURE — A9502 TC99M TETROFOSMIN: HCPCS | Performed by: INTERNAL MEDICINE

## 2022-02-01 PROCEDURE — 78452 HT MUSCLE IMAGE SPECT MULT: CPT

## 2022-02-01 RX ADMIN — TETROFOSMIN 10 MILLICURIE: 1.38 INJECTION, POWDER, LYOPHILIZED, FOR SOLUTION INTRAVENOUS at 09:27

## 2022-02-01 RX ADMIN — TETROFOSMIN 30 MILLICURIE: 1.38 INJECTION, POWDER, LYOPHILIZED, FOR SOLUTION INTRAVENOUS at 10:36

## 2022-02-01 RX ADMIN — REGADENOSON 0.4 MG: 0.08 INJECTION, SOLUTION INTRAVENOUS at 10:50

## 2022-02-04 ENCOUNTER — TELEPHONE (OUTPATIENT)
Dept: CARDIOLOGY CLINIC | Age: 57
End: 2022-02-04

## 2022-02-04 NOTE — TELEPHONE ENCOUNTER
[facility] faxed over cardiac evaluation form for Dr. Saw Smith to fill out after assessment. Patient is scheduled for 02/11/2022, papers have been placed in Dr. Joie Mendez folder.

## 2022-02-10 ENCOUNTER — OFFICE VISIT (OUTPATIENT)
Dept: PRIMARY CARE CLINIC | Age: 57
End: 2022-02-10
Payer: MEDICAID

## 2022-02-10 VITALS — HEART RATE: 82 BPM | SYSTOLIC BLOOD PRESSURE: 132 MMHG | DIASTOLIC BLOOD PRESSURE: 85 MMHG | OXYGEN SATURATION: 98 %

## 2022-02-10 DIAGNOSIS — E11.22 TYPE 2 DIABETES MELLITUS WITH STAGE 3 CHRONIC KIDNEY DISEASE, WITH LONG-TERM CURRENT USE OF INSULIN, UNSPECIFIED WHETHER STAGE 3A OR 3B CKD (HCC): Primary | Chronic | ICD-10-CM

## 2022-02-10 DIAGNOSIS — Z79.4 TYPE 2 DIABETES MELLITUS WITH STAGE 3 CHRONIC KIDNEY DISEASE, WITH LONG-TERM CURRENT USE OF INSULIN, UNSPECIFIED WHETHER STAGE 3A OR 3B CKD (HCC): Primary | Chronic | ICD-10-CM

## 2022-02-10 DIAGNOSIS — E11.59 HYPERTENSION ASSOCIATED WITH DIABETES (HCC): ICD-10-CM

## 2022-02-10 DIAGNOSIS — N18.30 TYPE 2 DIABETES MELLITUS WITH STAGE 3 CHRONIC KIDNEY DISEASE, WITH LONG-TERM CURRENT USE OF INSULIN, UNSPECIFIED WHETHER STAGE 3A OR 3B CKD (HCC): Primary | Chronic | ICD-10-CM

## 2022-02-10 DIAGNOSIS — I15.2 HYPERTENSION ASSOCIATED WITH DIABETES (HCC): ICD-10-CM

## 2022-02-10 DIAGNOSIS — I50.32 CHRONIC DIASTOLIC CONGESTIVE HEART FAILURE (HCC): ICD-10-CM

## 2022-02-10 PROBLEM — Z01.810 PREOP CARDIOVASCULAR EXAM: Status: ACTIVE | Noted: 2022-02-10

## 2022-02-10 PROBLEM — N18.6 CKD (CHRONIC KIDNEY DISEASE) REQUIRING CHRONIC DIALYSIS (HCC): Status: ACTIVE | Noted: 2022-02-10

## 2022-02-10 PROBLEM — Z99.2 CKD (CHRONIC KIDNEY DISEASE) REQUIRING CHRONIC DIALYSIS (HCC): Status: ACTIVE | Noted: 2022-02-10

## 2022-02-10 PROCEDURE — 99213 OFFICE O/P EST LOW 20 MIN: CPT | Performed by: NURSE PRACTITIONER

## 2022-02-10 PROCEDURE — 3046F HEMOGLOBIN A1C LEVEL >9.0%: CPT | Performed by: NURSE PRACTITIONER

## 2022-02-10 PROCEDURE — 2022F DILAT RTA XM EVC RTNOPTHY: CPT | Performed by: NURSE PRACTITIONER

## 2022-02-10 PROCEDURE — G8427 DOCREV CUR MEDS BY ELIG CLIN: HCPCS | Performed by: NURSE PRACTITIONER

## 2022-02-10 PROCEDURE — 3017F COLORECTAL CA SCREEN DOC REV: CPT | Performed by: NURSE PRACTITIONER

## 2022-02-10 PROCEDURE — G8420 CALC BMI NORM PARAMETERS: HCPCS | Performed by: NURSE PRACTITIONER

## 2022-02-10 PROCEDURE — 4004F PT TOBACCO SCREEN RCVD TLK: CPT | Performed by: NURSE PRACTITIONER

## 2022-02-10 PROCEDURE — G8484 FLU IMMUNIZE NO ADMIN: HCPCS | Performed by: NURSE PRACTITIONER

## 2022-02-10 RX ORDER — SODIUM BICARBONATE 650 MG/1
650 TABLET ORAL DAILY
COMMUNITY

## 2022-02-10 NOTE — PROGRESS NOTES
[address] [facility] - Cardiology      CC: \"I need a kidney transplant\"     History of present illness: Farzana Batista is a 65 y.o. 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 Today, he presents for preop risk assessment prior to having  He is now in the process for renal transplant through [facility]. Patient denies exertional chest pain/pressure, dyspnea at rest, BROWN, PND, orthopnea, palpitations, lightheadedness, weight changes, changes in LE edema, and syncope. He reports medication compliance and is tolerating.      Past Medical History:   Diagnosis Date    Cardiomyopathy [facility])     CHF (congestive heart failure) ([facility]     CVA (cerebral infarction) 5/2015    Diabetes mellitus ([facility] [address]     Foot ulcer, left ([facility] [address] 1/14/2016    Gastroparesis     Hemodialysis patient ([facility] [address]     Hypercholesteremia     Hypertension     Kidney disease     Unspecified cerebral artery occlusion with cerebral infarction     5/15, 7/15 LEFT SIDE WEAKNESS     Past Surgical History:   Procedure Laterality Date    COLONOSCOPY N/A 5/17/2021    COLONOSCOPY POLYPECTOMY SNARE/COLD BIOPSY performed by Lopez Guidry MD at [address] [place] [address] N/A 10/29/2020    PLACEMENT OF A TUNNELED DIALYSIS CATHETER WITH FLEURO AND ULTRASOUND performed by Helen Awad MD at [facility] N/A 10/29/2020    LAPAROSCOPIC PERITONEAL DIALYSIS CATHETER PLACEMENT WITH FLEURO AND ULTRASOUND performed by Helen Awad MD at [facility] N/A 68/57/2524    UMBILICAL HERNIA REPAIR performed by Helen Awad MD at [address] [facility] N/A 4/26/2021    ESOPHAGOGASTRODUODENOSCOPY performed by Lopez Guidry MD at [address] History:  Social History     Tobacco Use    Smoking status: Current Some Day Smoker     Packs/day: 0.00     Years: 30.00     Pack years: 0.00     Types: Cigars     Start date: 1/3/1979     Last attempt to quit: 1/16/2019     Years since quitting: 3.0    Smokeless tobacco: Never Used    Tobacco comment: 3 black and milds a week   Substance Use Topics    Alcohol use: Yes     Comment: occasional       Review of Systems:   Constitutional: No night sweats or fever. HEENT: No new vision difficulties or ringing in the ears. Respiratory: + BROWN, resolves with rest. No new SOB, PND, orthopnea or cough. Cardiovascular: See HPI. GI: No n/v, diarrhea, constipation, abdominal pain or changes in bowel habits. No melena, no hematochezia  : No urinary frequency, urgency, incontinence, hematuria or dysuria. Skin: No cyanosis or skin lesions. Musculoskeletal: No new muscle or joint pain. Mild ankle edema BLE. Neurological: No syncope or TIA-like symptoms. Psychiatric: No insomnia or depression    Physical Exam:  There were no vitals taken for this visit. General:  Awake, alert, oriented in NAD  Skin:  Warm and dry. No excessive bruising. No rash  Neck:  Supple. No JVD or carotid bruit appreciated  Chest:  Normal effort. Clear to auscultation, no wheezes/rhonchi/rales  Cardiovascular:  RRR, normal S1/S2.   No murmur/gallop/rub  Abdomen:  Soft, nontender, +bowel sounds  Extremities:  No edema  Neurological:  Left sided weakness  Psychological: Normal mood and affect      Current Outpatient Medications   Medication Sig Dispense Refill    hydrALAZINE (APRESOLINE) 100 MG tablet TAKE 2 TABLETS BY MOUTH THREE TIMES DAILY 270 tablet 1    spironolactone (ALDACTONE) 50 MG tablet Take 1 tablet by mouth once daily 90 tablet 1    acetaminophen-codeine (TYLENOL #3) 300-30 MG per tablet       cloNIDine (CATAPRES) 0.1 MG tablet Take 1 tablet by mouth 3 times daily 90 tablet 3    atorvastatin (LIPITOR) 40 MG tablet TAKE 1 TABLET BY MOUTH ONCE DAILY NIGHTLY 90 tablet 3    isosorbide mononitrate (IMDUR) 60 MG extended release tablet Take 1 tablet by mouth once daily 90 tablet 3    EQ ASPIRIN ADULT LOW DOSE 81 MG EC tablet Take 1 tablet by mouth once daily 90 tablet 3    gabapentin (NEURONTIN) 300 MG capsule TAKE 1 CAPSULE BY MOUTH THREE TIMES DAILY 270 capsule 3    insulin detemir (LEVEMIR FLEXTOUCH) 100 UNIT/ML injection pen Inject 22 Units into the skin nightly 5 pen 3    NIFEdipine (PROCARDIA XL) 90 MG extended release tablet Take 1 tablet by mouth daily 90 tablet 1    metoprolol tartrate (LOPRESSOR) 50 MG tablet Take twice daily 180 tablet 1    insulin lispro (HUMALOG) 100 UNIT/ML injection vial Inject into the skin 3 times daily (before meals) SLIDING SCALE       No current facility-administered medications for this visit. Labs: All reviewed   Lab Results   Component Value Date    HGB 11.1 02/04/2021    HCT 34.4 02/04/2021     Lab Results   Component Value Date     02/04/2021     Lab Results   Component Value Date    K 5.1 05/17/2021    K 5.2 02/04/2021    K 4.9 10/26/2020     Lab Results   Component Value Date    BUN 44 02/04/2021    CREATININE 4.7 02/04/2021    No components found for: HGBA1C  Lab Results   Component Value Date    TSH 3.04 10/23/2019      Lab Results   Component Value Date    TRIG 86 06/05/2019    HDL 31 10/23/2019    LDLCALC 51 10/23/2019    LDLDIRECT 103 01/12/2017    LABVLDL 24 10/23/2019       Imaging: all reviewed     MPI 7/21/2015:  Dilated LV with reduced LV systolic function. There is normal isotope uptake at stress and rest. There is no evidence of   myocardial ischemia or scar. Non-diagnostic EKG response due to failure to reach target heart rate . ECHO 7/20/2015:  Enlarged left ventricle. Concentric left ventricle hypertrophy. Severely  decreased left ventricular systolic function with an estimated ejection  fraction of 25 to 30% . Global hypokinesis, no regional wall motion  abnormalities. Trivial MR.   Bubble study negative for PFO/ASD    CAROTID DOPPLER 7/21/2015:  Duplex sonography with color flow enhancement was performed bilaterally on   the cervical carotid system. No evidence of hemodynamically significant   stenosis in the bilateral carotid and vertebral arteries. ECHO:2/22/2016  Summary  Left ventricle size is normal.  Mild to moderate concentric left ventricular hypertrophy is present. Ejection fraction is visually estimated to be 50-55 %. Normal right ventricular size.     ECHO:9/10/18  Summary  Normal left ventricular chamber size. Moderate left ventricular hypertrophy. Slight global decrease in wall motion. Ejection fraction is visually estimated to be 50%. The right ventricle is normal in size and function. ECHO 6/8/2020  Overall left ventricular systolic function appears moderately reduced. Ejection fraction is visually estimated to be 35-40% with diffuse  hypokinesis. There is moderately increased left ventricular wall thickness. Diastolic filling parameters suggest grade II diastolic dysfunction. Normal right ventricular size and function. The left atrium is mildly dilated. Mild mitral and tricuspid regurgitation. Trivial aortic regurgitation. Moderate sized pericardial effusion. Echo 10/26/20   Left ventricular cavity size is dilated. There is moderate concentric left ventricular hypertrophy. Ejection fraction is visually estimated to be 45-50%. There is moderate diffuse hypokinesis. Diastolic filling parameters suggest grade II diastolic dysfunction. The left atrium is severely dilated. The right ventricle is normal   Right ventricular systolic function is normal.   mild-Moderate circumferential pericardial effusion without tamponade   physiology. Angiography:9/10/18  ANGIOGRAPHY FINDINGS:  1. Left renal artery is normal without any significant stenosis. 2.  Right renal artery is normal without any significant stenosis.   SUMMARY:  Normal renal artery without any significant stenosis. Stress perfusion 9/14/20      1. Technically a satisfactory study.    2. Normal pharmacological stress portion of the study.    3. No evidence of Ischemia by Myocardial Perfusion Imaging.    4. Gated Study shows Dilated LV: EF is 51 %. [facility] 2/2021  1. Left main coronary artery is normal and patent. 2.  Left anterior descending artery is patent and normal.  3.  Left circumflex is patent and normal.  4.  Right coronary artery is normal and patent. 5.  LV ejection fraction 60%.     SUMMARY:  Normal coronary arteries. Carotid US 5/2021  1. The bilateral internal carotid arteries reveal no evidence of visible    plaque or measurable stenosis. 2. The bilateral common and external carotid arteries reveal no    significant stenosis. 3. The bilateral vertebral arteries are patent with antegrade flow. 4.  The bilateral subclavian arteries reveal no evidence of significant    stenosis. Echo 2/2022   Left ventricular cavity size is normal.   There is severe concentric left ventricular hypertrophy. Ejection fraction is visually estimated to be 40%. Grade II diastolic dysfunction with elevated LV filling pressures. The right ventricle is normal in size and function. There is a moderate circumferential pericardial effusion noted. Nuc GXT 2/2022  Normal myocardial perfusion study.    Normal myocardial perfusion.    mildly reduced LV systolic function       Assessment & Plan:    Preop risk assessment  Coronary angiogram 2/2021 showed normal coronaries. Recent stress test 2/1/22 showed no ischemia. Nonischemic Cardiomyopathy. LVEF 35-40%. Most likely due to uncontrolled hypertension. Cath 2/2021 showed normal coronaries. Stress test 2/2022 showed no ischemia. Echo 2/2022 showed LVEF 40%. Appears compensated on exam. Continue Losartan, b-blocker and lasix. Hypertension, essential   Controlled. Continue current medical management. Multiple CVA.   Believed most likely related to HTN and small vessel disease. Continue risk factor modifciations including statin and Plavix (per neurologist recommendations). BP control     Hyperlipidemia  Continue high intensity statin therapy. Nicotine addiction   We again discussed smoking cessation and recommend maintaining a smoke-free lifestyle. Diabetes Mellitus II  Managed by PCP. CKD on dialysis   Now on kidney transplant list.       Follow up in 6 months. Thank you very much for allowing me to participate in the care of your patient.       Taylor Hicks MD, MPH

## 2022-02-10 NOTE — PROGRESS NOTES
Carolina Perez (:  1965) is a 65 y.o. male,Established patient, here for evaluation of the following chief complaint(s):  No chief complaint on file. ASSESSMENT/PLAN:  1. Type 2 diabetes mellitus with stage 3 chronic kidney disease, with long-term current use of insulin, unspecified whether stage 3a or 3b CKD [facility]  -Continue Levemir  -Continue Lispro  -Peritoneal Dialysis  2. Chronic diastolic congestive heart failure (HCC)  - Nifedipine  -Hydralazine  -Lopressor  -Aldactone  -Imdur  3. Hypertension associated with diabetes ([facility]  -Clonidine  - Nifedipine  -Hydralazine  -Lopressor  -Aldactone  -Imdur  Return in about 3 months (around 5/10/2022) for HTN, DIABETES. Subjective   SUBJECTIVE/OBJECTIVE:  HPI    Hypertension  This is a chronic problem. The current episode started more than 1 year ago. The problem is uncontrolled. Pertinent negatives include no chest pain, headaches, palpitations or shortness of breath. Risk factors for coronary artery disease include dyslipidemia and diabetes mellitus. Past treatments include direct vasodilators, beta blockers, central alpha agonists and diuretics. The current treatment provides moderate improvement. Hypertensive end-organ damage includes kidney disease. Identifiable causes of hypertension include chronic renal disease.   Hyperlipidemia  This is a chronic problem. The problem is controlled. Exacerbating diseases include chronic renal disease. Pertinent negatives include no chest pain, myalgias or shortness of breath. Current antihyperlipidemic treatment includes diet change, exercise and statins. The current treatment provides moderate improvement of lipids. Compliance problems include adherence to diet and adherence to exercise.  Risk factors for coronary artery disease include diabetes mellitus, dyslipidemia, family history, male sex and hypertension.   Diabetes  He presents for his follow-up diabetic visit. He has type 2 diabetes mellitus. There are no hypoglycemic associated symptoms. Pertinent negatives for hypoglycemia include no dizziness, headaches, nervousness/anxiousness or seizures. Pertinent negatives for diabetes include no chest pain, no polyuria and no weakness. There are no hypoglycemic complications. There are no diabetic complications. Risk factors for coronary artery disease include diabetes mellitus, dyslipidemia, male sex and hypertension. Current diabetic treatment includes insulin injections. He is compliant with treatment most of the time. His weight is increasing rapidly. He is following a generally healthy diet. He rarely participates in exercise. ESRD   Reports performing peritoneal dialysis every evening for 8 hours. States blood sugar increases depending on dialysis fluid concentration 1.5% vs 2.5%. Dressing to abdomen dry, no abdominal tenderness, fever, nausea. PD has been decreased to 5 days/week. Increased  Dry weight 79.4--> 81 kg     Cleared by Dentist for transplant.     Had angiogram, ceared by Cardiology for transplant. Review of Systems   Constitutional: Negative for activity change and fever. HENT: Negative for congestion. Had recent dental exam, requires deep cleaning. Eyes: Negative for visual disturbance. Respiratory: Negative for chest tightness and shortness of breath. Cardiovascular: Negative for chest pain, palpitations and leg swelling. Hypertension controlled on present medication  Hyperlipidemia managed on present medication     Gastrointestinal: Negative for abdominal pain, constipation and diarrhea. Endocrine: Negative for polyuria. Diabetes managed on insulin. Genitourinary: Negative for dysuria. ESRD, performs peritoneal dialysis nightly. Continues to have urinary output. Being evaluated for Kidney transplant. Musculoskeletal: Positive for back pain (from fall). Negative for arthralgias and myalgias. Skin: Negative for rash.    Neurological: Negative for dizziness, seizures, weakness, light-headedness and headaches. History of Lacunar stroke in 2015. Was placed on Plavix at that time. No new CVA or weakness, Most recent CT WNL. Psychiatric/Behavioral: Negative for agitation, decreased concentration and sleep disturbance. The patient is not nervous/anxious. Objective   Physical Exam  Vitals reviewed. Constitutional:       Appearance: Normal appearance. He is well-developed and well-groomed. HENT:      Head: Normocephalic. Right Ear: Hearing normal.      Left Ear: Hearing normal.   Eyes:      General: Lids are normal. Vision grossly intact. Neck:      Trachea: Trachea and phonation normal.   Cardiovascular:      Rate and Rhythm: Normal rate and regular rhythm. Heart sounds: Normal heart sounds, S1 normal and S2 normal.   Pulmonary:      Effort: Pulmonary effort is normal.      Breath sounds: Normal breath sounds. Abdominal:      General: Bowel sounds are normal.      Palpations: Abdomen is soft. Tenderness: There is no abdominal tenderness. Musculoskeletal:      Cervical back: Full passive range of motion without pain and normal range of motion. Neurological:      General: No focal deficit present. Mental Status: He is alert and oriented to person, place, and time. GCS: GCS eye subscore is 4. GCS verbal subscore is 5. GCS motor subscore is 6. Psychiatric:         Attention and Perception: Attention normal.         Mood and Affect: Mood normal.         Speech: Speech normal.         Behavior: Behavior normal. Behavior is cooperative. Cognition and Memory: Cognition and memory normal.            On this date 2/10/2022 I have spent 25 minutes reviewing previous notes, test results and face to face with the patient discussing the diagnosis and importance of compliance with the treatment plan as well as documenting on the day of the visit.       An electronic signature was used to authenticate this note.     --Henri Munoz, ROQUE - CNP

## 2022-02-11 ENCOUNTER — OFFICE VISIT (OUTPATIENT)
Dept: CARDIOLOGY CLINIC | Age: 57
End: 2022-02-11
Payer: COMMERCIAL

## 2022-02-11 VITALS
OXYGEN SATURATION: 98 % | BODY MASS INDEX: 22.59 KG/M2 | HEIGHT: 74 IN | DIASTOLIC BLOOD PRESSURE: 86 MMHG | HEART RATE: 74 BPM | SYSTOLIC BLOOD PRESSURE: 134 MMHG | WEIGHT: 176 LBS

## 2022-02-11 DIAGNOSIS — I50.32 CHRONIC DIASTOLIC CONGESTIVE HEART FAILURE (HCC): ICD-10-CM

## 2022-02-11 DIAGNOSIS — E78.2 MIXED HYPERLIPIDEMIA: ICD-10-CM

## 2022-02-11 DIAGNOSIS — Z86.73 H/O: CVA (CEREBROVASCULAR ACCIDENT): ICD-10-CM

## 2022-02-11 DIAGNOSIS — Z01.810 PREOP CARDIOVASCULAR EXAM: Primary | ICD-10-CM

## 2022-02-11 DIAGNOSIS — Z99.2 CKD (CHRONIC KIDNEY DISEASE) REQUIRING CHRONIC DIALYSIS (HCC): ICD-10-CM

## 2022-02-11 DIAGNOSIS — N18.6 ESRD (END STAGE RENAL DISEASE) ON DIALYSIS (HCC): ICD-10-CM

## 2022-02-11 DIAGNOSIS — N18.6 CKD (CHRONIC KIDNEY DISEASE) REQUIRING CHRONIC DIALYSIS (HCC): ICD-10-CM

## 2022-02-11 DIAGNOSIS — N18.6 ESRD (END STAGE RENAL DISEASE) (HCC): ICD-10-CM

## 2022-02-11 DIAGNOSIS — I10 ESSENTIAL HYPERTENSION: ICD-10-CM

## 2022-02-11 DIAGNOSIS — I42.8 NONISCHEMIC CARDIOMYOPATHY (HCC): ICD-10-CM

## 2022-02-11 DIAGNOSIS — Z99.2 ESRD (END STAGE RENAL DISEASE) ON DIALYSIS (HCC): ICD-10-CM

## 2022-02-11 PROCEDURE — G8420 CALC BMI NORM PARAMETERS: HCPCS | Performed by: INTERNAL MEDICINE

## 2022-02-11 PROCEDURE — G8484 FLU IMMUNIZE NO ADMIN: HCPCS | Performed by: INTERNAL MEDICINE

## 2022-02-11 PROCEDURE — 4004F PT TOBACCO SCREEN RCVD TLK: CPT | Performed by: INTERNAL MEDICINE

## 2022-02-11 PROCEDURE — 3017F COLORECTAL CA SCREEN DOC REV: CPT | Performed by: INTERNAL MEDICINE

## 2022-02-11 PROCEDURE — 93000 ELECTROCARDIOGRAM COMPLETE: CPT | Performed by: INTERNAL MEDICINE

## 2022-02-11 PROCEDURE — 99214 OFFICE O/P EST MOD 30 MIN: CPT | Performed by: INTERNAL MEDICINE

## 2022-02-11 PROCEDURE — G8427 DOCREV CUR MEDS BY ELIG CLIN: HCPCS | Performed by: INTERNAL MEDICINE

## 2022-02-11 RX ORDER — CLONIDINE HYDROCHLORIDE 0.1 MG/1
0.1 TABLET ORAL 3 TIMES DAILY
Qty: 90 TABLET | Refills: 5 | Status: SHIPPED | OUTPATIENT
Start: 2022-02-11 | End: 2022-10-11

## 2022-02-11 RX ORDER — NIFEDIPINE 90 MG/1
90 TABLET, EXTENDED RELEASE ORAL 2 TIMES DAILY
Qty: 180 TABLET | Refills: 3 | Status: SHIPPED | OUTPATIENT
Start: 2022-02-11

## 2022-02-11 NOTE — PATIENT INSTRUCTIONS
good for your heart. · Choose low-fat or fat-free milk and dairy products. Eat foods high in fiber  · Eat a variety of grain products every day. Include whole-grain foods that have lots of fiber and nutrients. Examples of whole-grain foods include oats, whole wheat bread, and brown rice. · Buy whole-grain breads and cereals, instead of white bread or pastries. Limit salt and sodium  · Limit how much salt and sodium you eat to help lower your blood pressure. · Taste food before you salt it. Add only a little salt when you think you need it. With time, your taste buds will adjust to less salt. · Eat fewer snack items, fast foods, and other high-salt, processed foods. Check food labels for the amount of sodium in packaged foods. · Choose low-sodium versions of canned goods (such as soups, vegetables, and beans). Limit sugar  · Limit drinks and foods with added sugar. These include candy, desserts, and soda pop. Limit alcohol  · Limit alcohol to no more than 2 drinks a day for men and 1 drink a day for women. Too much alcohol can cause health problems. When should you call for help? Watch closely for changes in your health, and be sure to contact your doctor if:    · You would like help planning heart-healthy meals. Where can you learn more? Go to [email or web address] and sign in to your [employer] account. Enter V137 in the [facility] box to learn more about \"Heart-Healthy Diet: Care Instructions. \"     If you do not have an account, please click on the \"Sign Up Now\" link. Current as of: September 8, 2021               Content Version: 13.1  © 1911-3183 Healthwise, Incorporated. Care instructions adapted under license by [facility] ([facility]). If you have questions about a medical condition or this instruction, always ask your healthcare professional. [address] any warranty or liability for your use of this information.

## 2022-02-11 NOTE — PROGRESS NOTES
[address] - Cardiology      CC: \"I need a kidney transplant\"     History of present illness: Karen Longoria is a 65 y.o. 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 Today, he presents for preop risk assessment prior to having a kidney transplant. He has been in the process at [facility]. He is on peritoneal dialysis at home 5 days weekly. Reports that Dr. Zeke Diaz is wanting to stop Clonidine. Says he has cut back on alcohol intake. In general, he has been well. Taking all medication as prescribed. Patient denies exertional chest pain/pressure, dyspnea at rest, BROWN, PND, orthopnea, palpitations, lightheadedness, weight changes, changes in LE edema, and syncope. He reports medication compliance and is tolerating.      Past Medical History:   Diagnosis Date    Cardiomyopathy [facility])     [facility]est[facility]     CVA (cerebral infarction) 5/2015    Diabetes mellitus ([facility]     Foot ulcer, left ([facility] 1/14/2016    Gastroparesis     Hemodialysis patient ([facility]     Hypercholesteremia     Hypertension     Kidney disease     Unspecified cerebral artery occlusion with cerebral infarction     5/15, 7/15 LEFT SIDE WEAKNESS     Past Surgical History:   Procedure Laterality Date    COLONOSCOPY N/A 5/17/2021    COLONOSCOPY POLYPECTOMY SNARE/COLD BIOPSY performed by Tha Flores MD at [address] [place] [address] N/A 10/29/2020    PLACEMENT OF A TUNNELED DIALYSIS CATHETER WITH FLEURO AND ULTRASOUND performed by Asim Mcclellan MD at [facility] N/A 10/29/2020    LAPAROSCOPIC PERITONEAL DIALYSIS CATHETER PLACEMENT WITH FLEURO AND ULTRASOUND performed by Asim Mcclellan MD at [address] [place] [address] N/A 45/91/7302    UMBILICAL HERNIA REPAIR performed by Cecille Cordoba Kimmy Tarango MD at [facility] N/A 4/26/2021    ESOPHAGOGASTRODUODENOSCOPY performed by Lopez Guidry MD at [address] History:  Social History     Tobacco Use    Smoking status: Current Some Day Smoker     Packs/day: 0.00     Years: 30.00     Pack years: 0.00     Types: Cigars     Start date: 1/3/1979     Last attempt to quit: 1/16/2019     Years since quitting: 3.0    Smokeless tobacco: Never Used    Tobacco comment: 3 black and milds a week   Substance Use Topics    Alcohol use: Yes     Comment: occasional       Review of Systems:   Constitutional: No night sweats or fever. HEENT: No new vision difficulties or ringing in the ears. Respiratory: + BROWN, resolves with rest. No new SOB, PND, orthopnea or cough. Cardiovascular: See HPI. GI: No n/v, diarrhea, constipation, abdominal pain or changes in bowel habits. No melena, no hematochezia  : No urinary frequency, urgency, incontinence, hematuria or dysuria. Skin: No cyanosis or skin lesions. Musculoskeletal: No new muscle or joint pain. Mild ankle edema BLE. Neurological: No syncope or TIA-like symptoms. Psychiatric: No insomnia or depression    Physical Exam:  /86   Pulse 74   Ht 6' 2\" (1.88 m)   Wt 176 lb (79.8 kg)   SpO2 98%   BMI 22.60 kg/m²     General:  Awake, alert, oriented in NAD  Skin:  Warm and dry. No excessive bruising. No rash  Neck:  Supple. No JVD or carotid bruit appreciated  Chest:  Normal effort. Clear to auscultation, no wheezes/rhonchi/rales  Cardiovascular:  RRR, normal S1/S2.   No murmur/gallop/rub; +systolic murmur  Abdomen:  Soft, nontender, +bowel sounds  Extremities:  No edema  Neurological:  Left sided weakness  Psychological: Normal mood and affect      Current Outpatient Medications   Medication Sig Dispense Refill    CALCITRIOL PO Take 1 tablet by mouth      sodium bicarbonate 325 MG tablet Take 325 mg by mouth daily      hydrALAZINE (APRESOLINE) 100 MG tablet TAKE 2 TABLETS BY MOUTH THREE TIMES DAILY 270 tablet 1    spironolactone (ALDACTONE) 50 MG tablet Take 1 tablet by mouth once daily (Patient taking differently: 100 mg daily ) 90 tablet 1    acetaminophen-codeine (TYLENOL #3) 300-30 MG per tablet       cloNIDine (CATAPRES) 0.1 MG tablet Take 1 tablet by mouth 3 times daily 90 tablet 3    atorvastatin (LIPITOR) 40 MG tablet TAKE 1 TABLET BY MOUTH ONCE DAILY NIGHTLY 90 tablet 3    isosorbide mononitrate (IMDUR) 60 MG extended release tablet Take 1 tablet by mouth once daily 90 tablet 3    EQ ASPIRIN ADULT LOW DOSE 81 MG EC tablet Take 1 tablet by mouth once daily 90 tablet 3    insulin detemir (LEVEMIR FLEXTOUCH) 100 UNIT/ML injection pen Inject 22 Units into the skin nightly 5 pen 3    NIFEdipine (PROCARDIA XL) 90 MG extended release tablet Take 1 tablet by mouth daily (Patient taking differently: Take 90 mg by mouth 2 times daily ) 90 tablet 1    metoprolol tartrate (LOPRESSOR) 50 MG tablet Take twice daily (Patient taking differently: 50 mg 2 times daily Take twice daily) 180 tablet 1    insulin lispro (HUMALOG) 100 UNIT/ML injection vial Inject into the skin 3 times daily (before meals) SLIDING SCALE      gabapentin (NEURONTIN) 300 MG capsule TAKE 1 CAPSULE BY MOUTH THREE TIMES DAILY 270 capsule 3     No current facility-administered medications for this visit. Labs:   All reviewed   Lab Results   Component Value Date    HGB 11.1 02/04/2021    HCT 34.4 02/04/2021     Lab Results   Component Value Date     02/04/2021     Lab Results   Component Value Date    K 5.1 05/17/2021    K 5.2 02/04/2021    K 4.9 10/26/2020     Lab Results   Component Value Date    BUN 44 02/04/2021    CREATININE 4.7 02/04/2021    No components found for: HGBA1C  Lab Results   Component Value Date    TSH 3.04 10/23/2019      Lab Results   Component Value Date    TRIG 86 06/05/2019    HDL 31 10/23/2019    LDLCALC 51 10/23/2019    LDLDIRECT 103 01/12/2017 LABVLDL 24 10/23/2019       Imaging: all reviewed     MPI 7/21/2015:  Dilated LV with reduced LV systolic function. There is normal isotope uptake at stress and rest. There is no evidence of   myocardial ischemia or scar. Non-diagnostic EKG response due to failure to reach target heart rate . ECHO 7/20/2015:  Enlarged left ventricle. Concentric left ventricle hypertrophy. Severely  decreased left ventricular systolic function with an estimated ejection  fraction of 25 to 30% . Global hypokinesis, no regional wall motion  abnormalities. Trivial MR. Bubble study negative for PFO/ASD    CAROTID DOPPLER 7/21/2015:  Duplex sonography with color flow enhancement was performed bilaterally on   the cervical carotid system. No evidence of hemodynamically significant   stenosis in the bilateral carotid and vertebral arteries. ECHO:2/22/2016  Summary  Left ventricle size is normal.  Mild to moderate concentric left ventricular hypertrophy is present. Ejection fraction is visually estimated to be 50-55 %. Normal right ventricular size.     ECHO:9/10/18  Summary  Normal left ventricular chamber size. Moderate left ventricular hypertrophy. Slight global decrease in wall motion. Ejection fraction is visually estimated to be 50%. The right ventricle is normal in size and function. ECHO 6/8/2020  Overall left ventricular systolic function appears moderately reduced. Ejection fraction is visually estimated to be 35-40% with diffuse  hypokinesis. There is moderately increased left ventricular wall thickness. Diastolic filling parameters suggest grade II diastolic dysfunction. Normal right ventricular size and function. The left atrium is mildly dilated. Mild mitral and tricuspid regurgitation. Trivial aortic regurgitation. Moderate sized pericardial effusion. Echo 10/26/20   Left ventricular cavity size is dilated. There is moderate concentric left ventricular hypertrophy.    Ejection fraction is visually estimated to be 45-50%. There is moderate diffuse hypokinesis. Diastolic filling parameters suggest grade II diastolic dysfunction. The left atrium is severely dilated. The right ventricle is normal   Right ventricular systolic function is normal.   mild-Moderate circumferential pericardial effusion without tamponade   physiology. Angiography:9/10/18  ANGIOGRAPHY FINDINGS:  1. Left renal artery is normal without any significant stenosis. 2.  Right renal artery is normal without any significant stenosis. SUMMARY:  Normal renal artery without any significant stenosis. Stress perfusion 9/14/20      1. Technically a satisfactory study.    2. Normal pharmacological stress portion of the study.    3. No evidence of Ischemia by Myocardial Perfusion Imaging.    4. Gated Study shows Dilated LV: EF is 51 %. [facility] 2/2021  1. Left main coronary artery is normal and patent. 2.  Left anterior descending artery is patent and normal.  3.  Left circumflex is patent and normal.  4.  Right coronary artery is normal and patent. 5.  LV ejection fraction 60%.     SUMMARY:  Normal coronary arteries. Carotid US 5/2021  1. The bilateral internal carotid arteries reveal no evidence of visible    plaque or measurable stenosis. 2. The bilateral common and external carotid arteries reveal no    significant stenosis. 3. The bilateral vertebral arteries are patent with antegrade flow. 4.  The bilateral subclavian arteries reveal no evidence of significant    stenosis. Echo 2/2022   Left ventricular cavity size is normal.   There is severe concentric left ventricular hypertrophy. Ejection fraction is visually estimated to be 40%. Grade II diastolic dysfunction with elevated LV filling pressures. The right ventricle is normal in size and function. There is a moderate circumferential pericardial effusion noted. Nuc GXT 2/2022  Normal myocardial perfusion study.    Normal myocardial perfusion.  mildly reduced LV systolic function       Assessment & Plan:    Preop risk assessment  In need of a kidney transplant. Coronary angiogram 2/2021 showed normal coronaries. Recent stress test 2/1/22 showed no ischemia. Will refer him to  for evaluation by Dr. Liberty Horner. Nonischemic Cardiomyopathy. LVEF 35-40%. Most likely due to uncontrolled hypertension. Cath 2/2021 showed normal coronaries. Stress test 2/2022 showed no ischemia. Echo 2/2022 showed LVEF 40%. Appears compensated on exam. Continue Losartan, b-blocker and lasix. Hypertension, essential   Controlled. Continue current medical management. Multiple CVA. Believed most likely related to HTN and small vessel disease. Continue risk factor modifciations including statin and Plavix (per neurologist recommendations). BP control     Hyperlipidemia  Continue high intensity statin therapy. Nicotine addiction   We again discussed smoking cessation and recommend maintaining a smoke-free lifestyle. Diabetes Mellitus II  Managed by PCP. CKD on dialysis   Now on kidney transplant list.     Follow up in 6 months. Thank you very much for allowing me to participate in the care of your patient. Madyson Rider MD, MPH    This note was scribed in the presence of Dr. Madyson Rider MD by Dago Bernal  Physician Attestation:  The scribes documentation has been prepared under my direction and personally reviewed by me in its entirety. I confirm that the note above accurately reflects all work, treatment, procedures, and medical decision making performed by me.

## 2022-02-15 PROBLEM — Z99.2 ACUTE RENAL FAILURE SUPERIMPOSED ON CHRONIC KIDNEY DISEASE, ON CHRONIC DIALYSIS (HCC): Status: RESOLVED | Noted: 2020-10-25 | Resolved: 2022-02-15

## 2022-02-15 PROBLEM — N17.9 ACUTE RENAL FAILURE SUPERIMPOSED ON CHRONIC KIDNEY DISEASE, ON CHRONIC DIALYSIS (HCC): Status: RESOLVED | Noted: 2020-10-25 | Resolved: 2022-02-15

## 2022-02-15 PROBLEM — N18.9 ACUTE RENAL FAILURE SUPERIMPOSED ON CHRONIC KIDNEY DISEASE, ON CHRONIC DIALYSIS (HCC): Status: RESOLVED | Noted: 2020-10-25 | Resolved: 2022-02-15

## 2022-02-15 ASSESSMENT — ENCOUNTER SYMPTOMS
DIARRHEA: 0
ABDOMINAL PAIN: 0
CONSTIPATION: 0
SHORTNESS OF BREATH: 0
CHEST TIGHTNESS: 0
BACK PAIN: 1

## 2022-02-15 ASSESSMENT — VISUAL ACUITY: OU: 1

## 2022-02-16 ENCOUNTER — TELEPHONE (OUTPATIENT)
Dept: CARDIOLOGY CLINIC | Age: 57
End: 2022-02-16

## 2022-02-16 DIAGNOSIS — I42.0 DILATED CARDIOMYOPATHY (HCC): ICD-10-CM

## 2022-02-16 DIAGNOSIS — N18.6 ESRD (END STAGE RENAL DISEASE) (HCC): Primary | ICD-10-CM

## 2022-02-16 NOTE — TELEPHONE ENCOUNTER
Guerline Jacob from [facility] transplant called in this morning and states that Val Venegas came in to the office on 2/11 for his clearance for his kidney transplant. However, they would like a letter sent over stating that he is cleared per Dr. Sallie Rubin.     [address] [facility]    Fax: [phone]

## 2022-02-16 NOTE — TELEPHONE ENCOUNTER
Renny Newell called again and states we did not send everything we needed to send. Renny Newell states there was a referral sent to our office and at the bottom there is a list of what we need to send. I could not find the referral he was talking about so I asked him to send it over again. Letter is typed and now just waiting for referral to be sent to us so I can add everything else we need.

## 2022-02-16 NOTE — TELEPHONE ENCOUNTER
Spoke to patient and let him know that when he was seen in the office that Dr Hollis Phoenix wanted him to be seen at [facility]. He says that he is agreeable but when he talked to Balaji Dougherty at [facility] he was told that it is the same list. I let him know that I will discuss with Dr Hollis Phoenix and call him back.

## 2022-02-17 NOTE — TELEPHONE ENCOUNTER
Froylan Hernadez, RN Transplant Coordinator at [facility] called in this afternoon wanting to talk to Dr. Sven Smith. Froylan Hernadez said that after presenting all of Abel's information to the team, the team asked about Phoenix having another right heart cath due to his ECHO results that were sent over.      You can reach Froylan Hernadez until 3:30 today at #[phone]  He will be back in the office tomorrow morning at 7:00 am

## 2022-02-17 NOTE — TELEPHONE ENCOUNTER
Spoke to [employer] and made aware that Dr. June Bowling is OOT until 2/22/22. Dr. June Bowling please call Valerio Moses upon your return. Thank you.

## 2022-02-21 NOTE — TELEPHONE ENCOUNTER
Left message for Kale Quiroz regarding if RHC is needed prior to being placed on transplant list. If so will facilitate scheduling.

## 2022-02-22 DIAGNOSIS — N18.6 ESRD (END STAGE RENAL DISEASE) (HCC): ICD-10-CM

## 2022-02-22 DIAGNOSIS — I42.0 DILATED CARDIOMYOPATHY (HCC): ICD-10-CM

## 2022-02-22 LAB
ANION GAP SERPL CALCULATED.3IONS-SCNC: 14 MMOL/L (ref 3–16)
BUN BLDV-MCNC: 40 MG/DL (ref 7–20)
CALCIUM SERPL-MCNC: 8.1 MG/DL (ref 8.3–10.6)
CHLORIDE BLD-SCNC: 104 MMOL/L (ref 99–110)
CO2: 21 MMOL/L (ref 21–32)
CREAT SERPL-MCNC: 5.3 MG/DL (ref 0.9–1.3)
GFR AFRICAN AMERICAN: 14
GFR NON-AFRICAN AMERICAN: 11
GLUCOSE BLD-MCNC: 185 MG/DL (ref 70–99)
HCT VFR BLD CALC: 34.2 % (ref 40.5–52.5)
HEMOGLOBIN: 11.1 G/DL (ref 13.5–17.5)
MCH RBC QN AUTO: 30.5 PG (ref 26–34)
MCHC RBC AUTO-ENTMCNC: 32.5 G/DL (ref 31–36)
MCV RBC AUTO: 94 FL (ref 80–100)
PDW BLD-RTO: 14.2 % (ref 12.4–15.4)
PLATELET # BLD: 144 K/UL (ref 135–450)
PMV BLD AUTO: 10.3 FL (ref 5–10.5)
POTASSIUM SERPL-SCNC: 4.3 MMOL/L (ref 3.5–5.1)
RBC # BLD: 3.63 M/UL (ref 4.2–5.9)
SODIUM BLD-SCNC: 139 MMOL/L (ref 136–145)
WBC # BLD: 7.3 K/UL (ref 4–11)

## 2022-02-28 ENCOUNTER — HOSPITAL ENCOUNTER (OUTPATIENT)
Dept: CARDIAC CATH/INVASIVE PROCEDURES | Age: 57
Discharge: HOME | End: 2022-02-28
Payer: COMMERCIAL

## 2022-02-28 VITALS
SYSTOLIC BLOOD PRESSURE: 138 MMHG | WEIGHT: 180 LBS | RESPIRATION RATE: 21 BRPM | HEIGHT: 74 IN | DIASTOLIC BLOOD PRESSURE: 82 MMHG | BODY MASS INDEX: 23.1 KG/M2 | TEMPERATURE: 98.8 F | HEART RATE: 74 BPM | OXYGEN SATURATION: 100 %

## 2022-02-28 PROCEDURE — 99153 MOD SED SAME PHYS/QHP EA: CPT

## 2022-02-28 PROCEDURE — 6360000002 HC RX W HCPCS

## 2022-02-28 PROCEDURE — 2500000003 HC RX 250 WO HCPCS

## 2022-02-28 PROCEDURE — 93451 RIGHT HEART CATH: CPT | Performed by: INTERNAL MEDICINE

## 2022-02-28 PROCEDURE — C1751 CATH, INF, PER/CENT/MIDLINE: HCPCS

## 2022-02-28 PROCEDURE — 93451 RIGHT HEART CATH: CPT

## 2022-02-28 PROCEDURE — 99152 MOD SED SAME PHYS/QHP 5/>YRS: CPT | Performed by: INTERNAL MEDICINE

## 2022-02-28 PROCEDURE — 99152 MOD SED SAME PHYS/QHP 5/>YRS: CPT

## 2022-02-28 PROCEDURE — C1894 INTRO/SHEATH, NON-LASER: HCPCS

## 2022-02-28 RX ORDER — SODIUM CHLORIDE 0.9 % (FLUSH) 0.9 %
5-40 SYRINGE (ML) INJECTION PRN
Status: DISCONTINUED | OUTPATIENT
Start: 2022-02-28 | End: 2022-03-01 | Stop reason: HOSPADM

## 2022-02-28 RX ORDER — ACETAMINOPHEN 325 MG/1
650 TABLET ORAL EVERY 4 HOURS PRN
Status: DISCONTINUED | OUTPATIENT
Start: 2022-02-28 | End: 2022-03-01 | Stop reason: HOSPADM

## 2022-02-28 RX ORDER — SODIUM CHLORIDE 9 MG/ML
25 INJECTION, SOLUTION INTRAVENOUS PRN
Status: DISCONTINUED | OUTPATIENT
Start: 2022-02-28 | End: 2022-03-01 | Stop reason: HOSPADM

## 2022-02-28 RX ORDER — SODIUM CHLORIDE 9 MG/ML
INJECTION, SOLUTION INTRAVENOUS CONTINUOUS
Status: DISCONTINUED | OUTPATIENT
Start: 2022-02-28 | End: 2022-03-01 | Stop reason: HOSPADM

## 2022-02-28 RX ORDER — SODIUM CHLORIDE 0.9 % (FLUSH) 0.9 %
5-40 SYRINGE (ML) INJECTION EVERY 12 HOURS SCHEDULED
Status: DISCONTINUED | OUTPATIENT
Start: 2022-02-28 | End: 2022-03-01 | Stop reason: HOSPADM

## 2022-02-28 RX ORDER — ONDANSETRON 2 MG/ML
4 INJECTION INTRAMUSCULAR; INTRAVENOUS EVERY 6 HOURS PRN
Status: DISCONTINUED | OUTPATIENT
Start: 2022-02-28 | End: 2022-03-01 | Stop reason: HOSPADM

## 2022-02-28 NOTE — PROCEDURES
[address]   Procedure Note    CLINICAL HISTORY:       Farzana Batista is a 65 y.o. male with a history of ESRD    Patient Active Problem List   Diagnosis    Left-sided weakness    Essential hypertension    Type 2 diabetes mellitus with stage 3 chronic kidney disease, with long-term current use of insulin (HCC)    Nonischemic cardiomyopathy (HCC)    Cerebral infarction (HCC)    Cigarette nicotine dependence without complication    Erectile dysfunction due to arterial insufficiency    Renal artery stenosis (HCC)    Chronic diastolic congestive heart failure (HCC)    H/O: CVA (cerebrovascular accident)    Mixed hyperlipidemia    Major depressive disorder, recurrent episode, moderate (HCC)    Acute on chronic renal failure (HCC)    Pericardial effusion    Hypertension associated with diabetes ([facility]    DM type 2 with diabetic mixed hyperlipidemia ([facility]    Diabetes mellitus due to underlying condition with stage 4 chronic kidney disease, with long-term current use of insulin ([facility])    Preop cardiovascular exam    CKD (chronic kidney disease) requiring chronic dialysis ([facility]       Prior to Admission medications    Medication Sig Start Date End Date Taking?  Authorizing Provider   cloNIDine (CATAPRES) 0.1 MG tablet Take 1 tablet by mouth 3 times daily 2/11/22  Yes Digna Barron MD   NIFEdipine (PROCARDIA XL) 90 MG extended release tablet Take 1 tablet by mouth 2 times daily 2/11/22  Yes Digna Barron MD   CALCITRIOL PO Take 1 tablet by mouth   Yes Historical Provider, MD   sodium bicarbonate 325 MG tablet Take 325 mg by mouth daily   Yes Historical Provider, MD   hydrALAZINE (APRESOLINE) 100 MG tablet TAKE 2 TABLETS BY MOUTH THREE TIMES DAILY 1/20/22  Yes ROQUE Little CNP   spironolactone (ALDACTONE) 50 MG tablet Take 1 tablet by mouth once daily  Patient taking differently: 100 mg daily  12/20/21  Yes ROQUE Little CNP   acetaminophen-codeine (TYLENOL #3) 300-30 MG per tablet  11/4/21  Yes Historical Provider, MD   atorvastatin (LIPITOR) 40 MG tablet TAKE 1 TABLET BY MOUTH ONCE DAILY NIGHTLY 10/20/21  Yes ROQUE Mcguire CNP   isosorbide mononitrate (IMDUR) 60 MG extended release tablet Take 1 tablet by mouth once daily 10/20/21  Yes ROQUE Mcguire CNP   EQ ASPIRIN ADULT LOW DOSE 81 MG EC tablet Take 1 tablet by mouth once daily 9/24/21  Yes ROQUE Mcguire CNP   metoprolol tartrate (LOPRESSOR) 50 MG tablet Take twice daily  Patient taking differently: 50 mg 2 times daily Take twice daily 8/2/21  Yes ROQUE Mcguire CNP   gabapentin (NEURONTIN) 300 MG capsule TAKE 1 CAPSULE BY MOUTH THREE TIMES DAILY 8/20/21 10/20/21  ROQUE Mcguire CNP   insulin detemir (LEVEMIR FLEXTOUCH) 100 UNIT/ML injection pen Inject 22 Units into the skin nightly 8/10/21   ROQUE Mcguire CNP   insulin lispro (HUMALOG) 100 UNIT/ML injection vial Inject into the skin 3 times daily (before meals) SLIDING SCALE    Historical Provider, MD          The risks, benefits, and details of the procedure were explained to the patient. The patient verbalized understanding and wanted to proceed. Informed written consent was obtained. ASA 2  Mallampati 2    PROCEDURES PERFORMED:     RHC    PROCEDURE TECHNIQUE:  The patient was approached from the right basilic vein. Balloon tipped Lavone Jarreau catheter advanced into RA and RV and PA.       COMPLICATIONS:  None. EBL: 10 cc    Moderate Sedation:  Start time: 14:11  Stop time: 14:47  3 mg versed   150 mcg fentanyl   An independent trained observer pushed medications at my direction. We monitored the patient's level of consciousness and vital signs/physiologic status throughout the procedure duration (see start and start times above). HEMODYNAMICS:      RA 9 mmHg  RV 46/4  PA 48/19 mmHg  PCWP 18 mm Hg   PA % 60%  AO % 97  CO/CI 5.55 L/min 2.67 L/min/m2   dynes .  Sec/cm-5        SUMMARY:     Mild elevation of left sided pressure      RECOMMENDATION:  .    Continue work up for renal transplant     Marta Kohli MD [address], Interventional Cardiology, and Peripheral Vascular [address]   (O): [phone]

## 2022-02-28 NOTE — H&P
CC: \"I need a kidney transplant\"      History of present illness: Avelina Nath is a 65 y.o. 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      Today, he presents for preop risk assessment prior to having a kidney transplant. He has been in the process at [facility]. He is on peritoneal dialysis at home 5 days weekly. Reports that Dr. Gianfranco Rico is wanting to stop Clonidine. Says he has cut back on alcohol intake. In general, he has been well. Taking all medication as prescribed.      Patient denies exertional chest pain/pressure, dyspnea at rest, BROWN, PND, orthopnea, palpitations, lightheadedness, weight changes, changes in LE edema, and syncope.  He reports medication compliance and is tolerating.      Past Medical History        Past Medical History:   Diagnosis Date    Cardiomyopathy [facility])      CHF (congestive heart failure) (HCC)      CVA (cerebral infarction) 5/2015    Diabetes mellitus ([facility]      Foot ulcer, left ([facility] 1/14/2016    Gastroparesis      Hemodialysis patient ([facility]      Hypercholesteremia      Hypertension      Kidney disease      Unspecified cerebral artery occlusion with cerebral infarction       5/15, 7/15 LEFT SIDE WEAKNESS         Past Surgical History         Past Surgical History:   Procedure Laterality Date    COLONOSCOPY N/A 5/17/2021     COLONOSCOPY POLYPECTOMY SNARE/COLD BIOPSY performed by Michelle Montgomery MD at [address] [facility] N/A 10/29/2020     PLACEMENT OF A TUNNELED DIALYSIS CATHETER WITH FLEURO AND ULTRASOUND performed by Estella Mohan MD at [facility] N/A 10/29/2020     LAPAROSCOPIC PERITONEAL DIALYSIS CATHETER PLACEMENT WITH FLEURO AND ULTRASOUND performed by Estella Mohan MD at [facility] N/A 12/71/0420     UMBILICAL HERNIA REPAIR performed by Tyree Lentz MD at [facility] N/A 4/26/2021     ESOPHAGOGASTRODUODENOSCOPY performed by Carolina Rowland MD at [address] History:  Social History            Tobacco Use    Smoking status: Current Some Day Smoker       Packs/day: 0.00       Years: 30.00       Pack years: 0.00       Types: Cigars       Start date: 1/3/1979       Last attempt to quit: 1/16/2019       Years since quitting: 3.0    Smokeless tobacco: Never Used    Tobacco comment: 3 black and milds a week   Substance Use Topics    Alcohol use: Yes       Comment: occasional         Review of Systems:   Constitutional: No night sweats or fever. HEENT: No new vision difficulties or ringing in the ears. Respiratory: + BROWN, resolves with rest. No new SOB, PND, orthopnea or cough. Cardiovascular: See HPI. GI: No n/v, diarrhea, constipation, abdominal pain or changes in bowel habits. No melena, no hematochezia  : No urinary frequency, urgency, incontinence, hematuria or dysuria. Skin: No cyanosis or skin lesions. Musculoskeletal: No new muscle or joint pain. Mild ankle edema BLE. Neurological: No syncope or TIA-like symptoms. Psychiatric: No insomnia or depression     Physical Exam:  /86   Pulse 74   Ht 6' 2\" (1.88 m)   Wt 176 lb (79.8 kg)   SpO2 98%   BMI 22.60 kg/m²      General:  Awake, alert, oriented in NAD  Skin:  Warm and dry. No excessive bruising. No rash  Neck:  Supple. No JVD or carotid bruit appreciated  Chest:  Normal effort. Clear to auscultation, no wheezes/rhonchi/rales  Cardiovascular:  RRR, normal S1/S2.   No murmur/gallop/rub; +systolic murmur  Abdomen:  Soft, nontender, +bowel sounds  Extremities:  No edema  Neurological:  Left sided weakness  Psychological: Normal mood and affect        Current Facility-Administered Medications          Current Outpatient Medications   Medication Sig Dispense Refill    CALCITRIOL PO Take 1 tablet by mouth     10/23/2019            Lab Results   Component Value Date     TRIG 86 06/05/2019     HDL 31 10/23/2019     LDLCALC 51 10/23/2019     LDLDIRECT 103 01/12/2017     LABVLDL 24 10/23/2019         Imaging: all reviewed      MPI 7/21/2015:  Dilated LV with reduced LV systolic function. There is normal isotope uptake at stress and rest. There is no evidence of   myocardial ischemia or scar. Non-diagnostic EKG response due to failure to reach target heart rate .      ECHO 7/20/2015:  Enlarged left ventricle. Concentric left ventricle hypertrophy. Severely  decreased left ventricular systolic function with an estimated ejection  fraction of 25 to 30% . Global hypokinesis, no regional wall motion  abnormalities. Trivial MR. Bubble study negative for PFO/ASD     CAROTID DOPPLER 7/21/2015:  Duplex sonography with color flow enhancement was performed bilaterally on   the cervical carotid system. No evidence of hemodynamically significant   stenosis in the bilateral carotid and vertebral arteries.      ECHO:2/22/2016  Summary  Left ventricle size is normal.  Mild to moderate concentric left ventricular hypertrophy is present. Ejection fraction is visually estimated to be 50-55 %. Normal right ventricular size.     ECHO:9/10/18  Summary  Normal left ventricular chamber size. Moderate left ventricular hypertrophy. Slight global decrease in wall motion. Ejection fraction is visually estimated to be 50%. The right ventricle is normal in size and function.     ECHO 6/8/2020  Overall left ventricular systolic function appears moderately reduced. Ejection fraction is visually estimated to be 35-40% with diffuse  hypokinesis. There is moderately increased left ventricular wall thickness. Diastolic filling parameters suggest grade II diastolic dysfunction. Normal right ventricular size and function. The left atrium is mildly dilated. Mild mitral and tricuspid regurgitation. Trivial aortic regurgitation.   Moderate sized pericardial effusion.     Echo 10/26/20   Left ventricular cavity size is dilated.   There is moderate concentric left ventricular hypertrophy.   Ejection fraction is visually estimated to be 45-50%.   There is moderate diffuse hypokinesis.   Diastolic filling parameters suggest grade II diastolic dysfunction.   The left atrium is severely dilated.   The right ventricle is normal   Right ventricular systolic function is normal.   mild-Moderate circumferential pericardial effusion without tamponade   physiology.     Angiography:9/10/18  ANGIOGRAPHY FINDINGS:  1.  Left renal artery is normal without any significant stenosis. 2.  Right renal artery is normal without any significant stenosis. SUMMARY:  Normal renal artery without any significant stenosis.     Stress perfusion 9/14/20       1. Technically a satisfactory study.    2. Normal pharmacological stress portion of the study.    3. No evidence of Ischemia by Myocardial Perfusion Imaging.    4. Gated Study shows Dilated LV: EF is 51 %.      [facility] 2/2021  1.  Left main coronary artery is normal and patent. 2.  Left anterior descending artery is patent and normal.  3.  Left circumflex is patent and normal.  4.  Right coronary artery is normal and patent. 5.  LV ejection fraction 60%.     SUMMARY:  Normal coronary arteries.     Carotid US 5/2021  1. The bilateral internal carotid arteries reveal no evidence of visible    plaque or measurable stenosis. 2. The bilateral common and external carotid arteries reveal no    significant stenosis. 3. The bilateral vertebral arteries are patent with antegrade flow. 4.  The bilateral subclavian arteries reveal no evidence of significant    stenosis.       Echo 2/2022   Left ventricular cavity size is normal.   There is severe concentric left ventricular hypertrophy.   Ejection fraction is visually estimated to be 40%.    Grade II diastolic dysfunction with elevated LV filling pressures.   The right ventricle is normal in size and function.  Raquel Klein is a moderate circumferential pericardial effusion noted.     Nuc GXT 2/2022  Normal myocardial perfusion study.    Normal myocardial perfusion.    mildly reduced LV systolic function         Assessment & Plan:     Preop risk assessment  In need of a kidney transplant. Coronary angiogram 2/2021 showed normal coronaries. Recent stress test 2/1/22 showed no ischemia. [address] today      Nonischemic Cardiomyopathy. LVEF 35-40%. Most likely due to uncontrolled hypertension. Cath 2/2021 showed normal coronaries. Stress test 2/2022 showed no ischemia. Echo 2/2022 showed LVEF 40%. Appears compensated on exam. Continue Losartan, b-blocker and lasix.      Hypertension, essential   Controlled. Continue current medical management.      Multiple CVA. Believed most likely related to HTN and small vessel disease. Continue risk factor modifciations including statin and Plavix (per neurologist recommendations). BP control      Hyperlipidemia  Continue high intensity statin therapy.      Nicotine addiction   We again discussed smoking cessation and recommend maintaining a smoke-free lifestyle.      Diabetes Mellitus II  Managed by PCP.       CKD on dialysis   Now on kidney transplant list.      Thank you very much for allowing me to participate in the care of your patient.  Christine Llamas MD, MPH

## 2022-03-12 PROBLEM — Z01.810 PREOP CARDIOVASCULAR EXAM: Status: RESOLVED | Noted: 2022-02-10 | Resolved: 2022-03-12

## 2022-04-05 DIAGNOSIS — I10 ESSENTIAL HYPERTENSION: Chronic | ICD-10-CM

## 2022-04-05 DIAGNOSIS — I50.32 CHRONIC DIASTOLIC CONGESTIVE HEART FAILURE (HCC): ICD-10-CM

## 2022-04-06 RX ORDER — HYDRALAZINE HYDROCHLORIDE 100 MG/1
TABLET, FILM COATED ORAL
Qty: 270 TABLET | Refills: 1 | Status: SHIPPED | OUTPATIENT
Start: 2022-04-06

## 2022-04-06 NOTE — TELEPHONE ENCOUNTER
Medication:   Requested Prescriptions     Pending Prescriptions Disp Refills    hydrALAZINE (APRESOLINE) 100 MG tablet [Pharmacy Med Name: hydrALAZINE HCl 100 MG Oral Tablet] 270 tablet 0     Sig: TAKE 2 TABLETS BY MOUTH THREE TIMES DAILY        Last Filled:      Patient Phone Number: [phone] (home)     Last appt: 2/10/2022   Next appt: 5/10/2022    Last OARRS:   RX Monitoring 2/9/2018   Attestation The Prescription Monitoring Report for this patient was reviewed today. Periodic Controlled Substance Monitoring No signs of potential drug abuse or diversion identified.

## 2022-04-11 ENCOUNTER — NURSE TRIAGE (OUTPATIENT)
Dept: OTHER | Facility: CLINIC | Age: 57
End: 2022-04-11

## 2022-04-11 RX ORDER — INSULIN DETEMIR 100 [IU]/ML
22 INJECTION, SOLUTION SUBCUTANEOUS NIGHTLY
Qty: 5 PEN | Refills: 3 | Status: SHIPPED | OUTPATIENT
Start: 2022-04-11 | End: 2022-08-19 | Stop reason: SDUPTHER

## 2022-04-11 NOTE — TELEPHONE ENCOUNTER
Medication:   Requested Prescriptions     Pending Prescriptions Disp Refills    insulin lispro (HUMALOG) 100 UNIT/ML injection vial 10 mL 1     Sig: Inject 5 Units into the skin 3 times daily (before meals) SLIDING SCALE        Last Filled:      Patient Phone Number: [phone] (home)     Last appt: 2/10/2022   Next appt: 4/11/2022    Last OARRS:   RX Monitoring 2/9/2018   Attestation The Prescription Monitoring Report for this patient was reviewed today. Periodic Controlled Substance Monitoring No signs of potential drug abuse or diversion identified.

## 2022-04-11 NOTE — TELEPHONE ENCOUNTER
Patient to see PCP today for insulin, evaluation and medication adjustment as necessary.  Thanks, Skip Greenwood
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
## 2022-04-11 NOTE — TELEPHONE ENCOUNTER
----- Message from Sabine Silva sent at 4/11/2022  7:39 AM EDT -----  Subject: Refill Request    QUESTIONS  Name of Medication? Other - humalog 10 to 15 ml   Patient-reported dosage and instructions? 10 to 15 ml   How many days do you have left? 0  Preferred Pharmacy? [facility] [address]  Pharmacy phone number (if available)? [phone]  Additional Information for Provider? pt misplaced the medication   ---------------------------------------------------------------------------  --------------  CALL BACK INFO  What is the best way for the office to contact you? OK to leave message on   voicemail  Preferred Call Back Phone Number? [phone]  ---------------------------------------------------------------------------  --------------  SCRIPT ANSWERS  Relationship to Patient?  Self

## 2022-05-10 ENCOUNTER — TELEPHONE (OUTPATIENT)
Dept: PRIMARY CARE CLINIC | Age: 57
End: 2022-05-10

## 2022-05-10 ENCOUNTER — OFFICE VISIT (OUTPATIENT)
Dept: PRIMARY CARE CLINIC | Age: 57
End: 2022-05-10
Payer: MEDICAID

## 2022-05-10 VITALS
RESPIRATION RATE: 15 BRPM | HEIGHT: 74 IN | BODY MASS INDEX: 22.07 KG/M2 | WEIGHT: 172 LBS | TEMPERATURE: 98.6 F | DIASTOLIC BLOOD PRESSURE: 87 MMHG | OXYGEN SATURATION: 98 % | SYSTOLIC BLOOD PRESSURE: 161 MMHG | HEART RATE: 78 BPM

## 2022-05-10 DIAGNOSIS — E08.22 DIABETES MELLITUS DUE TO UNDERLYING CONDITION WITH STAGE 4 CHRONIC KIDNEY DISEASE, WITH LONG-TERM CURRENT USE OF INSULIN (HCC): Primary | ICD-10-CM

## 2022-05-10 DIAGNOSIS — Z79.4 DIABETES MELLITUS DUE TO UNDERLYING CONDITION WITH STAGE 4 CHRONIC KIDNEY DISEASE, WITH LONG-TERM CURRENT USE OF INSULIN (HCC): Primary | ICD-10-CM

## 2022-05-10 DIAGNOSIS — Z99.2 CKD (CHRONIC KIDNEY DISEASE) REQUIRING CHRONIC DIALYSIS (HCC): ICD-10-CM

## 2022-05-10 DIAGNOSIS — I50.32 CHRONIC DIASTOLIC CONGESTIVE HEART FAILURE (HCC): ICD-10-CM

## 2022-05-10 DIAGNOSIS — N18.4 DIABETES MELLITUS DUE TO UNDERLYING CONDITION WITH STAGE 4 CHRONIC KIDNEY DISEASE, WITH LONG-TERM CURRENT USE OF INSULIN (HCC): Primary | ICD-10-CM

## 2022-05-10 DIAGNOSIS — N18.6 CKD (CHRONIC KIDNEY DISEASE) REQUIRING CHRONIC DIALYSIS (HCC): ICD-10-CM

## 2022-05-10 PROBLEM — N18.9 ACUTE ON CHRONIC RENAL FAILURE (HCC): Status: RESOLVED | Noted: 2020-10-25 | Resolved: 2022-05-10

## 2022-05-10 PROBLEM — Z86.73 H/O: CVA (CEREBROVASCULAR ACCIDENT): Status: RESOLVED | Noted: 2019-07-11 | Resolved: 2022-05-10

## 2022-05-10 PROBLEM — E78.2 MIXED HYPERLIPIDEMIA: Status: RESOLVED | Noted: 2019-07-11 | Resolved: 2022-05-10

## 2022-05-10 PROBLEM — N17.9 ACUTE ON CHRONIC RENAL FAILURE (HCC): Status: RESOLVED | Noted: 2020-10-25 | Resolved: 2022-05-10

## 2022-05-10 LAB — HBA1C MFR BLD: 5.9 %

## 2022-05-10 PROCEDURE — 99213 OFFICE O/P EST LOW 20 MIN: CPT | Performed by: NURSE PRACTITIONER

## 2022-05-10 PROCEDURE — G8427 DOCREV CUR MEDS BY ELIG CLIN: HCPCS | Performed by: NURSE PRACTITIONER

## 2022-05-10 PROCEDURE — 83036 HEMOGLOBIN GLYCOSYLATED A1C: CPT | Performed by: NURSE PRACTITIONER

## 2022-05-10 PROCEDURE — G8420 CALC BMI NORM PARAMETERS: HCPCS | Performed by: NURSE PRACTITIONER

## 2022-05-10 PROCEDURE — 3017F COLORECTAL CA SCREEN DOC REV: CPT | Performed by: NURSE PRACTITIONER

## 2022-05-10 PROCEDURE — 4004F PT TOBACCO SCREEN RCVD TLK: CPT | Performed by: NURSE PRACTITIONER

## 2022-05-10 RX ORDER — CYCLOBENZAPRINE HCL 10 MG
10 TABLET ORAL 3 TIMES DAILY PRN
Qty: 90 TABLET | Refills: 0 | Status: SHIPPED | OUTPATIENT
Start: 2022-05-10 | End: 2022-06-09

## 2022-05-10 RX ORDER — LOSARTAN POTASSIUM 100 MG/1
TABLET ORAL
COMMUNITY
Start: 2022-04-29

## 2022-05-10 RX ORDER — CALCITRIOL 0.5 UG/1
0.5 CAPSULE, LIQUID FILLED ORAL DAILY
COMMUNITY
Start: 2022-04-05

## 2022-05-10 RX ORDER — DOXAZOSIN MESYLATE 4 MG/1
1 TABLET ORAL
COMMUNITY
Start: 2020-11-05 | End: 2022-08-19 | Stop reason: SINTOL

## 2022-05-10 RX ORDER — ASPIRIN 81 MG/1
TABLET ORAL DAILY
COMMUNITY
Start: 2022-04-19 | End: 2022-09-13

## 2022-05-10 RX ORDER — CYCLOBENZAPRINE HCL 10 MG
10 TABLET ORAL 3 TIMES DAILY PRN
COMMUNITY
Start: 2021-08-10 | End: 2022-05-10 | Stop reason: SDUPTHER

## 2022-05-10 RX ORDER — TORSEMIDE 20 MG/1
1 TABLET ORAL AS NEEDED
COMMUNITY
Start: 2022-04-19 | End: 2022-09-13

## 2022-05-10 ASSESSMENT — VISUAL ACUITY: OU: 1

## 2022-05-10 ASSESSMENT — PATIENT HEALTH QUESTIONNAIRE - PHQ9
8. MOVING OR SPEAKING SO SLOWLY THAT OTHER PEOPLE COULD HAVE NOTICED. OR THE OPPOSITE, BEING SO FIGETY OR RESTLESS THAT YOU HAVE BEEN MOVING AROUND A LOT MORE THAN USUAL: 0
SUM OF ALL RESPONSES TO PHQ QUESTIONS 1-9: 0
2. FEELING DOWN, DEPRESSED OR HOPELESS: 0
5. POOR APPETITE OR OVEREATING: 0
9. THOUGHTS THAT YOU WOULD BE BETTER OFF DEAD, OR OF HURTING YOURSELF: 0
7. TROUBLE CONCENTRATING ON THINGS, SUCH AS READING THE NEWSPAPER OR WATCHING TELEVISION: 0
1. LITTLE INTEREST OR PLEASURE IN DOING THINGS: 0
SUM OF ALL RESPONSES TO PHQ QUESTIONS 1-9: 0
4. FEELING TIRED OR HAVING LITTLE ENERGY: 0
3. TROUBLE FALLING OR STAYING ASLEEP: 0
SUM OF ALL RESPONSES TO PHQ QUESTIONS 1-9: 0
SUM OF ALL RESPONSES TO PHQ9 QUESTIONS 1 & 2: 0
6. FEELING BAD ABOUT YOURSELF - OR THAT YOU ARE A FAILURE OR HAVE LET YOURSELF OR YOUR FAMILY DOWN: 0
10. IF YOU CHECKED OFF ANY PROBLEMS, HOW DIFFICULT HAVE THESE PROBLEMS MADE IT FOR YOU TO DO YOUR WORK, TAKE CARE OF THINGS AT HOME, OR GET ALONG WITH OTHER PEOPLE: 0
SUM OF ALL RESPONSES TO PHQ QUESTIONS 1-9: 0

## 2022-05-10 ASSESSMENT — ENCOUNTER SYMPTOMS
BACK PAIN: 1
CHEST TIGHTNESS: 0
ABDOMINAL PAIN: 0
DIARRHEA: 0
CONSTIPATION: 0
SHORTNESS OF BREATH: 0

## 2022-05-10 NOTE — PROGRESS NOTES
Charyl Ceron (:  1965) is a 65 y.o. black male,Established patient, here for evaluation of the following chief complaint(s):  Diabetes         ASSESSMENT/PLAN:  1. Diabetes mellitus due to underlying condition with stage 4 chronic kidney disease, with long-term current use of insulin (HCC)  -     POCT glycosylated hemoglobin (Hb A1C): 5.9%  -Continue Levemir  -Continue Lispro  -Peritoneal Dialysis  -followed by Nephrology  2. CKD (chronic kidney disease) requiring chronic dialysis (HCC)  -     POCT glycosylated hemoglobin (Hb A1C): 5.9%  -Clonidine-recently decreased to 0.1 mg TID by Nephrology  - Nifedipine  -Hydralazine- recently decreased to once daily by Nephrology  -Lopressor  -Losartan  -Aldactone  -Imdur  -Followed by Nephrology  3. Chronic diastolic congestive heart failure (HCC)  - Nifedipine  -Hydralazine-recently decreased to once daily by Nephrology  -Lopressor  -Aldactone  -Imdur  -Clonidine-recently decreased to 0.1 mg TID by Neprology  -Followed by Cardiology      Return in about 3 months (around 8/10/2022) for HTN, DIABETES. Subjective   SUBJECTIVE/OBJECTIVE:  HPI    Spouse working from home. 9:30- 6, Friday and Saturday off. Hypertension  This is a chronic problem. The current episode started more than 1 year ago. The problem is uncontrolled. Pertinent negatives include no chest pain, headaches, palpitations or shortness of breath. Risk factors for coronary artery disease include dyslipidemia and diabetes mellitus. Past treatments include direct vasodilators, beta blockers, central alpha agonists and diuretics. The current treatment provides moderate improvement. Hypertensive end-organ damage includes kidney disease. Identifiable causes of hypertension include chronic renal disease. Nephrology decreased Clonidine to 0.1 mg TID and Hydralazine to once daily. Hyperlipidemia  This is a chronic problem. The problem is controlled.  Exacerbating diseases include chronic renal disease. Pertinent negatives include no chest pain, myalgias or shortness of breath. Current antihyperlipidemic treatment includes diet change, exercise and statins. The current treatment provides moderate improvement of lipids. Compliance problems include adherence to diet and adherence to exercise.  Risk factors for coronary artery disease include diabetes mellitus, dyslipidemia, family history, male sex and hypertension.   Diabetes  He presents for his follow-up diabetic visit. He has type 2 diabetes mellitus. There are no hypoglycemic associated symptoms. Pertinent negatives for hypoglycemia include no dizziness, headaches, nervousness/anxiousness or seizures. Pertinent negatives for diabetes include no chest pain, no polyuria and no weakness. There are no hypoglycemic complications. There are no diabetic complications. Risk factors for coronary artery disease include diabetes mellitus, dyslipidemia, male sex and hypertension. Current diabetic treatment includes insulin injections. He is compliant with treatment most of the time. His weight is increasing rapidly. He is following a generally healthy diet. He rarely participates in exercise. Patient is interested in Cialis for erectile dysfunctioned, cautioned due to taking Imdur, which in combination may cause drop in blood pressure.      ESRD   Reports performing peritoneal dialysis every evening for 8 hours. States blood sugar increases depending on dialysis fluid concentration 1.5% vs 2.5%. Dressing to abdomen dry, no abdominal tenderness, fever, nausea. PD has been decreased to 5 days/week. Increased  Dry weight 79.4--> 81 kg     Cleared by Dentist for transplant.     Had angiogram, ceared by Cardiology for transplant. Review of Systems   Constitutional: Negative for activity change and fever. HENT: Negative for congestion. Eyes: Negative for visual disturbance. Respiratory: Negative for chest tightness and shortness of breath. Cardiovascular: Negative for chest pain, palpitations and leg swelling. Hypertension   Hyperlipidema     Gastrointestinal: Negative for abdominal pain, constipation and diarrhea. Endocrine: Negative for polyuria. Diabetes   Genitourinary: Negative for dysuria. ESRD- peritoneal dialysis  Being evaluated for Kidney transplant. Musculoskeletal: Positive for back pain (from fall). Negative for arthralgias and myalgias. Skin: Negative for rash. Neurological: Negative for dizziness, seizures, weakness, light-headedness and headaches. Lacunar stroke in 2015   Psychiatric/Behavioral: Negative for agitation, decreased concentration and sleep disturbance. The patient is not nervous/anxious. Objective     height is 6' 2\" (1.88 m) and weight is 172 lb (78 kg). His temperature is 98.6 °F (37 °C). His blood pressure is 161/87 (abnormal) and his pulse is 78. His respiration is 15 and oxygen saturation is 98%.    Echo 2/2022   Left ventricular cavity size is normal.   There is severe concentric left ventricular hypertrophy.   Ejection fraction is visually estimated to be 40%.  Grade II diastolic dysfunction with elevated LV filling pressures.   The right ventricle is normal in size and function.   There is a moderate circumferential pericardial effusion noted. Physical Exam  Vitals reviewed. Constitutional:       Appearance: Normal appearance. He is well-developed and well-groomed. HENT:      Head: Normocephalic. Right Ear: Hearing normal.      Left Ear: Hearing normal.   Eyes:      General: Lids are normal. Vision grossly intact. Neck:      Trachea: Trachea and phonation normal.   Cardiovascular:      Rate and Rhythm: Normal rate and regular rhythm. Heart sounds: Normal heart sounds, S1 normal and S2 normal.   Pulmonary:      Effort: Pulmonary effort is normal.      Breath sounds: Normal breath sounds.    Abdominal:      General: Bowel sounds are normal. Palpations: Abdomen is soft. Tenderness: There is no abdominal tenderness. Musculoskeletal:      Cervical back: Full passive range of motion without pain and normal range of motion. Skin:     General: Skin is warm and dry. Neurological:      General: No focal deficit present. Mental Status: He is alert and oriented to person, place, and time. GCS: GCS eye subscore is 4. GCS verbal subscore is 5. GCS motor subscore is 6. Psychiatric:         Attention and Perception: Attention normal.         Mood and Affect: Mood normal.         Speech: Speech normal.         Behavior: Behavior normal. Behavior is cooperative. Cognition and Memory: Cognition and memory normal.            On this date 5/10/2022 I have spent 15 minutes reviewing previous notes, test results and face to face with the patient discussing the diagnosis and importance of compliance with the treatment plan as well as documenting on the day of the visit. An electronic signature was used to authenticate this note.     --ROQUE Rodriguez - CNP

## 2022-05-10 NOTE — TELEPHONE ENCOUNTER
pharmacy needs the insulin lispro (HUMALOG) 100 UNIT/ML injection vial resent with the diagnostic code to bill insurance

## 2022-05-10 NOTE — PATIENT INSTRUCTIONS
Patient Education        High Blood Pressure: Care Instructions  Overview     It's normal for blood pressure to go up and down throughout the day. But if it stays up, you have high blood pressure. Another name for high blood pressure is hypertension. Despite what a lot of people think, high blood pressure usually doesn't cause headaches or make you feel dizzy or lightheaded. It usually has no symptoms. But it does increase your risk of stroke, heart attack, and other problems. You and your doctor will talk about your risks of these problems based on your blood pressure. Your doctor will give you a goal for your blood pressure. Your goal will be based on your health and your age. Lifestyle changes, such as eating healthy and being active, are always important to help lower blood pressure. You might also take medicine to reach your blood pressure goal.  Follow-up care is a key part of your treatment and safety. Be sure to make and go to all appointments, and call your doctor if you are having problems. It's also a good idea to know your test results and keep a list of the medicines you take. How can you care for yourself at home? Medical treatment  · If you stop taking your medicine, your blood pressure will go back up. You may take one or more types of medicine to lower your blood pressure. Be safe with medicines. Take your medicine exactly as prescribed. Call your doctor if you think you are having a problem with your medicine. · Talk to your doctor before you start taking aspirin every day. Aspirin can help certain people lower their risk of a heart attack or stroke. But taking aspirin isn't right for everyone, because it can cause serious bleeding. · See your doctor regularly. You may need to see the doctor more often at first or until your blood pressure comes down.   · If you are taking blood pressure medicine, talk to your doctor before you take decongestants or anti-inflammatory medicine, such as ibuprofen. Some of these medicines can raise blood pressure. · Learn how to check your blood pressure at home. Lifestyle changes  · Stay at a healthy weight. This is especially important if you put on weight around the waist. Losing even 10 pounds can help you lower your blood pressure. · If your doctor recommends it, get more exercise. Walking is a good choice. Bit by bit, increase the amount you walk every day. Try for at least 30 minutes on most days of the week. You also may want to swim, bike, or do other activities. · Avoid or limit alcohol. Talk to your doctor about whether you can drink any alcohol. · Try to limit how much sodium you eat to less than 2,300 milligrams (mg) a day. Your doctor may ask you to try to eat less than 1,500 mg a day. · Eat plenty of fruits (such as bananas and oranges), vegetables, legumes, whole grains, and low-fat dairy products. · Lower the amount of saturated fat in your diet. Saturated fat is found in animal products such as milk, cheese, and meat. Limiting these foods may help you lose weight and also lower your risk for heart disease. · Do not smoke. Smoking increases your risk for heart attack and stroke. If you need help quitting, talk to your doctor about stop-smoking programs and medicines. These can increase your chances of quitting for good. When should you call for help? Call  911 anytime you think you may need emergency care. This may mean having symptoms that suggest that your blood pressure is causing a serious heart or blood vessel problem. Your blood pressure may be over 180/120. For example, call 911 if:    1. You have symptoms of a heart attack. These may include:  ? Chest pain or pressure, or a strange feeling in the chest.  ? Sweating. ? Shortness of breath. ? Nausea or vomiting. ? Pain, pressure, or a strange feeling in the back, neck, jaw, or upper belly or in one or both shoulders or arms. ? Lightheadedness or sudden weakness.   ? A fast or irregular heartbeat. 1. You have symptoms of a stroke. These may include:  ? Sudden numbness, tingling, weakness, or loss of movement in your face, arm, or leg, especially on only one side of your body. ? Sudden vision changes. ? Sudden trouble speaking. ? Sudden confusion or trouble understanding simple statements. ? Sudden problems with walking or balance. ? A sudden, severe headache that is different from past headaches. · You have severe back or belly pain. Do not wait until your blood pressure comes down on its own. Get help right away. Call your doctor now or seek immediate care if:    · Your blood pressure is much higher than normal (such as 180/120 or higher), but you don't have symptoms. 1. You think high blood pressure is causing symptoms, such as:  ? Severe headache.  ? Blurry vision. Watch closely for changes in your health, and be sure to contact your doctor if:    · Your blood pressure measures higher than your doctor recommends at least 2 times. That means the top number is higher or the bottom number is higher, or both. · You think you may be having side effects from your blood pressure medicine. Where can you learn more? Go to https://[employer]. org and sign in to your [facility] account. Enter S597 in the [employer] box to learn more about \"High Blood Pressure: Care Instructions. \"     If you do not have an account, please click on the \"Sign Up Now\" link. Current as of: August 31, 2020               Content Version: 12.8  © 2006-2021 [employer]. Care instructions adapted under license by [facility] [place] ([facility]). If you have questions about a medical condition or this instruction, always ask your healthcare professional. [place] [address] any warranty or liability for your use of this information. Make life style changes:  Exercise for at least 30 minutes 3 times weekly.   Decrease fatty, fried, fast and processed foods.    The medication list included in this document is our record of what you are currently taking, including any changes that were made at today's visit.  If you find any differences when compared to your medications at home, or have any questions that were not answered at your visit, please contact the office.

## 2022-05-16 NOTE — TELEPHONE ENCOUNTER
Pended for you to sign      Medication:   Requested Prescriptions     Pending Prescriptions Disp Refills    insulin lispro (HUMALOG) 100 UNIT/ML injection vial 10 mL 1     Sig: Inject 5 Units into the skin 3 times daily (before meals) SLIDING SCALE        Last Filled:      Patient Phone Number: [phone] (home)     Last appt: 2/10/2022   Next appt: 5/10/2022    Last OARRS:   RX Monitoring 2/9/2018   Attestation The Prescription Monitoring Report for this patient was reviewed today. Periodic Controlled Substance Monitoring No signs of potential drug abuse or diversion identified.

## 2022-05-31 DIAGNOSIS — Z79.4 DIABETES MELLITUS DUE TO UNDERLYING CONDITION WITH STAGE 4 CHRONIC KIDNEY DISEASE, WITH LONG-TERM CURRENT USE OF INSULIN (HCC): ICD-10-CM

## 2022-05-31 DIAGNOSIS — N18.4 DIABETES MELLITUS DUE TO UNDERLYING CONDITION WITH STAGE 4 CHRONIC KIDNEY DISEASE, WITH LONG-TERM CURRENT USE OF INSULIN (HCC): ICD-10-CM

## 2022-05-31 DIAGNOSIS — E08.22 DIABETES MELLITUS DUE TO UNDERLYING CONDITION WITH STAGE 4 CHRONIC KIDNEY DISEASE, WITH LONG-TERM CURRENT USE OF INSULIN (HCC): ICD-10-CM

## 2022-05-31 NOTE — TELEPHONE ENCOUNTER
Medication:   Requested Prescriptions     Pending Prescriptions Disp Refills    insulin lispro (HUMALOG) 100 UNIT/ML injection vial 10 mL 1     Sig: Inject 5 Units into the skin 3 times daily (before meals) SLIDING SCALE        Last Filled:      Patient Phone Number: [phone] (home)     Last appt: 5/10/2022   Next appt: 8/10/2022    Last OARRS:   RX Monitoring 2/9/2018   Attestation The Prescription Monitoring Report for this patient was reviewed today. Periodic Controlled Substance Monitoring No signs of potential drug abuse or diversion identified.

## 2022-06-16 NOTE — PLAN OF CARE
Problem: Nutrition  Goal: Optimal nutrition therapy  Outcome: Ongoing   Nutrition Problem #1: Increased nutrient needs  Intervention: Food and/or Nutrient Delivery: Continue Current Diet, Start Oral Nutrition Supplement  Nutritional Goals: consume >50% of meals and supplement during admission. [Hyperlipidemia] : hyperlipidemia [Hypertension] : hypertension [Coronary Artery Disease] : coronary artery disease [FreeTextEntry1] : 77 F HTN hyperlipidemia DM with CAD s/p PCI for f/u.\par 9-2021\par

## 2022-07-30 DIAGNOSIS — E11.49 OTHER DIABETIC NEUROLOGICAL COMPLICATION ASSOCIATED WITH TYPE 2 DIABETES MELLITUS (HCC): ICD-10-CM

## 2022-07-30 DIAGNOSIS — R53.1 LEFT-SIDED WEAKNESS: ICD-10-CM

## 2022-08-01 RX ORDER — GABAPENTIN 300 MG/1
CAPSULE ORAL
Qty: 270 CAPSULE | Refills: 1 | Status: SHIPPED | OUTPATIENT
Start: 2022-08-01 | End: 2022-10-01

## 2022-08-01 NOTE — TELEPHONE ENCOUNTER
Medication:   Requested Prescriptions     Pending Prescriptions Disp Refills    gabapentin (NEURONTIN) 300 MG capsule [Pharmacy Med Name: Gabapentin 300 MG Oral Capsule] 270 capsule 0     Sig: TAKE 1 CAPSULE BY MOUTH THREE TIMES DAILY        Last Filled:      Patient Phone Number: [phone] (home)     Last appt: 5/10/2022   Next appt: 8/10/2022    Last OARRS:   RX Monitoring 2/9/2018   Attestation The Prescription Monitoring Report for this patient was reviewed today. Periodic Controlled Substance Monitoring No signs of potential drug abuse or diversion identified.

## 2022-08-04 DIAGNOSIS — E78.2 MIXED HYPERLIPIDEMIA: ICD-10-CM

## 2022-08-04 RX ORDER — ATORVASTATIN CALCIUM 40 MG/1
TABLET, FILM COATED ORAL
Qty: 90 TABLET | Refills: 3 | Status: SHIPPED | OUTPATIENT
Start: 2022-08-04

## 2022-08-04 NOTE — TELEPHONE ENCOUNTER
Medication:   Requested Prescriptions     Pending Prescriptions Disp Refills    atorvastatin (LIPITOR) 40 MG tablet 90 tablet 3        Last Filled:      Patient Phone Number: [phone] (home)     Last appt: 5/10/2022   Next appt: 8/10/2022    Last OARRS:   RX Monitoring 2/9/2018   Attestation The Prescription Monitoring Report for this patient was reviewed today. Periodic Controlled Substance Monitoring No signs of potential drug abuse or diversion identified.

## 2022-08-19 ENCOUNTER — OFFICE VISIT (OUTPATIENT)
Dept: PRIMARY CARE CLINIC | Age: 57
End: 2022-08-19
Payer: MEDICAID

## 2022-08-19 VITALS
TEMPERATURE: 97.6 F | DIASTOLIC BLOOD PRESSURE: 72 MMHG | SYSTOLIC BLOOD PRESSURE: 120 MMHG | BODY MASS INDEX: 22.29 KG/M2 | WEIGHT: 173.6 LBS | HEART RATE: 72 BPM | OXYGEN SATURATION: 99 %

## 2022-08-19 DIAGNOSIS — N18.6 CKD (CHRONIC KIDNEY DISEASE) REQUIRING CHRONIC DIALYSIS (HCC): ICD-10-CM

## 2022-08-19 DIAGNOSIS — I50.32 CHRONIC DIASTOLIC CONGESTIVE HEART FAILURE (HCC): Primary | ICD-10-CM

## 2022-08-19 DIAGNOSIS — Z79.4 DIABETES MELLITUS DUE TO UNDERLYING CONDITION WITH STAGE 4 CHRONIC KIDNEY DISEASE, WITH LONG-TERM CURRENT USE OF INSULIN (HCC): ICD-10-CM

## 2022-08-19 DIAGNOSIS — N18.4 DIABETES MELLITUS DUE TO UNDERLYING CONDITION WITH STAGE 4 CHRONIC KIDNEY DISEASE, WITH LONG-TERM CURRENT USE OF INSULIN (HCC): ICD-10-CM

## 2022-08-19 DIAGNOSIS — Z99.2 CKD (CHRONIC KIDNEY DISEASE) REQUIRING CHRONIC DIALYSIS (HCC): ICD-10-CM

## 2022-08-19 DIAGNOSIS — E08.22 DIABETES MELLITUS DUE TO UNDERLYING CONDITION WITH STAGE 4 CHRONIC KIDNEY DISEASE, WITH LONG-TERM CURRENT USE OF INSULIN (HCC): ICD-10-CM

## 2022-08-19 LAB — HBA1C MFR BLD: 7 %

## 2022-08-19 PROCEDURE — G8427 DOCREV CUR MEDS BY ELIG CLIN: HCPCS | Performed by: NURSE PRACTITIONER

## 2022-08-19 PROCEDURE — 83036 HEMOGLOBIN GLYCOSYLATED A1C: CPT | Performed by: NURSE PRACTITIONER

## 2022-08-19 PROCEDURE — 4004F PT TOBACCO SCREEN RCVD TLK: CPT | Performed by: NURSE PRACTITIONER

## 2022-08-19 PROCEDURE — G8420 CALC BMI NORM PARAMETERS: HCPCS | Performed by: NURSE PRACTITIONER

## 2022-08-19 PROCEDURE — 99214 OFFICE O/P EST MOD 30 MIN: CPT | Performed by: NURSE PRACTITIONER

## 2022-08-19 PROCEDURE — 3017F COLORECTAL CA SCREEN DOC REV: CPT | Performed by: NURSE PRACTITIONER

## 2022-08-19 RX ORDER — SUCROFERRIC OXYHYDROXIDE 500 MG/1
1 TABLET, CHEWABLE ORAL
COMMUNITY
Start: 2022-06-23

## 2022-08-19 RX ORDER — INSULIN DETEMIR 100 [IU]/ML
22 INJECTION, SOLUTION SUBCUTANEOUS NIGHTLY
Qty: 5 PEN | Refills: 3 | Status: ON HOLD | OUTPATIENT
Start: 2022-08-19 | End: 2022-10-18 | Stop reason: SDUPTHER

## 2022-08-19 RX ORDER — INSULIN ASPART 100 [IU]/ML
3 INJECTION, SOLUTION INTRAVENOUS; SUBCUTANEOUS
Qty: 5 PEN | Refills: 3 | Status: SHIPPED | OUTPATIENT
Start: 2022-08-19

## 2022-08-19 RX ORDER — PEN NEEDLE, DIABETIC 31 G X1/4"
1 NEEDLE, DISPOSABLE MISCELLANEOUS DAILY
Qty: 100 EACH | Refills: 3 | Status: SHIPPED | OUTPATIENT
Start: 2022-08-19

## 2022-08-19 ASSESSMENT — ENCOUNTER SYMPTOMS
BACK PAIN: 1
DIARRHEA: 0
ABDOMINAL PAIN: 0
CHEST TIGHTNESS: 0
CONSTIPATION: 0
SHORTNESS OF BREATH: 0

## 2022-08-19 NOTE — PROGRESS NOTES
negatives include no chest pain, myalgias or shortness of breath. Current antihyperlipidemic treatment includes diet change, exercise and statins. The current treatment provides moderate improvement of lipids. Compliance problems include adherence to diet and adherence to exercise. Risk factors for coronary artery disease include diabetes mellitus, dyslipidemia, family history, male sex and hypertension. Diabetes: a1c 5.9%-->7.0%  He presents for his follow-up diabetic visit. He has type 2 diabetes mellitus. There are no hypoglycemic associated symptoms. Pertinent negatives for hypoglycemia include no dizziness, headaches, nervousness/anxiousness or seizures. Pertinent negatives for diabetes include no chest pain, no polyuria and no weakness. There are no hypoglycemic complications. There are no diabetic complications. Risk factors for coronary artery disease include diabetes mellitus, dyslipidemia, male sex and hypertension. Current diabetic treatment includes insulin injections. He is compliant with treatment most of the time. His weight is increasing rapidly. He is following a generally healthy diet. He rarely participates in exercise. Drinking Sprite and eating Ice cream more often. Patient is interested in Cialis for erectile dysfunctioned, cautioned due to taking Imdur, which in combination may cause drop in blood pressure. ESRD   Reports performing peritoneal dialysis every evening for 8 hours. States blood sugar increases depending on dialysis fluid concentration 1.5% vs 2.5%. Dressing to abdomen dry, no abdominal tenderness, fever, nausea. PD has been decreased to 5 days/week. Increased  Dry weight 76 kg. Does exchanges nor more than 5 times weekly. Cleared by Dentist for transplant. Had angiogram, cleared by Cardiology for transplant. Will be evaluated at [facility] for transplant list.  Review of Systems   Constitutional:  Negative for activity change and fever.    HENT:  Negative for congestion. Eyes:  Negative for visual disturbance. Respiratory:  Negative for chest tightness and shortness of breath. Cardiovascular:  Negative for chest pain, palpitations and leg swelling. Hypertension   Hyperlipidema     Gastrointestinal:  Negative for abdominal pain, constipation and diarrhea. Endocrine: Negative for polyuria. Diabetes   Genitourinary:  Negative for dysuria. ESRD- peritoneal dialysis  Being evaluated for Kidney transplant. Musculoskeletal:  Positive for back pain (from fall). Negative for arthralgias and myalgias. Skin:  Negative for rash. Neurological:  Negative for dizziness, seizures, weakness, light-headedness and headaches. Lacunar stroke in 2015   Psychiatric/Behavioral:  Negative for agitation, decreased concentration and sleep disturbance. The patient is not nervous/anxious. Objective    weight is 173 lb 9.6 oz (78.7 kg). His temporal temperature is 97.6 °F (36.4 °C). His blood pressure is 120/72 and his pulse is 72. His oxygen saturation is 99%.     BP Readings from Last 3 Encounters:   08/19/22 120/72   05/10/22 (!) 161/87   02/28/22 138/82      Wt Readings from Last 3 Encounters:   08/19/22 173 lb 9.6 oz (78.7 kg)   05/10/22 172 lb (78 kg)   02/28/22 180 lb (81.6 kg)      Past Medical History:   Diagnosis Date    Cardiomyopathy ([facility] [address]     CHF (congestive heart failure) (HCC)     CVA (cerebral infarction) 5/2015    Diabetes mellitus ([facility]     Foot ulcer, left ([facility] 1/14/2016    Gastroparesis     Hemodialysis patient ([facility] [address]     Hypercholesteremia     Hypertension     Kidney disease     Unspecified cerebral artery occlusion with cerebral infarction     5/15, 7/15 LEFT SIDE WEAKNESS      Past Surgical History:   Procedure Laterality Date    COLONOSCOPY N/A 5/17/2021    COLONOSCOPY POLYPECTOMY SNARE/COLD BIOPSY performed by Steven Jean MD at [address] [place] [address] N/A 10/29/2020    PLACEMENT OF A TUNNELED DIALYSIS CATHETER WITH FLEDELIA AND ULTRASOUND performed by Uma Luque MD at [address] [facility] N/A 10/29/2020    LAPAROSCOPIC PERITONEAL DIALYSIS CATHETER PLACEMENT WITH FLEDELIA AND ULTRASOUND performed by Uma Luque MD at [address] [place] [address] N/A 17/42/3104    UMBILICAL HERNIA REPAIR performed by Uma Luque MD at [facility] N/A 4/26/2021    ESOPHAGOGASTRODUODENOSCOPY performed by Pamela Patel MD at [address] History   Adopted: Yes      Social History     Tobacco Use    Smoking status: Some Days     Packs/day: 0.00     Years: 30.00     Pack years: 0.00     Types: Cigars, Cigarettes     Start date: 1/3/1979     Last attempt to quit: 1/16/2019     Years since quitting: 3.5    Smokeless tobacco: Never    Tobacco comments:     3 black and milds a week   Vaping Use    Vaping Use: Never used   Substance Use Topics    Alcohol use: Yes     Comment: occasional    Drug use: Yes     Types: Marijuana Juanetta Meagher)     Comment: previously used cocaine, stopped May 2015 LAST USED MARIJUANA-NOVEMBER 2020      Outpatient Encounter Medications as of 8/19/2022   Medication Sig Dispense Refill    Sucroferric Oxyhydroxide (VELPHORO) 500 MG CHEW Take 1 tablet by mouth 3 times daily (with meals)      insulin detemir (LEVEMIR FLEXTOUCH) 100 UNIT/ML injection pen Inject 22 Units into the skin nightly 5 pen 3    Insulin Pen Needle ([employer] PEN NEEDLES) 31G X 6 MM MISC 1 each by Does not apply route daily 100 each 3    insulin aspart (NOVOLOG FLEXPEN) 100 UNIT/ML injection pen Inject 3 Units into the skin 3 times daily (before meals) 5 pen 3    atorvastatin (LIPITOR) 40 MG tablet TAKE 1 TABLET BY MOUTH ONCE DAILY NIGHTLY 90 tablet 3    gabapentin (NEURONTIN) 300 MG capsule TAKE 1 CAPSULE BY MOUTH THREE TIMES DAILY 270 capsule 1    Methoxy PEG-Epoetin Beta (MIRCERA IJ) Inject 75 mcg into the skin      iron sucrose (VENOFER) 20 MG/ML injection 200 mg daily      aspirin 81 MG EC tablet Take by mouth daily      calcitRIOL (ROCALTROL) 0.25 MCG capsule daily      losartan (COZAAR) 100 MG tablet TAKE 1 TABLET BY MOUTH ONCE DAILY      hydrALAZINE (APRESOLINE) 100 MG tablet TAKE 2 TABLETS BY MOUTH THREE TIMES DAILY (Patient taking differently: Take 100 mg by mouth 3 times daily TAKE 1 TABLET BY MOUTH THREE TIMES DAILY) 270 tablet 1    cloNIDine (CATAPRES) 0.1 MG tablet Take 1 tablet by mouth 3 times daily (Patient taking differently: Take 0.2 mg by mouth 3 times daily) 90 tablet 5    NIFEdipine (PROCARDIA XL) 90 MG extended release tablet Take 1 tablet by mouth 2 times daily 180 tablet 3    sodium bicarbonate 325 MG tablet Take 325 mg by mouth daily      spironolactone (ALDACTONE) 50 MG tablet Take 1 tablet by mouth once daily (Patient taking differently: 100 mg daily) 90 tablet 1    isosorbide mononitrate (IMDUR) 60 MG extended release tablet Take 1 tablet by mouth once daily 90 tablet 3    EQ ASPIRIN ADULT LOW DOSE 81 MG EC tablet Take 1 tablet by mouth once daily 90 tablet 3    metoprolol tartrate (LOPRESSOR) 50 MG tablet Take twice daily (Patient taking differently: 50 mg 2 times daily Take twice daily) 180 tablet 1    [DISCONTINUED] insulin lispro (HUMALOG) 100 UNIT/ML injection vial Inject 5 Units into the skin 3 times daily (before meals) SLIDING SCALE 10 mL 1    [DISCONTINUED] insulin lispro (HUMALOG) 100 UNIT/ML injection vial Inject 5 Units into the skin 3 times daily (before meals) SLIDING SCALE 10 mL 1    torsemide (DEMADEX) 20 MG tablet Take 1 tablet by mouth as needed (Patient not taking: Reported on 8/19/2022)      [DISCONTINUED] doxazosin (CARDURA) 4 MG tablet Take 1 tablet by mouth      [DISCONTINUED] insulin detemir (LEVEMIR FLEXTOUCH) 100 UNIT/ML injection pen Inject 22 Units into the skin nightly 5 pen 3    [DISCONTINUED] CALCITRIOL PO Take 1 tablet by mouth       No facility-administered encounter medications on file as of 8/19/2022. Physical Exam  Vitals reviewed. Constitutional:       Appearance: He is well-developed. He is not diaphoretic. HENT:      Head: Normocephalic. Right Ear: Hearing and external ear normal. No tenderness. Left Ear: Hearing and external ear normal. No tenderness. Nose: Nose normal.   Eyes:      General: Lids are normal.   Cardiovascular:      Rate and Rhythm: Normal rate and regular rhythm. Heart sounds: Normal heart sounds, S1 normal and S2 normal.   Pulmonary:      Effort: Pulmonary effort is normal.      Breath sounds: Normal breath sounds. Abdominal:      General: Bowel sounds are normal. There is no distension. Palpations: Abdomen is soft. There is no mass. Tenderness: There is no right CVA tenderness, left CVA tenderness or rebound. Hernia: No hernia is present. Musculoskeletal:         General: Normal range of motion. Lymphadenopathy:      Head:      Right side of head: No submental or submandibular adenopathy. Left side of head: No submental or submandibular adenopathy. Cervical:      Right cervical: No superficial cervical adenopathy. Left cervical: No superficial cervical adenopathy. Skin:     General: Skin is warm and dry. Findings: No rash. Nails: There is no clubbing. Neurological:      Mental Status: He is alert and oriented to person, place, and time. GCS: GCS eye subscore is 4. GCS verbal subscore is 5. GCS motor subscore is 6. Psychiatric:         Speech: Speech normal.         Behavior: Behavior normal. Behavior is cooperative. Judgment: Judgment normal.          On this date 8/19/2022 I have spent 20 minutes reviewing previous notes, test results and face to face with the patient discussing the diagnosis and importance of compliance with the treatment plan as well as documenting on the day of the visit. An electronic signature was used to authenticate this note.     --ROQUE Raman - CNP

## 2022-09-13 ENCOUNTER — ANESTHESIA EVENT (OUTPATIENT)
Dept: ENDOSCOPY | Age: 57
DRG: 368 | End: 2022-09-13
Payer: COMMERCIAL

## 2022-09-13 ENCOUNTER — APPOINTMENT (OUTPATIENT)
Dept: CT IMAGING | Age: 57
DRG: 368 | End: 2022-09-13
Payer: COMMERCIAL

## 2022-09-13 ENCOUNTER — ANESTHESIA (OUTPATIENT)
Dept: ENDOSCOPY | Age: 57
DRG: 368 | End: 2022-09-13
Payer: COMMERCIAL

## 2022-09-13 ENCOUNTER — APPOINTMENT (OUTPATIENT)
Dept: GENERAL RADIOLOGY | Age: 57
DRG: 368 | End: 2022-09-13
Payer: COMMERCIAL

## 2022-09-13 ENCOUNTER — HOSPITAL ENCOUNTER (INPATIENT)
Age: 57
LOS: 3 days | Discharge: HOME HEALTH | DRG: 368 | End: 2022-09-16
Attending: EMERGENCY MEDICINE | Admitting: INTERNAL MEDICINE
Payer: COMMERCIAL

## 2022-09-13 DIAGNOSIS — K92.2 UGI BLEED: Primary | ICD-10-CM

## 2022-09-13 DIAGNOSIS — D62 ANEMIA DUE TO ACUTE BLOOD LOSS: ICD-10-CM

## 2022-09-13 DIAGNOSIS — R07.9 CHEST PAIN, UNSPECIFIED TYPE: ICD-10-CM

## 2022-09-13 DIAGNOSIS — K92.0 HEMATEMESIS WITH NAUSEA: ICD-10-CM

## 2022-09-13 LAB
A/G RATIO: 0.9 (ref 1.1–2.2)
ABO/RH: NORMAL
ALBUMIN SERPL-MCNC: 3.6 G/DL (ref 3.4–5)
ALP BLD-CCNC: 81 U/L (ref 40–129)
ALT SERPL-CCNC: <5 U/L (ref 10–40)
ANION GAP SERPL CALCULATED.3IONS-SCNC: 19 MMOL/L (ref 3–16)
ANISOCYTOSIS: ABNORMAL
ANTIBODY IDENTIFICATION: NORMAL
ANTIBODY SCREEN: NORMAL
AST SERPL-CCNC: 6 U/L (ref 15–37)
BASE EXCESS VENOUS: -3.6 MMOL/L
BASOPHILS ABSOLUTE: 0 K/UL (ref 0–0.2)
BASOPHILS RELATIVE PERCENT: 0 %
BETA-HYDROXYBUTYRATE: 0.09 MMOL/L (ref 0–0.27)
BILIRUB SERPL-MCNC: 0.3 MG/DL (ref 0–1)
BUN BLDV-MCNC: 69 MG/DL (ref 7–20)
CALCIUM SERPL-MCNC: 8.7 MG/DL (ref 8.3–10.6)
CARBOXYHEMOGLOBIN: 0.8 %
CHLORIDE BLD-SCNC: 101 MMOL/L (ref 99–110)
CO2: 19 MMOL/L (ref 21–32)
CREAT SERPL-MCNC: 8.2 MG/DL (ref 0.9–1.3)
DAT IGG: NORMAL
EKG ATRIAL RATE: 99 BPM
EKG DIAGNOSIS: NORMAL
EKG P AXIS: 27 DEGREES
EKG P-R INTERVAL: 162 MS
EKG Q-T INTERVAL: 334 MS
EKG QRS DURATION: 90 MS
EKG QTC CALCULATION (BAZETT): 428 MS
EKG R AXIS: -1 DEGREES
EKG T AXIS: 103 DEGREES
EKG VENTRICULAR RATE: 99 BPM
EOSINOPHILS ABSOLUTE: 0 K/UL (ref 0–0.6)
EOSINOPHILS RELATIVE PERCENT: 0 %
GFR AFRICAN AMERICAN: 8
GFR NON-AFRICAN AMERICAN: 7
GLUCOSE BLD-MCNC: 256 MG/DL (ref 70–99)
HCO3 VENOUS: 22 MMOL/L (ref 23–29)
HCT VFR BLD CALC: 23.5 % (ref 40.5–52.5)
HCT VFR BLD CALC: 24.5 % (ref 40.5–52.5)
HCT VFR BLD CALC: 24.8 % (ref 40.5–52.5)
HEMOGLOBIN: 7.8 G/DL (ref 13.5–17.5)
HEMOGLOBIN: 7.9 G/DL (ref 13.5–17.5)
HEMOGLOBIN: 7.9 G/DL (ref 13.5–17.5)
LACTIC ACID, SEPSIS: 0.7 MMOL/L (ref 0.4–1.9)
LACTIC ACID, SEPSIS: 1.3 MMOL/L (ref 0.4–1.9)
LYMPHOCYTES ABSOLUTE: 0.9 K/UL (ref 1–5.1)
LYMPHOCYTES RELATIVE PERCENT: 7 %
MCH RBC QN AUTO: 31.1 PG (ref 26–34)
MCHC RBC AUTO-ENTMCNC: 33 G/DL (ref 31–36)
MCV RBC AUTO: 94.4 FL (ref 80–100)
METHEMOGLOBIN VENOUS: 0.8 %
MONOCYTES ABSOLUTE: 1.4 K/UL (ref 0–1.3)
MONOCYTES RELATIVE PERCENT: 11 %
NEUTROPHILS ABSOLUTE: 10.3 K/UL (ref 1.7–7.7)
NEUTROPHILS RELATIVE PERCENT: 82 %
O2 SAT, VEN: 85 %
O2 THERAPY: ABNORMAL
OCCULT BLOOD DIAGNOSTIC: NORMAL
PCO2, VEN: 38.7 MMHG (ref 40–50)
PDW BLD-RTO: 13.8 % (ref 12.4–15.4)
PH VENOUS: 7.36 (ref 7.35–7.45)
PLATELET # BLD: 132 K/UL (ref 135–450)
PLATELET SLIDE REVIEW: ADEQUATE
PMV BLD AUTO: 10.2 FL (ref 5–10.5)
PO2, VEN: 51 MMHG
POTASSIUM SERPL-SCNC: 4.4 MMOL/L (ref 3.5–5.1)
RAPID INFLUENZA  B AGN: NEGATIVE
RAPID INFLUENZA A AGN: NEGATIVE
RBC # BLD: 2.49 M/UL (ref 4.2–5.9)
SLIDE REVIEW: ABNORMAL
SODIUM BLD-SCNC: 139 MMOL/L (ref 136–145)
TCO2 CALC VENOUS: 23 MMOL/L
TOTAL PROTEIN: 7.5 G/DL (ref 6.4–8.2)
TROPONIN: 0.15 NG/ML
WBC # BLD: 12.5 K/UL (ref 4–11)

## 2022-09-13 PROCEDURE — 2580000003 HC RX 258: Performed by: INTERNAL MEDICINE

## 2022-09-13 PROCEDURE — 96375 TX/PRO/DX INJ NEW DRUG ADDON: CPT

## 2022-09-13 PROCEDURE — 0DJ08ZZ INSPECTION OF UPPER INTESTINAL TRACT, VIA NATURAL OR ARTIFICIAL OPENING ENDOSCOPIC: ICD-10-PCS | Performed by: INTERNAL MEDICINE

## 2022-09-13 PROCEDURE — 3E1M39Z IRRIGATION OF PERITONEAL CAVITY USING DIALYSATE, PERCUTANEOUS APPROACH: ICD-10-PCS | Performed by: INTERNAL MEDICINE

## 2022-09-13 PROCEDURE — 2000000000 HC ICU R&B

## 2022-09-13 PROCEDURE — 86880 COOMBS TEST DIRECT: CPT

## 2022-09-13 PROCEDURE — 3700000000 HC ANESTHESIA ATTENDED CARE: Performed by: INTERNAL MEDICINE

## 2022-09-13 PROCEDURE — 82270 OCCULT BLOOD FECES: CPT

## 2022-09-13 PROCEDURE — 96374 THER/PROPH/DIAG INJ IV PUSH: CPT

## 2022-09-13 PROCEDURE — 86870 RBC ANTIBODY IDENTIFICATION: CPT

## 2022-09-13 PROCEDURE — 85014 HEMATOCRIT: CPT

## 2022-09-13 PROCEDURE — 87804 INFLUENZA ASSAY W/OPTIC: CPT

## 2022-09-13 PROCEDURE — 99285 EMERGENCY DEPT VISIT HI MDM: CPT

## 2022-09-13 PROCEDURE — 36415 COLL VENOUS BLD VENIPUNCTURE: CPT

## 2022-09-13 PROCEDURE — 6370000000 HC RX 637 (ALT 250 FOR IP): Performed by: EMERGENCY MEDICINE

## 2022-09-13 PROCEDURE — 2500000003 HC RX 250 WO HCPCS: Performed by: NURSE ANESTHETIST, CERTIFIED REGISTERED

## 2022-09-13 PROCEDURE — 86922 COMPATIBILITY TEST ANTIGLOB: CPT

## 2022-09-13 PROCEDURE — 82803 BLOOD GASES ANY COMBINATION: CPT

## 2022-09-13 PROCEDURE — C9113 INJ PANTOPRAZOLE SODIUM, VIA: HCPCS | Performed by: EMERGENCY MEDICINE

## 2022-09-13 PROCEDURE — 83605 ASSAY OF LACTIC ACID: CPT

## 2022-09-13 PROCEDURE — 6360000002 HC RX W HCPCS: Performed by: INTERNAL MEDICINE

## 2022-09-13 PROCEDURE — 71045 X-RAY EXAM CHEST 1 VIEW: CPT

## 2022-09-13 PROCEDURE — 93005 ELECTROCARDIOGRAM TRACING: CPT | Performed by: EMERGENCY MEDICINE

## 2022-09-13 PROCEDURE — 84484 ASSAY OF TROPONIN QUANT: CPT

## 2022-09-13 PROCEDURE — 93010 ELECTROCARDIOGRAM REPORT: CPT | Performed by: INTERNAL MEDICINE

## 2022-09-13 PROCEDURE — 2580000003 HC RX 258: Performed by: EMERGENCY MEDICINE

## 2022-09-13 PROCEDURE — 85025 COMPLETE CBC W/AUTO DIFF WBC: CPT

## 2022-09-13 PROCEDURE — 80053 COMPREHEN METABOLIC PANEL: CPT

## 2022-09-13 PROCEDURE — 94760 N-INVAS EAR/PLS OXIMETRY 1: CPT

## 2022-09-13 PROCEDURE — 86860 RBC ANTIBODY ELUTION: CPT

## 2022-09-13 PROCEDURE — 85018 HEMOGLOBIN: CPT

## 2022-09-13 PROCEDURE — 86850 RBC ANTIBODY SCREEN: CPT

## 2022-09-13 PROCEDURE — 82010 KETONE BODYS QUAN: CPT

## 2022-09-13 PROCEDURE — 3700000001 HC ADD 15 MINUTES (ANESTHESIA): Performed by: INTERNAL MEDICINE

## 2022-09-13 PROCEDURE — 3609017100 HC EGD: Performed by: INTERNAL MEDICINE

## 2022-09-13 PROCEDURE — 2709999900 HC NON-CHARGEABLE SUPPLY: Performed by: INTERNAL MEDICINE

## 2022-09-13 PROCEDURE — C9113 INJ PANTOPRAZOLE SODIUM, VIA: HCPCS | Performed by: INTERNAL MEDICINE

## 2022-09-13 PROCEDURE — 6360000002 HC RX W HCPCS: Performed by: NURSE ANESTHETIST, CERTIFIED REGISTERED

## 2022-09-13 PROCEDURE — 96365 THER/PROPH/DIAG IV INF INIT: CPT

## 2022-09-13 PROCEDURE — 6370000000 HC RX 637 (ALT 250 FOR IP): Performed by: INTERNAL MEDICINE

## 2022-09-13 PROCEDURE — 86900 BLOOD TYPING SEROLOGIC ABO: CPT

## 2022-09-13 PROCEDURE — 87040 BLOOD CULTURE FOR BACTERIA: CPT

## 2022-09-13 PROCEDURE — 6360000002 HC RX W HCPCS: Performed by: EMERGENCY MEDICINE

## 2022-09-13 PROCEDURE — 86901 BLOOD TYPING SEROLOGIC RH(D): CPT

## 2022-09-13 PROCEDURE — 74176 CT ABD & PELVIS W/O CONTRAST: CPT

## 2022-09-13 RX ORDER — PROPOFOL 10 MG/ML
INJECTION, EMULSION INTRAVENOUS PRN
Status: DISCONTINUED | OUTPATIENT
Start: 2022-09-13 | End: 2022-09-13 | Stop reason: SDUPTHER

## 2022-09-13 RX ORDER — ONDANSETRON 2 MG/ML
4 INJECTION INTRAMUSCULAR; INTRAVENOUS ONCE
Status: COMPLETED | OUTPATIENT
Start: 2022-09-13 | End: 2022-09-13

## 2022-09-13 RX ORDER — SODIUM CHLORIDE 0.9 % (FLUSH) 0.9 %
5-40 SYRINGE (ML) INJECTION PRN
Status: DISCONTINUED | OUTPATIENT
Start: 2022-09-13 | End: 2022-09-16 | Stop reason: HOSPADM

## 2022-09-13 RX ORDER — BUPROPION HYDROCHLORIDE 150 MG/1
150 TABLET, EXTENDED RELEASE ORAL 2 TIMES DAILY
Status: DISCONTINUED | OUTPATIENT
Start: 2022-09-13 | End: 2022-09-16 | Stop reason: HOSPADM

## 2022-09-13 RX ORDER — SODIUM CHLORIDE, SODIUM LACTATE, CALCIUM CHLORIDE, MAGNESIUM CHLORIDE AND DEXTROSE 1.5; 538; 448; 18.3; 5.08 G/100ML; MG/100ML; MG/100ML; MG/100ML; MG/100ML
2500 INJECTION, SOLUTION INTRAPERITONEAL EVERY 24 HOURS
Status: DISCONTINUED | OUTPATIENT
Start: 2022-09-13 | End: 2022-09-13 | Stop reason: SDUPTHER

## 2022-09-13 RX ORDER — ONDANSETRON 4 MG/1
4 TABLET, ORALLY DISINTEGRATING ORAL EVERY 8 HOURS PRN
Status: DISCONTINUED | OUTPATIENT
Start: 2022-09-13 | End: 2022-09-16 | Stop reason: HOSPADM

## 2022-09-13 RX ORDER — SODIUM CHLORIDE, SODIUM LACTATE, CALCIUM CHLORIDE, MAGNESIUM CHLORIDE AND DEXTROSE 1.5; 538; 448; 18.3; 5.08 G/100ML; MG/100ML; MG/100ML; MG/100ML; MG/100ML
6000 INJECTION, SOLUTION INTRAPERITONEAL NIGHTLY
Status: DISCONTINUED | OUTPATIENT
Start: 2022-09-13 | End: 2022-09-14

## 2022-09-13 RX ORDER — LIDOCAINE HYDROCHLORIDE 20 MG/ML
INJECTION, SOLUTION INFILTRATION; PERINEURAL PRN
Status: DISCONTINUED | OUTPATIENT
Start: 2022-09-13 | End: 2022-09-13 | Stop reason: SDUPTHER

## 2022-09-13 RX ORDER — ACETAMINOPHEN 650 MG/1
650 SUPPOSITORY RECTAL EVERY 6 HOURS PRN
Status: DISCONTINUED | OUTPATIENT
Start: 2022-09-13 | End: 2022-09-16 | Stop reason: HOSPADM

## 2022-09-13 RX ORDER — SODIUM CHLORIDE 9 MG/ML
INJECTION, SOLUTION INTRAVENOUS PRN
Status: COMPLETED | OUTPATIENT
Start: 2022-09-13 | End: 2022-09-13

## 2022-09-13 RX ORDER — SODIUM CHLORIDE 0.9 % (FLUSH) 0.9 %
5-40 SYRINGE (ML) INJECTION EVERY 12 HOURS SCHEDULED
Status: DISCONTINUED | OUTPATIENT
Start: 2022-09-13 | End: 2022-09-16 | Stop reason: HOSPADM

## 2022-09-13 RX ORDER — BUPROPION HYDROCHLORIDE 150 MG/1
150 TABLET, EXTENDED RELEASE ORAL 2 TIMES DAILY
COMMUNITY

## 2022-09-13 RX ORDER — ACETAMINOPHEN 500 MG
1000 TABLET ORAL ONCE
Status: COMPLETED | OUTPATIENT
Start: 2022-09-13 | End: 2022-09-13

## 2022-09-13 RX ORDER — ONDANSETRON 2 MG/ML
4 INJECTION INTRAMUSCULAR; INTRAVENOUS EVERY 6 HOURS PRN
Status: DISCONTINUED | OUTPATIENT
Start: 2022-09-13 | End: 2022-09-16 | Stop reason: HOSPADM

## 2022-09-13 RX ORDER — GENTAMICIN SULFATE 1 MG/G
CREAM TOPICAL NIGHTLY
Status: DISCONTINUED | OUTPATIENT
Start: 2022-09-13 | End: 2022-09-16 | Stop reason: HOSPADM

## 2022-09-13 RX ORDER — SODIUM CHLORIDE, SODIUM LACTATE, CALCIUM CHLORIDE, MAGNESIUM CHLORIDE AND DEXTROSE 1.5; 538; 448; 18.3; 5.08 G/100ML; MG/100ML; MG/100ML; MG/100ML; MG/100ML
1500 INJECTION, SOLUTION INTRAPERITONEAL NIGHTLY
Status: DISCONTINUED | OUTPATIENT
Start: 2022-09-13 | End: 2022-09-14

## 2022-09-13 RX ORDER — ACETAMINOPHEN 325 MG/1
650 TABLET ORAL EVERY 6 HOURS PRN
Status: DISCONTINUED | OUTPATIENT
Start: 2022-09-13 | End: 2022-09-16 | Stop reason: HOSPADM

## 2022-09-13 RX ORDER — SODIUM CHLORIDE, SODIUM LACTATE, CALCIUM CHLORIDE, MAGNESIUM CHLORIDE AND DEXTROSE 1.5; 538; 448; 18.3; 5.08 G/100ML; MG/100ML; MG/100ML; MG/100ML; MG/100ML
2500 INJECTION, SOLUTION INTRAPERITONEAL EVERY 24 HOURS
Status: DISCONTINUED | OUTPATIENT
Start: 2022-09-13 | End: 2022-09-13

## 2022-09-13 RX ORDER — SODIUM CHLORIDE 9 MG/ML
INJECTION, SOLUTION INTRAVENOUS PRN
Status: DISCONTINUED | OUTPATIENT
Start: 2022-09-13 | End: 2022-09-16 | Stop reason: HOSPADM

## 2022-09-13 RX ADMIN — LIDOCAINE HYDROCHLORIDE 100 MG: 20 INJECTION, SOLUTION INFILTRATION; PERINEURAL at 16:35

## 2022-09-13 RX ADMIN — GENTAMICIN SULFATE: 1 CREAM TOPICAL at 20:12

## 2022-09-13 RX ADMIN — SODIUM CHLORIDE 8 MG/HR: 9 INJECTION, SOLUTION INTRAVENOUS at 22:56

## 2022-09-13 RX ADMIN — SODIUM CHLORIDE: 9 INJECTION, SOLUTION INTRAVENOUS at 16:30

## 2022-09-13 RX ADMIN — BUPROPION HYDROCHLORIDE 150 MG: 150 TABLET, EXTENDED RELEASE ORAL at 20:05

## 2022-09-13 RX ADMIN — PROPOFOL 20 MG: 10 INJECTION, EMULSION INTRAVENOUS at 16:38

## 2022-09-13 RX ADMIN — SODIUM CHLORIDE 8 MG/HR: 9 INJECTION, SOLUTION INTRAVENOUS at 13:10

## 2022-09-13 RX ADMIN — ACETAMINOPHEN 1000 MG: 500 TABLET ORAL at 11:43

## 2022-09-13 RX ADMIN — SODIUM CHLORIDE 8 MG/HR: 9 INJECTION, SOLUTION INTRAVENOUS at 15:49

## 2022-09-13 RX ADMIN — Medication 10 ML: at 20:05

## 2022-09-13 RX ADMIN — ACETAMINOPHEN 650 MG: 325 TABLET, FILM COATED ORAL at 20:10

## 2022-09-13 RX ADMIN — ONDANSETRON 4 MG: 2 INJECTION INTRAMUSCULAR; INTRAVENOUS at 11:43

## 2022-09-13 RX ADMIN — PROPOFOL 100 MG: 10 INJECTION, EMULSION INTRAVENOUS at 16:35

## 2022-09-13 RX ADMIN — ONDANSETRON 4 MG: 2 INJECTION INTRAMUSCULAR; INTRAVENOUS at 13:58

## 2022-09-13 ASSESSMENT — PAIN DESCRIPTION - DESCRIPTORS
DESCRIPTORS: PATIENT UNABLE TO DESCRIBE
DESCRIPTORS: STABBING;THROBBING
DESCRIPTORS: ACHING

## 2022-09-13 ASSESSMENT — PAIN DESCRIPTION - LOCATION
LOCATION: ABDOMEN
LOCATION: CHEST

## 2022-09-13 ASSESSMENT — PAIN SCALES - GENERAL
PAINLEVEL_OUTOF10: 8
PAINLEVEL_OUTOF10: 2
PAINLEVEL_OUTOF10: 6
PAINLEVEL_OUTOF10: 8

## 2022-09-13 ASSESSMENT — PAIN DESCRIPTION - ORIENTATION: ORIENTATION: MID

## 2022-09-13 ASSESSMENT — PAIN - FUNCTIONAL ASSESSMENT
PAIN_FUNCTIONAL_ASSESSMENT: 0-10
PAIN_FUNCTIONAL_ASSESSMENT: ACTIVITIES ARE NOT PREVENTED

## 2022-09-13 ASSESSMENT — PAIN DESCRIPTION - ONSET
ONSET: ON-GOING
ONSET: PROGRESSIVE

## 2022-09-13 ASSESSMENT — PAIN DESCRIPTION - PAIN TYPE
TYPE: ACUTE PAIN

## 2022-09-13 ASSESSMENT — PAIN DESCRIPTION - FREQUENCY
FREQUENCY: CONTINUOUS
FREQUENCY: INTERMITTENT
FREQUENCY: CONTINUOUS

## 2022-09-13 NOTE — CONSULTS
GASTROENTEROLOGY INPATIENT CONSULTATION        IDENTIFYING DATA/REASON FOR CONSULTATION   PATIENT:  Aileen Chiang  MRN:  [id number]  ADMIT DATE: 9/13/2022  TIME OF EVALUATION: 9/13/2022 1:13 PM  HOSPITAL STAY:   LOS: 0 days     REASON FOR CONSULTATION:  acute GI bleed    HISTORY OF PRESENT ILLNESS   Aileen Chiang is a 65 y.o. male with a PMH of gastroparesis, CKD on peritoneal dialysis, HTN, DM, CHF, cardiomyopathy, umbilical hernia repair who presented on 9/13/2022 with chest pain, shortness of breath, cough. We have been consulted regarding acute GI bleed. Patient was found to have a hemoglobin of 4.9. He reports dark diarrhea on Sunday with no bowel movements since. He has been vomiting for the last 2 days with blood seen today. He does take ibuprofen and naproxen periodically. He reports epigastric abdominal pain and distention. He did have a fever today in ED of 100.1. He reports a history of peptic ulcer disease. Patient follows with Dr. Ronny Tirado for his CKD. XR showed possible small amount of free air beneath the right hemidiaphragm. May be related to peritoneal dialysis. Prior Endoscopic Evaluations:  Colonoscopy with Dr. Rafaela Yen 5/2021:  1. Transverse Colon Polyp   2. Left Colon Polyps    EGD with Dr. Rafaela Yen 4/2021:  1. Normal EGD   2.  Melanosis Duodeni    PAST MEDICAL, SURGICAL, FAMILY, and SOCIAL HISTORY     Past Medical History:   Diagnosis Date    Cardiomyopathy [facility])     CHF (congestive heart failure) ([facility] [address] [facility]     CVA (cerebral infarction) 5/2015    Diabetes mellitus ([facility]     Foot ulcer, left ([facility] [address] 1/14/2016    Gastroparesis     Hemodialysis patient ([facility] [address] [facility]     Hypercholesteremia     Hypertension     Kidney disease     Unspecified cerebral artery occlusion with cerebral infarction     5/15, 7/15 LEFT SIDE WEAKNESS     Past Surgical History:   Procedure Laterality Date    COLONOSCOPY N/A 5/17/2021    COLONOSCOPY POLYPECTOMY SNARE/COLD BIOPSY performed by Cher Tijerina MD at [facility]    HC DIALYSIS CATHETER N/A 10/29/2020    PLACEMENT OF A TUNNELED DIALYSIS CATHETER WITH FLEURO AND ULTRASOUND performed by Cedric Garrido MD at [address] [place] N/A 10/29/2020    LAPAROSCOPIC PERITONEAL DIALYSIS CATHETER PLACEMENT WITH FLEURO AND ULTRASOUND performed by Cedric Garrido MD at [address] [place] [address] N/A 67/78/3833    UMBILICAL HERNIA REPAIR performed by Cedric Garrido MD at [address] N/A 4/26/2021    ESOPHAGOGASTRODUODENOSCOPY performed by Steven Jean MD at [address] [facility] History   Adopted: Yes     Social History     Socioeconomic History    Marital status:      Spouse name: None    Number of children: 5    Years of education: None    Highest education level: None   Tobacco Use    Smoking status: Some Days     Packs/day: 0.00     Years: 30.00     Pack years: 0.00     Types: Cigars, Cigarettes     Start date: 1/3/1979     Last attempt to quit: 1/16/2019     Years since quitting: 3.6    Smokeless tobacco: Never    Tobacco comments:     3 black and milds a week   Vaping Use    Vaping Use: Never used   Substance and Sexual Activity    Alcohol use: Yes     Comment: occasional    Drug use: Yes     Types: Marijuana Jessica Hernandes)     Comment: previously used cocaine, stopped May 2015 LAST USED MARIJUANA-NOVEMBER 2020    Sexual activity: Yes     Partners: Female   Social History Narrative    Lives with my spouse, cousin, daughter       MEDICATIONS   SCHEDULED:  pantoprazole (PROTONIX) bolus, 80 mg, Once      FLUIDS/DRIPS:     sodium chloride      pantoprazole 8 mg/hr (09/13/22 1310)     PRNs: sodium chloride, , PRN      ALLERGIES:  He   Allergies   Allergen Reactions    Doxazosin Nausea And Vomiting     VIOLENT VOMITTING    Coconut Flavor Rash       REVIEW OF SYSTEMS   Pertinent ROS noted in HPI    PHYSICAL EXAM     Vitals:    09/13/22 1145 09/13/22 1150 09/13/22 1210 09/13/22 1230 BP: 121/75 122/75 117/72 114/73   Pulse: 96 96 96 93   Resp: 18 19 15 19   Temp:       TempSrc:       SpO2: 95% 97% 94% 96%   Weight:       Height:           No intake/output data recorded. Physical Exam:  General appearance: alert, cooperative, no distress, appears stated age  Eyes: Anicteric  Head: Normocephalic, without obvious abnormality  Lungs: clear to auscultation bilaterally, Normal Effort  Heart: regular rate and rhythm, normal S1 and S2, no murmurs or rubs  Abdomen: Distended abdomen with no tenderness  Extremities: atraumatic, no cyanosis or edema  Skin: warm and dry, no jaundice  Neuro: Grossly intact, A&OX3      LABS AND IMAGING   Laboratory   Recent Labs     09/13/22  1136   WBC 14.6*   HGB 4.9*   HCT 15.1*   MCV 94.3        Recent Labs     09/13/22  1135      K 4.4      CO2 19*   BUN 69*   CREATININE 8.2*     Recent Labs     09/13/22  1135   AST 6*   ALT <5*   BILITOT 0.3   ALKPHOS 81     No results for input(s): LIPASE, AMYLASE in the last 72 hours. No results for input(s): PROTIME, INR in the last 72 hours. Imaging  XR CHEST PORTABLE   Final Result   1. Possible small amount of free air beneath the right hemidiaphragm. If   there is free air, this might be related to peritoneal dialysis. A left   lateral decubitus view of the abdomen may be helpful for further evaluation. CT imaging of the abdomen and pelvis could also be considered. 2. Left basilar opacities which are predominantly linear and favored to   represent atelectasis as the left hemidiaphragm is elevated. Pneumonia is   considered less likely. Critical results were called by Dr. Darinel iJmenes to Dr. Harmeet Long on   9/13/2022 at 11:42 am.               ASSESSMENT AND RECOMMENDATIONS   Gerald Frausto is a 65 y.o. male with a PMH of gastroparesis, CKD on peritoneal dialysis, HTN, DM, CHF, cardiomyopathy, umbilical hernia repair who presented on 9/13/2022 with chest pain, shortness of breath, cough.

## 2022-09-13 NOTE — H&P
Hospital Medicine History & Physical      PCP: Romina Lyles APRN - CNP    Date of Admission: 9/13/2022    Date of Service: Pt seen/examined on 9/13/2022   and Admitted to Inpatient with expected LOS greater than two midnights due to medical therapy. Chief Complaint: Coffee-ground emesis      History Of Present Illness:    65 y.o. male who presented to [facility] [place] with coffee-ground emesis. Patient reports having bloody emesis and also dark stools. Patient reports midepigastric abdominal pain. Came to the hospital after he was feeling weak and tired and fatigued. Patient found to have low hemoglobin. Patient has been using NSAIDs. He is a peritoneal dialysis patient.     Past Medical History:          Diagnosis Date    Cardiomyopathy [facility])     CHF (congestive heart failure) (HCC)     CVA (cerebral infarction) 5/2015    Diabetes mellitus ([facility] [address] [address]     Foot ulcer, left ([facility] [address] 1/14/2016    Gastroparesis     Hemodialysis patient ([facility] [address] [address]     Hypercholesteremia     Hypertension     Kidney disease     Unspecified cerebral artery occlusion with cerebral infarction     5/15, 7/15 LEFT SIDE WEAKNESS       Past Surgical History:          Procedure Laterality Date    COLONOSCOPY N/A 5/17/2021    COLONOSCOPY POLYPECTOMY SNARE/COLD BIOPSY performed by Tom Murphy MD at [address] [facility] N/A 10/29/2020    PLACEMENT OF A TUNNELED DIALYSIS CATHETER WITH FLEURO AND ULTRASOUND performed by Cooper Rosen MD at [address] [facility] N/A 10/29/2020    LAPAROSCOPIC PERITONEAL DIALYSIS CATHETER PLACEMENT WITH FLEURO AND ULTRASOUND performed by Cooper Rosen MD at [address] [place] [address] N/A 78/53/5721    UMBILICAL HERNIA REPAIR performed by Cooper Rosen MD at [address] [facility] N/A 4/26/2021    ESOPHAGOGASTRODUODENOSCOPY performed by Tom Murphy MD at [address] [facility]       Medications Prior to Admission:      Prior to Admission medications    Medication Sig Start Date End Date Taking?  Authorizing Provider   buPROPion (WELLBUTRIN SR) 150 MG extended release tablet Take 150 mg by mouth 2 times daily   Yes Historical Provider, MD   atorvastatin (LIPITOR) 40 MG tablet TAKE 1 TABLET BY MOUTH ONCE DAILY NIGHTLY 8/4/22   ROQUE Garrett CNP   Sucroferric Oxyhydroxide (VELPHORO) 500 MG CHEW Take 1 tablet by mouth 3 times daily (with meals) 6/23/22   Historical Provider, MD   insulin detemir (LEVEMIR FLEXTOUCH) 100 UNIT/ML injection pen Inject 22 Units into the skin nightly 8/19/22   ROQUE Garrett CNP   Insulin Pen Needle ([employer] PEN NEEDLES) 31G X 6 MM MISC 1 each by Does not apply route daily 8/19/22   ROQUE Garrett CNP   insulin aspart (NOVOLOG FLEXPEN) 100 UNIT/ML injection pen Inject 3 Units into the skin 3 times daily (before meals) 8/19/22   ROQUE Garrett CNP   gabapentin (NEURONTIN) 300 MG capsule TAKE 1 CAPSULE BY MOUTH THREE TIMES DAILY 8/1/22 10/1/22  ROQUE Garrett CNP   Methoxy PEG-Epoetin Beta (MIRCERA IJ) Inject 75 mcg into the skin 5/10/21   Historical Provider, MD   iron sucrose (VENOFER) 20 MG/ML injection 200 mg daily 1/12/21   Historical Provider, MD   calcitRIOL (ROCALTROL) 0.25 MCG capsule 0.5 mcg daily 4/5/22   Historical Provider, MD   losartan (COZAAR) 100 MG tablet TAKE 1 TABLET BY MOUTH ONCE DAILY 4/29/22   Historical Provider, MD   hydrALAZINE (APRESOLINE) 100 MG tablet TAKE 2 TABLETS BY MOUTH THREE TIMES DAILY  Patient taking differently: Take 100 mg by mouth 3 times daily TAKE 1 TABLET BY MOUTH THREE TIMES DAILY 4/6/22   ROQUE Garrett CNP   cloNIDine (CATAPRES) 0.1 MG tablet Take 1 tablet by mouth 3 times daily  Patient taking differently: Take 0.3 mg by mouth 3 times daily 2/11/22   Eduarda Davalos MD   NIFEdipine (PROCARDIA XL) 90 MG extended release tablet Take 1 tablet by mouth 2 times daily  Patient taking differently: Take 90 mg by mouth 3 times daily 2/11/22   Cherelle Scruggs MD   sodium bicarbonate 325 MG tablet Take 325 mg by mouth daily    Historical Provider, MD   spironolactone (ALDACTONE) 50 MG tablet Take 1 tablet by mouth once daily  Patient taking differently: 100 mg daily 12/20/21   ROQUE Yusuf CNP   isosorbide mononitrate (IMDUR) 60 MG extended release tablet Take 1 tablet by mouth once daily 10/20/21   ROQUE Yusuf CNP   EQ ASPIRIN ADULT LOW DOSE 81 MG EC tablet Take 1 tablet by mouth once daily 9/24/21   ROQUE Yusuf CNP   metoprolol tartrate (LOPRESSOR) 50 MG tablet Take twice daily  Patient taking differently: 50 mg 2 times daily Take twice daily 8/2/21   ROQUE Yusuf CNP       Allergies:  Doxazosin and Coconut flavor    Social History:      The patient currently lives with wife    TOBACCO:   reports that he has been smoking cigars. He started smoking about 43 years ago. He has never used smokeless tobacco.  ETOH:   reports current alcohol use. E-cigarette/Vaping       Questions Responses    E-cigarette/Vaping Use Never User    Start Date     Passive Exposure No    Quit Date     Counseling Given Yes    Comments               Family History:       Reviewed and negative in regards to presenting illness/complaint. Adopted: Yes       REVIEW OF SYSTEMS COMPLETED:   Pertinent positives as noted in the HPI. All other systems reviewed and negative. PHYSICAL EXAM PERFORMED:    /81   Pulse 94   Temp 99.3 °F (37.4 °C)   Resp (!) 7   Ht 6' 2\" (1.88 m)   Wt 164 lb 14.5 oz (74.8 kg)   SpO2 95%   BMI 21.17 kg/m²     General appearance: Fatigued lethargic but able to answer questions appropriately  HEENT:  Normal cephalic, atraumatic without obvious deformity. Pupils equal, round, and reactive to light. Extra ocular muscles intact. Conjunctivae/corneas clear. Neck: Supple, with full range of motion. No jugular venous distention.  Trachea midline. Respiratory:  Normal respiratory effort. Clear to auscultation, bilaterally without Rales/Wheezes/Rhonchi. Cardiovascular:  Regular rate and rhythm with normal S1/S2 without murmurs, rubs or gallops. Abdomen: Soft, bowel sounds positive. No rebound guarding or rigidity;  PD catheter intact  Musculoskeletal:  No clubbing, cyanosis or edema bilaterally. Full range of motion without deformity. Skin: Skin color, texture, turgor normal.  No rashes or lesions. Neurologic:  Neurovascularly intact without any focal sensory/motor deficits. Cranial nerves: II-XII intact, grossly non-focal.  Psychiatric:  Alert and oriented, thought content appropriate, normal insight  Capillary Refill: Brisk,3 seconds, normal  Peripheral Pulses: +2 palpable, equal bilaterally       Labs:     Recent Labs     09/13/22  1136 09/13/22  1331   WBC 14.6*  --    HGB 4.9* 7.9*   HCT 15.1* 24.8*     --      Recent Labs     09/13/22  1135      K 4.4      CO2 19*   BUN 69*   CREATININE 8.2*   CALCIUM 8.7     Recent Labs     09/13/22  1135   AST 6*   ALT <5*   BILITOT 0.3   ALKPHOS 81     No results for input(s): INR in the last 72 hours. Recent Labs     09/13/22  1135   TROPONINI 0.15*       Urinalysis:      Lab Results   Component Value Date/Time    NITRU Negative 05/05/2021 05:36 PM    WBCUA 4 05/05/2021 05:36 PM    BACTERIA Rare 01/31/2019 05:00 PM    RBCUA 2 05/05/2021 05:36 PM    BLOODU Negative 05/05/2021 05:36 PM    SPECGRAV 1.017 05/05/2021 05:36 PM    GLUCOSEU Negative 05/05/2021 05:36 PM    GLUCOSEU 250 12/14/2010 02:50 PM       Radiology:       CT ABDOMEN PELVIS WO CONTRAST Additional Contrast? None   Final Result   Addendum (preliminary) 1 of 1   ADDENDUM:   The 13 mm diameter near water attenuation lesion in the inferior aspect of   the right lobe of the liver has been stable since the 10/20/2025 CT scan    and   is most likely an incidental hepatic cyst and requires no further workup.       Critical discussed his case  Hold bp meds for now. Will gradually re-introduce. Aspirin stopped  ICU care x 24 hrs    DVT Prophylaxis: scd  Diet: Diet NPO Exceptions are: Ice Chips  Code Status: Full Code      Dispo - 2-3 d       Joshua Bernal MD    Thank you ROQUE Yusuf CNP for the opportunity to be involved in this patient's care. If you have any questions or concerns please feel free to contact me at [phone].

## 2022-09-13 NOTE — CONSULTS
[address] [place] [address] Nephrology   [facility]  [phone]  Nephrology Consult Note          Patient ID: Anthony Ball  Referring/ Physician: Susan Jeong MD      HPI/Summary:   Anthony Ball is being seen by nephrology for ESRD. This is a 52-year-old man with past medical history significant for hypertension, ESRD, CAD, heart failure who was admitted with nausea vomiting and anemia. He does peritoneal dialysis at home. His hemoglobin was 4.9 on admission and the repeat was 7.9 prior to getting blood. Is very fatigued and weak. He denies any coffee-ground emesis, hemoptysis but has seen dark stool black stool in the toilet. He has no edema no chest pain no abdominal pain. Of note his hemoglobin was 10.8 in the office on 8/5. Needed peritoneal dialysis. There is gas distention of the traverse colon, atherosclerosis, polycystic kidneys. Moderate size pericardial effusion    Blood pressure is 122/79  satting 97 % on room air    Plan:   -Evaluation for possible GI source of bleeding  He will be getting a unit of blood. His blood pressure is on the low side for him, will hold antihypertensives use 1.5% Dianeal to avoid too much ultrafiltration. There are no acute electrolyte abnormalities  Currently his blood pressure is acceptable  PD cycle orders placed    ESRD  Secondary to polycystic kidney disease  Also history of hypertension and atherosclerosis  He is on peritoneal dialysis and undergoing kidney transplant evaluation. He does 3 exchanges of 2.5 L for 8-1/2 hours on the cycler, no last bag fill  No pain or tenderness at the PD catheter site no purulence or drainage.   We will check a cell count culture    Acute on chronic anemia  His last hemoglobin prior to admission was 10.8  He was receiving iron and [place] as an outpatient  Undergoing evaluation for GI source and will receive IV blood transfusion    History of hypertension  Had been difficult to control as an outpatient  He is on quite a regimen including clonidine 0.3 mg 3 times daily, spironolactone 100 mg daily, Imdur 60 mg daily, metoprolol 50 mg twice daily, Dralzine 100 mg 3 times daily and losartan 100 mg daily    Secondary hyperparathyroidism  He is on Velphoro calcitriol 0.5 mcg daily        [facility] would like to thank you for the opportunity to serve this patient. Please call with any questions or concerns. Tanika Martin MD  [facility]  Mallory Professor [address], [address]  Fax: [phone]  Office: [phone]         CC/Reason for consult:   Reason for consult: ESRD  Chief Complaint   Patient presents with    Chest Pain    Shortness of Breath    Cough     Pt arrives ambulatory for eval of chest pain and sob onset 2 days ago. Pt denies radiation,denies diaphoresis,Pt sts nausea present, chest pain worse with cough           Review of Systems:   [address] All other remaining systems are negative. Constitutional:  fever, chills, weakness, weight change, fatigue,      Skin:  rash, pruritus, hair loss, bruising, dry skin, petechiae. Head, Face, Neck   headaches, swelling,  cervical adenopathy. Respiratory: shortness of breath, cough, or wheezing  Cardiovascular: chest pain, palpitations, dizzy, edema  Gastrointestinal: nausea, vomiting, diarrhea, constipation,belly pain    Yellow skin, blood in stool, dark stool  Musculoskeletal:  back pain, muscle weakness, gait problems,       joint pain or swelling. Genitourinary:  dysuria, poor urine flow, flank pain, blood in urine  Neurologic:  vertigo, TIA'S, syncope, seizures, focal weakness  Psychosocial:  insomnia, anxiety, or depression.   Additional positive findings: -     PMH/SH/FH:    Medical Hx: reviewed and updated as appropriate  Past Medical History:   Diagnosis Date    [facility]     CHF (congestive heart failure) (HCC)     CVA (cerebral infarction) 5/2015    Diabetes mellitus ([facility]     Foot ulcer, left ([facility] 1/14/2016 Gastroparesis     Hemodialysis patient [facility])     Hypercholesteremia     Hypertension     Kidney disease     Unspecified cerebral artery occlusion with cerebral infarction     5/15, 7/15 LEFT SIDE WEAKNESS         Surgical Hx: reviewed and updated as appropriate   has a past surgical history that includes [address] (N/A, 10/29/2020); Umbilical hernia repair (N/A, 10/29/2020); hc dialysis catheter (N/A, 10/29/2020); Upper gastrointestinal endoscopy (N/A, 4/26/2021); and Colonoscopy (N/A, 5/17/2021). Social Hx: reviewed and updated as appropriate  Social History     Tobacco Use    Smoking status: Some Days     Packs/day: 0.00     Years: 30.00     Pack years: 0.00     Types: Cigars, Cigarettes     Start date: 1/3/1979     Last attempt to quit: 1/16/2019     Years since quitting: 3.6    Smokeless tobacco: Never    Tobacco comments:     3 black and milds a week   Substance Use Topics    Alcohol use: Yes     Comment: occasional        Family hx: reviewed and updated as appropriate  family history is not on file. He was adopted. Medications:   buPROPion, 150 mg, BID  sodium chloride flush, 5-40 mL, 2 times per day       Doxazosin and Coconut flavor    Allergies: Allergies   Allergen Reactions    Doxazosin Nausea And Vomiting     VIOLENT VOMITTING    Coconut Flavor Rash         Physical Exam/Objective:   Vitals:    09/13/22 1500   BP: 134/81   Pulse: 94   Resp: (!) 7   Temp:    SpO2: 95%       Intake/Output Summary (Last 24 hours) at 9/13/2022 1547  Last data filed at 9/13/2022 1430  Gross per 24 hour   Intake 4.56 ml   Output --   Net 4.56 ml         General appearance: Male in no acute distress, comfortable but weak and fatigued. HEENT: no icterus, EOM intact, trachea midline. Neck : no masses, appears symmetrical and no JVD appreciated. Respiratory: Respiratory effort normal, bilateral equal chest rise. No wheezes or rales  Cardiovascular:  Ausculation shows RRR and no peripheral edema   Abdomen: abdomen is soft, non distended  Musculoskeletal:  no joint swelling, no deformity, strength grossly normal.   Skin: no rashes, no induration, no tightening, no jaundice   Neuro:   Follows commands, moves all extremities spontaneously     PD catheter site intact      Data:   CBC:   Recent Labs     09/13/22  1136 09/13/22  1331   WBC 14.6*  --    HGB 4.9* 7.9*   HCT 15.1* 24.8*     --      BMP:    Recent Labs     09/13/22  1135      K 4.4      CO2 19*   BUN 69*   CREATININE 8.2*   GLUCOSE 256*

## 2022-09-13 NOTE — OP NOTE
Endoscopy Note    Patient: Damian Lr  : 1965  Acct#:     Procedure: Esophagogastroduodenoscopy    Date:  2022     Surgeon:  Ran Locke MD,     Indications: This is a 65y.o. year old male who presents today with hematemesis and acute blood loss anemia. Anesthesia:  TIVA    Description of Procedure:  Informed consent was obtained from the patient after explanation of indications, benefits and possible risks and complications of the procedure. The patient was then taken to the endoscopy suite, placed in the left lateral decubitus position and the above IV sedation was administrered. The Olympus videoendoscope was placed in the patient's mouth and under direct visualization passed into the esophagus and advanced without difficulty to the 2nd portion of the duodenum. Views were good, patient toleration was good. Retroflexion was performed in the stomach. Findings: The esophagus showed LA grade D reflux esophagitis with some necrotic debris. There was large food in the stomach. Stomach was otherwise normal. No blood in the stool. Duodenum was normal other than the known melanosis duodeni. The scope was then withdrawn back into the stomach, it was decompressed, and the scope was completely withdrawn. The patient tolerated the procedure well and was taken to the post anesthesia care unit in good condition. Estimated blood loss: none  Specimens taken: none    Impression:     LA grade D reflux esophagitis with some necrotic debris. There was large food in the stomach. Stomach was otherwise normal. No blood in the stool. Duodenum was normal other than the known melanosis duodeni. Recommendations:   1. See my progress note.     Ran Locke MD,   [address]

## 2022-09-13 NOTE — PROGRESS NOTES
Gastroenterology Progress Note    Chelsey Tang is a 65 y.o. male patient. Principal Problem:    GI bleed  Resolved Problems:    * No resolved hospital problems. *      SUBJECTIVE:  Tolerated endoscopy    Current Facility-Administered Medications: 0.9 % sodium chloride infusion, , IntraVENous, PRN  buPROPion [facility]) extended release tablet 150 mg, 150 mg, Oral, BID  sodium chloride flush 0.9 % injection 5-40 mL, 5-40 mL, IntraVENous, 2 times per day  sodium chloride flush 0.9 % injection 5-40 mL, 5-40 mL, IntraVENous, PRN  0.9 % sodium chloride infusion, , IntraVENous, PRN  ondansetron (ZOFRAN-ODT) disintegrating tablet 4 mg, 4 mg, Oral, Q8H PRN **OR** ondansetron (ZOFRAN) injection 4 mg, 4 mg, IntraVENous, Q6H PRN  acetaminophen (TYLENOL) tablet 650 mg, 650 mg, Oral, Q6H PRN **OR** acetaminophen (TYLENOL) suppository 650 mg, 650 mg, Rectal, Q6H PRN  pantoprazole (PROTONIX) 80 mg in sodium chloride 0.9 % 100 mL infusion, 8 mg/hr, IntraVENous, Continuous  gentamicin (GARAMYCIN) 0.1 % cream, , Topical, Nightly  dianeal lo-david 1.5% 6,000 mL, 6,000 mL, IntraPERitoneal, Nightly **AND** dianeal lo-david 1.5% 1,500 mL, 1,500 mL, IntraPERitoneal, Nightly    Physical    VITALS:  /81   Pulse 94   Temp 99.3 °F (37.4 °C)   Resp (!) 7   Ht 6' 2\" (1.88 m)   Wt 169 lb 8.5 oz (76.9 kg)   SpO2 95%   BMI 21.77 kg/m²   TEMPERATURE:  Current - Temp: 99.3 °F (37.4 °C);  Max - Temp  Av.3 °F (37.4 °C)  Min: 98.4 °F (36.9 °C)  Max: 100.1 °F (37.8 °C)    NAD  Eyes: No icterus  RRR  Lungs CTA Bilaterally, normal effort  Abdomen soft, ND, NT, Bowel sounds normal.  Ext: no edema  Neuro: No tremor  Psych: A&Ox3    Data    Data Review:    Recent Labs     22  1136 22  1331 22  1549   WBC 14.6*  --  12.5*   HGB 4.9* 7.9* 7.8*   HCT 15.1* 24.8* 23.5*   MCV 94.3  --  94.4     --  132*     Recent Labs     22  1135      K 4.4      CO2 19*   BUN 69*   CREATININE 8.2*     Recent Labs     09/13/22  1135   AST 6*   ALT <5*   BILITOT 0.3   ALKPHOS 81     No results for input(s): LIPASE, AMYLASE in the last 72 hours. No results for input(s): PROTIME, INR in the last 72 hours. No results for input(s): PTT in the last 72 hours. ASSESSMENT:  65 y.o. male with a PMH of gastroparesis, CKD on peritoneal dialysis, HTN, DM, CHF, cardiomyopathy, umbilical hernia repair who presented on 9/13/2022 with chest pain, shortness of breath, cough. We have been consulted regarding acute GI bleed. Patient was found to have a hemoglobin of 4.9. He reports dark diarrhea on Sunday with no bowel movements since. He has been vomiting for the last 2 days with blood seen today. He does take ibuprofen and naproxen periodically. He reports epigastric abdominal pain and distention. He did have a low grade temp today in ED of 100.1. He reports a history of peptic ulcer disease. Patient follows with Dr. Clint Johnson for his CKD. XR showed possible small amount of free air beneath the right hemidiaphragm. May be related to peritoneal dialysis. CT showed the same. Prior endoscopies include a colonoscopy 5/2021 with Dr. 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  EGD 9/13/22 showed LA grade D reflux esophagitis with some necrotic debris. There was large food in the stomach. Stomach was otherwise normal. No blood in the stool. Duodenum was normal other than the known melanosis duodeni. Imp;  Hematemesis, possible melena, acute blood loss anemia. Hgb of 4.9 was likely an error as repeat has been in the 7's x 2. Has antibodies so can't get a transfusion. EGD showed severe reflux esophagitis as the source. No active bleeding on endoscopy. 2.  Free air: Abdominal exam is very benign. DIscussed with surgery and this is common to see with a PD catheter. No change in management as long as no peritonitis. PLAN :  Continue PPI drip.     Will monitor hemoglobin and symptoms. Thank you for allowing me to participate in the care of your patient. Please feel free to contact me with any concerns.   [address] [facility], MD

## 2022-09-13 NOTE — PROGRESS NOTES
Medication Reconciliation    List of medications patient is currently taking is complete. Source of information: 1. Conversation with patient at bedside                                      2. EPIC records      Allergies  Doxazosin and Coconut flavor     Notes regarding home medications:   1. Patient received all of his morning medications before arriving in the ER.      Tatum AvilaD Candidate 9/13/2022 1:58 PM

## 2022-09-13 NOTE — ANESTHESIA PRE PROCEDURE
[facility] Department of Anesthesiology  Pre-Anesthesia Evaluation/Consultation       Name:  Johnson Quinones  : 1965  Age:  65 y.o.                                            MRN:  [id number]  Date: 2022           Surgeon: Surgeon(s):  Tayla Vasquez MD    Procedure: Procedure(s):  EGD DIAGNOSTIC ONLY     Allergies   Allergen Reactions    Doxazosin Nausea And Vomiting     VIOLENT VOMITTING    Coconut Flavor Rash     Patient Active Problem List   Diagnosis    Nonischemic cardiomyopathy ([address]    Cerebral infarction ([address]    Cigarette nicotine dependence without complication    Erectile dysfunction due to arterial insufficiency    Renal artery stenosis (HCC)    Chronic diastolic congestive heart failure (HCC)    Major depressive disorder, recurrent episode, moderate (HCC)    Pericardial effusion    Hypertension associated with diabetes ([address]    DM type 2 with diabetic mixed hyperlipidemia ([address]    Diabetes mellitus due to underlying condition with stage 4 chronic kidney disease, with long-term current use of insulin (HCC)    CKD (chronic kidney disease) requiring chronic dialysis ([address]    ESRD (end stage renal disease) ([address]    GI bleed     Past Medical History:   Diagnosis Date    Cardiomyopathy ([address]     CHF (congestive heart failure) ([address]     CVA (cerebral infarction) 2015    Diabetes mellitus ([address]     Foot ulcer, left ([address] 2016    Gastroparesis     Hemodialysis patient ([address]     Hypercholesteremia     Hypertension     Kidney disease     Unspecified cerebral artery occlusion with cerebral infarction     5/15, 7/15 LEFT SIDE WEAKNESS     Past Surgical History:   Procedure Laterality Date    COLONOSCOPY N/A 2021    COLONOSCOPY POLYPECTOMY SNARE/COLD BIOPSY performed by Wilberto Sadler MD at [address] [place] [address] N/A 10/29/2020    PLACEMENT OF A TUNNELED DIALYSIS CATHETER WITH FLEURO AND ULTRASOUND performed by Linda Harvey MD at [facility]    LAPAROSCOPY INSERTION PERITONEAL CATHETER N/A 10/29/2020    LAPAROSCOPIC PERITONEAL DIALYSIS CATHETER PLACEMENT WITH FLEURO AND ULTRASOUND performed by Jamey Odom MD at [address] N/A 87/17/9204    UMBILICAL HERNIA REPAIR performed by Jamey Odom MD at [facility] N/A 4/26/2021    ESOPHAGOGASTRODUODENOSCOPY performed by Elliot Geiger MD at [address] [facility] History     Tobacco Use    Smoking status: Some Days     Packs/day: 0.00     Years: 30.00     Pack years: 0.00     Types: Cigars, Cigarettes     Start date: 1/3/1979     Last attempt to quit: 1/16/2019     Years since quitting: 3.6    Smokeless tobacco: Never    Tobacco comments:     3 black and milds a week   Vaping Use    Vaping Use: Never used   Substance Use Topics    Alcohol use: Yes     Comment: occasional    Drug use: Yes     Types: Marijuana Jonathan Jock)     Comment: previously used cocaine, stopped May 2015 LAST USED MARIJUANA-NOVEMBER 2020     Medications  Current Facility-Administered Medications on File Prior to Visit   Medication Dose Route Frequency Provider Last Rate Last Admin    0.9 % sodium chloride infusion   IntraVENous PRN Jerrell Brown MD        buPROPion [facility]) extended release tablet 150 mg  150 mg Oral BID Jerrell Brown MD        sodium chloride flush 0.9 % injection 5-40 mL  5-40 mL IntraVENous 2 times per day Jerrell Brown MD        sodium chloride flush 0.9 % injection 5-40 mL  5-40 mL IntraVENous PRN Jerrell Brown MD        0.9 % sodium chloride infusion   IntraVENous PRN Jerrell Brown MD        ondansetron (ZOFRAN-ODT) disintegrating tablet 4 mg  4 mg Oral Q8H PRN Jerrell Brown MD        Or    ondansetron [facility]) injection 4 mg  4 mg IntraVENous Q6H PRN Jerrell Brown MD        acetaminophen (TYLENOL) tablet 650 mg  650 mg Oral Q6H PRN Jerrell Brown MD        Or    acetaminophen (TYLENOL) suppository 650 mg  650 mg Rectal Q6H PRN Edgar Mcghee MD        pantoprazole (PROTONIX) 80 mg in sodium chloride 0.9 % 100 mL infusion  8 mg/hr IntraVENous Continuous Edgar Mcghee MD 10 mL/hr at 09/13/22 1549 8 mg/hr at 09/13/22 1549    gentamicin (GARAMYCIN) 0.1 % cream   Topical Nightly Nhung Karimi MD        dianeal lo-david 1.5% 6,000 mL  6,000 mL IntraPERitoneal Nightly Nhung Karimi MD        And    dianeal lo-david 1.5% 1,500 mL  1,500 mL IntraPERitoneal Nightly Nhung Karimi MD         Current Outpatient Medications on File Prior to Visit   Medication Sig Dispense Refill    atorvastatin (LIPITOR) 40 MG tablet TAKE 1 TABLET BY MOUTH ONCE DAILY NIGHTLY 90 tablet 3    buPROPion (WELLBUTRIN SR) 150 MG extended release tablet Take 150 mg by mouth 2 times daily      Sucroferric Oxyhydroxide (VELPHORO) 500 MG CHEW Take 1 tablet by mouth 3 times daily (with meals)      insulin detemir (LEVEMIR FLEXTOUCH) 100 UNIT/ML injection pen Inject 22 Units into the skin nightly 5 pen 3    Insulin Pen Needle ([employer] PEN NEEDLES) 31G X 6 MM MISC 1 each by Does not apply route daily 100 each 3    insulin aspart (NOVOLOG FLEXPEN) 100 UNIT/ML injection pen Inject 3 Units into the skin 3 times daily (before meals) 5 pen 3    gabapentin (NEURONTIN) 300 MG capsule TAKE 1 CAPSULE BY MOUTH THREE TIMES DAILY 270 capsule 1    Methoxy PEG-Epoetin Beta (MIRCERA IJ) Inject 75 mcg into the skin      iron sucrose (VENOFER) 20 MG/ML injection 200 mg daily      calcitRIOL (ROCALTROL) 0.25 MCG capsule 0.5 mcg daily      losartan (COZAAR) 100 MG tablet TAKE 1 TABLET BY MOUTH ONCE DAILY      hydrALAZINE (APRESOLINE) 100 MG tablet TAKE 2 TABLETS BY MOUTH THREE TIMES DAILY (Patient taking differently: Take 100 mg by mouth 3 times daily TAKE 1 TABLET BY MOUTH THREE TIMES DAILY) 270 tablet 1    cloNIDine (CATAPRES) 0.1 MG tablet Take 1 tablet by mouth 3 times daily (Patient taking differently: Take 0.3 mg by mouth 3 times daily) 90 tablet 5    NIFEdipine (PROCARDIA XL) 90 MG extended release tablet Take 1 tablet by mouth 2 times daily (Patient taking differently: Take 90 mg by mouth 3 times daily) 180 tablet 3    sodium bicarbonate 325 MG tablet Take 325 mg by mouth daily      spironolactone (ALDACTONE) 50 MG tablet Take 1 tablet by mouth once daily (Patient taking differently: 100 mg daily) 90 tablet 1    isosorbide mononitrate (IMDUR) 60 MG extended release tablet Take 1 tablet by mouth once daily 90 tablet 3    EQ ASPIRIN ADULT LOW DOSE 81 MG EC tablet Take 1 tablet by mouth once daily 90 tablet 3    metoprolol tartrate (LOPRESSOR) 50 MG tablet Take twice daily (Patient taking differently: 50 mg 2 times daily Take twice daily) 180 tablet 1     No current facility-administered medications for this visit. No current outpatient medications on file.      Facility-Administered Medications Ordered in Other Visits   Medication Dose Route Frequency Provider Last Rate Last Admin    0.9 % sodium chloride infusion   IntraVENous PRN Jono Mays MD        [facility]) extended release tablet 150 mg  150 mg Oral BID Jono Mays MD        sodium chloride flush 0.9 % injection 5-40 mL  5-40 mL IntraVENous 2 times per day Jono Mays MD        sodium chloride flush 0.9 % injection 5-40 mL  5-40 mL IntraVENous PRN Jono Mays MD        0.9 % sodium chloride infusion   IntraVENous PRN Jono Mays MD        ondansetron (ZOFRAN-ODT) disintegrating tablet 4 mg  4 mg Oral Q8H PRN Jono Mays MD        Or    ondansetron [facility]) injection 4 mg  4 mg IntraVENous Q6H PRN Jono Mays MD        acetaminophen (TYLENOL) tablet 650 mg  650 mg Oral Q6H PRN Jono Mays MD        Or   Gudelia Singer acetaminophen (TYLENOL) suppository 650 mg  650 mg Rectal Q6H PRN Jono Mays MD        pantoprazole (PROTONIX) 80 mg in sodium chloride 0.9 % 100 mL infusion  8 mg/hr IntraVENous Continuous Jono Mays MD 10 mL/hr at 09/13/22 1549 8 mg/hr at 09/13/22 1549    gentamicin (GARAMYCIN) 0.1 % cream   Topical Nightly Carlota Doran MD        dianeal lo-david 1.5% 6,000 mL  6,000 mL IntraPERitoneal Nightly Carlota Doran MD        And    dianeal lo-david 1.5% 1,500 mL  1,500 mL IntraPERitoneal Nightly Carlota Doran MD         Vital Signs (Current) There were no vitals filed for this visit. Vital Signs Statistics (for past 48 hrs)     Temp  Av.3 °F (37.4 °C)  Min: 98.4 °F (36.9 °C)   Min taken time: 22 1400  Max: 100.1 °F (37.8 °C)   Max taken time: 22 1113  Pulse  Av.8  Min: 91   Min taken time: 22 1400  Max: 101   Max taken time: 22 1113  Resp  Avg: 15.8  Min: 7   Min taken time: 22 1500  Max: 19   Max taken time: 22 1400  BP  Min: 114/73   Min taken time: 22 1230  Max: 136/81   Max taken time: 22 1445  SpO2  Av.4 %  Min: 94 %   Min taken time: 22 1445  Max: 97 %   Max taken time: 22 1150    BP Readings from Last 3 Encounters:   22 134/81   22 120/72   05/10/22 (!) 161/87     BMI  There is no height or weight on file to calculate BMI. Estimated body mass index is 21.77 kg/m² as calculated from the following:    Height as of an earlier encounter on 22: 6' 2\" (1.88 m). Weight as of an earlier encounter on 22: 169 lb 8.5 oz (76.9 kg).     CBC   Lab Results   Component Value Date/Time    WBC 12.5 2022 03:49 PM    RBC 2.49 2022 03:49 PM    HGB 7.8 2022 03:49 PM    HCT 23.5 2022 03:49 PM    MCV 94.4 2022 03:49 PM    RDW 13.8 2022 03:49 PM     2022 03:49 PM     CMP    Lab Results   Component Value Date/Time     2022 11:35 AM    K 4.4 2022 11:35 AM    K 4.9 10/26/2020 07:11 AM     2022 11:35 AM    CO2 19 2022 11:35 AM    BUN 69 2022 11:35 AM    CREATININE 8.2 2022 11:35 AM    GFRAA 8 2022 11:35 AM    GFRAA >60 2010 01:58 PM    AGRATIO 0.9 2022 11:35 AM    LABGLOM 7 2022 11:35 AM    GLUCOSE 256 2022 11:35 Plan      MAC     ASA 3     (I discussed intravenous sedation to the patient's satisfaction including risks and alternatives. The patient agreed with the plan and has no further questions. Ezekiel Goddard MD )  Induction: intravenous. Anesthetic plan and risks discussed with patient. Plan discussed with CRNA. This pre-anesthesia assessment may be used as a history and physical.    DOS STAFF ADDENDUM:    Pt seen and examined, chart reviewed (including anesthesia, drug and allergy history). No interval changes to history and physical examination. Anesthetic plan, risks, benefits, alternatives, and personnel involved discussed with patient. Questions and concerns addressed. Patient(family) verbalized an understanding and agrees to proceed.       Ezekiel Goddard MD  September 13, 2022  4:21 PM

## 2022-09-13 NOTE — ED TRIAGE NOTES
Pt arrives ambulatory for eval of chest pain and sob onset 2 days ago. Pt denies radiation,denies diaphoresis,Pt sts nausea present, chest pain worse with cough. Pt sts cardiac hx of chf. Pt is a/xo4, rsp nonlabored and pwd.

## 2022-09-13 NOTE — ANESTHESIA POSTPROCEDURE EVALUATION
Department of Anesthesiology  Postprocedure Note    Patient: Shannan Romero  MRN: [id number]  YOB: 1965  Date of evaluation: 9/13/2022      Procedure Summary     Date: 09/13/22 Room / Location: [address] [place] [address]    Anesthesia Start: 1630 Anesthesia Stop: 1646    Procedure: EGD DIAGNOSTIC ONLY Diagnosis:       Anemia, unspecified type      (Anemia, unspecified type [D64.9])    Surgeons: Ean Carpenter MD Responsible Provider: Joey Hylton MD    Anesthesia Type: MAC ASA Status: 3          Anesthesia Type: No value filed.     Kasey Phase I:      Kasey Phase II:        Anesthesia Post Evaluation    Patient location during evaluation: PACU  Level of consciousness: awake and alert  Airway patency: patent  Nausea & Vomiting: no nausea and no vomiting  Complications: no  Cardiovascular status: blood pressure returned to baseline  Respiratory status: acceptable  Hydration status: euvolemic  Comments: Postoperative Anesthesia Note    Name:    Shannan Romero  MRN:      [id number]    Patient Vitals in the past 12 hrs:  09/13/22 1634, BP:128/77, Pulse:89, Resp:16, SpO2:99 %  09/13/22 1600, BP:126/66, Pulse:90, Resp:16, SpO2:96 %  09/13/22 1554, Height:6' 2\" (1.88 m), Weight:169 lb 8.5 oz (76.9 kg)  09/13/22 1543, BP:122/79, Temp:99.1 °F (37.3 °C), Temp src:Oral, Pulse:90, Resp:15, SpO2:98 %  09/13/22 1500, BP:134/81, Pulse:94, Resp:(!) 7, SpO2:95 %  09/13/22 1445, BP:136/81, Temp:99.3 °F (37.4 °C), Pulse:94, Resp:15, SpO2:94 %  09/13/22 1430, BP:129/80, Pulse:92, Resp:14, SpO2:95 %  09/13/22 1400, BP:126/73, Temp:98.4 °F (36.9 °C), Pulse:91, Resp:19, SpO2:96 %  09/13/22 1230, BP:114/73, Pulse:93, Resp:19, SpO2:96 %  09/13/22 1210, BP:117/72, Pulse:96, Resp:15, SpO2:94 %  09/13/22 1150, BP:122/75, Pulse:96, Resp:19, SpO2:97 %  09/13/22 1145, BP:121/75, Pulse:96, Resp:18, SpO2:95 %  09/13/22 1113, BP:126/76, Temp:100.1 °F (37.8 °C), Temp src:Oral, Pulse:(!) 101, Resp:16, SpO2:97 %, Height:6' 2\" (1.88 m), Weight:164 lb 14.5 oz (74.8 kg)     LABS:    CBC  Lab Results       Component                Value               Date/Time                  WBC                      12.5 (H)            09/13/2022 03:49 PM        HGB                      7.8 (L)             09/13/2022 03:49 PM        HCT                      23.5 (L)            09/13/2022 03:49 PM        PLT                      132 (L)             09/13/2022 03:49 PM   RENAL  Lab Results       Component                Value               Date/Time                  NA                       139                 09/13/2022 11:35 AM        K                        4.4                 09/13/2022 11:35 AM        K                        4.9                 10/26/2020 07:11 AM        CL                       101                 09/13/2022 11:35 AM        CO2                      19 (L)              09/13/2022 11:35 AM        BUN                      69 (H)              09/13/2022 11:35 AM        CREATININE               8.2 (HH)            09/13/2022 11:35 AM        GLUCOSE                  256 (H)             09/13/2022 11:35 AM   COAGS  Lab Results       Component                Value               Date/Time                  PROTIME                  10.5                08/16/2015 04:55 AM        INR                      0.97                08/16/2015 04:55 AM        APTT                     36.1                08/13/2015 08:30 AM     Intake & Output:  @69OOBL@    Nausea & Vomiting:  No    Level of Consciousness:  Awake    Pain Assessment:  Adequate analgesia    Anesthesia Complications:  No apparent anesthetic complications    SUMMARY      Vital signs stable  OK to discharge from Stage I post anesthesia care.   Care transferred from Anesthesiology department on discharge from perioperative area

## 2022-09-13 NOTE — PROGRESS NOTES
Medications administered and monitored by CRNA, see anesthesia record.     Electronically signed by Thea Zuinga RN on 9/13/2022 at 4:35 PM

## 2022-09-13 NOTE — ED PROVIDER NOTES
[facility]  eMERGENCY dEPARTMENT eNCOUnter      Pt Name: Shruthi Lucero  MRN: [id number]  [place] 1965  Date of evaluation: 9/13/2022  Provider: Leroy Diaz MD    [address] [place] [address]       Chief Complaint   Patient presents with    Chest Pain    Shortness of Breath    Cough     Pt arrives ambulatory for eval of chest pain and sob onset 2 days ago. Pt denies radiation,denies diaphoresis,Pt sts nausea present, chest pain worse with cough         CRITICAL CARE TIME   Total Critical Care time was a minimum of 35 minutes, excluding separately reportable procedures. There was a high probability of clinically significant/life threatening deterioration in the patient's condition which required my urgent intervention. Equal care time includes my initial evaluation, ongoing bedside care and reassessment, review of laboratory, EKG, chest x-ray, review of old records, consultation with GI, nephrology, and the hospitalist.  No procedure time was included. HISTORY OF PRESENT ILLNESS  (Location/Symptom, Timing/Onset, Context/Setting, Quality, Duration, Modifying Factors, Severity.)   Shruthi Lucero is a 65 y.o. male who presents to the emergency department any of chest pain, shortness of breath, and cough. He has had the symptoms for 2 days. His chest pain is substernal, it somewhat pressure-like. He states it may be somewhat worse when he coughs. He does have a mild cough. The cough is nonproductive. He says he is a little more short of breath than usual.  States he did have some nausea and vomited several times last night. Fever at home. Patient has chronic kidney disease, he is on peritoneal dialysis. He last did his peritoneal dialysis this morning. Patient's wife came in a short time later. She states that when he vomited this morning that the vomitus was coffee-ground in appearance.   She states she only vomited a large amount once this morning, the patient states it was twice. He states his stool was dark green in color. Nursing Notes were reviewed and I agree. REVIEW OF SYSTEMS    (2-9 systems for level 4, 10 or more for level 5)     Neuro: Denies fever. HEENT: No earache rhinorrhea or sore throat. Cardiovascular: Pressure-like substernal chest pain for 2 days, constant, intermittently worse. Pulmonary: Mild cough, nonproductive. He states he is a little more short of breath than usual.  GI: No abdominal pain. He had some nausea and vomiting last night. He vomited several times. No diarrhea. Musculoskeletal: No leg pain or leg swelling. Neuro: No headache or dizziness. Except as noted above the remainder of the review of systems was reviewed and negative.        PAST MEDICAL HISTORY     Past Medical History:   Diagnosis Date    Cardiomyopathy [facility])     CHF (congestive heart failure) ([facility]     CVA (cerebral infarction) 5/2015    Diabetes mellitus ([facility]     Foot ulcer, left ([facility] 1/14/2016    Gastroparesis     Hemodialysis patient ([facility]     Hypercholesteremia     Hypertension     Kidney disease     Unspecified cerebral artery occlusion with cerebral infarction     5/15, 7/15 LEFT SIDE WEAKNESS         SURGICAL HISTORY       Past Surgical History:   Procedure Laterality Date    COLONOSCOPY N/A 5/17/2021    COLONOSCOPY POLYPECTOMY SNARE/COLD BIOPSY performed by Amalia Urias MD at [address] [facility] N/A 10/29/2020    PLACEMENT OF A TUNNELED DIALYSIS CATHETER WITH FLEURO AND ULTRASOUND performed by Hiram Poole MD at [address] N/A 10/29/2020    LAPAROSCOPIC PERITONEAL DIALYSIS CATHETER PLACEMENT WITH FLEURO AND ULTRASOUND performed by Hiram Poole MD at [address] [facility] N/A 44/28/8907    UMBILICAL HERNIA REPAIR performed by Hiram Poole MD at [address] [facility] 4/26/2021    ESOPHAGOGASTRODUODENOSCOPY performed by Cody Verdugo MD at [address]        Previous Medications    ASPIRIN 81 MG EC TABLET    Take by mouth daily    ATORVASTATIN (LIPITOR) 40 MG TABLET    TAKE 1 TABLET BY MOUTH ONCE DAILY NIGHTLY    CALCITRIOL (ROCALTROL) 0.25 MCG CAPSULE    daily    CLONIDINE (CATAPRES) 0.1 MG TABLET    Take 1 tablet by mouth 3 times daily    EQ ASPIRIN ADULT LOW DOSE 81 MG EC TABLET    Take 1 tablet by mouth once daily    GABAPENTIN (NEURONTIN) 300 MG CAPSULE    TAKE 1 CAPSULE BY MOUTH THREE TIMES DAILY    HYDRALAZINE (APRESOLINE) 100 MG TABLET    TAKE 2 TABLETS BY MOUTH THREE TIMES DAILY    INSULIN ASPART (NOVOLOG FLEXPEN) 100 UNIT/ML INJECTION PEN    Inject 3 Units into the skin 3 times daily (before meals)    INSULIN DETEMIR (LEVEMIR FLEXTOUCH) 100 UNIT/ML INJECTION PEN    Inject 22 Units into the skin nightly    INSULIN PEN NEEDLE ([employer] PEN NEEDLES) 31G X 6 MM MISC    1 each by Does not apply route daily    IRON SUCROSE (VENOFER) 20 MG/ML INJECTION    200 mg daily    ISOSORBIDE MONONITRATE (IMDUR) 60 MG EXTENDED RELEASE TABLET    Take 1 tablet by mouth once daily    LOSARTAN (COZAAR) 100 MG TABLET    TAKE 1 TABLET BY MOUTH ONCE DAILY    METHOXY PEG-EPOETIN BETA (MIRCERA IJ)    Inject 75 mcg into the skin    METOPROLOL TARTRATE (LOPRESSOR) 50 MG TABLET    Take twice daily    NIFEDIPINE (PROCARDIA XL) 90 MG EXTENDED RELEASE TABLET    Take 1 tablet by mouth 2 times daily    SODIUM BICARBONATE 325 MG TABLET    Take 325 mg by mouth daily    SPIRONOLACTONE (ALDACTONE) 50 MG TABLET    Take 1 tablet by mouth once daily    SUCROFERRIC OXYHYDROXIDE (VELPHORO) 500 MG CHEW    Take 1 tablet by mouth 3 times daily (with meals)    TORSEMIDE (DEMADEX) 20 MG TABLET    Take 1 tablet by mouth as needed       ALLERGIES     Doxazosin and Coconut flavor    FAMILY HISTORY       Family History   Adopted: Yes          SOCIAL HISTORY       Social History     Socioeconomic History    Marital status:  Spouse name: None    Number of children: 5    Years of education: None    Highest education level: None   Tobacco Use    Smoking status: Some Days     Packs/day: 0.00     Years: 30.00     Pack years: 0.00     Types: Cigars, Cigarettes     Start date: 1/3/1979     Last attempt to quit: 1/16/2019     Years since quitting: 3.6    Smokeless tobacco: Never    Tobacco comments:     3 black and milds a week   Vaping Use    Vaping Use: Never used   Substance and Sexual Activity    Alcohol use: Yes     Comment: occasional    Drug use: Yes     Types: Marijuana [place])     Comment: previously used cocaine, stopped May 2015 LAST USED MARIJUANA-NOVEMBER 2020    Sexual activity: Yes     Partners: Female   Social History Narrative    Lives with my spouse, cousin, daughter         PHYSICAL EXAM    (up to 7 for level 4, 8 or more for level 5)     ED Triage Vitals [09/13/22 1113]   BP Temp Temp Source Heart Rate Resp SpO2 Height Weight   126/76 100.1 °F (37.8 °C) Oral (!) 101 16 97 % 6' 2\" (1.88 m) 164 lb 14.5 oz (74.8 kg)       General: Alert black male in no acute distress. Head: Atraumatic and normocephalic. Eyes: No conjunctival injection. No pallor. Pupils equal round reactive. ENT: Rozetta Brighter is clear. Oropharynx is moist without erythema. Neck: Supple, nontender, no adenopathy. Heart: Regular rate and rhythm. No murmurs or gallops noted. Lungs: Breath sounds decreased in the bases with basilar rales. No wheezes. No retractions or accessory muscle use. Speaks in full sentences. Abdomen: Obese, soft, nontender. Peritoneal dialysis catheter noted. No masses organomegaly. Rectal exam: His stool is dark green/black in color. There is no gross blood. Possible mass in the rectum. Musculoskeletal: No lower extremity edema. No calf tenderness. Intact symmetrical distal pulses. Skin: Warm and dry, good turgor. No pallor or cyanosis. No diaphoresis. Neuro: Awake, alert, oriented. Symmetrical reactive pupils.   Intact extraocular movements. No facial asymmetry. Symmetrical motor function      DIFFERENTIAL DIAGNOSIS   Differential includes but is not limited to COVID-19, influenza, pneumonia, bronchitis, myocardial infarction, congestive heart failure, other. DIAGNOSTIC RESULTS     EKG: All EKG's are interpreted by Eren Cardona MD in the absence of a cardiologist.    Normal sinus rhythm, rate of 99, left atrial enlargement, age-indeterminate septal infarct. Rhythm strip shows a sinus rhythm with a rate of 99, [place] interval 160 ms, QRS 90 ms with no other ectopy as interpreted by me. This is compared to an EKG dated 2/11/2022 previously noted lateral ischemic changes no longer present. No other significant changes noted. RADIOLOGY:   Non-plain film images such as CT, Ultrasound and MRI are read by the radiologist. Plain radiographic images are visualized and preliminarily interpreted Eren Cardona MD with the below findings:      Interpretation per the Radiologist below, if available at the time of this note:    XR CHEST PORTABLE   Final Result   1. Possible small amount of free air beneath the right hemidiaphragm. If   there is free air, this might be related to peritoneal dialysis. A left   lateral decubitus view of the abdomen may be helpful for further evaluation. CT imaging of the abdomen and pelvis could also be considered. 2. Left basilar opacities which are predominantly linear and favored to   represent atelectasis as the left hemidiaphragm is elevated. Pneumonia is   considered less likely.    Critical results were called by Dr. Nile Rodrigez to Dr. Belén Becker on   9/13/2022 at 11:42 am.               ED BEDSIDE ULTRASOUND:   Performed by ED Physician - none    LABS:  Labs Reviewed   CBC WITH AUTO DIFFERENTIAL - Abnormal; Notable for the following components:       Result Value    WBC 14.6 (*)     RBC 1.60 (*)     Hemoglobin 4.9 (*)     Hematocrit 15.1 (*)     Neutrophils Absolute 12.4 (*)     Lymphocytes Absolute 0.8 (*)     Monocytes Absolute 1.4 (*)     All other components within normal limits    Narrative:     Hersonkrystian Matias tel. [phone],  Hematology results called to and read back by Jeniffer French RN, 09/13/2022  12:17, by [address] [place] [address] - Abnormal; Notable for the following components:    CO2 19 (*)     Anion Gap 19 (*)     Glucose 256 (*)     BUN 69 (*)     Creatinine 8.2 (*)     GFR Non- 7 (*)     GFR African American 8 (*)     Albumin/Globulin Ratio 0.9 (*)     ALT <5 (*)     AST 6 (*)     All other components within normal limits    Narrative:     CALL  Dennis  [address] [place] [address] tel. [phone],  Chemistry results called to and read back by CHANDU French, 09/13/2022  12:55, by JUANA   TROPONIN - Abnormal; Notable for the following components:    Troponin 0.15 (*)     All other components within normal limits   BLOOD GAS, VENOUS - Abnormal; Notable for the following components:    pCO2, Ruel 38.7 (*)     HCO3, Venous 22 (*)     All other components within normal limits   RAPID INFLUENZA A/B ANTIGENS   COVID-19, RAPID   CULTURE, BLOOD 2   CULTURE, BLOOD 1   LACTATE, SEPSIS   BETA-HYDROXYBUTYRATE    Narrative:     Herson Florencio tel. [phone],  Chemistry results called to and read back by CHANDU French, 09/13/2022  12:55, by JUANA   BLOOD OCCULT STOOL DIAGNOSTIC    Narrative:     ORDER#: [id number]                          ORDERED BY: Nat Melvin  SOURCE: Stool                              COLLECTED:  09/13/22 13:11  ANTIBIOTICS AT [facility].:                      RECEIVED :  09/13/22 13:21   LACTATE, SEPSIS   HEMOGLOBIN AND HEMATOCRIT   TYPE AND SCREEN   PREPARE RBC (CROSSMATCH)       All other labs were within normal range or not returned as of this dictation.     EMERGENCY DEPARTMENT COURSE and DIFFERENTIAL DIAGNOSIS/MDM:   Vitals:    Vitals:    09/13/22 1145 09/13/22 1150 09/13/22 1210 09/13/22 1230   BP: 121/75 122/75 117/72 114/73   Pulse: 96 96 96 93   Resp: 18 19 15 19   Temp:       TempSrc:       SpO2: 95% 97% 94% 96%   Weight:       Height:           1255:  Discussed with Dr. Alfonzo Bentley. Keep NPO.    1325:  Discussed with Dr. Edward Garza. She will follow patient. This patient presented to triage complaining of chest pain. Further discussion with the patient is that he has had some nausea and vomiting since last night. His wife revealed that the vomitus this morning was coffee-ground in appearance. The patient has chronic kidney disease. He is on peritoneal dialysis. He did his last dialysis this morning. His only pain is in his lower chest area. He denies abdominal pain. His abdomen is soft and nontender. His hemoglobin is 4.9. His most recent comparison in February is 11. Ms. 4.4. His bicarb is 19 with an anion gap of 19. Troponin is elevated at 0.15. His chest x-ray did show a small amount of free air beneath the right diaphragm. Patient was placed on Protonix. A unit of packed red blood cells was ordered. I consulted GI, I spoke with Dr. Alfonzo Bentley. I consulted nephrology, I spoke with Dr. Edward Garza. Hospitalist was consulted for admission. Based on his clinical exam, his abdomen is nontender, I feel that it is likely that the free air is related to his peritoneal dialysis. I did discuss this finding with the consultants above. Test results, diagnosis, and treatment plan were discussed with the patient and his wife. They understand the treatment plan and need for admission and are agreeable          CONSULTS:  IP CONSULT TO GI  IP CONSULT TO NEPHROLOGY  IP CONSULT TO HOSPITALIST    PROCEDURES:  None    FINAL IMPRESSION      1. UGI bleed    2. Anemia due to acute blood loss    3. Hematemesis with nausea    4. Chest pain, unspecified type          DISPOSITION/PLAN   DISPOSITION Decision To Admit 09/13/2022 01:35:22 PM      PATIENT REFERRED TO:  No follow-up provider specified.     DISCHARGE MEDICATIONS:  New Prescriptions    No medications on file       (Please note that portions of this note were completed with a voice recognition program.  Efforts were made to edit the dictations but occasionally words are mis-transcribed.)    Leonie Martinez MD  Attending Emergency Physician        Joo Glez MD  09/13/22 2493

## 2022-09-13 NOTE — ED NOTES
Patient remains alert and oreinted. Pain score and patient condition reviewed. No acute distress noted. Report given to [facility] RN recieving patient.        Malika Johnson RN  09/13/22 6445

## 2022-09-13 NOTE — ED NOTES
Blood transfusion has been fully reviewed with the patient and written informed consent has been obtained.        Divine Mehta RN  09/13/22 1993

## 2022-09-14 LAB
A/G RATIO: 1.1 (ref 1.1–2.2)
ALBUMIN SERPL-MCNC: 3.1 G/DL (ref 3.4–5)
ALP BLD-CCNC: 62 U/L (ref 40–129)
ALT SERPL-CCNC: <5 U/L (ref 10–40)
ANION GAP SERPL CALCULATED.3IONS-SCNC: 16 MMOL/L (ref 3–16)
APPEARANCE FLUID: CLEAR
APTT: 30.5 SEC (ref 23–34.3)
AST SERPL-CCNC: <5 U/L (ref 15–37)
BASOPHILS ABSOLUTE: ABNORMAL K/UL (ref 0–0.2)
BASOPHILS RELATIVE PERCENT: ABNORMAL %
BILIRUB SERPL-MCNC: 0.3 MG/DL (ref 0–1)
BUN BLDV-MCNC: 70 MG/DL (ref 7–20)
CALCIUM SERPL-MCNC: 7.9 MG/DL (ref 8.3–10.6)
CELL COUNT FLUID TYPE: NORMAL
CHLORIDE BLD-SCNC: 103 MMOL/L (ref 99–110)
CLOT EVALUATION: NORMAL
CO2: 17 MMOL/L (ref 21–32)
COLOR FLUID: NORMAL
CREAT SERPL-MCNC: 8 MG/DL (ref 0.9–1.3)
EOSINOPHIL FLUID: 1 %
EOSINOPHILS ABSOLUTE: ABNORMAL K/UL (ref 0–0.6)
EOSINOPHILS RELATIVE PERCENT: ABNORMAL %
GFR AFRICAN AMERICAN: 8
GFR NON-AFRICAN AMERICAN: 7
GLUCOSE BLD-MCNC: 126 MG/DL (ref 70–99)
GLUCOSE BLD-MCNC: 152 MG/DL (ref 70–99)
GLUCOSE BLD-MCNC: 160 MG/DL (ref 70–99)
GLUCOSE BLD-MCNC: 246 MG/DL (ref 70–99)
HCT VFR BLD CALC: 23.6 % (ref 40.5–52.5)
HCT VFR BLD CALC: 24.4 % (ref 40.5–52.5)
HCT VFR BLD CALC: 25 % (ref 40.5–52.5)
HCT VFR BLD CALC: 25.4 % (ref 40.5–52.5)
HCT VFR BLD CALC: ABNORMAL % (ref 40.5–52.5)
HEMOGLOBIN: 7.7 G/DL (ref 13.5–17.5)
HEMOGLOBIN: 7.9 G/DL (ref 13.5–17.5)
HEMOGLOBIN: 8.1 G/DL (ref 13.5–17.5)
HEMOGLOBIN: 8.1 G/DL (ref 13.5–17.5)
HEMOGLOBIN: ABNORMAL G/DL (ref 13.5–17.5)
INR BLD: 1.14 (ref 0.87–1.14)
IRON SATURATION: 30 % (ref 20–50)
IRON: 43 UG/DL (ref 59–158)
LYMPHOCYTES ABSOLUTE: ABNORMAL K/UL (ref 1–5.1)
LYMPHOCYTES RELATIVE PERCENT: ABNORMAL %
LYMPHOCYTES, BODY FLUID: 2 %
MCH RBC QN AUTO: ABNORMAL PG (ref 26–34)
MCHC RBC AUTO-ENTMCNC: ABNORMAL G/DL (ref 31–36)
MCV RBC AUTO: ABNORMAL FL (ref 80–100)
MONOCYTE, FLUID: 41 %
MONOCYTES ABSOLUTE: ABNORMAL K/UL (ref 0–1.3)
MONOCYTES RELATIVE PERCENT: ABNORMAL %
NEUTROPHIL, FLUID: 56 %
NEUTROPHILS ABSOLUTE: ABNORMAL K/UL (ref 1.7–7.7)
NEUTROPHILS RELATIVE PERCENT: ABNORMAL %
NUCLEATED CELLS FLUID: 22 /CUMM
NUMBER OF CELLS COUNTED FLUID: 100
PDW BLD-RTO: ABNORMAL % (ref 12.4–15.4)
PERFORMED ON: ABNORMAL
PLATELET # BLD: ABNORMAL K/UL (ref 135–450)
PMV BLD AUTO: ABNORMAL FL (ref 5–10.5)
POTASSIUM REFLEX MAGNESIUM: 4.5 MMOL/L (ref 3.5–5.1)
PROTHROMBIN TIME: 14.5 SEC (ref 11.7–14.5)
RBC # BLD: ABNORMAL M/UL (ref 4.2–5.9)
RBC FLUID: <2000 /CUMM
REASON FOR REJECTION: NORMAL
REJECTED TEST: NORMAL
SODIUM BLD-SCNC: 136 MMOL/L (ref 136–145)
TOTAL IRON BINDING CAPACITY: 145 UG/DL (ref 260–445)
TOTAL PROTEIN: 6 G/DL (ref 6.4–8.2)
WBC # BLD: ABNORMAL K/UL (ref 4–11)

## 2022-09-14 PROCEDURE — 85014 HEMATOCRIT: CPT

## 2022-09-14 PROCEDURE — 83550 IRON BINDING TEST: CPT

## 2022-09-14 PROCEDURE — A4722 DIALYS SOL FLD VOL > 1999CC: HCPCS | Performed by: INTERNAL MEDICINE

## 2022-09-14 PROCEDURE — 2580000003 HC RX 258: Performed by: INTERNAL MEDICINE

## 2022-09-14 PROCEDURE — 6370000000 HC RX 637 (ALT 250 FOR IP): Performed by: INTERNAL MEDICINE

## 2022-09-14 PROCEDURE — 6360000002 HC RX W HCPCS: Performed by: INTERNAL MEDICINE

## 2022-09-14 PROCEDURE — 85730 THROMBOPLASTIN TIME PARTIAL: CPT

## 2022-09-14 PROCEDURE — 80053 COMPREHEN METABOLIC PANEL: CPT

## 2022-09-14 PROCEDURE — 94760 N-INVAS EAR/PLS OXIMETRY 1: CPT

## 2022-09-14 PROCEDURE — 87205 SMEAR GRAM STAIN: CPT

## 2022-09-14 PROCEDURE — 87070 CULTURE OTHR SPECIMN AEROBIC: CPT

## 2022-09-14 PROCEDURE — 85018 HEMOGLOBIN: CPT

## 2022-09-14 PROCEDURE — 36415 COLL VENOUS BLD VENIPUNCTURE: CPT

## 2022-09-14 PROCEDURE — 89051 BODY FLUID CELL COUNT: CPT

## 2022-09-14 PROCEDURE — C9113 INJ PANTOPRAZOLE SODIUM, VIA: HCPCS | Performed by: INTERNAL MEDICINE

## 2022-09-14 PROCEDURE — 83540 ASSAY OF IRON: CPT

## 2022-09-14 PROCEDURE — 2060000000 HC ICU INTERMEDIATE R&B

## 2022-09-14 PROCEDURE — 85610 PROTHROMBIN TIME: CPT

## 2022-09-14 RX ORDER — CLONIDINE HYDROCHLORIDE 0.1 MG/1
0.2 TABLET ORAL 3 TIMES DAILY
Status: DISCONTINUED | OUTPATIENT
Start: 2022-09-14 | End: 2022-09-16 | Stop reason: HOSPADM

## 2022-09-14 RX ORDER — SUCRALFATE 1 G/1
1 TABLET ORAL EVERY 6 HOURS SCHEDULED
Status: DISCONTINUED | OUTPATIENT
Start: 2022-09-14 | End: 2022-09-16 | Stop reason: HOSPADM

## 2022-09-14 RX ORDER — OXYCODONE HYDROCHLORIDE AND ACETAMINOPHEN 5; 325 MG/1; MG/1
1 TABLET ORAL EVERY 4 HOURS PRN
Status: DISCONTINUED | OUTPATIENT
Start: 2022-09-14 | End: 2022-09-16 | Stop reason: HOSPADM

## 2022-09-14 RX ORDER — LOSARTAN POTASSIUM 100 MG/1
100 TABLET ORAL DAILY
Status: DISCONTINUED | OUTPATIENT
Start: 2022-09-14 | End: 2022-09-16 | Stop reason: HOSPADM

## 2022-09-14 RX ORDER — OXYCODONE HYDROCHLORIDE AND ACETAMINOPHEN 5; 325 MG/1; MG/1
2 TABLET ORAL EVERY 4 HOURS PRN
Status: DISCONTINUED | OUTPATIENT
Start: 2022-09-14 | End: 2022-09-16 | Stop reason: HOSPADM

## 2022-09-14 RX ADMIN — ACETAMINOPHEN 650 MG: 325 TABLET, FILM COATED ORAL at 02:11

## 2022-09-14 RX ADMIN — SODIUM CHLORIDE 8 MG/HR: 9 INJECTION, SOLUTION INTRAVENOUS at 10:16

## 2022-09-14 RX ADMIN — GENTAMICIN SULFATE: 1 CREAM TOPICAL at 21:41

## 2022-09-14 RX ADMIN — SODIUM CHLORIDE, SODIUM LACTATE, CALCIUM CHLORIDE, MAGNESIUM CHLORIDE AND DEXTROSE 1500 ML: 1.5; 538; 448; 18.3; 5.08 INJECTION, SOLUTION INTRAPERITONEAL at 00:02

## 2022-09-14 RX ADMIN — SUCRALFATE 1 G: 1 TABLET ORAL at 18:07

## 2022-09-14 RX ADMIN — SUCRALFATE 1 G: 1 TABLET ORAL at 10:26

## 2022-09-14 RX ADMIN — OXYCODONE AND ACETAMINOPHEN 2 TABLET: 5; 325 TABLET ORAL at 12:14

## 2022-09-14 RX ADMIN — OXYCODONE AND ACETAMINOPHEN 1 TABLET: 5; 325 TABLET ORAL at 19:24

## 2022-09-14 RX ADMIN — BUPROPION HYDROCHLORIDE 150 MG: 150 TABLET, EXTENDED RELEASE ORAL at 21:27

## 2022-09-14 RX ADMIN — LOSARTAN POTASSIUM 100 MG: 100 TABLET, FILM COATED ORAL at 14:21

## 2022-09-14 RX ADMIN — ONDANSETRON 4 MG: 4 TABLET, ORALLY DISINTEGRATING ORAL at 13:29

## 2022-09-14 RX ADMIN — SODIUM CHLORIDE, SODIUM LACTATE, CALCIUM CHLORIDE, MAGNESIUM CHLORIDE AND DEXTROSE 6000 ML: 1.5; 538; 448; 18.3; 5.08 INJECTION, SOLUTION INTRAPERITONEAL at 00:00

## 2022-09-14 RX ADMIN — HEPARIN SODIUM: 1000 INJECTION INTRAVENOUS; SUBCUTANEOUS at 22:54

## 2022-09-14 RX ADMIN — HEPARIN SODIUM: 1000 INJECTION INTRAVENOUS; SUBCUTANEOUS at 22:57

## 2022-09-14 RX ADMIN — CLONIDINE HYDROCHLORIDE 0.2 MG: 0.1 TABLET ORAL at 14:21

## 2022-09-14 RX ADMIN — BUPROPION HYDROCHLORIDE 150 MG: 150 TABLET, EXTENDED RELEASE ORAL at 09:43

## 2022-09-14 RX ADMIN — Medication 10 ML: at 21:29

## 2022-09-14 RX ADMIN — CLONIDINE HYDROCHLORIDE 0.2 MG: 0.1 TABLET ORAL at 21:27

## 2022-09-14 ASSESSMENT — PAIN DESCRIPTION - PAIN TYPE
TYPE: ACUTE PAIN
TYPE: ACUTE PAIN

## 2022-09-14 ASSESSMENT — PAIN SCALES - GENERAL
PAINLEVEL_OUTOF10: 3
PAINLEVEL_OUTOF10: 8
PAINLEVEL_OUTOF10: 9
PAINLEVEL_OUTOF10: 4
PAINLEVEL_OUTOF10: 6
PAINLEVEL_OUTOF10: 7
PAINLEVEL_OUTOF10: 9
PAINLEVEL_OUTOF10: 0
PAINLEVEL_OUTOF10: 0
PAINLEVEL_OUTOF10: 7
PAINLEVEL_OUTOF10: 4
PAINLEVEL_OUTOF10: 0

## 2022-09-14 ASSESSMENT — PAIN DESCRIPTION - LOCATION
LOCATION: THROAT
LOCATION: THROAT
LOCATION: BACK;ABDOMEN
LOCATION: THROAT

## 2022-09-14 ASSESSMENT — PAIN - FUNCTIONAL ASSESSMENT
PAIN_FUNCTIONAL_ASSESSMENT: ACTIVITIES ARE NOT PREVENTED
PAIN_FUNCTIONAL_ASSESSMENT: PREVENTS OR INTERFERES SOME ACTIVE ACTIVITIES AND ADLS
PAIN_FUNCTIONAL_ASSESSMENT: ACTIVITIES ARE NOT PREVENTED
PAIN_FUNCTIONAL_ASSESSMENT: PREVENTS OR INTERFERES SOME ACTIVE ACTIVITIES AND ADLS

## 2022-09-14 ASSESSMENT — PAIN DESCRIPTION - DESCRIPTORS
DESCRIPTORS: ACHING;CRAMPING;DISCOMFORT
DESCRIPTORS: BURNING;DISCOMFORT
DESCRIPTORS: BURNING;DISCOMFORT
DESCRIPTORS: BURNING

## 2022-09-14 ASSESSMENT — PAIN DESCRIPTION - ORIENTATION
ORIENTATION: RIGHT;LEFT
ORIENTATION: LOWER
ORIENTATION: MID
ORIENTATION: MID;LOWER

## 2022-09-14 ASSESSMENT — PAIN DESCRIPTION - ONSET
ONSET: ON-GOING
ONSET: ON-GOING

## 2022-09-14 ASSESSMENT — PAIN DESCRIPTION - FREQUENCY
FREQUENCY: INTERMITTENT
FREQUENCY: INTERMITTENT

## 2022-09-14 NOTE — PROGRESS NOTES
Late entry due to patient care. Patient arrived to unit via stretcher. Patient is alert and oriented but lethargic at this time. VSS on room air, denies pain at this time. Patient is on protonix drip and has two peripheral Ivs. Patient has PD catheter in LLQ, no signs of infection or tenderness. Patient's wife stated he had treatment last night without issues. Patient did not allow RN to assess his skin but patient and wife stated he has no wounds in his sacrum or anywhere else, he ambulates frequently at home.

## 2022-09-14 NOTE — PLAN OF CARE
Problem: Discharge Planning  Goal: Discharge to home or other facility with appropriate resources  Outcome: Progressing  Flowsheets  Taken 9/13/2022 2000 by Macario Shirley RN  Discharge to home or other facility with appropriate resources:   Identify barriers to discharge with patient and caregiver   Arrange for needed discharge resources and transportation as appropriate   Identify discharge learning needs (meds, wound care, etc)   Arrange for interpreters to assist at discharge as needed  Taken 9/13/2022 1543 by Donna Arce RN  Discharge to home or other facility with appropriate resources: Identify barriers to discharge with patient and caregiver     Problem: Pain  Goal: Verbalizes/displays adequate comfort level or baseline comfort level  Outcome: Progressing  Flowsheets  Taken 9/13/2022 2000 by Macario Shirley RN  Verbalizes/displays adequate comfort level or baseline comfort level:   Encourage patient to monitor pain and request assistance   Assess pain using appropriate pain scale   Administer analgesics based on type and severity of pain and evaluate response   Implement non-pharmacological measures as appropriate and evaluate response   Consider cultural and social influences on pain and pain management   Notify Licensed Independent Practitioner if interventions unsuccessful or patient reports new pain  Taken 9/13/2022 1543 by Donna Arce RN  Verbalizes/displays adequate comfort level or baseline comfort level:   Encourage patient to monitor pain and request assistance   Assess pain using appropriate pain scale   Administer analgesics based on type and severity of pain and evaluate response   Implement non-pharmacological measures as appropriate and evaluate response     Problem: Chronic Conditions and Co-morbidities  Goal: Patient's chronic conditions and co-morbidity symptoms are monitored and maintained or improved  Outcome: Progressing  Flowsheets  Taken 9/13/2022 2000 by Macario Shirley RN  Care Plan - Patient's Chronic Conditions and Co-Morbidity Symptoms are Monitored and Maintained or Improved:   Monitor and assess patient's chronic conditions and comorbid symptoms for stability, deterioration, or improvement   Collaborate with multidisciplinary team to address chronic and comorbid conditions and prevent exacerbation or deterioration   Update acute care plan with appropriate goals if chronic or comorbid symptoms are exacerbated and prevent overall improvement and discharge  Taken 9/13/2022 1543 by Suma Clarke RN  Care Plan - Patient's Chronic Conditions and Co-Morbidity Symptoms are Monitored and Maintained or Improved: Monitor and assess patient's chronic conditions and comorbid symptoms for stability, deterioration, or improvement     Problem: Safety - Adult  Goal: Free from fall injury  Outcome: Progressing  Flowsheets (Taken 9/13/2022 2110)  Free From Fall Injury:   Instruct family/caregiver on patient safety   Based on caregiver fall risk screen, instruct family/caregiver to ask for assistance with transferring infant if caregiver noted to have fall risk factors     Problem: Skin/Tissue Integrity  Goal: Absence of new skin breakdown  Description: 1. Monitor for areas of redness and/or skin breakdown  2. Assess vascular access sites hourly  3. Every 4-6 hours minimum:  Change oxygen saturation probe site  4. Every 4-6 hours:  If on nasal continuous positive airway pressure, respiratory therapy assess nares and determine need for appliance change or resting period.   Outcome: Progressing     Problem: ABCDS Injury Assessment  Goal: Absence of physical injury  Outcome: Progressing  Flowsheets (Taken 9/13/2022 2110)  Absence of Physical Injury: Implement safety measures based on patient assessment

## 2022-09-14 NOTE — PROGRESS NOTES
Gastroenterology Progress Note    Gerald Frausto is a 65 y.o. male patient. Principal Problem:    GI bleed  Resolved Problems:    * No resolved hospital problems. *      SUBJECTIVE:  has lower chest burning discomfort worse with eating. No further vomiting. No hematemesis or melena. No fevers. Current Facility-Administered Medications: buPROPion [facility]) extended release tablet 150 mg, 150 mg, Oral, BID  sodium chloride flush 0.9 % injection 5-40 mL, 5-40 mL, IntraVENous, 2 times per day  sodium chloride flush 0.9 % injection 5-40 mL, 5-40 mL, IntraVENous, PRN  0.9 % sodium chloride infusion, , IntraVENous, PRN  ondansetron (ZOFRAN-ODT) disintegrating tablet 4 mg, 4 mg, Oral, Q8H PRN **OR** ondansetron (ZOFRAN) injection 4 mg, 4 mg, IntraVENous, Q6H PRN  acetaminophen (TYLENOL) tablet 650 mg, 650 mg, Oral, Q6H PRN **OR** acetaminophen (TYLENOL) suppository 650 mg, 650 mg, Rectal, Q6H PRN  pantoprazole (PROTONIX) 80 mg in sodium chloride 0.9 % 100 mL infusion, 8 mg/hr, IntraVENous, Continuous  gentamicin (GARAMYCIN) 0.1 % cream, , Topical, Nightly  dianeal lo-david 1.5% 6,000 mL, 6,000 mL, IntraPERitoneal, Nightly **AND** dianeal lo-david 1.5% 1,500 mL, 1,500 mL, IntraPERitoneal, Nightly    Physical    VITALS:  /71   Pulse 94   Temp 97.6 °F (36.4 °C) (Oral)   Resp 14   Ht 6' 2\" (1.88 m)   Wt 169 lb 5 oz (76.8 kg)   SpO2 99%   BMI 21.74 kg/m²   TEMPERATURE:  Current - Temp: 97.6 °F (36.4 °C);  Max - Temp  Av.9 °F (37.2 °C)  Min: 97.6 °F (36.4 °C)  Max: 100.1 °F (37.8 °C)    NAD  Eyes: No icterus  RRR  Lungs CTA Bilaterally, normal effort  Abdomen soft, ND, NT, Bowel sounds normal.  Ext: no edema  Neuro: No tremor  Psych: A&Ox3    Data    Data Review:    Recent Labs     22  1136 22  1331 22  1549 22  0256   WBC 14.6*  --  12.5*  --   --    HGB 4.9*   < > 7.8* 7.9* 8.1*   HCT 15.1*   < > 23.5* 24.5* 25.0*   MCV 94.3  --  94.4  --   --      -- 132*  --   --     < > = values in this interval not displayed. Recent Labs     09/13/22  1135 09/14/22  0427    136   K 4.4 4.5    103   CO2 19* 17*   BUN 69* 70*   CREATININE 8.2* 8.0*     Recent Labs     09/13/22  1135 09/14/22  0427   AST 6* <5*   ALT <5* <5*   BILITOT 0.3 0.3   ALKPHOS 81 62     No results for input(s): LIPASE, AMYLASE in the last 72 hours. No results for input(s): PROTIME, INR in the last 72 hours. No results for input(s): PTT in the last 72 hours. ASSESSMENT:  65 y.o. male with a PMH of gastroparesis, CKD on peritoneal dialysis, HTN, DM, CHF, cardiomyopathy, umbilical hernia repair who presented on 9/13/2022 with chest pain, shortness of breath, cough. We have been consulted regarding acute GI bleed. Patient was found to have a hemoglobin of 4.9. He reports dark diarrhea on Sunday with no bowel movements since. He has been vomiting for the last 2 days with blood seen today. He does take ibuprofen and naproxen periodically. He reports epigastric abdominal pain and distention. He did have a low grade temp today in ED of 100.1. He reports a history of peptic ulcer disease. Patient follows with Dr. Bella Miller for his CKD. XR showed possible small amount of free air beneath the right hemidiaphragm. May be related to peritoneal dialysis. CT showed the same. Prior endoscopies include a colonoscopy 5/2021 with Dr. 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  EGD 9/13/22 showed LA grade D reflux esophagitis with some necrotic debris. There was large food in the stomach. Stomach was otherwise normal. No blood in the stool. Duodenum was normal other than the known melanosis duodeni. Imp;  Hematemesis, possible melena, acute blood loss anemia. Hgb of 4.9 was likely an error as repeat has been in the 7's x 2. Has antibodies so can't get a transfusion. EGD showed severe reflux esophagitis as the source.   No active bleeding on

## 2022-09-14 NOTE — PROGRESS NOTES
[facility]                                               Progress note    Summary:   Chelsey Tang is being seen by nephrology for ESRD on PD. Admitted with anemia. Interval History  Seen and examined at bedside. Having pain with swallowing   Had low flow alarms on PD this AM and t was terminated early   No volume overload  EGD showed severe reflux esophagitis as the source. No active bleeding on endoscopy    /80  100% on room air    cc     Hgb 8.1  BUN 70   CR 8   K 4.5  Bicarb 17     Plan:   - sodium bicarb 650 TID   - add heparin to dialysate for the fibrin issue, 1000 units /L   - will resume his losartan 100 mg and clonidine 0.2 mg TID      Jennifer Arreguin MD  [facility]  Office: [phone]    Assessment:   ESRD  Secondary to polycystic kidney disease  Also history of hypertension and atherosclerosis  He is on peritoneal dialysis and undergoing kidney transplant evaluation. He does 3 exchanges of 2.5 L for 8-1/2 hours on the cycler, no last bag fill  No pain or tenderness at the PD catheter site no purulence or drainage. We will check a cell count culture    Acute on chronic anemia  His last hemoglobin prior to admission was 10.8  He was receiving iron and [place] as an outpatient  Undergoing evaluation for GI source and will receive IV blood transfusion    History of hypertension  Had been difficult to control as an outpatient  He is on quite a regimen including clonidine 0.3 mg 3 times daily, spironolactone 100 mg daily, Imdur 60 mg daily, metoprolol 50 mg twice daily, Dralzine 100 mg 3 times daily and losartan 100 mg daily    Secondary hyperparathyroidism  He is on Velphoro calcitriol 0.5 mcg daily            ROS:     Positives Listed Bold. All other remaining systems are negative. Constitutional:  fever, chills, weakness, weight change, fatigue,      Skin:  rash, pruritus, hair loss, bruising, dry skin, petechiae.   Head, Face, Neck   headaches, swelling,  cervical adenopathy. Respiratory: shortness of breath, cough, or wheezing  Cardiovascular: chest pain, palpitations, dizzy, edema  Gastrointestinal: nausea, vomiting, diarrhea, constipation,belly pain    Yellow skin, blood in stool  Musculoskeletal:  back pain, muscle weakness, gait problems,       joint pain or swelling. Genitourinary:  dysuria, poor urine flow, flank pain, blood in urine  Neurologic:  vertigo, TIA'S, syncope, seizures, focal weakness  Psychosocial:  insomnia, anxiety, or depression. Additional positive findings: - pain with swallowing     PMH:   Past medical history, surgical history, social history, family history are reviewed and updated as appropriate. Reviewed current medication list.   Allergies reviewed and updated as needed. PE:   Vitals:    09/14/22 1244   BP:    Pulse:    Resp: 17   Temp:    SpO2:        General appearance:  in NAD, fully alert and oriented. Comfortable. HEENT: EOM intact, no icterus. Trachea is midline. Neck : No masses, appears symmetrical, no JVD  Respiratory: Respiratory effort appears normal, bilateral equal chest rise, no wheeze, no crackles   Cardiovascular: Ausculation shows RRR no edema  Abdomen: No visible mass or tenderness, non distended. Musculoskeletal:  Joints with no swelling or deformity. Skin:no rashes, ulcers, induration, no jaundice. Neuro: face symmetric, no focal deficits. Appropriate responses.        Lab Results   Component Value Date    CREATININE 8.0 (HH) 09/14/2022    BUN 70 (H) 09/14/2022     09/14/2022    K 4.5 09/14/2022     09/14/2022    CO2 17 (L) 09/14/2022      Lab Results   Component Value Date    WBC 12.5 (H) 09/13/2022    HGB 8.1 (L) 09/14/2022    HCT 25.4 (L) 09/14/2022    MCV 94.4 09/13/2022     (L) 09/13/2022     Lab Results   Component Value Date    PTH 49.8 02/01/2019    CALCIUM 7.9 (L) 09/14/2022    PHOS 3.3 10/31/2020

## 2022-09-14 NOTE — PROGRESS NOTES
Patient's PD treatment was frequently interrupted by low flow alarms. There was some fibrin in patient's line and he was on drain 2 of 3. Per nephro RN stat drained and ended treatment.

## 2022-09-14 NOTE — CARE COORDINATION
[place]  ph:  [address]  Administrative Assist, Case Management  [phone]  Electronically signed by Hina Kebede on 9/14/2022 at 12:17 PM

## 2022-09-15 LAB
APPEARANCE FLUID: CLEAR
CELL COUNT FLUID TYPE: NORMAL
CLOT EVALUATION: NORMAL
COLOR FLUID: COLORLESS
FLUID DIFF COMMENT: NORMAL
GLUCOSE BLD-MCNC: 153 MG/DL (ref 70–99)
GLUCOSE BLD-MCNC: 176 MG/DL (ref 70–99)
HCT VFR BLD CALC: 24.3 % (ref 40.5–52.5)
HCT VFR BLD CALC: 25.3 % (ref 40.5–52.5)
HEMOGLOBIN: 7.9 G/DL (ref 13.5–17.5)
HEMOGLOBIN: 8.3 G/DL (ref 13.5–17.5)
NUCLEATED CELLS FLUID: 7 /CUMM
PERFORMED ON: ABNORMAL
PERFORMED ON: ABNORMAL
RBC FLUID: <2000 /CUMM
SARS-COV-2, NAAT: NOT DETECTED

## 2022-09-15 PROCEDURE — 97162 PT EVAL MOD COMPLEX 30 MIN: CPT

## 2022-09-15 PROCEDURE — 2580000003 HC RX 258: Performed by: INTERNAL MEDICINE

## 2022-09-15 PROCEDURE — 87070 CULTURE OTHR SPECIMN AEROBIC: CPT

## 2022-09-15 PROCEDURE — 36415 COLL VENOUS BLD VENIPUNCTURE: CPT

## 2022-09-15 PROCEDURE — 94760 N-INVAS EAR/PLS OXIMETRY 1: CPT

## 2022-09-15 PROCEDURE — 6370000000 HC RX 637 (ALT 250 FOR IP): Performed by: INTERNAL MEDICINE

## 2022-09-15 PROCEDURE — 6360000002 HC RX W HCPCS: Performed by: INTERNAL MEDICINE

## 2022-09-15 PROCEDURE — 87205 SMEAR GRAM STAIN: CPT

## 2022-09-15 PROCEDURE — 87635 SARS-COV-2 COVID-19 AMP PRB: CPT

## 2022-09-15 PROCEDURE — 87015 SPECIMEN INFECT AGNT CONCNTJ: CPT

## 2022-09-15 PROCEDURE — C9113 INJ PANTOPRAZOLE SODIUM, VIA: HCPCS | Performed by: INTERNAL MEDICINE

## 2022-09-15 PROCEDURE — 2060000000 HC ICU INTERMEDIATE R&B

## 2022-09-15 PROCEDURE — 97530 THERAPEUTIC ACTIVITIES: CPT

## 2022-09-15 PROCEDURE — 97116 GAIT TRAINING THERAPY: CPT

## 2022-09-15 PROCEDURE — 85014 HEMATOCRIT: CPT

## 2022-09-15 PROCEDURE — 85018 HEMOGLOBIN: CPT

## 2022-09-15 PROCEDURE — 89050 BODY FLUID CELL COUNT: CPT

## 2022-09-15 PROCEDURE — 90945 DIALYSIS ONE EVALUATION: CPT

## 2022-09-15 RX ORDER — DEXTROSE MONOHYDRATE 100 MG/ML
INJECTION, SOLUTION INTRAVENOUS CONTINUOUS PRN
Status: DISCONTINUED | OUTPATIENT
Start: 2022-09-15 | End: 2022-09-16 | Stop reason: HOSPADM

## 2022-09-15 RX ORDER — HYDRALAZINE HYDROCHLORIDE 50 MG/1
100 TABLET, FILM COATED ORAL EVERY 8 HOURS SCHEDULED
Status: DISCONTINUED | OUTPATIENT
Start: 2022-09-15 | End: 2022-09-16 | Stop reason: HOSPADM

## 2022-09-15 RX ORDER — INSULIN LISPRO 100 [IU]/ML
0-4 INJECTION, SOLUTION INTRAVENOUS; SUBCUTANEOUS NIGHTLY
Status: DISCONTINUED | OUTPATIENT
Start: 2022-09-16 | End: 2022-09-16 | Stop reason: HOSPADM

## 2022-09-15 RX ORDER — INSULIN LISPRO 100 [IU]/ML
0-8 INJECTION, SOLUTION INTRAVENOUS; SUBCUTANEOUS
Status: DISCONTINUED | OUTPATIENT
Start: 2022-09-16 | End: 2022-09-16 | Stop reason: HOSPADM

## 2022-09-15 RX ORDER — NIFEDIPINE 30 MG/1
90 TABLET, EXTENDED RELEASE ORAL DAILY
Status: DISCONTINUED | OUTPATIENT
Start: 2022-09-15 | End: 2022-09-16 | Stop reason: HOSPADM

## 2022-09-15 RX ORDER — INSULIN GLARGINE 100 [IU]/ML
22 INJECTION, SOLUTION SUBCUTANEOUS NIGHTLY
Status: DISCONTINUED | OUTPATIENT
Start: 2022-09-16 | End: 2022-09-16 | Stop reason: HOSPADM

## 2022-09-15 RX ADMIN — CLONIDINE HYDROCHLORIDE 0.2 MG: 0.1 TABLET ORAL at 16:11

## 2022-09-15 RX ADMIN — SODIUM CHLORIDE 8 MG/HR: 9 INJECTION, SOLUTION INTRAVENOUS at 21:07

## 2022-09-15 RX ADMIN — CLONIDINE HYDROCHLORIDE 0.2 MG: 0.1 TABLET ORAL at 09:23

## 2022-09-15 RX ADMIN — SODIUM CHLORIDE 8 MG/HR: 9 INJECTION, SOLUTION INTRAVENOUS at 09:26

## 2022-09-15 RX ADMIN — SUCRALFATE 1 G: 1 TABLET ORAL at 01:36

## 2022-09-15 RX ADMIN — NIFEDIPINE 90 MG: 30 TABLET, EXTENDED RELEASE ORAL at 12:56

## 2022-09-15 RX ADMIN — HYDRALAZINE HYDROCHLORIDE 100 MG: 50 TABLET, FILM COATED ORAL at 21:05

## 2022-09-15 RX ADMIN — CLONIDINE HYDROCHLORIDE 0.2 MG: 0.1 TABLET ORAL at 21:05

## 2022-09-15 RX ADMIN — SUCRALFATE 1 G: 1 TABLET ORAL at 17:40

## 2022-09-15 RX ADMIN — HYDRALAZINE HYDROCHLORIDE 100 MG: 50 TABLET, FILM COATED ORAL at 16:11

## 2022-09-15 RX ADMIN — LOSARTAN POTASSIUM 100 MG: 100 TABLET, FILM COATED ORAL at 09:23

## 2022-09-15 RX ADMIN — BUPROPION HYDROCHLORIDE 150 MG: 150 TABLET, EXTENDED RELEASE ORAL at 09:23

## 2022-09-15 RX ADMIN — Medication 10 ML: at 09:24

## 2022-09-15 RX ADMIN — BUPROPION HYDROCHLORIDE 150 MG: 150 TABLET, EXTENDED RELEASE ORAL at 21:05

## 2022-09-15 RX ADMIN — SUCRALFATE 1 G: 1 TABLET ORAL at 12:56

## 2022-09-15 ASSESSMENT — PAIN SCALES - GENERAL
PAINLEVEL_OUTOF10: 0
PAINLEVEL_OUTOF10: 0

## 2022-09-15 NOTE — PROGRESS NOTES
Physical Therapy  Facility/Department: [facility] 5N PROGRESSIVE CARE  Physical Therapy Initial Assessment    Name: Jesenia Krishna  : 1965  MRN: [id number]  Date of Service: 9/15/2022    Discharge Recommendations:  24 hour supervision or assist, Home with assist PRN, Home with Home health PT   PT Equipment Recommendations  Equipment Needed: No    Jesenia Krishna scored a 18/24 on the AM-PAC short mobility form. Current research shows that an AM-PAC score of 18 or greater is typically associated with a discharge to the patient's home setting. Based on the patient's AM-PAC score and their current functional mobility deficits, it is recommended that the patient have 2-3 sessions per week of Physical Therapy at d/c to increase the patient's independence. At this time, this patient demonstrates the endurance and safety to discharge home with home services and a follow up treatment frequency of 2-3x/wk. Please see assessment section for further patient specific details. If patient discharges prior to next session this note will serve as a discharge summary. Please see below for the latest assessment towards goals. Patient Diagnosis(es): The primary encounter diagnosis was UGI bleed. Diagnoses of Anemia due to acute blood loss, Hematemesis with nausea, and Chest pain, unspecified type were also pertinent to this visit. Past Medical History:  has a past medical history of Cardiomyopathy ([address] CHF (congestive heart failure) ([address] CVA (cerebral infarction), Diabetes mellitus ([address] Foot ulcer, left ([address] Gastroparesis, Hemodialysis patient ([address] Hypercholesteremia, Hypertension, Kidney disease, and Unspecified cerebral artery occlusion with cerebral infarction. Past Surgical History:  has a past surgical history that includes LAPAROSCOPY INSERTION PERITONEAL CATHETER (N/A, 10/29/2020); Umbilical hernia repair (N/A, 10/29/2020); hc dialysis catheter (N/A, 10/29/2020);  Upper gastrointestinal endoscopy (N/A, 4/26/2021); Colonoscopy (N/A, 5/17/2021); and Upper gastrointestinal endoscopy (N/A, 9/13/2022). Assessment   Body Structures, Functions, Activity Limitations Requiring Skilled Therapeutic Intervention: Decreased functional mobility ; Decreased endurance;Decreased balance  Assessment: Pt presents today below prior level of walking in his house without an AD. Today pt is alert and oriented but answers questions tangentially and incompletely and needs redirection and cues for safety. Bed mobility SBA, sit to stand CGA form bed but mod A form sofa nd he did not follow cues for hand placement. Amb with wh walker less than household distances with CGA to min A as pt leaned to the L occasionally. Gt is slow and steps are irregular. Pt would likely be able to go home with his wh walker and family assist but would benefit from continued home health OT, pt is agreeable. Pt will benefit from continued therapy to increase safety while in house before retrurning home. Treatment Diagnosis: decreased functional mobility followinfg UGI bleed  Specific Instructions for Next Treatment: increase gt  Therapy Prognosis: Good  Decision Making: Medium Complexity  History: see note  Exam: see note  Clinical Presentation: evolving  Barriers to Learning: extra time required  Requires PT Follow-Up: Yes  Activity Tolerance  Activity Tolerance: Patient tolerated evaluation without incident;Patient limited by fatigue;Patient limited by endurance     Plan   Plan  Plan: 3-5 times per week  Specific Instructions for Next Treatment: increase gt  Current Treatment Recommendations: Balance training, Functional mobility training, Endurance training, Transfer training, Gait training  Safety Devices  Type of Devices: All fall risk precautions in place, Call light within reach, Chair alarm in place, Gait belt, Left in chair, Nurse notified     Restrictions        Subjective   General  Chart Reviewed:  Yes  Additional Pertinent Hx: pt admitted with nausea/vomiting, low HGB, UGI bleed chest pain  Response To Previous Treatment: Not applicable  Family / Caregiver Present: No  Referring Practitioner: [place]  Referral Date : 09/15/22  Diagnosis: UGI bleed  Follows Commands: Impaired (extra time required, pt easily distracted)  General Comment  Comments: pt with no specific c/o at this time, answers questions tangentially, needs cues to stay on task  Subjective  Subjective: denies pain at present, IV in R arm hurts with movement         Social/Functional History  Social/Functional History  Lives With: Spouse  Type of Home: House  Home Layout: One level  Home Access: Level entry  Bathroom Shower/Tub: Tub/Shower unit  Bathroom Toilet:  (comfort height)  Bathroom Equipment: Grab bars in shower (toilet between sink and window)  Home Equipment: Walker, rolling, Cane (walking stick)  Has the patient had two or more falls in the past year or any fall with injury in the past year?: No  ADL Assistance: Needs assistance (with lower body, pt not specific as to what he needs help with despite specific questioning)  Toile[address] [facility] [address]: Needs assistance  Homemaking Responsibilities: No  Ambulation Assistance: Independent (pt unclear)  Transfer Assistance: Independent (pt unclear)  Occupation: Retired  Type of Occupation: [address]: Within [place] [address]  Hearing: Within functional limits    Cognition   Orientation  Orientation Level: Oriented to place;Oriented to situation;Oriented to person;Disoriented to time  Cognition  Overall Cognitive Status: Exceptions  Arousal/Alertness: Appropriate responses to stimuli  Following Commands:  Follows one step commands consistently  Attention Span: Attends with cues to redirect  Memory: Appears intact  Safety Judgement: Decreased awareness of need for assistance;Decreased awareness of need for safety  Problem Solving: Assistance required to generate solutions;Decreased awareness of errors  Insights: Decreased awareness of deficits  Initiation: Requires cues for some  Sequencing: Does not require cues     Objective     AROM RLE (degrees)  RLE AROM: WFL  AROM LLE (degrees)  LLE AROM : WFL  Strength RLE  Strength RLE: WFL  Comment: generally 4/5  Strength LLE  Strength LLE: WFL  Comment: generally 4/5      Bed mobility  Rolling to Right: Stand by assistance  Supine to Sit: Stand by assistance  Scooting: Stand by assistance  Bed Mobility Comments: pt would sit up upon request but they lay back down to tell a story and need cues to sit back up  Transfers  Stand to sit: Moderate Assistance;Contact guard assistance (CGA from bed, mod A from low sofa, did not follow cues for hand placement)  Bed to Chair: Minimal assistance  Ambulation  Surface: level tile  Device: Rolling Walker  Assistance: Minimal assistance;Contact guard assistance  Quality of Gait: varies between CGA and min A as pt leaned to the L occasionally, slow pace and shuffling steps. Gait Deviations: Slow Lesli;Staggers; Shuffles  Distance: 25', 10'  Comments: HR = 120 with walking  More Ambulation?: No     Balance  Posture: Fair  Sitting - Static: Good  Sitting - Dynamic: Good  Standing - Static: Fair  Standing - Dynamic: Poor         AM-PAC Score  AM-PAC Inpatient Mobility Raw Score : 18 (09/15/22 1641)  AM-PAC Inpatient T-Scale Score : 43.63 (09/15/22 1641)  Mobility Inpatient CMS 0-100% Score: 46.58 (09/15/22 1641)  Mobility Inpatient CMS G-Code Modifier : CK (09/15/22 1641)   Goals  Short Term Goals  Time Frame for Short term goals: by acute discharge  Short term goal 1: bed mobility S to modif I  Short term goal 2: transfers SBA to S  Short term goal 3: amb 48' with wh waker CGA to SBA  Patient Goals   Patient goals : go home   Therapy Time   Individual Concurrent Group Co-treatment   Time In 1603         Time Out 1657         Minutes 54         Timed Code Treatment Minutes: 54 Minutes   Charges = 15 min eval 25 min gt 14 min theract    Alesia Thomas, [address]

## 2022-09-15 NOTE — PROGRESS NOTES
Hospitalist Progress Note      PCP: ROQUE Hernandez - CNP    Date of Admission: 9/13/2022    Chief Complaint: coffee ground emesis      Subjective: feels better. Less odynophagia        Medications:  Reviewed    Infusion Medications    sodium chloride      pantoprozole (PROTONIX) infusion 8 mg/hr (09/15/22 0926)     Scheduled Medications    hydrALAZINE  100 mg Oral 3 times per day    NIFEdipine  90 mg Oral Daily    sucralfate  1 g Oral 4 times per day    losartan  100 mg Oral Daily    cloNIDine  0.2 mg Oral TID    custom dialysis builder   IntraPERitoneal Nightly    And    custom dialysis builder   IntraPERitoneal Nightly    buPROPion  150 mg Oral BID    sodium chloride flush  5-40 mL IntraVENous 2 times per day    gentamicin   Topical Nightly     PRN Meds: oxyCODONE-acetaminophen **OR** oxyCODONE-acetaminophen, sodium chloride flush, sodium chloride, ondansetron **OR** ondansetron, acetaminophen **OR** acetaminophen      Intake/Output Summary (Last 24 hours) at 9/15/2022 1841  Last data filed at 9/15/2022 1259  Gross per 24 hour   Intake 370.59 ml   Output 139 ml   Net 231.59 ml       Physical Exam Performed:    BP (!) 183/89   Pulse 97   Temp 99.4 °F (37.4 °C) (Oral)   Resp 20   Ht 6' 2\" (1.88 m)   Wt 163 lb 9.3 oz (74.2 kg)   SpO2 97%   BMI 21.00 kg/m²     General appearance: No apparent distress, appears stated age and cooperative. HEENT: Pupils equal, round, and reactive to light. Conjunctivae/corneas clear. Neck: Supple, with full range of motion. No jugular venous distention. Trachea midline. Respiratory:  Normal respiratory effort. Clear to auscultation, bilaterally without Rales/Wheezes/Rhonchi. Cardiovascular: Regular rate and rhythm with normal S1/S2 without murmurs, rubs or gallops. Abdomen: Soft, non-tender, non-distended with normal bowel sounds. Musculoskeletal: No clubbing, cyanosis or edema bilaterally. Full range of motion without deformity.   Skin: Skin color, texture, turgor normal.  No rashes or lesions. Neurologic:  Neurovascularly intact without any focal sensory/motor deficits. Cranial nerves: II-XII intact, grossly non-focal.  Psychiatric: Alert and oriented, thought content appropriate, normal insight  Capillary Refill: Brisk, 3 seconds, normal   Peripheral Pulses: +2 palpable, equal bilaterally       Labs:   Recent Labs     09/13/22  1136 09/13/22  1331 09/13/22  1549 09/13/22  2007 09/14/22  2035 09/15/22  0351 09/15/22  0918   WBC NA*  --  12.5*  --   --   --   --    HGB NA*   < > 7.8*   < > 7.9* 8.3* 7.9*   HCT NA*   < > 23.5*   < > 24.4* 25.3* 24.3*   PLT NA*  --  132*  --   --   --   --     < > = values in this interval not displayed. Recent Labs     09/13/22  1135 09/14/22  0427    136   K 4.4 4.5    103   CO2 19* 17*   BUN 69* 70*   CREATININE 8.2* 8.0*   CALCIUM 8.7 7.9*     Recent Labs     09/13/22  1135 09/14/22  0427   AST 6* <5*   ALT <5* <5*   BILITOT 0.3 0.3   ALKPHOS 81 62     Recent Labs     09/14/22  0923   INR 1.14     Recent Labs     09/13/22  1135   TROPONINI 0.15*       Urinalysis:      Lab Results   Component Value Date/Time    NITRU Negative 05/05/2021 05:36 PM    WBCUA 4 05/05/2021 05:36 PM    BACTERIA Rare 01/31/2019 05:00 PM    RBCUA 2 05/05/2021 05:36 PM    BLOODU Negative 05/05/2021 05:36 PM    SPECGRAV 1.017 05/05/2021 05:36 PM    GLUCOSEU Negative 05/05/2021 05:36 PM    GLUCOSEU 250 12/14/2010 02:50 PM       Radiology:  CT ABDOMEN PELVIS WO CONTRAST Additional Contrast? None   Final Result   Addendum (preliminary) 1 of 1   ADDENDUM:   The 13 mm diameter near water attenuation lesion in the inferior aspect of   the right lobe of the liver has been stable since the 10/20/2025 CT scan    and   is most likely an incidental hepatic cyst and requires no further workup. Critical results were called by Dr. Lew Man to Dr. Walden Lanes On    9/13/2022   at 15:13.          Final   Small to moderate amount of pneumoperitoneum and minimal amount of ascites   that may be due to peritoneal dialysis. Gas distension of the transverse colon that may be due to a localized ileus. Atherosclerosis. Adult type polycystic kidney disease. Partially projected moderate size pericardial effusion      RECOMMENDATIONS:   Clinical correlation. It should be determine whether not this patient has   rebound tenderness which would suggest that the pneumoperitoneum is related   to an acute abdomen. XR CHEST PORTABLE   Final Result   1. Possible small amount of free air beneath the right hemidiaphragm. If   there is free air, this might be related to peritoneal dialysis. A left   lateral decubitus view of the abdomen may be helpful for further evaluation. CT imaging of the abdomen and pelvis could also be considered. 2. Left basilar opacities which are predominantly linear and favored to   represent atelectasis as the left hemidiaphragm is elevated. Pneumonia is   considered less likely. Critical results were called by Dr. Lottie Farr to Dr. Brooke Dias on   9/13/2022 at 11:42 am.                 Assessment/Plan:    Active Hospital Problems    Diagnosis     GI bleed [K92.2]      Priority: Medium        Gi bleed due to severe esophagitis  Improved  Continue protonix drip x 24 hrs  H and h now daily  Dc planning for am     ESRD on PD  Per nephrologist    HTN  Elevated  Bp meds being re-initated    DVT Prophylaxis: scd  Diet: ADULT DIET;  Regular  Code Status: Full Code  PT/OT Eval Status:     Dispo - home in am      Mauricio De La Cruz MD

## 2022-09-15 NOTE — PROGRESS NOTES
Respiratory: shortness of breath, cough, or wheezing  Cardiovascular: chest pain, palpitations, dizzy, edema  Gastrointestinal: nausea, vomiting, diarrhea, constipation,belly pain    Yellow skin, blood in stool  Musculoskeletal:  back pain, muscle weakness, gait problems,       joint pain or swelling. Genitourinary:  dysuria, poor urine flow, flank pain, blood in urine  Neurologic:  vertigo, TIA'S, syncope, seizures, focal weakness  Psychosocial:  insomnia, anxiety, or depression. Additional positive findings: - pain with swallowing     PMH:   Past medical history, surgical history, social history, family history are reviewed and updated as appropriate. Reviewed current medication list.   Allergies reviewed and updated as needed. PE:   Vitals:    09/15/22 0920   BP: (!) 182/84   Pulse: (!) 111   Resp: 16   Temp: 100.1 °F (37.8 °C)   SpO2: 98%       General appearance:  in NAD, fully alert and oriented. Comfortable. HEENT: EOM intact, no icterus. Trachea is midline. Neck : No masses, appears symmetrical, no JVD  Respiratory: Respiratory effort appears normal, bilateral equal chest rise, no wheeze, no crackles   Cardiovascular: Ausculation shows RRR no edema  Abdomen: No visible mass or tenderness, non distended. Musculoskeletal:  Joints with no swelling or deformity. Skin:no rashes, ulcers, induration, no jaundice. Neuro: face symmetric, no focal deficits. Appropriate responses.        Lab Results   Component Value Date    CREATININE 8.0 (HH) 09/14/2022    BUN 70 (H) 09/14/2022     09/14/2022    K 4.5 09/14/2022     09/14/2022    CO2 17 (L) 09/14/2022      Lab Results   Component Value Date    WBC 12.5 (H) 09/13/2022    HGB 7.9 (L) 09/15/2022    HCT 24.3 (L) 09/15/2022    MCV 94.4 09/13/2022     (L) 09/13/2022     Lab Results   Component Value Date    PTH 49.8 02/01/2019    CALCIUM 7.9 (L) 09/14/2022    PHOS 3.3 10/31/2020

## 2022-09-15 NOTE — PROGRESS NOTES
°C)  Min: 98.3 °F (36.8 °C)  Max: 99.9 °F (37.7 °C)    NAD  Eyes: No icterus  RRR  Lungs CTA Bilaterally, normal effort  Abdomen soft, ND, NT, Bowel sounds normal.  Ext: no edema  Neuro: No tremor  Psych: A&Ox3    Data    Data Review:    Recent Labs     09/13/22  1136 09/13/22  1331 09/13/22  1549 09/13/22 2007 09/14/22  1449 09/14/22  2035 09/15/22  0351   WBC NA*  --  12.5*  --   --   --   --    HGB NA*   < > 7.8*   < > 7.7* 7.9* 8.3*   HCT NA*   < > 23.5*   < > 23.6* 24.4* 25.3*   MCV NA*  --  94.4  --   --   --   --    PLT NA*  --  132*  --   --   --   --     < > = values in this interval not displayed. Recent Labs     09/13/22 1135 09/14/22  0427    136   K 4.4 4.5    103   CO2 19* 17*   BUN 69* 70*   CREATININE 8.2* 8.0*     Recent Labs     09/13/22 1135 09/14/22  0427   AST 6* <5*   ALT <5* <5*   BILITOT 0.3 0.3   ALKPHOS 81 62     No results for input(s): LIPASE, AMYLASE in the last 72 hours. Recent Labs     09/14/22  0923   PROTIME 14.5   INR 1.14     No results for input(s): PTT in the last 72 hours. ASSESSMENT:  65 y.o. male with a PMH of gastroparesis, CKD on peritoneal dialysis, HTN, DM, CHF, cardiomyopathy, umbilical hernia repair who presented on 9/13/2022 with chest pain, shortness of breath, cough. We have been consulted regarding acute GI bleed. Patient was found to have a hemoglobin of 4.9. He reports dark diarrhea on Sunday with no bowel movements since. He has been vomiting for the last 2 days with blood seen today. He does take ibuprofen and naproxen periodically. He reports epigastric abdominal pain and distention. He did have a low grade temp today in ED of 100.1. He reports a history of peptic ulcer disease. Patient follows with Dr. Daphene Holstein for his CKD. XR showed possible small amount of free air beneath the right hemidiaphragm. May be related to peritoneal dialysis. CT showed the same.   Prior endoscopies include a colonoscopy 5/2021 with Dr. 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  EGD 9/13/22 showed LA grade D reflux esophagitis with some necrotic debris. There was large food in the stomach. Stomach was otherwise normal. No blood in the stool. Duodenum was normal other than the known melanosis duodeni. Imp;  Hematemesis, possible melena, acute blood loss anemia. Hgb of 4.9 was likely an error as repeat has been in the 7's x 2. Has antibodies so can't get a transfusion. EGD showed severe reflux esophagitis as the source. No active bleeding on endoscopy. 2.  Free air: Abdominal exam is very benign. DIscussed with surgery and this is common to see with a PD catheter. No change in management as long as no peritonitis. 3.  Severe reflux esophagitis  PLAN :  Continue PPI drip while here and discharge on pantoprazole 40mg bid. Continue carafate for the discomfort. Would discharge with 2 weeks and then stop. Continue diet. Hgb stable and no further bleeding. Will sign off. Please call with questions. Thank you for allowing me to participate in the care of your patient. Please feel free to contact me with any concerns.   [address], MD

## 2022-09-15 NOTE — PROGRESS NOTES
Hospitalist Progress Note      PCP: ROQUE Raman - CNP    Date of Admission: 9/13/2022    Chief Complaint: coffee ground emesis        Subjective: feels ok . Lots of pain with eating. EGD showed esophagitis           Physical Exam Performed:    BP (!) 183/89   Pulse 97   Temp 99.4 °F (37.4 °C) (Oral)   Resp 20   Ht 6' 2\" (1.88 m)   Wt 163 lb 9.3 oz (74.2 kg)   SpO2 97%   BMI 21.00 kg/m²     General appearance: No apparent distress, appears stated age and cooperative. HEENT: Pupils equal, round, and reactive to light. Conjunctivae/corneas clear. Neck: Supple, with full range of motion. No jugular venous distention. Trachea midline. Respiratory:  Normal respiratory effort. Clear to auscultation, bilaterally without Rales/Wheezes/Rhonchi. Cardiovascular: Regular rate and rhythm with normal S1/S2 without murmurs, rubs or gallops. Abdomen: Soft, mild tenderness  Musculoskeletal: No clubbing, cyanosis or edema bilaterally. Full range of motion without deformity. Skin: Skin color, texture, turgor normal.  No rashes or lesions. Neurologic:  Neurovascularly intact without any focal sensory/motor deficits.  Cranial nerves: II-XII intact, grossly non-focal.  Psychiatric: Alert and oriented, thought content appropriate, normal insight  Capillary Refill: Brisk, 3 seconds, normal   Peripheral Pulses: +2 palpable, equal bilaterally       Labs:     Hgb 7.8  WBC 12.5  Bun/Cr 70/8.0      Recent Labs     09/13/22  1135 09/14/22  0427   AST 6* <5*   ALT <5* <5*   BILITOT 0.3 0.3   ALKPHOS 81 62     Recent Labs     09/14/22  0923   INR 1.14     Recent Labs     09/13/22  1135   TROPONINI 0.15*       Urinalysis:      Lab Results   Component Value Date/Time    NITRU Negative 05/05/2021 05:36 PM    WBCUA 4 05/05/2021 05:36 PM    BACTERIA Rare 01/31/2019 05:00 PM    RBCUA 2 05/05/2021 05:36 PM    BLOODU Negative 05/05/2021 05:36 PM    SPECGRAV 1.017 05/05/2021 05:36 PM    GLUCOSEU Negative 05/05/2021 05:36 PM GLUCOSEU 250 12/14/2010 02:50 PM       Radiology:  CT ABDOMEN PELVIS WO CONTRAST Additional Contrast? None   Final Result   Addendum (preliminary) 1 of 1   ADDENDUM:   The 13 mm diameter near water attenuation lesion in the inferior aspect of   the right lobe of the liver has been stable since the 10/20/2025 CT scan    and   is most likely an incidental hepatic cyst and requires no further workup. Critical results were called by Dr. Kaycee Augustine to Dr. Luiza Murguia On    9/13/2022   at 15:13. Final   Small to moderate amount of pneumoperitoneum and minimal amount of ascites   that may be due to peritoneal dialysis. Gas distension of the transverse colon that may be due to a localized ileus. Atherosclerosis. Adult type polycystic kidney disease. Partially projected moderate size pericardial effusion      RECOMMENDATIONS:   Clinical correlation. It should be determine whether not this patient has   rebound tenderness which would suggest that the pneumoperitoneum is related   to an acute abdomen. XR CHEST PORTABLE   Final Result   1. Possible small amount of free air beneath the right hemidiaphragm. If   there is free air, this might be related to peritoneal dialysis. A left   lateral decubitus view of the abdomen may be helpful for further evaluation. CT imaging of the abdomen and pelvis could also be considered. 2. Left basilar opacities which are predominantly linear and favored to   represent atelectasis as the left hemidiaphragm is elevated. Pneumonia is   considered less likely. Critical results were called by Dr. Maria Fernanda Hanson to Dr. Ruslan Cline on   9/13/2022 at 11:42 am.                 Assessment/Plan:      Gi bleed due to severe esophagitis  Improved  Continue protonix drip  Add percocet for pain and carafate    ESRD on PD  Per nephrologist      DVT Prophylaxis: scd  Diet: ADULT DIET;  Regular  Code Status: Full Code  PT/OT Eval Status:     Dispo - 2-3 aidan Alexis MD

## 2022-09-15 NOTE — PLAN OF CARE
Problem: Safety - Adult  Goal: Free from fall injury  Outcome: Progressing     Problem: Skin/Tissue Integrity  Goal: Absence of new skin breakdown  Description: 1. Monitor for areas of redness and/or skin breakdown  2. Assess vascular access sites hourly  3. Every 4-6 hours minimum:  Change oxygen saturation probe site  4. Every 4-6 hours:  If on nasal continuous positive airway pressure, respiratory therapy assess nares and determine need for appliance change or resting period.   Outcome: Progressing     Problem: ABCDS Injury Assessment  Goal: Absence of physical injury  Outcome: Progressing

## 2022-09-16 VITALS
OXYGEN SATURATION: 98 % | HEIGHT: 74 IN | TEMPERATURE: 99 F | DIASTOLIC BLOOD PRESSURE: 118 MMHG | SYSTOLIC BLOOD PRESSURE: 149 MMHG | BODY MASS INDEX: 20.99 KG/M2 | RESPIRATION RATE: 20 BRPM | WEIGHT: 163.58 LBS | HEART RATE: 99 BPM

## 2022-09-16 LAB
ALBUMIN SERPL-MCNC: 3 G/DL (ref 3.4–5)
ANION GAP SERPL CALCULATED.3IONS-SCNC: 14 MMOL/L (ref 3–16)
APPEARANCE FLUID: CLEAR
BUN BLDV-MCNC: 61 MG/DL (ref 7–20)
CALCIUM SERPL-MCNC: 8.3 MG/DL (ref 8.3–10.6)
CELL COUNT FLUID TYPE: NORMAL
CHLORIDE BLD-SCNC: 103 MMOL/L (ref 99–110)
CLOT EVALUATION: NORMAL
CO2: 21 MMOL/L (ref 21–32)
COLOR FLUID: COLORLESS
CREAT SERPL-MCNC: 7.1 MG/DL (ref 0.9–1.3)
EOSINOPHIL FLUID: 1 %
GFR AFRICAN AMERICAN: 10
GFR NON-AFRICAN AMERICAN: 8
GLUCOSE BLD-MCNC: 129 MG/DL (ref 70–99)
GLUCOSE BLD-MCNC: 160 MG/DL (ref 70–99)
GLUCOSE BLD-MCNC: 97 MG/DL (ref 70–99)
LYMPHOCYTES, BODY FLUID: 76 %
MACROPHAGE FLUID: 9 %
NEUTROPHIL, FLUID: 14 %
NUCLEATED CELLS FLUID: 13 /CUMM
NUMBER OF CELLS COUNTED FLUID: 100
PERFORMED ON: ABNORMAL
PERFORMED ON: NORMAL
PHOSPHORUS: 4.2 MG/DL (ref 2.5–4.9)
POTASSIUM SERPL-SCNC: 4.2 MMOL/L (ref 3.5–5.1)
RBC FLUID: <2000 /CUMM
SODIUM BLD-SCNC: 138 MMOL/L (ref 136–145)
VOLUME: 12 ML

## 2022-09-16 PROCEDURE — 90945 DIALYSIS ONE EVALUATION: CPT

## 2022-09-16 PROCEDURE — 80069 RENAL FUNCTION PANEL: CPT

## 2022-09-16 PROCEDURE — A4722 DIALYS SOL FLD VOL > 1999CC: HCPCS | Performed by: INTERNAL MEDICINE

## 2022-09-16 PROCEDURE — 6370000000 HC RX 637 (ALT 250 FOR IP): Performed by: INTERNAL MEDICINE

## 2022-09-16 PROCEDURE — 36415 COLL VENOUS BLD VENIPUNCTURE: CPT

## 2022-09-16 PROCEDURE — 6370000000 HC RX 637 (ALT 250 FOR IP): Performed by: NURSE PRACTITIONER

## 2022-09-16 PROCEDURE — 6360000002 HC RX W HCPCS: Performed by: INTERNAL MEDICINE

## 2022-09-16 PROCEDURE — 89051 BODY FLUID CELL COUNT: CPT

## 2022-09-16 PROCEDURE — 94760 N-INVAS EAR/PLS OXIMETRY 1: CPT

## 2022-09-16 RX ORDER — SUCRALFATE 1 G/1
1 TABLET ORAL 4 TIMES DAILY
Qty: 120 TABLET | Refills: 0 | Status: SHIPPED | OUTPATIENT
Start: 2022-09-16

## 2022-09-16 RX ORDER — PANTOPRAZOLE SODIUM 40 MG/1
40 TABLET, DELAYED RELEASE ORAL
Qty: 60 TABLET | Refills: 2 | Status: SHIPPED | OUTPATIENT
Start: 2022-09-16

## 2022-09-16 RX ADMIN — LOSARTAN POTASSIUM 100 MG: 100 TABLET, FILM COATED ORAL at 09:24

## 2022-09-16 RX ADMIN — HYDRALAZINE HYDROCHLORIDE 100 MG: 50 TABLET, FILM COATED ORAL at 06:30

## 2022-09-16 RX ADMIN — INSULIN GLARGINE 22 UNITS: 100 INJECTION, SOLUTION SUBCUTANEOUS at 00:04

## 2022-09-16 RX ADMIN — SUCRALFATE 1 G: 1 TABLET ORAL at 00:03

## 2022-09-16 RX ADMIN — OXYCODONE AND ACETAMINOPHEN 2 TABLET: 5; 325 TABLET ORAL at 00:03

## 2022-09-16 RX ADMIN — HEPARIN SODIUM: 1000 INJECTION INTRAVENOUS; SUBCUTANEOUS at 00:22

## 2022-09-16 RX ADMIN — CLONIDINE HYDROCHLORIDE 0.2 MG: 0.1 TABLET ORAL at 09:23

## 2022-09-16 RX ADMIN — SUCRALFATE 1 G: 1 TABLET ORAL at 06:29

## 2022-09-16 RX ADMIN — NIFEDIPINE 90 MG: 30 TABLET, EXTENDED RELEASE ORAL at 09:23

## 2022-09-16 RX ADMIN — GENTAMICIN SULFATE: 1 CREAM TOPICAL at 00:04

## 2022-09-16 RX ADMIN — BUPROPION HYDROCHLORIDE 150 MG: 150 TABLET, EXTENDED RELEASE ORAL at 09:23

## 2022-09-16 ASSESSMENT — PAIN SCALES - GENERAL
PAINLEVEL_OUTOF10: 8
PAINLEVEL_OUTOF10: 5
PAINLEVEL_OUTOF10: 8

## 2022-09-16 ASSESSMENT — PAIN - FUNCTIONAL ASSESSMENT: PAIN_FUNCTIONAL_ASSESSMENT: ACTIVITIES ARE NOT PREVENTED

## 2022-09-16 ASSESSMENT — PAIN DESCRIPTION - LOCATION: LOCATION: BACK;ABDOMEN

## 2022-09-16 ASSESSMENT — PAIN DESCRIPTION - ONSET: ONSET: ON-GOING

## 2022-09-16 ASSESSMENT — PAIN DESCRIPTION - DESCRIPTORS: DESCRIPTORS: ACHING;CRAMPING

## 2022-09-16 ASSESSMENT — PAIN DESCRIPTION - FREQUENCY: FREQUENCY: CONTINUOUS

## 2022-09-16 ASSESSMENT — PAIN DESCRIPTION - ORIENTATION: ORIENTATION: RIGHT;LEFT

## 2022-09-16 ASSESSMENT — PAIN DESCRIPTION - PAIN TYPE: TYPE: ACUTE PAIN

## 2022-09-16 ASSESSMENT — PAIN SCALES - WONG BAKER: WONGBAKER_NUMERICALRESPONSE: 0

## 2022-09-16 NOTE — CARE COORDINATION
[facility]    DC order noted, all docs needed have been faxed to [facility] for home care services.     Home care to see patient within 24-48 hrs    Brianda Ro RN, BSN CTN  [facility] [phone]

## 2022-09-16 NOTE — PROGRESS NOTES
- No adverse events overnight    -PD started later than normal and dressing change completed on PD catheter. Catheter site is showing no signs of infection. -wife here until close to midnight    -Protonix drip remains on    -No alarms or beeping on PD cycler overnight.  Heparin is in PD fluid    -Pain medication given once overnight.     -Diabetic/glucose protocol ordered and scheduled lantus given

## 2022-09-16 NOTE — CARE COORDINATION
[facility]    Referral received from  to follow for home care services. I will follow for needs, and speak with patient to verify demos.     Bertha Atkins RN, BSN CTN  [facility] [phone]

## 2022-09-16 NOTE — CARE COORDINATION
INITIAL ASSESSMENT AND DISCHARGE SUMMARY   09/16/22 1580   Service Assessment   Patient Orientation Alert and Oriented   Cognition Alert   History Provided By Patient   Primary Caregiver Self   Accompanied By/Relationship wife on speaker phone   Support Systems Spouse/Significant Other;Family Members   Patient's Healthcare Decision Maker is: Patient Declined (Legal Next of [address] as Decision Maker)   PCP Verified by CM Yes   Prior Functional Level Independent in ADLs/IADLs   Current Functional Level Independent in ADLs/IADLs   Can patient return to prior living arrangement Yes   Ability to make needs known: Good   Family able to assist with home care needs: Yes   Financial Resources Medicare   Discharge Planning   Type of Residence House   Living Arrangements Spouse/Significant Other   Current Services Prior To Admission Durable Medical Equipment; Other (Comment)  (PD through [employer])   Potential Assistance Needed N/A   DME Ordered? No   Potential Assistance Purchasing Medications No   Type of Home Care Services PT;OT   Patient expects to be discharged to: House   One/Two Story Residence One story   History of falls? 0   Services At/After Discharge   Services At/After Discharge OT;PT   [employer] Information Provided? No   Mode of Transport at Discharge Other (see comment)  (wife to transport)   Confirm Follow Up Transport Self   Condition of Participation: Discharge Planning   The Plan for Transition of Care is related to the following treatment goals: home with family support and home healthcare for therapy   The Patient and/or Patient Representative was provided with a Choice of Provider? Patient   The Patient and/Or Patient Representative agree with the Discharge Plan? Yes   Freedom of Choice list was provided with basic dialogue that supports the patient's individualized plan of care/goals, treatment preferences, and shares the quality data associated with the providers?   Yes   Patient from home with wife pta. Patient plans to return home at discharge, wife to transport. Patient agreeable to therapy; freedom of choice list provided. Patient chose [facility]; referral placed and accepted. Patient denies any other discharge needs.     Electronically signed by Sudhir Talbot RN on 9/16/2022 at 11:47 AM

## 2022-09-16 NOTE — CARE COORDINATION
09/16/22 1155   IMM Letter   IMM Letter given to Patient/Family/Significant other/Guardian/POA/by: Provided to patient at bedside by Abimael Alvarenga RN. Education provided to patient, patient reported no questions and verbalized understanding. Patient aware of 4 hours allotted time to determine if they choose to pursue Medicare appeal process. Patient verbalized readiness for discharge home.    IMM Letter date given: 09/16/22   IMM Letter time given: 200   Electronically signed by Dallin Valentine RN on 9/16/2022 at 11:56 AM

## 2022-09-16 NOTE — DISCHARGE INSTR - COC
Continuity of Care Form    Patient Name: Meseret Vegas   :  1965  MRN:  [id number]    Admit date:  2022  Discharge date:  ***    Code Status Order: Full Code   Advance Directives:     Admitting Physician:  Angel Hernandez MD  PCP: ROQUE Hinson CNP    Discharging Nurse: [facility] Unit/Room#: R4G-3302/9555-93  Discharging Unit Phone Number: ***    Emergency Contact:   Extended Emergency Contact Information  Primary Emergency Contact: Glen EllenFinesse  Address: [address] [place] [address] Cara Viera of [facility]  Phone: [phone]  Mobile Phone: [phone]  Relation: Other    Past Surgical History:  Past Surgical History:   Procedure Laterality Date    COLONOSCOPY N/A 2021    COLONOSCOPY POLYPECTOMY SNARE/COLD BIOPSY performed by Yamileth Caraballo MD at [address] [facility] N/A 10/29/2020    PLACEMENT OF A TUNNELED DIALYSIS CATHETER WITH FLEURO AND ULTRASOUND [facility] MD [facility] N/A 10/29/2020    LAPAROSCOPIC PERITONEAL DIALYSIS CATHETER PLACEMENT WITH FLEURO AND ULTRASOUND performed by Anjel Sutton MD at [address] [place] [address] Nw N/A     UMBILICAL HERNIA REPAIR performed by Anjel Sutton MD at [address] N/A 2021    ESOPHAGOGASTRODUODENOSCOPY performed by Yamileth Caraballo MD at [address] [facility] 2022    EGD DIAGNOSTIC ONLY performed by Cristian Wilcox MD at [facility] ENDOSCOPY       Immunization History:   Immunization History   Administered Date(s) Administered    COVID-19, PFIZER GRAY top, DO NOT Dilute, (age 15 y+), IM, 30 mcg/0.3 mL 2022    COVID-19, PFIZER PURPLE top, DILUTE for use, (age 15 y+), 30mcg/0.3mL 2021, 2021    Hepatitis A Adult (Havrix, Vaqta) 03/10/2021, 2021    Hepatitis B 2022    Hepatitis B Adult (Heplisav-b) 05/06/2022    Hepatitis B Adult (Recombivax HB) 11/05/2020    Hepatitis B vaccine 11/05/2020    Influenza Vaccine, unspecified formulation 12/05/2013    Influenza Virus Vaccine 12/05/2013, 12/04/2015, 11/05/2020, 10/11/2021    Influenza, FLUARIX, FLULAVAL, FLUZONE (age 10 mo+) AND AFLURIA, (age 1 y+), PF, 0.5mL 10/15/2018, 11/05/2020    Influenza, Triv, 3 Years and older, IM, PF (Afluria 5yrs and older) 10/11/2021    Pneumococcal Conjugate 13-valent ([id number]) 11/10/2020    Pneumococcal Conjugate Vaccine 02/13/2015    Pneumococcal Polysaccharide ([id number]) 11/02/2009, 03/10/2021    Tdap (Boostrix, Adacel) 08/27/2021    Zoster Recombinant (Shingrix) 03/10/2021, 08/27/2021       Active Problems:  Patient Active Problem List   Diagnosis Code    Nonischemic cardiomyopathy ([facility] I42.8    Cerebral infarction ([address] I63.9    Cigarette nicotine dependence without complication E12.245    Erectile dysfunction due to arterial insufficiency N52.01    Renal artery stenosis ([facility]) I70.1    Chronic diastolic congestive heart failure ([facility]) I50.32    Major depressive disorder, recurrent episode, moderate ([facility]) F33.1    Pericardial effusion I31.3    Hypertension associated with diabetes ([facility]) E11.59, I15.2    DM type 2 with diabetic mixed hyperlipidemia ([facility]) E11.69, E78.2    Diabetes mellitus due to underlying condition with stage 4 chronic kidney disease, with long-term current use of insulin ([facility]) E08.22, N18.4, Z79.4    CKD (chronic kidney disease) requiring chronic dialysis ([facility] N18.6, Z99.2    ESRD (end stage renal disease) ([facility] N18.6    GI bleed K92.2       Isolation/Infection:   Isolation            No Isolation          Patient Infection Status       Infection Onset Added Last Indicated Last Indicated By Review Planned Expiration Resolved Resolved By    None active    Resolved    COVID-19 (Rule Out) 09/13/22 09/13/22 09/15/22 COVID-19, Rapid (Ordered)   09/15/22 Rule-Out Test Resulted    COVID-19 (Rule Out) 10/27/20 10/27/20 10/27/20 COVID-19 (Ordered)   10/27/20 Rule-Out Test Resulted            Nurse Assessment:  Last Vital Signs: BP (!) 149/118   Pulse 99   Temp 99 °F (37.2 °C) (Oral)   Resp 20   Ht 6' 2\" (1.88 m)   Wt 163 lb 9.3 oz (74.2 kg)   SpO2 98%   BMI 21.00 kg/m²     Last documented pain score (0-10 scale): Pain Level: 5  Last Weight:   Wt Readings from Last 1 Encounters:   09/15/22 163 lb 9.3 oz (74.2 kg)     Mental Status:  {IP PT MENTAL STATUS:}    IV Access:  { JEFF IV ACCESS:932008465}    Nursing Mobility/ADLs:  Walking   {CHP DME TFMQ:451304630}  Transfer  {CHP DME RIU}  Bathing  {CHP DME AQIF:937312359}  Dressing  {CHP DME CFII:771174821}  Toileting  {CHP DME HJQD:585967594}  Feeding  {P DME DOSQ:839787376}  Med Admin  {P DME CVGJ:515249395}  Med Delivery   { JEFF MED Delivery:251864084}    Wound Care Documentation and Therapy:  Incision 10/29/20 Chest Anterior;Right (Active)   Number of days: 687       Incision 10/29/20 Abdomen Anterior (Active)   Number of days: 687        Elimination:  Continence: Bowel: {YES / NL:36365}  Bladder: {YES / QJ:26813}  Urinary Catheter: {Urinary Catheter:845034697}   Colostomy/Ileostomy/Ileal Conduit: {YES / GL:31977}       Date of Last BM: ***    Intake/Output Summary (Last 24 hours) at 2022 1137  Last data filed at 2022 0636  Gross per 24 hour   Intake 301 ml   Output -166 ml   Net 467 ml     I/O last 3 completed shifts: In: 671.6 [P.O.:100;  I.V.:571.6]  Out: -68 [Urine:650]    Safety Concerns:     [facility] JEFF Safety Concerns:793039849}    Impairments/Disabilities:      {MH JEFF Impairments/Disabilities:912105106}    Nutrition Therapy:  Current Nutrition Therapy:   Alexia Garcia JEFF Diet List:840537276}    Routes of Feeding: {CHP DME Other Feedings:143863496}  Liquids: {Slp liquid thickness:93251}  Daily Fluid Restriction: {CHP DME Yes amt example:223103616}  Last Modified Barium Swallow with Video (Video Swallowing Test): {Done Not ***    Physician Certification: I certify the above information and transfer of Shannan Her  is necessary for the continuing treatment of the diagnosis listed and that he requires {Admit to Appropriate Level of Care:94856} for {GREATER/LESS:357240435} 30 days.      Update Admission H&P: {CHP DME Changes in [facility]:639551323}    PHYSICIAN SIGNATURE:  {Esignature:417291743}

## 2022-09-16 NOTE — CARE COORDINATION
Advance Care Planning     Advance Care Planning Activator (Inpatient)  Conversation Note      Date of ACP Conversation: 9/16/2022     Conversation Conducted with: Patient with Decision Making Capacity    ACP Activator: Loren Dunn RN    Health Care Decision Maker:     Current Designated Health Care Decision Maker:     Primary Decision Maker: Georginafroilan Man  [phone]    Care Preferences    Ventilation: \"If you were in your present state of health and suddenly became very ill and were unable to breathe on your own, what would your preference be about the use of a ventilator (breathing machine) if it were available to you? \"      Would the patient desire the use of ventilator (breathing machine)?: yes    \"If your health worsens and it becomes clear that your chance of recovery is unlikely, what would your preference be about the use of a ventilator (breathing machine) if it were available to you? \"     Would the patient desire the use of ventilator (breathing machine)?: No      Resuscitation  \"CPR works best to restart the heart when there is a sudden event, like a heart attack, in someone who is otherwise healthy. Unfortunately, CPR does not typically restart the heart for people who have serious health conditions or who are very sick. \"    \"In the event your heart stopped as a result of an underlying serious health condition, would you want attempts to be made to restart your heart (answer \"yes\" for attempt to resuscitate) or would you prefer a natural death (answer \"no\" for do not attempt to resuscitate)? \" yes       [] Yes   [] No   Educated Patient / Chinyere Estevez regarding differences between Advance Directives and portable DNR orders.     Length of ACP Conversation in minutes:  5    Conversation Outcomes:  [x] ACP discussion completed  [] Existing advance directive reviewed with patient; no changes to patient's previously recorded wishes  [] New Advance Directive completed  [] Portable Do Not Rescitate prepared for Provider review and signature  [] POLST/POST/MOLST/MOST prepared for Provider review and signature      Follow-up plan:    [] Schedule follow-up conversation to continue planning  [] Referred individual to Provider for additional questions/concerns   [] Advised patient/agent/surrogate to review completed ACP document and update if needed with changes in condition, patient preferences or care setting    [x] This note routed to one or more involved healthcare providers    Electronically signed by Larisa Hazel RN on 9/16/2022 at 11:50 AM

## 2022-09-16 NOTE — PROGRESS NOTES
Peripheral IV and telemetry monitor discontinued without issue. AVS printed and reviewed with patient and his spouse, emphasis placed on medication changes and follow up care; they were given the opportunity to ask questions, and verbalized understanding of all discharge instructions. Patient taken via wheelchair by PCA to return home with wife.

## 2022-09-17 LAB
BLOOD BANK DISPENSE STATUS: NORMAL
BLOOD BANK DISPENSE STATUS: NORMAL
BLOOD BANK PRODUCT CODE: NORMAL
BLOOD BANK PRODUCT CODE: NORMAL
BLOOD CULTURE, ROUTINE: NORMAL
BODY FLUID CULTURE, STERILE: NORMAL
BPU ID: NORMAL
BPU ID: NORMAL
CULTURE, BLOOD 2: NORMAL
DESCRIPTION BLOOD BANK: NORMAL
DESCRIPTION BLOOD BANK: NORMAL
GRAM STAIN RESULT: NORMAL

## 2022-09-18 LAB
BODY FLUID CULTURE, STERILE: NORMAL
GRAM STAIN RESULT: NORMAL

## 2022-09-19 ENCOUNTER — TELEPHONE (OUTPATIENT)
Dept: PRIMARY CARE CLINIC | Age: 57
End: 2022-09-19

## 2022-09-19 NOTE — TELEPHONE ENCOUNTER
[facility] Transitions Initial Follow Up Call    Outreach made within 2 business days of discharge: Yes    Patient: Andreea Stanley Patient : 1965   MRN: [id number]  Reason for Admission: There are no discharge diagnoses documented for the most recent discharge. Discharge Date: 22       Spoke with: Dianna Avalos    Discharge department/facility: [facility] Patient Contact:  Was patient able to fill all prescriptions: Yes  Was patient instructed to bring all medications to the follow-up visit: Yes  Is patient taking all medications as directed in the discharge summary?  Yes  Does patient understand their discharge instructions: Yes  Does patient have questions or concerns that need addressed prior to 7-14 day follow up office visit: no    Scheduled appointment with PCP within 7-14 days    Follow Up  2022 1:00    Augustine Webbre

## 2022-09-22 LAB — BLOOD BANK REF CASE: NORMAL

## 2022-10-11 ENCOUNTER — HOSPITAL ENCOUNTER (INPATIENT)
Age: 57
LOS: 8 days | Discharge: HOME | DRG: 856 | End: 2022-10-19
Attending: EMERGENCY MEDICINE | Admitting: INTERNAL MEDICINE
Payer: COMMERCIAL

## 2022-10-11 ENCOUNTER — APPOINTMENT (OUTPATIENT)
Dept: CT IMAGING | Age: 57
DRG: 856 | End: 2022-10-11
Payer: COMMERCIAL

## 2022-10-11 DIAGNOSIS — K65.2 SPONTANEOUS BACTERIAL PERITONITIS (HCC): ICD-10-CM

## 2022-10-11 DIAGNOSIS — K65.2 SBP (SPONTANEOUS BACTERIAL PERITONITIS) (HCC): Primary | ICD-10-CM

## 2022-10-11 DIAGNOSIS — D64.9 ANEMIA, UNSPECIFIED TYPE: ICD-10-CM

## 2022-10-11 LAB
ALBUMIN FLUID: 0.4 G/DL
ALBUMIN SERPL-MCNC: 2.4 G/DL (ref 3.4–5)
ALP BLD-CCNC: 53 U/L (ref 40–129)
ALT SERPL-CCNC: <5 U/L (ref 10–40)
ANION GAP SERPL CALCULATED.3IONS-SCNC: 12 MMOL/L (ref 3–16)
APPEARANCE FLUID: NORMAL
AST SERPL-CCNC: 7 U/L (ref 15–37)
BASOPHILS ABSOLUTE: 0 K/UL (ref 0–0.2)
BASOPHILS RELATIVE PERCENT: 0.4 %
BILIRUB SERPL-MCNC: <0.2 MG/DL (ref 0–1)
BILIRUBIN DIRECT: <0.2 MG/DL (ref 0–0.3)
BILIRUBIN, INDIRECT: ABNORMAL MG/DL (ref 0–1)
BUN BLDV-MCNC: 39 MG/DL (ref 7–20)
CALCIUM SERPL-MCNC: 8.3 MG/DL (ref 8.3–10.6)
CELL COUNT FLUID TYPE: NORMAL
CHLORIDE BLD-SCNC: 105 MMOL/L (ref 99–110)
CLOT EVALUATION: NORMAL
CO2: 19 MMOL/L (ref 21–32)
COLOR FLUID: NORMAL
CREAT SERPL-MCNC: 5.9 MG/DL (ref 0.9–1.3)
EOSINOPHIL FLUID: 2 %
EOSINOPHILS ABSOLUTE: 0.1 K/UL (ref 0–0.6)
EOSINOPHILS RELATIVE PERCENT: 1.3 %
FLUID TYPE: NORMAL
GFR AFRICAN AMERICAN: 12
GFR NON-AFRICAN AMERICAN: 10
GLUCOSE BLD-MCNC: 101 MG/DL (ref 70–99)
GLUCOSE BLD-MCNC: 113 MG/DL (ref 70–99)
GLUCOSE, FLUID: 263 MG/DL
HCT VFR BLD CALC: 22.3 % (ref 40.5–52.5)
HEMOGLOBIN: 7.2 G/DL (ref 13.5–17.5)
LACTIC ACID, SEPSIS: 1.2 MMOL/L (ref 0.4–1.9)
LD, FLUID: 31 U/L
LIPASE: 50 U/L (ref 13–60)
LYMPHOCYTES ABSOLUTE: 0.6 K/UL (ref 1–5.1)
LYMPHOCYTES RELATIVE PERCENT: 6.2 %
LYMPHOCYTES, BODY FLUID: 2 %
MACROPHAGE FLUID: 3 %
MCH RBC QN AUTO: 31.6 PG (ref 26–34)
MCHC RBC AUTO-ENTMCNC: 32.3 G/DL (ref 31–36)
MCV RBC AUTO: 97.7 FL (ref 80–100)
MONOCYTE, FLUID: 29 %
MONOCYTES ABSOLUTE: 0.4 K/UL (ref 0–1.3)
MONOCYTES RELATIVE PERCENT: 4.8 %
NEUTROPHIL, FLUID: 64 %
NEUTROPHILS ABSOLUTE: 8.1 K/UL (ref 1.7–7.7)
NEUTROPHILS RELATIVE PERCENT: 87.3 %
NUCLEATED CELLS FLUID: NORMAL /CUMM
NUMBER OF CELLS COUNTED FLUID: 100
PDW BLD-RTO: 14 % (ref 12.4–15.4)
PERFORMED ON: ABNORMAL
PLATELET # BLD: 265 K/UL (ref 135–450)
PMV BLD AUTO: 8.9 FL (ref 5–10.5)
POTASSIUM SERPL-SCNC: 4.5 MMOL/L (ref 3.5–5.1)
PROTEIN FLUID: 1 G/DL
RBC # BLD: 2.28 M/UL (ref 4.2–5.9)
RBC FLUID: 6000 /CUMM
SODIUM BLD-SCNC: 136 MMOL/L (ref 136–145)
TOTAL PROTEIN: 6.8 G/DL (ref 6.4–8.2)
WBC # BLD: 9.3 K/UL (ref 4–11)

## 2022-10-11 PROCEDURE — 87106 FUNGI IDENTIFICATION YEAST: CPT

## 2022-10-11 PROCEDURE — 85025 COMPLETE CBC W/AUTO DIFF WBC: CPT

## 2022-10-11 PROCEDURE — 6360000002 HC RX W HCPCS: Performed by: NURSE PRACTITIONER

## 2022-10-11 PROCEDURE — 36415 COLL VENOUS BLD VENIPUNCTURE: CPT

## 2022-10-11 PROCEDURE — 2580000003 HC RX 258: Performed by: NURSE PRACTITIONER

## 2022-10-11 PROCEDURE — 96375 TX/PRO/DX INJ NEW DRUG ADDON: CPT

## 2022-10-11 PROCEDURE — 83605 ASSAY OF LACTIC ACID: CPT

## 2022-10-11 PROCEDURE — 80048 BASIC METABOLIC PNL TOTAL CA: CPT

## 2022-10-11 PROCEDURE — 87205 SMEAR GRAM STAIN: CPT

## 2022-10-11 PROCEDURE — 96365 THER/PROPH/DIAG IV INF INIT: CPT

## 2022-10-11 PROCEDURE — 74176 CT ABD & PELVIS W/O CONTRAST: CPT

## 2022-10-11 PROCEDURE — 83615 LACTATE (LD) (LDH) ENZYME: CPT

## 2022-10-11 PROCEDURE — 1200000000 HC SEMI PRIVATE

## 2022-10-11 PROCEDURE — 83690 ASSAY OF LIPASE: CPT

## 2022-10-11 PROCEDURE — 89051 BODY FLUID CELL COUNT: CPT

## 2022-10-11 PROCEDURE — 2580000003 HC RX 258: Performed by: INTERNAL MEDICINE

## 2022-10-11 PROCEDURE — 87077 CULTURE AEROBIC IDENTIFY: CPT

## 2022-10-11 PROCEDURE — 82945 GLUCOSE OTHER FLUID: CPT

## 2022-10-11 PROCEDURE — 87070 CULTURE OTHR SPECIMN AEROBIC: CPT

## 2022-10-11 PROCEDURE — 80076 HEPATIC FUNCTION PANEL: CPT

## 2022-10-11 PROCEDURE — 84157 ASSAY OF PROTEIN OTHER: CPT

## 2022-10-11 PROCEDURE — 99285 EMERGENCY DEPT VISIT HI MDM: CPT

## 2022-10-11 PROCEDURE — 87040 BLOOD CULTURE FOR BACTERIA: CPT

## 2022-10-11 PROCEDURE — 82042 OTHER SOURCE ALBUMIN QUAN EA: CPT

## 2022-10-11 PROCEDURE — 6370000000 HC RX 637 (ALT 250 FOR IP): Performed by: NURSE PRACTITIONER

## 2022-10-11 RX ORDER — SPIRONOLACTONE 100 MG/1
100 TABLET, FILM COATED ORAL DAILY
COMMUNITY

## 2022-10-11 RX ORDER — CLONIDINE HYDROCHLORIDE 0.1 MG/1
0.3 TABLET ORAL 3 TIMES DAILY
Status: DISCONTINUED | OUTPATIENT
Start: 2022-10-11 | End: 2022-10-15

## 2022-10-11 RX ORDER — ISOSORBIDE MONONITRATE 60 MG/1
60 TABLET, EXTENDED RELEASE ORAL DAILY
Status: DISCONTINUED | OUTPATIENT
Start: 2022-10-12 | End: 2022-10-19 | Stop reason: HOSPADM

## 2022-10-11 RX ORDER — HYDRALAZINE HYDROCHLORIDE 50 MG/1
100 TABLET, FILM COATED ORAL 3 TIMES DAILY
Status: DISCONTINUED | OUTPATIENT
Start: 2022-10-11 | End: 2022-10-19 | Stop reason: HOSPADM

## 2022-10-11 RX ORDER — INSULIN LISPRO 100 [IU]/ML
0-4 INJECTION, SOLUTION INTRAVENOUS; SUBCUTANEOUS NIGHTLY
Status: DISCONTINUED | OUTPATIENT
Start: 2022-10-11 | End: 2022-10-19 | Stop reason: HOSPADM

## 2022-10-11 RX ORDER — BUPROPION HYDROCHLORIDE 150 MG/1
150 TABLET, EXTENDED RELEASE ORAL 2 TIMES DAILY
Status: DISCONTINUED | OUTPATIENT
Start: 2022-10-11 | End: 2022-10-15

## 2022-10-11 RX ORDER — ACETAMINOPHEN 650 MG/1
650 SUPPOSITORY RECTAL EVERY 6 HOURS PRN
Status: DISCONTINUED | OUTPATIENT
Start: 2022-10-11 | End: 2022-10-19 | Stop reason: HOSPADM

## 2022-10-11 RX ORDER — LOSARTAN POTASSIUM 100 MG/1
100 TABLET ORAL DAILY
Status: DISCONTINUED | OUTPATIENT
Start: 2022-10-12 | End: 2022-10-19 | Stop reason: HOSPADM

## 2022-10-11 RX ORDER — PANTOPRAZOLE SODIUM 40 MG/1
40 TABLET, DELAYED RELEASE ORAL
Status: DISCONTINUED | OUTPATIENT
Start: 2022-10-12 | End: 2022-10-19 | Stop reason: HOSPADM

## 2022-10-11 RX ORDER — INSULIN LISPRO 100 [IU]/ML
3 INJECTION, SOLUTION INTRAVENOUS; SUBCUTANEOUS
Status: DISCONTINUED | OUTPATIENT
Start: 2022-10-12 | End: 2022-10-19 | Stop reason: HOSPADM

## 2022-10-11 RX ORDER — SODIUM CHLORIDE 9 MG/ML
INJECTION, SOLUTION INTRAVENOUS PRN
Status: DISCONTINUED | OUTPATIENT
Start: 2022-10-11 | End: 2022-10-19 | Stop reason: HOSPADM

## 2022-10-11 RX ORDER — ONDANSETRON 2 MG/ML
4 INJECTION INTRAMUSCULAR; INTRAVENOUS EVERY 6 HOURS PRN
Status: DISCONTINUED | OUTPATIENT
Start: 2022-10-11 | End: 2022-10-19 | Stop reason: HOSPADM

## 2022-10-11 RX ORDER — SPIRONOLACTONE 100 MG/1
100 TABLET, FILM COATED ORAL DAILY
Status: DISCONTINUED | OUTPATIENT
Start: 2022-10-12 | End: 2022-10-19 | Stop reason: HOSPADM

## 2022-10-11 RX ORDER — CLONIDINE HYDROCHLORIDE 0.3 MG/1
0.3 TABLET ORAL 3 TIMES DAILY
Status: ON HOLD | COMMUNITY
Start: 2022-08-26 | End: 2022-10-18 | Stop reason: SDUPTHER

## 2022-10-11 RX ORDER — MORPHINE SULFATE 4 MG/ML
4 INJECTION, SOLUTION INTRAMUSCULAR; INTRAVENOUS ONCE
Status: COMPLETED | OUTPATIENT
Start: 2022-10-11 | End: 2022-10-11

## 2022-10-11 RX ORDER — SODIUM CHLORIDE 0.9 % (FLUSH) 0.9 %
5-40 SYRINGE (ML) INJECTION EVERY 12 HOURS SCHEDULED
Status: DISCONTINUED | OUTPATIENT
Start: 2022-10-11 | End: 2022-10-19 | Stop reason: HOSPADM

## 2022-10-11 RX ORDER — SODIUM CHLORIDE, SODIUM LACTATE, CALCIUM CHLORIDE, MAGNESIUM CHLORIDE AND DEXTROSE 2.5; 538; 448; 18.3; 5.08 G/100ML; MG/100ML; MG/100ML; MG/100ML; MG/100ML
2000 INJECTION, SOLUTION INTRAPERITONEAL EVERY 6 HOURS
Status: DISCONTINUED | OUTPATIENT
Start: 2022-10-11 | End: 2022-10-12

## 2022-10-11 RX ORDER — SODIUM BICARBONATE 650 MG/1
650 TABLET ORAL DAILY
Status: DISCONTINUED | OUTPATIENT
Start: 2022-10-12 | End: 2022-10-18

## 2022-10-11 RX ORDER — HEPARIN SODIUM 5000 [USP'U]/ML
5000 INJECTION, SOLUTION INTRAVENOUS; SUBCUTANEOUS EVERY 8 HOURS SCHEDULED
Status: DISCONTINUED | OUTPATIENT
Start: 2022-10-11 | End: 2022-10-17

## 2022-10-11 RX ORDER — ASPIRIN 81 MG/1
81 TABLET ORAL DAILY
Status: DISCONTINUED | OUTPATIENT
Start: 2022-10-12 | End: 2022-10-19 | Stop reason: HOSPADM

## 2022-10-11 RX ORDER — INSULIN LISPRO 100 [IU]/ML
0-8 INJECTION, SOLUTION INTRAVENOUS; SUBCUTANEOUS
Status: DISCONTINUED | OUTPATIENT
Start: 2022-10-12 | End: 2022-10-19 | Stop reason: HOSPADM

## 2022-10-11 RX ORDER — SUCRALFATE 1 G/1
1 TABLET ORAL 4 TIMES DAILY
Status: DISCONTINUED | OUTPATIENT
Start: 2022-10-11 | End: 2022-10-19 | Stop reason: HOSPADM

## 2022-10-11 RX ORDER — ONDANSETRON 4 MG/1
4 TABLET, ORALLY DISINTEGRATING ORAL EVERY 8 HOURS PRN
Status: DISCONTINUED | OUTPATIENT
Start: 2022-10-11 | End: 2022-10-19 | Stop reason: HOSPADM

## 2022-10-11 RX ORDER — SODIUM CHLORIDE 0.9 % (FLUSH) 0.9 %
5-40 SYRINGE (ML) INJECTION PRN
Status: DISCONTINUED | OUTPATIENT
Start: 2022-10-11 | End: 2022-10-19 | Stop reason: HOSPADM

## 2022-10-11 RX ORDER — DEXTROSE MONOHYDRATE 100 MG/ML
INJECTION, SOLUTION INTRAVENOUS CONTINUOUS PRN
Status: DISCONTINUED | OUTPATIENT
Start: 2022-10-11 | End: 2022-10-19 | Stop reason: HOSPADM

## 2022-10-11 RX ORDER — CALCITRIOL 0.25 UG/1
0.5 CAPSULE, LIQUID FILLED ORAL DAILY
Status: DISCONTINUED | OUTPATIENT
Start: 2022-10-12 | End: 2022-10-19 | Stop reason: HOSPADM

## 2022-10-11 RX ORDER — SEVELAMER CARBONATE 800 MG/1
800 TABLET, FILM COATED ORAL
Status: DISCONTINUED | OUTPATIENT
Start: 2022-10-12 | End: 2022-10-19 | Stop reason: HOSPADM

## 2022-10-11 RX ORDER — POLYETHYLENE GLYCOL 3350 17 G/17G
17 POWDER, FOR SOLUTION ORAL DAILY PRN
Status: DISCONTINUED | OUTPATIENT
Start: 2022-10-11 | End: 2022-10-19 | Stop reason: HOSPADM

## 2022-10-11 RX ORDER — ACETAMINOPHEN 325 MG/1
650 TABLET ORAL EVERY 6 HOURS PRN
Status: DISCONTINUED | OUTPATIENT
Start: 2022-10-11 | End: 2022-10-19 | Stop reason: HOSPADM

## 2022-10-11 RX ORDER — INSULIN GLARGINE 100 [IU]/ML
22 INJECTION, SOLUTION SUBCUTANEOUS NIGHTLY
Status: DISCONTINUED | OUTPATIENT
Start: 2022-10-11 | End: 2022-10-12

## 2022-10-11 RX ORDER — ATORVASTATIN CALCIUM 40 MG/1
40 TABLET, FILM COATED ORAL NIGHTLY
Status: DISCONTINUED | OUTPATIENT
Start: 2022-10-11 | End: 2022-10-19 | Stop reason: HOSPADM

## 2022-10-11 RX ORDER — NIFEDIPINE 90 MG/1
90 TABLET, EXTENDED RELEASE ORAL 2 TIMES DAILY
Status: DISCONTINUED | OUTPATIENT
Start: 2022-10-11 | End: 2022-10-19 | Stop reason: HOSPADM

## 2022-10-11 RX ORDER — GABAPENTIN 300 MG/1
300 CAPSULE ORAL 3 TIMES DAILY
Status: DISCONTINUED | OUTPATIENT
Start: 2022-10-11 | End: 2022-10-19 | Stop reason: HOSPADM

## 2022-10-11 RX ADMIN — METOPROLOL TARTRATE 75 MG: 25 TABLET, FILM COATED ORAL at 22:36

## 2022-10-11 RX ADMIN — BUPROPION HYDROCHLORIDE 150 MG: 150 TABLET, EXTENDED RELEASE ORAL at 22:36

## 2022-10-11 RX ADMIN — SODIUM CHLORIDE, SODIUM LACTATE, CALCIUM CHLORIDE, MAGNESIUM CHLORIDE AND DEXTROSE 2000 ML: 2.5; 538; 448; 18.3; 5.08 INJECTION, SOLUTION INTRAPERITONEAL at 23:16

## 2022-10-11 RX ADMIN — MORPHINE SULFATE 4 MG: 4 INJECTION, SOLUTION INTRAMUSCULAR; INTRAVENOUS at 18:01

## 2022-10-11 RX ADMIN — HYDRALAZINE HYDROCHLORIDE 100 MG: 50 TABLET, FILM COATED ORAL at 22:36

## 2022-10-11 RX ADMIN — NIFEDIPINE 90 MG: 90 TABLET, EXTENDED RELEASE ORAL at 22:36

## 2022-10-11 RX ADMIN — Medication 1250 MG: at 19:09

## 2022-10-11 RX ADMIN — ACETAMINOPHEN 650 MG: 325 TABLET ORAL at 23:14

## 2022-10-11 RX ADMIN — SUCRALFATE 1 G: 1 TABLET ORAL at 22:36

## 2022-10-11 RX ADMIN — HEPARIN SODIUM 5000 UNITS: 5000 INJECTION INTRAVENOUS; SUBCUTANEOUS at 22:36

## 2022-10-11 RX ADMIN — GABAPENTIN 300 MG: 300 CAPSULE ORAL at 22:36

## 2022-10-11 RX ADMIN — ATORVASTATIN CALCIUM 40 MG: 40 TABLET, FILM COATED ORAL at 22:36

## 2022-10-11 RX ADMIN — INSULIN GLARGINE 22 UNITS: 100 INJECTION, SOLUTION SUBCUTANEOUS at 22:35

## 2022-10-11 RX ADMIN — CEFEPIME 2000 MG: 2 INJECTION, POWDER, FOR SOLUTION INTRAVENOUS at 18:28

## 2022-10-11 RX ADMIN — CLONIDINE HYDROCHLORIDE 0.3 MG: 0.1 TABLET ORAL at 22:35

## 2022-10-11 ASSESSMENT — LIFESTYLE VARIABLES
HOW OFTEN DO YOU HAVE A DRINK CONTAINING ALCOHOL: NEVER
HOW MANY STANDARD DRINKS CONTAINING ALCOHOL DO YOU HAVE ON A TYPICAL DAY: PATIENT DOES NOT DRINK

## 2022-10-11 ASSESSMENT — PAIN SCALES - GENERAL
PAINLEVEL_OUTOF10: 0
PAINLEVEL_OUTOF10: 0
PAINLEVEL_OUTOF10: 7

## 2022-10-11 ASSESSMENT — PAIN DESCRIPTION - LOCATION: LOCATION: ABDOMEN

## 2022-10-11 NOTE — PROGRESS NOTES
Pharmacy Medication Reconciliation Note     List of medications Yadira Joe is currently taking is complete. Source of information:   1. Conversation with patient at bedside  2. EMR    Notes regarding home medications:   1. Patient is a poor historian with a complex medical/medication history--reports taking all AM PTA  2. Updated all meds with patient, but unknown compliance at home   3. [facility] has basal insulin at 100 units QD, historical med shows 22 units.      Patient denies taking any OTC or herbal medications    Ernie Miller, Pharmacy Intern  10/11/2022  7:22 PM

## 2022-10-11 NOTE — H&P
Hospital Medicine History & Physical      PCP: ROQUE Moran CNP    Date of Admission: 10/11/2022    Date of Service: Pt seen/examined on 10/11/2022 and Admitted to Inpatient with expected LOS greater than two midnights due to medical therapy. Chief Complaint:  abdominal pain and cloudy dialysis fluid      History Of Present Illness:      65 y.o. male with PMHx of DM, CVA, CHF, ESRD on PD, HTN and HLD presented to [facility] with 5 day history of abdominal pain during dialysis exchange with subsequent cloudy fluid. Abdominal pain became constant today. No fever, chills or myalgias. No nausea or vomiting. No chest pain or shortness of breath.      Past Medical History:          Diagnosis Date    Cardiomyopathy [facility])     CHF (congestive heart failure) (HCC)     CVA (cerebral infarction) 5/2015    Diabetes mellitus ([facility]     Foot ulcer, left ([facility] 1/14/2016    Gastroparesis     Hemodialysis patient ([facility]     Hypercholesteremia     Hypertension     Kidney disease     Unspecified cerebral artery occlusion with cerebral infarction     5/15, 7/15 LEFT SIDE WEAKNESS       Past Surgical History:          Procedure Laterality Date    COLONOSCOPY N/A 5/17/2021    COLONOSCOPY POLYPECTOMY SNARE/COLD BIOPSY performed by Julian Alvarez MD at [address] [facility] N/A 10/29/2020    PLACEMENT OF A TUNNELED DIALYSIS CATHETER WITH FLEURO AND ULTRASOUND performed by Marvie Paget, MD at [address] [facility] N/A 10/29/2020    LAPAROSCOPIC PERITONEAL DIALYSIS CATHETER PLACEMENT WITH FLEURO AND ULTRASOUND performed by Marvie Paget, MD at [address] [place] [address] N/A 20/20/0435    UMBILICAL HERNIA REPAIR performed by Marvie Paget, MD at [address] [facility] N/A 4/26/2021    ESOPHAGOGASTRODUODENOSCOPY performed by Julian Alvarez MD at [address] [facility] 9/13/2022    EGD DIAGNOSTIC ONLY performed by Donovan Demarco MD at [address] [place] [address]       Medications Prior to Admission:      Prior to Admission medications    Medication Sig Start Date End Date Taking?  Authorizing Provider   spironolactone (ALDACTONE) 100 MG tablet Take 100 mg by mouth daily   Yes Historical Provider, MD   atorvastatin (LIPITOR) 40 MG tablet TAKE 1 TABLET BY MOUTH ONCE DAILY NIGHTLY 8/4/22   ROQUE Vilchis CNP   cloNIDine (CATAPRES) 0.3 MG tablet Take 0.3 mg by mouth 3 times daily 8/26/22   Historical Provider, MD   sucralfate (CARAFATE) 1 GM tablet Take 1 tablet by mouth 4 times daily 9/16/22   Ruddy Linton MD   pantoprazole (PROTONIX) 40 MG tablet Take 1 tablet by mouth 2 times daily (before meals) 9/16/22   Ruddy Linton MD   buPROPion [facility] SR) 150 MG extended release tablet Take 150 mg by mouth 2 times daily    Historical Provider, MD   Sucroferric Oxyhydroxide (VELPHORO) 500 MG CHEW Take 1 tablet by mouth 3 times daily (with meals) 6/23/22   Historical Provider, MD   insulin detemir (LEVEMIR FLEXTOUCH) 100 UNIT/ML injection pen Inject 22 Units into the skin nightly 8/19/22   ROQUE Vilchis CNP   Insulin Pen Needle ([employer] PEN NEEDLES) 31G X 6 MM MISC 1 each by Does not apply route daily 8/19/22   ROQUE Vilchis CNP   insulin aspart (NOVOLOG FLEXPEN) 100 UNIT/ML injection pen Inject 3 Units into the skin 3 times daily (before meals) 8/19/22   ROQUE Vilchis CNP   gabapentin (NEURONTIN) 300 MG capsule TAKE 1 CAPSULE BY MOUTH THREE TIMES DAILY 8/1/22 10/1/22  ROQUE Vilchis CNP   Methoxy PEG-Epoetin Beta (MIRCERA IJ) Inject 75 mcg into the skin 5/10/21   Historical Provider, MD   calcitRIOL (ROCALTROL) 0.5 MCG capsule Take 0.5 mcg by mouth daily 4/5/22   Historical Provider, MD   losartan (COZAAR) 100 MG tablet TAKE 1 TABLET BY MOUTH ONCE DAILY 4/29/22   Historical Provider, MD   iron sucrose (VENOFER) 20 MG/ML injection 200 mg daily 1/12/21 9/16/22 Historical Provider, MD   hydrALAZINE (APRESOLINE) 100 MG tablet TAKE 2 TABLETS BY MOUTH THREE TIMES DAILY  Patient taking differently: Take 100 mg by mouth 3 times daily TAKE 2 TABLETS BY MOUTH THREE TIMES DAILY 4/6/22   ROQUE Díaz CNP   NIFEdipine (PROCARDIA XL) 90 MG extended release tablet Take 1 tablet by mouth 2 times daily 2/11/22   Marcella Núñez MD   sodium bicarbonate 650 MG tablet Take 650 mg by mouth daily    Historical Provider, MD   isosorbide mononitrate (IMDUR) 60 MG extended release tablet Take 1 tablet by mouth once daily 10/20/21   ROQUE Díaz CNP   EQ ASPIRIN ADULT LOW DOSE 81 MG EC tablet Take 1 tablet by mouth once daily 9/24/21   ROQUE Díaz CNP   metoprolol tartrate (LOPRESSOR) 50 MG tablet Take twice daily  Patient taking differently: Take 75 mg by mouth 2 times daily Take twice daily 8/2/21   ROQUE Díaz CNP       Allergies:  Doxazosin and Coconut flavor    Social History:      The patient currently lives with wife    TOBACCO:   reports that he has been smoking cigars. He started smoking about 43 years ago. He has never used smokeless tobacco.  ETOH:   reports current alcohol use. Family History:      Reviewed in detail positive as follows: Adopted: Yes       REVIEW OF SYSTEMS:   Pertinent positives as noted in the HPI. All other systems reviewed and negative. PHYSICAL EXAM PERFORMED:    BP (!) 183/95   Pulse (!) 102   Temp 100.3 °F (37.9 °C) (Oral)   Resp 18   Ht 6' 2\" (1.88 m)   Wt 183 lb 6.8 oz (83.2 kg)   SpO2 97%   BMI 23.55 kg/m²     General appearance:  Well developed, well nourished, AA male lying on hospital bed awake alert and in no apparent distress, appears stated age and cooperative. HEENT:  Normal cephalic, atraumatic without obvious deformity. Pupils equal, round, and reactive to light. Conjunctivae/corneas clear. Neck: Supple, with full range of motion. No jugular venous distention.  Trachea midline. Respiratory:  Normal respiratory effort. Clear to auscultation, bilaterally without accessory muscle use. Cardiovascular:  Regular rate and rhythm without murmurs, no lower extremity edema. Abdomen: Soft, diffusely tender to palpate, non-distended, without rebound or guarding. Normal bowel sounds. Musculoskeletal:  Moves all extremities equally. Full range of motion without deformity. Skin: Skin warm, dry and intact. No rashes or lesions. Neurologic:  Neurovascularly intact without any focal sensory/motor deficits. Cranial nerves: II-XII intact, grossly non-focal.  Psychiatric:  Alert and oriented, thought content appropriate, normal insight  Capillary Refill: Brisk,< 3 seconds   Peripheral Pulses: +2 palpable, equal bilaterally       Labs:     Recent Labs     10/11/22  1441   WBC 9.3   HGB 7.2*   HCT 22.3*        Recent Labs     10/11/22  1441      K 4.5      CO2 19*   BUN 39*   CREATININE 5.9*   CALCIUM 8.3     Recent Labs     10/11/22  1441   AST 7*   ALT <5*   BILIDIR <0.2   BILITOT <0.2   ALKPHOS 53     No results for input(s): INR in the last 72 hours. No results for input(s): Filiberto Pander in the last 72 hours. Urinalysis:      Lab Results   Component Value Date/Time    NITRU Negative 05/05/2021 05:36 PM    WBCUA 4 05/05/2021 05:36 PM    BACTERIA Rare 01/31/2019 05:00 PM    RBCUA 2 05/05/2021 05:36 PM    BLOODU Negative 05/05/2021 05:36 PM    SPECGRAV 1.017 05/05/2021 05:36 PM    GLUCOSEU Negative 05/05/2021 05:36 PM    GLUCOSEU 250 12/14/2010 02:50 PM       Radiology:       CT ABDOMEN PELVIS WO CONTRAST Additional Contrast? None   Final Result   No definite abnormality identified to explain cloudy peritoneal dialysate. Dialysate measures near water density on the current examination. Several bubbles of free intraperitoneal gas which or presumably introduced   during dialysis. Please note that hollow visceral perforation cannot be   excluded.       Mural thickening of the large bowel, mild-to-moderate in degree. That may be   secondary to under distension, but colitis must be considered as well. Polycystic kidney disease. Note that some of the renal lesions are   indeterminate in terms of density, and therefore a solid lesion cannot be   excluded. ASSESSMENT:    Active Hospital Problems    Diagnosis Date Noted    SBP (spontaneous bacterial peritonitis) [facility] [K65.2] 10/11/2022     Priority: Medium         PLAN:    SBP (spontaneous bacterial peritonitis)  - peritoneal fluid:  6000 red cells, 10,069 nucleated cells, differential of 64 neutrophils, 2 lymphs, and 29 monocytes. Dialysis glucose of 263.  - CT abd/pel: No definite abnormality identified to explain cloudy peritoneal dialysate. Several bubbles of free air intraperitoneal.  Mural thickening of the large bowel, mild to moderate in degree. May be secondary to underdistention but colitis cannot be ruled out. Polycystic kidney disease  - abx: continue cefepime and vanc  - consult nephrology- ED consulted and are aware    Diabetes mellitus II   - Lantus, SSI and CCD  - hold home po meds    Essential (primary) hypertension   - monitor blood pressure  - continue clonidine and nifedipine      Hyperlipidemia   - continue statin    Hx CVA  - with left side weakness  - continue asa, statin    CHF  - appears euvolemic  - continue aldactone, losartan, metoprolol and hydralazine  - daily wts  - I/O's    DVT Prophylaxis: Heparin  Diet: ADULT DIET; Regular; Low Sodium (2 gm); Low Phosphorus (Less than 1000 mg); 60 to 80 gm  Code Status: Full Code    PT/OT Eval Status: pending    [address] [facility]    Thank you ROQUE Avila CNP for the opportunity to be involved in this patient's care. If you have any questions or concerns please feel free to contact me at [phone].

## 2022-10-11 NOTE — CONSULTS
Clinical Pharmacy Note  Vancomycin Consult    Pharmacy consult received for one-time dose of vancomycin in the Emergency Department per ROQUE Easley-CNP. Ht Readings from Last 1 Encounters:   10/11/22 6' 2\" (1.88 m)        Wt Readings from Last 1 Encounters:   10/11/22 183 lb 6.8 oz (83.2 kg)         Assessment/Plan:  Vancomycin 1250 mg IV x 1 in ED. If Vancomycin is to continue on admission and pharmacy is to manage dosing, please re-consult with admission orders.       [address] [facility]  10/11/2022 6:20 PM

## 2022-10-11 NOTE — ED TRIAGE NOTES
Patient arrived via ems from home. Dialysis pt, last treatment yesterday. Complaining of mid abd pain today and blood in his urine.

## 2022-10-11 NOTE — ED PROVIDER NOTES
[address]  [address] [address]  Dept: [phone]  Loc: [address] [place] [address] COMPLAINT    Chief Complaint   Patient presents with    Abdominal Pain     Patient arrived via ems from home. Dialysis pt, last treatment yesterday. Complaining of mid abd pain today and blood in his urine. HPI    Brenna Beltran is a 65 y.o. male who presents urgency department via EMS with his spouse for complaints of mid abdominal pain since Saturday during his peritoneal dialysis exchanges. He denies body aches, fever or chills. He denies nausea or vomiting. He states that his dialysis at has been cloudy. His abdominal pain was only during his exchanges since Saturday but today became more consistent. Had a recent hospitalization about 3 weeks ago for gastroparesis pain. REVIEW OF SYSTEMS    GI: see HPI, no vomiting, no diarrhea, no hematochezia  Cardiac: No chest pain, shortness of breath, palpitations or syncope  Pulmonary: No difficulty breathing or new cough  General: No fevers  : No dysuria, No hematuria  See HPI for further details. All other systems reviewed and are negative.     PAST MEDICAL & SURGICAL HISTORY    Past Medical History:   Diagnosis Date    Cardiomyopathy [facility])     CHF (congestive heart failure) ([facility]     CVA (cerebral infarction) 5/2015    Diabetes mellitus ([facility]     Foot ulcer, left ([facility] 1/14/2016    Gastroparesis     Hemodialysis patient ([facility]     Hypercholesteremia     Hypertension     Kidney disease     Unspecified cerebral artery occlusion with cerebral infarction     5/15, 7/15 LEFT SIDE WEAKNESS     Past Surgical History:   Procedure Laterality Date    COLONOSCOPY N/A 5/17/2021    COLONOSCOPY POLYPECTOMY SNARE/COLD BIOPSY performed by Yadira Shirley MD at [address] 10/29/2020    PLACEMENT OF A TUNNELED DIALYSIS CATHETER WITH FLEURO AND ULTRASOUND performed by Rafi Silvestre MD at [address] [facility] N/A 10/29/2020    LAPAROSCOPIC PERITONEAL DIALYSIS CATHETER PLACEMENT WITH FLEURO AND ULTRASOUND performed by Rafi Silvestre MD at [address] [place] [address] N/A 33/38/8028    UMBILICAL HERNIA REPAIR performed by Rafi Silvestre MD at [address] [facility] 4/26/2021    ESOPHAGOGASTRODUODENOSCOPY performed by Babak Newell MD at [address] [place] [address] 9/13/2022    EGD DIAGNOSTIC ONLY performed by Raúl Cerrato MD at [address]  (may include discharge medications prescribed in the ED)  Current Outpatient Rx   Medication Sig Dispense Refill    atorvastatin (LIPITOR) 40 MG tablet TAKE 1 TABLET BY MOUTH ONCE DAILY NIGHTLY 90 tablet 3    sucralfate (CARAFATE) 1 GM tablet Take 1 tablet by mouth 4 times daily 120 tablet 0    pantoprazole (PROTONIX) 40 MG tablet Take 1 tablet by mouth 2 times daily (before meals) 60 tablet 2    buPROPion (WELLBUTRIN SR) 150 MG extended release tablet Take 150 mg by mouth 2 times daily      Sucroferric Oxyhydroxide (VELPHORO) 500 MG CHEW Take 1 tablet by mouth 3 times daily (with meals)      insulin detemir (LEVEMIR FLEXTOUCH) 100 UNIT/ML injection pen Inject 22 Units into the skin nightly 5 pen 3    Insulin Pen Needle ([employer] PEN NEEDLES) 31G X 6 MM MISC 1 each by Does not apply route daily 100 each 3    insulin aspart (NOVOLOG FLEXPEN) 100 UNIT/ML injection pen Inject 3 Units into the skin 3 times daily (before meals) 5 pen 3    gabapentin (NEURONTIN) 300 MG capsule TAKE 1 CAPSULE BY MOUTH THREE TIMES DAILY 270 capsule 1    Methoxy PEG-Epoetin Beta (MIRCERA IJ) Inject 75 mcg into the skin      calcitRIOL (ROCALTROL) 0.25 MCG capsule 0.5 mcg daily      losartan (COZAAR) 100 MG tablet TAKE 1 TABLET BY MOUTH ONCE DAILY      hydrALAZINE (APRESOLINE) 100 MG tablet TAKE 2 TABLETS BY MOUTH THREE TIMES DAILY 270 tablet 1    cloNIDine (CATAPRES) 0.1 MG tablet Take 1 tablet by mouth 3 times daily 90 tablet 5    NIFEdipine (PROCARDIA XL) 90 MG extended release tablet Take 1 tablet by mouth 2 times daily 180 tablet 3    sodium bicarbonate 325 MG tablet Take 325 mg by mouth daily      spironolactone (ALDACTONE) 50 MG tablet Take 1 tablet by mouth once daily 90 tablet 1    isosorbide mononitrate (IMDUR) 60 MG extended release tablet Take 1 tablet by mouth once daily 90 tablet 3    EQ ASPIRIN ADULT LOW DOSE 81 MG EC tablet Take 1 tablet by mouth once daily 90 tablet 3    metoprolol tartrate (LOPRESSOR) 50 MG tablet Take twice daily 180 tablet 1       ALLERGIES    Allergies   Allergen Reactions    Doxazosin Nausea And Vomiting     VIOLENT VOMITTING    Coconut Flavor Rash       SOCIAL AND FAMILY HISTORY    Social History     Socioeconomic History    Marital status:      Spouse name: None    Number of children: 5    Years of education: None    Highest education level: None   Tobacco Use    Smoking status: Some Days     Packs/day: 0.00     Years: 30.00     Pack years: 0.00     Types: Cigars, Cigarettes     Start date: 1/3/1979     Last attempt to quit: 1/16/2019     Years since quitting: 3.7    Smokeless tobacco: Never    Tobacco comments:     3 black and milds a week   Vaping Use    Vaping Use: Never used   Substance and Sexual Activity    Alcohol use: Yes     Comment: occasional    Drug use: Yes     Types: Marijuana Sidra Pleas)     Comment: previously used cocaine, stopped May 2015 LAST USED MARIJUANA-NOVEMBER 2020    Sexual activity: Yes     Partners: Female   Social History Narrative    Lives with my spouse, cousin, daughter     Family History   Adopted: Yes       PHYSICAL EXAM    VITAL SIGNS: BP (!) 173/79   Pulse 98   Temp 99.2 °F (37.3 °C) (Oral)   Resp 16   Ht 6' 2\" (1.88 m)   Wt 183 lb 6.8 oz (83.2 kg)   SpO2 99%   BMI 23.55 kg/m²   Constitutional:  Well developed, well nourished, chronically ill-appearing  Eyes:  Sclera nonicteric, conjunctiva moist  HENT:  Atraumatic, nose normal  Neck: no JVD  Respiratory:  No retractions, no accessory muscle use, normal breath sounds   Cardiovascular: regular rhythm and rate, S1-S2   GI: Abdomen is soft and nondistended. He is tender with subjective guarding and no rebound with palpation surrounding the umbilical area. Peritoneal dialysis site unremarkable. Back: no CVA tenderness  Musculoskeletal:  No edema, no acute deformities  Vascular: DP pulses 2+ and equal bilaterally  Integument: No rashes, skin dry  Neurologic:  Alert & oriented, no slurred speech  Psychiatric: Cooperative, pleasant affect       RADIOLOGY/PROCEDURES    CT ABDOMEN PELVIS WO CONTRAST Additional Contrast? None   Final Result   No definite abnormality identified to explain cloudy peritoneal dialysate. Dialysate measures near water density on the current examination. Several bubbles of free intraperitoneal gas which or presumably introduced   during dialysis. Please note that hollow visceral perforation cannot be   excluded. Mural thickening of the large bowel, mild-to-moderate in degree. That may be   secondary to under distension, but colitis must be considered as well. Polycystic kidney disease. Note that some of the renal lesions are   indeterminate in terms of density, and therefore a solid lesion cannot be   excluded.            Labs Reviewed   CBC WITH AUTO DIFFERENTIAL - Abnormal; Notable for the following components:       Result Value    RBC 2.28 (*)     Hemoglobin 7.2 (*)     Hematocrit 22.3 (*)     Neutrophils Absolute 8.1 (*)     Lymphocytes Absolute 0.6 (*)     All other components within normal limits   BASIC METABOLIC PANEL - Abnormal; Notable for the following components:    CO2 19 (*)     Glucose 113 (*)     BUN 39 (*)     Creatinine 5.9 (*)     GFR Non- 10 (*)     GFR  12 (*)     All other components within normal limits    Narrative:     Mary Jd tel. [phone],                  Chemistry results called to and read back by Juarez Orantes RN, 10/11/2022                  15:20, by LILLY   HEPATIC FUNCTION PANEL - Abnormal; Notable for the following components:    Albumin 2.4 (*)     ALT <5 (*)     AST 7 (*)     All other components within normal limits    Narrative:     Mary Miles tel. [phone],                  Chemistry results called to and read back by Juarez Orantes RN, 10/11/2022                  15:20, by LILLY   CULTURE, BODY FLUID   CULTURE, BLOOD 1   CULTURE, BLOOD 2   LIPASE    Narrative:     Mary Miles tel. [phone],                  Chemistry results called to and read back by Juarez Orantes RN, 10/11/2022                  15:20, by Skyla More, SEPSIS   GLUCOSE, BODY FLUID   LACTATE DEHYDROGENASE, BODY FLUID   PROTEIN, BODY FLUID   ALBUMIN, FLUID   CELL COUNT WITH DIFFERENTIAL, BODY FLUID       ED Mallory Stoddard 630 studies reviewed and interpreted. (See chart for details)     See chart for details of medications given during the ED stay.     Vitals:    10/11/22 1500 10/11/22 1545 10/11/22 1615 10/11/22 1645   BP: (!) 145/84 (!) 149/83 (!) 156/82 (!) 173/79   Pulse: 96 90  98   Resp:       Temp:       TempSrc:       SpO2: 98% 98% 98% 99%   Weight:       Height:         Medications   cefepime (MAXIPIME) 2000 mg IVPB minibag (has no administration in time range)   morphine (PF) injection 4 mg (4 mg IntraVENous Given 10/11/22 1801)     This patient was seen and evaluated by myself and my attending physician Dr. Dieudonne Hitchcock    Differential diagnosis includes but is not limited to spontaneous bacterial peritonitis, colitis, diverticulitis, pancreatitis, hepatitis, cholecystitis, mesenteric ischemia, other    Sanjuanita Adrian is a chronically ill-appearing male who is afebrile and hypertensive with a blood pressure of 173/79. He has a peritoneal dialysis patient. He is developed periumbilical pain during his exchanges since Saturday. Today his pain became more consistent. His abdomen is soft but it is tender to the periumbilical area with voluntary guarding. He has a white count of 9.3. Hemoglobin 7.2 with hematocrit of 22.3. He has anemia chronic disease. CO2 19, BUN 39 with a creatinine of 5.9, GFR 12, serum glucose 113, albumin is low at 2.4    Lactic acid 1.2    Blood cultures x2 were drawn ED    Dialysate fluid was cloudy and pink    CT Of the abdomen and pelvis without contrast showed  Impression   No definite abnormality identified to explain cloudy peritoneal dialysate. Dialysate measures near water density on the current examination. Several bubbles of free intraperitoneal gas which or presumably introduced   during dialysis. Please note that hollow visceral perforation cannot be   excluded. Mural thickening of the large bowel, mild-to-moderate in degree. That may be   secondary to under distension, but colitis must be considered as well. Polycystic kidney disease. Note that some of the renal lesions are   indeterminate in terms of density, and therefore a solid lesion cannot be   excluded. I spoke with nephrology, Dr. Ginger Vanegas. He would like the patient to be started on Vancomycin and cefepime. PerfectServe to the hospitalist service for admission. The patient and the wife are updated on the plan of care and they agree. CRITICAL CARE NOTE:  There was a high probability of clinically significant life-threatening deterioration of the patient's condition requiring my urgent intervention. Total critical care time is 35 minutes.     This includes multiple reevaluations, vital sign monitoring, pulse oximetry monitoring, telemetry monitoring, clinical response to the IV medications, reviewing the nursing notes, consultation time, dictation/documentation

## 2022-10-11 NOTE — ED PROVIDER NOTES
Attending Supervisory Note/Shared Visit   I have personally performed a face to face diagnostic evaluation on this patient. I have reviewed the mid-levels findings and agree. History and Exam by me shows an alert black male in no acute distress. Patient presents from home via EMS. Patient is a peritoneal dialysis patient. Complaining of diffuse abdominal pain. The last 2 days recent change in color of his urine. Is been somewhat cloudy and actually bloody looking today. No fever. General: Alert black male in no acute distress. Heart: Regular rate and rhythm. No murmurs or gallops noted. Lungs: Breath sounds equal bilaterally and clear. Abdomen: Moderately distended, diffusely tender with guarding throughout, rebound. PD catheter in place. Bowel sounds decreased. Lab reviewed. Dialysis fluid shows 6000 red cells, 10,069 nucleated cells, differential of 64 neutrophils, 2 lymphs, and 29 monocytes. Dialysis glucose of 263. H&H of 7.2 and 22.3. White blood cell count 9300 with 87 neutrophils and 6 lymphs. Electrolytes normal.  Bicarb of 19 with an anion gap of 12. BUN of 39 with a creatinine of 5.9. Liver enzymes are normal.  Bilirubin is less than 0.2. Lipase of 50. CT abdomen pelvis without contrast: No definite abnormality identified to explain cloudy peritoneal dialysate. Several bubbles of free air intraperitoneal.  Mural thickening of the large bowel, mild to moderate in degree. May be secondary to underdistention but colitis cannot be ruled out. Polycystic kidney disease. Patient's history and clinical exam are consistent with spontaneous bacterial peritonitis. IV antibiotics will be initiated. The patient's nephrologist has been consulted. The hospitalist will be consulted to admit. Test results, diagnosis, and treatment plan were discussed with the patient and his wife. They understand the treatment plan and need for admission and are agreeable.     1. SBP [facility]ontaneous bacterial peritonitis) ([facility]    2.  Anemia, unspecified type          (Please note that portions of this note were completed with a voice recognition program.  Efforts were made to edit the dictations but occasionally words are mis-transcribed.)    Marley Rahman MD  Attending Emergency Physician       Miguel Angel Cervantes MD  10/11/22 4131

## 2022-10-12 LAB
ABO/RH: NORMAL
ANION GAP SERPL CALCULATED.3IONS-SCNC: 12 MMOL/L (ref 3–16)
ANTIBODY SCREEN: NORMAL
BACTERIA: NORMAL /HPF
BASOPHILS ABSOLUTE: 0 K/UL (ref 0–0.2)
BASOPHILS RELATIVE PERCENT: 0.3 %
BILIRUBIN URINE: NEGATIVE
BLOOD BANK DISPENSE STATUS: NORMAL
BLOOD BANK DISPENSE STATUS: NORMAL
BLOOD BANK PRODUCT CODE: NORMAL
BLOOD BANK PRODUCT CODE: NORMAL
BLOOD, URINE: NEGATIVE
BPU ID: NORMAL
BPU ID: NORMAL
BUN BLDV-MCNC: 41 MG/DL (ref 7–20)
CALCIUM SERPL-MCNC: 8 MG/DL (ref 8.3–10.6)
CHLORIDE BLD-SCNC: 104 MMOL/L (ref 99–110)
CLARITY: CLEAR
CO2: 20 MMOL/L (ref 21–32)
COLOR: YELLOW
CREAT SERPL-MCNC: 6.1 MG/DL (ref 0.9–1.3)
DESCRIPTION BLOOD BANK: NORMAL
DESCRIPTION BLOOD BANK: NORMAL
EOSINOPHILS ABSOLUTE: 0.1 K/UL (ref 0–0.6)
EOSINOPHILS RELATIVE PERCENT: 0.5 %
EPITHELIAL CELLS, UA: 0 /HPF (ref 0–5)
GFR AFRICAN AMERICAN: 12
GFR NON-AFRICAN AMERICAN: 10
GLUCOSE BLD-MCNC: 115 MG/DL (ref 70–99)
GLUCOSE BLD-MCNC: 130 MG/DL (ref 70–99)
GLUCOSE BLD-MCNC: 166 MG/DL (ref 70–99)
GLUCOSE BLD-MCNC: 54 MG/DL (ref 70–99)
GLUCOSE BLD-MCNC: 68 MG/DL (ref 70–99)
GLUCOSE BLD-MCNC: 72 MG/DL (ref 70–99)
GLUCOSE BLD-MCNC: 97 MG/DL (ref 70–99)
GLUCOSE URINE: NEGATIVE MG/DL
HCT VFR BLD CALC: 19.4 % (ref 40.5–52.5)
HCT VFR BLD CALC: 20.4 % (ref 40.5–52.5)
HCT VFR BLD CALC: 22.4 % (ref 40.5–52.5)
HEMOGLOBIN: 6.3 G/DL (ref 13.5–17.5)
HEMOGLOBIN: 6.8 G/DL (ref 13.5–17.5)
HEMOGLOBIN: 7.3 G/DL (ref 13.5–17.5)
HYALINE CASTS: 0 /LPF (ref 0–8)
KETONES, URINE: NEGATIVE MG/DL
LACTIC ACID: 0.7 MMOL/L (ref 0.4–2)
LEUKOCYTE ESTERASE, URINE: NEGATIVE
LYMPHOCYTES ABSOLUTE: 1 K/UL (ref 1–5.1)
LYMPHOCYTES RELATIVE PERCENT: 7.4 %
MCH RBC QN AUTO: 31 PG (ref 26–34)
MCHC RBC AUTO-ENTMCNC: 32.5 G/DL (ref 31–36)
MCV RBC AUTO: 95.6 FL (ref 80–100)
MICROSCOPIC EXAMINATION: YES
MONOCYTES ABSOLUTE: 0.9 K/UL (ref 0–1.3)
MONOCYTES RELATIVE PERCENT: 6.6 %
NEUTROPHILS ABSOLUTE: 11.8 K/UL (ref 1.7–7.7)
NEUTROPHILS RELATIVE PERCENT: 85.2 %
NITRITE, URINE: NEGATIVE
PDW BLD-RTO: 13.6 % (ref 12.4–15.4)
PERFORMED ON: ABNORMAL
PERFORMED ON: NORMAL
PH UA: 7.5 (ref 5–8)
PLATELET # BLD: 234 K/UL (ref 135–450)
PMV BLD AUTO: 8.9 FL (ref 5–10.5)
POTASSIUM REFLEX MAGNESIUM: 4.6 MMOL/L (ref 3.5–5.1)
PROTEIN UA: 100 MG/DL
RBC # BLD: 2.03 M/UL (ref 4.2–5.9)
RBC UA: 1 /HPF (ref 0–4)
SODIUM BLD-SCNC: 136 MMOL/L (ref 136–145)
SPECIFIC GRAVITY UA: 1.01 (ref 1–1.03)
URINE TYPE: ABNORMAL
UROBILINOGEN, URINE: 0.2 E.U./DL
VANCOMYCIN RANDOM: 12.6 UG/ML
WBC # BLD: 13.9 K/UL (ref 4–11)
WBC UA: 1 /HPF (ref 0–5)

## 2022-10-12 PROCEDURE — 6360000002 HC RX W HCPCS: Performed by: INTERNAL MEDICINE

## 2022-10-12 PROCEDURE — 85014 HEMATOCRIT: CPT

## 2022-10-12 PROCEDURE — 9990000010 HC NO CHARGE VISIT

## 2022-10-12 PROCEDURE — 3E1M39Z IRRIGATION OF PERITONEAL CAVITY USING DIALYSATE, PERCUTANEOUS APPROACH: ICD-10-PCS | Performed by: INTERNAL MEDICINE

## 2022-10-12 PROCEDURE — 83605 ASSAY OF LACTIC ACID: CPT

## 2022-10-12 PROCEDURE — 36430 TRANSFUSION BLD/BLD COMPNT: CPT

## 2022-10-12 PROCEDURE — 81001 URINALYSIS AUTO W/SCOPE: CPT

## 2022-10-12 PROCEDURE — 1200000000 HC SEMI PRIVATE

## 2022-10-12 PROCEDURE — 86901 BLOOD TYPING SEROLOGIC RH(D): CPT

## 2022-10-12 PROCEDURE — 6370000000 HC RX 637 (ALT 250 FOR IP): Performed by: INTERNAL MEDICINE

## 2022-10-12 PROCEDURE — 86850 RBC ANTIBODY SCREEN: CPT

## 2022-10-12 PROCEDURE — 86900 BLOOD TYPING SEROLOGIC ABO: CPT

## 2022-10-12 PROCEDURE — 80048 BASIC METABOLIC PNL TOTAL CA: CPT

## 2022-10-12 PROCEDURE — 6370000000 HC RX 637 (ALT 250 FOR IP): Performed by: STUDENT IN AN ORGANIZED HEALTH CARE EDUCATION/TRAINING PROGRAM

## 2022-10-12 PROCEDURE — 85025 COMPLETE CBC W/AUTO DIFF WBC: CPT

## 2022-10-12 PROCEDURE — 80202 ASSAY OF VANCOMYCIN: CPT

## 2022-10-12 PROCEDURE — 6360000002 HC RX W HCPCS: Performed by: NURSE PRACTITIONER

## 2022-10-12 PROCEDURE — 6370000000 HC RX 637 (ALT 250 FOR IP): Performed by: NURSE PRACTITIONER

## 2022-10-12 PROCEDURE — 2580000003 HC RX 258: Performed by: NURSE PRACTITIONER

## 2022-10-12 PROCEDURE — 36415 COLL VENOUS BLD VENIPUNCTURE: CPT

## 2022-10-12 PROCEDURE — 90945 DIALYSIS ONE EVALUATION: CPT

## 2022-10-12 PROCEDURE — 85018 HEMOGLOBIN: CPT

## 2022-10-12 PROCEDURE — P9016 RBC LEUKOCYTES REDUCED: HCPCS

## 2022-10-12 PROCEDURE — 94760 N-INVAS EAR/PLS OXIMETRY 1: CPT

## 2022-10-12 PROCEDURE — 86923 COMPATIBILITY TEST ELECTRIC: CPT

## 2022-10-12 RX ORDER — SODIUM CHLORIDE, SODIUM LACTATE, CALCIUM CHLORIDE, MAGNESIUM CHLORIDE AND DEXTROSE 1.5; 538; 448; 18.3; 5.08 G/100ML; MG/100ML; MG/100ML; MG/100ML; MG/100ML
1000 INJECTION, SOLUTION INTRAPERITONEAL 4 TIMES DAILY
Status: DISPENSED | OUTPATIENT
Start: 2022-10-12 | End: 2022-10-13

## 2022-10-12 RX ORDER — TORSEMIDE 100 MG/1
100 TABLET ORAL DAILY
Status: DISCONTINUED | OUTPATIENT
Start: 2022-10-13 | End: 2022-10-15

## 2022-10-12 RX ORDER — SODIUM CHLORIDE 9 MG/ML
INJECTION, SOLUTION INTRAVENOUS PRN
Status: DISCONTINUED | OUTPATIENT
Start: 2022-10-12 | End: 2022-10-19 | Stop reason: HOSPADM

## 2022-10-12 RX ORDER — OXYCODONE HYDROCHLORIDE 5 MG/1
5 TABLET ORAL EVERY 4 HOURS PRN
Status: DISCONTINUED | OUTPATIENT
Start: 2022-10-12 | End: 2022-10-19 | Stop reason: HOSPADM

## 2022-10-12 RX ORDER — SODIUM CHLORIDE, SODIUM LACTATE, CALCIUM CHLORIDE, MAGNESIUM CHLORIDE AND DEXTROSE 1.5; 538; 448; 18.3; 5.08 G/100ML; MG/100ML; MG/100ML; MG/100ML; MG/100ML
6000 INJECTION, SOLUTION INTRAPERITONEAL NIGHTLY
Status: DISCONTINUED | OUTPATIENT
Start: 2022-10-12 | End: 2022-10-15

## 2022-10-12 RX ORDER — SODIUM CHLORIDE, SODIUM LACTATE, CALCIUM CHLORIDE, MAGNESIUM CHLORIDE AND DEXTROSE 1.5; 538; 448; 18.3; 5.08 G/100ML; MG/100ML; MG/100ML; MG/100ML; MG/100ML
2500 INJECTION, SOLUTION INTRAPERITONEAL DAILY
Status: DISCONTINUED | OUTPATIENT
Start: 2022-10-12 | End: 2022-10-12

## 2022-10-12 RX ORDER — INSULIN GLARGINE 100 [IU]/ML
18 INJECTION, SOLUTION SUBCUTANEOUS NIGHTLY
Status: DISCONTINUED | OUTPATIENT
Start: 2022-10-12 | End: 2022-10-13

## 2022-10-12 RX ORDER — SODIUM CHLORIDE 9 MG/ML
INJECTION, SOLUTION INTRAVENOUS PRN
Status: COMPLETED | OUTPATIENT
Start: 2022-10-12 | End: 2022-10-16

## 2022-10-12 RX ORDER — SODIUM CHLORIDE, SODIUM LACTATE, CALCIUM CHLORIDE, MAGNESIUM CHLORIDE AND DEXTROSE 1.5; 538; 448; 18.3; 5.08 G/100ML; MG/100ML; MG/100ML; MG/100ML; MG/100ML
1500 INJECTION, SOLUTION INTRAPERITONEAL NIGHTLY
Status: DISCONTINUED | OUTPATIENT
Start: 2022-10-12 | End: 2022-10-15

## 2022-10-12 RX ORDER — OXYCODONE HYDROCHLORIDE 10 MG/1
10 TABLET ORAL EVERY 4 HOURS PRN
Status: DISCONTINUED | OUTPATIENT
Start: 2022-10-12 | End: 2022-10-19 | Stop reason: HOSPADM

## 2022-10-12 RX ORDER — GENTAMICIN SULFATE 1 MG/G
CREAM TOPICAL DAILY
Status: DISCONTINUED | OUTPATIENT
Start: 2022-10-12 | End: 2022-10-18

## 2022-10-12 RX ADMIN — HEPARIN SODIUM 5000 UNITS: 5000 INJECTION INTRAVENOUS; SUBCUTANEOUS at 06:43

## 2022-10-12 RX ADMIN — NIFEDIPINE 90 MG: 90 TABLET, EXTENDED RELEASE ORAL at 17:59

## 2022-10-12 RX ADMIN — GABAPENTIN 300 MG: 300 CAPSULE ORAL at 09:14

## 2022-10-12 RX ADMIN — NYSTATIN 500000 UNITS: 100000 SUSPENSION ORAL at 16:45

## 2022-10-12 RX ADMIN — SUCRALFATE 1 G: 1 TABLET ORAL at 21:16

## 2022-10-12 RX ADMIN — GABAPENTIN 300 MG: 300 CAPSULE ORAL at 13:18

## 2022-10-12 RX ADMIN — SODIUM BICARBONATE 650 MG: 650 TABLET ORAL at 09:14

## 2022-10-12 RX ADMIN — OXYCODONE HYDROCHLORIDE 10 MG: 10 TABLET ORAL at 00:52

## 2022-10-12 RX ADMIN — CALCITRIOL 0.5 MCG: 0.25 CAPSULE ORAL at 09:15

## 2022-10-12 RX ADMIN — NYSTATIN 500000 UNITS: 100000 SUSPENSION ORAL at 21:16

## 2022-10-12 RX ADMIN — SODIUM CHLORIDE, PRESERVATIVE FREE 10 ML: 5 INJECTION INTRAVENOUS at 21:10

## 2022-10-12 RX ADMIN — CLONIDINE HYDROCHLORIDE 0.3 MG: 0.1 TABLET ORAL at 09:14

## 2022-10-12 RX ADMIN — INSULIN GLARGINE 18 UNITS: 100 INJECTION, SOLUTION SUBCUTANEOUS at 21:17

## 2022-10-12 RX ADMIN — CLONIDINE HYDROCHLORIDE 0.3 MG: 0.1 TABLET ORAL at 13:18

## 2022-10-12 RX ADMIN — GENTAMICIN SULFATE: 1 CREAM TOPICAL at 16:45

## 2022-10-12 RX ADMIN — ASPIRIN 81 MG: 81 TABLET, COATED ORAL at 09:15

## 2022-10-12 RX ADMIN — BUPROPION HYDROCHLORIDE 150 MG: 150 TABLET, EXTENDED RELEASE ORAL at 09:13

## 2022-10-12 RX ADMIN — HYDRALAZINE HYDROCHLORIDE 100 MG: 50 TABLET, FILM COATED ORAL at 13:19

## 2022-10-12 RX ADMIN — PANTOPRAZOLE SODIUM 40 MG: 40 TABLET, DELAYED RELEASE ORAL at 06:43

## 2022-10-12 RX ADMIN — OXYCODONE HYDROCHLORIDE 10 MG: 10 TABLET ORAL at 09:13

## 2022-10-12 RX ADMIN — ATORVASTATIN CALCIUM 40 MG: 40 TABLET, FILM COATED ORAL at 21:16

## 2022-10-12 RX ADMIN — METOPROLOL TARTRATE 75 MG: 25 TABLET, FILM COATED ORAL at 09:14

## 2022-10-12 RX ADMIN — EPOETIN ALFA-EPBX 10000 UNITS: 10000 INJECTION, SOLUTION INTRAVENOUS; SUBCUTANEOUS at 16:46

## 2022-10-12 RX ADMIN — ISOSORBIDE MONONITRATE 60 MG: 60 TABLET, EXTENDED RELEASE ORAL at 09:14

## 2022-10-12 RX ADMIN — NIFEDIPINE 90 MG: 90 TABLET, EXTENDED RELEASE ORAL at 09:14

## 2022-10-12 RX ADMIN — CEFEPIME 1000 MG: 1 INJECTION, POWDER, FOR SOLUTION INTRAMUSCULAR; INTRAVENOUS at 17:59

## 2022-10-12 RX ADMIN — GABAPENTIN 300 MG: 300 CAPSULE ORAL at 21:16

## 2022-10-12 RX ADMIN — SEVELAMER CARBONATE 800 MG: 800 TABLET, FILM COATED ORAL at 09:13

## 2022-10-12 RX ADMIN — SODIUM CHLORIDE, PRESERVATIVE FREE 10 ML: 5 INJECTION INTRAVENOUS at 09:15

## 2022-10-12 RX ADMIN — HEPARIN SODIUM 5000 UNITS: 5000 INJECTION INTRAVENOUS; SUBCUTANEOUS at 21:17

## 2022-10-12 RX ADMIN — CLONIDINE HYDROCHLORIDE 0.3 MG: 0.1 TABLET ORAL at 21:16

## 2022-10-12 RX ADMIN — SEVELAMER CARBONATE 800 MG: 800 TABLET, FILM COATED ORAL at 16:45

## 2022-10-12 RX ADMIN — OXYCODONE HYDROCHLORIDE 10 MG: 10 TABLET ORAL at 13:13

## 2022-10-12 RX ADMIN — HEPARIN SODIUM 5000 UNITS: 5000 INJECTION INTRAVENOUS; SUBCUTANEOUS at 13:19

## 2022-10-12 RX ADMIN — SEVELAMER CARBONATE 800 MG: 800 TABLET, FILM COATED ORAL at 12:13

## 2022-10-12 RX ADMIN — HYDRALAZINE HYDROCHLORIDE 100 MG: 50 TABLET, FILM COATED ORAL at 21:16

## 2022-10-12 RX ADMIN — HYDRALAZINE HYDROCHLORIDE 100 MG: 50 TABLET, FILM COATED ORAL at 09:14

## 2022-10-12 RX ADMIN — SUCRALFATE 1 G: 1 TABLET ORAL at 13:19

## 2022-10-12 RX ADMIN — SUCRALFATE 1 G: 1 TABLET ORAL at 09:15

## 2022-10-12 RX ADMIN — PANTOPRAZOLE SODIUM 40 MG: 40 TABLET, DELAYED RELEASE ORAL at 16:45

## 2022-10-12 RX ADMIN — VANCOMYCIN HYDROCHLORIDE 500 MG: 500 INJECTION, POWDER, LYOPHILIZED, FOR SOLUTION INTRAVENOUS at 22:06

## 2022-10-12 RX ADMIN — LOSARTAN POTASSIUM 100 MG: 100 TABLET, FILM COATED ORAL at 09:14

## 2022-10-12 RX ADMIN — BUPROPION HYDROCHLORIDE 150 MG: 150 TABLET, EXTENDED RELEASE ORAL at 21:16

## 2022-10-12 RX ADMIN — SPIRONOLACTONE 100 MG: 100 TABLET ORAL at 09:14

## 2022-10-12 RX ADMIN — SUCRALFATE 1 G: 1 TABLET ORAL at 16:45

## 2022-10-12 ASSESSMENT — PAIN DESCRIPTION - DESCRIPTORS
DESCRIPTORS: ACHING;STABBING
DESCRIPTORS: CRAMPING;SHARP

## 2022-10-12 ASSESSMENT — PAIN DESCRIPTION - LOCATION
LOCATION: ABDOMEN
LOCATION: BUTTOCKS

## 2022-10-12 ASSESSMENT — PAIN SCALES - GENERAL
PAINLEVEL_OUTOF10: 8
PAINLEVEL_OUTOF10: 7
PAINLEVEL_OUTOF10: 7

## 2022-10-12 ASSESSMENT — PAIN DESCRIPTION - ORIENTATION: ORIENTATION: INNER

## 2022-10-12 NOTE — PROGRESS NOTES
Clinical Pharmacy Note  Vancomycin Consult    Beaulah Sacks is a 65 y.o. male ordered Vancomycin for peritonitis; consult received from Galindo Bales CNP to manage therapy. Also receiving cefepime. Allergies:  Doxazosin and Coconut flavor     Temp max:  Temp (24hrs), Av °F (37.2 °C), Min:98.2 °F (36.8 °C), Max:100.7 °F (38.2 °C)      Recent Labs     10/11/22  1441 10/12/22  0515   WBC 9.3 13.9*       Recent Labs     10/11/22  1441 10/12/22  0515   BUN 39* 41*   CREATININE 5.9* 6.1*         Intake/Output Summary (Last 24 hours) at 10/12/2022 1943  Last data filed at 10/12/2022 1856  Gross per 24 hour   Intake 2649.17 ml   Output 875 ml   Net 1774.17 ml       Culture Results:  Pending    Ht Readings from Last 1 Encounters:   10/11/22 6' 2\" (1.88 m)        Wt Readings from Last 1 Encounters:   10/12/22 161 lb 6 oz (73.2 kg)         Estimated Creatinine Clearance: 14 mL/min (A) (based on SCr of 6.1 mg/dL (HH)). Assessment:  Day # 2 of vancomycin. Current regimen: Intermittent dosing based on levels  Vancomycin level: 12.6 mg/L      Plan: Will dose vancomycin 500mg IV x 1 and check a random level with AM labs on 10/14/22. Thank you for the consult.      Arabella Vidales, [facility]  10/12/2022 7:45 PM

## 2022-10-12 NOTE — PROGRESS NOTES
Mj Dorantes RN initiated manual drainage of PD fluid. PD fluid slowly draining and is pink tinged. Patient denies discomfort at this time. 12:10 Per scale, about 700ml output in drainage bag. Patient c/o discomfort, and is not agreeable to repositioning. Notified Dr Pete Covington, ordered to stop drain, and begin previously ordered 1L rapid exchanges. 14:20 Able to instill 500ml, patient c/o pain with this stopped. This was drained and color of PD fluid was more clear than previous; amount of 400 returned. Dr Pete Covington aware of all. Patient will not run on PD cycler tonight.

## 2022-10-12 NOTE — PROGRESS NOTES
Hospitalist Progress Note      PCP: Anson Oppenheim, APRN - CNP    Date of Admission: 10/11/2022    Chief Complaint: abdominal pain     Hospital Course:    65 y.o. male with PMHx of DM, CVA, CHF, ESRD on PD, HTN and HLD presented to [facility] with 5 day history of abdominal pain during dialysis exchange with subsequent cloudy fluid. Patient admitted with PD related peritonitis. Subjective:   Reported improved abdominal pain, denies any new complaints. Wife contacted via phone while in room, updated in full. Multiple questions about transfusion answered.     Medications:  Reviewed    Infusion Medications    sodium chloride      dextrose      sodium chloride       Scheduled Medications    dianeal lo-david 1.5%  1,000 mL IntraPERitoneal 4x daily    atorvastatin  40 mg Oral Nightly    buPROPion  150 mg Oral BID    calcitRIOL  0.5 mcg Oral Daily    cloNIDine  0.3 mg Oral TID    aspirin  81 mg Oral Daily    gabapentin  300 mg Oral TID    hydrALAZINE  100 mg Oral TID    insulin lispro  3 Units SubCUTAneous TID WC    isosorbide mononitrate  60 mg Oral Daily    losartan  100 mg Oral Daily    metoprolol tartrate  75 mg Oral BID    NIFEdipine  90 mg Oral BID    pantoprazole  40 mg Oral BID AC    sodium bicarbonate  650 mg Oral Daily    spironolactone  100 mg Oral Daily    sucralfate  1 g Oral 4x Daily    sevelamer  800 mg Oral TID WC    insulin lispro  0-8 Units SubCUTAneous TID WC    insulin lispro  0-4 Units SubCUTAneous Nightly    sodium chloride flush  5-40 mL IntraVENous 2 times per day    heparin (porcine)  5,000 Units SubCUTAneous 3 times per day    dianeal lo-david (ULTRABAG) 2.5%  2,000 mL IntraPERitoneal Q6H    insulin glargine  22 Units SubCUTAneous Nightly    cefepime  1,000 mg IntraVENous Q24H    vancomycin (VANCOCIN) intermittent dosing (placeholder)   Other RX Placeholder     PRN Meds: oxyCODONE **OR** oxyCODONE, sodium chloride, glucose, dextrose bolus **OR** dextrose bolus, glucagon (rDNA), dextrose, sodium chloride flush, sodium chloride, ondansetron **OR** ondansetron, polyethylene glycol, acetaminophen **OR** acetaminophen      Intake/Output Summary (Last 24 hours) at 10/12/2022 0850  Last data filed at 10/12/2022 0230  Gross per 24 hour   Intake 1500 ml   Output 425 ml   Net 1075 ml       Physical Exam Performed:    BP (!) 140/74   Pulse 99   Temp 98.5 °F (36.9 °C) (Oral)   Resp 20   Ht 6' 2\" (1.88 m)   Wt 161 lb 6 oz (73.2 kg)   SpO2 98%   BMI 20.72 kg/m²     General appearance: No apparent distress, appears stated age and cooperative. HEENT: Pupils equal, round, and reactive to light. Conjunctivae/corneas clear. Neck: Supple, with full range of motion. No jugular venous distention. Trachea midline. Respiratory:  Normal respiratory effort. Clear to auscultation, bilaterally without Rales/Wheezes/Rhonchi. Cardiovascular: Regular rate and rhythm with normal S1/S2 without murmurs, rubs or gallops. Abdomen: Minimal abdominal tenderness still present. PD catheter noted   musculoskeletal: No clubbing, cyanosis or edema bilaterally. Full range of motion without deformity. Skin: Skin color, texture, turgor normal.  No rashes or lesions. Neurologic:  Neurovascularly intact without any focal sensory/motor deficits. Cranial nerves: II-XII intact, grossly non-focal.  Psychiatric: Alert and oriented, thought content appropriate, normal insight  Capillary Refill: Brisk, 3 seconds, normal   Peripheral Pulses: +2 palpable, equal bilaterally       Labs:   Recent Labs     10/11/22  1441 10/12/22  0515   WBC 9.3 13.9*   HGB 7.2* 6.3*   HCT 22.3* 19.4*    234     Recent Labs     10/11/22  1441 10/12/22  0515    136   K 4.5 4.6    104   CO2 19* 20*   BUN 39* 41*   CREATININE 5.9* 6.1*   CALCIUM 8.3 8.0*     Recent Labs     10/11/22  1441   AST 7*   ALT <5*   BILIDIR <0.2   BILITOT <0.2   ALKPHOS 53     No results for input(s): INR in the last 72 hours.   No results for input(s): Vanessa Marcelina in the last 72 hours. Urinalysis:      Lab Results   Component Value Date/Time    NITRU Negative 05/05/2021 05:36 PM    WBCUA 4 05/05/2021 05:36 PM    BACTERIA Rare 01/31/2019 05:00 PM    RBCUA 2 05/05/2021 05:36 PM    BLOODU Negative 05/05/2021 05:36 PM    SPECGRAV 1.017 05/05/2021 05:36 PM    GLUCOSEU Negative 05/05/2021 05:36 PM    GLUCOSEU 250 12/14/2010 02:50 PM       Radiology:  CT ABDOMEN PELVIS WO CONTRAST Additional Contrast? None   Final Result   No definite abnormality identified to explain cloudy peritoneal dialysate. Dialysate measures near water density on the current examination. Several bubbles of free intraperitoneal gas which or presumably introduced   during dialysis. Please note that hollow visceral perforation cannot be   excluded. Mural thickening of the large bowel, mild-to-moderate in degree. That may be   secondary to under distension, but colitis must be considered as well. Polycystic kidney disease. Note that some of the renal lesions are   indeterminate in terms of density, and therefore a solid lesion cannot be   excluded. Assessment/Plan:    Active Hospital Problems    Diagnosis     SBP (spontaneous bacterial peritonitis) ([facility] [K65.2]      Priority: Medium     ESRD on PD  PD related peritonitis  Patient presented to emergency with abdominal pain during peritoneal dialysis, patient also reported cloudy fluid. Upon presentation patient was vitally stable, afebrile  Peritoneal fluid analysis with 10,000 nucleated cells. Plan  -Appreciate nephrology input  -Follow PD fluid culture (negative to date)   -Follow blood cultures  -Continue on vancomycin and cefepime    Diabetes mellitus  Hypoglycemia noted  Will decrease Lantus dose ( 22--> 18).      Acute on chronic anemia  Baseline hemoglobin 7-8  Patient presented with hemoglobin of 6.3  1 unit of PRBC ordered  Anemia worked up in admission in September 2022    Hx CVA  - with left side weakness  - continue asa, statin     CHF  - appears euvolemic  - continue aldactone, losartan, metoprolol and hydralazine  - daily wts  - I/O's      DVT Prophylaxis: heparin   Diet: ADULT DIET; Regular; Low Sodium (2 gm);  Low Phosphorus (Less than 1000 mg); 60 to 80 gm  Code Status: Full Code  PT/OT Eval Status: following     Dispo - pending clinical improvement      Junito Pascal MD

## 2022-10-12 NOTE — FLOWSHEET NOTE
Pt complaining of pain in his \"butt\" with peritoneal dialysis filling. Fill bag on warmer and draining in slow seems to help with pain. Order received for pain medication.   Oxycodone 10 mg  po given per pt request.

## 2022-10-12 NOTE — PROGRESS NOTES
Patient alert and oriented at this time, wife at bedside. Patient and wife agreeable for blood transfusion. This RN discussed the consent and was signed and placed in chart. All questions answered and all needs meet. Will continue to monitor. Electronically signed by Rodriguez Leone RN on 10/12/2022 at 11:22 AM    No previous blood reaction per patient and family, VSS on room air when blood was started, verified by this RN and Alcon Andrea RN per [facility] protocol. This RN remained in the room for the first 15 minutes. No adverse reaction noted, VSS. Patient states all needs are meet at this time. Will continue to monitor and recheck H+H 1 hour after infusion.      Electronically signed by Rodriguez Leone RN on 10/12/2022 at 12:00 PM

## 2022-10-12 NOTE — CONSULTS
Clinical Pharmacy Note  Vancomycin Consult    Lena Son is a 65 y.o. male ordered Vancomycin for peritonitis; consult received from Caroline Larry NP to manage therapy. Also receiving cefepime. Allergies:  Doxazosin and Coconut flavor     Temp max:  Temp (24hrs), Av.5 °F (37.5 °C), Min:98.9 °F (37.2 °C), Max:100.3 °F (37.9 °C)      Recent Labs     10/11/22  1441   WBC 9.3       Recent Labs     10/11/22  1441   BUN 39*   CREATININE 5.9*       No intake or output data in the 24 hours ending 10/11/22 2113    Culture Results:  Pending    Ht Readings from Last 1 Encounters:   10/11/22 6' 2\" (1.88 m)        Wt Readings from Last 1 Encounters:   10/11/22 183 lb 6.8 oz (83.2 kg)         Estimated Creatinine Clearance: 16 mL/min (A) (based on SCr of 5.9 mg/dL [facility])). Assessment/Plan:  Day # 1 of vancomycin. Vancomycin 1250 mg IV x 1 received in ER. Will dose based on levels due to ESRD on PD    Goal trough 15-20  Random level to be drawn 10/13/22      Thank you for the consult.    Sadia Mccormack, [address] [place] [address], PharmD, 10/11/2022 9:16 PM

## 2022-10-12 NOTE — FLOWSHEET NOTE
Call placed to Nephrology regarding pt difficulty with pain upon filling with dialysate and pain medication not helping sharp pain in his abdomen and bottom with a gravity fill. Informed Dr Robert Jain that the 2000 cc order was approximately 3/4 in and pt unable to handle the pain with full gravity fill. (Tubing partially clamped to aid in slowing PD fill). Also informed MD that fill time is approaching 3 hour tari. Order received to stop filling and these difficulties with filling will be addressed on morning rounds. Pt resting quietly, appears to be sleeping with respirations easy. Will continue to monitor.

## 2022-10-12 NOTE — CARE COORDINATION
INITIAL CASE MANAGEMENT ASSESSMENT    Reviewed chart, met with patient & wife Ford Castillo to assess possible discharge needs. Explained Case Management role/services. Living Situation: Confirmed address, lives in a 1 story house with level entry. ADLs: Independent, shares homemaking duties with wife. DME: [employer], PD cycler & supplies    PT/OT Recs: Await PT/OT eval & recs     Active Services: Was set up with [address] last time but declined needing it per wife. Transportation: Patient doesn't drive, wife transports [employer] or patient uses [employer] transport. Medications: Uses [facility] on [address] -- no issues    PCP: ROQUE Garcia      HD/PD: PD at home, affiliated with [facility]    PLAN/COMMENTS: Patient wants to return home with wife. Will ask if wants home care at discharge, [facility] had previously accepted. Watch for needs. SW/CM provided contact information for patient or family to call with any questions. SW/CM will follow and assist as needed.     Electronically signed by Gabriele Templeton RN Case Management [phone] on 10/12/2022 at 12:47 PM

## 2022-10-12 NOTE — FLOWSHEET NOTE
Pt admitted into 5253. Pt placed on telemetry. Sinus tach per monitor. Rhythm confirmed with the CMU. /95, -120's, temp 100.3. Will continue to monitor. Pt oriented to room bed unit and call light. Pt up to void. Pt very unsteady on feet. BSC at bedside. Pt encouraged to call for assist out of bed. Bed alarm on and call light in reach.

## 2022-10-12 NOTE — CONSULTS
[address] [place] [address] Nephrology   [facility]  [phone]  Nephrology Consult Note          Patient ID: Ydaira Joe  Referring/ Physician: Prema Johnston MD      HPI/Summary:   Yadira Joe is being seen by nephrology for ESRD on PD. This is a 78-year-old male past medical history significant for ESRD, hypertension hyperlipidemia, CAD, heart failure presented the hospital with 5 days of worsening abdominal pain. The pain really started to get worse on the third day. He been constipated prior and then developed loose bowels left quadrant. He did notice some cloudy fluid on his effluent from his PD catheter. He has been seeing blood in his effluent bag. He has been having some lapses in his sterile technique, not wearing a mask and not washing hands well. No edema no chest pain or shortness of breath no nausea no vomiting no fevers no chills. His abdominal pain is better this morning but when we tried to do a manual exchange she had a lot of pain      Plan:   1 L rapid exchange x 4 a 1.5%  Agree with vancomycin and cefepime empirically, body fluid culture no growth to date so far  Cell count consistent with peritonitis  Tonight will attempt the cycler, 7500 cc TV, 2500 cc x volume x 3 over 8 hrs on cycler. Add fungal cx  Add nustatin 500, 000units QID for fungal ppx while on system ABX  Blood transfusion if Hgb < 7 , had recent GIB so may need to evaluated by GI again. [place] per outpatient orders        Peritonitis  Likely secondary to lack in sterile technique  PD fluid was cloudy, pink, 10,000 nucleated cells consistent with peritonitis. Fluid culture so far is no growth to date  Blood cultures in process, negative so far  On empiric vancomycin and cefepime. ESRD  On peritoneal dialysis  Home prescription: 5 times weekly   79.5  3 x 2500 fill 8.5 hrs 2.5 hr dwell on cycler at night. Usually uses 1/2 green and 1/2 yellow    HTN  BP is acceptable. Difficult to control as an outpatient. Electrolytes  No acute issues. Anemia  Recent GIB  Anemia due to renal failure as well             [facility] would like to thank you for the opportunity to serve this patient. Please call with any questions or concerns. Pat Jefferson MD  [facility]  [address], [address]  Fax: [phone]  Office: [phone]         CC/Reason for consult:   Reason for consult: ESRD  Chief Complaint   Patient presents with    Abdominal Pain     Patient arrived via ems from home. Dialysis pt, last treatment yesterday. Complaining of mid abd pain today and blood in his urine. Review of Systems:   [address] All other remaining systems are negative. Constitutional:  fever, chills, weakness, weight change, fatigue,      Skin:  rash, pruritus, hair loss, bruising, dry skin, petechiae. Head, Face, Neck   headaches, swelling,  cervical adenopathy. Respiratory: shortness of breath, cough, or wheezing  Cardiovascular: chest pain, palpitations, dizzy, edema  Gastrointestinal: nausea, vomiting, diarrhea, constipation,belly pain    Yellow skin, blood in stool  Musculoskeletal:  back pain, muscle weakness, gait problems,       joint pain or swelling. Genitourinary:  dysuria, poor urine flow, flank pain, blood in urine  Neurologic:  vertigo, TIA'S, syncope, seizures, focal weakness  Psychosocial:  insomnia, anxiety, or depression.   Additional positive findings: -     PMH/SH/FH:    Medical Hx: reviewed and updated as appropriate  Past Medical History:   Diagnosis Date    Cardiomyopathy ([address]     CHF (congestive heart failure) (HCC)     CVA (cerebral infarction) 5/2015    Diabetes mellitus ([address]     Foot ulcer, left ([address] 1/14/2016    Gastroparesis     Hemodialysis patient ([address]     Hypercholesteremia     Hypertension     Kidney disease     Unspecified cerebral artery occlusion with cerebral infarction     5/15, 7/15 LEFT SIDE WEAKNESS         Surgical Hx: reviewed and updated as appropriate   has a past surgical history that includes [address] (N/A, 10/29/2020); Umbilical hernia repair (N/A, 10/29/2020); hc dialysis catheter (N/A, 10/29/2020); Upper gastrointestinal endoscopy (N/A, 4/26/2021); Colonoscopy (N/A, 5/17/2021); and Upper gastrointestinal endoscopy (N/A, 9/13/2022). Social Hx: reviewed and updated as appropriate  Social History     Tobacco Use    Smoking status: Some Days     Packs/day: 0.00     Years: 30.00     Pack years: 0.00     Types: Cigars, Cigarettes     Start date: 1/3/1979     Last attempt to quit: 1/16/2019     Years since quitting: 3.7    Smokeless tobacco: Never    Tobacco comments:     3 black and milds a week   Substance Use Topics    Alcohol use: Yes     Comment: occasional        Family hx: reviewed and updated as appropriate  family history is not on file. He was adopted. Medications:   dianeal lo-david 1.5%, 1,000 mL, 4x daily  insulin glargine, 18 Units, Nightly  atorvastatin, 40 mg, Nightly  buPROPion, 150 mg, BID  calcitRIOL, 0.5 mcg, Daily  cloNIDine, 0.3 mg, TID  aspirin, 81 mg, Daily  gabapentin, 300 mg, TID  hydrALAZINE, 100 mg, TID  insulin lispro, 3 Units, TID WC  isosorbide mononitrate, 60 mg, Daily  losartan, 100 mg, Daily  metoprolol tartrate, 75 mg, BID  NIFEdipine, 90 mg, BID  pantoprazole, 40 mg, BID AC  sodium bicarbonate, 650 mg, Daily  spironolactone, 100 mg, Daily  sucralfate, 1 g, 4x Daily  sevelamer, 800 mg, TID WC  insulin lispro, 0-8 Units, TID WC  insulin lispro, 0-4 Units, Nightly  sodium chloride flush, 5-40 mL, 2 times per day  heparin (porcine), 5,000 Units, 3 times per day  dianeal lo-david (ULTRABAG) 2.5%, 2,000 mL, Q6H  cefepime, 1,000 mg, Q24H  vancomycin (VANCOCIN) intermittent dosing (placeholder), , RX Placeholder       Doxazosin and Coconut flavor    Allergies:    Allergies   Allergen Reactions    Doxazosin Nausea And Vomiting     VIOLENT VOMITTING    Coconut Flavor Rash Physical Exam/Objective:   Vitals:    10/12/22 1343   BP:    Pulse:    Resp: 18   Temp:    SpO2:        Intake/Output Summary (Last 24 hours) at 10/12/2022 1353  Last data filed at 10/12/2022 1332  Gross per 24 hour   Intake 2210 ml   Output 875 ml   Net 1335 ml         General appearance:  in no acute distress, comfortable, communicative, awake and alert. HEENT: no icterus, EOM intact, trachea midline. Neck : no masses, appears symmetrical and no JVD appreciated. Respiratory: Respiratory effort normal, bilateral equal chest rise. No wheeze, no crackles   Cardiovascular: Ausculation shows RRR and  no edema   Abdomen: abdomen is soft, non distended, no masses, + TTP in left lower quadrant  PD catheter site no purulence or erythema  Musculoskeletal:  no joint swelling, no deformity, strength grossly normal.   Skin: no rashes, no induration, no tightening, no jaundice   Neuro:   Follows commands, moves all extremities spontaneously       Data:   CBC:   Recent Labs     10/11/22  1441 10/12/22  0515   WBC 9.3 13.9*   HGB 7.2* 6.3*   HCT 22.3* 19.4*    234     BMP:    Recent Labs     10/11/22  1441 10/12/22  0515    136   K 4.5 4.6    104   CO2 19* 20*   BUN 39* 41*   CREATININE 5.9* 6.1*   GLUCOSE 113* 97

## 2022-10-12 NOTE — PROGRESS NOTES
Physical Therapy  Attempt Note  Yuko Lam  [id number]  PT orders received and chart reviewed. Per review and RN report, pt with low H&H and is undecided on whether or not he wants a unit of blood. Pt on hold at this time. Will cont to follow and will attempt at a later time as the pt's status and the schedule permits.     Electronically signed by Bautista Yeboah PT on 10/12/2022 at 8:00 AM

## 2022-10-12 NOTE — PLAN OF CARE
Problem: Discharge Planning  Goal: Discharge to home or other facility with appropriate resources  Outcome: Progressing  Flowsheets (Taken 10/11/2022 2120)  Discharge to home or other facility with appropriate resources: Identify barriers to discharge with patient and caregiver     Problem: Safety - Adult  Goal: Free from fall injury  Outcome: Progressing     Problem: ABCDS Injury Assessment  Goal: Absence of physical injury  Outcome: Progressing  Flowsheets (Taken 10/11/2022 2102)  Absence of Physical Injury: Implement safety measures based on patient assessment     Problem: Confusion  Goal: Confusion, delirium, dementia, or psychosis is improved or at baseline  Description: INTERVENTIONS:  1. Assess for possible contributors to thought disturbance, including medications, impaired vision or hearing, underlying metabolic abnormalities, dehydration, psychiatric diagnoses, and notify attending LIP  2. Coulter high risk fall precautions, as indicated  3. Provide frequent short contacts to provide reality reorientation, refocusing and direction  4. Decrease environmental stimuli, including noise as appropriate  5. Monitor and intervene to maintain adequate nutrition, hydration, elimination, sleep and activity  6. If unable to ensure safety without constant attention obtain sitter and review sitter guidelines with assigned personnel  7.  Initiate Psychosocial CNS and Spiritual Care consult, as indicated  Outcome: Progressing

## 2022-10-12 NOTE — PROGRESS NOTES
Patient does not want to consent to a blood transfusion until his wife is at bedside; he is speaking with her on the phone at this time and she will \"be here soon. \" Patient also declining beginning manual exchanges until 1030, as he just received a pain pill.

## 2022-10-12 NOTE — PROGRESS NOTES
Clinical Pharmacy Note  Renal Dose Adjustment    Phillip Lu is receiving cefepime. This medication is renally eliminated. Based on the patient's Estimated Creatinine Clearance: 16 mL/min (A) (based on SCr of 5.9 mg/dL [facility])). and urine output, the dose has been adjusted to cefepime 1 g extended infusion every 24 hours per protocol. Pharmacy will continue to monitor and adjust dose as needed for changes in renal function.     Karo Munoz [facility], PharmD, 10/11/2022 9:04 PM

## 2022-10-12 NOTE — PROGRESS NOTES
Reviewed POC with Dr Joanne Koroma, received orders for 1l 1.5% dianeal rapid exchange x 4; order to stop if patient c/o pain.

## 2022-10-12 NOTE — CONSULTS
Thanks for consulting [facility] for the care of [facility]. Spoke to NP earlier  PD manual ordered  Agree with iv vancomycin and cefepime    Fluid analysis consistent with peritonitis    Full consult will follow  Please call with questions at-  24 Hrs Answering service [phone]  Perfect serve, or cell phone 7 am - [address] [facility], MD   [facility] nephrology  [facility]. [facility]

## 2022-10-12 NOTE — FLOWSHEET NOTE
4 Eyes Skin Assessment     NAME:  Bertha Andersen  YOB: 1965  MEDICAL RECORD NUMBER:  [id number]    The patient is being assess for  Admission    I agree that 2 RN's have performed a thorough Head to Toe Skin Assessment on the patient. ALL assessment sites listed below have been assessed. Areas assessed by both nurses:    Head, Face, Ears, sacrum and heels        Does the Patient have a Wound?  No noted wound(s)       [place] Prevention initiated:  No   Wound Care Orders initiated:  No    Pressure Injury (Stage 3,4, Unstageable, DTI, NWPT, and Complex wounds) if present place referral/consult order under [de-identified] No    New and Established Ostomies if present place consult order under : No      Nurse 1 eSignature: Electronically signed by Jenna Mckee RN on 10/12/22 at 1:52 AM EDT    **SHARE this note so that the co-signing nurse is able to place an eSignature**    Nurse 2 eSignature: Electronically signed by Gerardo Almazan RN on 10/12/22 at 1:52 AM EDT

## 2022-10-12 NOTE — CARE COORDINATION
10/12/22 1239   Readmission Assessment   Number of Days since last admission? 8-30 days   Previous Disposition Home with Family   Who is being Interviewed Patient   What was the patient's/caregiver's perception as to why they think they needed to return back to the hospital? Other (Comment)  (abd pain)   Did you visit your Primary Care Physician after you left the hospital, before you returned this time? No   Why weren't you able to visit your PCP? Other (Comment)  (had to reschedule appt)   Did you see a specialist, such as Cardiac, Pulmonary, Orthopedic Physician, etc. after you left the hospital? No   Who advised the patient to return to the hospital? Caregiver   Does the patient report anything that got in the way of taking their medications? No   In our efforts to provide the best possible care to you and others like you, can you think of anything that we could have done to help you after you left the hospital the first time, so that you might not have needed to return so soon?  Other (Comment)  (n/a)

## 2022-10-12 NOTE — ED NOTES
Report called to [address]. Verbalizes understanding of pt condition and has no further questions.       Kostas Winter RN  10/11/22 2033

## 2022-10-12 NOTE — PROGRESS NOTES
Patient H+H an hour after 1 unit of PRBC was 6.8/20.4. MD Simmons notified - awaiting any new orders. Electronically signed by Elspeth Dubin, RN on 10/12/2022 at 5:03 PM    Second unit of PRBC started, patient and his wife agreeable. All questions answered all needs meet. This RN with patient for the first 15 minutes. No suspected reaction, patients VSS.     Electronically signed by Elspeth Dubin, RN on 10/12/2022 at 6:55 PM

## 2022-10-12 NOTE — FLOWSHEET NOTE
This RN spoke with pt regarding low H and H and that Dr Bart Lafleur ordered a unit of PRBC. Pt verbalizing that he is not sure that he wants a unit of bld. He will have to think about it.

## 2022-10-12 NOTE — ACP (ADVANCE CARE PLANNING)
Advance Care Planning     Advance Care Planning Activator (Inpatient)  Conversation Note      Date of ACP Conversation: 10/12/2022     Conversation Conducted with: Patient with Decision Making Capacity    ACP Activator: Oleg Sanches RN    Health Care Decision Maker:     Current Designated Health Care Decision Maker:     Primary Decision Maker: Juan Collado - [phone]    Care Preferences    Ventilation: \"If you were in your present state of health and suddenly became very ill and were unable to breathe on your own, what would your preference be about the use of a ventilator (breathing machine) if it were available to you? \"      Would the patient desire the use of ventilator (breathing machine)?: yes    \"If your health worsens and it becomes clear that your chance of recovery is unlikely, what would your preference be about the use of a ventilator (breathing machine) if it were available to you? \"     Would the patient desire the use of ventilator (breathing machine)?: No      Resuscitation  \"CPR works best to restart the heart when there is a sudden event, like a heart attack, in someone who is otherwise healthy. Unfortunately, CPR does not typically restart the heart for people who have serious health conditions or who are very sick. \"    \"In the event your heart stopped as a result of an underlying serious health condition, would you want attempts to be made to restart your heart (answer \"yes\" for attempt to resuscitate) or would you prefer a natural death (answer \"no\" for do not attempt to resuscitate)? \" yes       [] Yes   [x] No   Educated Patient / Jensen Louis regarding differences between Advance Directives and portable DNR orders. Length of ACP Conversation in minutes:  5 minutes    Conversation Outcomes:  [x] ACP discussion completed. Reviewed prior ACP & confirmed no changes.   [] Existing advance directive reviewed with patient; no changes to patient's previously recorded wishes  [] New Advance Directive completed  [] Portable Do Not Rescitate prepared for Provider review and signature  [] POLST/POST/MOLST/MOST prepared for Provider review and signature  Electronically signed by Isaías Ohara RN Case Management on 10/12/2022 at 12:41 PM

## 2022-10-12 NOTE — FLOWSHEET NOTE
Pt PD fluid filling very slowly due to patient complaints of pain with PD filling wide open. Pt resting quietly with PD filling slow. Approximately 2/3 bag in at this time.

## 2022-10-12 NOTE — ACP (ADVANCE CARE PLANNING)
Advance Care Planning     Advance Care Planning Inpatient Note  Spiritual Care Department    Today's Date: 10/12/2022  Unit: [facility] PROGRESSIVE CARE    Received request from [employer]. Upon review of chart and communication with care team, patient's decision making abilities are not in question. . Patient and Spouse was/were present in the room during visit. Goals of ACP Conversation:  Discuss advance care planning documents    Health Care Decision Makers:       Primary Decision Maker: Lizzy Orellana - [phone]  Summary:  Verified Documents  No Decision Maker named by patient at this time  Advance Care Planning Documents (Patient Wishes):  Healthcare Power of /Advance Directive Appointment of Health Care Agent  Living Will/Advance Directive     Assessment:  Patient is ready to complete the ACP and the living will but wants to study it with spouse since he was feeling sleepy as a result of medication received.    Interventions:  Provided education on documents for clarity and greater understanding  Encouraged ongoing ACP conversation with future decision makers and loved ones    Care Preferences Communicated:   No    Outcomes/Plan:  ACP Discussion: Postponed    Electronically signed by Katherine Rao,  Intern on 10/12/2022 at 3:58 PM

## 2022-10-13 PROBLEM — K65.2 SBP (SPONTANEOUS BACTERIAL PERITONITIS) (HCC): Status: RESOLVED | Noted: 2022-10-11 | Resolved: 2022-10-13

## 2022-10-13 PROBLEM — K65.9 PERITONITIS (HCC): Status: ACTIVE | Noted: 2022-10-13

## 2022-10-13 LAB
ANION GAP SERPL CALCULATED.3IONS-SCNC: 9 MMOL/L (ref 3–16)
BASOPHILS ABSOLUTE: 0.1 K/UL (ref 0–0.2)
BASOPHILS RELATIVE PERCENT: 0.8 %
BUN BLDV-MCNC: 43 MG/DL (ref 7–20)
CALCIUM SERPL-MCNC: 7.9 MG/DL (ref 8.3–10.6)
CHLORIDE BLD-SCNC: 106 MMOL/L (ref 99–110)
CO2: 21 MMOL/L (ref 21–32)
CREAT SERPL-MCNC: 6.3 MG/DL (ref 0.9–1.3)
EOSINOPHILS ABSOLUTE: 0.1 K/UL (ref 0–0.6)
EOSINOPHILS RELATIVE PERCENT: 1 %
GFR AFRICAN AMERICAN: 11
GFR NON-AFRICAN AMERICAN: 9
GLUCOSE BLD-MCNC: 107 MG/DL (ref 70–99)
GLUCOSE BLD-MCNC: 126 MG/DL (ref 70–99)
GLUCOSE BLD-MCNC: 133 MG/DL (ref 70–99)
GLUCOSE BLD-MCNC: 42 MG/DL (ref 70–99)
GLUCOSE BLD-MCNC: 74 MG/DL (ref 70–99)
GLUCOSE BLD-MCNC: 88 MG/DL (ref 70–99)
HCT VFR BLD CALC: 25.1 % (ref 40.5–52.5)
HEMOGLOBIN: 8.2 G/DL (ref 13.5–17.5)
LYMPHOCYTES ABSOLUTE: 0.8 K/UL (ref 1–5.1)
LYMPHOCYTES RELATIVE PERCENT: 6.5 %
MCH RBC QN AUTO: 30.9 PG (ref 26–34)
MCHC RBC AUTO-ENTMCNC: 32.7 G/DL (ref 31–36)
MCV RBC AUTO: 94.5 FL (ref 80–100)
MONOCYTES ABSOLUTE: 0.7 K/UL (ref 0–1.3)
MONOCYTES RELATIVE PERCENT: 5.9 %
NEUTROPHILS ABSOLUTE: 10.7 K/UL (ref 1.7–7.7)
NEUTROPHILS RELATIVE PERCENT: 85.8 %
PDW BLD-RTO: 15.5 % (ref 12.4–15.4)
PERFORMED ON: ABNORMAL
PERFORMED ON: NORMAL
PERFORMED ON: NORMAL
PLATELET # BLD: 226 K/UL (ref 135–450)
PMV BLD AUTO: 8.8 FL (ref 5–10.5)
POTASSIUM REFLEX MAGNESIUM: 5.2 MMOL/L (ref 3.5–5.1)
RBC # BLD: 2.66 M/UL (ref 4.2–5.9)
SODIUM BLD-SCNC: 136 MMOL/L (ref 136–145)
WBC # BLD: 12.5 K/UL (ref 4–11)

## 2022-10-13 PROCEDURE — 97530 THERAPEUTIC ACTIVITIES: CPT

## 2022-10-13 PROCEDURE — 94760 N-INVAS EAR/PLS OXIMETRY 1: CPT

## 2022-10-13 PROCEDURE — 1200000000 HC SEMI PRIVATE

## 2022-10-13 PROCEDURE — 85025 COMPLETE CBC W/AUTO DIFF WBC: CPT

## 2022-10-13 PROCEDURE — 6370000000 HC RX 637 (ALT 250 FOR IP): Performed by: INTERNAL MEDICINE

## 2022-10-13 PROCEDURE — 6370000000 HC RX 637 (ALT 250 FOR IP): Performed by: STUDENT IN AN ORGANIZED HEALTH CARE EDUCATION/TRAINING PROGRAM

## 2022-10-13 PROCEDURE — 6370000000 HC RX 637 (ALT 250 FOR IP): Performed by: NURSE PRACTITIONER

## 2022-10-13 PROCEDURE — 80048 BASIC METABOLIC PNL TOTAL CA: CPT

## 2022-10-13 PROCEDURE — 6360000002 HC RX W HCPCS: Performed by: NURSE PRACTITIONER

## 2022-10-13 PROCEDURE — 2580000003 HC RX 258: Performed by: NURSE PRACTITIONER

## 2022-10-13 PROCEDURE — 97162 PT EVAL MOD COMPLEX 30 MIN: CPT

## 2022-10-13 PROCEDURE — 36415 COLL VENOUS BLD VENIPUNCTURE: CPT

## 2022-10-13 PROCEDURE — 97166 OT EVAL MOD COMPLEX 45 MIN: CPT

## 2022-10-13 RX ORDER — INSULIN GLARGINE 100 [IU]/ML
10 INJECTION, SOLUTION SUBCUTANEOUS NIGHTLY
Status: DISCONTINUED | OUTPATIENT
Start: 2022-10-13 | End: 2022-10-19 | Stop reason: HOSPADM

## 2022-10-13 RX ADMIN — METOPROLOL TARTRATE 75 MG: 25 TABLET, FILM COATED ORAL at 21:07

## 2022-10-13 RX ADMIN — SODIUM CHLORIDE, PRESERVATIVE FREE 10 ML: 5 INJECTION INTRAVENOUS at 08:27

## 2022-10-13 RX ADMIN — CLONIDINE HYDROCHLORIDE 0.3 MG: 0.1 TABLET ORAL at 08:26

## 2022-10-13 RX ADMIN — BUPROPION HYDROCHLORIDE 150 MG: 150 TABLET, EXTENDED RELEASE ORAL at 21:07

## 2022-10-13 RX ADMIN — NYSTATIN 500000 UNITS: 100000 SUSPENSION ORAL at 12:33

## 2022-10-13 RX ADMIN — GABAPENTIN 300 MG: 300 CAPSULE ORAL at 08:26

## 2022-10-13 RX ADMIN — CLONIDINE HYDROCHLORIDE 0.3 MG: 0.1 TABLET ORAL at 21:07

## 2022-10-13 RX ADMIN — HEPARIN SODIUM 5000 UNITS: 5000 INJECTION INTRAVENOUS; SUBCUTANEOUS at 14:04

## 2022-10-13 RX ADMIN — HYDRALAZINE HYDROCHLORIDE 100 MG: 50 TABLET, FILM COATED ORAL at 08:26

## 2022-10-13 RX ADMIN — NYSTATIN 500000 UNITS: 100000 SUSPENSION ORAL at 21:07

## 2022-10-13 RX ADMIN — ASPIRIN 81 MG: 81 TABLET, COATED ORAL at 08:26

## 2022-10-13 RX ADMIN — SUCRALFATE 1 G: 1 TABLET ORAL at 12:33

## 2022-10-13 RX ADMIN — SEVELAMER CARBONATE 800 MG: 800 TABLET, FILM COATED ORAL at 12:33

## 2022-10-13 RX ADMIN — SUCRALFATE 1 G: 1 TABLET ORAL at 08:25

## 2022-10-13 RX ADMIN — LOSARTAN POTASSIUM 100 MG: 100 TABLET, FILM COATED ORAL at 08:25

## 2022-10-13 RX ADMIN — NIFEDIPINE 90 MG: 90 TABLET, EXTENDED RELEASE ORAL at 18:03

## 2022-10-13 RX ADMIN — CALCITRIOL 0.5 MCG: 0.25 CAPSULE ORAL at 08:25

## 2022-10-13 RX ADMIN — TORSEMIDE 100 MG: 100 TABLET ORAL at 08:25

## 2022-10-13 RX ADMIN — HEPARIN SODIUM 5000 UNITS: 5000 INJECTION INTRAVENOUS; SUBCUTANEOUS at 21:07

## 2022-10-13 RX ADMIN — METOPROLOL TARTRATE 75 MG: 25 TABLET, FILM COATED ORAL at 00:07

## 2022-10-13 RX ADMIN — HYDRALAZINE HYDROCHLORIDE 100 MG: 50 TABLET, FILM COATED ORAL at 14:04

## 2022-10-13 RX ADMIN — GENTAMICIN SULFATE: 1 CREAM TOPICAL at 08:26

## 2022-10-13 RX ADMIN — SODIUM BICARBONATE 650 MG: 650 TABLET ORAL at 08:26

## 2022-10-13 RX ADMIN — GABAPENTIN 300 MG: 300 CAPSULE ORAL at 14:04

## 2022-10-13 RX ADMIN — GABAPENTIN 300 MG: 300 CAPSULE ORAL at 21:07

## 2022-10-13 RX ADMIN — HYDRALAZINE HYDROCHLORIDE 100 MG: 50 TABLET, FILM COATED ORAL at 21:07

## 2022-10-13 RX ADMIN — ISOSORBIDE MONONITRATE 60 MG: 60 TABLET, EXTENDED RELEASE ORAL at 08:26

## 2022-10-13 RX ADMIN — HEPARIN SODIUM 5000 UNITS: 5000 INJECTION INTRAVENOUS; SUBCUTANEOUS at 05:52

## 2022-10-13 RX ADMIN — SEVELAMER CARBONATE 800 MG: 800 TABLET, FILM COATED ORAL at 17:00

## 2022-10-13 RX ADMIN — NYSTATIN 500000 UNITS: 100000 SUSPENSION ORAL at 08:29

## 2022-10-13 RX ADMIN — METOPROLOL TARTRATE 75 MG: 25 TABLET, FILM COATED ORAL at 08:25

## 2022-10-13 RX ADMIN — SUCRALFATE 1 G: 1 TABLET ORAL at 17:00

## 2022-10-13 RX ADMIN — ATORVASTATIN CALCIUM 40 MG: 40 TABLET, FILM COATED ORAL at 21:07

## 2022-10-13 RX ADMIN — SODIUM CHLORIDE, PRESERVATIVE FREE 10 ML: 5 INJECTION INTRAVENOUS at 21:16

## 2022-10-13 RX ADMIN — SEVELAMER CARBONATE 800 MG: 800 TABLET, FILM COATED ORAL at 08:25

## 2022-10-13 RX ADMIN — SPIRONOLACTONE 100 MG: 100 TABLET ORAL at 08:26

## 2022-10-13 RX ADMIN — INSULIN GLARGINE 10 UNITS: 100 INJECTION, SOLUTION SUBCUTANEOUS at 21:08

## 2022-10-13 RX ADMIN — NYSTATIN 500000 UNITS: 100000 SUSPENSION ORAL at 17:01

## 2022-10-13 RX ADMIN — PANTOPRAZOLE SODIUM 40 MG: 40 TABLET, DELAYED RELEASE ORAL at 05:52

## 2022-10-13 RX ADMIN — PANTOPRAZOLE SODIUM 40 MG: 40 TABLET, DELAYED RELEASE ORAL at 16:58

## 2022-10-13 RX ADMIN — SUCRALFATE 1 G: 1 TABLET ORAL at 21:07

## 2022-10-13 RX ADMIN — OXYCODONE HYDROCHLORIDE 10 MG: 10 TABLET ORAL at 16:58

## 2022-10-13 RX ADMIN — CEFEPIME 1000 MG: 1 INJECTION, POWDER, FOR SOLUTION INTRAMUSCULAR; INTRAVENOUS at 19:15

## 2022-10-13 RX ADMIN — CLONIDINE HYDROCHLORIDE 0.3 MG: 0.1 TABLET ORAL at 14:04

## 2022-10-13 RX ADMIN — BUPROPION HYDROCHLORIDE 150 MG: 150 TABLET, EXTENDED RELEASE ORAL at 08:26

## 2022-10-13 RX ADMIN — NIFEDIPINE 90 MG: 90 TABLET, EXTENDED RELEASE ORAL at 08:27

## 2022-10-13 ASSESSMENT — PAIN DESCRIPTION - DESCRIPTORS
DESCRIPTORS: ACHING

## 2022-10-13 ASSESSMENT — PAIN SCALES - WONG BAKER
WONGBAKER_NUMERICALRESPONSE: 0
WONGBAKER_NUMERICALRESPONSE: 4
WONGBAKER_NUMERICALRESPONSE: 4

## 2022-10-13 ASSESSMENT — PAIN DESCRIPTION - FREQUENCY
FREQUENCY: INTERMITTENT
FREQUENCY: INTERMITTENT

## 2022-10-13 ASSESSMENT — PAIN DESCRIPTION - ONSET
ONSET: ON-GOING
ONSET: ON-GOING

## 2022-10-13 ASSESSMENT — PAIN SCALES - GENERAL: PAINLEVEL_OUTOF10: 7

## 2022-10-13 ASSESSMENT — PAIN DESCRIPTION - PAIN TYPE
TYPE: ACUTE PAIN
TYPE: ACUTE PAIN

## 2022-10-13 ASSESSMENT — PAIN DESCRIPTION - ORIENTATION: ORIENTATION: MID

## 2022-10-13 ASSESSMENT — PAIN - FUNCTIONAL ASSESSMENT: PAIN_FUNCTIONAL_ASSESSMENT: ACTIVITIES ARE NOT PREVENTED

## 2022-10-13 ASSESSMENT — PAIN DESCRIPTION - LOCATION
LOCATION: ABDOMEN

## 2022-10-13 NOTE — PROGRESS NOTES
Hospitalist Progress Note      PCP: ROQUE Mendosa - CNP    Date of Admission: 10/11/2022    Chief Complaint: abdominal pain     Hospital Course:    65 y.o. male with PMHx of DM, CVA, CHF, ESRD on PD, HTN and HLD presented to [facility] with 5 day history of abdominal pain during dialysis exchange with subsequent cloudy fluid. Patient admitted with PD related peritonitis. Subjective:   Pain had significantly improved. Patient significantly more alert today, denies any complaints.    Wife updated    Medications:  Reviewed    Infusion Medications    sodium chloride      sodium chloride      dextrose      sodium chloride       Scheduled Medications    insulin glargine  18 Units SubCUTAneous Nightly    nystatin  5 mL Oral 4x Daily    gentamicin   Topical Daily    epoetin robert-epbx  10,000 Units SubCUTAneous Once per day on Mon Wed Fri    dianeal lo-david 1.5%  6,000 mL IntraPERitoneal Nightly    And    dianeal lo-david 1.5%  1,500 mL IntraPERitoneal Nightly    torsemide  100 mg Oral Daily    atorvastatin  40 mg Oral Nightly    buPROPion  150 mg Oral BID    calcitRIOL  0.5 mcg Oral Daily    cloNIDine  0.3 mg Oral TID    aspirin  81 mg Oral Daily    gabapentin  300 mg Oral TID    hydrALAZINE  100 mg Oral TID    insulin lispro  3 Units SubCUTAneous TID WC    isosorbide mononitrate  60 mg Oral Daily    losartan  100 mg Oral Daily    metoprolol tartrate  75 mg Oral BID    NIFEdipine  90 mg Oral BID    pantoprazole  40 mg Oral BID AC    sodium bicarbonate  650 mg Oral Daily    spironolactone  100 mg Oral Daily    sucralfate  1 g Oral 4x Daily    sevelamer  800 mg Oral TID WC    insulin lispro  0-8 Units SubCUTAneous TID WC    insulin lispro  0-4 Units SubCUTAneous Nightly    sodium chloride flush  5-40 mL IntraVENous 2 times per day    heparin (porcine)  5,000 Units SubCUTAneous 3 times per day    cefepime  1,000 mg IntraVENous Q24H    vancomycin (VANCOCIN) intermittent dosing (placeholder)   Other RX Placeholder     PRN Meds: oxyCODONE **OR** oxyCODONE, sodium chloride, sodium chloride, glucose, dextrose bolus **OR** dextrose bolus, glucagon (rDNA), dextrose, sodium chloride flush, sodium chloride, ondansetron **OR** ondansetron, polyethylene glycol, acetaminophen **OR** acetaminophen      Intake/Output Summary (Last 24 hours) at 10/13/2022 1256  Last data filed at 10/13/2022 0848  Gross per 24 hour   Intake 2249.59 ml   Output 400 ml   Net 1849.59 ml         Physical Exam Performed:    /78   Pulse 78   Temp 98.7 °F (37.1 °C) (Oral)   Resp 18   Ht 6' 2\" (1.88 m)   Wt 163 lb 9.3 oz (74.2 kg)   SpO2 96%   BMI 21.00 kg/m²     General appearance: No apparent distress, appears stated age and cooperative. HEENT: Pupils equal, round, and reactive to light. Conjunctivae/corneas clear. Neck: Supple, with full range of motion. No jugular venous distention. Trachea midline. Respiratory:  Normal respiratory effort. Clear to auscultation, bilaterally without Rales/Wheezes/Rhonchi. Cardiovascular: Regular rate and rhythm with normal S1/S2 without murmurs, rubs or gallops. Abdomen: Minimal abdominal tenderness still present. PD catheter noted   musculoskeletal: No clubbing, cyanosis or edema bilaterally. Full range of motion without deformity. Skin: Skin color, texture, turgor normal.  No rashes or lesions. Neurologic:  Neurovascularly intact without any focal sensory/motor deficits.  Cranial nerves: II-XII intact, grossly non-focal.  Psychiatric: Alert and oriented, thought content appropriate, normal insight  Capillary Refill: Brisk, 3 seconds, normal   Peripheral Pulses: +2 palpable, equal bilaterally       Labs:   Recent Labs     10/11/22  1441 10/12/22  0515 10/12/22  1551 10/12/22  2210 10/13/22  0434   WBC 9.3 13.9*  --   --  12.5*   HGB 7.2* 6.3* 6.8* 7.3* 8.2*   HCT 22.3* 19.4* 20.4* 22.4* 25.1*    234  --   --  226       Recent Labs     10/11/22  1441 10/12/22  0515 10/13/22  0433    136 136   K 4.5 4.6 5.2*    104 106   CO2 19* 20* 21   BUN 39* 41* 43*   CREATININE 5.9* 6.1* 6.3*   CALCIUM 8.3 8.0* 7.9*       Recent Labs     10/11/22  1441   AST 7*   ALT <5*   BILIDIR <0.2   BILITOT <0.2   ALKPHOS 53       No results for input(s): INR in the last 72 hours. No results for input(s): Othelia Nutley in the last 72 hours. Urinalysis:      Lab Results   Component Value Date/Time    NITRU Negative 10/12/2022 10:45 AM    WBCUA 1 10/12/2022 10:45 AM    BACTERIA None Seen 10/12/2022 10:45 AM    RBCUA 1 10/12/2022 10:45 AM    BLOODU Negative 10/12/2022 10:45 AM    SPECGRAV 1.011 10/12/2022 10:45 AM    GLUCOSEU Negative 10/12/2022 10:45 AM    GLUCOSEU 250 12/14/2010 02:50 PM       Radiology:  CT ABDOMEN PELVIS WO CONTRAST Additional Contrast? None   Final Result   No definite abnormality identified to explain cloudy peritoneal dialysate. Dialysate measures near water density on the current examination. Several bubbles of free intraperitoneal gas which or presumably introduced   during dialysis. Please note that hollow visceral perforation cannot be   excluded. Mural thickening of the large bowel, mild-to-moderate in degree. That may be   secondary to under distension, but colitis must be considered as well. Polycystic kidney disease. Note that some of the renal lesions are   indeterminate in terms of density, and therefore a solid lesion cannot be   excluded. Assessment/Plan:    Active Hospital Problems    Diagnosis     Peritonitis ([address] [K65.9]      Priority: Medium     ESRD on PD  PD related peritonitis  Patient presented to emergency with abdominal pain during peritoneal dialysis, patient also reported cloudy fluid. Upon presentation patient was vitally stable, afebrile  Peritoneal fluid analysis with 10,000 nucleated cells.      PD cultures continue to be negative  Plan  -Appreciate nephrology input  -Follow PD fluid culture (negative to date)   -Follow blood cultures  -Continue on vancomycin and cefepime    Diabetes mellitus  Hypoglycemia noted again today  Will decrease Lantus dose further ( 22--> 18--> 10). Continue on sliding scale. Acute on chronic anemia  Baseline hemoglobin 7-8, hemoglobin today 8.2  Patient presented with hemoglobin of 6.3  Received 1 unit of PRBC 10/12  Anemia worked up in admission in September 2022  Will consult GI if hemoglobin drops again    Hx CVA  - with left side weakness  - continue asa, statin     CHF  - appears euvolemic  - continue aldactone, losartan, metoprolol and hydralazine  - daily wts  - I/O's      DVT Prophylaxis: heparin   Diet: ADULT DIET;  Regular  Code Status: Full Code  PT/OT Eval Status: following     Dispo - pending clinical improvement      Bonifacio Doss MD

## 2022-10-13 NOTE — PROGRESS NOTES
[facility]  Hypoglycemia Event and Prevention Plan      NAME: Paulden Lady  MEDICAL RECORD NUMBER:  [id number]  AGE: 65 y.o. GENDER: male  : 1965  EPISODE DATE:  10/13/2022     Data     Recent Labs     10/12/22  0844 10/12/22  1145 10/12/22  1659 10/12/22  2115 10/13/22  0601 10/13/22  0757   POCGLU 72 115* 166* 130* 74 107*       Medications  Scheduled Medications:   dianeal lo-david 1.5%  1,000 mL IntraPERitoneal 4x daily    insulin glargine  18 Units SubCUTAneous Nightly    nystatin  5 mL Oral 4x Daily    gentamicin   Topical Daily    epoetin robert-epbx  10,000 Units SubCUTAneous Once per day on     dianeal lo-david 1.5%  6,000 mL IntraPERitoneal Nightly    And    dianeal lo-david 1.5%  1,500 mL IntraPERitoneal Nightly    torsemide  100 mg Oral Daily    atorvastatin  40 mg Oral Nightly    buPROPion  150 mg Oral BID    calcitRIOL  0.5 mcg Oral Daily    cloNIDine  0.3 mg Oral TID    aspirin  81 mg Oral Daily    gabapentin  300 mg Oral TID    hydrALAZINE  100 mg Oral TID    insulin lispro  3 Units SubCUTAneous TID WC    isosorbide mononitrate  60 mg Oral Daily    losartan  100 mg Oral Daily    metoprolol tartrate  75 mg Oral BID    NIFEdipine  90 mg Oral BID    pantoprazole  40 mg Oral BID AC    sodium bicarbonate  650 mg Oral Daily    spironolactone  100 mg Oral Daily    sucralfate  1 g Oral 4x Daily    sevelamer  800 mg Oral TID WC    insulin lispro  0-8 Units SubCUTAneous TID WC    insulin lispro  0-4 Units SubCUTAneous Nightly    sodium chloride flush  5-40 mL IntraVENous 2 times per day    heparin (porcine)  5,000 Units SubCUTAneous 3 times per day    cefepime  1,000 mg IntraVENous Q24H    vancomycin (VANCOCIN) intermittent dosing (placeholder)   Other RX Placeholder       Diet  Current diet/supplement order: ADULT DIET;  Regular     Recorded PO: PO Meals Eaten (%): 1 - 25% last meal in flowsheets      Action       Physician Notified of event: Yes   Charli Meléndez MD    POCT added at 0200. Recommend reducing Lantus and correction scale.       Responce     Achieved POCT Blood Glucose greater than 70 mg/dl: Yes      Medication plan updated: Yes      Electronically signed by Emerson De Los Santos RN on 10/13/2022 at 8:34 AM

## 2022-10-13 NOTE — PROGRESS NOTES
Physical Therapy  Facility/Department: [address] PROGRESSIVE CARE  Physical Therapy Initial Assessment - Mallory Anahi [address] 89    Name: Mirella Fung  : 1965  MRN: [id number]  Date of Service: 10/13/2022    Discharge Recommendations:  Patient would benefit from continued therapy after discharge, IP Rehab   PT Equipment Recommendations  Equipment Needed: No      Patient Diagnosis(es): The primary encounter diagnosis was SBP (spontaneous bacterial peritonitis) ([address] A diagnosis of Anemia, unspecified type was also pertinent to this visit. Past Medical History:  has a past medical history of Cardiomyopathy ([facility] CHF (congestive heart failure) ([facility] CVA (cerebral infarction), Diabetes mellitus ([facility] [facility] Foot ulcer, left ([facility] [address] [facility] Gastroparesis, Hemodialysis patient ([facility] [address] [facility] Hypercholesteremia, Hypertension, Kidney disease, and Unspecified cerebral artery occlusion with cerebral infarction. Past Surgical History:  has a past surgical history that includes LAPAROSCOPY INSERTION PERITONEAL CATHETER (N/A, 10/29/2020); Umbilical hernia repair (N/A, 10/29/2020); hc dialysis catheter (N/A, 10/29/2020); Upper gastrointestinal endoscopy (N/A, 2021); Colonoscopy (N/A, 2021); and Upper gastrointestinal endoscopy (N/A, 2022). Assessment   Body Structures, Functions, Activity Limitations Requiring Skilled Therapeutic Intervention: Decreased functional mobility ; Decreased balance;Decreased vision/visual deficit  Assessment: Pt is a 65 y.o. M. with PMH of ESRD on PD, prior CVA, admitted 10/11 for R-sided visual loss, new CVA. He presents pleasant and agreeable to evaluation, demonstrating functional LE strength, but significant instability when ambulating. Activity tolerance limited d/t fatigue, and pt feels weaker than his baseline. He would benefit from high-frequency therapy to maximize his balance, endurance safety awareness, and independence ambulating. Mirella Fung scored a 17/ on the AM-PAC short mobility form. Current research shows that an AM-PAC score of 17 or less is typically not associated with a discharge to the patient's home setting. Based on the patient's AM-PAC score and their current functional mobility deficits, it is recommended that the patient have 5-7 sessions per week of Physical Therapy at d/c to increase the patient's independence. At this time, this patient demonstrates complex nursing, medical, and rehabilitative needs, and would benefit from intensive rehabilitation services upon discharge from the Inpatient setting. This patient demonstrates the ability to participate in and benefit from an intensive therapy program with a coordinated interdisciplinary team approach to foster frequent, structured, and documented communication among disciplines, who will work together to establish, prioritize, and achieve treatment goals. Please see assessment section for further patient specific details. If patient discharges prior to next session this note will serve as a discharge summary. Please see below for the latest assessment towards goals. Specific Instructions for Next Treatment: Balance; Endurance; Ambulation  Therapy Prognosis: Fair  Decision Making: Medium Complexity  History: See below  Exam: Strength; ROM; Balance; Ambulation  Clinical Presentation: Evolving  Barriers to Learning: Fatigue  Requires PT Follow-Up: Yes  Activity Tolerance  Activity Tolerance: Patient tolerated evaluation without incident     Plan   Physcial Therapy Plan  General Plan: 5-7 times per week  Specific Instructions for Next Treatment: Balance; Endurance; Ambulation  Current Treatment Recommendations: Balance training, Gait training, Functional mobility training, Stair training, Neuromuscular re-education, Transfer training, Equipment evaluation, education, & procurement, Safety education & training, Patient/Caregiver education & training  Safety Devices  Type of Devices:  All fall risk precautions in place, Call light within reach, Chair alarm in place, Left in chair, Nurse notified  Restraints  Restraints Initially in Place: No     Restrictions  Restrictions/Precautions  Restrictions/Precautions: Fall Risk  Position Activity Restriction  Other position/activity restrictions: PD     Subjective   General  Chart Reviewed: Yes  Patient assessed for rehabilitation services?: Yes  Additional Pertinent Hx: 65 y.o. male with PMHx of DM, CVA, CHF, ESRD on PD, HTN and HLD presented to [facility] with 5 day history of abdominal pain during dialysis exchange with subsequent cloudy fluid. Patient admitted with PD related peritonitis. Response To Previous Treatment: Not applicable  Referring Practitioner: ROQUE Avina CNP  Referral Date : 10/11/22  Diagnosis: PD related peritonitis; Hx of CVA; Anemia; DM  Follows Commands: Within Functional Limits  Subjective  Subjective: Pt agreeable to evaluation. C/o fatigue.          Social/Functional History  Social/Functional History  Lives With: Spouse (2 dogs)  Type of Home: House  Home Layout: One level  Home Access: Level entry  Bathroom Shower/Tub: Walk-in shower  Bathroom Toilet: Standard  Bathroom Equipment: Grab bars in shower, Built-in shower seat  Home Equipment: Cane  Has the patient had two or more falls in the past year or any fall with injury in the past year?: No  ADL Assistance: Independent  Ambulation Assistance: Independent (with cane)  Transfer Assistance: Independent  Active : No  Patient's  Info: wife drives     Vision/Hearing  Vision  Vision: Impaired  Vision Exceptions: Wears glasses at all times  Hearing  Hearing: Within functional limits      Cognition   Orientation  Overall Orientation Status: Within Normal Limits     Objective     AROM RLE (degrees)  RLE AROM: WFL  AROM LLE (degrees)  LLE AROM : WFL  Strength RLE  Strength RLE: WFL  Strength LLE  Strength LLE: WFL       Bed mobility  Supine to Sit: Stand by assistance    Transfers  Sit to Stand: Contact guard assistance  Stand to Sit: Contact guard assistance    Ambulation  Surface: Level tile  Device: No Device  Assistance: Contact guard assistance;Minimal assistance  Quality of Gait: Without device, pt ambulate 10' to commode, slow speed, excess lateral trunk sway, several LOB d/t fatigue, Min A to correct. With walker, pt somewhat more steady, able to ambulate 25' to chair with CGA. Distance: 10', 25'       AM-PAC Score  AM-PAC Inpatient Mobility Raw Score : 17 (10/13/22 0821)  AM-PAC Inpatient T-Scale Score : 42.13 (10/13/22 0821)  Mobility Inpatient CMS 0-100% Score: 50.57 (10/13/22 0609)  Mobility Inpatient CMS G-Code Modifier : CK (10/13/22 4998)    Goals  Short Term Goals  Time Frame for Short Term Goals: 2-3 days  Short Term Goal 1: Bed mobility (I)  Short Term Goal 2: Transfers (I)  Short Term Goal 3: Ambulation 48' with RW, (I)  Patient Goals   Patient Goals : To get stronger       Education  Patient Education  Education Given To: Patient  Education Provided: Role of Therapy;Plan of Care; Fall Prevention Strategies  Education Method: Demonstration;Verbal  Education Outcome: Continued education needed      Therapy Time   Individual Concurrent Group Co-treatment   Time In       3359   Time Out       0825   Minutes       30   Timed Code Treatment Minutes: 15 Minutes   Medium Complexity Evaluation    Gerry Howard PT   Electronically signed by Gerry Howard PT [id number] on 10/13/2022 at 8:27 AM

## 2022-10-13 NOTE — PLAN OF CARE
Problem: Discharge Planning  Goal: Discharge to home or other facility with appropriate resources  10/13/2022 0850 by Melquiades Barajas RN  Outcome: Progressing     Problem: Safety - Adult  Goal: Free from fall injury  10/13/2022 0850 by Melquiades Barajas RN  Outcome: Progressing     Problem: ABCDS Injury Assessment  Goal: Absence of physical injury  10/13/2022 0850 by Melquiades Barajas RN  Outcome: Progressing     Problem: Confusion  Goal: Confusion, delirium, dementia, or psychosis is improved or at baseline  Description: INTERVENTIONS:  1. Assess for possible contributors to thought disturbance, including medications, impaired vision or hearing, underlying metabolic abnormalities, dehydration, psychiatric diagnoses, and notify attending LIP  2. [place] high risk fall precautions, as indicated  3. Provide frequent short contacts to provide reality reorientation, refocusing and direction  4. Decrease environmental stimuli, including noise as appropriate  5. Monitor and intervene to maintain adequate nutrition, hydration, elimination, sleep and activity  6. If unable to ensure safety without constant attention obtain sitter and review sitter guidelines with assigned personnel  7.  Initiate Psychosocial CNS and Spiritual Care consult, as indicated  10/13/2022 0850 by Melquiades Barajas RN  Outcome: Progressing

## 2022-10-13 NOTE — PROGRESS NOTES
Unit of packed red blood cells complete. Tolerated well. No signs of reaction noted. [place] at this time. Order placed for follow up H and H. Rene Dickerson RN

## 2022-10-13 NOTE — PROGRESS NOTES
Occupational Therapy  Facility/Department: [address] PROGRESSIVE CARE  Occupational Therapy Initial Assessment and Tentative D/C      Name: Sky Grijalva  : 1965  MRN: [id number]  Date of Service: 10/13/2022    Discharge Recommendations: Sky Grijalva scored a 19/24 on the AM-PAC ADL Inpatient form. Current research shows that an AM-PAC score of 17 or less is typically not associated with a discharge to the patient's home setting. Based on the patient's AM-PAC score and their current ADL deficits, it is recommended that the patient have 5-7 sessions per week of Occupational Therapy at d/c to increase the patient's independence. At this time, this patient demonstrates complex nursing, medical, and rehabilitative needs, and would benefit from intensive rehabilitation services upon discharge from the Inpatient setting. This patient demonstrates the ability to participate in and benefit from an intensive therapy program with a coordinated interdisciplinary team approach to foster frequent, structured, and documented communication among disciplines, who will work together to establish, prioritize, and achieve treatment goals. Please see assessment section for further patient specific details. If patient discharges prior to next session this note will serve as a discharge summary. Please see below for the latest assessment towards goals. Continue to assess pending progress, 5-7 sessions per week, Patient would benefit from continued therapy after discharge  OT Equipment Recommendations  Equipment Needed: No       Patient Diagnosis(es): The primary encounter diagnosis was SBP (spontaneous bacterial peritonitis) ([address] A diagnosis of Anemia, unspecified type was also pertinent to this visit.   Past Medical History:  has a past medical history of Cardiomyopathy ([address] CHF (congestive heart failure) ([address] CVA (cerebral infarction), Diabetes mellitus ([address] Foot ulcer, left ([address] Gastroparesis, Hemodialysis patient [facility]), Hypercholesteremia, Hypertension, Kidney disease, and Unspecified cerebral artery occlusion with cerebral infarction. Past Surgical History:  has a past surgical history that includes LAPAROSCOPY INSERTION PERITONEAL CATHETER (N/A, 10/29/2020); Umbilical hernia repair (N/A, 10/29/2020); hc dialysis catheter (N/A, 10/29/2020); Upper gastrointestinal endoscopy (N/A, 4/26/2021); Colonoscopy (N/A, 5/17/2021); and Upper gastrointestinal endoscopy (N/A, 9/13/2022). Treatment Diagnosis: decreased balance, strength, and ADLs      Assessment   Performance deficits / Impairments: Decreased functional mobility ; Decreased strength;Decreased endurance;Decreased ADL status; Decreased safe awareness;Decreased balance;Decreased high-level IADLs  Assessment: Leighton Julio is a 65 y.o. male who presents urgency department via EMS with his spouse for complaints of mid abdominal pain since Saturday during his peritoneal dialysis exchanges. He denies body aches, fever or chills. He denies nausea or vomiting. He states that his dialysis at has been cloudy. His abdominal pain was only during his exchanges since Saturday but today became more consistent. Had a recent hospitalization about 3 weeks ago for gastroparesis pain. PTA pt from home where pt was Ind with mobility and transfers with cane. Pt currently requires CGA and RW for mobility and transfers. Pt also completes mobility with CGA/Min A with no device. Pt unsteady on feet. Anticipate pt needing up to [address] for ADLs. Pt with history of old CVA affecting L side. Pt will benefit from skilled OT services at this time. Anticipate pt with need for ongoing OT at time of D/C.   Treatment Diagnosis: decreased balance, strength, and ADLs  Prognosis: Fair  Decision Making: Medium Complexity  Exam: see above  Assistance / Modification: RW  REQUIRES OT FOLLOW-UP: Yes  Activity Tolerance  Activity Tolerance: Patient Tolerated treatment well        Plan   Occupational Therapy Plan  Times Per Week: 3-5x  Current Treatment Recommendations: Strengthening, Functional mobility training, Balance training, Patient/Caregiver education & training, Safety education & training, Self-Care / ADL, Endurance training     Restrictions  Restrictions/Precautions  Restrictions/Precautions: Fall Risk  Position Activity Restriction  Other position/activity restrictions: PD    Subjective   General  Chart Reviewed: Yes  Patient assessed for rehabilitation services?: Yes  Additional Pertinent Hx: per ED note, Miah Mesa is a 65 y.o. male who presents urgency department via EMS with his spouse for complaints of mid abdominal pain since Saturday during his peritoneal dialysis exchanges. He denies body aches, fever or chills. He denies nausea or vomiting. He states that his dialysis at has been cloudy. His abdominal pain was only during his exchanges since Saturday but today became more consistent. Had a recent hospitalization about 3 weeks ago for gastroparesis pain. Family / Caregiver Present: No  Referring Practitioner: ROQUE Carvalho CNP  Subjective  Subjective: Pt agreeable to OT evaluation. No reports of pain. General Comment  Comments: okay for therapy per RN.      Social/Functional History  Social/Functional History  Lives With: Spouse (2 dogs)  Type of Home: House  Home Layout: One level  Home Access: Level entry  Bathroom Shower/Tub: Walk-in shower  Bathroom Toilet: Standard  Bathroom Equipment: Grab bars in shower, Built-in shower seat  Home Equipment: Cane  Has the patient had two or more falls in the past year or any fall with injury in the past year?: No  ADL Assistance: Independent  Ambulation Assistance: Independent (with cane)  Transfer Assistance: Independent  Active : No  Patient's  Info: wife drives        Objective   Heart Rate: 79  Heart Rate Source: Monitor  BP: 122/69  BP Location: Right upper arm  BP Method: Automatic  Patient Position: Semi brandon  MAP (Calculated): 86.67  Resp: 22  SpO2: 95 %  O2 Device: None (Room air)             Safety Devices  Type of Devices: All fall risk precautions in place;Call light within reach; Chair alarm in place; Left in chair;Nurse notified  Restraints  Restraints Initially in Place: No  Bed Mobility Training  Bed Mobility Training: Yes  Overall Level of Assistance: Stand-by assistance  Supine to Sit: Stand-by assistance  Scooting: Stand-by assistance  Balance  Sitting: Intact  Standing: Impaired (RW; no device)  Standing - Static: Good;Fair  Standing - Dynamic: Fair  Gait  Overall Level of Assistance: Contact-guard assistance;Minimum assistance (CGA with RW; CGA/Min A with no device; pt unsteady on feet)  Distance (ft): 10 Feet (10ft, 25ft)  Assistive Device: Walker, rolling     AROM: Generally decreased, functional (old CVA affecting L UE)  Strength: Generally decreased, functional (old CVA affecting L UE)  Coordination: Generally decreased, functional  Sensation: Impaired (altered sensation L UE)  ADL  Feeding: Independent  LE Dressing: Minimal assistance (assist for tying pants)  Additional Comments: Anticipate pt needing up to Min A for ADLs based on ROM, strength, and balance     Activity Tolerance  Activity Tolerance: Patient tolerated evaluation without incident        Vision  Vision: Impaired  Vision Exceptions: Wears glasses at all times  Hearing  Hearing: Within functional limits  Cognition  Overall Cognitive Status: WFL  Orientation  Overall Orientation Status: Within Normal Limits                  Education Given To: Patient  Education Provided: Role of Therapy;Plan of Care;Transfer Training  Education Method: Verbal;Demonstration  Barriers to Learning: None  Education Outcome: Verbalized understanding;Demonstrated understanding          AM-PAC Score        AM-PAC Inpatient Daily Activity Raw Score: 19 (10/13/22 0830)  AM-PAC Inpatient ADL T-Scale Score : 40.22 (10/13/22 0830)  ADL Inpatient CMS 0-100% Score: 42.8 (10/13/22 0830)  ADL Inpatient CMS G-Code Modifier : CK (10/13/22 0830)    [place] Score       Goals  Short Term Goals  Time Frame for Short Term Goals: prior to D/C  Short Term Goal 1: complete functional mobility and transfers Mod Ind  Short Term Goal 2: complete bathing and dressing Mod Ind  Short Term Goal 3: complete toileting Mod Ind  Short Term Goal 4: complete grooming in stance at sink Mod Ind  Long Term Goals  Time Frame for Long Term Goals : STG=LTG  Patient Goals   Patient goals : improve independence       Therapy Time   Individual Concurrent Group Co-treatment   Time In 0755         Time Out 0825         Minutes 30         Timed Code Treatment Minutes: 15 Minutes (15 minute eval)       Idolina Opitz, OTR/L

## 2022-10-13 NOTE — PROGRESS NOTES
[facility]                                               Progress note    Summary:   Lex Alvarez is being seen by nephrology for ESRD. Admitted with peritonitis. Interval History  Seen and examined at bedside. He is awake and alert   No NVD  Abdominal pain has improved  His PO intake is good. No edema. Did not get any exchanges yesterday because he could not tolerate any fluid in his abdomen. /78  96% on room air    cc     Labs   Na 136   K 5.2   Bicarb 21  BUN 43  Cr 6.3  Glucose 42 this AM   Ca 7.9 Hgb 8.2        Plan:   - repeat cell count, send fungal cx. So far blood and fluid cxs NGTD. - continue IV cefepime and vancomycin. Once he can tolerate PD can discharge with IP antibiotics which I will order through is dialysis unit. - nystatin swish and swallow. - BP is acceptable and no volume overload  - attempt cycler PD tonight. Edil Antoine MD  [facility]  Office: [phone]    Assessment:   Peritonitis  Likely secondary to lack in sterile technique  PD fluid was cloudy, pink, 10,000 nucleated cells consistent with peritonitis. Fluid culture so far is no growth to date  Blood cultures in process, negative so far  On empiric vancomycin and cefepime. ESRD  On peritoneal dialysis  Home prescription: 5 times weekly   79.5  3 x 2500 fill 8.5 hrs 2.5 hr dwell on cycler at night. Usually uses 1/2 green and 1/2 yellow    HTN  BP is acceptable. Difficult to control as an outpatient. Electrolytes  No acute issues. Anemia  Recent GIB  Anemia due to renal failure as well             ROS:   Positives Listed Bold. All other remaining systems are negative. Constitutional:  fever, chills, weakness, weight change, fatigue,      Skin:  rash, pruritus, hair loss, bruising, dry skin, petechiae. Head, Face, Neck   headaches, swelling,  cervical adenopathy.      Respiratory: shortness of breath, cough, or wheezing  Cardiovascular: chest pain, palpitations, dizzy, edema  Gastrointestinal: nausea, vomiting, diarrhea, constipation,belly pain    Yellow skin, blood in stool  Musculoskeletal:  back pain, muscle weakness, gait problems,       joint pain or swelling. Genitourinary:  dysuria, poor urine flow, flank pain, blood in urine  Neurologic:  vertigo, TIA'S, syncope, seizures, focal weakness  Psychosocial:  insomnia, anxiety, or depression. Additional positive findings: -     PMH:   Past medical history, surgical history, social history, family history are reviewed and updated as appropriate. Reviewed current medication list.   Allergies reviewed and updated as needed. PE:   Vitals:    10/13/22 1239   BP: 122/78   Pulse: 78   Resp: 18   Temp: 98.7 °F (37.1 °C)   SpO2: 96%       General appearance:  in NAD, fully alert and oriented. Comfortable. HEENT: EOM intact, no icterus. Trachea is midline. Neck : No masses, appears symmetrical, no JVD  Respiratory: Respiratory effort appears normal, bilateral equal chest rise, no wheeze, no crackles   Cardiovascular: Ausculation shows RRR no edema  Abdomen: mild tender to palpation   Musculoskeletal:  Joints with no swelling or deformity. Skin:no rashes, ulcers, induration, no jaundice. Neuro: face symmetric, no focal deficits. Appropriate responses.        Lab Results   Component Value Date    CREATININE 6.3 (HH) 10/13/2022    BUN 43 (H) 10/13/2022     10/13/2022    K 5.2 (H) 10/13/2022     10/13/2022    CO2 21 10/13/2022      Lab Results   Component Value Date    WBC 12.5 (H) 10/13/2022    HGB 8.2 (L) 10/13/2022    HCT 25.1 (L) 10/13/2022    MCV 94.5 10/13/2022     10/13/2022     Lab Results   Component Value Date    PTH 49.8 02/01/2019    CALCIUM 7.9 (L) 10/13/2022    PHOS 4.2 09/16/2022

## 2022-10-13 NOTE — PLAN OF CARE
Problem: Discharge Planning  Goal: Discharge to home or other facility with appropriate resources  Outcome: Progressing  Flowsheets (Taken 10/13/2022 0104)  Discharge to home or other facility with appropriate resources:   Identify barriers to discharge with patient and caregiver   Arrange for needed discharge resources and transportation as appropriate   Identify discharge learning needs (meds, wound care, etc)   Arrange for interpreters to assist at discharge as needed     Problem: Safety - Adult  Goal: Free from fall injury  Outcome: Progressing  Flowsheets (Taken 10/13/2022 0104)  Free From Fall Injury: Instruct family/caregiver on patient safety     Problem: ABCDS Injury Assessment  Goal: Absence of physical injury  Outcome: Progressing  Flowsheets (Taken 10/13/2022 0104)  Absence of Physical Injury: Implement safety measures based on patient assessment     Problem: Confusion  Goal: Confusion, delirium, dementia, or psychosis is improved or at baseline  Outcome: Progressing  Flowsheets (Taken 10/13/2022 0104)  Effect of thought disturbance (confusion, delirium, dementia, or psychosis) are managed with adequate functional status:   Assess for contributors to thought disturbance, including medications, impaired vision or hearing, underlying metabolic abnormalities, dehydration, psychiatric diagnoses, notify [place] high risk fall precautions, as indicated   Provide frequent short contacts to provide reality reorientation, refocusing and direction   Decrease environmental stimuli, including noise as appropriate   Monitor and intervene to maintain adequate nutrition, hydration, elimination, sleep and activity

## 2022-10-13 NOTE — PROGRESS NOTES
Patient doing well today, states and appears like he has much more energy since blood transfusions. BG was low this morning, Wendy Chairez and MD aware. VSS this morning, patient states no complaints of pain at this time. All needs meet and all questions answered. Will await nephro recc. On PD.      Electronically signed by Kira Miller RN on 10/13/2022 at 11:14 AM

## 2022-10-13 NOTE — PROGRESS NOTES
Critical value for glucose at 42 and creatinine 6.3 called this AM. Creatinine expected as is PD patient and has not been able to tolerate treatments in last couple of days. Patient was easy to arouse to voice and drank 240 ml of fruit juice without difficulty. FSBS check then 74. Snack of crackers and peanut butter provided since breakfast isn't for a couple of hours. Will notify MD to review insulin orders. Cornelius Johnston RN

## 2022-10-14 LAB
ANION GAP SERPL CALCULATED.3IONS-SCNC: 11 MMOL/L (ref 3–16)
BASOPHILS ABSOLUTE: 0 K/UL (ref 0–0.2)
BASOPHILS RELATIVE PERCENT: 0.2 %
BUN BLDV-MCNC: 45 MG/DL (ref 7–20)
CALCIUM SERPL-MCNC: 8.1 MG/DL (ref 8.3–10.6)
CHLORIDE BLD-SCNC: 106 MMOL/L (ref 99–110)
CO2: 20 MMOL/L (ref 21–32)
CREAT SERPL-MCNC: 6.9 MG/DL (ref 0.9–1.3)
EOSINOPHILS ABSOLUTE: 0.3 K/UL (ref 0–0.6)
EOSINOPHILS RELATIVE PERCENT: 2.9 %
GFR AFRICAN AMERICAN: 10
GFR NON-AFRICAN AMERICAN: 8
GLUCOSE BLD-MCNC: 104 MG/DL (ref 70–99)
GLUCOSE BLD-MCNC: 116 MG/DL (ref 70–99)
GLUCOSE BLD-MCNC: 126 MG/DL (ref 70–99)
GLUCOSE BLD-MCNC: 147 MG/DL (ref 70–99)
GLUCOSE BLD-MCNC: 153 MG/DL (ref 70–99)
GLUCOSE BLD-MCNC: 247 MG/DL (ref 70–99)
HCT VFR BLD CALC: 24.9 % (ref 40.5–52.5)
HEMOGLOBIN: 8.1 G/DL (ref 13.5–17.5)
LYMPHOCYTES ABSOLUTE: 1.1 K/UL (ref 1–5.1)
LYMPHOCYTES RELATIVE PERCENT: 10.2 %
MCH RBC QN AUTO: 31.1 PG (ref 26–34)
MCHC RBC AUTO-ENTMCNC: 32.4 G/DL (ref 31–36)
MCV RBC AUTO: 96.1 FL (ref 80–100)
MONOCYTES ABSOLUTE: 0.9 K/UL (ref 0–1.3)
MONOCYTES RELATIVE PERCENT: 7.7 %
NEUTROPHILS ABSOLUTE: 8.7 K/UL (ref 1.7–7.7)
NEUTROPHILS RELATIVE PERCENT: 79 %
PDW BLD-RTO: 15 % (ref 12.4–15.4)
PERFORMED ON: ABNORMAL
PLATELET # BLD: 236 K/UL (ref 135–450)
PMV BLD AUTO: 8.7 FL (ref 5–10.5)
POTASSIUM REFLEX MAGNESIUM: 5.3 MMOL/L (ref 3.5–5.1)
RBC # BLD: 2.59 M/UL (ref 4.2–5.9)
SODIUM BLD-SCNC: 137 MMOL/L (ref 136–145)
VANCOMYCIN RANDOM: 15 UG/ML
WBC # BLD: 11.1 K/UL (ref 4–11)

## 2022-10-14 PROCEDURE — 87102 FUNGUS ISOLATION CULTURE: CPT

## 2022-10-14 PROCEDURE — 6360000002 HC RX W HCPCS: Performed by: INTERNAL MEDICINE

## 2022-10-14 PROCEDURE — 80048 BASIC METABOLIC PNL TOTAL CA: CPT

## 2022-10-14 PROCEDURE — 6360000002 HC RX W HCPCS: Performed by: NURSE PRACTITIONER

## 2022-10-14 PROCEDURE — A4722 DIALYS SOL FLD VOL > 1999CC: HCPCS | Performed by: INTERNAL MEDICINE

## 2022-10-14 PROCEDURE — 6370000000 HC RX 637 (ALT 250 FOR IP): Performed by: INTERNAL MEDICINE

## 2022-10-14 PROCEDURE — 2580000003 HC RX 258: Performed by: INTERNAL MEDICINE

## 2022-10-14 PROCEDURE — 87205 SMEAR GRAM STAIN: CPT

## 2022-10-14 PROCEDURE — 80202 ASSAY OF VANCOMYCIN: CPT

## 2022-10-14 PROCEDURE — 2580000003 HC RX 258: Performed by: NURSE PRACTITIONER

## 2022-10-14 PROCEDURE — 97530 THERAPEUTIC ACTIVITIES: CPT

## 2022-10-14 PROCEDURE — 87070 CULTURE OTHR SPECIMN AEROBIC: CPT

## 2022-10-14 PROCEDURE — 87106 FUNGI IDENTIFICATION YEAST: CPT

## 2022-10-14 PROCEDURE — 85025 COMPLETE CBC W/AUTO DIFF WBC: CPT

## 2022-10-14 PROCEDURE — 1200000000 HC SEMI PRIVATE

## 2022-10-14 PROCEDURE — 6370000000 HC RX 637 (ALT 250 FOR IP): Performed by: STUDENT IN AN ORGANIZED HEALTH CARE EDUCATION/TRAINING PROGRAM

## 2022-10-14 PROCEDURE — 6370000000 HC RX 637 (ALT 250 FOR IP): Performed by: NURSE PRACTITIONER

## 2022-10-14 PROCEDURE — 94760 N-INVAS EAR/PLS OXIMETRY 1: CPT

## 2022-10-14 PROCEDURE — 36415 COLL VENOUS BLD VENIPUNCTURE: CPT

## 2022-10-14 RX ORDER — SODIUM CHLORIDE, SODIUM LACTATE, CALCIUM CHLORIDE, MAGNESIUM CHLORIDE AND DEXTROSE 1.5; 538; 448; 18.3; 5.08 G/100ML; MG/100ML; MG/100ML; MG/100ML; MG/100ML
2000 INJECTION, SOLUTION INTRAPERITONEAL ONCE
Status: COMPLETED | OUTPATIENT
Start: 2022-10-14 | End: 2022-10-14

## 2022-10-14 RX ADMIN — OXYCODONE HYDROCHLORIDE 10 MG: 10 TABLET ORAL at 01:12

## 2022-10-14 RX ADMIN — HEPARIN SODIUM 5000 UNITS: 5000 INJECTION INTRAVENOUS; SUBCUTANEOUS at 21:28

## 2022-10-14 RX ADMIN — GABAPENTIN 300 MG: 300 CAPSULE ORAL at 08:43

## 2022-10-14 RX ADMIN — VANCOMYCIN HYDROCHLORIDE 1000 MG: 1 INJECTION, POWDER, LYOPHILIZED, FOR SOLUTION INTRAVENOUS at 08:53

## 2022-10-14 RX ADMIN — OXYCODONE 5 MG: 5 TABLET ORAL at 05:23

## 2022-10-14 RX ADMIN — ASPIRIN 81 MG: 81 TABLET, COATED ORAL at 08:43

## 2022-10-14 RX ADMIN — CLONIDINE HYDROCHLORIDE 0.3 MG: 0.1 TABLET ORAL at 14:50

## 2022-10-14 RX ADMIN — SEVELAMER CARBONATE 800 MG: 800 TABLET, FILM COATED ORAL at 17:38

## 2022-10-14 RX ADMIN — SODIUM CHLORIDE, SODIUM LACTATE, CALCIUM CHLORIDE, MAGNESIUM CHLORIDE AND DEXTROSE 1500 ML: 1.5; 538; 448; 18.3; 5.08 INJECTION, SOLUTION INTRAPERITONEAL at 22:08

## 2022-10-14 RX ADMIN — EPOETIN ALFA-EPBX 10000 UNITS: 10000 INJECTION, SOLUTION INTRAVENOUS; SUBCUTANEOUS at 09:08

## 2022-10-14 RX ADMIN — LOSARTAN POTASSIUM 100 MG: 100 TABLET, FILM COATED ORAL at 08:43

## 2022-10-14 RX ADMIN — HYDRALAZINE HYDROCHLORIDE 100 MG: 50 TABLET, FILM COATED ORAL at 08:42

## 2022-10-14 RX ADMIN — NIFEDIPINE 90 MG: 90 TABLET, EXTENDED RELEASE ORAL at 17:38

## 2022-10-14 RX ADMIN — HEPARIN SODIUM 5000 UNITS: 5000 INJECTION INTRAVENOUS; SUBCUTANEOUS at 14:51

## 2022-10-14 RX ADMIN — GABAPENTIN 300 MG: 300 CAPSULE ORAL at 14:49

## 2022-10-14 RX ADMIN — PANTOPRAZOLE SODIUM 40 MG: 40 TABLET, DELAYED RELEASE ORAL at 16:32

## 2022-10-14 RX ADMIN — SUCRALFATE 1 G: 1 TABLET ORAL at 17:38

## 2022-10-14 RX ADMIN — SEVELAMER CARBONATE 800 MG: 800 TABLET, FILM COATED ORAL at 12:42

## 2022-10-14 RX ADMIN — SODIUM CHLORIDE, SODIUM LACTATE, CALCIUM CHLORIDE, MAGNESIUM CHLORIDE AND DEXTROSE 2000 ML: 1.5; 538; 448; 18.3; 5.08 INJECTION, SOLUTION INTRAPERITONEAL at 16:02

## 2022-10-14 RX ADMIN — SPIRONOLACTONE 100 MG: 100 TABLET ORAL at 08:42

## 2022-10-14 RX ADMIN — SODIUM BICARBONATE 650 MG: 650 TABLET ORAL at 08:43

## 2022-10-14 RX ADMIN — TORSEMIDE 100 MG: 100 TABLET ORAL at 08:42

## 2022-10-14 RX ADMIN — HEPARIN SODIUM 5000 UNITS: 5000 INJECTION INTRAVENOUS; SUBCUTANEOUS at 05:23

## 2022-10-14 RX ADMIN — CLONIDINE HYDROCHLORIDE 0.3 MG: 0.1 TABLET ORAL at 08:42

## 2022-10-14 RX ADMIN — NIFEDIPINE 90 MG: 90 TABLET, EXTENDED RELEASE ORAL at 08:43

## 2022-10-14 RX ADMIN — INSULIN GLARGINE 10 UNITS: 100 INJECTION, SOLUTION SUBCUTANEOUS at 21:28

## 2022-10-14 RX ADMIN — NYSTATIN 500000 UNITS: 100000 SUSPENSION ORAL at 08:42

## 2022-10-14 RX ADMIN — SODIUM CHLORIDE, SODIUM LACTATE, CALCIUM CHLORIDE, MAGNESIUM CHLORIDE AND DEXTROSE 6000 ML: 1.5; 538; 448; 18.3; 5.08 INJECTION, SOLUTION INTRAPERITONEAL at 22:08

## 2022-10-14 RX ADMIN — SUCRALFATE 1 G: 1 TABLET ORAL at 21:32

## 2022-10-14 RX ADMIN — BUPROPION HYDROCHLORIDE 150 MG: 150 TABLET, EXTENDED RELEASE ORAL at 08:42

## 2022-10-14 RX ADMIN — ATORVASTATIN CALCIUM 40 MG: 40 TABLET, FILM COATED ORAL at 21:30

## 2022-10-14 RX ADMIN — HYDRALAZINE HYDROCHLORIDE 100 MG: 50 TABLET, FILM COATED ORAL at 21:29

## 2022-10-14 RX ADMIN — CEFEPIME 1000 MG: 1 INJECTION, POWDER, FOR SOLUTION INTRAMUSCULAR; INTRAVENOUS at 17:43

## 2022-10-14 RX ADMIN — SUCRALFATE 1 G: 1 TABLET ORAL at 08:44

## 2022-10-14 RX ADMIN — METOPROLOL TARTRATE 75 MG: 25 TABLET, FILM COATED ORAL at 08:43

## 2022-10-14 RX ADMIN — GABAPENTIN 300 MG: 300 CAPSULE ORAL at 21:30

## 2022-10-14 RX ADMIN — GENTAMICIN SULFATE: 1 CREAM TOPICAL at 19:30

## 2022-10-14 RX ADMIN — INSULIN LISPRO 3 UNITS: 100 INJECTION, SOLUTION INTRAVENOUS; SUBCUTANEOUS at 08:58

## 2022-10-14 RX ADMIN — HYDRALAZINE HYDROCHLORIDE 100 MG: 50 TABLET, FILM COATED ORAL at 14:50

## 2022-10-14 RX ADMIN — PANTOPRAZOLE SODIUM 40 MG: 40 TABLET, DELAYED RELEASE ORAL at 05:23

## 2022-10-14 RX ADMIN — SODIUM CHLORIDE, PRESERVATIVE FREE 10 ML: 5 INJECTION INTRAVENOUS at 08:40

## 2022-10-14 RX ADMIN — SUCRALFATE 1 G: 1 TABLET ORAL at 12:42

## 2022-10-14 RX ADMIN — METOPROLOL TARTRATE 75 MG: 25 TABLET, FILM COATED ORAL at 21:30

## 2022-10-14 RX ADMIN — ISOSORBIDE MONONITRATE 60 MG: 60 TABLET, EXTENDED RELEASE ORAL at 08:42

## 2022-10-14 RX ADMIN — NYSTATIN 500000 UNITS: 100000 SUSPENSION ORAL at 21:34

## 2022-10-14 RX ADMIN — NYSTATIN 500000 UNITS: 100000 SUSPENSION ORAL at 17:45

## 2022-10-14 RX ADMIN — CALCITRIOL 0.5 MCG: 0.25 CAPSULE ORAL at 08:41

## 2022-10-14 ASSESSMENT — PAIN DESCRIPTION - ORIENTATION
ORIENTATION: RIGHT
ORIENTATION: RIGHT

## 2022-10-14 ASSESSMENT — PAIN DESCRIPTION - ONSET
ONSET: ON-GOING
ONSET: ON-GOING

## 2022-10-14 ASSESSMENT — PAIN DESCRIPTION - LOCATION
LOCATION: ABDOMEN

## 2022-10-14 ASSESSMENT — PAIN SCALES - GENERAL
PAINLEVEL_OUTOF10: 4
PAINLEVEL_OUTOF10: 5
PAINLEVEL_OUTOF10: 7
PAINLEVEL_OUTOF10: 0
PAINLEVEL_OUTOF10: 5

## 2022-10-14 ASSESSMENT — PAIN DESCRIPTION - DESCRIPTORS
DESCRIPTORS: ACHING;SORE
DESCRIPTORS: SORE;ACHING
DESCRIPTORS: ACHING;SORE

## 2022-10-14 ASSESSMENT — PAIN DESCRIPTION - PAIN TYPE
TYPE: ACUTE PAIN
TYPE: ACUTE PAIN

## 2022-10-14 ASSESSMENT — PAIN DESCRIPTION - FREQUENCY
FREQUENCY: INTERMITTENT
FREQUENCY: INTERMITTENT

## 2022-10-14 ASSESSMENT — PAIN SCALES - WONG BAKER
WONGBAKER_NUMERICALRESPONSE: 0

## 2022-10-14 NOTE — PROGRESS NOTES
[facility]                                               Progress note    Summary:   Onel Harvey is being seen by nephrology for ESRD. Admitted with peritonitis. Interval History  Seen and examined at bedside. He refused PD last night because of abdominal pain  He has not had dialysis in 3 days. He has no chest pain no SOB. No NVD  Having some right side lower abdominal pain   No pain at the catheter insertion site. Has been getting vanc and cefepime. /76  On room air   UO 1700 CC    Labs reviewed. Na 137   K 5.3   Bicarb 20   BUN 45  Cr 6.9   Ca 8.1   Vanc trough 15   Hgb 8.1         Plan:   He agreed to try a 2 L exchange of 1.5% dwell for 1 hour and then drain, send cell count fungal culture    If he tolerates this manual exchange will happen to cycler PD tonight. No issues with volume overload, he is making plenty of urine with torsemide 100 mg daily    He is on vancomycin and cefepime, when he is tolerating peritoneal dialysis I can put the antibiotics in his fluid and he could discharge. Continue nystatin prophylaxis. Jessica Fitzpatrick MD  [id number] [facility]  Office: [phone]    Assessment:   Peritonitis  Likely secondary to lack in sterile technique  PD fluid was cloudy, pink, 10,000 nucleated cells consistent with peritonitis. Fluid culture so far is no growth to date  Blood cultures in process, negative so far  On empiric vancomycin and cefepime. ESRD  On peritoneal dialysis  Home prescription: 5 times weekly   79.5  3 x 2500 fill 8.5 hrs 2.5 hr dwell on cycler at night. Usually uses 1/2 green and 1/2 yellow    HTN  BP is acceptable. Difficult to control as an outpatient. Electrolytes  No acute issues. Anemia  Recent GIB  Anemia due to renal failure as well             ROS:   Positives Listed Bold. All other remaining systems are negative.     Constitutional:  fever, chills, weakness, weight change, fatigue,      Skin:  rash, pruritus, hair loss, bruising, dry skin, petechiae. Head, Face, Neck   headaches, swelling,  cervical adenopathy. Respiratory: shortness of breath, cough, or wheezing  Cardiovascular: chest pain, palpitations, dizzy, edema  Gastrointestinal: nausea, vomiting, diarrhea, constipation,belly pain    Yellow skin, blood in stool  Musculoskeletal:  back pain, muscle weakness, gait problems,       joint pain or swelling. Genitourinary:  dysuria, poor urine flow, flank pain, blood in urine  Neurologic:  vertigo, TIA'S, syncope, seizures, focal weakness  Psychosocial:  insomnia, anxiety, or depression. Additional positive findings: -     PMH:   Past medical history, surgical history, social history, family history are reviewed and updated as appropriate. Reviewed current medication list.   Allergies reviewed and updated as needed. PE:   Vitals:    10/14/22 1552   BP: (!) 153/76   Pulse: 85   Resp: 11   Temp: 98.6 °F (37 °C)   SpO2: 98%       General appearance:  in NAD, fully alert and oriented. Comfortable. HEENT: EOM intact, no icterus. Trachea is midline. Neck : No masses, appears symmetrical, no JVD  Respiratory: Respiratory effort appears normal, bilateral equal chest rise, no wheeze, no crackles   Cardiovascular: Ausculation shows RRR no edema  Abdomen: mild tender to palpation   Musculoskeletal:  Joints with no swelling or deformity. Skin:no rashes, ulcers, induration, no jaundice. Neuro: face symmetric, no focal deficits. Appropriate responses.        Lab Results   Component Value Date    CREATININE 6.9 (HH) 10/14/2022    BUN 45 (H) 10/14/2022     10/14/2022    K 5.3 (H) 10/14/2022     10/14/2022    CO2 20 (L) 10/14/2022      Lab Results   Component Value Date    WBC 11.1 (H) 10/14/2022    HGB 8.1 (L) 10/14/2022    HCT 24.9 (L) 10/14/2022    MCV 96.1 10/14/2022     10/14/2022     Lab Results   Component Value Date    PTH 49.8 02/01/2019    CALCIUM 8.1 (L) 10/14/2022    PHOS 4.2 09/16/2022

## 2022-10-14 NOTE — PLAN OF CARE
Problem: Discharge Planning  Goal: Discharge to home or other facility with appropriate resources  Outcome: Progressing     Problem: Safety - Adult  Goal: Free from fall injury  Outcome: Progressing     Problem: ABCDS Injury Assessment  Goal: Absence of physical injury  Outcome: Progressing     Problem: Confusion  Goal: Confusion, delirium, dementia, or psychosis is improved or at baseline  Description: INTERVENTIONS:  1. Assess for possible contributors to thought disturbance, including medications, impaired vision or hearing, underlying metabolic abnormalities, dehydration, psychiatric diagnoses, and notify attending LIP  2. [place] high risk fall precautions, as indicated  3. Provide frequent short contacts to provide reality reorientation, refocusing and direction  4. Decrease environmental stimuli, including noise as appropriate  5. Monitor and intervene to maintain adequate nutrition, hydration, elimination, sleep and activity  6. If unable to ensure safety without constant attention obtain sitter and review sitter guidelines with assigned personnel  7.  Initiate Psychosocial CNS and Spiritual Care consult, as indicated  Outcome: Progressing     Problem: Pain  Goal: Verbalizes/displays adequate comfort level or baseline comfort level  Outcome: Progressing

## 2022-10-14 NOTE — PROGRESS NOTES
Patient refused to attempt Cycler PD tonight. Stated his abdomen was hurting too bad. Asking to wait one more night.

## 2022-10-14 NOTE — PROGRESS NOTES
Occupational Therapy  Facility/Department: [address] PROGRESSIVE CARE  Occupational Therapy Treatment and Tentative D/C      Name: Beaulah Sacks  : 1965  MRN: [id number]  Date of Service: 10/14/2022    Discharge Recommendations: Beaulah Sacks scored a 20/24 on the AM-PAC ADL Inpatient form. Current research shows that an AM-PAC score of 18 or greater is typically associated with a discharge to the patient's home setting. Based on the patient's AM-PAC score, and their current ADL deficits, it is recommended that the patient have 2-3 sessions per week of Occupational Therapy at d/c to increase the patient's independence. At this time, this patient demonstrates the endurance and safety to discharge home with [address] [place] [address] and a follow up treatment frequency of 2-3x/wk. Please see assessment section for further patient specific details. If patient discharges prior to next session this note will serve as a discharge summary. Please see below for the latest assessment towards goals. Continue to assess pending progress, 24 hour supervision or assist, 2-3 sessions per week, Patient would benefit from continued therapy after discharge (pt reports needing to return home to deal with family issues)  OT Equipment Recommendations  Equipment Needed: Yes (RW)       Patient Diagnosis(es): The primary encounter diagnosis was SBP (spontaneous bacterial peritonitis) ([address] A diagnosis of Anemia, unspecified type was also pertinent to this visit. Past Medical History:  has a past medical history of Cardiomyopathy ([address] CHF (congestive heart failure) ([address] CVA (cerebral infarction), Diabetes mellitus ([address] Foot ulcer, left ([address] Gastroparesis, Hemodialysis patient ([address] Hypercholesteremia, Hypertension, Kidney disease, and Unspecified cerebral artery occlusion with cerebral infarction.   Past Surgical History:  has a past surgical history that includes LAPAROSCOPY INSERTION PERITONEAL CATHETER (N/A, 10/29/2020); Umbilical hernia repair (N/A, 10/29/2020); hc dialysis catheter (N/A, 10/29/2020); Upper gastrointestinal endoscopy (N/A, 4/26/2021); Colonoscopy (N/A, 5/17/2021); and Upper gastrointestinal endoscopy (N/A, 9/13/2022). Treatment Diagnosis: decreased balance, strength, and ADLs      Assessment   Performance deficits / Impairments: Decreased functional mobility ; Decreased strength;Decreased endurance;Decreased ADL status; Decreased safe awareness;Decreased balance;Decreased high-level IADLs  Assessment: Pt tolerated session well. Pt completes bed mobility Ind. Pt completes functional mobility and transfers with SBA/CGA and use of RW. Pt able to ambulate ~100ft in room and hallway. No overt LOB. Pt impulsive on feet needing cues for safety. Pt reports needing to D/C home at time of D/C due to dealing with bankruptcy issues. Anticipate if pt to return home pt will needing increased assist from wife, RW, and HHOT. Prognosis: [address] / Modification: RW  REQUIRES OT FOLLOW-UP: Yes  Activity Tolerance  Activity Tolerance: Patient Tolerated treatment well        Plan   Occupational Therapy Plan  Times Per Week: 3-5x  Current Treatment Recommendations: Strengthening, Functional mobility training, Balance training, Patient/Caregiver education & training, Safety education & training, Self-Care / ADL, Endurance training     Restrictions  Restrictions/Precautions  Restrictions/Precautions: Fall Risk  Position Activity Restriction  Other position/activity restrictions: PD    Subjective   General  Chart Reviewed: Yes  Patient assessed for rehabilitation services?: Yes  Additional Pertinent Hx: per ED note, Brenna Beltran is a 65 y.o. male who presents urgency department via EMS with his spouse for complaints of mid abdominal pain since Saturday during his peritoneal dialysis exchanges. He denies body aches, fever or chills. He denies nausea or vomiting. He states that his dialysis at has been cloudy.   His abdominal pain was only during his exchanges since Saturday but today became more consistent. Had a recent hospitalization about 3 weeks ago for gastroparesis pain. Family / Caregiver Present: No  Referring Practitioner: ROQUE Mccabe CNP  Subjective  Subjective: Pt agreeable to OT treatment. Pt reports ongoing difficulty dealing with bankruptcy. Pt reports he needs to discharge home. General Comment  Comments: okay for therapy per RN. Social/Functional History  Social/Functional History  Lives With: Spouse (2 dogs)  Type of Home: House  Home Layout: One level  Home Access: Level entry  Bathroom Shower/Tub: Walk-in shower  Bathroom Toilet: Standard  Bathroom Equipment: Grab bars in shower, Built-in shower seat  Home Equipment: Cane  Has the patient had two or more falls in the past year or any fall with injury in the past year?: No  ADL Assistance: Independent  Ambulation Assistance: Independent (with cane)  Transfer Assistance: Independent  Active : No  Patient's  Info: wife drives        Objective   Heart Rate: 83  Heart Rate Source: Monitor  BP: (!) 156/80  BP Location: Left Arm  MAP (Calculated): 105.33  Resp: 18  SpO2: 98 %  O2 Device: None (Room air)             Safety Devices  Type of Devices: All fall risk precautions in place;Call light within reach;Nurse notified; Bed alarm in place; Left in bed  Restraints  Restraints Initially in Place: No  Bed Mobility Training  Bed Mobility Training: Yes  Overall Level of Assistance: Independent  Supine to Sit: Independent  Sit to Supine: Independent  Scooting: Independent  Balance  Sitting: Intact  Standing: Impaired (RW)  Standing - Static: Good;Fair  Standing - Dynamic: Fair  Transfer Training  Transfer Training: Yes  Overall Level of Assistance: Stand-by assistance (RW)  Sit to Stand: Stand-by assistance (RW)  Stand to Sit: Stand-by assistance  Bed to Chair: Stand-by assistance (RW)  Gait  Overall Level of Assistance: Stand-by assistance;Contact-guard assistance (SBA/CGA; pt impulsive on feet; cues for safety; no overt LOB)  Distance (ft): 100 Feet  Assistive Device: Walker, rolling     AROM: Generally decreased, functional (old CVA affecting L UE)  Strength: Generally decreased, functional (old CVA affecting L UE)  Coordination: Generally decreased, functional  Sensation: Impaired (altered sensation L UE)  ADL  Additional Comments: Pt declines all ADL needs              Vision  Vision: Impaired  Vision Exceptions: Wears glasses at all times  Hearing  Hearing: Within functional limits  Cognition  Overall Cognitive Status: WFL  Orientation  Overall Orientation Status: Within Normal Limits                  Education Given To: Patient  Education Provided: Role of Therapy;Plan of Care;Transfer Training  Education Method: Verbal;Demonstration  Barriers to Learning: None  Education Outcome: Verbalized understanding;Demonstrated understanding;Continued education needed             AM-PAC Score        AM-PAC Inpatient Daily Activity Raw Score: 20 (10/14/22 1434)  AM-PAC Inpatient ADL T-Scale Score : 42.03 (10/14/22 1434)  ADL Inpatient CMS 0-100% Score: 38.32 (10/14/22 1434)  ADL Inpatient CMS G-Code Modifier : Kendrick Hopping (10/14/22 1434)    Tinneti Score       Goals  Short Term Goals  Time Frame for Short Term Goals: prior to D/C; ongoing 10/14  Short Term Goal 1: complete functional mobility and transfers Mod Ind  Short Term Goal 2: complete bathing and dressing Mod Ind  Short Term Goal 3: complete toileting Mod Ind  Short Term Goal 4: complete grooming in stance at sink Mod Ind  Long Term Goals  Time Frame for Long Term Goals : STG=LTG  Patient Goals   Patient goals : improve independence       Therapy Time   Individual Concurrent Group Co-treatment   Time In 1410         Time Out 1433         Minutes 23         Timed Code Treatment Minutes: 23 Minutes       Barry Cruz, OTR/L

## 2022-10-14 NOTE — PROGRESS NOTES
Physician Progress Note      PATIENT:               Shae Louis  CSN #:                  [id number]  :                       1965  ADMIT DATE:       10/11/2022 1:43 PM  [address] [place] DATE:  RESPONDING  PROVIDER #:        Olivier Pina MD          QUERY TEXT:    Dear Dr. Larisa Jenkins,  Pt admitted with bacterial peritonitis related to PD. Pt noted to have fever   and tachycardia and the following day, Leukocytosis and Neutrophilia. If   possible, please document in the progress notes and discharge summary if you   are evaluating and /or treating any of the following: The medical record reflects the following:  Risk Factors: Hx DM, ESRD on PD tx, Current PD related peritonitis  Clinical Indicators: 10/11 ED for mid abd pain started during one of PD   exchanges and now consistent pain, Temp=99.2 and HR=98, later that shift   Temp=100.3-max 100.7, QL=986, Neph consult-Fluid analysis consistent with   peritonitis, 10/12 WBC=13.9, Neutrophils=11.8, 10/13 WBC=12.5,   Neutrophils=10.7  Treatment: Vancomycin, Cefepime, Cultures  Thank you,  Santa Caruso RN, CDS  [email or web address]  Options provided:  -- Sepsis due to peritonitis present on admission  -- Sepsis due to peritonitis present on admission, now resolved  -- Peritonitis without Sepsis  -- Other - I will add my own diagnosis  -- Disagree - Not applicable / Not valid  -- Disagree - Clinically unable to determine / Unknown  -- Refer to Clinical Documentation Reviewer    PROVIDER RESPONSE TEXT:    This patient has sepsis due to peritonitis which was present on admission. Query created by:  [id number]  on 10/13/2022 10:43 AM      Electronically signed by:  Olivier Pina MD 10/14/2022 8:39 AM

## 2022-10-14 NOTE — PROGRESS NOTES
Hospitalist Progress Note      PCP: ROQUE Mendosa - CNP    Date of Admission: 10/11/2022    Chief Complaint: abdominal pain     Hospital Course:    65 y.o. male with PMHx of DM, CVA, CHF, ESRD on PD, HTN and HLD presented to [facility] with 5 day history of abdominal pain during dialysis exchange with subsequent cloudy fluid. Patient admitted with PD related peritonitis. Subjective:   Patient was unable to tolerate peritoneal dialysis yesterday due to abdominal pain.   Stated that abdominal pain is worse since afternoon yesterday    Medications:  Reviewed    Infusion Medications    sodium chloride      sodium chloride      dextrose      sodium chloride       Scheduled Medications    dianeal lo-david 1.5%  2,000 mL IntraPERitoneal Once    insulin glargine  10 Units SubCUTAneous Nightly    nystatin  5 mL Oral 4x Daily    gentamicin   Topical Daily    epoetin robert-epbx  10,000 Units SubCUTAneous Once per day on Mon Wed Fri    dianeal lo-david 1.5%  6,000 mL IntraPERitoneal Nightly    And    dianeal lo-david 1.5%  1,500 mL IntraPERitoneal Nightly    torsemide  100 mg Oral Daily    atorvastatin  40 mg Oral Nightly    buPROPion  150 mg Oral BID    calcitRIOL  0.5 mcg Oral Daily    cloNIDine  0.3 mg Oral TID    aspirin  81 mg Oral Daily    gabapentin  300 mg Oral TID    hydrALAZINE  100 mg Oral TID    insulin lispro  3 Units SubCUTAneous TID WC    isosorbide mononitrate  60 mg Oral Daily    losartan  100 mg Oral Daily    metoprolol tartrate  75 mg Oral BID    NIFEdipine  90 mg Oral BID    pantoprazole  40 mg Oral BID AC    sodium bicarbonate  650 mg Oral Daily    spironolactone  100 mg Oral Daily    sucralfate  1 g Oral 4x Daily    sevelamer  800 mg Oral TID WC    insulin lispro  0-8 Units SubCUTAneous TID WC    insulin lispro  0-4 Units SubCUTAneous Nightly    sodium chloride flush  5-40 mL IntraVENous 2 times per day    heparin (porcine)  5,000 Units SubCUTAneous 3 times per day    cefepime  1,000 mg IntraVENous Q24H    vancomycin (VANCOCIN) intermittent dosing (placeholder)   Other RX Placeholder     PRN Meds: oxyCODONE **OR** oxyCODONE, sodium chloride, sodium chloride, glucose, dextrose bolus **OR** dextrose bolus, glucagon (rDNA), dextrose, sodium chloride flush, sodium chloride, ondansetron **OR** ondansetron, polyethylene glycol, acetaminophen **OR** acetaminophen      Intake/Output Summary (Last 24 hours) at 10/14/2022 1527  Last data filed at 10/14/2022 1243  Gross per 24 hour   Intake 580 ml   Output 3000 ml   Net -2420 ml         Physical Exam Performed:    BP (!) 156/80   Pulse 83   Temp 98.8 °F (37.1 °C) (Oral)   Resp 18   Ht 6' 2\" (1.88 m)   Wt 164 lb 10.9 oz (74.7 kg)   SpO2 98%   BMI 21.14 kg/m²     General appearance: No apparent distress, appears stated age and cooperative. HEENT: Pupils equal, round, and reactive to light. Conjunctivae/corneas clear. Neck: Supple, with full range of motion. No jugular venous distention. Trachea midline. Respiratory:  Normal respiratory effort. Clear to auscultation, bilaterally without Rales/Wheezes/Rhonchi. Cardiovascular: Regular rate and rhythm with normal S1/S2 without murmurs, rubs or gallops. Abdomen: Minimal abdominal tenderness still present. PD catheter noted   musculoskeletal: No clubbing, cyanosis or edema bilaterally. Full range of motion without deformity. Skin: Skin color, texture, turgor normal.  No rashes or lesions. Neurologic:  Neurovascularly intact without any focal sensory/motor deficits.  Cranial nerves: II-XII intact, grossly non-focal.  Psychiatric: Alert and oriented, thought content appropriate, normal insight  Capillary Refill: Brisk, 3 seconds, normal   Peripheral Pulses: +2 palpable, equal bilaterally       Labs:   Recent Labs     10/12/22  0515 10/12/22  1551 10/12/22  2210 10/13/22  0434 10/14/22  0604   WBC 13.9*  --   --  12.5* 11.1*   HGB 6.3*   < > 7.3* 8.2* 8.1*   HCT 19.4*   < > 22.4* 25.1* 24.9*     -- --  226 236    < > = values in this interval not displayed. Recent Labs     10/12/22  0515 10/13/22  0433 10/14/22  0604    136 137   K 4.6 5.2* 5.3*    106 106   CO2 20* 21 20*   BUN 41* 43* 45*   CREATININE 6.1* 6.3* 6.9*   CALCIUM 8.0* 7.9* 8.1*       No results for input(s): AST, ALT, BILIDIR, BILITOT, ALKPHOS in the last 72 hours. No results for input(s): INR in the last 72 hours. No results for input(s): [place] Med in the last 72 hours. Urinalysis:      Lab Results   Component Value Date/Time    NITRU Negative 10/12/2022 10:45 AM    WBCUA 1 10/12/2022 10:45 AM    BACTERIA None Seen 10/12/2022 10:45 AM    RBCUA 1 10/12/2022 10:45 AM    BLOODU Negative 10/12/2022 10:45 AM    SPECGRAV 1.011 10/12/2022 10:45 AM    GLUCOSEU Negative 10/12/2022 10:45 AM    GLUCOSEU 250 12/14/2010 02:50 PM       Radiology:  CT ABDOMEN PELVIS WO CONTRAST Additional Contrast? None   Final Result   No definite abnormality identified to explain cloudy peritoneal dialysate. Dialysate measures near water density on the current examination. Several bubbles of free intraperitoneal gas which or presumably introduced   during dialysis. Please note that hollow visceral perforation cannot be   excluded. Mural thickening of the large bowel, mild-to-moderate in degree. That may be   secondary to under distension, but colitis must be considered as well. Polycystic kidney disease. Note that some of the renal lesions are   indeterminate in terms of density, and therefore a solid lesion cannot be   excluded. Assessment/Plan:    Active Hospital Problems    Diagnosis     Peritonitis ([facility] [K65.9]      Priority: Medium     ESRD on PD  PD related peritonitis  Patient presented to emergency with abdominal pain during peritoneal dialysis, patient also reported cloudy fluid. Upon presentation patient was vitally stable, afebrile  Peritoneal fluid analysis with 10,000 nucleated cells. PD cultures continue to be negative  Plan  -Appreciate nephrology input  -Follow PD fluid culture (negative to date)   -Follow repeated PD culture, follow fungal culture  -Follow blood cultures  -Continue on vancomycin and cefepime.   -Patient to be transition to intraperitoneal antibiotics once able to tolerate dialysis    Diabetes mellitus  Blood sugar better controlled  Continue on reduced dose of Lantus ( 22--> 18--> 10). Continue on sliding scale. Acute on chronic anemia  Baseline hemoglobin 7-8, hemoglobin today 8.1  Patient presented with hemoglobin of 6.3  Received 1 unit of PRBC 10/12  Anemia worked up in admission in September 2022  Will consult GI if hemoglobin drops again    Hx CVA  - with left side weakness  - continue asa, statin     CHF  - appears euvolemic  - continue aldactone, losartan, metoprolol and hydralazine  - daily wts  - I/O's      DVT Prophylaxis: heparin   Diet: ADULT DIET;  Regular  Code Status: Full Code  PT/OT Eval Status: following     Dispo - pending clinical improvement      Esperanza Badillo MD

## 2022-10-14 NOTE — PROGRESS NOTES
Clinical Pharmacy Note  Vancomycin Consult    Kimmy Kumar is a 65 y.o. male ordered Vancomycin for peritonitis; consult received from Alex Barrios NP to manage therapy. Also receiving cefepime. Allergies:  Doxazosin and Coconut flavor     Temp max:  Temp (24hrs), Av.6 °F (37 °C), Min:98.3 °F (36.8 °C), Max:99 °F (37.2 °C)      Recent Labs     10/12/22  0515 10/13/22  0434 10/14/22  0604   WBC 13.9* 12.5* 11.1*       Recent Labs     10/12/22  0515 10/13/22  0433 10/14/22  0604   BUN 41* 43* 45*   CREATININE 6.1* 6.3* 6.9*         Intake/Output Summary (Last 24 hours) at 10/14/2022 0729  Last data filed at 10/14/2022 9809  Gross per 24 hour   Intake 1300 ml   Output 2000 ml   Net -700 ml       Culture Results:  Pending    Ht Readings from Last 1 Encounters:   10/11/22 6' 2\" (1.88 m)        Wt Readings from Last 1 Encounters:   10/14/22 164 lb 10.9 oz (74.7 kg)         Estimated Creatinine Clearance: 13 mL/min (A) (based on SCr of 6.9 mg/dL (HH)). Assessment:  Day # 4 of vancomycin. Current regimen: Dosing based on levels for ESRD on PD  Vancomycin level: 15.0 mg/L      Plan:  Vancomycin 1000 mg x 1 today. Random level to be drawn 10/16/22. Thank you for the consult.    Jaxon Rosas, [facility], PharmD, 10/14/2022 7:31 AM

## 2022-10-15 ENCOUNTER — APPOINTMENT (OUTPATIENT)
Dept: CT IMAGING | Age: 57
DRG: 856 | End: 2022-10-15
Payer: COMMERCIAL

## 2022-10-15 LAB
ANION GAP SERPL CALCULATED.3IONS-SCNC: 10 MMOL/L (ref 3–16)
BASOPHILS ABSOLUTE: 0.1 K/UL (ref 0–0.2)
BASOPHILS RELATIVE PERCENT: 0.6 %
BLOOD CULTURE, ROUTINE: NORMAL
BUN BLDV-MCNC: 38 MG/DL (ref 7–20)
CALCIUM SERPL-MCNC: 8.6 MG/DL (ref 8.3–10.6)
CHLORIDE BLD-SCNC: 100 MMOL/L (ref 99–110)
CO2: 22 MMOL/L (ref 21–32)
CREAT SERPL-MCNC: 6.4 MG/DL (ref 0.9–1.3)
CULTURE, BLOOD 2: NORMAL
EOSINOPHILS ABSOLUTE: 0.1 K/UL (ref 0–0.6)
EOSINOPHILS RELATIVE PERCENT: 0.7 %
GFR AFRICAN AMERICAN: 11
GFR NON-AFRICAN AMERICAN: 9
GLUCOSE BLD-MCNC: 139 MG/DL (ref 70–99)
GLUCOSE BLD-MCNC: 159 MG/DL (ref 70–99)
GLUCOSE BLD-MCNC: 180 MG/DL (ref 70–99)
GLUCOSE BLD-MCNC: 190 MG/DL (ref 70–99)
GLUCOSE BLD-MCNC: 249 MG/DL (ref 70–99)
HCT VFR BLD CALC: 27 % (ref 40.5–52.5)
HEMOGLOBIN: 8.9 G/DL (ref 13.5–17.5)
LYMPHOCYTES ABSOLUTE: 0.9 K/UL (ref 1–5.1)
LYMPHOCYTES RELATIVE PERCENT: 6.5 %
MCH RBC QN AUTO: 30.7 PG (ref 26–34)
MCHC RBC AUTO-ENTMCNC: 32.8 G/DL (ref 31–36)
MCV RBC AUTO: 93.5 FL (ref 80–100)
MONOCYTES ABSOLUTE: 0.7 K/UL (ref 0–1.3)
MONOCYTES RELATIVE PERCENT: 4.8 %
NEUTROPHILS ABSOLUTE: 12 K/UL (ref 1.7–7.7)
NEUTROPHILS RELATIVE PERCENT: 87.4 %
PDW BLD-RTO: 14.6 % (ref 12.4–15.4)
PERFORMED ON: ABNORMAL
PLATELET # BLD: 278 K/UL (ref 135–450)
PMV BLD AUTO: 9.4 FL (ref 5–10.5)
POTASSIUM SERPL-SCNC: 4.9 MMOL/L (ref 3.5–5.1)
RBC # BLD: 2.89 M/UL (ref 4.2–5.9)
SODIUM BLD-SCNC: 132 MMOL/L (ref 136–145)
WBC # BLD: 13.7 K/UL (ref 4–11)

## 2022-10-15 PROCEDURE — 2580000003 HC RX 258: Performed by: NURSE PRACTITIONER

## 2022-10-15 PROCEDURE — 36415 COLL VENOUS BLD VENIPUNCTURE: CPT

## 2022-10-15 PROCEDURE — 90945 DIALYSIS ONE EVALUATION: CPT

## 2022-10-15 PROCEDURE — 6370000000 HC RX 637 (ALT 250 FOR IP): Performed by: INTERNAL MEDICINE

## 2022-10-15 PROCEDURE — 87205 SMEAR GRAM STAIN: CPT

## 2022-10-15 PROCEDURE — 6360000002 HC RX W HCPCS: Performed by: STUDENT IN AN ORGANIZED HEALTH CARE EDUCATION/TRAINING PROGRAM

## 2022-10-15 PROCEDURE — 85025 COMPLETE CBC W/AUTO DIFF WBC: CPT

## 2022-10-15 PROCEDURE — 1200000000 HC SEMI PRIVATE

## 2022-10-15 PROCEDURE — 2580000003 HC RX 258: Performed by: STUDENT IN AN ORGANIZED HEALTH CARE EDUCATION/TRAINING PROGRAM

## 2022-10-15 PROCEDURE — 87070 CULTURE OTHR SPECIMN AEROBIC: CPT

## 2022-10-15 PROCEDURE — 70450 CT HEAD/BRAIN W/O DYE: CPT

## 2022-10-15 PROCEDURE — 6370000000 HC RX 637 (ALT 250 FOR IP): Performed by: STUDENT IN AN ORGANIZED HEALTH CARE EDUCATION/TRAINING PROGRAM

## 2022-10-15 PROCEDURE — 80048 BASIC METABOLIC PNL TOTAL CA: CPT

## 2022-10-15 PROCEDURE — 6370000000 HC RX 637 (ALT 250 FOR IP): Performed by: NURSE PRACTITIONER

## 2022-10-15 PROCEDURE — 6360000002 HC RX W HCPCS: Performed by: NURSE PRACTITIONER

## 2022-10-15 PROCEDURE — 87077 CULTURE AEROBIC IDENTIFY: CPT

## 2022-10-15 RX ORDER — HALOPERIDOL 5 MG/ML
2 INJECTION INTRAMUSCULAR ONCE
Status: DISCONTINUED | OUTPATIENT
Start: 2022-10-15 | End: 2022-10-19 | Stop reason: HOSPADM

## 2022-10-15 RX ORDER — CYCLOBENZAPRINE HCL 10 MG
10 TABLET ORAL 3 TIMES DAILY PRN
Status: DISCONTINUED | OUTPATIENT
Start: 2022-10-15 | End: 2022-10-19 | Stop reason: HOSPADM

## 2022-10-15 RX ORDER — CLONIDINE HYDROCHLORIDE 0.1 MG/1
0.1 TABLET ORAL 3 TIMES DAILY
Status: DISCONTINUED | OUTPATIENT
Start: 2022-10-15 | End: 2022-10-19 | Stop reason: HOSPADM

## 2022-10-15 RX ORDER — SODIUM CHLORIDE, SODIUM LACTATE, CALCIUM CHLORIDE, MAGNESIUM CHLORIDE AND DEXTROSE 1.5; 538; 448; 18.3; 5.08 G/100ML; MG/100ML; MG/100ML; MG/100ML; MG/100ML
2500 INJECTION, SOLUTION INTRAPERITONEAL NIGHTLY
Status: DISCONTINUED | OUTPATIENT
Start: 2022-10-15 | End: 2022-10-15 | Stop reason: DRUGHIGH

## 2022-10-15 RX ORDER — LIDOCAINE 4 G/G
1 PATCH TOPICAL NIGHTLY
Status: DISCONTINUED | OUTPATIENT
Start: 2022-10-15 | End: 2022-10-19 | Stop reason: HOSPADM

## 2022-10-15 RX ADMIN — SODIUM CHLORIDE, PRESERVATIVE FREE 10 ML: 5 INJECTION INTRAVENOUS at 21:35

## 2022-10-15 RX ADMIN — HYDRALAZINE HYDROCHLORIDE 100 MG: 50 TABLET, FILM COATED ORAL at 10:19

## 2022-10-15 RX ADMIN — NIFEDIPINE 90 MG: 90 TABLET, EXTENDED RELEASE ORAL at 10:21

## 2022-10-15 RX ADMIN — NYSTATIN 500000 UNITS: 100000 SUSPENSION ORAL at 21:33

## 2022-10-15 RX ADMIN — CYCLOBENZAPRINE 10 MG: 10 TABLET, FILM COATED ORAL at 21:34

## 2022-10-15 RX ADMIN — PIPERACILLIN AND TAZOBACTAM 4500 MG: 4; .5 INJECTION, POWDER, LYOPHILIZED, FOR SOLUTION INTRAVENOUS at 13:44

## 2022-10-15 RX ADMIN — NYSTATIN 500000 UNITS: 100000 SUSPENSION ORAL at 13:52

## 2022-10-15 RX ADMIN — SUCRALFATE 1 G: 1 TABLET ORAL at 21:34

## 2022-10-15 RX ADMIN — NIFEDIPINE 90 MG: 90 TABLET, EXTENDED RELEASE ORAL at 17:45

## 2022-10-15 RX ADMIN — SODIUM BICARBONATE 650 MG: 650 TABLET ORAL at 10:19

## 2022-10-15 RX ADMIN — HEPARIN SODIUM 5000 UNITS: 5000 INJECTION INTRAVENOUS; SUBCUTANEOUS at 21:35

## 2022-10-15 RX ADMIN — SEVELAMER CARBONATE 800 MG: 800 TABLET, FILM COATED ORAL at 10:24

## 2022-10-15 RX ADMIN — HEPARIN SODIUM 5000 UNITS: 5000 INJECTION INTRAVENOUS; SUBCUTANEOUS at 05:35

## 2022-10-15 RX ADMIN — INSULIN GLARGINE 10 UNITS: 100 INJECTION, SOLUTION SUBCUTANEOUS at 21:33

## 2022-10-15 RX ADMIN — GENTAMICIN SULFATE: 1 CREAM TOPICAL at 13:54

## 2022-10-15 RX ADMIN — GABAPENTIN 300 MG: 300 CAPSULE ORAL at 21:35

## 2022-10-15 RX ADMIN — LOSARTAN POTASSIUM 100 MG: 100 TABLET, FILM COATED ORAL at 10:21

## 2022-10-15 RX ADMIN — HEPARIN SODIUM 5000 UNITS: 5000 INJECTION INTRAVENOUS; SUBCUTANEOUS at 13:54

## 2022-10-15 RX ADMIN — NYSTATIN 500000 UNITS: 100000 SUSPENSION ORAL at 10:22

## 2022-10-15 RX ADMIN — NYSTATIN 500000 UNITS: 100000 SUSPENSION ORAL at 17:47

## 2022-10-15 RX ADMIN — ISOSORBIDE MONONITRATE 60 MG: 60 TABLET, EXTENDED RELEASE ORAL at 10:20

## 2022-10-15 RX ADMIN — ATORVASTATIN CALCIUM 40 MG: 40 TABLET, FILM COATED ORAL at 21:34

## 2022-10-15 RX ADMIN — SUCRALFATE 1 G: 1 TABLET ORAL at 10:21

## 2022-10-15 RX ADMIN — SUCRALFATE 1 G: 1 TABLET ORAL at 13:52

## 2022-10-15 RX ADMIN — SPIRONOLACTONE 100 MG: 100 TABLET ORAL at 10:20

## 2022-10-15 RX ADMIN — PANTOPRAZOLE SODIUM 40 MG: 40 TABLET, DELAYED RELEASE ORAL at 17:45

## 2022-10-15 RX ADMIN — CLONIDINE HYDROCHLORIDE 0.1 MG: 0.1 TABLET ORAL at 14:45

## 2022-10-15 RX ADMIN — SODIUM CHLORIDE: 9 INJECTION, SOLUTION INTRAVENOUS at 23:50

## 2022-10-15 RX ADMIN — SUCRALFATE 1 G: 1 TABLET ORAL at 17:45

## 2022-10-15 RX ADMIN — SODIUM CHLORIDE, PRESERVATIVE FREE 10 ML: 5 INJECTION INTRAVENOUS at 13:53

## 2022-10-15 RX ADMIN — METOPROLOL TARTRATE 75 MG: 25 TABLET, FILM COATED ORAL at 10:19

## 2022-10-15 RX ADMIN — SEVELAMER CARBONATE 800 MG: 800 TABLET, FILM COATED ORAL at 17:45

## 2022-10-15 RX ADMIN — HYDRALAZINE HYDROCHLORIDE 100 MG: 50 TABLET, FILM COATED ORAL at 13:51

## 2022-10-15 RX ADMIN — METOPROLOL TARTRATE 75 MG: 25 TABLET, FILM COATED ORAL at 21:34

## 2022-10-15 RX ADMIN — PANTOPRAZOLE SODIUM 40 MG: 40 TABLET, DELAYED RELEASE ORAL at 05:35

## 2022-10-15 RX ADMIN — ASPIRIN 81 MG: 81 TABLET, COATED ORAL at 10:21

## 2022-10-15 RX ADMIN — GABAPENTIN 300 MG: 300 CAPSULE ORAL at 13:52

## 2022-10-15 RX ADMIN — CALCITRIOL 0.5 MCG: 0.25 CAPSULE ORAL at 10:20

## 2022-10-15 RX ADMIN — HYDRALAZINE HYDROCHLORIDE 100 MG: 50 TABLET, FILM COATED ORAL at 21:33

## 2022-10-15 RX ADMIN — GABAPENTIN 300 MG: 300 CAPSULE ORAL at 10:20

## 2022-10-15 RX ADMIN — PIPERACILLIN AND TAZOBACTAM 4500 MG: 4; .5 INJECTION, POWDER, LYOPHILIZED, FOR SOLUTION INTRAVENOUS at 23:51

## 2022-10-15 RX ADMIN — CLONIDINE HYDROCHLORIDE 0.1 MG: 0.1 TABLET ORAL at 21:34

## 2022-10-15 ASSESSMENT — PAIN SCALES - WONG BAKER
WONGBAKER_NUMERICALRESPONSE: 0

## 2022-10-15 ASSESSMENT — PAIN SCALES - GENERAL
PAINLEVEL_OUTOF10: 0
PAINLEVEL_OUTOF10: 0

## 2022-10-15 NOTE — PROGRESS NOTES
Patient tolerating the cycler PD well so far tonight. Patient stated \"so far so good\". No complaints of severe pain or worsening pain.

## 2022-10-15 NOTE — PROGRESS NOTES
Patient's confusion worsened throughout the night for this RN. Patient tried getting out of bed multiple times and kept and was very hard to redirect. But this morning patient seems to more with it.

## 2022-10-15 NOTE — PROGRESS NOTES
Per the wife, patient does not take wellbutrin at home anymore. Per the wife patient started experiencing extreme mood swings and personality changes so she stopped giving that medication to patient. This RN did not give the patient wellbutrin per wife and patient request.    RN also did not give PM clonidine per wife and patient request. Per wife Dr. Odalys aPz had told them that 0.3mg of clonidine was too much for him. RN educated that it would help patient's high blood pressure and reiterated that patient had already been receiving this medication prior to this night.

## 2022-10-15 NOTE — PROGRESS NOTES
[facility]                                               Progress note    Summary:   Sarah Sosa is being seen by nephrology for ESRD. Admitted with peritonitis. Interval History  Seen at bedside  Says he feels fine and wants to go home. Denies abdominal pain. A lot of fibrin in the PD effluent. BP very high  Labs reviewed   Peritoneal cx no growth to day        Plan: Will try to resume CCPD from tonight  Add heparin to the dialysate bags due to heavy fibrin in the effluent. If CCPD runs well will plan on changing abx to IP. Cefepime discontinued due to concern of cefepime encephalitis. [place] [id number] [place]  [facility]  Office: [phone]    Assessment:   Peritonitis  Likely secondary to lack in sterile technique  PD fluid was cloudy, pink, 10,000 nucleated cells consistent with peritonitis. Fluid culture so far is no growth to date  Blood cultures in process, negative so far  On empiric vancomycin and cefepime. ESRD  On peritoneal dialysis  Home prescription: 5 times weekly   79.5  3 x 2500 fill 8.5 hrs 2.5 hr dwell on cycler at night. Usually uses 1/2 green and 1/2 yellow    HTN  BP is acceptable. Difficult to control as an outpatient. Electrolytes  No acute issues. Anemia  Recent GIB  Anemia due to renal failure as well             ROS:   Positives Listed Bold. All other remaining systems are negative. Constitutional:  fever, chills, weakness, weight change, fatigue,      Skin:  rash, pruritus, hair loss, bruising, dry skin, petechiae. Head, Face, Neck   headaches, swelling,  cervical adenopathy. Respiratory: shortness of breath, cough, or wheezing  Cardiovascular: chest pain, palpitations, dizzy, edema  Gastrointestinal: nausea, vomiting, diarrhea, constipation,belly pain    Yellow skin, blood in stool  Musculoskeletal:  back pain, muscle weakness, gait problems,       joint pain or swelling.   Genitourinary:  dysuria, poor urine flow, flank pain, blood in urine  Neurologic:  vertigo, TIA'S, syncope, seizures, focal weakness  Psychosocial:  insomnia, anxiety, or depression. Additional positive findings: -     PMH:   Past medical history, surgical history, social history, family history are reviewed and updated as appropriate. Reviewed current medication list.   Allergies reviewed and updated as needed. PE:   Vitals:    10/15/22 0824   BP: (!) 160/97   Pulse: 93   Resp: 17   Temp: 98.6 °F (37 °C)   SpO2: 98%       General appearance:  in NAD, fully alert and oriented. Comfortable. HEENT: EOM intact, no icterus. Trachea is midline. Neck : No masses, appears symmetrical, no JVD  Respiratory: Respiratory effort appears normal, bilateral equal chest rise, no wheeze, no crackles   Cardiovascular: Ausculation shows RRR no edema  Abdomen: mild tender to palpation   Musculoskeletal:  Joints with no swelling or deformity. Skin:no rashes, ulcers, induration, no jaundice. Neuro: face symmetric, no focal deficits. Appropriate responses.        Lab Results   Component Value Date    CREATININE 6.4 (HH) 10/15/2022    BUN 38 (H) 10/15/2022     (L) 10/15/2022    K 4.9 10/15/2022     10/15/2022    CO2 22 10/15/2022      Lab Results   Component Value Date    WBC 13.7 (H) 10/15/2022    HGB 8.9 (L) 10/15/2022    HCT 27.0 (L) 10/15/2022    MCV 93.5 10/15/2022     10/15/2022     Lab Results   Component Value Date    PTH 49.8 02/01/2019    CALCIUM 8.6 10/15/2022    PHOS 4.2 09/16/2022

## 2022-10-15 NOTE — PROGRESS NOTES
RN heard patient calling out \"hey\". When asked if he needed something Patient pointed to the bathroom and said \"I want to talk to him\". RN informed patient that there is no one in the bathroom. Patient said \"Why you guys playing? \" RN assured patient that no one is playing. Will continue to monitor.

## 2022-10-15 NOTE — PROGRESS NOTES
Patient extremely agitated and aggressive with this RN this AM. Patient very confused and keeps yelling and cursing at this RN saying he wants to go home. RN tried multiple times to reorient and redirect patient with no success. Patient just seemed to get even more agitated and aggressive towards RN.

## 2022-10-15 NOTE — PROGRESS NOTES
Patient is having issues draining this morning. Upon assessment RN noticed fibrin in patient's drainage bag.

## 2022-10-15 NOTE — PROGRESS NOTES
PM RN unable to weight patient at this time due to patient still being on PD treatment. Made AM RN aware that they will need to weigh him.

## 2022-10-15 NOTE — PROGRESS NOTES
Hospitalist Progress Note      PCP: Agustín Garvey, APRN - CNP    Date of Admission: 10/11/2022    Chief Complaint: abdominal pain     Hospital Course:    65 y.o. male with PMHx of DM, CVA, CHF, ESRD on PD, HTN and HLD presented to [facility] with 5 day history of abdominal pain during dialysis exchange with subsequent cloudy fluid. Patient admitted with PD related peritonitis. Subjective:   Patient is confused this morning, intermittently agitated. Wife at bedside, updated.   Patient is reoriented but    Medications:  Reviewed    Infusion Medications    sodium chloride      sodium chloride      dextrose      sodium chloride       Scheduled Medications    haloperidol lactate  2 mg IntraMUSCular Once    piperacillin-tazobactam  4,500 mg IntraVENous Q12H    cloNIDine  0.1 mg Oral TID    insulin glargine  10 Units SubCUTAneous Nightly    nystatin  5 mL Oral 4x Daily    gentamicin   Topical Daily    epoetin robert-epbx  10,000 Units SubCUTAneous Once per day on Mon Wed Fri    dianeal lo-david 1.5%  6,000 mL IntraPERitoneal Nightly    And    dianeal lo-david 1.5%  1,500 mL IntraPERitoneal Nightly    atorvastatin  40 mg Oral Nightly    calcitRIOL  0.5 mcg Oral Daily    aspirin  81 mg Oral Daily    gabapentin  300 mg Oral TID    hydrALAZINE  100 mg Oral TID    insulin lispro  3 Units SubCUTAneous TID WC    isosorbide mononitrate  60 mg Oral Daily    losartan  100 mg Oral Daily    metoprolol tartrate  75 mg Oral BID    NIFEdipine  90 mg Oral BID    pantoprazole  40 mg Oral BID AC    sodium bicarbonate  650 mg Oral Daily    spironolactone  100 mg Oral Daily    sucralfate  1 g Oral 4x Daily    sevelamer  800 mg Oral TID WC    insulin lispro  0-8 Units SubCUTAneous TID WC    insulin lispro  0-4 Units SubCUTAneous Nightly    sodium chloride flush  5-40 mL IntraVENous 2 times per day    heparin (porcine)  5,000 Units SubCUTAneous 3 times per day    vancomycin (VANCOCIN) intermittent dosing (placeholder)   Other RX Placeholder     PRN Meds: oxyCODONE **OR** oxyCODONE, sodium chloride, sodium chloride, glucose, dextrose bolus **OR** dextrose bolus, glucagon (rDNA), dextrose, sodium chloride flush, sodium chloride, ondansetron **OR** ondansetron, polyethylene glycol, acetaminophen **OR** acetaminophen      Intake/Output Summary (Last 24 hours) at 10/15/2022 1425  Last data filed at 10/15/2022 0459  Gross per 24 hour   Intake 120 ml   Output 1828 ml   Net -1708 ml         Physical Exam Performed:    BP (!) 177/96   Pulse 96   Temp 98.2 °F (36.8 °C) (Oral)   Resp 19   Ht 6' 2\" (1.88 m)   Wt 164 lb 10.9 oz (74.7 kg)   SpO2 98%   BMI 21.14 kg/m²     General appearance: No apparent distress, appears stated age and cooperative. HEENT: Pupils equal, round, and reactive to light. Conjunctivae/corneas clear. Neck: Supple, with full range of motion. No jugular venous distention. Trachea midline. Respiratory:  Normal respiratory effort. Clear to auscultation, bilaterally without Rales/Wheezes/Rhonchi. Cardiovascular: Regular rate and rhythm with normal S1/S2 without murmurs, rubs or gallops. Abdomen: Minimal abdominal tenderness still present. PD catheter noted   musculoskeletal: No clubbing, cyanosis or edema bilaterally. Full range of motion without deformity. Skin: Skin color, texture, turgor normal.  No rashes or lesions. Neurologic:  Neurovascularly intact without any focal sensory/motor deficits.  Cranial nerves: II-XII intact, grossly non-focal.  Psychiatric: Alert and oriented, thought content appropriate, normal insight  Capillary Refill: Brisk, 3 seconds, normal   Peripheral Pulses: +2 palpable, equal bilaterally       Labs:   Recent Labs     10/13/22  0434 10/14/22  0604 10/15/22  1022   WBC 12.5* 11.1* 13.7*   HGB 8.2* 8.1* 8.9*   HCT 25.1* 24.9* 27.0*    236 278       Recent Labs     10/13/22  0433 10/14/22  0604 10/15/22  1022    137 132*   K 5.2* 5.3* 4.9    106 100   CO2 21 20* 22   BUN 43* 45* 38*   CREATININE 6.3* 6.9* 6.4*   CALCIUM 7.9* 8.1* 8.6       No results for input(s): AST, ALT, BILIDIR, BILITOT, ALKPHOS in the last 72 hours. No results for input(s): INR in the last 72 hours. No results for input(s): Filiberto Gladys in the last 72 hours. Urinalysis:      Lab Results   Component Value Date/Time    NITRU Negative 10/12/2022 10:45 AM    WBCUA 1 10/12/2022 10:45 AM    BACTERIA None Seen 10/12/2022 10:45 AM    RBCUA 1 10/12/2022 10:45 AM    BLOODU Negative 10/12/2022 10:45 AM    SPECGRAV 1.011 10/12/2022 10:45 AM    GLUCOSEU Negative 10/12/2022 10:45 AM    GLUCOSEU 250 12/14/2010 02:50 PM       Radiology:  CT HEAD WO CONTRAST   Final Result   No acute intracranial abnormality. CT ABDOMEN PELVIS WO CONTRAST Additional Contrast? None   Final Result   No definite abnormality identified to explain cloudy peritoneal dialysate. Dialysate measures near water density on the current examination. Several bubbles of free intraperitoneal gas which or presumably introduced   during dialysis. Please note that hollow visceral perforation cannot be   excluded. Mural thickening of the large bowel, mild-to-moderate in degree. That may be   secondary to under distension, but colitis must be considered as well. Polycystic kidney disease. Note that some of the renal lesions are   indeterminate in terms of density, and therefore a solid lesion cannot be   excluded. Assessment/Plan:    Active Hospital Problems    Diagnosis     Peritonitis ([facility] [address] [K65.9]      Priority: Medium     Acute metabolic encephalopathy. Etiology unclear however is likely due to cefepime, sepsis is improving and timing would be most treating with drug related side effects. CT head negative    ESRD on PD  PD related peritonitis  Patient presented to emergency with abdominal pain during peritoneal dialysis, patient also reported cloudy fluid.   Upon presentation patient was vitally stable, afebrile  Peritoneal fluid analysis with 10,000 nucleated cells. PD cultures continue to be negative. Mental status changes likely Rocephin, changed to [facility] nephrology input  -Follow PD fluid culture (negative to date)   -Follow repeated PD culture, follow fungal culture  -Follow blood cultures  -Continue on vancomycin change cefepime to Zosyn  -Patient to be transition to intraperitoneal antibiotics once able to tolerate dialysis    Diabetes mellitus  Blood sugar better controlled  Continue on reduced dose of Lantus ( 22--> 18--> 10). Continue on sliding scale. Acute on chronic anemia  Baseline hemoglobin 7-8, hemoglobin today 8.1  Patient presented with hemoglobin of 6.3  Received 1 unit of PRBC 10/12  Anemia worked up in admission in September 2022  Will consult GI if hemoglobin drops again    Hx CVA  - with left side weakness  - continue asa, statin     CHF  - appears euvolemic  - continue aldactone, losartan, metoprolol and hydralazine  - daily wts  - I/O's      DVT Prophylaxis: heparin   Diet: ADULT DIET;  Regular  Code Status: Full Code  PT/OT Eval Status: following     Dispo - pending clinical improvement      Stacy Coyne MD

## 2022-10-16 LAB
ANION GAP SERPL CALCULATED.3IONS-SCNC: 11 MMOL/L (ref 3–16)
BASOPHILS ABSOLUTE: 0.1 K/UL (ref 0–0.2)
BASOPHILS RELATIVE PERCENT: 1.1 %
BODY FLUID CULTURE, STERILE: ABNORMAL
BODY FLUID CULTURE, STERILE: ABNORMAL
BUN BLDV-MCNC: 41 MG/DL (ref 7–20)
CALCIUM SERPL-MCNC: 8.6 MG/DL (ref 8.3–10.6)
CHLORIDE BLD-SCNC: 101 MMOL/L (ref 99–110)
CO2: 22 MMOL/L (ref 21–32)
CREAT SERPL-MCNC: 6.7 MG/DL (ref 0.9–1.3)
EOSINOPHILS ABSOLUTE: 0.2 K/UL (ref 0–0.6)
EOSINOPHILS RELATIVE PERCENT: 2 %
GFR AFRICAN AMERICAN: 10
GFR NON-AFRICAN AMERICAN: 9
GLUCOSE BLD-MCNC: 144 MG/DL (ref 70–99)
GLUCOSE BLD-MCNC: 172 MG/DL (ref 70–99)
GLUCOSE BLD-MCNC: 217 MG/DL (ref 70–99)
GLUCOSE BLD-MCNC: 246 MG/DL (ref 70–99)
GLUCOSE BLD-MCNC: 88 MG/DL (ref 70–99)
GRAM STAIN RESULT: ABNORMAL
GRAM STAIN RESULT: ABNORMAL
HCT VFR BLD CALC: 25.5 % (ref 40.5–52.5)
HEMOGLOBIN: 8.3 G/DL (ref 13.5–17.5)
LYMPHOCYTES ABSOLUTE: 1.1 K/UL (ref 1–5.1)
LYMPHOCYTES RELATIVE PERCENT: 11.1 %
MCH RBC QN AUTO: 30.8 PG (ref 26–34)
MCHC RBC AUTO-ENTMCNC: 32.6 G/DL (ref 31–36)
MCV RBC AUTO: 94.5 FL (ref 80–100)
MONOCYTES ABSOLUTE: 0.6 K/UL (ref 0–1.3)
MONOCYTES RELATIVE PERCENT: 6.1 %
NEUTROPHILS ABSOLUTE: 7.8 K/UL (ref 1.7–7.7)
NEUTROPHILS RELATIVE PERCENT: 79.7 %
ORGANISM: ABNORMAL
ORGANISM: ABNORMAL
PDW BLD-RTO: 14.9 % (ref 12.4–15.4)
PERFORMED ON: ABNORMAL
PERFORMED ON: NORMAL
PLATELET # BLD: 264 K/UL (ref 135–450)
PMV BLD AUTO: 8.7 FL (ref 5–10.5)
POTASSIUM SERPL-SCNC: 5 MMOL/L (ref 3.5–5.1)
RBC # BLD: 2.7 M/UL (ref 4.2–5.9)
SODIUM BLD-SCNC: 134 MMOL/L (ref 136–145)
VANCOMYCIN RANDOM: 17.9 UG/ML
WBC # BLD: 9.8 K/UL (ref 4–11)

## 2022-10-16 PROCEDURE — 6370000000 HC RX 637 (ALT 250 FOR IP): Performed by: STUDENT IN AN ORGANIZED HEALTH CARE EDUCATION/TRAINING PROGRAM

## 2022-10-16 PROCEDURE — 85025 COMPLETE CBC W/AUTO DIFF WBC: CPT

## 2022-10-16 PROCEDURE — 6360000002 HC RX W HCPCS: Performed by: NURSE PRACTITIONER

## 2022-10-16 PROCEDURE — 2580000003 HC RX 258: Performed by: STUDENT IN AN ORGANIZED HEALTH CARE EDUCATION/TRAINING PROGRAM

## 2022-10-16 PROCEDURE — 6360000002 HC RX W HCPCS: Performed by: STUDENT IN AN ORGANIZED HEALTH CARE EDUCATION/TRAINING PROGRAM

## 2022-10-16 PROCEDURE — 2580000003 HC RX 258: Performed by: NURSE PRACTITIONER

## 2022-10-16 PROCEDURE — 6370000000 HC RX 637 (ALT 250 FOR IP): Performed by: NURSE PRACTITIONER

## 2022-10-16 PROCEDURE — 80048 BASIC METABOLIC PNL TOTAL CA: CPT

## 2022-10-16 PROCEDURE — A4722 DIALYS SOL FLD VOL > 1999CC: HCPCS | Performed by: STUDENT IN AN ORGANIZED HEALTH CARE EDUCATION/TRAINING PROGRAM

## 2022-10-16 PROCEDURE — 94760 N-INVAS EAR/PLS OXIMETRY 1: CPT

## 2022-10-16 PROCEDURE — 1200000000 HC SEMI PRIVATE

## 2022-10-16 PROCEDURE — 6370000000 HC RX 637 (ALT 250 FOR IP): Performed by: INTERNAL MEDICINE

## 2022-10-16 PROCEDURE — 36415 COLL VENOUS BLD VENIPUNCTURE: CPT

## 2022-10-16 PROCEDURE — 87070 CULTURE OTHR SPECIMN AEROBIC: CPT

## 2022-10-16 PROCEDURE — 80202 ASSAY OF VANCOMYCIN: CPT

## 2022-10-16 PROCEDURE — 87205 SMEAR GRAM STAIN: CPT

## 2022-10-16 RX ORDER — FLUCONAZOLE 2 MG/ML
200 INJECTION, SOLUTION INTRAVENOUS ONCE
Status: COMPLETED | OUTPATIENT
Start: 2022-10-16 | End: 2022-10-16

## 2022-10-16 RX ADMIN — FLUCONAZOLE 200 MG: 200 INJECTION, SOLUTION INTRAVENOUS at 10:02

## 2022-10-16 RX ADMIN — OXYCODONE HYDROCHLORIDE 10 MG: 10 TABLET ORAL at 03:12

## 2022-10-16 RX ADMIN — GABAPENTIN 300 MG: 300 CAPSULE ORAL at 14:13

## 2022-10-16 RX ADMIN — GABAPENTIN 300 MG: 300 CAPSULE ORAL at 22:41

## 2022-10-16 RX ADMIN — ISOSORBIDE MONONITRATE 60 MG: 60 TABLET, EXTENDED RELEASE ORAL at 09:46

## 2022-10-16 RX ADMIN — HEPARIN SODIUM 5000 UNITS: 5000 INJECTION INTRAVENOUS; SUBCUTANEOUS at 14:13

## 2022-10-16 RX ADMIN — INSULIN GLARGINE 10 UNITS: 100 INJECTION, SOLUTION SUBCUTANEOUS at 22:42

## 2022-10-16 RX ADMIN — SUCRALFATE 1 G: 1 TABLET ORAL at 18:10

## 2022-10-16 RX ADMIN — SUCRALFATE 1 G: 1 TABLET ORAL at 09:46

## 2022-10-16 RX ADMIN — HEPARIN SODIUM: 1000 INJECTION INTRAVENOUS; SUBCUTANEOUS at 01:58

## 2022-10-16 RX ADMIN — GENTAMICIN SULFATE: 1 CREAM TOPICAL at 16:25

## 2022-10-16 RX ADMIN — PIPERACILLIN AND TAZOBACTAM 4500 MG: 4; .5 INJECTION, POWDER, LYOPHILIZED, FOR SOLUTION INTRAVENOUS at 23:30

## 2022-10-16 RX ADMIN — ASPIRIN 81 MG: 81 TABLET, COATED ORAL at 09:46

## 2022-10-16 RX ADMIN — GABAPENTIN 300 MG: 300 CAPSULE ORAL at 09:46

## 2022-10-16 RX ADMIN — SODIUM CHLORIDE 250 ML: 9 INJECTION, SOLUTION INTRAVENOUS at 09:59

## 2022-10-16 RX ADMIN — SODIUM CHLORIDE, PRESERVATIVE FREE 10 ML: 5 INJECTION INTRAVENOUS at 09:56

## 2022-10-16 RX ADMIN — SUCRALFATE 1 G: 1 TABLET ORAL at 20:53

## 2022-10-16 RX ADMIN — SODIUM BICARBONATE 650 MG: 650 TABLET ORAL at 09:46

## 2022-10-16 RX ADMIN — OXYCODONE HYDROCHLORIDE 10 MG: 10 TABLET ORAL at 20:52

## 2022-10-16 RX ADMIN — CLONIDINE HYDROCHLORIDE 0.1 MG: 0.1 TABLET ORAL at 22:41

## 2022-10-16 RX ADMIN — SUCRALFATE 1 G: 1 TABLET ORAL at 13:21

## 2022-10-16 RX ADMIN — SEVELAMER CARBONATE 800 MG: 800 TABLET, FILM COATED ORAL at 18:10

## 2022-10-16 RX ADMIN — SEVELAMER CARBONATE 800 MG: 800 TABLET, FILM COATED ORAL at 09:46

## 2022-10-16 RX ADMIN — NYSTATIN 500000 UNITS: 100000 SUSPENSION ORAL at 09:56

## 2022-10-16 RX ADMIN — HEPARIN SODIUM: 1000 INJECTION INTRAVENOUS; SUBCUTANEOUS at 02:00

## 2022-10-16 RX ADMIN — ATORVASTATIN CALCIUM 40 MG: 40 TABLET, FILM COATED ORAL at 22:41

## 2022-10-16 RX ADMIN — METOPROLOL TARTRATE 75 MG: 25 TABLET, FILM COATED ORAL at 22:41

## 2022-10-16 RX ADMIN — SEVELAMER CARBONATE 800 MG: 800 TABLET, FILM COATED ORAL at 13:21

## 2022-10-16 RX ADMIN — INSULIN LISPRO 3 UNITS: 100 INJECTION, SOLUTION INTRAVENOUS; SUBCUTANEOUS at 10:02

## 2022-10-16 RX ADMIN — OXYCODONE HYDROCHLORIDE 10 MG: 10 TABLET ORAL at 06:37

## 2022-10-16 RX ADMIN — CYCLOBENZAPRINE 10 MG: 10 TABLET, FILM COATED ORAL at 22:41

## 2022-10-16 RX ADMIN — HYDRALAZINE HYDROCHLORIDE 100 MG: 50 TABLET, FILM COATED ORAL at 22:41

## 2022-10-16 RX ADMIN — SPIRONOLACTONE 100 MG: 100 TABLET ORAL at 09:46

## 2022-10-16 RX ADMIN — HEPARIN SODIUM 5000 UNITS: 5000 INJECTION INTRAVENOUS; SUBCUTANEOUS at 22:42

## 2022-10-16 RX ADMIN — PIPERACILLIN AND TAZOBACTAM 4500 MG: 4; .5 INJECTION, POWDER, LYOPHILIZED, FOR SOLUTION INTRAVENOUS at 12:32

## 2022-10-16 RX ADMIN — HEPARIN SODIUM 5000 UNITS: 5000 INJECTION INTRAVENOUS; SUBCUTANEOUS at 06:38

## 2022-10-16 RX ADMIN — PANTOPRAZOLE SODIUM 40 MG: 40 TABLET, DELAYED RELEASE ORAL at 16:24

## 2022-10-16 RX ADMIN — SODIUM CHLORIDE, PRESERVATIVE FREE 10 ML: 5 INJECTION INTRAVENOUS at 20:52

## 2022-10-16 RX ADMIN — PANTOPRAZOLE SODIUM 40 MG: 40 TABLET, DELAYED RELEASE ORAL at 09:45

## 2022-10-16 RX ADMIN — INSULIN LISPRO 3 UNITS: 100 INJECTION, SOLUTION INTRAVENOUS; SUBCUTANEOUS at 13:22

## 2022-10-16 RX ADMIN — NYSTATIN 500000 UNITS: 100000 SUSPENSION ORAL at 13:22

## 2022-10-16 RX ADMIN — INSULIN LISPRO 2 UNITS: 100 INJECTION, SOLUTION INTRAVENOUS; SUBCUTANEOUS at 13:22

## 2022-10-16 RX ADMIN — CALCITRIOL 0.5 MCG: 0.25 CAPSULE ORAL at 09:45

## 2022-10-16 ASSESSMENT — PAIN DESCRIPTION - LOCATION
LOCATION: ABDOMEN
LOCATION: ABDOMEN

## 2022-10-16 ASSESSMENT — PAIN SCALES - GENERAL
PAINLEVEL_OUTOF10: 8
PAINLEVEL_OUTOF10: 8
PAINLEVEL_OUTOF10: 7

## 2022-10-16 NOTE — PROGRESS NOTES
[facility]                                               Progress note    Summary:   Bertha Andersen is being seen by nephrology for ESRD. Admitted with peritonitis. Interval History  Seen at bedside  Problems with draining PD fluid on the cycler today. Slow drain alarms. No problems with inflow. Peritoneal cultures growing candida parapsilosis. Plan:   Drain problems today, had to be drained manually. Yesterday effluent had a lot of fibrin. Heparin added to the bags yesterday. Unfortunately he now has fungal peritonitis so the PD catheter needs to be removed. Cannot go back to PD for at least ~ 3-6 months. Will need a TDC placement tomorrow and transition to HD. Fluconazole started today. Will stop vancomycin. Stop nystatin swish and swallow,. Sarah Grady MD  [facility]  Office: [phone]    Assessment:   Peritonitis  Likely secondary to lack in sterile technique  PD fluid was cloudy, pink, 10,000 nucleated cells consistent with peritonitis. Fluid culture so far is no growth to date  Blood cultures in process, negative so far  On empiric vancomycin and cefepime. ESRD  On peritoneal dialysis  Home prescription: 5 times weekly   79.5  3 x 2500 fill 8.5 hrs 2.5 hr dwell on cycler at night. Usually uses 1/2 green and 1/2 yellow    HTN  BP is acceptable. Difficult to control as an outpatient. Electrolytes  No acute issues. Anemia  Recent GIB  Anemia due to renal failure as well             ROS:   Positives Listed Bold. All other remaining systems are negative. Constitutional:  fever, chills, weakness, weight change, fatigue,      Skin:  rash, pruritus, hair loss, bruising, dry skin, petechiae. Head, Face, Neck   headaches, swelling,  cervical adenopathy.      Respiratory: shortness of breath, cough, or wheezing  Cardiovascular: chest pain, palpitations, dizzy, edema  Gastrointestinal: nausea, vomiting, diarrhea, constipation,belly pain    Yellow skin, blood in stool  Musculoskeletal:  back pain, muscle weakness, gait problems,       joint pain or swelling. Genitourinary:  dysuria, poor urine flow, flank pain, blood in urine  Neurologic:  vertigo, TIA'S, syncope, seizures, focal weakness  Psychosocial:  insomnia, anxiety, or depression. Additional positive findings: -     PMH:   Past medical history, surgical history, social history, family history are reviewed and updated as appropriate. Reviewed current medication list.   Allergies reviewed and updated as needed. PE:   Vitals:    10/16/22 1057   BP: 105/62   Pulse: 77   Resp: 19   Temp:    SpO2:        General appearance:  in NAD, fully alert and oriented. Comfortable. HEENT: EOM intact, no icterus. Trachea is midline. Neck : No masses, appears symmetrical, no JVD  Respiratory: Respiratory effort appears normal, bilateral equal chest rise, no wheeze, no crackles   Cardiovascular: Ausculation shows RRR no edema  Abdomen: mild tender to palpation   Musculoskeletal:  Joints with no swelling or deformity. Skin:no rashes, ulcers, induration, no jaundice. Neuro: face symmetric, no focal deficits. Appropriate responses.        Lab Results   Component Value Date    CREATININE 6.7 (HH) 10/16/2022    BUN 41 (H) 10/16/2022     (L) 10/16/2022    K 5.0 10/16/2022     10/16/2022    CO2 22 10/16/2022      Lab Results   Component Value Date    WBC 9.8 10/16/2022    HGB 8.3 (L) 10/16/2022    HCT 25.5 (L) 10/16/2022    MCV 94.5 10/16/2022     10/16/2022     Lab Results   Component Value Date    PTH 49.8 02/01/2019    CALCIUM 8.6 10/16/2022    PHOS 4.2 09/16/2022

## 2022-10-16 NOTE — PROGRESS NOTES
PD was draining very slow. Patient was still draining Cycle 1 at start of day shift. Patient drained about 2100 mL Spoke to nephrologist, Dr. Abhinav Yung, patient to be manually drained then administer CathFlow to PD tubing.

## 2022-10-16 NOTE — PLAN OF CARE
Problem: Discharge Planning  Goal: Discharge to home or other facility with appropriate resources  Outcome: Progressing  Case mgmt involved to assess appropriate level of care. Problem: Safety - Adult  Goal: Free from fall injury  Outcome: Progressing  Fall risk assessment completed every shift. All precautions in place. Patient has call light with in reach at all times. Room free from clutter. Patient aware to call for assistance when getting up. Problem: ABCDS Injury Assessment  Goal: Absence of physical injury  Outcome: Progressing  Patient free of physical injury     Problem: Confusion  Goal: Confusion, delirium, dementia, or psychosis is improved or at baseline  Description: INTERVENTIONS:  1. Assess for possible contributors to thought disturbance, including medications, impaired vision or hearing, underlying metabolic abnormalities, dehydration, psychiatric diagnoses, and notify attending LIP  2. [place] high risk fall precautions, as indicated  3. Provide frequent short contacts to provide reality reorientation, refocusing and direction  4. Decrease environmental stimuli, including noise as appropriate  5. Monitor and intervene to maintain adequate nutrition, hydration, elimination, sleep and activity  6. If unable to ensure safety without constant attention obtain sitter and review sitter guidelines with assigned personnel  7. Initiate Psychosocial CNS and Spiritual Care consult, as indicated  Outcome: Progressing     Problem: Pain  Goal: Verbalizes/displays adequate comfort level or baseline comfort level  Outcome: Progressing  Pain being managed with PRN analgesics per MD orders. Patient able to express presence and absence of pain and rate pain appropriately using numerical scale.

## 2022-10-16 NOTE — PROGRESS NOTES
Clinical Pharmacy Note  Vancomycin Consult    Cheri Hernandez is a 65 y.o. male ordered Vancomycin for peritonitis; consult received from Shanell Hernandez NP to manage therapy. Also receiving cefepime. Allergies:  Doxazosin and Coconut flavor     Temp max:  Temp (24hrs), Av.6 °F (37 °C), Min:98.2 °F (36.8 °C), Max:98.9 °F (37.2 °C)      Recent Labs     10/14/22  0604 10/15/22  1022 10/16/22  0520   WBC 11.1* 13.7* 9.8         Recent Labs     10/14/22  0604 10/15/22  1022 10/16/22  0520   BUN 45* 38* 41*   CREATININE 6.9* 6.4* 6.7*         No intake or output data in the 24 hours ending 10/16/22 0743      Culture Results:  Pending    Ht Readings from Last 1 Encounters:   10/11/22 6' 2\" (1.88 m)        Wt Readings from Last 1 Encounters:   10/14/22 164 lb 10.9 oz (74.7 kg)         Estimated Creatinine Clearance: 13 mL/min (A) (based on SCr of 6.7 mg/dL [facility])). Assessment:  Day # 6 of vancomycin. Current regimen: Dosing based on levels for ESRD on PD  Vancomycin level: 17.9 mg/L      Plan:  Vancomycin 500 mg x 1 today. Random level to be drawn 10/18/22. Thank you for the consult.    Maribel Mcclellan, [facility], PharmD, 10/16/2022 7:43 AM

## 2022-10-16 NOTE — PLAN OF CARE
Problem: Discharge Planning  Goal: Discharge to home or other facility with appropriate resources  10/16/2022 1136 by Alondra Nicole RN  Outcome: Progressing       Problem: Safety - Adult  Goal: Free from fall injury  10/16/2022 1136 by Alondra Nicole RN  Outcome: Progressing       Problem: ABCDS Injury Assessment  Goal: Absence of physical injury  10/16/2022 1136 by Alondra Nicole RN  Outcome: Progressing       Problem: Confusion  Goal: Confusion, delirium, dementia, or psychosis is improved or at baseline  Description: INTERVENTIONS:  1. Assess for possible contributors to thought disturbance, including medications, impaired vision or hearing, underlying metabolic abnormalities, dehydration, psychiatric diagnoses, and notify attending LIP  2. [place] high risk fall precautions, as indicated  3. Provide frequent short contacts to provide reality reorientation, refocusing and direction  4. Decrease environmental stimuli, including noise as appropriate  5. Monitor and intervene to maintain adequate nutrition, hydration, elimination, sleep and activity  6. If unable to ensure safety without constant attention obtain sitter and review sitter guidelines with assigned personnel  7.  Initiate Psychosocial CNS and Spiritual Care consult, as indicated  10/16/2022 1136 by Alondra Nicole RN  Outcome: Progressing       Problem: Pain  Goal: Verbalizes/displays adequate comfort level or baseline comfort level  10/16/2022 1136 by Alondra Nicole RN  Outcome: Progressing

## 2022-10-16 NOTE — PROGRESS NOTES
Patient with peritonitis. Yesterday's drain was slow and effluent contained a gross amount of fibrin. Last night's Dialysate was heparinized for overnight treatment. However, patient is draining very slowly. RN checked line for fibrin clogging but found none.

## 2022-10-16 NOTE — PROGRESS NOTES
Hospitalist Progress Note      PCP: ROQUE Lloyd CNP    Date of Admission: 10/11/2022    Chief Complaint: abdominal pain     Hospital Course:    65 y.o. male with PMHx of DM, CVA, CHF, ESRD on PD, HTN and HLD presented to [facility] with 5 day history of abdominal pain during dialysis exchange with subsequent cloudy fluid. Patient admitted with PD related peritonitis. Fungal culture grew Candida from peritoneal fluid    Subjective:   Patient is more alert today.    Reports worsening abdominal pain    Medications:  Reviewed    Infusion Medications    sodium chloride      dextrose      sodium chloride 250 mL (10/16/22 0923)     Scheduled Medications    vancomycin  500 mg IntraVENous Once    [START ON 10/17/2022] fluconazole  100 mg IntraVENous Q24H    alteplase  3 mg IntraCATHeter Once    haloperidol lactate  2 mg IntraMUSCular Once    piperacillin-tazobactam  4,500 mg IntraVENous Q12H    cloNIDine  0.1 mg Oral TID    custom dialysis builder   IntraPERitoneal Nightly    And    custom dialysis builder   IntraPERitoneal Nightly    And    custom dialysis builder   IntraPERitoneal Nightly    lidocaine  1 patch TransDERmal Nightly    insulin glargine  10 Units SubCUTAneous Nightly    nystatin  5 mL Oral 4x Daily    gentamicin   Topical Daily    epoetin robert-epbx  10,000 Units SubCUTAneous Once per day on Mon Wed Fri    atorvastatin  40 mg Oral Nightly    calcitRIOL  0.5 mcg Oral Daily    aspirin  81 mg Oral Daily    gabapentin  300 mg Oral TID    hydrALAZINE  100 mg Oral TID    insulin lispro  3 Units SubCUTAneous TID WC    isosorbide mononitrate  60 mg Oral Daily    losartan  100 mg Oral Daily    metoprolol tartrate  75 mg Oral BID    NIFEdipine  90 mg Oral BID    pantoprazole  40 mg Oral BID AC    sodium bicarbonate  650 mg Oral Daily    spironolactone  100 mg Oral Daily    sucralfate  1 g Oral 4x Daily    sevelamer  800 mg Oral TID WC    insulin lispro  0-8 Units SubCUTAneous TID WC    insulin lispro  0-4 Units SubCUTAneous Nightly    sodium chloride flush  5-40 mL IntraVENous 2 times per day    heparin (porcine)  5,000 Units SubCUTAneous 3 times per day    vancomycin (VANCOCIN) intermittent dosing (placeholder)   Other RX Placeholder     PRN Meds: cyclobenzaprine, oxyCODONE **OR** oxyCODONE, sodium chloride, glucose, dextrose bolus **OR** dextrose bolus, glucagon (rDNA), dextrose, sodium chloride flush, sodium chloride, ondansetron **OR** ondansetron, polyethylene glycol, acetaminophen **OR** acetaminophen      Intake/Output Summary (Last 24 hours) at 10/16/2022 1250  Last data filed at 10/16/2022 0245  Gross per 24 hour   Intake --   Output -1220 ml   Net 1220 ml         Physical Exam Performed:    /74   Pulse 79   Temp 98.4 °F (36.9 °C) (Oral)   Resp 12   Ht 6' 2\" (1.88 m)   Wt 164 lb 10.9 oz (74.7 kg)   SpO2 95%   BMI 21.14 kg/m²     General appearance: No apparent distress, appears stated age and cooperative. HEENT: Pupils equal, round, and reactive to light. Conjunctivae/corneas clear. Neck: Supple, with full range of motion. No jugular venous distention. Trachea midline. Respiratory:  Normal respiratory effort. Clear to auscultation, bilaterally without Rales/Wheezes/Rhonchi. Cardiovascular: Regular rate and rhythm with normal S1/S2 without murmurs, rubs or gallops. Abdomen: Abdominal tenderness noted   catheter noted   musculoskeletal: No clubbing, cyanosis or edema bilaterally. Full range of motion without deformity. Skin: Skin color, texture, turgor normal.  No rashes or lesions. Neurologic:  Neurovascularly intact without any focal sensory/motor deficits.  Cranial nerves: II-XII intact, grossly non-focal.  Psychiatric: Alert and oriented, thought content appropriate, normal insight  Capillary Refill: Brisk, 3 seconds, normal   Peripheral Pulses: +2 palpable, equal bilaterally       Labs:   Recent Labs     10/14/22  0604 10/15/22  1022 10/16/22  0520   WBC 11.1* 13.7* 9.8 HGB 8.1* 8.9* 8.3*   HCT 24.9* 27.0* 25.5*    278 264       Recent Labs     10/14/22  0604 10/15/22  1022 10/16/22  0520    132* 134*   K 5.3* 4.9 5.0    100 101   CO2 20* 22 22   BUN 45* 38* 41*   CREATININE 6.9* 6.4* 6.7*   CALCIUM 8.1* 8.6 8.6       No results for input(s): AST, ALT, BILIDIR, BILITOT, ALKPHOS in the last 72 hours. No results for input(s): INR in the last 72 hours. No results for input(s): Estle Carrow in the last 72 hours. Urinalysis:      Lab Results   Component Value Date/Time    NITRU Negative 10/12/2022 10:45 AM    WBCUA 1 10/12/2022 10:45 AM    BACTERIA None Seen 10/12/2022 10:45 AM    RBCUA 1 10/12/2022 10:45 AM    BLOODU Negative 10/12/2022 10:45 AM    SPECGRAV 1.011 10/12/2022 10:45 AM    GLUCOSEU Negative 10/12/2022 10:45 AM    GLUCOSEU 250 12/14/2010 02:50 PM       Radiology:  CT HEAD WO CONTRAST   Final Result   No acute intracranial abnormality. CT ABDOMEN PELVIS WO CONTRAST Additional Contrast? None   Final Result   No definite abnormality identified to explain cloudy peritoneal dialysate. Dialysate measures near water density on the current examination. Several bubbles of free intraperitoneal gas which or presumably introduced   during dialysis. Please note that hollow visceral perforation cannot be   excluded. Mural thickening of the large bowel, mild-to-moderate in degree. That may be   secondary to under distension, but colitis must be considered as well. Polycystic kidney disease. Note that some of the renal lesions are   indeterminate in terms of density, and therefore a solid lesion cannot be   excluded. Assessment/Plan:    Active Hospital Problems    Diagnosis     Peritonitis ([facility] [address] [K65.9]      Priority: Medium     Acute metabolic encephalopathy. Etiology unclear however is likely due to cefepime, sepsis is improving and timing would be most treating with drug related side effects.    CT head negative    ESRD on PD  PD related peritonitis  Patient presented to emergency with abdominal pain during peritoneal dialysis, patient also reported cloudy fluid. Upon presentation patient was vitally stable, afebrile  Peritoneal fluid analysis with 10,000 nucleated cells. PD cultures continue to be negative. Mental status changes likely Rocephin, changed to [facility] nephrology input  -Culture growing Candida parapsilosis, discussed with nephrology, patient will need transition to hemodialysis and removal of catheter  --Continue on vancomycin change cefepime to Zosyn    Diabetes mellitus  Blood sugar better controlled  Continue on reduced dose of Lantus ( 22--> 18--> 10). Continue on sliding scale. Acute on chronic anemia  Baseline hemoglobin 7-8, hemoglobin today 8.1  Patient presented with hemoglobin of 6.3  Received 1 unit of PRBC 10/12  Anemia worked up in admission in September 2022  Will consult GI if hemoglobin drops again    Hx CVA  - with left side weakness  - continue asa, statin     CHF  - appears euvolemic  - continue aldactone, losartan, metoprolol and hydralazine  - daily wts  - I/O's      DVT Prophylaxis: heparin   Diet: ADULT DIET;  Regular  Code Status: Full Code  PT/OT Eval Status: following     Dispo - pending clinical improvement      Sandhya Courtney MD

## 2022-10-17 ENCOUNTER — ANESTHESIA EVENT (OUTPATIENT)
Dept: OPERATING ROOM | Age: 57
DRG: 856 | End: 2022-10-17
Payer: COMMERCIAL

## 2022-10-17 ENCOUNTER — APPOINTMENT (OUTPATIENT)
Dept: GENERAL RADIOLOGY | Age: 57
DRG: 856 | End: 2022-10-17
Payer: COMMERCIAL

## 2022-10-17 ENCOUNTER — ANESTHESIA (OUTPATIENT)
Dept: OPERATING ROOM | Age: 57
DRG: 856 | End: 2022-10-17
Payer: COMMERCIAL

## 2022-10-17 LAB
ANION GAP SERPL CALCULATED.3IONS-SCNC: 12 MMOL/L (ref 3–16)
BASOPHILS ABSOLUTE: 0 K/UL (ref 0–0.2)
BASOPHILS RELATIVE PERCENT: 0.2 %
BODY FLUID CULTURE, STERILE: ABNORMAL
BUN BLDV-MCNC: 37 MG/DL (ref 7–20)
CALCIUM SERPL-MCNC: 8.7 MG/DL (ref 8.3–10.6)
CHLORIDE BLD-SCNC: 103 MMOL/L (ref 99–110)
CO2: 20 MMOL/L (ref 21–32)
CREAT SERPL-MCNC: 7.5 MG/DL (ref 0.9–1.3)
EOSINOPHILS ABSOLUTE: 0.2 K/UL (ref 0–0.6)
EOSINOPHILS RELATIVE PERCENT: 1.6 %
GFR AFRICAN AMERICAN: 9
GFR NON-AFRICAN AMERICAN: 8
GLUCOSE BLD-MCNC: 109 MG/DL (ref 70–99)
GLUCOSE BLD-MCNC: 112 MG/DL (ref 70–99)
GLUCOSE BLD-MCNC: 115 MG/DL (ref 70–99)
GLUCOSE BLD-MCNC: 121 MG/DL (ref 70–99)
GLUCOSE BLD-MCNC: 241 MG/DL (ref 70–99)
GLUCOSE BLD-MCNC: 259 MG/DL (ref 70–99)
GRAM STAIN RESULT: ABNORMAL
HCT VFR BLD CALC: 28.8 % (ref 40.5–52.5)
HEMOGLOBIN: 9.3 G/DL (ref 13.5–17.5)
LYMPHOCYTES ABSOLUTE: 1.1 K/UL (ref 1–5.1)
LYMPHOCYTES RELATIVE PERCENT: 11.1 %
MCH RBC QN AUTO: 31.3 PG (ref 26–34)
MCHC RBC AUTO-ENTMCNC: 32.4 G/DL (ref 31–36)
MCV RBC AUTO: 96.7 FL (ref 80–100)
MONOCYTES ABSOLUTE: 0.9 K/UL (ref 0–1.3)
MONOCYTES RELATIVE PERCENT: 9.3 %
NEUTROPHILS ABSOLUTE: 8 K/UL (ref 1.7–7.7)
NEUTROPHILS RELATIVE PERCENT: 77.8 %
ORGANISM: ABNORMAL
PDW BLD-RTO: 14.7 % (ref 12.4–15.4)
PERFORMED ON: ABNORMAL
PLATELET # BLD: 279 K/UL (ref 135–450)
PMV BLD AUTO: 8.9 FL (ref 5–10.5)
POTASSIUM SERPL-SCNC: 5.5 MMOL/L (ref 3.5–5.1)
RBC # BLD: 2.98 M/UL (ref 4.2–5.9)
SODIUM BLD-SCNC: 135 MMOL/L (ref 136–145)
WBC # BLD: 10.2 K/UL (ref 4–11)

## 2022-10-17 PROCEDURE — 0JH83XZ INSERTION OF TUNNELED VASCULAR ACCESS DEVICE INTO ABDOMEN SUBCUTANEOUS TISSUE AND FASCIA, PERCUTANEOUS APPROACH: ICD-10-PCS | Performed by: SURGERY

## 2022-10-17 PROCEDURE — 80048 BASIC METABOLIC PNL TOTAL CA: CPT

## 2022-10-17 PROCEDURE — 2580000003 HC RX 258: Performed by: SURGERY

## 2022-10-17 PROCEDURE — 6360000002 HC RX W HCPCS: Performed by: INTERNAL MEDICINE

## 2022-10-17 PROCEDURE — 6360000002 HC RX W HCPCS: Performed by: SURGERY

## 2022-10-17 PROCEDURE — 6370000000 HC RX 637 (ALT 250 FOR IP): Performed by: SURGERY

## 2022-10-17 PROCEDURE — 36415 COLL VENOUS BLD VENIPUNCTURE: CPT

## 2022-10-17 PROCEDURE — 3700000001 HC ADD 15 MINUTES (ANESTHESIA): Performed by: SURGERY

## 2022-10-17 PROCEDURE — 87205 SMEAR GRAM STAIN: CPT

## 2022-10-17 PROCEDURE — 2580000003 HC RX 258: Performed by: NURSE PRACTITIONER

## 2022-10-17 PROCEDURE — 2500000003 HC RX 250 WO HCPCS: Performed by: NURSE ANESTHETIST, CERTIFIED REGISTERED

## 2022-10-17 PROCEDURE — 7100000001 HC PACU RECOVERY - ADDTL 15 MIN: Performed by: SURGERY

## 2022-10-17 PROCEDURE — 6370000000 HC RX 637 (ALT 250 FOR IP): Performed by: INTERNAL MEDICINE

## 2022-10-17 PROCEDURE — 6370000000 HC RX 637 (ALT 250 FOR IP): Performed by: STUDENT IN AN ORGANIZED HEALTH CARE EDUCATION/TRAINING PROGRAM

## 2022-10-17 PROCEDURE — 80074 ACUTE HEPATITIS PANEL: CPT

## 2022-10-17 PROCEDURE — 6360000002 HC RX W HCPCS: Performed by: NURSE PRACTITIONER

## 2022-10-17 PROCEDURE — APPNB30 APP NON BILLABLE TIME 0-30 MINS: Performed by: NURSE PRACTITIONER

## 2022-10-17 PROCEDURE — 2500000003 HC RX 250 WO HCPCS: Performed by: SURGERY

## 2022-10-17 PROCEDURE — 85025 COMPLETE CBC W/AUTO DIFF WBC: CPT

## 2022-10-17 PROCEDURE — 36558 INSERT TUNNELED CV CATH: CPT | Performed by: SURGERY

## 2022-10-17 PROCEDURE — A4217 STERILE WATER/SALINE, 500 ML: HCPCS | Performed by: SURGERY

## 2022-10-17 PROCEDURE — 3600000012 HC SURGERY LEVEL 2 ADDTL 15MIN: Performed by: SURGERY

## 2022-10-17 PROCEDURE — 02HV33Z INSERTION OF INFUSION DEVICE INTO SUPERIOR VENA CAVA, PERCUTANEOUS APPROACH: ICD-10-PCS | Performed by: SURGERY

## 2022-10-17 PROCEDURE — 1200000000 HC SEMI PRIVATE

## 2022-10-17 PROCEDURE — C1750 CATH, HEMODIALYSIS,LONG-TERM: HCPCS | Performed by: SURGERY

## 2022-10-17 PROCEDURE — 6360000002 HC RX W HCPCS: Performed by: NURSE ANESTHETIST, CERTIFIED REGISTERED

## 2022-10-17 PROCEDURE — 87102 FUNGUS ISOLATION CULTURE: CPT

## 2022-10-17 PROCEDURE — 71045 X-RAY EXAM CHEST 1 VIEW: CPT

## 2022-10-17 PROCEDURE — 77001 FLUOROGUIDE FOR VEIN DEVICE: CPT

## 2022-10-17 PROCEDURE — 2500000003 HC RX 250 WO HCPCS: Performed by: STUDENT IN AN ORGANIZED HEALTH CARE EDUCATION/TRAINING PROGRAM

## 2022-10-17 PROCEDURE — 6370000000 HC RX 637 (ALT 250 FOR IP): Performed by: NURSE PRACTITIONER

## 2022-10-17 PROCEDURE — 9990000010 HC NO CHARGE VISIT

## 2022-10-17 PROCEDURE — 3600000002 HC SURGERY LEVEL 2 BASE: Performed by: SURGERY

## 2022-10-17 PROCEDURE — 0WPG33Z REMOVAL OF INFUSION DEVICE FROM PERITONEAL CAVITY, PERCUTANEOUS APPROACH: ICD-10-PCS | Performed by: SURGERY

## 2022-10-17 PROCEDURE — 2709999900 HC NON-CHARGEABLE SUPPLY: Performed by: SURGERY

## 2022-10-17 PROCEDURE — 49422 REMOVE TUNNELED IP CATH: CPT | Performed by: SURGERY

## 2022-10-17 PROCEDURE — 3700000000 HC ANESTHESIA ATTENDED CARE: Performed by: SURGERY

## 2022-10-17 PROCEDURE — 99223 1ST HOSP IP/OBS HIGH 75: CPT | Performed by: INTERNAL MEDICINE

## 2022-10-17 PROCEDURE — 7100000000 HC PACU RECOVERY - FIRST 15 MIN: Performed by: SURGERY

## 2022-10-17 PROCEDURE — 87070 CULTURE OTHR SPECIMN AEROBIC: CPT

## 2022-10-17 PROCEDURE — 86706 HEP B SURFACE ANTIBODY: CPT

## 2022-10-17 PROCEDURE — 3209999900 FLUORO FOR SURGICAL PROCEDURES

## 2022-10-17 DEVICE — 15.5F X 28CM HEMO-FLOW®XF DOUBLE LUMEN CATHETER FULLDOUBLE SET (CUFF 23CM FROM TIP)SET (CUF
Type: IMPLANTABLE DEVICE | Site: SUBCLAVIAN | Status: FUNCTIONAL
Brand: MEDCOMP HEMO-FLOW DOUBLE LUMEN CATHETERMEDCOMP HEMO-FLOW DOUBLE LUMEN CATHETER

## 2022-10-17 RX ORDER — ROCURONIUM BROMIDE 10 MG/ML
INJECTION, SOLUTION INTRAVENOUS PRN
Status: DISCONTINUED | OUTPATIENT
Start: 2022-10-17 | End: 2022-10-17 | Stop reason: SDUPTHER

## 2022-10-17 RX ORDER — GLYCOPYRROLATE 0.2 MG/ML
INJECTION INTRAMUSCULAR; INTRAVENOUS PRN
Status: DISCONTINUED | OUTPATIENT
Start: 2022-10-17 | End: 2022-10-17 | Stop reason: SDUPTHER

## 2022-10-17 RX ORDER — FENTANYL CITRATE 50 UG/ML
50 INJECTION, SOLUTION INTRAMUSCULAR; INTRAVENOUS EVERY 5 MIN PRN
Status: DISCONTINUED | OUTPATIENT
Start: 2022-10-17 | End: 2022-10-17 | Stop reason: HOSPADM

## 2022-10-17 RX ORDER — SODIUM CHLORIDE 9 MG/ML
INJECTION, SOLUTION INTRAVENOUS CONTINUOUS
Status: DISCONTINUED | OUTPATIENT
Start: 2022-10-17 | End: 2022-10-17 | Stop reason: ALTCHOICE

## 2022-10-17 RX ORDER — PROCHLORPERAZINE EDISYLATE 5 MG/ML
5 INJECTION INTRAMUSCULAR; INTRAVENOUS
Status: DISCONTINUED | OUTPATIENT
Start: 2022-10-17 | End: 2022-10-17 | Stop reason: HOSPADM

## 2022-10-17 RX ORDER — FLUCONAZOLE 2 MG/ML
200 INJECTION, SOLUTION INTRAVENOUS EVERY 24 HOURS
Status: DISCONTINUED | OUTPATIENT
Start: 2022-10-17 | End: 2022-10-18

## 2022-10-17 RX ORDER — SODIUM CHLORIDE 9 MG/ML
INJECTION, SOLUTION INTRAVENOUS PRN
Status: DISCONTINUED | OUTPATIENT
Start: 2022-10-17 | End: 2022-10-17 | Stop reason: HOSPADM

## 2022-10-17 RX ORDER — SODIUM CHLORIDE 0.9 % (FLUSH) 0.9 %
5-40 SYRINGE (ML) INJECTION EVERY 12 HOURS SCHEDULED
Status: DISCONTINUED | OUTPATIENT
Start: 2022-10-17 | End: 2022-10-17 | Stop reason: ALTCHOICE

## 2022-10-17 RX ORDER — HYDRALAZINE HYDROCHLORIDE 20 MG/ML
10 INJECTION INTRAMUSCULAR; INTRAVENOUS
Status: DISCONTINUED | OUTPATIENT
Start: 2022-10-17 | End: 2022-10-17 | Stop reason: HOSPADM

## 2022-10-17 RX ORDER — LIDOCAINE HYDROCHLORIDE 10 MG/ML
INJECTION, SOLUTION INFILTRATION; PERINEURAL
Status: COMPLETED | OUTPATIENT
Start: 2022-10-17 | End: 2022-10-17

## 2022-10-17 RX ORDER — IPRATROPIUM BROMIDE AND ALBUTEROL SULFATE 2.5; .5 MG/3ML; MG/3ML
1 SOLUTION RESPIRATORY (INHALATION)
Status: DISCONTINUED | OUTPATIENT
Start: 2022-10-17 | End: 2022-10-17 | Stop reason: HOSPADM

## 2022-10-17 RX ORDER — MIDAZOLAM HYDROCHLORIDE 1 MG/ML
INJECTION INTRAMUSCULAR; INTRAVENOUS PRN
Status: DISCONTINUED | OUTPATIENT
Start: 2022-10-17 | End: 2022-10-17 | Stop reason: SDUPTHER

## 2022-10-17 RX ORDER — PROPOFOL 10 MG/ML
INJECTION, EMULSION INTRAVENOUS PRN
Status: DISCONTINUED | OUTPATIENT
Start: 2022-10-17 | End: 2022-10-17 | Stop reason: SDUPTHER

## 2022-10-17 RX ORDER — LABETALOL HYDROCHLORIDE 5 MG/ML
10 INJECTION, SOLUTION INTRAVENOUS
Status: DISCONTINUED | OUTPATIENT
Start: 2022-10-17 | End: 2022-10-17 | Stop reason: HOSPADM

## 2022-10-17 RX ORDER — HEPARIN SODIUM (PORCINE) LOCK FLUSH IV SOLN 100 UNIT/ML 100 UNIT/ML
SOLUTION INTRAVENOUS
Status: COMPLETED | OUTPATIENT
Start: 2022-10-17 | End: 2022-10-17

## 2022-10-17 RX ORDER — FENTANYL CITRATE 50 UG/ML
INJECTION, SOLUTION INTRAMUSCULAR; INTRAVENOUS PRN
Status: DISCONTINUED | OUTPATIENT
Start: 2022-10-17 | End: 2022-10-17 | Stop reason: SDUPTHER

## 2022-10-17 RX ORDER — MAGNESIUM HYDROXIDE 1200 MG/15ML
LIQUID ORAL CONTINUOUS PRN
Status: DISCONTINUED | OUTPATIENT
Start: 2022-10-17 | End: 2022-10-17 | Stop reason: HOSPADM

## 2022-10-17 RX ORDER — FENTANYL CITRATE 50 UG/ML
25 INJECTION, SOLUTION INTRAMUSCULAR; INTRAVENOUS EVERY 5 MIN PRN
Status: DISCONTINUED | OUTPATIENT
Start: 2022-10-17 | End: 2022-10-17 | Stop reason: HOSPADM

## 2022-10-17 RX ORDER — SODIUM CHLORIDE 0.9 % (FLUSH) 0.9 %
5-40 SYRINGE (ML) INJECTION PRN
Status: DISCONTINUED | OUTPATIENT
Start: 2022-10-17 | End: 2022-10-17 | Stop reason: ALTCHOICE

## 2022-10-17 RX ORDER — LABETALOL HYDROCHLORIDE 5 MG/ML
5 INJECTION, SOLUTION INTRAVENOUS
Status: COMPLETED | OUTPATIENT
Start: 2022-10-17 | End: 2022-10-17

## 2022-10-17 RX ORDER — SODIUM CHLORIDE 0.9 % (FLUSH) 0.9 %
5-40 SYRINGE (ML) INJECTION EVERY 12 HOURS SCHEDULED
Status: DISCONTINUED | OUTPATIENT
Start: 2022-10-17 | End: 2022-10-17 | Stop reason: HOSPADM

## 2022-10-17 RX ORDER — SODIUM CHLORIDE 0.9 % (FLUSH) 0.9 %
5-40 SYRINGE (ML) INJECTION PRN
Status: DISCONTINUED | OUTPATIENT
Start: 2022-10-17 | End: 2022-10-17 | Stop reason: HOSPADM

## 2022-10-17 RX ORDER — ONDANSETRON 2 MG/ML
4 INJECTION INTRAMUSCULAR; INTRAVENOUS
Status: DISCONTINUED | OUTPATIENT
Start: 2022-10-17 | End: 2022-10-17 | Stop reason: HOSPADM

## 2022-10-17 RX ORDER — LIDOCAINE HYDROCHLORIDE 20 MG/ML
INJECTION, SOLUTION EPIDURAL; INFILTRATION; INTRACAUDAL; PERINEURAL PRN
Status: DISCONTINUED | OUTPATIENT
Start: 2022-10-17 | End: 2022-10-17 | Stop reason: SDUPTHER

## 2022-10-17 RX ORDER — HEPARIN SODIUM 5000 [USP'U]/ML
5000 INJECTION, SOLUTION INTRAVENOUS; SUBCUTANEOUS EVERY 8 HOURS SCHEDULED
Status: DISCONTINUED | OUTPATIENT
Start: 2022-10-18 | End: 2022-10-19 | Stop reason: HOSPADM

## 2022-10-17 RX ORDER — SODIUM CHLORIDE 9 MG/ML
INJECTION, SOLUTION INTRAVENOUS PRN
Status: DISCONTINUED | OUTPATIENT
Start: 2022-10-17 | End: 2022-10-17 | Stop reason: ALTCHOICE

## 2022-10-17 RX ADMIN — SEVELAMER CARBONATE 800 MG: 800 TABLET, FILM COATED ORAL at 18:35

## 2022-10-17 RX ADMIN — FLUCONAZOLE 200 MG: 200 INJECTION, SOLUTION INTRAVENOUS at 15:30

## 2022-10-17 RX ADMIN — SODIUM CHLORIDE, PRESERVATIVE FREE 10 ML: 5 INJECTION INTRAVENOUS at 21:49

## 2022-10-17 RX ADMIN — NIFEDIPINE 90 MG: 90 TABLET, EXTENDED RELEASE ORAL at 08:05

## 2022-10-17 RX ADMIN — MIDAZOLAM 2 MG: 1 INJECTION INTRAMUSCULAR; INTRAVENOUS at 08:59

## 2022-10-17 RX ADMIN — FENTANYL CITRATE 50 MCG: 50 INJECTION INTRAMUSCULAR; INTRAVENOUS at 08:59

## 2022-10-17 RX ADMIN — HYDRALAZINE HYDROCHLORIDE 100 MG: 50 TABLET, FILM COATED ORAL at 14:30

## 2022-10-17 RX ADMIN — LABETALOL HYDROCHLORIDE 5 MG: 5 INJECTION INTRAVENOUS at 11:19

## 2022-10-17 RX ADMIN — LIDOCAINE HYDROCHLORIDE 25 MG: 20 INJECTION, SOLUTION EPIDURAL; INFILTRATION; INTRACAUDAL; PERINEURAL at 09:26

## 2022-10-17 RX ADMIN — GABAPENTIN 300 MG: 300 CAPSULE ORAL at 14:30

## 2022-10-17 RX ADMIN — CLONIDINE HYDROCHLORIDE 0.1 MG: 0.1 TABLET ORAL at 14:30

## 2022-10-17 RX ADMIN — LIDOCAINE HYDROCHLORIDE 50 MG: 20 INJECTION, SOLUTION EPIDURAL; INFILTRATION; INTRACAUDAL; PERINEURAL at 09:08

## 2022-10-17 RX ADMIN — SODIUM ZIRCONIUM CYCLOSILICATE 10 G: 10 POWDER, FOR SUSPENSION ORAL at 15:22

## 2022-10-17 RX ADMIN — INSULIN LISPRO 3 UNITS: 100 INJECTION, SOLUTION INTRAVENOUS; SUBCUTANEOUS at 18:37

## 2022-10-17 RX ADMIN — CLONIDINE HYDROCHLORIDE 0.1 MG: 0.1 TABLET ORAL at 08:03

## 2022-10-17 RX ADMIN — LIDOCAINE HYDROCHLORIDE 25 MG: 20 INJECTION, SOLUTION EPIDURAL; INFILTRATION; INTRACAUDAL; PERINEURAL at 09:19

## 2022-10-17 RX ADMIN — ATORVASTATIN CALCIUM 40 MG: 40 TABLET, FILM COATED ORAL at 21:48

## 2022-10-17 RX ADMIN — OXYCODONE HYDROCHLORIDE 10 MG: 10 TABLET ORAL at 04:35

## 2022-10-17 RX ADMIN — INSULIN LISPRO 2 UNITS: 100 INJECTION, SOLUTION INTRAVENOUS; SUBCUTANEOUS at 18:37

## 2022-10-17 RX ADMIN — ISOSORBIDE MONONITRATE 60 MG: 60 TABLET, EXTENDED RELEASE ORAL at 08:04

## 2022-10-17 RX ADMIN — OXYCODONE 5 MG: 5 TABLET ORAL at 12:38

## 2022-10-17 RX ADMIN — METOPROLOL TARTRATE 75 MG: 25 TABLET, FILM COATED ORAL at 21:48

## 2022-10-17 RX ADMIN — PROPOFOL 100 MG: 10 INJECTION, EMULSION INTRAVENOUS at 09:08

## 2022-10-17 RX ADMIN — GABAPENTIN 300 MG: 300 CAPSULE ORAL at 21:49

## 2022-10-17 RX ADMIN — HYDRALAZINE HYDROCHLORIDE 100 MG: 50 TABLET, FILM COATED ORAL at 21:48

## 2022-10-17 RX ADMIN — PIPERACILLIN AND TAZOBACTAM 4500 MG: 4; .5 INJECTION, POWDER, LYOPHILIZED, FOR SOLUTION INTRAVENOUS at 23:37

## 2022-10-17 RX ADMIN — GLYCOPYRROLATE 0.2 MG: 0.2 INJECTION, SOLUTION INTRAMUSCULAR; INTRAVENOUS at 08:59

## 2022-10-17 RX ADMIN — HYDRALAZINE HYDROCHLORIDE 100 MG: 50 TABLET, FILM COATED ORAL at 08:29

## 2022-10-17 RX ADMIN — SUCRALFATE 1 G: 1 TABLET ORAL at 18:35

## 2022-10-17 RX ADMIN — SODIUM CHLORIDE, PRESERVATIVE FREE 10 ML: 5 INJECTION INTRAVENOUS at 08:03

## 2022-10-17 RX ADMIN — SEVELAMER CARBONATE 800 MG: 800 TABLET, FILM COATED ORAL at 12:38

## 2022-10-17 RX ADMIN — CLONIDINE HYDROCHLORIDE 0.1 MG: 0.1 TABLET ORAL at 21:48

## 2022-10-17 RX ADMIN — METOPROLOL TARTRATE 75 MG: 25 TABLET, FILM COATED ORAL at 08:04

## 2022-10-17 RX ADMIN — HEPARIN SODIUM 5000 UNITS: 5000 INJECTION INTRAVENOUS; SUBCUTANEOUS at 07:37

## 2022-10-17 RX ADMIN — PIPERACILLIN AND TAZOBACTAM 4500 MG: 4; .5 INJECTION, POWDER, LYOPHILIZED, FOR SOLUTION INTRAVENOUS at 15:29

## 2022-10-17 RX ADMIN — SUCRALFATE 1 G: 1 TABLET ORAL at 21:48

## 2022-10-17 RX ADMIN — SUCRALFATE 1 G: 1 TABLET ORAL at 12:38

## 2022-10-17 RX ADMIN — ACETAMINOPHEN 650 MG: 325 TABLET ORAL at 08:05

## 2022-10-17 RX ADMIN — PANTOPRAZOLE SODIUM 40 MG: 40 TABLET, DELAYED RELEASE ORAL at 18:35

## 2022-10-17 RX ADMIN — INSULIN GLARGINE 10 UNITS: 100 INJECTION, SOLUTION SUBCUTANEOUS at 21:49

## 2022-10-17 RX ADMIN — NIFEDIPINE 90 MG: 90 TABLET, EXTENDED RELEASE ORAL at 18:35

## 2022-10-17 RX ADMIN — ROCURONIUM BROMIDE 50 MG: 10 INJECTION INTRAVENOUS at 09:08

## 2022-10-17 RX ADMIN — LOSARTAN POTASSIUM 100 MG: 100 TABLET, FILM COATED ORAL at 08:06

## 2022-10-17 RX ADMIN — CYCLOBENZAPRINE 10 MG: 10 TABLET, FILM COATED ORAL at 21:49

## 2022-10-17 ASSESSMENT — PAIN - FUNCTIONAL ASSESSMENT
PAIN_FUNCTIONAL_ASSESSMENT: PREVENTS OR INTERFERES SOME ACTIVE ACTIVITIES AND ADLS
PAIN_FUNCTIONAL_ASSESSMENT: 0-10

## 2022-10-17 ASSESSMENT — PAIN DESCRIPTION - PAIN TYPE: TYPE: ACUTE PAIN

## 2022-10-17 ASSESSMENT — PAIN SCALES - GENERAL
PAINLEVEL_OUTOF10: 8

## 2022-10-17 ASSESSMENT — PAIN DESCRIPTION - ONSET: ONSET: ON-GOING

## 2022-10-17 ASSESSMENT — PAIN DESCRIPTION - DESCRIPTORS
DESCRIPTORS: ACHING
DESCRIPTORS: ACHING
DESCRIPTORS: DISCOMFORT

## 2022-10-17 ASSESSMENT — PAIN DESCRIPTION - ORIENTATION
ORIENTATION: RIGHT
ORIENTATION: RIGHT

## 2022-10-17 ASSESSMENT — PAIN DESCRIPTION - LOCATION
LOCATION: NECK
LOCATION: ABDOMEN
LOCATION: ABDOMEN
LOCATION: NECK

## 2022-10-17 ASSESSMENT — ENCOUNTER SYMPTOMS
RESPIRATORY NEGATIVE: 1
ABDOMINAL PAIN: 1

## 2022-10-17 ASSESSMENT — LIFESTYLE VARIABLES: SMOKING_STATUS: 1

## 2022-10-17 NOTE — BRIEF OP NOTE
Brief Postoperative Note      Patient: Kim Ashby  YOB: 1965  MRN: [id number]    Date of Procedure: 10/17/2022    Pre-Op Diagnosis: SPONTANEOUS BACTERIAL PERITONITIS    Post-Op Diagnosis: Same       Procedure(s):  PERITONEAL DIALYSIS CATHETER REMOVAL  TUNNEL CATHETER PLACEMENT    Surgeon(s):  Yakov Powers MD    Assistant:  Surgical Assistant: Vani Phillip    Anesthesia: Choice    Estimated Blood Loss (mL): Minimal    Complications: None    Specimens:   ID Type Source Tests Collected by Time Destination   1 : 1. Removed peritoneal dialysis catheter Catheter Tip Catheter Tip CULTURE, FUNGUS, CULTURE, ANAEROBIC AND AEROBIC Yakov Powers MD 10/17/2022 5541        Implants:  Implant Name Type Inv.  Item Serial No.  Lot No. LRB No. Used Action   CATHETER HD STR 15.5X28 CM LT DL STP BASIC SET HEMO-FLOW XF - [id number] Hemodialysis catheters CATHETER HD STR 15.5X28 CM LT DL STP BASIC SET HEMO-FLOW XF  [employer]- [id number] 1 Implanted         Drains: * No LDAs found *    Findings:     Electronically signed by Yakov Powers MD on 10/17/2022 at 9:52 AM

## 2022-10-17 NOTE — CONSULTS
[facility] Vascular and Endovascular Surgery  Consultation Note    10/17/2022 7:48 AM    Chief Complaint: Abdominal Pain     Reason for Consultation: PD Catheter Removal and TDC Placement    History of Present Illness:  Patient is a 65 y.o. male who presented to the ED on 10/11/2022 with complaints of Abdominal Pain. He has a significant PMH of CHF, CVA, DM II, HTN, ESRD, PD Catheter Placement (Dr. Emily Zarco in October 2020 and Right IJ [facility] [place], operated by [facility] Placement (Dr. Emily Zarco in October 2020). The patient reports ongoing abdominal pain for the past 7-10 days. The patient denies fever or chills. A PD fluid culture was obtained which has shown to grow Candida. We have been consulted to evaluate the patient for PD Catheter Removal with [facility] [place] operated by [facility] Placement. At present, the patient is seen resting in bed, he is alert and oriented and appears to be in stable condition. Review of Systems  Review of Systems   Constitutional:  Negative for chills and fever. HENT: Negative. Respiratory: Negative. Cardiovascular: Negative. Gastrointestinal:  Positive for abdominal pain. Right Sided Abdominal Pain, Left Sided PD Catheter   Skin: Negative. Neurological:         History of CVA - Left Sided Weakness   Psychiatric/Behavioral: Negative.         Past Medical History:   Diagnosis Date    Cardiomyopathy [facility])     CHF (congestive heart failure) ([address]     CVA (cerebral infarction) 5/2015    Diabetes mellitus ([facility] [address]     Foot ulcer, left ([address] 1/14/2016    Gastroparesis     Hemodialysis patient ([facility] [address]     Hypercholesteremia     Hypertension     Kidney disease     Unspecified cerebral artery occlusion with cerebral infarction     5/15, 7/15 LEFT SIDE WEAKNESS       Past Surgical History:   Procedure Laterality Date    COLONOSCOPY N/A 5/17/2021    COLONOSCOPY POLYPECTOMY SNARE/COLD BIOPSY performed by Haylee Mckeon MD at [address] 10/29/2020    PLACEMENT OF A TUNNELED DIALYSIS CATHETER WITH FLEURO AND ULTRASOUND performed by Shelby Reyes MD at [address] N/A 10/29/2020    LAPAROSCOPIC PERITONEAL DIALYSIS CATHETER PLACEMENT WITH FLEURO AND ULTRASOUND performed by Shelby Reyes MD at [facility] 51 N/A 72/08/8758    UMBILICAL HERNIA REPAIR performed by Shelby Reyes MD at [facility] N/A 4/26/2021    ESOPHAGOGASTRODUODENOSCOPY performed by Idalia Norris MD at [address] [place] [address] N/A 9/13/2022    EGD DIAGNOSTIC ONLY performed by Samantha Gray MD at [place] [address]   Allergen Reactions    Doxazosin Nausea And Vomiting     VIOLENT VOMITTING    Coconut Flavor Rash       Social History     Socioeconomic History    Marital status:      Spouse name: Not on file    Number of children: 5    Years of education: Not on file    Highest education level: Not on file   Occupational History    Not on file   Tobacco Use    Smoking status: Some Days     Packs/day: 0.00     Years: 30.00     Pack years: 0.00     Types: Cigars, Cigarettes     Start date: 1/3/1979     Last attempt to quit: 1/16/2019     Years since quitting: 3.7    Smokeless tobacco: Never    Tobacco comments:     3 black and milds a week   Vaping Use    Vaping Use: Never used   Substance and Sexual Activity    Alcohol use: Yes     Comment: occasional    Drug use: Yes     Types: Marijuana Deadra Michel)     Comment: previously used cocaine, stopped May 2015 LAST USED MARIJUANA-NOVEMBER 2020    Sexual activity: Yes     Partners: Female   Other Topics Concern    Not on file   Social History Narrative    Lives with my spouse, cousin, daughter     Social Determinants of Health     Financial Resource Strain: Not on file   Food Insecurity: Not on file   Transportation Needs: Not on file   Physical Activity: Not on file   Stress: Not on file   Social Connections: Not on file   Intimate Partner Violence: Not on file Housing Stability: Not on file       Family History   Adopted: Yes       Vital Signs  Vitals:    10/16/22 1627 10/16/22 2052 10/16/22 2241 10/17/22 0547   BP: 108/74  133/77    Pulse: 88  89    Resp: 22 19 16    Temp: 98.2 °F (36.8 °C)  98.5 °F (36.9 °C)    TempSrc: Oral  Oral    SpO2: 97%  97%    Weight:    161 lb 13.1 oz (73.4 kg)   Height:           I/O:    Intake/Output Summary (Last 24 hours) at 10/17/2022 0748  Last data filed at 10/16/2022 1220  Gross per 24 hour   Intake 360 ml   Output --   Net 360 ml       Physical Exam:  General: no apparent distress, alert and oriented, afebrile  Psychiatric: mood and affect are within normal limits  Head/Eyes/Ears/Nose/Throat: normocephalic and atraumatic, face symmetric, normal hearing, nose - negative  Neck: normal appearance and no deformity, supple, trachea midline, carotid bruit absent bilaterally  Chest/Lungs: clear to auscultation bilaterally, non labored breathing no tachypnea, retractions or cyanosis  Cardiac: Heart regular rate and rhythm  Abdomen: soft, nondistended, and tenderness mild to the RLQ , Left Side PD Catheter - no erythema or purulence seen to surrounding exit site  Extremities: no significant edema to BLE  Vascular: extremities warm and well perfused, no signs of cyanosis or ischemia  Wounds: none  Pulses:  -R Radial: +2/4 palpable   -L Radial: +2/4 palpable  -R DP: doppler signal present  -L DP: doppler signal present  -R PT: strong doppler signal present  -L PT: strong doppler signal present      Labs  Lab Results   Component Value Date/Time    WBC 9.8 10/16/2022 05:20 AM    HGB 8.3 10/16/2022 05:20 AM    HCT 25.5 10/16/2022 05:20 AM    MCV 94.5 10/16/2022 05:20 AM     10/16/2022 05:20 AM     Lab Results   Component Value Date/Time     10/16/2022 05:20 AM    K 5.0 10/16/2022 05:20 AM    K 5.3 10/14/2022 06:04 AM     10/16/2022 05:20 AM    CO2 22 10/16/2022 05:20 AM    PHOS 4.2 09/16/2022 06:02 AM    BUN 41 10/16/2022 05:20 AM    CREATININE 6.7 10/16/2022 05:20 AM      No results found for: GLU    Scheduled Meds:    fluconazole  100 mg IntraVENous Q24H    alteplase  3 mg IntraCATHeter Once    haloperidol lactate  2 mg IntraMUSCular Once    piperacillin-tazobactam  4,500 mg IntraVENous Q12H    cloNIDine  0.1 mg Oral TID    lidocaine  1 patch TransDERmal Nightly    insulin glargine  10 Units SubCUTAneous Nightly    gentamicin   Topical Daily    epoetin robert-epbx  10,000 Units SubCUTAneous Once per day on Mon Wed Fri    atorvastatin  40 mg Oral Nightly    calcitRIOL  0.5 mcg Oral Daily    aspirin  81 mg Oral Daily    gabapentin  300 mg Oral TID    hydrALAZINE  100 mg Oral TID    insulin lispro  3 Units SubCUTAneous TID WC    isosorbide mononitrate  60 mg Oral Daily    losartan  100 mg Oral Daily    metoprolol tartrate  75 mg Oral BID    NIFEdipine  90 mg Oral BID    pantoprazole  40 mg Oral BID AC    sodium bicarbonate  650 mg Oral Daily    spironolactone  100 mg Oral Daily    sucralfate  1 g Oral 4x Daily    sevelamer  800 mg Oral TID WC    insulin lispro  0-8 Units SubCUTAneous TID WC    insulin lispro  0-4 Units SubCUTAneous Nightly    sodium chloride flush  5-40 mL IntraVENous 2 times per day    [Held by provider] heparin (porcine)  5,000 Units SubCUTAneous 3 times per day     Continuous Infusions:    sodium chloride      dextrose      sodium chloride 250 mL (10/16/22 0959)       Imaging:   CT Head - 10/15/2022  Impression  No acute intracranial abnormality. Assessment:  -Abdominal Pan - RLQ  -LLQ PD Catheter - Placed in 2020 by Dr. Aquilino Mcbride  -History of Right IJ TDC - placed by Dr. Aquilino Mcbride in 2020  -Peritoneal Fluid Culture growing Candida    Plan:   -NPO  -PD Catheter Removal and [facility] Placement with Dr. Aquilino Mcbride in [address] today    All pertinent information and plan of care discussed with Dr. Reema Guerrier. All questions and concerns were addressed with the patient.  I have discussed the above stated plan with the patient and the nurse. The patient verbalized understanding and agreed with the plan. Thank you for allowing to us to participate in the care of Mirella Fung      Electronically signed by ROQUE Keenan - CNP on 10/17/2022 at 7:48 AM  Pt seen and examined. for DIAGNOSIS of fungal peritonitis  Agree with ROQUE Keenan's note. Labs reviewed. Vitals   Vitals:    10/16/22 2052 10/16/22 2241 10/17/22 0547 10/17/22 0745   BP:  133/77  (!) 159/85   Pulse:  89  90   Resp: 19 16  16   Temp:  98.5 °F (36.9 °C)  98 °F (36.7 °C)   TempSrc:  Oral  Oral   SpO2:  97%  98%   Weight:   161 lb 13.1 oz (73.4 kg)    Height:           Lower ab tender , site ok thin, neck ok for line,small  well healed incision . Chest clear heart RRR .+2 fem and pop pulses.  Marked for emergent operation this AM

## 2022-10-17 NOTE — CARE COORDINATION
Spoke to Georges Zurita [phone] with [facility], tells me they are working on securing an outpatient HD chair time & location and will let us know when complete. Electronically signed by Madelin Rojas RN Case Management on 10/17/2022 at 1:56 PM      Outpatient HD set up at [facility] TTS 10:40am chair time. Patient should arrive 15 minutes prior to scheduled chair time. Earliest day facility can start is Thursday. Will inform patient of the above.   Electronically signed by Madelin Rojas RN Case Management [phone] on 10/17/2022 at 2:28 PM

## 2022-10-17 NOTE — PROGRESS NOTES
Physical Therapy  Attempt Note    Name:Abel Emmanuel  :1965  OKZ:[id number]  Room: OR/NONE    Date of Service: 10/17/2022      Patient chart reviewed. PT attempted to see for PT tx at this time/date. PD Catheter Removal and TDC Placement with Dr. Alex Magallanes in [address] [place] [address] today. Will need new orders when patient medically cleared for mobility assessment.                 Electronically signed by Candie Wray PT on 10/17/2022 at 9:50 AM          Aliya Scruggs PT, DPT [id number] 10/17/22 9:52 AM

## 2022-10-17 NOTE — PLAN OF CARE
Problem: Safety - Adult  Goal: Free from fall injury  10/17/2022 1549 by Jefe Blackburn RN  Outcome: Progressing  10/17/2022 0548 by Hector Esquivel RN  Outcome: Progressing     Problem: Pain  Goal: Verbalizes/displays adequate comfort level or baseline comfort level  10/17/2022 1549 by Jefe Blackburn RN  Outcome: Progressing  10/17/2022 0548 by Hector Esquivel RN  Outcome: Progressing     Problem: Chronic Conditions and Co-morbidities  Goal: Patient's chronic conditions and co-morbidity symptoms are monitored and maintained or improved  Outcome: Progressing

## 2022-10-17 NOTE — PROGRESS NOTES
Occupational Therapy    Savannah Ayala  [id number]  [id number]      Attempted to see for OT follow up session this am. Patient is off the floor at this time for PD Catheter Removal and [facility] Placement with Dr. Charleen Solorzano in [address] today. Discussed with OTR will need new orders when medically cleared for mobility. If discharged prior to next OT session please see last daily note for discharge status.     Electronically signed by Chelsie Ortega [id number] on 10/17/2022 at 11:55 AM

## 2022-10-17 NOTE — PROGRESS NOTES
Hospitalist Progress Note      PCP: ROQUE Liao - CNP    Date of Admission: 10/11/2022    Chief Complaint: abdominal pain     Hospital Course:    65 y.o. male with PMHx of DM, CVA, CHF, ESRD on PD, HTN and HLD presented to [facility] with 5 day history of abdominal pain during dialysis exchange with subsequent cloudy fluid. Patient admitted with PD related peritonitis. Fungal culture grew Candida from peritoneal fluid. PD catheter removed 10/17 and HD line inserted. Subjective:   Patient very emotional today, had always worried about needing to go on hemodialysis and expressed all frustration.    Patient was counseled, he can go back to peritoneal dialysis in 6 months    Medications:  Reviewed    Infusion Medications    sodium chloride      dextrose      sodium chloride 10 mL/hr at 10/17/22 0902     Scheduled Medications    [START ON 10/18/2022] heparin (porcine)  5,000 Units SubCUTAneous 3 times per day    fluconazole  200 mg IntraVENous Q24H    sodium zirconium cyclosilicate  10 g Oral Once    alteplase  3 mg IntraCATHeter Once    haloperidol lactate  2 mg IntraMUSCular Once    piperacillin-tazobactam  4,500 mg IntraVENous Q12H    cloNIDine  0.1 mg Oral TID    lidocaine  1 patch TransDERmal Nightly    insulin glargine  10 Units SubCUTAneous Nightly    gentamicin   Topical Daily    epoetin robert-epbx  10,000 Units SubCUTAneous Once per day on Mon Wed Fri    atorvastatin  40 mg Oral Nightly    calcitRIOL  0.5 mcg Oral Daily    aspirin  81 mg Oral Daily    gabapentin  300 mg Oral TID    hydrALAZINE  100 mg Oral TID    insulin lispro  3 Units SubCUTAneous TID WC    isosorbide mononitrate  60 mg Oral Daily    losartan  100 mg Oral Daily    metoprolol tartrate  75 mg Oral BID    NIFEdipine  90 mg Oral BID    pantoprazole  40 mg Oral BID AC    sodium bicarbonate  650 mg Oral Daily    spironolactone  100 mg Oral Daily    sucralfate  1 g Oral 4x Daily    sevelamer  800 mg Oral TID WC    insulin lispro  0-8 Units SubCUTAneous TID     insulin lispro  0-4 Units SubCUTAneous Nightly    sodium chloride flush  5-40 mL IntraVENous 2 times per day     PRN Meds: cyclobenzaprine, oxyCODONE **OR** oxyCODONE, sodium chloride, glucose, dextrose bolus **OR** dextrose bolus, glucagon (rDNA), dextrose, sodium chloride flush, sodium chloride, ondansetron **OR** ondansetron, polyethylene glycol, acetaminophen **OR** acetaminophen      Intake/Output Summary (Last 24 hours) at 10/17/2022 1419  Last data filed at 10/17/2022 1144  Gross per 24 hour   Intake --   Output 275 ml   Net -275 ml         Physical Exam Performed:    BP (!) 182/100   Pulse 99   Temp 97.3 °F (36.3 °C) (Oral)   Resp 12   Ht 6' 2\" (1.88 m)   Wt 161 lb 13.1 oz (73.4 kg)   SpO2 96%   BMI 20.78 kg/m²     General appearance: No apparent distress, appears stated age and cooperative. HEENT: Pupils equal, round, and reactive to light. Conjunctivae/corneas clear. Neck: Supple, with full range of motion. No jugular venous distention. Trachea midline. Respiratory:  Normal respiratory effort. Clear to auscultation, bilaterally without Rales/Wheezes/Rhonchi. Cardiovascular: Regular rate and rhythm with normal S1/S2 without murmurs, rubs or gallops. Abdomen: Abdominal tenderness noted   catheter noted   musculoskeletal: No clubbing, cyanosis or edema bilaterally. Full range of motion without deformity. Skin: Skin color, texture, turgor normal.  No rashes or lesions. Neurologic:  Neurovascularly intact without any focal sensory/motor deficits.  Cranial nerves: II-XII intact, grossly non-focal.  Psychiatric: Alert and oriented, thought content appropriate, normal insight  Capillary Refill: Brisk, 3 seconds, normal   Peripheral Pulses: +2 palpable, equal bilaterally       Labs:   Recent Labs     10/15/22  1022 10/16/22  0520 10/17/22  0707   WBC 13.7* 9.8 10.2   HGB 8.9* 8.3* 9.3*   HCT 27.0* 25.5* 28.8*    264 279       Recent Labs 10/15/22  1022 10/16/22  0520 10/17/22  0707   * 134* 135*   K 4.9 5.0 5.5*    101 103   CO2 22 22 20*   BUN 38* 41* 37*   CREATININE 6.4* 6.7* 7.5*   CALCIUM 8.6 8.6 8.7       No results for input(s): AST, ALT, BILIDIR, BILITOT, ALKPHOS in the last 72 hours. No results for input(s): INR in the last 72 hours. No results for input(s): Neli Macleod in the last 72 hours. Urinalysis:      Lab Results   Component Value Date/Time    NITRU Negative 10/12/2022 10:45 AM    WBCUA 1 10/12/2022 10:45 AM    BACTERIA None Seen 10/12/2022 10:45 AM    RBCUA 1 10/12/2022 10:45 AM    BLOODU Negative 10/12/2022 10:45 AM    SPECGRAV 1.011 10/12/2022 10:45 AM    GLUCOSEU Negative 10/12/2022 10:45 AM    GLUCOSEU 250 12/14/2010 02:50 PM       Radiology:  XR CHEST PORTABLE   Final Result   1. Right-sided central venous catheter with the tip in the superior vena   cava. 2.  Stable cardiomegaly due to a moderate-sized pericardial effusion. 3.  Mild bibasilar airspace opacities favored to represent atelectasis. FL VASCULAR ACCESS GUIDANCE   Final Result      FLUORO FOR SURGICAL PROCEDURES   Final Result      CT HEAD WO CONTRAST   Final Result   No acute intracranial abnormality. CT ABDOMEN PELVIS WO CONTRAST Additional Contrast? None   Final Result   No definite abnormality identified to explain cloudy peritoneal dialysate. Dialysate measures near water density on the current examination. Several bubbles of free intraperitoneal gas which or presumably introduced   during dialysis. Please note that hollow visceral perforation cannot be   excluded. Mural thickening of the large bowel, mild-to-moderate in degree. That may be   secondary to under distension, but colitis must be considered as well. Polycystic kidney disease. Note that some of the renal lesions are   indeterminate in terms of density, and therefore a solid lesion cannot be   excluded. Assessment/Plan:    Active Hospital Problems    Diagnosis     Peritonitis ([facility] [address] [K65.9]      Priority: Medium    SBP (spontaneous bacterial peritonitis) ([facility] [address] [K65.2]      Priority: Medium     Acute metabolic encephalopathy. (Resolved)   Etiology unclear however is likely due to cefepime, sepsis is improving and timing would be most treating with drug related side effects. CT head negative    ESRD on PD, transition to HD  PD related peritonitis  Patient presented to emergency with abdominal pain during peritoneal dialysis, patient also reported cloudy fluid. Upon presentation patient was vitally stable, afebrile  Peritoneal fluid analysis with 10,000 nucleated cells. PD cultures grew Candida. PD dialysis catheter removed 10/17, HD line inserted  Plan  -Appreciate nephrology input  -Consult infectious disease for duration of antifungal medication.   -Will need outpatient dialysis slot    Diabetes mellitus  Blood sugar better controlled  Continue on reduced dose of Lantus ( 22--> 18--> 10). Continue on sliding scale. Acute on chronic anemia  Baseline hemoglobin 7-8, hemoglobin today 8.1  Patient presented with hemoglobin of 6.3  Received 1 unit of PRBC 10/12  Anemia worked up in admission in September 2022  Will consult GI if hemoglobin drops again    Hx CVA  - with left side weakness  - continue asa, statin     CHF  - appears euvolemic  - continue aldactone, losartan, metoprolol and hydralazine  - daily wts  - I/O's      DVT Prophylaxis: heparin   Diet: ADULT DIET;  Regular; Low Potassium (Less than 3000 mg/day)  Code Status: Full Code  PT/OT Eval Status: following     Dispo - pending clinical improvement      Caesar Marin MD

## 2022-10-17 NOTE — ANESTHESIA POSTPROCEDURE EVALUATION
Department of Anesthesiology  Postprocedure Note    Patient: Sierra Bautista  MRN: [id number]  YOB: 1965  Date of evaluation: 10/17/2022      Procedure Summary     Date: 10/17/22 Room / Location: [address]    Anesthesia Start: 0902 Anesthesia Stop: 1010    Procedures:       PERITONEAL DIALYSIS CATHETER REMOVAL (Abdomen)      TUNNEL CATHETER PLACEMENT (Chest) Diagnosis:       Spontaneous bacterial peritonitis ([address]      (SPONTANEOUS BACTERIAL PERITONITIS)    Surgeons: Jon Smith MD Responsible Provider: Herminia Mccray MD    Anesthesia Type: General ASA Status: 3          Anesthesia Type: General    Kasey Phase I: Kasey Score: 8    Kasey Phase II:        Anesthesia Post Evaluation    Patient location during evaluation: PACU  Patient participation: complete - patient participated  Level of consciousness: awake and alert  Airway patency: patent  Nausea & Vomiting: no nausea and no vomiting  Complications: no  Cardiovascular status: hemodynamically stable, blood pressure returned to baseline and hypertensive  Respiratory status: spontaneous ventilation, nonlabored ventilation and room air  Hydration status: stable  Comments: 5mg labetalol given. Dialysis planned later today as inpatient.

## 2022-10-17 NOTE — CARE COORDINATION
Case management follow up. Reviewed therapy notes, PT notes recs IP rehab vs OT home w/ assist, AM PAC 20/24. Patient likely not appropriate based on medical diagnosis & insurance for ARU. Notified therapy. Patient currently off unit for HD catheter placement. Patient has peritonitis so will have to do HD for 3-6 months before resuming PD. Left message w/ [facility] to see if I need to do anything with getting an outpatient HD chair time, await return call. Will continue to follow for discharge needs.   Electronically signed by Lynnette Marley RN Case Management [phone] on 10/17/2022 at 11:52 AM

## 2022-10-17 NOTE — PLAN OF CARE
Problem: Discharge Planning  Goal: Discharge to home or other facility with appropriate resources  Outcome: Progressing  Case mgmt involved to assess appropriate level of care and resources     Problem: Safety - Adult  Goal: Free from fall injury  Outcome: Progressing  Fall risk assessment completed every shift. All precautions in place. Patient has call light with in reach at all times. Room free from clutter. Patient aware to call for assistance when getting up. Problem: ABCDS Injury Assessment  Goal: Absence of physical injury  Outcome: Progressing  Patient free of physical injury     Problem: Confusion  Goal: Confusion, delirium, dementia, or psychosis is improved or at baseline  Description: INTERVENTIONS:  1. Assess for possible contributors to thought disturbance, including medications, impaired vision or hearing, underlying metabolic abnormalities, dehydration, psychiatric diagnoses, and notify attending LIP  2. [place] high risk fall precautions, as indicated  3. Provide frequent short contacts to provide reality reorientation, refocusing and direction  4. Decrease environmental stimuli, including noise as appropriate  5. Monitor and intervene to maintain adequate nutrition, hydration, elimination, sleep and activity  6. If unable to ensure safety without constant attention obtain sitter and review sitter guidelines with assigned personnel  7. Initiate Psychosocial CNS and Spiritual Care consult, as indicated  Outcome: Progressing     Problem: Pain  Goal: Verbalizes/displays adequate comfort level or baseline comfort level  Outcome: Progressing  Pain being managed with PRN analgesics per MD orders. Patient able to express presence and absence of pain and rate pain appropriately using numerical scale.

## 2022-10-17 NOTE — OP NOTE
[address] [place]                                OPERATIVE REPORT    PATIENT NAME: Rodrick Garcia                    :        1965  MED REC NO:   [id number]                          ROOM:       5253  ACCOUNT NO:   [de-identified]                           ADMIT DATE: 10/11/2022  PROVIDER:     Yakov Powers MD    DATE OF PROCEDURE:  10/17/2022    PREOPERATIVE DIAGNOSIS:  Spontaneous candidal infection of the abdomen,  peritonitis with PD catheter in place. POSTOPERATIVE DIAGNOSIS:  Spontaneous candidal infection of the abdomen,  peritonitis with PD catheter in place. OPERATION PERFORMED:  Placement of right tunneled dialysis catheter, 28  cm cuffed, dual lumen and then removal of infection PD catheter, tip  sent for culture. SURGEON:  Yakov Powers MD    ASSISTANT:  Kiera Bruce. ESTIMATED BLOOD LOSS:  Minimal.    INDICATIONS:  This is a very pleasant 16-year-old black male born on  1965. I placed a peritoneal dialysis catheter and fixed an  umbilical hernia in  and has been doing well until about a week  and a half or so, started having some abdominal pain, hurt to take deep  breaths, had a feeling like he had to have a bowel movement, but would  not produce much and so he was admitted to the hospital on 10/11/2022. We were consulted over the weekend and saw him today. He had grown a  candida parapsilosis in his peritoneal fluid. In any case, he has been  treated with Diflucan and is here today to get the peritoneal catheter  removed, and place a tunnel catheter so that he can have hemodialysis  while his abdomen heals. His doctor is Landon Sarmiento, ANDREA and  nephrologist who saw him yesterday was Dr. Heidi Vee and Sarah Bonner. OPERATIVE PROCEDURE:  The patient was placed on the table in supine  position prepped and draped in usual sterile fashion.   Before prepping  and draping, we looked at the jugular vein. The right jugular vein  still looked open and compressible, although I downsized and could see  in the neck. So, we did prep both sides, but planned to use a right  side again. Then, we were able to use the ultrasound guidance to put  the needle into the jugular vein on the first stick, got good blood  return, slipped the wire in. Position looked good, checked with fluoro. Then, I made 1 cm incision on the neck including the wire and used the  small and then the medium dilator to follow the tract through the subcu  and into the vein. Some resistance from the old scar tissue, but it  went well. I left this dilator in place and we made an incision on his  chest nearby his old one, infiltrated with local there and along the  tunnel, and then tunneled up with the new catheter from the chest to the  neck, pulled the catheter through bearing the cuff and the subcu after  dampening it with saline. We then put the large dilator and peel-away  sheath, exchanged over the wire, and into position. Removed the  dilator, removed the guidewire, and put the new catheter down through  the peel-away sheath and peeled it away. Tucked it in and checked with  fluoro. It was in good position, aspirated and flushed well. So, final  flush with heparinized saline was used, 2 mL in each and then they were  clamped and cut. The neck was closed with 3-0 and 4-0 Vicryl followed  by Skin Affix glue. The catheter was secured to the chest with two 3-0  nylon sutures. A blue disc and sterile dressing was applied and then we  moved down to the abdomen, reprepped and draped, cut the PD catheter  short with a sterile hemostat and scissors, and prepped it into the  field.   We then could feel the outer cuff very close to the exit site  and made our incision closer to the inner cuff of the 8-mm incision and  made about a 2.5 cm incision after infiltrating with local.  I carried  down through skin subcutaneous fatty tissue, identified the catheter,  and lifted it up, followed proximally to the exit site taking down the  cuff from its attachments with the Bovie. We then divided the catheter  and pulled it out to remove it. We then followed the medial portion of  the catheter down to the fascia, freed this up circumferentially and  then before freeing it completely, we put in a pursestring of 2-0 PDS  and removed the catheter, cut the tip off for culture, and the catheter  was removed in its entirety. We then tied the pursestring and then  added a figure-of-eight stitch over the top to secure the defect in the  abdominal wall. I then closed with 3-0 and 4-0 Vicryl and Skin Affix  glue on this incision. I left the exit site open to drain, put some  gauze over it, and tape. The patient tolerated the procedure well and  sent to the recovery room in stable condition. Chest x-ray will be  ordered for recovery and then they can use the line for dialysis today.         Elizabeth Hart MD    D: 10/17/2022 10:03:02       T: 10/17/2022 16:09:25     [email or web address]  Job#: [id number]     Doc#: [id number]    CC:

## 2022-10-17 NOTE — PROGRESS NOTES
[facility]                                               Progress note    Summary:   Kim Ashby is being seen by nephrology for ESRD. Admitted with peritonitis. Interval History  Seen at bedside. He is very emotional but awake and alert   PD catheter was removed and has a tunneled line now. Wife at bedside. /100   No SOB or edema   Making urine   No pain right now.  cc today   PD fluid cx + candida parapsilosis. Labs reviewed. K 5.5   Bicarb 20   Ca 8.7   Bun 37         Plan:   Continue retacrit 10 k units TIW. On fluconazole 200 mg daily   Still on zosyn , will ask ID for antimicrobial recs and duration of tx   HD tomorrow. One dose lokelma   Increase clonidine to 0.2 mg TID       Honey Dejesus MD  [facility]  Office: [phone]    Assessment:   Peritonitis  Likely secondary to lack in sterile technique and standing sewage issue at his home recently   PD fluid was cloudy, pink, 10,000 nucleated cells consistent with peritonitis. Fluid culture so far is no growth to date  + candida     Hyperkalemia     ESRD  On peritoneal dialysis  Home prescription: 5 times weekly   79.5  3 x 2500 fill 8.5 hrs 2.5 hr dwell on cycler at night. Usually uses 1/2 green and 1/2 yellow    HTN  BP is acceptable. Difficult to control as an outpatient. Electrolytes  No acute issues. Anemia  Recent GIB  Anemia due to renal failure as well             ROS:   Positives Listed Bold. All other remaining systems are negative. Constitutional:  fever, chills, weakness, weight change, fatigue,      Skin:  rash, pruritus, hair loss, bruising, dry skin, petechiae. Head, Face, Neck   headaches, swelling,  cervical adenopathy.      Respiratory: shortness of breath, cough, or wheezing  Cardiovascular: chest pain, palpitations, dizzy, edema  Gastrointestinal: nausea, vomiting, diarrhea, constipation,belly pain    Yellow skin, blood in stool  Musculoskeletal:  back pain, muscle weakness, gait problems,       joint pain or swelling. Genitourinary:  dysuria, poor urine flow, flank pain, blood in urine  Neurologic:  vertigo, TIA'S, syncope, seizures, focal weakness  Psychosocial:  insomnia, anxiety, or depression. Additional positive findings: -     PMH:   Past medical history, surgical history, social history, family history are reviewed and updated as appropriate. Reviewed current medication list.   Allergies reviewed and updated as needed. PE:   Vitals:    10/17/22 1144   BP: (!) 182/100   Pulse: 99   Resp: 12   Temp: 97.3 °F (36.3 °C)   SpO2: 96%       General appearance:  in NAD, fully alert and oriented. Comfortable. HEENT: EOM intact, no icterus. Trachea is midline. Neck : No masses, appears symmetrical, no JVD  Respiratory: Respiratory effort appears normal, bilateral equal chest rise, no wheeze, no crackles   Cardiovascular: Ausculation shows RRR no edema  Abdomen: mild tender to palpation   Musculoskeletal:  Joints with no swelling or deformity. Skin:no rashes, ulcers, induration, no jaundice. Neuro: face symmetric, no focal deficits. Appropriate responses.        Lab Results   Component Value Date    CREATININE 7.5 (HH) 10/17/2022    BUN 37 (H) 10/17/2022     (L) 10/17/2022    K 5.5 (H) 10/17/2022     10/17/2022    CO2 20 (L) 10/17/2022      Lab Results   Component Value Date    WBC 10.2 10/17/2022    HGB 9.3 (L) 10/17/2022    HCT 28.8 (L) 10/17/2022    MCV 96.7 10/17/2022     10/17/2022     Lab Results   Component Value Date    PTH 49.8 02/01/2019    CALCIUM 8.7 10/17/2022    PHOS 4.2 09/16/2022

## 2022-10-17 NOTE — CONSULTS
Infectious Diseases Inpatient Consult Note      Reason for Consult:  PD catheter related Peritonitis and Candida infection     Requesting Physician:  Lolly Collins      Primary Care Physician:  ROQUE Thomas CNP    History Obtained From:  [facility] and Patient     CHIEF COMPLAINT:     Chief Complaint   Patient presents with    Abdominal Pain     Patient arrived via ems from home. Dialysis pt, last treatment yesterday. Complaining of mid abd pain today and blood in his urine. HISTORY OF PRESENT ILLNESS:  65 y.o. man with a significant history for end-stage renal disease on peritoneal dialysis, cardiomyopathy, congestive heart failure, CVA, gastroparesis, admitted to hospital secondary to abdominal pain as well as PD fluid was abnormal and pink. He has been on PD dialysis for the past 2 years. No previous PD related infections per patient. On presentation PD fluid analysis was grossly abnormal with elevated WBC count. PD fluid culture positive for Candida parapsilosis. He had multiple PD fluid specimens positive for yeast.  He underwent PD catheter removal and HD line placement today by Dr. Sandra Garcia. Given the need for antifungal treatment we are consulted for recommendations. T-max 100.7 since admission, WBC elevated to 13.3.         Past Medical History:    Past Medical History:   Diagnosis Date    Cardiomyopathy [facility])     CHF (congestive heart failure) (HCC)     CVA (cerebral infarction) 5/2015    Diabetes mellitus ([facility]     Foot ulcer, left ([facility] 1/14/2016    Gastroparesis     Hemodialysis patient ([facility]     Hypercholesteremia     Hypertension     Kidney disease     Unspecified cerebral artery occlusion with cerebral infarction     5/15, 7/15 LEFT SIDE WEAKNESS       Past Surgical History:    Past Surgical History:   Procedure Laterality Date    CATHETER INSERTION N/A 10/17/2022    TUNNEL CATHETER PLACEMENT performed by Ana Newell MD at [address] [place] [address] N/A 5/17/2021 COLONOSCOPY POLYPECTOMY SNARE/COLD BIOPSY performed by Jamshid Mendoza MD at [address] [place] [address] N/A 10/17/2022    PERITONEAL DIALYSIS CATHETER REMOVAL performed by Jesenia Cifuentes MD at [facility] N/A 10/29/2020    PLACEMENT OF A TUNNELED DIALYSIS CATHETER WITH FLEURO AND ULTRASOUND performed by Jesenia Cifuentes MD at [address] N/A 10/29/2020    LAPAROSCOPIC PERITONEAL DIALYSIS CATHETER PLACEMENT WITH FLEURO AND ULTRASOUND performed by Jesenia Cifuentes MD at [address] N/A 80/52/5658    UMBILICAL HERNIA REPAIR performed by Jesenia Cifuentes MD at [place] [address]. 4/26/2021    ESOPHAGOGASTRODUODENOSCOPY performed by Jamshid Mendoza MD at [place] [address] 9/13/2022    EGD DIAGNOSTIC ONLY performed by Zenon Dejesus MD at [facility]       Current Medications:    Outpatient Medications Marked as Taking for the 10/11/22 encounter [facility] Encounter)   Medication Sig Dispense Refill    spironolactone (ALDACTONE) 100 MG tablet Take 100 mg by mouth daily         Allergies:  Doxazosin and Coconut flavor    Immunizations :   Immunization History   Administered Date(s) Administered    COVID-19, PFIZER GRAY top, DO NOT Dilute, (age 15 y+), IM, 30 mcg/0.3 mL 06/04/2022    COVID-19, PFIZER PURPLE top, DILUTE for use, (age 15 y+), 30mcg/0.3mL 06/23/2021, 07/19/2021    Hepatitis A Adult (Havrix, Vaqta) 03/10/2021, 09/23/2021    Hepatitis B 05/06/2022    Hepatitis B Adult (Heplisav-b) 05/06/2022    Hepatitis B Adult (Recombivax HB) 11/05/2020    Hepatitis B vaccine 11/05/2020    Influenza Vaccine, unspecified formulation 12/05/2013    Influenza Virus Vaccine 12/05/2013, 12/04/2015, 11/05/2020, 10/11/2021    Influenza, FLUARIX, FLULAVAL, FLUZONE (age 10 mo+) AND AFLURIA, (age 1 y+), PF, 0.5mL 10/15/2018, 11/05/2020    Influenza, Triv, 3 Years and older, IM, PF (Afluria 5yrs and older) 10/11/2021    Pneumococcal Conjugate 13-valent ([id number]) 11/10/2020    Pneumococcal Conjugate Vaccine 02/13/2015    Pneumococcal Polysaccharide ([id number]) 11/02/2009, 03/10/2021    Tdap (Boostrix, Adacel) 08/27/2021    Zoster Recombinant (Shingrix) 03/10/2021, 08/27/2021         Social History:    Social History     Tobacco Use    Smoking status: Some Days     Packs/day: 0.00     Years: 30.00     Pack years: 0.00     Types: Cigars, Cigarettes     Start date: 1/3/1979     Last attempt to quit: 1/16/2019     Years since quitting: 3.7    Smokeless tobacco: Never    Tobacco comments:     3 black and milds a week   Vaping Use    Vaping Use: Never used   Substance Use Topics    Alcohol use: Yes     Comment: occasional    Drug use: Yes     Types: Marijuana Donana Garcia)     Comment: previously used cocaine, stopped May 2015 LAST USED MARIJUANA-NOVEMBER 2020     Social History     Tobacco Use   Smoking Status Some Days    Packs/day: 0.00    Years: 30.00    Pack years: 0.00    Types: Cigars, Cigarettes    Start date: 1/3/1979    Last attempt to quit: 1/16/2019    Years since quitting: 3.7   Smokeless Tobacco Never   Tobacco Comments    3 black and milds a week      Family History   Adopted: Yes         REVIEW OF SYSTEMS:     Constitutional:  negative for fevers, chills, night sweats  Eyes:  negative for blurred vision, eye discharge, visual disturbance   HEENT:  negative for hearing loss, ear drainage,nasal congestion  Respiratory:  negative for cough, shortness of breath or hemoptysis   Cardiovascular:  negative for chest pain, palpitations, syncope  Gastrointestinal:  nausea++ , vomiting, diarrhea, constipation, abdominal pain++   Genitourinary:  negative for frequency, dysuria, urinary incontinence, hematuria  Hematologic/Lymphatic:  negative for easy bruising, bleeding and lymphadenopathy  Allergic/Immunologic:  negative for recurrent infections, angioedema, K 5.5 (H) 10/17/2022     10/17/2022    CO2 20 (L) 10/17/2022     SED RATE:   Lab Results   Component Value Date/Time    SEDRATE 64 10/26/2020 02:14 PM     CK: No results found for: CKTOTAL  CRP:   Lab Results   Component Value Date/Time    CRP 2.9 10/26/2020 02:14 PM     Hepatic Function Panel:   Lab Results   Component Value Date/Time    ALKPHOS 53 10/11/2022 02:41 PM    ALT <5 10/11/2022 02:41 PM    AST 7 10/11/2022 02:41 PM    PROT 6.8 10/11/2022 02:41 PM    BILITOT <0.2 10/11/2022 02:41 PM    BILIDIR <0.2 10/11/2022 02:41 PM    IBILI see below 10/11/2022 02:41 PM    LABALBU 2.4 10/11/2022 02:41 PM     UA:  Lab Results   Component Value Date/Time    COLORU Yellow 10/12/2022 10:45 AM    CLARITYU Clear 10/12/2022 10:45 AM    GLUCOSEU Negative 10/12/2022 10:45 AM    GLUCOSEU 250 12/14/2010 02:50 PM    BILIRUBINUR Negative 10/12/2022 10:45 AM    BILIRUBINUR NEGATIVE 12/14/2010 02:50 PM    KETUA Negative 10/12/2022 10:45 AM    SPECGRAV 1.011 10/12/2022 10:45 AM    BLOODU Negative 10/12/2022 10:45 AM    PHUR 7.5 10/12/2022 10:45 AM    PROTEINU 100 10/12/2022 10:45 AM    UROBILINOGEN 0.2 10/12/2022 10:45 AM    NITRU Negative 10/12/2022 10:45 AM    LEUKOCYTESUR Negative 10/12/2022 10:45 AM    LABMICR YES 10/12/2022 10:45 AM    URINETYPE NotGiven 10/12/2022 10:45 AM      Urine Microscopic:   Lab Results   Component Value Date/Time    BACTERIA None Seen 10/12/2022 10:45 AM    COMU see below 05/05/2021 05:36 PM    HYALCAST 0 10/12/2022 10:45 AM    WBCUA 1 10/12/2022 10:45 AM    RBCUA 1 10/12/2022 10:45 AM    EPIU 0 10/12/2022 10:45 AM     Urine Reflex to Culture:   Lab Results   Component Value Date/Time    URRFLXCULT Not Indicated 05/05/2021 05:36 PM     WBC  13.7   0 Result Notes  Component Ref Range & Units 10/11/22 1525 9/16/22 0914 9/15/22 0740 9/14/22 0600   Cell Count Fluid Type  Peritoneal dial  Peritoneal dial  Peritoneal dial  Peritoneal dial    Color, Fluid  Pink  Colorless  Colorless  Pale Yellow Appearance, Fluid  Cloudy  Clear  Clear  Clear    Clot Eval.  see below  see below CM  see below CM  see below CM    Comment: No Clots Seen   Nucl Cell, Fluid /cumm 10,069  13  7  22    RBC, Fluid /cumm 6,000  <2,000  <2,000  <2,000    Neutrophil Count, Fluid % 64  14   56    Lymphocytes, Body Fluid % 2  76   2    Monocyte Count, Fluid % 29    41    Eos, Fluid % 2  1   1    Macrophages % 3  9      Number of Cells Counted Fluid  100  100 CM   100 CM    Comment: The reference interval(s) and other method performance   specifications are unavailable for this body fluid. Comparison of the result with concentration in the           MICRO: cultures reviewed and updated by me     Procedure Component Value Units Date/Time   Culture, Body Fluid [[id number]] (Abnormal) Collected: 10/15/22 0715   Order Status: Completed Specimen: Body Fluid from Peritoneal Dialysis Fluid Updated: 10/17/22 1336    Gram Stain Result No WBCs or organisms seen    Organism Candida parapsilosis Abnormal     Body Fluid Culture, Sterile --    Rare growth   Refer to ([id number]) for sensitivity results    Narrative:     ORDER#: [id number]                          ORDERED BY: Madalyn Quiros   SOURCE: Peritoneal Dialysis Fluid          COLLECTED:  10/15/22 07:15   ANTIBIOTICS AT [facility].:                      RECEIVED :  10/15/22 08:15   Culture, Fungus [[id number]] Collected: 10/17/22 0948   Order Status: Sent Specimen: Catheter Tip Updated: 10/17/22 1054   Culture, Tip [[id number]] Collected: 10/17/22 0948   Order Status: No result Updated: 10/17/22 1043   Culture, Body Fluid [[id number]] Collected: 10/16/22 1100   Order Status: Completed Specimen:  Body Fluid from Peritoneal Dialysis Fluid Updated: 10/17/22 0958    Body Fluid Culture, Sterile No growth to date    Gram Stain Result No Epithelial Cells seen   No WBCs or organisms seen    Narrative:     ORDER#: [id number]                          ORDERED BY: Madalyn Quiros   SOURCE: Peritoneal Dialysis Fluid          COLLECTED:  10/16/22 11:00   ANTIBIOTICS AT [facility].:                      RECEIVED :  10/16/22 11:42   Culture, Anaerobic and Aerobic [[id number]] Collected: 10/17/22 0948   Order Status: Canceled Specimen: Catheter Tip    Culture, Body Fluid [[id number]] (Abnormal) Collected: 10/14/22 1730   Order Status: Completed Specimen: Peritoneal Fluid from Peritoneal Dialysis Fluid Updated: 10/16/22 1123    Gram Stain Result No Epithelial Cells seen   1+ WBC's (Polymorphonuclear)   No organisms seen     Organism Candida parapsilosis Abnormal     Body Fluid Culture, Sterile --    Light growth   No further workup   Refer to culture [id number] for sensitivity results    Narrative:     ORDER#: [id number]                          ORDERED BY: Blossom Dela Cruz   SOURCE: Peritoneal Dialysis Fluid          COLLECTED:  10/14/22 17:30   ANTIBIOTICS AT [facility].:                      RECEIVED :  10/14/22 17:59   Culture, Body Fluid [[id number]] (Abnormal)  Collected: 10/11/22 1525   Order Status: Completed Specimen: Body Fluid from Peritoneal Dialysis Fluid Updated: 10/16/22 0755    Gram Stain Result 1+ WBC's (Mononuclear)   No Epithelial Cells seen   No organisms seen     Organism Candida parapsilosis Abnormal     Body Fluid Culture, Sterile Rare growth   Narrative:     ORDER#: [id number]                          ORDERED BY: Joseph Blum   SOURCE: Peritoneal Dialysis Fluid          COLLECTED:  10/11/22 15:25   ANTIBIOTICS AT [facility].:                      RECEIVED :  10/11/22 15:46   Culture, Blood 2 [[id number]] Collected: 10/11/22 2103   Order Status: Completed Specimen: Blood Updated: 10/15/22 2315    Culture, Blood 2 No Growth after 4 days of incubation.    Narrative:     ORDER#: [id number]                          ORDERED BY: Joseph Blum   SOURCE: Blood                              COLLECTED:  10/11/22 21:03   ANTIBIOTICS AT [facility].:                      RECEIVED :  10/11/22 21:30   If child <=2 yrs old please draw pediatric bottle. ~Blood Culture #2   Culture, Blood 1 [[id number]] Collected: 10/11/22 1822   Order Status: Completed Specimen: Blood Updated: 10/15/22 2015    Blood Culture, Routine No Growth after 4 days of incubation. Narrative:     ORDER#: [id number]                          ORDERED BY: Raúl Chino   SOURCE: Blood                              COLLECTED:  10/11/22 18:22   ANTIBIOTICS AT [facility].:                      RECEIVED :  10/11/22 18:26   If child <=2 yrs old please draw pediatric bottle. ~Blood Culture 1   Culture, Fungus [[id number]] Collected: 10/14/22 1730   Order Status: Sent Specimen: Peritoneal Fluid from Peritoneal Dialysis Fluid Updated: 10/15/22 1549    Fungus Stain No Fungal elements seen   Narrative:     ORDER#: [id number]                          ORDERED BY: Chelsey Longoria   SOURCE: Peritoneal Dialysis Fluid          COLLECTED:  10/14/22 17:30   ANTIBIOTICS AT [facility].:                      RECEIVED :  10/14/22 17:59   Culture, Fungus [[id number]]    Order Status: No result Specimen: Peritoneal Dialysis Fluid    Culture, Fungus [[id number]]    Order Status: No result Specimen: Peritoneal Dialysis Fluid      Susceptibility    Candida parapsilosis (1)    Antibiotic Interpretation Microscan  Method Status    Caspofungin acetate Sensitive 0.25 mcg/mL BACTERIAL SUSCEPTIBILITY PANEL BY DYLON     Fluconazole Sensitive <=0.5 mcg/mL BACTERIAL SUSCEPTIBILITY PANEL BY DYLON     Micafungin Sensitive 0.5 mcg/mL BACTERIAL SUSCEPTIBILITY PANEL BY DYLON     Voriconazole Sensitive <=0.12 mcg/mL BACTERIAL SUSCEPTIBILITY PANEL BY DYLON        Narrative  Performed by: [facility]  ORDER#: [id number]                          ORDERED BY: Raúl Chino   SOURCE: Peritoneal Dialysis Fluid          COLLECTED:  10/11/22 15:25   ANTIBIOTICS AT [facility].:                      RECEIVED :  10/11/22 15:46      Specimen Collected: 10/11/22 15:25 EDT Last Resulted:        Blood Culture:   Lab Results   Component Value Date/Time    BC No Growth after 4 days of incubation. 10/11/2022 06:22 PM    BLOODCULT2 No Growth after 4 days of incubation. 10/11/2022 09:03 PM       Viral Culture:    Lab Results   Component Value Date/Time    COVID19 Not Detected 09/15/2022 07:20 PM     Urine Culture: No results for input(s): Logan Taylor in the last 72 hours.     Scheduled Meds:   [START ON 10/18/2022] heparin (porcine)  5,000 Units SubCUTAneous 3 times per day    fluconazole  100 mg IntraVENous Q24H    alteplase  3 mg IntraCATHeter Once    haloperidol lactate  2 mg IntraMUSCular Once    piperacillin-tazobactam  4,500 mg IntraVENous Q12H    cloNIDine  0.1 mg Oral TID    lidocaine  1 patch TransDERmal Nightly    insulin glargine  10 Units SubCUTAneous Nightly    gentamicin   Topical Daily    epoetin robert-epbx  10,000 Units SubCUTAneous Once per day on Mon Wed Fri    atorvastatin  40 mg Oral Nightly    calcitRIOL  0.5 mcg Oral Daily    aspirin  81 mg Oral Daily    gabapentin  300 mg Oral TID    hydrALAZINE  100 mg Oral TID    insulin lispro  3 Units SubCUTAneous TID WC    isosorbide mononitrate  60 mg Oral Daily    losartan  100 mg Oral Daily    metoprolol tartrate  75 mg Oral BID    NIFEdipine  90 mg Oral BID    pantoprazole  40 mg Oral BID AC    sodium bicarbonate  650 mg Oral Daily    spironolactone  100 mg Oral Daily    sucralfate  1 g Oral 4x Daily    sevelamer  800 mg Oral TID WC    insulin lispro  0-8 Units SubCUTAneous TID WC    insulin lispro  0-4 Units SubCUTAneous Nightly    sodium chloride flush  5-40 mL IntraVENous 2 times per day       Continuous Infusions:   sodium chloride      dextrose      sodium chloride 10 mL/hr at 10/17/22 0902       PRN Meds:  cyclobenzaprine, oxyCODONE **OR** oxyCODONE, sodium chloride, glucose, dextrose bolus **OR** dextrose bolus, glucagon (rDNA), dextrose, sodium chloride flush, sodium chloride, ondansetron **OR** ondansetron, polyethylene glycol, acetaminophen **OR** acetaminophen    Imaging:   XR CHEST PORTABLE   Final Result   1. Right-sided central venous catheter with the tip in the superior vena   cava. 2.  Stable cardiomegaly due to a moderate-sized pericardial effusion. 3.  Mild bibasilar airspace opacities favored to represent atelectasis. FL VASCULAR ACCESS GUIDANCE   Final Result      FLUORO FOR SURGICAL PROCEDURES   Final Result      CT HEAD WO CONTRAST   Final Result   No acute intracranial abnormality. CT ABDOMEN PELVIS WO CONTRAST Additional Contrast? None   Final Result   No definite abnormality identified to explain cloudy peritoneal dialysate. Dialysate measures near water density on the current examination. Several bubbles of free intraperitoneal gas which or presumably introduced   during dialysis. Please note that hollow visceral perforation cannot be   excluded. Mural thickening of the large bowel, mild-to-moderate in degree. That may be   secondary to under distension, but colitis must be considered as well. Polycystic kidney disease. Note that some of the renal lesions are   indeterminate in terms of density, and therefore a solid lesion cannot be   excluded. All pertinent images and reports for the current Hospitalization were reviewed by me.     IMPRESSION:    Patient Active Problem List   Diagnosis    Nonischemic cardiomyopathy ([address]    Cerebral infarction ([address]    Cigarette nicotine dependence without complication    Erectile dysfunction due to arterial insufficiency    Renal artery stenosis (HCC)    Chronic diastolic congestive heart failure (HCC)    Major depressive disorder, recurrent episode, moderate (HCC)    Pericardial effusion    Hypertension associated with diabetes ([address]    DM type 2 with diabetic mixed hyperlipidemia ([address]    Diabetes mellitus due to underlying condition with stage 4 chronic kidney disease, with long-term current use of insulin (HCC)    CKD (chronic kidney disease) requiring chronic dialysis ([address] ESRD (end stage renal disease) (HCC)    GI bleed    SBP (spontaneous bacterial peritonitis) ([address]    Peritonitis ([facility]     PD catheter related Peritonitis  ESRD on PD  PD fluid was very abnormal   Multiple PD fluid cx with Candida parapsilosis  WBC elevation   CT abd/pelvis changes from PD   Polycystic Kidney disease  H/o CVA  DM+  S/P PD catheter removal on 10/17  S/p New HD line placement for Dialysis    Now that PD catheter has been removed we can choose oral Anti Fungal therapy when ready for d/c    Anticipate x 2 weeks of oral Fluconazole therapy    Etiology of Peritonitis in these instances is from External catheter  colonization leading to internal migration with biofilm formation  and not from SBP      Labs, Microbiology, Radiology and pertinent results from current hospitalization and care every where were reviewed by me as a part of the consultation. PLAN :  D/C Zosyn   Cont IV Fluconazole x 200 mg dose increased  PD catheter removal was very appropriate  PD tip cx in process  He will be on HD for now per patient   Anticipate x 14 days more of antifungal therapy   D/C plans per primary     Discussed with patient/Family and Nursing   Risk of Complications/Morbidity: High      Illness(es)/ Infection present that pose threat to bodily function. There is potential for severe exacerbation of infection/side effects of treatment. Therapy requires intensive monitoring for antimicrobial agent toxicity. Thanks for allowing me to participate in your patient's care please call me with any questions or concerns.     Dr. Nish Mejia MD  [address] [place] [address] Physician  Phone: [phone]   Fax : [phone]

## 2022-10-17 NOTE — ANESTHESIA PRE PROCEDURE
[facility] Department of Anesthesiology  Pre-Anesthesia Evaluation/Consultation       Name:  Mirella Fung  : 1965  Age:  65 y.o.                                            MRN:  [id number]  Date: 10/17/2022           Surgeon: Surgeon(s):  Shelby Reyes MD    Procedure: Procedure(s):  PERITONEAL DIALYSIS CATHETER REMOVAL  TUNNEL CATHETER PLACEMENT     Allergies   Allergen Reactions    Doxazosin Nausea And Vomiting     VIOLENT VOMITTING    Coconut Flavor Rash     Patient Active Problem List   Diagnosis    Nonischemic cardiomyopathy ([address]    Cerebral infarction ([address]    Cigarette nicotine dependence without complication    Erectile dysfunction due to arterial insufficiency    Renal artery stenosis (HCC)    Chronic diastolic congestive heart failure (HCC)    Major depressive disorder, recurrent episode, moderate (HCC)    Pericardial effusion    Hypertension associated with diabetes ([address]    DM type 2 with diabetic mixed hyperlipidemia ([address]    Diabetes mellitus due to underlying condition with stage 4 chronic kidney disease, with long-term current use of insulin (HCC)    CKD (chronic kidney disease) requiring chronic dialysis ([address]    ESRD (end stage renal disease) ([address]    GI bleed    Peritonitis ([address]     Past Medical History:   Diagnosis Date    Cardiomyopathy ([address]     CHF (congestive heart failure) ([address]     CVA (cerebral infarction) 2015    Diabetes mellitus ([address]     Foot ulcer, left ([address] 2016    Gastroparesis     Hemodialysis patient ([address]     Hypercholesteremia     Hypertension     Kidney disease     Unspecified cerebral artery occlusion with cerebral infarction     5/15, 7/15 LEFT SIDE WEAKNESS     Past Surgical History:   Procedure Laterality Date    COLONOSCOPY N/A 2021    COLONOSCOPY POLYPECTOMY SNARE/COLD BIOPSY performed by Idalia Norris MD at [address] [place] [address] N/A 10/29/2020    PLACEMENT OF A TUNNELED DIALYSIS CATHETER WITH FLEURO AND ULTRASOUND performed by Grace Beyer MD at [facility] N/A 10/29/2020    LAPAROSCOPIC PERITONEAL DIALYSIS CATHETER PLACEMENT WITH FLEURO AND ULTRASOUND performed by Grace Beyer MD at [address] N/A 20/16/2231    UMBILICAL HERNIA REPAIR performed by Grace Beyer MD at [address] [employer] N/A 4/26/2021    ESOPHAGOGASTRODU[facility]ENOSCOPY [facility] N/A 9/13/2022    EGD DIAGNOSTIC ONLY performed by Axel Gerardo MD at [address] [facility] History     Tobacco Use    Smoking status: Some Days     Packs/day: 0.00     Years: 30.00     Pack years: 0.00     Types: Cigars, Cigarettes     Start date: 1/3/1979     Last attempt to quit: 1/16/2019     Years since quitting: 3.7    Smokeless tobacco: Never    Tobacco comments:     3 black and milds a week   Vaping Use    Vaping Use: Never used   Substance Use Topics    Alcohol use: Yes     Comment: occasional    Drug use: Yes     Types: Marijuana Bernadette Ege)     Comment: previously used cocaine, stopped May 2015 LAST USED MARIJUANA-NOVEMBER 2020     Medications  Current Facility-Administered Medications on File Prior to Visit   Medication Dose Route Frequency Provider Last Rate Last Admin    fluconazole (DIFLUCAN) 100 mg IVPB  100 mg IntraVENous Q24H Jesús Singh MD        alteplase (CATHFLO) injection 3 mg  3 mg IntraCATHeter Once Cali Kincaid MD        haloperidol lactate (HALDOL) injection 2 mg  2 mg IntraMUSCular Once Jesús Singh MD        piperacillin-tazobactam (ZOSYN) 4,500 mg in dextrose 5 % 100 mL IVPB (mini-bag)  4,500 mg IntraVENous Q12H Jesús Singh MD   Stopped at 10/17/22 0330    cloNIDine (CATAPRES) tablet 0.1 mg  0.1 mg Oral TID Jesús Singh MD   0.1 mg at 10/17/22 0803    lidocaine 4 % external patch 1 patch  1 patch TransDERmal Nightly Kit Ben, APRN - CNP        cyclobenzaprine (FLEXERIL) tablet 10 mg  10 mg Oral TID PRN Kit Rachaelon, APRN - CNP   10 mg at 10/16/22 2241    insulin glargine (LANTUS) injection vial 10 Units  10 Units SubCUTAneous Nightly Linda Peck MD   10 Units at 10/16/22 2242    oxyCODONE (ROXICODONE) immediate release tablet 5 mg  5 mg Oral Q4H PRN Guilherme Contreras, APRN - CNP   5 mg at 10/14/22 5093    Or    oxyCODONE HCl (OXY-IR) immediate release tablet 10 mg  10 mg Oral Q4H PRN Kit Ben, APRN - CNP   10 mg at 10/17/22 0435    0.9 % sodium chloride infusion   IntraVENous PRN Marcellus Goodman MD        gentamicin (GARAMYCIN) 0.1 % cream   Topical Daily Sofie Quiñones MD   Given at 10/16/22 1625    epoetin robert-epbx (RETACRIT) injection 10,000 Units  10,000 Units SubCUTAneous Once per day on Mon Wed Fri Sofie Quiñones MD   10,000 Units at 10/14/22 0908    atorvastatin (LIPITOR) tablet 40 mg  40 mg Oral Nightly Kit Ben, APRN - CNP   40 mg at 10/16/22 2241    calcitRIOL (ROCALTROL) capsule 0.5 mcg  0.5 mcg Oral Daily Guilherme Contreras, APRN - CNP   0.5 mcg at 10/16/22 0945    aspirin EC tablet 81 mg  81 mg Oral Daily Guilherme Contreras, APRN - CNP   81 mg at 10/16/22 0946    gabapentin (NEURONTIN) capsule 300 mg  300 mg Oral TID Guilherme Contreras, APRN - CNP   300 mg at 10/16/22 2241    hydrALAZINE (APRESOLINE) tablet 100 mg  100 mg Oral TID Guilherme Contreras, APRN - CNP   100 mg at 10/17/22 0829    insulin lispro (HUMALOG) injection vial 3 Units  3 Units SubCUTAneous TID WC Guilherme Contreras, APRN - CNP   3 Units at 10/16/22 1322    isosorbide mononitrate (IMDUR) extended release tablet 60 mg  60 mg Oral Daily Guilherme Contreras, APRN - CNP   60 mg at 10/17/22 0804    losartan (COZAAR) tablet 100 mg  100 mg Oral Daily ROQUE Liu CNP   100 mg at 10/17/22 0806    metoprolol tartrate (LOPRESSOR) tablet 75 mg  75 mg Oral BID ROQUE Liu CNP 75 mg at 10/17/22 0804    NIFEdipine (PROCARDIA XL) extended release tablet 90 mg  90 mg Oral BID Bertzoie Rizzo, APRN - CNP   90 mg at 10/17/22 0805    pantoprazole (PROTONIX) tablet 40 mg  40 mg Oral BID  Bertis So, APRN - CNP   40 mg at 10/16/22 1624    sodium bicarbonate tablet 650 mg  650 mg Oral Daily Bertis Bors, APRN - CNP   650 mg at 10/16/22 0393    spironolactone (ALDACTONE) tablet 100 mg  100 mg Oral Daily Bertis Bors, APRN - CNP   100 mg at 10/16/22 0946    sucralfate (CARAFATE) tablet 1 g  1 g Oral 4x Daily Smitha Rizzo, APRN - CNP   1 g at 10/16/22 2053    sevelamer (RENVELA) tablet 800 mg  800 mg Oral TID  Smitha Rizzo, APRN - CNP   800 mg at 10/16/22 1810    insulin lispro (HUMALOG) injection vial 0-8 Units  0-8 Units SubCUTAneous TID  Smitha Rizzo, APRN - CNP   2 Units at 10/16/22 1322    insulin lispro (HUMALOG) injection vial 0-4 Units  0-4 Units SubCUTAneous Nightly Smitha Rizzo, APRN - CNP        glucose chewable tablet 16 g  4 tablet Oral PRN Smitha Bors, APRN - CNP        dextrose bolus 10% 125 mL  125 mL IntraVENous PRN Johnnyis Bors, APRN - CNP        Or    dextrose bolus 10% 250 mL  250 mL IntraVENous PRN Smitha Bors, APRN - CNP        glucagon (rDNA) injection 1 mg  1 mg SubCUTAneous PRN Smitha Bors, APRN - CNP        dextrose 10 % infusion   IntraVENous Continuous PRN Johnnyis Bors, APRN - CNP        sodium chloride flush 0.9 % injection 5-40 mL  5-40 mL IntraVENous 2 times per day Smitha Rizzo, APRN - CNP   10 mL at 10/17/22 0803    sodium chloride flush 0.9 % injection 5-40 mL  5-40 mL IntraVENous PRN Johnnyis Bors, APRN - CNP        0.9 % sodium chloride infusion   IntraVENous PRN Johnnyis Bors, APRN - CNP 10 mL/hr at 10/16/22 0959 250 mL at 10/16/22 0959    ondansetron (ZOFRAN-ODT) disintegrating tablet 4 mg  4 mg Oral Q8H PRN ROQUE Sprague CNP        Or    ondansetron [facility]) injection 4 mg  4 mg IntraVENous Q6H PRN Sandro Grammes, APRN - CNP        polyethylene glycol (GLYCOLAX) packet 17 g  17 g Oral Daily PRN Sandro Grammes, APRN - CNP        acetaminophen (TYLENOL) tablet 650 mg  650 mg Oral Q6H PRN Sandro Grammes, APRN - CNP   650 mg at 10/17/22 0805    Or    acetaminophen (TYLENOL) suppository 650 mg  650 mg Rectal Q6H PRN Sandro Grammes, APRN - CNP        [Held by provider] heparin (porcine) injection 5,000 Units  5,000 Units SubCUTAneous 3 times per day Sandro Grammes, APRN - CNP   5,000 Units at 10/17/22 6189     Current Outpatient Medications on File Prior to Visit   Medication Sig Dispense Refill    atorvastatin (LIPITOR) 40 MG tablet TAKE 1 TABLET BY MOUTH ONCE DAILY NIGHTLY 90 tablet 3    cloNIDine (CATAPRES) 0.3 MG tablet Take 0.3 mg by mouth 3 times daily      spironolactone (ALDACTONE) 100 MG tablet Take 100 mg by mouth daily      sucralfate (CARAFATE) 1 GM tablet Take 1 tablet by mouth 4 times daily 120 tablet 0    pantoprazole (PROTONIX) 40 MG tablet Take 1 tablet by mouth 2 times daily (before meals) 60 tablet 2    buPROPion (WELLBUTRIN SR) 150 MG extended release tablet Take 150 mg by mouth 2 times daily      Sucroferric Oxyhydroxide (VELPHORO) 500 MG CHEW Take 1 tablet by mouth 3 times daily (with meals)      insulin detemir (LEVEMIR FLEXTOUCH) 100 UNIT/ML injection pen Inject 22 Units into the skin nightly 5 pen 3    Insulin Pen Needle ([employer] PEN NEEDLES) 31G X 6 MM MISC 1 each by Does not apply route daily 100 each 3    insulin aspart (NOVOLOG FLEXPEN) 100 UNIT/ML injection pen Inject 3 Units into the skin 3 times daily (before meals) 5 pen 3    gabapentin (NEURONTIN) 300 MG capsule TAKE 1 CAPSULE BY MOUTH THREE TIMES DAILY 270 capsule 1    Methoxy PEG-Epoetin Beta (MIRCERA IJ) Inject 75 mcg into the skin      calcitRIOL (ROCALTROL) 0.5 MCG capsule Take 0.5 mcg by mouth daily      losartan (COZAAR) 100 MG tablet TAKE 1 TABLET BY MOUTH ONCE DAILY      [DISCONTINUED] iron sucrose (VENOFER) 20 MG/ML injection 200 mg daily      hydrALAZINE (APRESOLINE) 100 MG tablet TAKE 2 TABLETS BY MOUTH THREE TIMES DAILY (Patient taking differently: Take 100 mg by mouth 3 times daily TAKE 2 TABLETS BY MOUTH THREE TIMES DAILY) 270 tablet 1    NIFEdipine (PROCARDIA XL) 90 MG extended release tablet Take 1 tablet by mouth 2 times daily 180 tablet 3    sodium bicarbonate 650 MG tablet Take 650 mg by mouth daily      isosorbide mononitrate (IMDUR) 60 MG extended release tablet Take 1 tablet by mouth once daily 90 tablet 3    EQ ASPIRIN ADULT LOW DOSE 81 MG EC tablet Take 1 tablet by mouth once daily 90 tablet 3    metoprolol tartrate (LOPRESSOR) 50 MG tablet Take twice daily (Patient taking differently: Take 75 mg by mouth 2 times daily Take twice daily) 180 tablet 1     No current facility-administered medications for this visit. No current outpatient medications on file.      Facility-Administered Medications Ordered in Other Visits   Medication Dose Route Frequency Provider Last Rate Last Admin    fluconazole (DIFLUCAN) 100 mg IVPB  100 mg IntraVENous Q24H Jennifer Roca MD        alteplase (CATHFLO) injection 3 mg  3 mg IntraCATHeter Once Suman Lee MD        haloperidol lactate (HALDOL) injection 2 mg  2 mg IntraMUSCular Once Jennifer Roca MD        piperacillin-tazobactam (ZOSYN) 4,500 mg in dextrose 5 % 100 mL IVPB (mini-bag)  4,500 mg IntraVENous Q12H Jennifer Roca MD   Stopped at 10/17/22 0330    cloNIDine (CATAPRES) tablet 0.1 mg  0.1 mg Oral TID Jennifer Roca MD   0.1 mg at 10/17/22 0803    lidocaine 4 % external patch 1 patch  1 patch TransDERmal Nightly ROQUE Stevens CNP        cyclobenzaprine (FLEXERIL) tablet 10 mg  10 mg Oral TID PRN ROQUE Stevens CNP   10 mg at 10/16/22 2241    insulin glargine (LANTUS) injection vial 10 Units  10 Units SubCUTAneous Nightly Jennifer Roca MD   10 Units at 10/16/22 2242    oxyCODONE (ROXICODONE) immediate release tablet 5 mg  5 mg Oral Q4H PRN Indra Jones, APRN - CNP   5 mg at 10/14/22 6544    Or    oxyCODONE HCl (OXY-IR) immediate release tablet 10 mg  10 mg Oral Q4H PRN Indra Jones APRN - CNP   10 mg at 10/17/22 0435    0.9 % sodium chloride infusion   IntraVENous PRN Edith Saxena MD        gentamicin (GARAMYCIN) 0.1 % cream   Topical Daily Payton Mackey MD   Given at 10/16/22 1625    epoetin robert-epbx (RETACRIT) injection 10,000 Units  10,000 Units SubCUTAneous Once per day on Mon Wed Fri Payton Mackey MD   10,000 Units at 10/14/22 0908    atorvastatin (LIPITOR) tablet 40 mg  40 mg Oral Nightly Indra Jones, APRN - CNP   40 mg at 10/16/22 2241    calcitRIOL (ROCALTROL) capsule 0.5 mcg  0.5 mcg Oral Daily Indra Jones APRN - CNP   0.5 mcg at 10/16/22 0945    aspirin EC tablet 81 mg  81 mg Oral Daily Indar Jones, APRN - CNP   81 mg at 10/16/22 0946    gabapentin (NEURONTIN) capsule 300 mg  300 mg Oral TID Indra Jones APRN - CNP   300 mg at 10/16/22 2241    hydrALAZINE (APRESOLINE) tablet 100 mg  100 mg Oral TID Indra Jones, APRN - CNP   100 mg at 10/17/22 0829    insulin lispro (HUMALOG) injection vial 3 Units  3 Units SubCUTAneous TID  Indra Jones, APRN - CNP   3 Units at 10/16/22 1322    isosorbide mononitrate (IMDUR) extended release tablet 60 mg  60 mg Oral Daily Indra Jones, APRN - CNP   60 mg at 10/17/22 0804    losartan (COZAAR) tablet 100 mg  100 mg Oral Daily Indra Jones, APRN - CNP   100 mg at 10/17/22 0806    metoprolol tartrate (LOPRESSOR) tablet 75 mg  75 mg Oral BID Indra Jones APRN - CNP   75 mg at 10/17/22 0804    NIFEdipine (PROCARDIA XL) extended release tablet 90 mg  90 mg Oral BID ROQUE Gardner CNP   90 mg at 10/17/22 0805    pantoprazole (PROTONIX) tablet 40 mg  40 mg Oral BID AC ROQUE Gardner CNP   40 mg at 10/16/22 1624    sodium bicarbonate tablet 650 mg  650 mg Oral Daily Bertis Bors, APRN - CNP   650 mg at 10/16/22 5895    spironolactone (ALDACTONE) tablet 100 mg  100 mg Oral Daily Bertis Bors, APRN - CNP   100 mg at 10/16/22 0946    sucralfate (CARAFATE) tablet 1 g  1 g Oral 4x Daily Bertis Bors, APRN - CNP   1 g at 10/16/22 2053    sevelamer (RENVELA) tablet 800 mg  800 mg Oral TID  Bertis Bors, APRN - CNP   800 mg at 10/16/22 1810    insulin lispro (HUMALOG) injection vial 0-8 Units  0-8 Units SubCUTAneous TID  Bertis Bors, APRN - CNP   2 Units at 10/16/22 1322    insulin lispro (HUMALOG) injection vial 0-4 Units  0-4 Units SubCUTAneous Nightly Bertis Bors, APRN - CNP        glucose chewable tablet 16 g  4 tablet Oral PRN Bertis Bors, APRN - CNP        dextrose bolus 10% 125 mL  125 mL IntraVENous PRN Bertis Bors, APRN - CNP        Or    dextrose bolus 10% 250 mL  250 mL IntraVENous PRN Bertis Bors, APRN - CNP        glucagon (rDNA) injection 1 mg  1 mg SubCUTAneous PRN Bertis Bors, APRN - CNP        dextrose 10 % infusion   IntraVENous Continuous PRN Bertis Bors, APRN - CNP        sodium chloride flush 0.9 % injection 5-40 mL  5-40 mL IntraVENous 2 times per day Bertis Bors, APRN - CNP   10 mL at 10/17/22 0803    sodium chloride flush 0.9 % injection 5-40 mL  5-40 mL IntraVENous PRN Bertis Bors, APRN - CNP        0.9 % sodium chloride infusion   IntraVENous PRN Bertis Bors, APRN - CNP 10 mL/hr at 10/16/22 0959 250 mL at 10/16/22 0959    ondansetron (ZOFRAN-ODT) disintegrating tablet 4 mg  4 mg Oral Q8H PRN Bertis Bors, APRN - CNP        Or    ondansetron (ZOFRAN) injection 4 mg  4 mg IntraVENous Q6H PRN Bertis Bors, APRN - CNP        polyethylene glycol (GLYCOLAX) packet 17 g  17 g Oral Daily PRN Bertis Bors, APRN - CNP        acetaminophen (TYLENOL) tablet 650 mg  650 mg Oral Q6H PRN ROQUE Sprague - CNP   650 mg at 10/17/22 1011 Or    acetaminophen (TYLENOL) suppository 650 mg  650 mg Rectal Q6H PRN ROQUE Barry CNP        [Held by provider] heparin (porcine) injection 5,000 Units  5,000 Units SubCUTAneous 3 times per day ROQUE Barry CNP   5,000 Units at 10/17/22 7732     Vital Signs (Current) There were no vitals filed for this visit. Vital Signs Statistics (for past 48 hrs)     Temp  Av.4 °F (36.9 °C)  Min: 98 °F (36.7 °C)   Min taken time: 10/17/22 0745  Max: 98.9 °F (37.2 °C)   Max taken time: 10/15/22 1457  Pulse  Av.9  Min: 68   Min taken time: 10/16/22 0750  Max: 100   Max taken time: 10/15/22 1350  Resp  Av.6  Min: 12   Min taken time: 10/16/22 1234  Max: 25   Max taken time: 10/16/22 1627  BP  Min: 105/62   Min taken time: 10/16/22 1057  Max: 177/96   Max taken time: 10/15/22 1351  SpO2  Av.4 %  Min: 95 %   Min taken time: 10/16/22 1234  Max: 99 %   Max taken time: 10/16/22 0315    BP Readings from Last 3 Encounters:   10/17/22 (!) 159/85   22 (!) 149/118   22 120/72     BMI  There is no height or weight on file to calculate BMI. Estimated body mass index is 20.78 kg/m² as calculated from the following:    Height as of 10/11/22: 6' 2\" (1.88 m). Weight as of an earlier encounter on 10/17/22: 161 lb 13.1 oz (73.4 kg).     CBC   Lab Results   Component Value Date/Time    WBC 10.2 10/17/2022 07:07 AM    RBC 2.98 10/17/2022 07:07 AM    HGB 9.3 10/17/2022 07:07 AM    HCT 28.8 10/17/2022 07:07 AM    MCV 96.7 10/17/2022 07:07 AM    RDW 14.7 10/17/2022 07:07 AM     10/17/2022 07:07 AM     CMP    Lab Results   Component Value Date/Time     10/16/2022 05:20 AM    K 5.0 10/16/2022 05:20 AM    K 5.3 10/14/2022 06:04 AM     10/16/2022 05:20 AM    CO2 22 10/16/2022 05:20 AM    BUN 41 10/16/2022 05:20 AM    CREATININE 6.7 10/16/2022 05:20 AM    GFRAA 10 10/16/2022 05:20 AM    GFRAA >60 2010 01:58 PM    AGRATIO 1.1 2022 04:27 AM    LABGLOM 9 10/16/2022 05:20 AM    GLUCOSE 217 10/16/2022 05:20 AM    PROT 6.8 10/11/2022 02:41 PM    CALCIUM 8.6 10/16/2022 05:20 AM    BILITOT <0.2 10/11/2022 02:41 PM    ALKPHOS 53 10/11/2022 02:41 PM    AST 7 10/11/2022 02:41 PM    ALT <5 10/11/2022 02:41 PM     BMP    Lab Results   Component Value Date/Time     10/16/2022 05:20 AM    K 5.0 10/16/2022 05:20 AM    K 5.3 10/14/2022 06:04 AM     10/16/2022 05:20 AM    CO2 22 10/16/2022 05:20 AM    BUN 41 10/16/2022 05:20 AM    CREATININE 6.7 10/16/2022 05:20 AM    CALCIUM 8.6 10/16/2022 05:20 AM    GFRAA 10 10/16/2022 05:20 AM    GFRAA >60 12/14/2010 01:58 PM    LABGLOM 9 10/16/2022 05:20 AM    GLUCOSE 217 10/16/2022 05:20 AM     POCGlucose  Recent Labs     10/15/22  1022 10/16/22  0520   GLUCOSE 159* 217*      Coags    Lab Results   Component Value Date/Time    PROTIME 14.5 09/14/2022 09:23 AM    INR 1.14 09/14/2022 09:23 AM    APTT 30.5 09/14/2022 09:23 AM     HCG (If Applicable) No results found for: PREGTESTUR, PREGSERUM, HCG, HCGQUANT   ABGs No results found for: PHART, PO2ART, GZL8LPV, VHI0YFX, BEART, B0UJNQRK   Type & Screen (If Applicable)  No results found for: LABABO, LABRH                         BMI: Wt Readings from Last 3 Encounters:       NPO Status: food over 12 hours, water 5 hours                          Anesthesia Evaluation   no history of anesthetic complications:   Airway: Mallampati: II  TM distance: >3 FB   Neck ROM: full  Comment: Prior airway:  Placement date 10/29/20; Placement time 0735; Preoxygenation Yes; Technique Direct laryngoscopy, Stylet; Type Cuffed; Tube size 8 mm; Laryngoscope Mac; Blade size 3; Location Oral; Grade view 2a; Insertion attempts 1; Atraumatic Yes     Dental:      Comment: Denies loose teeth    Pulmonary:   (+) current smoker (Cigars)    (-) asthma, rhonchi, wheezes and rales  COPD: suspected. Patient did not smoke on day of surgery.                 PE comment: Prolonged expiration noted, unlabored Cardiovascular:  Exercise tolerance: good (>4 METS),   (+) hypertension:, CAD:, CHF:, hyperlipidemia    (-) weak pulses      Rhythm: regular  Rate: normal           Beta Blocker:  Dose within 24 Hrs      ROS comment: Non-ischemic cardiomyopathy    EKG:  Normal sinus rhythmLeft atrial enlargement  Cannot rule out Septal infarct (cited on or before 18-AUG-2015)   Nonspecific T wave abnormality   Abnormal ECG    Echo  Summary:  Left ventricular cavity size is normal. There is severe concentric left ventricular hypertrophy. Ejection fraction is visually estimated to be 40%. Grade II diastolic dysfunction with elevated LV filling pressures. The right ventricle is normal in size and function. There is a moderate circumferential pericardial effusion noted. Neuro/Psych:   (+) CVA (left sided hemiparesis):, psychiatric history:   (-) seizures and TIA           GI/Hepatic/Renal:   (+) GERD: well controlled, renal disease (PD last 10/15; h/o renal artery stenosis): ESRD,      (-) liver disease and no morbid obesity      ROS comment: Peritonitis, with PD catheter in place. Endo/Other:    (+) Diabetes (HgbA1c: 7.0%)Type II DM, well controlled, using insulin, electrolyte abnormalities (last K+ 5.0), . Abdominal:         (-) obese Abdomen: soft. PE comment: Protuberant, PD catheter in situ, LLQ   Vascular:           ROS comment: Unclear from patient if fistula formation was attempted on RLE in past, PIV in situ left forearm. . Other Findings: Mucous membranes very dry. Anesthesia Plan      general     ASA 3     (Questionable history of delerium / sundowning on floor reported by patient, current admission. Vitals stable / hypertensive on floor. Afebrile. NPO appropriate. Mr. Karen Robles denies active nausea / reflux. Plan GETA. Avoid succinylcholine. )  Induction: intravenous. Anesthetic plan and risks discussed with patient.     Use of blood products discussed with patient whom consented to blood products. Plan discussed with CRNA. This pre-anesthesia assessment may be used as a history and physical.    DOS STAFF ADDENDUM:    Pt seen and examined, chart reviewed (including anesthesia, drug and allergy history). No interval changes to history and physical examination. Anesthetic plan, risks, benefits, alternatives, and personnel involved discussed with patient. Patient verbalized an understanding and agrees to proceed.       Dominick Hoff MD  October 17, 2022  8:45 AM

## 2022-10-17 NOTE — PROGRESS NOTES
Alyce Lundy 187 to PACU. VSS. Drowsy. IV patent. Surgical sites (x2; tunneled cath site R neck & L abdomen PD site) clean, dry and intact.

## 2022-10-18 PROBLEM — R10.84 GENERALIZED ABDOMINAL PAIN: Status: ACTIVE | Noted: 2022-10-18

## 2022-10-18 PROBLEM — T85.71XA DIALYSIS-ASSOCIATED PERITONITIS (HCC): Status: ACTIVE | Noted: 2022-10-13

## 2022-10-18 PROBLEM — B37.9 CANDIDA INFECTION: Status: ACTIVE | Noted: 2022-10-18

## 2022-10-18 PROBLEM — Q61.3 PKD (POLYCYSTIC KIDNEY DISEASE): Status: ACTIVE | Noted: 2022-10-18

## 2022-10-18 LAB
ANION GAP SERPL CALCULATED.3IONS-SCNC: 15 MMOL/L (ref 3–16)
BASOPHILS ABSOLUTE: 0.1 K/UL (ref 0–0.2)
BASOPHILS RELATIVE PERCENT: 0.3 %
BUN BLDV-MCNC: 41 MG/DL (ref 7–20)
CALCIUM SERPL-MCNC: 8.9 MG/DL (ref 8.3–10.6)
CHLORIDE BLD-SCNC: 104 MMOL/L (ref 99–110)
CO2: 17 MMOL/L (ref 21–32)
CREAT SERPL-MCNC: 7.7 MG/DL (ref 0.9–1.3)
EOSINOPHILS ABSOLUTE: 0 K/UL (ref 0–0.6)
EOSINOPHILS RELATIVE PERCENT: 0 %
FUNGUS STAIN: NORMAL
GFR SERPL CREATININE-BSD FRML MDRD: 8 ML/MIN/{1.73_M2}
GLUCOSE BLD-MCNC: 138 MG/DL (ref 70–99)
GLUCOSE BLD-MCNC: 154 MG/DL (ref 70–99)
GLUCOSE BLD-MCNC: 156 MG/DL (ref 70–99)
GLUCOSE BLD-MCNC: 161 MG/DL (ref 70–99)
GLUCOSE BLD-MCNC: 176 MG/DL (ref 70–99)
HAV IGM SER IA-ACNC: ABNORMAL
HBV SURFACE AB TITR SER: 4.45 MIU/ML
HCT VFR BLD CALC: 28.5 % (ref 40.5–52.5)
HEMOGLOBIN: 9.3 G/DL (ref 13.5–17.5)
HEPATITIS B CORE IGM ANTIBODY: ABNORMAL
HEPATITIS B SURFACE ANTIGEN INTERPRETATION: ABNORMAL
HEPATITIS C ANTIBODY INTERPRETATION: REACTIVE
LYMPHOCYTES ABSOLUTE: 0.9 K/UL (ref 1–5.1)
LYMPHOCYTES RELATIVE PERCENT: 5.8 %
MCH RBC QN AUTO: 31.3 PG (ref 26–34)
MCHC RBC AUTO-ENTMCNC: 32.7 G/DL (ref 31–36)
MCV RBC AUTO: 95.6 FL (ref 80–100)
MONOCYTES ABSOLUTE: 1.2 K/UL (ref 0–1.3)
MONOCYTES RELATIVE PERCENT: 7.9 %
NEUTROPHILS ABSOLUTE: 13.6 K/UL (ref 1.7–7.7)
NEUTROPHILS RELATIVE PERCENT: 86 %
PDW BLD-RTO: 15.1 % (ref 12.4–15.4)
PERFORMED ON: ABNORMAL
PLATELET # BLD: 322 K/UL (ref 135–450)
PMV BLD AUTO: 9.4 FL (ref 5–10.5)
POTASSIUM SERPL-SCNC: 5.9 MMOL/L (ref 3.5–5.1)
RBC # BLD: 2.98 M/UL (ref 4.2–5.9)
SODIUM BLD-SCNC: 136 MMOL/L (ref 136–145)
VANCOMYCIN RANDOM: 14.3 UG/ML
WBC # BLD: 15.8 K/UL (ref 4–11)

## 2022-10-18 PROCEDURE — 80048 BASIC METABOLIC PNL TOTAL CA: CPT

## 2022-10-18 PROCEDURE — 97116 GAIT TRAINING THERAPY: CPT

## 2022-10-18 PROCEDURE — 1200000000 HC SEMI PRIVATE

## 2022-10-18 PROCEDURE — 6370000000 HC RX 637 (ALT 250 FOR IP): Performed by: SURGERY

## 2022-10-18 PROCEDURE — 80202 ASSAY OF VANCOMYCIN: CPT

## 2022-10-18 PROCEDURE — 94760 N-INVAS EAR/PLS OXIMETRY 1: CPT

## 2022-10-18 PROCEDURE — 6360000002 HC RX W HCPCS: Performed by: SURGERY

## 2022-10-18 PROCEDURE — 36415 COLL VENOUS BLD VENIPUNCTURE: CPT

## 2022-10-18 PROCEDURE — 90935 HEMODIALYSIS ONE EVALUATION: CPT

## 2022-10-18 PROCEDURE — 97530 THERAPEUTIC ACTIVITIES: CPT

## 2022-10-18 PROCEDURE — 6370000000 HC RX 637 (ALT 250 FOR IP): Performed by: INTERNAL MEDICINE

## 2022-10-18 PROCEDURE — 99024 POSTOP FOLLOW-UP VISIT: CPT | Performed by: SURGERY

## 2022-10-18 PROCEDURE — 85025 COMPLETE CBC W/AUTO DIFF WBC: CPT

## 2022-10-18 PROCEDURE — 5A1D70Z PERFORMANCE OF URINARY FILTRATION, INTERMITTENT, LESS THAN 6 HOURS PER DAY: ICD-10-PCS | Performed by: INTERNAL MEDICINE

## 2022-10-18 PROCEDURE — 99233 SBSQ HOSP IP/OBS HIGH 50: CPT | Performed by: INTERNAL MEDICINE

## 2022-10-18 PROCEDURE — 6360000002 HC RX W HCPCS: Performed by: INTERNAL MEDICINE

## 2022-10-18 RX ORDER — FLUCONAZOLE 100 MG/1
200 TABLET ORAL DAILY
Status: DISCONTINUED | OUTPATIENT
Start: 2022-10-18 | End: 2022-10-19 | Stop reason: HOSPADM

## 2022-10-18 RX ORDER — INSULIN DETEMIR 100 [IU]/ML
10 INJECTION, SOLUTION SUBCUTANEOUS NIGHTLY
Qty: 3 ML | Refills: 0 | Status: SHIPPED | OUTPATIENT
Start: 2022-10-18 | End: 2022-11-17

## 2022-10-18 RX ORDER — FLUCONAZOLE 100 MG/1
200 TABLET ORAL DAILY
Qty: 28 TABLET | Refills: 0 | Status: SHIPPED | OUTPATIENT
Start: 2022-10-18 | End: 2022-11-01

## 2022-10-18 RX ORDER — HEPARIN SODIUM 1000 [USP'U]/ML
4300 INJECTION, SOLUTION INTRAVENOUS; SUBCUTANEOUS PRN
Status: DISCONTINUED | OUTPATIENT
Start: 2022-10-18 | End: 2022-10-19 | Stop reason: HOSPADM

## 2022-10-18 RX ORDER — SODIUM BICARBONATE 650 MG/1
650 TABLET ORAL 3 TIMES DAILY
Status: DISCONTINUED | OUTPATIENT
Start: 2022-10-18 | End: 2022-10-19 | Stop reason: HOSPADM

## 2022-10-18 RX ORDER — CLONIDINE HYDROCHLORIDE 0.1 MG/1
0.1 TABLET ORAL 3 TIMES DAILY
Qty: 90 TABLET | Refills: 0 | Status: SHIPPED | OUTPATIENT
Start: 2022-10-18 | End: 2022-11-17

## 2022-10-18 RX ADMIN — INSULIN LISPRO 3 UNITS: 100 INJECTION, SOLUTION INTRAVENOUS; SUBCUTANEOUS at 13:11

## 2022-10-18 RX ADMIN — PANTOPRAZOLE SODIUM 40 MG: 40 TABLET, DELAYED RELEASE ORAL at 15:59

## 2022-10-18 RX ADMIN — CLONIDINE HYDROCHLORIDE 0.1 MG: 0.1 TABLET ORAL at 21:17

## 2022-10-18 RX ADMIN — HYDRALAZINE HYDROCHLORIDE 100 MG: 50 TABLET, FILM COATED ORAL at 12:00

## 2022-10-18 RX ADMIN — NIFEDIPINE 90 MG: 90 TABLET, EXTENDED RELEASE ORAL at 12:01

## 2022-10-18 RX ADMIN — METOPROLOL TARTRATE 75 MG: 25 TABLET, FILM COATED ORAL at 21:16

## 2022-10-18 RX ADMIN — INSULIN GLARGINE 10 UNITS: 100 INJECTION, SOLUTION SUBCUTANEOUS at 21:20

## 2022-10-18 RX ADMIN — HYDRALAZINE HYDROCHLORIDE 100 MG: 50 TABLET, FILM COATED ORAL at 21:16

## 2022-10-18 RX ADMIN — METOPROLOL TARTRATE 75 MG: 25 TABLET, FILM COATED ORAL at 12:06

## 2022-10-18 RX ADMIN — NIFEDIPINE 90 MG: 90 TABLET, EXTENDED RELEASE ORAL at 17:42

## 2022-10-18 RX ADMIN — HEPARIN SODIUM 5000 UNITS: 5000 INJECTION INTRAVENOUS; SUBCUTANEOUS at 21:20

## 2022-10-18 RX ADMIN — INSULIN LISPRO 3 UNITS: 100 INJECTION, SOLUTION INTRAVENOUS; SUBCUTANEOUS at 17:41

## 2022-10-18 RX ADMIN — GABAPENTIN 300 MG: 300 CAPSULE ORAL at 15:59

## 2022-10-18 RX ADMIN — SUCRALFATE 1 G: 1 TABLET ORAL at 11:58

## 2022-10-18 RX ADMIN — GABAPENTIN 300 MG: 300 CAPSULE ORAL at 11:59

## 2022-10-18 RX ADMIN — SEVELAMER CARBONATE 800 MG: 800 TABLET, FILM COATED ORAL at 13:12

## 2022-10-18 RX ADMIN — GABAPENTIN 300 MG: 300 CAPSULE ORAL at 21:16

## 2022-10-18 RX ADMIN — SEVELAMER CARBONATE 800 MG: 800 TABLET, FILM COATED ORAL at 17:42

## 2022-10-18 RX ADMIN — CLONIDINE HYDROCHLORIDE 0.1 MG: 0.1 TABLET ORAL at 11:59

## 2022-10-18 RX ADMIN — OXYCODONE 5 MG: 5 TABLET ORAL at 21:17

## 2022-10-18 RX ADMIN — SPIRONOLACTONE 100 MG: 100 TABLET ORAL at 12:02

## 2022-10-18 RX ADMIN — CALCITRIOL 0.5 MCG: 0.25 CAPSULE ORAL at 12:02

## 2022-10-18 RX ADMIN — SODIUM BICARBONATE 650 MG: 650 TABLET ORAL at 12:02

## 2022-10-18 RX ADMIN — SUCRALFATE 1 G: 1 TABLET ORAL at 17:42

## 2022-10-18 RX ADMIN — LOSARTAN POTASSIUM 100 MG: 100 TABLET, FILM COATED ORAL at 12:01

## 2022-10-18 RX ADMIN — HEPARIN SODIUM 5000 UNITS: 5000 INJECTION INTRAVENOUS; SUBCUTANEOUS at 13:17

## 2022-10-18 RX ADMIN — SODIUM BICARBONATE 650 MG: 650 TABLET ORAL at 21:16

## 2022-10-18 RX ADMIN — FLUCONAZOLE 200 MG: 100 TABLET ORAL at 15:59

## 2022-10-18 RX ADMIN — HYDRALAZINE HYDROCHLORIDE 100 MG: 50 TABLET, FILM COATED ORAL at 15:59

## 2022-10-18 RX ADMIN — EPOETIN ALFA-EPBX 10000 UNITS: 10000 INJECTION, SOLUTION INTRAVENOUS; SUBCUTANEOUS at 08:59

## 2022-10-18 RX ADMIN — SUCRALFATE 1 G: 1 TABLET ORAL at 21:17

## 2022-10-18 RX ADMIN — ISOSORBIDE MONONITRATE 60 MG: 60 TABLET, EXTENDED RELEASE ORAL at 11:59

## 2022-10-18 RX ADMIN — ATORVASTATIN CALCIUM 40 MG: 40 TABLET, FILM COATED ORAL at 21:16

## 2022-10-18 RX ADMIN — ASPIRIN 81 MG: 81 TABLET, COATED ORAL at 11:58

## 2022-10-18 ASSESSMENT — PAIN SCALES - GENERAL
PAINLEVEL_OUTOF10: 8
PAINLEVEL_OUTOF10: 4

## 2022-10-18 ASSESSMENT — PAIN DESCRIPTION - DESCRIPTORS: DESCRIPTORS: ACHING

## 2022-10-18 ASSESSMENT — PAIN DESCRIPTION - LOCATION: LOCATION: ABDOMEN

## 2022-10-18 NOTE — PROGRESS NOTES
Occupational Therapy  Attempted OT follow-up. Pt supine in bed and declining therapy as he reports he can ambulate without assist.  Noted PT note earlier pt with LOB and required min assist.  Reminded pt of this and could work on mobility and balance. Pt continued to refuse any activity. Pt's wife present and reported they do not have any needs at this time as he has a built in shower chair. His wife reports they will start [address] after they complete there move. Advised he have someone ambulate with him due to decreased balance.   Time In: 1350  Time Out: Gian 32, [address]

## 2022-10-18 NOTE — PROGRESS NOTES
Occupational Therapy  Attempted OT follow-up. Discussed with OTR and does not require new orders due to getting tunneled cath placed. Pt not currently in his room. May be at dialysis. Will attempt later this date as schedule permits.   [facility], [address]

## 2022-10-18 NOTE — PROGRESS NOTES
Received call from CHANDU Vides stating patient fell. Patient states he did not hit his head. Vital signs stable. Hospitalist phoned. Patient's wife informed. Hospitalist came to see patient. Patient refused CT scan.

## 2022-10-18 NOTE — DISCHARGE SUMMARY
Hospital Medicine Discharge Summary    Patient ID: Viktor Quiros      Patient's PCP: Deangelo Lewis, APRN - CNP    Admit Date: 10/11/2022     Discharge Date:   10/18/22      Admitting Provider: Laurel Bustos MD     Discharge Provider: Sandhya Courtney MD     Discharge Diagnoses: Active Hospital Problems    Diagnosis     Candida infection [B37.9]      Priority: Medium    Generalized abdominal pain [R10.84]      Priority: Medium    PKD (polycystic kidney disease) [Q61.3]      Priority: Medium    Dialysis-associated peritonitis (HCC) Chris Crea      Priority: Medium    SBP (spontaneous bacterial peritonitis) ([facility] [K65.2]      Priority: Medium    ESRD (end stage renal disease) on dialysis ([facility] [address] [N18.6, Z99.2]     H/O: CVA (cerebrovascular accident) [Z86.73]        The patient was seen and examined on day of discharge and this discharge summary is in conjunction with any daily progress note from day of discharge. Hospital Course:      65 y.o. male with PMHx of DM, CVA, CHF, ESRD on PD, HTN and HLD presented to [facility] with 5 day history of abdominal pain during dialysis exchange with subsequent cloudy fluid. Patient admitted with PD related peritonitis. Fungal culture grew Candida from peritoneal fluid. PD catheter removed 10/17 and HD line inserted. Acute metabolic encephalopathy. (Resolved)   Etiology unclear however is likely due to cefepime, sepsis is improving and timing would be most treating with drug related side effects. CT head negative     ESRD on PD, transition to HD  PD related peritonitis  Patient presented to emergency with abdominal pain during peritoneal dialysis, patient also reported cloudy fluid. Upon presentation patient was vitally stable, afebrile  Peritoneal fluid analysis with 10,000 nucleated cells. PD cultures grew Candida.    PD dialysis catheter removed 10/17, HD line inserted    Patient seen by infectious disease, will receive fluconazole 200 mg p.o. for 14 days    Diabetes mellitus  Blood sugar better controlled  Continue on reduced dose of Lantus ( 22--> 18--> 10). Continue on sliding scale. Acute on chronic anemia  Baseline hemoglobin 7-8, hemoglobin today 8.1  Patient presented with hemoglobin of 6.3  Received 1 unit of PRBC 10/12  Anemia worked up in admission in September 2022  Will consult GI if hemoglobin drops again     Hx CVA  - with left side weakness  - continue asa, statin     CHF  - appears euvolemic  - continue aldactone, losartan, metoprolol and hydralazine  - daily wts  - I/O's    Physical Exam Performed:     BP (!) 150/88   Pulse 96   Temp 97.4 °F (36.3 °C) (Oral)   Resp 16   Ht 6' 2\" (1.88 m)   Wt 160 lb 0.9 oz (72.6 kg)   SpO2 98%   BMI 20.55 kg/m²       General appearance:  No apparent distress, appears stated age and cooperative. HEENT:  Normal cephalic, atraumatic without obvious deformity. Pupils equal, round, and reactive to light. Extra ocular muscles intact. Conjunctivae/corneas clear. Neck: Supple, with full range of motion. No jugular venous distention. Trachea midline. Respiratory:  Normal respiratory effort. Clear to auscultation, bilaterally without Rales/Wheezes/Rhonchi. Cardiovascular:  Regular rate and rhythm with normal S1/S2 without murmurs, rubs or gallops. Abdomen: Soft, non-tender, non-distended with normal bowel sounds. Musculoskeletal:  No clubbing, cyanosis or edema bilaterally. Full range of motion without deformity. Skin: Skin color, texture, turgor normal.  No rashes or lesions. Neurologic:  Neurovascularly intact without any focal sensory/motor deficits. Cranial nerves: II-XII intact, grossly non-focal.  Psychiatric:  Alert and oriented, thought content appropriate, normal insight  Capillary Refill: Brisk,< 3 seconds   Peripheral Pulses: +2 palpable, equal bilaterally       Labs:  For convenience and continuity at follow-up the following most recent labs are provided:      CBC:    Lab Results   Component Value Date/Time    WBC 15.8 10/18/2022 06:04 AM    HGB 9.3 10/18/2022 06:04 AM    HCT 28.5 10/18/2022 06:04 AM     10/18/2022 06:04 AM       Renal:    Lab Results   Component Value Date/Time     10/18/2022 06:04 AM    K 5.9 10/18/2022 06:04 AM    K 5.3 10/14/2022 06:04 AM     10/18/2022 06:04 AM    CO2 17 10/18/2022 06:04 AM    BUN 41 10/18/2022 06:04 AM    CREATININE 7.7 10/18/2022 06:04 AM    CALCIUM 8.9 10/18/2022 06:04 AM    PHOS 4.2 09/16/2022 06:02 AM         Significant Diagnostic Studies    Radiology:   XR CHEST PORTABLE   Final Result   1. Right-sided central venous catheter with the tip in the superior vena   cava. 2.  Stable cardiomegaly due to a moderate-sized pericardial effusion. 3.  Mild bibasilar airspace opacities favored to represent atelectasis. FL VASCULAR ACCESS GUIDANCE   Final Result      FLUORO FOR SURGICAL PROCEDURES   Final Result      CT HEAD WO CONTRAST   Final Result   No acute intracranial abnormality. CT ABDOMEN PELVIS WO CONTRAST Additional Contrast? None   Final Result   No definite abnormality identified to explain cloudy peritoneal dialysate. Dialysate measures near water density on the current examination. Several bubbles of free intraperitoneal gas which or presumably introduced   during dialysis. Please note that hollow visceral perforation cannot be   excluded. Mural thickening of the large bowel, mild-to-moderate in degree. That may be   secondary to under distension, but colitis must be considered as well. Polycystic kidney disease. Note that some of the renal lesions are   indeterminate in terms of density, and therefore a solid lesion cannot be   excluded.                 Consults:     IP CONSULT TO HOSPITALIST  IP CONSULT TO NEPHROLOGY  IP CONSULT TO PHARMACY  IP CONSULT TO NEPHROLOGY  IP CONSULT TO SPIRITUAL SERVICES  IP CONSULT TO VASCULAR SURGERY  IP CONSULT TO INFECTIOUS DISEASES    Disposition: Home    Condition at Discharge: Stable    Discharge Instructions/Follow-up:    -Fluconazole 200 mg p.o. for 14 days  -Dialysis as outpatient arranged for Thursday  -Continue follow-up with nephrology  -Follow-up with vascular surgery  -Patient will need to be on hemodialysis for at least 3 to 6 months until fungal infection is cleared before reconsidering peritoneal dialysis,     Code Status:  Full Code     Activity: activity as tolerated    Diet: renal diet      Discharge Medications:     Current Discharge Medication List             Details   fluconazole (DIFLUCAN) 100 MG tablet Take 2 tablets by mouth daily for 14 days  Qty: 28 tablet, Refills: 0                Details   cloNIDine (CATAPRES) 0.1 MG tablet Take 1 tablet by mouth 3 times daily  Qty: 90 tablet, Refills: 0      insulin detemir (LEVEMIR FLEXTOUCH) 100 UNIT/ML injection pen Inject 10 Units into the skin nightly  Qty: 3 mL, Refills: 0                Details   spironolactone (ALDACTONE) 100 MG tablet Take 100 mg by mouth daily      atorvastatin (LIPITOR) 40 MG tablet TAKE 1 TABLET BY MOUTH ONCE DAILY NIGHTLY  Qty: 90 tablet, Refills: 3    Associated Diagnoses: Mixed hyperlipidemia      sucralfate (CARAFATE) 1 GM tablet Take 1 tablet by mouth 4 times daily  Qty: 120 tablet, Refills: 0      pantoprazole (PROTONIX) 40 MG tablet Take 1 tablet by mouth 2 times daily (before meals)  Qty: 60 tablet, Refills: 2      buPROPion (WELLBUTRIN SR) 150 MG extended release tablet Take 150 mg by mouth 2 times daily      Sucroferric Oxyhydroxide (VELPHORO) 500 MG CHEW Take 1 tablet by mouth 3 times daily (with meals)      Insulin Pen Needle ([employer] PEN NEEDLES) 31G X 6 MM MISC 1 each by Does not apply route daily  Qty: 100 each, Refills: 3      insulin aspart (NOVOLOG FLEXPEN) 100 UNIT/ML injection pen Inject 3 Units into the skin 3 times daily (before meals)  Qty: 5 pen, Refills: 3      gabapentin (NEURONTIN) 300 MG capsule TAKE 1 CAPSULE BY MOUTH THREE TIMES DAILY  Qty: 270 capsule, Refills: 1    Associated Diagnoses: Left-sided weakness; Other diabetic neurological complication associated with type 2 diabetes mellitus (HCC)      Methoxy PEG-Epoetin Beta (MIRCERA IJ) Inject 75 mcg into the skin      calcitRIOL (ROCALTROL) 0.5 MCG capsule Take 0.5 mcg by mouth daily      losartan (COZAAR) 100 MG tablet TAKE 1 TABLET BY MOUTH ONCE DAILY      hydrALAZINE (APRESOLINE) 100 MG tablet TAKE 2 TABLETS BY MOUTH THREE TIMES DAILY  Qty: 270 tablet, Refills: 1    Associated Diagnoses: Essential hypertension; Chronic diastolic congestive heart failure (HCC)      NIFEdipine (PROCARDIA XL) 90 MG extended release tablet Take 1 tablet by mouth 2 times daily  Qty: 180 tablet, Refills: 3    Comments: Please consider 90 day supplies to promote better adherence  Associated Diagnoses: Essential hypertension; Chronic diastolic congestive heart failure (HCC)      sodium bicarbonate 650 MG tablet Take 650 mg by mouth daily      isosorbide mononitrate (IMDUR) 60 MG extended release tablet Take 1 tablet by mouth once daily  Qty: 90 tablet, Refills: 3    Associated Diagnoses: Chronic diastolic congestive heart failure (HCC)      EQ ASPIRIN ADULT LOW DOSE 81 MG EC tablet Take 1 tablet by mouth once daily  Qty: 90 tablet, Refills: 3    Associated Diagnoses: Essential hypertension      metoprolol tartrate (LOPRESSOR) 50 MG tablet Take twice daily  Qty: 180 tablet, Refills: 1    Associated Diagnoses: Essential hypertension             Time Spent on discharge is more than 30 minutes in the examination, evaluation, counseling and review of medications and discharge plan. Signed:    Caesar Marin MD   10/18/2022      Thank you ROQUE Reese - ANDREA for the opportunity to be involved in this patient's care. If you have any questions or concerns, please feel free to contact me at [phone].

## 2022-10-18 NOTE — PROGRESS NOTES
Physical Therapy  Facility/Department: [address] PROGRESSIVE CARE  Physical Therapy Daily Treatment Note    Name: Tati Alejandre  : 1965  MRN: [id number]  Date of Service: 10/18/2022    Discharge Recommendations: Tati Alejandre scored a 17/24 on the AM-PAC short mobility form. Current research shows that an AM-PAC score of 18 or greater is typically associated with a discharge to the patient's home setting. Based on the patient's AM-PAC score and their current functional mobility deficits, it is recommended that the patient have 2-3 sessions per week of Physical Therapy at d/c to increase the patient's independence. At this time, this patient demonstrates the endurance and safety to discharge home with 24 hour supervision/assistance and HHPT and a follow up treatment frequency of 2-3x/wk. Please see assessment section for further patient specific details. If patient discharges prior to next session this note will serve as a discharge summary. Please see below for the latest assessment towards goals. 24 hour supervision or assist, Home with Home health PT   PT Equipment Recommendations  Equipment Needed: Yes  Mobility Devices: Rommie Bame: Rolling      Patient Diagnosis(es): The primary encounter diagnosis was SBP (spontaneous bacterial peritonitis) ([address] Diagnoses of Anemia, unspecified type and Spontaneous bacterial peritonitis ([address] were also pertinent to this visit. Past Medical History:  has a past medical history of Cardiomyopathy ([address] CHF (congestive heart failure) ([address] CVA (cerebral infarction), Diabetes mellitus ([address] Foot ulcer, left ([address] Gastroparesis, Hemodialysis patient ([address] Hypercholesteremia, Hypertension, Kidney disease, and Unspecified cerebral artery occlusion with cerebral infarction. Past Surgical History:  has a past surgical history that includes LAPAROSCOPY INSERTION PERITONEAL CATHETER (N/A, 10/29/2020);  Umbilical hernia repair (N/A, 10/29/2020); hc dialysis catheter (N/A, 10/29/2020); Upper gastrointestinal endoscopy (N/A, 4/26/2021); Colonoscopy (N/A, 5/17/2021); Upper gastrointestinal endoscopy (N/A, 9/13/2022); Dialysis Catheter Removal (N/A, 10/17/2022); and Catheter Insertion (N/A, 10/17/2022). Assessment   Body Structures, Functions, Activity Limitations Requiring Skilled Therapeutic Intervention: Decreased functional mobility ; Decreased balance;Decreased vision/visual deficit  Assessment: Pt presents today with slight improvement in functional mobility. Pt was able to ambulate with slightly improved steadiness with use of the RW this date. Pt did demonstrate one mild LOB which required min A to correct. Pt is planning on returning home at discharge. Recommend 24 hour supervision/assistance and HHPT at discharge. Pt will continue to benefit from skilled PT to facilitate return to PLOF and to promote independence. Treatment Diagnosis: Impaired functional mobility  Specific Instructions for Next Treatment: Balance; Endurance; Ambulation  Barriers to Learning: Fatigue  Requires PT Follow-Up: Yes  Activity Tolerance  Activity Tolerance Comments: Pt limited by fatigue     Plan   Physcial Therapy Plan  General Plan: 5-7 times per week  Specific Instructions for Next Treatment: Balance; Endurance; Ambulation  Current Treatment Recommendations: Balance training, Gait training, Functional mobility training, Stair training, Neuromuscular re-education, Transfer training, Equipment evaluation, education, & procurement, Safety education & training, Patient/Caregiver education & training  Safety Devices  Type of Devices:  All fall risk precautions in place, Bed alarm in place, Call light within reach, Gait belt, Left in bed  Restraints  Restraints Initially in Place: No     Restrictions  Restrictions/Precautions  Restrictions/Precautions: Up as Tolerated, Fall Risk (High fall risk)  Position Activity Restriction  Other position/activity restrictions: PD     Subjective General  Chart Reviewed: Yes  Patient assessed for rehabilitation services?: Yes  Additional Pertinent Hx: 65 y.o. male with PMHx of DM, CVA, CHF, ESRD on PD, HTN and HLD presented to [facility] with 5 day history of abdominal pain during dialysis exchange with subsequent cloudy fluid. Patient admitted with PD related peritonitis. Response To Previous Treatment: Patient with no complaints from previous session. Family / Caregiver Present: Yes (Wife)  Referring Practitioner: ROQUE Barry CNP  Referral Date : 10/11/22  Diagnosis: PD related peritonitis; Hx of CVA; Anemia; DM  Follows Commands: Within Functional Limits  General Comment  Comments: Pt seated in recliner on arrival, agreeable to participate in PT treatment session. Subjective  Subjective: Pt denies pain however states that he is having a lot of fatigue following his dialysis session.      Social/Functional History  Social/Functional History  Lives With: Spouse (2 dogs)  Type of Home: House  Home Layout: One level  Home Access: Level entry  Bathroom Shower/Tub: Walk-in shower  Bathroom Toilet: Standard  Bathroom Equipment: Grab bars in shower, Built-in shower seat  Home Equipment: Cane  Has the patient had two or more falls in the past year or any fall with injury in the past year?: No  ADL Assistance: Independent  Ambulation Assistance: Independent (with cane)  Transfer Assistance: Independent  Active : No  Patient's  Info: wife drives     Cognition   Cognition  Overall Cognitive Status: WFL     Objective   Observation/Palpation  Observation: Pt on RA on arrival      Bed mobility  Sit to Supine: Modified independent (HOB elevated)  Scooting: Independent (Toward HOB)  Transfers  Sit to Stand: Stand by assistance (Up to RW)  Stand to Sit: Stand by assistance (With RW, verbal cues required for hand placement)  Ambulation  Surface: Level tile  Device: Rolling Walker  Assistance: Contact guard assistance;Minimal assistance (CGA with one period of min A due to mild LOB)  Quality of Gait: Decreased step lengths and heights, slow speed, normal JOBY, slight increased trunk flexion throughout gait cycle, mildly unsteady with one LOB  Distance: 25'     Balance  Sitting - Static:  (Supervision)  Standing - Static:  (CGA with B UE support on RW)  Standing - Dynamic:  (CGA to min A with B UE support)     AM-PAC Score  AM-PAC Inpatient Mobility Raw Score : 17 (10/18/22 1319)  AM-PAC Inpatient T-Scale Score : 42.13 (10/18/22 1319)  Mobility Inpatient CMS 0-100% Score: 50.57 (10/18/22 1319)  Mobility Inpatient CMS G-Code Modifier : CK (10/18/22 1319)     Goals  Short Term Goals  Time Frame for Short Term Goals: 2-3 days  Short Term Goal 1: Bed mobility (I)  Short Term Goal 2: Transfers (I)  Short Term Goal 3: Ambulation 48' with RW, (I)  Patient Goals   Patient Goals :  To get stronger  *No goals met 10/18/22    Education  Patient Education  Education Given To: Patient  Education Provided: Role of Therapy;Transfer Training;Equipment  Education Method: Verbal  Barriers to Learning:  (Fatigue)  Education Outcome: Verbalized understanding;Continued education needed    Therapy Time   Individual Concurrent Group Co-treatment   Time In 1304         Time Out 1320         Minutes 16         Timed Code Treatment Minutes: MISTY Gandhi PT, DPT [id number]

## 2022-10-18 NOTE — PLAN OF CARE
Problem: Discharge Planning  Goal: Discharge to home or other facility with appropriate resources  10/18/2022 0141 by Randa Cristina RN  Outcome: Progressing  Case mgmt involved to assess for appropriate level of care and resources. 10/17/2022 1549 by Melissa Cano RN  Outcome: Progressing     Problem: Safety - Adult  Goal: Free from fall injury  10/18/2022 0141 by Randa Cristina RN  Outcome: Progressing  Fall risk assessment completed every shift. All precautions in place. Patient has call light with in reach at all times. Room free from clutter. Patient aware to call for assistance when getting up.    10/17/2022 1549 by Melissa Cano RN  Outcome: Progressing     Problem: ABCDS Injury Assessment  Goal: Absence of physical injury  10/18/2022 0141 by Randa Cristina RN  Outcome: Progressing  Patient free of physical injury  10/17/2022 1549 by Melissa Cano RN  Outcome: Progressing     Problem: Confusion  Goal: Confusion, delirium, dementia, or psychosis is improved or at baseline  Description: INTERVENTIONS:  1. Assess for possible contributors to thought disturbance, including medications, impaired vision or hearing, underlying metabolic abnormalities, dehydration, psychiatric diagnoses, and notify attending LIP  2. [place] high risk fall precautions, as indicated  3. Provide frequent short contacts to provide reality reorientation, refocusing and direction  4. Decrease environmental stimuli, including noise as appropriate  5. Monitor and intervene to maintain adequate nutrition, hydration, elimination, sleep and activity  6. If unable to ensure safety without constant attention obtain sitter and review sitter guidelines with assigned personnel  7.  Initiate Psychosocial CNS and Spiritual Care consult, as indicated  10/18/2022 0141 by Randa Cristina RN  Outcome: Progressing  10/17/2022 1549 by Melissa Cano RN  Outcome: Progressing     Problem: Pain  Goal: Verbalizes/displays adequate comfort level or baseline comfort level  10/18/2022 0141 by Lopez Carpenter RN  Outcome: Progressing  Pain being managed with PRN analgesics per MD orders. RN using Adult Non Verbal pain scale to assess for pain.    10/17/2022 1549 by Felicia Smith RN  Outcome: Progressing

## 2022-10-18 NOTE — PROGRESS NOTES
[facility]  Progress Note    10/18/2022 9:09 AM    Chief Complaint: Abdominal Pain     Reason for Consult: PD Catheter Removal and TDC Placement    POD #1 PD Catheter Removal and Right IJ TDC Placement with Dr. Chantale Julio    Subjective: Pt seen resting in bed during HD run, reports abdominal pain has improved, denies significant incisional pain, TDC working for HD    Vital Signs:  Vitals:    10/18/22 0046 10/18/22 0534 10/18/22 0615 10/18/22 0819   BP: (!) 157/91 (!) 169/95 (!) 187/100    Pulse: 91 89 93 80   Resp: 11 15 16 (!) 9   Temp: 98.5 °F (36.9 °C) 98.5 °F (36.9 °C) 98.5 °F (36.9 °C)    TempSrc: Oral Oral     SpO2: 100% 100%  99%   Weight:   162 lb 4.1 oz (73.6 kg)    Height:           I/O:    Intake/Output Summary (Last 24 hours) at 10/18/2022 0909  Last data filed at 10/17/2022 1429  Gross per 24 hour   Intake --   Output 575 ml   Net -575 ml       Physical Exam:   General: no apparent distress, alert and oriented, afebrile  Chest/Lungs: non labored breathing no tachypnea, retractions or cyanosis  Neck: Right Chest TDC in place, Right neck incision intact with surgical glue, no drainage, no erythema, no hematoma, no significant swelling  Abdomen: soft, nondistended, and nontender, Left side with incision which is intact with surgical glue, no drainage, no erythema, no significant swelling, dressing in place over previous PD Catheter exit with no drainage noted  Extremities: normal strength, tone, and muscle mass, no deformities, no significant edema to BLE    Labs:   Lab Results   Component Value Date/Time     10/18/2022 06:04 AM    K 5.9 10/18/2022 06:04 AM    K 5.3 10/14/2022 06:04 AM     10/18/2022 06:04 AM    CO2 17 10/18/2022 06:04 AM    BUN 41 10/18/2022 06:04 AM    CREATININE 7.7 10/18/2022 06:04 AM    GFRAA 9 10/17/2022 07:07 AM    GFRAA >60 12/14/2010 01:58 PM    LABGLOM 8 10/18/2022 06:04 AM    GLUCOSE 161 10/18/2022 06:04 AM    PHOS 4.2 09/16/2022 06:02 AM MG 1.90 01/30/2019 09:40 PM    CALCIUM 8.9 10/18/2022 06:04 AM     Lab Results   Component Value Date/Time    WBC 15.8 10/18/2022 06:04 AM    RBC 2.98 10/18/2022 06:04 AM    HGB 9.3 10/18/2022 06:04 AM    HCT 28.5 10/18/2022 06:04 AM    MCV 95.6 10/18/2022 06:04 AM    RDW 15.1 10/18/2022 06:04 AM     10/18/2022 06:04 AM     Lab Results   Component Value Date    INR 1.14 09/14/2022    PROTIME 14.5 09/14/2022        Scheduled Meds:    heparin (porcine)  5,000 Units SubCUTAneous 3 times per day    fluconazole  200 mg IntraVENous Q24H    alteplase  3 mg IntraCATHeter Once    haloperidol lactate  2 mg IntraMUSCular Once    cloNIDine  0.1 mg Oral TID    lidocaine  1 patch TransDERmal Nightly    insulin glargine  10 Units SubCUTAneous Nightly    gentamicin   Topical Daily    epoetin robert-epbx  10,000 Units SubCUTAneous Once per day on Mon Wed Fri    atorvastatin  40 mg Oral Nightly    calcitRIOL  0.5 mcg Oral Daily    aspirin  81 mg Oral Daily    gabapentin  300 mg Oral TID    hydrALAZINE  100 mg Oral TID    insulin lispro  3 Units SubCUTAneous TID WC    isosorbide mononitrate  60 mg Oral Daily    losartan  100 mg Oral Daily    metoprolol tartrate  75 mg Oral BID    NIFEdipine  90 mg Oral BID    pantoprazole  40 mg Oral BID AC    sodium bicarbonate  650 mg Oral Daily    spironolactone  100 mg Oral Daily    sucralfate  1 g Oral 4x Daily    sevelamer  800 mg Oral TID WC    insulin lispro  0-8 Units SubCUTAneous TID WC    insulin lispro  0-4 Units SubCUTAneous Nightly    sodium chloride flush  5-40 mL IntraVENous 2 times per day     Continuous Infusions:    sodium chloride      dextrose      sodium chloride 10 mL/hr at 10/17/22 0902       Assessment:  -POD #1 PD Catheter Removal and Right IJ TDC Placement with Dr. Jaylon Burgess  -RLQ Abdominal Pan - improving  -Right Neck Incision intact with surgical glue  -Left Abdomen Incision intact with surgical glue     Plan:   -Antifungal per ID  -Replace dressing to Left Side of Abdomen as needed for drainage  -HD - per Nephrology  -Can consider replacing PD Catheter in the future if pt would like to return to PD - will await direction of ID and Nephrology  -Okay to consider D/C planning from Vascular Surgery standpoint once otherwise medically stable    All pertinent information and plan of care discussed with Dr. Sally Pappas. All questions and concerns were addressed with the patient. I have discussed the above stated plan with the patient. The patient verbalized understanding and agreed with the plan. Thank you for allowing to us to participate in the care of Yadira Joe      Electronically signed by ROQUE Sesay - CNP on 10/18/2022 at 9:09 AM   Pt seen and examined. for DIAGNOSIS OF fungal peritonitis and renal failure agree with ROQUE Sesay's note. Labs reviewed. Vitals   Vitals:    10/18/22 0819 10/18/22 0922 10/18/22 1019 10/18/22 1030   BP:  (!) 173/95  (!) 150/88   Pulse: 80 84  96   Resp: (!) 9 16  16   Temp:  98.4 °F (36.9 °C)  97.4 °F (36.3 °C)   TempSrc:    Oral   SpO2: 99%   98%   Weight:  160 lb 0.9 oz (72.6 kg)     Height:   6' 2\" (1.88 m)    Patient seen on dialysis new tunneled line is working. Neck incision looks good. Abdominal incision looks good small piece of gauze on the exit site has no drainage on it. Patient already asking when he can go home and when he can get his PD catheter back.   Told him he was not up to us he had to get cleared of his fungal infection

## 2022-10-18 NOTE — PLAN OF CARE
Problem: Safety - Adult  Goal: Free from fall injury  Outcome: Progressing     Problem: ABCDS Injury Assessment  Goal: Absence of physical injury  Outcome: Progressing     Problem: Chronic Conditions and Co-morbidities  Goal: Patient's chronic conditions and co-morbidity symptoms are monitored and maintained or improved  Outcome: Progressing     Problem: Nutrition Deficit:  Goal: Optimize nutritional status  10/18/2022 1754 by Dwayne Killian RN  Outcome: Progressing  10/18/2022 1025 by Manohar Torres RD, LD  Outcome: Progressing

## 2022-10-18 NOTE — PROGRESS NOTES
Treatment time: 3 hours  Net UF: 950 ml     Pre weight: 73.6 kg  Post weight:72.6 kg  EDW: 79.5 kg    Access used: RTDC    Access function: well with  ml/min     Medications or blood products given: heparin dwells     Regular outpatient schedule: n/a     Summary of response to treatment: Patient tolerated treatment well and without any complications. Patient remained stable throughout entire treatment        10/18/22 0615 10/18/22 0922   Treatment   Time On 0622  --    Time Off  --  3072   Treatment Goal 1400  --    Vital Signs   BP (!) 187/100 (!) 173/95   Temp 98.5 °F (36.9 °C) 98.4 °F (36.9 °C)   Heart Rate 93 84   Resp 16 16   Weight 162 lb 4.1 oz (73.6 kg) 160 lb 0.9 oz (72.6 kg)   Technical Checks   Machine Alarm Self Test Completed; Passed  --    Dialysis Bath   K+ (Potassium) 2  --    Ca+ (Calcium) 2.5  --    Na+ (Sodium) 138  --    HCO3 (Bicarb) 32  --

## 2022-10-18 NOTE — PROGRESS NOTES
[facility]                                               Progress note    Summary:   Gage Ruano is being seen by nephrology for ESRD. Admitted with peritonitis. Interval History  Seen and examined at bedside  Awake and alert  Very tired after working with therapy  Had his first dialysis session today, tolerated it fine  At 3-hour treatment 950 cc UF post weight 72.6 kg  Estimated dry weight 79.5 kg below his dry weight    Labs reviewed  WBC down to 15.8  Hemoglobin 9.3 potassium 5.9 BUN 41 creatinine 7.7 bicarb 17    Blood pressure 150/88 98% on room air afebrile      Plan:   Dialysis today, will keep TTS schedule, has an outpatient spot at [address] on TTS second shift  Increase sodium bicarbonate to 60 mg 3 times daily  Fluconazole 200 mg daily for 14 days more per ID, appreciate their recommendations. Okay to discharge from renal aspect, has dialysis chair confirmed outpatient  Due to social situation, living conditions will have to wait on transition back to home therapies until these issues are settled      Marietta Butler MD  [facility]  Office: [phone]    Assessment:   Peritonitis  Likely secondary to lack in sterile technique and standing sewage issue at his home recently   PD fluid was cloudy, pink, 10,000 nucleated cells consistent with peritonitis. Fluid culture so far is no growth to date  + candida     Hyperkalemia     ESRD  On peritoneal dialysis  Home prescription: 5 times weekly   79.5  3 x 2500 fill 8.5 hrs 2.5 hr dwell on cycler at night. Usually uses 1/2 green and 1/2 yellow    HTN  BP is acceptable. Difficult to control as an outpatient. Electrolytes  No acute issues. Anemia  Recent GIB  Anemia due to renal failure as well             ROS:   Positives Listed Bold. All other remaining systems are negative.     Constitutional:  fever, chills, weakness, weight change, fatigue,      Skin:  rash, pruritus, hair loss, bruising, dry skin, petechiae. Head, Face, Neck   headaches, swelling,  cervical adenopathy. Respiratory: shortness of breath, cough, or wheezing  Cardiovascular: chest pain, palpitations, dizzy, edema  Gastrointestinal: nausea, vomiting, diarrhea, constipation,belly pain    Yellow skin, blood in stool  Musculoskeletal:  back pain, muscle weakness, gait problems,       joint pain or swelling. Genitourinary:  dysuria, poor urine flow, flank pain, blood in urine  Neurologic:  vertigo, TIA'S, syncope, seizures, focal weakness  Psychosocial:  insomnia, anxiety, or depression. Additional positive findings: -     PMH:   Past medical history, surgical history, social history, family history are reviewed and updated as appropriate. Reviewed current medication list.   Allergies reviewed and updated as needed. PE:   Vitals:    10/18/22 1206   BP: (!) 150/88   Pulse: 96   Resp:    Temp:    SpO2:        General appearance:  in NAD, fully alert and oriented. Comfortable. HEENT: EOM intact, no icterus. Trachea is midline. Neck : No masses, appears symmetrical, no JVD  Respiratory: Respiratory effort appears normal, bilateral equal chest rise, no wheeze, no crackles   Cardiovascular: Ausculation shows RRR no edema  Abdomen: mild tender to palpation   Musculoskeletal:  Joints with no swelling or deformity. Skin:no rashes, ulcers, induration, no jaundice. Neuro: face symmetric, no focal deficits. Appropriate responses.        Lab Results   Component Value Date    CREATININE 7.7 (HH) 10/18/2022    BUN 41 (H) 10/18/2022     10/18/2022    K 5.9 (H) 10/18/2022     10/18/2022    CO2 17 (L) 10/18/2022      Lab Results   Component Value Date    WBC 15.8 (H) 10/18/2022    HGB 9.3 (L) 10/18/2022    HCT 28.5 (L) 10/18/2022    MCV 95.6 10/18/2022     10/18/2022     Lab Results   Component Value Date    PTH 49.8 02/01/2019    CALCIUM 8.9 10/18/2022    PHOS 4.2 09/16/2022

## 2022-10-18 NOTE — PROGRESS NOTES
POST FALL MANAGEMENT    [facility] RECORD NUMBER:  [id number]  AGE: 65 y.o. GENDER: male  : 1965  TODAYS DATE:  10/18/2022    Details     Fall Occurred: Yes    Was the Fall Witnessed:  No    Brief Review of Event:  Received call from CHANDU Huber stating patient fell. Patient states he did not hit his head. Vital signs stable. Hospitalist phoned. Patient's wife informed. Hospitalist came to see patient. Patient refused CT scan. Date Fall Occurred:  2022 . Time Fall Occurred: 5:14a.m. Assessment     Post Fall Head to Toe Assessment Completed: Yes    Post Nadia Masha and ABCDS Completed: Yes    Injury Occurred:  no    Did the Patient Experience:( Karri all that apply)    [] Loss of consciousness  [] Change in mental status following the fall  [] Injuries exceeding minor hematoma and lacerations (restrictions in mobility, joint             range of motion or change in weight bearing status)  [] Patient is on an anticoagulant medication  [x] Unknown if patient hit head    *If any of the boxes are checked, obtain a Head CT w/o Contrast & increase vital signs and neuro checks to q2h. Patient refused CT       CT Performed:  No: Patient refused  Increased VS/Neuro checks to q2h:  No: No injury sustained.   Neuro checks not ordered by MD.    Follow-up     Persons Notified of Fall:  Tom Monson all that apply and provide names of persons notified)   [x] Physician: Jc Sawyer   [] NP:  [x] Nursing Supervisior: Juan Terrell  [x] Manager: Tay Silvestre  [x] Family: Wife  [] Other:      Electronically signed by Gudelia Trinidad RN 10/18/2022 at 5:58 AM

## 2022-10-18 NOTE — PROGRESS NOTES
Comprehensive Nutrition Assessment    Type and Reason for Visit:  Initial (LOS)    Nutrition Recommendations/Plan:   Continue Low K+ diet. Start Nepro BID. Add bowel regimen. Malnutrition Assessment:  Malnutrition Status: At risk for malnutrition (Comment) (10/18/22 1024)    Context:  Acute Illness     Findings of the 6 clinical characteristics of malnutrition:  Energy Intake:  Mild decrease in energy intake (Comment)  Weight Loss:  Unable to assess (fluid shifts)     Body Fat Loss:  No significant body fat loss     Muscle Mass Loss:  No significant muscle mass loss    Fluid Accumulation:  No significant fluid accumulation     Strength:  Not Performed    Nutrition Assessment:    LOS. Pt with PMH of DM, HF, and ESRD on PD who presented with abdominal pain. Pt with PD related peritonitis which was fungal peritonitis so pt was switched over to HD and [facility] was placed. Pt appetite has varied since admission, likely d/t off and on confusion and abdominal pain. Pt currently on Low K+ diet. Will trial ONS and encourage PO intakes. No BM since admission, need bowel regimen. Nutrition Related Findings:    no edema or BM noted; K+ 5.9 Wound Type: None       Current Nutrition Intake & Therapies:    Average Meal Intake: 1-25%, 26-50%, 51-75%, %  Average Supplements Intake: None Ordered  ADULT DIET; Regular; Low Potassium (Less than 3000 mg/day)    Anthropometric Measures:  Height: 6' 2\" (188 cm)  Ideal Body Weight (IBW): 190 lbs (86 kg)       Current Body Weight: 160 lb (72.6 kg), 84.2 % IBW. Weight Source: Bed Scale  Current BMI (kg/m2): 20.5                          BMI Categories: Normal Weight (BMI 18.5-24. 9)    Estimated Daily Nutrient Needs:        Energy (kcal/day): 8967-6968 kcal (25-30 kcal/kg 72 kg CBW)     Protein (g/day):  gm (1.2-1.4 gm/kg 72 kg CBW)     Fluid (ml/day): 1 mL/kcal or per nephrology    Nutrition Diagnosis:   Inadequate oral intake related to pain, cognitive or neurological impairment as evidenced by intake 26-50%, intake 51-75%    Nutrition Interventions:   Food and/or Nutrient Delivery: Continue Current Diet, Start Oral Nutrition Supplement  Nutrition Education/Counseling: No recommendation at this time  Coordination of Nutrition Care: Continue to monitor while inpatient       Goals:     Goals: PO intake 50% or greater       Nutrition Monitoring and Evaluation:      Food/Nutrient Intake Outcomes: Food and Nutrient Intake, Supplement Intake  Physical Signs/Symptoms Outcomes: Biochemical Data, Weight, Skin, Nutrition Focused Physical Findings, Constipation    Discharge Planning:     Too soon to determine     Monty Hamilton RD, LD  Contact: [phone]

## 2022-10-18 NOTE — PLAN OF CARE
Problem: Discharge Planning  Goal: Discharge to home or other facility with appropriate resources  10/18/2022 0141 by Gudelia Trinidad RN  Outcome: Progressing  Case mgmt involved to assess for appropriate level of care and resources. 10/17/2022 1549 by Kael Morales RN  Outcome: Progressing     Problem: ABCDS Injury Assessment  Goal: Absence of physical injury  10/18/2022 0141 by Gudelia Trinidad RN  Outcome: Progressing  Patient free of physical education  10/17/2022 1549 by Kael Morales RN  Outcome: Progressing     Problem: Confusion  Goal: Confusion, delirium, dementia, or psychosis is improved or at baseline  Description: INTERVENTIONS:  1. Assess for possible contributors to thought disturbance, including medications, impaired vision or hearing, underlying metabolic abnormalities, dehydration, psychiatric diagnoses, and notify attending LIP  2. [place] high risk fall precautions, as indicated  3. Provide frequent short contacts to provide reality reorientation, refocusing and direction  4. Decrease environmental stimuli, including noise as appropriate  5. Monitor and intervene to maintain adequate nutrition, hydration, elimination, sleep and activity  6. If unable to ensure safety without constant attention obtain sitter and review sitter guidelines with assigned personnel  7. Initiate Psychosocial CNS and Spiritual Care consult, as indicated  10/18/2022 0141 by Gudelia Trinidad RN  Outcome: Progressing  10/17/2022 1549 by Kael Morales RN  Outcome: Progressing     Problem: Pain  Goal: Verbalizes/displays adequate comfort level or baseline comfort level  10/18/2022 0141 by Gudelia Trinidad RN  Outcome: Progressing  Pain being managed with PRN analgesics per MD orders. Patient able to express presence and absence of pain and rate pain appropriately using numerical scale.     10/17/2022 1549 by Kael Morales RN  Outcome: Progressing

## 2022-10-18 NOTE — CARE COORDINATION
Patient may discharge today vs tomorrow pending ID recs. Patient will return home with wife. Pt/wife decline home care because they are in the process of moving/packing belongings. Pt will follow up with PCP if home care desired in the future. Patient requests a rolling walker, which he has been using in hospital room. Await updated therapy notes for equipment recs. [employer] following for DME needs. Patient & wife aware of outpatient HD at [facility] (starting Thursday 10/20) at 10:40am (arrive 10:25am). Spoke to [facility], aware patient will be there Friday. Will fax dc summary to [phone]. [place] transport made to HD for Thursday per pt/wife's request ([facility]#[id number]). Wife to make further HD transportation w/ [place] herself. Electronically signed by Letty Alarcon RN Case Management [phone] on 10/18/2022 at 1:03 PM      Spoke to [facility], patient last had a wheeled walker delivered in 2019. Patient not eligible for insurance to cover another walker. Notified patient & wife, they will try to find walker or purchase a new walker from [place] or [facility].    Electronically signed by Letty Alarcon RN Case Management on 10/18/2022 at 1:35 PM

## 2022-10-18 NOTE — PROGRESS NOTES
Patient had recently been transitioned to hemodialysis. Requesting to stay for 1 more day to get things arranged.   Will postpone discharge to tomorrow

## 2022-10-18 NOTE — PROGRESS NOTES
Infectious Disease Follow up Notes  Admit Date: 10/11/2022  Hospital Day: 8    Antibiotics :   ORAL   Fluconazole     CHIEF COMPLAINT:      PD peritonitis  Abd pain   ESRD on HD now     Subjective interval History :  65 y. o.man with a significant history for end-stage renal disease on peritoneal dialysis, cardiomyopathy, congestive heart failure, CVA, gastroparesis, admitted to hospital secondary to abdominal pain as well as PD fluid was abnormal and pink. He has been on PD dialysis for the past 2 years. No previous PD related infections per patient. On presentation PD fluid analysis was grossly abnormal with elevated WBC count. PD fluid culture positive for Candida parapsilosis. He had multiple PD fluid specimens positive for yeast.  He underwent PD catheter removal and HD line placement today by Dr. Charleen Solorzano. Given the need for antifungal treatment we are consulted for recommendations. T-max 100.7 since admission, WBC elevated to 13.3.     Interval History : Abd pain better no nausea and HD run went ok wife at bed side had questions on antifungal therapy  and duration        Past Medical History:    Past Medical History:   Diagnosis Date    Cardiomyopathy ([address]     CHF (congestive heart failure) (HCC)     CVA (cerebral infarction) 5/2015    Diabetes mellitus ([facility]     Foot ulcer, left ([address] 1/14/2016    Gastroparesis     Hemodialysis patient ([facility]     Hypercholesteremia     Hypertension     Kidney disease     Unspecified cerebral artery occlusion with cerebral infarction     5/15, 7/15 LEFT SIDE WEAKNESS       Past Surgical History:    Past Surgical History:   Procedure Laterality Date    CATHETER INSERTION N/A 10/17/2022    TUNNEL CATHETER PLACEMENT performed by Jennifer Tang MD at [address] N/A 5/17/2021    COLONOSCOPY POLYPECTOMY SNARE/COLD BIOPSY performed by Fannie Xavier MD at [facility] DIALYSIS CATHETER REMOVAL N/A 10/17/2022    PERITONEAL DIALYSIS CATHETER REMOVAL performed by Bernard Virk MD at [facility] N/A 10/29/2020    PLACEMENT OF A TUNNELED DIALYSIS CATHETER WITH FLEURO AND ULTRASOUND performed by Bernard Virk MD at [address] [place] N/A 10/29/2020    LAPAROSCOPIC PERITONEAL DIALYSIS CATHETER PLACEMENT WITH FLEURO AND ULTRASOUND performed by Bernard Virk MD at [address] N/A 54/00/4000    UMBILICAL HERNIA REPAIR performed by Bernard Virk MD at [address] 4/26/2021    ESOPHAGOGASTRODUODENOSCOPY performed by Devaughn Mistry MD at [address] [facility] 9/13/2022    EGD DIAGNOSTIC ONLY performed by Wilber Horner MD at [address] [facility]       Current Medications:    Outpatient Medications Marked as Taking for the 10/11/22 encounter [facility] Encounter)   Medication Sig Dispense Refill    spironolactone (ALDACTONE) 100 MG tablet Take 100 mg by mouth daily         Allergies:  Doxazosin and Coconut flavor    Immunizations :   Immunization History   Administered Date(s) Administered    COVID-19, PFIZER GRAY top, DO NOT Dilute, (age 15 y+), IM, 30 mcg/0.3 mL 06/04/2022    COVID-19, PFIZER PURPLE top, DILUTE for use, (age 15 y+), 30mcg/0.3mL 06/23/2021, 07/19/2021    Hepatitis A Adult (Havrix, Vaqta) 03/10/2021, 09/23/2021    Hepatitis B 05/06/2022    Hepatitis B Adult (Heplisav-b) 05/06/2022    Hepatitis B Adult (Recombivax HB) 11/05/2020    Hepatitis B vaccine 11/05/2020    Influenza Vaccine, unspecified formulation 12/05/2013    Influenza Virus Vaccine 12/05/2013, 12/04/2015, 11/05/2020, 10/11/2021    Influenza, FLUARIX, FLULAVAL, FLUZONE (age 10 mo+) AND AFLURIA, (age 1 y+), PF, 0.5mL 10/15/2018, 11/05/2020    Influenza, Triv, 3 Years and older, IM, PF (Afluria 5yrs and older) 10/11/2021    Pneumococcal Conjugate 13-valent Pool Sandhu) 11/10/2020    Pneumococcal Conjugate Vaccine 02/13/2015    Pneumococcal Polysaccharide (Rhggqtcks40) 11/02/2009, 03/10/2021    Tdap (Boostrix, Adacel) 08/27/2021    Zoster Recombinant (Shingrix) 03/10/2021, 08/27/2021       Social History:     Social History     Tobacco Use    Smoking status: Some Days     Packs/day: 0.00     Years: 30.00     Pack years: 0.00     Types: Cigars, Cigarettes     Start date: 1/3/1979     Last attempt to quit: 1/16/2019     Years since quitting: 3.7    Smokeless tobacco: Never    Tobacco comments:     3 black and milds a week   Vaping Use    Vaping Use: Never used   Substance Use Topics    Alcohol use: Yes     Comment: occasional    Drug use: Yes     Types: Marijuana Herminia Schultz)     Comment: previously used cocaine, stopped May 2015 LAST USED MARIJUANA-NOVEMBER 2020     Social History     Tobacco Use   Smoking Status Some Days    Packs/day: 0.00    Years: 30.00    Pack years: 0.00    Types: Cigars, Cigarettes    Start date: 1/3/1979    Last attempt to quit: 1/16/2019    Years since quitting: 3.7   Smokeless Tobacco Never   Tobacco Comments    3 black and milds a week      Family History   Adopted: Yes          REVIEW OF SYSTEMS:      Constitutional:  negative for fevers, chills, night sweats  Eyes:  negative for blurred vision, eye discharge, visual disturbance   HEENT:  negative for hearing loss, ear drainage,nasal congestion  Respiratory:  negative for cough, shortness of breath or hemoptysis   Cardiovascular:  negative for chest pain, palpitations, syncope  Gastrointestinal:  negative for nausea, vomiting, diarrhea, constipation, abdominal pain++   Genitourinary:  negative for frequency, dysuria, urinary incontinence, hematuria  Hematologic/Lymphatic:  negative for easy bruising, bleeding and lymphadenopathy  Allergic/Immunologic:  negative for recurrent infections, angioedema, anaphylaxis   Endocrine:  negative for weight changes, polyuria, polydipsia and polyphagia  Musculoskeletal:  negative for joint  pain, swelling, decreased range of motion  Integumentary: No rashes, skin lesions  Neurological:  negative for headaches, slurred speech, unilateral weakness  Psychiatric: negative for hallucinations,confusion,agitation.                 PHYSICAL EXAM:      Vitals:    BP (!) 150/88   Pulse 96   Temp 97.4 °F (36.3 °C) (Oral)   Resp 16   Ht 6' 2\" (1.88 m)   Wt 160 lb 0.9 oz (72.6 kg)   SpO2 98%   BMI 20.55 kg/m²   General Appearance: alert,in no acute distress, ++  pallor, no icterus   Skin: warm and dry, no rash or erythema  Head: normocephalic and atraumatic  Eyes: pupils equal, round, and reactive to light, conjunctivae normal  ENT: tympanic membrane, external ear and ear canal normal bilaterally, nose without deformity, nasal mucosa and turbinates normal without polyps  Neck: supple and non-tender without mass, no thyromegaly  no cervical lymphadenopathy  Pulmonary/Chest: clear to auscultation bilaterally- no wheezes, rales or rhonchi, normal air movement, no respiratory distress  Cardiovascular: l S1 and S2, ESM++ murmurs, rubs, clicks, or gallops, no carotid bruits  Abdomen: soft, +-tender, + distended, normal bowel sounds, no masses or organomegaly  Extremities: no cyanosis, clubbing or edema  Musculoskeletal: normal range of motion, no joint swelling, deformity or tenderness  Integumentary: No rashes, no abnormal skin lesions, no petechiae  Neurologic: reflexes normal and symmetric, no cranial nerve deficit  Psych:  Orientation, sensorium, mood normal            Lines: HD line+      Data Review:    CBC:   Lab Results   Component Value Date    WBC 15.8 (H) 10/18/2022    HGB 9.3 (L) 10/18/2022    HCT 28.5 (L) 10/18/2022    MCV 95.6 10/18/2022     10/18/2022     RENAL:   Lab Results   Component Value Date    CREATININE 7.7 (HH) 10/18/2022    BUN 41 (H) 10/18/2022     10/18/2022    K 5.9 (H) 10/18/2022     10/18/2022    CO2 17 (L) 10/18/2022 SED RATE:   Lab Results   Component Value Date/Time    SEDRATE 64 10/26/2020 02:14 PM     CK: No results found for: CKTOTAL  CRP:   Lab Results   Component Value Date/Time    CRP 2.9 10/26/2020 02:14 PM     Hepatic Function Panel:   Lab Results   Component Value Date/Time    ALKPHOS 53 10/11/2022 02:41 PM    ALT <5 10/11/2022 02:41 PM    AST 7 10/11/2022 02:41 PM    PROT 6.8 10/11/2022 02:41 PM    BILITOT <0.2 10/11/2022 02:41 PM    BILIDIR <0.2 10/11/2022 02:41 PM    IBILI see below 10/11/2022 02:41 PM    LABALBU 2.4 10/11/2022 02:41 PM     UA:  Lab Results   Component Value Date/Time    COLORU Yellow 10/12/2022 10:45 AM    CLARITYU Clear 10/12/2022 10:45 AM    GLUCOSEU Negative 10/12/2022 10:45 AM    GLUCOSEU 250 12/14/2010 02:50 PM    BILIRUBINUR Negative 10/12/2022 10:45 AM    BILIRUBINUR NEGATIVE 12/14/2010 02:50 PM    KETUA Negative 10/12/2022 10:45 AM    SPECGRAV 1.011 10/12/2022 10:45 AM    BLOODU Negative 10/12/2022 10:45 AM    PHUR 7.5 10/12/2022 10:45 AM    PROTEINU 100 10/12/2022 10:45 AM    UROBILINOGEN 0.2 10/12/2022 10:45 AM    NITRU Negative 10/12/2022 10:45 AM    LEUKOCYTESUR Negative 10/12/2022 10:45 AM    LABMICR YES 10/12/2022 10:45 AM    URINETYPE NotGiven 10/12/2022 10:45 AM      Urine Microscopic:   Lab Results   Component Value Date/Time    BACTERIA None Seen 10/12/2022 10:45 AM    COMU see below 05/05/2021 05:36 PM    HYALCAST 0 10/12/2022 10:45 AM    WBCUA 1 10/12/2022 10:45 AM    RBCUA 1 10/12/2022 10:45 AM    EPIU 0 10/12/2022 10:45 AM     Urine Reflex to Culture:   Lab Results   Component Value Date/Time    URRFLXCULT Not Indicated 05/05/2021 05:36 PM         MICRO: cultures reviewed and updated by me   Blood Culture:   Lab Results   Component Value Date/Time    BC No Growth after 4 days of incubation. 10/11/2022 06:22 PM    BLOODCULT2 No Growth after 4 days of incubation.  10/11/2022 09:03 PM     Procedure Component Value Units Date/Time   Culture, Tip [[id number]] Collected: 10/17/22 0948   Order Status: Completed Specimen: Catheter Tip Updated: 10/18/22 0717    Culture Catheter Tip No growth to date   Narrative:     ORDER#: [id number]                          ORDERED BY: Calos Wilcox   SOURCE: Catheter Tip                       COLLECTED:  10/17/22 09:48   ANTIBIOTICS AT [facility].:                      RECEIVED :  10/17/22 10:09   Culture, Body Fluid [[id number]] Collected: 10/16/22 1100   Order Status: Completed Specimen: Body Fluid from Peritoneal Dialysis Fluid Updated: 10/18/22 0711    Body Fluid Culture, Sterile --    No growth to date   No growth 36 to 48 hours     Gram Stain Result No Epithelial Cells seen   No WBCs or organisms seen    Narrative:     ORDER#: [id number]                          ORDERED BY: Blossom Dela Cruz   SOURCE: Peritoneal Dialysis Fluid          COLLECTED:  10/16/22 11:00   ANTIBIOTICS AT [facility].:                      RECEIVED :  10/16/22 11:42   Culture, Fungus [[id number]] Collected: 10/14/22 1730   Order Status: Completed Specimen: Peritoneal Fluid from Peritoneal Dialysis Fluid Updated: 10/17/22 1602    Fungus (Mycology) Culture Yeast Isolated, culture in progress    Fungus Stain No Fungal elements seen   Narrative:     ORDER#: [id number]                          ORDERED BY: Blossom Dela Cruz   SOURCE: Peritoneal Dialysis Fluid          COLLECTED:  10/14/22 17:30   ANTIBIOTICS AT [facility].:                      RECEIVED :  10/14/22 17:59   Culture, Body Fluid [[id number]] (Abnormal) Collected: 10/15/22 0715   Order Status: Completed Specimen:  Body Fluid from Peritoneal Dialysis Fluid Updated: 10/17/22 1336    Gram Stain Result No WBCs or organisms seen    Organism Candida parapsilosis Abnormal     Body Fluid Culture, Sterile --    Rare growth   Refer to ([id number]) for sensitivity results    Narrative:     ORDER#: [id number]                          ORDERED BY: Blossom Dela Cruz      Respiratory Culture:  Lab Results   Component Value Date/Time    [facility] 10/16/2022 11:00 AM     No Epithelial Cells seen  No WBCs or organisms seen       AFB:No results found for: AFBSMEAR  Viral Culture:  Lab Results   Component Value Date/Time    COVID19 Not Detected 09/15/2022 07:20 PM     Urine Culture: No results for input(s): LABURIN in the last 72 hours. IMAGING:    XR CHEST PORTABLE   Final Result   1. Right-sided central venous catheter with the tip in the superior vena   cava. 2.  Stable cardiomegaly due to a moderate-sized pericardial effusion. 3.  Mild bibasilar airspace opacities favored to represent atelectasis. FL VASCULAR ACCESS GUIDANCE   Final Result      FLUORO FOR SURGICAL PROCEDURES   Final Result      CT HEAD WO CONTRAST   Final Result   No acute intracranial abnormality. CT ABDOMEN PELVIS WO CONTRAST Additional Contrast? None   Final Result   No definite abnormality identified to explain cloudy peritoneal dialysate. Dialysate measures near water density on the current examination. Several bubbles of free intraperitoneal gas which or presumably introduced   during dialysis. Please note that hollow visceral perforation cannot be   excluded. Mural thickening of the large bowel, mild-to-moderate in degree. That may be   secondary to under distension, but colitis must be considered as well. Polycystic kidney disease. Note that some of the renal lesions are   indeterminate in terms of density, and therefore a solid lesion cannot be   excluded.                All the pertinent images and reports for the current Hospitalization were reviewed by me     Scheduled Meds:   heparin (porcine)  5,000 Units SubCUTAneous 3 times per day    fluconazole  200 mg IntraVENous Q24H    alteplase  3 mg IntraCATHeter Once    haloperidol lactate  2 mg IntraMUSCular Once    cloNIDine  0.1 mg Oral TID    lidocaine  1 patch TransDERmal Nightly    insulin glargine  10 Units SubCUTAneous Nightly    gentamicin   Topical Daily    epoetin robert-epbx 10,000 Units SubCUTAneous Once per day on Mon Wed Fri    atorvastatin  40 mg Oral Nightly    calcitRIOL  0.5 mcg Oral Daily    aspirin  81 mg Oral Daily    gabapentin  300 mg Oral TID    hydrALAZINE  100 mg Oral TID    insulin lispro  3 Units SubCUTAneous TID     isosorbide mononitrate  60 mg Oral Daily    losartan  100 mg Oral Daily    metoprolol tartrate  75 mg Oral BID    NIFEdipine  90 mg Oral BID    pantoprazole  40 mg Oral BID AC    sodium bicarbonate  650 mg Oral Daily    spironolactone  100 mg Oral Daily    sucralfate  1 g Oral 4x Daily    sevelamer  800 mg Oral TID WC    insulin lispro  0-8 Units SubCUTAneous TID WC    insulin lispro  0-4 Units SubCUTAneous Nightly    sodium chloride flush  5-40 mL IntraVENous 2 times per day       Continuous Infusions:   sodium chloride      dextrose      sodium chloride 10 mL/hr at 10/17/22 0902       PRN Meds:  heparin (porcine), cyclobenzaprine, oxyCODONE **OR** oxyCODONE, sodium chloride, glucose, dextrose bolus **OR** dextrose bolus, glucagon (rDNA), dextrose, sodium chloride flush, sodium chloride, ondansetron **OR** ondansetron, polyethylene glycol, acetaminophen **OR** acetaminophen      Assessment:     Patient Active Problem List   Diagnosis    Nonischemic cardiomyopathy ([facility]    C[facility]    Cigarette nicotine dependence without complication    Erectile dysfunction due to arterial insufficiency    Renal artery stenosis ([facility]    Chronic diastolic congestive heart failure ([facility]    H/O: CVA (cerebrovascular accident)    Major depressive disorder, recurrent episode, moderate (HCC)    Pericardial effusion    Hypertension associated with diabetes ([facility]    DM type 2 with diabetic mixed hyperlipidemia ([facility]    Diabetes mellitus due to underlying condition with stage 4 chronic kidney disease, with long-term current use of insulin ([facility]    ESRD (end stage renal disease) on dialysis ([facility]    ESRD (end stage renal disease) ([facility]    GI bleed    SBP (spontaneous bacterial peritonitis) ([facility]    Dialysis-associated peritonitis ([address]    Candida infection    Generalized abdominal pain    PKD (polycystic kidney disease)     PD catheter related Peritonitis  ESRD on PD  PD fluid was very abnormal   Multiple PD fluid cx with Candida parapsilosis  WBC elevation   CT abd/pelvis changes from PD   Polycystic Kidney disease  H/o CVA  DM+  S/P PD catheter removal on 10/17  S/p New HD line placement for Dialysis     Now that PD catheter has been removed we can choose oral Anti Fungal therapy when ready for d/c     Anticipate x 2 weeks of oral Fluconazole therapy     Etiology of Peritonitis in these instances is from External catheter  colonization leading to internal migration with biofilm formation  and not from SBP    PD catheter is now out and he is on HD and poor access ok to change oral Fluconazole for d/c planning    Labs, Microbiology, Radiology and all the pertinent results from current hospitalization and  care every where were reviewed  by me as a part of the evaluation   Plan:   Cont oral  Fluconazole x 200 mg dose increased x 14 more days    PD catheter removal was very appropriate  PD tip cx in process  He will be on HD for now per patient   Anticipate x 14 days more of antifungal therapy   D/C plans per primary   Would wait for x 4 weeks before new PD placement       Discussed with patient/Family and Nursing   Risk of Complications/Morbidity: High      Illness(es)/ Infection present that pose threat to bodily function. There is potential for severe exacerbation of infection/side effects of treatment. Therapy requires intensive monitoring for antimicrobial agent toxicity. Discussed with patient/Family and Nursing staff     Thanks for allowing me to participate in your patient's care and please call me with any questions or concerns.     Irena Quinonez MD  Infectious Disease  [facility]) Physician  Phone: [phone]   Fax : [phone]

## 2022-10-19 VITALS
OXYGEN SATURATION: 98 % | RESPIRATION RATE: 20 BRPM | WEIGHT: 160.05 LBS | DIASTOLIC BLOOD PRESSURE: 96 MMHG | TEMPERATURE: 98 F | BODY MASS INDEX: 20.54 KG/M2 | SYSTOLIC BLOOD PRESSURE: 149 MMHG | HEIGHT: 74 IN | HEART RATE: 85 BPM

## 2022-10-19 LAB
ANION GAP SERPL CALCULATED.3IONS-SCNC: 9 MMOL/L (ref 3–16)
BASOPHILS ABSOLUTE: 0.1 K/UL (ref 0–0.2)
BASOPHILS RELATIVE PERCENT: 0.5 %
BODY FLUID CULTURE, STERILE: NORMAL
BUN BLDV-MCNC: 22 MG/DL (ref 7–20)
CALCIUM SERPL-MCNC: 8.5 MG/DL (ref 8.3–10.6)
CHLORIDE BLD-SCNC: 102 MMOL/L (ref 99–110)
CO2: 25 MMOL/L (ref 21–32)
CREAT SERPL-MCNC: 5.7 MG/DL (ref 0.9–1.3)
EOSINOPHILS ABSOLUTE: 0 K/UL (ref 0–0.6)
EOSINOPHILS RELATIVE PERCENT: 0.3 %
FUNGUS (MYCOLOGY) CULTURE: ABNORMAL
FUNGUS STAIN: ABNORMAL
GFR SERPL CREATININE-BSD FRML MDRD: 11 ML/MIN/{1.73_M2}
GLUCOSE BLD-MCNC: 126 MG/DL (ref 70–99)
GLUCOSE BLD-MCNC: 138 MG/DL (ref 70–99)
GLUCOSE BLD-MCNC: 155 MG/DL (ref 70–99)
GRAM STAIN RESULT: NORMAL
HCT VFR BLD CALC: 28.2 % (ref 40.5–52.5)
HEMOGLOBIN: 9.1 G/DL (ref 13.5–17.5)
LYMPHOCYTES ABSOLUTE: 2.1 K/UL (ref 1–5.1)
LYMPHOCYTES RELATIVE PERCENT: 14.9 %
MCH RBC QN AUTO: 30.8 PG (ref 26–34)
MCHC RBC AUTO-ENTMCNC: 32.3 G/DL (ref 31–36)
MCV RBC AUTO: 95.1 FL (ref 80–100)
MONOCYTES ABSOLUTE: 1.2 K/UL (ref 0–1.3)
MONOCYTES RELATIVE PERCENT: 8.4 %
NEUTROPHILS ABSOLUTE: 10.7 K/UL (ref 1.7–7.7)
NEUTROPHILS RELATIVE PERCENT: 75.9 %
ORGANISM: ABNORMAL
PDW BLD-RTO: 15.1 % (ref 12.4–15.4)
PERFORMED ON: ABNORMAL
PERFORMED ON: ABNORMAL
PLATELET # BLD: 271 K/UL (ref 135–450)
PMV BLD AUTO: 8.8 FL (ref 5–10.5)
POTASSIUM SERPL-SCNC: 4.4 MMOL/L (ref 3.5–5.1)
RBC # BLD: 2.96 M/UL (ref 4.2–5.9)
SODIUM BLD-SCNC: 136 MMOL/L (ref 136–145)
WBC # BLD: 14.1 K/UL (ref 4–11)

## 2022-10-19 PROCEDURE — 6360000002 HC RX W HCPCS: Performed by: SURGERY

## 2022-10-19 PROCEDURE — 80048 BASIC METABOLIC PNL TOTAL CA: CPT

## 2022-10-19 PROCEDURE — 6370000000 HC RX 637 (ALT 250 FOR IP): Performed by: SURGERY

## 2022-10-19 PROCEDURE — 36415 COLL VENOUS BLD VENIPUNCTURE: CPT

## 2022-10-19 PROCEDURE — 6370000000 HC RX 637 (ALT 250 FOR IP): Performed by: INTERNAL MEDICINE

## 2022-10-19 PROCEDURE — APPNB30 APP NON BILLABLE TIME 0-30 MINS: Performed by: NURSE PRACTITIONER

## 2022-10-19 PROCEDURE — 94760 N-INVAS EAR/PLS OXIMETRY 1: CPT

## 2022-10-19 PROCEDURE — 99024 POSTOP FOLLOW-UP VISIT: CPT | Performed by: SURGERY

## 2022-10-19 PROCEDURE — 85025 COMPLETE CBC W/AUTO DIFF WBC: CPT

## 2022-10-19 RX ADMIN — HEPARIN SODIUM 5000 UNITS: 5000 INJECTION INTRAVENOUS; SUBCUTANEOUS at 05:43

## 2022-10-19 RX ADMIN — HEPARIN SODIUM 5000 UNITS: 5000 INJECTION INTRAVENOUS; SUBCUTANEOUS at 14:51

## 2022-10-19 RX ADMIN — CLONIDINE HYDROCHLORIDE 0.1 MG: 0.1 TABLET ORAL at 14:50

## 2022-10-19 RX ADMIN — SPIRONOLACTONE 100 MG: 100 TABLET ORAL at 09:17

## 2022-10-19 RX ADMIN — SEVELAMER CARBONATE 800 MG: 800 TABLET, FILM COATED ORAL at 12:49

## 2022-10-19 RX ADMIN — GABAPENTIN 300 MG: 300 CAPSULE ORAL at 09:17

## 2022-10-19 RX ADMIN — SUCRALFATE 1 G: 1 TABLET ORAL at 12:49

## 2022-10-19 RX ADMIN — CLONIDINE HYDROCHLORIDE 0.1 MG: 0.1 TABLET ORAL at 09:17

## 2022-10-19 RX ADMIN — HYDRALAZINE HYDROCHLORIDE 100 MG: 50 TABLET, FILM COATED ORAL at 09:16

## 2022-10-19 RX ADMIN — ASPIRIN 81 MG: 81 TABLET, COATED ORAL at 09:18

## 2022-10-19 RX ADMIN — METOPROLOL TARTRATE 75 MG: 25 TABLET, FILM COATED ORAL at 09:17

## 2022-10-19 RX ADMIN — CYCLOBENZAPRINE 10 MG: 10 TABLET, FILM COATED ORAL at 05:48

## 2022-10-19 RX ADMIN — POLYETHYLENE GLYCOL 3350 17 G: 17 POWDER, FOR SOLUTION ORAL at 09:26

## 2022-10-19 RX ADMIN — GABAPENTIN 300 MG: 300 CAPSULE ORAL at 14:51

## 2022-10-19 RX ADMIN — SUCRALFATE 1 G: 1 TABLET ORAL at 09:16

## 2022-10-19 RX ADMIN — EPOETIN ALFA-EPBX 10000 UNITS: 10000 INJECTION, SOLUTION INTRAVENOUS; SUBCUTANEOUS at 12:49

## 2022-10-19 RX ADMIN — NIFEDIPINE 90 MG: 90 TABLET, EXTENDED RELEASE ORAL at 09:37

## 2022-10-19 RX ADMIN — LOSARTAN POTASSIUM 100 MG: 100 TABLET, FILM COATED ORAL at 09:18

## 2022-10-19 RX ADMIN — FLUCONAZOLE 200 MG: 100 TABLET ORAL at 09:17

## 2022-10-19 RX ADMIN — SEVELAMER CARBONATE 800 MG: 800 TABLET, FILM COATED ORAL at 09:38

## 2022-10-19 RX ADMIN — SODIUM BICARBONATE 650 MG: 650 TABLET ORAL at 14:50

## 2022-10-19 RX ADMIN — PANTOPRAZOLE SODIUM 40 MG: 40 TABLET, DELAYED RELEASE ORAL at 05:43

## 2022-10-19 RX ADMIN — HYDRALAZINE HYDROCHLORIDE 100 MG: 50 TABLET, FILM COATED ORAL at 14:50

## 2022-10-19 RX ADMIN — OXYCODONE 5 MG: 5 TABLET ORAL at 05:43

## 2022-10-19 RX ADMIN — ISOSORBIDE MONONITRATE 60 MG: 60 TABLET, EXTENDED RELEASE ORAL at 09:16

## 2022-10-19 RX ADMIN — CALCITRIOL 0.5 MCG: 0.25 CAPSULE ORAL at 09:17

## 2022-10-19 RX ADMIN — SODIUM BICARBONATE 650 MG: 650 TABLET ORAL at 09:16

## 2022-10-19 ASSESSMENT — PAIN SCALES - GENERAL
PAINLEVEL_OUTOF10: 9
PAINLEVEL_OUTOF10: 9

## 2022-10-19 ASSESSMENT — PAIN DESCRIPTION - LOCATION
LOCATION: ABDOMEN;NECK
LOCATION: ABDOMEN;NECK

## 2022-10-19 NOTE — DISCHARGE INSTR - COC
Continuity of Care Form    Patient Name: Bridget Gomez   :  1965  MRN:  [id number]    Admit date:  10/11/2022  Discharge date:  10/19/2022    Code Status Order: Full Code   Advance Directives:   [facility] Documentation       Date/Time Healthcare Directive Type of Healthcare Directive Copy in [address] Agent's Name Healthcare Agent's Phone Number    10/17/22 0272 No, patient does not have an advance directive for healthcare treatment -- -- -- -- --            Admitting Physician:  Shanelle Oakley MD  PCP: ROQUE Song CNP    Discharging Nurse: [facility] Unit/Room#: U9G-4603/1727-36  Discharging Unit Phone Number: [phone]    Emergency Contact:   Extended Emergency Contact Information  Primary Emergency Contact: Sedrick Demarco  Address: [address] [place] [address] Phone: [phone]  Mobile Phone: [phone]  Relation: Spouse    Past Surgical History:  Past Surgical History:   Procedure Laterality Date    CATHETER INSERTION N/A 10/17/2022    TUNNEL CATHETER PLACEMENT performed by Jesenia Cifuentes MD at [facility] 2021    COLONOSCOPY POLYPECTOMY SNARE/COLD BIOPSY performed by Jamshid Mendoza MD at [address] [place] [address] N/A 10/17/2022    PERITONEAL DIALYSIS CATHETER REMOVAL performed by Jesenia Cifuentes MD at [address] [place] [address] N/A 10/29/2020    PLACEMENT OF A TUNNELED DIALYSIS CATHETER WITH FLEURO AND ULTRASOUND performed by Jesenia Cifuentes MD at [address] [place] [address] N/A 10/29/2020    LAPAROSCOPIC PERITONEAL DIALYSIS CATHETER PLACEMENT WITH FLEURO AND ULTRASOUND performed by Jesenia Cifuentes MD at [address] [place] [address] N/A     UMBILICAL HERNIA REPAIR performed by Jesenia Cifuentes MD at [place] [address] 2021 ESOPHAGOGASTRODUODENOSCOPY performed by Savage Aaron MD at [place] [address] N/A 9/13/2022    EGD DIAGNOSTIC ONLY performed by Prakash Sylvester MD at [facility]       Immunization History:   Immunization History   Administered Date(s) Administered    COVID-19, PFIZER GRAY top, DO NOT Dilute, (age 15 y+), IM, 30 mcg/0.3 mL 06/04/2022    COVID-19, PFIZER PURPLE top, DILUTE for use, (age 15 y+), 30mcg/0.3mL 06/23/2021, 07/19/2021    Hepatitis A Adult (Havrix, Vaqta) 03/10/2021, 09/23/2021    Hepatitis B 05/06/2022    Hepatitis B Adult (Heplisav-b) 05/06/2022    Hepatitis B Adult (Recombivax HB) 11/05/2020    Hepatitis B vaccine 11/05/2020    Influenza Vaccine, unspecified formulation 12/05/2013    Influenza Virus Vaccine 12/05/2013, 12/04/2015, 11/05/2020, 10/11/2021    Influenza, FLUARIX, FLULAVAL, [address] (age 10 mo+) AND AFLURIA, (age 1 y+), PF, 0.5mL 10/15/2018, 11/05/2020    Influenza, Triv, 3 Years and older, IM, PF (Afluria 5yrs and older) 10/11/2021    Pneumococcal Conjugate 13-valent ([id number]) 11/10/2020    Pneumococcal Conjugate Vaccine 02/13/2015    Pneumococcal Polysaccharide ([id number]) 11/02/2009, 03/10/2021    Tdap (Boostrix, Adacel) 08/27/2021    Zoster Recombinant (Shingrix) 03/10/2021, 08/27/2021       Active Problems:  Patient Active Problem List   Diagnosis Code    Nonischemic cardiomyopathy ([address] I42.8    Cerebral infarction ([address] I63.9    Cigarette nicotine dependence without complication S54.312    Erectile dysfunction due to arterial insufficiency N52.01    Renal artery stenosis (HCC) I70.1    Chronic diastolic congestive heart failure (HCC) I50.32    H/O: CVA (cerebrovascular accident) Z86.73    Major depressive disorder, recurrent episode, moderate (HCC) F33.1    Pericardial effusion I31.39    Hypertension associated with diabetes ([address] E11.59, I15.2    DM type 2 with diabetic mixed hyperlipidemia (HCC) E11.69, E78.2    Diabetes mellitus due to underlying condition with stage 4 chronic kidney disease, with long-term current use of insulin (HCC) E08.22, N18.4, Z79.4    ESRD (end stage renal disease) on dialysis ([facility] N18.6, Z99.2    ESRD (end stage renal disease) ([facility] N18.6    GI bleed K92.2    SBP (spontaneous bacterial peritonitis) ([facility] K65.2    Dialysis-associated peritonitis ([facility] T85.71XA    Candida infection B37.9    Generalized abdominal pain R10.84    PKD (polycystic kidney disease) Q61.3       Isolation/Infection:   Isolation            No Isolation          Patient Infection Status       Infection Onset Added Last Indicated Last Indicated By Review Planned Expiration Resolved Resolved By    None active    Resolved    COVID-19 (Rule Out) 09/13/22 09/13/22 09/15/22 COVID-19, Rapid (Ordered)   09/15/22 Rule-Out Test Resulted    COVID-19 (Rule Out) 10/27/20 10/27/20 10/27/20 COVID-19 (Ordered)   10/27/20 Rule-Out Test Resulted            Nurse Assessment:  Last Vital Signs: BP (!) 149/96   Pulse 85   Temp 98 °F (36.7 °C) (Oral)   Resp 20   Ht 6' 2\" (1.88 m)   Wt 160 lb 0.9 oz (72.6 kg)   SpO2 98%   BMI 20.55 kg/m²     Last documented pain score (0-10 scale): Pain Level: 9  Last Weight:   Wt Readings from Last 1 Encounters:   10/18/22 160 lb 0.9 oz (72.6 kg)     Mental Status:  oriented    IV Access:  - None    Nursing Mobility/ADLs:  Walking   Independent  Transfer  Independent  Bathing  Independent  Dressing  Independent  Toileting  Independent  Feeding  Independent  Med Admin  Assisted  Med Delivery   whole    Wound Care Documentation and Therapy:  Incision 10/17/22 Internal jugular Right (Active)   Dressing Status Clean;Dry; Intact 10/18/22 2049   Dressing/Treatment Tegaderm/transparent film dressing 10/18/22 2049   Closure Surgical glue 10/17/22 1008   Margins Approximated 10/17/22 1008   Drainage Amount None 10/17/22 1008   Number of days: 2       Incision 10/17/22 Abdomen Left (Active)   Dressing Status Clean;Dry; Intact 10/18/22 2049   Incision Cleansed Cleansed with saline 10/17/22 0953   Dressing/Treatment Gauze dressing/dressing sponge;Tegaderm/transparent film dressing 10/17/22 1008   Closure Sutures;Surgical glue 10/17/22 0953   Margins Approximated 10/17/22 0953   Drainage Amount None 10/17/22 1008   Number of days: 2       Incision 10/29/20 Chest Anterior;Right (Active)   Number of days: 720       Incision 10/29/20 Abdomen Anterior (Active)   Number of days: 720        Elimination:  Continence: Bowel: Yes  Bladder: Yes  Urinary Catheter: None   Colostomy/Ileostomy/Ileal Conduit: No       Date of Last BM: 10/19/22    Intake/Output Summary (Last 24 hours) at 10/19/2022 1320  Last data filed at 10/19/2022 0815  Gross per 24 hour   Intake 600 ml   Output 200 ml   Net 400 ml     I/O last 3 completed shifts: In: 5 [P.O.:720]  Out: -     Safety Concerns: At Risk for Falls    Impairments/Disabilities:      None    Nutrition Therapy:  Current Nutrition Therapy:   - Oral Diet:  Renal    Routes of Feeding: Oral  Liquids: Thin Liquids  Daily Fluid Restriction: no  Last Modified Barium Swallow with Video (Video Swallowing Test): not done    Treatments at the Time of Hospital Discharge:   Respiratory Treatments: none  Oxygen Therapy:  is not on home oxygen therapy.   Ventilator:    - No ventilator support    Rehab Therapies: none  Weight Bearing Status/Restrictions: No weight bearing restrictions  Other Medical Equipment (for information only, NOT a DME order):  ***  Other Treatments: none    Patient's personal belongings (please select all that are sent with patient):  Glasses    RN SIGNATURE:  Electronically signed by Aayush Irene RN on 10/19/22 at 3:09 PM EDT    CASE MANAGEMENT/SOCIAL WORK SECTION    Inpatient Status Date: ***    Readmission Risk Assessment Score:  Readmission Risk              Risk of Unplanned Readmission:  31           Discharging to Facility/ Agency   Name:   Address:  Phone:  Fax:    Dialysis Facility (if applicable)   Name: HD at [facility]   Address:  Dialysis Schedule: TTS @1040  Phone:  [phone]   Fax:  [phone]    / signature: Electronically signed by CARRILLO Sims LSW on 10/19/22 at 1:50 PM EDT    PHYSICIAN SECTION      Prognosis: Fair    Condition at Discharge: Stable    Rehab Potential (if transferring to Rehab): Good    Recommended Labs or Other Treatments After Discharge:        Physician Certification: I certify the above information and transfer of Bertha Andersen  is necessary for the continuing treatment of the diagnosis listed and that he requires [address] for less 30 days.      Update Admission H&P: No change in H&P    PHYSICIAN SIGNATURE:  Electronically signed by Larry Mcgrath MD on 10/19/22 at 1:22 PM EDT

## 2022-10-19 NOTE — PROGRESS NOTES
[facility]  Progress Note    10/19/2022 10:29 AM    Chief Complaint: Abdominal Pain     Reason for Consult: PD Catheter Removal and TDC Placement    POD #2 PD Catheter Removal and Right IJ TDC Placement with Dr. Bree Mares    Subjective: Pt seen resting in bed, denies abdominal pain, planning for D/C today pt reports    Vital Signs:  Vitals:    10/18/22 2348 10/19/22 0543 10/19/22 0815 10/19/22 0955   BP:   128/79    Pulse: 83  79    Resp: 26 14 18 16   Temp:   97.5 °F (36.4 °C)    TempSrc:   Oral    SpO2:   95% 96%   Weight:       Height:           I/O:    Intake/Output Summary (Last 24 hours) at 10/19/2022 1029  Last data filed at 10/19/2022 0815  Gross per 24 hour   Intake 960 ml   Output 200 ml   Net 760 ml         Physical Exam:   General: no apparent distress, alert and oriented, afebrile  Chest/Lungs: non labored breathing no tachypnea, retractions or cyanosis  Neck: Right Chest TDC in place, Right neck incision intact with surgical glue, no drainage, no erythema, no hematoma, no significant swelling  Abdomen: soft, nondistended, and nontender, Left side with incision which is intact with surgical glue, no drainage, no erythema, no significant swelling, dressing in place over previous PD Catheter exit with no drainage noted  Extremities: normal strength, tone, and muscle mass, no deformities, no significant edema to BLE    Labs:   Lab Results   Component Value Date/Time     10/19/2022 05:52 AM    K 4.4 10/19/2022 05:52 AM    K 5.3 10/14/2022 06:04 AM     10/19/2022 05:52 AM    CO2 25 10/19/2022 05:52 AM    BUN 22 10/19/2022 05:52 AM    CREATININE 5.7 10/19/2022 05:52 AM    GFRAA 9 10/17/2022 07:07 AM    GFRAA >60 12/14/2010 01:58 PM    LABGLOM 11 10/19/2022 05:52 AM    GLUCOSE 126 10/19/2022 05:52 AM    PHOS 4.2 09/16/2022 06:02 AM    MG 1.90 01/30/2019 09:40 PM    CALCIUM 8.5 10/19/2022 05:52 AM     Lab Results   Component Value Date/Time    WBC 14.1 10/19/2022 05:52 AM RBC 2.96 10/19/2022 05:52 AM    HGB 9.1 10/19/2022 05:52 AM    HCT 28.2 10/19/2022 05:52 AM    MCV 95.1 10/19/2022 05:52 AM    RDW 15.1 10/19/2022 05:52 AM     10/19/2022 05:52 AM     Lab Results   Component Value Date    INR 1.14 09/14/2022    PROTIME 14.5 09/14/2022        Scheduled Meds:    sodium bicarbonate  650 mg Oral TID    fluconazole  200 mg Oral Daily    heparin (porcine)  5,000 Units SubCUTAneous 3 times per day    alteplase  3 mg IntraCATHeter Once    haloperidol lactate  2 mg IntraMUSCular Once    cloNIDine  0.1 mg Oral TID    lidocaine  1 patch TransDERmal Nightly    insulin glargine  10 Units SubCUTAneous Nightly    epoetin robert-epbx  10,000 Units SubCUTAneous Once per day on Mon Wed Fri    atorvastatin  40 mg Oral Nightly    calcitRIOL  0.5 mcg Oral Daily    aspirin  81 mg Oral Daily    gabapentin  300 mg Oral TID    hydrALAZINE  100 mg Oral TID    insulin lispro  3 Units SubCUTAneous TID WC    isosorbide mononitrate  60 mg Oral Daily    losartan  100 mg Oral Daily    metoprolol tartrate  75 mg Oral BID    NIFEdipine  90 mg Oral BID    pantoprazole  40 mg Oral BID AC    spironolactone  100 mg Oral Daily    sucralfate  1 g Oral 4x Daily    sevelamer  800 mg Oral TID WC    insulin lispro  0-8 Units SubCUTAneous TID WC    insulin lispro  0-4 Units SubCUTAneous Nightly    sodium chloride flush  5-40 mL IntraVENous 2 times per day     Continuous Infusions:    sodium chloride      dextrose      sodium chloride 10 mL/hr at 10/17/22 0902       Assessment:  -POD #2 PD Catheter Removal and Right IJ TDC Placement with Dr. Barbara Cotto  -RLQ Abdominal Pan - improving  -Right Neck Incision intact with surgical glue  -Left Abdomen Incision intact with surgical glue     Plan:   -Antifungal per ID  -HD - per Nephrology  -Can consider replacing PD Catheter in the future if pt would like to return to PD - will await direction of ID and Nephrology  -Okay to consider D/C planning from Vascular Surgery standpoint once otherwise medically stable    All pertinent information and plan of care discussed with Dr. Charly Ibrahim. All questions and concerns were addressed with the patient. I have discussed the above stated plan with the patient. The patient verbalized understanding and agreed with the plan. Thank you for allowing to us to participate in the care of Inell Perfect      Electronically signed by ROQUE Eugene - CNP on 10/19/2022 at 10:29 AM     Pt seen and examined. for DIAGNOSIS OF peritonitis fungal with a PD catheter agree with ROQUE Eugene's note. Labs reviewed. Vitals   Vitals:    10/19/22 0543 10/19/22 0815 10/19/22 0955 10/19/22 1200   BP:  128/79  (!) 149/96   Pulse:  79  85   Resp: 14 18 16 20   Temp:  97.5 °F (36.4 °C)  98 °F (36.7 °C)   TempSrc:  Oral  Oral   SpO2:  95% 96% 98%   Weight:       Height:         Tunneled line placed . place PD catheter removed he is doing well with these had dialysis yesterday wants to go home all his incisions look good.   Spoke to Dr. Christine Perez today who suggested we could put her back in in 4 weeks

## 2022-10-19 NOTE — DISCHARGE SUMMARY
Hospital Medicine Discharge Summary      Patient Identification:   Espinoza Soria   : 1965  MRN: [id number]   Account: [de-identified]      Patient's PCP: ROQUE Llanes CNP    Admit Date: 10/11/2022     Discharge Date:   10/19/2022    Admitting Physician: Joanna Kirkpatrick MD     Discharge Physician: Nona Turk MD     Consults:     IP CONSULT TO HOSPITALIST  IP CONSULT TO NEPHROLOGY  IP CONSULT TO PHARMACY  IP CONSULT TO NEPHROLOGY  IP CONSULT TO SPIRITUAL SERVICES  IP CONSULT TO VASCULAR SURGERY  IP CONSULT TO INFECTIOUS DISEASES  IP CONSULT TO HOME CARE NEEDS    Disposition: Home with home health care    Condition at Discharge: Stable    Code Status:  Full Code       Discharge Diagnoses:  Acute metabolic encephalopathy, PD catheter related peritonitis grew Candida  Diabetes mellitus  Anemia, multifactorial  End-stage renal disease  Prior CVA with left-sided weakness  Chronic CHF    Hospital Course:   Espinoza Soria is a 65 y.o. male hospitalized   10/11/2022   abdominal pain, changes in the color of the peritoneal fluid, found to have PD related peritonitis, culture grew Candida. Patient evaluated by nephrology, infectious disease service in consultation. PD catheter removed, hemodialysis initiated by nephrology team.  Culture data Candida. Patient discharged on fluconazole. Outpatient hemodialysis treatment arranged. Patient seen and examined in the day of discharge. Vital signs reviewed. BP (!) 149/96   Pulse 85   Temp 98 °F (36.7 °C) (Oral)   Resp 20   Ht 6' 2\" (1.88 m)   Wt 160 lb 0.9 oz (72.6 kg)   SpO2 98%   BMI 20.55 kg/m²     Patient alert, oriented, comfortable, no acute distress, chronically ill. Abdomen soft, slight tenderness at the site of where PD catheter was. Right upper chest HD catheter noted. Patient reached the maximum benefit of this hospitalization.    Patient  was hemodynamically stable on discharge   Available consultant are in agreement with discharge planning. Patient symptoms was controlled and in agreement with discharge planning. Patient Instructions:    Discharge lab/important testing/finding that need follow up : Outpatient follow-up with nephrology    Activity: activity as tolerated    Diet: ADULT DIET; Regular; Low Potassium (Less than 3000 mg/day)  ADULT ORAL NUTRITION SUPPLEMENT; Breakfast, Dinner; Renal Oral Supplement      Follow-up visits:   Chantel Monroe MD  [address] [place] [address]    Schedule an appointment as soon as possible for a visit  Call to schedule a follow up appointment with Dr. Lenny Garcia to discuss placing a new PD Catheter. Please check with Infectious Disease and Nephrology for clearance of new PD Catheter before follow up.     Stacy Caceres, APRN - CNP  [address] [place] [id number] [phone]    Schedule an appointment as soon as possible for a visit in 1 week(s)           Discharge Medications:       Current Discharge Medication List        START taking these medications    Details   fluconazole (DIFLUCAN) 100 MG tablet Take 2 tablets by mouth daily for 14 days  Qty: 28 tablet, Refills: 0           Current Discharge Medication List        CONTINUE these medications which have CHANGED    Details   cloNIDine (CATAPRES) 0.1 MG tablet Take 1 tablet by mouth 3 times daily  Qty: 90 tablet, Refills: 0      insulin detemir (LEVEMIR FLEXTOUCH) 100 UNIT/ML injection pen Inject 10 Units into the skin nightly  Qty: 3 mL, Refills: 0           Current Discharge Medication List        CONTINUE these medications which have NOT CHANGED    Details   spironolactone (ALDACTONE) 100 MG tablet Take 100 mg by mouth daily      atorvastatin (LIPITOR) 40 MG tablet TAKE 1 TABLET BY MOUTH ONCE DAILY NIGHTLY  Qty: 90 tablet, Refills: 3    Associated Diagnoses: Mixed hyperlipidemia      sucralfate (CARAFATE) 1 GM tablet Take 1 tablet by mouth 4 times daily  Qty: 120 tablet, Refills: 0      pantoprazole (PROTONIX) 40 MG tablet Take 1 tablet by mouth 2 times daily (before meals)  Qty: 60 tablet, Refills: 2      buPROPion (WELLBUTRIN SR) 150 MG extended release tablet Take 150 mg by mouth 2 times daily      Sucroferric Oxyhydroxide (VELPHORO) 500 MG CHEW Take 1 tablet by mouth 3 times daily (with meals)      Insulin Pen Needle (VENNCOMMOGER PEN NEEDLES) 31G X 6 MM MISC 1 each by Does not apply route daily  Qty: 100 each, Refills: 3      insulin aspart (NOVOLOG FLEXPEN) 100 UNIT/ML injection pen Inject 3 Units into the skin 3 times daily (before meals)  Qty: 5 pen, Refills: 3      gabapentin (NEURONTIN) 300 MG capsule TAKE 1 CAPSULE BY MOUTH THREE TIMES DAILY  Qty: 270 capsule, Refills: 1    Associated Diagnoses: Left-sided weakness;  Other diabetic neurological complication associated with type 2 diabetes mellitus (HCC)      Methoxy PEG-Epoetin Beta (MIRCERA IJ) Inject 75 mcg into the skin      calcitRIOL (ROCALTROL) 0.5 MCG capsule Take 0.5 mcg by mouth daily      losartan (COZAAR) 100 MG tablet TAKE 1 TABLET BY MOUTH ONCE DAILY      hydrALAZINE (APRESOLINE) 100 MG tablet TAKE 2 TABLETS BY MOUTH THREE TIMES DAILY  Qty: 270 tablet, Refills: 1    Associated Diagnoses: Essential hypertension; Chronic diastolic congestive heart failure (HCC)      NIFEdipine (PROCARDIA XL) 90 MG extended release tablet Take 1 tablet by mouth 2 times daily  Qty: 180 tablet, Refills: 3    Comments: Please consider 90 day supplies to promote better adherence  Associated Diagnoses: Essential hypertension; Chronic diastolic congestive heart failure (HCC)      sodium bicarbonate 650 MG tablet Take 650 mg by mouth daily      isosorbide mononitrate (IMDUR) 60 MG extended release tablet Take 1 tablet by mouth once daily  Qty: 90 tablet, Refills: 3    Associated Diagnoses: Chronic diastolic congestive heart failure (HCC)      EQ ASPIRIN ADULT LOW DOSE 81 MG EC tablet Take 1 tablet by mouth once daily  Qty: 90 tablet, Refills: 3    Associated Diagnoses: Essential hypertension      metoprolol tartrate (LOPRESSOR) 50 MG tablet Take twice daily  Qty: 180 tablet, Refills: 1    Associated Diagnoses: Essential hypertension           Current Discharge Medication List        STOP taking these medications       iron sucrose (VENOFER) 20 MG/ML injection Comments:   Reason for Stopping:                 Labs: For convenience and continuity at follow-up the following most recent labs are provided:      CBC:    Lab Results   Component Value Date/Time    WBC 14.1 10/19/2022 05:52 AM    HGB 9.1 10/19/2022 05:52 AM    HCT 28.2 10/19/2022 05:52 AM     10/19/2022 05:52 AM       Renal:    Lab Results   Component Value Date/Time     10/19/2022 05:52 AM    K 4.4 10/19/2022 05:52 AM    K 5.3 10/14/2022 06:04 AM     10/19/2022 05:52 AM    CO2 25 10/19/2022 05:52 AM    BUN 22 10/19/2022 05:52 AM    CREATININE 5.7 10/19/2022 05:52 AM    CALCIUM 8.5 10/19/2022 05:52 AM    PHOS 4.2 09/16/2022 06:02 AM         Significant Diagnostic Studies    Radiology:   XR CHEST PORTABLE   Final Result   1. Right-sided central venous catheter with the tip in the superior vena   cava. 2.  Stable cardiomegaly due to a moderate-sized pericardial effusion. 3.  Mild bibasilar airspace opacities favored to represent atelectasis. FL VASCULAR ACCESS GUIDANCE   Final Result      FLUORO FOR SURGICAL PROCEDURES   Final Result      CT HEAD WO CONTRAST   Final Result   No acute intracranial abnormality. CT ABDOMEN PELVIS WO CONTRAST Additional Contrast? None   Final Result   No definite abnormality identified to explain cloudy peritoneal dialysate. Dialysate measures near water density on the current examination. Several bubbles of free intraperitoneal gas which or presumably introduced   during dialysis. Please note that hollow visceral perforation cannot be   excluded. Mural thickening of the large bowel, mild-to-moderate in degree. That may be   secondary to under distension, but colitis must be considered as well. Polycystic kidney disease. Note that some of the renal lesions are   indeterminate in terms of density, and therefore a solid lesion cannot be   excluded. Time Spent on discharge is 35 in the examination, evaluation, counseling and review of medications and discharge plan. Thank you ROQUE Thomson CNP for the opportunity to be involved in this patient's care.          Electronically signed by Larry Mcgrath MD on 10/19/2022 at 3:15 PM

## 2022-10-19 NOTE — PROGRESS NOTES
[facility]                                               Progress note    Summary:   Cheri Hernandez is being seen by nephrology for ESRD. Admitted with peritonitis. Interval History  Seen and examined at bedside  Awake and alert  Feeling better  No abdominal pain   Still weak , no edema no chest pain no SOB    Labs reviewed    /96  98% on room air       Plan: Ok with dc  Fluconazole for 2 weeks   Has TTS spot tomorrow at [facility] at 1030 am  BP acceptable , dc on home meds      Zachariah Pelayo MD  [facility]  Office: [phone]    Assessment:   Peritonitis  Likely secondary to lack in sterile technique and standing sewage issue at his home recently   PD fluid was cloudy, pink, 10,000 nucleated cells consistent with peritonitis. Fluid culture so far is no growth to date  + candida     Hyperkalemia     ESRD  On peritoneal dialysis  Home prescription: 5 times weekly   79.5  3 x 2500 fill 8.5 hrs 2.5 hr dwell on cycler at night. Usually uses 1/2 green and 1/2 yellow    HTN  BP is acceptable. Difficult to control as an outpatient. Electrolytes  No acute issues. Anemia  Recent GIB  Anemia due to renal failure as well             ROS:   Positives Listed Bold. All other remaining systems are negative. Constitutional:  fever, chills, weakness, weight change, fatigue,      Skin:  rash, pruritus, hair loss, bruising, dry skin, petechiae. Head, Face, Neck   headaches, swelling,  cervical adenopathy. Respiratory: shortness of breath, cough, or wheezing  Cardiovascular: chest pain, palpitations, dizzy, edema  Gastrointestinal: nausea, vomiting, diarrhea, constipation,belly pain    Yellow skin, blood in stool  Musculoskeletal:  back pain, muscle weakness, gait problems,       joint pain or swelling.   Genitourinary:  dysuria, poor urine flow, flank pain, blood in urine  Neurologic:  vertigo, TIA'S, syncope, seizures, focal weakness  Psychosocial:  insomnia, anxiety, or depression. Additional positive findings: -     PMH:   Past medical history, surgical history, social history, family history are reviewed and updated as appropriate. Reviewed current medication list.   Allergies reviewed and updated as needed. PE:   Vitals:    10/19/22 1200   BP: (!) 149/96   Pulse: 85   Resp: 20   Temp: 98 °F (36.7 °C)   SpO2: 98%       General appearance:  in NAD, fully alert and oriented. Comfortable. HEENT: EOM intact, no icterus. Trachea is midline. Neck : No masses, appears symmetrical, no JVD  Respiratory: Respiratory effort appears normal, bilateral equal chest rise, no wheeze, no crackles   Cardiovascular: Ausculation shows RRR no edema  Abdomen: mild tender to palpation   Musculoskeletal:  Joints with no swelling or deformity. Skin:no rashes, ulcers, induration, no jaundice. Neuro: face symmetric, no focal deficits. Appropriate responses.        Lab Results   Component Value Date    CREATININE 5.7 (HH) 10/19/2022    BUN 22 (H) 10/19/2022     10/19/2022    K 4.4 10/19/2022     10/19/2022    CO2 25 10/19/2022      Lab Results   Component Value Date    WBC 14.1 (H) 10/19/2022    HGB 9.1 (L) 10/19/2022    HCT 28.2 (L) 10/19/2022    MCV 95.1 10/19/2022     10/19/2022     Lab Results   Component Value Date    PTH 49.8 02/01/2019    CALCIUM 8.5 10/19/2022    PHOS 4.2 09/16/2022

## 2022-10-19 NOTE — PROGRESS NOTES
Removed patient from telemetry monitor, pharmacy delivered medications, went over discharge paperwork, informed of follow-up appointments, medication review completed. Patient was driven home by his wife at this time.

## 2022-10-19 NOTE — PLAN OF CARE
Problem: Discharge Planning  Goal: Discharge to home or other facility with appropriate resources  10/19/2022 0210 by Raul Perez RN  Outcome: Progressing  Case mgmt involved to assess appropriate level of care and resources. 10/18/2022 1754 by Noel Sigala RN  Outcome: Progressing     Problem: Safety - Adult  Goal: Free from fall injury  10/19/2022 0210 by Raul Perez RN  Outcome: Progressing  Fall risk assessment completed every shift. All precautions in place. Patient has call light with in reach at all times. Room free from clutter. Patient aware to call for assistance when getting up.    10/18/2022 1754 by Noel Sigala RN  Outcome: Progressing     Problem: ABCDS Injury Assessment  Goal: Absence of physical injury  10/19/2022 0210 by Raul Perez RN  Outcome: Progressing  Patient free of physical injury  10/18/2022 1754 by Noel Sigala RN  Outcome: Progressing     Problem: Pain  Goal: Verbalizes/displays adequate comfort level or baseline comfort level  10/19/2022 0210 by Raul Perez RN  Outcome: Progressing  Pain being managed with PRN analgesics per MD orders. Patient able to express presence and absence of pain and rate pain appropriately using numerical scale.     10/18/2022 1754 by Noel Sigala RN  Outcome: Progressing

## 2022-10-19 NOTE — CARE COORDINATION
CASE MANAGEMENT DISCHARGE SUMMARY:    DISCHARGE DATE: 10/19/2022    DISCHARGED TO: Home with wife, new outpatient HD     HOME CARE AGENCY: patient declined.       Dialysis Facility   Name: HD at [facility]   Dialysis Schedule: TTS @[id number]  Phone:  [phone]   Fax:  [phone] and notified of patient's discharge for today and verified they are expecting him on 10/20/2022    TRANSPORTATION: Wife              TIME: 2:30 PM     PREFERRED PHARMACY: [address], [facility], Social Work/Case Management   [phone]  Electronically signed by CARRILLO Crandall, DANDY on 10/19/2022 at 1:46 PM

## 2022-10-20 NOTE — PROGRESS NOTES
CLINICAL PHARMACY NOTE: MEDS TO BEDS    Total # of Prescriptions Filled: 3   The following medications were delivered to the patient:  Discharge Medication List as of 10/19/2022  3:09 PM        START taking these medications    Details   fluconazole (DIFLUCAN) 100 MG tablet Take 2 tablets by mouth daily for 14 days, Disp-28 tablet, R-0Normal         Clonodine 0.1mg  Levimir flex touch pens    Additional Documentation:

## 2022-10-20 NOTE — DISCHARGE SUMMARY
Hospital Medicine Discharge Summary    Patient ID: Vimal Haney      Patient's PCP: Dustin Garcia, APRN - CNP    Admit Date: 9/13/2022     Discharge Date: 9/16/2022      Admitting Provider: Pippa Dupree MD     Discharge Provider: Pippa Dupree MD     Discharge Diagnoses:  GI bleed  Severe esophagitis  Stage renal disease on peritoneal dialysis  Hyperlipidemia  Diabetes mellitus type 2       The patient was seen and examined on day of discharge and this discharge summary is in conjunction with any daily progress note from day of discharge. Hospital Course: 69-year-old male with chronic history of end-stage renal disease on peritoneal dialysis who presented to the hospital with blood mixed bloody emesis and dark stools consistent with GI bleed. Seen by GI and nephrology. Treated with Protonix drip. Underwent EGD which showed severe esophagitis. Responded well to PPI therapy at the time of discharge she was tolerating a diet. Advised to avoid NSAIDs at discharge. His hemoglobin was stable at discharge          Physical Exam Performed:     BP (!) 149/118   Pulse 99   Temp 99 °F (37.2 °C) (Oral)   Resp 20   Ht 6' 2\" (1.88 m)   Wt 163 lb 9.3 oz (74.2 kg)   SpO2 98%   BMI 21.00 kg/m²       General appearance:  No apparent distress, appears stated age and cooperative. HEENT:  Normal cephalic, atraumatic without obvious deformity. Pupils equal, round, and reactive to light. Extra ocular muscles intact. Conjunctivae/corneas clear. Neck: Supple, with full range of motion. No jugular venous distention. Trachea midline. Respiratory:  Normal respiratory effort. Clear to auscultation, bilaterally without Rales/Wheezes/Rhonchi. Cardiovascular:  Regular rate and rhythm with normal S1/S2 without murmurs, rubs or gallops. Abdomen: Soft, non-tender, non-distended with normal bowel sounds. Musculoskeletal:  No clubbing, cyanosis or edema bilaterally. Full range of motion without deformity.   Skin: Skin color, texture, turgor normal.  No rashes or lesions. Neurologic:  Neurovascularly intact without any focal sensory/motor deficits. Cranial nerves: II-XII intact, grossly non-focal.  Psychiatric:  Alert and oriented, thought content appropriate, normal insight  Capillary Refill: Brisk,< 3 seconds   Peripheral Pulses: +2 palpable, equal bilaterally       Labs: For convenience and continuity at follow-up the following most recent labs are provided:      CBC:    Lab Results   Component Value Date/Time    WBC 14.1 10/19/2022 05:52 AM    HGB 9.1 10/19/2022 05:52 AM    HCT 28.2 10/19/2022 05:52 AM     10/19/2022 05:52 AM       Renal:    Lab Results   Component Value Date/Time     10/19/2022 05:52 AM    K 4.4 10/19/2022 05:52 AM    K 5.3 10/14/2022 06:04 AM     10/19/2022 05:52 AM    CO2 25 10/19/2022 05:52 AM    BUN 22 10/19/2022 05:52 AM    CREATININE 5.7 10/19/2022 05:52 AM    CALCIUM 8.5 10/19/2022 05:52 AM    PHOS 4.2 09/16/2022 06:02 AM         Significant Diagnostic Studies    Radiology:   CT ABDOMEN PELVIS WO CONTRAST Additional Contrast? None   Final Result   Addendum (preliminary) 1 of 1   ADDENDUM:   The 13 mm diameter near water attenuation lesion in the inferior aspect of   the right lobe of the liver has been stable since the 10/20/2025 CT scan    and   is most likely an incidental hepatic cyst and requires no further workup. Critical results were called by Dr. Gia Corral to Dr. Fabian Barber On    9/13/2022   at 15:13. Final   Small to moderate amount of pneumoperitoneum and minimal amount of ascites   that may be due to peritoneal dialysis. Gas distension of the transverse colon that may be due to a localized ileus. Atherosclerosis. Adult type polycystic kidney disease. Partially projected moderate size pericardial effusion      RECOMMENDATIONS:   Clinical correlation.   It should be determine whether not this patient has   rebound tenderness which would suggest that the pneumoperitoneum is related   to an acute abdomen. XR CHEST PORTABLE   Final Result   1. Possible small amount of free air beneath the right hemidiaphragm. If   there is free air, this might be related to peritoneal dialysis. A left   lateral decubitus view of the abdomen may be helpful for further evaluation. CT imaging of the abdomen and pelvis could also be considered. 2. Left basilar opacities which are predominantly linear and favored to   represent atelectasis as the left hemidiaphragm is elevated. Pneumonia is   considered less likely.    Critical results were called by Dr. Apolinar Landers to Dr. Regla Umanzor on   9/13/2022 at 11:42 am.                Consults:     Mirna Gandhi  IP CONSULT TO HOSPITALIST  IP CONSULT TO GI  IP CONSULT TO NEPHROLOGY    Disposition:  home     Condition at Discharge: Stable    Discharge Instructions/Follow-up: Avoid NSAIDs ROQUE Garrett CNP      Code Status:  full    Activity: activity as tolerated    Diet: cardiac diet, diabetic diet, and renal diet      Discharge Medications:     Discharge Medication List as of 9/16/2022 12:27 PM             Details   sucralfate (CARAFATE) 1 GM tablet Take 1 tablet by mouth 4 times daily, Disp-120 tablet, R-0Normal      pantoprazole (PROTONIX) 40 MG tablet Take 1 tablet by mouth 2 times daily (before meals), Disp-60 tablet, R-2Normal                Details   atorvastatin (LIPITOR) 40 MG tablet TAKE 1 TABLET BY MOUTH ONCE DAILY NIGHTLY, Disp-90 tablet, R-3Normal      buPROPion (WELLBUTRIN SR) 150 MG extended release tablet Take 150 mg by mouth 2 times dailyHistorical Med      Sucroferric Oxyhydroxide (VELPHORO) 500 MG CHEW Take 1 tablet by mouth 3 times daily (with meals)Historical Med      Insulin Pen Needle ([employer] PEN NEEDLES) 31G X 6 MM MISC DAILY Starting Fri 8/19/2022, Disp-100 each, R-3, Normal      insulin aspart (NOVOLOG FLEXPEN) 100 UNIT/ML injection pen Inject 3 Units into the skin 3 times daily (before meals), Disp-5 pen, R-3Normal      insulin detemir (LEVEMIR FLEXTOUCH) 100 UNIT/ML injection pen Inject 22 Units into the skin nightly, Disp-5 pen, R-3Normal      gabapentin (NEURONTIN) 300 MG capsule TAKE 1 CAPSULE BY MOUTH THREE TIMES DAILY, Disp-270 capsule, R-1Normal      Methoxy PEG-Epoetin Beta (MIRCERA IJ) Inject 75 mcg into the skinHistorical Med      calcitRIOL (ROCALTROL) 0.5 MCG capsule 0.5 mcg dailyHistorical Med      losartan (COZAAR) 100 MG tablet TAKE 1 TABLET BY MOUTH ONCE DAILYHistorical Med      hydrALAZINE (APRESOLINE) 100 MG tablet TAKE 2 TABLETS BY MOUTH THREE TIMES DAILY, Disp-270 tablet, R-1Normal      NIFEdipine (PROCARDIA XL) 90 MG extended release tablet Take 1 tablet by mouth 2 times daily, Disp-180 tablet, R-3Please consider 90 day supplies to promote better adherenceNormal      cloNIDine (CATAPRES) 0.1 MG tablet Take 1 tablet by mouth 3 times daily, Disp-90 tablet, R-5Normal      sodium bicarbonate 650 MG tablet Take 325 mg by mouth dailyHistorical Med      spironolactone (ALDACTONE) 50 MG tablet Take 1 tablet by mouth once daily, Disp-90 tablet, R-1Normal      isosorbide mononitrate (IMDUR) 60 MG extended release tablet Take 1 tablet by mouth once daily, Disp-90 tablet, R-3Normal      EQ ASPIRIN ADULT LOW DOSE 81 MG EC tablet Take 1 tablet by mouth once daily, Disp-90 tablet, R-3Normal      metoprolol tartrate (LOPRESSOR) 50 MG tablet Take twice daily, Disp-180 tablet, R-1Normal             Time Spent on discharge is more than 45 minutes in the examination, evaluation, counseling and review of medications and discharge plan. Signed:    Jono Mays MD   10/20/2022      Thank you ROQUE Castillo CNP for the opportunity to be involved in this patient's care. If you have any questions or concerns, please feel free to contact me at [phone].

## 2022-10-21 ENCOUNTER — TELEPHONE (OUTPATIENT)
Dept: PRIMARY CARE CLINIC | Age: 57
End: 2022-10-21

## 2022-10-21 LAB — CULTURE CATHETER TIP: NORMAL

## 2022-10-26 ENCOUNTER — OFFICE VISIT (OUTPATIENT)
Dept: PRIMARY CARE CLINIC | Age: 57
End: 2022-10-26
Payer: COMMERCIAL

## 2022-10-26 VITALS
HEART RATE: 97 BPM | WEIGHT: 161 LBS | DIASTOLIC BLOOD PRESSURE: 89 MMHG | SYSTOLIC BLOOD PRESSURE: 153 MMHG | BODY MASS INDEX: 20.66 KG/M2 | HEIGHT: 74 IN | TEMPERATURE: 97.1 F | OXYGEN SATURATION: 100 %

## 2022-10-26 DIAGNOSIS — E11.59 HYPERTENSION ASSOCIATED WITH DIABETES (HCC): ICD-10-CM

## 2022-10-26 DIAGNOSIS — I42.8 NONISCHEMIC CARDIOMYOPATHY (HCC): ICD-10-CM

## 2022-10-26 DIAGNOSIS — T85.71XD PERITONITIS ASSOCIATED WITH PERITONEAL DIALYSIS, SUBSEQUENT ENCOUNTER (HCC): Primary | ICD-10-CM

## 2022-10-26 DIAGNOSIS — Z09 HOSPITAL DISCHARGE FOLLOW-UP: ICD-10-CM

## 2022-10-26 DIAGNOSIS — I15.2 HYPERTENSION ASSOCIATED WITH DIABETES (HCC): ICD-10-CM

## 2022-10-26 PROCEDURE — 3051F HG A1C>EQUAL 7.0%<8.0%: CPT | Performed by: NURSE PRACTITIONER

## 2022-10-26 PROCEDURE — 3017F COLORECTAL CA SCREEN DOC REV: CPT | Performed by: NURSE PRACTITIONER

## 2022-10-26 PROCEDURE — G8420 CALC BMI NORM PARAMETERS: HCPCS | Performed by: NURSE PRACTITIONER

## 2022-10-26 PROCEDURE — 4004F PT TOBACCO SCREEN RCVD TLK: CPT | Performed by: NURSE PRACTITIONER

## 2022-10-26 PROCEDURE — 1111F DSCHRG MED/CURRENT MED MERGE: CPT | Performed by: NURSE PRACTITIONER

## 2022-10-26 PROCEDURE — G8427 DOCREV CUR MEDS BY ELIG CLIN: HCPCS | Performed by: NURSE PRACTITIONER

## 2022-10-26 PROCEDURE — G8484 FLU IMMUNIZE NO ADMIN: HCPCS | Performed by: NURSE PRACTITIONER

## 2022-10-26 PROCEDURE — 99214 OFFICE O/P EST MOD 30 MIN: CPT | Performed by: NURSE PRACTITIONER

## 2022-10-26 PROCEDURE — 3074F SYST BP LT 130 MM HG: CPT | Performed by: NURSE PRACTITIONER

## 2022-10-26 PROCEDURE — 3078F DIAST BP <80 MM HG: CPT | Performed by: NURSE PRACTITIONER

## 2022-10-26 PROCEDURE — 2022F DILAT RTA XM EVC RTNOPTHY: CPT | Performed by: NURSE PRACTITIONER

## 2022-10-26 SDOH — ECONOMIC STABILITY: FOOD INSECURITY: WITHIN THE PAST 12 MONTHS, THE FOOD YOU BOUGHT JUST DIDN'T LAST AND YOU DIDN'T HAVE MONEY TO GET MORE.: NEVER TRUE

## 2022-10-26 SDOH — ECONOMIC STABILITY: FOOD INSECURITY: WITHIN THE PAST 12 MONTHS, YOU WORRIED THAT YOUR FOOD WOULD RUN OUT BEFORE YOU GOT MONEY TO BUY MORE.: NEVER TRUE

## 2022-10-26 ASSESSMENT — PATIENT HEALTH QUESTIONNAIRE - PHQ9
SUM OF ALL RESPONSES TO PHQ QUESTIONS 1-9: 2
1. LITTLE INTEREST OR PLEASURE IN DOING THINGS: 0
SUM OF ALL RESPONSES TO PHQ QUESTIONS 1-9: 2
SUM OF ALL RESPONSES TO PHQ9 QUESTIONS 1 & 2: 2
SUM OF ALL RESPONSES TO PHQ QUESTIONS 1-9: 2
SUM OF ALL RESPONSES TO PHQ QUESTIONS 1-9: 2
2. FEELING DOWN, DEPRESSED OR HOPELESS: 2

## 2022-10-26 ASSESSMENT — ENCOUNTER SYMPTOMS
ABDOMINAL PAIN: 0
DIARRHEA: 0
BACK PAIN: 1
CONSTIPATION: 0
CHEST TIGHTNESS: 0
SHORTNESS OF BREATH: 0

## 2022-10-26 ASSESSMENT — SOCIAL DETERMINANTS OF HEALTH (SDOH): HOW HARD IS IT FOR YOU TO PAY FOR THE VERY BASICS LIKE FOOD, HOUSING, MEDICAL CARE, AND HEATING?: NOT VERY HARD

## 2022-10-26 NOTE — PROGRESS NOTES
Post-Discharge Transitional Care Follow Up      Phillip Lu   YOB: 1965    Date of Office Visit:  10/26/2022  Date of Hospital Admission: 10/11/22  Date of Hospital Discharge: 10/19/22  Readmission Risk Score (high >=14%. Medium >=10%):Readmission Risk Score: 21.3      Care management risk score Rising risk (score 2-5) and Complex Care (Scores >=6): No Risk Score On File     Non face to face  following discharge, date last encounter closed (first attempt may have been earlier): *No documented post hospital discharge outreach found in the last 14 days     Call initiated 2 business days of discharge: *No response recorded in the last 14 days     Peritonitis associated with peritoneal dialysis, subsequent encounter ([facility] [address]  Comments:  -complete antibiotics  -followed by Nephrology  -continue Hemodialysis  Hospital discharge follow-up  -     HI DISCHARGE MEDS RECONCILED W/ CURRENT OUTPATIENT MED LIST  Hypertension associated with diabetes ([facility]  Comments:  -followed by Nephrology  Nonischemic cardiomyopathy [facility])  Comments:  -followed by Cardiology    Medical Decision Making: low complexity  No follow-ups on file. On this date 10/26/2022 I have spent 15 minutes reviewing previous notes, test results and face to face with the patient discussing the diagnosis and importance of compliance with the treatment plan as well as documenting on the day of the visit. Subjective:   DONNA Emmanuel  hospitalized   10/11/2022   for bacterial peritonitis, culture revealed  yeast infection at PD site. PD catheter was removed, Vas cath with dry and intact dressing to right chest wall. Patient and spouse states he experienced hallucinations after receiving Cefepime while hospitalized prior to results of culture. Received 2 units PRBC's during hospitalization for Hgb 6.3%, no cause determined. Being treated X 14 days with Fluconazole. Outpatient hemodialysis treatment arranged.  Hemodialysis T, Th, Sat for 3.5 hours, at least 4-6 weeks. Inpatient course: Discharge summary reviewed- see chart. Interval history/Current status: stable    Patient Active Problem List   Diagnosis    Nonischemic cardiomyopathy ([facility] [address]    Cerebral infarction ([facility] [address]    Cigarette nicotine dependence without complication    Erectile dysfunction due to arterial insufficiency    Renal artery stenosis (HCC)    Chronic diastolic congestive heart failure ([address]    H/O: CVA (cerebrovascular accident)    Major depressive disorder, recurrent episode, moderate (HCC)    Pericardial effusion    Hypertension associated with diabetes ([address]    DM type 2 with diabetic mixed hyperlipidemia ([address]    Diabetes mellitus due to underlying condition with stage 4 chronic kidney disease, with long-term current use of insulin ([facility] [address]    ESRD (end stage renal disease) on dialysis ([facility] [address]    ESRD (end stage renal disease) ([facility] [address]    GI bleed    SBP (spontaneous bacterial peritonitis) ([facility] [address]    Dialysis-associated peritonitis ([facility] [address]    Candida infection    Generalized abdominal pain    PKD (polycystic kidney disease)       Medications listed as ordered at the time of discharge from hospital     Medication List            Accurate as of October 26, 2022 11:59 PM. If you have any questions, ask your nurse or doctor. CHANGE how you take these medications      hydrALAZINE 100 MG tablet  Commonly known as: APRESOLINE  TAKE 2 TABLETS BY MOUTH THREE TIMES DAILY  What changed: See the new instructions.      metoprolol tartrate 50 MG tablet  Commonly known as: LOPRESSOR  Take twice daily  What changed:   how much to take  how to take this  when to take this            CONTINUE taking these medications      atorvastatin 40 MG tablet  Commonly known as: LIPITOR  TAKE 1 TABLET BY MOUTH ONCE DAILY NIGHTLY     buPROPion 150 MG extended release tablet  Commonly known as: WELLBUTRIN SR     calcitRIOL 0.5 MCG capsule  Commonly known as: ROCALTROL     cloNIDine 0.1 MG tablet  Commonly known as: CATAPRES  Take 1 tablet by mouth 3 times daily     EQ Aspirin Adult Low Dose 81 MG EC tablet  Generic drug: aspirin  Take 1 tablet by mouth once daily     fluconazole 100 MG tablet  Commonly known as: DIFLUCAN  Take 2 tablets by mouth daily for 14 days     gabapentin 300 MG capsule  Commonly known as: NEURONTIN  TAKE 1 CAPSULE BY MOUTH THREE TIMES DAILY     isosorbide mononitrate 60 MG extended release tablet  Commonly known as: IMDUR  Take 1 tablet by mouth once daily     [employer] Pen Needles 31G X 6 MM Misc  Generic drug: Insulin Pen Needle  1 each by Does not apply route daily     Levemir FlexTouch 100 UNIT/ML injection pen  Generic drug: insulin detemir  Inject 10 Units into the skin nightly     losartan 100 MG tablet  Commonly known as: COZAAR     MIRCERA IJ     NIFEdipine 90 MG extended release tablet  Commonly known as: PROCARDIA XL  Take 1 tablet by mouth 2 times daily     NovoLOG FlexPen 100 UNIT/ML injection pen  Generic drug: insulin aspart  Inject 3 Units into the skin 3 times daily (before meals)     pantoprazole 40 MG tablet  Commonly known as: PROTONIX  Take 1 tablet by mouth 2 times daily (before meals)     sodium bicarbonate 650 MG tablet     spironolactone 100 MG tablet  Commonly known as: ALDACTONE     sucralfate 1 GM tablet  Commonly known as: CARAFATE  Take 1 tablet by mouth 4 times daily     Velphoro 500 MG Chew chewable tablet  Generic drug: sucroferric oxyhydroxide               Medications marked \"taking\" at this time  Outpatient Medications Marked as Taking for the 10/26/22 encounter (Office Visit) with ROQUE Segovia CNP   Medication Sig Dispense Refill    cloNIDine (CATAPRES) 0.1 MG tablet Take 1 tablet by mouth 3 times daily 90 tablet 0    insulin detemir (LEVEMIR FLEXTOUCH) 100 UNIT/ML injection pen Inject 10 Units into the skin nightly 3 mL 0    fluconazole (DIFLUCAN) 100 MG tablet Take 2 tablets by mouth daily for 14 days 28 tablet 0 spironolactone (ALDACTONE) 100 MG tablet Take 100 mg by mouth daily      sucralfate (CARAFATE) 1 GM tablet Take 1 tablet by mouth 4 times daily 120 tablet 0    pantoprazole (PROTONIX) 40 MG tablet Take 1 tablet by mouth 2 times daily (before meals) 60 tablet 2    buPROPion (WELLBUTRIN SR) 150 MG extended release tablet Take 150 mg by mouth 2 times daily      Sucroferric Oxyhydroxide (VELPHORO) 500 MG CHEW Take 1 tablet by mouth 3 times daily (with meals)      Insulin Pen Needle ([employer] PEN NEEDLES) 31G X 6 MM MISC 1 each by Does not apply route daily 100 each 3    insulin aspart (NOVOLOG FLEXPEN) 100 UNIT/ML injection pen Inject 3 Units into the skin 3 times daily (before meals) 5 pen 3    atorvastatin (LIPITOR) 40 MG tablet TAKE 1 TABLET BY MOUTH ONCE DAILY NIGHTLY 90 tablet 3    Methoxy PEG-Epoetin Beta (MIRCERA IJ) Inject 75 mcg into the skin      calcitRIOL (ROCALTROL) 0.5 MCG capsule Take 0.5 mcg by mouth daily      losartan (COZAAR) 100 MG tablet TAKE 1 TABLET BY MOUTH ONCE DAILY      hydrALAZINE (APRESOLINE) 100 MG tablet TAKE 2 TABLETS BY MOUTH THREE TIMES DAILY (Patient taking differently: Take 100 mg by mouth 3 times daily TAKE 2 TABLETS BY MOUTH THREE TIMES DAILY) 270 tablet 1    NIFEdipine (PROCARDIA XL) 90 MG extended release tablet Take 1 tablet by mouth 2 times daily 180 tablet 3    sodium bicarbonate 650 MG tablet Take 650 mg by mouth daily      isosorbide mononitrate (IMDUR) 60 MG extended release tablet Take 1 tablet by mouth once daily 90 tablet 3    EQ ASPIRIN ADULT LOW DOSE 81 MG EC tablet Take 1 tablet by mouth once daily 90 tablet 3    metoprolol tartrate (LOPRESSOR) 50 MG tablet Take twice daily (Patient taking differently: Take 75 mg by mouth 2 times daily Take twice daily) 180 tablet 1        Medications patient taking as of now reconciled against medications ordered at time of hospital discharge: Yes    Review of Systems   Constitutional:  Negative for activity change and fever.    HENT: Negative for congestion. Eyes:  Negative for visual disturbance. Respiratory:  Negative for chest tightness and shortness of breath. Cardiovascular:  Negative for chest pain, palpitations and leg swelling. Hypertension   Hyperlipidema     Gastrointestinal:  Negative for abdominal pain, constipation and diarrhea. Endocrine: Negative for polyuria. Diabetes   Genitourinary:  Negative for dysuria. ESRD- peritoneal dialysis  Being evaluated for Kidney transplant. Musculoskeletal:  Positive for back pain (from fall). Negative for arthralgias and myalgias. Skin:  Negative for rash. Neurological:  Negative for dizziness, seizures, weakness, light-headedness and headaches. Lacunar stroke in 2015   Psychiatric/Behavioral:  Negative for agitation, decreased concentration and sleep disturbance. The patient is not nervous/anxious. Objective:    BP (!) 153/89 (Site: Right Upper Arm, Position: Sitting)   Pulse 97   Temp 97.1 °F (36.2 °C)   Ht 6' 2\" (1.88 m)   Wt 161 lb (73 kg)   SpO2 100%   BMI 20.67 kg/m²   Physical Exam  Chest:       Abdominal:           An electronic signature was used to authenticate this note.   --Luz Hidalgo, APRN - CNP

## 2022-10-31 DIAGNOSIS — I50.32 CHRONIC DIASTOLIC CONGESTIVE HEART FAILURE (HCC): ICD-10-CM

## 2022-10-31 NOTE — TELEPHONE ENCOUNTER
Medication:   Requested Prescriptions     Pending Prescriptions Disp Refills    isosorbide mononitrate (IMDUR) 60 MG extended release tablet [Pharmacy Med Name: Isosorbide Mononitrate ER 60 MG Oral Tablet Extended Release 24 Hour] 90 tablet 0     Sig: Take 1 tablet by mouth once daily        Last Filled:      Patient Phone Number: [phone] (home)     Last appt: 10/26/2022   Next appt: 11/18/2022    Last OARRS:   RX Monitoring 2/9/2018   Attestation The Prescription Monitoring Report for this patient was reviewed today. Periodic Controlled Substance Monitoring No signs of potential drug abuse or diversion identified.

## 2022-11-01 RX ORDER — ISOSORBIDE MONONITRATE 60 MG/1
TABLET, EXTENDED RELEASE ORAL
Qty: 90 TABLET | Refills: 3 | Status: SHIPPED | OUTPATIENT
Start: 2022-11-01

## 2022-11-04 ENCOUNTER — TELEPHONE (OUTPATIENT)
Dept: PRIMARY CARE CLINIC | Age: 57
End: 2022-11-04

## 2022-11-04 NOTE — TELEPHONE ENCOUNTER
Pt's wife David Seals called in regarding the [facility] paperwork she needed Mónica Truong to fill out. She says that Mónica Truong filled it out incorrectly. Her employer needs a duration/length of time (how long PT's condition would last, to put a date through sometime in 2023). Placed forms back in Christy's basket. She needs this corrected before 11/11 so she can get paid.      Please call back: [phone]

## 2022-11-26 ENCOUNTER — APPOINTMENT (OUTPATIENT)
Dept: GENERAL RADIOLOGY | Age: 57
DRG: 314 | End: 2022-11-26
Payer: COMMERCIAL

## 2022-11-26 ENCOUNTER — APPOINTMENT (OUTPATIENT)
Dept: CT IMAGING | Age: 57
DRG: 314 | End: 2022-11-26
Payer: COMMERCIAL

## 2022-11-26 ENCOUNTER — HOSPITAL ENCOUNTER (INPATIENT)
Age: 57
LOS: 11 days | Discharge: HOME HEALTH | DRG: 314 | End: 2022-12-07
Attending: EMERGENCY MEDICINE | Admitting: HOSPITALIST
Payer: COMMERCIAL

## 2022-11-26 DIAGNOSIS — J18.9 PNEUMONIA OF RIGHT LOWER LOBE DUE TO INFECTIOUS ORGANISM: ICD-10-CM

## 2022-11-26 DIAGNOSIS — E87.5 HYPERKALEMIA: ICD-10-CM

## 2022-11-26 DIAGNOSIS — G93.40 ENCEPHALOPATHY ACUTE: Primary | ICD-10-CM

## 2022-11-26 DIAGNOSIS — E11.65 TYPE 2 DIABETES MELLITUS WITH HYPERGLYCEMIA, WITH LONG-TERM CURRENT USE OF INSULIN (HCC): ICD-10-CM

## 2022-11-26 DIAGNOSIS — R79.89 ELEVATED LACTIC ACID LEVEL: ICD-10-CM

## 2022-11-26 DIAGNOSIS — Z79.4 TYPE 2 DIABETES MELLITUS WITH HYPERGLYCEMIA, WITH LONG-TERM CURRENT USE OF INSULIN (HCC): ICD-10-CM

## 2022-11-26 DIAGNOSIS — N18.6 ESRD NEEDING DIALYSIS (HCC): ICD-10-CM

## 2022-11-26 DIAGNOSIS — R91.1 PULMONARY NODULE: ICD-10-CM

## 2022-11-26 DIAGNOSIS — S32.010A CLOSED COMPRESSION FRACTURE OF L1 VERTEBRA, INITIAL ENCOUNTER (HCC): ICD-10-CM

## 2022-11-26 DIAGNOSIS — R65.20 SEVERE SEPSIS (HCC): ICD-10-CM

## 2022-11-26 DIAGNOSIS — A41.9 SEVERE SEPSIS (HCC): ICD-10-CM

## 2022-11-26 DIAGNOSIS — I31.39 PERICARDIAL EFFUSION: ICD-10-CM

## 2022-11-26 DIAGNOSIS — Z99.2 ESRD NEEDING DIALYSIS (HCC): ICD-10-CM

## 2022-11-26 PROBLEM — J15.9 BACTERIAL PNEUMONIA: Status: ACTIVE | Noted: 2022-11-26

## 2022-11-26 LAB
A/G RATIO: 0.8 (ref 1.1–2.2)
ALBUMIN SERPL-MCNC: 3.5 G/DL (ref 3.4–5)
ALP BLD-CCNC: 104 U/L (ref 40–129)
ALT SERPL-CCNC: 13 U/L (ref 10–40)
ANION GAP SERPL CALCULATED.3IONS-SCNC: 18 MMOL/L (ref 3–16)
AST SERPL-CCNC: 12 U/L (ref 15–37)
BACTERIA: ABNORMAL /HPF
BASE EXCESS VENOUS: -7.8 MMOL/L
BASOPHILS ABSOLUTE: 0.1 K/UL (ref 0–0.2)
BASOPHILS RELATIVE PERCENT: 0.3 %
BETA-HYDROXYBUTYRATE: 0.15 MMOL/L (ref 0–0.27)
BILIRUB SERPL-MCNC: <0.2 MG/DL (ref 0–1)
BILIRUBIN URINE: NEGATIVE
BLOOD, URINE: ABNORMAL
BUN BLDV-MCNC: 41 MG/DL (ref 7–20)
CALCIUM SERPL-MCNC: 9.3 MG/DL (ref 8.3–10.6)
CARBOXYHEMOGLOBIN: 1.3 %
CHLORIDE BLD-SCNC: 96 MMOL/L (ref 99–110)
CLARITY: CLEAR
CO2: 15 MMOL/L (ref 21–32)
COLOR: YELLOW
CREAT SERPL-MCNC: 7.4 MG/DL (ref 0.9–1.3)
EOSINOPHILS ABSOLUTE: 0 K/UL (ref 0–0.6)
EOSINOPHILS RELATIVE PERCENT: 0 %
EPITHELIAL CELLS, UA: 0 /HPF (ref 0–5)
GFR SERPL CREATININE-BSD FRML MDRD: 8 ML/MIN/{1.73_M2}
GLUCOSE BLD-MCNC: 311 MG/DL (ref 70–99)
GLUCOSE BLD-MCNC: 373 MG/DL (ref 70–99)
GLUCOSE BLD-MCNC: 500 MG/DL (ref 70–99)
GLUCOSE BLD-MCNC: 503 MG/DL (ref 70–99)
GLUCOSE URINE: >=1000 MG/DL
HCO3 VENOUS: 17 MMOL/L (ref 23–29)
HCT VFR BLD CALC: 33.9 % (ref 40.5–52.5)
HEMOGLOBIN: 10.7 G/DL (ref 13.5–17.5)
HYALINE CASTS: 0 /LPF (ref 0–8)
KETONES, URINE: NEGATIVE MG/DL
LACTIC ACID, SEPSIS: 3.2 MMOL/L (ref 0.4–1.9)
LACTIC ACID, SEPSIS: 3.9 MMOL/L (ref 0.4–1.9)
LEUKOCYTE ESTERASE, URINE: NEGATIVE
LYMPHOCYTES ABSOLUTE: 0.3 K/UL (ref 1–5.1)
LYMPHOCYTES RELATIVE PERCENT: 1.7 %
MCH RBC QN AUTO: 30.7 PG (ref 26–34)
MCHC RBC AUTO-ENTMCNC: 31.6 G/DL (ref 31–36)
MCV RBC AUTO: 97.1 FL (ref 80–100)
METHEMOGLOBIN VENOUS: 0.7 %
MICROSCOPIC EXAMINATION: YES
MONOCYTES ABSOLUTE: 0.4 K/UL (ref 0–1.3)
MONOCYTES RELATIVE PERCENT: 2.3 %
MUCUS: PRESENT
NEUTROPHILS ABSOLUTE: 18.6 K/UL (ref 1.7–7.7)
NEUTROPHILS RELATIVE PERCENT: 95.7 %
NITRITE, URINE: NEGATIVE
O2 SAT, VEN: 64 %
O2 THERAPY: ABNORMAL
PCO2, VEN: 33.5 MMHG (ref 40–50)
PDW BLD-RTO: 15.4 % (ref 12.4–15.4)
PERFORMED ON: ABNORMAL
PH UA: 7.5 (ref 5–8)
PH VENOUS: 7.32 (ref 7.35–7.45)
PLATELET # BLD: 114 K/UL (ref 135–450)
PMV BLD AUTO: 10.1 FL (ref 5–10.5)
PO2, VEN: 34 MMHG
POTASSIUM SERPL-SCNC: 5.6 MMOL/L (ref 3.5–5.1)
POTASSIUM SERPL-SCNC: 6.5 MMOL/L (ref 3.5–5.1)
PROCALCITONIN: 92.71 NG/ML (ref 0–0.15)
PROTEIN UA: 300 MG/DL
RAPID INFLUENZA  B AGN: NEGATIVE
RAPID INFLUENZA A AGN: NEGATIVE
RBC # BLD: 3.49 M/UL (ref 4.2–5.9)
RBC UA: 9 /HPF (ref 0–4)
REPORT: NORMAL
SARS-COV-2, NAAT: NOT DETECTED
SODIUM BLD-SCNC: 129 MMOL/L (ref 136–145)
SPECIFIC GRAVITY UA: 1.02 (ref 1–1.03)
TCO2 CALC VENOUS: 18 MMOL/L
TOTAL PROTEIN: 8 G/DL (ref 6.4–8.2)
URINE REFLEX TO CULTURE: ABNORMAL
URINE TYPE: ABNORMAL
UROBILINOGEN, URINE: 0.2 E.U./DL
WBC # BLD: 19.4 K/UL (ref 4–11)
WBC UA: 3 /HPF (ref 0–5)

## 2022-11-26 PROCEDURE — 6360000002 HC RX W HCPCS: Performed by: INTERNAL MEDICINE

## 2022-11-26 PROCEDURE — 70450 CT HEAD/BRAIN W/O DYE: CPT

## 2022-11-26 PROCEDURE — 85025 COMPLETE CBC W/AUTO DIFF WBC: CPT

## 2022-11-26 PROCEDURE — 87804 INFLUENZA ASSAY W/OPTIC: CPT

## 2022-11-26 PROCEDURE — 2580000003 HC RX 258: Performed by: HOSPITALIST

## 2022-11-26 PROCEDURE — 36415 COLL VENOUS BLD VENIPUNCTURE: CPT

## 2022-11-26 PROCEDURE — 1200000000 HC SEMI PRIVATE

## 2022-11-26 PROCEDURE — 81001 URINALYSIS AUTO W/SCOPE: CPT

## 2022-11-26 PROCEDURE — 80053 COMPREHEN METABOLIC PANEL: CPT

## 2022-11-26 PROCEDURE — 83605 ASSAY OF LACTIC ACID: CPT

## 2022-11-26 PROCEDURE — 71260 CT THORAX DX C+: CPT | Performed by: PHYSICIAN ASSISTANT

## 2022-11-26 PROCEDURE — 84145 PROCALCITONIN (PCT): CPT

## 2022-11-26 PROCEDURE — 6360000002 HC RX W HCPCS: Performed by: PHYSICIAN ASSISTANT

## 2022-11-26 PROCEDURE — 2580000003 HC RX 258: Performed by: PHYSICIAN ASSISTANT

## 2022-11-26 PROCEDURE — 96365 THER/PROPH/DIAG IV INF INIT: CPT

## 2022-11-26 PROCEDURE — 6370000000 HC RX 637 (ALT 250 FOR IP): Performed by: HOSPITALIST

## 2022-11-26 PROCEDURE — 71045 X-RAY EXAM CHEST 1 VIEW: CPT

## 2022-11-26 PROCEDURE — 6360000004 HC RX CONTRAST MEDICATION: Performed by: EMERGENCY MEDICINE

## 2022-11-26 PROCEDURE — 96375 TX/PRO/DX INJ NEW DRUG ADDON: CPT

## 2022-11-26 PROCEDURE — 87150 DNA/RNA AMPLIFIED PROBE: CPT

## 2022-11-26 PROCEDURE — 96367 TX/PROPH/DG ADDL SEQ IV INF: CPT

## 2022-11-26 PROCEDURE — 5A1D70Z PERFORMANCE OF URINARY FILTRATION, INTERMITTENT, LESS THAN 6 HOURS PER DAY: ICD-10-PCS | Performed by: INTERNAL MEDICINE

## 2022-11-26 PROCEDURE — 87186 SC STD MICRODIL/AGAR DIL: CPT

## 2022-11-26 PROCEDURE — 99285 EMERGENCY DEPT VISIT HI MDM: CPT

## 2022-11-26 PROCEDURE — 90935 HEMODIALYSIS ONE EVALUATION: CPT

## 2022-11-26 PROCEDURE — 93005 ELECTROCARDIOGRAM TRACING: CPT | Performed by: PHYSICIAN ASSISTANT

## 2022-11-26 PROCEDURE — 6370000000 HC RX 637 (ALT 250 FOR IP): Performed by: PHYSICIAN ASSISTANT

## 2022-11-26 PROCEDURE — 87040 BLOOD CULTURE FOR BACTERIA: CPT

## 2022-11-26 PROCEDURE — 82010 KETONE BODYS QUAN: CPT

## 2022-11-26 PROCEDURE — 82803 BLOOD GASES ANY COMBINATION: CPT

## 2022-11-26 PROCEDURE — 6360000002 HC RX W HCPCS: Performed by: HOSPITALIST

## 2022-11-26 PROCEDURE — 87635 SARS-COV-2 COVID-19 AMP PRB: CPT

## 2022-11-26 PROCEDURE — 84132 ASSAY OF SERUM POTASSIUM: CPT

## 2022-11-26 PROCEDURE — 2500000003 HC RX 250 WO HCPCS: Performed by: PHYSICIAN ASSISTANT

## 2022-11-26 RX ORDER — ONDANSETRON 2 MG/ML
4 INJECTION INTRAMUSCULAR; INTRAVENOUS EVERY 6 HOURS PRN
Status: DISCONTINUED | OUTPATIENT
Start: 2022-11-26 | End: 2022-12-07 | Stop reason: HOSPADM

## 2022-11-26 RX ORDER — PANTOPRAZOLE SODIUM 40 MG/1
40 TABLET, DELAYED RELEASE ORAL
Status: DISCONTINUED | OUTPATIENT
Start: 2022-11-26 | End: 2022-12-07 | Stop reason: HOSPADM

## 2022-11-26 RX ORDER — INSULIN GLARGINE 100 [IU]/ML
0.25 INJECTION, SOLUTION SUBCUTANEOUS NIGHTLY
Status: DISCONTINUED | OUTPATIENT
Start: 2022-11-26 | End: 2022-12-07 | Stop reason: HOSPADM

## 2022-11-26 RX ORDER — INSULIN LISPRO 100 [IU]/ML
0.05 INJECTION, SOLUTION INTRAVENOUS; SUBCUTANEOUS
Status: DISCONTINUED | OUTPATIENT
Start: 2022-11-26 | End: 2022-12-07 | Stop reason: HOSPADM

## 2022-11-26 RX ORDER — INSULIN LISPRO 100 [IU]/ML
0-8 INJECTION, SOLUTION INTRAVENOUS; SUBCUTANEOUS
Status: DISCONTINUED | OUTPATIENT
Start: 2022-11-26 | End: 2022-12-07 | Stop reason: HOSPADM

## 2022-11-26 RX ORDER — HYDRALAZINE HYDROCHLORIDE 50 MG/1
100 TABLET, FILM COATED ORAL 3 TIMES DAILY
Status: DISCONTINUED | OUTPATIENT
Start: 2022-11-26 | End: 2022-12-07 | Stop reason: HOSPADM

## 2022-11-26 RX ORDER — MECLIZINE HCL 12.5 MG/1
12.5 TABLET ORAL ONCE
Status: COMPLETED | OUTPATIENT
Start: 2022-11-26 | End: 2022-11-26

## 2022-11-26 RX ORDER — MECLIZINE HCL 12.5 MG/1
12.5 TABLET ORAL 3 TIMES DAILY PRN
Status: DISCONTINUED | OUTPATIENT
Start: 2022-11-26 | End: 2022-12-07 | Stop reason: HOSPADM

## 2022-11-26 RX ORDER — SODIUM BICARBONATE 650 MG/1
650 TABLET ORAL DAILY
Status: DISCONTINUED | OUTPATIENT
Start: 2022-11-26 | End: 2022-11-27

## 2022-11-26 RX ORDER — CALCIUM GLUCONATE 94 MG/ML
1000 INJECTION, SOLUTION INTRAVENOUS ONCE
Status: COMPLETED | OUTPATIENT
Start: 2022-11-26 | End: 2022-11-26

## 2022-11-26 RX ORDER — SUCRALFATE 1 G/1
1 TABLET ORAL
Status: DISCONTINUED | OUTPATIENT
Start: 2022-11-26 | End: 2022-12-07 | Stop reason: HOSPADM

## 2022-11-26 RX ORDER — SODIUM CHLORIDE 0.9 % (FLUSH) 0.9 %
5-40 SYRINGE (ML) INJECTION PRN
Status: DISCONTINUED | OUTPATIENT
Start: 2022-11-26 | End: 2022-12-07 | Stop reason: HOSPADM

## 2022-11-26 RX ORDER — POLYETHYLENE GLYCOL 3350 17 G/17G
17 POWDER, FOR SOLUTION ORAL DAILY PRN
Status: DISCONTINUED | OUTPATIENT
Start: 2022-11-26 | End: 2022-12-07 | Stop reason: HOSPADM

## 2022-11-26 RX ORDER — ATORVASTATIN CALCIUM 40 MG/1
40 TABLET, FILM COATED ORAL NIGHTLY
Status: DISCONTINUED | OUTPATIENT
Start: 2022-11-26 | End: 2022-12-07 | Stop reason: HOSPADM

## 2022-11-26 RX ORDER — 0.9 % SODIUM CHLORIDE 0.9 %
1000 INTRAVENOUS SOLUTION INTRAVENOUS ONCE
Status: DISCONTINUED | OUTPATIENT
Start: 2022-11-26 | End: 2022-11-26

## 2022-11-26 RX ORDER — SODIUM CHLORIDE 0.9 % (FLUSH) 0.9 %
5-40 SYRINGE (ML) INJECTION EVERY 12 HOURS SCHEDULED
Status: DISCONTINUED | OUTPATIENT
Start: 2022-11-26 | End: 2022-12-07 | Stop reason: HOSPADM

## 2022-11-26 RX ORDER — ACETAMINOPHEN 650 MG/1
650 SUPPOSITORY RECTAL EVERY 6 HOURS PRN
Status: DISCONTINUED | OUTPATIENT
Start: 2022-11-26 | End: 2022-12-07 | Stop reason: HOSPADM

## 2022-11-26 RX ORDER — SODIUM CHLORIDE 9 MG/ML
INJECTION, SOLUTION INTRAVENOUS PRN
Status: DISCONTINUED | OUTPATIENT
Start: 2022-11-26 | End: 2022-12-07 | Stop reason: HOSPADM

## 2022-11-26 RX ORDER — BUPROPION HYDROCHLORIDE 150 MG/1
150 TABLET, EXTENDED RELEASE ORAL 2 TIMES DAILY
Status: DISCONTINUED | OUTPATIENT
Start: 2022-11-26 | End: 2022-11-27 | Stop reason: CLARIF

## 2022-11-26 RX ORDER — ONDANSETRON 2 MG/ML
4 INJECTION INTRAMUSCULAR; INTRAVENOUS ONCE
Status: COMPLETED | OUTPATIENT
Start: 2022-11-26 | End: 2022-11-26

## 2022-11-26 RX ORDER — DEXTROSE MONOHYDRATE 100 MG/ML
INJECTION, SOLUTION INTRAVENOUS CONTINUOUS PRN
Status: DISCONTINUED | OUTPATIENT
Start: 2022-11-26 | End: 2022-12-07 | Stop reason: HOSPADM

## 2022-11-26 RX ORDER — HEPARIN SODIUM 1000 [USP'U]/ML
4300 INJECTION, SOLUTION INTRAVENOUS; SUBCUTANEOUS PRN
Status: DISCONTINUED | OUTPATIENT
Start: 2022-11-26 | End: 2022-12-01 | Stop reason: DRUGHIGH

## 2022-11-26 RX ORDER — ISOSORBIDE MONONITRATE 60 MG/1
60 TABLET, EXTENDED RELEASE ORAL DAILY
Status: DISCONTINUED | OUTPATIENT
Start: 2022-11-27 | End: 2022-12-07 | Stop reason: HOSPADM

## 2022-11-26 RX ORDER — HEPARIN SODIUM 5000 [USP'U]/ML
5000 INJECTION, SOLUTION INTRAVENOUS; SUBCUTANEOUS EVERY 8 HOURS SCHEDULED
Status: DISCONTINUED | OUTPATIENT
Start: 2022-11-26 | End: 2022-12-07 | Stop reason: HOSPADM

## 2022-11-26 RX ORDER — ACETAMINOPHEN 325 MG/1
650 TABLET ORAL ONCE
Status: COMPLETED | OUTPATIENT
Start: 2022-11-26 | End: 2022-11-26

## 2022-11-26 RX ORDER — ONDANSETRON 4 MG/1
4 TABLET, ORALLY DISINTEGRATING ORAL EVERY 8 HOURS PRN
Status: DISCONTINUED | OUTPATIENT
Start: 2022-11-26 | End: 2022-12-07 | Stop reason: HOSPADM

## 2022-11-26 RX ORDER — ACETAMINOPHEN 325 MG/1
650 TABLET ORAL ONCE
Status: COMPLETED | OUTPATIENT
Start: 2022-11-26 | End: 2022-11-27

## 2022-11-26 RX ORDER — CALCITRIOL 0.25 UG/1
0.5 CAPSULE, LIQUID FILLED ORAL DAILY
Status: DISCONTINUED | OUTPATIENT
Start: 2022-11-26 | End: 2022-12-07 | Stop reason: HOSPADM

## 2022-11-26 RX ORDER — ACETAMINOPHEN 325 MG/1
650 TABLET ORAL EVERY 6 HOURS PRN
Status: DISCONTINUED | OUTPATIENT
Start: 2022-11-26 | End: 2022-12-07 | Stop reason: HOSPADM

## 2022-11-26 RX ORDER — NIFEDIPINE 90 MG/1
90 TABLET, EXTENDED RELEASE ORAL 2 TIMES DAILY
Status: DISCONTINUED | OUTPATIENT
Start: 2022-11-26 | End: 2022-12-07 | Stop reason: HOSPADM

## 2022-11-26 RX ORDER — INSULIN LISPRO 100 [IU]/ML
0-4 INJECTION, SOLUTION INTRAVENOUS; SUBCUTANEOUS NIGHTLY
Status: DISCONTINUED | OUTPATIENT
Start: 2022-11-26 | End: 2022-12-07 | Stop reason: HOSPADM

## 2022-11-26 RX ADMIN — IOPAMIDOL 75 ML: 755 INJECTION, SOLUTION INTRAVENOUS at 11:25

## 2022-11-26 RX ADMIN — SUCRALFATE 1 G: 1 TABLET ORAL at 21:40

## 2022-11-26 RX ADMIN — PANTOPRAZOLE SODIUM 40 MG: 40 TABLET, DELAYED RELEASE ORAL at 18:43

## 2022-11-26 RX ADMIN — HYDRALAZINE HYDROCHLORIDE 100 MG: 50 TABLET, FILM COATED ORAL at 18:42

## 2022-11-26 RX ADMIN — INSULIN LISPRO 4 UNITS: 100 INJECTION, SOLUTION INTRAVENOUS; SUBCUTANEOUS at 18:49

## 2022-11-26 RX ADMIN — SODIUM CHLORIDE, PRESERVATIVE FREE 10 ML: 5 INJECTION INTRAVENOUS at 21:43

## 2022-11-26 RX ADMIN — SODIUM BICARBONATE 650 MG: 650 TABLET ORAL at 18:42

## 2022-11-26 RX ADMIN — ONDANSETRON 4 MG: 2 INJECTION INTRAMUSCULAR; INTRAVENOUS at 09:54

## 2022-11-26 RX ADMIN — NIFEDIPINE 90 MG: 90 TABLET, EXTENDED RELEASE ORAL at 21:40

## 2022-11-26 RX ADMIN — SODIUM CHLORIDE: 9 INJECTION, SOLUTION INTRAVENOUS at 21:57

## 2022-11-26 RX ADMIN — CALCIUM GLUCONATE 1000 MG: 98 INJECTION, SOLUTION INTRAVENOUS at 10:43

## 2022-11-26 RX ADMIN — ONDANSETRON 4 MG: 2 INJECTION INTRAMUSCULAR; INTRAVENOUS at 22:54

## 2022-11-26 RX ADMIN — HYDRALAZINE HYDROCHLORIDE 100 MG: 50 TABLET, FILM COATED ORAL at 21:40

## 2022-11-26 RX ADMIN — ATORVASTATIN CALCIUM 40 MG: 40 TABLET, FILM COATED ORAL at 21:40

## 2022-11-26 RX ADMIN — MECLIZINE 12.5 MG: 12.5 TABLET ORAL at 11:39

## 2022-11-26 RX ADMIN — PIPERACILLIN AND TAZOBACTAM 4500 MG: 4; .5 INJECTION, POWDER, FOR SOLUTION INTRAVENOUS at 10:51

## 2022-11-26 RX ADMIN — INSULIN GLARGINE 18 UNITS: 100 INJECTION, SOLUTION SUBCUTANEOUS at 18:53

## 2022-11-26 RX ADMIN — CALCITRIOL 0.5 MCG: 0.25 CAPSULE ORAL at 18:42

## 2022-11-26 RX ADMIN — PIPERACILLIN AND TAZOBACTAM 3375 MG: 3; .375 INJECTION, POWDER, FOR SOLUTION INTRAVENOUS at 21:57

## 2022-11-26 RX ADMIN — SODIUM BICARBONATE 50 MEQ: 84 INJECTION INTRAVENOUS at 10:41

## 2022-11-26 RX ADMIN — VANCOMYCIN HYDROCHLORIDE 1250 MG: 1.25 INJECTION, POWDER, LYOPHILIZED, FOR SOLUTION INTRAVENOUS at 11:35

## 2022-11-26 RX ADMIN — INSULIN LISPRO 8 UNITS: 100 INJECTION, SOLUTION INTRAVENOUS; SUBCUTANEOUS at 18:49

## 2022-11-26 RX ADMIN — BUPROPION HYDROCHLORIDE 150 MG: 150 TABLET, EXTENDED RELEASE ORAL at 21:40

## 2022-11-26 RX ADMIN — ONDANSETRON 4 MG: 2 INJECTION INTRAMUSCULAR; INTRAVENOUS at 16:27

## 2022-11-26 RX ADMIN — HEPARIN SODIUM 4300 UNITS: 1000 INJECTION INTRAVENOUS; SUBCUTANEOUS at 17:10

## 2022-11-26 RX ADMIN — SUCRALFATE 1 G: 1 TABLET ORAL at 18:43

## 2022-11-26 RX ADMIN — HEPARIN SODIUM 5000 UNITS: 5000 INJECTION INTRAVENOUS; SUBCUTANEOUS at 21:58

## 2022-11-26 RX ADMIN — MECLIZINE 12.5 MG: 12.5 TABLET ORAL at 21:40

## 2022-11-26 RX ADMIN — ACETAMINOPHEN 650 MG: 325 TABLET ORAL at 09:13

## 2022-11-26 RX ADMIN — METOPROLOL TARTRATE 75 MG: 50 TABLET, FILM COATED ORAL at 21:39

## 2022-11-26 RX ADMIN — INSULIN LISPRO 4 UNITS: 100 INJECTION, SOLUTION INTRAVENOUS; SUBCUTANEOUS at 22:16

## 2022-11-26 ASSESSMENT — PAIN SCALES - GENERAL
PAINLEVEL_OUTOF10: 0
PAINLEVEL_OUTOF10: 0

## 2022-11-26 ASSESSMENT — LIFESTYLE VARIABLES
HOW MANY STANDARD DRINKS CONTAINING ALCOHOL DO YOU HAVE ON A TYPICAL DAY: PATIENT DOES NOT DRINK
HOW OFTEN DO YOU HAVE A DRINK CONTAINING ALCOHOL: NEVER

## 2022-11-26 ASSESSMENT — PAIN - FUNCTIONAL ASSESSMENT: PAIN_FUNCTIONAL_ASSESSMENT: 0-10

## 2022-11-26 NOTE — ED NOTES
Report called to [facility]. Pt in dialysis presently.      Krishna Burciaga RN  11/26/22 0258

## 2022-11-26 NOTE — ED NOTES
Pt refusing straight cath. States will give urine when he can. Family at bedside will assist. Urinal provided.      Melinda Fernando RN  11/26/22 0370

## 2022-11-26 NOTE — PROGRESS NOTES
Dialysis nurse called asking for some zofran for pt due to him vomiting. Pt has a prn order for zofran. Took medication to dialysis room and administered it to pt.

## 2022-11-26 NOTE — CONSULTS
Clinical Pharmacy Note  Vancomycin Consult    Pharmacy consult received for one-time dose of vancomycin in the Emergency Department per devi Patel. Ht Readings from Last 1 Encounters:   11/26/22 6' 2\" (1.88 m)        Wt Readings from Last 1 Encounters:   11/26/22 160 lb 0.9 oz (72.6 kg)         Assessment/Plan:  Vancomycin 1250 mg x 1 in ED. If Vancomycin is to continue on admission and pharmacy is to manage dosing, please re-consult with admission orders.

## 2022-11-26 NOTE — FLOWSHEET NOTE
11/26/22 1338 11/26/22 1716   Vital Signs   /60 (!) 149/66   Temp 98.1 °F (36.7 °C) 97.9 °F (36.6 °C)   Heart Rate (!) 101 (!) 110   Resp 20 20     Treatment time: 3.5 hours    Net UF: 1.5 L    Pre weight: 69.9 kg (bed scale)  Post weight: 68.4 kg (bed scale)  EDW: TBD    Access used: RIJ HD tunneled cath  Access function: No problems    Medications or blood products given: None    Regular outpatient schedule: [facility] TTS    Summary of response to treatment: Hypotension 1 hour after start of tx, BP 88/61. UFG decreased from 2 L net  to minimum, 1.9 L, completed remainder of tx with 's. Copy of dialysis treatment record placed in chart, to be scanned into EMR.      Report called to Coy Ibanez RN

## 2022-11-26 NOTE — PROGRESS NOTES
4 Eyes Skin Assessment     NAME:  Jacqueline Eid  YOB: 1965  MEDICAL RECORD NUMBER:  [id number]    The patient is being assessed for  Admission    I agree that One RN have performed a thorough Head to Toe Skin Assessment on the patient. ALL assessment sites listed below have been assessed. Areas assessed by both nurses:    Head, Face, Ears, Shoulders, Back, Chest, Arms, Elbows, Hands, Sacrum. Buttock, Coccyx, Ischium, and Legs. Feet and Heels        Does the Patient have a Wound?  No noted wound(s)       [place] Prevention initiated by RN: Yes   Wound Care Orders initiated by RN: No    Pressure Injury (Stage 3,4, Unstageable, DTI, NWPT, and Complex wounds) if present place referral order by RN under : No    New and Established Ostomies, if present place, referral order under : No      Nurse 1 eSignature: Electronically signed by Cash Almazan RN on 11/26/22 at 6:31 PM EST    **SHARE this note so that the co-signing nurse is able to place an eSignature**    Nurse 2 eSignature: {Esignature:[id number]}

## 2022-11-26 NOTE — PROGRESS NOTES
Pt arrived to room from dialysis without complications. Oriented pt to room, bd in lowest position with alarm on. Educated pt to call before getting out of bed due to his vertigo. Urinal at bedside. Pt resting comfortably. Will continue to monitor.

## 2022-11-26 NOTE — ED PROVIDER NOTES
EMERGENCY DEPARTMENT ENCOUNTER      Pt Name: Wenceslao Allred  MRN: [id number]  [place] 1965  Date of evaluation: 11/26/2022  Provider: Consuelo Schwab, PA       I have seen and evaluated this patient with my supervising physician Dr. Jarret Martell  Hyperglycemia      HISTORY [address]  (Location/Symptom, Timing/Onset, Context/Setting, Quality, Duration, Modifying Factors, Severity.)   Wenceslao Allred is a 65 y.o. male who presents to the emergency department for altered mental status and hyperglycemia. Most of history obtained from wife. Wife reports yesterday he was confused most of the day. She tried to check his glucose but had an error message on multiple meters. He has a type II diabetic on Levemir and NovoLog. He became less confused last night but then this morning again was confused so she brought him here. She reports yesterday he was shaking but did not have a fever. Continues to have shaking chills today. Has not had any Tylenol or ibuprofen yet today. He is on dialysis. He had spontaneous bacterial peritonitis last month and PD catheter was removed. Therefore he has been going to dialysis Tuesday, Thursday, Saturday. He last went Wednesday as this is the week of Thanksgiving so his schedule was altered. Was due for dialysis today. Nephrologist is Dr. Severo Sar. Wife denies any sick contacts. Patient will occasionally answer my questions. He does report he had a cough. Denies any vomiting. Denies abdominal pain. Has had some diarrhea. Denies blood or black in stool. He really will not answer any other questions regarding history. Nursing Notes were reviewed and I agree.     REVIEW OF SYSTEMS    (2-9 systems for level 4, 10 or more for level 5)     Review of Systems    All other systems reviewed and are negative except as noted    PAST MEDICAL HISTORY         Diagnosis Date    Cardiomyopathy [facility])     CHF (congestive heart failure) ([facility]     CVA (cerebral infarction) 5/2015    Diabetes mellitus ([facility]     Foot ulcer, left ([facility] 1/14/2016    Gastroparesis     Hemodialysis patient ([facility]     Hypercholesteremia     Hypertension     Kidney disease     Unspecified cerebral artery occlusion with cerebral infarction     5/15, 7/15 LEFT SIDE WEAKNESS       SURGICAL HISTORY           Procedure Laterality Date    CATHETER INSERTION N/A 10/17/2022    TUNNEL CATHETER PLACEMENT performed by Jon Smith MD at [address] [place] [address] N/A 5/17/2021    COLONOSCOPY POLYPECTOMY SNARE/COLD BIOPSY performed by Shane Betancourt MD at [address] [place] [address] N/A 10/17/2022    PERITONEAL DIALYSIS CATHETER REMOVAL performed by Jon Smith MD at [address] [place] [address] N/A 10/29/2020    PLACEMENT OF A TUNNELED DIALYSIS CATHETER WITH FLEURO AND ULTRASOUND performed by Jon Smith MD at [facility] N/A 10/29/2020    LAPAROSCOPIC PERITONEAL DIALYSIS CATHETER PLACEMENT WITH FLEURO AND ULTRASOUND performed by Jon Smith MD at [address] [place] [address] N/A 06/19/7741    UMBILICAL HERNIA REPAIR performed by Jon Smith MD at [place] [address] 4/26/2021    ESOPHAGOGASTRODUODENOSCOPY performed by Shane Betancourt MD at [facility] 9/13/2022    EGD DIAGNOSTIC ONLY performed by Curly Snow MD at [address] [place] [address]       Current Discharge Medication List        CONTINUE these medications which have NOT CHANGED    Details   isosorbide mononitrate (IMDUR) 60 MG extended release tablet Take 1 tablet by mouth once daily  Qty: 90 tablet, Refills: 3    Associated Diagnoses: Chronic diastolic congestive heart failure (HCC)      cloNIDine (CATAPRES) 0.1 MG tablet Take 1 tablet by mouth 3 times daily  Qty: 90 tablet, Refills: 0      insulin detemir (LEVEMIR FLEXTOUCH) 100 UNIT/ML injection pen Inject 10 Units into the skin nightly  Qty: 3 mL, Refills: 0      spironolactone (ALDACTONE) 100 MG tablet Take 100 mg by mouth daily      sucralfate (CARAFATE) 1 GM tablet Take 1 tablet by mouth 4 times daily  Qty: 120 tablet, Refills: 0      pantoprazole (PROTONIX) 40 MG tablet Take 1 tablet by mouth 2 times daily (before meals)  Qty: 60 tablet, Refills: 2      buPROPion (WELLBUTRIN SR) 150 MG extended release tablet Take 150 mg by mouth 2 times daily      Sucroferric Oxyhydroxide (VELPHORO) 500 MG CHEW Take 1 tablet by mouth 3 times daily (with meals)      Insulin Pen Needle ([employer] PEN NEEDLES) 31G X 6 MM MISC 1 each by Does not apply route daily  Qty: 100 each, Refills: 3      insulin aspart (NOVOLOG FLEXPEN) 100 UNIT/ML injection pen Inject 3 Units into the skin 3 times daily (before meals)  Qty: 5 pen, Refills: 3      atorvastatin (LIPITOR) 40 MG tablet TAKE 1 TABLET BY MOUTH ONCE DAILY NIGHTLY  Qty: 90 tablet, Refills: 3    Associated Diagnoses: Mixed hyperlipidemia      gabapentin (NEURONTIN) 300 MG capsule TAKE 1 CAPSULE BY MOUTH THREE TIMES DAILY  Qty: 270 capsule, Refills: 1    Associated Diagnoses: Left-sided weakness;  Other diabetic neurological complication associated with type 2 diabetes mellitus (HCC)      Methoxy PEG-Epoetin Beta (MIRCERA IJ) Inject 75 mcg into the skin      calcitRIOL (ROCALTROL) 0.5 MCG capsule Take 0.5 mcg by mouth daily      losartan (COZAAR) 100 MG tablet TAKE 1 TABLET BY MOUTH ONCE DAILY      hydrALAZINE (APRESOLINE) 100 MG tablet TAKE 2 TABLETS BY MOUTH THREE TIMES DAILY  Qty: 270 tablet, Refills: 1    Associated Diagnoses: Essential hypertension; Chronic diastolic congestive heart failure (HCC)      NIFEdipine (PROCARDIA XL) 90 MG extended release tablet Take 1 tablet by mouth 2 times daily  Qty: 180 tablet, Refills: 3    Comments: Please consider 90 day supplies to promote better adherence  Associated Diagnoses: Essential hypertension; Chronic diastolic congestive heart failure (HCC)      sodium bicarbonate 650 MG tablet Take 650 mg by mouth daily      EQ ASPIRIN ADULT LOW DOSE 81 MG EC tablet Take 1 tablet by mouth once daily  Qty: 90 tablet, Refills: 3    Associated Diagnoses: Essential hypertension      metoprolol tartrate (LOPRESSOR) 50 MG tablet Take twice daily  Qty: 180 tablet, Refills: 1    Associated Diagnoses: Essential hypertension             ALLERGIES     Doxazosin, Cefepime, and Coconut flavor    FAMILY HISTORY           Adopted: Yes     Family Status   Relation Name Status    Mother      Father          SOCIAL HISTORY      reports that he has been smoking cigars and cigarettes. He started smoking about 43 years ago. He has never used smokeless tobacco. He reports current alcohol use. He reports current drug use. Drug: Marijuana Ilda Coad). PHYSICAL EXAM    (up to 7 for level 4, 8 or more for level 5)     ED Triage Vitals   BP Temp Temp Source Heart Rate Resp SpO2 Height Weight   22 0827 22 0827 22 0827 22 0827 22 0827 22 0827 22 0827 22 0853   130/78 100.1 °F (37.8 °C) Oral (!) 129 18 98 % 6' 2\" (1.88 m) 160 lb 0.9 oz (72.6 kg)       Physical Exam  Constitutional:       General: He is not in acute distress. Appearance: He is well-developed. He is ill-appearing. He is not toxic-appearing or diaphoretic. Comments: shaking   HENT:      Head: Normocephalic and atraumatic. Right Ear: There is impacted cerumen. Left Ear: Tympanic membrane, ear canal and external ear normal.      Mouth/Throat:      Mouth: Mucous membranes are moist.      Pharynx: Oropharynx is clear. No oropharyngeal exudate or posterior oropharyngeal erythema. Eyes:      Pupils: Pupils are equal, round, and reactive to light. Cardiovascular:      Rate and Rhythm: Regular rhythm. Tachycardia present. Heart sounds: Normal heart sounds.    Pulmonary:      Effort: Pulmonary effort is normal. No respiratory distress. Breath sounds: Normal breath sounds. No stridor. No wheezing or rhonchi. Abdominal:      General: There is no distension. Palpations: Abdomen is soft. There is no mass. Tenderness: There is no abdominal tenderness. There is no guarding or rebound. Hernia: No hernia is present. Musculoskeletal:         General: Normal range of motion. Cervical back: Normal range of motion and neck supple. Comments: Moving all 4 extremitites   Skin:     General: Skin is warm. Neurological:      Mental Status: He is alert. Comments: Oriented to person and place but not time  Answers questions occasionally  Does not follow commands most of the time. DIFFERENTIAL DIAGNOSIS   Hyperglycemia, DKA, electrolyte normality, uremia, sepsis, influenza, pneumonia, COVID, UTI, intracranial hemorrhage, other    DIAGNOSTICRESULTS     EKG: All EKG's are interpreted by ANTHONY Westbrook in the absence of a cardiologist.    EKG obtained. See Dr. Bethany Babcock note for interpretation    RADIOLOGY:   Non-plain film images such as CT, Ultrasound and MRI are read by the radiologist. Plain radiographic images are visualized and preliminarily interpreted by ANTHONY Westbrook with the below findings:      Interpretation per the Radiologist below, if available at the time of this note:    CT CHEST PULMONARY EMBOLISM W CONTRAST   Final Result   1. Focal consolidation in the right lower lobe favoring   infectious/inflammatory process. Recommend CT of the chest in 3 months to   confirm resolution. 2. Solid subcentimeter right pulmonary nodules can be assessed during the   same time. 3. Cardiomegaly with unchanged moderate to large pericardial effusion. 4. Age-indeterminate L1 compression fracture. If there is point tenderness,   recommend nonemergent MRI of the lumbar spine. CT HEAD WO CONTRAST   Final Result   1. No acute intracranial abnormality.          XR CHEST PORTABLE   Final Result   Stable increased opacity left basilar region compared to prior studies dating   back to 10/25/2020 consistent with pericardial effusion, loculated left   basilar pleural effusion, pulmonary consolidation or mass/neoplasm. 2 mm   opacity consistent with pulmonary nodule in the right basilar region and foci   of infiltrate or subsegmental atelectasis in the mid-lower lung fields noted   bilaterally. IV contrast-enhanced chest CT suggested for further evaluation.                LABS:  Results for orders placed or performed during the hospital encounter of 11/26/22   COVID-19, Rapid    Specimen: Nasopharyngeal Swab   Result Value Ref Range    SARS-CoV-2, NAAT Not Detected Not Detected   Rapid influenza A/B antigens    Specimen: Nares   Result Value Ref Range    Rapid Influenza A Ag Negative Negative    Rapid Influenza B Ag Negative Negative   Beta-Hydroxybutyrate   Result Value Ref Range    Beta-Hydroxybutyrate 0.15 0.00 - 0.27 mmol/L   Blood Gas, Venous   Result Value Ref Range    pH, Ruel 7.323 (L) 7.350 - 7.450    pCO2, Ruel 33.5 (L) 40.0 - 50.0 mmHg    pO2, Ruel 34 Not Established mmHg    HCO3, Venous 17 (L) 23 - 29 mmol/L    Base Excess, Ruel -7.8 Not Established mmol/L    O2 Sat, Ruel 64 Not Established %    Carboxyhemoglobin 1.3 %    MetHgb, Ruel 0.7 <1.5 %    TC02 (Calc), Ruel 18 Not Established mmol/L    O2 Therapy Unknown    CBC with Auto Differential   Result Value Ref Range    WBC 19.4 (H) 4.0 - 11.0 K/uL    RBC 3.49 (L) 4.20 - 5.90 M/uL    Hemoglobin 10.7 (L) 13.5 - 17.5 g/dL    Hematocrit 33.9 (L) 40.5 - 52.5 %    MCV 97.1 80.0 - 100.0 fL    MCH 30.7 26.0 - 34.0 pg    MCHC 31.6 31.0 - 36.0 g/dL    RDW 15.4 12.4 - 15.4 %    Platelets 524 (L) 217 - 450 K/uL    MPV 10.1 5.0 - 10.5 fL    Neutrophils % 95.7 %    Lymphocytes % 1.7 %    Monocytes % 2.3 %    Eosinophils % 0.0 %    Basophils % 0.3 %    Neutrophils Absolute 18.6 (H) 1.7 - 7.7 K/uL    Lymphocytes Absolute 0.3 (L) 1.0 - 5.1 K/uL    Monocytes Absolute 0.4 0.0 - 1.3 K/uL    Eosinophils Absolute 0.0 0.0 - 0.6 K/uL    Basophils Absolute 0.1 0.0 - 0.2 K/uL   Comprehensive Metabolic Panel   Result Value Ref Range    Sodium 129 (L) 136 - 145 mmol/L    Potassium 6.5 (HH) 3.5 - 5.1 mmol/L    Chloride 96 (L) 99 - 110 mmol/L    CO2 15 (L) 21 - 32 mmol/L    Anion Gap 18 (H) 3 - 16    Glucose 500 (H) 70 - 99 mg/dL    BUN 41 (H) 7 - 20 mg/dL    Creatinine 7.4 (HH) 0.9 - 1.3 mg/dL    Est, Glom Filt Rate 8 (A) >60    Calcium 9.3 8.3 - 10.6 mg/dL    Total Protein 8.0 6.4 - 8.2 g/dL    Albumin 3.5 3.4 - 5.0 g/dL    Albumin/Globulin Ratio 0.8 (L) 1.1 - 2.2    Total Bilirubin <0.2 0.0 - 1.0 mg/dL    Alkaline Phosphatase 104 40 - 129 U/L    ALT 13 10 - 40 U/L    AST 12 (L) 15 - 37 U/L   Urinalysis with Reflex to Culture    Specimen: Urine, clean catch   Result Value Ref Range    Color, UA Yellow Straw/Yellow    Clarity, UA Clear Clear    Glucose, Ur >=1000 (A) Negative mg/dL    Bilirubin Urine Negative Negative    Ketones, Urine Negative Negative mg/dL    Specific Gravity, UA 1.019 1.005 - 1.030    Blood, Urine TRACE (A) Negative    pH, UA 7.5 5.0 - 8.0    Protein,  Negative mg/dL    Urobilinogen, Urine 0.2 <2.0 E.U./dL    Nitrite, Urine Negative Negative    Leukocyte Esterase, Urine Negative Negative    Microscopic Examination YES     Urine Type Cleancatch    Lactate, Sepsis   Result Value Ref Range    Lactic Acid, Sepsis 3.9 (H) 0.4 - 1.9 mmol/L   Lactate, Sepsis   Result Value Ref Range    Lactic Acid, Sepsis 3.2 (H) 0.4 - 1.9 mmol/L   Potassium   Result Value Ref Range    Potassium 5.6 (H) 3.5 - 5.1 mmol/L   Procalcitonin   Result Value Ref Range    Procalcitonin 92.71 (H) 0.00 - 0.15 ng/mL   POCT Glucose   Result Value Ref Range    POC Glucose 503 (H) 70 - 99 mg/dl    Performed on ACCU-CHEK        All other labs were withinnormal range or not returned as of this dictation.     EMERGENCY DEPARTMENT COURSE and DIFFERENTIAL DIAGNOSIS/MDM:   Vitals: shock? Yes   SEP-1 CORE MEASURE DATA      Sepsis Criteria   Severe Sepsis Criteria   Septic Shock Criteria     Must be confirmed or suspected to move forward with diagnosis of sepsis. Must meet 2:    [] Temperature > 100.9 F (38.3 C)        or < 96.8 F (36 C)  [x] HR > 90  [] RR > 20  [x] WBC > 12 or < 4 or 10% bands      AND:      [x] Infection Confirmed or        Suspected. Must meet 1:    [] Lactate > 2       or   [] Signs of Organ Dysfunction:    - SBP < 90 or MAP < 65  - Altered mental status  - Creatinine > 2 or increased from      baseline  - Urine Output < 0.5 ml/kg/hr  - Bilirubin > 2  - INR > 1.5 (not anticoagulated)  - Platelets < 603,592  - Acute Respiratory Failure as     evidenced by new need for NIPPV     or mechanical ventilation      [] No criteria met for Severe Sepsis. Must meet 1:    [] Lactate > 4        or   [] SBP < 90 or MAP < 65 for at        least two readings in the first        hour after fluid bolus        administration      [] Vasopressors initiated (if hypotension persists after fluid resuscitation)        [] No criteria met for Septic Shock.    Patient Vitals for the past 6 hrs:   BP Temp Pulse Resp SpO2 Weight Weight Method Percent Weight Change Dry Weight   11/26/22 0853 -- -- -- -- -- 160 lb 0.9 oz (72.6 kg) Actual;Bed scale 0 --   11/26/22 1015 (!) 148/69 -- (!) 119 19 -- -- -- -- --   11/26/22 1030 137/60 -- (!) 114 27 -- -- -- -- --   11/26/22 1045 (!) 142/66 -- (!) 114 23 98 % -- -- -- --   11/26/22 1100 136/78 -- (!) 111 14 -- -- -- -- --   11/26/22 1115 (!) 140/70 -- (!) 110 26 98 % -- -- -- --   11/26/22 1210 -- 98.1 °F (36.7 °C) (!) 104 25 -- -- -- -- --   11/26/22 1300 129/60 -- (!) 103 23 99 % -- -- -- --   11/26/22 1338 130/60 98.1 °F (36.7 °C) (!) 101 20 -- 154 lb 1.6 oz (69.9 kg) Actual;Bed scale -3.72 153 lb (69.4 kg)      Recent Labs     11/26/22  0926   WBC 19.4*   CREATININE 7.4*   BILITOT <0.2   *         Time Severe Sepsis Identified:

## 2022-11-26 NOTE — CONSULTS
Patient Name: Miah Mesa                                                    Primary Physician: Kitty admitting provider for patient encounter. Admitting Dx: No admission diagnoses are documented for this encounter. Nephrology [address] [facility]  [email or web address] [employer]                                          Assessment / Plan:     ESRD  Runs TTS at [facility] and ran Wed per Holiday scheduled with dialysis today. Runs for 3.5 hrs. Vol:  EDW? Try for 2 L today with good BP  Anemia: Hgb 10.7 and holding EPO  SHPT: Velphoro for binder and Rocaltrol   Access: TDC  Sepsis  Started on ATBX in ED with Vanc  Hx of Fungal Peritonitis with PD catheter removed. CT of chest shows Rt consolidation / PNA with mod/ large Pericardial effusion. HTN  BP here is 140/70  Home meds Cozaar 100 mg, Hydralazine 100 mg, Nifedipine 90 mg, Lopressor 50 mg, and Spironolactone 100 mg     Please call our office at [phone] or [employer] with any questions or contact me directly. Subjective:     CC / Reason for Consult:  ESRD    HPI / PMHx:  This is a consult for Miah Mesa  requested by No admitting provider for patient encounter. for the reason of  ESRD . Miah Mesa is a 65 y.o. male admitted for Fevers / Sepsis with PMHx of ESRD, Anemia in CKD, SHPT, CHF, PCKD. He presented with elevated high blood sugar and slightly altered mental status. Patient complaining of chills, sneezing, coughing, fever gradual onset constant worsening for the last 2 days. No known sick contacts. Patient has a risk factor of a history fungal peritonitis, prior peritoneal dialysis removed and started on HD. Patient's most recent dialysis session was on Wednesday due to Thanksgiving holiday. K was 6.5 and repeat 5.6 with lactic acid 3.2 and WBC of 19.4. I thank Dr. Tang admitting provider for patient encounter. for consulting [facility] [address] [facility] in the care of your patient.   Please call with any questions or concerns. [phone]. Natividaddeeptigayathri Barrios    Objective:     SocHx: Smokes occasionally x 30 yrs. Drinks occasionally as well. FamHx: Parents . Current Medications:  Scheduled Medications:  acetaminophen, 650 mg, Once  vancomycin, 1,250 mg, Once       IV Meds:      PRN Medications: Allergies: Doxazosin, Cefepime, and Coconut flavor    ROS: Positives in BOLD     GEN:   fevers, chills, sweats, fatigue and weight loss     HEENT:    nasal congestion, sore mouth and sore throat     Resp:    cough, hemoptysis, pneumonia or dyspnea on exertion     Card:  chest pain, chest pressure/discomfort, dyspnea, palpitations,  lower extremity edema     GI:   nausea, vomiting, diarrhea, constipation and abdominal pain     :  dysuria, nocturia, urinary incontinence, hesitancy and hematuria     Derm:   rash, skin lesion(s), pruritus and dryness     Neuro:   headaches, dizziness, seizures, gait problems, tremor and weakness     MS:  myalgias, arthralgias, neck pain and back pain     Endo:    nephropathy and cardiovascular disease    PE:      Gen: alert, well appearing, and in no distress     HEENT:pupils equal and reactive, extraocular eye movements intact      Neuro: alert, oriented, normal speech, no focal findings or movement disorder noted     Neck:  supple, no significant adenopathy     Cardio: normal rate, regular rhythm, normal S1, S2, no murmurs, rubs, clicks or gallops      Resp: clear to auscultation, no wheezes, rales or rhonchi, symmetric air entry. GI:  soft, nontender, nondistended, no masses or organomegaly. Ext:  peripheral pulses normal, no pedal edema, no clubbing or cyanosis      MS: no joint tenderness, deformity or swelling      DERM: normal coloration and turgor, no rashesor bruising.        Vitals: Patient Vitals for the past 8 hrs:   BP Temp Temp src Pulse Resp SpO2 Height Weight   22 1210 -- 98.1 °F (36.7 °C) Oral (!) 104 25 -- -- --   22 1115 (!) 140/70 -- -- [place] 110 26 98 % -- --   11/26/22 1100 136/78 -- -- (!) 111 14 -- -- --   11/26/22 1045 (!) 142/66 -- -- (!) 114 23 98 % -- --   11/26/22 1030 137/60 -- -- (!) 114 27 -- -- --   11/26/22 1015 (!) 148/69 -- -- (!) 119 19 -- -- --   11/26/22 0853 -- -- -- -- -- -- -- 160 lb 0.9 oz (72.6 kg)   11/26/22 0827 130/78 100.1 °F (37.8 °C) Oral (!) 129 18 98 % 6' 2\" (1.88 m) --          I/Os: No intake or output data in the 24 hours ending 11/26/22 1227    LABS:    Lab Results   Component Value Date/Time    CREATININE 7.4 11/26/2022 09:26 AM    BUN 41 11/26/2022 09:26 AM     11/26/2022 09:26 AM    K 5.6 11/26/2022 11:03 AM    K 5.3 10/14/2022 06:04 AM    CL 96 11/26/2022 09:26 AM    CO2 15 11/26/2022 09:26 AM     No results found for: [id number]   Lab Results   Component Value Date/Time    WBC 19.4 11/26/2022 09:26 AM    HGB 10.7 11/26/2022 09:26 AM    HCT 33.9 11/26/2022 09:26 AM    MCV 97.1 11/26/2022 09:26 AM     11/26/2022 09:26 AM      Lab Results   Component Value Date/Time    IRON 43 09/14/2022 04:27 AM    TIBC 145 09/14/2022 04:27 AM    FERRITIN 230.3 10/27/2020 05:57 AM      No results found for: Mani Rodrigues  Lab Results   Component Value Date/Time    PTH 49.8 02/01/2019 05:59 AM    CALCIUM 9.3 11/26/2022 09:26 AM    PHOS 4.2 09/16/2022 06:02 AM

## 2022-11-26 NOTE — H&P
Hospital Medicine History & Physical      PCP: No primary care provider on file. Date of Admission: 11/26/2022    Date of Service: Pt seen/examined on 11/26/22and Admitted to Inpatient with expected LOS greater than two midnights due to medical therapy. Chief Complaint: Chills rigors, fevers, altered mental status      History Of Present Illness:      65 y.o. male with ESRD on hemodialysis, hypertension, hyperlipidemia presented to emergency room with fevers, chills and rigors  for the past 2 days. This was associated with cough, sneezing. His blood sugars have been running high and states that he did not take his insulin. He was also lethargic and diaphoretic  Upon arrival to the ED, his blood sugar was 503, he appeared lethargic. BP was 130/78, pulse 129, temperature 100.1, oxygen saturation 98% on room air. CT chest showed a right lobe infiltrate. .  Rapid influenza and COVID were negative. .. He had an anion gap metabolic acidosis but negative serum ketones . Marilu [place]   Serum lactate was elevated at 3.2, WBC 19 K       Past Medical History:          Diagnosis Date    Cardiomyopathy [facility])     CHF (congestive heart failure) (HCC)     CVA (cerebral infarction) 5/2015    Diabetes mellitus ([facility]     Foot ulcer, left ([facility] 1/14/2016    Gastroparesis     Hemodialysis patient ([facility]     Hypercholesteremia     Hypertension     Kidney disease     Unspecified cerebral artery occlusion with cerebral infarction     5/15, 7/15 LEFT SIDE WEAKNESS       Past Surgical History:          Procedure Laterality Date    CATHETER INSERTION N/A 10/17/2022    TUNNEL CATHETER PLACEMENT performed by Armani Long MD at [address] [facility] N/A 5/17/2021    COLONOSCOPY POLYPECTOMY SNARE/COLD BIOPSY performed by Roly Yañez MD at [address] [place] [address] N/A 10/17/2022    PERITONEAL DIALYSIS CATHETER REMOVAL performed by Armani Long MD at [address] [place] [address] N/A 10/29/2020 PLACEMENT OF A TUNNELED DIALYSIS CATHETER WITH FLEURO AND ULTRASOUND performed by Barney Hernandez MD at [address] [facility] N/A 10/29/2020    LAPAROSCOPIC PERITONEAL DIALYSIS CATHETER PLACEMENT WITH FLEDELIA AND ULTRASOUND performed by Barney Hernandez MD at [address] [facility] N/A 23/06/7849    UMBILICAL HERNIA REPAIR performed by Barney Hernandez MD at [address] [facility] N/A 4/26/2021    ESOPHAGOGASTRODUODENOSCOPY performed by Jadon Luu MD at [address] [facility] 9/13/2022    EGD DIAGNOSTIC ONLY performed by Gigi Hernández MD at [address] [facility]       Medications Prior to Admission:      Prior to Admission medications    Medication Sig Start Date End Date Taking?  Authorizing Provider   isosorbide mononitrate (IMDUR) 60 MG extended release tablet Take 1 tablet by mouth once daily 11/1/22   ROQUE Neil CNP   cloNIDine (CATAPRES) 0.1 MG tablet Take 1 tablet by mouth 3 times daily 10/18/22 11/17/22  Junito Pascal MD   insulin detemir (LEVEMIR FLEXTOUCH) 100 UNIT/ML injection pen Inject 10 Units into the skin nightly 10/18/22 11/17/22  Junito Pascal MD   spironolactone (ALDACTONE) 100 MG tablet Take 100 mg by mouth daily    Historical Provider, MD   sucralfate (CARAFATE) 1 GM tablet Take 1 tablet by mouth 4 times daily 9/16/22   Reymundo Tong MD   pantoprazole (PROTONIX) 40 MG tablet Take 1 tablet by mouth 2 times daily (before meals) 9/16/22   Reymundo Tong MD   buPROPion [facility] SR) 150 MG extended release tablet Take 150 mg by mouth 2 times daily    Historical Provider, MD   Sucroferric Oxyhydroxide (VELPHORO) 500 MG CHEW Take 1 tablet by mouth 3 times daily (with meals) 6/23/22   Historical Provider, MD   Insulin Pen Needle ([employer] PEN NEEDLES) 31G X 6 MM MISC 1 each by Does not apply route daily 8/19/22   ROQUE Neil CNP   insulin aspart ([place]) 100 UNIT/ML injection pen Inject 3 Units into the skin 3 times daily (before meals) 8/19/22   ROQUE Llanes CNP   atorvastatin (LIPITOR) 40 MG tablet TAKE 1 TABLET BY MOUTH ONCE DAILY NIGHTLY 8/4/22   ROQUE Llanes CNP   gabapentin (NEURONTIN) 300 MG capsule TAKE 1 CAPSULE BY MOUTH THREE TIMES DAILY 8/1/22 10/1/22  ROQUE Llanes CNP   Methoxy PEG-Epoetin Beta (MIRCERA IJ) Inject 75 mcg into the skin 5/10/21   Historical Provider, MD   calcitRIOL (ROCALTROL) 0.5 MCG capsule Take 0.5 mcg by mouth daily 4/5/22   Historical Provider, MD   losartan (COZAAR) 100 MG tablet TAKE 1 TABLET BY MOUTH ONCE DAILY 4/29/22   Historical Provider, MD   iron sucrose (VENOFER) 20 MG/ML injection 200 mg daily 1/12/21 9/16/22  Historical Provider, MD   hydrALAZINE (APRESOLINE) 100 MG tablet TAKE 2 TABLETS BY MOUTH THREE TIMES DAILY  Patient taking differently: Take 100 mg by mouth 3 times daily TAKE 2 TABLETS BY MOUTH THREE TIMES DAILY 4/6/22   ROQUE Llanes CNP   NIFEdipine (PROCARDIA XL) 90 MG extended release tablet Take 1 tablet by mouth 2 times daily 2/11/22   Jacob Page MD   sodium bicarbonate 650 MG tablet Take 650 mg by mouth daily    Historical Provider, MD DELCID ASPIRIN ADULT LOW DOSE 81 MG EC tablet Take 1 tablet by mouth once daily 9/24/21   ROQUE Llanes CNP   metoprolol tartrate (LOPRESSOR) 50 MG tablet Take twice daily  Patient taking differently: Take 75 mg by mouth 2 times daily Take twice daily 8/2/21   ROQUE Llanes CNP       Allergies:  Doxazosin, Cefepime, and Coconut flavor    Social History:      The patient currently lives     TOBACCO:   reports that he has been smoking cigars and cigarettes. He started smoking about 43 years ago. He has never used smokeless tobacco.  ETOH:   reports current alcohol use. Family History:      Reviewed in detail and negative for DM, CAD, Cancer, CVA. Positive as follows:         Adopted: Yes       REVIEW OF SYSTEMS:   Pertinent positives as noted in the HPI. All other systems reviewed and negative. PHYSICAL EXAM:    /60   Pulse (!) 103   Temp 98.1 °F (36.7 °C) (Oral)   Resp 23   Ht 6' 2\" (1.88 m)   Wt 160 lb 0.9 oz (72.6 kg)   SpO2 99%   BMI 20.55 kg/m²     General appearance:  No apparent distress, appears stated age and cooperative. HEENT:  Normal cephalic, atraumatic without obvious deformity. Pupils equal, round, and reactive to light. Extra ocular muscles intact. Conjunctivae/corneas clear. Neck: Supple, with full range of motion. No jugular venous distention. Trachea midline. Respiratory:  Normal respiratory effort. Clear to auscultation, bilaterally without Rales/Wheezes/Rhonchi. Cardiovascular:  Regular rate and rhythm with normal S1/S2 without murmurs, rubs or gallops. Abdomen: Soft, non-tender, non-distended with normal bowel sounds. Musculoskeletal:  No clubbing, cyanosis or edema bilaterally. Full range of motion without deformity. Skin: Skin color, texture, turgor normal.  No rashes or lesions. Neurologic:  Neurovascularly intact without any focal sensory/motor deficits. Cranial nerves: II-XII intact, grossly non-focal.  Psychiatric:  Alert and oriented, thought content appropriate, normal insight  Capillary Refill: Brisk,< 3 seconds   Peripheral Pulses: +2 palpable, equal bilaterally       CXR:  I have reviewed the CXR with the following interpretation:   EKG:  I have reviewed the EKG with the following interpretation:     Labs:     Recent Labs     11/26/22 0926   WBC 19.4*   HGB 10.7*   HCT 33.9*   *     Recent Labs     11/26/22 0926 11/26/22  1103   *  --    K 6.5* 5.6*   CL 96*  --    CO2 15*  --    BUN 41*  --    CREATININE 7.4*  --    CALCIUM 9.3  --      Recent Labs     11/26/22 0926   AST 12*   ALT 13   BILITOT <0.2   ALKPHOS 104     No results for input(s): INR in the last 72 hours.   No results for input(s): [place] in the last 72 hours.    Urinalysis:      Lab Results   Component Value Date/Time    NITRU Negative 10/12/2022 10:45 AM    WBCUA 1 10/12/2022 10:45 AM    BACTERIA None Seen 10/12/2022 10:45 AM    RBCUA 1 10/12/2022 10:45 AM    BLOODU Negative 10/12/2022 10:45 AM    SPECGRAV 1.011 10/12/2022 10:45 AM    GLUCOSEU Negative 10/12/2022 10:45 AM    GLUCOSEU 250 12/14/2010 02:50 PM         ASSESSMENT:    -Right lower lobe bacterial pneumonia. .  Manage as HCAP. Savannah Ferro Continue Zosyn and vancomycin. .  Follow-up on culture data    -Sepsis due to pneumonia-patient with fever, sinus tachycardia, leukocytosis, lactic acidosis--continue antibiotics as above, follow-up on cultures    -ESRD--on hemodialysis TTS--continue management per nephrology    -Anion Metabolic acidosis due to CKD/lactic acidosis--ketones negative--    -Hyperkalemia--having hemodialysis--monitor    -Hyponatremia--corrected sodium within normal range    -DM type II uncontrolled severe hyperglycemia-patient noncompliance-missed insulin doses--obtain hemoglobin A1c initiate basal bolus regimen    -Essential hypertension---stable-Home meds include nifedipine, hydralazine, losartan, Lopressor, Aldactone    -Hyperlipidemia-continue statin    -Dizziness /vertigo--reports symptoms present for 2 weeks--CT head negative--will obtain PT and OT consults:  MRI brain once stable    -Acute metabolic encephalopathy--lethargic on presentation to the ED--improved        DVT Prophylaxis: Subcu heparin  Diet: Diabetic/renal  Code Status: Full code           Barbara Woods MD    Thank you No primary care provider on file. for the opportunity to be involved in this patient's care. If you have any questions or concerns please feel free to contact me at [phone].

## 2022-11-26 NOTE — ED PROVIDER NOTES
I have personally performed a face to face diagnostic evaluation on this patient. I have fully participated in the care of this patient I personally saw the patient and performed a substantive portion of the visit including all aspects of the medical decision making. I have reviewed and agree with all pertinent clinical information including history, physical exam, diagnostic tests, and the plan. HPI: Felicia Hyman presented with elevated high blood sugar and slightly altered mental status. Patient complaining of chills, sneezing, coughing, fever gradual onset constant worsening for the last 2 days. No known sick contacts. Patient has a risk factor of a history of polycystic kidney disease, prior peritoneal dialysis now on Vas-Cath dialysis. Patient's most recent dialysis session was on Wednesday due to Thanksgiving holiday. See YVAN note for further details. Chief Complaint   Patient presents with    Hyperglycemia     Pt is experiencing high blood sugar. BS on arrival is 503. Pt is shaking & lethargic & diaphoretic. Pt also complains of flu-like s/s chills, sneezing, coughing, fever. Symptoms x2 days. Review of Systems: See YVAN note  Vital Signs: /78   Pulse (!) 129   Temp 100.1 °F (37.8 °C) (Oral)   Resp 18   Ht 6' 2\" (1.88 m)   Wt 160 lb 0.9 oz (72.6 kg)   SpO2 98%   BMI 20.55 kg/m²     Alert 65 y.o. male who does not appear toxic or acutely ill  HENT: Atraumatic, oral mucosa moist  Neck: Grossly normal ROM  Chest/Lungs: respiratory effort normal, tachycardia, bibasilar Rales  Abdomen: Nontender  Musculoskeletal: Grossly normal ROM  Skin: No palor, mild diaphoresis    Medical Decision Making and Plan:  Pertinent Labs & Imaging studies reviewed. (See YVAN chart for details)  I agree with YVAN assessment and plan. Patient is febrile and tachycardic. History of ESRD on hemodialysis, CHF. Will obtain broad infectious work-up and laboratory work-up.   We will ensure the patient does not have DKA or HHS. Will obtain urine studies, COVID and flu, chest x-ray. Patient may need repeat dialysis session. Patient also has history of spontaneous bacterial peritonitis. He has no abdominal reproducible tenderness at this time. Will reeval closely and disposition accordingly. See YVAN note for further details. X-ray concerning for fluid overload and/or specifically pneumonia. Patient with elevated white blood cell count with significant respiratory symptoms fever and tachycardia concerning for sepsis. Will give patient antibiotics. We will also consult nephrology as patient will likely require dialysis. Will obtain CT chest with IV contrast per radiology recommendation. On reevaluation patient still has no abdominal tenderness. Will reeval closely and plan on admission. See YVAN note for further details.     I personally saw the patient and independently provided 15 minutes of nonconcurrent critical care out of the total shared critical care time provided    EKG: All EKG's are interpreted by the Emergency Department Physician who either signs or Co-signs this chart in the absence of a cardiologist.    EKG Interpretation    Interpreted by emergency department physician    Rhythm: sinus tachycardia  Rate: 110-120  Axis: normal  Ectopy: none  Conduction: normal  ST Segments: nonspecific changes  T Waves: non specific changes  Q Waves: none    Clinical Impression: Sinus tachycardia with nonspecific ST and T wave changes which are unchanged from previous EKG dated February 11, 2022    MD Lo Cervantes MD  11/26/22 [id number]

## 2022-11-26 NOTE — ED NOTES
Pt had an instance of emesis approximately 300mL, dark pink, white flecks from Tylenol. Provider notified.      Ann Kaplan RN  11/26/22 [id number]

## 2022-11-27 LAB
ANION GAP SERPL CALCULATED.3IONS-SCNC: 14 MMOL/L (ref 3–16)
ANISOCYTOSIS: ABNORMAL
BASOPHILS ABSOLUTE: 0 K/UL (ref 0–0.2)
BASOPHILS RELATIVE PERCENT: 0.1 %
BUN BLDV-MCNC: 29 MG/DL (ref 7–20)
CALCIUM SERPL-MCNC: 9.1 MG/DL (ref 8.3–10.6)
CHLORIDE BLD-SCNC: 95 MMOL/L (ref 99–110)
CO2: 24 MMOL/L (ref 21–32)
CREAT SERPL-MCNC: 5.2 MG/DL (ref 0.9–1.3)
EKG ATRIAL RATE: 119 BPM
EKG DIAGNOSIS: NORMAL
EKG P AXIS: 56 DEGREES
EKG P-R INTERVAL: 144 MS
EKG Q-T INTERVAL: 298 MS
EKG QRS DURATION: 82 MS
EKG QTC CALCULATION (BAZETT): 419 MS
EKG R AXIS: 49 DEGREES
EKG T AXIS: 99 DEGREES
EKG VENTRICULAR RATE: 119 BPM
EOSINOPHILS ABSOLUTE: 0 K/UL (ref 0–0.6)
EOSINOPHILS RELATIVE PERCENT: 0.1 %
ESTIMATED AVERAGE GLUCOSE: 159.9 MG/DL
GFR SERPL CREATININE-BSD FRML MDRD: 12 ML/MIN/{1.73_M2}
GLUCOSE BLD-MCNC: 151 MG/DL (ref 70–99)
GLUCOSE BLD-MCNC: 159 MG/DL (ref 70–99)
GLUCOSE BLD-MCNC: 193 MG/DL (ref 70–99)
GLUCOSE BLD-MCNC: 212 MG/DL (ref 70–99)
GLUCOSE BLD-MCNC: 251 MG/DL (ref 70–99)
HBA1C MFR BLD: 7.2 %
HCT VFR BLD CALC: 29.2 % (ref 40.5–52.5)
HEMOGLOBIN: 9.4 G/DL (ref 13.5–17.5)
LYMPHOCYTES ABSOLUTE: 0.6 K/UL (ref 1–5.1)
LYMPHOCYTES RELATIVE PERCENT: 3.1 %
MCH RBC QN AUTO: 30.4 PG (ref 26–34)
MCHC RBC AUTO-ENTMCNC: 32.4 G/DL (ref 31–36)
MCV RBC AUTO: 94 FL (ref 80–100)
MONOCYTES ABSOLUTE: 1.3 K/UL (ref 0–1.3)
MONOCYTES RELATIVE PERCENT: 6.7 %
MRSA SCREEN RT-PCR: ABNORMAL
NEUTROPHILS ABSOLUTE: 16.9 K/UL (ref 1.7–7.7)
NEUTROPHILS RELATIVE PERCENT: 90 %
ORGANISM: ABNORMAL
PDW BLD-RTO: 15.3 % (ref 12.4–15.4)
PERFORMED ON: ABNORMAL
PLATELET # BLD: 95 K/UL (ref 135–450)
PLATELET SLIDE REVIEW: ABNORMAL
PMV BLD AUTO: 10.6 FL (ref 5–10.5)
POTASSIUM REFLEX MAGNESIUM: 4.7 MMOL/L (ref 3.5–5.1)
RBC # BLD: 3.1 M/UL (ref 4.2–5.9)
SLIDE REVIEW: ABNORMAL
SODIUM BLD-SCNC: 133 MMOL/L (ref 136–145)
VANCOMYCIN RANDOM: 14.2 UG/ML
VANCOMYCIN RANDOM: 15.4 UG/ML
WBC # BLD: 18.8 K/UL (ref 4–11)

## 2022-11-27 PROCEDURE — 2580000003 HC RX 258: Performed by: INTERNAL MEDICINE

## 2022-11-27 PROCEDURE — 1200000000 HC SEMI PRIVATE

## 2022-11-27 PROCEDURE — 99223 1ST HOSP IP/OBS HIGH 75: CPT | Performed by: INTERNAL MEDICINE

## 2022-11-27 PROCEDURE — 6360000002 HC RX W HCPCS: Performed by: INTERNAL MEDICINE

## 2022-11-27 PROCEDURE — 85025 COMPLETE CBC W/AUTO DIFF WBC: CPT

## 2022-11-27 PROCEDURE — 94760 N-INVAS EAR/PLS OXIMETRY 1: CPT

## 2022-11-27 PROCEDURE — 36415 COLL VENOUS BLD VENIPUNCTURE: CPT

## 2022-11-27 PROCEDURE — 83036 HEMOGLOBIN GLYCOSYLATED A1C: CPT

## 2022-11-27 PROCEDURE — 80048 BASIC METABOLIC PNL TOTAL CA: CPT

## 2022-11-27 PROCEDURE — 9990000010 HC NO CHARGE VISIT

## 2022-11-27 PROCEDURE — 93010 ELECTROCARDIOGRAM REPORT: CPT | Performed by: INTERNAL MEDICINE

## 2022-11-27 PROCEDURE — 6360000002 HC RX W HCPCS: Performed by: HOSPITALIST

## 2022-11-27 PROCEDURE — 6370000000 HC RX 637 (ALT 250 FOR IP): Performed by: HOSPITALIST

## 2022-11-27 PROCEDURE — 87641 MR-STAPH DNA AMP PROBE: CPT

## 2022-11-27 PROCEDURE — 6370000000 HC RX 637 (ALT 250 FOR IP): Performed by: PHYSICIAN ASSISTANT

## 2022-11-27 PROCEDURE — 2580000003 HC RX 258: Performed by: HOSPITALIST

## 2022-11-27 PROCEDURE — 80202 ASSAY OF VANCOMYCIN: CPT

## 2022-11-27 RX ORDER — SODIUM BICARBONATE 650 MG/1
650 TABLET ORAL
Status: DISCONTINUED | OUTPATIENT
Start: 2022-11-27 | End: 2022-12-07 | Stop reason: HOSPADM

## 2022-11-27 RX ADMIN — SODIUM CHLORIDE, PRESERVATIVE FREE 10 ML: 5 INJECTION INTRAVENOUS at 21:39

## 2022-11-27 RX ADMIN — ATORVASTATIN CALCIUM 40 MG: 40 TABLET, FILM COATED ORAL at 21:35

## 2022-11-27 RX ADMIN — CALCITRIOL 0.5 MCG: 0.25 CAPSULE ORAL at 09:02

## 2022-11-27 RX ADMIN — SUCRALFATE 1 G: 1 TABLET ORAL at 11:38

## 2022-11-27 RX ADMIN — SODIUM BICARBONATE 650 MG: 650 TABLET ORAL at 18:58

## 2022-11-27 RX ADMIN — ISOSORBIDE MONONITRATE 60 MG: 60 TABLET, EXTENDED RELEASE ORAL at 09:02

## 2022-11-27 RX ADMIN — ONDANSETRON 4 MG: 4 TABLET, ORALLY DISINTEGRATING ORAL at 21:35

## 2022-11-27 RX ADMIN — SODIUM CHLORIDE: 9 INJECTION, SOLUTION INTRAVENOUS at 11:39

## 2022-11-27 RX ADMIN — INSULIN LISPRO 4 UNITS: 100 INJECTION, SOLUTION INTRAVENOUS; SUBCUTANEOUS at 14:25

## 2022-11-27 RX ADMIN — METOPROLOL TARTRATE 75 MG: 50 TABLET, FILM COATED ORAL at 09:03

## 2022-11-27 RX ADMIN — HYDRALAZINE HYDROCHLORIDE 100 MG: 50 TABLET, FILM COATED ORAL at 14:24

## 2022-11-27 RX ADMIN — SUCRALFATE 1 G: 1 TABLET ORAL at 18:28

## 2022-11-27 RX ADMIN — HEPARIN SODIUM 5000 UNITS: 5000 INJECTION INTRAVENOUS; SUBCUTANEOUS at 05:27

## 2022-11-27 RX ADMIN — HEPARIN SODIUM 5000 UNITS: 5000 INJECTION INTRAVENOUS; SUBCUTANEOUS at 14:24

## 2022-11-27 RX ADMIN — INSULIN LISPRO 4 UNITS: 100 INJECTION, SOLUTION INTRAVENOUS; SUBCUTANEOUS at 09:11

## 2022-11-27 RX ADMIN — HYDRALAZINE HYDROCHLORIDE 100 MG: 50 TABLET, FILM COATED ORAL at 21:36

## 2022-11-27 RX ADMIN — HEPARIN SODIUM 5000 UNITS: 5000 INJECTION INTRAVENOUS; SUBCUTANEOUS at 21:40

## 2022-11-27 RX ADMIN — INSULIN LISPRO 4 UNITS: 100 INJECTION, SOLUTION INTRAVENOUS; SUBCUTANEOUS at 18:29

## 2022-11-27 RX ADMIN — SODIUM CHLORIDE: 9 INJECTION, SOLUTION INTRAVENOUS at 10:12

## 2022-11-27 RX ADMIN — SUCRALFATE 1 G: 1 TABLET ORAL at 05:27

## 2022-11-27 RX ADMIN — CEFAZOLIN 1000 MG: 1 INJECTION, POWDER, FOR SOLUTION INTRAMUSCULAR; INTRAVENOUS at 11:39

## 2022-11-27 RX ADMIN — HYDRALAZINE HYDROCHLORIDE 100 MG: 50 TABLET, FILM COATED ORAL at 09:02

## 2022-11-27 RX ADMIN — MECLIZINE 12.5 MG: 12.5 TABLET ORAL at 21:36

## 2022-11-27 RX ADMIN — METOPROLOL TARTRATE 75 MG: 50 TABLET, FILM COATED ORAL at 21:36

## 2022-11-27 RX ADMIN — BUPROPION HYDROCHLORIDE 150 MG: 150 TABLET, EXTENDED RELEASE ORAL at 09:03

## 2022-11-27 RX ADMIN — ACETAMINOPHEN 650 MG: 325 TABLET ORAL at 21:36

## 2022-11-27 RX ADMIN — PIPERACILLIN AND TAZOBACTAM 3375 MG: 3; .375 INJECTION, POWDER, FOR SOLUTION INTRAVENOUS at 10:13

## 2022-11-27 RX ADMIN — ONDANSETRON 4 MG: 4 TABLET, ORALLY DISINTEGRATING ORAL at 05:33

## 2022-11-27 RX ADMIN — SUCRALFATE 1 G: 1 TABLET ORAL at 21:35

## 2022-11-27 RX ADMIN — MECLIZINE 12.5 MG: 12.5 TABLET ORAL at 05:33

## 2022-11-27 RX ADMIN — SODIUM BICARBONATE 650 MG: 650 TABLET ORAL at 09:02

## 2022-11-27 RX ADMIN — NIFEDIPINE 90 MG: 90 TABLET, EXTENDED RELEASE ORAL at 09:02

## 2022-11-27 RX ADMIN — INSULIN GLARGINE 18 UNITS: 100 INJECTION, SOLUTION SUBCUTANEOUS at 21:40

## 2022-11-27 RX ADMIN — PANTOPRAZOLE SODIUM 40 MG: 40 TABLET, DELAYED RELEASE ORAL at 18:28

## 2022-11-27 RX ADMIN — PANTOPRAZOLE SODIUM 40 MG: 40 TABLET, DELAYED RELEASE ORAL at 05:27

## 2022-11-27 RX ADMIN — NIFEDIPINE 90 MG: 90 TABLET, EXTENDED RELEASE ORAL at 21:36

## 2022-11-27 RX ADMIN — ACETAMINOPHEN 650 MG: 325 TABLET ORAL at 03:10

## 2022-11-27 ASSESSMENT — PAIN SCALES - GENERAL
PAINLEVEL_OUTOF10: 0
PAINLEVEL_OUTOF10: 0
PAINLEVEL_OUTOF10: 5

## 2022-11-27 ASSESSMENT — PAIN DESCRIPTION - DESCRIPTORS: DESCRIPTORS: ACHING;SORE

## 2022-11-27 ASSESSMENT — PAIN DESCRIPTION - LOCATION: LOCATION: GENERALIZED

## 2022-11-27 NOTE — CONSULTS
Infectious Diseases Inpatient Consult Note      Reason for Consult:  Sepsis, fevers Staph aureus Bacteremia, ESRD on HD     Requesting Physician:        Primary Care Physician:  No primary care provider on file. History Obtained From:  [facility] and Patient     CHIEF COMPLAINT:     Chief Complaint   Patient presents with    Hyperglycemia     Pt is experiencing high blood sugar. BS on arrival is 503. Pt is shaking & lethargic & diaphoretic. Pt also complains of flu-like s/s chills, sneezing, coughing, fever. Symptoms x2 days. HISTORY OF PRESENT ILLNESS:  65 y.o. man with a history of end-stage renal disease on hemodialysis, CHF, CVA, diabetes, hypercholesterolemia, hypertension, with some left weakness admitted to the hospital secondary to fever chills not. He felt cold and flulike symptoms ongoing for the past 4 to 5 days. T-max 100.3 since admission, labs indicate procalcitonin elevated 92.7 potassium 5 6 blood glucose of 500, WBC elevated 19.4 with left shift hemoglobin 10.7 blood cultures no reported to be Staph aureus 4/4 bottles MSSA, tested negative for rapid flu and COVID-19, CT head negative CT chest indicating focal consolidation right lower lobe, also pericardial effusion noted. We are consulted for IV abx recommendation.        Past Medical History:    Past Medical History:   Diagnosis Date    Cardiomyopathy [facility])     CHF (congestive heart failure) (HCC)     CVA (cerebral infarction) 5/2015    Diabetes mellitus ([facility]     Foot ulcer, left ([facility] 1/14/2016    Gastroparesis     Hemodialysis patient ([facility]     Hypercholesteremia     Hypertension     Kidney disease     Unspecified cerebral artery occlusion with cerebral infarction     5/15, 7/15 LEFT SIDE WEAKNESS       Past Surgical History:    Past Surgical History:   Procedure Laterality Date    CATHETER INSERTION N/A 10/17/2022    TUNNEL CATHETER PLACEMENT performed by Ana Newell MD at [address] [place] [address] N/A 5/17/2021 COLONOSCOPY POLYPECTOMY SNARE/COLD BIOPSY performed by Haylee Mckeon MD at [address] [place] [address] N/A 10/17/2022    PERITONEAL DIALYSIS CATHETER REMOVAL performed by Ja Serrano MD at [address] [facility] N/A 10/29/2020    PLACEMENT OF A TUNNELED DIALYSIS CATHETER WITH FLEURO AND ULTRASOUND performed by Ja Serrano MD at [address] N/A 10/29/2020    LAPAROSCOPIC PERITONEAL DIALYSIS CATHETER PLACEMENT WITH FLEURO AND ULTRASOUND performed by Ja Serrano MD at [address]  N/A 02/97/9748    UMBILICAL HERNIA REPAIR performed by Ja Serrano MD at [place] [address] 4/26/2021    ESOPHAGOGASTRODUODENOSCOPY performed by Haylee Mckeon MD at [address] [facility] [address] 9/13/2022    EGD DIAGNOSTIC ONLY performed by Rosario Johnson MD at [address] [facility]       Current Medications:    No outpatient medications have been marked as taking for the 11/26/22 encounter [facility] Encounter).        Allergies:  Doxazosin, Cefepime, and Coconut flavor    Immunizations :   Immunization History   Administered Date(s) Administered    COVID-19, PFIZER GRAY top, DO NOT Dilute, (age 15 y+), IM, 30 mcg/0.3 mL 06/04/2022    COVID-19, PFIZER PURPLE top, DILUTE for use, (age 15 y+), 30mcg/0.3mL 06/23/2021, 07/19/2021    Hepatitis A Adult (Havrix, Vaqta) 03/10/2021, 09/23/2021    Hepatitis B 05/06/2022    Hepatitis B Adult (Heplisav-b) 05/06/2022    Hepatitis B Adult (Recombivax HB) 11/05/2020    Hepatitis B vaccine 11/05/2020    Influenza Vaccine, unspecified formulation 12/05/2013    Influenza Virus Vaccine 12/05/2013, 12/04/2015, 11/05/2020, 10/11/2021    Influenza, FLUARIX, FLULAVAL, FLUZONE (age 10 mo+) AND AFLURIA, (age 1 y+), PF, 0.5mL 10/15/2018, 11/05/2020    Influenza, Triv, 3 Years and older, IM, PF (Afluria 5yrs and older) 10/11/2021 Pneumococcal Conjugate 13-valent ([id number]) 11/10/2020    Pneumococcal Conjugate Vaccine 02/13/2015    Pneumococcal Polysaccharide ([id number]) 11/02/2009, 03/10/2021    Tdap (Boostrix, Adacel) 08/27/2021    Zoster Recombinant (Shingrix) 03/10/2021, 08/27/2021         Social History:     Social History     Tobacco Use    Smoking status: Some Days     Packs/day: 0.00     Years: 30.00     Pack years: 0.00     Types: Cigars, Cigarettes     Start date: 1/3/1979     Last attempt to quit: 1/16/2019     Years since quitting: 3.8    Smokeless tobacco: Never    Tobacco comments:     3 black and milds a week   Vaping Use    Vaping Use: Never used   Substance Use Topics    Alcohol use: Yes     Comment: occasional    Drug use: Yes     Types: Marijuana Garrel Calender)     Comment: previously used cocaine, stopped May 2015 LAST USED MARIJUANA-NOVEMBER 2020     Social History     Tobacco Use   Smoking Status Some Days    Packs/day: 0.00    Years: 30.00    Pack years: 0.00    Types: Cigars, Cigarettes    Start date: 1/3/1979    Last attempt to quit: 1/16/2019    Years since quitting: 3.8   Smokeless Tobacco Never   Tobacco Comments    3 black and milds a week      Family History   Adopted: Yes          REVIEW OF SYSTEMS:      Constitutional:   fevers,+  chills + , night sweats  Eyes:  negative for blurred vision, eye discharge, visual disturbance   HEENT:  negative for hearing loss, ear drainage,nasal congestion  Respiratory:    cough,+  shortness of breath or hemoptysis   Cardiovascular:  negative for chest pain, palpitations, syncope  Gastrointestinal:  negative for nausea, vomiting, diarrhea, constipation, abdominal pain  Genitourinary:  negative for frequency, dysuria, urinary incontinence, hematuria  Hematologic/Lymphatic:  negative for easy bruising, bleeding and lymphadenopathy  Allergic/Immunologic:  negative for recurrent infections, angioedema, anaphylaxis   Endocrine:  negative for weight changes, polyuria, polydipsia and polyphagia  Musculoskeletal:  negative for joint  pain, swelling, decreased range of motion  Integumentary: No rashes, skin lesions  Neurological:  negative for headaches, slurred speech, unilateral weakness  Psychiatric: negative for hallucinations,confusion,agitation.      PHYSICAL EXAM:      Vitals:  t MAX  100.3  /71   Pulse 99   Temp 100.3 °F (37.9 °C) (Oral)   Resp 18   Ht 6' 2\" (1.88 m)   Wt 144 lb 13.5 oz (65.7 kg)   SpO2 96%   BMI 18.60 kg/m²     General Appearance: alert,in some  acute distress,++  pallor, no icterus cachexia + chronic ill appearing man +    Skin: warm and dry, no rash or erythema  Head: normocephalic and atraumatic  Eyes: pupils equal, round, and reactive to light, conjunctivae normal  ENT: tympanic membrane, external ear and ear canal normal bilaterally, nose without deformity, nasal mucosa and turbinates normal without polyps  Neck: supple and non-tender without mass, no thyromegaly  no cervical lymphadenopathy  Pulmonary/Chest: clear to auscultation bilaterally- no wheezes, rales or rhonchi, normal air movement, no respiratory distress  Cardiovascular: normal rate, regular rhythm, normal S1 and S2, no murmurs, rubs, clicks, or gallops, no carotid bruits  Abdomen: soft, non-tender, non-distended, normal bowel sounds, no masses or organomegaly  Extremities: no cyanosis, clubbing or edema  Musculoskeletal: normal range of motion, no joint swelling, deformity or tenderness  Integumentary: No rashes, no abnormal skin lesions, no petechiae  Neurologic: reflexes normal and symmetric, no cranial nerve deficit  Psych:  Orientation, sensorium, mood normal   Lines: HD line +     DATA:    CBC:   Lab Results   Component Value Date    WBC 19.4 (H) 11/26/2022    HGB 10.7 (L) 11/26/2022    HCT 33.9 (L) 11/26/2022    MCV 97.1 11/26/2022     (L) 11/26/2022     RENAL:   Lab Results   Component Value Date    CREATININE 7.4 (HH) 11/26/2022    BUN 41 (H) 11/26/2022     (L) 11/26/2022    K 5.6 (H) 11/26/2022    CL 96 (L) 11/26/2022    CO2 15 (L) 11/26/2022     SED RATE:   Lab Results   Component Value Date/Time    SEDRATE 64 10/26/2020 02:14 PM     CK: No results found for: CKTOTAL  CRP:   Lab Results   Component Value Date/Time    CRP 2.9 10/26/2020 02:14 PM     Hepatic Function Panel:   Lab Results   Component Value Date/Time    ALKPHOS 104 11/26/2022 09:26 AM    ALT 13 11/26/2022 09:26 AM    AST 12 11/26/2022 09:26 AM    PROT 8.0 11/26/2022 09:26 AM    BILITOT <0.2 11/26/2022 09:26 AM    BILIDIR <0.2 10/11/2022 02:41 PM    IBILI see below 10/11/2022 02:41 PM    LABALBU 3.5 11/26/2022 09:26 AM     UA:  Lab Results   Component Value Date/Time    COLORU Yellow 11/26/2022 01:32 PM    CLARITYU Clear 11/26/2022 01:32 PM    GLUCOSEU >=1000 11/26/2022 01:32 PM    GLUCOSEU 250 12/14/2010 02:50 PM    BILIRUBINUR Negative 11/26/2022 01:32 PM    BILIRUBINUR NEGATIVE 12/14/2010 02:50 PM    KETUA Negative 11/26/2022 01:32 PM    SPECGRAV 1.019 11/26/2022 01:32 PM    BLOODU TRACE 11/26/2022 01:32 PM    PHUR 7.5 11/26/2022 01:32 PM    PROTEINU 300 11/26/2022 01:32 PM    UROBILINOGEN 0.2 11/26/2022 01:32 PM    NITRU Negative 11/26/2022 01:32 PM    LEUKOCYTESUR Negative 11/26/2022 01:32 PM    LABMICR YES 11/26/2022 01:32 PM    URINETYPE Cleancatch 11/26/2022 01:32 PM      Urine Microscopic:   Lab Results   Component Value Date/Time    BACTERIA None Seen 11/26/2022 01:32 PM    COMU see below 05/05/2021 05:36 PM    HYALCAST 0 11/26/2022 01:32 PM    WBCUA 3 11/26/2022 01:32 PM    RBCUA 9 11/26/2022 01:32 PM    EPIU 0 11/26/2022 01:32 PM     Urine Reflex to Culture:   Lab Results   Component Value Date/Time    URRFLXCULT Not Indicated 11/26/2022 01:32 PM       Procal  92.71     Lactic acid  3.2     WBC  19.4       MICRO: cultures reviewed and updated by me     Procedure Component Value Units Date/Time   MRSA DNA Probe, Nasal [[id number]] Collected: 11/27/22 0530   Order Status: Sent Specimen: Nares Microbiology results called to and read back by CHANDU Sebastian,   11/26/2022 22:12, by [facility]   Rapid influenza A/B antigens [[id number]] Collected: 11/26/22 0926   Order Status: Completed Specimen: Nares Updated: 11/26/22 1000    Rapid Influenza A Ag Negative    Rapid Influenza B Ag Negative   COVID-19, Rapid [[id number]] Collected: 11/26/22 0926   Order Status: Completed Specimen: Nasopharyngeal Swab Updated: 11/26/22 1000    SARS-CoV-2, NAAT Not Detected    Comment: Rapid NAAT:   Negative results should be treated as presumptive and,   if inconsistent with clinical signs and symptoms or necessary for   patient management, should be tested with an alternative molecular   assay. Negative results do not preclude SARS-CoV-2 infection and   should not be used as the sole basis for patient management decisions. This test has been authorized by the FDA under an Emergency Use   Authorization (EUA) for use by authorized laboratories. Fact sheet for Healthcare Providers:   http://www.titi.tosha/   Fact sheet for Patients: http://www.paulino-patty.tosha/     METHODOLOGY: Isothermal Nucleic Acid Amplification         Blood Culture:   Lab Results   Component Value Date/Time    [facility]  11/26/2022 10:10 AM     Gram stain Aerobic bottle:  Gram positive cocci in clusters  resembling Staphylococcus  Information to follow  Gram stain Anaerobic bottle:  Gram positive cocci in clusters  resembling Staphylococcus  Information to follow      [facility]  11/26/2022 10:10 AM     mecA gene not detected  See additional report for complete BCID panel.       BLOODCULT2  11/26/2022 10:10 AM     Gram stain Aerobic bottle:  Gram positive cocci in clusters  resembling Staphylococcus  Information to follow  Gram stain Anaerobic bottle:  Gram positive cocci in clusters  resembling Staphylococcus  Information to follow         Viral Culture:    Lab Results   Component Value Date/Time    COVID19 Not Detected 11/26/2022 09:26 AM     Urine Culture: No results for input(s): LABURIN in the last 72 hours. Scheduled Meds:   sodium bicarbonate  650 mg Oral TID WC    sodium chloride flush  5-40 mL IntraVENous 2 times per day    heparin (porcine)  5,000 Units SubCUTAneous 3 times per day    insulin lispro  0-8 Units SubCUTAneous TID WC    insulin lispro  0-4 Units SubCUTAneous Nightly    insulin lispro  0.05 Units/kg SubCUTAneous TID WC    piperacillin-tazobactam  3,375 mg IntraVENous Q12H    insulin glargine  0.25 Units/kg SubCUTAneous Nightly    calcitRIOL  0.5 mcg Oral Daily    atorvastatin  40 mg Oral Nightly    metoprolol tartrate  75 mg Oral BID    NIFEdipine  90 mg Oral BID    pantoprazole  40 mg Oral BID AC    sucralfate  1 g Oral 4x Daily AC & HS    hydrALAZINE  100 mg Oral TID    isosorbide mononitrate  60 mg Oral Daily    vancomycin (VANCOCIN) intermittent dosing (placeholder)   Other RX Placeholder       Continuous Infusions:   sodium chloride 10 mL/hr at 11/27/22 1012    dextrose         PRN Meds:  sodium chloride flush, sodium chloride, ondansetron **OR** ondansetron, polyethylene glycol, acetaminophen **OR** acetaminophen, glucose, dextrose bolus **OR** dextrose bolus, glucagon (rDNA), dextrose, meclizine, heparin (porcine)    Imaging:   CT CHEST PULMONARY EMBOLISM W CONTRAST   Final Result   1. Focal consolidation in the right lower lobe favoring   infectious/inflammatory process. Recommend CT of the chest in 3 months to   confirm resolution. 2. Solid subcentimeter right pulmonary nodules can be assessed during the   same time. 3. Cardiomegaly with unchanged moderate to large pericardial effusion. 4. Age-indeterminate L1 compression fracture. If there is point tenderness,   recommend nonemergent MRI of the lumbar spine. CT HEAD WO CONTRAST   Final Result   1. No acute intracranial abnormality.          XR CHEST PORTABLE   Final Result   Stable increased opacity left basilar region compared to prior studies dating   back to 10/25/2020 consistent with pericardial effusion, loculated left   basilar pleural effusion, pulmonary consolidation or mass/neoplasm. 2 mm   opacity consistent with pulmonary nodule in the right basilar region and foci   of infiltrate or subsegmental atelectasis in the mid-lower lung fields noted   bilaterally. IV contrast-enhanced chest CT suggested for further evaluation. MRI BRAIN WO CONTRAST    (Results Pending)       All pertinent images and reports for the current Hospitalization were reviewed by me.     IMPRESSION:    Patient Active Problem List   Diagnosis    Nonischemic cardiomyopathy ([address]    Cerebral infarction ([address]    Cigarette nicotine dependence without complication    Erectile dysfunction due to arterial insufficiency    Renal artery stenosis (HCC)    Chronic diastolic congestive heart failure (HCC)    H/O: CVA (cerebrovascular accident)    Major depressive disorder, recurrent episode, moderate (HCC)    Pericardial effusion    Hypertension associated with diabetes ([address]    DM type 2 with diabetic mixed hyperlipidemia ([address]    Diabetes mellitus due to underlying condition with stage 4 chronic kidney disease, with long-term current use of insulin ([address]    ESRD (end stage renal disease) on dialysis ([address]    ESRD (end stage renal disease) ([address]    GI bleed    SBP (spontaneous bacterial peritonitis) ([address]    Dialysis-associated peritonitis ([address]    Candida infection    Generalized abdominal pain    PKD (polycystic kidney disease)    Bacterial pneumonia     Sepsis  Staph aureus Bacteremia  MSSA+  ESRD on HD  HD line in place concern for Line infection   Cardiomyopathy   Pericardial effusion know to have this   WBC elevation   Procal elevation  DM+  Hyperkalemia  PKD  Fevers from above    Given the high grade Bacteremia and sepsis concern is for HD line infection - will need HD line removal and needs line Holiday and repeat Blood cx    TTE pending     Labs, Microbiology, Radiology and pertinent results from current hospitalization and care every where were reviewed by me as a part of the consultation. PLAN :  D/C IV Vancomycin   IV Cefazolin x 1 gm q 24 HRS  Repeat blood cx in AM   Recommend HD line removal   Send Tip for cx   TTE pending   Unfortunately PD catheter was infection and now having problems with HD line infections  Needs to improve skin hygiene and catheter care  -     Discussed with patient/Family and Nursing   Risk of Complications/Morbidity: High      Illness(es)/ Infection present that pose threat to bodily function. There is potential for severe exacerbation of infection/side effects of treatment. Therapy requires intensive monitoring for antimicrobial agent toxicity. Thanks for allowing me to participate in your patient's care please call me with any questions or concerns.     Dr. Darlene Orellana MD  [facility] Physician  Phone: [phone]   Fax : [phone]

## 2022-11-27 NOTE — PROGRESS NOTES
Hospitalist Progress Note      PCP: No primary care provider on file. Date of Admission: 11/26/2022    Chief Complaint:     Hospital Course:     65 y.o. male with ESRD on hemodialysis, hypertension, hyperlipidemia presented to emergency room with fevers, chills and rigors  for the past 2 days. This was associated with cough, sneezing. His blood sugars have been running high and states that he did not take his insulin. He was also lethargic and diaphoretic  Upon arrival to the ED, his blood sugar was 503, he appeared lethargic. BP was 130/78, pulse 129, temperature 100.1, oxygen saturation 98% on room air. CT chest showed a right lobe infiltrate. .  Rapid influenza and COVID were negative. .. He had an anion gap metabolic acidosis but negative serum ketones . [facility] Serum lactate was elevated at 3.2, WBC 19 K    Subjective:     Feels better this morning. No fevers, chills or rigors. Still reports intermittent dizziness/vertigo.   No nausea, vomiting or diarrhea      Medications:  Reviewed    Infusion Medications    sodium chloride 10 mL/hr at 11/27/22 1012    dextrose       Scheduled Medications    sodium chloride flush  5-40 mL IntraVENous 2 times per day    heparin (porcine)  5,000 Units SubCUTAneous 3 times per day    insulin lispro  0-8 Units SubCUTAneous TID WC    insulin lispro  0-4 Units SubCUTAneous Nightly    insulin lispro  0.05 Units/kg SubCUTAneous TID WC    piperacillin-tazobactam  3,375 mg IntraVENous Q12H    insulin glargine  0.25 Units/kg SubCUTAneous Nightly    calcitRIOL  0.5 mcg Oral Daily    buPROPion  150 mg Oral BID    atorvastatin  40 mg Oral Nightly    metoprolol tartrate  75 mg Oral BID    NIFEdipine  90 mg Oral BID    sodium bicarbonate  650 mg Oral Daily    pantoprazole  40 mg Oral BID AC    sucralfate  1 g Oral 4x Daily AC & HS    hydrALAZINE  100 mg Oral TID    isosorbide mononitrate  60 mg Oral Daily    vancomycin (VANCOCIN) intermittent dosing (placeholder)   Other RX Placeholder     PRN Meds: sodium chloride flush, sodium chloride, ondansetron **OR** ondansetron, polyethylene glycol, acetaminophen **OR** acetaminophen, glucose, dextrose bolus **OR** dextrose bolus, glucagon (rDNA), dextrose, meclizine, heparin (porcine)      Intake/Output Summary (Last 24 hours) at 11/27/2022 1013  Last data filed at 11/27/2022 0600  Gross per 24 hour   Intake 1534.74 ml   Output --   Net 1534.74 ml       Physical Exam Performed:    /71   Pulse 99   Temp 100.3 °F (37.9 °C) (Oral)   Resp 18   Ht 6' 2\" (1.88 m)   Wt 144 lb 13.5 oz (65.7 kg)   SpO2 96%   BMI 18.60 kg/m²     General appearance: No apparent distress, appears stated age and cooperative. HEENT: Pupils equal, round, and reactive to light. Conjunctivae/corneas clear. Neck: Supple, with full range of motion. No jugular venous distention. Trachea midline. Respiratory:  Normal respiratory effort. Clear to auscultation, bilaterally without Rales/Wheezes/Rhonchi. Cardiovascular: Regular rate and rhythm with normal S1/S2 without murmurs, rubs or gallops. Abdomen: Soft, non-tender, non-distended with normal bowel sounds. Musculoskeletal: No clubbing, cyanosis or edema bilaterally. Full range of motion without deformity. Skin: Skin color, texture, turgor normal.  No rashes or lesions. Neurologic:  Neurovascularly intact without any focal sensory/motor deficits.  Cranial nerves: II-XII intact, grossly non-focal.  Psychiatric: Alert and oriented, thought content appropriate, normal insight  Capillary Refill: Brisk, 3 seconds, normal   Peripheral Pulses: +2 palpable, equal bilaterally       Labs:   Recent Labs     11/26/22 0926   WBC 19.4*   HGB 10.7*   HCT 33.9*   *     Recent Labs     11/26/22 0926 11/26/22  1103   *  --    K 6.5* 5.6*   CL 96*  --    CO2 15*  --    BUN 41*  --    CREATININE 7.4*  --    CALCIUM 9.3  --      Recent Labs     11/26/22 0926   AST 12*   ALT 13   BILITOT <0.2   ALKPHOS 104     No results for input(s): INR in the last 72 hours. No results for input(s): Filiberto Graham in the last 72 hours. Urinalysis:      Lab Results   Component Value Date/Time    NITRU Negative 11/26/2022 01:32 PM    WBCUA 3 11/26/2022 01:32 PM    BACTERIA None Seen 11/26/2022 01:32 PM    RBCUA 9 11/26/2022 01:32 PM    BLOODU TRACE 11/26/2022 01:32 PM    SPECGRAV 1.019 11/26/2022 01:32 PM    GLUCOSEU >=1000 11/26/2022 01:32 PM    GLUCOSEU 250 12/14/2010 02:50 PM       Radiology:  CT CHEST PULMONARY EMBOLISM W CONTRAST   Final Result   1. Focal consolidation in the right lower lobe favoring   infectious/inflammatory process. Recommend CT of the chest in 3 months to   confirm resolution. 2. Solid subcentimeter right pulmonary nodules can be assessed during the   same time. 3. Cardiomegaly with unchanged moderate to large pericardial effusion. 4. Age-indeterminate L1 compression fracture. If there is point tenderness,   recommend nonemergent MRI of the lumbar spine. CT HEAD WO CONTRAST   Final Result   1. No acute intracranial abnormality. XR CHEST PORTABLE   Final Result   Stable increased opacity left basilar region compared to prior studies dating   back to 10/25/2020 consistent with pericardial effusion, loculated left   basilar pleural effusion, pulmonary consolidation or mass/neoplasm. 2 mm   opacity consistent with pulmonary nodule in the right basilar region and foci   of infiltrate or subsegmental atelectasis in the mid-lower lung fields noted   bilaterally. IV contrast-enhanced chest CT suggested for further evaluation.          MRI BRAIN WO CONTRAST    (Results Pending)       IP CONSULT TO NEPHROLOGY  PHARMACY TO DOSE VANCOMYCIN  PHARMACY TO DOSE VANCOMYCIN  IP CONSULT TO INFECTIOUS DISEASES  IP CONSULT TO INFECTIOUS DISEASES    Assessment/Plan:      -Staph bacteremia 2/2--await speciation--concern for tunneled dialysis catheter infection--on vancomycin /Zosyn--ID consulted, follow-up on cultures    -Right lower lobe bacterial pneumonia. . Continue Zosyn and vancomycin. .  Follow-up on culture data     -Sepsis due to pneumonia/staph bacteremia-patient with fever, sinus tachycardia, leukocytosis, lactic acidosis--continue antibiotics as above, follow-up on cultures     -ESRD--on hemodialysis TTS--continue management per nephrology. .  Patient has tunneled dialysis catheter. .  Previously on PD but discontinued due to fungal peritonitis     -Anion Metabolic acidosis due to CKD/lactic acidosis--ketones negative--monitor labs-continue oral bicarb     -Hyperkalemia--treated with hemodialysis-monitor labs     -Hyponatremia--corrected sodium within normal range     -DM type II uncontrolled severe hyperglycemia-patient was noncompliance due to acute illness-hemoglobin A1c is pending. .  Hypoglycemia is better continue current basal bolus insulin regimen     -Essential hypertension---stable-continue Home meds include nifedipine, hydralazine, - Aldactone/losartan held due to hyperkalemia     -Hyperlipidemia-continue statin     -Dizziness /vertigo--reports symptoms present for 2 weeks--CT head negative--MRI brain, PT and OT eval pending, continue meclizine     -Acute metabolic encephalopathy--resolved -lethargic on presentation to the ED--        DVT Prophylaxis: Subcu heparin  Diet: ADULT DIET; Regular; 4 carb choices (60 gm/meal); Low Fat/Low Chol/High Fiber/2 gm Na; Low Sodium (2 gm); Low Potassium (Less than 3000 mg/day);  Low Phosphorus (Less than 1000 mg)  Code Status: Full Code            Shiloh Nazario MD

## 2022-11-27 NOTE — PROGRESS NOTES
Physical Therapy  Initial Evaluation Attempt    22    Name: Chema Balbuena   : 1965    MRN: [id number]    PT order noted. Patient refusing therapy this date - willing to try on 22.     Electronically signed by Dianne Villareal PT on 2022 at 10:19 AM

## 2022-11-27 NOTE — PROGRESS NOTES
Pt has started with some intermittent delirium again. Wife at bedside states these episodes have been happening off and on for the past few days. MD notified who stated we will continue to monitor pt.

## 2022-11-27 NOTE — PLAN OF CARE
Problem: Discharge Planning  Goal: Discharge to home or other facility with appropriate resources  Outcome: Progressing     Problem: Skin/Tissue Integrity  Goal: Absence of new skin breakdown  Description: 1. Monitor for areas of redness and/or skin breakdown  2. Assess vascular access sites hourly  3. Every 4-6 hours minimum:  Change oxygen saturation probe site  4. Every 4-6 hours:  If on nasal continuous positive airway pressure, respiratory therapy assess nares and determine need for appliance change or resting period.   Outcome: Progressing     Problem: Safety - Adult  Goal: Free from fall injury  Outcome: Progressing     Problem: Respiratory - Adult  Goal: Achieves optimal ventilation and oxygenation  Outcome: Progressing     Problem: Musculoskeletal - Adult  Goal: Return mobility to safest level of function  Outcome: Progressing     Problem: Gastrointestinal - Adult  Goal: Minimal or absence of nausea and vomiting  Outcome: Progressing     Problem: Infection - Adult  Goal: Absence of infection at discharge  Outcome: Progressing     Problem: Metabolic/Fluid and Electrolytes - Adult  Goal: Electrolytes maintained within normal limits  Outcome: Progressing  Goal: Hemodynamic stability and optimal renal function maintained  Outcome: Progressing  Goal: Glucose maintained within prescribed range  Outcome: Progressing

## 2022-11-27 NOTE — PLAN OF CARE
Problem: Discharge Planning  Goal: Discharge to home or other facility with appropriate resources  Outcome: Progressing  Flowsheets (Taken 11/27/2022 0930)  Discharge to home or other facility with appropriate resources: Arrange for needed discharge resources and transportation as appropriate     Problem: Skin/Tissue Integrity  Goal: Absence of new skin breakdown  Description: 1. Monitor for areas of redness and/or skin breakdown  2. Assess vascular access sites hourly  3. Every 4-6 hours minimum:  Change oxygen saturation probe site  4. Every 4-6 hours:  If on nasal continuous positive airway pressure, respiratory therapy assess nares and determine need for appliance change or resting period.   Outcome: Progressing     Problem: Safety - Adult  Goal: Free from fall injury  Outcome: Progressing     Problem: Respiratory - Adult  Goal: Achieves optimal ventilation and oxygenation  Outcome: Progressing  Flowsheets (Taken 11/27/2022 0930)  Achieves optimal ventilation and oxygenation: Assess for changes in respiratory status     Problem: Musculoskeletal - Adult  Goal: Return mobility to safest level of function  Outcome: Progressing  Flowsheets (Taken 11/27/2022 0930)  Return Mobility to Safest Level of Function: Assess patient stability and activity tolerance for standing, transferring and ambulating with or without assistive devices     Problem: Gastrointestinal - Adult  Goal: Minimal or absence of nausea and vomiting  Outcome: Progressing  Flowsheets (Taken 11/27/2022 0930)  Minimal or absence of nausea and vomiting: Administer IV fluids as ordered to ensure adequate hydration     Problem: Infection - Adult  Goal: Absence of infection at discharge  Outcome: Progressing  Flowsheets (Taken 11/27/2022 0930)  Absence of infection at discharge: Assess and monitor for signs and symptoms of infection     Problem: Metabolic/Fluid and Electrolytes - Adult  Goal: Electrolytes maintained within normal limits  Outcome: Progressing  Flowsheets (Taken 11/27/2022 0930)  Electrolytes maintained within normal limits: Monitor labs and assess patient for signs and symptoms of electrolyte imbalances  Goal: Hemodynamic stability and optimal renal function maintained  Outcome: Progressing  Flowsheets (Taken 11/27/2022 0930)  Hemodynamic stability and optimal renal function maintained: Monitor labs and assess for signs and symptoms of volume excess or deficit  Goal: Glucose maintained within prescribed range  Outcome: Progressing  Flowsheets (Taken 11/27/2022 0930)  Glucose maintained within prescribed range: Monitor blood glucose as ordered     Problem: ABCDS Injury Assessment  Goal: Absence of physical injury  Outcome: Progressing

## 2022-11-27 NOTE — PROGRESS NOTES
Medication Reconciliation    List of medications patient is currently taking is complete. Source of information: 1.  Conversation with patient's wife over the phone                                      2. EPIC records      Allergies  Doxazosin, Cefepime, and Coconut flavor     Donna Roberts [facility], PharmD, BCPS  11/27/2022 10:19 AM

## 2022-11-28 ENCOUNTER — APPOINTMENT (OUTPATIENT)
Dept: MRI IMAGING | Age: 57
DRG: 314 | End: 2022-11-28
Payer: COMMERCIAL

## 2022-11-28 PROBLEM — G93.40 ENCEPHALOPATHY ACUTE: Status: ACTIVE | Noted: 2022-11-28

## 2022-11-28 PROBLEM — R65.20 SEVERE SEPSIS (HCC): Status: ACTIVE | Noted: 2022-11-28

## 2022-11-28 PROBLEM — D72.9 NEUTROPHILIA: Status: ACTIVE | Noted: 2022-11-28

## 2022-11-28 PROBLEM — A41.01 STAPHYLOCOCCUS AUREUS BACTEREMIA WITH SEPSIS (HCC): Status: ACTIVE | Noted: 2022-11-28

## 2022-11-28 PROBLEM — R79.89 ELEVATED LACTIC ACID LEVEL: Status: ACTIVE | Noted: 2022-11-28

## 2022-11-28 PROBLEM — R79.89 ELEVATED PROCALCITONIN: Status: ACTIVE | Noted: 2022-11-28

## 2022-11-28 PROBLEM — N18.6 ESRD NEEDING DIALYSIS (HCC): Status: ACTIVE | Noted: 2022-11-28

## 2022-11-28 PROBLEM — A41.9 SEVERE SEPSIS (HCC): Status: ACTIVE | Noted: 2022-11-28

## 2022-11-28 PROBLEM — Z99.2 ESRD NEEDING DIALYSIS (HCC): Status: ACTIVE | Noted: 2022-11-28

## 2022-11-28 PROBLEM — E87.5 HYPERKALEMIA: Status: ACTIVE | Noted: 2022-11-28

## 2022-11-28 LAB
ANION GAP SERPL CALCULATED.3IONS-SCNC: 19 MMOL/L (ref 3–16)
BASOPHILS ABSOLUTE: 0 K/UL (ref 0–0.2)
BASOPHILS RELATIVE PERCENT: 0.2 %
BUN BLDV-MCNC: 40 MG/DL (ref 7–20)
CALCIUM SERPL-MCNC: 9.2 MG/DL (ref 8.3–10.6)
CHLORIDE BLD-SCNC: 98 MMOL/L (ref 99–110)
CO2: 20 MMOL/L (ref 21–32)
CREAT SERPL-MCNC: 6.1 MG/DL (ref 0.9–1.3)
EOSINOPHILS ABSOLUTE: 0 K/UL (ref 0–0.6)
EOSINOPHILS RELATIVE PERCENT: 0.3 %
GFR SERPL CREATININE-BSD FRML MDRD: 10 ML/MIN/{1.73_M2}
GLUCOSE BLD-MCNC: 172 MG/DL (ref 70–99)
GLUCOSE BLD-MCNC: 197 MG/DL (ref 70–99)
GLUCOSE BLD-MCNC: 198 MG/DL (ref 70–99)
GLUCOSE BLD-MCNC: 89 MG/DL (ref 70–99)
GLUCOSE BLD-MCNC: 95 MG/DL (ref 70–99)
HCT VFR BLD CALC: 31.3 % (ref 40.5–52.5)
HEMOGLOBIN: 10.1 G/DL (ref 13.5–17.5)
LYMPHOCYTES ABSOLUTE: 0.7 K/UL (ref 1–5.1)
LYMPHOCYTES RELATIVE PERCENT: 4.8 %
MCH RBC QN AUTO: 30.4 PG (ref 26–34)
MCHC RBC AUTO-ENTMCNC: 32.2 G/DL (ref 31–36)
MCV RBC AUTO: 94.4 FL (ref 80–100)
MONOCYTES ABSOLUTE: 1.5 K/UL (ref 0–1.3)
MONOCYTES RELATIVE PERCENT: 9.6 %
NEUTROPHILS ABSOLUTE: 12.9 K/UL (ref 1.7–7.7)
NEUTROPHILS RELATIVE PERCENT: 85.1 %
PDW BLD-RTO: 14.7 % (ref 12.4–15.4)
PERFORMED ON: ABNORMAL
PERFORMED ON: NORMAL
PLATELET # BLD: 99 K/UL (ref 135–450)
PMV BLD AUTO: 10.7 FL (ref 5–10.5)
POTASSIUM SERPL-SCNC: 4.6 MMOL/L (ref 3.5–5.1)
PROCALCITONIN: >100 NG/ML (ref 0–0.15)
RBC # BLD: 3.31 M/UL (ref 4.2–5.9)
SODIUM BLD-SCNC: 137 MMOL/L (ref 136–145)
WBC # BLD: 15.2 K/UL (ref 4–11)

## 2022-11-28 PROCEDURE — 93308 TTE F-UP OR LMTD: CPT

## 2022-11-28 PROCEDURE — 70551 MRI BRAIN STEM W/O DYE: CPT

## 2022-11-28 PROCEDURE — 99233 SBSQ HOSP IP/OBS HIGH 50: CPT | Performed by: INTERNAL MEDICINE

## 2022-11-28 PROCEDURE — 97163 PT EVAL HIGH COMPLEX 45 MIN: CPT

## 2022-11-28 PROCEDURE — 87040 BLOOD CULTURE FOR BACTERIA: CPT

## 2022-11-28 PROCEDURE — 80048 BASIC METABOLIC PNL TOTAL CA: CPT

## 2022-11-28 PROCEDURE — 2580000003 HC RX 258: Performed by: HOSPITALIST

## 2022-11-28 PROCEDURE — 6360000002 HC RX W HCPCS: Performed by: HOSPITALIST

## 2022-11-28 PROCEDURE — 2580000003 HC RX 258: Performed by: INTERNAL MEDICINE

## 2022-11-28 PROCEDURE — 6360000002 HC RX W HCPCS: Performed by: INTERNAL MEDICINE

## 2022-11-28 PROCEDURE — 93325 DOPPLER ECHO COLOR FLOW MAPG: CPT

## 2022-11-28 PROCEDURE — 84145 PROCALCITONIN (PCT): CPT

## 2022-11-28 PROCEDURE — 1200000000 HC SEMI PRIVATE

## 2022-11-28 PROCEDURE — 6370000000 HC RX 637 (ALT 250 FOR IP): Performed by: HOSPITALIST

## 2022-11-28 PROCEDURE — 85025 COMPLETE CBC W/AUTO DIFF WBC: CPT

## 2022-11-28 PROCEDURE — 36415 COLL VENOUS BLD VENIPUNCTURE: CPT

## 2022-11-28 PROCEDURE — 97116 GAIT TRAINING THERAPY: CPT

## 2022-11-28 RX ADMIN — ATORVASTATIN CALCIUM 40 MG: 40 TABLET, FILM COATED ORAL at 21:10

## 2022-11-28 RX ADMIN — SUCRALFATE 1 G: 1 TABLET ORAL at 21:10

## 2022-11-28 RX ADMIN — METOPROLOL TARTRATE 75 MG: 50 TABLET, FILM COATED ORAL at 21:10

## 2022-11-28 RX ADMIN — HYDRALAZINE HYDROCHLORIDE 100 MG: 50 TABLET, FILM COATED ORAL at 21:10

## 2022-11-28 RX ADMIN — SODIUM CHLORIDE, PRESERVATIVE FREE 10 ML: 5 INJECTION INTRAVENOUS at 21:12

## 2022-11-28 RX ADMIN — SODIUM BICARBONATE 650 MG: 650 TABLET ORAL at 08:25

## 2022-11-28 RX ADMIN — SODIUM BICARBONATE 650 MG: 650 TABLET ORAL at 12:21

## 2022-11-28 RX ADMIN — SUCRALFATE 1 G: 1 TABLET ORAL at 07:37

## 2022-11-28 RX ADMIN — INSULIN GLARGINE 18 UNITS: 100 INJECTION, SOLUTION SUBCUTANEOUS at 21:13

## 2022-11-28 RX ADMIN — SUCRALFATE 1 G: 1 TABLET ORAL at 12:21

## 2022-11-28 RX ADMIN — CALCITRIOL 0.5 MCG: 0.25 CAPSULE ORAL at 08:25

## 2022-11-28 RX ADMIN — SUCRALFATE 1 G: 1 TABLET ORAL at 15:25

## 2022-11-28 RX ADMIN — ACETAMINOPHEN 650 MG: 325 TABLET ORAL at 03:50

## 2022-11-28 RX ADMIN — PANTOPRAZOLE SODIUM 40 MG: 40 TABLET, DELAYED RELEASE ORAL at 07:37

## 2022-11-28 RX ADMIN — CEFAZOLIN 1000 MG: 1 INJECTION, POWDER, FOR SOLUTION INTRAMUSCULAR; INTRAVENOUS at 12:21

## 2022-11-28 RX ADMIN — SODIUM CHLORIDE, PRESERVATIVE FREE 10 ML: 5 INJECTION INTRAVENOUS at 08:31

## 2022-11-28 RX ADMIN — HEPARIN SODIUM 5000 UNITS: 5000 INJECTION INTRAVENOUS; SUBCUTANEOUS at 07:37

## 2022-11-28 RX ADMIN — PANTOPRAZOLE SODIUM 40 MG: 40 TABLET, DELAYED RELEASE ORAL at 15:25

## 2022-11-28 RX ADMIN — METOPROLOL TARTRATE 75 MG: 50 TABLET, FILM COATED ORAL at 08:25

## 2022-11-28 RX ADMIN — HEPARIN SODIUM 5000 UNITS: 5000 INJECTION INTRAVENOUS; SUBCUTANEOUS at 15:33

## 2022-11-28 RX ADMIN — HEPARIN SODIUM 5000 UNITS: 5000 INJECTION INTRAVENOUS; SUBCUTANEOUS at 21:12

## 2022-11-28 RX ADMIN — SODIUM BICARBONATE 650 MG: 650 TABLET ORAL at 15:25

## 2022-11-28 ASSESSMENT — PAIN SCALES - GENERAL: PAINLEVEL_OUTOF10: 0

## 2022-11-28 NOTE — PROGRESS NOTES
Comprehensive Nutrition Assessment    Type and Reason for Visit:  Initial, Positive Nutrition Screen (weight loss, decreased appetite)    Nutrition Recommendations/Plan:   Carb control, renal friendly diet regimen, no added salt   Offer Nepro bid and monitor tolerance  Will monitor nutritional adequacy, nutrition-related labs, weights, BMs, and clinical progress      Malnutrition Assessment:  Malnutrition Status:  Insufficient data (11/28/22 1119)      Nutrition Assessment:    Patient admitted with bacterial pneumonia. History of ESRD on HD at baseline, CHF, DM. Patient out of room for MRI at this time. Nutrition risk triggered due to weight loss and decreased po intake prior to admission. Weight loss trends difficult to evaluate with fluctuations but is still evident. Weight's mostly in the 160's to 170's, trending down to 144-149 lbs in the past couple days. Reported decreased po intake may be likely to current condition/flu like symptoms for the past 5 days. Will liberalize diet slightly to allow more menu options and offer Nepro bid due to increased nutrient needs. Will monitor nutritional progress and goals of care. Nutrition Related Findings:    Creat greater than 6 this am; no BM noted yet Wound Type:  (redness on buttocks)       Current Nutrition Intake & Therapies:    Average Meal Intake: Unable to assess  Average Supplements Intake: Unable to assess  ADULT ORAL NUTRITION SUPPLEMENT; Breakfast, Dinner; Renal Oral Supplement  ADULT DIET; Regular; 4 carb choices (60 gm/meal); No Added Salt (3-4 gm); Low Potassium (Less than 3000 mg/day); Low Phosphorus (Less than 1000 mg)    Anthropometric Measures:  Height: 6' 2\" (188 cm)  Ideal Body Weight (IBW): 190 lbs (86 kg)       Current Body Weight: 149 lb 4 oz (67.7 kg),   IBW.  Weight Source: Bed Scale  Current BMI (kg/m2): 19.2                          BMI Categories: Normal Weight (BMI 18.5-24.9) (redness on buttocks)    Estimated Daily Nutrient Needs:  Energy Requirements Based On: Kcal/kg  Weight Used for Energy Requirements: Current  Energy (kcal/day): 7728-5270 (30-35 kcal/67.7 kg)  Weight Used for Protein Requirements: Current  Protein (g/day):  (1.2-1.5 g/67.7 kg)  Method Used for Fluid Requirements: 1 ml/kcal  Fluid (ml/day):      Nutrition Diagnosis:   Increased nutrient needs related to increase demand for energy/nutrients as evidenced by poor intake prior to admission, dialysis, weight loss    Nutrition Interventions:   Food and/or Nutrient Delivery: Modify Current Diet, Start Oral Nutrition Supplement  Nutrition Education/Counseling: Education not indicated  Coordination of Nutrition Care: Continue to monitor while inpatient       Goals:     Goals: PO intake 75% or greater       Nutrition Monitoring and Evaluation:      Food/Nutrient Intake Outcomes: Food and Nutrient Intake, Supplement Intake  Physical Signs/Symptoms Outcomes: Biochemical Data, Nutrition Focused Physical Findings    Discharge Planning:     Too soon to determine     LUISA, [address] [facility], [address] [place] [address],   Contact: Seamus Foster

## 2022-11-28 NOTE — PROGRESS NOTES
[facility]                                               Progress note    CC: fever, sepsis  Summary:   Cecilia Skinner is being seen by nephrology for ERD. He is a 65 y.o. male with a PMH significant for ESRD, CHF, ADPKD, fungal peritonitis 2/2 PD catheter who presented to the ED 11/26/2022 with fever, hyperglycemia, and AMS. He was noted to be hyperkalemic on admission. Blood cx positive for MSSA    Interval History  Seen at bedside  BP at goal  Acidosis noted. Last HD was on Saturday  Mental status waxing and waning. Wife at bedside, easily able to redirect. Plan:   - plan for HD tomorrow per schedule  - remove TDC after HD, consult IR - ordered. - line holiday for 1-2 days, if repeat cx NGTD can proceed with new TDC. If blood cx do not clear will likely need a temp HD line until blood cx clear.  - abx per ID        Lyle Gray MD  [id number] [facility]  Office: [phone]    Assessment:   ESRD:  - on HD TTS at [facility]  - recent fungal PD peritonitis  - now access is a TDC which might be the cause of MRSA bacteremia  - EDW 69.5 kg    Hypertension:    Continue current medicatiosn    MSSA bacteremia:  - suspect the [facility] to be the source  - remove [facility] 11/29/2022  - on cefazolin      ROS:    Positives Listed Bold. All other remaining systems are negative. Constitutional:  fever, chills, weakness, weight change, fatigue,      Skin:  rash, pruritus, hair loss, bruising, dry skin, petechiae. Head, Face, Neck   headaches, swelling,  cervical adenopathy. Respiratory: shortness of breath, cough, or wheezing  Cardiovascular: chest pain, palpitations, dizzy, edema  Gastrointestinal: nausea, vomiting, diarrhea, constipation,belly pain    Yellow skin, blood in stool  Musculoskeletal:  back pain, muscle weakness, gait problems,       joint pain or swelling.   Genitourinary:  dysuria, poor urine flow, flank pain, blood in urine  Neurologic:  vertigo, TIA'S, syncope, seizures, focal weakness  Psychosocial:  insomnia, anxiety, or depression. Additional positive findings: -     PMH:   Past medical history, surgical history, social history, family history are reviewed and updated as appropriate. Reviewed current medication list.   Allergies reviewed and updated as needed. PE:   Vitals:    11/28/22 1227   BP: 118/66   Pulse: (!) 103   Resp: 18   Temp: 99 °F (37.2 °C)   SpO2: 93%       General appearance: in NAD, fully alert and oriented. Comfortable. HEENT: EOM intact, no icterus. Trachea is midline. Neck : No masses, appears symmetrical, no JVD  Respiratory: Respiratory effort appears normal, bilateral equal chest rise, no wheeze, no crackles   Cardiovascular: Ausculation shows RRR no edema  Abdomen: No visible mass or tenderness, non distended. Musculoskeletal:  Joints with no swelling or deformity. Skin:no rashes, ulcers, induration, no jaundice. Neuro: face symmetric, no focal deficits. Appropriate responses.        Lab Results   Component Value Date    CREATININE 6.1 (HH) 11/28/2022    BUN 40 (H) 11/28/2022     11/28/2022    K 4.6 11/28/2022    CL 98 (L) 11/28/2022    CO2 20 (L) 11/28/2022      Lab Results   Component Value Date    WBC 15.2 (H) 11/28/2022    HGB 10.1 (L) 11/28/2022    HCT 31.3 (L) 11/28/2022    MCV 94.4 11/28/2022    PLT 99 (L) 11/28/2022     Lab Results   Component Value Date    PTH 49.8 02/01/2019    CALCIUM 9.2 11/28/2022    PHOS 4.2 09/16/2022

## 2022-11-28 NOTE — PROGRESS NOTES
Physical Therapy  Facility/Department: [facility]  Physical Therapy Initial Assessment    Name: Jacqueline Eid  : 1965  MRN: [id number]  Date of Service: 2022  Jacqueline Eid scored a 15/24 on the AM-PAC short mobility form. Current research shows that an AM-PAC score of 18 or greater is typically associated with a discharge to the patient's home setting. Based on the patient's AM-PAC score and their current functional mobility deficits, it is recommended that the patient have 2-3 sessions per week of Physical Therapy at d/c to increase the patient's independence. At this time, this patient demonstrates the endurance and safety to discharge home with HHPT (home vs OP services) and a follow up treatment frequency of 2-3x/wk. Please see assessment section for further patient specific details. HOME HEALTH CARE: LEVEL 3 SAFETY     - Initial home health evaluation to occur within 24-48 hours, in patient home   - Therapy evaluations in home within 24-48 hours of discharge; including DME and home safety   - Frontload therapy 5 days, then 3x a week   - Therapy to evaluate if patient has [address] needs for personal care   -  evaluation within 24-48 hours, includes evaluation of resources and insurance to determine [place], [place], LTC, and Medicaid options      If patient discharges prior to next session this note will serve as a discharge summary. Please see below for the latest assessment towards goals. Discharge Recommendations:  Continue to assess pending progress, Patient would benefit from continued therapy after discharge, 24 hour supervision or assist (HHPT S3)   PT Equipment Recommendations  Equipment Needed: No  Other: Pt. reports having a FWW at home      Patient Diagnosis(es): The primary encounter diagnosis was Encephalopathy acute.  Diagnoses of ESRD needing dialysis ([facility] Severe sepsis ([facility] Hyperkalemia, Type 2 diabetes mellitus with hyperglycemia, with long-term current use of insulin ([facility] Elevated lactic acid level, Pneumonia of right lower lobe due to infectious organism, Pulmonary nodule, Closed compression fracture of L1 vertebra, initial encounter ([facility] and Pericardial effusion were also pertinent to this visit. Past Medical History:  has a past medical history of Cardiomyopathy ([facility] CHF (congestive heart failure) ([facility] CVA (cerebral infarction), Diabetes mellitus ([facility] Foot ulcer, left ([facility] Gastroparesis, Hemodialysis patient ([facility] Hypercholesteremia, Hypertension, Kidney disease, and Unspecified cerebral artery occlusion with cerebral infarction. Past Surgical History:  has a past surgical history that includes LAPAROSCOPY INSERTION PERITONEAL CATHETER (N/A, 10/29/2020); Umbilical hernia repair (N/A, 10/29/2020); hc dialysis catheter (N/A, 10/29/2020); Upper gastrointestinal endoscopy (N/A, 4/26/2021); Colonoscopy (N/A, 5/17/2021); Upper gastrointestinal endoscopy (N/A, 9/13/2022); Dialysis Catheter Removal (N/A, 10/17/2022); and Catheter Insertion (N/A, 10/17/2022). Assessment   Body Structures, Functions, Activity Limitations Requiring Skilled Therapeutic Intervention: Decreased endurance;Decreased cognition;Decreased safe awareness;Decreased balance;Decreased coordination;Decreased strength;Decreased fine motor control;Decreased sensation  Assessment: Pt. presents with Bacterial pneumonia and septic. Pt. also found to have a pericardial effusion. Pt. has an L1 comp fx of indeterminate age of which Pt. reports happened ~ 1 year ago when falling out of the back of a moving truck. Pt. also on HD. Pt. presents below baseline functional level. Unsteady with gait at present time but has not been OOB in 4 days. Hopeful Pt. will improve to be able to return home with 24 hour S/A of wife. Pt. is adamant about returning home. Would recommend HHPT S3 level at d/c.   Therapy Prognosis: Good  Decision Making: High Complexity  History: as above  Exam: as above  Clinical Presentation: evolving  Barriers to Learning: None  Requires PT Follow-Up: Yes  Activity Tolerance  Activity Tolerance: Patient limited by fatigue;Patient limited by endurance;Treatment limited secondary to agitation  Activity Tolerance Comments: Pt. frustrated about being in the hospital, intermittently agitated but could be redirected     Plan   Physcial Therapy Plan  General Plan: 3-5 times per week  Current Treatment Recommendations: Strengthening, Balance training, Functional mobility training, Gait training, Transfer training, Endurance training, Safety education & training, Home exercise program, Stair training, Patient/Caregiver education & training, Equipment evaluation, education, & procurement, Therapeutic activities  Safety Devices  Type of Devices: Call light within reach, Chair alarm in place, Gait belt, Left in chair, Nurse notified, All fall risk precautions in place     Restrictions  Restrictions/Precautions  Restrictions/Precautions: Fall Risk, Contact Precautions (MRSA+, High fall risk)     Subjective   General  Chart Reviewed: Yes  Patient assessed for rehabilitation services?: Yes  Response To Previous Treatment: Patient with no complaints from previous session. Family / Caregiver Present: No  Referring Practitioner: Dr. Raquel Anderson  Referral Date : 11/26/22  Diagnosis: Bacterial pneumonia  Follows Commands: Within Functional Limits  Other (Comment):  But occasionally needs repeatative cues  General Comment  Comments: Supine in bed, agreeable to therapy, denies pain         Social/Functional History  Social/Functional History  Lives With: Spouse (2 dogs)  Type of Home: House  Home Layout: One level  Home Access: Level entry  Bathroom Shower/Tub: Walk-in shower  Bathroom Toilet: Standard  Bathroom Equipment: Grab bars in shower, Built-in shower seat  Home Equipment: do Mota  ADL Assistance: Independent  Ambulation Assistance: Independent (with [facility])  Transfer Assistance: Independent  Active : No  Patient's  Info: wife drives   Type of Occupation:  retiree  Additional Comments: Wife works from home, wife cooks  Vision/Hearing  Vision  Vision: Impaired  Vision Exceptions: Wears glasses at all times  Hearing  Hearing: Within functional limits    Cognition   Orientation  Overall Orientation Status: Within Functional Limits  Cognition  Overall Cognitive Status: Exceptions  Arousal/Alertness: Appropriate responses to stimuli  Following Commands: Follows one step commands consistently; Follows multistep commands with repitition; Follows multistep commands with increased time  Attention Span: Difficulty attending to directions; Difficulty dividing attention (falling asleep during intake questions?)  Memory: Appears intact  Safety Judgement: Decreased awareness of need for safety;Decreased awareness of need for assistance  Problem Solving: Decreased awareness of errors  Insights: Decreased awareness of deficits  Initiation: Requires cues for some  Sequencing: Requires cues for some     Objective   Observation/Palpation  Posture: Good  Observation: Thin, decreased muscle mass        AROM RLE (degrees)  RLE AROM: WFL  AROM LLE (degrees)  LLE AROM : WFL  LLE General AROM: Except ankle DF which is WFL passively, weakness limits full AROM  Strength RLE  Comment: Grossly 3+/5  Strength LLE  Comment: Grossly 3+/5 hip and knee, ankle 2/5  Impaired coordination: heel to shin B LE severely impaired  Denies lnumbness or tingling impairment but reports neuropathy           Bed mobility  Supine to Sit: Minimal assistance (increased time/effort, difficulty sequencing)  Sit to Supine: Unable to assess (in recliner at end of session)  Scooting: Stand by assistance (cues for sequencing)  Transfers  Sit to Stand: Minimal Assistance  Stand to Sit: Minimal Assistance; Moderate Assistance (Poor approach to chair and impulsively sitting too quickly)  Ambulation  Surface: Level tile  Device: Rolling Walker  Assistance: Moderate assistance  Quality of Gait: Uncoordinated, ataxic B LE with R LE ER and foot drop present with ambulation. Unstedy  Gait Deviations: Slow Lesli;Decreased step length;Decreased step height;Deviated path  Distance: 15' x 1  Comments: Pt impulsive and unsteady  Stairs/Curb  Stairs?: No     Balance  Sitting - Static: Good;-  Sitting - Dynamic: Fair;+  Standing - Static: Poor  Standing - Dynamic: Poor  Comments: Stands with walker with Min A to stand                                                             AM-PAC Score  AM-PAC Inpatient Mobility Raw Score : 15 (11/28/22 0944)  AM-PAC Inpatient T-Scale Score : 39.45 (11/28/22 0944)  Mobility Inpatient CMS 0-100% Score: 57.7 (11/28/22 0944)  Mobility Inpatient CMS G-Code Modifier : CK (11/28/22 0944)                 Goals  Short Term Goals  Time Frame for Short Term Goals: By d/c  Short Term Goal 1: Supine to/from sit with Supervision  Short Term Goal 2: Sit to/from stand with Supervision  Short Term Goal 3: Amb.  x 48' with FWW and Supervision  Patient Goals   Patient Goals : Return home - Pt adamant about not going to a rehab facility       Education  Patient Education  Education Given To: Patient  Education Provided: Role of Therapy;Plan of Care;Transfer Training  Barriers to Learning: Cognition (Frustration)  Education Outcome: Verbalized understanding;Demonstrated understanding;Continued education needed      Therapy Time   Individual Concurrent Group Co-treatment   Time In 0907         Time Out 0937         Minutes 30         Timed Code Treatment Minutes: [address], [place], [id number]

## 2022-11-28 NOTE — PROGRESS NOTES
Occupational Therapy  Facility/Department: [facility]  Occupational Therapy Initial Assessment    Name: Sarwat Wilson  : 1965  MRN: [id number]  Date of Service: 2022    Discharge Recommendations:  3-5 sessions per week, Patient would benefit from continued therapy after discharge, Continue to assess pending progress ( A if returning home)     Sarwat Wilson scored a 16/24 on the AM-PAC ADL Inpatient form. Current research shows that an AM-PAC score of 17 or less is typically not associated with a discharge to the patient's home setting. Based on the patient's AM-PAC score and their current ADL deficits, it is recommended that the patient have 3-5 sessions per week of Occupational Therapy at d/c to increase the patient's independence. Please see assessment section for further patient specific details. If patient discharges prior to next session this note will serve as a discharge summary. Please see below for the latest assessment towards goals. Patient Diagnosis(es): The primary encounter diagnosis was Encephalopathy acute. Diagnoses of ESRD needing dialysis ([address] Severe sepsis ([address] Hyperkalemia, Type 2 diabetes mellitus with hyperglycemia, with long-term current use of insulin (HCC), Elevated lactic acid level, Pneumonia of right lower lobe due to infectious organism, Pulmonary nodule, Closed compression fracture of L1 vertebra, initial encounter ([address] and Pericardial effusion were also pertinent to this visit. Past Medical History:  has a past medical history of Cardiomyopathy ([address] CHF (congestive heart failure) ([address] CVA (cerebral infarction), Diabetes mellitus ([address] Foot ulcer, left ([address] Gastroparesis, Hemodialysis patient ([address] Hypercholesteremia, Hypertension, Kidney disease, and Unspecified cerebral artery occlusion with cerebral infarction.   Past Surgical History:  has a past surgical history that includes LAPAROSCOPY INSERTION PERITONEAL CATHETER (N/A, 10/29/2020); Umbilical hernia repair (N/A, 10/29/2020); hc dialysis catheter (N/A, 10/29/2020); Upper gastrointestinal endoscopy (N/A, 4/26/2021); Colonoscopy (N/A, 5/17/2021); Upper gastrointestinal endoscopy (N/A, 9/13/2022); Dialysis Catheter Removal (N/A, 10/17/2022); and Catheter Insertion (N/A, 10/17/2022). Treatment Diagnosis: Decreased: ADLs, fxl transfers/mobility      Assessment   Performance deficits / Impairments: Decreased functional mobility ; Decreased ADL status; Decreased balance;Decreased endurance;Decreased cognition;Decreased safe awareness;Decreased strength;Decreased ROM; Decreased coordination  Assessment: Pt is a 66 yo M w/ PMHx ESRD on HD who was admitted with chills, fevers, and AMS. BS found to be 503 in the ED and he was lethargic. PTA, pt lives at home w/ his wife where he is typically independent in self-care and fxl mobility using SPC. He is below baseline today, limited most by decreased balance, ataxic movements, and decreased cognition/safety. He required min A for bed mobility, min/mod A fxl transfers, and mod A fxl mobility using RW. Anticipate overall min/mod A for full ADL. Will continue to see on acute in order to address the above deficits and maximize pt's fxl performance. Pt is a high fall risk and would most benefit from ongoing skilled OT at a moderate intensity at d/c to increase his safety and independence. Pt reports his plan is to return home - he would require strict 24/7 hands-on assistance and would recommend [facility] OT L3 to maximize his safety. Treatment Diagnosis: Decreased: ADLs, fxl transfers/mobility  Prognosis: Fair  Decision Making: Medium Complexity  REQUIRES OT FOLLOW-UP: Yes  Activity Tolerance  Activity Tolerance: Patient limited by fatigue;Treatment limited secondary to decreased cognition        Plan   Occupational Therapy Plan  Times Per Week: 3-5  Times Per Day:  Once a day  Current Treatment Recommendations: Strengthening, Balance training, ROM, Functional mobility training, Endurance training, Safety education & training, Self-Care / ADL     Restrictions  Restrictions/Precautions  Restrictions/Precautions: Fall Risk, Contact Precautions (MRSA+, High fall risk)    Subjective   General  Chart Reviewed: Yes  Patient assessed for rehabilitation services?: Yes  Additional Pertinent Hx: per H&P: \"65 y.o. male with ESRD on hemodialysis, hypertension, hyperlipidemia presented to emergency room with fevers, chills and rigors  for the past 2 days. This was associated with cough, sneezing. His blood sugars have been running high and states that he did not take his insulin. He was also lethargic and diaphoretic  Upon arrival to the ED, his blood sugar was 503, he appeared lethargic. \"  Family / Caregiver Present: No  Referring Practitioner: Perez Malcolm MD  Diagnosis: Right lower lobe bacterial pneumonia  Subjective  Subjective: Pt met b/s for OT eval/tx w/ PT. Pt in bed, agreeable to therapy. A bit confused and frustrated w/ medical issues and not being able to get OOB yet     Social/Functional History  Social/Functional History  Lives With: Spouse (2 dogs)  Type of Home: House  Home Layout: One level  Home Access: Level entry  Bathroom Shower/Tub: Walk-in shower  Bathroom Toilet: Standard  Bathroom Equipment: Grab bars in shower, Built-in shower seat  Home Equipment: [place], do  ADL Assistance: Independent  Ambulation Assistance: Independent (with [facility])  Transfer Assistance: Independent  Active : No  Patient's  Info: wife drives   Type of Occupation:  retiree  Additional Comments: Wife works from home, wife cooks       Objective      Observation/Palpation  Posture: Good  Observation: Thin, decreased muscle mass  Safety Devices  Type of Devices: Call light within reach; Chair alarm in place;Gait belt;Left in chair;Nurse notified; All fall risk precautions in place        AROM: Generally decreased, functional  PROM: Generally decreased, functional  Strength: Generally decreased, functional  Coordination: Generally decreased, functional  ADL  Additional Comments: Pt declined ADLs. Anticipate he would be independent w/ feeding, setup grooming, min A UB bathing/dressing, mod A LB bathing/dressing, min/mod A toileting based on ROM, strength, endurance this session     Activity Tolerance  Activity Tolerance: Patient limited by fatigue;Patient limited by endurance;Treatment limited secondary to agitation  Activity Tolerance Comments: Pt. frustrated about being in the hospital, intermittently agitated but could be redirected  Bed mobility  Supine to Sit: Minimal assistance (increased time/effort, difficulty sequencing)  Sit to Supine: Unable to assess (in recliner at end of session)  Scooting: Stand by assistance (cues for sequencing)  Transfers  Sit to stand: Minimal assistance  Stand to sit: Minimal assistance; Moderate assistance (Decreased eccentric control into chair)  Transfer Comments: RW, cues for hand placement and safety. Pt completed fxl mobility from bed > chair ~15 ft w/ overall mod A using RW - poor safety, unsteady at times and somewhat ataxic gait  Vision  Vision: Impaired  Vision Exceptions: Wears glasses at all times  Hearing  Hearing: Within functional limits  Cognition  Overall Cognitive Status: Exceptions  Arousal/Alertness: Appropriate responses to stimuli  Following Commands: Follows one step commands consistently; Follows multistep commands with repitition; Follows multistep commands with increased time  Attention Span: Difficulty attending to directions; Difficulty dividing attention (falling asleep during intake questions?)  Memory: Appears intact  Safety Judgement: Decreased awareness of need for safety;Decreased awareness of need for assistance  Problem Solving: Decreased awareness of errors  Insights: Decreased awareness of deficits  Initiation: Requires cues for some  Sequencing: Requires cues for some  Orientation  Overall Orientation Status: Within Functional Limits                  Education Given To: Patient  Education Provided: Role of Therapy;Plan of Care;Transfer Training;ADL Adaptive Strategies; Energy Conservation  Education Method: Verbal  Barriers to Learning: Cognition  Education Outcome: Continued education needed           AM-PAC Score  AM-PAC Inpatient Daily Activity Raw Score: 16 (11/28/22 0955)  AM-PAC Inpatient ADL T-Scale Score : 35.96 (11/28/22 0955)  ADL Inpatient CMS 0-100% Score: 53.32 (11/28/22 0955)  ADL Inpatient CMS G-Code Modifier : CK (11/28/22 0955)    Goals  Short Term Goals  Time Frame for Short Term Goals: Prior to d/c  Short Term Goal 1: Pt will complete ADL transfers w/ CGA  Short Term Goal 2: Pt will toilet w/ CGA  Short Term Goal 3: Pt will groom in stance at sink w/ CGA  Short Term Goal 4: Pt will bathe w/ min A  Short Term Goal 5: Pt will increase strength and endurance to achieve the above goals  Long Term Goals  Time Frame for Long Term Goals : LTG=STG  Patient Goals   Patient goals : to go home       Therapy Time   Individual Concurrent Group Co-treatment   Time In 0907         Time Out 0940         Minutes 33         Timed Code Treatment Minutes: [address] [facility], [address] [place] [address]

## 2022-11-28 NOTE — PLAN OF CARE
Problem: Discharge Planning  Goal: Discharge to home or other facility with appropriate resources  11/27/2022 2304 by Teagan Bee RN  Outcome: Progressing     Problem: Skin/Tissue Integrity  Goal: Absence of new skin breakdown  Description: 1. Monitor for areas of redness and/or skin breakdown  2. Assess vascular access sites hourly  3. Every 4-6 hours minimum:  Change oxygen saturation probe site  4. Every 4-6 hours:  If on nasal continuous positive airway pressure, respiratory therapy assess nares and determine need for appliance change or resting period.   11/27/2022 2304 by Teagan Bee RN  Outcome: Progressing     Problem: Safety - Adult  Goal: Free from fall injury  11/27/2022 2304 by Teagan Bee RN  Outcome: Progressing     Problem: Respiratory - Adult  Goal: Achieves optimal ventilation and oxygenation  11/27/2022 2304 by Teagan Bee RN  Outcome: Progressing     Problem: Musculoskeletal - Adult  Goal: Return mobility to safest level of function  11/27/2022 2304 by Teagan Bee RN  Outcome: Progressing     Problem: Gastrointestinal - Adult  Goal: Minimal or absence of nausea and vomiting  11/27/2022 2304 by Teagan Bee RN  Outcome: Progressing     Problem: Infection - Adult  Goal: Absence of infection at discharge  11/27/2022 2304 by Teagan Bee RN  Outcome: Progressing     Problem: Metabolic/Fluid and Electrolytes - Adult  Goal: Electrolytes maintained within normal limits  11/27/2022 2304 by Teagan Bee RN  Outcome: Progressing  Goal: Hemodynamic stability and optimal renal function maintained  11/27/2022 2304 by Teagan Bee RN  Outcome: Progressing  Goal: Glucose maintained within prescribed range  11/27/2022 2304 by Teagan Bee RN  Outcome: Progressing       Problem: ABCDS Injury Assessment  Goal: Absence of physical injury  11/27/2022 2304 by Teagan Bee RN  Outcome: Progressing

## 2022-11-28 NOTE — PROGRESS NOTES
Infectious Disease Follow up Notes  Admit Date: 11/26/2022  Hospital Day: 3    Antibiotics :   IV Cefazolin     CHIEF COMPLAINT:     Severe sepsis  Fevers   Staph aureus Bacteremia  ESRD  HD line infection       Subjective interval History :  65 y. o.man with a history of end-stage renal disease on hemodialysis, CHF, CVA, diabetes, hypercholesterolemia, hypertension, with some left weakness admitted to the hospital secondary to fever chills not. He felt cold and flulike symptoms ongoing for the past 4 to 5 days. T-max 100.3 since admission, labs indicate procalcitonin elevated 92.7 potassium 5 6 blood glucose of 500, WBC elevated 19.4 with left shift hemoglobin 10.7 blood cultures no reported to be Staph aureus 4/4 bottles MSSA, tested negative for rapid flu and COVID-19, CT head negative CT chest indicating focal consolidation right lower lobe, also pericardial effusion noted. We are consulted for IV abx recommendation. Interval History : Ongoing fevers and sweats shortness of breath improving tolerating antibiotic therapy okay blood culture isolated Staph aureus MSSA discussed with patient and his wife at bedside HD line may need to be removed.   Discussed with the nephrology team      Past Medical History:    Past Medical History:   Diagnosis Date    Cardiomyopathy [facility])     CHF (congestive heart failure) ([facility]     CVA (cerebral infarction) 5/2015    Diabetes mellitus ([facility]     Foot ulcer, left ([facility] 1/14/2016    Gastroparesis     Hemodialysis patient ([facility]     Hypercholesteremia     Hypertension     Kidney disease     Unspecified cerebral artery occlusion with cerebral infarction     5/15, 7/15 LEFT SIDE WEAKNESS       Past Surgical History:    Past Surgical History:   Procedure Laterality Date    CATHETER INSERTION N/A 10/17/2022    TUNNEL CATHETER PLACEMENT performed by Bernard Virk MD at [facility] 5/17/2021    COLONOSCOPY POLYPECTOMY SNARE/COLD BIOPSY performed by Yadira King MD at [address] [place] [address] N/A 10/17/2022    PERITONEAL DIALYSIS CATHETER REMOVAL performed by Michele Alcocer MD at [facility] N/A 10/29/2020    PLACEMENT OF A TUNNELED DIALYSIS CATHETER WITH FLEURO AND ULTRASOUND performed by Michele Alcocer MD at [facility] N/A 10/29/2020    LAPAROSCOPIC PERITONEAL DIALYSIS CATHETER PLACEMENT WITH FLEURO AND ULTRASOUND performed by Michele Alcocer MD at [address] N/A 52/58/8322    UMBILICAL HERNIA REPAIR performed by Michele Alcocer MD at [address] [facility] [address] 4/26/2021    ESOPHAGOGASTRODUODENOSCOPY performed by Yadira King MD at [address] [facility] 9/13/2022    EGD DIAGNOSTIC ONLY performed by Alyssa Marcus MD at [address]       Current Medications:    No outpatient medications have been marked as taking for the 11/26/22 encounter [facility] Encounter).        Allergies:  Doxazosin, Cefepime, and Coconut flavor    Immunizations :   Immunization History   Administered Date(s) Administered    COVID-19, PFIZER GRAY top, DO NOT Dilute, (age 15 y+), IM, 30 mcg/0.3 mL 06/04/2022    COVID-19, PFIZER PURPLE top, DILUTE for use, (age 15 y+), 30mcg/0.3mL 06/23/2021, 07/19/2021    Hepatitis A Adult (Havrix, Vaqta) 03/10/2021, 09/23/2021    Hepatitis B 05/06/2022    Hepatitis B Adult (Heplisav-b) 05/06/2022    Hepatitis B Adult (Recombivax HB) 11/05/2020    Hepatitis B vaccine 11/05/2020    Influenza Vaccine, unspecified formulation 12/05/2013    Influenza Virus Vaccine 12/05/2013, 12/04/2015, 11/05/2020, 10/11/2021    Influenza, FLUARIX, FLULAVAL, FLUZONE (age 10 mo+) AND AFLURIA, (age 1 y+), PF, 0.5mL 10/15/2018, 11/05/2020    Influenza, Triv, 3 Years and older, IM, PF (Afluria 5yrs and older) 10/11/2021 Pneumococcal Conjugate 13-valent ([id number]) 11/10/2020    Pneumococcal Conjugate Vaccine 02/13/2015    Pneumococcal Polysaccharide ([id number]) 11/02/2009, 03/10/2021    Tdap (Boostrix, Adacel) 08/27/2021    Zoster Recombinant (Shingrix) 03/10/2021, 08/27/2021       Social History:     Social History     Tobacco Use    Smoking status: Some Days     Packs/day: 0.00     Years: 30.00     Pack years: 0.00     Types: Cigars, Cigarettes     Start date: 1/3/1979     Last attempt to quit: 1/16/2019     Years since quitting: 3.8    Smokeless tobacco: Never    Tobacco comments:     3 black and milds a week   Vaping Use    Vaping Use: Never used   Substance Use Topics    Alcohol use: Yes     Comment: occasional    Drug use: Yes     Types: Marijuana Chrissie Vick)     Comment: previously used cocaine, stopped May 2015 LAST USED MARIJUANA-NOVEMBER 2020     Social History     Tobacco Use   Smoking Status Some Days    Packs/day: 0.00    Years: 30.00    Pack years: 0.00    Types: Cigars, Cigarettes    Start date: 1/3/1979    Last attempt to quit: 1/16/2019    Years since quitting: 3.8   Smokeless Tobacco Never   Tobacco Comments    3 black and milds a week      Family History   Adopted: Yes          REVIEW OF SYSTEMS:      Constitutional:  r fevers,++  chills++ , night sweats  Eyes:  negative for blurred vision, eye discharge, visual disturbance   HEENT:  negative for hearing loss, ear drainage,nasal congestion  Respiratory:  negative for cough, shortness of breath or hemoptysis   Cardiovascular:  negative for chest pain, palpitations, syncope  Gastrointestinal:  negative for nausea, vomiting, diarrhea, constipation, abdominal pain  Genitourinary:  negative for frequency, dysuria, urinary incontinence, hematuria  Hematologic/Lymphatic:  negative for easy bruising, bleeding and lymphadenopathy  Allergic/Immunologic:  negative for recurrent infections, angioedema, anaphylaxis   Endocrine:  negative for weight changes, polyuria, polydipsia and polyphagia  Musculoskeletal:  negative for joint  pain, swelling, decreased range of motion  Integumentary: No rashes, skin lesions  Neurological:  negative for headaches, slurred speech, unilateral weakness  Psychiatric: negative for hallucinations,confusion,agitation.                 PHYSICAL EXAM:      Vitals:  T max  102.8   /63   Pulse 100   Temp (!) 100.5 °F (38.1 °C) (Oral)   Resp 18   Ht 6' 2\" (1.88 m)   Wt 149 lb 4 oz (67.7 kg)   SpO2 95%   BMI 19.16 kg/m²     General Appearance: alert,in some  acute distress,++  pallor, no icterus cachexia + chronic ill appearing man +    Skin: warm and dry, no rash or erythema  Head: normocephalic and atraumatic  Eyes: pupils equal, round, and reactive to light, conjunctivae normal  ENT: tympanic membrane, external ear and ear canal normal bilaterally, nose without deformity, nasal mucosa and turbinates normal without polyps  Neck: supple and non-tender without mass, no thyromegaly  no cervical lymphadenopathy  Pulmonary/Chest: clear to auscultation bilaterally- no wheezes, rales or rhonchi, normal air movement, no respiratory distress  Cardiovascular: normal rate, regular rhythm, normal S1 and S2, no murmurs, rubs, clicks, or gallops, no carotid bruits  Abdomen: soft, non-tender, non-distended, normal bowel sounds, no masses or organomegaly  Extremities: no cyanosis, clubbing or edema  Musculoskeletal: normal range of motion, no joint swelling, deformity or tenderness  Integumentary: No rashes, no abnormal skin lesions, no petechiae  Neurologic: reflexes normal and symmetric, no cranial nerve deficit  Psych:  Orientation, sensorium, mood normal            Lines: HD line +      Data Review:    CBC:   Lab Results   Component Value Date    WBC 15.2 (H) 11/28/2022    HGB 10.1 (L) 11/28/2022    HCT 31.3 (L) 11/28/2022    MCV 94.4 11/28/2022    PLT 99 (L) 11/28/2022     RENAL:   Lab Results   Component Value Date    CREATININE 6.1 ([place]) 11/28/2022 BUN 40 (H) 11/28/2022     11/28/2022    K 4.6 11/28/2022    CL 98 (L) 11/28/2022    CO2 20 (L) 11/28/2022     SED RATE:   Lab Results   Component Value Date/Time    SEDRATE 64 10/26/2020 02:14 PM     CK: No results found for: CKTOTAL  CRP:   Lab Results   Component Value Date/Time    CRP 2.9 10/26/2020 02:14 PM     Hepatic Function Panel:   Lab Results   Component Value Date/Time    ALKPHOS 104 11/26/2022 09:26 AM    ALT 13 11/26/2022 09:26 AM    AST 12 11/26/2022 09:26 AM    PROT 8.0 11/26/2022 09:26 AM    BILITOT <0.2 11/26/2022 09:26 AM    BILIDIR <0.2 10/11/2022 02:41 PM    IBILI see below 10/11/2022 02:41 PM    LABALBU 3.5 11/26/2022 09:26 AM     UA:  Lab Results   Component Value Date/Time    COLORU Yellow 11/26/2022 01:32 PM    CLARITYU Clear 11/26/2022 01:32 PM    GLUCOSEU >=1000 11/26/2022 01:32 PM    GLUCOSEU 250 12/14/2010 02:50 PM    BILIRUBINUR Negative 11/26/2022 01:32 PM    BILIRUBINUR NEGATIVE 12/14/2010 02:50 PM    KETUA Negative 11/26/2022 01:32 PM    SPECGRAV 1.019 11/26/2022 01:32 PM    BLOODU TRACE 11/26/2022 01:32 PM    PHUR 7.5 11/26/2022 01:32 PM    PROTEINU 300 11/26/2022 01:32 PM    UROBILINOGEN 0.2 11/26/2022 01:32 PM    NITRU Negative 11/26/2022 01:32 PM    LEUKOCYTESUR Negative 11/26/2022 01:32 PM    LABMICR YES 11/26/2022 01:32 PM    URINETYPE Cleancatch 11/26/2022 01:32 PM      Urine Microscopic:   Lab Results   Component Value Date/Time    BACTERIA None Seen 11/26/2022 01:32 PM    COMU see below 05/05/2021 05:36 PM    HYALCAST 0 11/26/2022 01:32 PM    WBCUA 3 11/26/2022 01:32 PM    RBCUA 9 11/26/2022 01:32 PM    EPIU 0 11/26/2022 01:32 PM     Urine Reflex to Culture:   Lab Results   Component Value Date/Time    URRFLXCULT Not Indicated 11/26/2022 01:32 PM         MICRO: cultures reviewed and updated by me   Blood Culture:   Lab Results   Component Value Date/Time    [facility]  11/26/2022 10:10 AM     Gram stain Aerobic bottle:  Gram positive cocci in clusters  resembling Staphylococcus  Information to follow  Gram stain Anaerobic bottle:  Gram positive cocci in clusters  resembling Staphylococcus  Information to follow      [facility]  11/26/2022 10:10 AM     mecA gene not detected  See additional report for complete BCID panel. [facility]  11/26/2022 10:10 AM     POSITIVE for  Sensitivity to follow  Isolated two of two sets      [address]  11/26/2022 10:10 AM     Gram stain Aerobic bottle:  Gram positive cocci in clusters  resembling Staphylococcus  Information to follow  Gram stain Anaerobic bottle:  Gram positive cocci in clusters  resembling Staphylococcus  Information to follow      [address]  11/26/2022 10:10 AM     POSITIVE for  No further workup  Refer to culture [id number] for sensitivity results  Isolated two of two sets         Respiratory Culture:  Lab Results   Component Value Date/Time    LABGRAM  10/16/2022 11:00 AM     No Epithelial Cells seen  No WBCs or organisms seen       AFB:No results found for: [facility]  Viral Culture:  Lab Results   Component Value Date/Time    COVID19 Not Detected 11/26/2022 09:26 AM     Urine Culture: No results for input(s): LABURIN in the last 72 hours. IMAGING:    CT CHEST PULMONARY EMBOLISM W CONTRAST   Final Result   1. Focal consolidation in the right lower lobe favoring   infectious/inflammatory process. Recommend CT of the chest in 3 months to   confirm resolution. 2. Solid subcentimeter right pulmonary nodules can be assessed during the   same time. 3. Cardiomegaly with unchanged moderate to large pericardial effusion. 4. Age-indeterminate L1 compression fracture. If there is point tenderness,   recommend nonemergent MRI of the lumbar spine. CT HEAD WO CONTRAST   Final Result   1. No acute intracranial abnormality.          XR CHEST PORTABLE   Final Result   Stable increased opacity left basilar region compared to prior studies dating   back to 10/25/2020 consistent with pericardial effusion, loculated left   basilar pleural effusion, pulmonary consolidation or mass/neoplasm. 2 mm   opacity consistent with pulmonary nodule in the right basilar region and foci   of infiltrate or subsegmental atelectasis in the mid-lower lung fields noted   bilaterally. IV contrast-enhanced chest CT suggested for further evaluation.          MRI BRAIN WO CONTRAST    (Results Pending)         All the pertinent images and reports for the current Hospitalization were reviewed by me     Scheduled Meds:   sodium bicarbonate  650 mg Oral TID WC    ceFAZolin  1,000 mg IntraVENous Q24H    sodium chloride flush  5-40 mL IntraVENous 2 times per day    heparin (porcine)  5,000 Units SubCUTAneous 3 times per day    insulin lispro  0-8 Units SubCUTAneous TID WC    insulin lispro  0-4 Units SubCUTAneous Nightly    insulin lispro  0.05 Units/kg SubCUTAneous TID WC    insulin glargine  0.25 Units/kg SubCUTAneous Nightly    calcitRIOL  0.5 mcg Oral Daily    atorvastatin  40 mg Oral Nightly    metoprolol tartrate  75 mg Oral BID    [Held by provider] NIFEdipine  90 mg Oral BID    pantoprazole  40 mg Oral BID AC    sucralfate  1 g Oral 4x Daily AC & HS    hydrALAZINE  100 mg Oral TID    [Held by provider] isosorbide mononitrate  60 mg Oral Daily       Continuous Infusions:   sodium chloride Stopped (11/27/22 1302)    dextrose         PRN Meds:  perflutren lipid microspheres, sodium chloride flush, sodium chloride, ondansetron **OR** ondansetron, polyethylene glycol, acetaminophen **OR** acetaminophen, glucose, dextrose bolus **OR** dextrose bolus, glucagon (rDNA), dextrose, meclizine, heparin (porcine)      Assessment:     Patient Active Problem List   Diagnosis    Nonischemic cardiomyopathy ([address]    Cerebral infarction ([address]    Cigarette nicotine dependence without complication    Erectile dysfunction due to arterial insufficiency    Renal artery stenosis (HCC)    Chronic diastolic congestive heart failure ([address]    H/O: CVA (cerebrovascular accident)    Major depressive disorder, recurrent episode, moderate (HCC)    Pericardial effusion    Hypertension associated with diabetes ([address]    DM type 2 with diabetic mixed hyperlipidemia ([address]    Diabetes mellitus due to underlying condition with stage 4 chronic kidney disease, with long-term current use of insulin (HCC)    ESRD (end stage renal disease) on dialysis ([address]    ESRD (end stage renal disease) ([address]    GI bleed    SBP (spontaneous bacterial peritonitis) ([address]    Dialysis-associated peritonitis ([address]    Candida infection    Generalized abdominal pain    PKD (polycystic kidney disease)    Bacterial pneumonia    Encephalopathy acute    Elevated lactic acid level    Hyperkalemia    Severe sepsis (HCC)    Staphylococcus aureus bacteremia with sepsis (HCC)    Neutrophilia    Elevated procalcitonin    ESRD needing dialysis ([address]     Sepsis  Staph aureus Bacteremia  MSSA+  ESRD on HD  HD line in place concern for Line infection   Cardiomyopathy   Pericardial effusion know to have this   WBC elevation   Procal elevation  DM+  Hyperkalemia  PKD  Fevers from above     Given the high grade Bacteremia and sepsis concern is for HD line infection - will need HD line removal and needs line Holiday and repeat Blood cx     TTE pending     Staph aureus MSSA high-grade bacteremia concern for for HD line infection will need line removal      Labs, Microbiology, Radiology and all the pertinent results from current hospitalization and  care every where were reviewed  by me as a part of the evaluation   Plan:   IV Cefazolin x 1 gm q 24 HRS  Repeat blood cx in process  Recommend HD line removal   Send Tip for cx   TTE pending   Unfortunately PD catheter was infected  and now having problems with HD line infections  Needs to improve skin hygiene and catheter care  -      Discussed with patient/Family and Nursing  d/w Nephrology   Risk of Complications/Morbidity: High      Illness(es)/ Infection present that pose threat to bodily function. There is potential for severe exacerbation of infection/side effects of treatment. Therapy requires intensive monitoring for antimicrobial agent toxicity. Discussed with patient/Family and Nursing staff     Thanks for allowing me to participate in your patient's care and please call me with any questions or concerns.     Rebecca Lugo MD  Infectious Disease  [facility] ([facility]) Physician  Phone: [phone]   Fax : [phone]

## 2022-11-28 NOTE — PROGRESS NOTES
Hospitalist Progress Note      PCP: No primary care provider on file. Date of Admission: 11/26/2022    Chief Complaint:     Hospital Course:     65 y.o. male with ESRD on hemodialysis, hypertension, hyperlipidemia presented to emergency room with fevers, chills and rigors  for the past 2 days. This was associated with cough, sneezing. His blood sugars have been running high and states that he did not take his insulin. He was also lethargic and diaphoretic  Upon arrival to the ED, his blood sugar was 503, he appeared lethargic. BP was 130/78, pulse 129, temperature 100.1, oxygen saturation 98% on room air. CT chest showed a right lobe infiltrate. .  Rapid influenza and COVID were negative. .. He had an anion gap metabolic acidosis but negative serum ketones . Pink Jaime Serum lactate was elevated at 3.2, WBC 19 K    Subjective:     States he feels had low-grade fever overnight.   No dizziness or vertigo this morning      Medications:  Reviewed    Infusion Medications    sodium chloride Stopped (11/27/22 1302)    dextrose       Scheduled Medications    sodium bicarbonate  650 mg Oral TID WC    ceFAZolin  1,000 mg IntraVENous Q24H    sodium chloride flush  5-40 mL IntraVENous 2 times per day    heparin (porcine)  5,000 Units SubCUTAneous 3 times per day    insulin lispro  0-8 Units SubCUTAneous TID WC    insulin lispro  0-4 Units SubCUTAneous Nightly    insulin lispro  0.05 Units/kg SubCUTAneous TID WC    insulin glargine  0.25 Units/kg SubCUTAneous Nightly    calcitRIOL  0.5 mcg Oral Daily    atorvastatin  40 mg Oral Nightly    metoprolol tartrate  75 mg Oral BID    [Held by provider] NIFEdipine  90 mg Oral BID    pantoprazole  40 mg Oral BID AC    sucralfate  1 g Oral 4x Daily AC & HS    hydrALAZINE  100 mg Oral TID    isosorbide mononitrate  60 mg Oral Daily     PRN Meds: perflutren lipid microspheres, sodium chloride flush, sodium chloride, ondansetron **OR** ondansetron, polyethylene glycol, acetaminophen **OR** acetaminophen, glucose, dextrose bolus **OR** dextrose bolus, glucagon (rDNA), dextrose, meclizine, heparin (porcine)      Intake/Output Summary (Last 24 hours) at 11/28/2022 1239  Last data filed at 11/27/2022 2300  Gross per 24 hour   Intake 382.08 ml   Output --   Net 382.08 ml       Physical Exam Performed:    /66   Pulse (!) 103   Temp 99 °F (37.2 °C) (Oral)   Resp 18   Ht 6' 2\" (1.88 m)   Wt 149 lb 4 oz (67.7 kg)   SpO2 93%   BMI 19.16 kg/m²     General appearance: No apparent distress, appears stated age and cooperative. HEENT: Pupils equal, round, and reactive to light. Conjunctivae/corneas clear. Neck: Supple, with full range of motion. No jugular venous distention. Trachea midline. Respiratory:  Normal respiratory effort. Clear to auscultation, bilaterally without Rales/Wheezes/Rhonchi. Cardiovascular: Regular rate and rhythm with normal S1/S2 without murmurs, rubs or gallops. Abdomen: Soft, non-tender, non-distended with normal bowel sounds. Musculoskeletal: No clubbing, cyanosis or edema bilaterally. Full range of motion without deformity. Skin: Skin color, texture, turgor normal.  No rashes or lesions. Neurologic:  Neurovascularly intact without any focal sensory/motor deficits.  Cranial nerves: II-XII intact, grossly non-focal.  Psychiatric: Alert and oriented, thought content appropriate, normal insight  Capillary Refill: Brisk, 3 seconds, normal   Peripheral Pulses: +2 palpable, equal bilaterally       Labs:   Recent Labs     11/26/22  0926 11/27/22  1042 11/28/22  0659   WBC 19.4* 18.8* 15.2*   HGB 10.7* 9.4* 10.1*   HCT 33.9* 29.2* 31.3*   * 95* 99*     Recent Labs     11/26/22  0926 11/26/22  1103 11/27/22  1042 11/28/22  0659   *  --  133* 137   K 6.5* 5.6* 4.7 4.6   CL 96*  --  95* 98*   CO2 15*  --  24 20*   BUN 41*  --  29* 40*   CREATININE 7.4*  --  5.2* 6.1*   CALCIUM 9.3  --  9.1 9.2     Recent Labs     11/26/22  0926   AST 12*   ALT 13   BILITOT <0.2 ALKPHOS 104     No results for input(s): INR in the last 72 hours. No results for input(s): Donzella Rands in the last 72 hours. Urinalysis:      Lab Results   Component Value Date/Time    NITRU Negative 11/26/2022 01:32 PM    WBCUA 3 11/26/2022 01:32 PM    BACTERIA None Seen 11/26/2022 01:32 PM    RBCUA 9 11/26/2022 01:32 PM    BLOODU TRACE 11/26/2022 01:32 PM    SPECGRAV 1.019 11/26/2022 01:32 PM    GLUCOSEU >=1000 11/26/2022 01:32 PM    GLUCOSEU 250 12/14/2010 02:50 PM       Radiology:  MRI BRAIN WO CONTRAST   Final Result   1. No acute intracranial abnormality. No acute infarct. 2. Mild global parenchymal volume loss with chronic microvascular ischemic   changes. 3. Small lipoma is again seen along the right dorsal midbrain. CT CHEST PULMONARY EMBOLISM W CONTRAST   Final Result   1. Focal consolidation in the right lower lobe favoring   infectious/inflammatory process. Recommend CT of the chest in 3 months to   confirm resolution. 2. Solid subcentimeter right pulmonary nodules can be assessed during the   same time. 3. Cardiomegaly with unchanged moderate to large pericardial effusion. 4. Age-indeterminate L1 compression fracture. If there is point tenderness,   recommend nonemergent MRI of the lumbar spine. CT HEAD WO CONTRAST   Final Result   1. No acute intracranial abnormality. XR CHEST PORTABLE   Final Result   Stable increased opacity left basilar region compared to prior studies dating   back to 10/25/2020 consistent with pericardial effusion, loculated left   basilar pleural effusion, pulmonary consolidation or mass/neoplasm. 2 mm   opacity consistent with pulmonary nodule in the right basilar region and foci   of infiltrate or subsegmental atelectasis in the mid-lower lung fields noted   bilaterally. IV contrast-enhanced chest CT suggested for further evaluation.              IP CONSULT TO NEPHROLOGY  IP CONSULT TO INFECTIOUS DISEASES  IP CONSULT TO INFECTIOUS DISEASES    Assessment/Plan:      -MSSA bacteremia with sepsis--concern for tunneled dialysis catheter infection-- vancomycin Alexy Saldana de-escalated to cefazolin 11/27--continue management per ID--follow-up on cultures. .  Limited echo pending    -Right lower lobe bacterial pneumonia. .  CT chest reviewed continue current antibiotics per ID     -Sepsis due to bacteremia /pneumonia-patient with fever, sinus tachycardia, leukocytosis, lactic acidosis--continue antibiotics as above, follow-up on cultures    -Chronic stable moderate pericardial effusion--evident on prior echos and current CT chest... Limited echo pending    -Chronic systolic/ diastolic heart failure--Echo 2/2022 showed grade 2 diastolic dysfunction, EF 04%, moderate pericardial effusion--repeat echo pending     -ESRD--on hemodialysis TTS--continue management per nephrology. .  Patient has tunneled dialysis catheter. .  Previously on PD but discontinued due to fungal peritonitis     -Anion Metabolic acidosis due to CKD/lactic acidosis--ketones negative--monitor labs-continue oral bicarb     -Hyperkalemia--resolved with hemodialysis     -Hyponatremia--corrected sodium within normal range on presentation     -DM type II uncontrolled  with severe hyperglycemia on presentation-patient was noncompliant with insulin due to acute illness-hemoglobin A1c is 7.2..   Hyperglycemia is better- continue current basal bolus insulin regimen     -Essential hypertension---stable-on nifedipine, hydralazine,imdur - Aldactone/losartan held due to hyperkalemia--also held nifedipine/Imdur this a.m. due to low BPs--continue to monitor for now     -Hyperlipidemia-continue statin     -Dizziness /vertigo-MRI brain is negative-currently asymptomatic-PT and OT eval pending     -Acute encephalopathy likely due to sepsis--resolved -lethargic on presentation to the ED--continue to monitor        DVT Prophylaxis: Subcu heparin  Diet: ADULT ORAL NUTRITION SUPPLEMENT; Breakfast, Dinner; Renal Oral Supplement  ADULT DIET; Regular; 4 carb choices (60 gm/meal); No Added Salt (3-4 gm); Low Potassium (Less than 3000 mg/day);  Low Phosphorus (Less than 1000 mg)  Code Status: Full Code            Didi Martinez MD

## 2022-11-29 ENCOUNTER — APPOINTMENT (OUTPATIENT)
Dept: INTERVENTIONAL RADIOLOGY/VASCULAR | Age: 57
DRG: 314 | End: 2022-11-29
Payer: COMMERCIAL

## 2022-11-29 LAB
ANION GAP SERPL CALCULATED.3IONS-SCNC: 17 MMOL/L (ref 3–16)
ANISOCYTOSIS: ABNORMAL
BANDED NEUTROPHILS RELATIVE PERCENT: 1 % (ref 0–7)
BASOPHILS ABSOLUTE: 0 K/UL (ref 0–0.2)
BASOPHILS RELATIVE PERCENT: 0 %
BLOOD CULTURE, ROUTINE: ABNORMAL
BLOOD CULTURE, ROUTINE: ABNORMAL
BUN BLDV-MCNC: 51 MG/DL (ref 7–20)
CALCIUM SERPL-MCNC: 8.8 MG/DL (ref 8.3–10.6)
CHLORIDE BLD-SCNC: 95 MMOL/L (ref 99–110)
CO2: 21 MMOL/L (ref 21–32)
CREAT SERPL-MCNC: 6.7 MG/DL (ref 0.9–1.3)
CULTURE, BLOOD 2: ABNORMAL
EOSINOPHILS ABSOLUTE: 0 K/UL (ref 0–0.6)
EOSINOPHILS RELATIVE PERCENT: 0 %
GFR SERPL CREATININE-BSD FRML MDRD: 9 ML/MIN/{1.73_M2}
GLUCOSE BLD-MCNC: 124 MG/DL (ref 70–99)
GLUCOSE BLD-MCNC: 130 MG/DL (ref 70–99)
GLUCOSE BLD-MCNC: 166 MG/DL (ref 70–99)
GLUCOSE BLD-MCNC: 203 MG/DL (ref 70–99)
GLUCOSE BLD-MCNC: 279 MG/DL (ref 70–99)
HCT VFR BLD CALC: 32 % (ref 40.5–52.5)
HEMATOLOGY PATH CONSULT: NORMAL
HEMATOLOGY PATH CONSULT: YES
HEMOGLOBIN: 10.4 G/DL (ref 13.5–17.5)
LYMPHOCYTES ABSOLUTE: 1.5 K/UL (ref 1–5.1)
LYMPHOCYTES RELATIVE PERCENT: 10 %
MCH RBC QN AUTO: 30.5 PG (ref 26–34)
MCHC RBC AUTO-ENTMCNC: 32.4 G/DL (ref 31–36)
MCV RBC AUTO: 94.2 FL (ref 80–100)
MONOCYTES ABSOLUTE: 1.5 K/UL (ref 0–1.3)
MONOCYTES RELATIVE PERCENT: 10 %
NEUTROPHILS ABSOLUTE: 12.2 K/UL (ref 1.7–7.7)
NEUTROPHILS RELATIVE PERCENT: 79 %
ORGANISM: ABNORMAL
PDW BLD-RTO: 15.4 % (ref 12.4–15.4)
PERFORMED ON: ABNORMAL
PLATELET # BLD: 103 K/UL (ref 135–450)
PLATELET SLIDE REVIEW: ABNORMAL
PMV BLD AUTO: 11.5 FL (ref 5–10.5)
POTASSIUM SERPL-SCNC: 5 MMOL/L (ref 3.5–5.1)
RBC # BLD: 3.4 M/UL (ref 4.2–5.9)
SLIDE REVIEW: ABNORMAL
SODIUM BLD-SCNC: 133 MMOL/L (ref 136–145)
WBC # BLD: 15.3 K/UL (ref 4–11)

## 2022-11-29 PROCEDURE — 2580000003 HC RX 258: Performed by: HOSPITALIST

## 2022-11-29 PROCEDURE — 6360000002 HC RX W HCPCS: Performed by: INTERNAL MEDICINE

## 2022-11-29 PROCEDURE — 99223 1ST HOSP IP/OBS HIGH 75: CPT | Performed by: INTERNAL MEDICINE

## 2022-11-29 PROCEDURE — 36589 REMOVAL TUNNELED CV CATH: CPT

## 2022-11-29 PROCEDURE — 02PY33Z REMOVAL OF INFUSION DEVICE FROM GREAT VESSEL, PERCUTANEOUS APPROACH: ICD-10-PCS | Performed by: STUDENT IN AN ORGANIZED HEALTH CARE EDUCATION/TRAINING PROGRAM

## 2022-11-29 PROCEDURE — 76937 US GUIDE VASCULAR ACCESS: CPT

## 2022-11-29 PROCEDURE — 87077 CULTURE AEROBIC IDENTIFY: CPT

## 2022-11-29 PROCEDURE — 87070 CULTURE OTHR SPECIMN AEROBIC: CPT

## 2022-11-29 PROCEDURE — 77001 FLUOROGUIDE FOR VEIN DEVICE: CPT

## 2022-11-29 PROCEDURE — 2580000003 HC RX 258: Performed by: INTERNAL MEDICINE

## 2022-11-29 PROCEDURE — 36556 INSERT NON-TUNNEL CV CATH: CPT

## 2022-11-29 PROCEDURE — 6360000002 HC RX W HCPCS: Performed by: HOSPITALIST

## 2022-11-29 PROCEDURE — 87186 SC STD MICRODIL/AGAR DIL: CPT

## 2022-11-29 PROCEDURE — 85025 COMPLETE CBC W/AUTO DIFF WBC: CPT

## 2022-11-29 PROCEDURE — 1200000000 HC SEMI PRIVATE

## 2022-11-29 PROCEDURE — 6370000000 HC RX 637 (ALT 250 FOR IP): Performed by: HOSPITALIST

## 2022-11-29 PROCEDURE — C1769 GUIDE WIRE: HCPCS

## 2022-11-29 PROCEDURE — 0JPT3XZ REMOVAL OF TUNNELED VASCULAR ACCESS DEVICE FROM TRUNK SUBCUTANEOUS TISSUE AND FASCIA, PERCUTANEOUS APPROACH: ICD-10-PCS | Performed by: STUDENT IN AN ORGANIZED HEALTH CARE EDUCATION/TRAINING PROGRAM

## 2022-11-29 PROCEDURE — 90935 HEMODIALYSIS ONE EVALUATION: CPT

## 2022-11-29 PROCEDURE — 6360000002 HC RX W HCPCS: Performed by: STUDENT IN AN ORGANIZED HEALTH CARE EDUCATION/TRAINING PROGRAM

## 2022-11-29 PROCEDURE — 80048 BASIC METABOLIC PNL TOTAL CA: CPT

## 2022-11-29 RX ORDER — FENTANYL CITRATE 50 UG/ML
INJECTION, SOLUTION INTRAMUSCULAR; INTRAVENOUS DAILY PRN
Status: COMPLETED | OUTPATIENT
Start: 2022-11-29 | End: 2022-11-29

## 2022-11-29 RX ADMIN — SODIUM CHLORIDE, PRESERVATIVE FREE 10 ML: 5 INJECTION INTRAVENOUS at 09:32

## 2022-11-29 RX ADMIN — METOPROLOL TARTRATE 75 MG: 50 TABLET, FILM COATED ORAL at 09:28

## 2022-11-29 RX ADMIN — CALCITRIOL 0.5 MCG: 0.25 CAPSULE ORAL at 09:27

## 2022-11-29 RX ADMIN — HYDRALAZINE HYDROCHLORIDE 100 MG: 50 TABLET, FILM COATED ORAL at 21:48

## 2022-11-29 RX ADMIN — SODIUM BICARBONATE 650 MG: 650 TABLET ORAL at 18:55

## 2022-11-29 RX ADMIN — MECLIZINE 12.5 MG: 12.5 TABLET ORAL at 05:34

## 2022-11-29 RX ADMIN — SUCRALFATE 1 G: 1 TABLET ORAL at 21:48

## 2022-11-29 RX ADMIN — HEPARIN SODIUM 5000 UNITS: 5000 INJECTION INTRAVENOUS; SUBCUTANEOUS at 21:48

## 2022-11-29 RX ADMIN — SODIUM CHLORIDE, PRESERVATIVE FREE 10 ML: 5 INJECTION INTRAVENOUS at 21:48

## 2022-11-29 RX ADMIN — HEPARIN SODIUM 5000 UNITS: 5000 INJECTION INTRAVENOUS; SUBCUTANEOUS at 14:30

## 2022-11-29 RX ADMIN — SUCRALFATE 1 G: 1 TABLET ORAL at 05:34

## 2022-11-29 RX ADMIN — SUCRALFATE 1 G: 1 TABLET ORAL at 09:27

## 2022-11-29 RX ADMIN — INSULIN LISPRO 4 UNITS: 100 INJECTION, SOLUTION INTRAVENOUS; SUBCUTANEOUS at 12:32

## 2022-11-29 RX ADMIN — SODIUM BICARBONATE 650 MG: 650 TABLET ORAL at 12:30

## 2022-11-29 RX ADMIN — HEPARIN SODIUM 5000 UNITS: 5000 INJECTION INTRAVENOUS; SUBCUTANEOUS at 05:39

## 2022-11-29 RX ADMIN — HYDRALAZINE HYDROCHLORIDE 100 MG: 50 TABLET, FILM COATED ORAL at 09:27

## 2022-11-29 RX ADMIN — HYDRALAZINE HYDROCHLORIDE 100 MG: 50 TABLET, FILM COATED ORAL at 14:31

## 2022-11-29 RX ADMIN — INSULIN GLARGINE 18 UNITS: 100 INJECTION, SOLUTION SUBCUTANEOUS at 21:51

## 2022-11-29 RX ADMIN — INSULIN LISPRO 4 UNITS: 100 INJECTION, SOLUTION INTRAVENOUS; SUBCUTANEOUS at 18:56

## 2022-11-29 RX ADMIN — SODIUM CHLORIDE 40 ML: 9 INJECTION, SOLUTION INTRAVENOUS at 22:44

## 2022-11-29 RX ADMIN — SODIUM BICARBONATE 650 MG: 650 TABLET ORAL at 09:33

## 2022-11-29 RX ADMIN — FENTANYL CITRATE 50 MCG: 50 INJECTION INTRAMUSCULAR; INTRAVENOUS at 11:24

## 2022-11-29 RX ADMIN — CEFAZOLIN 1000 MG: 1 INJECTION, POWDER, FOR SOLUTION INTRAMUSCULAR; INTRAVENOUS at 22:45

## 2022-11-29 RX ADMIN — PANTOPRAZOLE SODIUM 40 MG: 40 TABLET, DELAYED RELEASE ORAL at 18:55

## 2022-11-29 RX ADMIN — ATORVASTATIN CALCIUM 40 MG: 40 TABLET, FILM COATED ORAL at 21:47

## 2022-11-29 RX ADMIN — INSULIN LISPRO 2 UNITS: 100 INJECTION, SOLUTION INTRAVENOUS; SUBCUTANEOUS at 08:00

## 2022-11-29 RX ADMIN — SUCRALFATE 1 G: 1 TABLET ORAL at 18:55

## 2022-11-29 RX ADMIN — METOPROLOL TARTRATE 75 MG: 50 TABLET, FILM COATED ORAL at 21:47

## 2022-11-29 RX ADMIN — ACETAMINOPHEN 650 MG: 325 TABLET ORAL at 21:47

## 2022-11-29 RX ADMIN — INSULIN LISPRO 4 UNITS: 100 INJECTION, SOLUTION INTRAVENOUS; SUBCUTANEOUS at 08:00

## 2022-11-29 RX ADMIN — PANTOPRAZOLE SODIUM 40 MG: 40 TABLET, DELAYED RELEASE ORAL at 05:34

## 2022-11-29 RX ADMIN — ISOSORBIDE MONONITRATE 60 MG: 60 TABLET, EXTENDED RELEASE ORAL at 09:27

## 2022-11-29 ASSESSMENT — PAIN SCALES - GENERAL
PAINLEVEL_OUTOF10: 0
PAINLEVEL_OUTOF10: 0

## 2022-11-29 NOTE — CARE COORDINATION
INITIAL CASE MANAGEMENT ASSESSMENT    Met with patient to assess possible discharge needs. Explained Case Management role/services. Living Situation: Lives with his spouse in a one level house with no steps to enter. ADLs: Independent     DME: Has a rolling walker he uses off and on, cane, and scooter he uses when he shops. PT/OT Recs: Recommendations for 2-3 sessions per week at discharge. Active Services: N/A     Transportation: Not an active  due to neuropathy in his feet. Spouse and Elliottperla Murdock provide transportation. Medications: [address] on [place]    PCP: NP left the practice and is now seeing someone new at [facility] ([facility]) on [address]. HD/PD: HD at [facility] Tues/Thur/Sat at 10:45 AM. Transportation for Tues/Thur provided by [employer] [place] and spouse provides transportation on Sat. PLAN/COMMENTS: Discharge plan is to return to home with spouse when medically stable with outpatient HD at [facility]. Spouse will provide transportation. May require [place] [address] services. Currently working with [facility] for a kidney transplant. Provided contact information for patient or family to call with any questions. Will follow and assist as needed.     Lizandro AMATO RN  Case Management  [phone]    Electronically signed by Lizandro Blanco RN on 11/29/2022 at 5:35 PM

## 2022-11-29 NOTE — OR NURSING
Lidocaine over the permacath site near the neck as we will nick the skin and insert a wire to preserve the access site

## 2022-11-29 NOTE — PLAN OF CARE
Problem: Discharge Planning  Goal: Discharge to home or other facility with appropriate resources  Outcome: Progressing     Problem: Skin/Tissue Integrity  Goal: Absence of new skin breakdown  Description: 1. Monitor for areas of redness and/or skin breakdown  2. Assess vascular access sites hourly  3. Every 4-6 hours minimum:  Change oxygen saturation probe site  4. Every 4-6 hours:  If on nasal continuous positive airway pressure, respiratory therapy assess nares and determine need for appliance change or resting period. Outcome: Progressing     Problem: Safety - Adult Bed alarm in place and call light within reach.    Goal: Free from fall injury  Outcome: Progressing     Problem: Respiratory - Adult  Goal: Achieves optimal ventilation and oxygenation  Outcome: Progressing

## 2022-11-29 NOTE — PROGRESS NOTES
Infectious Disease Follow up Notes  Admit Date: 11/26/2022  Hospital Day: 4    Antibiotics :   IV Cefazolin     CHIEF COMPLAINT:     Severe sepsis  Fevers   Staph aureus Bacteremia  ESRD  HD line infection       Subjective interval History :  65 y. o.man with a history of end-stage renal disease on hemodialysis, CHF, CVA, diabetes, hypercholesterolemia, hypertension, with some left weakness admitted to the hospital secondary to fever chills not. He felt cold and flulike symptoms ongoing for the past 4 to 5 days. T-max 100.3 since admission, labs indicate procalcitonin elevated 92.7 potassium 5 6 blood glucose of 500, WBC elevated 19.4 with left shift hemoglobin 10.7 blood cultures no reported to be Staph aureus 4/4 bottles MSSA, tested negative for rapid flu and COVID-19, CT head negative CT chest indicating focal consolidation right lower lobe, also pericardial effusion noted. We are consulted for IV abx recommendation.      Interval History : Ongoing fevers and sweats shortness of breath improving tolerating antibiotic therapy okay blood culture repeat  in process from 11/28 and 11/29   Past Medical History:    Past Medical History:   Diagnosis Date    Cardiomyopathy ([address]     CHF (congestive heart failure) (HCC)     CVA (cerebral infarction) 5/2015    Diabetes mellitus ([address]     Foot ulcer, left ([address] 1/14/2016    Gastroparesis     Hemod[facility] patient ([facility] [address]     Hypercholesteremia     Hypertension     Kidney disease     Unspecified cerebral artery occlusion with cerebral infarction     5/15, 7/15 LEFT SIDE WEAKNESS       Past Surgical History:    Past Surgical History:   Procedure Laterality Date    CATHETER INSERTION N/A 10/17/2022    TUNNEL CATHETER PLACEMENT performed by Roque Pressley MD at [facility] N/A 5/17/2021    COLONOSCOPY POLYPECTOMY SNARE/COLD BIOPSY performed by James Valdes MD at [address] [facility] DIALYSIS CATHETER REMOVAL N/A 10/17/2022    PERITONEAL DIALYSIS CATHETER REMOVAL performed by Daryn Davila MD at [facility] N/A 10/29/2020    PLACEMENT OF A TUNNELED DIALYSIS CATHETER WITH FLEURO AND ULTRASOUND performed by Daryn Davila MD at [facility] NONTUNNELED VASCULAR CATHETER  11/29/2022    IR NONTUNNELED VASCULAR CATHETER 11/29/2022 [facility]    LAPAROSCOPY INSERTION PERITONEAL CATHETER N/A 10/29/2020    LAPAROSCOPIC PERITONEAL DIALYSIS CATHETER PLACEMENT WITH FLEURO AND ULTRASOUND performed by Daryn Davila MD at [address] N/A [id number]/7302    UMBILICAL HERNIA REPAIR performed by Daryn Davila MD at [facility] N/A 4/26/2021    ESOPHAGOGASTRODUODENOSCOPY performed by Betzaida Zeng MD at [address] [place] [address] 9/13/2022    EGD DIAGNOSTIC ONLY performed by Dewey Nelson MD at [facility]       Current Medications:    No outpatient medications have been marked as taking for the 11/26/22 encounter [facility] Encounter).        Allergies:  Doxazosin, Cefepime, and Coconut flavor    Immunizations :   Immunization History   Administered Date(s) Administered    COVID-19, PFIZER GRAY top, DO NOT Dilute, (age 15 y+), IM, 30 mcg/0.3 mL 06/04/2022    COVID-19, PFIZER PURPLE top, DILUTE for use, (age 15 y+), 30mcg/0.3mL 06/23/2021, 07/19/2021    Hepatitis A Adult (Havrix, Vaqta) 03/10/2021, 09/23/2021    Hepatitis B 05/06/2022    Hepatitis B Adult (Heplisav-b) 05/06/2022    Hepatitis B Adult (Recombivax HB) 11/05/2020    Hepatitis B vaccine 11/05/2020    Influenza Vaccine, unspecified formulation 12/05/2013    Influenza Virus Vaccine 12/05/2013, 12/04/2015, 11/05/2020, 10/11/2021    Influenza, FLUARIX, FLULAVAL, FLUZONE (age 10 mo+) AND AFLURIA, (age 1 y+), PF, 0.5mL 10/15/2018, 11/05/2020    Influenza, Triv, 3 Years and older, IM, PF (Afluria 5yrs and older) 10/11/2021    Pneumococcal Conjugate 13-valent ([id number]) 11/10/2020    Pneumococcal Conjugate Vaccine 02/13/2015    Pneumococcal Polysaccharide ([id number]) 11/02/2009, 03/10/2021    Tdap (Boostrix, Adacel) 08/27/2021    Zoster Recombinant (Shingrix) 03/10/2021, 08/27/2021       Social History:     Social History     Tobacco Use    Smoking status: Some Days     Packs/day: 0.00     Years: 30.00     Pack years: 0.00     Types: Cigars, Cigarettes     Start date: 1/3/1979     Last attempt to quit: 1/16/2019     Years since quitting: 3.8    Smokeless tobacco: Never    Tobacco comments:     3 black and milds a week   Vaping Use    Vaping Use: Never used   Substance Use Topics    Alcohol use: Yes     Comment: occasional    Drug use: Yes     Types: Marijuana Selena Cotton)     Comment: previously used cocaine, stopped May 2015 LAST USED MARIJUANA-NOVEMBER 2020     Social History     Tobacco Use   Smoking Status Some Days    Packs/day: 0.00    Years: 30.00    Pack years: 0.00    Types: Cigars, Cigarettes    Start date: 1/3/1979    Last attempt to quit: 1/16/2019    Years since quitting: 3.8   Smokeless Tobacco Never   Tobacco Comments    3 black and milds a week      Family History   Adopted: Yes          REVIEW OF SYSTEMS:      Constitutional:  r fevers,++  chills++ , night sweats  Eyes:  negative for blurred vision, eye discharge, visual disturbance   HEENT:  negative for hearing loss, ear drainage,nasal congestion  Respiratory:  negative for cough, shortness of breath or hemoptysis   Cardiovascular:  negative for chest pain, palpitations, syncope  Gastrointestinal:  negative for nausea, vomiting, diarrhea, constipation, abdominal pain  Genitourinary:  negative for frequency, dysuria, urinary incontinence, hematuria  Hematologic/Lymphatic:  negative for easy bruising, bleeding and lymphadenopathy  Allergic/Immunologic:  negative for recurrent infections, angioedema, anaphylaxis   Endocrine:  negative for weight changes, polyuria, polydipsia and polyphagia  Musculoskeletal:  negative for joint  pain, swelling, decreased range of motion  Integumentary: No rashes, skin lesions  Neurological:  negative for headaches, slurred speech, unilateral weakness  Psychiatric: negative for hallucinations,confusion,agitation.                 PHYSICAL EXAM:      Vitals:  T max  102.8   BP (!) 149/76   Pulse 99   Temp 97.5 °F (36.4 °C) (Oral)   Resp 18   Ht 6' 2\" (1.88 m)   Wt 149 lb 7.6 oz (67.8 kg)   SpO2 93%   BMI 19.19 kg/m²     General Appearance: alert,in some  acute distress,++  pallor, no icterus cachexia + chronic ill appearing man +    Skin: warm and dry, no rash or erythema  Head: normocephalic and atraumatic  Eyes: pupils equal, round, and reactive to light, conjunctivae normal  ENT: tympanic membrane, external ear and ear canal normal bilaterally, nose without deformity, nasal mucosa and turbinates normal without polyps  Neck: supple and non-tender without mass, no thyromegaly  no cervical lymphadenopathy  Pulmonary/Chest: clear to auscultation bilaterally- no wheezes, rales or rhonchi, normal air movement, no respiratory distress  Cardiovascular: normal rate, regular rhythm, normal S1 and S2, no murmurs, rubs, clicks, or gallops, no carotid bruits  Abdomen: soft, non-tender, non-distended, normal bowel sounds, no masses or organomegaly  Extremities: no cyanosis, clubbing or edema  Musculoskeletal: normal range of motion, no joint swelling, deformity or tenderness  Integumentary: No rashes, no abnormal skin lesions, no petechiae  Neurologic: reflexes normal and symmetric, no cranial nerve deficit  Psych:  Orientation, sensorium, mood normal            Lines: HD line +      Data Review:    CBC:   Lab Results   Component Value Date    WBC 15.3 (H) 11/29/2022    HGB 10.4 (L) 11/29/2022    HCT 32.0 (L) 11/29/2022    MCV 94.2 11/29/2022     (L) 11/29/2022     RENAL:   Lab Results   Component Value Date    CREATININE 6.7 (HH) 11/29/2022    BUN 51 (H) 11/29/2022     (L) 11/29/2022    K 5.0 11/29/2022    CL 95 (L) 11/29/2022    CO2 21 11/29/2022     SED RATE:   Lab Results   Component Value Date/Time    SEDRATE 64 10/26/2020 02:14 PM     CK: No results found for: CKTOTAL  CRP:   Lab Results   Component Value Date/Time    CRP 2.9 10/26/2020 02:14 PM     Hepatic Function Panel:   Lab Results   Component Value Date/Time    ALKPHOS 104 11/26/2022 09:26 AM    ALT 13 11/26/2022 09:26 AM    AST 12 11/26/2022 09:26 AM    PROT 8.0 11/26/2022 09:26 AM    BILITOT <0.2 11/26/2022 09:26 AM    BILIDIR <0.2 10/11/2022 02:41 PM    IBILI see below 10/11/2022 02:41 PM    LABALBU 3.5 11/26/2022 09:26 AM     UA:  Lab Results   Component Value Date/Time    COLORU Yellow 11/26/2022 01:32 PM    CLARITYU Clear 11/26/2022 01:32 PM    GLUCOSEU >=1000 11/26/2022 01:32 PM    GLUCOSEU 250 12/14/2010 02:50 PM    BILIRUBINUR Negative 11/26/2022 01:32 PM    BILIRUBINUR NEGATIVE 12/14/2010 02:50 PM    KETUA Negative 11/26/2022 01:32 PM    SPECGRAV 1.019 11/26/2022 01:32 PM    BLOODU TRACE 11/26/2022 01:32 PM    PHUR 7.5 11/26/2022 01:32 PM    PROTEINU 300 11/26/2022 01:32 PM    UROBILINOGEN 0.2 11/26/2022 01:32 PM    NITRU Negative 11/26/2022 01:32 PM    LEUKOCYTESUR Negative 11/26/2022 01:32 PM    LABMICR YES 11/26/2022 01:32 PM    URINETYPE Cleancatch 11/26/2022 01:32 PM      Urine Microscopic:   Lab Results   Component Value Date/Time    BACTERIA None Seen 11/26/2022 01:32 PM    COMU see below 05/05/2021 05:36 PM    HYALCAST 0 11/26/2022 01:32 PM    WBCUA 3 11/26/2022 01:32 PM    RBCUA 9 11/26/2022 01:32 PM    EPIU 0 11/26/2022 01:32 PM     Urine Reflex to Culture:   Lab Results   Component Value Date/Time    URRFLXCULT Not Indicated 11/26/2022 01:32 PM         MICRO: cultures reviewed and updated by me   Blood Culture:   Lab Results   Component Value Date/Time    [facility]  11/28/2022 06:59 AM     No Growth to date. Any change in status will be called.     Pineda Pereyra 11/28/2022 06:59 AM     No Growth to date. Any change in status will be called. Respiratory Culture:  Lab Results   Component Value Date/Time    LABGRAM  10/16/2022 11:00 AM     No Epithelial Cells seen  No WBCs or organisms seen       AFB:No results found for: [facility]  Viral Culture:  Lab Results   Component Value Date/Time    COVID19 Not Detected 11/26/2022 09:26 AM     Urine Culture: No results for input(s): LABURIN in the last 72 hours. IMAGING:    IR REMOVE TUNNELED CVAD WO SQ PORT/PUMP   Final Result   Successful removal of tunneled dialysis catheter. Successful fluoroscopic guided non tunneled Trialysis catheter placement. IR NONTUNNELED VASCULAR CATHETER > 5 YEARS   Final Result   Successful removal of tunneled dialysis catheter. Successful fluoroscopic guided non tunneled Trialysis catheter placement. MRI BRAIN WO CONTRAST   Final Result   1. No acute intracranial abnormality. No acute infarct. 2. Mild global parenchymal volume loss with chronic microvascular ischemic   changes. 3. Small lipoma is again seen along the right dorsal midbrain. CT CHEST PULMONARY EMBOLISM W CONTRAST   Final Result   1. Focal consolidation in the right lower lobe favoring   infectious/inflammatory process. Recommend CT of the chest in 3 months to   confirm resolution. 2. Solid subcentimeter right pulmonary nodules can be assessed during the   same time. 3. Cardiomegaly with unchanged moderate to large pericardial effusion. 4. Age-indeterminate L1 compression fracture. If there is point tenderness,   recommend nonemergent MRI of the lumbar spine. CT HEAD WO CONTRAST   Final Result   1. No acute intracranial abnormality.          XR CHEST PORTABLE   Final Result   Stable increased opacity left basilar region compared to prior studies dating   back to 10/25/2020 consistent with pericardial effusion, loculated left   basilar pleural effusion, pulmonary consolidation or mass/neoplasm. 2 mm   opacity consistent with pulmonary nodule in the right basilar region and foci   of infiltrate or subsegmental atelectasis in the mid-lower lung fields noted   bilaterally. IV contrast-enhanced chest CT suggested for further evaluation. All the pertinent images and reports for the current Hospitalization were reviewed by me     Scheduled Meds:   sodium bicarbonate  650 mg Oral TID WC    ceFAZolin  1,000 mg IntraVENous Q24H    sodium chloride flush  5-40 mL IntraVENous 2 times per day    heparin (porcine)  5,000 Units SubCUTAneous 3 times per day    insulin lispro  0-8 Units SubCUTAneous TID WC    insulin lispro  0-4 Units SubCUTAneous Nightly    insulin lispro  0.05 Units/kg SubCUTAneous TID WC    insulin glargine  0.25 Units/kg SubCUTAneous Nightly    calcitRIOL  0.5 mcg Oral Daily    atorvastatin  40 mg Oral Nightly    metoprolol tartrate  75 mg Oral BID    [Held by provider] NIFEdipine  90 mg Oral BID    pantoprazole  40 mg Oral BID AC    sucralfate  1 g Oral 4x Daily AC & HS    hydrALAZINE  100 mg Oral TID    isosorbide mononitrate  60 mg Oral Daily       Continuous Infusions:   sodium chloride Stopped (11/27/22 1302)    dextrose       Summary   Limited study. Overall left ventricular systolic function appears moderately reduced. Ejection fraction is visually estimated to be 35-40% with diffuse   hypokinesis. There is severely increased left ventricular wall thickness. Left ventricular cavity size is normal.   The right ventricle is not well visualized. Tricuspid aortic valve leaflets appear thickened/calcified but no clear   mobile structure to suggest a vegetation. There is a moderate pericardial effusion.       PRN Meds:  perflutren lipid microspheres, sodium chloride flush, sodium chloride, ondansetron **OR** ondansetron, polyethylene glycol, acetaminophen **OR** acetaminophen, glucose, dextrose bolus **OR** dextrose bolus, glucagon (rDNA), dextrose, meclizine, heparin (porcine)      Assessment:     Patient Active Problem List   Diagnosis    Nonischemic cardiomyopathy ([address]    Cerebral infarction ([address]    Cigarette nicotine dependence without complication    Erectile dysfunction due to arterial insufficiency    Renal artery stenosis (HCC)    Chronic diastolic congestive heart failure (HCC)    H/O: CVA (cerebrovascular accident)    Major depressive disorder, recurrent episode, moderate (HCC)    Pericardial effusion    Hypertension associated with diabetes ([facility]    DM type 2 with diabetic mixed hyperlipidemia ([address]    Diabetes mellitus due to underlying condition with stage 4 chronic kidney disease, with long-term current use of insulin ([address]    ESRD (end stage renal disease) on dialysis ([address]    ESRD (end stage renal disease) ([address]    GI bleed    SBP (spontaneous bacterial peritonitis) ([address]    Dialysis-associated peritonitis ([address]    Candida infection    Generalized abdominal pain    PKD (polycystic kidney disease)    Bacterial pneumonia    Encephalopathy acute    Elevated lactic acid level    Hyperkalemia    Severe sepsis (HCC)    Staphylococcus aureus bacteremia with sepsis (HCC)    Neutrophilia    Elevated procalcitonin    ESRD needing dialysis ([address]     Sepsis  Staph aureus Bacteremia  MSSA+  ESRD on HD  HD line in place concern for Line infection   Cardiomyopathy   Pericardial effusion know to have this   WBC elevation   Procal elevation  DM+  Hyperkalemia  PKD  Fevers from above     Given the high grade Bacteremia and sepsis concern is for HD line infection - will need HD line removal and needs line Holiday and repeat Blood cx     TTE completed no vegetation -     Staph aureus MSSA high-grade bacteremia concern for for HD line infection  s/p  line removal and tip sent for cx in process       Labs, Microbiology, Radiology and all the pertinent results from current hospitalization and  care every where were reviewed  by me as a part of the evaluation   Plan: IV Cefazolin x 1 gm q 24 HRS  Repeat blood cx in  process from 11/28 a  S/p  HD line removal   Send Tip for cx in process   TTE  -ve  Unfortunately PD catheter was infected  and now having problems with HD line infections  Needs to improve skin hygiene and catheter care  -      Discussed with patient/Family and Nursing  d/w Nephrology   Risk of Complications/Morbidity: High      Illness(es)/ Infection present that pose threat to bodily function. There is potential for severe exacerbation of infection/side effects of treatment. Therapy requires intensive monitoring for antimicrobial agent toxicity. Discussed with patient/Family and Nursing staff     Thanks for allowing me to participate in your patient's care and please call me with any questions or concerns.     John Encarnacion MD  Infectious Disease  [facility] ([facility]) Physician  Phone: [phone]   Fax : [phone]

## 2022-11-29 NOTE — PROGRESS NOTES
Hospitalist Progress Note      PCP: No primary care provider on file. Date of Admission: 11/26/2022    Chief Complaint: Infected dialysis line    Hospital Course:      Subjective: Having trouble coordinating removal of TDC with dialysis. We will remove TDC today and have temporary HD line placed for HD later today. Medications:  Reviewed    Infusion Medications    sodium chloride Stopped (11/27/22 1302)    dextrose       Scheduled Medications    sodium bicarbonate  650 mg Oral TID WC    ceFAZolin  1,000 mg IntraVENous Q24H    sodium chloride flush  5-40 mL IntraVENous 2 times per day    heparin (porcine)  5,000 Units SubCUTAneous 3 times per day    insulin lispro  0-8 Units SubCUTAneous TID WC    insulin lispro  0-4 Units SubCUTAneous Nightly    insulin lispro  0.05 Units/kg SubCUTAneous TID WC    insulin glargine  0.25 Units/kg SubCUTAneous Nightly    calcitRIOL  0.5 mcg Oral Daily    atorvastatin  40 mg Oral Nightly    metoprolol tartrate  75 mg Oral BID    [Held by provider] NIFEdipine  90 mg Oral BID    pantoprazole  40 mg Oral BID AC    sucralfate  1 g Oral 4x Daily AC & HS    hydrALAZINE  100 mg Oral TID    isosorbide mononitrate  60 mg Oral Daily     PRN Meds: perflutren lipid microspheres, sodium chloride flush, sodium chloride, ondansetron **OR** ondansetron, polyethylene glycol, acetaminophen **OR** acetaminophen, glucose, dextrose bolus **OR** dextrose bolus, glucagon (rDNA), dextrose, meclizine, heparin (porcine)      Intake/Output Summary (Last 24 hours) at 11/29/2022 1436  Last data filed at 11/29/2022 0519  Gross per 24 hour   Intake 120 ml   Output 300 ml   Net -180 ml       Exam:    BP (!) 149/76   Pulse 99   Temp 97.5 °F (36.4 °C) (Oral)   Resp 18   Ht 6' 2\" (1.88 m)   Wt 149 lb 7.6 oz (67.8 kg)   SpO2 93%   BMI 19.19 kg/m²     General appearance: No apparent distress, appears stated age and cooperative.   Protein calorie malnutrition  HEENT: Pupils equal, round, and reactive to light. Conjunctivae/corneas clear. Neck: Supple, with full range of motion. No jugular venous distention. Trachea midline. Respiratory:  Normal respiratory effort. Clear to auscultation, bilaterally without Rales/Wheezes/Rhonchi. Cardiovascular: Regular rate and rhythm with normal S1/S2 without murmurs, rubs or gallops. Abdomen: Soft, non-tender, non-distended with normal bowel sounds. Musculoskeletal: No clubbing, cyanosis or edema bilaterally. Full range of motion without deformity. Skin: Skin color, texture, turgor normal.  No rashes or lesions. Neurologic:  Neurovascularly intact without any focal sensory/motor deficits. Cranial nerves: II-XII intact, grossly non-focal.  Psychiatric: Alert and oriented, thought content appropriate, normal insight  Capillary Refill: Brisk,< 3 seconds   Peripheral Pulses: +2 palpable, equal bilaterally       Labs:   Recent Labs     11/27/22  1042 11/28/22  0659 11/29/22  0643   WBC 18.8* 15.2* 15.3*   HGB 9.4* 10.1* 10.4*   HCT 29.2* 31.3* 32.0*   PLT 95* 99* 103*     Recent Labs     11/27/22  1042 11/28/22  0659 11/29/22  0643   * 137 133*   K 4.7 4.6 5.0   CL 95* 98* 95*   CO2 24 20* 21   BUN 29* 40* 51*   CREATININE 5.2* 6.1* 6.7*   CALCIUM 9.1 9.2 8.8     No results for input(s): AST, ALT, BILIDIR, BILITOT, ALKPHOS in the last 72 hours. No results for input(s): INR in the last 72 hours. No results for input(s): Asuncion Jordyn in the last 72 hours. Urinalysis:      Lab Results   Component Value Date/Time    NITRU Negative 11/26/2022 01:32 PM    WBCUA 3 11/26/2022 01:32 PM    BACTERIA None Seen 11/26/2022 01:32 PM    RBCUA 9 11/26/2022 01:32 PM    BLOODU TRACE 11/26/2022 01:32 PM    SPECGRAV 1.019 11/26/2022 01:32 PM    GLUCOSEU >=1000 11/26/2022 01:32 PM    GLUCOSEU 250 12/14/2010 02:50 PM       Radiology:  IR REMOVE TUNNELED CVAD WO SQ PORT/PUMP   Final Result   Successful removal of tunneled dialysis catheter.       Successful fluoroscopic guided non tunneled Trialysis catheter placement. IR NONTUNNELED VASCULAR CATHETER > 5 YEARS   Final Result   Successful removal of tunneled dialysis catheter. Successful fluoroscopic guided non tunneled Trialysis catheter placement. MRI BRAIN WO CONTRAST   Final Result   1. No acute intracranial abnormality. No acute infarct. 2. Mild global parenchymal volume loss with chronic microvascular ischemic   changes. 3. Small lipoma is again seen along the right dorsal midbrain. CT CHEST PULMONARY EMBOLISM W CONTRAST   Final Result   1. Focal consolidation in the right lower lobe favoring   infectious/inflammatory process. Recommend CT of the chest in 3 months to   confirm resolution. 2. Solid subcentimeter right pulmonary nodules can be assessed during the   same time. 3. Cardiomegaly with unchanged moderate to large pericardial effusion. 4. Age-indeterminate L1 compression fracture. If there is point tenderness,   recommend nonemergent MRI of the lumbar spine. CT HEAD WO CONTRAST   Final Result   1. No acute intracranial abnormality. XR CHEST PORTABLE   Final Result   Stable increased opacity left basilar region compared to prior studies dating   back to 10/25/2020 consistent with pericardial effusion, loculated left   basilar pleural effusion, pulmonary consolidation or mass/neoplasm. 2 mm   opacity consistent with pulmonary nodule in the right basilar region and foci   of infiltrate or subsegmental atelectasis in the mid-lower lung fields noted   bilaterally. IV contrast-enhanced chest CT suggested for further evaluation.                  Assessment/Plan:    Active Hospital Problems    Diagnosis Date Noted    Encephalopathy acute [G93.40] 11/28/2022     Priority: Medium    Elevated lactic acid level [R79.89] 11/28/2022     Priority: Medium    Hyperkalemia [E87.5] 11/28/2022     Priority: Medium    Severe sepsis ([facility] [address] [A41.9, R65.20] 11/28/2022     Priority: Medium Staphylococcus aureus bacteremia with sepsis ([facility] [A41.01] 11/28/2022     Priority: Medium    Neutrophilia [D72.9] 11/28/2022     Priority: Medium    Elevated procalcitonin [R79.89] 11/28/2022     Priority: Medium    ESRD needing dialysis ([facility] [N18.6, Z99.2] 11/28/2022     Priority: Medium    Bacterial pneumonia [J15.9] 11/26/2022     Priority: Medium       -MSSA bacteremia with sepsis--concern for tunneled dialysis catheter infection-- vancomycin Berneta Cava de-escalated to cefazolin 11/27--continue management per ID--follow-up on cultures. .   -TDC removed, temporary HD line placed. Follow-up blood cultures for temporary catheter prior to placing new TDC     -Right lower lobe bacterial pneumonia. .  CT chest reviewed continue current antibiotics per ID     -Sepsis due to bacteremia /pneumonia-patient with fever, sinus tachycardia, leukocytosis, lactic acidosis--continue antibiotics as above, follow-up on cultures     -Chronic stable moderate pericardial effusion--evident on prior echos and current CT chest... Limited echo pending     -Chronic systolic/ diastolic heart failure--Echo 2/2022 showed grade 2 diastolic dysfunction, EF 39%, moderate pericardial effusion--repeat echo pending     -ESRD--on hemodialysis TTS--continue management per nephrology. .  Patient has tunneled dialysis catheter. .  Previously on PD but discontinued due to fungal peritonitis     -Anion Metabolic acidosis due to CKD/lactic acidosis--ketones negative--monitor labs-continue oral bicarb     -Hyperkalemia--resolved with hemodialysis     -Hyponatremia--corrected sodium within normal range on presentation     -DM type II uncontrolled  with severe hyperglycemia on presentation-patient was noncompliant with insulin due to acute illness-hemoglobin A1c is 7.2..   Hyperglycemia is better- continue current basal bolus insulin regimen     -Essential hypertension---stable-on nifedipine, hydralazine,imdur - Aldactone/losartan held due to hyperkalemia--also

## 2022-11-29 NOTE — PROGRESS NOTES
[facility]                                               Progress note    CC: fever, sepsis  Summary:   Cecilia Skinner is being seen by nephrology for ERD. He is a 65 y.o. male with a PMH significant for ESRD, CHF, ADPKD, fungal peritonitis 2/2 PD catheter who presented to the ED 11/26/2022 with fever, hyperglycemia, and AMS. He was noted to be hyperkalemic on admission. Blood cx positive for MSSA    Interval History  Seen at bedside  /78  Labs reviewed, Na 133, K 5.0  Still makes urine, 300 mL UO documented for yesterday  Febrile with TMAX 102.8 F yesterday    Plan:   - scheduling trouble coordinating dialysis early and getting the [facility] removed early. - will proceed with TDC removal now and have temp HD line placed now and plan for HD later today. - continue abx, repeat blood cx per ID to ensure blood cx clear before new TDC can be placed. Lyle Gray MD  [id number]  Office: [phone]    Assessment:   ESRD:  - on HD TTS at [facility]  - recent fungal PD peritonitis  - now access is a TDC which might be the cause of MRSA bacteremia  - EDW 69.5 kg    Hypertension:    Continue current medicatiosn    MSSA bacteremia:  - suspect the [facility] to be the source  - remove [facility] 11/29/2022  - on cefazolin      ROS:    Positives Listed Bold. All other remaining systems are negative. Constitutional:  fever, chills, weakness, weight change, fatigue,      Skin:  rash, pruritus, hair loss, bruising, dry skin, petechiae. Head, Face, Neck   headaches, swelling,  cervical adenopathy. Respiratory: shortness of breath, cough, or wheezing  Cardiovascular: chest pain, palpitations, dizzy, edema  Gastrointestinal: nausea, vomiting, diarrhea, constipation,belly pain    Yellow skin, blood in stool  Musculoskeletal:  back pain, muscle weakness, gait problems,       joint pain or swelling.   Genitourinary:  dysuria, poor urine flow, flank pain, blood in urine  Neurologic:  vertigo, TIA'S, syncope, seizures, focal weakness  Psychosocial:  insomnia, anxiety, or depression. Additional positive findings: -     PMH:   Past medical history, surgical history, social history, family history are reviewed and updated as appropriate. Reviewed current medication list.   Allergies reviewed and updated as needed. PE:   Vitals:    11/29/22 0519   BP: 126/78   Pulse: 100   Resp: 17   Temp: 99.4 °F (37.4 °C)   SpO2: 94%       General appearance: in NAD, fully alert and oriented. Comfortable. HEENT: EOM intact, no icterus. Trachea is midline. Neck : No masses, appears symmetrical, no JVD  Respiratory: Respiratory effort appears normal, bilateral equal chest rise, no wheeze, no crackles   Cardiovascular: Ausculation shows RRR no edema  Abdomen: No visible mass or tenderness, non distended. Musculoskeletal:  Joints with no swelling or deformity. Skin:no rashes, ulcers, induration, no jaundice. Neuro: face symmetric, no focal deficits. Appropriate responses.        Lab Results   Component Value Date    CREATININE 6.7 (HH) 11/29/2022    BUN 51 (H) 11/29/2022     (L) 11/29/2022    K 5.0 11/29/2022    CL 95 (L) 11/29/2022    CO2 21 11/29/2022      Lab Results   Component Value Date    WBC 15.3 (H) 11/29/2022    HGB 10.4 (L) 11/29/2022    HCT 32.0 (L) 11/29/2022    MCV 94.2 11/29/2022     (L) 11/29/2022     Lab Results   Component Value Date    PTH 49.8 02/01/2019    CALCIUM 8.8 11/29/2022    PHOS 4.2 09/16/2022

## 2022-11-29 NOTE — ACP (ADVANCE CARE PLANNING)
Advance Care Planning     Advance Care Planning Activator (Inpatient)  Conversation Note      Date of ACP Conversation: 11/29/2022     Conversation Conducted with:  Healthcare Decision Maker: Next of Kin by law (only applies in absence of above) (name) Fab Alarcon    ACP Activator: Israel King RN      Health Care Decision Maker:     Current Designated Health Care Decision Maker:     Primary Decision Maker: Glory Yao - [phone]    Care Preferences    Ventilation: \"If you were in your present state of health and suddenly became very ill and were unable to breathe on your own, what would your preference be about the use of a ventilator (breathing machine) if it were available to you? \"      Would the patient desire the use of ventilator (breathing machine)?: yes    \"If your health worsens and it becomes clear that your chance of recovery is unlikely, what would your preference be about the use of a ventilator (breathing machine) if it were available to you? \"     Would the patient desire the use of ventilator (breathing machine)?: Unsure      Resuscitation  \"CPR works best to restart the heart when there is a sudden event, like a heart attack, in someone who is otherwise healthy. Unfortunately, CPR does not typically restart the heart for people who have serious health conditions or who are very sick. \"    \"In the event your heart stopped as a result of an underlying serious health condition, would you want attempts to be made to restart your heart (answer \"yes\" for attempt to resuscitate) or would you prefer a natural death (answer \"no\" for do not attempt to resuscitate)? \" yes       [] Yes   [x] No   Educated Patient / Christal Tucker regarding differences between Advance Directives and portable DNR orders.     Length of ACP Conversation in minutes:  5    Conversation Outcomes:  [x] ACP discussion completed  [] Existing advance directive reviewed with patient; no changes to patient's previously recorded wishes  [] New Advance Directive completed  [] Portable Do Not Rescitate prepared for Provider review and signature  [] POLST/POST/MOLST/MOST prepared for Provider review and signature      Follow-up plan:    [] Schedule follow-up conversation to continue planning  [] Referred individual to Provider for additional questions/concerns   [] Advised patient/agent/surrogate to review completed ACP document and update if needed with changes in condition, patient preferences or care setting    [] This note routed to one or more involved healthcare providers    Daryl HUANGN RN  Case Management  [phone]    Electronically signed by Daryl Pope RN on 11/29/2022 at 5:44 PM

## 2022-11-29 NOTE — PROGRESS NOTES
Patient alert and oriented x4  VS stable on room air  Denies any pain or discomfort  Patient worried about HD and TDC being infected    Patient assured and informed that the infected Tunneled CVAD will be removed. Patient agreeable. AM meds complete  Patient tolerated well  Patient left in bed  Call light within reach    No other needs voiced.

## 2022-11-29 NOTE — OR NURSING
Permacath aspirated and fentanyl given via this since the peripheral right wrist IV does not flush/aspirate

## 2022-11-29 NOTE — BRIEF OP NOTE
Brief Postoperative Note    Charly Paez  YOB: 1965  [id number]    Pre-operative Diagnosis: sepsis, bactermia, ESRD on HD    Post-operative Diagnosis: Same    Procedure: image guided removal of tunneled dialysis catheter and placement of non tunneled dialysis catheter    Anesthesia: Local    Surgeons/Assistants: Yisel Anderson MD    Estimated Blood Loss: less than 5    Complications: None    Specimens: Was Obtained: tunneled TDC tip sent for culture    Findings: successful removal of tunneled dialysis catheter with placement of new temp dialysis catheter in right IJ with catheter tip at cavoatrial junction. Catheter aspirated and flushed with heparin. Catheter OK for use.     Electronically signed by Yisel Anderson MD on 11/29/2022 at 11:45 AM

## 2022-11-29 NOTE — OR NURSING
New line in and being sutured, all ports aspirated and flushed easily; heparin flush instilled into red and blue line and end caps applied.   Catheter being sutured in place

## 2022-11-29 NOTE — FLOWSHEET NOTE
Treatment time: 2 hours 7 minutes  Net UF: 1284 ml     Pre weight: 68.1 kg   Post weight: 67 kg     Access used: [facility]  Access function: Good with -400 ml/min     Medications or blood products given: N/A     Regular outpatient schedule: TTS     Summary of response to treatment:    11/29/22 1609 11/29/22 1825   Vital Signs   BP (!) 154/68 (!) 85/56   Temp 97.7 °F (36.5 °C) 97.7 °F (36.5 °C)   Heart Rate (!) 101 (!) 112   Resp 18 18   Weight 150 lb 2.1 oz (68.1 kg) 147 lb 11.3 oz (67 kg)   Weight Method Bed scale Bed scale   Percent Weight Change 0.44 -1.62   Pain Assessment   Pain Assessment None - Denies Pain None - Denies Pain   Post-Hemodialysis Assessment   Post-Treatment Procedures  --  Blood returned;Catheter capped, clamped and heparinized x 2 ports   Machine Disinfection Process  --  Acid/Vinegar Clean;Bleach; Exterior Machine Disinfection   Rinseback Volume (ml)  --  200 ml   Blood Volume Processed (Liters)  --  46.4 l/min   Dialyzer Clearance  --  Lightly streaked   Duration of Treatment (minutes)  --  120 minutes   Hemodialysis Intake (ml)  --  400 ml   Hemodialysis Output (ml)  --  1684 ml   NET Removed (ml)  --  1284   Tolerated Treatment  --  Poor   Time Off  --  1825   Copy of dialysis treatment record placed in chart, to be scanned into EMR. Report called to Inderjit Gomez RN.

## 2022-11-29 NOTE — PLAN OF CARE
Problem: Discharge Planning  Goal: Discharge to home or other facility with appropriate resources  Outcome: Progressing  Flowsheets (Taken 11/29/2022 0900)  Discharge to home or other facility with appropriate resources:   Identify barriers to discharge with patient and caregiver   Arrange for needed discharge resources and transportation as appropriate   Identify discharge learning needs (meds, wound care, etc)     Problem: Skin/Tissue Integrity  Goal: Absence of new skin breakdown  Description: 1. Monitor for areas of redness and/or skin breakdown  2. Assess vascular access sites hourly  3. Every 4-6 hours minimum:  Change oxygen saturation probe site  4. Every 4-6 hours:  If on nasal continuous positive airway pressure, respiratory therapy assess nares and determine need for appliance change or resting period.   Outcome: Progressing     Problem: Safety - Adult  Goal: Free from fall injury  Outcome: Progressing     Problem: Respiratory - Adult  Goal: Achieves optimal ventilation and oxygenation  Outcome: Progressing  Flowsheets (Taken 11/29/2022 0900)  Achieves optimal ventilation and oxygenation: Assess for changes in respiratory status     Problem: Musculoskeletal - Adult  Goal: Return mobility to safest level of function  Outcome: Progressing  Flowsheets (Taken 11/29/2022 0900)  Return Mobility to Safest Level of Function: Assess patient stability and activity tolerance for standing, transferring and ambulating with or without assistive devices     Problem: Gastrointestinal - Adult  Goal: Minimal or absence of nausea and vomiting  Outcome: Progressing     Problem: Infection - Adult  Goal: Absence of infection at discharge  Outcome: Progressing  Flowsheets (Taken 11/29/2022 0900)  Absence of infection at discharge:   Assess and monitor for signs and symptoms of infection   Monitor lab/diagnostic results     Problem: Metabolic/Fluid and Electrolytes - Adult  Goal: Electrolytes maintained within normal limits  Outcome: Progressing  Flowsheets (Taken 11/29/2022 0900)  Electrolytes maintained within normal limits:   Monitor labs and assess patient for signs and symptoms of electrolyte imbalances   Fluid restriction as ordered   Administer electrolyte replacement as ordered   Monitor response to electrolyte replacements, including repeat lab results as appropriate   Instruct patient on fluid and nutrition restrictions as appropriate  Goal: Hemodynamic stability and optimal renal function maintained  Outcome: Progressing  Flowsheets (Taken 11/29/2022 0900)  Hemodynamic stability and optimal renal function maintained:   Monitor labs and assess for signs and symptoms of volume excess or deficit   Monitor intake, output and patient weight  Goal: Glucose maintained within prescribed range  Outcome: Progressing  Flowsheets (Taken 11/29/2022 0900)  Glucose maintained within prescribed range:   Monitor blood glucose as ordered   Assess for signs and symptoms of hyperglycemia and hypoglycemia     Problem: ABCDS Injury Assessment  Goal: Absence of physical injury  Outcome: Progressing     Problem: Nutrition Deficit:  Goal: Optimize nutritional status  Outcome: Progressing

## 2022-11-30 LAB
ANION GAP SERPL CALCULATED.3IONS-SCNC: 16 MMOL/L (ref 3–16)
BASOPHILS ABSOLUTE: 0 K/UL (ref 0–0.2)
BASOPHILS RELATIVE PERCENT: 0.2 %
BUN BLDV-MCNC: 34 MG/DL (ref 7–20)
CALCIUM SERPL-MCNC: 8.7 MG/DL (ref 8.3–10.6)
CHLORIDE BLD-SCNC: 95 MMOL/L (ref 99–110)
CO2: 23 MMOL/L (ref 21–32)
CREAT SERPL-MCNC: 5.6 MG/DL (ref 0.9–1.3)
EOSINOPHILS ABSOLUTE: 0.2 K/UL (ref 0–0.6)
EOSINOPHILS RELATIVE PERCENT: 1.2 %
GFR SERPL CREATININE-BSD FRML MDRD: 11 ML/MIN/{1.73_M2}
GLUCOSE BLD-MCNC: 103 MG/DL (ref 70–99)
GLUCOSE BLD-MCNC: 109 MG/DL (ref 70–99)
GLUCOSE BLD-MCNC: 160 MG/DL (ref 70–99)
GLUCOSE BLD-MCNC: 179 MG/DL (ref 70–99)
GLUCOSE BLD-MCNC: 72 MG/DL (ref 70–99)
GLUCOSE BLD-MCNC: 96 MG/DL (ref 70–99)
HCT VFR BLD CALC: 29.1 % (ref 40.5–52.5)
HEMATOLOGY PATH CONSULT: NO
HEMOGLOBIN: 9.5 G/DL (ref 13.5–17.5)
LYMPHOCYTES ABSOLUTE: 1.1 K/UL (ref 1–5.1)
LYMPHOCYTES RELATIVE PERCENT: 6.1 %
MCH RBC QN AUTO: 30.4 PG (ref 26–34)
MCHC RBC AUTO-ENTMCNC: 32.7 G/DL (ref 31–36)
MCV RBC AUTO: 93.1 FL (ref 80–100)
MONOCYTES ABSOLUTE: 2.2 K/UL (ref 0–1.3)
MONOCYTES RELATIVE PERCENT: 12.8 %
NEUTROPHILS ABSOLUTE: 13.8 K/UL (ref 1.7–7.7)
NEUTROPHILS RELATIVE PERCENT: 79.7 %
PDW BLD-RTO: 14.8 % (ref 12.4–15.4)
PERFORMED ON: ABNORMAL
PERFORMED ON: NORMAL
PERFORMED ON: NORMAL
PLATELET # BLD: 108 K/UL (ref 135–450)
PMV BLD AUTO: 10.7 FL (ref 5–10.5)
POTASSIUM SERPL-SCNC: 4.2 MMOL/L (ref 3.5–5.1)
RBC # BLD: 3.13 M/UL (ref 4.2–5.9)
SODIUM BLD-SCNC: 134 MMOL/L (ref 136–145)
WBC # BLD: 17.3 K/UL (ref 4–11)

## 2022-11-30 PROCEDURE — 6370000000 HC RX 637 (ALT 250 FOR IP): Performed by: HOSPITALIST

## 2022-11-30 PROCEDURE — 6360000002 HC RX W HCPCS: Performed by: INTERNAL MEDICINE

## 2022-11-30 PROCEDURE — 6360000002 HC RX W HCPCS: Performed by: HOSPITALIST

## 2022-11-30 PROCEDURE — 2580000003 HC RX 258: Performed by: INTERNAL MEDICINE

## 2022-11-30 PROCEDURE — 1200000000 HC SEMI PRIVATE

## 2022-11-30 PROCEDURE — 80048 BASIC METABOLIC PNL TOTAL CA: CPT

## 2022-11-30 PROCEDURE — 99233 SBSQ HOSP IP/OBS HIGH 50: CPT | Performed by: INTERNAL MEDICINE

## 2022-11-30 PROCEDURE — 2580000003 HC RX 258: Performed by: HOSPITALIST

## 2022-11-30 PROCEDURE — 85025 COMPLETE CBC W/AUTO DIFF WBC: CPT

## 2022-11-30 PROCEDURE — 36415 COLL VENOUS BLD VENIPUNCTURE: CPT

## 2022-11-30 PROCEDURE — 97530 THERAPEUTIC ACTIVITIES: CPT

## 2022-11-30 RX ADMIN — POLYETHYLENE GLYCOL 3350 17 G: 17 POWDER, FOR SOLUTION ORAL at 09:58

## 2022-11-30 RX ADMIN — ATORVASTATIN CALCIUM 40 MG: 40 TABLET, FILM COATED ORAL at 21:18

## 2022-11-30 RX ADMIN — METOPROLOL TARTRATE 75 MG: 50 TABLET, FILM COATED ORAL at 09:58

## 2022-11-30 RX ADMIN — INSULIN LISPRO 4 UNITS: 100 INJECTION, SOLUTION INTRAVENOUS; SUBCUTANEOUS at 09:59

## 2022-11-30 RX ADMIN — HEPARIN SODIUM 5000 UNITS: 5000 INJECTION INTRAVENOUS; SUBCUTANEOUS at 21:19

## 2022-11-30 RX ADMIN — ACETAMINOPHEN 650 MG: 325 TABLET ORAL at 21:19

## 2022-11-30 RX ADMIN — ACETAMINOPHEN 650 MG: 325 TABLET ORAL at 04:46

## 2022-11-30 RX ADMIN — HYDRALAZINE HYDROCHLORIDE 100 MG: 50 TABLET, FILM COATED ORAL at 21:18

## 2022-11-30 RX ADMIN — SUCRALFATE 1 G: 1 TABLET ORAL at 16:30

## 2022-11-30 RX ADMIN — PANTOPRAZOLE SODIUM 40 MG: 40 TABLET, DELAYED RELEASE ORAL at 05:15

## 2022-11-30 RX ADMIN — SODIUM CHLORIDE, PRESERVATIVE FREE 10 ML: 5 INJECTION INTRAVENOUS at 22:01

## 2022-11-30 RX ADMIN — SODIUM BICARBONATE 650 MG: 650 TABLET ORAL at 12:49

## 2022-11-30 RX ADMIN — ACETAMINOPHEN 650 MG: 325 TABLET ORAL at 16:32

## 2022-11-30 RX ADMIN — INSULIN LISPRO 4 UNITS: 100 INJECTION, SOLUTION INTRAVENOUS; SUBCUTANEOUS at 12:50

## 2022-11-30 RX ADMIN — SUCRALFATE 1 G: 1 TABLET ORAL at 12:49

## 2022-11-30 RX ADMIN — SUCRALFATE 1 G: 1 TABLET ORAL at 21:18

## 2022-11-30 RX ADMIN — SODIUM CHLORIDE, PRESERVATIVE FREE 10 ML: 5 INJECTION INTRAVENOUS at 12:50

## 2022-11-30 RX ADMIN — SODIUM BICARBONATE 650 MG: 650 TABLET ORAL at 16:30

## 2022-11-30 RX ADMIN — HYDRALAZINE HYDROCHLORIDE 100 MG: 50 TABLET, FILM COATED ORAL at 09:58

## 2022-11-30 RX ADMIN — PANTOPRAZOLE SODIUM 40 MG: 40 TABLET, DELAYED RELEASE ORAL at 16:31

## 2022-11-30 RX ADMIN — HYDRALAZINE HYDROCHLORIDE 100 MG: 50 TABLET, FILM COATED ORAL at 13:22

## 2022-11-30 RX ADMIN — CEFAZOLIN 1000 MG: 1 INJECTION, POWDER, FOR SOLUTION INTRAMUSCULAR; INTRAVENOUS at 22:00

## 2022-11-30 RX ADMIN — HEPARIN SODIUM 5000 UNITS: 5000 INJECTION INTRAVENOUS; SUBCUTANEOUS at 05:14

## 2022-11-30 RX ADMIN — ISOSORBIDE MONONITRATE 60 MG: 60 TABLET, EXTENDED RELEASE ORAL at 09:58

## 2022-11-30 RX ADMIN — SUCRALFATE 1 G: 1 TABLET ORAL at 05:16

## 2022-11-30 RX ADMIN — HEPARIN SODIUM 5000 UNITS: 5000 INJECTION INTRAVENOUS; SUBCUTANEOUS at 13:22

## 2022-11-30 RX ADMIN — CALCITRIOL 0.5 MCG: 0.25 CAPSULE ORAL at 09:58

## 2022-11-30 RX ADMIN — SODIUM BICARBONATE 650 MG: 650 TABLET ORAL at 09:59

## 2022-11-30 RX ADMIN — METOPROLOL TARTRATE 75 MG: 50 TABLET, FILM COATED ORAL at 21:18

## 2022-11-30 ASSESSMENT — PAIN DESCRIPTION - LOCATION: LOCATION: NECK

## 2022-11-30 ASSESSMENT — PAIN DESCRIPTION - DESCRIPTORS: DESCRIPTORS: ACHING

## 2022-11-30 ASSESSMENT — PAIN DESCRIPTION - ORIENTATION: ORIENTATION: RIGHT

## 2022-11-30 ASSESSMENT — PAIN SCALES - GENERAL: PAINLEVEL_OUTOF10: 7

## 2022-11-30 NOTE — PROGRESS NOTES
[facility]                                               Progress note    CC: fever, sepsis  Summary:   Bertha Andersen is being seen by nephrology for ERD. He is a 65 y.o. male with a PMH significant for ESRD, CHF, ADPKD, fungal peritonitis 2/2 PD catheter who presented to the ED 11/26/2022 with fever, hyperglycemia, and AMS. He was noted to be hyperkalemic on admission. Blood cx positive for MSSA    Interval History  Seen at bedside, sleeping comfortably. BP 1128/68  Dialyzed yesterday, 1 L UF done. Febrile with TMAX 100.8 F yesterday, trending down  WBC count higher today at 17.3    Plan:   - scheduling trouble coordinating dialysis early and getting the [facility] removed early. - will proceed with TDC removal now and have temp HD line placed now and plan for HD later today. - continue abx, repeat blood cx per ID to ensure blood cx clear before new TDC can be placed. Sarah Grady MD  [facility]  Office: [phone]    Assessment:   ESRD:  - on HD TTS at [facility]  - recent fungal PD peritonitis  - now access is a [facility] which might be the cause of MRSA bacteremia  - TDC was removed 11/29/2022 and a temp HD catheter was placed for continued dialysis while the infection is addressed  - EDW 69.5 kg    Hypertension:    Continue current medicatiosn    MSSA bacteremia:  - suspect the [facility] to be the source  - remove [facility] 11/29/2022  - on cefazolin      ROS:    Positives Listed Bold. All other remaining systems are negative. Constitutional:  fever, chills, weakness, weight change, fatigue,      Skin:  rash, pruritus, hair loss, bruising, dry skin, petechiae. Head, Face, Neck   headaches, swelling,  cervical adenopathy.      Respiratory: shortness of breath, cough, or wheezing  Cardiovascular: chest pain, palpitations, dizzy, edema  Gastrointestinal: nausea, vomiting, diarrhea, constipation,belly pain    Yellow skin, blood in stool  Musculoskeletal:  back pain, muscle weakness, gait problems, joint pain or swelling. Genitourinary:  dysuria, poor urine flow, flank pain, blood in urine  Neurologic:  vertigo, TIA'S, syncope, seizures, focal weakness  Psychosocial:  insomnia, anxiety, or depression. Additional positive findings: -     PMH:   Past medical history, surgical history, social history, family history are reviewed and updated as appropriate. Reviewed current medication list.   Allergies reviewed and updated as needed. PE:   Vitals:    11/30/22 0822   BP: 128/68   Pulse: (!) 101   Resp: 16   Temp: 99.1 °F (37.3 °C)   SpO2: 100%       General appearance: in NAD, fully alert and oriented. Comfortable. HEENT: EOM intact, no icterus. Trachea is midline. Neck : No masses, appears symmetrical, no JVD  Respiratory: Respiratory effort appears normal, bilateral equal chest rise, no wheeze, no crackles   Cardiovascular: Ausculation shows RRR no edema  Abdomen: No visible mass or tenderness, non distended. Musculoskeletal:  Joints with no swelling or deformity. Skin:no rashes, ulcers, induration, no jaundice. Neuro: face symmetric, no focal deficits. Appropriate responses.        Lab Results   Component Value Date    CREATININE 5.6 (HH) 11/30/2022    BUN 34 (H) 11/30/2022     (L) 11/30/2022    K 4.2 11/30/2022    CL 95 (L) 11/30/2022    CO2 23 11/30/2022      Lab Results   Component Value Date    WBC 17.3 (H) 11/30/2022    HGB 9.5 (L) 11/30/2022    HCT 29.1 (L) 11/30/2022    MCV 93.1 11/30/2022     (L) 11/30/2022     Lab Results   Component Value Date    PTH 49.8 02/01/2019    CALCIUM 8.7 11/30/2022    PHOS 4.2 09/16/2022

## 2022-11-30 NOTE — PROGRESS NOTES
Infectious Disease Follow up Notes  Admit Date: 11/26/2022  Hospital Day: 5    Antibiotics :   IV Cefazolin     CHIEF COMPLAINT:     Severe sepsis  Fevers   Staph aureus Bacteremia  ESRD  HD line infection       Subjective interval History :  65 y. o.man with a history of end-stage renal disease on hemodialysis, CHF, CVA, diabetes, hypercholesterolemia, hypertension, with some left weakness admitted to the hospital secondary to fever chills not. He felt cold and flulike symptoms ongoing for the past 4 to 5 days. T-max 100.3 since admission, labs indicate procalcitonin elevated 92.7 potassium 5 6 blood glucose of 500, WBC elevated 19.4 with left shift hemoglobin 10.7 blood cultures no reported to be Staph aureus 4/4 bottles MSSA, tested negative for rapid flu and COVID-19, CT head negative CT chest indicating focal consolidation right lower lobe, also pericardial effusion noted. We are consulted for IV abx recommendation.      Interval History : Ongoing fevers and sweats shortness of breath improving tolerating antibiotic therapy  Temp HD line in place c/o neck pain and WBC still elevation TIP cx with staph aureus consistent with LINE infection -     Past Medical History:    Past Medical History:   Diagnosis Date    Cardiomyopathy ([address]     CHF (congestive heart failure) (HCC)     CVA (cerebral infarction) 5/2015    Diabetes mellitus ([address]     Foot ulcer, left ([address] 1/14/2016    Gastroparesis     Hemod[facility] patient ([facility]     Hypercholesteremia     Hypertension     Kidney disease     Unspecified cerebral artery occlusion with cerebral infarction     5/15, 7/15 LEFT SIDE WEAKNESS       Past Surgical History:    Past Surgical History:   Procedure Laterality Date    CATHETER INSERTION N/A 10/17/2022    TUNNEL CATHETER PLACEMENT performed by Rafi Silvestre MD at [address] [facility] N/A 5/17/2021    COLONOSCOPY POLYPECTOMY SNARE/COLD BIOPSY performed by Shane Betancourt MD at [address] [place] [address] N/A 10/17/2022    PERITONEAL DIALYSIS CATHETER REMOVAL performed by Jon Smith MD at [address] [facility] N/A 10/29/2020    PLACEMENT OF A TUNNELED DIALYSIS CATHETER WITH FLEURO AND ULTRASOUND performed by Jon Smith MD at [place] [address] NONTUNNELED VASCULAR CATHETER  11/29/2022    IR NONTUNNELED VASCULAR CATHETER 11/29/2022 [facility]    LAPAROSCOPY INSERTION PERITONEAL CATHETER N/A 10/29/2020    LAPAROSCOPIC PERITONEAL DIALYSIS CATHETER PLACEMENT WITH FLEURO AND ULTRASOUND performed by Jon Smith MD at [address] [place] [address] N/A 56/41/3390    UMBILICAL HERNIA REPAIR performed by Jon Smith MD at [address] [facility] N/A 4/26/2021    ESOPHAGOGASTRODUODENOSCOPY performed by Shane Betancourt MD at [address] [facility] 9/13/2022    EGD DIAGNOSTIC ONLY performed by Curly Snow MD at [address] [facility]       Current Medications:    No outpatient medications have been marked as taking for the 11/26/22 encounter [facility] Encounter).        Allergies:  Doxazosin, Cefepime, and Coconut flavor    Immunizations :   Immunization History   Administered Date(s) Administered    COVID-19, PFIZER GRAY top, DO NOT Dilute, (age 15 y+), IM, 30 mcg/0.3 mL 06/04/2022    COVID-19, PFIZER PURPLE top, DILUTE for use, (age 15 y+), 30mcg/0.3mL 06/23/2021, 07/19/2021    Hepatitis A Adult (Havrix, Vaqta) 03/10/2021, 09/23/2021    Hepatitis B 05/06/2022    Hepatitis B Adult (Heplisav-b) 05/06/2022    Hepatitis B Adult (Recombivax HB) 11/05/2020    Hepatitis B vaccine 11/05/2020    Influenza Vaccine, unspecified formulation 12/05/2013    Influenza Virus Vaccine 12/05/2013, 12/04/2015, 11/05/2020, 10/11/2021    Influenza, FLUARIX, FLULAVAL, FLUZONE (age 10 mo+) AND AFLURIA, (age 1 y+), PF, 0.5mL 10/15/2018, 11/05/2020 Influenza, Triv, 3 Years and older, IM, PF (Afluria 5yrs and older) 10/11/2021    Pneumococcal Conjugate 13-valent ([id number]) 11/10/2020    Pneumococcal Conjugate Vaccine 02/13/2015    Pneumococcal Polysaccharide ([id number]) 11/02/2009, 03/10/2021    Tdap (Boostrix, Adacel) 08/27/2021    Zoster Recombinant (Shingrix) 03/10/2021, 08/27/2021       Social History:     Social History     Tobacco Use    Smoking status: Some Days     Packs/day: 0.00     Years: 30.00     Pack years: 0.00     Types: Cigars, Cigarettes     Start date: 1/3/1979     Last attempt to quit: 1/16/2019     Years since quitting: 3.8    Smokeless tobacco: Never    Tobacco comments:     3 black and milds a week   Vaping Use    Vaping Use: Never used   Substance Use Topics    Alcohol use: Yes     Comment: occasional    Drug use: Yes     Types: Marijuana Patelmagno Ocasio)     Comment: previously used cocaine, stopped May 2015 LAST USED MARIJUANA-NOVEMBER 2020     Social History     Tobacco Use   Smoking Status Some Days    Packs/day: 0.00    Years: 30.00    Pack years: 0.00    Types: Cigars, Cigarettes    Start date: 1/3/1979    Last attempt to quit: 1/16/2019    Years since quitting: 3.8   Smokeless Tobacco Never   Tobacco Comments    3 black and milds a week      Family History   Adopted: Yes          REVIEW OF SYSTEMS:      Constitutional:  r fevers,++  chills++ , night sweats  Eyes:  negative for blurred vision, eye discharge, visual disturbance   HEENT:  negative for hearing loss, ear drainage,nasal congestion  Respiratory:  negative for cough, shortness of breath or hemoptysis   Cardiovascular:  negative for chest pain, palpitations, syncope  Gastrointestinal:  negative for nausea, vomiting, diarrhea, constipation, abdominal pain  Genitourinary:  negative for frequency, dysuria, urinary incontinence, hematuria  Hematologic/Lymphatic:  negative for easy bruising, bleeding and lymphadenopathy  Allergic/Immunologic:  negative for recurrent infections, angioedema, anaphylaxis   Endocrine:  negative for weight changes, polyuria, polydipsia and polyphagia  Musculoskeletal:  negative for joint  pain, swelling, decreased range of motion  Integumentary: No rashes, skin lesions  Neurological:  negative for headaches, slurred speech, unilateral weakness  Psychiatric: negative for hallucinations,confusion,agitation.                 PHYSICAL EXAM:      Vitals:  T max  102.8   /68   Pulse (!) 101   Temp 99.1 °F (37.3 °C) (Oral)   Resp 16   Ht 6' 2\" (1.88 m)   Wt 146 lb 13.2 oz (66.6 kg)   SpO2 100%   BMI 18.85 kg/m²     General Appearance: alert,in some  acute distress,++  pallor, no icterus cachexia + chronic ill appearing man +    Skin: warm and dry, no rash or erythema  Head: normocephalic and atraumatic  Eyes: pupils equal, round, and reactive to light, conjunctivae normal  ENT: tympanic membrane, external ear and ear canal normal bilaterally, nose without deformity, nasal mucosa and turbinates normal without polyps  Neck: supple and non-tender without mass, no thyromegaly  no cervical lymphadenopathy  Pulmonary/Chest: clear to auscultation bilaterally- no wheezes, rales or rhonchi, normal air movement, no respiratory distress  Cardiovascular:  S1 and S2, esm+  murmurs, rubs, clicks, or gallops, no carotid bruits  Abdomen: soft, non-tender, non-distended, normal bowel sounds, no masses or organomegaly  Extremities: no cyanosis, clubbing or edema  Musculoskeletal: normal range of motion, no joint swelling, deformity or tenderness  Integumentary: No rashes, no abnormal skin lesions, no petechiae  Neurologic: reflexes normal and symmetric, no cranial nerve deficit  Psych:  Orientation, sensorium, mood normal            Lines: HD line +  Temp line on Rt chest wall    Data Review:    CBC:   Lab Results   Component Value Date    WBC 17.3 (H) 11/30/2022    HGB 9.5 (L) 11/30/2022    HCT 29.1 (L) 11/30/2022    MCV 93.1 11/30/2022     (L) 11/30/2022     RENAL: Lab Results   Component Value Date    CREATININE 5.6 (HH) 11/30/2022    BUN 34 (H) 11/30/2022     (L) 11/30/2022    K 4.2 11/30/2022    CL 95 (L) 11/30/2022    CO2 23 11/30/2022     SED RATE:   Lab Results   Component Value Date/Time    SEDRATE 64 10/26/2020 02:14 PM     CK: No results found for: CKTOTAL  CRP:   Lab Results   Component Value Date/Time    CRP 2.9 10/26/2020 02:14 PM     Hepatic Function Panel:   Lab Results   Component Value Date/Time    ALKPHOS 104 11/26/2022 09:26 AM    ALT 13 11/26/2022 09:26 AM    AST 12 11/26/2022 09:26 AM    PROT 8.0 11/26/2022 09:26 AM    BILITOT <0.2 11/26/2022 09:26 AM    BILIDIR <0.2 10/11/2022 02:41 PM    IBILI see below 10/11/2022 02:41 PM    LABALBU 3.5 11/26/2022 09:26 AM     UA:  Lab Results   Component Value Date/Time    COLORU Yellow 11/26/2022 01:32 PM    CLARITYU Clear 11/26/2022 01:32 PM    GLUCOSEU >=1000 11/26/2022 01:32 PM    GLUCOSEU 250 12/14/2010 02:50 PM    BILIRUBINUR Negative 11/26/2022 01:32 PM    BILIRUBINUR NEGATIVE 12/14/2010 02:50 PM    KETUA Negative 11/26/2022 01:32 PM    SPECGRAV 1.019 11/26/2022 01:32 PM    BLOODU TRACE 11/26/2022 01:32 PM    PHUR 7.5 11/26/2022 01:32 PM    PROTEINU 300 11/26/2022 01:32 PM    UROBILINOGEN 0.2 11/26/2022 01:32 PM    NITRU Negative 11/26/2022 01:32 PM    LEUKOCYTESUR Negative 11/26/2022 01:32 PM    LABMICR YES 11/26/2022 01:32 PM    URINETYPE Cleancatch 11/26/2022 01:32 PM      Urine Microscopic:   Lab Results   Component Value Date/Time    BACTERIA None Seen 11/26/2022 01:32 PM    COMU see below 05/05/2021 05:36 PM    HYALCAST 0 11/26/2022 01:32 PM    WBCUA 3 11/26/2022 01:32 PM    RBCUA 9 11/26/2022 01:32 PM    EPIU 0 11/26/2022 01:32 PM     Urine Reflex to Culture:   Lab Results   Component Value Date/Time    URRFLXCULT Not Indicated 11/26/2022 01:32 PM         MICRO: cultures reviewed and updated by me   Blood Culture:   Lab Results   Component Value Date/Time    [facility]  11/28/2022 06:59 AM     No Growth to date.  Any change in status will be called. BLOODCULT2  11/28/2022 06:59 AM     No Growth to date. Any change in status will be called. Respiratory Culture:  Lab Results   Component Value Date/Time    LABGRAM  10/16/2022 11:00 AM     No Epithelial Cells seen  No WBCs or organisms seen       AFB:No results found for: [facility]  Viral Culture:  Lab Results   Component Value Date/Time    COVID19 Not Detected 11/26/2022 09:26 AM     Urine Culture: No results for input(s): LABURIN in the last 72 hours. IMAGING:    IR REMOVE TUNNELED CVAD WO SQ PORT/PUMP   Final Result   Successful removal of tunneled dialysis catheter. Successful fluoroscopic guided non tunneled Trialysis catheter placement. IR NONTUNNELED VASCULAR CATHETER > 5 YEARS   Final Result   Successful removal of tunneled dialysis catheter. Successful fluoroscopic guided non tunneled Trialysis catheter placement. MRI BRAIN WO CONTRAST   Final Result   1. No acute intracranial abnormality. No acute infarct. 2. Mild global parenchymal volume loss with chronic microvascular ischemic   changes. 3. Small lipoma is again seen along the right dorsal midbrain. CT CHEST PULMONARY EMBOLISM W CONTRAST   Final Result   1. Focal consolidation in the right lower lobe favoring   infectious/inflammatory process. Recommend CT of the chest in 3 months to   confirm resolution. 2. Solid subcentimeter right pulmonary nodules can be assessed during the   same time. 3. Cardiomegaly with unchanged moderate to large pericardial effusion. 4. Age-indeterminate L1 compression fracture. If there is point tenderness,   recommend nonemergent MRI of the lumbar spine. CT HEAD WO CONTRAST   Final Result   1. No acute intracranial abnormality.          XR CHEST PORTABLE   Final Result   Stable increased opacity left basilar region compared to prior studies dating   back to 10/25/2020 consistent with pericardial effusion, loculated left   basilar pleural effusion, pulmonary consolidation or mass/neoplasm. 2 mm   opacity consistent with pulmonary nodule in the right basilar region and foci   of infiltrate or subsegmental atelectasis in the mid-lower lung fields noted   bilaterally. IV contrast-enhanced chest CT suggested for further evaluation. All the pertinent images and reports for the current Hospitalization were reviewed by me     Scheduled Meds:   sodium bicarbonate  650 mg Oral TID WC    ceFAZolin  1,000 mg IntraVENous Q24H    sodium chloride flush  5-40 mL IntraVENous 2 times per day    heparin (porcine)  5,000 Units SubCUTAneous 3 times per day    insulin lispro  0-8 Units SubCUTAneous TID WC    insulin lispro  0-4 Units SubCUTAneous Nightly    insulin lispro  0.05 Units/kg SubCUTAneous TID WC    insulin glargine  0.25 Units/kg SubCUTAneous Nightly    calcitRIOL  0.5 mcg Oral Daily    atorvastatin  40 mg Oral Nightly    metoprolol tartrate  75 mg Oral BID    [Held by provider] NIFEdipine  90 mg Oral BID    pantoprazole  40 mg Oral BID AC    sucralfate  1 g Oral 4x Daily AC & HS    hydrALAZINE  100 mg Oral TID    isosorbide mononitrate  60 mg Oral Daily       Continuous Infusions:   sodium chloride 40 mL (11/29/22 4293)    dextrose       Summary   Limited study. Overall left ventricular systolic function appears moderately reduced. Ejection fraction is visually estimated to be 35-40% with diffuse   hypokinesis. There is severely increased left ventricular wall thickness. Left ventricular cavity size is normal.   The right ventricle is not well visualized. Tricuspid aortic valve leaflets appear thickened/calcified but no clear   mobile structure to suggest a vegetation. There is a moderate pericardial effusion.       PRN Meds:  perflutren lipid microspheres, sodium chloride flush, sodium chloride, ondansetron **OR** ondansetron, polyethylene glycol, acetaminophen **OR** acetaminophen, glucose, dextrose bolus **OR** dextrose bolus, glucagon (rDNA), dextrose, meclizine, heparin (porcine)      Assessment:     Patient Active Problem List   Diagnosis    Nonischemic cardiomyopathy ([address]    Cerebral infarction ([address]    Cigarette nicotine dependence without complication    Erectile dysfunction due to arterial insufficiency    Renal artery stenosis (HCC)    Chronic diastolic congestive heart failure (HCC)    H/O: CVA (cerebrovascular accident)    Major depressive disorder, recurrent episode, moderate (HCC)    Pericardial effusion    Hypertension associated with diabetes ([address]    DM type 2 with diabetic mixed hyperlipidemia ([address]    Diabetes mellitus due to underlying condition with stage 4 chronic kidney disease, with long-term current use of insulin ([address]    ESRD (end stage renal disease) on dialysis ([address]    ESRD (end stage renal disease) ([address]    GI bleed    SBP (spontaneous bacterial peritonitis) ([address]    Dialysis-associated peritonitis ([address]    Candida infection    Generalized abdominal pain    PKD (polycystic kidney disease)    Bacterial pneumonia    Encephalopathy acute    Elevated lactic acid level    Hyperkalemia    Severe sepsis (HCC)    Staphylococcus aureus bacteremia with sepsis (HCC)    Neutrophilia    Elevated procalcitonin    ESRD needing dialysis ([address]     Sepsis  Staph aureus Bacteremia  MSSA+  ESRD on HD  HD line in place concern for Line infection   Cardiomyopathy   Pericardial effusion know to have this   WBC elevation   Procal elevation  DM+  Hyperkalemia  PKD  Fevers from above     Given the high grade Bacteremia and sepsis concern is for HD line infection - will need HD line removal and needs line Holiday and repeat Blood cx     TTE completed no vegetation -     Staph aureus MSSA high-grade bacteremia concern for for HD line infection  s/p  line removal and tip sent for cx in process with staph aureus sensitivities pending    WBC still high and fever spike from on going infection follow up blood cx in process needs to watch for any new complications       Labs, Microbiology, Radiology and all the pertinent results from current hospitalization and  care every where were reviewed  by me as a part of the evaluation   Plan:   IV Cefazolin x 1 gm q 24 HRS  Repeat blood cx in  process from 11/28 a  S/p  HD line removal   Send Tip for cx in process  staph aureus noted    TTE  -ve  Unfortunately PD catheter was infected  and now having problems with HD line infections  Needs to improve skin hygiene and catheter care  -   If more fevers needs to watch for complications      Discussed with patient/Family and Nursing  d/w Nephrology   Risk of Complications/Morbidity: High      Illness(es)/ Infection present that pose threat to bodily function. There is potential for severe exacerbation of infection/side effects of treatment. Therapy requires intensive monitoring for antimicrobial agent toxicity. Discussed with patient/Family and Nursing staff     Thanks for allowing me to participate in your patient's care and please call me with any questions or concerns.     Sharmin Howard MD  Infectious Disease  [facility] ([facility]) Physician  Phone: [phone]   Fax : [phone]

## 2022-11-30 NOTE — PROGRESS NOTES
Physical Therapy  Facility/Department: [facility]  Physical Therapy Treatment session  Name: Yadira Joe  : 1965  MRN: [id number]  Date of Service: 2022  Yadira Joe scored a 9/24 on the AM-PAC short mobility form. Current research shows that an AM-PAC score of 17 or less is typically not associated with a discharge to the patient's home setting. Based on the patient's AM-PAC score and their current functional mobility deficits, it is recommended that the patient have 3-5 sessions per week of Physical Therapy at d/c to increase the patient's independence. Please see assessment section for further patient specific details. If patient discharges prior to next session this note will serve as a discharge summary. Please see below for the latest assessment towards goals. Discharge Recommendations:  Continue to assess pending progress   PT Equipment Recommendations  Equipment Needed: No  Other: Pt. reports having a FWW at home      Patient Diagnosis(es): The primary encounter diagnosis was Encephalopathy acute. Diagnoses of ESRD needing dialysis ([address] Severe sepsis ([address] Hyperkalemia, Type 2 diabetes mellitus with hyperglycemia, with long-term current use of insulin (HCC), Elevated lactic acid level, Pneumonia of right lower lobe due to infectious organism, Pulmonary nodule, Closed compression fracture of L1 vertebra, initial encounter ([address] and Pericardial effusion were also pertinent to this visit. Past Medical History:  has a past medical history of Cardiomyopathy ([address] CHF (congestive heart failure) ([address] CVA (cerebral infarction), Diabetes mellitus ([address] Foot ulcer, left ([address] Gastroparesis, Hemodialysis patient ([address] Hypercholesteremia, Hypertension, Kidney disease, and Unspecified cerebral artery occlusion with cerebral infarction. Past Surgical History:  has a past surgical history that includes LAPAROSCOPY INSERTION PERITONEAL CATHETER (N/A, 10/29/2020);  Umbilical hernia repair (N/A, 10/29/2020); hc dialysis catheter (N/A, 10/29/2020); Upper gastrointestinal endoscopy (N/A, 4/26/2021); Colonoscopy (N/A, 5/17/2021); Upper gastrointestinal endoscopy (N/A, 9/13/2022); Dialysis Catheter Removal (N/A, 10/17/2022); Catheter Insertion (N/A, 10/17/2022); and IR NONTUNNELED VASCULAR CATHETER > 5 YEARS (11/29/2022). Assessment   Assessment: Pt. presents with Bacterial pneumonia and septic. Pt. also found to have a pericardial effusion. Pt. has an L1 comp fx of indeterminate age of which Pt. reports happened ~ 1 year ago when falling out of the back of a moving truck. Pt. also on HD. This date Pt. had very limited participation in therapy, warm to touch, confused, unable to tolerate sitting up. RN notified who is assessing the medical status of this patient. Will continue to follow. Therapy Prognosis: Good  Barriers to Learning: None  Activity Tolerance  Activity Tolerance: Patient limited by pain; Patient limited by endurance;Treatment limited secondary to decreased cognition;Treatment limited secondary to medical complications  Activity Tolerance Comments: Pt. limited participation today, increased confusion, Notified RNCameron who is going to assess him     Plan   Physcial Therapy Plan  General Plan: 3-5 times per week  Current Treatment Recommendations: Strengthening, Balance training, Functional mobility training, Gait training, Transfer training, Endurance training, Safety education & training, Home exercise program, Stair training, Patient/Caregiver education & training, Equipment evaluation, education, & procurement, Therapeutic activities  Safety Devices  Type of Devices:  All fall risk precautions in place, Bed alarm in place, Left in bed, Nurse notified, Patient at risk for falls  Restraints  Restraints Initially in Place: No     Restrictions  Restrictions/Precautions  Restrictions/Precautions: Fall Risk, Contact Precautions (MRSA, fall risk)     Subjective General  Chart Reviewed: Yes  Response To Previous Treatment: Patient with no complaints from previous session. Family / Caregiver Present: No  Diagnosis: Bacterial pneumonia  Follows Commands: Impaired  Other (Comment): Pt. arouses, eyes open but confused, limited command following  General Comment  Comments: Supine in bed. unable to follow through with PT directions         Social/Functional History  Social/Functional History  Lives With: Spouse (2 dogs)  Type of Home: House  Home Layout: One level  Home Access: Level entry  Bathroom Shower/Tub: Walk-in shower  Bathroom Toilet: Standard  Bathroom Equipment: Grab bars in shower, Built-in shower seat  Home Equipment: Lyndia Seat, rolling  ADL Assistance: Independent  Ambulation Assistance: Independent (with [facility])  Transfer Assistance: Independent  Active : No  Patient's  Info: wife drives   Type of Occupation:  retiree  Additional Comments: Wife works from home, wife cooks  Vision/Hearing       Cognition   Orientation  Overall Orientation Status: Impaired  Cognition  Arousal/Alertness: Inconsistent responses to stimuli  Following Commands: Inconsistently follows commands  Attention Span: Difficulty attending to directions; Difficulty dividing attention  Memory: Decreased recall of recent events  Safety Judgement: Decreased awareness of need for safety;Decreased awareness of need for assistance  Problem Solving: Decreased awareness of errors  Insights: Decreased awareness of deficits  Initiation: Requires cues for some  Sequencing: Requires cues for some  Cognition Comment: Decrease in cognition this date     Objective                              Bed mobility  Rolling to Left: Maximum assistance  Rolling to Right: Maximum assistance  Supine to Sit: Maximum assistance  Sit to Supine:  Moderate assistance  Scooting: Dependent/Total;2 Person assistance (Scootin up toward top of bed after repeatative attempts to get Pt. to participate, Pt unable.)  Bed Mobility Comments: Repeatative cues to get Pt. to arouse and participate. Pt. unable to follow through with cues,  Sat on the EOB very briefly but Pt. pushing posteriorly. Reports room spinning and dneeded to lie down. Transfers  Comment: Unable this date  Ambulation  Assistance: Unable to assess (d/t medical status this date)     Balance  Sitting - Static: Poor  Sitting - Dynamic: Poor  Standing - Static: Poor  Standing - Dynamic: Poor  Comments: Limited by medical status this date                                                           AM-PAC Score  AM-PAC Inpatient Mobility Raw Score : 9 (11/30/22 1419)  AM-PAC Inpatient T-Scale Score : 30.55 (11/30/22 1419)  Mobility Inpatient CMS 0-100% Score: 81.38 (11/30/22 1419)  Mobility Inpatient CMS G-Code Modifier : CM (11/30/22 1419)                 Goals  Short Term Goals  Time Frame for Short Term Goals: By d/c - Ongoing 11/30  Short Term Goal 1: Supine to/from sit with Supervision  Short Term Goal 2: Sit to/from stand with Supervision  Short Term Goal 3: Amb.  x 48' with FWW and Supervision  Patient Goals   Patient Goals : Return home - Pt adamant about not going to a rehab facility       Education  Patient Education  Education Given To: Patient  Education Provided: Role of Therapy;Plan of Care;Transfer Training  Education Method: Verbal  Barriers to Learning: Cognition  Education Outcome: Verbalized understanding;Demonstrated understanding;Continued education needed      Therapy Time   Individual Concurrent Group Co-treatment   Time In 1400         Time Out 1415         Minutes 15         Timed Code Treatment Minutes: 900 [address], [id number]

## 2022-11-30 NOTE — PROGRESS NOTES
Hospitalist Progress Note      PCP: No primary care provider on file. Date of Admission: 11/26/2022    Chief Complaint: Infected dialysis line    Hospital Course:      Subjective: Fever last night      Medications:  Reviewed    Infusion Medications    sodium chloride 40 mL (11/29/22 2634)    dextrose       Scheduled Medications    sodium bicarbonate  650 mg Oral TID WC    ceFAZolin  1,000 mg IntraVENous Q24H    sodium chloride flush  5-40 mL IntraVENous 2 times per day    heparin (porcine)  5,000 Units SubCUTAneous 3 times per day    insulin lispro  0-8 Units SubCUTAneous TID WC    insulin lispro  0-4 Units SubCUTAneous Nightly    insulin lispro  0.05 Units/kg SubCUTAneous TID WC    insulin glargine  0.25 Units/kg SubCUTAneous Nightly    calcitRIOL  0.5 mcg Oral Daily    atorvastatin  40 mg Oral Nightly    metoprolol tartrate  75 mg Oral BID    [Held by provider] NIFEdipine  90 mg Oral BID    pantoprazole  40 mg Oral BID AC    sucralfate  1 g Oral 4x Daily AC & HS    hydrALAZINE  100 mg Oral TID    isosorbide mononitrate  60 mg Oral Daily     PRN Meds: perflutren lipid microspheres, sodium chloride flush, sodium chloride, ondansetron **OR** ondansetron, polyethylene glycol, acetaminophen **OR** acetaminophen, glucose, dextrose bolus **OR** dextrose bolus, glucagon (rDNA), dextrose, meclizine, heparin (porcine)      Intake/Output Summary (Last 24 hours) at 11/30/2022 1428  Last data filed at 11/30/2022 1125  Gross per 24 hour   Intake 640 ml   Output 1684 ml   Net -1044 ml         Exam:    BP (!) 156/84   Pulse 96   Temp 98.7 °F (37.1 °C) (Oral)   Resp 16   Ht 6' 2\" (1.88 m)   Wt 146 lb 13.2 oz (66.6 kg)   SpO2 98%   BMI 18.85 kg/m²     General appearance: No apparent distress, appears stated age and cooperative. Protein calorie malnutrition  HEENT: Pupils equal, round, and reactive to light. Conjunctivae/corneas clear. Neck: Supple, with full range of motion. No jugular venous distention.  Trachea midline. Respiratory:  Normal respiratory effort. Clear to auscultation, bilaterally without Rales/Wheezes/Rhonchi. Cardiovascular: Regular rate and rhythm with normal S1/S2 without murmurs, rubs or gallops. Abdomen: Soft, non-tender, non-distended with normal bowel sounds. Musculoskeletal: No clubbing, cyanosis or edema bilaterally. Full range of motion without deformity. Skin: Skin color, texture, turgor normal.  No rashes or lesions. Neurologic:  Neurovascularly intact without any focal sensory/motor deficits. Cranial nerves: II-XII intact, grossly non-focal.  Psychiatric: Alert and oriented, thought content appropriate, normal insight  Capillary Refill: Brisk,< 3 seconds   Peripheral Pulses: +2 palpable, equal bilaterally       Labs:   Recent Labs     11/28/22 0659 11/29/22 0643 11/30/22  0643   WBC 15.2* 15.3* 17.3*   HGB 10.1* 10.4* 9.5*   HCT 31.3* 32.0* 29.1*   PLT 99* 103* 108*       Recent Labs     11/28/22  0659 11/29/22  0643 11/30/22  0643    133* 134*   K 4.6 5.0 4.2   CL 98* 95* 95*   CO2 20* 21 23   BUN 40* 51* 34*   CREATININE 6.1* 6.7* 5.6*   CALCIUM 9.2 8.8 8.7       No results for input(s): AST, ALT, BILIDIR, BILITOT, ALKPHOS in the last 72 hours. No results for input(s): INR in the last 72 hours. No results for input(s): Neli Macleod in the last 72 hours. Urinalysis:      Lab Results   Component Value Date/Time    NITRU Negative 11/26/2022 01:32 PM    WBCUA 3 11/26/2022 01:32 PM    BACTERIA None Seen 11/26/2022 01:32 PM    RBCUA 9 11/26/2022 01:32 PM    BLOODU TRACE 11/26/2022 01:32 PM    SPECGRAV 1.019 11/26/2022 01:32 PM    GLUCOSEU >=1000 11/26/2022 01:32 PM    GLUCOSEU 250 12/14/2010 02:50 PM       Radiology:  IR REMOVE TUNNELED CVAD WO SQ PORT/PUMP   Final Result   Successful removal of tunneled dialysis catheter. Successful fluoroscopic guided non tunneled Trialysis catheter placement.          IR NONTUNNELED VASCULAR CATHETER > 5 YEARS   Final Result Successful removal of tunneled dialysis catheter. Successful fluoroscopic guided non tunneled Trialysis catheter placement. MRI BRAIN WO CONTRAST   Final Result   1. No acute intracranial abnormality. No acute infarct. 2. Mild global parenchymal volume loss with chronic microvascular ischemic   changes. 3. Small lipoma is again seen along the right dorsal midbrain. CT CHEST PULMONARY EMBOLISM W CONTRAST   Final Result   1. Focal consolidation in the right lower lobe favoring   infectious/inflammatory process. Recommend CT of the chest in 3 months to   confirm resolution. 2. Solid subcentimeter right pulmonary nodules can be assessed during the   same time. 3. Cardiomegaly with unchanged moderate to large pericardial effusion. 4. Age-indeterminate L1 compression fracture. If there is point tenderness,   recommend nonemergent MRI of the lumbar spine. CT HEAD WO CONTRAST   Final Result   1. No acute intracranial abnormality. XR CHEST PORTABLE   Final Result   Stable increased opacity left basilar region compared to prior studies dating   back to 10/25/2020 consistent with pericardial effusion, loculated left   basilar pleural effusion, pulmonary consolidation or mass/neoplasm. 2 mm   opacity consistent with pulmonary nodule in the right basilar region and foci   of infiltrate or subsegmental atelectasis in the mid-lower lung fields noted   bilaterally. IV contrast-enhanced chest CT suggested for further evaluation.                  Assessment/Plan:    Active Hospital Problems    Diagnosis Date Noted    Encephalopathy acute [G93.40] 11/28/2022     Priority: Medium    Elevated lactic acid level [R79.89] 11/28/2022     Priority: Medium    Hyperkalemia [E87.5] 11/28/2022     Priority: Medium    Severe sepsis ([address] [A41.9, R65.20] 11/28/2022     Priority: Medium    Staphylococcus aureus bacteremia with sepsis ([address] [A41.01] 11/28/2022     Priority: Medium    Neutrophilia [D72.9] 11/28/2022     Priority: Medium    Elevated procalcitonin [R79.89] 11/28/2022     Priority: Medium    ESRD needing dialysis ([facility] [N18.6, Z99.2] 11/28/2022     Priority: Medium    Bacterial pneumonia [J15.9] 11/26/2022     Priority: Medium       -MSSA bacteremia with sepsis--concern for tunneled dialysis catheter infection-- vancomycin Binany Navarrogayathri de-escalated to cefazolin 11/27--continue management per ID--follow-up on cultures. .   -TDC removed, temporary HD line placed. Follow-up blood cultures for temporary catheter prior to placing new TDC     -Right lower lobe bacterial pneumonia. .  CT chest reviewed continue current antibiotics per ID     -Sepsis due to bacteremia /pneumonia-patient with fever, sinus tachycardia, leukocytosis, lactic acidosis--continue antibiotics as above, follow-up on cultures     -Chronic stable moderate pericardial effusion--evident on prior echos and current CT chest... Limited echo pending     -Chronic systolic/ diastolic heart failure--Echo 2/2022 showed grade 2 diastolic dysfunction, EF 07%, moderate pericardial effusion--repeat echo pending     -ESRD--on hemodialysis TTS--continue management per nephrology. .  Patient has tunneled dialysis catheter. .  Previously on PD but discontinued due to fungal peritonitis     -Anion Metabolic acidosis due to CKD/lactic acidosis--ketones negative--monitor labs-continue oral bicarb     -Hyperkalemia--resolved with hemodialysis     -Hyponatremia--corrected sodium within normal range on presentation     -DM type II uncontrolled  with severe hyperglycemia on presentation-patient was noncompliant with insulin due to acute illness-hemoglobin A1c is 7.2..   Hyperglycemia is better- continue current basal bolus insulin regimen     -Essential hypertension---stable-on nifedipine, hydralazine,imdur - Aldactone/losartan held due to hyperkalemia--also held nifedipine/Imdur this a.m. due to low BPs--continue to monitor for now     -Hyperlipidemia-continue statin     -Dizziness /vertigo-MRI brain is negative-currently asymptomatic-PT and OT eval pending     -Acute encephalopathy likely due to sepsis--resolved -lethargic on presentation to the ED--continue to monitor    DVT Prophylaxis: Heparin  Diet: ADULT ORAL NUTRITION SUPPLEMENT; Breakfast, Dinner; Renal Oral Supplement  ADULT DIET; Regular; 4 carb choices (60 gm/meal); No Added Salt (3-4 gm); Low Potassium (Less than 3000 mg/day);  Low Phosphorus (Less than 1000 mg); 2000 ml  Code Status: Full Code    PT/OT Eval Status: N/A    Dispo -MedSurg; still having fevers, defer to ID when to place flaca Vang MD

## 2022-11-30 NOTE — PLAN OF CARE
Problem: Discharge Planning  Goal: Discharge to home or other facility with appropriate resources  Outcome: Progressing  Flowsheets (Taken 11/30/2022 0809)  Discharge to home or other facility with appropriate resources:   Identify barriers to discharge with patient and caregiver   Arrange for needed discharge resources and transportation as appropriate   Identify discharge learning needs (meds, wound care, etc)     Problem: Skin/Tissue Integrity  Goal: Absence of new skin breakdown  Description: 1. Monitor for areas of redness and/or skin breakdown  2. Assess vascular access sites hourly  3. Every 4-6 hours minimum:  Change oxygen saturation probe site  4. Every 4-6 hours:  If on nasal continuous positive airway pressure, respiratory therapy assess nares and determine need for appliance change or resting period.   Outcome: Progressing     Problem: Safety - Adult  Goal: Free from fall injury  11/30/2022 1519 by Dawson Vizcaino RN  Outcome: Progressing     Problem: Respiratory - Adult  Goal: Achieves optimal ventilation and oxygenation  Outcome: Progressing  Flowsheets (Taken 11/30/2022 0809)  Achieves optimal ventilation and oxygenation:   Assess for changes in respiratory status   Assess for changes in mentation and behavior   Position to facilitate oxygenation and minimize respiratory effort     Problem: Musculoskeletal - Adult  Goal: Return mobility to safest level of function  Outcome: Progressing  Flowsheets (Taken 11/30/2022 0809)  Return Mobility to Safest Level of Function:   Assess patient stability and activity tolerance for standing, transferring and ambulating with or without assistive devices   Assist with transfers and ambulation using safe patient handling equipment as needed     Problem: Gastrointestinal - Adult  Goal: Minimal or absence of nausea and vomiting  Outcome: Progressing     Problem: Infection - Adult  Goal: Absence of infection at discharge  Outcome: Progressing  Flowsheets (Taken 11/30/2022 0809)  Absence of infection at discharge:   Assess and monitor for signs and symptoms of infection   Monitor lab/diagnostic results     Problem: Metabolic/Fluid and Electrolytes - Adult  Goal: Electrolytes maintained within normal limits  Outcome: Progressing  Flowsheets (Taken 11/30/2022 0809)  Electrolytes maintained within normal limits:   Monitor labs and assess patient for signs and symptoms of electrolyte imbalances   Administer electrolyte replacement as ordered   Monitor response to electrolyte replacements, including repeat lab results as appropriate   Fluid restriction as ordered  Goal: Hemodynamic stability and optimal renal function maintained  Outcome: Progressing  Flowsheets (Taken 11/30/2022 0809)  Hemodynamic stability and optimal renal function maintained:   Monitor labs and assess for signs and symptoms of volume excess or deficit   Monitor intake, output and patient weight  Goal: Glucose maintained within prescribed range  Outcome: Progressing  Flowsheets (Taken 11/30/2022 0809)  Glucose maintained within prescribed range:   Monitor blood glucose as ordered   Assess for signs and symptoms of hyperglycemia and hypoglycemia     Problem: ABCDS Injury Assessment  Goal: Absence of physical injury  Outcome: Progressing     Problem: Nutrition Deficit:  Goal: Optimize nutritional status  Outcome: Progressing

## 2022-11-30 NOTE — PLAN OF CARE
Problem: Safety - Adult  Goal: Free from fall injury  11/30/2022 0333 by Michaelle Hawkins, RN  Outcome: Progressing  Note: D: Pt.  Alert and oriented x4 calls for assist as needed  A: reminded to call for assist as needed  R: without falls this shift

## 2022-12-01 PROBLEM — E43 SEVERE MALNUTRITION (HCC): Chronic | Status: ACTIVE | Noted: 2022-12-01

## 2022-12-01 LAB
ALBUMIN SERPL-MCNC: 3 G/DL (ref 3.4–5)
ANION GAP SERPL CALCULATED.3IONS-SCNC: 17 MMOL/L (ref 3–16)
BUN BLDV-MCNC: 47 MG/DL (ref 7–20)
CALCIUM SERPL-MCNC: 9.1 MG/DL (ref 8.3–10.6)
CHLORIDE BLD-SCNC: 97 MMOL/L (ref 99–110)
CO2: 21 MMOL/L (ref 21–32)
CREAT SERPL-MCNC: 7.4 MG/DL (ref 0.9–1.3)
CULTURE CATHETER TIP: ABNORMAL
GFR SERPL CREATININE-BSD FRML MDRD: 8 ML/MIN/{1.73_M2}
GLUCOSE BLD-MCNC: 138 MG/DL (ref 70–99)
GLUCOSE BLD-MCNC: 140 MG/DL (ref 70–99)
GLUCOSE BLD-MCNC: 141 MG/DL (ref 70–99)
GLUCOSE BLD-MCNC: 158 MG/DL (ref 70–99)
GLUCOSE BLD-MCNC: 171 MG/DL (ref 70–99)
GLUCOSE BLD-MCNC: 214 MG/DL (ref 70–99)
HCT VFR BLD CALC: 31 % (ref 40.5–52.5)
HEMOGLOBIN: 9.8 G/DL (ref 13.5–17.5)
MCH RBC QN AUTO: 30 PG (ref 26–34)
MCHC RBC AUTO-ENTMCNC: 31.8 G/DL (ref 31–36)
MCV RBC AUTO: 94.3 FL (ref 80–100)
ORGANISM: ABNORMAL
PDW BLD-RTO: 14.9 % (ref 12.4–15.4)
PERFORMED ON: ABNORMAL
PHOSPHORUS: 3.4 MG/DL (ref 2.5–4.9)
PLATELET # BLD: 177 K/UL (ref 135–450)
PMV BLD AUTO: 12.2 FL (ref 5–10.5)
POTASSIUM SERPL-SCNC: 4 MMOL/L (ref 3.5–5.1)
RBC # BLD: 3.28 M/UL (ref 4.2–5.9)
SODIUM BLD-SCNC: 135 MMOL/L (ref 136–145)
WBC # BLD: 16.3 K/UL (ref 4–11)

## 2022-12-01 PROCEDURE — 85027 COMPLETE CBC AUTOMATED: CPT

## 2022-12-01 PROCEDURE — 36415 COLL VENOUS BLD VENIPUNCTURE: CPT

## 2022-12-01 PROCEDURE — 80069 RENAL FUNCTION PANEL: CPT

## 2022-12-01 PROCEDURE — 6370000000 HC RX 637 (ALT 250 FOR IP): Performed by: NURSE PRACTITIONER

## 2022-12-01 PROCEDURE — 97530 THERAPEUTIC ACTIVITIES: CPT

## 2022-12-01 PROCEDURE — 2580000003 HC RX 258: Performed by: HOSPITALIST

## 2022-12-01 PROCEDURE — 94760 N-INVAS EAR/PLS OXIMETRY 1: CPT

## 2022-12-01 PROCEDURE — 6360000002 HC RX W HCPCS: Performed by: INTERNAL MEDICINE

## 2022-12-01 PROCEDURE — 6370000000 HC RX 637 (ALT 250 FOR IP): Performed by: HOSPITALIST

## 2022-12-01 PROCEDURE — 97535 SELF CARE MNGMENT TRAINING: CPT

## 2022-12-01 PROCEDURE — 6360000002 HC RX W HCPCS: Performed by: HOSPITALIST

## 2022-12-01 PROCEDURE — 99233 SBSQ HOSP IP/OBS HIGH 50: CPT | Performed by: INTERNAL MEDICINE

## 2022-12-01 PROCEDURE — 1200000000 HC SEMI PRIVATE

## 2022-12-01 PROCEDURE — 2580000003 HC RX 258: Performed by: INTERNAL MEDICINE

## 2022-12-01 PROCEDURE — 6360000002 HC RX W HCPCS: Performed by: STUDENT IN AN ORGANIZED HEALTH CARE EDUCATION/TRAINING PROGRAM

## 2022-12-01 PROCEDURE — 97116 GAIT TRAINING THERAPY: CPT

## 2022-12-01 PROCEDURE — 90935 HEMODIALYSIS ONE EVALUATION: CPT

## 2022-12-01 RX ORDER — CHLORPROMAZINE HYDROCHLORIDE 25 MG/1
50 TABLET, FILM COATED ORAL ONCE
Status: COMPLETED | OUTPATIENT
Start: 2022-12-01 | End: 2022-12-01

## 2022-12-01 RX ORDER — GABAPENTIN 100 MG/1
100 CAPSULE ORAL ONCE
Status: COMPLETED | OUTPATIENT
Start: 2022-12-01 | End: 2022-12-01

## 2022-12-01 RX ORDER — HEPARIN SODIUM 1000 [USP'U]/ML
2400 INJECTION, SOLUTION INTRAVENOUS; SUBCUTANEOUS PRN
Status: DISCONTINUED | OUTPATIENT
Start: 2022-12-01 | End: 2022-12-07 | Stop reason: HOSPADM

## 2022-12-01 RX ADMIN — ATORVASTATIN CALCIUM 40 MG: 40 TABLET, FILM COATED ORAL at 20:27

## 2022-12-01 RX ADMIN — CHLORPROMAZINE HYDROCHLORIDE 50 MG: 25 TABLET, FILM COATED ORAL at 23:29

## 2022-12-01 RX ADMIN — SUCRALFATE 1 G: 1 TABLET ORAL at 17:50

## 2022-12-01 RX ADMIN — GABAPENTIN 100 MG: 100 CAPSULE ORAL at 23:29

## 2022-12-01 RX ADMIN — ISOSORBIDE MONONITRATE 60 MG: 60 TABLET, EXTENDED RELEASE ORAL at 09:25

## 2022-12-01 RX ADMIN — PANTOPRAZOLE SODIUM 40 MG: 40 TABLET, DELAYED RELEASE ORAL at 17:50

## 2022-12-01 RX ADMIN — HYDRALAZINE HYDROCHLORIDE 100 MG: 50 TABLET, FILM COATED ORAL at 09:25

## 2022-12-01 RX ADMIN — METOPROLOL TARTRATE 75 MG: 50 TABLET, FILM COATED ORAL at 09:25

## 2022-12-01 RX ADMIN — SODIUM BICARBONATE 650 MG: 650 TABLET ORAL at 12:51

## 2022-12-01 RX ADMIN — HEPARIN SODIUM 5000 UNITS: 5000 INJECTION INTRAVENOUS; SUBCUTANEOUS at 05:40

## 2022-12-01 RX ADMIN — SODIUM BICARBONATE 650 MG: 650 TABLET ORAL at 17:50

## 2022-12-01 RX ADMIN — SODIUM BICARBONATE 650 MG: 650 TABLET ORAL at 09:25

## 2022-12-01 RX ADMIN — INSULIN GLARGINE 18 UNITS: 100 INJECTION, SOLUTION SUBCUTANEOUS at 02:53

## 2022-12-01 RX ADMIN — INSULIN LISPRO 4 UNITS: 100 INJECTION, SOLUTION INTRAVENOUS; SUBCUTANEOUS at 12:51

## 2022-12-01 RX ADMIN — ONDANSETRON 4 MG: 2 INJECTION INTRAMUSCULAR; INTRAVENOUS at 05:40

## 2022-12-01 RX ADMIN — CEFAZOLIN 1000 MG: 1 INJECTION, POWDER, FOR SOLUTION INTRAMUSCULAR; INTRAVENOUS at 20:27

## 2022-12-01 RX ADMIN — SUCRALFATE 1 G: 1 TABLET ORAL at 09:25

## 2022-12-01 RX ADMIN — SUCRALFATE 1 G: 1 TABLET ORAL at 20:27

## 2022-12-01 RX ADMIN — INSULIN GLARGINE 18 UNITS: 100 INJECTION, SOLUTION SUBCUTANEOUS at 20:41

## 2022-12-01 RX ADMIN — HYDRALAZINE HYDROCHLORIDE 100 MG: 50 TABLET, FILM COATED ORAL at 20:28

## 2022-12-01 RX ADMIN — SODIUM CHLORIDE, PRESERVATIVE FREE 10 ML: 5 INJECTION INTRAVENOUS at 20:27

## 2022-12-01 RX ADMIN — INSULIN LISPRO 2 UNITS: 100 INJECTION, SOLUTION INTRAVENOUS; SUBCUTANEOUS at 12:51

## 2022-12-01 RX ADMIN — INSULIN LISPRO 4 UNITS: 100 INJECTION, SOLUTION INTRAVENOUS; SUBCUTANEOUS at 17:50

## 2022-12-01 RX ADMIN — SUCRALFATE 1 G: 1 TABLET ORAL at 05:40

## 2022-12-01 RX ADMIN — HEPARIN SODIUM 5000 UNITS: 5000 INJECTION INTRAVENOUS; SUBCUTANEOUS at 20:42

## 2022-12-01 RX ADMIN — SODIUM CHLORIDE, PRESERVATIVE FREE 10 ML: 5 INJECTION INTRAVENOUS at 09:36

## 2022-12-01 RX ADMIN — PANTOPRAZOLE SODIUM 40 MG: 40 TABLET, DELAYED RELEASE ORAL at 05:40

## 2022-12-01 RX ADMIN — CALCITRIOL 0.5 MCG: 0.25 CAPSULE ORAL at 09:25

## 2022-12-01 RX ADMIN — HEPARIN SODIUM 2400 UNITS: 1000 INJECTION INTRAVENOUS; SUBCUTANEOUS at 17:05

## 2022-12-01 RX ADMIN — METOPROLOL TARTRATE 75 MG: 50 TABLET, FILM COATED ORAL at 20:27

## 2022-12-01 ASSESSMENT — PAIN SCALES - GENERAL: PAINLEVEL_OUTOF10: 0

## 2022-12-01 NOTE — PROGRESS NOTES
Physical Therapy  Facility/Department: [address] MED SURG  Daily Treatment Note  NAME: Nsih Magallon  : 1965  MRN: [id number]    Date of Service: 2022    Discharge Recommendations:  24 hour supervision or assist, Home with Home health PT   PT Equipment Recommendations  Equipment Needed: No  Other: Pt. reports having a FWW at home    [facility] 6 Clicks Inpatient Mobility:  [address]   How much difficulty turning over in bed?: A Little  How much difficulty sitting down on / standing up from a chair with arms?: A Little  How much difficulty moving from lying on back to sitting on side of bed?: A Lot  How much help from another person moving to and from a bed to a chair?: A Little  How much help from another person needed to walk in hospital room?: A Little  How much help from another person for climbing 3-5 steps with a railing?: A Lot  AM-PAC Inpatient Mobility Raw Score : 16  AM-PAC Inpatient T-Scale Score : 40.78  Mobility Inpatient CMS 0-100% Score: 54.16  Mobility Inpatient CMS G-Code Modifier : CK    Patient Diagnosis(es): The primary encounter diagnosis was Encephalopathy acute. Diagnoses of ESRD needing dialysis ([facility] Severe sepsis ([facility] Hyperkalemia, Type 2 diabetes mellitus with hyperglycemia, with long-term current use of insulin (HCC), Elevated lactic acid level, Pneumonia of right lower lobe due to infectious organism, Pulmonary nodule, Closed compression fracture of L1 vertebra, initial encounter ([facility] and Pericardial effusion were also pertinent to this visit. Assessment   Assessment: Pt required min for transfers and ambulation with RW this date and cues for safety. Pt fatigues quickly and requires rest breaks between activities. Will continue to progress mobility as pt tolerates. Pt adamant about going home and reports wife able to assist. Pt will require 24hr physical assist and level 3 home PT.   Activity Tolerance: Patient limited by endurance  Equipment Needed: No  Other: Pt. reports having a FWW at home     [address]: 3-5 times per week  Current Treatment Recommendations: Strengthening;Balance training;Functional mobility training;Gait training;Transfer training; Endurance training; Safety education & training;Home exercise program;Stair training;Patient/Caregiver education & training;Equipment evaluation, education, & procurement; Therapeutic activities     Restrictions  Restrictions/Precautions  Restrictions/Precautions: Fall Risk, Contact Precautions     Subjective    Subjective  Subjective: Pt agreeable to therapy. Pain: Denies pain  Orientation  Overall Orientation Status: Impaired  Orientation Level: Oriented to person  Cognition  Overall Cognitive Status: Exceptions  Arousal/Alertness: Delayed responses to stimuli  Following Commands: Follows one step commands with increased time; Follows one step commands with repetition  Attention Span: Difficulty attending to directions; Difficulty dividing attention  Memory: Decreased recall of recent events  Safety Judgement: Decreased awareness of need for safety  Problem Solving: Decreased awareness of errors  Insights: Decreased awareness of deficits  Initiation: Requires cues for some  Sequencing: Requires cues for some  Cognition Comment: Self-limiting     Objective      Bed Mobility Training  Bed Mobility Training: Yes  Supine to Sit: Moderate assistance  Balance  Sitting: Impaired  Sitting - Static: Fair (occasional)  Sitting - Dynamic: Fair (occasional)  Standing: Impaired  Standing - Static: Fair (at walker)  Standing - Dynamic: Poor (at walker)  Transfer Training  Transfer Training: Yes  Interventions: Verbal cues; Tactile cues; Safety awareness training  Sit to Stand: Minimum assistance; Additional time (initially from EOB, progressing to [facility])  Stand to Sit: Minimum assistance;Contact-guard assistance; Additional time  Gait Training  Gait Training: Yes  Gait  Overall Level of Assistance: Minimum assistance  Base of Support: Widened  Speed/Lesli: Slow;Pace decreased (< 100 feet/min)  Step Length: Right shortened;Left shortened  Gait Abnormalities: Ataxic;Path deviations (unsteady with assist for steering walker; fatigues quickly)  Distance (ft): 15 Feet (and 30 ft)  Assistive Device: Walker, rolling;Gait belt     PT Exercises  Dynamic Standing Balance Exercises: Standing at walker with CGA while OT assisted with pericare - pt fatigued quickly and required seated rest breaks     Safety Devices  Type of Devices: Left in chair;Nurse notified; Patient at risk for falls; Chair alarm in place;Gait belt;Call light within reach       Goals  Short Term Goals  Time Frame for Short Term Goals: By d/c - Ongoing 11/30  Short Term Goal 1: Supine to/from sit with Supervision  Short Term Goal 2: Sit to/from stand with Supervision  Short Term Goal 3: Amb.  x 48' with FWW and Supervision  Patient Goals   Patient Goals : Return home - Pt adamant about not going to a rehab facility    Education  Patient Education  Education Given To: Patient  Education Provided: Role of Therapy;Plan of Care;Transfer Training  Education Provided Comments: safety with transfers, need for continued therapy  Education Method: Verbal  Barriers to Learning: Cognition  Education Outcome: Verbalized understanding;Demonstrated understanding;Continued education needed    Therapy Time   Individual Concurrent Group Co-treatment   Time In 0823         Time Out 0901         Minutes 38         Timed Code Treatment Minutes: [facility],   [facility]

## 2022-12-01 NOTE — PROGRESS NOTES
Comprehensive Nutrition Assessment    Type and Reason for Visit:  Reassess    Nutrition Recommendations/Plan:   Regular; 4 carb choices (60 gm/meal); No Added Salt (3-4 gm); Low Potassium (Less than 3000 mg/day), will remove phos restriction   Will d/c ONS, pt not accepting      Malnutrition Assessment:  Malnutrition Status:  Severe malnutrition (12/01/22 1221)    Context:  Chronic Illness     Findings of the 6 clinical characteristics of malnutrition:  Energy Intake:  Mild decrease in energy intake (Comment) (this admission)  Weight Loss:  Greater than 7.5% over 3 months     Body Fat Loss:  Severe body fat loss Orbital, Triceps   Muscle Mass Loss:  Severe muscle mass loss Temples (temporalis), Clavicles (pectoralis & deltoids), Calf (gastrocnemius), Thigh (quadraceps)  Fluid Accumulation:  No significant fluid accumulation     Strength:  Not Performed    Nutrition Assessment:    Follow-up. Pt continues on routine dialysis. Pt reports continued poor po intake d/t not having many options on diet. Did endorse hunger. Not accepting supplements and does not like them overall, declined to trial further supplements. Reported wt loss PTA d/t several hospitializations. Wasting noted. Will liberalize some of diet to promote po intake. Nutrition Related Findings:    Reviewed labs Wound Type:  (redness on buttocks)       Current Nutrition Intake & Therapies:    Average Meal Intake: 0%, 1-25%  Average Supplements Intake: 0%, Refusing to take  ADULT ORAL NUTRITION SUPPLEMENT; Breakfast, Dinner; Renal Oral Supplement  ADULT DIET; Regular; 4 carb choices (60 gm/meal); No Added Salt (3-4 gm); Low Potassium (Less than 3000 mg/day); Low Phosphorus (Less than 1000 mg); 2000 ml    Anthropometric Measures:  Height: 6' 2\" (188 cm)  Ideal Body Weight (IBW): 190 lbs (86 kg)       Current Body Weight: 142 lb (64.4 kg), 74.7 % IBW.  Weight Source: Bed Scale  Current BMI (kg/m2): 18.2  Usual Body Weight: 169 lb (76.7 kg) (3 months ago)  % Weight Change (Calculated): -16                    BMI Categories: Underweight (BMI less than 22) age over 72    Estimated Daily Nutrient Needs:  Energy Requirements Based On: Kcal/kg  Weight Used for Energy Requirements: Current  Energy (kcal/day): 3760-0088 (30-35 kcal/67.7 kg)  Weight Used for Protein Requirements: Current  Protein (g/day):  (1.2-1.5 g/67.7 kg)  Method Used for Fluid Requirements: 1 ml/kcal  Fluid (ml/day):      Nutrition Diagnosis:   Increased nutrient needs related to increase demand for energy/nutrients as evidenced by poor intake prior to admission, dialysis, weight loss    Nutrition Interventions:   Food and/or Nutrient Delivery: Modify Current Diet, Discontinue Oral Nutrition Supplement  Nutrition Education/Counseling: No recommendation at this time  Coordination of Nutrition Care: Continue to monitor while inpatient       Goals:  Previous Goal Met: No Progress toward Goal(s)  Goals: PO intake 75% or greater       Nutrition Monitoring and Evaluation:   Behavioral-Environmental Outcomes: None Identified  Food/Nutrient Intake Outcomes: Food and Nutrient Intake  Physical Signs/Symptoms Outcomes: Biochemical Data, Nutrition Focused Physical Findings, Skin, Weight    Discharge Planning:     Too soon to determine     Gio Hurst, [address] [place] [address],   Contact: [phone]

## 2022-12-01 NOTE — PROGRESS NOTES
Hospitalist Progress Note      PCP: No primary care provider on file. Date of Admission: 11/26/2022    Chief Complaint: Infected dialysis line    Hospital Course:      Subjective: No new fevers or complaints. Medications:  Reviewed    Infusion Medications    sodium chloride 40 mL (11/29/22 5705)    dextrose       Scheduled Medications    sodium bicarbonate  650 mg Oral TID WC    ceFAZolin  1,000 mg IntraVENous Q24H    sodium chloride flush  5-40 mL IntraVENous 2 times per day    heparin (porcine)  5,000 Units SubCUTAneous 3 times per day    insulin lispro  0-8 Units SubCUTAneous TID WC    insulin lispro  0-4 Units SubCUTAneous Nightly    insulin lispro  0.05 Units/kg SubCUTAneous TID WC    insulin glargine  0.25 Units/kg SubCUTAneous Nightly    calcitRIOL  0.5 mcg Oral Daily    atorvastatin  40 mg Oral Nightly    metoprolol tartrate  75 mg Oral BID    [Held by provider] NIFEdipine  90 mg Oral BID    pantoprazole  40 mg Oral BID AC    sucralfate  1 g Oral 4x Daily AC & HS    hydrALAZINE  100 mg Oral TID    isosorbide mononitrate  60 mg Oral Daily     PRN Meds: perflutren lipid microspheres, sodium chloride flush, sodium chloride, ondansetron **OR** ondansetron, polyethylene glycol, acetaminophen **OR** acetaminophen, glucose, dextrose bolus **OR** dextrose bolus, glucagon (rDNA), dextrose, meclizine, heparin (porcine)      Intake/Output Summary (Last 24 hours) at 12/1/2022 1418  Last data filed at 12/1/2022 1041  Gross per 24 hour   Intake 120 ml   Output --   Net 120 ml         Exam:    /72   Pulse 93   Temp 98.2 °F (36.8 °C) (Oral)   Resp 16   Ht 6' 2\" (1.88 m)   Wt 142 lb 13.7 oz (64.8 kg)   SpO2 96%   BMI 18.34 kg/m²     General appearance: No apparent distress, appears stated age and cooperative. Protein calorie malnutrition  HEENT: Pupils equal, round, and reactive to light. Conjunctivae/corneas clear. Neck: Supple, with full range of motion. No jugular venous distention.  Trachea midline. Respiratory:  Normal respiratory effort. Clear to auscultation, bilaterally without Rales/Wheezes/Rhonchi. Cardiovascular: Regular rate and rhythm with normal S1/S2 without murmurs, rubs or gallops. Abdomen: Soft, non-tender, non-distended with normal bowel sounds. Musculoskeletal: No clubbing, cyanosis or edema bilaterally. Full range of motion without deformity. Skin: Skin color, texture, turgor normal.  No rashes or lesions. Neurologic:  Neurovascularly intact without any focal sensory/motor deficits. Cranial nerves: II-XII intact, grossly non-focal.  Psychiatric: Alert and oriented, thought content appropriate, normal insight  Capillary Refill: Brisk,< 3 seconds   Peripheral Pulses: +2 palpable, equal bilaterally       Labs:   Recent Labs     11/29/22  0643 11/30/22  0643 12/01/22  0707   WBC 15.3* 17.3* 16.3*   HGB 10.4* 9.5* 9.8*   HCT 32.0* 29.1* 31.0*   * 108* 177       Recent Labs     11/29/22  0643 11/30/22  0643 12/01/22  0707   * 134* 135*   K 5.0 4.2 4.0   CL 95* 95* 97*   CO2 21 23 21   BUN 51* 34* 47*   CREATININE 6.7* 5.6* 7.4*   CALCIUM 8.8 8.7 9.1   PHOS  --   --  3.4       No results for input(s): AST, ALT, BILIDIR, BILITOT, ALKPHOS in the last 72 hours. No results for input(s): INR in the last 72 hours. No results for input(s): Vanessa Fuse in the last 72 hours. Urinalysis:      Lab Results   Component Value Date/Time    NITRU Negative 11/26/2022 01:32 PM    WBCUA 3 11/26/2022 01:32 PM    BACTERIA None Seen 11/26/2022 01:32 PM    RBCUA 9 11/26/2022 01:32 PM    BLOODU TRACE 11/26/2022 01:32 PM    SPECGRAV 1.019 11/26/2022 01:32 PM    GLUCOSEU >=1000 11/26/2022 01:32 PM    GLUCOSEU 250 12/14/2010 02:50 PM       Radiology:  IR REMOVE TUNNELED CVAD WO SQ PORT/PUMP   Final Result   Successful removal of tunneled dialysis catheter. Successful fluoroscopic guided non tunneled Trialysis catheter placement.          IR NONTUNNELED VASCULAR CATHETER > 5 YEARS Final Result   Successful removal of tunneled dialysis catheter. Successful fluoroscopic guided non tunneled Trialysis catheter placement. MRI BRAIN WO CONTRAST   Final Result   1. No acute intracranial abnormality. No acute infarct. 2. Mild global parenchymal volume loss with chronic microvascular ischemic   changes. 3. Small lipoma is again seen along the right dorsal midbrain. CT CHEST PULMONARY EMBOLISM W CONTRAST   Final Result   1. Focal consolidation in the right lower lobe favoring   infectious/inflammatory process. Recommend CT of the chest in 3 months to   confirm resolution. 2. Solid subcentimeter right pulmonary nodules can be assessed during the   same time. 3. Cardiomegaly with unchanged moderate to large pericardial effusion. 4. Age-indeterminate L1 compression fracture. If there is point tenderness,   recommend nonemergent MRI of the lumbar spine. CT HEAD WO CONTRAST   Final Result   1. No acute intracranial abnormality. XR CHEST PORTABLE   Final Result   Stable increased opacity left basilar region compared to prior studies dating   back to 10/25/2020 consistent with pericardial effusion, loculated left   basilar pleural effusion, pulmonary consolidation or mass/neoplasm. 2 mm   opacity consistent with pulmonary nodule in the right basilar region and foci   of infiltrate or subsegmental atelectasis in the mid-lower lung fields noted   bilaterally. IV contrast-enhanced chest CT suggested for further evaluation.                  Assessment/Plan:    Active Hospital Problems    Diagnosis Date Noted    Severe malnutrition ([address] [E43] 12/01/2022     Priority: Medium    Encephalopathy acute [G93.40] 11/28/2022     Priority: Medium    Elevated lactic acid level [R79.89] 11/28/2022     Priority: Medium    Hyperkalemia [E87.5] 11/28/2022     Priority: Medium    Severe sepsis ([address] [A41.9, R65.20] 11/28/2022     Priority: Medium    Staphylococcus aureus bacteremia with sepsis ([facility] [A41.01] 11/28/2022     Priority: Medium    Neutrophilia [D72.9] 11/28/2022     Priority: Medium    Elevated procalcitonin [R79.89] 11/28/2022     Priority: Medium    ESRD needing dialysis ([facility] [N18.6, Z99.2] 11/28/2022     Priority: Medium    Bacterial pneumonia [J15.9] 11/26/2022     Priority: Medium       -MSSA bacteremia with sepsis--concern for tunneled dialysis catheter infection-- vancomycin Kenna Screen de-escalated to cefazolin 11/27--continue management per ID--follow-up on cultures. .   -TDC removed, temporary HD line placed. Follow-up blood cultures for temporary catheter prior to placing new TDC     -Right lower lobe bacterial pneumonia. .  CT chest reviewed continue current antibiotics per ID     -Sepsis due to bacteremia /pneumonia-patient with fever, sinus tachycardia, leukocytosis, lactic acidosis--continue antibiotics as above, follow-up on cultures     -Chronic stable moderate pericardial effusion--evident on prior echos and current CT chest... Limited echo pending     -Chronic systolic/ diastolic heart failure--Echo 2/2022 showed grade 2 diastolic dysfunction, EF 40%, moderate pericardial effusion--repeat echo pending     -ESRD--on hemodialysis TTS--continue management per nephrology. .  Patient has tunneled dialysis catheter. .  Previously on PD but discontinued due to fungal peritonitis     -Anion Metabolic acidosis due to CKD/lactic acidosis--ketones negative--monitor labs-continue oral bicarb     -Hyperkalemia--resolved with hemodialysis     -Hyponatremia--corrected sodium within normal range on presentation     -DM type II uncontrolled  with severe hyperglycemia on presentation-patient was noncompliant with insulin due to acute illness-hemoglobin A1c is 7.2..   Hyperglycemia is better- continue current basal bolus insulin regimen     -Essential hypertension---stable-on nifedipine, hydralazine,imdur - Aldactone/losartan held due to hyperkalemia--also held nifedipine/Imdur this a.m. due to low BPs--continue to monitor for now     -Hyperlipidemia-continue statin     -Dizziness /vertigo-MRI brain is negative-currently asymptomatic-PT and OT eval pending     -Acute encephalopathy likely due to sepsis--resolved -lethargic on presentation to the ED--continue to monitor    DVT Prophylaxis: Heparin  Diet: ADULT DIET; Regular; 4 carb choices (60 gm/meal); No Added Salt (3-4 gm); Low Potassium (Less than 3000 mg/day); 2000 ml  Code Status: Full Code    PT/OT Eval Status: N/A    Dispo -MedSurg; defer to ID on duration of monitoring of antibiotics inpatient given fevers.     Gudelia Ramirez MD

## 2022-12-01 NOTE — PROGRESS NOTES
Occupational Therapy  Facility/Department: [address] MED SURG  Occupational Therapy Daily Note  Name: Bridget Gomez  : 1965  MRN: [id number]  Date of Service: 2022    Discharge Recommendations:  3-5 sessions per week, Patient would benefit from continued therapy after discharge, 24 hour supervision or assist, Home with Home health OT, S Level 3 (IF returns home, will require 24/7 hands on assist)          Patient Diagnosis(es): The primary encounter diagnosis was Encephalopathy acute. Diagnoses of ESRD needing dialysis ([address] Severe sepsis ([address] Hyperkalemia, Type 2 diabetes mellitus with hyperglycemia, with long-term current use of insulin (HCC), Elevated lactic acid level, Pneumonia of right lower lobe due to infectious organism, Pulmonary nodule, Closed compression fracture of L1 vertebra, initial encounter ([address] and Pericardial effusion were also pertinent to this visit. Past Medical History:  has a past medical history of Cardiomyopathy ([address] CHF (congestive heart failure) ([address] CVA (cerebral infarction), Diabetes mellitus ([address] Foot ulcer, left ([address] Gastroparesis, Hemodialysis patient ([address] Hypercholesteremia, Hypertension, Kidney disease, and Unspecified cerebral artery occlusion with cerebral infarction. Past Surgical History:  has a past surgical history that includes LAPAROSCOPY INSERTION PERITONEAL CATHETER (N/A, 10/29/2020); Umbilical hernia repair (N/A, 10/29/2020); hc dialysis catheter (N/A, 10/29/2020); Upper gastrointestinal endoscopy (N/A, 2021); Colonoscopy (N/A, 2021); Upper gastrointestinal endoscopy (N/A, 2022); Dialysis Catheter Removal (N/A, 10/17/2022); Catheter Insertion (N/A, 10/17/2022); and IR NONTUNNELED VASCULAR CATHETER > 5 YEARS (2022). Treatment Diagnosis: Decreased: ADLs, fxl transfers/mobility      Assessment   Performance deficits / Impairments: Decreased functional mobility ; Decreased ADL status; Decreased balance;Decreased endurance;Decreased cognition;Decreased safe awareness;Decreased strength;Decreased ROM; Decreased coordination  Assessment: Discussed with OTR: Pt tolerated tx fair, needing cues to participate. Pt seen with PT for low endurance/activity tolerance, decreased cognition. Pt needing Min/CGA for sit><stands, using RW this session, with cues, assist for safe hand placement and amb with RW in room with Min A x1 (see PT note). Pt required up to Max A for ADL's (incontinence episode and appeared unaware). Pt's ampa does not reflect d/c home. Pt however, plans to return home, stating his wife can assist. Pt will require 24/7 hands on assist if returns home and recommend [address] [place] [address]'[facility] [place], level 3 home care. Cont poc to maximize function, safety and lessen caregiver burden. Treatment Diagnosis: Decreased: ADLs, fxl transfers/mobility  Prognosis: Fair  History: Pt is a 64 yo M w/ PMHx ESRD on HD who was admitted with chills, fevers, and AMS. BS found to be 503 in the ED and he was lethargic. PTA, pt lives at home w/ his wife where he is typically independent in self-care and fxl mobility using SPC. REQUIRES OT FOLLOW-UP: Yes  Activity Tolerance  Activity Tolerance: Patient limited by fatigue;Treatment limited secondary to decreased cognition        Plan   Occupational Therapy Plan  Times Per Week: 3-5  Times Per Day: Once a day  Current Treatment Recommendations: Strengthening, Balance training, ROM, Functional mobility training, Endurance training, Safety education & training, Self-Care / ADL     Restrictions  Restrictions/Precautions  Restrictions/Precautions: Fall Risk, Contact Precautions    Subjective   General  Chart Reviewed: Yes, Orders, Progress Notes  Patient assessed for rehabilitation services?: Yes  Additional Pertinent Hx: per H&P: \"65 y.o. male with ESRD on hemodialysis, hypertension, hyperlipidemia presented to emergency room with fevers, chills and rigors  for the past 2 days. This was associated with cough, sneezing. His blood sugars have been running high and states that he did not take his insulin. He was also lethargic and diaphoretic  Upon arrival to the ED, his blood sugar was 503, he appeared lethargic. \"  Family / Caregiver Present: No  Referring Practitioner: Brooklynn Young MD  Diagnosis: Right lower lobe bacterial pneumonia  Subjective  Subjective: Pt met BS, in bed. Pt agreeable to co-tx wiwth PT with much encourgement. Denies pain  Social/Functional History  Social/Functional History  Lives With: Spouse (2 dogs)  Type of Home: House  Home Layout: One level  Home Access: Level entry  Bathroom Shower/Tub: Walk-in shower  Bathroom Toilet: Standard  Bathroom Equipment: Grab bars in shower, Built-in shower seat  Home Equipment: Lyndia Seat, rolling  ADL Assistance: Independent  Ambulation Assistance: Independent (with SPC)  Transfer Assistance: Independent  Active : No  Patient's  Info: wife drives   Type of Occupation:  retiree  Additional Comments: Wife works from home, wife cooks       Objective      Safety Devices  Type of Devices: Left in chair;Nurse notified; Patient at risk for falls; Chair alarm in place;Gait belt;Call light within reach     ADL  Grooming: Setup  Grooming Skilled Clinical Factors: washed face, under arms with cues for initiation, progression  LE Bathing: Maximum assistance  LE Bathing Skilled Clinical Factors: assist for thorough cleaning after incontinence  UE Dressing: Minimal assistance  UE Dressing Skilled Clinical Factors: gown changed  LE Dressing: Maximum assistance  LE Dressing Skilled Clinical Factors: to change from soiled depends to pull up brief  Toileting: Maximum assistance  Toileting Skilled Clinical Factors: Pt incontinent of urine, BM in and thru depends, onto gown, bed pad.  Pt assisted for all hygiene and clothes     Bed mobility  Supine to Sit: Moderate assistance (x1; HOB fully raised.)  Bed Mobility Comments: Reports he sleeps in recliner at home.  Transfers  Sit to stand: Contact guard assistance;Minimal assistance  Stand to sit: Minimal assistance;Contact guard assistance  Transfer Comments: RW, cues for hand placement and safety, at bed, commode and to recliner. Pt amb in room with RW, Min A cues for safety (see PT note). Cognition  Overall Cognitive Status: Exceptions  Arousal/Alertness: Delayed responses to stimuli  Following Commands: Follows one step commands with increased time; Follows one step commands with repetition  Attention Span: Difficulty attending to directions; Difficulty dividing attention  Memory: Decreased recall of recent events  Safety Judgement: Decreased awareness of need for safety  Problem Solving: Decreased awareness of errors  Insights: Decreased awareness of deficits  Initiation: Requires cues for some  Sequencing: Requires cues for some  Cognition Comment: Self-limiting  Orientation  Overall Orientation Status: Impaired  Orientation Level: Oriented to person          Education Given To: Patient  Education Provided: Role of Therapy;Plan of Care;Transfer Training;ADL Adaptive Strategies; Energy Conservation  Education Method: Verbal  Barriers to Learning: Cognition  Education Outcome: Continued education needed                         AM-PAC Score        AM-[facility] Inpatient Daily Activity Raw Score: 16 (12/01/22 0900)  AM-PAC Inpatient ADL T-Scale Score : 35.96 (12/01/22 0900)  ADL Inpatient CMS 0-100% Score: 53.32 (12/01/22 0900)  ADL Inpatient CMS G-Code Modifier : CK (12/01/22 0900)         Goals  Short Term Goals  Time Frame for Short Term Goals: Prior to d/c.  Status: goals ongoing  Short Term Goal 1: Pt will complete ADL transfers w/ CGA  Short Term Goal 2: Pt will toilet w/ CGA  Short Term Goal 3: Pt will groom in stance at sink w/ CGA  Short Term Goal 4: Pt will bathe w/ min A  Short Term Goal 5: Pt will increase strength and endurance to achieve the above goals  Long Term Goals  Time Frame for Long Term Goals : LTG=STG  Patient Goals   Patient goals : to go home       Therapy Time   Individual Concurrent Group Co-treatment   Time In 0820         Time Out 0902         Minutes [address] [place],[address]

## 2022-12-01 NOTE — PROGRESS NOTES
[facility]                                               Progress note    CC: fever, sepsis  Summary:   Kimmy Kumar is being seen by nephrology for ERD. He is a 65 y.o. male with a PMH significant for ESRD, CHF, ADPKD, fungal peritonitis 2/2 PD catheter who presented to the ED 11/26/2022 with fever, hyperglycemia, and AMS. He was noted to be hyperkalemic on admission. Blood cx positive for MSSA    Interval History  Seen at bedside, sleeping comfortably. /86  Continues to spike fevers, TMAX for yesterday 101.8  Repeat cx froom 11/28/2022 NGTD  TDC catheter tip cx positive for MSSA      Plan:   - HD today per schedule. - will hold off on putting TDC back in until fevers resolve. - cefazolin per ID        Suman Lee MD  [facility]  Office: [phone]    Assessment:   ESRD:  - on HD TTS at [facility]  - recent fungal PD peritonitis  - now access is a [facility] which might be the cause of MRSA bacteremia  - TDC was removed 11/29/2022 and a temp HD catheter was placed for continued dialysis while the infection is addressed  - EDW 69.5 kg    Hypertension:    Continue current medicatiosn    MSSA bacteremia:  - suspect the [facility] to be the source  - remove [facility] 11/29/2022, tip cx positive for MSSA also. - TTE negative for vegetations. LVEF 35-40%, severely increased LV wall thickness  - repeat blood cx 11/28/2022 NGTD  - on cefazolin      ROS:    Positives Listed Bold. All other remaining systems are negative. Constitutional:  fever, chills, weakness, weight change, fatigue,      Skin:  rash, pruritus, hair loss, bruising, dry skin, petechiae. Head, Face, Neck   headaches, swelling,  cervical adenopathy.      Respiratory: shortness of breath, cough, or wheezing  Cardiovascular: chest pain, palpitations, dizzy, edema  Gastrointestinal: nausea, vomiting, diarrhea, constipation,belly pain    Yellow skin, blood in stool  Musculoskeletal:  back pain, muscle weakness, gait problems,       joint pain or swelling. Genitourinary:  dysuria, poor urine flow, flank pain, blood in urine  Neurologic:  vertigo, TIA'S, syncope, seizures, focal weakness  Psychosocial:  insomnia, anxiety, or depression. Additional positive findings: -     PMH:   Past medical history, surgical history, social history, family history are reviewed and updated as appropriate. Reviewed current medication list.   Allergies reviewed and updated as needed. PE:   Vitals:    12/01/22 0755   BP: (!) 169/86   Pulse: (!) 105   Resp: 16   Temp: 100.2 °F (37.9 °C)   SpO2: 98%       General appearance: in NAD, fully alert and oriented. Comfortable. HEENT: EOM intact, no icterus. Trachea is midline. Neck : No masses, appears symmetrical, no JVD  Respiratory: Respiratory effort appears normal, bilateral equal chest rise, no wheeze, no crackles   Cardiovascular: Ausculation shows RRR no edema  Abdomen: No visible mass or tenderness, non distended. Musculoskeletal:  Joints with no swelling or deformity. Skin:no rashes, ulcers, induration, no jaundice. Neuro: face symmetric, no focal deficits. Appropriate responses.        Lab Results   Component Value Date    CREATININE 7.4 (HH) 12/01/2022    BUN 47 (H) 12/01/2022     (L) 12/01/2022    K 4.0 12/01/2022    CL 97 (L) 12/01/2022    CO2 21 12/01/2022      Lab Results   Component Value Date    WBC 16.3 (H) 12/01/2022    HGB 9.8 (L) 12/01/2022    HCT 31.0 (L) 12/01/2022    MCV 94.3 12/01/2022     12/01/2022     Lab Results   Component Value Date    PTH 49.8 02/01/2019    CALCIUM 9.1 12/01/2022    PHOS 3.4 12/01/2022

## 2022-12-01 NOTE — PROGRESS NOTES
Infectious Disease Follow up Notes  Admit Date: 11/26/2022  Hospital Day: 6    Antibiotics :   IV Cefazolin     CHIEF COMPLAINT:     Severe sepsis  Fevers   Staph aureus Bacteremia  ESRD  HD line infection       Subjective interval History :  65 y. o.man with a history of end-stage renal disease on hemodialysis, CHF, CVA, diabetes, hypercholesterolemia, hypertension, with some left weakness admitted to the hospital secondary to fever chills not. He felt cold and flulike symptoms ongoing for the past 4 to 5 days. T-max 100.3 since admission, labs indicate procalcitonin elevated 92.7 potassium 5 6 blood glucose of 500, WBC elevated 19.4 with left shift hemoglobin 10.7 blood cultures no reported to be Staph aureus 4/4 bottles MSSA, tested negative for rapid flu and COVID-19, CT head negative CT chest indicating focal consolidation right lower lobe, also pericardial effusion noted. We are consulted for IV abx recommendation.      Interval History : Ongoing fevers slowly trending down WBC count remains elevated seen in hemodialysis temporary catheter working well denies any joint pains, follow-up blood culture in process    Past Medical History:    Past Medical History:   Diagnosis Date    Cardiomyopathy ([address]     CHF (congestive heart failure) ([facility]     CVA (cerebral infarction) 5/2015    Diabetes mellitus ([facility]     Foot ulcer, left ([address] 1/14/2016    Gastroparesis     Hemodialysis patient ([facility]     Hypercholesteremia     Hypertension     Kidney disease     Unspecified cerebral artery occlusion with cerebral infarction     5/15, 7/15 LEFT SIDE WEAKNESS       Past Surgical History:    Past Surgical History:   Procedure Laterality Date    CATHETER INSERTION N/A 10/17/2022    TUNNEL CATHETER PLACEMENT performed by Tim Lu MD at [address] [place] [address] N/A 5/17/2021    COLONOSCOPY POLYPECTOMY SNARE/COLD BIOPSY performed by Chetan Cain Lorraine Corrales MD at [address] 10/17/2022    PERITONEAL DIALYSIS CATHETER REMOVAL performed by Apolinar Ross MD at [address] [place] [address] N/A 10/29/2020    PLACEMENT OF A TUNNELED DIALYSIS CATHETER WITH FLEURO AND ULTRASOUND performed by Apolinar Ross MD at [place] [address] NONTUNNELED VASCULAR CATHETER  11/29/2022    IR NONTUNNELED VASCULAR CATHETER 11/29/2022 [facility]    LAPAROSCOPY INSERTION PERITONEAL CATHETER N/A 10/29/2020    LAPAROSCOPIC PERITONEAL DIALYSIS CATHETER PLACEMENT WITH FLEURO AND ULTRASOUND performed by Apolinar Ross MD at [address] [place] [address] N/A 12/28/2731    UMBILICAL HERNIA REPAIR performed by Apolinar Ross MD at [facility] N/A 4/26/2021    ESOPHAGOGASTRODUODENOSCOPY performed by Savage Aaron MD at [address] [facility] 9/13/2022    EGD DIAGNOSTIC ONLY performed by Prakash Sylvester MD at [address]       Current Medications:    No outpatient medications have been marked as taking for the 11/26/22 encounter [facility] Encounter).        Allergies:  Doxazosin, Cefepime, and Coconut flavor    Immunizations :   Immunization History   Administered Date(s) Administered    COVID-19, PFIZER GRAY top, DO NOT Dilute, (age 15 y+), IM, 30 mcg/0.3 mL 06/04/2022    COVID-19, PFIZER PURPLE top, DILUTE for use, (age 15 y+), 30mcg/0.3mL 06/23/2021, 07/19/2021    Hepatitis A Adult (Havrix, Vaqta) 03/10/2021, 09/23/2021    Hepatitis B 05/06/2022    Hepatitis B Adult (Heplisav-b) 05/06/2022    Hepatitis B Adult (Recombivax HB) 11/05/2020    Hepatitis B vaccine 11/05/2020    Influenza Vaccine, unspecified formulation 12/05/2013    Influenza Virus Vaccine 12/05/2013, 12/04/2015, 11/05/2020, 10/11/2021    Influenza, FLUARIX, FLULAVAL, FLUZONE (age 10 mo+) AND AFLURIA, (age 1 y+), PF, 0.5mL 10/15/2018, 11/05/2020    Influenza, Triv, 3 Years and older, IM, PF (Afluria 5yrs and older) 10/11/2021    Pneumococcal Conjugate 13-valent ([id number]) 11/10/2020    Pneumococcal Conjugate Vaccine 02/13/2015    Pneumococcal Polysaccharide ([id number]) 11/02/2009, 03/10/2021    Tdap (Boostrix, Adacel) 08/27/2021    Zoster Recombinant (Shingrix) 03/10/2021, 08/27/2021       Social History:     Social History     Tobacco Use    Smoking status: Some Days     Packs/day: 0.00     Years: 30.00     Pack years: 0.00     Types: Cigars, Cigarettes     Start date: 1/3/1979     Last attempt to quit: 1/16/2019     Years since quitting: 3.8    Smokeless tobacco: Never    Tobacco comments:     3 black and milds a week   Vaping Use    Vaping Use: Never used   Substance Use Topics    Alcohol use: Yes     Comment: occasional    Drug use: Yes     Types: Marijuana Kenard Snooks)     Comment: previously used cocaine, stopped May 2015 LAST USED MARIJUANA-NOVEMBER 2020     Social History     Tobacco Use   Smoking Status Some Days    Packs/day: 0.00    Years: 30.00    Pack years: 0.00    Types: Cigars, Cigarettes    Start date: 1/3/1979    Last attempt to quit: 1/16/2019    Years since quitting: 3.8   Smokeless Tobacco Never   Tobacco Comments    3 black and milds a week      Family History   Adopted: Yes          REVIEW OF SYSTEMS:      Constitutional:  r fevers,++  chills++ , night sweats  Eyes:  negative for blurred vision, eye discharge, visual disturbance   HEENT:  negative for hearing loss, ear drainage,nasal congestion  Respiratory:  negative for cough, shortness of breath or hemoptysis   Cardiovascular:  negative for chest pain, palpitations, syncope  Gastrointestinal:  negative for nausea, vomiting, diarrhea, constipation, abdominal pain  Genitourinary:  negative for frequency, dysuria, urinary incontinence, hematuria  Hematologic/Lymphatic:  negative for easy bruising, bleeding and lymphadenopathy  Allergic/Immunologic:  negative for recurrent infections, angioedema, anaphylaxis   Endocrine:  negative for weight changes, polyuria, polydipsia and polyphagia  Musculoskeletal:  negative for joint  pain, swelling, decreased range of motion  Integumentary: No rashes, skin lesions  Neurological:  negative for headaches, slurred speech, unilateral weakness  Psychiatric: negative for hallucinations,confusion,agitation.                 PHYSICAL EXAM:      Vitals:  T max  102.8   BP (!) 169/86   Pulse (!) 105   Temp 100.2 °F (37.9 °C) (Oral)   Resp 16   Ht 6' 2\" (1.88 m)   Wt 142 lb 13.7 oz (64.8 kg)   SpO2 96%   BMI 18.34 kg/m²     General Appearance: alert,in some  acute distress,++  pallor, no icterus cachexia + chronic ill appearing man +    Skin: warm and dry, no rash or erythema  Head: normocephalic and atraumatic  Eyes: pupils equal, round, and reactive to light, conjunctivae normal  ENT: tympanic membrane, external ear and ear canal normal bilaterally, nose without deformity, nasal mucosa and turbinates normal without polyps  Neck: supple and non-tender without mass, no thyromegaly  no cervical lymphadenopathy  Pulmonary/Chest: clear to auscultation bilaterally- no wheezes, rales or rhonchi, normal air movement, no respiratory distress  Cardiovascular:  S1 and S2, esm+  murmurs, rubs, clicks, or gallops, no carotid bruits  Abdomen: soft, non-tender, non-distended, normal bowel sounds, no masses or organomegaly  Extremities: no cyanosis, clubbing or edema  Musculoskeletal: normal range of motion, no joint swelling, deformity or tenderness  Integumentary: No rashes, no abnormal skin lesions, no petechiae  Neurologic: reflexes normal and symmetric, no cranial nerve deficit  Psych:  Orientation, sensorium, mood normal            Lines: HD line +  Temp line on Rt chest wall    Data Review:    CBC:   Lab Results   Component Value Date    WBC 16.3 (H) 12/01/2022    HGB 9.8 (L) 12/01/2022    HCT 31.0 (L) 12/01/2022    MCV 94.3 12/01/2022     12/01/2022     RENAL:   Lab Results   Component Value Date CREATININE 7.4 (HH) 12/01/2022    BUN 47 (H) 12/01/2022     (L) 12/01/2022    K 4.0 12/01/2022    CL 97 (L) 12/01/2022    CO2 21 12/01/2022     SED RATE:   Lab Results   Component Value Date/Time    SEDRATE 64 10/26/2020 02:14 PM     CK: No results found for: CKTOTAL  CRP:   Lab Results   Component Value Date/Time    CRP 2.9 10/26/2020 02:14 PM     Hepatic Function Panel:   Lab Results   Component Value Date/Time    ALKPHOS 104 11/26/2022 09:26 AM    ALT 13 11/26/2022 09:26 AM    AST 12 11/26/2022 09:26 AM    PROT 8.0 11/26/2022 09:26 AM    BILITOT <0.2 11/26/2022 09:26 AM    BILIDIR <0.2 10/11/2022 02:41 PM    IBILI see below 10/11/2022 02:41 PM    LABALBU 3.0 12/01/2022 07:07 AM     UA:  Lab Results   Component Value Date/Time    COLORU Yellow 11/26/2022 01:32 PM    CLARITYU Clear 11/26/2022 01:32 PM    GLUCOSEU >=1000 11/26/2022 01:32 PM    GLUCOSEU 250 12/14/2010 02:50 PM    BILIRUBINUR Negative 11/26/2022 01:32 PM    BILIRUBINUR NEGATIVE 12/14/2010 02:50 PM    KETUA Negative 11/26/2022 01:32 PM    SPECGRAV 1.019 11/26/2022 01:32 PM    BLOODU TRACE 11/26/2022 01:32 PM    PHUR 7.5 11/26/2022 01:32 PM    PROTEINU 300 11/26/2022 01:32 PM    UROBILINOGEN 0.2 11/26/2022 01:32 PM    NITRU Negative 11/26/2022 01:32 PM    LEUKOCYTESUR Negative 11/26/2022 01:32 PM    LABMICR YES 11/26/2022 01:32 PM    URINETYPE Cleancatch 11/26/2022 01:32 PM      Urine Microscopic:   Lab Results   Component Value Date/Time    BACTERIA None Seen 11/26/2022 01:32 PM    COMU see below 05/05/2021 05:36 PM    HYALCAST 0 11/26/2022 01:32 PM    WBCUA 3 11/26/2022 01:32 PM    RBCUA 9 11/26/2022 01:32 PM    EPIU 0 11/26/2022 01:32 PM     Urine Reflex to Culture:   Lab Results   Component Value Date/Time    URRFLXCULT Not Indicated 11/26/2022 01:32 PM         MICRO: cultures reviewed and updated by me   Blood Culture:   Organism Staphylococcus aureus Abnormal     Culture Catheter Tip 15 colonies    Resulting Agency [facility] Susceptibility    Staphylococcus aureus (1)    Antibiotic Interpretation Microscan  Method Status    ceFAZolin Sensitive <=4 mcg/mL BACTERIAL SUSCEPTIBILITY PANEL BY DYLON     clindamycin Sensitive <=0.5 mcg/mL BACTERIAL SUSCEPTIBILITY PANEL BY DYLON     erythromycin Sensitive <=0.5 mcg/mL BACTERIAL SUSCEPTIBILITY PANEL BY DYLON     oxacillin Sensitive <=0.25 mcg/mL BACTERIAL SUSCEPTIBILITY PANEL BY DYLON     tetracycline Sensitive <=4 mcg/mL BACTERIAL SUSCEPTIBILITY PANEL BY DYLON     trimethoprim-sulfamethoxazole Sensitive <=0.5/9.5 mcg/mL BACTERIAL SUSCEPTIBILITY PANEL BY DYLON        Narrative  Performed by: [facility]  ORDER#: [id number]                          ORDERED BY: Meme Shipman   SOURCE: Catheter Tip                       COLLECTED:  11/29/22 11:40            Lab Results   Component Value Date/Time    [facility]  11/28/2022 06:59 AM     No Growth to date. Any change in status will be called. BLOODCULT2  11/28/2022 06:59 AM     No Growth to date. Any change in status will be called. Respiratory Culture:  Lab Results   Component Value Date/Time    LABGRAM  10/16/2022 11:00 AM     No Epithelial Cells seen  No WBCs or organisms seen       AFB:No results found for: [facility]  Viral Culture:  Lab Results   Component Value Date/Time    COVID19 Not Detected 11/26/2022 09:26 AM     Urine Culture: No results for input(s): LABURIN in the last 72 hours. IMAGING:    IR REMOVE TUNNELED CVAD WO SQ PORT/PUMP   Final Result   Successful removal of tunneled dialysis catheter. Successful fluoroscopic guided non tunneled Trialysis catheter placement. IR NONTUNNELED VASCULAR CATHETER > 5 YEARS   Final Result   Successful removal of tunneled dialysis catheter. Successful fluoroscopic guided non tunneled Trialysis catheter placement. MRI BRAIN WO CONTRAST   Final Result   1. No acute intracranial abnormality. No acute infarct.    2. Mild global parenchymal volume loss with chronic microvascular ischemic   changes. 3. Small lipoma is again seen along the right dorsal midbrain. CT CHEST PULMONARY EMBOLISM W CONTRAST   Final Result   1. Focal consolidation in the right lower lobe favoring   infectious/inflammatory process. Recommend CT of the chest in 3 months to   confirm resolution. 2. Solid subcentimeter right pulmonary nodules can be assessed during the   same time. 3. Cardiomegaly with unchanged moderate to large pericardial effusion. 4. Age-indeterminate L1 compression fracture. If there is point tenderness,   recommend nonemergent MRI of the lumbar spine. CT HEAD WO CONTRAST   Final Result   1. No acute intracranial abnormality. XR CHEST PORTABLE   Final Result   Stable increased opacity left basilar region compared to prior studies dating   back to 10/25/2020 consistent with pericardial effusion, loculated left   basilar pleural effusion, pulmonary consolidation or mass/neoplasm. 2 mm   opacity consistent with pulmonary nodule in the right basilar region and foci   of infiltrate or subsegmental atelectasis in the mid-lower lung fields noted   bilaterally. IV contrast-enhanced chest CT suggested for further evaluation.                All the pertinent images and reports for the current Hospitalization were reviewed by me     Scheduled Meds:   sodium bicarbonate  650 mg Oral TID WC    ceFAZolin  1,000 mg IntraVENous Q24H    sodium chloride flush  5-40 mL IntraVENous 2 times per day    heparin (porcine)  5,000 Units SubCUTAneous 3 times per day    insulin lispro  0-8 Units SubCUTAneous TID WC    insulin lispro  0-4 Units SubCUTAneous Nightly    insulin lispro  0.05 Units/kg SubCUTAneous TID WC    insulin glargine  0.25 Units/kg SubCUTAneous Nightly    calcitRIOL  0.5 mcg Oral Daily    atorvastatin  40 mg Oral Nightly    metoprolol tartrate  75 mg Oral BID    [Held by provider] NIFEdipine  90 mg Oral BID    pantoprazole  40 mg Oral BID AC    sucralfate  1 g Oral 4x Daily AC & HS    hydrALAZINE  100 mg Oral TID    isosorbide mononitrate  60 mg Oral Daily       Continuous Infusions:   sodium chloride 40 mL (11/29/22 5273)    dextrose       Summary   Limited study. Overall left ventricular systolic function appears moderately reduced. Ejection fraction is visually estimated to be 35-40% with diffuse   hypokinesis. There is severely increased left ventricular wall thickness. Left ventricular cavity size is normal.   The right ventricle is not well visualized. Tricuspid aortic valve leaflets appear thickened/calcified but no clear   mobile structure to suggest a vegetation. There is a moderate pericardial effusion.       PRN Meds:  perflutren lipid microspheres, sodium chloride flush, sodium chloride, ondansetron **OR** ondansetron, polyethylene glycol, acetaminophen **OR** acetaminophen, glucose, dextrose bolus **OR** dextrose bolus, glucagon (rDNA), dextrose, meclizine, heparin (porcine)      Assessment:     Patient Active Problem List   Diagnosis    Nonischemic cardiomyopathy ([address]    Cerebral infarction ([address]    Cigarette nicotine dependence without complication    Erectile dysfunction due to arterial insufficiency    Renal artery stenosis (HCC)    Chronic diastolic congestive heart failure (HCC)    H/O: CVA (cerebrovascular accident)    Major depressive disorder, recurrent episode, moderate (HCC)    Pericardial effusion    Hypertension associated with diabetes ([address]    DM type 2 with diabetic mixed hyperlipidemia ([address]    Diabetes mellitus due to underlying condition with stage 4 chronic kidney disease, with long-term current use of insulin (HCC)    ESRD (end stage renal disease) on dialysis ([address]    ESRD (end stage renal disease) ([address]    GI bleed    SBP (spontaneous bacterial peritonitis) ([address]    Dialysis-associated peritonitis ([address]    Candida infection    Generalized abdominal pain    PKD (polycystic kidney disease)    Bacterial pneumonia Encephalopathy acute    Elevated lactic acid level    Hyperkalemia    Severe sepsis (HCC)    Staphylococcus aureus bacteremia with sepsis (HCC)    Neutrophilia    Elevated procalcitonin    ESRD needing dialysis ([address]     Sepsis  Staph aureus Bacteremia  MSSA+  ESRD on HD  HD line in place concern for Line infection   Cardiomyopathy   Pericardial effusion know to have this   WBC elevation   Procal elevation  DM+  Hyperkalemia  PKD  Fevers from above     Given the high grade Bacteremia and sepsis concern is for HD line infection - will need HD line removal and needs line Holiday and repeat Blood cx     TTE completed no vegetation -     Staph aureus MSSA high-grade bacteremia concern for for HD line infection  s/p  line removal and tip sent for cx in process with staph aureus sensitivities pending    WBC still high and fever spike from on going infection follow up blood cx in process needs to watch for any new complications       Labs, Microbiology, Radiology and all the pertinent results from current hospitalization and  care every where were reviewed  by me as a part of the evaluation   Plan:   IV Cefazolin x 1 gm q 24 HRS  Repeat blood cx in  process from 11/28 so far no growth to date  S/p  HD line removal   Send Tip for cx   staph aureus noted MSSA  TTE  -ve  Unfortunately PD catheter was infected  and now having problems with HD line infections  Needs to improve skin hygiene and catheter care  -   If more fevers needs to watch for complications   Recheck Pro-Gabriele tomorrow  Anticipate 4-r 6 weeks of IV antibiotic with hemodialysis sessions at discharge     Discussed with patient/Family and Nursing  d/w Nephrology   Risk of Complications/Morbidity: High      Illness(es)/ Infection present that pose threat to bodily function. There is potential for severe exacerbation of infection/side effects of treatment. Therapy requires intensive monitoring for antimicrobial agent toxicity.      Discussed with patient/Family and Nursing staff     Thanks for allowing me to participate in your patient's care and please call me with any questions or concerns.     Beryle Spike MD  Infectious Disease  [facility] ([facility]) Physician  Phone: [phone]   Fax : [phone]

## 2022-12-01 NOTE — PROGRESS NOTES
Patient's vitals as indicated below     11/30/22 0822 11/30/22 1208 11/30/22 1615   Vital Signs   Temp 99.1 °F (37.3 °C) 98.7 °F (37.1 °C) 100.1 °F (37.8 °C)   Temp Source Oral Oral Oral   Heart Rate (!) 101 96 (!) 117   Heart Rate Source Monitor Monitor Monitor   Resp 16 16 18   /68 (!) 156/84 137/83   MAP (Calculated) 88 108 101   MAP (mmHg) 88 108  --    BP Location Right Arm Right Arm Right Arm   BP Method  --   --  Automatic   Level of Consciousness  --   --  0   MEWS Score  --   --  3   Oxygen Therapy   SpO2 100 % 98 %  --    O2 Device None (Room air) None (Room air)  --       11/30/22 2004   Vital Signs   Temp (!) 101.8 °F (38.8 °C)   Temp Source Oral   Heart Rate (!) 118   Heart Rate Source Monitor   Resp 16   BP (!) 144/77   MAP (Calculated) 99   MAP (mmHg) 99   BP Location Right Arm   BP Method  --    Level of Consciousness  --    MEWS Score  --    Oxygen Therapy   SpO2 94 %   O2 Device None (Room air)          11/26/22 09:26 11/27/22 10:42 11/28/22 06:59 11/29/22 06:43 11/30/22 06:43   WBC 19.4 (H) 18.8 (H) 15.2 (H) 15.3 (H) 17.3 (H)         Patient  blood pressure  elevated  Has ongoing fevers and sweets   Poor PO intact  Lethargic  Tachycardia  Leukocytosis   Blood glucose checked        Patient inquiring about elevated temp,       Dr. Beltran Putnam  at bedside  Disease process explained to patient.

## 2022-12-01 NOTE — PLAN OF CARE
Problem: Discharge Planning  Goal: Discharge to home or other facility with appropriate resources  Outcome: Progressing     Problem: Skin/Tissue Integrity  Goal: Absence of new skin breakdown  Description: 1. Monitor for areas of redness and/or skin breakdown  2. Assess vascular access sites hourly  3. Every 4-6 hours minimum:  Change oxygen saturation probe site  4. Every 4-6 hours:  If on nasal continuous positive airway pressure, respiratory therapy assess nares and determine need for appliance change or resting period.   Outcome: Progressing     Problem: Safety - Adult  Goal: Free from fall injury  Outcome: Progressing     Problem: Respiratory - Adult  Goal: Achieves optimal ventilation and oxygenation  Outcome: Progressing     Problem: Musculoskeletal - Adult  Goal: Return mobility to safest level of function  Outcome: Progressing     Problem: Gastrointestinal - Adult  Goal: Minimal or absence of nausea and vomiting  Outcome: Progressing     Problem: Infection - Adult  Goal: Absence of infection at discharge  Outcome: Progressing     Problem: Metabolic/Fluid and Electrolytes - Adult  Goal: Electrolytes maintained within normal limits  Outcome: Progressing  Goal: Hemodynamic stability and optimal renal function maintained  Outcome: Progressing  Goal: Glucose maintained within prescribed range  Outcome: Progressing     Problem: ABCDS Injury Assessment  Goal: Absence of physical injury  Outcome: Progressing     Problem: Nutrition Deficit:  Goal: Optimize nutritional status  Outcome: Progressing

## 2022-12-01 NOTE — FLOWSHEET NOTE
12/01/22 1332 12/01/22 1706   Vital Signs   /79 (!) 133/93   Temp 98.3 °F (36.8 °C) 97 °F (36.1 °C)   Heart Rate 64 (!) 108   Resp 20 20     Treatment time: 3.5 hours    Net UF: 2 L    Pre weight: 69 kg (bed scale)  Post weight: 67 kg (bed scale)  EDW: TBD    Access used: RIJ NT HD cath  Access function: No problems    Medications or blood products given: None    Regular outpatient schedule: [facility] TTS    Summary of response to treatment: Tolerated tx without difficulty. VSS. Copy of dialysis treatment record placed in chart, to be scanned into EMR.      Report called to Fátima Gaston RN

## 2022-12-02 LAB
BLOOD CULTURE, ROUTINE: NORMAL
CULTURE, BLOOD 2: NORMAL
GLUCOSE BLD-MCNC: 167 MG/DL (ref 70–99)
GLUCOSE BLD-MCNC: 191 MG/DL (ref 70–99)
GLUCOSE BLD-MCNC: 192 MG/DL (ref 70–99)
GLUCOSE BLD-MCNC: 212 MG/DL (ref 70–99)
GLUCOSE BLD-MCNC: 258 MG/DL (ref 70–99)
PERFORMED ON: ABNORMAL
PROCALCITONIN: 20.46 NG/ML (ref 0–0.15)

## 2022-12-02 PROCEDURE — 84145 PROCALCITONIN (PCT): CPT

## 2022-12-02 PROCEDURE — 2580000003 HC RX 258: Performed by: HOSPITALIST

## 2022-12-02 PROCEDURE — 6370000000 HC RX 637 (ALT 250 FOR IP): Performed by: HOSPITALIST

## 2022-12-02 PROCEDURE — 1200000000 HC SEMI PRIVATE

## 2022-12-02 PROCEDURE — 2580000003 HC RX 258: Performed by: INTERNAL MEDICINE

## 2022-12-02 PROCEDURE — 99233 SBSQ HOSP IP/OBS HIGH 50: CPT | Performed by: INTERNAL MEDICINE

## 2022-12-02 PROCEDURE — 94760 N-INVAS EAR/PLS OXIMETRY 1: CPT

## 2022-12-02 PROCEDURE — 6360000002 HC RX W HCPCS: Performed by: INTERNAL MEDICINE

## 2022-12-02 PROCEDURE — 6360000002 HC RX W HCPCS: Performed by: HOSPITALIST

## 2022-12-02 PROCEDURE — 6360000002 HC RX W HCPCS: Performed by: STUDENT IN AN ORGANIZED HEALTH CARE EDUCATION/TRAINING PROGRAM

## 2022-12-02 PROCEDURE — 36415 COLL VENOUS BLD VENIPUNCTURE: CPT

## 2022-12-02 RX ORDER — LORAZEPAM 2 MG/ML
0.25 INJECTION INTRAMUSCULAR ONCE
Status: COMPLETED | OUTPATIENT
Start: 2022-12-02 | End: 2022-12-02

## 2022-12-02 RX ADMIN — SUCRALFATE 1 G: 1 TABLET ORAL at 08:19

## 2022-12-02 RX ADMIN — INSULIN LISPRO 4 UNITS: 100 INJECTION, SOLUTION INTRAVENOUS; SUBCUTANEOUS at 18:09

## 2022-12-02 RX ADMIN — HYDRALAZINE HYDROCHLORIDE 100 MG: 50 TABLET, FILM COATED ORAL at 09:20

## 2022-12-02 RX ADMIN — SUCRALFATE 1 G: 1 TABLET ORAL at 21:41

## 2022-12-02 RX ADMIN — ATORVASTATIN CALCIUM 40 MG: 40 TABLET, FILM COATED ORAL at 21:42

## 2022-12-02 RX ADMIN — INSULIN LISPRO 4 UNITS: 100 INJECTION, SOLUTION INTRAVENOUS; SUBCUTANEOUS at 09:25

## 2022-12-02 RX ADMIN — SUCRALFATE 1 G: 1 TABLET ORAL at 09:20

## 2022-12-02 RX ADMIN — INSULIN GLARGINE 18 UNITS: 100 INJECTION, SOLUTION SUBCUTANEOUS at 21:47

## 2022-12-02 RX ADMIN — HEPARIN SODIUM 5000 UNITS: 5000 INJECTION INTRAVENOUS; SUBCUTANEOUS at 15:05

## 2022-12-02 RX ADMIN — PANTOPRAZOLE SODIUM 40 MG: 40 TABLET, DELAYED RELEASE ORAL at 08:19

## 2022-12-02 RX ADMIN — SODIUM CHLORIDE, PRESERVATIVE FREE 10 ML: 5 INJECTION INTRAVENOUS at 21:50

## 2022-12-02 RX ADMIN — ISOSORBIDE MONONITRATE 60 MG: 60 TABLET, EXTENDED RELEASE ORAL at 09:19

## 2022-12-02 RX ADMIN — CALCITRIOL 0.5 MCG: 0.25 CAPSULE ORAL at 09:20

## 2022-12-02 RX ADMIN — HYDRALAZINE HYDROCHLORIDE 100 MG: 50 TABLET, FILM COATED ORAL at 15:05

## 2022-12-02 RX ADMIN — HEPARIN SODIUM 5000 UNITS: 5000 INJECTION INTRAVENOUS; SUBCUTANEOUS at 21:44

## 2022-12-02 RX ADMIN — SODIUM BICARBONATE 650 MG: 650 TABLET ORAL at 18:08

## 2022-12-02 RX ADMIN — SODIUM CHLORIDE 25 ML: 9 INJECTION, SOLUTION INTRAVENOUS at 20:17

## 2022-12-02 RX ADMIN — HEPARIN SODIUM 5000 UNITS: 5000 INJECTION INTRAVENOUS; SUBCUTANEOUS at 08:19

## 2022-12-02 RX ADMIN — SODIUM BICARBONATE 650 MG: 650 TABLET ORAL at 09:19

## 2022-12-02 RX ADMIN — METOPROLOL TARTRATE 75 MG: 50 TABLET, FILM COATED ORAL at 21:41

## 2022-12-02 RX ADMIN — METOPROLOL TARTRATE 75 MG: 50 TABLET, FILM COATED ORAL at 09:20

## 2022-12-02 RX ADMIN — LORAZEPAM 0.25 MG: 2 INJECTION INTRAMUSCULAR; INTRAVENOUS at 03:48

## 2022-12-02 RX ADMIN — SUCRALFATE 1 G: 1 TABLET ORAL at 18:08

## 2022-12-02 RX ADMIN — PANTOPRAZOLE SODIUM 40 MG: 40 TABLET, DELAYED RELEASE ORAL at 15:05

## 2022-12-02 RX ADMIN — CEFAZOLIN 1000 MG: 1 INJECTION, POWDER, FOR SOLUTION INTRAMUSCULAR; INTRAVENOUS at 20:20

## 2022-12-02 RX ADMIN — HYDRALAZINE HYDROCHLORIDE 100 MG: 50 TABLET, FILM COATED ORAL at 21:42

## 2022-12-02 NOTE — FLOWSHEET NOTE
Pt's wife came out to nursing desk and advised pt has not been sleeping all night and continuously talks. Pt unable to focus when having conversation with wife. Pt states to wife that he is so tired but can't sleep. Wife states his mentality is worse tonight than yesterday.  Will notify MD.

## 2022-12-02 NOTE — PROGRESS NOTES
Infectious Disease Follow up Notes  Admit Date: 11/26/2022  Hospital Day: 7    Antibiotics :   IV Cefazolin     CHIEF COMPLAINT:     Severe sepsis  Fevers   Staph aureus Bacteremia  ESRD  HD line infection       Subjective interval History :  65 y. o.man with a history of end-stage renal disease on hemodialysis, CHF, CVA, diabetes, hypercholesterolemia, hypertension, with some left weakness admitted to the hospital secondary to fever chills not. He felt cold and flulike symptoms ongoing for the past 4 to 5 days. T-max 100.3 since admission, labs indicate procalcitonin elevated 92.7 potassium 5 6 blood glucose of 500, WBC elevated 19.4 with left shift hemoglobin 10.7 blood cultures no reported to be Staph aureus 4/4 bottles MSSA, tested negative for rapid flu and COVID-19, CT head negative CT chest indicating focal consolidation right lower lobe, also pericardial effusion noted. We are consulted for IV abx recommendation.      Interval History : Ongoing fevers but now low grade tolerating IV abx ok and Follow up blood cx in process - Procal trend down     Past Medical History:    Past Medical History:   Diagnosis Date    Cardiomyopathy ([address]     CHF (congestive heart failure) (HCC)     CVA (cerebral infarction) 5/2015    Diabetes mellitus ([facility]     Foot ulcer, left ([address] 1/14/2016    Gastroparesis     Hemodialysis patient ([facility]     Hypercholesteremia     Hypertension     Kidney disease     Unspecified cerebral artery occlusion with cerebral infarction     5/15, 7/15 LEFT SIDE WEAKNESS       Past Surgical History:    Past Surgical History:   Procedure Laterality Date    CATHETER INSERTION N/A 10/17/2022    TUNNEL CATHETER PLACEMENT performed by Roque Pressley MD at [facility] N/A 5/17/2021    COLONOSCOPY POLYPECTOMY SNARE/COLD BIOPSY performed by James Valdes MD at [address] 10/17/2022    PERITONEAL DIALYSIS CATHETER REMOVAL performed by Jon Smith MD at [facility] N/A 10/29/2020    PLACEMENT OF A TUNNELED DIALYSIS CATHETER WITH FLEURO AND ULTRASOUND performed by Jon Smith MD at [place] [address] NONTUNNELED VASCULAR CATHETER  11/29/2022    IR NONTUNNELED VASCULAR CATHETER 11/29/2022 [facility]    LAPAROSCOPY INSERTION PERITONEAL CATHETER N/A 10/29/2020    LAPAROSCOPIC PERITONEAL DIALYSIS CATHETER PLACEMENT WITH FLEURO AND ULTRASOUND performed by Jon Smith MD at [address]  N/A 09/42/1721    UMBILICAL HERNIA REPAIR performed by Jon Smith MD at [address] N/A 4/26/2021    ESOPHAGOGASTRODUODENOSCOPY performed by Shane Betancourt MD at [address] 9/13/2022    EGD DIAGNOSTIC ONLY performed by Curly Snow MD at [facility]       Current Medications:    No outpatient medications have been marked as taking for the 11/26/22 encounter [facility] Encounter).        Allergies:  Doxazosin, Cefepime, and Coconut flavor    Immunizations :   Immunization History   Administered Date(s) Administered    COVID-19, PFIZER GRAY top, DO NOT Dilute, (age 15 y+), IM, 30 mcg/0.3 mL 06/04/2022    COVID-19, PFIZER PURPLE top, DILUTE for use, (age 15 y+), 30mcg/0.3mL 06/23/2021, 07/19/2021    Hepatitis A Adult (Havrix, Vaqta) 03/10/2021, 09/23/2021    Hepatitis B 05/06/2022    Hepatitis B Adult (Heplisav-b) 05/06/2022    Hepatitis B Adult (Recombivax HB) 11/05/2020    Hepatitis B vaccine 11/05/2020    Influenza Vaccine, unspecified formulation 12/05/2013    Influenza Virus Vaccine 12/05/2013, 12/04/2015, 11/05/2020, 10/11/2021    Influenza, FLUARIX, FLULAVAL, FLUZONE (age 10 mo+) AND AFLURIA, (age 1 y+), PF, 0.5mL 10/15/2018, 11/05/2020    Influenza, Triv, 3 Years and older, IM, PF (Afluria 5yrs and older) 10/11/2021    Pneumococcal Conjugate 13-valent ([id number]) 11/10/2020    Pneumococcal Conjugate Vaccine 02/13/2015    Pneumococcal Polysaccharide ([id number]) 11/02/2009, 03/10/2021    Tdap (Boostrix, Adacel) 08/27/2021    Zoster Recombinant (Shingrix) 03/10/2021, 08/27/2021       Social History:     Social History     Tobacco Use    Smoking status: Some Days     Packs/day: 0.00     Years: 30.00     Pack years: 0.00     Types: Cigars, Cigarettes     Start date: 1/3/1979     Last attempt to quit: 1/16/2019     Years since quitting: 3.8    Smokeless tobacco: Never    Tobacco comments:     3 black and milds a week   Vaping Use    Vaping Use: Never used   Substance Use Topics    Alcohol use: Yes     Comment: occasional    Drug use: Yes     Types: Marijuana Umugoldie Freeman)     Comment: previously used cocaine, stopped May 2015 LAST USED MARIJUANA-NOVEMBER 2020     Social History     Tobacco Use   Smoking Status Some Days    Packs/day: 0.00    Years: 30.00    Pack years: 0.00    Types: Cigars, Cigarettes    Start date: 1/3/1979    Last attempt to quit: 1/16/2019    Years since quitting: 3.8   Smokeless Tobacco Never   Tobacco Comments    3 black and milds a week      Family History   Adopted: Yes          REVIEW OF SYSTEMS:      Constitutional:  r fevers,++  chills++ , night sweats  Eyes:  negative for blurred vision, eye discharge, visual disturbance   HEENT:  negative for hearing loss, ear drainage,nasal congestion  Respiratory:  negative for cough, shortness of breath or hemoptysis   Cardiovascular:  negative for chest pain, palpitations, syncope  Gastrointestinal:  negative for nausea, vomiting, diarrhea, constipation, abdominal pain  Genitourinary:  negative for frequency, dysuria, urinary incontinence, hematuria  Hematologic/Lymphatic:  negative for easy bruising, bleeding and lymphadenopathy  Allergic/Immunologic:  negative for recurrent infections, angioedema, anaphylaxis   Endocrine:  negative for weight changes, polyuria, polydipsia and polyphagia  Musculoskeletal:  negative for joint  pain, swelling, decreased range of motion  Integumentary: No rashes, skin lesions  Neurological:  negative for headaches, slurred speech, unilateral weakness  Psychiatric: negative for hallucinations,confusion,agitation.                 PHYSICAL EXAM:      Vitals:  T max  102.8   /75   Pulse (!) 118   Temp 100.2 °F (37.9 °C) (Oral)   Resp 16   Ht 6' 2\" (1.88 m)   Wt 148 lb 2.4 oz (67.2 kg)   SpO2 96%   BMI 19.02 kg/m²     General Appearance: alert,in some  acute distress,++  pallor, no icterus cachexia + chronic ill appearing man +    Skin: warm and dry, no rash or erythema  Head: normocephalic and atraumatic  Eyes: pupils equal, round, and reactive to light, conjunctivae normal  ENT: tympanic membrane, external ear and ear canal normal bilaterally, nose without deformity, nasal mucosa and turbinates normal without polyps  Neck: supple and non-tender without mass, no thyromegaly  no cervical lymphadenopathy  Pulmonary/Chest: clear to auscultation bilaterally- no wheezes, rales or rhonchi, normal air movement, no respiratory distress  Cardiovascular:  S1 and S2, esm+  murmurs, rubs, clicks, or gallops, no carotid bruits  Abdomen: soft, non-tender, non-distended, normal bowel sounds, no masses or organomegaly  Extremities: no cyanosis, clubbing or edema  Musculoskeletal: normal range of motion, no joint swelling, deformity or tenderness  Integumentary: No rashes, no abnormal skin lesions, no petechiae  Neurologic: reflexes normal and symmetric, no cranial nerve deficit  Psych:  Orientation, sensorium, mood normal            Lines: HD line +  Temp line on Rt chest wall    Data Review:    CBC:   Lab Results   Component Value Date    WBC 16.3 (H) 12/01/2022    HGB 9.8 (L) 12/01/2022    HCT 31.0 (L) 12/01/2022    MCV 94.3 12/01/2022     12/01/2022     RENAL:   Lab Results   Component Value Date    CREATININE 7.4 (HH) 12/01/2022    BUN 47 (H) 12/01/2022  (L) 12/01/2022    K 4.0 12/01/2022    CL 97 (L) 12/01/2022    CO2 21 12/01/2022     SED RATE:   Lab Results   Component Value Date/Time    SEDRATE 64 10/26/2020 02:14 PM     CK: No results found for: CKTOTAL  CRP:   Lab Results   Component Value Date/Time    CRP 2.9 10/26/2020 02:14 PM     Hepatic Function Panel:   Lab Results   Component Value Date/Time    ALKPHOS 104 11/26/2022 09:26 AM    ALT 13 11/26/2022 09:26 AM    AST 12 11/26/2022 09:26 AM    PROT 8.0 11/26/2022 09:26 AM    BILITOT <0.2 11/26/2022 09:26 AM    BILIDIR <0.2 10/11/2022 02:41 PM    IBILI see below 10/11/2022 02:41 PM    LABALBU 3.0 12/01/2022 07:07 AM     UA:  Lab Results   Component Value Date/Time    COLORU Yellow 11/26/2022 01:32 PM    CLARITYU Clear 11/26/2022 01:32 PM    GLUCOSEU >=1000 11/26/2022 01:32 PM    GLUCOSEU 250 12/14/2010 02:50 PM    BILIRUBINUR Negative 11/26/2022 01:32 PM    BILIRUBINUR NEGATIVE 12/14/2010 02:50 PM    KETUA Negative 11/26/2022 01:32 PM    SPECGRAV 1.019 11/26/2022 01:32 PM    BLOODU TRACE 11/26/2022 01:32 PM    PHUR 7.5 11/26/2022 01:32 PM    PROTEINU 300 11/26/2022 01:32 PM    UROBILINOGEN 0.2 11/26/2022 01:32 PM    NITRU Negative 11/26/2022 01:32 PM    LEUKOCYTESUR Negative 11/26/2022 01:32 PM    LABMICR YES 11/26/2022 01:32 PM    URINETYPE Cleancatch 11/26/2022 01:32 PM      Urine Microscopic:   Lab Results   Component Value Date/Time    BACTERIA None Seen 11/26/2022 01:32 PM    COMU see below 05/05/2021 05:36 PM    HYALCAST 0 11/26/2022 01:32 PM    WBCUA 3 11/26/2022 01:32 PM    RBCUA 9 11/26/2022 01:32 PM    EPIU 0 11/26/2022 01:32 PM     Urine Reflex to Culture:   Lab Results   Component Value Date/Time    URRFLXCULT Not Indicated 11/26/2022 01:32 PM         MICRO: cultures reviewed and updated by me   Blood Culture:   Organism Staphylococcus aureus Abnormal     Culture Catheter Tip 15 colonies    Resulting Agency [address] [facility]        Susceptibility    Staphylococcus aureus (1)    Antibiotic Interpretation Microscan  Method Status    ceFAZolin Sensitive <=4 mcg/mL BACTERIAL SUSCEPTIBILITY PANEL BY DYLON     clindamycin Sensitive <=0.5 mcg/mL BACTERIAL SUSCEPTIBILITY PANEL BY DYLON     erythromycin Sensitive <=0.5 mcg/mL BACTERIAL SUSCEPTIBILITY PANEL BY DYLON     oxacillin Sensitive <=0.25 mcg/mL BACTERIAL SUSCEPTIBILITY PANEL BY DYLON     tetracycline Sensitive <=4 mcg/mL BACTERIAL SUSCEPTIBILITY PANEL BY DYLON     trimethoprim-sulfamethoxazole Sensitive <=0.5/9.5 mcg/mL BACTERIAL SUSCEPTIBILITY PANEL BY DYLON        Narrative  [facility]formed by: [facility]  ORDER#: [id number]                          ORDERED BY: Lori Steve   SOURCE: Catheter Tip                       COLLECTED:  11/29/22 11:40            Lab Results   Component Value Date/Time    BC No Growth after 4 days of incubation. 11/28/2022 06:59 AM    BLOODCULT2 No Growth after 4 days of incubation. 11/28/2022 06:59 AM       Respiratory Culture:  Lab Results   Component Value Date/Time    LABGRAM  10/16/2022 11:00 AM     No Epithelial Cells seen  No WBCs or organisms seen       AFB:No results found for: [facility]  Viral Culture:  Lab Results   Component Value Date/Time    COVID19 Not Detected 11/26/2022 09:26 AM     Urine Culture: No results for input(s): LABURIN in the last 72 hours. IMAGING:    IR REMOVE TUNNELED CVAD WO SQ PORT/PUMP   Final Result   Successful removal of tunneled dialysis catheter. Successful fluoroscopic guided non tunneled Trialysis catheter placement. IR NONTUNNELED VASCULAR CATHETER > 5 YEARS   Final Result   Successful removal of tunneled dialysis catheter. Successful fluoroscopic guided non tunneled Trialysis catheter placement. MRI BRAIN WO CONTRAST   Final Result   1. No acute intracranial abnormality. No acute infarct. 2. Mild global parenchymal volume loss with chronic microvascular ischemic   changes. 3. Small lipoma is again seen along the right dorsal midbrain. CT CHEST PULMONARY EMBOLISM W CONTRAST   Final Result   1. Focal consolidation in the right lower lobe favoring   infectious/inflammatory process. Recommend CT of the chest in 3 months to   confirm resolution. 2. Solid subcentimeter right pulmonary nodules can be assessed during the   same time. 3. Cardiomegaly with unchanged moderate to large pericardial effusion. 4. Age-indeterminate L1 compression fracture. If there is point tenderness,   recommend nonemergent MRI of the lumbar spine. CT HEAD WO CONTRAST   Final Result   1. No acute intracranial abnormality. XR CHEST PORTABLE   Final Result   Stable increased opacity left basilar region compared to prior studies dating   back to 10/25/2020 consistent with pericardial effusion, loculated left   basilar pleural effusion, pulmonary consolidation or mass/neoplasm. 2 mm   opacity consistent with pulmonary nodule in the right basilar region and foci   of infiltrate or subsegmental atelectasis in the mid-lower lung fields noted   bilaterally. IV contrast-enhanced chest CT suggested for further evaluation.                All the pertinent images and reports for the current Hospitalization were reviewed by me     Scheduled Meds:   sodium bicarbonate  650 mg Oral TID WC    ceFAZolin  1,000 mg IntraVENous Q24H    sodium chloride flush  5-40 mL IntraVENous 2 times per day    heparin (porcine)  5,000 Units SubCUTAneous 3 times per day    insulin lispro  0-8 Units SubCUTAneous TID WC    insulin lispro  0-4 Units SubCUTAneous Nightly    insulin lispro  0.05 Units/kg SubCUTAneous TID WC    insulin glargine  0.25 Units/kg SubCUTAneous Nightly    calcitRIOL  0.5 mcg Oral Daily    atorvastatin  40 mg Oral Nightly    metoprolol tartrate  75 mg Oral BID    [Held by provider] NIFEdipine  90 mg Oral BID    pantoprazole  40 mg Oral BID AC    sucralfate  1 g Oral 4x Daily AC & HS    hydrALAZINE  100 mg Oral TID    isosorbide mononitrate  60 mg Oral Daily Continuous Infusions:   sodium chloride 40 mL (11/29/22 0534)    dextrose       Summary   Limited study. Overall left ventricular systolic function appears moderately reduced. Ejection fraction is visually estimated to be 35-40% with diffuse   hypokinesis. There is severely increased left ventricular wall thickness. Left ventricular cavity size is normal.   The right ventricle is not well visualized. Tricuspid aortic valve leaflets appear thickened/calcified but no clear   mobile structure to suggest a vegetation. There is a moderate pericardial effusion.       PRN Meds:  heparin (porcine), perflutren lipid microspheres, sodium chloride flush, sodium chloride, ondansetron **OR** ondansetron, polyethylene glycol, acetaminophen **OR** acetaminophen, glucose, dextrose bolus **OR** dextrose bolus, glucagon (rDNA), dextrose, meclizine      Assessment:     Patient Active Problem List   Diagnosis    Nonischemic cardiomyopathy ([address]    Cerebral infarction ([address]    Cigarette nicotine dependence without complication    Erectile dysfunction due to arterial insufficiency    Renal artery stenosis (HCC)    Chronic diastolic congestive heart failure (HCC)    H/O: CVA (cerebrovascular accident)    Major depressive disorder, recurrent episode, moderate (HCC)    Pericardial effusion    Hypertension associated with diabetes ([address]    DM type 2 with diabetic mixed hyperlipidemia ([address]    Diabetes mellitus due to underlying condition with stage 4 chronic kidney disease, with long-term current use of insulin (HCC)    ESRD (end stage renal disease) on dialysis ([address]    ESRD (end stage renal disease) ([address]    GI bleed    SBP (spontaneous bacterial peritonitis) ([address]    Dialysis-associated peritonitis ([address]    Candida infection    Generalized abdominal pain    PKD (polycystic kidney disease)    Bacterial pneumonia    Encephalopathy acute    Elevated lactic acid level    Hyperkalemia    Severe sepsis ([address]    Staphylococcus aureus bacteremia with sepsis ([facility]    Neutrophilia    Elevated procalcitonin    ESRD needing dialysis (HCC)    Severe malnutrition (HCC)     Sepsis  Staph aureus Bacteremia  MSSA+  ESRD on HD  HD line in place concern for Line infection   Cardiomyopathy   Pericardial effusion know to have this   WBC elevation   Procal elevation  DM+  Hyperkalemia  PKD  Fevers from above     Given the high grade Bacteremia and sepsis concern is for HD line infection - will need HD line removal and needs line Holiday and repeat Blood cx     TTE completed no vegetation -     Staph aureus MSSA high-grade bacteremia concern for for HD line infection  s/p  line removal and tip sent for cx in process with staph aureus sensitivities pending    WBC still high and fever spike from on going infection follow up blood cx in process needs to watch for any new complications     CT chest images reviewed there is a small area of  change Rt lower lobe does not look like Bacterial infection or dense consolidation       Labs, Microbiology, Radiology and all the pertinent results from current hospitalization and  care every where were reviewed  by me as a part of the evaluation   Plan:   IV Cefazolin x 1 gm q 24 HRS  Repeat blood cx in  process from 11/28 so far no growth to date  S/p  HD line removal   Send Tip for cx   staph aureus noted MSSA  TTE  -ve  Unfortunately PD catheter was infected  and now having problems with HD line infections  Needs to improve skin hygiene and catheter care  -   If more fevers needs to watch for complications   Recheck Pro-Gabriele  improving  Anticipate 4- 6 weeks of IV antibiotic with hemodialysis sessions at discharge     Discussed with patient/Family and Nursing    Risk of Complications/Morbidity: High      Illness(es)/ Infection present that pose threat to bodily function. There is potential for severe exacerbation of infection/side effects of treatment.   Therapy requires intensive monitoring for antimicrobial agent toxicity. Discussed with patient/Family and Nursing staff     Thanks for allowing me to participate in your patient's care and please call me with any questions or concerns.     Pepe Awan MD  Infectious Disease  [address] [facility]  Phone: [phone]   Fax : [phone]

## 2022-12-02 NOTE — PROGRESS NOTES
[facility]                                               Progress note    CC: fever, sepsis  Summary:   Espinoza Soria is being seen by nephrology for ERD. He is a 65 y.o. male with a PMH significant for ESRD, CHF, ADPKD, fungal peritonitis 2/2 PD catheter who presented to the ED 11/26/2022 with fever, hyperglycemia, and AMS. He was noted to be hyperkalemic on admission. Blood cx positive for MSSA    Interval History  Seen at bedside. /78  Continues to spike fevers, TMAX this .2 F  TMAX is trending down  Repeat cx froom 11/28/2022 NGTD  TDC catheter tip cx positive for MSSA      Plan:   - no acute indication for HD today, plan for HD tomorrow via temp HD catheter. - will hold off on putting TDC back in until fevers resolve. - cefazolin per HUY Kincaid MD  [facility]  Office: [phone]    Assessment:   ESRD:  - on HD TTS at [facility]  - recent fungal PD peritonitis  - now access is a [facility] which might be the cause of MRSA bacteremia  - TDC was removed 11/29/2022 and a temp HD catheter was placed for continued dialysis while the infection is addressed  - EDW 69.5 kg    Hypertension:    Continue current medicatiosn    MSSA bacteremia:  - suspect the [facility] to be the source  - remove [facility] 11/29/2022, tip cx positive for MSSA also. - TTE negative for vegetations. LVEF 35-40%, severely increased LV wall thickness  - repeat blood cx 11/28/2022 NGTD  - on cefazolin      ROS:    Positives Listed Bold. All other remaining systems are negative. Constitutional:  fever, chills, weakness, weight change, fatigue,      Skin:  rash, pruritus, hair loss, bruising, dry skin, petechiae. Head, Face, Neck   headaches, swelling,  cervical adenopathy.      Respiratory: shortness of breath, cough, or wheezing  Cardiovascular: chest pain, palpitations, dizzy, edema  Gastrointestinal: nausea, vomiting, diarrhea, constipation,belly pain    Yellow skin, blood in stool  Musculoskeletal:  back pain, muscle weakness, gait problems,       joint pain or swelling. Genitourinary:  dysuria, poor urine flow, flank pain, blood in urine  Neurologic:  vertigo, TIA'S, syncope, seizures, focal weakness  Psychosocial:  insomnia, anxiety, or depression. Additional positive findings: -     PMH:   Past medical history, surgical history, social history, family history are reviewed and updated as appropriate. Reviewed current medication list.   Allergies reviewed and updated as needed. PE:   Vitals:    12/02/22 0902   BP: 123/75   Pulse: (!) 118   Resp: 16   Temp: 100.2 °F (37.9 °C)   SpO2: 96%       General appearance: in NAD, fully alert and oriented. Comfortable. HEENT: EOM intact, no icterus. Trachea is midline. Neck : No masses, appears symmetrical, no JVD  Respiratory: Respiratory effort appears normal, bilateral equal chest rise, no wheeze, no crackles   Cardiovascular: Ausculation shows RRR no edema  Abdomen: No visible mass or tenderness, non distended. Musculoskeletal:  Joints with no swelling or deformity. Skin:no rashes, ulcers, induration, no jaundice. Neuro: face symmetric, no focal deficits. Appropriate responses.        Lab Results   Component Value Date    CREATININE 7.4 (HH) 12/01/2022    BUN 47 (H) 12/01/2022     (L) 12/01/2022    K 4.0 12/01/2022    CL 97 (L) 12/01/2022    CO2 21 12/01/2022      Lab Results   Component Value Date    WBC 16.3 (H) 12/01/2022    HGB 9.8 (L) 12/01/2022    HCT 31.0 (L) 12/01/2022    MCV 94.3 12/01/2022     12/01/2022     Lab Results   Component Value Date    PTH 49.8 02/01/2019    CALCIUM 9.1 12/01/2022    PHOS 3.4 12/01/2022

## 2022-12-02 NOTE — PROGRESS NOTES
Facility/Department: [address] MED SURG  Initial Assessment  DYSPHAGIA BEDSIDE SWALLOW EVALUATION     Patient: Sierra Bautista   : 1965   MRN: [id number]      Evaluation Date: 2022   Admitting Diagnosis:   Hyperkalemia [E87.5]  Pericardial effusion [I31.39]  Bacterial pneumonia [J15.9]  Encephalopathy acute [G93.40]  Pulmonary nodule [R91.1]  ESRD needing dialysis ([address] [N18.6, Z99.2]  Elevated lactic acid level [R79.89]  Severe sepsis ([address] [A41.9, R65.20]  Pneumonia of right lower lobe due to infectious organism [J18.9]  Type 2 diabetes mellitus with hyperglycemia, with long-term current use of insulin ([address] [E11.65, Z79.4]  Closed compression fracture of L1 vertebra, initial encounter ([address] [S32.010A]    Pain: Did not state                                                       CHART REVIEW:  2022 admitted with sepsis d/t RLL pneumonia  Prolonged hospitalization upon receipt of swallow eval order 2022  Multiple specialties following    IMAGING:  CXR: 2022  Impression   Stable increased opacity left basilar region compared to prior studies dating   back to 10/25/2020 consistent with pericardial effusion, loculated left   basilar pleural effusion, pulmonary consolidation or mass/neoplasm. 2 mm   opacity consistent with pulmonary nodule in the right basilar region and foci   of infiltrate or subsegmental atelectasis in the mid-lower lung fields noted   bilaterally. IV contrast-enhanced chest CT suggested for further evaluation. CT CHEST: 2022  Impression   1. Focal consolidation in the right lower lobe favoring   infectious/inflammatory process. Recommend CT of the chest in 3 months to   confirm resolution. 2. Solid subcentimeter right pulmonary nodules can be assessed during the   same time. 3. Cardiomegaly with unchanged moderate to large pericardial effusion. 4. Age-indeterminate L1 compression fracture.   If there is point tenderness,   recommend nonemergent MRI of the lumbar spine. BRAIN MRI: 11/28/2022  Impression   1. No acute intracranial abnormality. No acute infarct. 2. Mild global parenchymal volume loss with chronic microvascular ischemic   changes. 3. Small lipoma is again seen along the right dorsal midbrain. Current Diet Level (prior to evaluation): Regular texture diet  Thin liquids    Respiratory Status:   [x]Room Air   []O2 via nasal cannula   []Other:    Reason for referral: SLP evaluation orders received due to RN rep rots concern for oral pocketing    History/Prior Level of Function:   Living Status: admitted from home with wife  Baseline diet: regular/thin  Prior Dysphagia History: unremarkable    Dysphagia Impressions/Diagnosis: Oropharyngeal Dysphagia   Accepted and tolerated evaluation at bedside. Patient alert, cooperative, pleasant; oriented to self and place; follows dx; verbally responsive but with confused responses at times. Moderate oral stage dysphagia characterized by generalized weakness for mastication and lingual manipulation. Prolonged, labored mastication with regular solid with eventual need to remove solid from oral cavity d/t unable to complete prep. Prolonged but adequate mastication with soft solids. Prolonged bolus formation and movement with all with tendency to intermittently rock PO. Pharyngeal stage appears grossly WFL. Cog status suspected to impact current dysphagia. Recommended Diet and Intervention 12/2/2022:  Diet Solids Recommendation:  Dysphagia III Soft and bite sized  Liquid Consistency Recommendation: Thin liquids  Recommended form of Meds: Meds whole with water vs crushed in puree/pudding     Compensatory Swallowing Strategies:  Upright as possible with all PO intake , Remain upright 30-45 min     SHORT TERM DYSPHAGIA GOALS/PLAN OF CARE: Speech therapy for dysphagia tx 3-5 times per week during acute care stay.     Goals  Pt will functionally tolerate recommended diet with no overt clinical s/s of aspiration   Pt will advance to least restrictive diet as indicated   If dysphagia persists, patient will improve swallow function via swallow ex completed 10/10       Dysphagia Therapeutic Intervention:  Bolus Control Exercise, Diet Tolerance Monitoring , Oral Motor Exercises , Patient/Family Education , Therapeutic Trials with SLP     Dysphagia Prognosis: [x] good []fair  [x]guarded []poor  Barriers to progress: Confusion, Limited insight into deficits, and Decreased endurance    Discharge Recommendations: Discharge recommendations to be determined pending ongoing follow-up during acute care stay      TEST DATA  Vision: []WFL [x]Impaired: wears glasses  Hearing: [x]WFL []Impaired:     Cognitive/behavioral/communication:   Oriented to [x] self [x] place [] date [] situation  [x]alert []lethargic  [x]cooperative []self-limiting   [x]confused   []distractible []agitated []impulsive  [x]verbally responsive []nonverbal []limited verbal responses  [x]follows one step commands []does not follow dx []follows complex commands  []aphasic []dysarthric  [] other:     Dentition: [x]Adequate []Dentures []Missing Many Teeth []Edentulous []Other:  Vocal Quality: []Normal []Dysphonic  []Aphonic  []Hoarse []Wet [x]Weak []Other:  Volitional Cough: []Strong [x]Weak []Wet []Absent []Congested []Other:  Volitional Swallow:   []Absent  []Delayed [x]Adequate []Required use of drink     Patient Positioning: Upright in bed     Consistencies Presented:    Thin liquid;   Puree food  Soft/bite size food  Regular solid food    Oral Mechanism Exam:  []WFL [x]Mild   [x] Moderate  []Severe  []To be assessed  Impaired:   []Left side      []Right side    [x]Labial ROM/Coordination    []Labial Symmetry   [x]Lingual ROM/Coordination   []Lingual Symmetry  []Gag  []Other:     Oral Phase: []WFL []Mild   [x] Moderate  []Severe  []To be assessed   [x]Impaired/Prolonged Mastication: unable to chew regular; prolonged with soft   []Spillage Left:   []Spillage Right:  []Pocketing Left:   []Pocketing Right:   [x]Decreased Anterior to Posterior Transit:   []Suspected Premature Bolus Loss:   []Lingual/Palatal Residue:   []Other:     Pharyngeal Phase: [x]WFL []Mild   [] Moderate  []Severe  []To be assessed   []Delayed Swallow:   []Suspected Pharyngeal Pooling:   []Decreased Laryngeal Elevation:   []Absent Swallow:  []Wet Vocal Quality:   []Throat Clearing-Immediate:   []Throat Clearing-Delayed:   []Cough-Immediate:   []Cough-Delayed:  []Change in Vital Signs:  []Suspected Delayed Pharyngeal Clearing:  []Other:       Eating Assistance:  []Independent  [x]Setup or clean-up assistance   [] Supervision or touching assistance   [] Partial or moderate assistance   [] Substantial or maximal assistance  [] Dependent       EDUCATION:   Provided education regarding role of SLP, results of assessment, recommendations and general speech pathology plan of care. [] Pt verbalized understanding and agreement   [x] Pt requires ongoing learning   [] No evidence of comprehension     If patient discharges prior to next visit, this note will serve as discharge. Timed Code Minutes: 0  Total Treatment Minutes: 25    Electronically signed by:  Callie Heart.  Suzanne Smith, [address] [place] [address], [id number]  Speech-Language Pathologist  Portable phone: [phone]  12/02/22 1:50 PM

## 2022-12-02 NOTE — CARE COORDINATION
Per chart review, elevated temps, confused, remains with temporary HD cath. Tunneled dialysis cath positive for MSSA. Being treated with IV antibiotics. Will need a tunneled HD catheter placed prior to discharge. Discharge plan is to return to home with his spouse when medically ready and resume outpatient HD at [facility].      Marysue Bloch BSN RN  Case Management  [phone]    Electronically signed by Marysue Bloch, RN on 12/2/2022 at 5:19 PM

## 2022-12-03 LAB
ANION GAP SERPL CALCULATED.3IONS-SCNC: 13 MMOL/L (ref 3–16)
BASOPHILS ABSOLUTE: 0.1 K/UL (ref 0–0.2)
BASOPHILS RELATIVE PERCENT: 0.3 %
BUN BLDV-MCNC: 16 MG/DL (ref 7–20)
CALCIUM SERPL-MCNC: 9 MG/DL (ref 8.3–10.6)
CHLORIDE BLD-SCNC: 97 MMOL/L (ref 99–110)
CO2: 27 MMOL/L (ref 21–32)
CREAT SERPL-MCNC: 3.5 MG/DL (ref 0.9–1.3)
EOSINOPHILS ABSOLUTE: 0.2 K/UL (ref 0–0.6)
EOSINOPHILS RELATIVE PERCENT: 0.9 %
ESTIMATED AVERAGE GLUCOSE: 157.1 MG/DL
GFR SERPL CREATININE-BSD FRML MDRD: 20 ML/MIN/{1.73_M2}
GLUCOSE BLD-MCNC: 138 MG/DL (ref 70–99)
GLUCOSE BLD-MCNC: 163 MG/DL (ref 70–99)
GLUCOSE BLD-MCNC: 188 MG/DL (ref 70–99)
GLUCOSE BLD-MCNC: 196 MG/DL (ref 70–99)
GLUCOSE BLD-MCNC: 203 MG/DL (ref 70–99)
HBA1C MFR BLD: 7.1 %
HCT VFR BLD CALC: 28.6 % (ref 40.5–52.5)
HEMOGLOBIN: 9.4 G/DL (ref 13.5–17.5)
LYMPHOCYTES ABSOLUTE: 1 K/UL (ref 1–5.1)
LYMPHOCYTES RELATIVE PERCENT: 6.3 %
MCH RBC QN AUTO: 30.1 PG (ref 26–34)
MCHC RBC AUTO-ENTMCNC: 32.7 G/DL (ref 31–36)
MCV RBC AUTO: 92.1 FL (ref 80–100)
MONOCYTES ABSOLUTE: 1.3 K/UL (ref 0–1.3)
MONOCYTES RELATIVE PERCENT: 8 %
NEUTROPHILS ABSOLUTE: 13.7 K/UL (ref 1.7–7.7)
NEUTROPHILS RELATIVE PERCENT: 84.5 %
PDW BLD-RTO: 14.8 % (ref 12.4–15.4)
PERFORMED ON: ABNORMAL
PLATELET # BLD: 267 K/UL (ref 135–450)
PMV BLD AUTO: 10.9 FL (ref 5–10.5)
POTASSIUM SERPL-SCNC: 3.8 MMOL/L (ref 3.5–5.1)
RBC # BLD: 3.1 M/UL (ref 4.2–5.9)
SODIUM BLD-SCNC: 137 MMOL/L (ref 136–145)
WBC # BLD: 16.3 K/UL (ref 4–11)

## 2022-12-03 PROCEDURE — 2580000003 HC RX 258: Performed by: HOSPITALIST

## 2022-12-03 PROCEDURE — 94760 N-INVAS EAR/PLS OXIMETRY 1: CPT

## 2022-12-03 PROCEDURE — 1200000000 HC SEMI PRIVATE

## 2022-12-03 PROCEDURE — 6370000000 HC RX 637 (ALT 250 FOR IP): Performed by: HOSPITALIST

## 2022-12-03 PROCEDURE — 92526 ORAL FUNCTION THERAPY: CPT

## 2022-12-03 PROCEDURE — 6360000002 HC RX W HCPCS: Performed by: HOSPITALIST

## 2022-12-03 PROCEDURE — 36415 COLL VENOUS BLD VENIPUNCTURE: CPT

## 2022-12-03 PROCEDURE — 85025 COMPLETE CBC W/AUTO DIFF WBC: CPT

## 2022-12-03 PROCEDURE — 6360000002 HC RX W HCPCS: Performed by: INTERNAL MEDICINE

## 2022-12-03 PROCEDURE — 80048 BASIC METABOLIC PNL TOTAL CA: CPT

## 2022-12-03 PROCEDURE — 2580000003 HC RX 258: Performed by: INTERNAL MEDICINE

## 2022-12-03 PROCEDURE — 83036 HEMOGLOBIN GLYCOSYLATED A1C: CPT

## 2022-12-03 PROCEDURE — 90935 HEMODIALYSIS ONE EVALUATION: CPT

## 2022-12-03 RX ADMIN — INSULIN LISPRO 4 UNITS: 100 INJECTION, SOLUTION INTRAVENOUS; SUBCUTANEOUS at 14:03

## 2022-12-03 RX ADMIN — HEPARIN SODIUM 5000 UNITS: 5000 INJECTION INTRAVENOUS; SUBCUTANEOUS at 23:03

## 2022-12-03 RX ADMIN — SUCRALFATE 1 G: 1 TABLET ORAL at 17:13

## 2022-12-03 RX ADMIN — METOPROLOL TARTRATE 75 MG: 50 TABLET, FILM COATED ORAL at 23:03

## 2022-12-03 RX ADMIN — SUCRALFATE 1 G: 1 TABLET ORAL at 23:03

## 2022-12-03 RX ADMIN — INSULIN LISPRO 2 UNITS: 100 INJECTION, SOLUTION INTRAVENOUS; SUBCUTANEOUS at 17:13

## 2022-12-03 RX ADMIN — HYDRALAZINE HYDROCHLORIDE 100 MG: 50 TABLET, FILM COATED ORAL at 14:03

## 2022-12-03 RX ADMIN — PANTOPRAZOLE SODIUM 40 MG: 40 TABLET, DELAYED RELEASE ORAL at 17:13

## 2022-12-03 RX ADMIN — SUCRALFATE 1 G: 1 TABLET ORAL at 05:56

## 2022-12-03 RX ADMIN — SODIUM CHLORIDE: 9 INJECTION, SOLUTION INTRAVENOUS at 23:06

## 2022-12-03 RX ADMIN — HYDRALAZINE HYDROCHLORIDE 100 MG: 50 TABLET, FILM COATED ORAL at 23:04

## 2022-12-03 RX ADMIN — SODIUM BICARBONATE 650 MG: 650 TABLET ORAL at 14:03

## 2022-12-03 RX ADMIN — CEFAZOLIN 1000 MG: 1 INJECTION, POWDER, FOR SOLUTION INTRAMUSCULAR; INTRAVENOUS at 23:07

## 2022-12-03 RX ADMIN — SODIUM CHLORIDE, PRESERVATIVE FREE 10 ML: 5 INJECTION INTRAVENOUS at 23:04

## 2022-12-03 RX ADMIN — INSULIN GLARGINE 18 UNITS: 100 INJECTION, SOLUTION SUBCUTANEOUS at 23:58

## 2022-12-03 RX ADMIN — HEPARIN SODIUM 5000 UNITS: 5000 INJECTION INTRAVENOUS; SUBCUTANEOUS at 05:56

## 2022-12-03 RX ADMIN — HEPARIN SODIUM 5000 UNITS: 5000 INJECTION INTRAVENOUS; SUBCUTANEOUS at 14:44

## 2022-12-03 RX ADMIN — PANTOPRAZOLE SODIUM 40 MG: 40 TABLET, DELAYED RELEASE ORAL at 05:56

## 2022-12-03 RX ADMIN — SODIUM BICARBONATE 650 MG: 650 TABLET ORAL at 17:13

## 2022-12-03 RX ADMIN — ATORVASTATIN CALCIUM 40 MG: 40 TABLET, FILM COATED ORAL at 23:02

## 2022-12-03 RX ADMIN — INSULIN LISPRO 4 UNITS: 100 INJECTION, SOLUTION INTRAVENOUS; SUBCUTANEOUS at 17:13

## 2022-12-03 ASSESSMENT — PAIN SCALES - GENERAL: PAINLEVEL_OUTOF10: 8

## 2022-12-03 ASSESSMENT — PAIN DESCRIPTION - LOCATION: LOCATION: HEAD

## 2022-12-03 NOTE — FLOWSHEET NOTE
Treatment time: 3.5 hts    Net UF: 0    Pre weight: 67 kg   Post weight: 66.8 kg   EDW: tbd     Access used: R IJ NTDC  Access function: good     Medications or blood products given: none     Regular outpatient schedule: TTS     Summary of response to treatment: pt tolerated tx well. R Tridialysis catheter CDI transparent dressing in place. Post VSS     Copy of dialysis treatment record placed in chart, to be scanned into EMR.       12/03/22 0803 12/03/22 1144   Vital Signs   /80 (!) 162/77   Temp 98.4 °F (36.9 °C) 98 °F (36.7 °C)   Heart Rate 99 (!) 102   Weight 147 lb 11.3 oz (67 kg) 147 lb 4.3 oz (66.8 kg)   Weight Method Bed scale Bed scale   Post-Hemodialysis Assessment   Hemodialysis Intake (ml)  --  1200 ml   Hemodialysis Output (ml)  --  1300 ml   NET Removed (ml)  --  100   Tolerated Treatment  --  Good

## 2022-12-03 NOTE — PLAN OF CARE
Problem: Discharge Planning  Goal: Discharge to home or other facility with appropriate resources  Outcome: Progressing  Flowsheets  Taken 12/3/2022 0455 by Lord Mayra RN  Discharge to home or other facility with appropriate resources:   Identify barriers to discharge with patient and caregiver   Arrange for needed discharge resources and transportation as appropriate  Taken 12/2/2022 2020 by Serge Padilla RN  Discharge to home or other facility with appropriate resources: Identify barriers to discharge with patient and caregiver     Problem: Skin/Tissue Integrity  Goal: Absence of new skin breakdown  Description: 1. Monitor for areas of redness and/or skin breakdown  2. Assess vascular access sites hourly  3. Every 4-6 hours minimum:  Change oxygen saturation probe site  4. Every 4-6 hours:  If on nasal continuous positive airway pressure, respiratory therapy assess nares and determine need for appliance change or resting period.   Outcome: Progressing     Problem: Safety - Adult  Goal: Free from fall injury  Outcome: Progressing     Problem: Respiratory - Adult  Goal: Achieves optimal ventilation and oxygenation  Outcome: Progressing  Flowsheets  Taken 12/3/2022 0455 by Lord Mayra RN  Achieves optimal ventilation and oxygenation:   Assess for changes in respiratory status   Assess for changes in mentation and behavior   Position to facilitate oxygenation and minimize respiratory effort   Oxygen supplementation based on oxygen saturation or arterial blood gases   Assess and instruct to report shortness of breath or any respiratory difficulty   Respiratory therapy support as indicated  Taken 12/2/2022 2020 by Serge Padilla RN  Achieves optimal ventilation and oxygenation:   Assess for changes in respiratory status   Assess for changes in mentation and behavior     Problem: Musculoskeletal - Adult  Goal: Return mobility to safest level of function  Outcome: Progressing  Flowsheets  Taken 12/3/2022 4699 by Pancho Pandey RN  Return Mobility to Safest Level of Function:   Assess patient stability and activity tolerance for standing, transferring and ambulating with or without assistive devices   Assist with transfers and ambulation using safe patient handling equipment as needed   Obtain physical therapy/occupational therapy consults as needed   Instruct patient/family in ordered activity level  Taken 12/2/2022 2020 by Siri James RN  Return Mobility to Safest Level of Function:   Assess patient stability and activity tolerance for standing, transferring and ambulating with or without assistive devices   Assist with transfers and ambulation using safe patient handling equipment as needed     Problem: Gastrointestinal - Adult  Goal: Minimal or absence of nausea and vomiting  Outcome: Progressing  Flowsheets (Taken 12/3/2022 0455)  Minimal or absence of nausea and vomiting:   Administer ordered antiemetic medications as needed   Provide nonpharmacologic comfort measures as appropriate     Problem: Infection - Adult  Goal: Absence of infection at discharge  Outcome: Progressing  Flowsheets  Taken 12/3/2022 0455 by Pancho Pandey RN  Absence of infection at discharge:   Assess and monitor for signs and symptoms of infection   Monitor lab/diagnostic results   Monitor all insertion sites i.e., indwelling lines, tubes and drains   Administer medications as ordered  Taken 12/2/2022 2020 by Siri James RN  Absence of infection at discharge:   Assess and monitor for signs and symptoms of infection   Monitor lab/diagnostic results   Administer medications as ordered     Problem: Metabolic/Fluid and Electrolytes - Adult  Goal: Electrolytes maintained within normal limits  Outcome: Progressing  Flowsheets  Taken 12/3/2022 0455 by Pancho Pandey RN  Electrolytes maintained within normal limits:   Monitor labs and assess patient for signs and symptoms of electrolyte imbalances   Administer electrolyte replacement as ordered   Monitor response to electrolyte replacements, including repeat lab results as appropriate   Fluid restriction as ordered   Instruct patient on fluid and nutrition restrictions as appropriate  Taken 12/2/2022 2020 by Enedina Verdugo RN  Electrolytes maintained within normal limits: Monitor labs and assess patient for signs and symptoms of electrolyte imbalances  Goal: Hemodynamic stability and optimal renal function maintained  Outcome: Progressing  Flowsheets  Taken 12/3/2022 0455 by Edward Ho RN  Hemodynamic stability and optimal renal function maintained:   Monitor labs and assess for signs and symptoms of volume excess or deficit   Monitor intake, output and patient weight   Monitor urine specific gravity, serum osmolarity and serum sodium as indicated or ordered   Monitor response to interventions for patient's volume status, including labs, urine output, blood pressure (other measures as available)  Taken 12/2/2022 2020 by Enedina Verdugo RN  Hemodynamic stability and optimal renal function maintained:   Monitor labs and assess for signs and symptoms of volume excess or deficit   Monitor intake, output and patient weight  Goal: Glucose maintained within prescribed range  Outcome: Progressing  Flowsheets (Taken 12/3/2022 0455)  Glucose maintained within prescribed range:   Monitor blood glucose as ordered   Assess for signs and symptoms of hyperglycemia and hypoglycemia   Administer ordered medications to maintain glucose within target range   Assess barriers to adequate nutritional intake and initiate nutrition consult as needed   Instruct patient on self management of diabetes and initiate consult as needed     Problem: ABCDS Injury Assessment  Goal: Absence of physical injury  Outcome: Progressing     Problem: Nutrition Deficit:  Goal: Optimize nutritional status  Outcome: Progressing     Problem: Neurosensory - Adult  Goal: Achieves stable or improved neurological status  Outcome: Progressing  Flowsheets (Taken 12/3/2022 0455)  Achieves stable or improved neurological status: Assess for and report changes in neurological status     Problem: Cardiovascular - Adult  Goal: Maintains optimal cardiac output and hemodynamic stability  Outcome: Progressing  Flowsheets (Taken 12/3/2022 0455)  Maintains optimal cardiac output and hemodynamic stability:   Monitor blood pressure and heart rate   Monitor urine output and notify Licensed Independent Practitioner for values outside of normal range   Assess for signs of decreased cardiac output  Goal: Absence of cardiac dysrhythmias or at baseline  Outcome: Progressing  Flowsheets (Taken 12/3/2022 0455)  Absence of cardiac dysrhythmias or at baseline:   Monitor cardiac rate and rhythm   Assess for signs of decreased cardiac output   Administer antiarrhythmia medication and electrolyte replacement as ordered     Problem: Skin/Tissue Integrity - Adult  Goal: Skin integrity remains intact  Outcome: Progressing  Flowsheets (Taken 12/2/2022 2020 by Serge Padilla RN)  Skin Integrity Remains Intact: Monitor for areas of redness and/or skin breakdown     Problem: Genitourinary - Adult  Goal: Absence of urinary retention  Outcome: Progressing  Flowsheets (Taken 12/3/2022 0455)  Absence of urinary retention:   Assess patients ability to void and empty bladder   Monitor intake/output and perform bladder scan as needed

## 2022-12-03 NOTE — PROGRESS NOTES
[facility]   Speech Therapy  Daily Dysphagia Treatment Note        Sierra Bautista  AGE: 65 y.o.    GENDER: male  : 1965  [id number]  EPISODE DATE:  2022  Patient Active Problem List   Diagnosis    Nonischemic cardiomyopathy ([facility]    Cerebral infarction ([address]    Cigarette nicotine dependence without complication    Erectile dysfunction due to arterial insufficiency    Renal artery stenosis (HCC)    Chronic diastolic congestive heart failure (HCC)    H/O: CVA (cerebrovascular accident)    Major depressive disorder, recurrent episode, moderate (HCC)    Pericardial effusion    Hypertension associated with diabetes ([address]    DM type 2 with diabetic mixed hyperlipidemia ([address]    Diabetes mellitus due to underlying condition with stage 4 chronic kidney disease, with long-term current use of insulin ([address]    ESRD (end stage renal disease) on dialysis ([address]    ESRD (end stage renal disease) ([address]    GI bleed    SBP (spontaneous bacterial peritonitis) ([address]    Dialysis-associated peritonitis ([facility]    Candida infection    Generalized abdominal pain    PKD (polycystic kidney disease)    Bacterial pneumonia    Encephalopathy acute    Elevated lactic acid level    Hyperkalemia    Severe sepsis (HCC)    Staphylococcus aureus bacteremia with sepsis (HCC)    Neutrophilia    Elevated procalcitonin    ESRD needing dialysis (HCC)    Severe malnutrition (HCC)     Allergies   Allergen Reactions    Doxazosin Nausea And Vomiting     VIOLENT VOMITTING    Cefepime Hallucinations     Causes severe hallucinations    Coconut Flavor Rash     Treatment Diagnosis: Dysphagia       Chart review:   CHART REVIEW:  2022 admitted with sepsis d/t RLL pneumonia  Prolonged hospitalization upon receipt of swallow eval order 2022  Multiple specialties following     IMAGING:  CXR: 2022  Impression   Stable increased opacity left basilar region compared to prior studies dating   back to 10/25/2020 consistent with pericardial effusion, loculated left   basilar pleural effusion, pulmonary consolidation or mass/neoplasm. 2 mm   opacity consistent with pulmonary nodule in the right basilar region and foci   of infiltrate or subsegmental atelectasis in the mid-lower lung fields noted   bilaterally. IV contrast-enhanced chest CT suggested for further evaluation. CT CHEST: 11/26/2022  Impression   1. Focal consolidation in the right lower lobe favoring   infectious/inflammatory process. Recommend CT of the chest in 3 months to   confirm resolution. 2. Solid subcentimeter right pulmonary nodules can be assessed during the   same time. 3. Cardiomegaly with unchanged moderate to large pericardial effusion. 4. Age-indeterminate L1 compression fracture. If there is point tenderness,   recommend nonemergent MRI of the lumbar spine. BRAIN MRI: 11/28/2022  Impression   1. No acute intracranial abnormality. No acute infarct. 2. Mild global parenchymal volume loss with chronic microvascular ischemic   changes. 3. Small lipoma is again seen along the right dorsal midbrain. Subjective:     Current diet: soft bite size/nectar liquids  Comments regarding tolerating Current Diet: per nurse, pt doing much better today, pt and wife requesting regular diet, upon arrival to room pt with thin liquids and regular solids at bedside    Objective:     Pain: none reported  Cognitive/Behavior: reduced cognition, unable to give sequential hx of hospitalizations, mild word finding difficulty     Positioning: upright in bed     PO Trials:   Thin Liquids; via straw, single sips, audible swallows, prolonged oral holding, suspected premature loss of bolus posteriorly, suspected delayed initiation, suspected reduced laryngeal elevation, NO overt s/s aspiration  Nectar thick liquids: DNT, pt refused  Honey Thick liquids: DNT  Puree: pt declined  Soft food: pt declined  Regular food: impulsivity/very large bites, prolonged mastication, reduced oral clearance, however pt independently with liquid wash to clear and adequate clearance noted for all trials, suspected reduced laryngeal elevation, suspected delayed swallow, NO s/s aspiration. Dysphagia Tx:   Direct Dysphagia tx: pt tolerated PO trials as outlined above  Dysphagia ex; n/a on this date  Training in compensatory strategies: extensive education regarding positioning, rate, bite size, small sips/bites, aspiration precautions  Pt response to ex/training: pt expressed understanding, however may require reinforcement    Goals  Pt will functionally tolerate recommended diet with no overt clinical s/s of aspiration, continue  Pt will advance to least restrictive diet as indicated, continue  If dysphagia persists, patient will improve swallow function via swallow ex completed 10/10, continue       Assessment:   Impressions:   Pt pleasant and cooperative, tolerated tx at bedside. Patient alert, follows dx; verbally responsive but with confused responses at times, reduced insight noted. Mild-moderate oral stage dysphagia characterized by generalized weakness for mastication and lingual manipulation. Prolonged, labored mastication with regular solids, however pt was able to independently clear with liquid wash and additional swallow overt multiple trials. Prolonged bolus formation and movement with all with tendency to intermittently rock PO. Prolonged oral holding with thin liquids. Audible swallow with all liquid intake, NO overt s/s aspiration with any consistency. Pharyngeal stage appears grossly WFL. Cog status suspected to impact current dysphagia, pt improved today, however pt does demonstrate increased impulsivity with PO intake. Recommend upgrade to easy to chew with close monitoring for any s/s aspiration. Unsure of diet order for nectar, as pt appeared to tolerate thin liquids during assessment t-1.      Diet Recommendations: easy to chew/thin liquids, monitor closely for any difficulty, downgrade to soft/bite size if any issues arise. Strategies: Upright as possible with all PO intake , Remain upright 30-45 min, slow rate, small bites/sips  Education:  Provided education regarding role of SLP, results of assessment, recommendations and general speech pathology plan of care. [] Pt verbalized understanding and agreement   [x] Pt requires ongoing learning   [] No evidence of comprehension     Dysphagia Prognosis: fair/guarded based on cognitive status  Barriers: impulsivity, reduced insight, fluctuating cognitive status    Plan:     Continue Dysphagia Therapy: YES  Interventions: dysphagia tx, education  Duration/Frequency of therapy while on unit: 1-2x  Discharge Instructions:   Anticipate Yes__x__No__ for further skilled Speech Therapy for Dysphagia at discharge    This note serves as a D/C Summary in the event that this patient is discharged prior to the next therapy session.     Coded treatment time: 0  Total treatment time: 13    Time in: 2251  Time out: 1520    Electronically signed by   Fidel Quintero  [facility]  Speech-Language Pathologist  12/03/22  3:20pm

## 2022-12-03 NOTE — PLAN OF CARE
Problem: Discharge Planning  Goal: Discharge to home or other facility with appropriate resources  12/3/2022 1333 by Inocente Winslow RN  Outcome: Progressing  12/3/2022 0456 by Gigi Hernandez RN  Outcome: Progressing  Flowsheets  Taken 12/3/2022 0455 by Gigi Hernandez RN  Discharge to home or other facility with appropriate resources:   Identify barriers to discharge with patient and caregiver   Arrange for needed discharge resources and transportation as appropriate  Taken 12/2/2022 2020 by Vandana Ugalde RN  Discharge to home or other facility with appropriate resources: Identify barriers to discharge with patient and caregiver     Problem: Skin/Tissue Integrity  Goal: Absence of new skin breakdown  Description: 1. Monitor for areas of redness and/or skin breakdown  2. Assess vascular access sites hourly  3. Every 4-6 hours minimum:  Change oxygen saturation probe site  4. Every 4-6 hours:  If on nasal continuous positive airway pressure, respiratory therapy assess nares and determine need for appliance change or resting period.   12/3/2022 1333 by Inocente Winslow RN  Outcome: Progressing  12/3/2022 0456 by Gigi Hernandez RN  Outcome: Progressing     Problem: Safety - Adult  Goal: Free from fall injury  12/3/2022 1333 by Inocente Winslow RN  Outcome: Progressing  12/3/2022 0456 by Gigi Hernandez RN  Outcome: Progressing     Problem: Respiratory - Adult  Goal: Achieves optimal ventilation and oxygenation  12/3/2022 1333 by Inocente Winslow RN  Outcome: Progressing  12/3/2022 0456 by Gigi Hernandez RN  Outcome: Progressing  Flowsheets  Taken 12/3/2022 0455 by Gigi Hernandez RN  Achieves optimal ventilation and oxygenation:   Assess for changes in respiratory status   Assess for changes in mentation and behavior   Position to facilitate oxygenation and minimize respiratory effort   Oxygen supplementation based on oxygen saturation or arterial blood gases   Assess and instruct to report shortness of breath or any respiratory difficulty   Respiratory therapy support as indicated  Taken 12/2/2022 2020 by Berenice Landry RN  Achieves optimal ventilation and oxygenation:   Assess for changes in respiratory status   Assess for changes in mentation and behavior     Problem: Musculoskeletal - Adult  Goal: Return mobility to safest level of function  12/3/2022 1333 by Jaime Klein RN  Outcome: Progressing  12/3/2022 0456 by Kate Arreaga RN  Outcome: Progressing  Flowsheets  Taken 12/3/2022 0455 by Kate Arreaag RN  Return Mobility to Safest Level of Function:   Assess patient stability and activity tolerance for standing, transferring and ambulating with or without assistive devices   Assist with transfers and ambulation using safe patient handling equipment as needed   Obtain physical therapy/occupational therapy consults as needed   Instruct patient/family in ordered activity level  Taken 12/2/2022 2020 by Berenice Landry RN  Return Mobility to Safest Level of Function:   Assess patient stability and activity tolerance for standing, transferring and ambulating with or without assistive devices   Assist with transfers and ambulation using safe patient handling equipment as needed     Problem: Gastrointestinal - Adult  Goal: Minimal or absence of nausea and vomiting  12/3/2022 1333 by Jaime Klein RN  Outcome: Progressing  12/3/2022 0456 by Kate Arreaga RN  Outcome: Progressing  Flowsheets (Taken 12/3/2022 0455)  Minimal or absence of nausea and vomiting:   Administer ordered antiemetic medications as needed   Provide nonpharmacologic comfort measures as appropriate     Problem: Infection - Adult  Goal: Absence of infection at discharge  12/3/2022 1333 by Jaime Klein RN  Outcome: Progressing  12/3/2022 0456 by Kate Arreaga RN  Outcome: Progressing  Flowsheets  Taken 12/3/2022 0455 by Kate Arreaga RN  Absence of infection at discharge:   Assess and monitor for signs and symptoms of infection   Monitor lab/diagnostic results   Monitor all insertion sites i.e., indwelling lines, tubes and drains   Administer medications as ordered  Taken 12/2/2022 2020 by Kiera Pandya RN  Absence of infection at discharge:   Assess and monitor for signs and symptoms of infection   Monitor lab/diagnostic results   Administer medications as ordered     Problem: Metabolic/Fluid and Electrolytes - Adult  Goal: Electrolytes maintained within normal limits  12/3/2022 1333 by Simon Haney RN  Outcome: Progressing  12/3/2022 0456 by Harvin Landau, RN  Outcome: Progressing  Flowsheets  Taken 12/3/2022 0455 by Harvin Landau, RN  Electrolytes maintained within normal limits:   Monitor labs and assess patient for signs and symptoms of electrolyte imbalances   Administer electrolyte replacement as ordered   Monitor response to electrolyte replacements, including repeat lab results as appropriate   Fluid restriction as ordered   Instruct patient on fluid and nutrition restrictions as appropriate  Taken 12/2/2022 2020 by Kiera Pandya RN  Electrolytes maintained within normal limits: Monitor labs and assess patient for signs and symptoms of electrolyte imbalances  Goal: Hemodynamic stability and optimal renal function maintained  12/3/2022 1333 by Simon Haney RN  Outcome: Progressing  12/3/2022 0456 by Harvin Landau, RN  Outcome: Progressing  Flowsheets  Taken 12/3/2022 0455 by Harvin Landau, RN  Hemodynamic stability and optimal renal function maintained:   Monitor labs and assess for signs and symptoms of volume excess or deficit   Monitor intake, output and patient weight   Monitor urine specific gravity, serum osmolarity and serum sodium as indicated or ordered   Monitor response to interventions for patient's volume status, including labs, urine output, blood pressure (other measures as available)  Taken 12/2/2022 2020 by Kiera Pandya RN  Hemodynamic stability and optimal renal function maintained: Monitor labs and assess for signs and symptoms of volume excess or deficit   Monitor intake, output and patient weight  Goal: Glucose maintained within prescribed range  12/3/2022 1333 by Caesar Lobo RN  Outcome: Progressing  12/3/2022 0456 by Pancho Pandey RN  Outcome: Progressing  Flowsheets (Taken 12/3/2022 0455)  Glucose maintained within prescribed range:   Monitor blood glucose as ordered   Assess for signs and symptoms of hyperglycemia and hypoglycemia   Administer ordered medications to maintain glucose within target range   Assess barriers to adequate nutritional intake and initiate nutrition consult as needed   Instruct patient on self management of diabetes and initiate consult as needed     Problem: ABCDS Injury Assessment  Goal: Absence of physical injury  12/3/2022 1333 by Caesar Lobo RN  Outcome: Progressing  12/3/2022 0456 by Pancho Pandey RN  Outcome: Progressing     Problem: Nutrition Deficit:  Goal: Optimize nutritional status  12/3/2022 1333 by Caesar Lobo RN  Outcome: Progressing  12/3/2022 0456 by Pancho Pandey RN  Outcome: Progressing     Problem: Neurosensory - Adult  Goal: Achieves stable or improved neurological status  12/3/2022 1333 by Caesar Lobo RN  Outcome: Progressing  12/3/2022 0456 by Pancho Pandey RN  Outcome: Progressing  Flowsheets (Taken 12/3/2022 0455)  Achieves stable or improved neurological status: Assess for and report changes in neurological status     Problem: Cardiovascular - Adult  Goal: Maintains optimal cardiac output and hemodynamic stability  12/3/2022 1333 by Caesar Lobo RN  Outcome: Progressing  12/3/2022 0456 by Pancho Pandey RN  Outcome: Progressing  Flowsheets (Taken 12/3/2022 0455)  Maintains optimal cardiac output and hemodynamic stability:   Monitor blood pressure and heart rate   Monitor urine output and notify Licensed Independent Practitioner for values outside of normal range   Assess for signs of decreased cardiac output  Goal: Absence of cardiac dysrhythmias or at baseline  12/3/2022 1333 by Robbi Rojas RN  Outcome: Progressing  12/3/2022 0456 by Lord Mayra RN  Outcome: Progressing  Flowsheets (Taken 12/3/2022 0455)  Absence of cardiac dysrhythmias or at baseline:   Monitor cardiac rate and rhythm   Assess for signs of decreased cardiac output   Administer antiarrhythmia medication and electrolyte replacement as ordered     Problem: Skin/Tissue Integrity - Adult  Goal: Skin integrity remains intact  12/3/2022 1333 by Robbi Rojas RN  Outcome: Progressing  12/3/2022 0456 by Lord Mayra RN  Outcome: Progressing  Flowsheets (Taken 12/2/2022 2020 by Serge Padilla RN)  Skin Integrity Remains Intact: Monitor for areas of redness and/or skin breakdown     Problem: Genitourinary - Adult  Goal: Absence of urinary retention  12/3/2022 1333 by Robbi Rojas RN  Outcome: Progressing  12/3/2022 0456 by Lord Mayra RN  Outcome: Progressing  Flowsheets (Taken 12/3/2022 0455)  Absence of urinary retention:   Assess patients ability to void and empty bladder   Monitor intake/output and perform bladder scan as needed

## 2022-12-03 NOTE — PROGRESS NOTES
Hospitalist Progress Note      PCP: No primary care provider on file. Date of Admission: 11/26/2022    Chief Complaint: Infected dialysis line    Hospital Course:      Subjective: Low grade fevers overnight and this morning      Medications:  Reviewed    Infusion Medications    sodium chloride 40 mL (11/29/22 8786)    dextrose       Scheduled Medications    sodium bicarbonate  650 mg Oral TID WC    ceFAZolin  1,000 mg IntraVENous Q24H    sodium chloride flush  5-40 mL IntraVENous 2 times per day    heparin (porcine)  5,000 Units SubCUTAneous 3 times per day    insulin lispro  0-8 Units SubCUTAneous TID WC    insulin lispro  0-4 Units SubCUTAneous Nightly    insulin lispro  0.05 Units/kg SubCUTAneous TID WC    insulin glargine  0.25 Units/kg SubCUTAneous Nightly    calcitRIOL  0.5 mcg Oral Daily    atorvastatin  40 mg Oral Nightly    metoprolol tartrate  75 mg Oral BID    [Held by provider] NIFEdipine  90 mg Oral BID    pantoprazole  40 mg Oral BID AC    sucralfate  1 g Oral 4x Daily AC & HS    hydrALAZINE  100 mg Oral TID    isosorbide mononitrate  60 mg Oral Daily     PRN Meds: heparin (porcine), perflutren lipid microspheres, sodium chloride flush, sodium chloride, ondansetron **OR** ondansetron, polyethylene glycol, acetaminophen **OR** acetaminophen, glucose, dextrose bolus **OR** dextrose bolus, glucagon (rDNA), dextrose, meclizine      Intake/Output Summary (Last 24 hours) at 12/2/2022 1933  Last data filed at 12/2/2022 0902  Gross per 24 hour   Intake 360 ml   Output --   Net 360 ml         Exam:    /73   Pulse (!) 105   Temp 99.5 °F (37.5 °C) (Oral)   Resp 20   Ht 6' 2\" (1.88 m)   Wt 148 lb 2.4 oz (67.2 kg)   SpO2 97%   BMI 19.02 kg/m²     General appearance: No apparent distress, appears stated age and cooperative. Protein calorie malnutrition  HEENT: Pupils equal, round, and reactive to light. Conjunctivae/corneas clear. Neck: Supple, with full range of motion.  No jugular venous distention. Trachea midline. Respiratory:  Normal respiratory effort. Clear to auscultation, bilaterally without Rales/Wheezes/Rhonchi. Cardiovascular: Regular rate and rhythm with normal S1/S2 without murmurs, rubs or gallops. Abdomen: Soft, non-tender, non-distended with normal bowel sounds. Musculoskeletal: No clubbing, cyanosis or edema bilaterally. Full range of motion without deformity. Skin: Skin color, texture, turgor normal.  No rashes or lesions. Neurologic:  Neurovascularly intact without any focal sensory/motor deficits. Cranial nerves: II-XII intact, grossly non-focal.  Psychiatric: Alert and oriented, thought content appropriate, normal insight  Capillary Refill: Brisk,< 3 seconds   Peripheral Pulses: +2 palpable, equal bilaterally       Labs:   Recent Labs     11/30/22  0643 12/01/22  0707   WBC 17.3* 16.3*   HGB 9.5* 9.8*   HCT 29.1* 31.0*   * 177       Recent Labs     11/30/22  0643 12/01/22  0707   * 135*   K 4.2 4.0   CL 95* 97*   CO2 23 21   BUN 34* 47*   CREATININE 5.6* 7.4*   CALCIUM 8.7 9.1   PHOS  --  3.4       No results for input(s): AST, ALT, BILIDIR, BILITOT, ALKPHOS in the last 72 hours. No results for input(s): INR in the last 72 hours. No results for input(s): [place] in the last 72 hours. Urinalysis:      Lab Results   Component Value Date/Time    NITRU Negative 11/26/2022 01:32 PM    WBCUA 3 11/26/2022 01:32 PM    BACTERIA None Seen 11/26/2022 01:32 PM    RBCUA 9 11/26/2022 01:32 PM    BLOODU TRACE 11/26/2022 01:32 PM    SPECGRAV 1.019 11/26/2022 01:32 PM    GLUCOSEU >=1000 11/26/2022 01:32 PM    GLUCOSEU 250 12/14/2010 02:50 PM       Radiology:  IR REMOVE TUNNELED CVAD WO SQ PORT/PUMP   Final Result   Successful removal of tunneled dialysis catheter. Successful fluoroscopic guided non tunneled Trialysis catheter placement. IR NONTUNNELED VASCULAR CATHETER > 5 YEARS   Final Result   Successful removal of tunneled dialysis catheter. Successful fluoroscopic guided non tunneled Trialysis catheter placement. MRI BRAIN WO CONTRAST   Final Result   1. No acute intracranial abnormality. No acute infarct. 2. Mild global parenchymal volume loss with chronic microvascular ischemic   changes. 3. Small lipoma is again seen along the right dorsal midbrain. CT CHEST PULMONARY EMBOLISM W CONTRAST   Final Result   1. Focal consolidation in the right lower lobe favoring   infectious/inflammatory process. Recommend CT of the chest in 3 months to   confirm resolution. 2. Solid subcentimeter right pulmonary nodules can be assessed during the   same time. 3. Cardiomegaly with unchanged moderate to large pericardial effusion. 4. Age-indeterminate L1 compression fracture. If there is point tenderness,   recommend nonemergent MRI of the lumbar spine. CT HEAD WO CONTRAST   Final Result   1. No acute intracranial abnormality. XR CHEST PORTABLE   Final Result   Stable increased opacity left basilar region compared to prior studies dating   back to 10/25/2020 consistent with pericardial effusion, loculated left   basilar pleural effusion, pulmonary consolidation or mass/neoplasm. 2 mm   opacity consistent with pulmonary nodule in the right basilar region and foci   of infiltrate or subsegmental atelectasis in the mid-lower lung fields noted   bilaterally. IV contrast-enhanced chest CT suggested for further evaluation.                  Assessment/Plan:    Active Hospital Problems    Diagnosis Date Noted    Severe malnutrition ([address] [E43] 12/01/2022     Priority: Medium    Encephalopathy acute [G93.40] 11/28/2022     Priority: Medium    Elevated lactic acid level [R79.89] 11/28/2022     Priority: Medium    Hyperkalemia [E87.5] 11/28/2022     Priority: Medium    Severe sepsis ([address] [A41.9, R65.20] 11/28/2022     Priority: Medium    Staphylococcus aureus bacteremia with sepsis ([address] [A41.01] 11/28/2022     Priority: Medium Neutrophilia [D72.9] 11/28/2022     Priority: Medium    Elevated procalcitonin [R79.89] 11/28/2022     Priority: Medium    ESRD needing dialysis ([facility] [address] [N18.6, Z99.2] 11/28/2022     Priority: Medium    Bacterial pneumonia [J15.9] 11/26/2022     Priority: Medium       -MSSA bacteremia with sepsis--concern for tunneled dialysis catheter infection-- vancomycin [place] de-escalated to cefazolin 11/27--continue management per ID--follow-up on cultures. .   -TDC removed, temporary HD line placed. Follow-up blood cultures for temporary catheter prior to placing new TDC     -Right lower lobe bacterial pneumonia. .  CT chest reviewed continue current antibiotics per ID     -Sepsis due to bacteremia /pneumonia-patient with fever, sinus tachycardia, leukocytosis, lactic acidosis--continue antibiotics as above, follow-up on cultures     -Chronic stable moderate pericardial effusion--evident on prior echos and current CT chest... Limited echo pending     -Chronic systolic/ diastolic heart failure--Echo 2/2022 showed grade 2 diastolic dysfunction, EF 94%, moderate pericardial effusion--repeat echo pending     -ESRD--on hemodialysis TTS--continue management per nephrology. .  Patient has tunneled dialysis catheter. .  Previously on PD but discontinued due to fungal peritonitis     -Anion Metabolic acidosis due to CKD/lactic acidosis--ketones negative--monitor labs-continue oral bicarb     -Hyperkalemia--resolved with hemodialysis     -Hyponatremia--corrected sodium within normal range on presentation     -DM type II uncontrolled  with severe hyperglycemia on presentation-patient was noncompliant with insulin due to acute illness-hemoglobin A1c is 7.2..   Hyperglycemia is better- continue current basal bolus insulin regimen     -Essential hypertension---stable-on nifedipine, hydralazine,imdur - Aldactone/losartan held due to hyperkalemia--also held nifedipine/Imdur this a.m. due to low BPs--continue to monitor for now -Hyperlipidemia-continue statin     -Dizziness /vertigo-MRI brain is negative-currently asymptomatic-PT and OT eval pending     -Acute encephalopathy likely due to sepsis--resolved -lethargic on presentation to the ED--continue to monitor    DVT Prophylaxis: Heparin  Diet: ADULT DIET; Dysphagia - Soft and Bite Sized; 4 carb choices (60 gm/meal); No Added Salt (3-4 gm); Low Potassium (Less than 3000 mg/day); Mildly Thick (Nectar); 2000 ml  Code Status: Full Code    PT/OT Eval Status: N/A    Dispo -MedSurg; defer to ID on duration of monitoring of antibiotics inpatient given fevers.     Fabian Mays MD

## 2022-12-04 LAB
ANION GAP SERPL CALCULATED.3IONS-SCNC: 13 MMOL/L (ref 3–16)
BASOPHILS ABSOLUTE: 0 K/UL (ref 0–0.2)
BASOPHILS RELATIVE PERCENT: 0.3 %
BUN BLDV-MCNC: 25 MG/DL (ref 7–20)
CALCIUM SERPL-MCNC: 9.1 MG/DL (ref 8.3–10.6)
CHLORIDE BLD-SCNC: 98 MMOL/L (ref 99–110)
CO2: 27 MMOL/L (ref 21–32)
CREAT SERPL-MCNC: 6.1 MG/DL (ref 0.9–1.3)
EOSINOPHILS ABSOLUTE: 0.2 K/UL (ref 0–0.6)
EOSINOPHILS RELATIVE PERCENT: 1.2 %
GFR SERPL CREATININE-BSD FRML MDRD: 10 ML/MIN/{1.73_M2}
GLUCOSE BLD-MCNC: 133 MG/DL (ref 70–99)
GLUCOSE BLD-MCNC: 157 MG/DL (ref 70–99)
GLUCOSE BLD-MCNC: 166 MG/DL (ref 70–99)
GLUCOSE BLD-MCNC: 236 MG/DL (ref 70–99)
GLUCOSE BLD-MCNC: 273 MG/DL (ref 70–99)
HCT VFR BLD CALC: 30.2 % (ref 40.5–52.5)
HEMOGLOBIN: 9.6 G/DL (ref 13.5–17.5)
LYMPHOCYTES ABSOLUTE: 1.1 K/UL (ref 1–5.1)
LYMPHOCYTES RELATIVE PERCENT: 7 %
MCH RBC QN AUTO: 29.8 PG (ref 26–34)
MCHC RBC AUTO-ENTMCNC: 31.7 G/DL (ref 31–36)
MCV RBC AUTO: 94 FL (ref 80–100)
MONOCYTES ABSOLUTE: 1.4 K/UL (ref 0–1.3)
MONOCYTES RELATIVE PERCENT: 8.8 %
NEUTROPHILS ABSOLUTE: 12.7 K/UL (ref 1.7–7.7)
NEUTROPHILS RELATIVE PERCENT: 82.7 %
PDW BLD-RTO: 15.2 % (ref 12.4–15.4)
PERFORMED ON: ABNORMAL
PLATELET # BLD: 331 K/UL (ref 135–450)
PMV BLD AUTO: 10.6 FL (ref 5–10.5)
POTASSIUM SERPL-SCNC: 4.1 MMOL/L (ref 3.5–5.1)
RBC # BLD: 3.22 M/UL (ref 4.2–5.9)
SODIUM BLD-SCNC: 138 MMOL/L (ref 136–145)
WBC # BLD: 15.3 K/UL (ref 4–11)

## 2022-12-04 PROCEDURE — 6360000002 HC RX W HCPCS: Performed by: HOSPITALIST

## 2022-12-04 PROCEDURE — 85025 COMPLETE CBC W/AUTO DIFF WBC: CPT

## 2022-12-04 PROCEDURE — 1200000000 HC SEMI PRIVATE

## 2022-12-04 PROCEDURE — 2580000003 HC RX 258: Performed by: INTERNAL MEDICINE

## 2022-12-04 PROCEDURE — 6360000002 HC RX W HCPCS: Performed by: INTERNAL MEDICINE

## 2022-12-04 PROCEDURE — 36415 COLL VENOUS BLD VENIPUNCTURE: CPT

## 2022-12-04 PROCEDURE — 6370000000 HC RX 637 (ALT 250 FOR IP): Performed by: HOSPITALIST

## 2022-12-04 PROCEDURE — 2580000003 HC RX 258: Performed by: HOSPITALIST

## 2022-12-04 PROCEDURE — 80048 BASIC METABOLIC PNL TOTAL CA: CPT

## 2022-12-04 RX ADMIN — METOPROLOL TARTRATE 75 MG: 50 TABLET, FILM COATED ORAL at 21:24

## 2022-12-04 RX ADMIN — ISOSORBIDE MONONITRATE 60 MG: 60 TABLET, EXTENDED RELEASE ORAL at 08:54

## 2022-12-04 RX ADMIN — SUCRALFATE 1 G: 1 TABLET ORAL at 21:24

## 2022-12-04 RX ADMIN — SUCRALFATE 1 G: 1 TABLET ORAL at 12:50

## 2022-12-04 RX ADMIN — CEFAZOLIN 1000 MG: 1 INJECTION, POWDER, FOR SOLUTION INTRAMUSCULAR; INTRAVENOUS at 21:32

## 2022-12-04 RX ADMIN — HEPARIN SODIUM 5000 UNITS: 5000 INJECTION INTRAVENOUS; SUBCUTANEOUS at 21:23

## 2022-12-04 RX ADMIN — METOPROLOL TARTRATE 75 MG: 50 TABLET, FILM COATED ORAL at 08:55

## 2022-12-04 RX ADMIN — SODIUM BICARBONATE 650 MG: 650 TABLET ORAL at 12:50

## 2022-12-04 RX ADMIN — PANTOPRAZOLE SODIUM 40 MG: 40 TABLET, DELAYED RELEASE ORAL at 05:54

## 2022-12-04 RX ADMIN — SUCRALFATE 1 G: 1 TABLET ORAL at 05:54

## 2022-12-04 RX ADMIN — HYDRALAZINE HYDROCHLORIDE 100 MG: 50 TABLET, FILM COATED ORAL at 08:54

## 2022-12-04 RX ADMIN — ACETAMINOPHEN 650 MG: 325 TABLET ORAL at 21:23

## 2022-12-04 RX ADMIN — HEPARIN SODIUM 5000 UNITS: 5000 INJECTION INTRAVENOUS; SUBCUTANEOUS at 05:54

## 2022-12-04 RX ADMIN — SODIUM CHLORIDE, PRESERVATIVE FREE 10 ML: 5 INJECTION INTRAVENOUS at 08:57

## 2022-12-04 RX ADMIN — INSULIN LISPRO 4 UNITS: 100 INJECTION, SOLUTION INTRAVENOUS; SUBCUTANEOUS at 17:44

## 2022-12-04 RX ADMIN — HYDRALAZINE HYDROCHLORIDE 100 MG: 50 TABLET, FILM COATED ORAL at 12:50

## 2022-12-04 RX ADMIN — SUCRALFATE 1 G: 1 TABLET ORAL at 17:44

## 2022-12-04 RX ADMIN — ATORVASTATIN CALCIUM 40 MG: 40 TABLET, FILM COATED ORAL at 21:24

## 2022-12-04 RX ADMIN — HEPARIN SODIUM 5000 UNITS: 5000 INJECTION INTRAVENOUS; SUBCUTANEOUS at 12:50

## 2022-12-04 RX ADMIN — HYDRALAZINE HYDROCHLORIDE 100 MG: 50 TABLET, FILM COATED ORAL at 21:23

## 2022-12-04 RX ADMIN — INSULIN LISPRO 2 UNITS: 100 INJECTION, SOLUTION INTRAVENOUS; SUBCUTANEOUS at 12:50

## 2022-12-04 RX ADMIN — INSULIN GLARGINE 18 UNITS: 100 INJECTION, SOLUTION SUBCUTANEOUS at 21:24

## 2022-12-04 RX ADMIN — SODIUM BICARBONATE 650 MG: 650 TABLET ORAL at 08:54

## 2022-12-04 RX ADMIN — INSULIN LISPRO 4 UNITS: 100 INJECTION, SOLUTION INTRAVENOUS; SUBCUTANEOUS at 12:50

## 2022-12-04 RX ADMIN — SODIUM BICARBONATE 650 MG: 650 TABLET ORAL at 17:44

## 2022-12-04 RX ADMIN — SODIUM CHLORIDE, PRESERVATIVE FREE 10 ML: 5 INJECTION INTRAVENOUS at 21:32

## 2022-12-04 RX ADMIN — CALCITRIOL 0.5 MCG: 0.25 CAPSULE ORAL at 08:54

## 2022-12-04 RX ADMIN — PANTOPRAZOLE SODIUM 40 MG: 40 TABLET, DELAYED RELEASE ORAL at 17:44

## 2022-12-04 ASSESSMENT — PAIN DESCRIPTION - ORIENTATION
ORIENTATION: ANTERIOR
ORIENTATION: ANTERIOR

## 2022-12-04 ASSESSMENT — PAIN DESCRIPTION - LOCATION
LOCATION: HEAD
LOCATION: HEAD

## 2022-12-04 ASSESSMENT — PAIN DESCRIPTION - DESCRIPTORS
DESCRIPTORS: ACHING
DESCRIPTORS: ACHING

## 2022-12-04 ASSESSMENT — PAIN SCALES - GENERAL
PAINLEVEL_OUTOF10: 4
PAINLEVEL_OUTOF10: 4
PAINLEVEL_OUTOF10: 1
PAINLEVEL_OUTOF10: 0

## 2022-12-04 ASSESSMENT — PAIN DESCRIPTION - PAIN TYPE: TYPE: ACUTE PAIN

## 2022-12-04 NOTE — PLAN OF CARE
Problem: Discharge Planning  Goal: Discharge to home or other facility with appropriate resources  12/4/2022 1140 by Graciela Antoine RN  Outcome: Progressing  12/4/2022 0311 by Mikaela Leach RN  Outcome: Progressing  Flowsheets (Taken 12/3/2022 2300)  Discharge to home or other facility with appropriate resources:   Identify barriers to discharge with patient and caregiver   Arrange for needed discharge resources and transportation as appropriate   Identify discharge learning needs (meds, wound care, etc)     Problem: Skin/Tissue Integrity  Goal: Absence of new skin breakdown  Description: 1. Monitor for areas of redness and/or skin breakdown  2. Assess vascular access sites hourly  3. Every 4-6 hours minimum:  Change oxygen saturation probe site  4. Every 4-6 hours:  If on nasal continuous positive airway pressure, respiratory therapy assess nares and determine need for appliance change or resting period.   12/4/2022 1140 by Graciela Antoine RN  Outcome: Progressing  12/4/2022 0311 by Mikaela Leach RN  Outcome: Progressing     Problem: Safety - Adult  Goal: Free from fall injury  12/4/2022 1140 by Graciela Antoine RN  Outcome: Progressing  12/4/2022 0311 by Mikaela Leach RN  Outcome: Progressing     Problem: Respiratory - Adult  Goal: Achieves optimal ventilation and oxygenation  12/4/2022 1140 by Graciela Antoine RN  Outcome: Progressing  12/4/2022 0311 by Mikaela Leach RN  Outcome: Progressing  Flowsheets (Taken 12/3/2022 2300)  Achieves optimal ventilation and oxygenation: Assess for changes in respiratory status     Problem: Musculoskeletal - Adult  Goal: Return mobility to safest level of function  12/4/2022 1140 by Graciela Antoine RN  Outcome: Progressing  12/4/2022 0311 by Mikaela Leach RN  Outcome: Progressing  Flowsheets (Taken 12/3/2022 2300)  Return Mobility to Safest Level of Function: Assess patient stability and activity tolerance for standing, transferring and ambulating with or without assistive devices     Problem: Gastrointestinal - Adult  Goal: Minimal or absence of nausea and vomiting  12/4/2022 1140 by Jostin Rutherford RN  Outcome: Progressing  12/4/2022 0311 by Cedric Velasco RN  Outcome: Progressing  Flowsheets (Taken 12/3/2022 2300)  Minimal or absence of nausea and vomiting: Administer IV fluids as ordered to ensure adequate hydration     Problem: Infection - Adult  Goal: Absence of infection at discharge  12/4/2022 1140 by Jostin Rutherford RN  Outcome: Progressing  12/4/2022 0311 by Cedric Velasco RN  Outcome: Progressing  Flowsheets (Taken 12/3/2022 2300)  Absence of infection at discharge:   Assess and monitor for signs and symptoms of infection   Monitor lab/diagnostic results   Monitor all insertion sites i.e., indwelling lines, tubes and drains     Problem: Metabolic/Fluid and Electrolytes - Adult  Goal: Electrolytes maintained within normal limits  12/4/2022 1140 by Jostin Rutherford RN  Outcome: Progressing  12/4/2022 0311 by Cedric Velasco RN  Outcome: Progressing  Flowsheets (Taken 12/3/2022 2300)  Electrolytes maintained within normal limits: Monitor labs and assess patient for signs and symptoms of electrolyte imbalances  Goal: Hemodynamic stability and optimal renal function maintained  12/4/2022 1140 by Jostin Rutherford RN  Outcome: Progressing  12/4/2022 0311 by Cedric Velasco RN  Outcome: Progressing  Flowsheets (Taken 12/3/2022 2300)  Hemodynamic stability and optimal renal function maintained: Monitor labs and assess for signs and symptoms of volume excess or deficit  Goal: Glucose maintained within prescribed range  12/4/2022 1140 by Jostin Rutherford RN  Outcome: Progressing  12/4/2022 0311 by Cedric Velasco RN  Outcome: Progressing  Flowsheets (Taken 12/3/2022 2300)  Glucose maintained within prescribed range: Monitor blood glucose as ordered     Problem: ABCDS Injury Assessment  Goal: Absence of physical injury  12/4/2022 1140 by Jostin Rutherford RN  Outcome: Progressing  12/4/2022 0311 by Alesia Madera RN  Outcome: Progressing     Problem: Nutrition Deficit:  Goal: Optimize nutritional status  12/4/2022 1140 by Rebel Blanca RN  Outcome: Progressing  12/4/2022 0311 by Alesia Madera RN  Outcome: Progressing     Problem: Neurosensory - Adult  Goal: Achieves stable or improved neurological status  12/4/2022 1140 by Rebel Blanca RN  Outcome: Progressing  12/4/2022 0311 by Alesia Madera RN  Outcome: Progressing  Flowsheets (Taken 12/3/2022 2300)  Achieves stable or improved neurological status: Assess for and report changes in neurological status     Problem: Cardiovascular - Adult  Goal: Maintains optimal cardiac output and hemodynamic stability  12/4/2022 1140 by Rebel Blanca RN  Outcome: Progressing  12/4/2022 0311 by Alesia Madera RN  Outcome: Progressing  Flowsheets (Taken 12/3/2022 2300)  Maintains optimal cardiac output and hemodynamic stability: Monitor blood pressure and heart rate  Goal: Absence of cardiac dysrhythmias or at baseline  12/4/2022 1140 by Rebel Blanca RN  Outcome: Progressing  12/4/2022 0311 by Alesia Madera RN  Outcome: Progressing  Flowsheets (Taken 12/3/2022 2300)  Absence of cardiac dysrhythmias or at baseline: Monitor cardiac rate and rhythm     Problem: Skin/Tissue Integrity - Adult  Goal: Skin integrity remains intact  12/4/2022 1140 by Rebel Blanca RN  Outcome: Progressing  Flowsheets (Taken 12/4/2022 0312 by Alesia Madera RN)  Skin Integrity Remains Intact:   Monitor for areas of redness and/or skin breakdown   Assess vascular access sites hourly  12/4/2022 0311 by Alesia Madera RN  Outcome: Progressing  Flowsheets (Taken 12/3/2022 2300)  Skin Integrity Remains Intact:   Monitor for areas of redness and/or skin breakdown   Assess vascular access sites hourly     Problem: Genitourinary - Adult  Goal: Absence of urinary retention  12/4/2022 1140 by Rebel Blanca RN  Outcome: Progressing  12/4/2022 0311 by Gerson Lopez, RN  Outcome: Progressing  Flowsheets (Taken 12/3/2022 2300)  Absence of urinary retention: Assess patients ability to void and empty bladder

## 2022-12-04 NOTE — PLAN OF CARE
Problem: Discharge Planning  Goal: Discharge to home or other facility with appropriate resources  12/4/2022 0311 by Adryan Shipley RN  Outcome: Progressing  12/3/2022 1333 by Adam Nicholas RN  Outcome: Progressing     Problem: Skin/Tissue Integrity  Goal: Absence of new skin breakdown  Description: 1. Monitor for areas of redness and/or skin breakdown  2. Assess vascular access sites hourly  3. Every 4-6 hours minimum:  Change oxygen saturation probe site  4. Every 4-6 hours:  If on nasal continuous positive airway pressure, respiratory therapy assess nares and determine need for appliance change or resting period.   12/4/2022 0311 by Adryan Shipley RN  Outcome: Progressing  12/3/2022 1333 by Adam Nicholas RN  Outcome: Progressing     Problem: Safety - Adult  Goal: Free from fall injury  12/4/2022 0311 by Adryan Shipley RN  Outcome: Progressing  12/3/2022 1333 by Adam Nicholas RN  Outcome: Progressing     Problem: Respiratory - Adult  Goal: Achieves optimal ventilation and oxygenation  12/4/2022 0311 by Adryan Shipley RN  Outcome: Progressing  12/3/2022 1333 by Adam Nicholas RN  Outcome: Progressing     Problem: Musculoskeletal - Adult  Goal: Return mobility to safest level of function  12/4/2022 0311 by Adryan Shipley RN  Outcome: Progressing  12/3/2022 1333 by Adam Nicholas RN  Outcome: Progressing     Problem: Gastrointestinal - Adult  Goal: Minimal or absence of nausea and vomiting  12/4/2022 0311 by Adryan Shipley RN  Outcome: Progressing  12/3/2022 1333 by Adam Nicholas RN  Outcome: Progressing     Problem: Infection - Adult  Goal: Absence of infection at discharge  12/4/2022 0311 by Adryan Shipley RN  Outcome: Progressing  12/3/2022 1333 by Adam Nicholas RN  Outcome: Progressing     Problem: Metabolic/Fluid and Electrolytes - Adult  Goal: Electrolytes maintained within normal limits  12/4/2022 0311 by Adryan Shipley RN  Outcome: Progressing  12/3/2022 1333 by Toan Watters RN  Outcome: Progressing  Goal: Hemodynamic stability and optimal renal function maintained  12/4/2022 0311 by Mikaela Leach RN  Outcome: Progressing  12/3/2022 1333 by Toan Watters RN  Outcome: Progressing  Goal: Glucose maintained within prescribed range  12/4/2022 0311 by Mikaela Leach RN  Outcome: Progressing  12/3/2022 1333 by Toan Watters RN  Outcome: Progressing     Problem: ABCDS Injury Assessment  Goal: Absence of physical injury  12/4/2022 0311 by Mikaela Leach RN  Outcome: Progressing  12/3/2022 1333 by Toan Watters RN  Outcome: Progressing     Problem: Nutrition Deficit:  Goal: Optimize nutritional status  12/4/2022 0311 by Mikaela Leach RN  Outcome: Progressing  12/3/2022 1333 by Toan Watters RN  Outcome: Progressing     Problem: Neurosensory - Adult  Goal: Achieves stable or improved neurological status  12/4/2022 0311 by Mikaela Leach RN  Outcome: Progressing  12/3/2022 1333 by Toan Watters RN  Outcome: Progressing     Problem: Cardiovascular - Adult  Goal: Maintains optimal cardiac output and hemodynamic stability  12/4/2022 0311 by Mikaela Leach RN  Outcome: Progressing  12/3/2022 1333 by Toan Watters RN  Outcome: Progressing  Goal: Absence of cardiac dysrhythmias or at baseline  12/4/2022 0311 by Mikaela Leach RN  Outcome: Progressing  12/3/2022 1333 by Toan Watters RN  Outcome: Progressing     Problem: Skin/Tissue Integrity - Adult  Goal: Skin integrity remains intact  12/4/2022 0311 by Mikaela Leach RN  Outcome: Progressing  12/3/2022 1333 by Toan Watters RN  Outcome: Progressing     Problem: Genitourinary - Adult  Goal: Absence of urinary retention  12/4/2022 0311 by Mikaela Leach RN  Outcome: Progressing  12/3/2022 1333 by Toan Watters RN  Outcome: Progressing

## 2022-12-04 NOTE — PROGRESS NOTES
Hospitalist Progress Note      PCP: No primary care provider on file. Date of Admission: 11/26/2022    Subjective: fever spike improved, more alert today per spouse, not feeling much hungry    Medications:  Reviewed    Infusion Medications    sodium chloride 25 mL (12/02/22 2017)    dextrose       Scheduled Medications    sodium bicarbonate  650 mg Oral TID WC    ceFAZolin  1,000 mg IntraVENous Q24H    sodium chloride flush  5-40 mL IntraVENous 2 times per day    heparin (porcine)  5,000 Units SubCUTAneous 3 times per day    insulin lispro  0-8 Units SubCUTAneous TID WC    insulin lispro  0-4 Units SubCUTAneous Nightly    insulin lispro  0.05 Units/kg SubCUTAneous TID WC    insulin glargine  0.25 Units/kg SubCUTAneous Nightly    calcitRIOL  0.5 mcg Oral Daily    atorvastatin  40 mg Oral Nightly    metoprolol tartrate  75 mg Oral BID    [Held by provider] NIFEdipine  90 mg Oral BID    pantoprazole  40 mg Oral BID AC    sucralfate  1 g Oral 4x Daily AC & HS    hydrALAZINE  100 mg Oral TID    isosorbide mononitrate  60 mg Oral Daily     PRN Meds: heparin (porcine), perflutren lipid microspheres, sodium chloride flush, sodium chloride, ondansetron **OR** ondansetron, polyethylene glycol, acetaminophen **OR** acetaminophen, glucose, dextrose bolus **OR** dextrose bolus, glucagon (rDNA), dextrose, meclizine      Intake/Output Summary (Last 24 hours) at 12/3/2022 1908  Last data filed at 12/3/2022 1144  Gross per 24 hour   Intake 1360 ml   Output 1300 ml   Net 60 ml       Physical Exam Performed:    /79   Pulse 90   Temp 98 °F (36.7 °C) (Oral)   Resp 16   Ht 6' 2\" (1.88 m)   Wt 147 lb 4.3 oz (66.8 kg)   SpO2 98%   BMI 18.91 kg/m²        General appearance: No apparent distress, appears stated age and cooperative. Protein calorie malnutrition  HEENT: Pupils equal, round, and reactive to light. Conjunctivae/corneas clear. Neck: Supple, with full range of motion. No jugular venous distention.  Trachea midline. Respiratory:  Normal respiratory effort. Clear to auscultation, bilaterally without Rales/Wheezes/Rhonchi. Cardiovascular: Regular rate and rhythm with normal S1/S2 without murmurs, rubs or gallops. Abdomen: Soft, non-tender, non-distended with normal bowel sounds. Musculoskeletal: No clubbing, cyanosis or edema bilaterally. Full range of motion without deformity. Skin: Skin color, texture, turgor normal.  No rashes or lesions. Neurologic:  Neurovascularly intact without any focal sensory/motor deficits. Cranial nerves: II-XII intact, grossly non-focal.  Psychiatric: Alert and oriented, thought content appropriate, normal insight  Capillary Refill: Brisk,< 3 seconds   Peripheral Pulses: +2 palpable, equal bilaterally        Labs:   Recent Labs     12/01/22  0707 12/03/22  1327   WBC 16.3* 16.3*   HGB 9.8* 9.4*   HCT 31.0* 28.6*    267     Recent Labs     12/01/22  0707 12/03/22  1327   * 137   K 4.0 3.8   CL 97* 97*   CO2 21 27   BUN 47* 16   CREATININE 7.4* 3.5*   CALCIUM 9.1 9.0   PHOS 3.4  --      No results for input(s): AST, ALT, BILIDIR, BILITOT, ALKPHOS in the last 72 hours. No results for input(s): INR in the last 72 hours. No results for input(s): Pamla Clines in the last 72 hours. Urinalysis:      Lab Results   Component Value Date/Time    NITRU Negative 11/26/2022 01:32 PM    WBCUA 3 11/26/2022 01:32 PM    BACTERIA None Seen 11/26/2022 01:32 PM    RBCUA 9 11/26/2022 01:32 PM    BLOODU TRACE 11/26/2022 01:32 PM    SPECGRAV 1.019 11/26/2022 01:32 PM    GLUCOSEU >=1000 11/26/2022 01:32 PM    GLUCOSEU 250 12/14/2010 02:50 PM       Radiology:  IR REMOVE TUNNELED CVAD WO SQ PORT/PUMP   Final Result   Successful removal of tunneled dialysis catheter. Successful fluoroscopic guided non tunneled Trialysis catheter placement. IR NONTUNNELED VASCULAR CATHETER > 5 YEARS   Final Result   Successful removal of tunneled dialysis catheter.       Successful fluoroscopic guided non tunneled Trialysis catheter placement. MRI BRAIN WO CONTRAST   Final Result   1. No acute intracranial abnormality. No acute infarct. 2. Mild global parenchymal volume loss with chronic microvascular ischemic   changes. 3. Small lipoma is again seen along the right dorsal midbrain. CT CHEST PULMONARY EMBOLISM W CONTRAST   Final Result   1. Focal consolidation in the right lower lobe favoring   infectious/inflammatory process. Recommend CT of the chest in 3 months to   confirm resolution. 2. Solid subcentimeter right pulmonary nodules can be assessed during the   same time. 3. Cardiomegaly with unchanged moderate to large pericardial effusion. 4. Age-indeterminate L1 compression fracture. If there is point tenderness,   recommend nonemergent MRI of the lumbar spine. CT HEAD WO CONTRAST   Final Result   1. No acute intracranial abnormality. XR CHEST PORTABLE   Final Result   Stable increased opacity left basilar region compared to prior studies dating   back to 10/25/2020 consistent with pericardial effusion, loculated left   basilar pleural effusion, pulmonary consolidation or mass/neoplasm. 2 mm   opacity consistent with pulmonary nodule in the right basilar region and foci   of infiltrate or subsegmental atelectasis in the mid-lower lung fields noted   bilaterally. IV contrast-enhanced chest CT suggested for further evaluation.              IP CONSULT TO NEPHROLOGY  IP CONSULT TO INFECTIOUS DISEASES  IP CONSULT TO INFECTIOUS DISEASES    Assessment/Plan:    Active Hospital Problems    Diagnosis     Severe malnutrition ([address] [E43]      Priority: Medium    Encephalopathy acute [G93.40]      Priority: Medium    Elevated lactic acid level [R79.89]      Priority: Medium    Hyperkalemia [E87.5]      Priority: Medium    Severe sepsis ([address] [A41.9, R65.20]      Priority: Medium    Staphylococcus aureus bacteremia with sepsis ([address] [A41.01]      Priority: Medium Neutrophilia [D72.9]      Priority: Medium    Elevated procalcitonin [R79.89]      Priority: Medium    ESRD needing dialysis ([address] [N18.6, Z99.2]      Priority: Medium    Bacterial pneumonia [J15.9]      Priority: Medium         -MSSA bacteremia with sepsis--concern for tunneled dialysis catheter infection-- vancomycin Eloina Torres de-escalated to cefazolin 11/27--continue management per ID--follow-up on cultures. .   -TDC removed, temporary HD line placed. Follow-up blood cultures for temporary catheter prior to placing new TDC     -Right lower lobe bacterial pneumonia. .  CT chest reviewed continue current antibiotics per ID     -Sepsis due to bacteremia /pneumonia-patient with fever, sinus tachycardia, leukocytosis, lactic acidosis--continue antibiotics as above, follow-up on cultures     -Chronic stable moderate pericardial effusion--evident on prior echos and current CT chest... Limited echo ordered     -Chronic systolic/ diastolic heart failure--Echo 2/2022 showed grade 2 diastolic dysfunction, EF 23%, moderate pericardial effusion--repeat echo ordered     -ESRD--on hemodialysis TTS--continue management per nephrology. .  Patient has tunneled dialysis catheter. .  Previously on PD but discontinued due to fungal peritonitis     -Anion Metabolic acidosis due to CKD/lactic acidosis--ketones negative--monitor labs-continue oral bicarb     -Hyperkalemia--resolved with hemodialysis     -Hyponatremia--corrected sodium within normal range on presentation     -DM type II uncontrolled  with severe hyperglycemia on presentation-patient was noncompliant with insulin due to acute illness-hemoglobin A1c is 7.2..   Hyperglycemia is better- continue current basal bolus insulin regimen     -Essential hypertension---stable-on nifedipine, hydralazine,imdur - Aldactone/losartan held due to hyperkalemia--also held nifedipine/Imdur this a.m. due to low BPs--continue to monitor for now     -Hyperlipidemia-continue statin     -Dizziness /vertigo-MRI brain is negative-currently asymptomatic-PT and OT eval pending     -Acute encephalopathy likely due to sepsis--resolved -lethargic on presentation to the ED--continue to monitor - improved         DVT Prophylaxis: Heparin  Diet: ADULT DIET; Dysphagia - Soft and Bite Sized; 5 carb choices (75 gm/meal); No Added Salt (3-4 gm); Low Potassium (Less than 3000 mg/day);  Mildly Thick (Nectar); 2000 ml  Code Status: Full Code  PT/OT Eval Status: ordered    Alexandro Rodriguez MD

## 2022-12-05 LAB
ANION GAP SERPL CALCULATED.3IONS-SCNC: 18 MMOL/L (ref 3–16)
BASOPHILS ABSOLUTE: 0.1 K/UL (ref 0–0.2)
BASOPHILS RELATIVE PERCENT: 0.5 %
BUN BLDV-MCNC: 35 MG/DL (ref 7–20)
CALCIUM SERPL-MCNC: 9 MG/DL (ref 8.3–10.6)
CHLORIDE BLD-SCNC: 93 MMOL/L (ref 99–110)
CO2: 23 MMOL/L (ref 21–32)
CREAT SERPL-MCNC: 6.4 MG/DL (ref 0.9–1.3)
EOSINOPHILS ABSOLUTE: 0.2 K/UL (ref 0–0.6)
EOSINOPHILS RELATIVE PERCENT: 1.1 %
GFR SERPL CREATININE-BSD FRML MDRD: 9 ML/MIN/{1.73_M2}
GLUCOSE BLD-MCNC: 133 MG/DL (ref 70–99)
GLUCOSE BLD-MCNC: 180 MG/DL (ref 70–99)
GLUCOSE BLD-MCNC: 202 MG/DL (ref 70–99)
GLUCOSE BLD-MCNC: 220 MG/DL (ref 70–99)
GLUCOSE BLD-MCNC: 220 MG/DL (ref 70–99)
HCT VFR BLD CALC: 31.3 % (ref 40.5–52.5)
HEMOGLOBIN: 9.8 G/DL (ref 13.5–17.5)
LYMPHOCYTES ABSOLUTE: 1 K/UL (ref 1–5.1)
LYMPHOCYTES RELATIVE PERCENT: 6.3 %
MCH RBC QN AUTO: 30 PG (ref 26–34)
MCHC RBC AUTO-ENTMCNC: 31.4 G/DL (ref 31–36)
MCV RBC AUTO: 95.6 FL (ref 80–100)
MONOCYTES ABSOLUTE: 1 K/UL (ref 0–1.3)
MONOCYTES RELATIVE PERCENT: 6.6 %
NEUTROPHILS ABSOLUTE: 13.4 K/UL (ref 1.7–7.7)
NEUTROPHILS RELATIVE PERCENT: 85.5 %
PDW BLD-RTO: 15.3 % (ref 12.4–15.4)
PERFORMED ON: ABNORMAL
PLATELET # BLD: 270 K/UL (ref 135–450)
PMV BLD AUTO: 11 FL (ref 5–10.5)
POTASSIUM SERPL-SCNC: 4.3 MMOL/L (ref 3.5–5.1)
RBC # BLD: 3.27 M/UL (ref 4.2–5.9)
SODIUM BLD-SCNC: 134 MMOL/L (ref 136–145)
WBC # BLD: 15.6 K/UL (ref 4–11)

## 2022-12-05 PROCEDURE — 36415 COLL VENOUS BLD VENIPUNCTURE: CPT

## 2022-12-05 PROCEDURE — 1200000000 HC SEMI PRIVATE

## 2022-12-05 PROCEDURE — 97530 THERAPEUTIC ACTIVITIES: CPT

## 2022-12-05 PROCEDURE — 6370000000 HC RX 637 (ALT 250 FOR IP): Performed by: HOSPITALIST

## 2022-12-05 PROCEDURE — 6360000002 HC RX W HCPCS: Performed by: HOSPITALIST

## 2022-12-05 PROCEDURE — 99233 SBSQ HOSP IP/OBS HIGH 50: CPT | Performed by: INTERNAL MEDICINE

## 2022-12-05 PROCEDURE — 2580000003 HC RX 258: Performed by: HOSPITALIST

## 2022-12-05 PROCEDURE — 2580000003 HC RX 258: Performed by: INTERNAL MEDICINE

## 2022-12-05 PROCEDURE — 85025 COMPLETE CBC W/AUTO DIFF WBC: CPT

## 2022-12-05 PROCEDURE — 6360000002 HC RX W HCPCS: Performed by: INTERNAL MEDICINE

## 2022-12-05 PROCEDURE — 80048 BASIC METABOLIC PNL TOTAL CA: CPT

## 2022-12-05 RX ADMIN — SODIUM CHLORIDE, PRESERVATIVE FREE 10 ML: 5 INJECTION INTRAVENOUS at 20:52

## 2022-12-05 RX ADMIN — HEPARIN SODIUM 5000 UNITS: 5000 INJECTION INTRAVENOUS; SUBCUTANEOUS at 07:04

## 2022-12-05 RX ADMIN — INSULIN LISPRO 2 UNITS: 100 INJECTION, SOLUTION INTRAVENOUS; SUBCUTANEOUS at 12:19

## 2022-12-05 RX ADMIN — HEPARIN SODIUM 5000 UNITS: 5000 INJECTION INTRAVENOUS; SUBCUTANEOUS at 22:00

## 2022-12-05 RX ADMIN — INSULIN GLARGINE 18 UNITS: 100 INJECTION, SOLUTION SUBCUTANEOUS at 20:36

## 2022-12-05 RX ADMIN — PANTOPRAZOLE SODIUM 40 MG: 40 TABLET, DELAYED RELEASE ORAL at 06:58

## 2022-12-05 RX ADMIN — SUCRALFATE 1 G: 1 TABLET ORAL at 11:31

## 2022-12-05 RX ADMIN — HYDRALAZINE HYDROCHLORIDE 100 MG: 50 TABLET, FILM COATED ORAL at 20:37

## 2022-12-05 RX ADMIN — CALCITRIOL 0.5 MCG: 0.25 CAPSULE ORAL at 08:44

## 2022-12-05 RX ADMIN — SODIUM BICARBONATE 650 MG: 650 TABLET ORAL at 16:47

## 2022-12-05 RX ADMIN — SUCRALFATE 1 G: 1 TABLET ORAL at 16:47

## 2022-12-05 RX ADMIN — CEFAZOLIN 1000 MG: 1 INJECTION, POWDER, FOR SOLUTION INTRAMUSCULAR; INTRAVENOUS at 20:42

## 2022-12-05 RX ADMIN — HEPARIN SODIUM 5000 UNITS: 5000 INJECTION INTRAVENOUS; SUBCUTANEOUS at 12:19

## 2022-12-05 RX ADMIN — HYDRALAZINE HYDROCHLORIDE 100 MG: 50 TABLET, FILM COATED ORAL at 08:44

## 2022-12-05 RX ADMIN — INSULIN LISPRO 2 UNITS: 100 INJECTION, SOLUTION INTRAVENOUS; SUBCUTANEOUS at 16:48

## 2022-12-05 RX ADMIN — INSULIN LISPRO 4 UNITS: 100 INJECTION, SOLUTION INTRAVENOUS; SUBCUTANEOUS at 16:47

## 2022-12-05 RX ADMIN — INSULIN LISPRO 4 UNITS: 100 INJECTION, SOLUTION INTRAVENOUS; SUBCUTANEOUS at 12:19

## 2022-12-05 RX ADMIN — HYDRALAZINE HYDROCHLORIDE 100 MG: 50 TABLET, FILM COATED ORAL at 12:19

## 2022-12-05 RX ADMIN — SODIUM BICARBONATE 650 MG: 650 TABLET ORAL at 11:31

## 2022-12-05 RX ADMIN — METOPROLOL TARTRATE 75 MG: 50 TABLET, FILM COATED ORAL at 08:44

## 2022-12-05 RX ADMIN — ATORVASTATIN CALCIUM 40 MG: 40 TABLET, FILM COATED ORAL at 20:37

## 2022-12-05 RX ADMIN — SUCRALFATE 1 G: 1 TABLET ORAL at 06:58

## 2022-12-05 RX ADMIN — ISOSORBIDE MONONITRATE 60 MG: 60 TABLET, EXTENDED RELEASE ORAL at 08:44

## 2022-12-05 RX ADMIN — SODIUM CHLORIDE, PRESERVATIVE FREE 10 ML: 5 INJECTION INTRAVENOUS at 08:45

## 2022-12-05 RX ADMIN — PANTOPRAZOLE SODIUM 40 MG: 40 TABLET, DELAYED RELEASE ORAL at 16:47

## 2022-12-05 RX ADMIN — METOPROLOL TARTRATE 75 MG: 50 TABLET, FILM COATED ORAL at 20:37

## 2022-12-05 RX ADMIN — SODIUM BICARBONATE 650 MG: 650 TABLET ORAL at 08:44

## 2022-12-05 RX ADMIN — SUCRALFATE 1 G: 1 TABLET ORAL at 20:55

## 2022-12-05 ASSESSMENT — PAIN SCALES - GENERAL
PAINLEVEL_OUTOF10: 0

## 2022-12-05 NOTE — PROGRESS NOTES
Speech Language Pathology    Swallow tx attempted. Patient refusing, stating he does not want anything from here to eat and is awaiting wife arrival with food. ST to re-attempt as schedule permits unless otherwise notified. Thank you.   Robert Gómez MA CCC-SLP #[id number]  Speech-Language Pathologist  12/05/22  12:42pm

## 2022-12-05 NOTE — PROGRESS NOTES
Occupational Therapy  Facility/Department: [facility]  Occupational Therapy Daily Treatment    Name: Lex Alvarez  : 1965  MRN: [id number]  Date of Service: 2022    Discharge Recommendations:  3-5 sessions per week, Patient would benefit from continued therapy after discharge, Continue to assess pending progress, 24 hour supervision or assist ( A if returning home)     Lex Alvarez scored a 16/24 on the AM-PAC ADL Inpatient form. Current research shows that an AM-PAC score of 17 or less is typically not associated with a discharge to the patient's home setting. Based on the patient's AM-PAC score and their current ADL deficits, it is recommended that the patient have 3-5 sessions per week of Occupational Therapy at d/c to increase the patient's independence. Please see assessment section for further patient specific details. If patient discharges prior to next session this note will serve as a discharge summary. Please see below for the latest assessment towards goals. Patient Diagnosis(es): The primary encounter diagnosis was Encephalopathy acute. Diagnoses of ESRD needing dialysis ([address] Severe sepsis ([address] Hyperkalemia, Type 2 diabetes mellitus with hyperglycemia, with long-term current use of insulin (HCC), Elevated lactic acid level, Pneumonia of right lower lobe due to infectious organism, Pulmonary nodule, Closed compression fracture of L1 vertebra, initial encounter ([address] and Pericardial effusion were also pertinent to this visit. Past Medical History:  has a past medical history of Cardiomyopathy ([address] CHF (congestive heart failure) ([address] CVA (cerebral infarction), Diabetes mellitus ([address] Foot ulcer, left ([address] Gastroparesis, Hemodialysis patient ([address] Hypercholesteremia, Hypertension, Kidney disease, and Unspecified cerebral artery occlusion with cerebral infarction.   Past Surgical History:  has a past surgical history that includes LAPAROSCOPY INSERTION PERITONEAL CATHETER (N/A, 10/29/2020); Umbilical hernia repair (N/A, 10/29/2020); hc dialysis catheter (N/A, 10/29/2020); Upper gastrointestinal endoscopy (N/A, 4/26/2021); Colonoscopy (N/A, 5/17/2021); Upper gastrointestinal endoscopy (N/A, 9/13/2022); Dialysis Catheter Removal (N/A, 10/17/2022); Catheter Insertion (N/A, 10/17/2022); and IR NONTUNNELED VASCULAR CATHETER > 5 YEARS (11/29/2022). Treatment Diagnosis: Decreased: ADLs, fxl transfers/mobility      Assessment   Performance deficits / Impairments: Decreased functional mobility ; Decreased ADL status; Decreased balance;Decreased endurance;Decreased cognition;Decreased safe awareness;Decreased strength;Decreased ROM; Decreased coordination  Assessment: Pt is a 64 yo M w/ PMHx ESRD on HD who was admitted with chills, fevers, and AMS. BS found to be 503 in the ED and he was lethargic. PTA, pt lives at home w/ his wife where he is typically independent in self-care and fxl mobility using SPC. Pt remains below baseline - requiring min A for functional transfers and CGA to ambulate short distances using RW w/ much encouragement. Anticipate max A for LB ADLs/toileting. Pt would most benefit from ongoing skilled OT 3-5x/week at d/c to increase his safety and independence. Pt reports his plan is to return home - he would require strict 24/7 hands-on assistance and would recommend [facility] OT L3 to maximize his safety. Treatment Diagnosis: Decreased: ADLs, fxl transfers/mobility  Prognosis: Fair  REQUIRES OT FOLLOW-UP: Yes  Activity Tolerance  Activity Tolerance: Patient limited by fatigue;Treatment limited secondary to decreased cognition        Plan   Occupational Therapy Plan  Times Per Week: 3-5  Times Per Day:  Once a day  Current Treatment Recommendations: Strengthening, Balance training, ROM, Functional mobility training, Endurance training, Safety education & training, Self-Care / ADL     Restrictions  Restrictions/Precautions  Restrictions/Precautions: Fall Risk, Contact Precautions    Subjective   General  Chart Reviewed: Yes  Patient assessed for rehabilitation services?: Yes  Additional Pertinent Hx: per H&P: \"65 y.o. male with ESRD on hemodialysis, hypertension, hyperlipidemia presented to emergency room with fevers, chills and rigors  for the past 2 days. This was associated with cough, sneezing. His blood sugars have been running high and states that he did not take his insulin. He was also lethargic and diaphoretic  Upon arrival to the ED, his blood sugar was 503, he appeared lethargic. \"  Family / Caregiver Present: No  Referring Practitioner: [place] Dorantes MD  Diagnosis: Right lower lobe bacterial pneumonia  Subjective  Subjective: Pt met b/s for OT cotx w/ PT. Agreeable but requires encouragement and increased time for all tasks. Denied pain     Social/Functional History  Social/Functional History  Lives With: Spouse (2 dogs)  Type of Home: House  Home Layout: One level  Home Access: Level entry  Bathroom Shower/Tub: Walk-in shower  Bathroom Toilet: Standard  Bathroom Equipment: Grab bars in shower, Built-in shower seat  Home Equipment: Jacmolinan Heide, rolling  ADL Assistance: Independent  Ambulation Assistance: Independent (with SPC)  Transfer Assistance: Independent  Active : No  Patient's  Info: wife drives   Type of Occupation:  retiree  Additional Comments: Wife works from home, wife cooks       Objective   Safety Devices  Type of Devices: All fall risk precautions in place;Call light within reach;Gait belt;Patient at risk for falls; Left in bed;Bed alarm in place;Nurse notified           ADL  Additional Comments: Declined ADLs     Activity Tolerance  Activity Tolerance: Patient limited by endurance  Bed mobility  Supine to Sit: Stand by assistance (significantly increased time)  Sit to Supine: Stand by assistance  Transfers  Sit to stand: Minimal assistance  Stand to sit: Minimal assistance  Transfer Comments: RW, cues for Individual Concurrent Group Co-treatment   Time In 1130         Time Out 1200         Minutes [address] [place] [address]  [place] [id number]

## 2022-12-05 NOTE — PROGRESS NOTES
Hospitalist Progress Note      PCP: No primary care provider on file. Date of Admission: 11/26/2022    Subjective: feeling better, appetite improved    Medications:  Reviewed    Infusion Medications    sodium chloride 25 mL/hr at 12/03/22 2306    dextrose       Scheduled Medications    sodium bicarbonate  650 mg Oral TID WC    ceFAZolin  1,000 mg IntraVENous Q24H    sodium chloride flush  5-40 mL IntraVENous 2 times per day    heparin (porcine)  5,000 Units SubCUTAneous 3 times per day    insulin lispro  0-8 Units SubCUTAneous TID WC    insulin lispro  0-4 Units SubCUTAneous Nightly    insulin lispro  0.05 Units/kg SubCUTAneous TID WC    insulin glargine  0.25 Units/kg SubCUTAneous Nightly    calcitRIOL  0.5 mcg Oral Daily    atorvastatin  40 mg Oral Nightly    metoprolol tartrate  75 mg Oral BID    [Held by provider] NIFEdipine  90 mg Oral BID    pantoprazole  40 mg Oral BID AC    sucralfate  1 g Oral 4x Daily AC & HS    hydrALAZINE  100 mg Oral TID    isosorbide mononitrate  60 mg Oral Daily     PRN Meds: heparin (porcine), perflutren lipid microspheres, sodium chloride flush, sodium chloride, ondansetron **OR** ondansetron, polyethylene glycol, acetaminophen **OR** acetaminophen, glucose, dextrose bolus **OR** dextrose bolus, glucagon (rDNA), dextrose, meclizine      Intake/Output Summary (Last 24 hours) at 12/4/2022 1905  Last data filed at 12/4/2022 0555  Gross per 24 hour   Intake 420 ml   Output --   Net 420 ml       Physical Exam Performed:    /75   Pulse (!) 104   Temp 99 °F (37.2 °C) (Oral)   Resp 18   Ht 6' 2\" (1.88 m)   Wt 157 lb 10.1 oz (71.5 kg)   SpO2 97%   BMI 20.24 kg/m²        General appearance: No apparent distress, appears stated age and cooperative. Protein calorie malnutrition  HEENT: Pupils equal, round, and reactive to light. Conjunctivae/corneas clear. Neck: Supple, with full range of motion. No jugular venous distention. Trachea midline.   Respiratory:  Normal respiratory effort. Clear to auscultation, bilaterally without Rales/Wheezes/Rhonchi. Cardiovascular: Regular rate and rhythm with normal S1/S2 without murmurs, rubs or gallops. Abdomen: Soft, non-tender, non-distended with normal bowel sounds. Musculoskeletal: No clubbing, cyanosis or edema bilaterally. Full range of motion without deformity. Skin: Skin color, texture, turgor normal.  No rashes or lesions. Neurologic:  Neurovascularly intact without any focal sensory/motor deficits. Cranial nerves: II-XII intact, grossly non-focal.  Psychiatric: Alert and oriented, thought content appropriate, normal insight  Capillary Refill: Brisk,< 3 seconds   Peripheral Pulses: +2 palpable, equal bilaterally        Labs:   Recent Labs     12/03/22  1327 12/04/22  1553   WBC 16.3* 15.3*   HGB 9.4* 9.6*   HCT 28.6* 30.2*    331     Recent Labs     12/03/22  1327 12/04/22  1553    138   K 3.8 4.1   CL 97* 98*   CO2 27 27   BUN 16 25*   CREATININE 3.5* 6.1*   CALCIUM 9.0 9.1     No results for input(s): AST, ALT, BILIDIR, BILITOT, ALKPHOS in the last 72 hours. No results for input(s): INR in the last 72 hours. No results for input(s): [place] in the last 72 hours. Urinalysis:      Lab Results   Component Value Date/Time    NITRU Negative 11/26/2022 01:32 PM    WBCUA 3 11/26/2022 01:32 PM    BACTERIA None Seen 11/26/2022 01:32 PM    RBCUA 9 11/26/2022 01:32 PM    BLOODU TRACE 11/26/2022 01:32 PM    SPECGRAV 1.019 11/26/2022 01:32 PM    GLUCOSEU >=1000 11/26/2022 01:32 PM    GLUCOSEU 250 12/14/2010 02:50 PM       Radiology:  IR REMOVE TUNNELED CVAD WO SQ PORT/PUMP   Final Result   Successful removal of tunneled dialysis catheter. Successful fluoroscopic guided non tunneled Trialysis catheter placement. IR NONTUNNELED VASCULAR CATHETER > 5 YEARS   Final Result   Successful removal of tunneled dialysis catheter.       Successful fluoroscopic guided non tunneled Trialysis catheter placement. MRI BRAIN WO CONTRAST   Final Result   1. No acute intracranial abnormality. No acute infarct. 2. Mild global parenchymal volume loss with chronic microvascular ischemic   changes. 3. Small lipoma is again seen along the right dorsal midbrain. CT CHEST PULMONARY EMBOLISM W CONTRAST   Final Result   1. Focal consolidation in the right lower lobe favoring   infectious/inflammatory process. Recommend CT of the chest in 3 months to   confirm resolution. 2. Solid subcentimeter right pulmonary nodules can be assessed during the   same time. 3. Cardiomegaly with unchanged moderate to large pericardial effusion. 4. Age-indeterminate L1 compression fracture. If there is point tenderness,   recommend nonemergent MRI of the lumbar spine. CT HEAD WO CONTRAST   Final Result   1. No acute intracranial abnormality. XR CHEST PORTABLE   Final Result   Stable increased opacity left basilar region compared to prior studies dating   back to 10/25/2020 consistent with pericardial effusion, loculated left   basilar pleural effusion, pulmonary consolidation or mass/neoplasm. 2 mm   opacity consistent with pulmonary nodule in the right basilar region and foci   of infiltrate or subsegmental atelectasis in the mid-lower lung fields noted   bilaterally. IV contrast-enhanced chest CT suggested for further evaluation.              IP CONSULT TO NEPHROLOGY  IP CONSULT TO INFECTIOUS DISEASES  IP CONSULT TO INFECTIOUS DISEASES    Assessment/Plan:    Active Hospital Problems    Diagnosis     Severe malnutrition ([address] [E43]      Priority: Medium    Encephalopathy acute [G93.40]      Priority: Medium    Elevated lactic acid level [R79.89]      Priority: Medium    Hyperkalemia [E87.5]      Priority: Medium    Severe sepsis ([address] [A41.9, R65.20]      Priority: Medium    Staphylococcus aureus bacteremia with sepsis ([address] [A41.01]      Priority: Medium    Neutrophilia [D72.9]      Priority: Medium Elevated procalcitonin [R79.89]      Priority: Medium    ESRD needing dialysis ([facility] [N18.6, Z99.2]      Priority: Medium    Bacterial pneumonia [J15.9]      Priority: Medium       -MSSA bacteremia with sepsis--concern for tunneled dialysis catheter infection-- vancomycin Helton Breed de-escalated to cefazolin 11/27--continue management per ID--follow-up on cultures. .   -TDC removed, temporary HD line placed. Follow-up blood cultures for temporary catheter prior to placing new TDC     -Right lower lobe bacterial pneumonia. .  CT chest reviewed continue current antibiotics per ID     -Sepsis due to bacteremia /pneumonia-patient with fever, sinus tachycardia, leukocytosis, lactic acidosis--continue antibiotics as above, follow-up on cultures     -Chronic stable moderate pericardial effusion--evident on prior echos and current CT chest... Limited echo ordered     -Chronic systolic/ diastolic heart failure--Echo 2/2022 showed grade 2 diastolic dysfunction, EF 52%, moderate pericardial effusion--repeat echo ordered     -ESRD--on hemodialysis TTS--continue management per nephrology. .  Patient has tunneled dialysis catheter. .  Previously on PD but discontinued due to fungal peritonitis     -Anion Metabolic acidosis due to CKD/lactic acidosis--ketones negative--monitor labs-continue oral bicarb     -Hyperkalemia--resolved with hemodialysis     -Hyponatremia--corrected sodium within normal range on presentation     -DM type II uncontrolled  with severe hyperglycemia on presentation-patient was noncompliant with insulin due to acute illness-hemoglobin A1c is 7.2..   Hyperglycemia is better- continue current basal bolus insulin regimen     -Essential hypertension---stable-on nifedipine, hydralazine,imdur - Aldactone/losartan held due to hyperkalemia--also held nifedipine/Imdur this a.m. due to low BPs--continue to monitor for now     -Hyperlipidemia-continue statin     -Dizziness /vertigo-MRI brain is negative-currently asymptomatic-PT and OT eval pending     -Acute encephalopathy likely due to sepsis--resolved -lethargic on presentation to the ED--continue to monitor - improved           DVT Prophylaxis: Heparin  Diet: ADULT DIET; Dysphagia - Soft and Bite Sized; 5 carb choices (75 gm/meal); No Added Salt (3-4 gm); Low Potassium (Less than 3000 mg/day);  Mildly Thick (Nectar); 2000 ml  Code Status: Full Code  PT/OT Eval Status: ordered    Dispo - cont care, await ID recs for abx        Patricia Hilario MD

## 2022-12-05 NOTE — PROGRESS NOTES
Infectious Disease Follow up Notes  Admit Date: 11/26/2022  Hospital Day: 10    Antibiotics :   IV Cefazolin     CHIEF COMPLAINT:     Severe sepsis  Fevers   Staph aureus Bacteremia  ESRD  HD line infection       Subjective interval History :  65 y. o.man with a history of end-stage renal disease on hemodialysis, CHF, CVA, diabetes, hypercholesterolemia, hypertension, with some left weakness admitted to the hospital secondary to fever chills not. He felt cold and flulike symptoms ongoing for the past 4 to 5 days. T-max 100.3 since admission, labs indicate procalcitonin elevated 92.7 potassium 5 6 blood glucose of 500, WBC elevated 19.4 with left shift hemoglobin 10.7 blood cultures no reported to be Staph aureus 4/4 bottles MSSA, tested negative for rapid flu and COVID-19, CT head negative CT chest indicating focal consolidation right lower lobe, also pericardial effusion noted. We are consulted for IV abx recommendation.      Interval History :  Fevers improved and WBC still up but slowly improving and no back pain  no joint pains wife at bed side     Past Medical History:    Past Medical History:   Diagnosis Date    Cardiomyopathy ([facility]     CHF (congestive heart failure) (HCC)     CVA (cerebral infarction) 5/2015    Diabetes mellitus ([facility]     Foot ulcer, left ([facility] 1/14/2016    Gastroparesis     Hemodialysis patient ([facility]     Hypercholesteremia     Hypertension     Kidney disease     Unspecified cerebral artery occlusion with cerebral infarction     5/15, 7/15 LEFT SIDE WEAKNESS       Past Surgical History:    Past Surgical History:   Procedure Laterality Date    CATHETER INSERTION N/A 10/17/2022    TUNNEL CATHETER PLACEMENT performed by Nick Winter MD at [address] [facility] N/A 5/17/2021    COLONOSCOPY POLYPECTOMY SNARE/COLD BIOPSY performed by Lisa Arnold MD at [address] 10/17/2022    PERITONEAL DIALYSIS CATHETER REMOVAL performed by Ana Newell MD at [address] [facility] N/A 10/29/2020    PLACEMENT OF A TUNNELED DIALYSIS CATHETER WITH FLEURO AND ULTRASOUND performed by Ana Newell MD at [facility] NONTUNNELED VASCULAR CATHETER  11/29/2022    IR NONTUNNELED VASCULAR CATHETER 11/29/2022 [facility]    LAPAROSCOPY INSERTION PERITONEAL CATHETER N/A 10/29/2020    LAPAROSCOPIC PERITONEAL DIALYSIS CATHETER PLACEMENT WITH FLEURO AND ULTRASOUND performed by Ana Newell MD at [address]  N/A [id number]    UMBILICAL HERNIA REPAIR performed by Ana Newell MD at [address] [facility] [address] N/A 4/26/2021    ESOPHAGOGASTRODUODENOSCOPY performed by Tony Amador MD at [address] [facility] [address] 9/13/2022    EGD DIAGNOSTIC ONLY performed by Kris Lara MD at [address] [facility]       Current Medications:    No outpatient medications have been marked as taking for the 11/26/22 encounter [facility] Encounter).        Allergies:  Doxazosin, Cefepime, and Coconut flavor    Immunizations :   Immunization History   Administered Date(s) Administered    COVID-19, PFIZER GRAY top, DO NOT Dilute, (age 15 y+), IM, 30 mcg/0.3 mL 06/04/2022    COVID-19, PFIZER PURPLE top, DILUTE for use, (age 15 y+), 30mcg/0.3mL 06/23/2021, 07/19/2021    Hepatitis A Adult (Havrix, Vaqta) 03/10/2021, 09/23/2021    Hepatitis B 05/06/2022    Hepatitis B Adult (Heplisav-b) 05/06/2022    Hepatitis B Adult (Recombivax HB) 11/05/2020    Hepatitis B vaccine 11/05/2020    Influenza Vaccine, unspecified formulation 12/05/2013    Influenza Virus Vaccine 12/05/2013, 12/04/2015, 11/05/2020, 10/11/2021    Influenza, FLUARIX, FLULAVAL, FLUZONE (age 10 mo+) AND AFLURIA, (age 1 y+), PF, 0.5mL 10/15/2018, 11/05/2020    Influenza, Triv, 3 Years and older, IM, PF (Afluria 5yrs and older) 10/11/2021    Pneumococcal Conjugate 13-valent ([id number]) 11/10/2020    Pneumococcal Conjugate Vaccine 02/13/2015    Pneumococcal Polysaccharide ([id number]) 11/02/2009, 03/10/2021    Tdap (Boostrix, Adacel) 08/27/2021    Zoster Recombinant (Shingrix) 03/10/2021, 08/27/2021       Social History:     Social History     Tobacco Use    Smoking status: Some Days     Packs/day: 0.00     Years: 30.00     Pack years: 0.00     Types: Cigars, Cigarettes     Start date: 1/3/1979     Last attempt to quit: 1/16/2019     Years since quitting: 3.8    Smokeless tobacco: Never    Tobacco comments:     3 black and milds a week   Vaping Use    Vaping Use: Never used   Substance Use Topics    Alcohol use: Yes     Comment: occasional    Drug use: Yes     Types: Marijuana Bernadette Ege)     Comment: previously used cocaine, stopped May 2015 LAST USED MARIJUANA-NOVEMBER 2020     Social History     Tobacco Use   Smoking Status Some Days    Packs/day: 0.00    Years: 30.00    Pack years: 0.00    Types: Cigars, Cigarettes    Start date: 1/3/1979    Last attempt to quit: 1/16/2019    Years since quitting: 3.8   Smokeless Tobacco Never   Tobacco Comments    3 black and milds a week      Family History   Adopted: Yes          REVIEW OF SYSTEMS:      Constitutional:  r fevers,++  chills++ , night sweats  Eyes:  negative for blurred vision, eye discharge, visual disturbance   HEENT:  negative for hearing loss, ear drainage,nasal congestion  Respiratory:  negative for cough, shortness of breath or hemoptysis   Cardiovascular:  negative for chest pain, palpitations, syncope  Gastrointestinal:  negative for nausea, vomiting, diarrhea, constipation, abdominal pain  Genitourinary:  negative for frequency, dysuria, urinary incontinence, hematuria  Hematologic/Lymphatic:  negative for easy bruising, bleeding and lymphadenopathy  Allergic/Immunologic:  negative for recurrent infections, angioedema, anaphylaxis   Endocrine:  negative for weight changes, polyuria, polydipsia and polyphagia  Musculoskeletal:  negative for joint  pain, swelling, decreased range of motion  Integumentary: No rashes, skin lesions  Neurological:  negative for headaches, slurred speech, unilateral weakness  Psychiatric: negative for hallucinations,confusion,agitation.                 PHYSICAL EXAM:      Vitals:  T max  102.8   BP (!) 162/84   Pulse 90   Temp 98.2 °F (36.8 °C) (Oral)   Resp 18   Ht 6' 2\" (1.88 m)   Wt 135 lb 5.8 oz (61.4 kg)   SpO2 98%   BMI 17.38 kg/m²     General Appearance: alert,in some  acute distress,++  pallor, no icterus cachexia + chronic ill appearing man +    Skin: warm and dry, no rash or erythema  Head: normocephalic and atraumatic  Eyes: pupils equal, round, and reactive to light, conjunctivae normal  ENT: tympanic membrane, external ear and ear canal normal bilaterally, nose without deformity, nasal mucosa and turbinates normal without polyps  Neck: supple and non-tender without mass, no thyromegaly  no cervical lymphadenopathy  Pulmonary/Chest: clear to auscultation bilaterally- no wheezes, rales or rhonchi, normal air movement, no respiratory distress  Cardiovascular:  S1 and S2, esm+  murmurs, rubs, clicks, or gallops, no carotid bruits  Abdomen: soft, non-tender, non-distended, normal bowel sounds, no masses or organomegaly  Extremities: no cyanosis, clubbing or edema  Musculoskeletal: normal range of motion, no joint swelling, deformity or tenderness  Integumentary: No rashes, no abnormal skin lesions, no petechiae  Neurologic: reflexes normal and symmetric, no cranial nerve deficit  Psych:  Orientation, sensorium, mood normal            Lines: HD line +  Temp line on Rt chest wall    Data Review:    CBC:   Lab Results   Component Value Date    WBC 15.3 (H) 12/04/2022    HGB 9.6 (L) 12/04/2022    HCT 30.2 (L) 12/04/2022    MCV 94.0 12/04/2022     12/04/2022     RENAL:   Lab Results   Component Value Date    CREATININE 6.1 (HH) 12/04/2022    BUN 25 (H) 12/04/2022  12/04/2022    K 4.1 12/04/2022    CL 98 (L) 12/04/2022    CO2 27 12/04/2022     SED RATE:   Lab Results   Component Value Date/Time    SEDRATE 64 10/26/2020 02:14 PM     CK: No results found for: CKTOTAL  CRP:   Lab Results   Component Value Date/Time    CRP 2.9 10/26/2020 02:14 PM     Hepatic Function Panel:   Lab Results   Component Value Date/Time    ALKPHOS 104 11/26/2022 09:26 AM    ALT 13 11/26/2022 09:26 AM    AST 12 11/26/2022 09:26 AM    PROT 8.0 11/26/2022 09:26 AM    BILITOT <0.2 11/26/2022 09:26 AM    BILIDIR <0.2 10/11/2022 02:41 PM    IBILI see below 10/11/2022 02:41 PM    LABALBU 3.0 12/01/2022 07:07 AM     UA:  Lab Results   Component Value Date/Time    COLORU Yellow 11/26/2022 01:32 PM    CLARITYU Clear 11/26/2022 01:32 PM    GLUCOSEU >=1000 11/26/2022 01:32 PM    GLUCOSEU 250 12/14/2010 02:50 PM    BILIRUBINUR Negative 11/26/2022 01:32 PM    BILIRUBINUR NEGATIVE 12/14/2010 02:50 PM    KETUA Negative 11/26/2022 01:32 PM    SPECGRAV 1.019 11/26/2022 01:32 PM    BLOODU TRACE 11/26/2022 01:32 PM    PHUR 7.5 11/26/2022 01:32 PM    PROTEINU 300 11/26/2022 01:32 PM    UROBILINOGEN 0.2 11/26/2022 01:32 PM    NITRU Negative 11/26/2022 01:32 PM    LEUKOCYTESUR Negative 11/26/2022 01:32 PM    LABMICR YES 11/26/2022 01:32 PM    URINETYPE Cleancatch 11/26/2022 01:32 PM      Urine Microscopic:   Lab Results   Component Value Date/Time    BACTERIA None Seen 11/26/2022 01:32 PM    COMU see below 05/05/2021 05:36 PM    HYALCAST 0 11/26/2022 01:32 PM    WBCUA 3 11/26/2022 01:32 PM    RBCUA 9 11/26/2022 01:32 PM    EPIU 0 11/26/2022 01:32 PM     Urine Reflex to Culture:   Lab Results   Component Value Date/Time    URRFLXCULT Not Indicated 11/26/2022 01:32 PM         MICRO: cultures reviewed and updated by me   Blood Culture:   Organism Staphylococcus aureus Abnormal     Culture Catheter Tip 15 colonies    Resulting Agency [address] [facility]        Susceptibility    Staphylococcus aureus (1)    Antibiotic Interpretation Microscan  Method Status    ceFAZolin Sensitive <=4 mcg/mL BACTERIAL SUSCEPTIBILITY PANEL BY DYLON     clindamycin Sensitive <=0.5 mcg/mL BACTERIAL SUSCEPTIBILITY PANEL BY DYLON     erythromycin Sensitive <=0.5 mcg/mL BACTERIAL SUSCEPTIBILITY PANEL BY DYLON     oxacillin Sensitive <=0.25 mcg/mL BACTERIAL SUSCEPTIBILITY PANEL BY DYLON     tetracycline Sensitive <=4 mcg/mL BACTERIAL SUSCEPTIBILITY PANEL BY DYLON     trimethoprim-sulfamethoxazole Sensitive <=0.5/9.5 mcg/mL BACTERIAL SUSCEPTIBILITY PANEL BY DYLON        Narrative  Performed by: Don Dumont [facility]  ORDER#: [id number]                          ORDERED BY: Tin Call   SOURCE: Catheter Tip                       COLLECTED:  11/29/22 11:40            Lab Results   Component Value Date/Time    BC No Growth after 4 days of incubation. 11/28/2022 06:59 AM    BLOODCULT2 No Growth after 4 days of incubation. 11/28/2022 06:59 AM       Respiratory Culture:  Lab Results   Component Value Date/Time    LABGRAM  10/16/2022 11:00 AM     No Epithelial Cells seen  No WBCs or organisms seen       AFB:No results found for: [facility]  Viral Culture:  Lab Results   Component Value Date/Time    COVID19 Not Detected 11/26/2022 09:26 AM     Urine Culture: No results for input(s): LABURIN in the last 72 hours. IMAGING:    IR REMOVE TUNNELED CVAD WO SQ PORT/PUMP   Final Result   Successful removal of tunneled dialysis catheter. Successful fluoroscopic guided non tunneled Trialysis catheter placement. IR NONTUNNELED VASCULAR CATHETER > 5 YEARS   Final Result   Successful removal of tunneled dialysis catheter. Successful fluoroscopic guided non tunneled Trialysis catheter placement. MRI BRAIN WO CONTRAST   Final Result   1. No acute intracranial abnormality. No acute infarct. 2. Mild global parenchymal volume loss with chronic microvascular ischemic   changes. 3. Small lipoma is again seen along the right dorsal midbrain.          CT CHEST PULMONARY EMBOLISM W CONTRAST   Final Result   1. Focal consolidation in the right lower lobe favoring   infectious/inflammatory process. Recommend CT of the chest in 3 months to   confirm resolution. 2. Solid subcentimeter right pulmonary nodules can be assessed during the   same time. 3. Cardiomegaly with unchanged moderate to large pericardial effusion. 4. Age-indeterminate L1 compression fracture. If there is point tenderness,   recommend nonemergent MRI of the lumbar spine. CT HEAD WO CONTRAST   Final Result   1. No acute intracranial abnormality. XR CHEST PORTABLE   Final Result   Stable increased opacity left basilar region compared to prior studies dating   back to 10/25/2020 consistent with pericardial effusion, loculated left   basilar pleural effusion, pulmonary consolidation or mass/neoplasm. 2 mm   opacity consistent with pulmonary nodule in the right basilar region and foci   of infiltrate or subsegmental atelectasis in the mid-lower lung fields noted   bilaterally. IV contrast-enhanced chest CT suggested for further evaluation.                All the pertinent images and reports for the current Hospitalization were reviewed by me     Scheduled Meds:   sodium bicarbonate  650 mg Oral TID WC    ceFAZolin  1,000 mg IntraVENous Q24H    sodium chloride flush  5-40 mL IntraVENous 2 times per day    heparin (porcine)  5,000 Units SubCUTAneous 3 times per day    insulin lispro  0-8 Units SubCUTAneous TID WC    insulin lispro  0-4 Units SubCUTAneous Nightly    insulin lispro  0.05 Units/kg SubCUTAneous TID WC    insulin glargine  0.25 Units/kg SubCUTAneous Nightly    calcitRIOL  0.5 mcg Oral Daily    atorvastatin  40 mg Oral Nightly    metoprolol tartrate  75 mg Oral BID    [Held by provider] NIFEdipine  90 mg Oral BID    pantoprazole  40 mg Oral BID AC    sucralfate  1 g Oral 4x Daily AC & HS    hydrALAZINE  100 mg Oral TID    isosorbide mononitrate  60 mg Oral Daily       Continuous Infusions:   sodium chloride 25 mL/hr at 12/03/22 2306    dextrose       Summary   Limited study. Overall left ventricular systolic function appears moderately reduced. Ejection fraction is visually estimated to be 35-40% with diffuse   hypokinesis. There is severely increased left ventricular wall thickness. Left ventricular cavity size is normal.   The right ventricle is not well visualized. Tricuspid aortic valve leaflets appear thickened/calcified but no clear   mobile structure to suggest a vegetation. There is a moderate pericardial effusion.       PRN Meds:  heparin (porcine), perflutren lipid microspheres, sodium chloride flush, sodium chloride, ondansetron **OR** ondansetron, polyethylene glycol, acetaminophen **OR** acetaminophen, glucose, dextrose bolus **OR** dextrose bolus, glucagon (rDNA), dextrose, meclizine      Assessment:     Patient Active Problem List   Diagnosis    Nonischemic cardiomyopathy ([address]    Cerebral infarction ([address]    Cigarette nicotine dependence without complication    Erectile dysfunction due to arterial insufficiency    Renal artery stenosis (HCC)    Chronic diastolic congestive heart failure ([address]    H/O: CVA (cerebrovascular accident)    Major depressive disorder, recurrent episode, moderate (HCC)    Pericardial effusion    Hypertension associated with diabetes ([address]    DM type 2 with diabetic mixed hyperlipidemia ([address]    Diabetes mellitus due to underlying condition with stage 4 chronic kidney disease, with long-term current use of insulin (HCC)    ESRD (end stage renal disease) on dialysis ([address]    ESRD (end stage renal disease) ([address]    GI bleed    SBP (spontaneous bacterial peritonitis) ([address]    Dialysis-associated peritonitis ([address]    Candida infection    Generalized abdominal pain    PKD (polycystic kidney disease)    Bacterial pneumonia    Encephalopathy acute    Elevated lactic acid level    Hyperkalemia    Severe sepsis (HCC)    Staphylococcus aureus bacteremia with sepsis (HCC)    Neutrophilia    Elevated procalcitonin    ESRD needing dialysis (HCC)    Severe malnutrition (HCC)     Sepsis  Staph aureus Bacteremia  MSSA+  ESRD on HD  HD line in place concern for Line infection   Cardiomyopathy   Pericardial effusion know to have this   WBC elevation   Procal elevation  DM+  Hyperkalemia  PKD  Fevers from above     Given the high grade Bacteremia and sepsis concern is for HD line infection - will need HD line removal and needs line Holiday and repeat Blood cx     TTE completed no vegetation -     Staph aureus MSSA high-grade bacteremia concern for for HD line infection  s/p  line removal and tip sent for cx in process with staph aureus sensitivities pending    WBC still high and fever spike from on going infection follow up blood cx in process needs to watch for any new complications     CT chest images reviewed there is a small area of  change Rt lower lobe does not look like Bacterial infection or dense consolidation     Follow up Blood cx negative ok for Tunneled line placement        Labs, Microbiology, Radiology and all the pertinent results from current hospitalization and  care every where were reviewed  by me as a part of the evaluation   Plan:   IV Cefazolin x 1 gm q 24 HRS will change to 2 gm with HD sessions at d/c for  x  4 weeks   Repeat blood cx in  process from 11/28 so far no growth to date  S/p  HD line removal   Send Tip for cx   staph aureus noted MSSA  TTE  -ve  Unfortunately PD catheter was infected  and now having problems with HD line infections  Needs to improve skin hygiene and catheter care  -   If more fevers needs to watch for complications   Recheck Pro-Gabriele  improving down  20.46   Anticipate 4- 6 weeks of IV antibiotic with hemodialysis sessions at discharge  OK for Tunneled line conversion      Discussed with patient/Family and Nursing    Risk of Complications/Morbidity: High      Illness(es)/ Infection present that pose threat to bodily function. There is potential for severe exacerbation of infection/side effects of treatment. Therapy requires intensive monitoring for antimicrobial agent toxicity. Discussed with patient/Family and Nursing staff     Thanks for allowing me to participate in your patient's care and please call me with any questions or concerns.     Irena Quinonez MD  Infectious Disease  [facility] Physician  Phone: [phone]   Fax : [phone]

## 2022-12-05 NOTE — FLOWSHEET NOTE
Attempted to do nurse draw for labs from pigtail lumen with no blood return. Lumen flushes well and still unable to draw even with position changing and encouraging coughing and deep breathing. Will notify oncoming shift of need to obtain am labs.

## 2022-12-05 NOTE — PLAN OF CARE
Problem: Genitourinary - Adult  Goal: Absence of urinary retention  Outcome: Progressing     Problem: Pain  Goal: Verbalizes/displays adequate comfort level or baseline comfort level  Outcome: Progressing

## 2022-12-05 NOTE — ADT AUTH CERT
Subscriber Details  Hospital Account [de-identified]  CVG Subscriber Name/Sex/Relation Subscriber  Subscriber Address/Phone Subscriber Emp/Emp Phone   Donna Larios [place]   [id number] Issa Rice - Male   (Self) 1965 [address] [facility], [address]   [phone](V) UNEMPLOYED      Utilization Reviews       Pneumonia - Care Day 8 (12/3/2022) by Rina Varela RN       Review Status Review Entered   Completed 2022 1616       Created By   Rina Varela RN      Criteria Review      Care Day: 8 Care Date: 12/3/2022 Level of Care: Telemetry    Guideline Day 2    Clinical Status    (X) * No CO2 retention or acidosis    2022 4:15 PM EST by Gaviota Lynn      CO2 27  glucose 163, 188, 203, 196  wbc 16.3  hgb 9.4, hct 28.6  na 137  K+3.8  bun 16, creat 3.5    (X) * No requirement for mechanical ventilation    (X) * Hypotension absent    2022 4:15 PM EST by Gaviota Lynn      98.2-150-/77-96% on RA    (X) * Afebrile or fever improved    2022 4:15 PM EST by Gaviota Lynn      98.4    (X) * No hypoxia on room air or oxygenation improved    2022 4:15 PM EST by Gaviota Lynn      96% on RA    (X) * Mental status improved or at baseline    Activity    ( ) * Increased activity    Routes    (X) Oral medications    2022 4:15 PM EST by Gaviota Lynn      Lipitor 40 mg nightly, Heparin 5000 units SC TID, Hydralazine 100 mg TID, Imdur 60 mg daily, Lopressor 75 mg BID, Protonix 40 mg BID, NA bicarb 650 mg TID, Carafate 1 g QID    (X) Usual diet    2022 4:16 PM EST by Gaviota Lynn      Dysphagia - Soft and Bite Sized; 5 carb choices (75 gm/meal); No Added Salt (3-4 gm); Low Potassium (Less than 3000 mg/day);  Mildly Thick (Nectar); 2000 ml    Interventions    (X) Pulse oximetry    2022 4:15 PM EST by Gaviota Lynn      96%    Medications    (X) IV or oral antibiotics    2022 4:15 PM EST by Damian Barron 1000 mg q24    * Milestone   Additional Notes   DATE: 12/03/2022      PERTINENT UPDATES:Dyspnea persists, ST on monitor, Agitated per nursing    IV Ancef continues      fever spike improved, more alert today per spouse, not feeling much hungry      PHYSICAL EXAM:  Protein calorie malnutrition      A&O x4-delayed responses   Dyspnea with exertion/Dyspnea at rest         MEDICATIONS:see above            Pneumonia - Care Day 5 (11/30/2022) by Michelle Darden RN       Review Status Review Entered   Completed 12/5/2022 1604       Created By   Michelle Darden RN      Criteria Review      Care Day: 5 Care Date: 11/30/2022 Level of Care: Telemetry    Guideline Day 2    Clinical Status    (X) * No CO2 retention or acidosis    (X) * No requirement for mechanical ventilation    (X) * Hypotension absent    12/5/2022 4:04 PM EST by Jacquelyn Diaz      101.2-100-/77-96% on RA    ( ) * Afebrile or fever improved    12/5/2022 4:04 PM EST by Jacquelyn Diaz      101.8    (X) * No hypoxia on room air or oxygenation improved    12/5/2022 4:04 PM EST by Jacquelyn Diaz      96% on RA    (X) * Mental status improved or at baseline    Activity    ( ) * Increased activity    Routes    (X) Oral medications    12/5/2022 4:04 PM EST by Jacquelyn Diaz      Lipitor 40 mg nightly, Heparin 5000 units SC TID, Hydralazine 100 mg TID, Imdur 60 mg daily, Lopressor 75 mg BID, Protonix 40 mg BID, NA bicarb 650 mg TID, Carafate 1 g QID, Tylenol 650 g x4    (X) Usual diet    Interventions    (X) Pulse oximetry    12/5/2022 4:04 PM EST by Jacquelyn Diaz      96%    Medications    (X) IV or oral antibiotics    12/5/2022 4:04 PM EST by Jacquelyn Diaz      IV Ancef 1000 mg q24       Definitions for Care Day 5    Hypotension absent    (X) Hypotension absent, as indicated by  1 or more  of the following  (1) (2) (3) (4):       (X) SBP greater than or equal to 90 mm Hg and without recent decrease greater than 40 mm Hg from baseline in adult or child 10 years or older       * Milestone   Additional Notes   DATE: 11/30/2022   Dyspnea with exertion/Dyspnea at rest   ST on monitor      PERTINENT UPDATES:IV ancef continues         ABNL/PERTINENT LABS:na 134   K+4.2   Chloride 95   Bun 34, creat 5.6   Glucose 160   Wbc 17.3   Hgb 9.5, hct 29.1      PHYSICAL EXAM:Cardiovascular: Regular rate and rhythm with normal S1/S2 without murmurs, rubs or gallops. Abdomen: Soft, non-tender, non-distended with normal bowel sounds. Musculoskeletal: No clubbing, cyanosis or edema bilaterally. Full range of motion without deformity      Per Renal -Dialyzed yesterday, 1 L UF done. WBC count higher today at 17.3       Assessment:WBC count higher today at 17.3       Plan:    - scheduling trouble coordinating dialysis early and getting the [facility] removed early. - will proceed with TDC removal now and have temp HD line placed now and plan for HD later today. - continue abx, repeat blood cx per ID to ensure blood cx clear before new TDC can be placed. Plan:    - scheduling trouble coordinating dialysis early and getting the [facility] removed early. - will proceed with TDC removal now and have temp HD line placed now and plan for HD later today. - continue abx, repeat blood cx per ID to ensure blood cx clear before new TDC can be placed. Per Internal Medicine-MSSA bacteremia with sepsis--concern for tunneled dialysis catheter infection-- vancomycin Efraín Marie de-escalated to cefazolin 11/27--continue management per ID--follow-up on cultures. .    -TDC removed, temporary HD line placed. Follow-up blood cultures for temporary catheter prior to placing new TDC       -Right lower lobe bacterial pneumonia. .  CT chest reviewed continue current antibiotics per ID       -Sepsis due to bacteremia /pneumonia-patient with fever, sinus tachycardia, leukocytosis, lactic acidosis--continue antibiotics as above, follow-up on cultures      MEDICATIONS:see above

## 2022-12-05 NOTE — PROGRESS NOTES
Physical Therapy  Facility/Department: [address] MED SURG  Physical Therapy Treatment Note    Name: Delio Mejia  : 1965  MRN: [id number]  Date of Service: 2022    Discharge Recommendations:  Therapy recommended at discharge, Continue to assess pending progress    Delio Mejia scored a 14/24 on the AM-PAC short mobility form. Current research shows that an AM-PAC score of 17 or less is typically not associated with a discharge to the patient's home setting. Based on the patient's AM-PAC score and their current functional mobility deficits, it is recommended that the patient have 3-5 sessions per week of Physical Therapy at d/c to increase the patient's independence. Please see assessment section for further patient specific details. If patient discharges prior to next session this note will serve as a discharge summary. Please see below for the latest assessment towards goals. Patient Diagnosis(es): The primary encounter diagnosis was Encephalopathy acute. Diagnoses of ESRD needing dialysis ([address] Severe sepsis ([address] Hyperkalemia, Type 2 diabetes mellitus with hyperglycemia, with long-term current use of insulin (HCC), Elevated lactic acid level, Pneumonia of right lower lobe due to infectious organism, Pulmonary nodule, Closed compression fracture of L1 vertebra, initial encounter ([address] and Pericardial effusion were also pertinent to this visit. Past Medical History:  has a past medical history of Cardiomyopathy ([address] CHF (congestive heart failure) ([address] CVA (cerebral infarction), Diabetes mellitus ([address] Foot ulcer, left ([address] Gastroparesis, Hemodialysis patient ([address] Hypercholesteremia, Hypertension, Kidney disease, and Unspecified cerebral artery occlusion with cerebral infarction. Past Surgical History:  has a past surgical history that includes LAPAROSCOPY INSERTION PERITONEAL CATHETER (N/A, 10/29/2020);  Umbilical hernia repair (N/A, 10/29/2020); hc dialysis catheter (N/A, 10/29/2020); Upper gastrointestinal endoscopy (N/A, 4/26/2021); Colonoscopy (N/A, 5/17/2021); Upper gastrointestinal endoscopy (N/A, 9/13/2022); Dialysis Catheter Removal (N/A, 10/17/2022); Catheter Insertion (N/A, 10/17/2022); and IR NONTUNNELED VASCULAR CATHETER > 5 YEARS (11/29/2022). Assessment   Body Structures, Functions, Activity Limitations Requiring Skilled Therapeutic Intervention: Decreased endurance;Decreased cognition;Decreased safe awareness;Decreased balance;Decreased coordination;Decreased strength;Decreased fine motor control;Decreased sensation;Decreased functional mobility ; Decreased ADL status  Assessment: Pt. presents with Bacterial pneumonia and septic. Pt. also found to have a pericardial effusion. Pt. has an L1 comp fx of indeterminate age of which Pt. reports happened ~ 1 year ago when falling out of the back of a moving truck. Pt. also on HD. Pt continues to function well below baseline. Recommend continued skilled therapy 3-5x/week to maximize independence and safety with functional mobility. If refusing, recommend 24hr supv and level 3 HHPT. Treatment Diagnosis: impaired mobility  Activity Tolerance  Activity Tolerance: Patient limited by endurance     Plan   Physcial Therapy Plan  General Plan: 3-5 times per week  Current Treatment Recommendations: Strengthening, Balance training, Functional mobility training, Gait training, Transfer training, Endurance training, Safety education & training, Home exercise program, Stair training, Patient/Caregiver education & training, Equipment evaluation, education, & procurement, Therapeutic activities  Safety Devices  Type of Devices:  All fall risk precautions in place, Call light within reach, Gait belt, Patient at risk for falls, Left in bed, Bed alarm in place, Nurse notified  Restraints  Restraints Initially in Place: No     Restrictions  Restrictions/Precautions  Restrictions/Precautions: Fall Risk, Contact Precautions     Subjective General  Chart Reviewed: Yes  Response To Previous Treatment: Patient with no complaints from previous session. Family / Caregiver Present: No  Diagnosis: Bacterial pneumonia  Follows Commands: Impaired  Subjective  Subjective: Pt is agreeable to PT - extra time to encourage mobility. Social/Functional History  Social/Functional History  Lives With: Spouse (2 dogs)  Type of Home: House  Home Layout: One level  Home Access: Level entry  Bathroom Shower/Tub: Walk-in shower  Bathroom Toilet: Standard  Bathroom Equipment: Grab bars in shower, Built-in shower seat  Home Equipment: Wendy Roxi, rolling  ADL Assistance: Independent  Ambulation Assistance: Independent (with SPC)  Transfer Assistance: Independent  Active : No  Patient's  Info: wife drives   Type of Occupation:  retiree  Additional Comments: Wife works from home, wife cooks    Vision/Hearing  Vision  Vision: Impaired  Vision Exceptions: Wears glasses at all times  Hearing  Hearing: Within functional limits      Cognition   Orientation  Overall Orientation Status: Impaired  Orientation Level: Oriented to person  Cognition  Overall Cognitive Status: Exceptions  Arousal/Alertness: Appropriate responses to stimuli  Following Commands: Follows one step commands with increased time; Follows one step commands with repetition  Attention Span: Difficulty attending to directions; Difficulty dividing attention  Memory: Decreased recall of recent events  Safety Judgement: Decreased awareness of need for safety  Problem Solving: Decreased awareness of errors  Insights: Decreased awareness of deficits  Initiation: Requires cues for some  Sequencing: Requires cues for some     Objective   Gross Assessment  Strength: Generally decreased, functional     Bed mobility  Supine to Sit: Stand by assistance (significantly increased time)  Sit to Supine: Stand by assistance  Transfers  Sit to Stand: Minimal Assistance  Stand to Sit: Minimal Assistance  Ambulation  Surface: Level tile  Device: Rolling Walker  Assistance: Contact guard assistance  Quality of Gait: Uncoordinated, ataxic B LE with R LE ER and foot drop present with ambulation. Unstedy  Gait Deviations: Slow Lesli;Decreased step length;Decreased step height;Deviated path  Distance: 10' x 2  Comments: Pt impulsive and unsteady     Balance  Posture: Fair  Sitting - Static: Fair;+  Sitting - Dynamic: Fair;+  Standing - Static: Fair;-  Standing - Dynamic: Fair;-           AM-PAC Score  AM-PAC Inpatient Mobility Raw Score : 14 (12/05/22 1314)  AM-PAC Inpatient T-Scale Score : 38.1 (12/05/22 1314)  Mobility Inpatient CMS 0-100% Score: 61.29 (12/05/22 1314)  Mobility Inpatient CMS G-Code Modifier : CL (12/05/22 1314)            Goals  Short Term Goals  Time Frame for Short Term Goals: By d/c - all goals ongoing as of 12/5/22  Short Term Goal 1: Supine to/from sit with Supervision  Short Term Goal 2: Sit to/from stand with Supervision  Short Term Goal 3: Amb.  x 48' with FWW and Supervision  Patient Goals   Patient Goals : Return home - Pt adamant about not going to a rehab facility       Education  Patient Education  Education Given To: Patient  Education Provided: Role of Therapy;Plan of Care;Transfer Training  Education Provided Comments: safety with transfers, need for continued therapy  Education Method: Verbal  Barriers to Learning: Cognition  Education Outcome: Verbalized understanding;Demonstrated understanding;Continued education needed      Therapy Time   Individual Concurrent Group Co-treatment   Time In 1130         Time Out 1200         Minutes 30         Timed Code Treatment Minutes: 30 Minutes       Ashley Carmona PT

## 2022-12-05 NOTE — PROGRESS NOTES
[facility]                                               Progress note    CC: fever, sepsis  Summary:   Savannah Ayala is being seen by nephrology for ERD. He is a 65 y.o. male with a PMH significant for ESRD, CHF, ADPKD, fungal peritonitis 2/2 PD catheter who presented to the ED 11/26/2022 with fever, hyperglycemia, and AMS. He was noted to be hyperkalemic on admission. Blood cx positive for MSSA    Interval History  Seen at bedside  /95  Repeat cx froom 11/28/2022 NGTD  TDC catheter tip cx positive for MSSA  No fevers since 12/2/2022    Plan:   Plan for [facility] placement tomorrow  Plan for HD tomorrow per schedule. Continue cefazolin per ID, noted planned for continuing abx for 6-8 weeks. Check vitamin B12 levels in AM        Christopher Mar MD  [facility]  Office: [phone]    Assessment:   ESRD:  - on HD TTS at [facility]  - recent fungal PD peritonitis  - now access is a [facility] which might be the cause of MRSA bacteremia  - TDC was removed 11/29/2022 and a temp HD catheter was placed for continued dialysis while the infection is addressed  - EDW 69.5 kg    Hypertension:    Continue current medicatiosn    MSSA bacteremia:  - suspect the [facility] to be the source  - remove [facility] 11/29/2022, tip cx positive for MSSA also. - TTE negative for vegetations. LVEF 35-40%, severely increased LV wall thickness  - repeat blood c 11/28/2022 NGTD  - on cefazolin       -Right lower lobe bacterial pneumonia. CT chest reviewed continue current antibiotics per ID     -Chronic stable moderate pericardial effusion  -evident on prior echos and current CT chest... Limited echo pending     -Chronic systolic/ diastolic heart failure-  Echo 2/2022 showed grade 2 diastolic dysfunction, EF 67%, moderate pericardial effusion--repeat echo pending    ROS:    Positives Listed Bold. All other remaining systems are negative.     Constitutional:  fever, chills, weakness, weight change, fatigue,      Skin:  rash, pruritus, hair loss, bruising, dry skin, petechiae. Head, Face, Neck   headaches, swelling,  cervical adenopathy. Respiratory: shortness of breath, cough, or wheezing  Cardiovascular: chest pain, palpitations, dizzy, edema  Gastrointestinal: nausea, vomiting, diarrhea, constipation,belly pain    Yellow skin, blood in stool  Musculoskeletal:  back pain, muscle weakness, gait problems,       joint pain or swelling. Genitourinary:  dysuria, poor urine flow, flank pain, blood in urine  Neurologic:  vertigo, TIA'S, syncope, seizures, focal weakness  Psychosocial:  insomnia, anxiety, or depression. Additional positive findings: -     PMH:   Past medical history, surgical history, social history, family history are reviewed and updated as appropriate. Reviewed current medication list.   Allergies reviewed and updated as needed. PE:   Vitals:    12/05/22 0847   BP: (!) 159/95   Pulse: 97   Resp: 18   Temp: 98.2 °F (36.8 °C)   SpO2: 100%       General appearance: in NAD, fully alert and oriented. Comfortable. HEENT: EOM intact, no icterus. Trachea is midline. Neck : No masses, appears symmetrical, no JVD  Respiratory: Respiratory effort appears normal, bilateral equal chest rise, no wheeze, no crackles   Cardiovascular: Ausculation shows RRR no edema  Abdomen: No visible mass or tenderness, non distended. Musculoskeletal:  Joints with no swelling or deformity. Skin:no rashes, ulcers, induration, no jaundice. Neuro: face symmetric, no focal deficits. Appropriate responses.        Lab Results   Component Value Date    CREATININE 6.4 (HH) 12/05/2022    BUN 35 (H) 12/05/2022     (L) 12/05/2022    K 4.3 12/05/2022    CL 93 (L) 12/05/2022    CO2 23 12/05/2022      Lab Results   Component Value Date    WBC 15.6 (H) 12/05/2022    HGB 9.8 (L) 12/05/2022    HCT 31.3 (L) 12/05/2022    MCV 95.6 12/05/2022     12/05/2022     Lab Results   Component Value Date    PTH 49.8 02/01/2019    CALCIUM 9.0 12/05/2022 PHOS 3.4 12/01/2022

## 2022-12-05 NOTE — PROGRESS NOTES
Comprehensive Nutrition Assessment    Type and Reason for Visit:  Reassess    Nutrition Recommendations/Plan:   Continue current diet     Malnutrition Assessment:  Malnutrition Status:  Severe malnutrition (12/05/22 1156)    Context:  Chronic Illness     Findings of the 6 clinical characteristics of malnutrition:  Energy Intake:  Mild decrease in energy intake (Comment) (this admission)  Weight Loss:  Greater than 7.5% over 3 months     Body Fat Loss:  Severe body fat loss Orbital, Triceps   Muscle Mass Loss:  Severe muscle mass loss Temples (temporalis), Clavicles (pectoralis & deltoids), Calf (gastrocnemius)  Fluid Accumulation:  No significant fluid accumulation     Strength:  Not Performed    Nutrition Assessment:    Follow-up. Receiving Dysphagia- Soft & Bite sized, 5 CCC, GRACE, Low K+ diet. No recent PO documented in chart, but observed 50% of breakfast consumed at bedside. Pt reports his wife brings in meals for him and he consumes 100% of these meals. States his appetite is improving, continues to decline ONS at this time. Will continue to monitor intake this admission. Nutrition Related Findings:    Labs reviewed; Cr 6.4, Glucose 133-273 x 24 hrs. No edema. LBM 12/5. Wound Type:  (redness on buttocks)       Current Nutrition Intake & Therapies:    Average Meal Intake: 26-50%, %  Average Supplements Intake: None Ordered  ADULT DIET; Dysphagia - Soft and Bite Sized; 5 carb choices (75 gm/meal); No Added Salt (3-4 gm); Low Potassium (Less than 3000 mg/day); Mildly Thick (Nectar); 2000 ml    Anthropometric Measures:  Height: 6' 2\" (188 cm)  Ideal Body Weight (IBW): 190 lbs (86 kg)    Current Body Weight: 135 lb (61.2 kg), 74.7 % IBW.  Weight Source: Bed Scale  Current BMI (kg/m2): 17.3  Usual Body Weight: 169 lb (76.7 kg) (3 months ago)  % Weight Change (Calculated): -16  BMI Categories: Underweight (BMI less than 22) age over 72    Estimated Daily Nutrient Needs:  Energy Requirements Based On: Kcal/kg  Weight Used for Energy Requirements: Current  Energy (kcal/day): 1544-7846 (30-35 kcal/ 61 kg)  Weight Used for Protein Requirements: Current  Protein (g/day): 73-92 (1.2-1.5 g/ 61 kg)  Method Used for Fluid Requirements: 1 ml/kcal  Fluid (ml/day): 7645-3057    Nutrition Diagnosis:   Increased nutrient needs related to increase demand for energy/nutrients as evidenced by poor intake prior to admission, weight loss, dialysis  Severe malnutrition related to catabolic illness, inadequate protein-energy intake as evidenced by Criteria as identified in malnutrition assessment    Nutrition Interventions:   Food and/or Nutrient Delivery: Continue Current Diet  Nutrition Education/Counseling: No recommendation at this time  Coordination of Nutrition Care: Continue to monitor while inpatient    Goals:  Previous Goal Met: Progressing toward Goal(s)  Goals: PO intake 75% or greater    Nutrition Monitoring and Evaluation:   Behavioral-Environmental Outcomes: None Identified  Food/Nutrient Intake Outcomes: Food and Nutrient Intake  Physical Signs/Symptoms Outcomes: Biochemical Data, Nutrition Focused Physical Findings, Skin, Weight    Discharge Planning:     Too soon to determine     Amadou Laureano RD, LD  Contact:[phone]

## 2022-12-06 ENCOUNTER — APPOINTMENT (OUTPATIENT)
Dept: INTERVENTIONAL RADIOLOGY/VASCULAR | Age: 57
DRG: 314 | End: 2022-12-06
Payer: COMMERCIAL

## 2022-12-06 LAB
ALBUMIN SERPL-MCNC: 2.8 G/DL (ref 3.4–5)
ANION GAP SERPL CALCULATED.3IONS-SCNC: 19 MMOL/L (ref 3–16)
BASOPHILS ABSOLUTE: 0.1 K/UL (ref 0–0.2)
BASOPHILS RELATIVE PERCENT: 0.6 %
BUN BLDV-MCNC: 41 MG/DL (ref 7–20)
CALCIUM SERPL-MCNC: 9 MG/DL (ref 8.3–10.6)
CHLORIDE BLD-SCNC: 92 MMOL/L (ref 99–110)
CO2: 23 MMOL/L (ref 21–32)
CREAT SERPL-MCNC: 7.3 MG/DL (ref 0.9–1.3)
EOSINOPHILS ABSOLUTE: 0.3 K/UL (ref 0–0.6)
EOSINOPHILS RELATIVE PERCENT: 1.8 %
FOLATE: 7.28 NG/ML (ref 4.78–24.2)
GFR SERPL CREATININE-BSD FRML MDRD: 8 ML/MIN/{1.73_M2}
GLUCOSE BLD-MCNC: 153 MG/DL (ref 70–99)
GLUCOSE BLD-MCNC: 157 MG/DL (ref 70–99)
GLUCOSE BLD-MCNC: 214 MG/DL (ref 70–99)
HCT VFR BLD CALC: 29.8 % (ref 40.5–52.5)
HEMOGLOBIN: 9.4 G/DL (ref 13.5–17.5)
INR BLD: 1.32 (ref 0.87–1.14)
LYMPHOCYTES ABSOLUTE: 1.1 K/UL (ref 1–5.1)
LYMPHOCYTES RELATIVE PERCENT: 7.5 %
MCH RBC QN AUTO: 29.1 PG (ref 26–34)
MCHC RBC AUTO-ENTMCNC: 31.5 G/DL (ref 31–36)
MCV RBC AUTO: 92.4 FL (ref 80–100)
MONOCYTES ABSOLUTE: 1 K/UL (ref 0–1.3)
MONOCYTES RELATIVE PERCENT: 7 %
NEUTROPHILS ABSOLUTE: 12 K/UL (ref 1.7–7.7)
NEUTROPHILS RELATIVE PERCENT: 83.1 %
PDW BLD-RTO: 15 % (ref 12.4–15.4)
PERFORMED ON: ABNORMAL
PERFORMED ON: ABNORMAL
PHOSPHORUS: 4.5 MG/DL (ref 2.5–4.9)
PLATELET # BLD: 338 K/UL (ref 135–450)
PMV BLD AUTO: 10.6 FL (ref 5–10.5)
POTASSIUM SERPL-SCNC: 4.1 MMOL/L (ref 3.5–5.1)
PROTHROMBIN TIME: 16.3 SEC (ref 11.7–14.5)
RBC # BLD: 3.22 M/UL (ref 4.2–5.9)
SODIUM BLD-SCNC: 134 MMOL/L (ref 136–145)
VITAMIN B-12: 761 PG/ML (ref 211–911)
WBC # BLD: 14.4 K/UL (ref 4–11)

## 2022-12-06 PROCEDURE — 6370000000 HC RX 637 (ALT 250 FOR IP): Performed by: HOSPITALIST

## 2022-12-06 PROCEDURE — 90935 HEMODIALYSIS ONE EVALUATION: CPT

## 2022-12-06 PROCEDURE — 97530 THERAPEUTIC ACTIVITIES: CPT

## 2022-12-06 PROCEDURE — C1750 CATH, HEMODIALYSIS,LONG-TERM: HCPCS

## 2022-12-06 PROCEDURE — 99233 SBSQ HOSP IP/OBS HIGH 50: CPT | Performed by: INTERNAL MEDICINE

## 2022-12-06 PROCEDURE — 36558 INSERT TUNNELED CV CATH: CPT

## 2022-12-06 PROCEDURE — 0JH63XZ INSERTION OF TUNNELED VASCULAR ACCESS DEVICE INTO CHEST SUBCUTANEOUS TISSUE AND FASCIA, PERCUTANEOUS APPROACH: ICD-10-PCS | Performed by: RADIOLOGY

## 2022-12-06 PROCEDURE — 02H633Z INSERTION OF INFUSION DEVICE INTO RIGHT ATRIUM, PERCUTANEOUS APPROACH: ICD-10-PCS | Performed by: RADIOLOGY

## 2022-12-06 PROCEDURE — 6360000002 HC RX W HCPCS: Performed by: STUDENT IN AN ORGANIZED HEALTH CARE EDUCATION/TRAINING PROGRAM

## 2022-12-06 PROCEDURE — 1200000000 HC SEMI PRIVATE

## 2022-12-06 PROCEDURE — 99152 MOD SED SAME PHYS/QHP 5/>YRS: CPT

## 2022-12-06 PROCEDURE — 82746 ASSAY OF FOLIC ACID SERUM: CPT

## 2022-12-06 PROCEDURE — 77001 FLUOROGUIDE FOR VEIN DEVICE: CPT

## 2022-12-06 PROCEDURE — 82607 VITAMIN B-12: CPT

## 2022-12-06 PROCEDURE — 85610 PROTHROMBIN TIME: CPT

## 2022-12-06 PROCEDURE — 36415 COLL VENOUS BLD VENIPUNCTURE: CPT

## 2022-12-06 PROCEDURE — 6360000002 HC RX W HCPCS: Performed by: HOSPITALIST

## 2022-12-06 PROCEDURE — 85025 COMPLETE CBC W/AUTO DIFF WBC: CPT

## 2022-12-06 PROCEDURE — 6360000002 HC RX W HCPCS: Performed by: RADIOLOGY

## 2022-12-06 PROCEDURE — 80069 RENAL FUNCTION PANEL: CPT

## 2022-12-06 RX ORDER — MIDAZOLAM HYDROCHLORIDE 1 MG/ML
INJECTION INTRAMUSCULAR; INTRAVENOUS DAILY PRN
Status: COMPLETED | OUTPATIENT
Start: 2022-12-06 | End: 2022-12-06

## 2022-12-06 RX ORDER — FENTANYL CITRATE 50 UG/ML
INJECTION, SOLUTION INTRAMUSCULAR; INTRAVENOUS DAILY PRN
Status: COMPLETED | OUTPATIENT
Start: 2022-12-06 | End: 2022-12-06

## 2022-12-06 RX ADMIN — SUCRALFATE 1 G: 1 TABLET ORAL at 20:48

## 2022-12-06 RX ADMIN — SODIUM BICARBONATE 650 MG: 650 TABLET ORAL at 16:21

## 2022-12-06 RX ADMIN — FENTANYL CITRATE 50 MCG: 50 INJECTION INTRAMUSCULAR; INTRAVENOUS at 14:50

## 2022-12-06 RX ADMIN — HEPARIN SODIUM 2400 UNITS: 1000 INJECTION INTRAVENOUS; SUBCUTANEOUS at 12:14

## 2022-12-06 RX ADMIN — PANTOPRAZOLE SODIUM 40 MG: 40 TABLET, DELAYED RELEASE ORAL at 06:55

## 2022-12-06 RX ADMIN — PANTOPRAZOLE SODIUM 40 MG: 40 TABLET, DELAYED RELEASE ORAL at 16:21

## 2022-12-06 RX ADMIN — ATORVASTATIN CALCIUM 40 MG: 40 TABLET, FILM COATED ORAL at 20:48

## 2022-12-06 RX ADMIN — METOPROLOL TARTRATE 75 MG: 50 TABLET, FILM COATED ORAL at 20:48

## 2022-12-06 RX ADMIN — HYDRALAZINE HYDROCHLORIDE 100 MG: 50 TABLET, FILM COATED ORAL at 20:48

## 2022-12-06 RX ADMIN — MIDAZOLAM 1 MG: 1 INJECTION INTRAMUSCULAR; INTRAVENOUS at 14:50

## 2022-12-06 RX ADMIN — HEPARIN SODIUM 5000 UNITS: 5000 INJECTION INTRAVENOUS; SUBCUTANEOUS at 06:55

## 2022-12-06 RX ADMIN — SUCRALFATE 1 G: 1 TABLET ORAL at 16:21

## 2022-12-06 RX ADMIN — SUCRALFATE 1 G: 1 TABLET ORAL at 06:55

## 2022-12-06 RX ADMIN — INSULIN GLARGINE 18 UNITS: 100 INJECTION, SOLUTION SUBCUTANEOUS at 20:45

## 2022-12-06 RX ADMIN — HEPARIN SODIUM 5000 UNITS: 5000 INJECTION INTRAVENOUS; SUBCUTANEOUS at 20:49

## 2022-12-06 ASSESSMENT — PAIN SCALES - GENERAL
PAINLEVEL_OUTOF10: 0

## 2022-12-06 NOTE — PLAN OF CARE
Problem: Discharge Planning  Goal: Discharge to home or other facility with appropriate resources  12/6/2022 1511 by Sallie Karimi RN  Outcome: Progressing  Flowsheets (Taken 12/6/2022 0281)  Discharge to home or other facility with appropriate resources: Identify barriers to discharge with patient and caregiver     Problem: Skin/Tissue Integrity  Goal: Absence of new skin breakdown  Description: 1. Monitor for areas of redness and/or skin breakdown  2. Assess vascular access sites hourly  3. Every 4-6 hours minimum:  Change oxygen saturation probe site  4. Every 4-6 hours:  If on nasal continuous positive airway pressure, respiratory therapy assess nares and determine need for appliance change or resting period.   12/6/2022 1511 by Sallie Karimi RN  Outcome: Progressing     Problem: Safety - Adult  Goal: Free from fall injury  12/6/2022 1511 by Sallie Karimi RN  Outcome: Progressing     Problem: Respiratory - Adult  Goal: Achieves optimal ventilation and oxygenation  12/6/2022 1511 by Sallie Karimi RN  Outcome: Progressing  Flowsheets (Taken 12/6/2022 0082)  Achieves optimal ventilation and oxygenation: Assess for changes in respiratory status     Problem: Musculoskeletal - Adult  Goal: Return mobility to safest level of function  12/6/2022 1511 by Sallie Karimi RN  Outcome: Progressing  Flowsheets (Taken 12/6/2022 1795)  Return Mobility to Safest Level of Function: Assess patient stability and activity tolerance for standing, transferring and ambulating with or without assistive devices     Problem: Gastrointestinal - Adult  Goal: Minimal or absence of nausea and vomiting  12/6/2022 1511 by Sallie Karimi RN  Outcome: Progressing  Flowsheets (Taken 12/6/2022 5880)  Minimal or absence of nausea and vomiting: Administer IV fluids as ordered to ensure adequate hydration     Problem: Infection - Adult  Goal: Absence of infection at discharge  12/6/2022 1511 by Sallie Karimi RN  Outcome: Progressing  Flowsheets (Taken 12/6/2022 4034)  Absence of infection at discharge: Assess and monitor for signs and symptoms of infection     Problem: Metabolic/Fluid and Electrolytes - Adult  Goal: Electrolytes maintained within normal limits  12/6/2022 1511 by Jett Mcintyre RN  Outcome: Progressing  Flowsheets (Taken 12/6/2022 4856)  Electrolytes maintained within normal limits: Monitor labs and assess patient for signs and symptoms of electrolyte imbalances     Problem: ABCDS Injury Assessment  Goal: Absence of physical injury  12/6/2022 1511 by Jett Mcintyre RN  Outcome: Progressing     Problem: Nutrition Deficit:  Goal: Optimize nutritional status  12/6/2022 1511 by Jett Mcintyre RN  Outcome: Progressing     Problem: Neurosensory - Adult  Goal: Achieves stable or improved neurological status  12/6/2022 1511 by Jett Mcintyre RN  Outcome: Progressing  Flowsheets (Taken 12/6/2022 1917)  Achieves stable or improved neurological status: Assess for and report changes in neurological status     Problem: Cardiovascular - Adult  Goal: Maintains optimal cardiac output and hemodynamic stability  12/6/2022 1511 by Jett Mcintyre RN  Outcome: Progressing  Flowsheets (Taken 12/6/2022 0415)  Maintains optimal cardiac output and hemodynamic stability: Monitor blood pressure and heart rate     Problem: Skin/Tissue Integrity - Adult  Goal: Skin integrity remains intact  12/6/2022 1511 by Jett Mcintyre RN  Outcome: Progressing  Flowsheets (Taken 12/6/2022 3852)  Skin Integrity Remains Intact: Monitor for areas of redness and/or skin breakdown     Problem: Genitourinary - Adult  Goal: Absence of urinary retention  12/6/2022 1511 by Jett Mcintyre RN  Outcome: Progressing  Flowsheets (Taken 12/6/2022 5989)  Absence of urinary retention: Assess patients ability to void and empty bladder     Problem: Pain  Goal: Verbalizes/displays adequate comfort level or baseline comfort level  12/6/2022 1511 by [employer] Charmaine Mejia RN  Outcome: Progressing  Flowsheets (Taken 12/6/2022 9947)  Verbalizes/displays adequate comfort level or baseline comfort level: Encourage patient to monitor pain and request assistance   Pain /discomfort being managed with PRN analgesics per MD orders. Patient able to express presence and absence of pain and rate pain appropriately using numerical scale.

## 2022-12-06 NOTE — PROGRESS NOTES
[facility]                                               Progress note    CC: fever, sepsis  Summary:   Espinoza Soria is being seen by nephrology for ERD. He is a 65 y.o. male with a PMH significant for ESRD, CHF, ADPKD, fungal peritonitis 2/2 PD catheter who presented to the ED 11/26/2022 with fever, hyperglycemia, and AMS. He was noted to be hyperkalemic on admission. Blood cx positive for MSSA    Interval History  Seen at bedside  /76  Repeat cx froom 11/28/2022 NGTD  TDC catheter tip cx positive for MSSA  No fevers since 12/2/2022  HD today per schedule. Seems to be losing weight in between HD treatments. Plan:   HD today per schedule. No UF. IR consult for [facility] placement. Tentatively planned for today  Will f/u final abx recommendations per ID        Cali Kincaid MD  [facility]  Office: [phone]    Assessment:   ESRD:  - on HD TTS at [facility]  - recent fungal PD peritonitis  - now access is a [facility] which might be the cause of MRSA bacteremia  - TDC was removed 11/29/2022 and a temp HD catheter was placed for continued dialysis while the infection is addressed  - EDW 69.5 kg    Hypertension:    Continue current medicatiosn    MSSA bacteremia:  - suspect the [facility] to be the source  - remove [facility] 11/29/2022, tip cx positive for MSSA also. - TTE negative for vegetations. LVEF 35-40%, severely increased LV wall thickness  - repeat blood c 11/28/2022 NGTD  - on cefazolin       -Right lower lobe bacterial pneumonia. CT chest reviewed continue current antibiotics per ID     -Chronic stable moderate pericardial effusion  -evident on prior echos and current CT chest... Limited echo pending     -Chronic systolic/ diastolic heart failure-  Echo 2/2022 showed grade 2 diastolic dysfunction, EF 41%, moderate pericardial effusion--repeat echo pending    ROS:    Positives Listed Bold. All other remaining systems are negative.     Constitutional:  fever, chills, weakness, weight change, fatigue,      Skin:  rash, pruritus, hair loss, bruising, dry skin, petechiae. Head, Face, Neck   headaches, swelling,  cervical adenopathy. Respiratory: shortness of breath, cough, or wheezing  Cardiovascular: chest pain, palpitations, dizzy, edema  Gastrointestinal: nausea, vomiting, diarrhea, constipation,belly pain    Yellow skin, blood in stool  Musculoskeletal:  back pain, muscle weakness, gait problems,       joint pain or swelling. Genitourinary:  dysuria, poor urine flow, flank pain, blood in urine  Neurologic:  vertigo, TIA'S, syncope, seizures, focal weakness  Psychosocial:  insomnia, anxiety, or depression. Additional positive findings: -     PMH:   Past medical history, surgical history, social history, family history are reviewed and updated as appropriate. Reviewed current medication list.   Allergies reviewed and updated as needed. PE:   Vitals:    12/06/22 0816   BP: 136/76   Pulse: 88   Resp: 16   Temp: 97.8 °F (36.6 °C)   SpO2:        General appearance: in NAD, fully alert and oriented. Comfortable. HEENT: EOM intact, no icterus. Trachea is midline. Neck : No masses, appears symmetrical, no JVD  Respiratory: Respiratory effort appears normal, bilateral equal chest rise, no wheeze, no crackles   Cardiovascular: Ausculation shows RRR no edema  Abdomen: No visible mass or tenderness, non distended. Musculoskeletal:  Joints with no swelling or deformity. Skin:no rashes, ulcers, induration, no jaundice. Neuro: face symmetric, no focal deficits. Appropriate responses.        Lab Results   Component Value Date    CREATININE 7.3 (HH) 12/06/2022    BUN 41 (H) 12/06/2022     (L) 12/06/2022    K 4.1 12/06/2022    CL 92 (L) 12/06/2022    CO2 23 12/06/2022      Lab Results   Component Value Date    WBC 14.4 (H) 12/06/2022    HGB 9.4 (L) 12/06/2022    HCT 29.8 (L) 12/06/2022    MCV 92.4 12/06/2022     12/06/2022     Lab Results   Component Value Date    PTH 49.8 02/01/2019 CALCIUM 9.0 12/06/2022    PHOS 4.5 12/06/2022

## 2022-12-06 NOTE — PLAN OF CARE
Problem: Discharge Planning  Goal: Discharge to home or other facility with appropriate resources  Outcome: Progressing     Problem: Skin/Tissue Integrity  Goal: Absence of new skin breakdown  Description: 1. Monitor for areas of redness and/or skin breakdown  2. Assess vascular access sites hourly  3. Every 4-6 hours minimum:  Change oxygen saturation probe site  4. Every 4-6 hours:  If on nasal continuous positive airway pressure, respiratory therapy assess nares and determine need for appliance change or resting period.   Outcome: Progressing     Problem: Safety - Adult  Goal: Free from fall injury  Outcome: Progressing     Problem: Respiratory - Adult  Goal: Achieves optimal ventilation and oxygenation  Outcome: Progressing     Problem: Musculoskeletal - Adult  Goal: Return mobility to safest level of function  Outcome: Progressing     Problem: Gastrointestinal - Adult  Goal: Minimal or absence of nausea and vomiting  Outcome: Progressing     Problem: Infection - Adult  Goal: Absence of infection at discharge  Outcome: Progressing     Problem: Metabolic/Fluid and Electrolytes - Adult  Goal: Electrolytes maintained within normal limits  Outcome: Progressing  Goal: Hemodynamic stability and optimal renal function maintained  Outcome: Progressing  Goal: Glucose maintained within prescribed range  Outcome: Progressing     Problem: ABCDS Injury Assessment  Goal: Absence of physical injury  Outcome: Progressing     Problem: Nutrition Deficit:  Goal: Optimize nutritional status  Outcome: Progressing     Problem: Neurosensory - Adult  Goal: Achieves stable or improved neurological status  Outcome: Progressing     Problem: Cardiovascular - Adult  Goal: Maintains optimal cardiac output and hemodynamic stability  Outcome: Progressing  Goal: Absence of cardiac dysrhythmias or at baseline  Outcome: Progressing     Problem: Skin/Tissue Integrity - Adult  Goal: Skin integrity remains intact  Outcome: Progressing Problem: Genitourinary - Adult  Goal: Absence of urinary retention  Outcome: Progressing     Problem: Pain  Goal: Verbalizes/displays adequate comfort level or baseline comfort level  Outcome: Progressing

## 2022-12-06 NOTE — CARE COORDINATION
Discharge Planning:  SW met with pt and pts spouse in the room as pt was arriving back from HD. Pt was agitated and stating he wanted to go home, today. Spouse expressed concern about pts weakness and stated she had a difficult time getting pt in the house the last time pt was discharged from the hospital.  Spouse stated pt is far weaker this time around and she is worried about how pt will get to and from the vehicle for his HD transport. Spouse stated she takes to and from his HD appointment every Saturday but utilizes [facility] transport to go every Tues and Thursday. SW met with PT/OT and requested they work with this pt again today to work with pt while the spouse is in the room so she is able to see how pt is progressing. CARRILLO Sandoval [facility]  [phone]  Electronically signed by Donita Howard on 12/6/2022 at 3:11 PM    Addendum:  SW again met with pts spouse after therapy worked with this pt. Spouse stated she recognizes that therapy did not go will today but she does not feel pt will agree to go to a SNF. Spouse stated she will be bringing pts walker to the hospital tomorrow and is hopeful that after he eats and is able to rest after HD, that he will be able to ambulate better. Spouse works during the day and pt had previously been able to get himself ready and walk to the [address] that was arranged for him from [facility]. Spouse did confirm pt is active with [facility] spoke with pts transition to care CM through [facility], who assists with the [facility] [phone]. SW explained pt may need w/c transport to and from HD if he refuses a SNF. Diana Leight stated she will look into this and get back with this SW.  SW/CM will continue to follow to assist with d/c planning needs.    CARRILLO Sandoval LSW  [phone]  Electronically signed by Donita Howard on 12/6/2022 at 3:15 PM      --

## 2022-12-06 NOTE — PROGRESS NOTES
Speech Language Pathology    Dysphagia tx/follow-up attempted. Patient unavailable out of room at dialysis. ST to re-attempt as schedule permits unless otherwise notified. Thank you. Barry Edwards Signs, #[phone]  Speech-Language Pathologist  Portable phone: [phone]

## 2022-12-06 NOTE — FLOWSHEET NOTE
Lab notified to obtain pt blood draw this am . Unable to get blood return from [facility] .

## 2022-12-06 NOTE — FLOWSHEET NOTE
12/06/22 0816 12/06/22 1208   Vital Signs   /76 128/84   Temp 97.8 °F (36.6 °C) 98.4 °F (36.9 °C)   Heart Rate 88 93   Resp 16 16     Treatment time: 3.5 hours    Net UF: No UF    Pre weight: 61.4 kg (bed scale)  Post weight: 61.4 kg (bed scale)  EDW: 69.4 kg (outpt clinic)    Access used: RIJ HD NT cath  Access function: Poor flow, unable to obtain Qb >200. Medications or blood products given: None    Regular outpatient schedule: [address] [place] [facility] TTS    Summary of response to treatment: Tolerated tx without difficulty. VSS. Copy of dialysis treatment record placed in chart, to be scanned into EMR.      Report called to Jett Mcintyre RN

## 2022-12-06 NOTE — PROGRESS NOTES
Physical Therapy  Facility/Department: [address] MED SURG  Physical Therapy Treatment Note    Name: Lena Son  : 1965  MRN: [id number]  Date of Service: 2022    Discharge Recommendations:  Therapy recommended at discharge, Continue to assess pending progress   PT Equipment Recommendations  Other: Possible transport chair due to standard width possibly not fitting through [address] scored a  on the AM-PAC short mobility form. Current research shows that an AM-PAC score of 17 or less is typically not associated with a discharge to the patient's home setting. Based on the patient's AM-PAC score and their current functional mobility deficits, it is recommended that the patient have 3-5 sessions per week of Physical Therapy at d/c to increase the patient's independence. Please see assessment section for further patient specific details. If patient discharges prior to next session this note will serve as a discharge summary. Please see below for the latest assessment towards goals. Patient Diagnosis(es): The primary encounter diagnosis was Encephalopathy acute. Diagnoses of ESRD needing dialysis ([address] Severe sepsis ([address] Hyperkalemia, Type 2 diabetes mellitus with hyperglycemia, with long-term current use of insulin (HCC), Elevated lactic acid level, Pneumonia of right lower lobe due to infectious organism, Pulmonary nodule, Closed compression fracture of L1 vertebra, initial encounter ([address] and Pericardial effusion were also pertinent to this visit. Past Medical History:  has a past medical history of Cardiomyopathy ([address] CHF (congestive heart failure) ([address] CVA (cerebral infarction), Diabetes mellitus ([address] Foot ulcer, left ([address] Gastroparesis, Hemodialysis patient ([address] Hypercholesteremia, Hypertension, Kidney disease, and Unspecified cerebral artery occlusion with cerebral infarction.   Past Surgical History:  has a past surgical history that includes LAPAROSCOPY INSERTION PERITONEAL CATHETER (N/A, 10/29/2020); Umbilical hernia repair (N/A, 10/29/2020); hc dialysis catheter (N/A, 10/29/2020); Upper gastrointestinal endoscopy (N/A, 4/26/2021); Colonoscopy (N/A, 5/17/2021); Upper gastrointestinal endoscopy (N/A, 9/13/2022); Dialysis Catheter Removal (N/A, 10/17/2022); Catheter Insertion (N/A, 10/17/2022); and IR NONTUNNELED VASCULAR CATHETER > 5 YEARS (11/29/2022). Assessment   Body Structures, Functions, Activity Limitations Requiring Skilled Therapeutic Intervention: Decreased endurance;Decreased cognition;Decreased safe awareness;Decreased balance;Decreased coordination;Decreased strength;Decreased fine motor control;Decreased sensation;Decreased functional mobility ; Decreased ADL status  Assessment: Pt. presents with Bacterial pneumonia and septic. Pt. also found to have a pericardial effusion. Pt. has an L1 comp fx of indeterminate age of which Pt. reports happened ~ 1 year ago when falling out of the back of a moving truck. Pt. also on HD. Pt continues to function well below baseline. Recommend continued skilled therapy 3-5x/week to maximize independence and safety with functional mobility. If refusing, recommend 24hr supv and level 3 HHPT - discussed with wife - possibly using transport chair to get into home - she reports she is unable to do this. Treatment Diagnosis: impaired mobility  Activity Tolerance  Activity Tolerance: Patient limited by endurance;Treatment limited secondary to decreased cognition     Plan   Physcial Therapy Plan  General Plan: 3-5 times per week  Current Treatment Recommendations: Strengthening, Balance training, Functional mobility training, Gait training, Transfer training, Endurance training, Safety education & training, Home exercise program, Stair training, Patient/Caregiver education & training, Equipment evaluation, education, & procurement, Therapeutic activities  Safety Devices  Type of Devices:  All fall risk precautions in place, Call light within reach, Gait belt, Patient at risk for falls, Left in bed, Bed alarm in place, Nurse notified  Restraints  Restraints Initially in Place: No     Restrictions  Restrictions/Precautions  Restrictions/Precautions: Fall Risk, Contact Precautions     Subjective   General  Chart Reviewed: Yes  Response To Previous Treatment: Patient with no complaints from previous session. Family / Caregiver Present: Yes (spouse)  Referring Practitioner: Dr. Ania Sharpe  Referral Date : 11/26/22  Diagnosis: Bacterial pneumonia  Follows Commands: Impaired  Subjective  Subjective: Pt is agreeable to PT - extra time to encourage mobility. Pt refusing any further mobility than a sit<>stand transfer because he is \"cold\" - slammed walker into ground. Social/Functional History  Social/Functional History  Lives With: Spouse (2 dogs)  Type of Home: House  Home Layout: One level  Home Access: Level entry  Bathroom Shower/Tub: Walk-in shower  Bathroom Toilet: Standard  Bathroom Equipment: Grab bars in shower, Built-in shower seat  Home Equipment: Wendy Roxi, rolling  ADL Assistance: Independent  Ambulation Assistance: Independent (with SPC)  Transfer Assistance: Independent  Active : No  Patient's  Info: wife drives   Type of Occupation:  retiree  Additional Comments: Wife works from home, wife cooks    Vision/Hearing  Vision  Vision: Impaired  Vision Exceptions: Wears glasses at all times  Hearing  Hearing: Within functional limits      Cognition   Orientation  Overall Orientation Status: Impaired  Orientation Level: Oriented to person  Cognition  Overall Cognitive Status: Exceptions  Arousal/Alertness: Appropriate responses to stimuli  Following Commands: Follows one step commands with increased time; Follows one step commands with repetition  Attention Span: Difficulty attending to directions; Difficulty dividing attention  Memory: Decreased recall of recent events  Safety Judgement: Decreased awareness of need for safety  Problem Solving: Decreased awareness of errors  Insights: Decreased awareness of deficits  Initiation: Requires cues for some  Sequencing: Requires cues for some     Objective   Observation/Palpation  Posture: Good  Observation: Thin, decreased muscle mass  Gross Assessment  Strength: Generally decreased, functional     Bed mobility  Supine to Sit: Stand by assistance (significantly increased time)  Sit to Supine: Stand by assistance  Transfers  Sit to Stand: Contact guard assistance  Stand to Sit: Contact guard assistance  Comment: Pt stood to EOB but did not ambulate - made because he is \"cold\" - slammed walker into ground and laid back into bed. Balance  Posture: Fair  Sitting - Static: Fair;+  Sitting - Dynamic: Fair;+  Standing - Static: Fair;-  Standing - Dynamic: Fair;-         AM-PAC Score  AM-PAC Inpatient Mobility Raw Score : 12 (12/06/22 1332)  AM-PAC Inpatient T-Scale Score : 35.33 (12/06/22 1332)  Mobility Inpatient CMS 0-100% Score: 68.66 (12/06/22 1332)  Mobility Inpatient CMS G-Code Modifier : CL (12/06/22 1332)        Goals  Short Term Goals  Time Frame for Short Term Goals: By d/c - all goals ongoing as of 12/6/22  Short Term Goal 1: Supine to/from sit with Supervision  Short Term Goal 2: Sit to/from stand with Supervision  Short Term Goal 3: Amb.  x 48' with FWW and Supervision  Patient Goals   Patient Goals : Return home - Pt adamant about not going to a rehab facility       Education  Patient Education  Education Given To: Patient  Education Provided: Role of Therapy;Plan of Care;Transfer Training  Education Provided Comments: safety with transfers, need for continued therapy  Education Method: Verbal  Barriers to Learning: Cognition  Education Outcome: Verbalized understanding;Demonstrated understanding;Continued education needed      Therapy Time   Individual Concurrent Group Co-treatment   Time In 1305         Time Out 1330 Minutes 25         Timed Code Treatment Minutes: 25 Minutes       Suzanne Torres, PT

## 2022-12-06 NOTE — PROGRESS NOTES
Occupational Therapy  Facility/Department: [address] MED SURG  Occupational Therapy Daily Treatment    Name: Gage Ruano  : 1965  MRN: [id number]  Date of Service: 2022    Discharge Recommendations:  3-5 sessions per week, Patient would benefit from continued therapy after discharge ( A if returning home)     Gage Ruano scored a 16/24 on the AM-PAC ADL Inpatient form. Current research shows that an AM-PAC score of 17 or less is typically not associated with a discharge to the patient's home setting. Based on the patient's AM-PAC score and their current ADL deficits, it is recommended that the patient have 3-5 sessions per week of Occupational Therapy at d/c to increase the patient's independence. Please see assessment section for further patient specific details. If patient discharges prior to next session this note will serve as a discharge summary. Please see below for the latest assessment towards goals. Patient Diagnosis(es): The primary encounter diagnosis was Encephalopathy acute. Diagnoses of ESRD needing dialysis ([address] Severe sepsis ([address] Hyperkalemia, Type 2 diabetes mellitus with hyperglycemia, with long-term current use of insulin (HCC), Elevated lactic acid level, Pneumonia of right lower lobe due to infectious organism, Pulmonary nodule, Closed compression fracture of L1 vertebra, initial encounter ([address] and Pericardial effusion were also pertinent to this visit. Past Medical History:  has a past medical history of Cardiomyopathy ([address] CHF (congestive heart failure) ([address] CVA (cerebral infarction), Diabetes mellitus ([address] Foot ulcer, left ([address] Gastroparesis, Hemodialysis patient ([address] Hypercholesteremia, Hypertension, Kidney disease, and Unspecified cerebral artery occlusion with cerebral infarction. Past Surgical History:  has a past surgical history that includes LAPAROSCOPY INSERTION PERITONEAL CATHETER (N/A, 10/29/2020);  Umbilical hernia repair (N/A, 10/29/2020); hc dialysis catheter (N/A, 10/29/2020); Upper gastrointestinal endoscopy (N/A, 4/26/2021); Colonoscopy (N/A, 5/17/2021); Upper gastrointestinal endoscopy (N/A, 9/13/2022); Dialysis Catheter Removal (N/A, 10/17/2022); Catheter Insertion (N/A, 10/17/2022); and IR NONTUNNELED VASCULAR CATHETER > 5 YEARS (11/29/2022). Treatment Diagnosis: Decreased: ADLs, fxl transfers/mobility      Assessment   Performance deficits / Impairments: Decreased functional mobility ; Decreased ADL status; Decreased balance;Decreased endurance;Decreased cognition;Decreased safe awareness;Decreased strength;Decreased ROM; Decreased coordination  Assessment: Pt is a 64 yo M w/ PMHx ESRD on HD who was admitted with chills, fevers, and AMS. BS found to be 503 in the ED and he was lethargic. PTA, pt lives at home w/ his wife where he is typically independent in self-care and fxl mobility using SPC. Pt remains below baseline - today limited by agitation and refused ambulation. He did complete bed mobility w/ SBA and sit<>stand w/ CGA, though effortful and poor safety throughout. Wife present reporting pt needs to be able to ambulate to/from her car for HD and short distances in the home. Based on performance the past few sessions, unsure if pt/wife would be able to safely manage this at home. Recommend ongoing skilled OT 3-5 times/week at d/c to increase pt's safety, independence, and decrease burden of care on his wife. Treatment Diagnosis: Decreased: ADLs, fxl transfers/mobility  Prognosis: Fair  REQUIRES OT FOLLOW-UP: Yes  Activity Tolerance  Activity Tolerance: Patient limited by fatigue;Treatment limited secondary to decreased cognition;Treatment limited secondary to agitation  Activity Tolerance Comments: Easily agitated today, refused to attempt mobility despite encouragement from therapists and wife        Plan   Occupational Therapy Plan  Times Per Week: 3-5  Times Per Day:  Once a day  Current Treatment Recommendations: Strengthening, Balance training, ROM, Functional mobility training, Endurance training, Safety education & training, Self-Care / ADL     Restrictions  Restrictions/Precautions  Restrictions/Precautions: Fall Risk, Contact Precautions    Subjective   General  Chart Reviewed: Yes  Patient assessed for rehabilitation services?: Yes  Additional Pertinent Hx: per H&P: \"65 y.o. male with ESRD on hemodialysis, hypertension, hyperlipidemia presented to emergency room with fevers, chills and rigors  for the past 2 days. This was associated with cough, sneezing. His blood sugars have been running high and states that he did not take his insulin. He was also lethargic and diaphoretic  Upon arrival to the ED, his blood sugar was 503, he appeared lethargic. \"  Family / Caregiver Present: Yes (wife)  Referring Practitioner: Jerry Watkins MD  Diagnosis: Right lower lobe bacterial pneumonia  Subjective  Subjective: Pt met b/s for OT cotx w/ PT. Pt in bed, required encouragement from PT/OT and wife to attempt [de-identified] - pt fixated on being cold and hungry     Social/Functional History  Social/Functional History  Lives With: Spouse (2 dogs)  Type of Home: House  Home Layout: One level  Home Access: Level entry  Bathroom Shower/Tub: Walk-in shower  Bathroom Toilet: Standard  Bathroom Equipment: Grab bars in shower, Built-in shower seat  Home Equipment: Asa Shank, rolling  ADL Assistance: Independent  Ambulation Assistance: Independent (with [address])  Transfer Assistance: Independent  Active : No  Patient's  Info: wife drives   Type of Occupation:  retiree  Additional Comments: Wife works from home, wife cooks       Objective   Observation/Palpation  Posture: Good  Observation: Thin, decreased muscle mass  Safety Devices  Type of Devices: All fall risk precautions in place;Call light within reach;Gait belt;Patient at risk for falls; Left in bed;Bed alarm in place;Nurse notified                 Activity Tolerance  Activity Tolerance: Patient limited by endurance;Treatment limited secondary to decreased cognition  Bed mobility  Supine to Sit: Stand by assistance (significantly increased time)  Sit to Supine: Stand by assistance  Transfers  Sit to stand: Contact guard assistance  Stand to sit: Contact guard assistance  Transfer Comments: RW - pt pulls on RW despite cues for hand placement. Upon standing, pt became extremely agitated d/t being cold - slammed his RW on the ground and returned to bed, refused any additional activity. Wife also agreed to try again tomorrow  Vision  Vision: Impaired  Vision Exceptions: Wears glasses at all times  Hearing  Hearing: Within functional limits  Cognition  Overall Cognitive Status: Exceptions  Arousal/Alertness: Appropriate responses to stimuli  Following Commands: Follows one step commands with increased time; Follows one step commands with repetition  Attention Span: Difficulty attending to directions; Difficulty dividing attention  Memory: Decreased recall of recent events  Safety Judgement: Decreased awareness of need for safety  Problem Solving: Decreased awareness of errors  Insights: Decreased awareness of deficits  Initiation: Requires cues for some  Sequencing: Requires cues for some  Cognition Comment: Pt easily agitated today, very poor insight  Orientation  Overall Orientation Status: Impaired  Orientation Level: Oriented to person                  Education Given To: Patient; Family  Education Provided: Role of Therapy;Plan of Care;Transfer Training;Energy Conservation  Education Provided Comments: d/c recs w/ wife  Education Method: Verbal  Barriers to Learning: Cognition (agitation)  Education Outcome: Continued education needed        AM-PAC Score  AM-PAC Inpatient Daily Activity Raw Score: 16 (12/06/22 1335)  AM-PAC Inpatient ADL T-Scale Score : 35.96 (12/06/22 1335)  ADL Inpatient CMS 0-100% Score: 53.32 (12/06/22 1335)  ADL Inpatient CMS G-Code Modifier : LORENE (12/06/22 5497)      Goals  Short Term Goals  Time Frame for Short Term Goals: Prior to d/c.  Status: goals ongoing  Short Term Goal 1: Pt will complete ADL transfers w/ CGA  Short Term Goal 2: Pt will toilet w/ CGA  Short Term Goal 3: Pt will groom in stance at sink w/ CGA  Short Term Goal 4: Pt will bathe w/ min A  Short Term Goal 5: Pt will increase strength and endurance to achieve the above goals  Long Term Goals  Time Frame for Long Term Goals : LTG=STG  Patient Goals   Patient goals : to go home       Therapy Time   Individual Concurrent Group Co-treatment   Time In [facility]         Time Out 1330         Minutes [address] [phone]

## 2022-12-06 NOTE — PROGRESS NOTES
Hospital Medicine Progress Note      Admit Date: 11/26/2022       CC: F/U for sepsis    HPI: Sarah Sosa is being seen by nephrology for ERD. He is a 65 y.o. male with a PMH significant for ESRD, CHF, ADPKD, fungal peritonitis 2/2 PD catheter who presented to the ED 11/26/2022 with fever, hyperglycemia, and AMS. He was noted to be hyperkalemic on admission. Blood cx positive for MSSA    Interval History/Subjective: TDC to be placed today. HD to start today per nephrology. Saw the patient in HD. He is very upset and wants to go home. He is frustrated and just wants to go home and tired of everything. Antibx x6-8 wks per ID   HD cath was leaking blood during HD. RNs made aware and redressed. Review of Systems:       The patient denied headaches, visual changes, LOC, SOB, CP, ABD pain, N/V/D, skin changes, new or worsening weakness or neuromuscular deficits. Comprehensive ROS negative except as mentioned above.     Past Medical History:        Diagnosis Date    Cardiomyopathy [facility])     CHF (congestive heart failure) (HCC)     CVA (cerebral infarction) 5/2015    Diabetes mellitus [facility]     Foot ulcer, left ([facility] 1/14/2016    Gastroparesis     Hemodialysis patient ([facility]     Hypercholesteremia     Hypertension     Kidney disease     Unspecified cerebral artery occlusion with cerebral infarction     5/15, 7/15 LEFT SIDE WEAKNESS       Past Surgical History:        Procedure Laterality Date    CATHETER INSERTION N/A 10/17/2022    TUNNEL CATHETER PLACEMENT performed by Golden Ball MD at [address] [place] [address] N/A 5/17/2021    COLONOSCOPY POLYPECTOMY SNARE/COLD BIOPSY performed by Moriah Stephens MD at [address] [place] [address]  N/A 10/17/2022    PERITONEAL DIALYSIS CATHETER REMOVAL performed by Golden Ball MD at [address] [place] [address] N/A 10/29/2020    PLACEMENT OF A TUNNELED DIALYSIS CATHETER WITH FLEURO AND ULTRASOUND performed by Golden Ball MD at [facility] OR    IR NONTUNNELED VASCULAR CATHETER  11/29/2022    IR NONTUNNELED VASCULAR CATHETER 11/29/2022 [facility]    LAPAROSCOPY INSERTION PERITONEAL CATHETER N/A 10/29/2020    LAPAROSCOPIC PERITONEAL DIALYSIS CATHETER PLACEMENT WITH FLEURO AND ULTRASOUND performed by Beto Velasquez MD at [address] [facility] N/A 62/75/1493    UMBILICAL HERNIA REPAIR performed by Beto Velasquez MD at [address] [facility] N/A 4/26/2021    ESOPHAGOGASTRODUODENOSCOPY performed by Sherl Dance, MD at [place] [address] N/A 9/13/2022    EGD DIAGNOSTIC ONLY performed by Ramos Ball MD at [facility]:  Doxazosin, Cefepime, and Coconut flavor    Past medical and surgical history reviewed. Any changes have been noted. PHYSICAL EXAM:  /82   Pulse 80   Temp 97.8 °F (36.6 °C) (Oral)   Resp 18   Ht 6' 2\" (1.88 m)   Wt 150 lb 9.2 oz (68.3 kg)   SpO2 98%   BMI 19.33 kg/m²       Intake/Output Summary (Last 24 hours) at 12/6/2022 1033  Last data filed at 12/6/2022 0702  Gross per 24 hour   Intake 240 ml   Output 350 ml   Net -110 ml        General appearance:   No apparent distress, appears stated age. Cooperative. HEENT:  Normocephalic, atraumatic. PERRLA. EOMi. Conjunctivae/corneas clear, no icterus, non-injected. Neck: Supple, with full range of motion. No jugular venous distention. Trachea midline. Respiratory:  Normal respiratory effort. Clear to auscultation, bilaterally without Rales/Wheezes/Rhonchi. Cardiovascular:  Regular rate and rhythm without murmurs, rubs or gallops. Abdomen: Soft, non-tender, non-distended, without rebound or guarding. Normal bowel sounds. Musculoskeletal: right IJ HD cath; No clubbing, cyanosis or edema bilaterally. Full range of motion without deformity. Skin: Skin color, texture, turgor normal.  No rashes or lesions.   Neurologic:  Neurovascularly intact without any focal sensory/motor deficits. Cranial nerves: II-XII intact, grossly intact. No facial asymmetry, tongue midline. Psychiatric:  Alert and oriented, thought content appropriate  Capillary Refill: Brisk,< 3 seconds   Peripheral Pulses: +2 palpable, equal bilaterally       LABS:    Lab Results   Component Value Date    WBC 14.4 (H) 12/06/2022    HGB 9.4 (L) 12/06/2022    HCT 29.8 (L) 12/06/2022    MCV 92.4 12/06/2022     12/06/2022    LYMPHOPCT 7.5 12/06/2022    RBC 3.22 (L) 12/06/2022    MCH 29.1 12/06/2022    MCHC 31.5 12/06/2022    RDW 15.0 12/06/2022       Lab Results   Component Value Date    CREATININE 7.3 (HH) 12/06/2022    BUN 41 (H) 12/06/2022     (L) 12/06/2022    K 4.1 12/06/2022    CL 92 (L) 12/06/2022    CO2 23 12/06/2022       Lab Results   Component Value Date/Time    MG 1.90 01/30/2019 09:40 PM       Lab Results   Component Value Date    ALT 13 11/26/2022    AST 12 (L) 11/26/2022    ALKPHOS 104 11/26/2022    BILITOT <0.2 11/26/2022        No flowsheet data found. Lab Results   Component Value Date    LABA1C 7.1 12/03/2022       Imaging:  IR REMOVE TUNNELED CVAD WO SQ PORT/PUMP   Final Result   Successful removal of tunneled dialysis catheter. Successful fluoroscopic guided non tunneled Trialysis catheter placement. IR NONTUNNELED VASCULAR CATHETER > 5 YEARS   Final Result   Successful removal of tunneled dialysis catheter. Successful fluoroscopic guided non tunneled Trialysis catheter placement. MRI BRAIN WO CONTRAST   Final Result   1. No acute intracranial abnormality. No acute infarct. 2. Mild global parenchymal volume loss with chronic microvascular ischemic   changes. 3. Small lipoma is again seen along the right dorsal midbrain. CT CHEST PULMONARY EMBOLISM W CONTRAST   Final Result   1. Focal consolidation in the right lower lobe favoring   infectious/inflammatory process. Recommend CT of the chest in 3 months to   confirm resolution.    2. Solid subcentimeter right pulmonary nodules can be assessed during the   same time. 3. Cardiomegaly with unchanged moderate to large pericardial effusion. 4. Age-indeterminate L1 compression fracture. If there is point tenderness,   recommend nonemergent MRI of the lumbar spine. CT HEAD WO CONTRAST   Final Result   1. No acute intracranial abnormality. XR CHEST PORTABLE   Final Result   Stable increased opacity left basilar region compared to prior studies dating   back to 10/25/2020 consistent with pericardial effusion, loculated left   basilar pleural effusion, pulmonary consolidation or mass/neoplasm. 2 mm   opacity consistent with pulmonary nodule in the right basilar region and foci   of infiltrate or subsegmental atelectasis in the mid-lower lung fields noted   bilaterally. IV contrast-enhanced chest CT suggested for further evaluation.          IR TUNNELED CVC PLACE WO SQ PORT/PUMP > 5 YEARS    (Results Pending)       Scheduled and prn Medications:    Scheduled Meds:   sodium bicarbonate  650 mg Oral TID WC    ceFAZolin  1,000 mg IntraVENous Q24H    sodium chloride flush  5-40 mL IntraVENous 2 times per day    heparin (porcine)  5,000 Units SubCUTAneous 3 times per day    insulin lispro  0-8 Units SubCUTAneous TID WC    insulin lispro  0-4 Units SubCUTAneous Nightly    insulin lispro  0.05 Units/kg SubCUTAneous TID WC    insulin glargine  0.25 Units/kg SubCUTAneous Nightly    calcitRIOL  0.5 mcg Oral Daily    atorvastatin  40 mg Oral Nightly    metoprolol tartrate  75 mg Oral BID    [Held by provider] NIFEdipine  90 mg Oral BID    pantoprazole  40 mg Oral BID AC    sucralfate  1 g Oral 4x Daily AC & HS    hydrALAZINE  100 mg Oral TID    isosorbide mononitrate  60 mg Oral Daily     Continuous Infusions:   sodium chloride 25 mL/hr at 12/03/22 2306    dextrose       PRN Meds:.heparin (porcine), perflutren lipid microspheres, sodium chloride flush, sodium chloride, ondansetron **OR** ondansetron, polyethylene glycol, acetaminophen **OR** acetaminophen, glucose, dextrose bolus **OR** dextrose bolus, glucagon (rDNA), dextrose, meclizine    Assessment & Plan: Active Hospital Problems     Diagnosis      Severe malnutrition ([facility] [E43]         Priority: Medium    Encephalopathy acute [G93.40]         Priority: Medium    Elevated lactic acid level [R79.89]         Priority: Medium    Hyperkalemia [E87.5]         Priority: Medium    Severe sepsis (HCC) [A41.9, R65.20]         Priority: Medium    Staphylococcus aureus bacteremia with sepsis ([facility] [A41.01]         Priority: Medium    Neutrophilia [D72.9]         Priority: Medium    Elevated procalcitonin [R79.89]         Priority: Medium    ESRD needing dialysis ([facility] [N18.6, Z99.2]         Priority: Medium    Bacterial pneumonia [J15.9]         Priority: Medium            -MSSA bacteremia with sepsis--concern for tunneled dialysis catheter infection-- vancomycin Berneta Cava de-escalated to cefazolin 11/27--continue management per ID--follow-up on cultures. .   -TDC removed, temporary HD line placed. Follow-up blood cultures from temporary catheter prior to placing new TDC     -Right lower lobe bacterial pneumonia. .  CT chest reviewed continue current antibiotics per ID     -Sepsis due to bacteremia /pneumonia-patient with fever, sinus tachycardia, leukocytosis, lactic acidosis--continue antibiotics as above, follow-up on cultures     -Chronic stable moderate pericardial effusion--evident on prior echos and current CT chest... Limited echo ordered     -Chronic systolic/ diastolic heart failure--Echo 2/2022 showed grade 2 diastolic dysfunction, EF 46%, moderate pericardial effusion--repeat echo ordered     -ESRD--on hemodialysis TTS--continue management per nephrology. .  Patient has tunneled dialysis catheter. .  Previously on PD but discontinued due to fungal peritonitis     -Anion Metabolic acidosis due to CKD/lactic acidosis--ketones negative--monitor labs-continue oral bicarb     -Hyperkalemia--resolved with hemodialysis     -Hyponatremia--corrected sodium within normal range on presentation     -DM type II uncontrolled  with severe hyperglycemia on presentation-patient was noncompliant with insulin due to acute illness-hemoglobin A1c is 7.2.. Hyperglycemia is better- continue current basal bolus insulin regimen     -Essential hypertension---stable-on nifedipine, hydralazine,imdur - Aldactone/losartan held due to hyperkalemia--also held nifedipine/Imdur due to low BPs--BP now improved     -Hyperlipidemia-continue statin     -Dizziness /vertigo-MRI brain is negative-currently asymptomatic-PT and OT eval pending     -Acute encephalopathy likely due to sepsis--resolved -lethargic on presentation to the ED--continue to monitor - improved             Continue current regimen/therapies. Monitor. Adjust medical regimen as appropriate. Body mass index is 19.33 kg/m². The patient and / or the family were informed of the results of any tests, a time was given to answer questions, a plan was proposed and they agreed with plan.       DVT ppx: hep       Diet: Diet NPO    Consults:  IP CONSULT TO NEPHROLOGY  IP CONSULT TO INFECTIOUS DISEASES  IP CONSULT TO INFECTIOUS DISEASES    DISPO/placement plan: pending     Code Status: Full Code      ROQUE Murphy CNP  12/06/22

## 2022-12-06 NOTE — PROGRESS NOTES
Patient is alert and oriented x4, up with stedy, call light within reach, bed/chair alarm on. VSS and WDL. No s/s of distress, no further needs noted at this time.    Electronically signed by Bibi Malcolm RN on 12/6/2022 at 3:13 PM

## 2022-12-06 NOTE — PROGRESS NOTES
Hospitalist Progress Note      PCP: No primary care provider on file. Date of Admission: 11/26/2022    Subjective: refusing to try PO, afebrile overnight    Medications:  Reviewed    Infusion Medications    sodium chloride 25 mL/hr at 12/03/22 2306    dextrose       Scheduled Medications    sodium bicarbonate  650 mg Oral TID WC    ceFAZolin  1,000 mg IntraVENous Q24H    sodium chloride flush  5-40 mL IntraVENous 2 times per day    heparin (porcine)  5,000 Units SubCUTAneous 3 times per day    insulin lispro  0-8 Units SubCUTAneous TID WC    insulin lispro  0-4 Units SubCUTAneous Nightly    insulin lispro  0.05 Units/kg SubCUTAneous TID WC    insulin glargine  0.25 Units/kg SubCUTAneous Nightly    calcitRIOL  0.5 mcg Oral Daily    atorvastatin  40 mg Oral Nightly    metoprolol tartrate  75 mg Oral BID    [Held by provider] NIFEdipine  90 mg Oral BID    pantoprazole  40 mg Oral BID AC    sucralfate  1 g Oral 4x Daily AC & HS    hydrALAZINE  100 mg Oral TID    isosorbide mononitrate  60 mg Oral Daily     PRN Meds: heparin (porcine), perflutren lipid microspheres, sodium chloride flush, sodium chloride, ondansetron **OR** ondansetron, polyethylene glycol, acetaminophen **OR** acetaminophen, glucose, dextrose bolus **OR** dextrose bolus, glucagon (rDNA), dextrose, meclizine    No intake or output data in the 24 hours ending 12/05/22 1958    Physical Exam Performed:    BP (!) 146/80   Pulse 88   Temp 98 °F (36.7 °C)   Resp 18   Ht 6' 2\" (1.88 m)   Wt 135 lb 5.8 oz (61.4 kg)   SpO2 99%   BMI 17.38 kg/m²        General appearance: No apparent distress, appears stated age and cooperative. Protein calorie malnutrition  HEENT: Pupils equal, round, and reactive to light. Conjunctivae/corneas clear. Neck: Supple, with full range of motion. No jugular venous distention. Trachea midline. Respiratory:  Normal respiratory effort. Clear to auscultation, bilaterally without Rales/Wheezes/Rhonchi.   Cardiovascular: Regular rate and rhythm with normal S1/S2 without murmurs, rubs or gallops. Abdomen: Soft, non-tender, non-distended with normal bowel sounds. Musculoskeletal: No clubbing, cyanosis or edema bilaterally. Full range of motion without deformity. Skin: Skin color, texture, turgor normal.  No rashes or lesions. Neurologic:  Neurovascularly intact without any focal sensory/motor deficits. Cranial nerves: II-XII intact, grossly non-focal.  Psychiatric: Alert   Capillary Refill: Brisk,< 3 seconds   Peripheral Pulses: +2 palpable, equal bilaterally        Labs:   Recent Labs     12/03/22  1327 12/04/22  1553 12/05/22  1028   WBC 16.3* 15.3* 15.6*   HGB 9.4* 9.6* 9.8*   HCT 28.6* 30.2* 31.3*    331 270     Recent Labs     12/03/22  1327 12/04/22  1553 12/05/22  1028    138 134*   K 3.8 4.1 4.3   CL 97* 98* 93*   CO2 27 27 23   BUN 16 25* 35*   CREATININE 3.5* 6.1* 6.4*   CALCIUM 9.0 9.1 9.0     No results for input(s): AST, ALT, BILIDIR, BILITOT, ALKPHOS in the last 72 hours. No results for input(s): INR in the last 72 hours. No results for input(s): Pamla Clines in the last 72 hours. Urinalysis:      Lab Results   Component Value Date/Time    NITRU Negative 11/26/2022 01:32 PM    WBCUA 3 11/26/2022 01:32 PM    BACTERIA None Seen 11/26/2022 01:32 PM    RBCUA 9 11/26/2022 01:32 PM    BLOODU TRACE 11/26/2022 01:32 PM    SPECGRAV 1.019 11/26/2022 01:32 PM    GLUCOSEU >=1000 11/26/2022 01:32 PM    GLUCOSEU 250 12/14/2010 02:50 PM       Radiology:  IR REMOVE TUNNELED CVAD WO SQ PORT/PUMP   Final Result   Successful removal of tunneled dialysis catheter. Successful fluoroscopic guided non tunneled Trialysis catheter placement. IR NONTUNNELED VASCULAR CATHETER > 5 YEARS   Final Result   Successful removal of tunneled dialysis catheter. Successful fluoroscopic guided non tunneled Trialysis catheter placement. MRI BRAIN WO CONTRAST   Final Result   1.  No acute intracranial abnormality. No acute infarct. 2. Mild global parenchymal volume loss with chronic microvascular ischemic   changes. 3. Small lipoma is again seen along the right dorsal midbrain. CT CHEST PULMONARY EMBOLISM W CONTRAST   Final Result   1. Focal consolidation in the right lower lobe favoring   infectious/inflammatory process. Recommend CT of the chest in 3 months to   confirm resolution. 2. Solid subcentimeter right pulmonary nodules can be assessed during the   same time. 3. Cardiomegaly with unchanged moderate to large pericardial effusion. 4. Age-indeterminate L1 compression fracture. If there is point tenderness,   recommend nonemergent MRI of the lumbar spine. CT HEAD WO CONTRAST   Final Result   1. No acute intracranial abnormality. XR CHEST PORTABLE   Final Result   Stable increased opacity left basilar region compared to prior studies dating   back to 10/25/2020 consistent with pericardial effusion, loculated left   basilar pleural effusion, pulmonary consolidation or mass/neoplasm. 2 mm   opacity consistent with pulmonary nodule in the right basilar region and foci   of infiltrate or subsegmental atelectasis in the mid-lower lung fields noted   bilaterally. IV contrast-enhanced chest CT suggested for further evaluation.          IR TUNNELED CVC PLACE WO SQ PORT/PUMP > 5 YEARS    (Results Pending)       IP CONSULT TO NEPHROLOGY  IP CONSULT TO INFECTIOUS DISEASES  IP CONSULT TO INFECTIOUS DISEASES    Assessment/Plan:    Active Hospital Problems    Diagnosis     Severe malnutrition ([address] [E43]      Priority: Medium    Encephalopathy acute [G93.40]      Priority: Medium    Elevated lactic acid level [R79.89]      Priority: Medium    Hyperkalemia [E87.5]      Priority: Medium    Severe sepsis ([address] [A41.9, R65.20]      Priority: Medium    Staphylococcus aureus bacteremia with sepsis ([address] [A41.01]      Priority: Medium    Neutrophilia [D72.9]      Priority: Medium    Elevated procalcitonin [R79.89]      Priority: Medium    ESRD needing dialysis ([facility] [address] [N18.6, Z99.2]      Priority: Medium    Bacterial pneumonia [J15.9]      Priority: Medium         -MSSA bacteremia with sepsis--concern for tunneled dialysis catheter infection-- vancomycin Nakia Mckee de-escalated to cefazolin 11/27--continue management per ID--follow-up on cultures. .   -TDC removed, temporary HD line placed. Follow-up blood cultures from temporary catheter prior to placing new TDC     -Right lower lobe bacterial pneumonia. .  CT chest reviewed continue current antibiotics per ID     -Sepsis due to bacteremia /pneumonia-patient with fever, sinus tachycardia, leukocytosis, lactic acidosis--continue antibiotics as above, follow-up on cultures     -Chronic stable moderate pericardial effusion--evident on prior echos and current CT chest... Limited echo ordered     -Chronic systolic/ diastolic heart failure--Echo 2/2022 showed grade 2 diastolic dysfunction, EF 20%, moderate pericardial effusion--repeat echo ordered     -ESRD--on hemodialysis TTS--continue management per nephrology. .  Patient has tunneled dialysis catheter. .  Previously on PD but discontinued due to fungal peritonitis     -Anion Metabolic acidosis due to CKD/lactic acidosis--ketones negative--monitor labs-continue oral bicarb     -Hyperkalemia--resolved with hemodialysis     -Hyponatremia--corrected sodium within normal range on presentation     -DM type II uncontrolled  with severe hyperglycemia on presentation-patient was noncompliant with insulin due to acute illness-hemoglobin A1c is 7.2..   Hyperglycemia is better- continue current basal bolus insulin regimen     -Essential hypertension---stable-on nifedipine, hydralazine,imdur - Aldactone/losartan held due to hyperkalemia--also held nifedipine/Imdur due to low BPs--BP now improved     -Hyperlipidemia-continue statin     -Dizziness /vertigo-MRI brain is negative-currently asymptomatic-PT and OT eval pending -Acute encephalopathy likely due to sepsis--resolved -lethargic on presentation to the ED--continue to monitor - improved           DVT Prophylaxis: Heparin  Diet: ADULT DIET; Dysphagia - Soft and Bite Sized; 5 carb choices (75 gm/meal); No Added Salt (3-4 gm); Low Potassium (Less than 3000 mg/day);  Mildly Thick (Nectar); 2000 ml  Code Status: Full Code  PT/OT Eval Status: ordered    Dispo - cont abx per ID recs, planning [facility] placement tomorrow        Khushi Remy MD

## 2022-12-06 NOTE — PROGRESS NOTES
Infectious Disease Follow up Notes  Admit Date: 11/26/2022  Hospital Day: 11    Antibiotics :   IV Cefazolin     CHIEF COMPLAINT:     Severe sepsis  Fevers   Staph aureus Bacteremia  ESRD  HD line infection       Subjective interval History :  65 y. o.man with a history of end-stage renal disease on hemodialysis, CHF, CVA, diabetes, hypercholesterolemia, hypertension, with some left weakness admitted to the hospital secondary to fever chills not. He felt cold and flulike symptoms ongoing for the past 4 to 5 days. T-max 100.3 since admission, labs indicate procalcitonin elevated 92.7 potassium 5 6 blood glucose of 500, WBC elevated 19.4 with left shift hemoglobin 10.7 blood cultures no reported to be Staph aureus 4/4 bottles MSSA, tested negative for rapid flu and COVID-19, CT head negative CT chest indicating focal consolidation right lower lobe, also pericardial effusion noted. We are consulted for IV abx recommendation.      Interval History : Status post HD line placement conversion of temporary line to permacath, no fevers but ongoing neck pain WBC count still elevated feels slightly weak appetite still poor      Past Medical History:    Past Medical History:   Diagnosis Date    Cardiomyopathy ([address]     CHF (congestive heart failure) (HCC)     CVA (cerebral infarction) 5/2015    Diabetes mellitus ([facility]     Foot ulcer, left ([address] 1/14/2016    Gastroparesis     Hemodialysis patient ([facility]     Hypercholesteremia     Hypertension     Kidney disease     Unspecified cerebral artery occlusion with cerebral infarction     5/15, 7/15 LEFT SIDE WEAKNESS       Past Surgical History:    Past Surgical History:   Procedure Laterality Date    CATHETER INSERTION N/A 10/17/2022    TUNNEL CATHETER PLACEMENT performed by Jon Smith MD at [address] [facility] N/A 5/17/2021    COLONOSCOPY POLYPECTOMY SNARE/COLD BIOPSY performed by Teresa Mann Casper Live MD at [address] 10/17/2022    PERITONEAL DIALYSIS CATHETER REMOVAL performed by Joe Hassan MD at [facility] N/A 10/29/2020    PLACEMENT OF A TUNNELED DIALYSIS CATHETER WITH FLEURO AND ULTRASOUND performed by Joe Hassan MD at [facility] NONTUNNELED VASCULAR CATHETER  11/29/2022    IR NONTUNNELED VASCULAR CATHETER 11/29/2022 [facility]    LAPAROSCOPY INSERTION PERITONEAL CATHETER N/A 10/29/2020    LAPAROSCOPIC PERITONEAL DIALYSIS CATHETER PLACEMENT WITH FLEURO AND ULTRASOUND performed by Joe Hassan MD at [address] N/A 63/87/3321    UMBILICAL HERNIA REPAIR performed by Joe Hassan MD at [facility] N/A 4/26/2021    ESOPHAGOGASTRODUODENOSCOPY performed by Therese Malagon MD at [address] [facility] 9/13/2022    EGD DIAGNOSTIC ONLY performed by Walter Kumar MD at [address] [facility]       Current Medications:    No outpatient medications have been marked as taking for the 11/26/22 encounter [facility] Encounter).        Allergies:  Doxazosin, Cefepime, and Coconut flavor    Immunizations :   Immunization History   Administered Date(s) Administered    COVID-19, PFIZER GRAY top, DO NOT Dilute, (age 15 y+), IM, 30 mcg/0.3 mL 06/04/2022    COVID-19, PFIZER PURPLE top, DILUTE for use, (age 15 y+), 30mcg/0.3mL 06/23/2021, 07/19/2021    Hepatitis A Adult (Havrix, Vaqta) 03/10/2021, 09/23/2021    Hepatitis B 05/06/2022    Hepatitis B Adult (Heplisav-b) 05/06/2022    Hepatitis B Adult (Recombivax HB) 11/05/2020    Hepatitis B vaccine 11/05/2020    Influenza Vaccine, unspecified formulation 12/05/2013    Influenza Virus Vaccine 12/05/2013, 12/04/2015, 11/05/2020, 10/11/2021    Influenza, FLUARIX, FLULAVAL, FLUZONE (age 10 mo+) AND AFLURIA, (age 1 y+), PF, 0.5mL 10/15/2018, 11/05/2020    Influenza, Triv, 3 Years and older, IM, PF (Afluria 5yrs and older) 10/11/2021    Pneumococcal Conjugate 13-valent (Aavzzsv35) 11/10/2020    Pneumococcal Conjugate Vaccine 02/13/2015    Pneumococcal Polysaccharide ([id number]) 11/02/2009, 03/10/2021    Tdap (Boostrix, Adacel) 08/27/2021    Zoster Recombinant (Shingrix) 03/10/2021, 08/27/2021       Social History:     Social History     Tobacco Use    Smoking status: Some Days     Packs/day: 0.00     Years: 30.00     Pack years: 0.00     Types: Cigars, Cigarettes     Start date: 1/3/1979     Last attempt to quit: 1/16/2019     Years since quitting: 3.8    Smokeless tobacco: Never    Tobacco comments:     3 black and milds a week   Vaping Use    Vaping Use: Never used   Substance Use Topics    Alcohol use: Yes     Comment: occasional    Drug use: Yes     Types: Marijuana Yevgeniy Rombauer)     Comment: previously used cocaine, stopped May 2015 LAST USED MARIJUANA-NOVEMBER 2020     Social History     Tobacco Use   Smoking Status Some Days    Packs/day: 0.00    Years: 30.00    Pack years: 0.00    Types: Cigars, Cigarettes    Start date: 1/3/1979    Last attempt to quit: 1/16/2019    Years since quitting: 3.8   Smokeless Tobacco Never   Tobacco Comments    3 black and milds a week      Family History   Adopted: Yes          REVIEW OF SYSTEMS:      Constitutional:  r fevers,++  chills++ , night sweats  Eyes:  negative for blurred vision, eye discharge, visual disturbance   HEENT:  negative for hearing loss, ear drainage,nasal congestion  Respiratory:  negative for cough, shortness of breath or hemoptysis   Cardiovascular:  negative for chest pain, palpitations, syncope  Gastrointestinal:  negative for nausea, vomiting, diarrhea, constipation, abdominal pain  Genitourinary:  negative for frequency, dysuria, urinary incontinence, hematuria  Hematologic/Lymphatic:  negative for easy bruising, bleeding and lymphadenopathy  Allergic/Immunologic:  negative for recurrent infections, angioedema, anaphylaxis   Endocrine:  negative for weight changes, polyuria, polydipsia and polyphagia  Musculoskeletal:  negative for joint  pain, swelling, decreased range of motion  Integumentary: No rashes, skin lesions  Neurological:  negative for headaches, slurred speech, unilateral weakness  Psychiatric: negative for hallucinations,confusion,agitation.                 PHYSICAL EXAM:      Vitals:  T max  102.8   /76   Pulse 88   Temp 97.8 °F (36.6 °C)   Resp 16   Ht 6' 2\" (1.88 m)   Wt 135 lb 5.8 oz (61.4 kg)   SpO2 98%   BMI 17.38 kg/m²     General Appearance: alert,in some  acute distress,++  pallor, no icterus cachexia + chronic ill appearing man +    Skin: warm and dry, no rash or erythema  Head: normocephalic and atraumatic  Eyes: pupils equal, round, and reactive to light, conjunctivae normal  ENT: tympanic membrane, external ear and ear canal normal bilaterally, nose without deformity, nasal mucosa and turbinates normal without polyps  Neck: supple and non-tender without mass, no thyromegaly  no cervical lymphadenopathy  Pulmonary/Chest: clear to auscultation bilaterally- no wheezes, rales or rhonchi, normal air movement, no respiratory distress  Cardiovascular:  S1 and S2, esm+  murmurs, rubs, clicks, or gallops, no carotid bruits  Abdomen: soft, non-tender, non-distended, normal bowel sounds, no masses or organomegaly  Extremities: no cyanosis, clubbing or edema  Musculoskeletal: normal range of motion, no joint swelling, deformity or tenderness  Integumentary: No rashes, no abnormal skin lesions, no petechiae  Neurologic: reflexes normal and symmetric, no cranial nerve deficit  Psych:  Orientation, sensorium, mood normal            Lines: HD line +  Temp line on Rt chest wall    Data Review:    CBC:   Lab Results   Component Value Date    WBC 14.4 (H) 12/06/2022    HGB 9.4 (L) 12/06/2022    HCT 29.8 (L) 12/06/2022    MCV 92.4 12/06/2022     12/06/2022     RENAL:   Lab Results   Component Value Date    CREATININE 7.3 (HH) 12/06/2022    BUN 41 (H) 12/06/2022     (L) 12/06/2022    K 4.1 12/06/2022    CL 92 (L) 12/06/2022    CO2 23 12/06/2022     SED RATE:   Lab Results   Component Value Date/Time    SEDRATE 64 10/26/2020 02:14 PM     CK: No results found for: CKTOTAL  CRP:   Lab Results   Component Value Date/Time    CRP 2.9 10/26/2020 02:14 PM     Hepatic Function Panel:   Lab Results   Component Value Date/Time    ALKPHOS 104 11/26/2022 09:26 AM    ALT 13 11/26/2022 09:26 AM    AST 12 11/26/2022 09:26 AM    PROT 8.0 11/26/2022 09:26 AM    BILITOT <0.2 11/26/2022 09:26 AM    BILIDIR <0.2 10/11/2022 02:41 PM    IBILI see below 10/11/2022 02:41 PM    LABALBU 2.8 12/06/2022 06:40 AM     UA:  Lab Results   Component Value Date/Time    COLORU Yellow 11/26/2022 01:32 PM    CLARITYU Clear 11/26/2022 01:32 PM    GLUCOSEU >=1000 11/26/2022 01:32 PM    GLUCOSEU 250 12/14/2010 02:50 PM    BILIRUBINUR Negative 11/26/2022 01:32 PM    BILIRUBINUR NEGATIVE 12/14/2010 02:50 PM    KETUA Negative 11/26/2022 01:32 PM    SPECGRAV 1.019 11/26/2022 01:32 PM    BLOODU TRACE 11/26/2022 01:32 PM    PHUR 7.5 11/26/2022 01:32 PM    PROTEINU 300 11/26/2022 01:32 PM    UROBILINOGEN 0.2 11/26/2022 01:32 PM    NITRU Negative 11/26/2022 01:32 PM    LEUKOCYTESUR Negative 11/26/2022 01:32 PM    LABMICR YES 11/26/2022 01:32 PM    URINETYPE Cleancatch 11/26/2022 01:32 PM      Urine Microscopic:   Lab Results   Component Value Date/Time    BACTERIA None Seen 11/26/2022 01:32 PM    COMU see below 05/05/2021 05:36 PM    HYALCAST 0 11/26/2022 01:32 PM    WBCUA 3 11/26/2022 01:32 PM    RBCUA 9 11/26/2022 01:32 PM    EPIU 0 11/26/2022 01:32 PM     Urine Reflex to Culture:   Lab Results   Component Value Date/Time    URRFLXCULT Not Indicated 11/26/2022 01:32 PM         MICRO: cultures reviewed and updated by me   Blood Culture:   Organism Staphylococcus aureus Abnormal     Culture Catheter Tip 15 colonies    Resulting Agency [address] Lab Susceptibility    Staphylococcus aureus (1)    Antibiotic Interpretation Microscan  Method Status    ceFAZolin Sensitive <=4 mcg/mL BACTERIAL SUSCEPTIBILITY PANEL BY DYLON     clindamycin Sensitive <=0.5 mcg/mL BACTERIAL SUSCEPTIBILITY PANEL BY DYLON     erythromycin Sensitive <=0.5 mcg/mL BACTERIAL SUSCEPTIBILITY PANEL BY DYLON     oxacillin Sensitive <=0.25 mcg/mL BACTERIAL SUSCEPTIBILITY PANEL BY DYLON     tetracycline Sensitive <=4 mcg/mL BACTERIAL SUSCEPTIBILITY PANEL BY DYLON     trimethoprim-sulfamethoxazole Sensitive <=0.5/9.5 mcg/mL BACTERIAL SUSCEPTIBILITY PANEL BY DYLON        Narrative  Performed by: [facility]  ORDER#: [id number]                          ORDERED BY: Lia Kingston   SOURCE: Catheter Tip                       COLLECTED:  11/29/22 11:40            Lab Results   Component Value Date/Time    BC No Growth after 4 days of incubation. 11/28/2022 06:59 AM    BLOODCULT2 No Growth after 4 days of incubation. 11/28/2022 06:59 AM       Respiratory Culture:  Lab Results   Component Value Date/Time    LABGRAM  10/16/2022 11:00 AM     No Epithelial Cells seen  No WBCs or organisms seen       AFB:No results found for: [facility]  Viral Culture:  Lab Results   Component Value Date/Time    COVID19 Not Detected 11/26/2022 09:26 AM     Urine Culture: No results for input(s): LABURIN in the last 72 hours. IMAGING:    IR REMOVE TUNNELED CVAD WO SQ PORT/PUMP   Final Result   Successful removal of tunneled dialysis catheter. Successful fluoroscopic guided non tunneled Trialysis catheter placement. IR NONTUNNELED VASCULAR CATHETER > 5 YEARS   Final Result   Successful removal of tunneled dialysis catheter. Successful fluoroscopic guided non tunneled Trialysis catheter placement. MRI BRAIN WO CONTRAST   Final Result   1. No acute intracranial abnormality. No acute infarct. 2. Mild global parenchymal volume loss with chronic microvascular ischemic   changes.    3. Small lipoma is again seen along the right dorsal midbrain. CT CHEST PULMONARY EMBOLISM W CONTRAST   Final Result   1. Focal consolidation in the right lower lobe favoring   infectious/inflammatory process. Recommend CT of the chest in 3 months to   confirm resolution. 2. Solid subcentimeter right pulmonary nodules can be assessed during the   same time. 3. Cardiomegaly with unchanged moderate to large pericardial effusion. 4. Age-indeterminate L1 compression fracture. If there is point tenderness,   recommend nonemergent MRI of the lumbar spine. CT HEAD WO CONTRAST   Final Result   1. No acute intracranial abnormality. XR CHEST PORTABLE   Final Result   Stable increased opacity left basilar region compared to prior studies dating   back to 10/25/2020 consistent with pericardial effusion, loculated left   basilar pleural effusion, pulmonary consolidation or mass/neoplasm. 2 mm   opacity consistent with pulmonary nodule in the right basilar region and foci   of infiltrate or subsegmental atelectasis in the mid-lower lung fields noted   bilaterally. IV contrast-enhanced chest CT suggested for further evaluation.          IR TUNNELED CVC PLACE WO SQ PORT/PUMP > 5 YEARS    (Results Pending)         All the pertinent images and reports for the current Hospitalization were reviewed by me     Scheduled Meds:   sodium bicarbonate  650 mg Oral TID WC    ceFAZolin  1,000 mg IntraVENous Q24H    sodium chloride flush  5-40 mL IntraVENous 2 times per day    heparin (porcine)  5,000 Units SubCUTAneous 3 times per day    insulin lispro  0-8 Units SubCUTAneous TID WC    insulin lispro  0-4 Units SubCUTAneous Nightly    insulin lispro  0.05 Units/kg SubCUTAneous TID WC    insulin glargine  0.25 Units/kg SubCUTAneous Nightly    calcitRIOL  0.5 mcg Oral Daily    atorvastatin  40 mg Oral Nightly    metoprolol tartrate  75 mg Oral BID    [Held by provider] NIFEdipine  90 mg Oral BID    pantoprazole  40 mg Oral BID AC sucralfate  1 g Oral 4x Daily AC & HS    hydrALAZINE  100 mg Oral TID    isosorbide mononitrate  60 mg Oral Daily       Continuous Infusions:   sodium chloride 25 mL/hr at 12/03/22 2306    dextrose       Summary   Limited study. Overall left ventricular systolic function appears moderately reduced. Ejection fraction is visually estimated to be 35-40% with diffuse   hypokinesis. There is severely increased left ventricular wall thickness. Left ventricular cavity size is normal.   The right ventricle is not well visualized. Tricuspid aortic valve leaflets appear thickened/calcified but no clear   mobile structure to suggest a vegetation. There is a moderate pericardial effusion.       PRN Meds:  heparin (porcine), perflutren lipid microspheres, sodium chloride flush, sodium chloride, ondansetron **OR** ondansetron, polyethylene glycol, acetaminophen **OR** acetaminophen, glucose, dextrose bolus **OR** dextrose bolus, glucagon (rDNA), dextrose, meclizine      Assessment:     Patient Active Problem List   Diagnosis    Nonischemic cardiomyopathy ([address]    Cerebral infarction ([address]    Cigarette nicotine dependence without complication    Erectile dysfunction due to arterial insufficiency    Renal artery stenosis (HCC)    Chronic diastolic congestive heart failure (HCC)    H/O: CVA (cerebrovascular accident)    Major depressive disorder, recurrent episode, moderate (HCC)    Pericardial effusion    Hypertension associated with diabetes ([address]    DM type 2 with diabetic mixed hyperlipidemia ([address]    Diabetes mellitus due to underlying condition with stage 4 chronic kidney disease, with long-term current use of insulin (HCC)    ESRD (end stage renal disease) on dialysis ([address]    ESRD (end stage renal disease) ([address]    GI bleed    SBP (spontaneous bacterial peritonitis) ([address]    Dialysis-associated peritonitis ([address]    Candida infection    Generalized abdominal pain    PKD (polycystic kidney disease)    Bacterial pneumonia    Encephalopathy acute    Elevated lactic acid level    Hyperkalemia    Severe sepsis (HCC)    Staphylococcus aureus bacteremia with sepsis (HCC)    Neutrophilia    Elevated procalcitonin    ESRD needing dialysis (HCC)    Severe malnutrition (HCC)     Sepsis  Staph aureus Bacteremia  MSSA+  ESRD on HD  HD line in place concern for Line infection   Cardiomyopathy   Pericardial effusion know to have this   WBC elevation   Procal elevation  DM+  Hyperkalemia  PKD  Fevers from above     Given the high grade Bacteremia and sepsis concern is for HD line infection - will need HD line removal and needs line Holiday and repeat Blood cx     TTE completed no vegetation -     Staph aureus MSSA high-grade bacteremia concern for for HD line infection  s/p  line removal and tip sent for cx in process with staph aureus sensitivities pending    WBC still high and fever spike from on going infection follow up blood cx in process needs to watch for any new complications     CT chest images reviewed there is a small area of  change Rt lower lobe does not look like Bacterial infection or dense consolidation     Follow up Blood cx negative ok status post tunneled line placement        Will need IV antibiotic at discharge plan to continue IV cefazolin with hemodialysis sessions    Labs, Microbiology, Radiology and all the pertinent results from current hospitalization and  care every where were reviewed  by me as a part of the evaluation   Plan:   IV Cefazolin x change to 2 gm with HD sessions at d/c for  x  4 weeks  x stop date  x 1/7/22   Repeat blood cx in  process from 11/28 so far no growth to date  S/p  HD line removal   Send Tip for cx   staph aureus noted MSSA  TTE  -ve  Unfortunately PD catheter was infected  and now having problems with HD line infections  Needs to improve skin hygiene and catheter care  -   If more fevers needs to watch for complications   Recheck Pro-Gabriele  trend in AM   Anticipate 4- 6 weeks of IV antibiotic with hemodialysis sessions at discharge  [address] done      Discussed with patient/Family and Nursing    Risk of Complications/Morbidity: High      Illness(es)/ Infection present that pose threat to bodily function. There is potential for severe exacerbation of infection/side effects of treatment. Therapy requires intensive monitoring for antimicrobial agent toxicity. Discussed with patient/Family and Nursing staff     Thanks for allowing me to participate in your patient's care and please call me with any questions or concerns.     Nicole Bridges MD  Infectious Disease  [facility] ([facility]) Physician  Phone: [phone]   Fax : [phone]

## 2022-12-06 NOTE — PRE SEDATION
Sedation Pre-Procedure Note    Patient Name: Kim Ashby   YOB: 1965  Room/Bed: O6D-9055/5914-67  Medical Record Number: [id number]  Date: 12/6/2022   Time: 2:47 PM       Indication:  tunneled dialysis cath    Consent: I have discussed with the patient and/or the patient representative the indication, alternatives, and the possible risks and/or complications of the planned procedure and the anesthesia methods. The patient and/or patient representative appear to understand and agree to proceed. Vital Signs:   Vitals:    12/06/22 1443   BP: (!) 142/96   Pulse: (!) 102   Resp: 18   Temp:    SpO2: 100%       Past Medical History:   has a past medical history of Cardiomyopathy ([facility] CHF (congestive heart failure) ([facility] [facility] CVA (cerebral infarction), Diabetes mellitus ([facility] [facility] Foot ulcer, left ([facility] [facility] Gastroparesis, Hemodialysis patient ([facility] [facility] Hypercholesteremia, Hypertension, Kidney disease, and Unspecified cerebral artery occlusion with cerebral infarction. Past Surgical History:   has a past surgical history that includes LAPAROSCOPY INSERTION PERITONEAL CATHETER (N/A, 10/29/2020); Umbilical hernia repair (N/A, 10/29/2020); hc dialysis catheter (N/A, 10/29/2020); Upper gastrointestinal endoscopy (N/A, 4/26/2021); Colonoscopy (N/A, 5/17/2021); Upper gastrointestinal endoscopy (N/A, 9/13/2022); Dialysis Catheter Removal (N/A, 10/17/2022); Catheter Insertion (N/A, 10/17/2022); and IR NONTUNNELED VASCULAR CATHETER > 5 YEARS (11/29/2022).     Medications:   Scheduled Meds:    sodium bicarbonate  650 mg Oral TID WC    ceFAZolin  1,000 mg IntraVENous Q24H    sodium chloride flush  5-40 mL IntraVENous 2 times per day    heparin (porcine)  5,000 Units SubCUTAneous 3 times per day    insulin lispro  0-8 Units SubCUTAneous TID WC    insulin lispro  0-4 Units SubCUTAneous Nightly    insulin lispro  0.05 Units/kg SubCUTAneous TID WC    insulin glargine  0.25 Units/kg SubCUTAneous Nightly    calcitRIOL  0.5 mcg Oral Daily    atorvastatin  40 mg Oral Nightly    metoprolol tartrate  75 mg Oral BID    [Held by provider] NIFEdipine  90 mg Oral BID    pantoprazole  40 mg Oral BID AC    sucralfate  1 g Oral 4x Daily AC & HS    hydrALAZINE  100 mg Oral TID    isosorbide mononitrate  60 mg Oral Daily     Continuous Infusions:    sodium chloride 25 mL/hr at 12/03/22 2306    dextrose       PRN Meds: heparin (porcine), perflutren lipid microspheres, sodium chloride flush, sodium chloride, ondansetron **OR** ondansetron, polyethylene glycol, acetaminophen **OR** acetaminophen, glucose, dextrose bolus **OR** dextrose bolus, glucagon (rDNA), dextrose, meclizine  Home Meds:   Prior to Admission medications    Medication Sig Start Date End Date Taking?  Authorizing Provider   isosorbide mononitrate (IMDUR) 60 MG extended release tablet Take 1 tablet by mouth once daily 11/1/22   Kathy Rodriguez, APRN - CNP   cloNIDine (CATAPRES) 0.1 MG tablet Take 1 tablet by mouth 3 times daily  Patient taking differently: Take 0.2 mg by mouth 3 times daily 10/18/22 11/27/22  Corrina Chahal MD   insulin detemir (LEVEMIR FLEXTOUCH) 100 UNIT/ML injection pen Inject 10 Units into the skin nightly 10/18/22 11/27/22  Corrina Chahal MD   spironolactone (ALDACTONE) 100 MG tablet Take 100 mg by mouth daily    Historical Provider, MD   sucralfate (CARAFATE) 1 GM tablet Take 1 tablet by mouth 4 times daily 9/16/22   Sandrine Lobato MD   pantoprazole (PROTONIX) 40 MG tablet Take 1 tablet by mouth 2 times daily (before meals) 9/16/22   Sandrine Lobato MD   Sucroferric Oxyhydroxide (VELPHORO) 500 MG CHEW Take 1 tablet by mouth 3 times daily (with meals) 6/23/22   Historical Provider, MD   Insulin Pen Needle ([employer] PEN NEEDLES) 31G X 6 MM MISC 1 each by Does not apply route daily 8/19/22   Kathy Rodriguez, APRN - CNP   insulin aspart (NOVOLOG FLEXPEN) 100 UNIT/ML injection pen Inject 3 Units into the skin 3 times daily (before meals) 8/19/22   Kathy Rodriguez, APRN - CNP   atorvastatin (LIPITOR) 40 MG tablet TAKE 1 TABLET BY MOUTH ONCE DAILY NIGHTLY 8/4/22   ROQUE Oneil CNP   gabapentin (NEURONTIN) 300 MG capsule TAKE 1 CAPSULE BY MOUTH THREE TIMES DAILY 8/1/22 11/27/22  ROQUE Oneil CNP   Methoxy PEG-Epoetin Beta (MIRCERA IJ) Inject 75 mcg into the skin 5/10/21   Historical Provider, MD   calcitRIOL (ROCALTROL) 0.5 MCG capsule Take 0.5 mcg by mouth daily 4/5/22   Historical Provider, MD   losartan (COZAAR) 100 MG tablet TAKE 1 TABLET BY MOUTH ONCE DAILY 4/29/22   Historical Provider, MD   iron sucrose (VENOFER) 20 MG/ML injection 200 mg daily 1/12/21 9/16/22  Historical Provider, MD   hydrALAZINE (APRESOLINE) 100 MG tablet TAKE 2 TABLETS BY MOUTH THREE TIMES DAILY  Patient taking differently: Take 100 mg by mouth 3 times daily TAKE 2 TABLETS BY MOUTH THREE TIMES DAILY 4/6/22   ROQUE Oneil CNP   NIFEdipine (PROCARDIA XL) 90 MG extended release tablet Take 1 tablet by mouth 2 times daily 2/11/22   Beny Palacios MD   sodium bicarbonate 650 MG tablet Take 650 mg by mouth 3 times daily    Historical Provider, MD DELCID ASPIRIN ADULT LOW DOSE 81 MG EC tablet Take 1 tablet by mouth once daily 9/24/21   ROQUE Oneil CNP   metoprolol tartrate (LOPRESSOR) 50 MG tablet Take twice daily  Patient taking differently: Take 75 mg by mouth 2 times daily Take twice daily 8/2/21   ROQUE Oneil CNP     Coumadin Use Last 7 Days:  no  Antiplatelet drug therapy use last 7 days: no  Other anticoagulant use last 7 days: no  Additional Medication Information:        Pre-Sedation Documentation and Exam:   I have reviewed the patient's history and review of systems.     Mallampati Airway Assessment:  normal neck range of motion    Prior History of Anesthesia Complications:   none    ASA Classification:  Class 2 - A normal healthy patient with mild systemic disease    Sedation/ Anesthesia Plan:   intravenous sedation    Medications Planned: midazolam (Versed) intravenously and fentanyl intravenously    Patient is an appropriate candidate for plan of sedation: yes    Electronically signed by Dave Rose MD on 12/6/2022 at 2:47 PM

## 2022-12-06 NOTE — PROGRESS NOTES
Speech Language Pathology    Dysphagia tx attempted. Patient NPO for procedure later this date. ST to re-attempt 12/7/2022 unless otherwise notified. Thank you. Lacey Gray, Shivam Klein, #[id number]  Speech-Language Pathologist  Portable phone: [phone]

## 2022-12-07 VITALS
WEIGHT: 136.24 LBS | BODY MASS INDEX: 17.49 KG/M2 | HEART RATE: 89 BPM | RESPIRATION RATE: 18 BRPM | OXYGEN SATURATION: 98 % | DIASTOLIC BLOOD PRESSURE: 64 MMHG | SYSTOLIC BLOOD PRESSURE: 105 MMHG | TEMPERATURE: 98.1 F | HEIGHT: 74 IN

## 2022-12-07 LAB
GLUCOSE BLD-MCNC: 118 MG/DL (ref 70–99)
GLUCOSE BLD-MCNC: 130 MG/DL (ref 70–99)
GLUCOSE BLD-MCNC: 223 MG/DL (ref 70–99)
PERFORMED ON: ABNORMAL

## 2022-12-07 PROCEDURE — 97530 THERAPEUTIC ACTIVITIES: CPT

## 2022-12-07 PROCEDURE — 92526 ORAL FUNCTION THERAPY: CPT

## 2022-12-07 PROCEDURE — 6370000000 HC RX 637 (ALT 250 FOR IP): Performed by: HOSPITALIST

## 2022-12-07 PROCEDURE — 99233 SBSQ HOSP IP/OBS HIGH 50: CPT | Performed by: INTERNAL MEDICINE

## 2022-12-07 PROCEDURE — 94760 N-INVAS EAR/PLS OXIMETRY 1: CPT

## 2022-12-07 PROCEDURE — 6360000002 HC RX W HCPCS: Performed by: HOSPITALIST

## 2022-12-07 PROCEDURE — 2580000003 HC RX 258: Performed by: INTERNAL MEDICINE

## 2022-12-07 PROCEDURE — 6360000002 HC RX W HCPCS: Performed by: INTERNAL MEDICINE

## 2022-12-07 RX ORDER — SODIUM BICARBONATE 650 MG/1
650 TABLET ORAL
Qty: 90 TABLET | Refills: 1 | Status: SHIPPED | OUTPATIENT
Start: 2022-12-08

## 2022-12-07 RX ADMIN — NIFEDIPINE 90 MG: 90 TABLET, EXTENDED RELEASE ORAL at 10:35

## 2022-12-07 RX ADMIN — SUCRALFATE 1 G: 1 TABLET ORAL at 16:40

## 2022-12-07 RX ADMIN — SUCRALFATE 1 G: 1 TABLET ORAL at 06:42

## 2022-12-07 RX ADMIN — INSULIN LISPRO 4 UNITS: 100 INJECTION, SOLUTION INTRAVENOUS; SUBCUTANEOUS at 16:41

## 2022-12-07 RX ADMIN — PANTOPRAZOLE SODIUM 40 MG: 40 TABLET, DELAYED RELEASE ORAL at 06:43

## 2022-12-07 RX ADMIN — CEFAZOLIN 1000 MG: 1 INJECTION, POWDER, FOR SOLUTION INTRAMUSCULAR; INTRAVENOUS at 14:09

## 2022-12-07 RX ADMIN — SODIUM BICARBONATE 650 MG: 650 TABLET ORAL at 12:58

## 2022-12-07 RX ADMIN — SODIUM BICARBONATE 650 MG: 650 TABLET ORAL at 16:39

## 2022-12-07 RX ADMIN — CALCITRIOL 0.5 MCG: 0.25 CAPSULE ORAL at 09:17

## 2022-12-07 RX ADMIN — SUCRALFATE 1 G: 1 TABLET ORAL at 09:17

## 2022-12-07 RX ADMIN — METOPROLOL TARTRATE 75 MG: 50 TABLET, FILM COATED ORAL at 09:17

## 2022-12-07 RX ADMIN — HEPARIN SODIUM 5000 UNITS: 5000 INJECTION INTRAVENOUS; SUBCUTANEOUS at 15:11

## 2022-12-07 RX ADMIN — ISOSORBIDE MONONITRATE 60 MG: 60 TABLET, EXTENDED RELEASE ORAL at 09:17

## 2022-12-07 RX ADMIN — PANTOPRAZOLE SODIUM 40 MG: 40 TABLET, DELAYED RELEASE ORAL at 15:36

## 2022-12-07 RX ADMIN — HEPARIN SODIUM 5000 UNITS: 5000 INJECTION INTRAVENOUS; SUBCUTANEOUS at 06:43

## 2022-12-07 RX ADMIN — INSULIN LISPRO 2 UNITS: 100 INJECTION, SOLUTION INTRAVENOUS; SUBCUTANEOUS at 16:41

## 2022-12-07 RX ADMIN — HYDRALAZINE HYDROCHLORIDE 100 MG: 50 TABLET, FILM COATED ORAL at 09:17

## 2022-12-07 ASSESSMENT — PAIN SCALES - GENERAL
PAINLEVEL_OUTOF10: 0

## 2022-12-07 NOTE — CARE COORDINATION
[facility]    Referral received from CM to follow for home care services. I will follow for needs, and speak with patient to verify demos. Patient needs to schedule with new PCP Dr Raven Lorenzo before he will follow for [facility].. Spoke with Lorena Galicia at Dr Kat Adams (nephro) office, waiting on return call to verify if they will follow for [facility].. Spouse updated, will call new PCP to get scheduled.     Unable to confirm that one of the above MD's will follow for [facility].,  [facility] CRE notified, will follow up with PCP office to obtain new referral.      Olive Longo RN, BSN [address] [facility] [phone]

## 2022-12-07 NOTE — PROGRESS NOTES
Infectious Disease Follow up Notes  Admit Date: 11/26/2022  Hospital Day: 12    Antibiotics :   IV Cefazolin with HD sessions     CHIEF COMPLAINT:     Severe sepsis  Fevers   Staph aureus Bacteremia  ESRD  HD line infection       Subjective interval History :  65 y. o.man with a history of end-stage renal disease on hemodialysis, CHF, CVA, diabetes, hypercholesterolemia, hypertension, with some left weakness admitted to the hospital secondary to fever chills not. He felt cold and flulike symptoms ongoing for the past 4 to 5 days. T-max 100.3 since admission, labs indicate procalcitonin elevated 92.7 potassium 5 6 blood glucose of 500, WBC elevated 19.4 with left shift hemoglobin 10.7 blood cultures no reported to be Staph aureus 4/4 bottles MSSA, tested negative for rapid flu and COVID-19, CT head negative CT chest indicating focal consolidation right lower lobe, also pericardial effusion noted. We are consulted for IV abx recommendation. Interval History : Fevers resolved WBC trending down tolerating antibiotic therapy PICC line working well outpatient antibiotic therapy discussed in detail with the patient and his wife.   He was receiving IV antibiotic with hemodialysis    Past Medical History:    Past Medical History:   Diagnosis Date    Cardiomyopathy ([facility] [facility]     CHF (congestive heart failure) (HCC)     CVA (cerebral infarction) 5/2015    Diabetes mellitus ([facility] [address]     Foot ulcer, left ([facility] 1/14/2016    Gastroparesis     Hemodialysis patient ([facility] [address]     Hypercholesteremia     Hypertension     Kidney disease     Unspecified cerebral artery occlusion with cerebral infarction     5/15, 7/15 LEFT SIDE WEAKNESS       Past Surgical History:    Past Surgical History:   Procedure Laterality Date    CATHETER INSERTION N/A 10/17/2022    TUNNEL CATHETER PLACEMENT performed by Jaylin Dumont MD at [place] [address] N/A 5/17/2021 COLONOSCOPY POLYPECTOMY SNARE/COLD BIOPSY performed by Lorena Penaloza MD at [address] [place] [address] N/A 10/17/2022    PERITONEAL DIALYSIS CATHETER REMOVAL performed by Luzmaria Rivera MD at [facility] N/A 10/29/2020    PLACEMENT OF A TUNNELED DIALYSIS CATHETER WITH FLEURO AND ULTRASOUND performed by Luzmaria Rivera MD at [facility] NONTUNNELED VASCULAR CATHETER  11/29/2022    IR NONTUNNELED VASCULAR CATHETER 11/29/2022 [facility]    LAPAROSCOPY INSERTION PERITONEAL CATHETER N/A 10/29/2020    LAPAROSCOPIC PERITONEAL DIALYSIS CATHETER PLACEMENT WITH FLEURO AND ULTRASOUND performed by Luzmaria Rivera MD at [address] [facility] N/A 72/09/0475    UMBILICAL HERNIA REPAIR performed by Luzmaria Rivera MD at [address] [facility] N/A 4/26/2021    ESOPHAGOGASTRODUODENOSCOPY performed by Lorena Penaloza MD at [address] [facility] 9/13/2022    EGD DIAGNOSTIC ONLY performed by Ashutosh Blanca MD at [address] [facility]       Current Medications:    No outpatient medications have been marked as taking for the 11/26/22 encounter [facility] Encounter).        Allergies:  Doxazosin, Cefepime, and Coconut flavor    Immunizations :   Immunization History   Administered Date(s) Administered    COVID-19, PFIZER GRAY top, DO NOT Dilute, (age 15 y+), IM, 30 mcg/0.3 mL 06/04/2022    COVID-19, PFIZER PURPLE top, DILUTE for use, (age 15 y+), 30mcg/0.3mL 06/23/2021, 07/19/2021    Hepatitis A Adult (Havrix, Vaqta) 03/10/2021, 09/23/2021    Hepatitis B 05/06/2022    Hepatitis B Adult (Heplisav-b) 05/06/2022    Hepatitis B Adult (Recombivax HB) 11/05/2020    Hepatitis B vaccine 11/05/2020    Influenza Vaccine, unspecified formulation 12/05/2013    Influenza Virus Vaccine 12/05/2013, 12/04/2015, 11/05/2020, 10/11/2021    Influenza, FLUARIX, FLULAVAL, FLUZONE (age 10 mo+) AND AFLURIA, (age 1 y+), PF, 0.5mL 10/15/2018, 11/05/2020    Influenza, Triv, 3 Years and older, IM, PF (Afluria 5yrs and older) 10/11/2021    Pneumococcal Conjugate 13-valent ([id number]) 11/10/2020    Pneumococcal Conjugate Vaccine 02/13/2015    Pneumococcal Polysaccharide ([id number]) 11/02/2009, 03/10/2021    Tdap (Boostrix, Adacel) 08/27/2021    Zoster Recombinant (Shingrix) 03/10/2021, 08/27/2021       Social History:     Social History     Tobacco Use    Smoking status: Some Days     Packs/day: 0.00     Years: 30.00     Pack years: 0.00     Types: Cigars, Cigarettes     Start date: 1/3/1979     Last attempt to quit: 1/16/2019     Years since quitting: 3.8    Smokeless tobacco: Never    Tobacco comments:     3 black and milds a week   Vaping Use    Vaping Use: Never used   Substance Use Topics    Alcohol use: Yes     Comment: occasional    Drug use: Yes     Types: Marijuana Veljosé Sanchez)     Comment: previously used cocaine, stopped May 2015 LAST USED MARIJUANA-NOVEMBER 2020     Social History     Tobacco Use   Smoking Status Some Days    Packs/day: 0.00    Years: 30.00    Pack years: 0.00    Types: Cigars, Cigarettes    Start date: 1/3/1979    Last attempt to quit: 1/16/2019    Years since quitting: 3.8   Smokeless Tobacco Never   Tobacco Comments    3 black and milds a week      Family History   Adopted: Yes          REVIEW OF SYSTEMS:      Constitutional:  no  fevers  chills, night sweats  Eyes:  negative for blurred vision, eye discharge, visual disturbance   HEENT:  negative for hearing loss, ear drainage,nasal congestion  Respiratory:  negative for cough, shortness of breath or hemoptysis   Cardiovascular:  negative for chest pain, palpitations, syncope  Gastrointestinal:  negative for nausea, vomiting, diarrhea, constipation, abdominal pain  Genitourinary:  negative for frequency, dysuria, urinary incontinence, hematuria  Hematologic/Lymphatic:  negative for easy bruising, bleeding and lymphadenopathy  Allergic/Immunologic:  negative for recurrent infections, angioedema, anaphylaxis   Endocrine:  negative for weight changes, polyuria, polydipsia and polyphagia  Musculoskeletal:  negative for joint  pain, swelling, decreased range of motion  Integumentary: No rashes, skin lesions  Neurological:  negative for headaches, slurred speech, unilateral weakness  Psychiatric: negative for hallucinations,confusion,agitation.                 PHYSICAL EXAM:      Vitals:  T max  102.8   /74   Pulse 88   Temp 97.9 °F (36.6 °C) (Oral)   Resp 18   Ht 6' 2\" (1.88 m)   Wt 136 lb 3.9 oz (61.8 kg)   SpO2 100%   BMI 17.49 kg/m²     General Appearance: alert,in some  acute distress,++  pallor, no icterus cachexia + chronic ill appearing man +    Skin: warm and dry, no rash or erythema  Head: normocephalic and atraumatic  Eyes: pupils equal, round, and reactive to light, conjunctivae normal  ENT: tympanic membrane, external ear and ear canal normal bilaterally, nose without deformity, nasal mucosa and turbinates normal without polyps  Neck: supple and non-tender without mass, no thyromegaly  no cervical lymphadenopathy  Pulmonary/Chest: clear to auscultation bilaterally- no wheezes, rales or rhonchi, normal air movement, no respiratory distress  Cardiovascular:  S1 and S2, esm+  murmurs, rubs, clicks, or gallops, no carotid bruits  Abdomen: soft, non-tender, non-distended, normal bowel sounds, no masses or organomegaly  Extremities: no cyanosis, clubbing or edema  Musculoskeletal: normal range of motion, no joint swelling, deformity or tenderness  Integumentary: No rashes, no abnormal skin lesions, no petechiae  Neurologic: reflexes normal and symmetric, no cranial nerve deficit  Psych:  Orientation, sensorium, mood normal            Lines: HD line +      Data Review:    CBC:   Lab Results   Component Value Date    WBC 14.4 (H) 12/06/2022    HGB 9.4 (L) 12/06/2022    HCT 29.8 (L) 12/06/2022    MCV 92.4 12/06/2022     12/06/2022     RENAL:   Lab Results Component Value Date    CREATININE 7.3 (HH) 12/06/2022    BUN 41 (H) 12/06/2022     (L) 12/06/2022    K 4.1 12/06/2022    CL 92 (L) 12/06/2022    CO2 23 12/06/2022     SED RATE:   Lab Results   Component Value Date/Time    SEDRATE 64 10/26/2020 02:14 PM     CK: No results found for: CKTOTAL  CRP:   Lab Results   Component Value Date/Time    CRP 2.9 10/26/2020 02:14 PM     Hepatic Function Panel:   Lab Results   Component Value Date/Time    ALKPHOS 104 11/26/2022 09:26 AM    ALT 13 11/26/2022 09:26 AM    AST 12 11/26/2022 09:26 AM    PROT 8.0 11/26/2022 09:26 AM    BILITOT <0.2 11/26/2022 09:26 AM    BILIDIR <0.2 10/11/2022 02:41 PM    IBILI see below 10/11/2022 02:41 PM    LABALBU 2.8 12/06/2022 06:40 AM     UA:  Lab Results   Component Value Date/Time    COLORU Yellow 11/26/2022 01:32 PM    CLARITYU Clear 11/26/2022 01:32 PM    GLUCOSEU >=1000 11/26/2022 01:32 PM    GLUCOSEU 250 12/14/2010 02:50 PM    BILIRUBINUR Negative 11/26/2022 01:32 PM    BILIRUBINUR NEGATIVE 12/14/2010 02:50 PM    KETUA Negative 11/26/2022 01:32 PM    SPECGRAV 1.019 11/26/2022 01:32 PM    BLOODU TRACE 11/26/2022 01:32 PM    PHUR 7.5 11/26/2022 01:32 PM    PROTEINU 300 11/26/2022 01:32 PM    UROBILINOGEN 0.2 11/26/2022 01:32 PM    NITRU Negative 11/26/2022 01:32 PM    LEUKOCYTESUR Negative 11/26/2022 01:32 PM    LABMICR YES 11/26/2022 01:32 PM    URINETYPE Cleancatch 11/26/2022 01:32 PM      Urine Microscopic:   Lab Results   Component Value Date/Time    BACTERIA None Seen 11/26/2022 01:32 PM    COMU see below 05/05/2021 05:36 PM    HYALCAST 0 11/26/2022 01:32 PM    WBCUA 3 11/26/2022 01:32 PM    RBCUA 9 11/26/2022 01:32 PM    EPIU 0 11/26/2022 01:32 PM     Urine Reflex to Culture:   Lab Results   Component Value Date/Time    URRFLXCULT Not Indicated 11/26/2022 01:32 PM         MICRO: cultures reviewed and updated by me   Blood Culture:   Organism Staphylococcus aureus Abnormal     Culture Catheter Tip 15 colonies    Resulting Agency [facility]        Susceptibility    Staphylococcus aureus (1)    Antibiotic Interpretation Microscan  Method Status    ceFAZolin Sensitive <=4 mcg/mL BACTERIAL SUSCEPTIBILITY PANEL BY DYLON     clindamycin Sensitive <=0.5 mcg/mL BACTERIAL SUSCEPTIBILITY PANEL BY DYLON     erythromycin Sensitive <=0.5 mcg/mL BACTERIAL SUSCEPTIBILITY PANEL BY DYLON     oxacillin Sensitive <=0.25 mcg/mL BACTERIAL SUSCEPTIBILITY PANEL BY DYLON     tetracycline Sensitive <=4 mcg/mL BACTERIAL SUSCEPTIBILITY PANEL BY DYLON     trimethoprim-sulfamethoxazole Sensitive <=0.5/9.5 mcg/mL BACTERIAL SUSCEPTIBILITY PANEL BY DYLON        Narrative  Performed by: [facility]  ORDER#: [id number]                          ORDERED BY: Talita Coronel   SOURCE: Catheter Tip                       COLLECTED:  11/29/22 11:40            Lab Results   Component Value Date/Time    BC No Growth after 4 days of incubation. 11/28/2022 06:59 AM    BLOODCULT2 No Growth after 4 days of incubation. 11/28/2022 06:59 AM       Respiratory Culture:  Lab Results   Component Value Date/Time    LABGRAM  10/16/2022 11:00 AM     No Epithelial Cells seen  No WBCs or organisms seen       AFB:No results found for: [facility]  Viral Culture:  Lab Results   Component Value Date/Time    COVID19 Not Detected 11/26/2022 09:26 AM     Urine Culture: No results for input(s): LABURIN in the last 72 hours. IMAGING:    IR REMOVE TUNNELED CVAD WO SQ PORT/PUMP   Final Result   Successful removal of tunneled dialysis catheter. Successful fluoroscopic guided non tunneled Trialysis catheter placement. IR NONTUNNELED VASCULAR CATHETER > 5 YEARS   Final Result   Successful removal of tunneled dialysis catheter. Successful fluoroscopic guided non tunneled Trialysis catheter placement. MRI BRAIN WO CONTRAST   Final Result   1. No acute intracranial abnormality. No acute infarct.    2. Mild global parenchymal volume loss with chronic microvascular ischemic changes. 3. Small lipoma is again seen along the right dorsal midbrain. CT CHEST PULMONARY EMBOLISM W CONTRAST   Final Result   1. Focal consolidation in the right lower lobe favoring   infectious/inflammatory process. Recommend CT of the chest in 3 months to   confirm resolution. 2. Solid subcentimeter right pulmonary nodules can be assessed during the   same time. 3. Cardiomegaly with unchanged moderate to large pericardial effusion. 4. Age-indeterminate L1 compression fracture. If there is point tenderness,   recommend nonemergent MRI of the lumbar spine. CT HEAD WO CONTRAST   Final Result   1. No acute intracranial abnormality. XR CHEST PORTABLE   Final Result   Stable increased opacity left basilar region compared to prior studies dating   back to 10/25/2020 consistent with pericardial effusion, loculated left   basilar pleural effusion, pulmonary consolidation or mass/neoplasm. 2 mm   opacity consistent with pulmonary nodule in the right basilar region and foci   of infiltrate or subsegmental atelectasis in the mid-lower lung fields noted   bilaterally. IV contrast-enhanced chest CT suggested for further evaluation.          IR TUNNELED CVC PLACE WO SQ PORT/PUMP > 5 YEARS    (Results Pending)         All the pertinent images and reports for the current Hospitalization were reviewed by me     Scheduled Meds:   [START ON 12/8/2022] ceFAZolin  2,000 mg IntraVENous Once per day on Tue Thu Sat    sodium bicarbonate  650 mg Oral TID WC    sodium chloride flush  5-40 mL IntraVENous 2 times per day    heparin (porcine)  5,000 Units SubCUTAneous 3 times per day    insulin lispro  0-8 Units SubCUTAneous TID WC    insulin lispro  0-4 Units SubCUTAneous Nightly    insulin lispro  0.05 Units/kg SubCUTAneous TID WC    insulin glargine  0.25 Units/kg SubCUTAneous Nightly    calcitRIOL  0.5 mcg Oral Daily    atorvastatin  40 mg Oral Nightly    metoprolol tartrate  75 mg Oral BID NIFEdipine  90 mg Oral BID    pantoprazole  40 mg Oral BID AC    sucralfate  1 g Oral 4x Daily AC & HS    hydrALAZINE  100 mg Oral TID    isosorbide mononitrate  60 mg Oral Daily       Continuous Infusions:   sodium chloride 25 mL/hr at 12/03/22 2306    dextrose       Summary   Limited study. Overall left ventricular systolic function appears moderately reduced. Ejection fraction is visually estimated to be 35-40% with diffuse   hypokinesis. There is severely increased left ventricular wall thickness. Left ventricular cavity size is normal.   The right ventricle is not well visualized. Tricuspid aortic valve leaflets appear thickened/calcified but no clear   mobile structure to suggest a vegetation. There is a moderate pericardial effusion.       PRN Meds:  heparin (porcine), perflutren lipid microspheres, sodium chloride flush, sodium chloride, ondansetron **OR** ondansetron, polyethylene glycol, acetaminophen **OR** acetaminophen, glucose, dextrose bolus **OR** dextrose bolus, glucagon (rDNA), dextrose, meclizine      Assessment:     Patient Active Problem List   Diagnosis    Nonischemic cardiomyopathy ([address]    Cerebral infarction ([address]    Cigarette nicotine dependence without complication    Erectile dysfunction due to arterial insufficiency    Renal artery stenosis (HCC)    Chronic diastolic congestive heart failure (HCC)    H/O: CVA (cerebrovascular accident)    Major depressive disorder, recurrent episode, moderate (HCC)    Pericardial effusion    Hypertension associated with diabetes ([address]    DM type 2 with diabetic mixed hyperlipidemia ([address]    Diabetes mellitus due to underlying condition with stage 4 chronic kidney disease, with long-term current use of insulin (HCC)    ESRD (end stage renal disease) on dialysis ([address]    ESRD (end stage renal disease) ([address]    GI bleed    SBP (spontaneous bacterial peritonitis) ([address]    Dialysis-associated peritonitis ([address]    Candida infection    Generalized Recheck Pro-Gabriele improving   anticipate 4- 6 weeks of IV antibiotic with hemodialysis sessions at discharge  [address] [place] done      Discussed with patient/Family and Nursing discussed with his wife in detail  Risk of Complications/Morbidity: High      Illness(es)/ Infection present that pose threat to bodily function. There is potential for severe exacerbation of infection/side effects of treatment. Therapy requires intensive monitoring for antimicrobial agent toxicity. Discussed with patient/Family and Nursing staff     Thanks for allowing me to participate in your patient's care and please call me with any questions or concerns.     Esme Lloyd MD  Infectious Disease  [facility]) Physician  Phone: [phone]   Fax : [phone]

## 2022-12-07 NOTE — DISCHARGE INSTR - COC
Continuity of Care Form    Patient Name: Payton Londono   :  1965  MRN:  [id number]    Admit date:  2022  Discharge date:  2022    Code Status Order: Full Code   Advance Directives:     Admitting Physician:  No admitting provider for patient encounter. PCP: No primary care provider on file.     Discharging Nurse: [facility] Unit/Room#: [id number]/[id number]  Discharging Unit Phone Number: [phone]    Emergency Contact:   Extended Emergency Contact Information  Primary Emergency Contact: Marizol Tejada  Address: [address] Phone: [phone]  Mobile Phone: [phone]  Relation: Spouse    Past Surgical History:  Past Surgical History:   Procedure Laterality Date    CATHETER INSERTION N/A 10/17/2022    TUNNEL CATHETER PLACEMENT performed by Magda Enamorado MD at [address] 2021    COLONOSCOPY POLYPECTOMY SNARE/COLD BIOPSY performed by Minna Zaldivar MD at [address] 10/17/2022    PERITONEAL DIALYSIS CATHETER REMOVAL performed by Magda Enamorado MD at [address] [place] [address] N/A 10/29/2020    PLACEMENT OF A TUNNELED DIALYSIS CATHETER WITH FLEURO AND ULTRASOUND performed by Magda Enamorado MD at [place] [address] NONTUNNELED VASCULAR CATHETER  2022    IR NONTUNNELED VASCULAR CATHETER 2022 [facility]ED[facility]    LAPAROSCOPY INSERTION PERITONEAL CATHETER N/A 10/29/2020    LAPAROSCOPIC PERITONEAL DIALYSIS CATHETER PLACEMENT WITH FLEURO AND ULTRASOUND performed by Magda Enamorado MD at [address] [place] [address] N/A     UMBILICAL HERNIA REPAIR performed by Magda Enmaorado MD at  [facility] 2021    ESOPHAGOGASTRODUODENOSCOPY performed by Minna Zaldivar MD at [address] [facility] 2022    EGD DIAGNOSTIC ONLY performed by Wilber Horner MD at [facility] ENDOSCOPY       Immunization History:   Immunization History   Administered Date(s) Administered    COVID-19, PFIZER GRAY top, DO NOT Dilute, (age 15 y+), IM, 30 mcg/0.3 mL 06/04/2022    COVID-19, PFIZER PURPLE top, DILUTE for use, (age 15 y+), 30mcg/0.3mL 06/23/2021, 07/19/2021    Hepatitis A Adult (Havrix, Vaqta) 03/10/2021, 09/23/2021    Hepatitis B 05/06/2022    Hepatitis B Adult (Heplisav-b) 05/06/2022    Hepatitis B Adult (Recombivax HB) 11/05/2020    Hepatitis B vaccine 11/05/2020    Influenza Vaccine, unspecified formulation 12/05/2013    Influenza Virus Vaccine 12/05/2013, 12/04/2015, 11/05/2020, 10/11/2021    Influenza, FLUARIX, FLULAVAL, FLUZONE (age 10 mo+) AND AFLURIA, (age 1 y+), PF, 0.5mL 10/15/2018, 11/05/2020    Influenza, Triv, 3 Years and older, IM, PF (Afluria 5yrs and older) 10/11/2021    Pneumococcal Conjugate 13-valent (Edyppzu20) 11/10/2020    Pneumococcal Conjugate Vaccine 02/13/2015    Pneumococcal Polysaccharide (Rnzoarhhe33) 11/02/2009, 03/10/2021    Tdap (Boostrix, Adacel) 08/27/2021    Zoster Recombinant (Shingrix) 03/10/2021, 08/27/2021       Active Problems:  Patient Active Problem List   Diagnosis Code    Nonischemic cardiomyopathy ([facility] I42.8    Cerebral infarction ([facility] I63.9    Cigarette nicotine dependence without complication A08.701    Erectile dysfunction due to arterial insufficiency N52.01    Renal artery stenosis (HCC) I70.1    Chronic diastolic congestive heart failure (HCC) I50.32    H/O: CVA (cerebrovascular accident) Z86.73    Major depressive disorder, recurrent episode, moderate (HCC) F33.1    Pericardial effusion I31.39    Hypertension associated with diabetes ([facility] E11.59, I15.2    DM type 2 with diabetic mixed hyperlipidemia (HCC) E11.69, E78.2    Diabetes mellitus due to underlying condition with stage 4 chronic kidney disease, with long-term current use of insulin ([facility]) E08.22, N18.4, Z79.4    ESRD (end stage renal disease) on dialysis ([facility] N18.6, Z99.2    ESRD (end stage renal disease) ([facility] N18.6    GI bleed K92.2    SBP (spontaneous bacterial peritonitis) ([facility] K65.2    Dialysis-associated peritonitis ([facility] T85.71XA    Candida infection B37.9    Generalized abdominal pain R10.84    PKD (polycystic kidney disease) Q61.3    Bacterial pneumonia J15.9    Encephalopathy acute G93.40    Elevated lactic acid level R79.89    Hyperkalemia E87.5    Severe sepsis (HCC) A41.9, R65.20    Staphylococcus aureus bacteremia with sepsis (HCC) A41.01    Neutrophilia D72.9    Elevated procalcitonin R79.89    ESRD needing dialysis ([facility] N18.6, Z99.2    Severe malnutrition ([facility] E43       Isolation/Infection:   Isolation            Contact          Patient Infection Status       Infection Onset Added Last Indicated Last Indicated By Review Planned Expiration Resolved Resolved By    MRSA 11/27/22 11/27/22 11/27/22 MRSA DNA Probe, Nasal        Resolved    COVID-19 (Rule Out) 11/26/22 11/26/22 11/26/22 COVID-19, Rapid (Ordered)   11/26/22 Rule-Out Test Resulted    COVID-19 (Rule Out) 09/13/22 09/13/22 09/15/22 COVID-19, Rapid (Ordered)   09/15/22 Rule-Out Test Resulted    COVID-19 (Rule Out) 10/27/20 10/27/20 10/27/20 COVID-19 (Ordered)   10/27/20 Rule-Out Test Resulted            Nurse Assessment:  Last Vital Signs: BP (!) 160/86   Pulse 97   Temp 98.7 °F (37.1 °C) (Oral)   Resp 17   Ht 6' 2\" (1.88 m)   Wt 135 lb 5.8 oz (61.4 kg)   SpO2 99%   BMI 17.38 kg/m²     Last documented pain score (0-10 scale): Pain Level: 0  Last Weight:   Wt Readings from Last 1 Encounters:   12/06/22 135 lb 5.8 oz (61.4 kg)     Mental Status:  oriented and alert    IV Access:  - Dialysis Catheter  - site  right, insertion date: 12/06/22    Nursing Mobility/ADLs:  Walking   Assisted  Transfer  Assisted  Bathing  Assisted  Dressing  Assisted  Toileting  Assisted  Feeding  Independent  Med Admin  Independent  Med Delivery   whole    Wound Care Documentation and Therapy:  Incision 10/17/22 Internal jugular Right (Active)   Number of days: 50       Incision 10/17/22 Abdomen Left (Active)   Number of days: 50       Incision 10/29/20 Chest Anterior;Right (Active)   Number of days: 768       Incision 10/29/20 Abdomen Anterior (Active)   Number of days: 768        Elimination:  Continence: Bowel: Yes  Bladder: Yes  Urinary Catheter: None   Colostomy/Ileostomy/Ileal Conduit: No       Date of Last BM: 12/05/12    Intake/Output Summary (Last 24 hours) at 12/7/2022 0022  Last data filed at 12/6/2022 2335  Gross per 24 hour   Intake 360 ml   Output 350 ml   Net 10 ml     I/O last 3 completed shifts: In: 240 [P.O.:240]  Out: 350 [Urine:350]    Safety Concerns: At Risk for Falls    Impairments/Disabilities:      Left sided weakness     Nutrition Therapy:  Current Nutrition Therapy: Regular; 5 carb choices (75 gm/meal); No Added Salt (3-4 gm); Low Potassium (Less than 3000 mg/day)      Routes of Feeding: Oral  Liquids: Thin Liquids  Daily Fluid Restriction: yes - amount 2000 ml   Last Modified Barium Swallow with Video (Video Swallowing Test): not done    Treatments at the Time of Hospital Discharge:   Respiratory Treatments: n/a   Oxygen Therapy:  is not on home oxygen therapy.   Ventilator:    - No ventilator support    Rehab Therapies: Physical Therapy and Occupational Therapy  Weight Bearing Status/Restrictions: No weight bearing restrictions  Other Medical Equipment (for information only, NOT a DME order):  walker  Other Treatments: [facility]: LEVEL 3 [address] to establish plan of care for patient over 60 day period   Nursing  Initial home SN evaluation visit to occur within 24-48 hours for:  1)  medication management  2)  VS and clinical assessment  3)  S&S chronic disease exacerbation education + when to contact MD/NP  4)  care coordination  Medication Reconciliation during 1st SN visit  PT/OT/Speech   Evaluations in home within 24-48 hours of discharge to include DME and home safety   Frontload therapy 5 days, then 3x a week   OT to evaluate if patient has [address] needs for personal care    evaluation within 24-48 hours to evaluate resources & insurance for potential [place], [place], LTC, and Medicaid options   Palliative Care referral within 5 days of hospital discharge   PCP Visit scheduled within 3 - 7 days of hospital discharge    [address] (If patient is agreeable and meets guidelines)      Patient's personal belongings (please select all that are sent with patient):  [place]    RN SIGNATURE:  Electronically signed by Jared Kinney RN on 12/7/22 at 3:30 PM EST    CASE MANAGEMENT/SOCIAL WORK SECTION    Inpatient Status Date: 11/26/2022    Readmission Risk Assessment Score:  Readmission Risk              Risk of Unplanned Readmission:  35           Discharging to Facility/ Agency   Name:  [facility] care    Address: [address] [place] [address], [address] [facility] [address]  Phone: [phone]  Fax: [phone]      Dialysis Facility (if applicable)   Name: [facility]  Address: [address] [place] [address]  Dialysis Schedule: Tues/Thur/Sat Chair time: 10:45 AM  Phone: [phone]  Fax: [phone]    / signature: Electronically signed by Iram Elam RN on 12/7/22 at 2:45 PM EST    PHYSICIAN SECTION    Prognosis: Good    Condition at Discharge: Stable    Rehab Potential (if transferring to Rehab): Good    Recommended Labs or Other Treatments After Discharge:   IV Cefazolin x 2 gm with HD sessions x  3 times a week stop date  1/7/23  CBC with diff, ESR, CRP WEEKLY  Fax results to [phone]  NO ID follow up   First dose given in hospital     [address] [facility]  Phone: [phone]   Fax : [phone]       Physician Certification: I certify the above information and transfer of Rene Hong  is necessary for the continuing treatment of the diagnosis listed and that he requires [place] for less 30 days.      Update Admission H&P: No change in H&P    PHYSICIAN SIGNATURE:  Electronically signed by ROQUE Whipple CNP on 12/7/22 at 10:18 AM EST/ Dr. Stephon Peacock

## 2022-12-07 NOTE — CARE COORDINATION
CASE MANAGEMENT DISCHARGE SUMMARY: Discharge order noted. Patient refused to discharge to a skilled nursing facility as recommended by therapy. Discharging to home with home healthcare services for PT/OT/skilled nursing. Will resume outpatient HD services with [facility] on regular T/Th/S schedule with IV antibiotic given with HD. Order faxed to [facility] and facility notified. DISCHARGE DATE: 12/7/22    DISCHARGED TO: Home with [place]    Discharging to Facility/ Agency   Name:  [facility] care    Address: [address] [place] [address], [address] [place] [address], [place] [address]  Phone: [phone]  Fax: [phone]      Discharging Dialysis Facility:  Name: [facility]  Address:  [address] [place] [address]  Phone: [phone]  Fax: [phone]                 TRANSPORTATION: Spouse             TIME: Afternoon   N/A Form completed and on chart    PREFERRED PHARMACY: [facility]             NUMBER: [phone]    INSURANCE PRECERT OBTAINED: N/A    HENS/PASAAEL COMPLETED: N/A    Yes JEFF Updated   Yes Case Management   Yes Physician   Yes Nurse    Yes Destination updated (SNF/HHC)    Yes Whiteboard Note Updated with above    Yes Home Care Order Verified     Colt AMATO RN  Case Management  [phone]    Electronically signed by Colt Padilla RN on 12/7/2022 at 3:21 PM

## 2022-12-07 NOTE — PROGRESS NOTES
Occupational Therapy  Facility/Department: [facility]  Occupational Therapy Daily Treatment    Name: Lena Son  : 1965  MRN: [id number]  Date of Service: 2022    Discharge Recommendations:  24 hour supervision or assist, 2-3 sessions per week, S Level 3 ( hands-on A)     Lena Son scored a 16/24 on the AM-PAC ADL Inpatient form. Current research shows that an AM-PAC score of 18 or greater is typically associated with a discharge to the patient's home setting. Based on the patient's AM-PAC score, and their current ADL deficits, it is recommended that the patient have 2-3 sessions per week of Occupational Therapy at d/c to increase the patient's independence. At this time, this patient demonstrates the endurance and safety to discharge home with  assist and [place] OT L3 (did recommend SNF but pt/wife refusing) and a follow up treatment frequency of 2-3x/wk. Please see assessment section for further patient specific details. If patient discharges prior to next session this note will serve as a discharge summary. Please see below for the latest assessment towards goals. Patient Diagnosis(es): The primary encounter diagnosis was Encephalopathy acute. Diagnoses of ESRD needing dialysis ([address] Severe sepsis ([address] Hyperkalemia, Type 2 diabetes mellitus with hyperglycemia, with long-term current use of insulin (HCC), Elevated lactic acid level, Pneumonia of right lower lobe due to infectious organism, Pulmonary nodule, Closed compression fracture of L1 vertebra, initial encounter ([address] and Pericardial effusion were also pertinent to this visit.   Past Medical History:  has a past medical history of Cardiomyopathy ([address] CHF (congestive heart failure) ([address] CVA (cerebral infarction), Diabetes mellitus ([address] Foot ulcer, left ([address] Gastroparesis, Hemodialysis patient ([address] Hypercholesteremia, Hypertension, Kidney disease, and Unspecified cerebral artery occlusion with cerebral infarction. Past Surgical History:  has a past surgical history that includes LAPAROSCOPY INSERTION PERITONEAL CATHETER (N/A, 10/29/2020); Umbilical hernia repair (N/A, 10/29/2020); hc dialysis catheter (N/A, 10/29/2020); Upper gastrointestinal endoscopy (N/A, 4/26/2021); Colonoscopy (N/A, 5/17/2021); Upper gastrointestinal endoscopy (N/A, 9/13/2022); Dialysis Catheter Removal (N/A, 10/17/2022); Catheter Insertion (N/A, 10/17/2022); IR NONTUNNELED VASCULAR CATHETER > 5 YEARS (11/29/2022); and IR TUNNELED CVC PLACE WO SQ PORT/PUMP > 5 YEARS (12/6/2022). Treatment Diagnosis: Decreased: ADLs, fxl transfers/mobility      Assessment   Performance deficits / Impairments: Decreased functional mobility ; Decreased ADL status; Decreased balance;Decreased endurance;Decreased cognition;Decreased safe awareness;Decreased strength;Decreased ROM; Decreased coordination  Assessment: Pt is a 64 yo M w/ PMHx ESRD on HD who was admitted with chills, fevers, and AMS. BS found to be 503 in the ED and he was lethargic. PTA, pt lives at home w/ his wife where he is typically independent in self-care and fxl mobility using SPC. Pt remains below baseline, but showed improvement today. He required min A for bed mobility and min A functional transfers. He completed functional mobility x2 short distances w/ CGA and 4WW - unsteady throughout and needed seated rest break. He required max A LB dressing. Pt's wife at bedside reports she thinks they will be able to manage at home. If returning home, recommend strict 24/7 assistance and HH OT L1 to increase pt's safety/independence and reduce fall risk. Treatment Diagnosis: Decreased: ADLs, fxl transfers/mobility  Prognosis: Fair  REQUIRES OT FOLLOW-UP: Yes  Activity Tolerance  Activity Tolerance: Patient limited by fatigue;Treatment limited secondary to decreased cognition        Plan   Occupational Therapy Plan  Times Per Week: 3-5  Times Per Day:  Once a day  Current Treatment Recommendations: Strengthening, Balance training, ROM, Functional mobility training, Endurance training, Safety education & training, Self-Care / ADL     Restrictions  Restrictions/Precautions  Restrictions/Precautions: Fall Risk, Contact Precautions    Subjective   General  Chart Reviewed: Yes  Patient assessed for rehabilitation services?: Yes  Additional Pertinent Hx: per H&P: \"65 y.o. male with ESRD on hemodialysis, hypertension, hyperlipidemia presented to emergency room with fevers, chills and rigors  for the past 2 days. This was associated with cough, sneezing. His blood sugars have been running high and states that he did not take his insulin. He was also lethargic and diaphoretic  Upon arrival to the ED, his blood sugar was 503, he appeared lethargic. \"  Family / Caregiver Present: Yes (wife)  Referring Practitioner: Barbara Adam MD  Diagnosis: Right lower lobe bacterial pneumonia  Subjective  Subjective: Pt met b/s for OT cotx w/ PT. Pt in bed, agreeable to therapy. Wants to go home     Social/Functional History  Social/Functional History  Lives With: Spouse (2 dogs)  Type of Home: House  Home Layout: One level  Home Access: Level entry  Bathroom Shower/Tub: Walk-in shower  Bathroom Toilet: Standard  Bathroom Equipment: Grab bars in shower, Built-in shower seat  Home Equipment: Robert Dakins, rolling  ADL Assistance: Independent  Ambulation Assistance: Independent (with SPC)  Transfer Assistance: Independent  Active : No  Patient's  Info: wife drives   Type of Occupation:  retiree  Additional Comments: Wife works from home, wife cooks       Objective   Observation/Palpation  Posture: Good  Observation: Thin, decreased muscle mass  Safety Devices  Type of Devices: All fall risk precautions in place;Call light within reach;Gait belt;Patient at risk for falls; Left in bed;Bed alarm in place           ADL  LE Dressing: Maximum assistance  LE Dressing Skilled Clinical Factors: Assist to don pajama pants from EOB        Bed mobility  Supine to Sit: Minimal assistance  Sit to Supine: Stand by assistance  Transfers  Sit to stand: Contact guard assistance;Minimal assistance  Stand to sit: Contact guard assistance;Minimal assistance  Transfer Comments: 9NY - cues for hand placement and safety. min A to hold 4WW in place - right brake not working. Pt ambulated ~5 ft w/ CGA before turning to sit on the seat of his 4WW -unsteady but no overt LOB. After 2-3 mins seated rest break, pt ambulated ~15 ft w/ SBA/CGA and 4WW  Vision  Vision: Impaired  Vision Exceptions: Wears glasses at all times  Hearing  Hearing: Within functional limits  Cognition  Overall Cognitive Status: Exceptions  Arousal/Alertness: Appropriate responses to stimuli  Following Commands: Follows one step commands with increased time; Follows one step commands with repetition  Attention Span: Difficulty attending to directions; Difficulty dividing attention  Memory: Decreased recall of recent events  Safety Judgement: Decreased awareness of need for safety  Problem Solving: Decreased awareness of errors  Insights: Decreased awareness of deficits  Initiation: Requires cues for some  Sequencing: Requires cues for some  Cognition Comment: Poor insight  Orientation  Overall Orientation Status: Impaired  Orientation Level: Oriented to person;Oriented to place                  Education Given To: Patient; Family  Education Provided: Role of Therapy;Plan of Care;Transfer Training;Energy Conservation; ADL Adaptive Strategies  Education Provided Comments: d/c recs w/ wife  Education Method: Verbal  Barriers to Learning: Cognition  Education Outcome: Continued education needed              AM-PAC Score  AM-PAC Inpatient Daily Activity Raw Score: 16 (12/07/22 1448)  AM-PAC Inpatient ADL T-Scale Score : 35.96 (12/07/22 1448)  ADL Inpatient CMS 0-100% Score: 53.32 (12/07/22 1448)  ADL Inpatient CMS G-Code Modifier : CK (12/07/22 8214)    Goals  Short Term Goals  Time Frame for Short Term Goals: Prior to d/c.  Status: goals ongoing  Short Term Goal 1: Pt will complete ADL transfers w/ CGA  Short Term Goal 2: Pt will toilet w/ CGA  Short Term Goal 3: Pt will groom in stance at sink w/ CGA  Short Term Goal 4: Pt will bathe w/ min A  Short Term Goal 5: Pt will increase strength and endurance to achieve the above goals  Long Term Goals  Time Frame for Long Term Goals : LTG=STG  Patient Goals   Patient goals : to go home       Therapy Time   Individual Concurrent Group Co-treatment   Time In [address]         Time Out 1445         Minutes [address] [place], [place] [address]

## 2022-12-07 NOTE — DISCHARGE SUMMARY
Hospital Medicine Discharge Summary    Patient ID: Sarwat Wilson      Patient's PCP: No primary care provider on file. Admit Date: 11/26/2022     Discharge Date:   12/7/22    Admitting Physician: No admitting provider for patient encounter. Discharge Physician: Renette Lennox, APRN - CNP       Discharge Diagnoses: Active Hospital Problems    Diagnosis Date Noted    Severe malnutrition ([facility] [E43] 12/01/2022     Priority: Medium    Encephalopathy acute [G93.40] 11/28/2022     Priority: Medium    Elevated lactic acid level [R79.89] 11/28/2022     Priority: Medium    Hyperkalemia [E87.5] 11/28/2022     Priority: Medium    Severe sepsis ([address] [A41.9, R65.20] 11/28/2022     Priority: Medium    Staphylococcus aureus bacteremia with sepsis ([facility] [address] [A41.01] 11/28/2022     Priority: Medium    Neutrophilia [D72.9] 11/28/2022     Priority: Medium    Elevated procalcitonin [R79.89] 11/28/2022     Priority: Medium    ESRD needing dialysis ([facility] [address] [N18.6, Z99.2] 11/28/2022     Priority: Medium    Bacterial pneumonia [J15.9] 11/26/2022     Priority: Medium       The patient was seen and examined on day of discharge and this discharge summary is in conjunction with any daily progress note from day of discharge. Disposition:  [x] Home  [] Home with home health [] Rehab [] Psych [] SNF  [] LTAC  [] Long term nursing home or group home [] Transfer to ICU  [] Transfer to PCU [] Other:    Hospital Course: Sarwat Wilson is being seen by nephrology for ERD. He is a 65 y.o. male with a PMH significant for ESRD, CHF, ADPKD, fungal peritonitis 2/2 PD catheter who presented to the ED 11/26/2022 with fever, hyperglycemia, and AMS. He was noted to be hyperkalemic on admission. Blood cx positive for MSSA     12/6: TDC to be placed today. HD to start today per nephrology. Saw the patient in HD. He is very upset and wants to go home. He is frustrated and just wants to go home and tired of everything.    Antibx x6-8 wks per ID   HD cath was leaking blood during HD. RNs made aware and redressed. Interval History/Subjective: patient can discharge with instructions for IV antibx with dialysis. JEFF with ID instructions. He will go either home with [facility] or to SNF. Wife is not sure she can take care of him and not sure if he will be agreeable to SNF      assessment/plan  -MSSA bacteremia with sepsis--concern for tunneled dialysis catheter infection-- vancomycin Lizbeth Yañez de-escalated to cefazolin 11/27--continue management per ID--follow-up on cultures. .   -TDC removed, temporary HD line placed. Follow-up blood cultures from temporary catheter prior to placing [facility]  - will need cefazolin 2g with HD sessions at d/c x4 wks till 1/7/23  - repeat blood cx NGTD         -Right lower lobe bacterial pneumonia. .  CT chest reviewed continue current antibiotics per ID     -Sepsis due to bacteremia /pneumonia-patient with fever, sinus tachycardia, leukocytosis, lactic acidosis--continue antibiotics as above, follow-up on cultures     -Chronic stable moderate pericardial effusion--evident on prior echos and current CT chest... Limited echo ordered     -Chronic systolic/ diastolic heart failure--Echo 2/2022 showed grade 2 diastolic dysfunction, EF 07%, moderate pericardial effusion--repeat echo ordered     -ESRD--on hemodialysis TTS--continue management per nephrology. .  Patient has tunneled dialysis catheter. .  Previously on PD but discontinued due to fungal peritonitis     -Anion Metabolic acidosis due to CKD/lactic acidosis--ketones negative--monitor labs-continue oral bicarb     -Hyperkalemia--resolved with hemodialysis     -Hyponatremia--corrected sodium within normal range on presentation     -DM type II uncontrolled  with severe hyperglycemia on presentation-patient was noncompliant with insulin due to acute illness-hemoglobin A1c is 7.2..   Hyperglycemia is better- continue current basal bolus insulin regimen     -Essential hypertension---stable-on nifedipine, hydralazine,imdur - Aldactone/losartan held due to hyperkalemia--also held nifedipine/Imdur due to low BPs--BP now improved. Resumed nifedipine.     -Hyperlipidemia-continue statin     -Dizziness /vertigo-MRI brain is negative-currently asymptomatic-PT and OT eval pending     -Acute encephalopathy likely due to sepsis--resolved -lethargic on presentation to the ED--continue to monitor - improved     Exam:     /74   Pulse 88   Temp 97.9 °F (36.6 °C) (Oral)   Resp 18   Ht 6' 2\" (1.88 m)   Wt 136 lb 3.9 oz (61.8 kg)   SpO2 100%   BMI 17.49 kg/m²   General appearance:   No apparent distress, appears stated age. Cooperative. HEENT:  Normocephalic, atraumatic. PERRLA. EOMi. Conjunctivae/corneas clear, no icterus, non-injected. Neck: Supple, with full range of motion. No jugular venous distention. Trachea midline. Respiratory:  Normal respiratory effort. Clear to auscultation, bilaterally without Rales/Wheezes/Rhonchi. Cardiovascular:  Regular rate and rhythm without murmurs, rubs or gallops. Abdomen: Soft, non-tender, non-distended, without rebound or guarding. Normal bowel sounds. Musculoskeletal: right IJ HD cath; No clubbing, cyanosis or edema bilaterally. Full range of motion without deformity. Skin: Skin color, texture, turgor normal.  No rashes or lesions. Neurologic:  Neurovascularly intact without any focal sensory/motor deficits. Cranial nerves: II-XII intact, grossly intact. No facial asymmetry, tongue midline. Psychiatric:  Alert and oriented, thought content appropriate  Capillary Refill: Brisk,< 3 seconds   Peripheral Pulses: +2 palpable, equal bilaterally       Consults:     IP CONSULT TO NEPHROLOGY  IP CONSULT TO INFECTIOUS DISEASES  IP CONSULT TO INFECTIOUS DISEASES  IP CONSULT TO HOME CARE NEEDS    Diagnostic tests: Imaging:  IR REMOVE TUNNELED CVAD WO SQ PORT/PUMP   Final Result   Successful removal of tunneled dialysis catheter. Successful fluoroscopic guided non tunneled Trialysis catheter placement. IR NONTUNNELED VASCULAR CATHETER > 5 YEARS   Final Result   Successful removal of tunneled dialysis catheter. Successful fluoroscopic guided non tunneled Trialysis catheter placement. MRI BRAIN WO CONTRAST   Final Result   1. No acute intracranial abnormality. No acute infarct. 2. Mild global parenchymal volume loss with chronic microvascular ischemic   changes. 3. Small lipoma is again seen along the right dorsal midbrain. CT CHEST PULMONARY EMBOLISM W CONTRAST   Final Result   1. Focal consolidation in the right lower lobe favoring   infectious/inflammatory process. Recommend CT of the chest in 3 months to   confirm resolution. 2. Solid subcentimeter right pulmonary nodules can be assessed during the   same time. 3. Cardiomegaly with unchanged moderate to large pericardial effusion. 4. Age-indeterminate L1 compression fracture. If there is point tenderness,   recommend nonemergent MRI of the lumbar spine. CT HEAD WO CONTRAST   Final Result   1. No acute intracranial abnormality. XR CHEST PORTABLE   Final Result   Stable increased opacity left basilar region compared to prior studies dating   back to 10/25/2020 consistent with pericardial effusion, loculated left   basilar pleural effusion, pulmonary consolidation or mass/neoplasm. 2 mm   opacity consistent with pulmonary nodule in the right basilar region and foci   of infiltrate or subsegmental atelectasis in the mid-lower lung fields noted   bilaterally. IV contrast-enhanced chest CT suggested for further evaluation. Labs:  For convenience and continuity at follow-up the following most recent labs are provided:      CBC:    Lab Results   Component Value Date/Time    WBC 14.4 12/06/2022 06:40 AM    HGB 9.4 12/06/2022 06:40 AM    HCT 29.8 12/06/2022 06:40 AM     12/06/2022 06:40 AM       Renal:    Lab Results   Component Value Date/Time     12/06/2022 06:40 AM    K 4.1 12/06/2022 06:40 AM    K 4.7 11/27/2022 10:42 AM    CL 92 12/06/2022 06:40 AM    CO2 23 12/06/2022 06:40 AM    BUN 41 12/06/2022 06:40 AM    CREATININE 7.3 12/06/2022 06:40 AM    CALCIUM 9.0 12/06/2022 06:40 AM    PHOS 4.5 12/06/2022 06:40 AM           Discharge Instructions/Follow-up:  cont IV antibx at least for 4 wks with dialysis as per JEFF by ID     PCP/SNF to follow up: pcp 1 wk     D/C condition: stable    Code status: full    Diet: ADULT ORAL NUTRITION SUPPLEMENT; Breakfast, Lunch, Dinner, HS Snack; Standard High Calorie/High Protein Oral Supplement  ADULT DIET; Regular; 5 carb choices (75 gm/meal); No Added Salt (3-4 gm);  Low Potassium (Less than 3000 mg/day); 2000 ml         Discharge Medications:     Current Discharge Medication List             Details   isosorbide mononitrate (IMDUR) 60 MG extended release tablet Take 1 tablet by mouth once daily  Qty: 90 tablet, Refills: 3    Associated Diagnoses: Chronic diastolic congestive heart failure (HCC)      insulin detemir (LEVEMIR FLEXTOUCH) 100 UNIT/ML injection pen Inject 10 Units into the skin nightly  Qty: 3 mL, Refills: 0      sucralfate (CARAFATE) 1 GM tablet Take 1 tablet by mouth 4 times daily  Qty: 120 tablet, Refills: 0      pantoprazole (PROTONIX) 40 MG tablet Take 1 tablet by mouth 2 times daily (before meals)  Qty: 60 tablet, Refills: 2      Sucroferric Oxyhydroxide (VELPHORO) 500 MG CHEW Take 1 tablet by mouth 3 times daily (with meals)      Insulin Pen Needle ([employer] PEN NEEDLES) 31G X 6 MM MISC 1 each by Does not apply route daily  Qty: 100 each, Refills: 3      insulin aspart (NOVOLOG FLEXPEN) 100 UNIT/ML injection pen Inject 3 Units into the skin 3 times daily (before meals)  Qty: 5 pen, Refills: 3      atorvastatin (LIPITOR) 40 MG tablet TAKE 1 TABLET BY MOUTH ONCE DAILY NIGHTLY  Qty: 90 tablet, Refills: 3    Associated Diagnoses: Mixed hyperlipidemia gabapentin (NEURONTIN) 300 MG capsule TAKE 1 CAPSULE BY MOUTH THREE TIMES DAILY  Qty: 270 capsule, Refills: 1    Associated Diagnoses: Left-sided weakness; Other diabetic neurological complication associated with type 2 diabetes mellitus (HCC)      Methoxy PEG-Epoetin Beta (MIRCERA IJ) Inject 75 mcg into the skin      calcitRIOL (ROCALTROL) 0.5 MCG capsule Take 0.5 mcg by mouth daily      hydrALAZINE (APRESOLINE) 100 MG tablet TAKE 2 TABLETS BY MOUTH THREE TIMES DAILY  Qty: 270 tablet, Refills: 1    Associated Diagnoses: Essential hypertension; Chronic diastolic congestive heart failure (HCC)      NIFEdipine (PROCARDIA XL) 90 MG extended release tablet Take 1 tablet by mouth 2 times daily  Qty: 180 tablet, Refills: 3    Comments: Please consider 90 day supplies to promote better adherence  Associated Diagnoses: Essential hypertension; Chronic diastolic congestive heart failure (HCC)      sodium bicarbonate 650 MG tablet Take 650 mg by mouth 3 times daily      EQ ASPIRIN ADULT LOW DOSE 81 MG EC tablet Take 1 tablet by mouth once daily  Qty: 90 tablet, Refills: 3    Associated Diagnoses: Essential hypertension      metoprolol tartrate (LOPRESSOR) 50 MG tablet Take twice daily  Qty: 180 tablet, Refills: 1    Associated Diagnoses: Essential hypertension             Time Spent on discharge is more than 30 minutes in the examination, evaluation, counseling and review of medications and discharge plan. Signed:    ROQUE Henson CNP   12/7/2022      Thank you No primary care provider on file. for the opportunity to be involved in this patient's care. If you have any questions or concerns please feel free to contact me at [phone].

## 2022-12-07 NOTE — PROGRESS NOTES
Hospital Medicine Progress Note      Admit Date: 11/26/2022       CC: F/U for sepsis    HPI:  Felicia Hyman is being seen by nephrology for ERD. He is a 65 y.o. male with a PMH significant for ESRD, CHF, ADPKD, fungal peritonitis 2/2 PD catheter who presented to the ED 11/26/2022 with fever, hyperglycemia, and AMS. He was noted to be hyperkalemic on admission. Blood cx positive for MSSA     12/6: TDC to be placed today. HD to start today per nephrology. Saw the patient in HD. He is very upset and wants to go home. He is frustrated and just wants to go home and tired of everything. Antibx x6-8 wks per ID   HD cath was leaking blood during HD. RNs made aware and redressed. Interval History/Subjective: patient can discharge with instructions for IV antibx with dialysis. JEFF with ID instructions. He will go either home with [facility] or to SNF. Wife is not sure she can take care of him and not sure if he will be agreeable to SNF     Review of Systems:       The patient denied headaches, visual changes, LOC, SOB, CP, ABD pain, N/V/D, skin changes, new or worsening weakness or neuromuscular deficits. Comprehensive ROS negative except as mentioned above.     Past Medical History:        Diagnosis Date    Cardiomyopathy [facility])     CHF (congestive heart failure) (HCC)     CVA (cerebral infarction) 5/2015    Diabetes mellitus ([facility]     Foot ulcer, left ([facility] [address] 1/14/2016    Gastroparesis     Hemodialysis patient ([facility] [address]     Hypercholesteremia     Hypertension     Kidney disease     Unspecified cerebral artery occlusion with cerebral infarction     5/15, 7/15 LEFT SIDE WEAKNESS       Past Surgical History:        Procedure Laterality Date    CATHETER INSERTION N/A 10/17/2022    TUNNEL CATHETER PLACEMENT performed by Jaylin Dumont MD at [address] [facility] [address] N/A 5/17/2021    COLONOSCOPY POLYPECTOMY SNARE/COLD BIOPSY performed by Ke Live MD at [address] [place] [address]  N/A 10/17/2022 PERITONEAL DIALYSIS CATHETER REMOVAL performed by Golden Ball MD at [address] [place] [address] N/A 10/29/2020    PLACEMENT OF A TUNNELED DIALYSIS CATHETER WITH FLEURO AND ULTRASOUND performed by Golden Ball MD at [facility] [address] NONTUNNELED VASCULAR CATHETER  11/29/2022    IR NONTUNNELED VASCULAR CATHETER 11/29/2022 [facility]    LAPAROSCOPY INSERTION PERITONEAL CATHETER N/A 10/29/2020    LAPAROSCOPIC PERITONEAL DIALYSIS CATHETER PLACEMENT WITH FLEURO AND ULTRASOUND performed by Golden Ball MD at [address] [place] [address] N/A 40/91/4155    UMBILICAL HERNIA REPAIR performed by Golden Ball MD at [address] N/A 4/26/2021    ESOPHAGOGASTRODUODENOSCOPY [facility] Moriah Stephens MD [facility] [address] [facility]  N/A 9/13/2022    EGD DIAGNOSTIC ONLY performed by [place] MD at [facility]:  Doxazosin, Cefepime, and Coconut flavor    Past medical and surgical history reviewed. Any changes have been noted. PHYSICAL EXAM:  BP (!) 166/84   Pulse 69   Temp 97.9 °F (36.6 °C) (Oral)   Resp 18   Ht 6' 2\" (1.88 m)   Wt 136 lb 3.9 oz (61.8 kg)   SpO2 100%   BMI 17.49 kg/m²       Intake/Output Summary (Last 24 hours) at 12/7/2022 1003  Last data filed at 12/6/2022 2335  Gross per 24 hour   Intake 240 ml   Output --   Net 240 ml      General appearance:   No apparent distress, appears stated age. Cooperative. HEENT:  Normocephalic, atraumatic. PERRLA. EOMi. Conjunctivae/corneas clear, no icterus, non-injected. Neck: Supple, with full range of motion. No jugular venous distention. Trachea midline. Respiratory:  Normal respiratory effort. Clear to auscultation, bilaterally without Rales/Wheezes/Rhonchi. Cardiovascular:  Regular rate and rhythm without murmurs, rubs or gallops. Abdomen: Soft, non-tender, non-distended, without rebound or guarding.  Normal bowel sounds. Musculoskeletal: right IJ HD cath; No clubbing, cyanosis or edema bilaterally. Full range of motion without deformity. Skin: Skin color, texture, turgor normal.  No rashes or lesions. Neurologic:  Neurovascularly intact without any focal sensory/motor deficits. Cranial nerves: II-XII intact, grossly intact. No facial asymmetry, tongue midline. Psychiatric:  Alert and oriented, thought content appropriate  Capillary Refill: Brisk,< 3 seconds   Peripheral Pulses: +2 palpable, equal bilaterally       LABS:    Lab Results   Component Value Date    WBC 14.4 (H) 12/06/2022    HGB 9.4 (L) 12/06/2022    HCT 29.8 (L) 12/06/2022    MCV 92.4 12/06/2022     12/06/2022    LYMPHOPCT 7.5 12/06/2022    RBC 3.22 (L) 12/06/2022    MCH 29.1 12/06/2022    MCHC 31.5 12/06/2022    RDW 15.0 12/06/2022       Lab Results   Component Value Date    CREATININE 7.3 (HH) 12/06/2022    BUN 41 (H) 12/06/2022     (L) 12/06/2022    K 4.1 12/06/2022    CL 92 (L) 12/06/2022    CO2 23 12/06/2022       Lab Results   Component Value Date/Time    MG 1.90 01/30/2019 09:40 PM       Lab Results   Component Value Date    ALT 13 11/26/2022    AST 12 (L) 11/26/2022    ALKPHOS 104 11/26/2022    BILITOT <0.2 11/26/2022        No flowsheet data found. Lab Results   Component Value Date    LABA1C 7.1 12/03/2022       Imaging:  IR REMOVE TUNNELED CVAD WO SQ PORT/PUMP   Final Result   Successful removal of tunneled dialysis catheter. Successful fluoroscopic guided non tunneled Trialysis catheter placement. IR NONTUNNELED VASCULAR CATHETER > 5 YEARS   Final Result   Successful removal of tunneled dialysis catheter. Successful fluoroscopic guided non tunneled Trialysis catheter placement. MRI BRAIN WO CONTRAST   Final Result   1. No acute intracranial abnormality. No acute infarct. 2. Mild global parenchymal volume loss with chronic microvascular ischemic   changes.    3. Small lipoma is again seen along the right dorsal midbrain. CT CHEST PULMONARY EMBOLISM W CONTRAST   Final Result   1. Focal consolidation in the right lower lobe favoring   infectious/inflammatory process. Recommend CT of the chest in 3 months to   confirm resolution. 2. Solid subcentimeter right pulmonary nodules can be assessed during the   same time. 3. Cardiomegaly with unchanged moderate to large pericardial effusion. 4. Age-indeterminate L1 compression fracture. If there is point tenderness,   recommend nonemergent MRI of the lumbar spine. CT HEAD WO CONTRAST   Final Result   1. No acute intracranial abnormality. XR CHEST PORTABLE   Final Result   Stable increased opacity left basilar region compared to prior studies dating   back to 10/25/2020 consistent with pericardial effusion, loculated left   basilar pleural effusion, pulmonary consolidation or mass/neoplasm. 2 mm   opacity consistent with pulmonary nodule in the right basilar region and foci   of infiltrate or subsegmental atelectasis in the mid-lower lung fields noted   bilaterally. IV contrast-enhanced chest CT suggested for further evaluation.          IR TUNNELED CVC PLACE WO SQ PORT/PUMP > 5 YEARS    (Results Pending)       Scheduled and prn Medications:    Scheduled Meds:   [START ON 12/8/2022] ceFAZolin  2,000 mg IntraVENous Once per day on Tue Thu Sat    sodium bicarbonate  650 mg Oral TID WC    sodium chloride flush  5-40 mL IntraVENous 2 times per day    heparin (porcine)  5,000 Units SubCUTAneous 3 times per day    insulin lispro  0-8 Units SubCUTAneous TID WC    insulin lispro  0-4 Units SubCUTAneous Nightly    insulin lispro  0.05 Units/kg SubCUTAneous TID WC    insulin glargine  0.25 Units/kg SubCUTAneous Nightly    calcitRIOL  0.5 mcg Oral Daily    atorvastatin  40 mg Oral Nightly    metoprolol tartrate  75 mg Oral BID    [Held by provider] NIFEdipine  90 mg Oral BID    pantoprazole  40 mg Oral BID AC    sucralfate  1 g Oral 4x Daily AC & HS hydrALAZINE  100 mg Oral TID    isosorbide mononitrate  60 mg Oral Daily     Continuous Infusions:   sodium chloride 25 mL/hr at 12/03/22 2306    dextrose       PRN Meds:.heparin (porcine), perflutren lipid microspheres, sodium chloride flush, sodium chloride, ondansetron **OR** ondansetron, polyethylene glycol, acetaminophen **OR** acetaminophen, glucose, dextrose bolus **OR** dextrose bolus, glucagon (rDNA), dextrose, meclizine    Assessment & Plan:              -MSSA bacteremia with sepsis--concern for tunneled dialysis catheter infection-- vancomycin Laurel Minor de-escalated to cefazolin 11/27--continue management per ID--follow-up on cultures. .   -TDC removed, temporary HD line placed. Follow-up blood cultures from temporary catheter prior to placing [facility]  - will need cefazolin 2g with HD sessions at d/c x4 wks till 1/7/23  - repeat blood cx NGTD        -Right lower lobe bacterial pneumonia. .  CT chest reviewed continue current antibiotics per ID     -Sepsis due to bacteremia /pneumonia-patient with fever, sinus tachycardia, leukocytosis, lactic acidosis--continue antibiotics as above, follow-up on cultures     -Chronic stable moderate pericardial effusion--evident on prior echos and current CT chest... Limited echo ordered     -Chronic systolic/ diastolic heart failure--Echo 2/2022 showed grade 2 diastolic dysfunction, EF 77%, moderate pericardial effusion--repeat echo ordered     -ESRD--on hemodialysis TTS--continue management per nephrology. .  Patient has tunneled dialysis catheter. .  Previously on PD but discontinued due to fungal peritonitis     -Anion Metabolic acidosis due to CKD/lactic acidosis--ketones negative--monitor labs-continue oral bicarb     -Hyperkalemia--resolved with hemodialysis     -Hyponatremia--corrected sodium within normal range on presentation     -DM type II uncontrolled  with severe hyperglycemia on presentation-patient was noncompliant with insulin due to acute illness-hemoglobin A1c is 7.2.. Hyperglycemia is better- continue current basal bolus insulin regimen     -Essential hypertension---stable-on nifedipine, hydralazine,imdur - Aldactone/losartan held due to hyperkalemia--also held nifedipine/Imdur due to low BPs--BP now improved. Resumed nifedipine.     -Hyperlipidemia-continue statin     -Dizziness /vertigo-MRI brain is negative-currently asymptomatic-PT and OT eval pending     -Acute encephalopathy likely due to sepsis--resolved -lethargic on presentation to the ED--continue to monitor - improved          Continue current regimen/therapies. Monitor. Adjust medical regimen as appropriate. Body mass index is 17.49 kg/m². The patient and / or the family were informed of the results of any tests, a time was given to answer questions, a plan was proposed and they agreed with plan. DVT ppx: hep       Diet: ADULT ORAL NUTRITION SUPPLEMENT; Breakfast, Lunch, Dinner, HS Snack; Standard High Calorie/High Protein Oral Supplement  ADULT DIET; Regular; 5 carb choices (75 gm/meal); No Added Salt (3-4 gm);  Low Potassium (Less than 3000 mg/day); 2000 ml    Consults:  IP CONSULT TO NEPHROLOGY  IP CONSULT TO INFECTIOUS DISEASES  IP CONSULT TO INFECTIOUS DISEASES    DISPO/placement plan: home with [facility] vs. SNF today ok    Code Status: Full Code      ROQUE Michaels CNP  12/07/22

## 2022-12-07 NOTE — PROGRESS NOTES
Patient discharged per Merline Hong NP. Discharge paperwork (AVS/JEFF) reviewed with patient and wife. All questions answered no concerns voiced. No PCP on file.  aware. Patient and his wife said they will f/u with [facility] ([facility] on [address] to select a provider tomorrow. Patient left unit via wheelchair escorted by transport. Patients wife to transport home.

## 2022-12-07 NOTE — PROGRESS NOTES
[facility]                                               Progress note    CC: fever, sepsis  Summary:   Viktor Quiros is being seen by nephrology for ERD. He is a 65 y.o. male with a PMH significant for ESRD, CHF, ADPKD, fungal peritonitis 2/2 PD catheter who presented to the ED 11/26/2022 with fever, hyperglycemia, and AMS. He was noted to be hyperkalemic on admission. Blood cx positive for MSSA    Interval History  Seen at bedside  /74  Repeat cx froom 11/28/2022 NGTD  TDC catheter tip cx positive for MSSA  No fevers since 12/2/2022  New TDC placed 12/6/2022, appreciate IR assistance. Plan:   No acute indication for HD today  Discharge abx recommendations per ID: Cefazolin 2 g qHD with a stop date 1/7/2023  Jeremi Islas for discharge from nephrology perspective. Dialysis unit called and notified of antibiotic orders. Aminata Lugo MD  [facility]  Office: [phone]    Assessment:   ESRD:  - on HD TTS at [facility]  - recent fungal PD peritonitis  - now access is a [facility] which might be the cause of MRSA bacteremia  - TDC was removed 11/29/2022 and a temp HD catheter was placed for continued dialysis while the infection is addressed  - EDW 69.5 kg    Hypertension:    Continue current medicatiosn    MSSA bacteremia:  - suspect the [facility] to be the source  - remove [facility] 11/29/2022, tip cx positive for MSSA also. - TTE negative for vegetations. LVEF 35-40%, severely increased LV wall thickness  - repeat blood c 11/28/2022 NGTD  - on cefazolin       -Right lower lobe bacterial pneumonia. CT chest reviewed continue current antibiotics per ID     -Chronic stable moderate pericardial effusion  -evident on prior echos and current CT chest... Limited echo pending     -Chronic systolic/ diastolic heart failure-  Echo 2/2022 showed grade 2 diastolic dysfunction, EF 13%, moderate pericardial effusion--repeat echo pending    ROS:    Positives Listed Bold.  All other remaining systems are negative. Constitutional:  fever, chills, weakness, weight change, fatigue,      Skin:  rash, pruritus, hair loss, bruising, dry skin, petechiae. Head, Face, Neck   headaches, swelling,  cervical adenopathy. Respiratory: shortness of breath, cough, or wheezing  Cardiovascular: chest pain, palpitations, dizzy, edema  Gastrointestinal: nausea, vomiting, diarrhea, constipation,belly pain    Yellow skin, blood in stool  Musculoskeletal:  back pain, muscle weakness, gait problems,       joint pain or swelling. Genitourinary:  dysuria, poor urine flow, flank pain, blood in urine  Neurologic:  vertigo, TIA'S, syncope, seizures, focal weakness  Psychosocial:  insomnia, anxiety, or depression. Additional positive findings: -     PMH:   Past medical history, surgical history, social history, family history are reviewed and updated as appropriate. Reviewed current medication list.   Allergies reviewed and updated as needed. PE:   Vitals:    12/07/22 1029   BP: 132/74   Pulse: 88   Resp:    Temp:    SpO2:        General appearance: in NAD, fully alert and oriented. Comfortable. HEENT: EOM intact, no icterus. Trachea is midline. Neck : No masses, appears symmetrical, no JVD  Respiratory: Respiratory effort appears normal, bilateral equal chest rise, no wheeze, no crackles   Cardiovascular: Ausculation shows RRR no edema  Abdomen: No visible mass or tenderness, non distended. Musculoskeletal:  Joints with no swelling or deformity. Skin:no rashes, ulcers, induration, no jaundice. Neuro: face symmetric, no focal deficits. Appropriate responses.        Lab Results   Component Value Date    CREATININE 7.3 (HH) 12/06/2022    BUN 41 (H) 12/06/2022     (L) 12/06/2022    K 4.1 12/06/2022    CL 92 (L) 12/06/2022    CO2 23 12/06/2022      Lab Results   Component Value Date    WBC 14.4 (H) 12/06/2022    HGB 9.4 (L) 12/06/2022    HCT 29.8 (L) 12/06/2022    MCV 92.4 12/06/2022     12/06/2022     Lab Results   Component Value Date    PTH 49.8 02/01/2019    CALCIUM 9.0 12/06/2022    PHOS 4.5 12/06/2022

## 2022-12-07 NOTE — PROGRESS NOTES
[facility]   Speech Therapy  Daily Dysphagia Treatment Note    Gage Ruano  AGE: 65 y.o.    GENDER: male  : 1965  [id number]  EPISODE DATE:  2022    Patient Active Problem List   Diagnosis    Nonischemic cardiomyopathy ([facility]    Cerebral infarction ([address]    Cigarette nicotine dependence without complication    Erectile dysfunction due to arterial insufficiency    Renal artery stenosis (HCC)    Chronic diastolic congestive heart failure (HCC)    H/O: CVA (cerebrovascular accident)    Major depressive disorder, recurrent episode, moderate (HCC)    Pericardial effusion    Hypertension associated with diabetes ([address]    DM type 2 with diabetic mixed hyperlipidemia ([address]    Diabetes mellitus due to underlying condition with stage 4 chronic kidney disease, with long-term current use of insulin ([address]    ESRD (end stage renal disease) on dialysis ([address]    ESRD (end stage renal disease) ([address]    GI bleed    SBP (spontaneous bacterial peritonitis) ([address]    Dialysis-associated peritonitis ([address]    Candida infection    Generalized abdominal pain    PKD (polycystic kidney disease)    Bacterial pneumonia    Encephalopathy acute    Elevated lactic acid level    Hyperkalemia    Severe sepsis (HCC)    Staphylococcus aureus bacteremia with sepsis (HCC)    Neutrophilia    Elevated procalcitonin    ESRD needing dialysis (HCC)    Severe malnutrition (HCC)     Allergies   Allergen Reactions    Doxazosin Nausea And Vomiting     VIOLENT VOMITTING    Cefepime Hallucinations     Causes severe hallucinations    Coconut Flavor Rash     Treatment Diagnosis: Dysphagia     CHART REVIEW:  2022 admitted with sepsis d/t RLL pneumonia  Prolonged hospitalization upon receipt of swallow eval order 2022  Multiple specialties following     IMAGING:  CXR: 2022  Impression   Stable increased opacity left basilar region compared to prior studies dating   back to 10/25/2020 consistent with pericardial effusion, loculated left   basilar pleural effusion, pulmonary consolidation or mass/neoplasm. 2 mm   opacity consistent with pulmonary nodule in the right basilar region and foci   of infiltrate or subsegmental atelectasis in the mid-lower lung fields noted   bilaterally. IV contrast-enhanced chest CT suggested for further evaluation. CT CHEST: 11/26/2022  Impression   1. Focal consolidation in the right lower lobe favoring   infectious/inflammatory process. Recommend CT of the chest in 3 months to   confirm resolution. 2. Solid subcentimeter right pulmonary nodules can be assessed during the   same time. 3. Cardiomegaly with unchanged moderate to large pericardial effusion. 4. Age-indeterminate L1 compression fracture. If there is point tenderness,   recommend nonemergent MRI of the lumbar spine. BRAIN MRI: 11/28/2022  Impression   1. No acute intracranial abnormality. No acute infarct. 2. Mild global parenchymal volume loss with chronic microvascular ischemic   changes. 3. Small lipoma is again seen along the right dorsal midbrain. Subjective:     Current diet: soft bite size/thin liquids  Comments regarding tolerating Current Diet: patient with improved endurance, strength, and level of alertness since last seen by this writer; patient eager for upgrade to regular diet    Objective:     Pain: none reported    Cognitive/Behavior: alert, cooperative, pleasant, follows dx; verbally responsive but unreliable historian     Positioning: upright in bed     PO Trials:   Thin Liquids; via straw: now appears WFL  Nectar thick liquids: DNT  Honey Thick liquids: DNT  Puree: pt declined  Soft food: pt declined  Regular food: raw/fresh apple - whole: now appears WFL    Dysphagia Tx:   Direct Dysphagia tx: pt tolerated PO trials as described above  Training in compensatory strategies: reinforced positioning recs  Pt response to ex/training: concern for reduced insight and reduced recall for novel dysphagia learning    Goals  Pt will functionally tolerate recommended diet with no overt clinical s/s of aspiration, continue  Pt will advance to least restrictive diet as indicated, continue  If dysphagia persists, patient will improve swallow function via swallow ex completed 10/10, hold - dysphagia appears resolved as generalized weakness resolved       Assessment:   Impressions:   Patient alert, follows dx; verbally responsive but with confused responses at times. Oral and pharyngeal; stages of swallow now appear grossly [facility]  Recommend upgrade to regular/thin. ST to f/u 1-2 to confirm tolerance. Diet Recommendations: Regular/thin    Strategies: Upright as possible with all PO intake , Remain upright 30-45 min    Education:  Provided education regarding role of SLP, results of assessment, recommendations and general speech pathology plan of care. [] Pt verbalized understanding and agreement   [x] Pt requires ongoing learning   [] No evidence of comprehension     Dysphagia Prognosis: good  Barriers: impulsivity, reduced insight, fluctuating cognitive status    Plan:     Continue Dysphagia Therapy: YES  Interventions: dysphagia tx, education  Duration/Frequency of therapy while on unit: 1-2x  Discharge Instructions:   Anticipate no need for further skilled Speech Therapy for Dysphagia at discharge    This note serves as a D/C Summary in the event that this patient is discharged prior to the next therapy session. Coded treatment time: 0  Total treatment time: 30    Electronically signed by   Lori Hickey.  Tony Jackson, [id number] [place] [address], [id number]  Speech-Language Pathologist  Portable phone: [phone]  12/07/22 9:00 AM

## 2022-12-07 NOTE — PROGRESS NOTES
Physical Therapy  Facility/Department: [facility]  Physical Therapy Initial Assessment    Name: Viktor Quiros  : 1965  MRN: [id number]  Date of Service: 2022    Discharge Recommendations:  Therapy recommended at discharge, Continue to assess pending progress   PT Equipment Recommendations  Equipment Needed: Yes  Mobility Devices: Wheelchair  Wheelchair: Transport Chair    Viktor Qurios scored a 15/24 on the AM-PAC short mobility form. Current research shows that an AM-PAC score of 17 or less is typically not associated with a discharge to the patient's home setting. Based on the patient's AM-PAC score and their current functional mobility deficits, it is recommended that the patient have 3-5 sessions per week of Physical Therapy at d/c to increase the patient's independence. Please see assessment section for further patient specific details. If patient discharges prior to next session this note will serve as a discharge summary. Please see below for the latest assessment towards goals. Patient Diagnosis(es): The primary encounter diagnosis was Encephalopathy acute. Diagnoses of ESRD needing dialysis ([address] Severe sepsis ([address] Hyperkalemia, Type 2 diabetes mellitus with hyperglycemia, with long-term current use of insulin (HCC), Elevated lactic acid level, Pneumonia of right lower lobe due to infectious organism, Pulmonary nodule, Closed compression fracture of L1 vertebra, initial encounter ([address] and Pericardial effusion were also pertinent to this visit. Past Medical History:  has a past medical history of Cardiomyopathy ([address] CHF (congestive heart failure) ([address] CVA (cerebral infarction), Diabetes mellitus ([address] Foot ulcer, left ([address] Gastroparesis, Hemodialysis patient ([address] Hypercholesteremia, Hypertension, Kidney disease, and Unspecified cerebral artery occlusion with cerebral infarction.   Past Surgical History:  has a past surgical history that includes LAPAROSCOPY INSERTION PERITONEAL CATHETER (N/A, 10/29/2020); Umbilical hernia repair (N/A, 10/29/2020); hc dialysis catheter (N/A, 10/29/2020); Upper gastrointestinal endoscopy (N/A, 4/26/2021); Colonoscopy (N/A, 5/17/2021); Upper gastrointestinal endoscopy (N/A, 9/13/2022); Dialysis Catheter Removal (N/A, 10/17/2022); Catheter Insertion (N/A, 10/17/2022); IR NONTUNNELED VASCULAR CATHETER > 5 YEARS (11/29/2022); and IR TUNNELED CVC PLACE WO SQ PORT/PUMP > 5 YEARS (12/6/2022). Assessment   Body Structures, Functions, Activity Limitations Requiring Skilled Therapeutic Intervention: Decreased endurance;Decreased cognition;Decreased safe awareness;Decreased balance;Decreased coordination;Decreased strength;Decreased fine motor control;Decreased sensation;Decreased functional mobility ; Decreased ADL status  Assessment: Pt. presents with Bacterial pneumonia and septic. Pt. also found to have a pericardial effusion. Pt. has an L1 comp fx of indeterminate age of which Pt. reports happened ~ 1 year ago when falling out of the back of a moving truck. Pt. also on HD. Pt continues to function well below baseline. Recommend continued skilled therapy 3-5x/week to maximize independence and safety with functional mobility. If refusing, recommend 24hr supv and level 3 HHPT. Recommend transport chair. Treatment Diagnosis: impaired mobility  Activity Tolerance  Activity Tolerance: Patient limited by endurance; Patient limited by fatigue     Plan   Physcial Therapy Plan  General Plan: 3-5 times per week  Current Treatment Recommendations: Strengthening, Balance training, Functional mobility training, Gait training, Transfer training, Endurance training, Safety education & training, Home exercise program, Stair training, Patient/Caregiver education & training, Equipment evaluation, education, & procurement, Therapeutic activities  Safety Devices  Type of Devices:  All fall risk precautions in place, Call light within reach, Gait belt, Patient at risk for falls, Left in bed, Bed alarm in place  Restraints  Restraints Initially in Place: No     Restrictions  Restrictions/Precautions  Restrictions/Precautions: Fall Risk, Contact Precautions     Subjective   General  Chart Reviewed: Yes  Response To Previous Treatment: Patient with no complaints from previous session. Family / Caregiver Present: Yes (spouse)  Referring Practitioner: Dr. Agustin Corcoran  Referral Date : 11/26/22  Diagnosis: Bacterial pneumonia  Follows Commands: Impaired  Subjective  Subjective: Pt is agreeable to PT. Social/Functional History  Social/Functional History  Lives With: Spouse (2 dogs)  Type of Home: House  Home Layout: One level  Home Access: Level entry  Bathroom Shower/Tub: Walk-in shower  Bathroom Toilet: Standard  Bathroom Equipment: Grab bars in shower, Built-in shower seat  Home Equipment: Dwana Galeazzi, rolling  ADL Assistance: Independent  Ambulation Assistance: Independent (with [facility])  Transfer Assistance: Independent  Active : No  Patient's  Info: wife drives   Type of Occupation:  retiree  Additional Comments: Wife works from home, wife cooks    Vision/Hearing  Vision  Vision: Impaired  Vision Exceptions: Wears glasses at all times  Hearing  Hearing: Within functional limits      Cognition   Orientation  Overall Orientation Status: Impaired  Orientation Level: Oriented to person;Oriented to place  Cognition  Overall Cognitive Status: Exceptions  Arousal/Alertness: Appropriate responses to stimuli  Following Commands: Follows one step commands with increased time; Follows one step commands with repetition  Attention Span: Difficulty attending to directions; Difficulty dividing attention  Memory: Decreased recall of recent events  Safety Judgement: Decreased awareness of need for safety  Problem Solving: Decreased awareness of errors  Insights: Decreased awareness of deficits  Initiation: Requires cues for some  Sequencing: Requires cues for some  Cognition Comment: Poor insight     Objective   Observation/Palpation  Posture: Good  Observation: Thin, decreased muscle mass  Gross Assessment  Strength: Generally decreased, functional     Bed mobility  Supine to Sit: Minimal assistance  Sit to Supine: Stand by assistance  Transfers  Sit to Stand: Minimal Assistance  Stand to Sit: Minimal Assistance  Comment: pt required min A for sit<>Stand due to needing to hold rollator in place - right break not locking into place. Ambulation  Surface: Level tile  Device: Rollator  Assistance: Contact guard assistance  Quality of Gait: Uncoordinated, ataxic B LE, Unstedy  Gait Deviations: Slow Lesli;Decreased step length;Decreased step height  Distance: 5' + 15'  Comments: Pt abruptly sits down after 5' - reports he is hungry and this is why he needed to sit. After 3 minute seated rest break, pt ambulated 15' to bed. Balance  Posture: Fair  Sitting - Static: Fair;+  Sitting - Dynamic: Fair;+  Standing - Static: Fair;-  Standing - Dynamic: Fair;-           AM-PAC Score  AM-PAC Inpatient Mobility Raw Score : 15 (12/07/22 1452)  AM-PAC Inpatient T-Scale Score : 39.45 (12/07/22 1452)  Mobility Inpatient CMS 0-100% Score: 57.7 (12/07/22 1452)  Mobility Inpatient CMS G-Code Modifier : CK (12/07/22 1452)          Goals  Short Term Goals  Time Frame for Short Term Goals: By d/c - all goals ongoing as of 12/7/22  Short Term Goal 1: Supine to/from sit with Supervision  Short Term Goal 2: Sit to/from stand with Supervision  Short Term Goal 3: Amb.  x 48' with FWW and Supervision  Patient Goals   Patient Goals : Return home - Pt adamant about not going to a rehab facility       Education  Patient Education  Education Given To: Patient  Education Provided: Role of Therapy;Plan of Care;Transfer Training  Education Provided Comments: safety with transfers, need for continued therapy  Education Method: Verbal  Barriers to Learning: Cognition  Education Outcome: Verbalized understanding;Demonstrated understanding;Continued education needed      Therapy Time   Individual Concurrent Group Co-treatment   Time In 1420         Time Out 1445         Minutes 25         Timed Code Treatment Minutes: 25 Minutes       Loman Cogan, PT

## 2022-12-07 NOTE — PLAN OF CARE
Problem: Discharge Planning  Goal: Discharge to home or other facility with appropriate resources  12/7/2022 0428 by Mena Walden RN  Outcome: Progressing  12/6/2022 1511 by Jett Mcintyre RN  Outcome: Progressing  Flowsheets (Taken 12/6/2022 6463)  Discharge to home or other facility with appropriate resources: Identify barriers to discharge with patient and caregiver     Problem: Skin/Tissue Integrity  Goal: Absence of new skin breakdown  Description: 1. Monitor for areas of redness and/or skin breakdown  2. Assess vascular access sites hourly  3. Every 4-6 hours minimum:  Change oxygen saturation probe site  4. Every 4-6 hours:  If on nasal continuous positive airway pressure, respiratory therapy assess nares and determine need for appliance change or resting period.   12/7/2022 0428 by Mena Walden RN  Outcome: Progressing  12/6/2022 1511 by Jett Mcintyre RN  Outcome: Progressing     Problem: Safety - Adult  Goal: Free from fall injury  12/7/2022 0428 by Mena Walden RN  Outcome: Klaudia How (Taken 12/6/2022 2100)  Free From Fall Injury: Instruct family/caregiver on patient safety  12/6/2022 1511 by Jett Mcintyre RN  Outcome: Progressing     Problem: Respiratory - Adult  Goal: Achieves optimal ventilation and oxygenation  12/7/2022 0428 by Mena Walden RN  Outcome: Progressing  12/6/2022 1511 by Jett Mcintyre RN  Outcome: Progressing  Flowsheets (Taken 12/6/2022 2995)  Achieves optimal ventilation and oxygenation: Assess for changes in respiratory status     Problem: Musculoskeletal - Adult  Goal: Return mobility to safest level of function  12/7/2022 0428 by Mena Walden RN  Outcome: Progressing  12/6/2022 1511 by Jett Mcintyre RN  Outcome: Progressing  Flowsheets (Taken 12/6/2022 0338)  Return Mobility to Safest Level of Function: Assess patient stability and activity tolerance for standing, transferring and ambulating with or without assistive devices     Problem: Gastrointestinal - Adult  Goal: Minimal or absence of nausea and vomiting  12/7/2022 0428 by Uday Betancourt RN  Outcome: Progressing  12/6/2022 1511 by Bibi Malcolm RN  Outcome: Progressing  Flowsheets (Taken 12/6/2022 1438)  Minimal or absence of nausea and vomiting: Administer IV fluids as ordered to ensure adequate hydration     Problem: Infection - Adult  Goal: Absence of infection at discharge  12/7/2022 0428 by Uday Betancourt RN  Outcome: Progressing  12/6/2022 1511 by Bibi Malcolm RN  Outcome: Progressing  Flowsheets (Taken 12/6/2022 6236)  Absence of infection at discharge: Assess and monitor for signs and symptoms of infection     Problem: Metabolic/Fluid and Electrolytes - Adult  Goal: Electrolytes maintained within normal limits  12/7/2022 0428 by Uday Betancourt RN  Outcome: Progressing  12/6/2022 1511 by Bibi Malcolm RN  Outcome: Progressing  Flowsheets (Taken 12/6/2022 9186)  Electrolytes maintained within normal limits: Monitor labs and assess patient for signs and symptoms of electrolyte imbalances  Goal: Hemodynamic stability and optimal renal function maintained  12/7/2022 0428 by Uday Betancourt RN  Outcome: Progressing  12/6/2022 1511 by Bibi Malcolm RN  Outcome: Progressing  Flowsheets (Taken 12/6/2022 1148)  Hemodynamic stability and optimal renal function maintained: Monitor labs and assess for signs and symptoms of volume excess or deficit  Goal: Glucose maintained within prescribed range  12/7/2022 0428 by Uday Betancourt RN  Outcome: Progressing  12/6/2022 1511 by Bibi Malcolm RN  Outcome: Progressing  Flowsheets (Taken 12/6/2022 0938)  Glucose maintained within prescribed range: Monitor blood glucose as ordered     Problem: ABCDS Injury Assessment  Goal: Absence of physical injury  12/7/2022 0428 by Uday Betancourt RN  Outcome: Progressing  Flowsheets (Taken 12/6/2022 2100)  Absence of Physical Injury: Implement safety measures based on patient assessment  12/6/2022 1511 by Hayden Tim RN  Outcome: Progressing     Problem: Nutrition Deficit:  Goal: Optimize nutritional status  12/7/2022 0428 by Julio Cesar Jack RN  Outcome: Progressing  12/6/2022 1511 by Hayden Tim RN  Outcome: Progressing     Problem: Neurosensory - Adult  Goal: Achieves stable or improved neurological status  12/7/2022 0428 by Julio Cesar Jack RN  Outcome: Progressing  12/6/2022 1511 by Hayden Tim RN  Outcome: Progressing  Flowsheets (Taken 12/6/2022 8540)  Achieves stable or improved neurological status: Assess for and report changes in neurological status     Problem: Cardiovascular - Adult  Goal: Maintains optimal cardiac output and hemodynamic stability  12/7/2022 0428 by Julio Cesar Jack RN  Outcome: Progressing  12/6/2022 1511 by Hayden Tim RN  Outcome: Progressing  Flowsheets (Taken 12/6/2022 4977)  Maintains optimal cardiac output and hemodynamic stability: Monitor blood pressure and heart rate  Goal: Absence of cardiac dysrhythmias or at baseline  12/7/2022 0428 by Julio Cesar Jack RN  Outcome: Progressing  12/6/2022 1511 by Hayden Tim RN  Outcome: Progressing  Flowsheets (Taken 12/6/2022 4041)  Absence of cardiac dysrhythmias or at baseline: Monitor cardiac rate and rhythm     Problem: Skin/Tissue Integrity - Adult  Goal: Skin integrity remains intact  12/7/2022 0428 by Julio Cesar Jack RN  Outcome: Progressing  Flowsheets (Taken 12/6/2022 2100)  Skin Integrity Remains Intact: Monitor for areas of redness and/or skin breakdown  12/6/2022 1511 by Hayden Tim RN  Outcome: Progressing  Flowsheets (Taken 12/6/2022 0938)  Skin Integrity Remains Intact: Monitor for areas of redness and/or skin breakdown     Problem: Genitourinary - Adult  Goal: Absence of urinary retention  12/7/2022 0428 by Julio Cesar Jack RN  Outcome: Progressing  12/6/2022 1511 by Hayden Tim RN  Outcome: Progressing  Flowsheets (Taken 12/6/2022 2902)  Absence of urinary retention: Assess patients ability to void and empty bladder     Problem: Pain  Goal: Verbalizes/displays adequate comfort level or baseline comfort level  12/7/2022 0428 by Sammy Goode RN  Outcome: Progressing  12/6/2022 1511 by Kinza Stovall RN  Outcome: Progressing  Flowsheets (Taken 12/6/2022 8304)  Verbalizes/displays adequate comfort level or baseline comfort level: Encourage patient to monitor pain and request assistance

## 2022-12-07 NOTE — PLAN OF CARE
Problem: Discharge Planning  Goal: Discharge to home or other facility with appropriate resources  12/7/2022 1042 by Maria Elena Rendon RN  Outcome: Progressing     Problem: Skin/Tissue Integrity  Goal: Absence of new skin breakdown  Description: 1. Monitor for areas of redness and/or skin breakdown  2. Assess vascular access sites hourly  3. Every 4-6 hours minimum:  Change oxygen saturation probe site  4. Every 4-6 hours:  If on nasal continuous positive airway pressure, respiratory therapy assess nares and determine need for appliance change or resting period.   12/7/2022 1042 by Maria Elena Rendon RN  Outcome: Progressing     Problem: Safety - Adult  Goal: Free from fall injury  12/7/2022 1042 by Maria Elena Rendon RN  Outcome: Progressing     Problem: Respiratory - Adult  Goal: Achieves optimal ventilation and oxygenation  12/7/2022 1042 by Maria Elena Rendon RN  Outcome: Progressing     Problem: Gastrointestinal - Adult  Goal: Minimal or absence of nausea and vomiting  12/7/2022 1042 by Maria Elena Rendon RN  Outcome: Progressing     Problem: Infection - Adult  Goal: Absence of infection at discharge  12/7/2022 1042 by Maria Elena Rendon RN  Outcome: Progressing     Problem: Metabolic/Fluid and Electrolytes - Adult  Goal: Electrolytes maintained within normal limits  12/7/2022 1042 by Maria Elena Rendon RN  Outcome: Progressing     Problem: Metabolic/Fluid and Electrolytes - Adult  Goal: Hemodynamic stability and optimal renal function maintained  12/7/2022 1042 by Maria Elena Rendon RN  Outcome: Progressing     Problem: Metabolic/Fluid and Electrolytes - Adult  Goal: Glucose maintained within prescribed range  12/7/2022 1042 by Maria Elena Rendon RN  Outcome: Progressing     Problem: Neurosensory - Adult  Goal: Achieves stable or improved neurological status  12/7/2022 1042 by Maria Elena Rendon RN  Outcome: Progressing     Problem: Cardiovascular - Adult  Goal: Maintains optimal cardiac output and hemodynamic stability  12/7/2022 1042 by Swapna Taylor RN  Outcome: Progressing     Problem: Cardiovascular - Adult  Goal: Absence of cardiac dysrhythmias or at baseline  12/7/2022 1042 by Swapna Taylor RN  Outcome: Progressing     Problem: Skin/Tissue Integrity - Adult  Goal: Skin integrity remains intact  12/7/2022 1042 by Swapna Taylor RN  Outcome: Progressing     Problem: Genitourinary - Adult  Goal: Absence of urinary retention  12/7/2022 1042 by Swapna Taylor RN  Outcome: Progressing     Problem: ABCDS Injury Assessment  Goal: Absence of physical injury  12/7/2022 1042 by Swapna Taylor RN  Outcome: Progressing     Problem: Nutrition Deficit:  Goal: Optimize nutritional status  12/7/2022 1042 by Swapna Taylor RN  Outcome: Progressing     Problem: Pain  Goal: Verbalizes/displays adequate comfort level or baseline comfort level  12/7/2022 1042 by Swapna Taylor RN  Outcome: Progressing

## 2022-12-08 DIAGNOSIS — A41.01 STAPHYLOCOCCUS AUREUS BACTEREMIA WITH SEPSIS (HCC): Primary | ICD-10-CM

## 2023-01-06 ENCOUNTER — TELEPHONE (OUTPATIENT)
Dept: INFECTIOUS DISEASES | Age: 58
End: 2023-01-06

## 2023-01-06 NOTE — TELEPHONE ENCOUNTER
Spoke with RN at [facility] verbalized understanding to DC IV ABX  after last dose on 1/7/23.

## 2023-01-18 ENCOUNTER — OFFICE VISIT (OUTPATIENT)
Dept: BARIATRICS/WEIGHT MGMT | Age: 58
End: 2023-01-18
Payer: COMMERCIAL

## 2023-01-18 VITALS
DIASTOLIC BLOOD PRESSURE: 76 MMHG | SYSTOLIC BLOOD PRESSURE: 102 MMHG | HEART RATE: 78 BPM | BODY MASS INDEX: 20.79 KG/M2 | HEIGHT: 74 IN | WEIGHT: 162 LBS

## 2023-01-18 DIAGNOSIS — Z99.2 ESRD NEEDING DIALYSIS (HCC): Primary | ICD-10-CM

## 2023-01-18 DIAGNOSIS — E43 SEVERE MALNUTRITION (HCC): ICD-10-CM

## 2023-01-18 DIAGNOSIS — B37.9 CANDIDA INFECTION: ICD-10-CM

## 2023-01-18 DIAGNOSIS — N18.6 ESRD NEEDING DIALYSIS (HCC): Primary | ICD-10-CM

## 2023-01-18 PROCEDURE — 3017F COLORECTAL CA SCREEN DOC REV: CPT | Performed by: SURGERY

## 2023-01-18 PROCEDURE — 3074F SYST BP LT 130 MM HG: CPT | Performed by: SURGERY

## 2023-01-18 PROCEDURE — 99204 OFFICE O/P NEW MOD 45 MIN: CPT | Performed by: SURGERY

## 2023-01-18 PROCEDURE — 3078F DIAST BP <80 MM HG: CPT | Performed by: SURGERY

## 2023-01-18 PROCEDURE — 4004F PT TOBACCO SCREEN RCVD TLK: CPT | Performed by: SURGERY

## 2023-01-18 PROCEDURE — G8484 FLU IMMUNIZE NO ADMIN: HCPCS | Performed by: SURGERY

## 2023-01-18 PROCEDURE — G8427 DOCREV CUR MEDS BY ELIG CLIN: HCPCS | Performed by: SURGERY

## 2023-01-18 PROCEDURE — G8420 CALC BMI NORM PARAMETERS: HCPCS | Performed by: SURGERY

## 2023-01-20 ENCOUNTER — OFFICE VISIT (OUTPATIENT)
Dept: PRIMARY CARE CLINIC | Age: 58
End: 2023-01-20
Payer: COMMERCIAL

## 2023-01-20 ENCOUNTER — TELEPHONE (OUTPATIENT)
Dept: CARDIOLOGY CLINIC | Age: 58
End: 2023-01-20

## 2023-01-20 VITALS
WEIGHT: 163 LBS | SYSTOLIC BLOOD PRESSURE: 136 MMHG | DIASTOLIC BLOOD PRESSURE: 84 MMHG | HEIGHT: 74 IN | BODY MASS INDEX: 20.92 KG/M2

## 2023-01-20 DIAGNOSIS — I50.32 CHRONIC DIASTOLIC CONGESTIVE HEART FAILURE (HCC): ICD-10-CM

## 2023-01-20 DIAGNOSIS — E11.59 HYPERTENSION ASSOCIATED WITH DIABETES (HCC): Primary | ICD-10-CM

## 2023-01-20 DIAGNOSIS — Z99.2 ESRD (END STAGE RENAL DISEASE) ON DIALYSIS (HCC): ICD-10-CM

## 2023-01-20 DIAGNOSIS — N18.6 ESRD (END STAGE RENAL DISEASE) ON DIALYSIS (HCC): ICD-10-CM

## 2023-01-20 DIAGNOSIS — Z86.69 HX OF ENCEPHALOPATHY: ICD-10-CM

## 2023-01-20 DIAGNOSIS — I10 ESSENTIAL HYPERTENSION: Chronic | ICD-10-CM

## 2023-01-20 DIAGNOSIS — R76.8 HEPATITIS C ANTIBODY POSITIVE IN BLOOD: ICD-10-CM

## 2023-01-20 DIAGNOSIS — I15.2 HYPERTENSION ASSOCIATED WITH DIABETES (HCC): Primary | ICD-10-CM

## 2023-01-20 PROCEDURE — 4004F PT TOBACCO SCREEN RCVD TLK: CPT | Performed by: NURSE PRACTITIONER

## 2023-01-20 PROCEDURE — 3078F DIAST BP <80 MM HG: CPT | Performed by: NURSE PRACTITIONER

## 2023-01-20 PROCEDURE — 99215 OFFICE O/P EST HI 40 MIN: CPT | Performed by: NURSE PRACTITIONER

## 2023-01-20 PROCEDURE — 3074F SYST BP LT 130 MM HG: CPT | Performed by: NURSE PRACTITIONER

## 2023-01-20 PROCEDURE — G8420 CALC BMI NORM PARAMETERS: HCPCS | Performed by: NURSE PRACTITIONER

## 2023-01-20 PROCEDURE — G8427 DOCREV CUR MEDS BY ELIG CLIN: HCPCS | Performed by: NURSE PRACTITIONER

## 2023-01-20 PROCEDURE — 2022F DILAT RTA XM EVC RTNOPTHY: CPT | Performed by: NURSE PRACTITIONER

## 2023-01-20 PROCEDURE — 3046F HEMOGLOBIN A1C LEVEL >9.0%: CPT | Performed by: NURSE PRACTITIONER

## 2023-01-20 PROCEDURE — 3017F COLORECTAL CA SCREEN DOC REV: CPT | Performed by: NURSE PRACTITIONER

## 2023-01-20 PROCEDURE — G8484 FLU IMMUNIZE NO ADMIN: HCPCS | Performed by: NURSE PRACTITIONER

## 2023-01-20 RX ORDER — HYDRALAZINE HYDROCHLORIDE 100 MG/1
100 TABLET, FILM COATED ORAL 3 TIMES DAILY
Qty: 90 TABLET | Refills: 0 | Status: SHIPPED | OUTPATIENT
Start: 2023-01-20

## 2023-01-20 RX ORDER — CLONIDINE HYDROCHLORIDE 0.2 MG/1
TABLET ORAL
Qty: 90 TABLET | Refills: 0 | Status: SHIPPED | OUTPATIENT
Start: 2023-01-20

## 2023-01-20 RX ORDER — CLONIDINE HYDROCHLORIDE 0.2 MG/1
TABLET ORAL
COMMUNITY
Start: 2022-10-31 | End: 2023-01-20 | Stop reason: SDUPTHER

## 2023-01-20 ASSESSMENT — PATIENT HEALTH QUESTIONNAIRE - PHQ9
3. TROUBLE FALLING OR STAYING ASLEEP: 0
10. IF YOU CHECKED OFF ANY PROBLEMS, HOW DIFFICULT HAVE THESE PROBLEMS MADE IT FOR YOU TO DO YOUR WORK, TAKE CARE OF THINGS AT HOME, OR GET ALONG WITH OTHER PEOPLE: 0
1. LITTLE INTEREST OR PLEASURE IN DOING THINGS: 0
SUM OF ALL RESPONSES TO PHQ QUESTIONS 1-9: 0
5. POOR APPETITE OR OVEREATING: 0
9. THOUGHTS THAT YOU WOULD BE BETTER OFF DEAD, OR OF HURTING YOURSELF: 0
SUM OF ALL RESPONSES TO PHQ QUESTIONS 1-9: 0
SUM OF ALL RESPONSES TO PHQ QUESTIONS 1-9: 0
7. TROUBLE CONCENTRATING ON THINGS, SUCH AS READING THE NEWSPAPER OR WATCHING TELEVISION: 0
SUM OF ALL RESPONSES TO PHQ QUESTIONS 1-9: 0
8. MOVING OR SPEAKING SO SLOWLY THAT OTHER PEOPLE COULD HAVE NOTICED. OR THE OPPOSITE, BEING SO FIGETY OR RESTLESS THAT YOU HAVE BEEN MOVING AROUND A LOT MORE THAN USUAL: 0
6. FEELING BAD ABOUT YOURSELF - OR THAT YOU ARE A FAILURE OR HAVE LET YOURSELF OR YOUR FAMILY DOWN: 0
SUM OF ALL RESPONSES TO PHQ9 QUESTIONS 1 & 2: 0
2. FEELING DOWN, DEPRESSED OR HOPELESS: 0
4. FEELING TIRED OR HAVING LITTLE ENERGY: 0

## 2023-01-20 ASSESSMENT — ENCOUNTER SYMPTOMS
CHEST TIGHTNESS: 0
COUGH: 0
SHORTNESS OF BREATH: 0
SORE THROAT: 0
VOMITING: 0
FACIAL SWELLING: 0
WHEEZING: 0
SINUS PAIN: 0
ABDOMINAL PAIN: 0
NAUSEA: 0
DIARRHEA: 0
EYE PAIN: 0
TROUBLE SWALLOWING: 0

## 2023-01-20 NOTE — TELEPHONE ENCOUNTER
Evelio Heaton called in stating that he needed to schedule an angiogram. I did not see anything in his chart, when I asked who said that he needed the procedure, he said [facility]. I informed Evelio Heaton that the  will call to set the procedure appointment with him.     Evelio Heaton can be reached at: [phone]

## 2023-01-20 NOTE — PROGRESS NOTES
2023    Miah Mesa (:  1965) anila 63 y.o. male, here for evaluation of the following medical concerns:    HPI    81-year-old male comes to clinic for new patient visit. Patient has history of end-stage renal disease, nonischemic cardiomyopathy, renal artery stenosis, hypertension associated with diabetes, type 2 diabetes with hyperlipidemia, polycystic kidney disease, history of CVA, tobacco use disorder, major depressive disorder. HTN- Blood pressure under control  Diabetes- Last a1c 7.1%  Nephrologist- [facility]. Going to dialysis three days a week. Patient reports that he had been admitted to [facility] [place] on  for pneumonia progressing to sepsis. Patient reports that he has recovered and has been feeling well. Pain or shortness of breath he had PD catheter placed in  by Dr. Janak Osborn with hernia repair and removed 10/2022 for cadida infection. He has upcoming procedure for peritoneal dialysis catheter placement. Review of Systems   Constitutional:  Negative for chills and fever. HENT:  Negative for congestion, ear pain, facial swelling, sinus pain, sore throat and trouble swallowing. Eyes:  Negative for pain and visual disturbance. Respiratory:  Negative for cough, chest tightness, shortness of breath and wheezing. Cardiovascular:  Negative for chest pain, palpitations and leg swelling. Gastrointestinal:  Negative for abdominal pain, blood in stool, constipation, diarrhea, nausea and vomiting. Endocrine: Negative for polydipsia and polyuria. Genitourinary:  Negative for difficulty urinating and hematuria. Musculoskeletal:  Negative for arthralgias and myalgias. Skin:  Negative for pallor and rash. Allergic/Immunologic: Negative for environmental allergies and food allergies. Neurological:  Negative for dizziness, syncope, weakness, numbness and headaches. Hematological:  Negative for adenopathy. Does not bruise/bleed easily. Psychiatric/Behavioral:  Negative for dysphoric mood and suicidal ideas. The patient is nervous/anxious. Prior to Visit Medications    Medication Sig Taking? Authorizing Provider   cloNIDine (CATAPRES) 0.2 MG tablet TAKE 1 TABLET BY MOUTH THREE TIMES DAILY Yes ROQUE Can CNP   hydrALAZINE (APRESOLINE) 100 MG tablet Take 1 tablet by mouth 3 times daily Yes ROQUE Can CNP   isosorbide mononitrate (IMDUR) 60 MG extended release tablet Take 1 tablet by mouth once daily Yes ROQUE Mason CNP   Sucroferric Oxyhydroxide (VELPHORO) 500 MG CHEW Take 1 tablet by mouth 3 times daily (with meals)  Patient not taking: Reported on 2/3/2023 Yes Historical Provider, MD   Insulin Pen Needle (brand eins VerlagestKlatcher Cocker PEN NEEDLES) 31G X 6 MM MISC 1 each by Does not apply route daily Yes ROQUE Mason CNP   insulin aspart (NOVOLOG FLEXPEN) 100 UNIT/ML injection pen Inject 3 Units into the skin 3 times daily (before meals) Yes ROQUE Mason CNP   atorvastatin (LIPITOR) 40 MG tablet TAKE 1 TABLET BY MOUTH ONCE DAILY NIGHTLY Yes ROQUE Mason CNP   calcitRIOL (ROCALTROL) 0.5 MCG capsule Take 0.5 mcg by mouth daily Yes Historical Provider, MD   NIFEdipine (PROCARDIA XL) 90 MG extended release tablet Take 1 tablet by mouth 2 times daily Yes Mattie Scanlon MD   sodium bicarbonate 650 MG tablet Take 650 mg by mouth 3 times daily Yes Historical Provider, MD   EQ ASPIRIN ADULT LOW DOSE 81 MG EC tablet Take 1 tablet by mouth once daily Yes ROQUE Mason CNP   metoprolol tartrate (LOPRESSOR) 50 MG tablet Take twice daily  Patient taking differently: Take 75 mg by mouth 2 times daily Take twice daily Yes ROQUE Mason CNP   oxyCODONE (ROXICODONE) 5 MG immediate release tablet Take 1 tablet by mouth every 6 hours as needed for Pain for up to 7 days. Intended supply: 7 days.  Take lowest dose possible to manage pain Max Daily Amount: 20 mg  Luisa Jackson Hetal Mcclelland MD   docusate sodium (COLACE) 100 MG capsule Take 1 capsule by mouth 2 times daily for 14 days Please take while taking narcotic pain medicine. If you develop loose or watery stools, then stop taking.   Tip Dunn MD   insulin detemir (LEVEMIR FLEXTOUCH) 100 UNIT/ML injection pen Inject 10 Units into the skin nightly  Neftali Campos MD   gabapentin (NEURONTIN) 300 MG capsule TAKE 1 CAPSULE BY MOUTH THREE TIMES DAILY  ROQUE Díaz CNP   Methoxy PEG-Epoetin Beta (MIRCERA IJ) Inject 75 mcg into the skin as needed  Patient not taking: Reported on 2/3/2023  Historical Provider, MD   iron sucrose (VENOFER) 20 MG/ML injection 200 mg daily  Historical Provider, MD        Allergies   Allergen Reactions    Doxazosin Nausea And Vomiting     VIOLENT VOMITTING    Cefepime Hallucinations     Causes severe hallucinations    Coconut Flavor Rash       Past Medical History:   Diagnosis Date    Cardiomyopathy ([address]     CHF (congestive heart failure) ([address]     CVA (cerebral infarction) 05/2015    Left sided weakness (hands and legs)    Diabetes mellitus ([address]     Foot ulcer, left ([address] 01/14/2016    Gastroparesis     Hemodialysis patient ([facility]     raúl davidson, sat    History of blood transfusion     Hypercholesteremia     Hypertension     Kidney disease     Unspecified cerebral artery occlusion with cerebral infarction     5/15, 7/15 LEFT SIDE WEAKNESS    Weakness     left side       Past Surgical History:   Procedure Laterality Date    CATHETER INSERTION N/A 10/17/2022    TUNNEL CATHETER PLACEMENT performed by Hiren Maguire MD at [facility] N/A 05/17/2021    COLONOSCOPY POLYPECTOMY SNARE/COLD BIOPSY performed by Catherine Smith MD at [address] [place] [address] 2/3/2023    LAPAROSCOPIC PERITONEAL DIALYSIS CATHETER PLACEMENT performed by Vlad Angelo DO at [address] [place] [address] 10/17/2022    PERITONEAL DIALYSIS CATHETER REMOVAL performed by Tiffanie Berrios MD at [facility], COLON, DIAGNOSTIC      HC DIALYSIS CATHETER N/A 10/29/2020    PLACEMENT OF A TUNNELED DIALYSIS CATHETER WITH FLEURO AND ULTRASOUND performed by Tiffanie Berrios MD at [address] [place]      IR NONTUNNELED VASCULAR CATHETER  11/29/2022    IR NONTUNNELED VASCULAR CATHETER 11/29/2022 [facility]    IR TUNNELED CATHETER PLACEMENT GREATER THAN 5 YEARS  12/06/2022    IR TUNNELED CATHETER PLACEMENT GREATER THAN 5 YEARS 12/6/2022 [facility]    LAPAROSCOPY INSERTION PERITONEAL CATHETER N/A 10/29/2020    LAPAROSCOPIC PERITONEAL DIALYSIS CATHETER PLACEMENT WITH FLEURO AND ULTRASOUND performed by Tiffanie Berrios MD at [address] [place] [address] N/A 48/42/2419    UMBILICAL HERNIA REPAIR performed by Tiffanie Berrios MD at [address] N/A 04/26/2021    ESOPHAGOGASTRODUODENOSCOPY performed by Rehan Glover MD at [place] [address] N/A 09/13/2022    EGD DIAGNOSTIC ONLY performed by Murray Vale MD at [facility] History     Socioeconomic History    Marital status:      Spouse name: Not on file    Number of children: 5    Years of education: Not on file    Highest education level: Not on file   Occupational History    Not on file   Tobacco Use    Smoking status: Some Days     Types: Cigars    Smokeless tobacco: Never    Tobacco comments:     3 black and milds a week   Vaping Use    Vaping Use: Never used   Substance and Sexual Activity    Alcohol use: Yes     Comment: occasional    Drug use: Not Currently     Types: Marijuana [place])     Comment: previously used cocaine, stopped May 2015 LAST USED MARIJUANA-last used September 2022    Sexual activity: Yes     Partners: Female   Other Topics Concern    Not on file   Social History Narrative    Lives with my spouse, cousin, daughter     Social Determinants of Health     Financial Resource Strain: Low Risk     Difficulty of Paying Living Expenses: Not very hard   Food Insecurity: No Food Insecurity    Worried About Running Out of Food in the Last Year: Never true    Ran Out of Food in the Last Year: Never true   Transportation Needs: Not on file   Physical Activity: Not on file   Stress: Not on file   Social Connections: Not on file   Intimate Partner Violence: Not on file   Housing Stability: Not on file        Family History   Adopted: Yes       Vitals:    01/20/23 0827   BP: 136/84   Weight: 163 lb (73.9 kg)   Height: 6' 2\" (1.88 m)     Estimated body mass index is 20.93 kg/m² as calculated from the following:    Height as of this encounter: 6' 2\" (1.88 m). Weight as of this encounter: 163 lb (73.9 kg). Physical Exam    Constitutional: Patient is oriented to person, place, and time. Vital signs are normal. Patient  appears well-developed and well-nourished. Patient  is active and cooperative. Non-toxic appearance. No distress. HENT:   Head: Normocephalic and atraumatic. Head is without laceration. Right Ear: External ear normal. No lacerations. No drainage, swelling or tenderness. Left Ear: External ear normal. No lacerations. No drainage, swelling or tenderness. Nose: Nose normal. No nose lacerations or nasal deformity. Mouth/Throat: Uvula is midline, oropharynx is clear and moist and mucous membranes are normal. No oropharyngeal exudate. Eyes: Conjunctivae, EOM and lids are normal. Pupils are equal, round, and reactive to light. Right eye exhibits no discharge. No foreign body present in the right eye. Left eye exhibits no discharge. No foreign body present in the left eye. No scleral icterus. Neck: Trachea normal and normal range of motion. Neck supple. No JVD present. No tracheal tenderness present. Carotid bruit is not present. No rigidity. No tracheal deviation and no edema present. No thyromegaly present.    Cardiovascular: Normal rate, regular rhythm, normal heart sounds, intact distal pulses and normal pulses. Pulmonary/Chest: Effort normal and breath sounds normal. No stridor. No respiratory distress. Patient  has no wheezes. Patient has no rales. Patient exhibits no tenderness and no crepitus. Abdominal: Soft. Normal appearance and bowel sounds are normal. Patient exhibits no distension, no abdominal bruit, no ascites and no mass. There is no hepatosplenomegaly. There is no tenderness. There is no rigidity, no rebound, no guarding and no CVA tenderness. No hernia. Hernia confirmed negative in the ventral area. Neurological: Patient is alert and oriented to person, place, and time. Patient has normal strength. Coordination and gait normal. GCS eye subscore is 4. GCS verbal subscore is 5. GCS motor subscore is 6. Skin: Skin is warm and dry. No abrasion and no rash noted. Patient  is not diaphoretic. No cyanosis or erythema. Psychiatric: Patient has a normal mood and affect. speech is normal and behavior is normal. Cognition and memory are normal.      ASSESSMENT/PLAN:  1. Hypertension associated with diabetes ([facility]  -     cloNIDine (CATAPRES) 0.2 MG tablet; TAKE 1 TABLET BY MOUTH THREE TIMES DAILY, Disp-90 tablet, R-0Normal  -     CBC with Auto Differential; Future  -     Comprehensive Metabolic Panel; Future  -     Lipid Panel; Future  -     TSH with Reflex; Future  2. ESRD (end stage renal disease) on dialysis ([facility]  -     CBC with Auto Differential; Future  -     Comprehensive Metabolic Panel; Future  3. Chronic diastolic congestive heart failure (HCC)  -     CBC with Auto Differential; Future  -     Comprehensive Metabolic Panel; Future  -     hydrALAZINE (APRESOLINE) 100 MG tablet; Take 1 tablet by mouth 3 times daily, Disp-90 tablet, R-0Override insurance for dosage change. New rx. Normal  4.  Hepatitis C antibody positive in blood  -     Hepatitis C RNA QNT W Genotype RFLX; Future  -     [facility] - Yris Funk MD, [place] [facility] [place]  5. Essential hypertension  -     CBC with Auto Differential; Future  -     Comprehensive Metabolic Panel; Future  -     hydrALAZINE (APRESOLINE) 100 MG tablet; Take 1 tablet by mouth 3 times daily, Disp-90 tablet, R-0Override insurance for dosage change. New rx. Normal  6. Hx of encephalopathy  -     CBC with Auto Differential; Future  -     Comprehensive Metabolic Panel; Future        I have spent a total 64 minutes face-to-face with this patient and/or guardian. Over 50% of this time was spent on counseling and care coordination. Return in about 1 week (around 1/27/2023) for Annual Physical, Lab Work PREOP-EXT.

## 2023-01-23 DIAGNOSIS — R76.8 HEPATITIS C ANTIBODY POSITIVE IN BLOOD: ICD-10-CM

## 2023-01-23 DIAGNOSIS — E11.59 HYPERTENSION ASSOCIATED WITH DIABETES (HCC): ICD-10-CM

## 2023-01-23 DIAGNOSIS — I15.2 HYPERTENSION ASSOCIATED WITH DIABETES (HCC): ICD-10-CM

## 2023-01-23 LAB
A/G RATIO: 1.1 (ref 1.1–2.2)
ALBUMIN SERPL-MCNC: 3.8 G/DL (ref 3.4–5)
ALP BLD-CCNC: 101 U/L (ref 40–129)
ALT SERPL-CCNC: 10 U/L (ref 10–40)
ANION GAP SERPL CALCULATED.3IONS-SCNC: 14 MMOL/L (ref 3–16)
AST SERPL-CCNC: 12 U/L (ref 15–37)
BILIRUB SERPL-MCNC: <0.2 MG/DL (ref 0–1)
BUN BLDV-MCNC: 38 MG/DL (ref 7–20)
CALCIUM SERPL-MCNC: 9.2 MG/DL (ref 8.3–10.6)
CHLORIDE BLD-SCNC: 104 MMOL/L (ref 99–110)
CHOLESTEROL, TOTAL: 112 MG/DL (ref 0–199)
CO2: 23 MMOL/L (ref 21–32)
CREAT SERPL-MCNC: 4.4 MG/DL (ref 0.9–1.3)
GFR SERPL CREATININE-BSD FRML MDRD: 15 ML/MIN/{1.73_M2}
GLUCOSE BLD-MCNC: 231 MG/DL (ref 70–99)
HDLC SERPL-MCNC: 39 MG/DL (ref 40–60)
LDL CHOLESTEROL CALCULATED: 58 MG/DL
POTASSIUM SERPL-SCNC: 4.9 MMOL/L (ref 3.5–5.1)
SODIUM BLD-SCNC: 141 MMOL/L (ref 136–145)
TOTAL PROTEIN: 7.3 G/DL (ref 6.4–8.2)
TRIGL SERPL-MCNC: 73 MG/DL (ref 0–150)
TSH REFLEX: 3.04 UIU/ML (ref 0.27–4.2)
VLDLC SERPL CALC-MCNC: 15 MG/DL

## 2023-01-23 PROCEDURE — 36415 COLL VENOUS BLD VENIPUNCTURE: CPT | Performed by: NURSE PRACTITIONER

## 2023-01-23 NOTE — TELEPHONE ENCOUNTER
Called and spoke to Sabino at [facility] to verify if an angiogram is needed. He does not see any documentation that it is need. The only repeat testing that he is seeing that is needed is a follow up chest CT. Called and let Darrell Pavon know that this could probably be ordered by his PCP and he could discuss at his upcoming follow up.

## 2023-01-25 LAB
HCV QNT BY NAAT IU/ML: ABNORMAL IU/ML
HCV QNT BY NAAT LOG IU/ML: 5.88 LOG IU/ML
INTERPRETATION: DETECTED

## 2023-01-27 ENCOUNTER — OFFICE VISIT (OUTPATIENT)
Dept: PRIMARY CARE CLINIC | Age: 58
End: 2023-01-27
Payer: COMMERCIAL

## 2023-01-27 VITALS
TEMPERATURE: 97.4 F | OXYGEN SATURATION: 98 % | DIASTOLIC BLOOD PRESSURE: 84 MMHG | BODY MASS INDEX: 21.17 KG/M2 | HEART RATE: 76 BPM | SYSTOLIC BLOOD PRESSURE: 132 MMHG | HEIGHT: 74 IN | WEIGHT: 165 LBS

## 2023-01-27 DIAGNOSIS — N18.6 ESRD ON PERITONEAL DIALYSIS (HCC): ICD-10-CM

## 2023-01-27 DIAGNOSIS — Z01.818 PREOP EXAMINATION: Primary | ICD-10-CM

## 2023-01-27 DIAGNOSIS — Z87.01 HX OF BACTERIAL PNEUMONIA: ICD-10-CM

## 2023-01-27 DIAGNOSIS — Z99.2 ESRD ON PERITONEAL DIALYSIS (HCC): ICD-10-CM

## 2023-01-27 PROCEDURE — 99214 OFFICE O/P EST MOD 30 MIN: CPT | Performed by: NURSE PRACTITIONER

## 2023-01-27 PROCEDURE — G8427 DOCREV CUR MEDS BY ELIG CLIN: HCPCS | Performed by: NURSE PRACTITIONER

## 2023-01-27 PROCEDURE — G8420 CALC BMI NORM PARAMETERS: HCPCS | Performed by: NURSE PRACTITIONER

## 2023-01-27 PROCEDURE — 3074F SYST BP LT 130 MM HG: CPT | Performed by: NURSE PRACTITIONER

## 2023-01-27 PROCEDURE — 3078F DIAST BP <80 MM HG: CPT | Performed by: NURSE PRACTITIONER

## 2023-01-27 PROCEDURE — G8484 FLU IMMUNIZE NO ADMIN: HCPCS | Performed by: NURSE PRACTITIONER

## 2023-01-27 ASSESSMENT — PATIENT HEALTH QUESTIONNAIRE - PHQ9
7. TROUBLE CONCENTRATING ON THINGS, SUCH AS READING THE NEWSPAPER OR WATCHING TELEVISION: 0
3. TROUBLE FALLING OR STAYING ASLEEP: 0
1. LITTLE INTEREST OR PLEASURE IN DOING THINGS: 0
9. THOUGHTS THAT YOU WOULD BE BETTER OFF DEAD, OR OF HURTING YOURSELF: 0
SUM OF ALL RESPONSES TO PHQ9 QUESTIONS 1 & 2: 0
10. IF YOU CHECKED OFF ANY PROBLEMS, HOW DIFFICULT HAVE THESE PROBLEMS MADE IT FOR YOU TO DO YOUR WORK, TAKE CARE OF THINGS AT HOME, OR GET ALONG WITH OTHER PEOPLE: 0
5. POOR APPETITE OR OVEREATING: 0
SUM OF ALL RESPONSES TO PHQ QUESTIONS 1-9: 0
6. FEELING BAD ABOUT YOURSELF - OR THAT YOU ARE A FAILURE OR HAVE LET YOURSELF OR YOUR FAMILY DOWN: 0
SUM OF ALL RESPONSES TO PHQ QUESTIONS 1-9: 0
2. FEELING DOWN, DEPRESSED OR HOPELESS: 0
4. FEELING TIRED OR HAVING LITTLE ENERGY: 0
8. MOVING OR SPEAKING SO SLOWLY THAT OTHER PEOPLE COULD HAVE NOTICED. OR THE OPPOSITE, BEING SO FIGETY OR RESTLESS THAT YOU HAVE BEEN MOVING AROUND A LOT MORE THAN USUAL: 0

## 2023-01-27 NOTE — PROGRESS NOTES
Preoperative Consultation      Cecilia Skinner  YOB: 1965    Date of Service:  1/27/2023    Vitals:    01/27/23 1103   BP: 132/84   Pulse: 76   Temp: 97.4 °F (36.3 °C)   SpO2: 98%   Weight: 165 lb (74.8 kg)   Height: 6' 2\" (1.88 m)      Wt Readings from Last 2 Encounters:   01/27/23 165 lb (74.8 kg)   01/20/23 163 lb (73.9 kg)     BP Readings from Last 3 Encounters:   01/27/23 132/84   01/20/23 136/84   01/18/23 102/76        Chief Complaint   Patient presents with    Pre-op Exam     Port placement; 2/3/23; jose miguel villalba; Anshul Adams     Allergies   Allergen Reactions    Doxazosin Nausea And Vomiting     VIOLENT VOMITTING    Cefepime Hallucinations     Causes severe hallucinations    Coconut Flavor Rash     Outpatient Medications Marked as Taking for the 1/27/23 encounter (Office Visit) with ROQUE Rose CNP   Medication Sig Dispense Refill    cloNIDine (CATAPRES) 0.2 MG tablet TAKE 1 TABLET BY MOUTH THREE TIMES DAILY 90 tablet 0    hydrALAZINE (APRESOLINE) 100 MG tablet Take 1 tablet by mouth 3 times daily 90 tablet 0    sodium bicarbonate 650 MG tablet Take 1 tablet by mouth 3 times daily (with meals) 90 tablet 1    isosorbide mononitrate (IMDUR) 60 MG extended release tablet Take 1 tablet by mouth once daily 90 tablet 3    sucralfate (CARAFATE) 1 GM tablet Take 1 tablet by mouth 4 times daily 120 tablet 0    Sucroferric Oxyhydroxide (VELPHORO) 500 MG CHEW Take 1 tablet by mouth 3 times daily (with meals)      Insulin Pen Needle (KROGER PEN NEEDLES) 31G X 6 MM MISC 1 each by Does not apply route daily 100 each 3    insulin aspart (NOVOLOG FLEXPEN) 100 UNIT/ML injection pen Inject 3 Units into the skin 3 times daily (before meals) 5 pen 3    atorvastatin (LIPITOR) 40 MG tablet TAKE 1 TABLET BY MOUTH ONCE DAILY NIGHTLY 90 tablet 3    Methoxy PEG-Epoetin Beta (MIRCERA IJ) Inject 75 mcg into the skin      calcitRIOL (ROCALTROL) 0.5 MCG capsule Take 0.5 mcg by mouth daily      NIFEdipine (PROCARDIA XL) 90 MG extended release tablet Take 1 tablet by mouth 2 times daily 180 tablet 3    sodium bicarbonate 650 MG tablet Take 650 mg by mouth 3 times daily      EQ ASPIRIN ADULT LOW DOSE 81 MG EC tablet Take 1 tablet by mouth once daily 90 tablet 3    metoprolol tartrate (LOPRESSOR) 50 MG tablet Take twice daily (Patient taking differently: Take 75 mg by mouth 2 times daily Take twice daily) 180 tablet 1       This patient presents to the office today for a preoperative consultation at the request of surgeon, Dr. Yessi Vides who plans on performing [address] on February 3 at [facility].  The current problem began 2 months ago, and symptoms have been unchanged with time.   Conservative therapy: No.      Planned anesthesia: General   Known anesthesia problems: None   Bleeding risk: Recent history of abnormal bleeding- UGI 9/2022  Personal or FH of DVT/PE: No    Patient objection to receiving blood products: No    Patient Active Problem List   Diagnosis    Nonischemic cardiomyopathy ([address]    Cerebral infarction ([address]    Cigarette nicotine dependence without complication    Erectile dysfunction due to arterial insufficiency    Renal artery stenosis (HCC)    Chronic diastolic congestive heart failure ([address]    H/O: CVA (cerebrovascular accident)    Major depressive disorder, recurrent episode, moderate (HCC)    Pericardial effusion    Hypertension associated with diabetes ([address]    DM type 2 with diabetic mixed hyperlipidemia ([address]    Diabetes mellitus due to underlying condition with stage 4 chronic kidney disease, with long-term current use of insulin ([address]    ESRD (end stage renal disease) on dialysis ([address]    ESRD (end stage renal disease) ([address]    GI bleed    SBP (spontaneous bacterial peritonitis) ([address]    Dialysis-associated peritonitis ([address]    Candida infection    Generalized abdominal pain    PKD (polycystic kidney disease)    Bacterial pneumonia Encephalopathy acute    Elevated lactic acid level    Hyperkalemia    Severe sepsis (HCC)    Staphylococcus aureus bacteremia with sepsis (HCC)    Neutrophilia    Elevated procalcitonin    ESRD needing dialysis ([facility]    Severe malnutrition ([facility]       Past Medical History:   Diagnosis Date    Cardiomyopathy [facility])     CHF (congestive heart failure) (HCC)     CVA (cerebral infarction) 5/2015    Diabetes mellitus ([facility]     Foot ulcer, left ([facility] 1/14/2016    Gastroparesis     Hemodialysis patient ([facility]     Hypercholesteremia     Hypertension     Kidney disease     Unspecified cerebral artery occlusion with cerebral infarction     5/15, 7/15 LEFT SIDE WEAKNESS     Past Surgical History:   Procedure Laterality Date    CATHETER INSERTION N/A 10/17/2022    TUNNEL CATHETER PLACEMENT performed by Jon Smith MD at [address] [place] [address] N/A 5/17/2021    COLONOSCOPY POLYPECTOMY SNARE/COLD BIOPSY performed by Shane Betancourt MD at [place] [address] N/A 10/17/2022    PERITONEAL DIALYSIS CATHETER REMOVAL performed by Jon Smith MD at [address] [place] [address] N/A 10/29/2020    PLACEMENT OF A TUNNELED DIALYSIS CATHETER WITH FLEURO AND ULTRASOUND performed by Jon Smith MD at [address] [place] [address]  11/29/2022    IR NONTUNNELED VASCULAR CATHETER 11/29/2022 [facility]    IR TUNNELED CATHETER PLACEMENT GREATER THAN 5 YEARS  12/6/2022    IR TUNNELED CATHETER PLACEMENT GREATER THAN 5 YEARS 12/6/2022 [facility]    LAPAROSCOPY INSERTION PERITONEAL CATHETER N/A 10/29/2020    LAPAROSCOPIC PERITONEAL DIALYSIS CATHETER PLACEMENT WITH FLEURO AND ULTRASOUND performed by Jon Smith MD at [address] [place] [address] N/A 05/79/1933    UMBILICAL HERNIA REPAIR performed by Jon Smith MD at [facility] N/A 4/26/2021    ESOPHAGOGASTRODUODENOSCOPY performed by Shane Betancourt MD at [facility] ENDOSCOPY    UPPER GASTROINTESTINAL ENDOSCOPY N/A 2022    EGD DIAGNOSTIC ONLY performed by Chucho Smith MD at [address] History   Adopted: Yes     Social History     Socioeconomic History    Marital status:      Spouse name: Not on file    Number of children: 5    Years of education: Not on file    Highest education level: Not on file   Occupational History    Not on file   Tobacco Use    Smoking status: Some Days     Packs/day: 0.00     Years: 30.00     Pack years: 0.00     Types: Cigars, Cigarettes     Start date: 1/3/1979     Last attempt to quit: 2019     Years since quittin.0    Smokeless tobacco: Never    Tobacco comments:     3 black and milds a week   Vaping Use    Vaping Use: Never used   Substance and Sexual Activity    Alcohol use: Yes     Comment: occasional    Drug use: Yes     Types: Marijuana (Weed)     Comment: previously used cocaine, stopped May 2015 LAST USED MARIJUANA-2020    Sexual activity: Yes     Partners: Female   Other Topics Concern    Not on file   Social History Narrative    Lives with my spouse, cousin, daughter     Social Determinants of Health     Financial Resource Strain: Low Risk     Difficulty of Paying Living Expenses: Not very hard   Food Insecurity: No Food Insecurity    Worried About Running Out of Food in the Last Year: Never true    [address] in the Last Year: Never true   Transportation Needs: Not on file   Physical Activity: Not on file   Stress: Not on file   Social Connections: Not on file   Intimate Partner Violence: Not on file   Housing Stability: Not on file       Review of Systems  A comprehensive review of systems was negative except for what was noted in the HPI. Physical Exam   Constitutional: He is oriented to person, place, and time. He appears well-developed and well-nourished. No distress. HENT:   Head: Normocephalic and atraumatic.    Mouth/Throat: Uvula is midline, oropharynx is clear and moist and mucous membranes are normal.   Eyes: Conjunctivae and EOM are normal. Pupils are equal, round, and reactive to light. Neck: Trachea normal and normal range of motion. Neck supple. No JVD present. Carotid bruit is not present. No mass and no thyromegaly present. Cardiovascular: Normal rate, regular rhythm, normal heart sounds and intact distal pulses. Exam reveals no gallop and no friction rub. No murmur heard. Pulmonary/Chest: Effort normal and breath sounds normal. No respiratory distress. He has no wheezes. He has no rales. Port access to right anterior chest.   Abdominal: Soft. Normal aorta and bowel sounds are normal. He exhibits no distension and no mass. There is no hepatosplenomegaly. No tenderness. Musculoskeletal: He exhibits no edema and no tenderness. Neurological: He is alert and oriented to person, place, and time. He has normal strength. No cranial nerve deficit or sensory deficit. Coordination and gait normal.   Skin: Skin is warm and dry. No rash noted. No erythema. Psychiatric: He has a normal mood and affect. His behavior is normal.     EKG Interpretation:  normal sinus rhythm. Lab Review   Lab Results   Component Value Date/Time     01/23/2023 10:22 AM    K 4.9 01/23/2023 10:22 AM    K 4.7 11/27/2022 10:42 AM     01/23/2023 10:22 AM    CO2 23 01/23/2023 10:22 AM    BUN 38 01/23/2023 10:22 AM    CREATININE 4.4 01/23/2023 10:22 AM    GLUCOSE 231 01/23/2023 10:22 AM    CALCIUM 9.2 01/23/2023 10:22 AM     Lab Results   Component Value Date/Time    TROPONINI 0.15 09/13/2022 11:35 AM     Lab Results   Component Value Date/Time    WBC 14.4 12/06/2022 06:40 AM    HGB 9.4 12/06/2022 06:40 AM    HCT 29.8 12/06/2022 06:40 AM    MCV 92.4 12/06/2022 06:40 AM     12/06/2022 06:40 AM           Assessment:       63 y.o. patient with planned surgery as above.     Known risk factors for perioperative complications: Coronary artery disease, Congestive heart failure, Diabetes mellitus, Hypertension, Renal dysfunction, Tobacco abuse  Current medications which may produce withdrawal symptoms if withheld perioperatively: none      Plan:     1. Preoperative workup as follows: none  2. Change in medication regimen before surgery: Take clonidine, hydralazine, Imdur, atorvastatin, nifedipine, metoprolol, Levemir, gabapentin on morning of surgery with sip of water, and hold all other medications until after surgery  3. Prophylaxis for cardiac events with perioperative beta-blockers: Currently taking  metoprolol  ACC/AHA indications for pre-operative beta-blocker use:    Vascular surgery with history of postitive stress test  Intermediate or high risk surgery with history of CAD   Intermediate or high risk surgery with multiple clinical predictors of CAD- 2 of the following: history of compensated or prior heart failure, history of cerebrovascular disease, DM, or renal insufficiency    Routine administration of higher-dose, long-acting metoprolol in beta-blocker-naïve patients on the day of surgery, and in the absence of dose titration is associated with an overall increase in mortality. Beta-blockers should be started days to weeks prior to surgery and titrated to pulse < 70.  4. Deep vein thrombosis prophylaxis: regimen to be chosen by surgical team  5. No contraindications to planned surgery         I have spent a total 44  minutes face-to-face with this patient and/or guardian. Over 50% of this time was spent on counseling and care coordination.

## 2023-01-28 LAB — HEPATITIS C GENOTYPE: NORMAL

## 2023-01-30 ENCOUNTER — TELEPHONE (OUTPATIENT)
Dept: CARDIOLOGY CLINIC | Age: 58
End: 2023-01-30

## 2023-01-30 NOTE — PROGRESS NOTES
[facility]) Physicians   General & Laparoscopic Surgery  [employer]       HPI:  Fazal Mcgregor is a very pleasant 63 y.o. male who is referred by Dr. Liz Shafer nephrologist for consultation with regards PD catheter placement    Patient has ESRD requiring dialysis and prefers PD over HD    Had PD catheter placed in 2020 by Dr. Neela Everett with hernia repair and removed 10/2022 for cadida infection    Would like replaced      Past Medical History:   Diagnosis Date    Cardiomyopathy ([facility]     CHF (congestive heart failure) ([facility]     CVA (cerebral infarction) 05/2015    Diabetes mellitus ([facility]     Foot ulcer, left ([facility] 01/14/2016    Gastroparesis     Hemodialysis patient ([facility]     raúl davidson, sat    History of blood transfusion     Hypercholesteremia     Hypertension     Kidney disease     Unspecified cerebral artery occlusion with cerebral infarction     5/15, 7/15 LEFT SIDE WEAKNESS    Weakness     left side     Past Surgical History:   Procedure Laterality Date    CATHETER INSERTION N/A 10/17/2022    TUNNEL CATHETER PLACEMENT performed by Luzmaria Rivera MD at [address] [facility] N/A 05/17/2021    COLONOSCOPY POLYPECTOMY SNARE/COLD BIOPSY performed by Lorena Penaloza MD at [address] 10/17/2022    PERITONEAL DIALYSIS CATHETER REMOVAL performed by Luzmaria Rivera MD at [facility], COLON, DIAGNOSTIC      HC DIALYSIS CATHETER N/A 10/29/2020    PLACEMENT OF A TUNNELED DIALYSIS CATHETER WITH FLEURO AND ULTRASOUND performed by Luzmaria Rivera MD at [facility]      IR NONTUNNELED VASCULAR CATHETER  11/29/2022    IR NONTUNNELED VASCULAR CATHETER 11/29/2022 [facility]    IR TUNNELED CATHETER PLACEMENT GREATER THAN 5 YEARS  12/06/2022    IR TUNNELED CATHETER PLACEMENT GREATER THAN 5 YEARS 12/6/2022 [facility]    LAPAROSCOPY INSERTION PERITONEAL CATHETER N/A 10/29/2020    LAPAROSCOPIC PERITONEAL DIALYSIS CATHETER PLACEMENT WITH FLEURO AND ULTRASOUND performed by Grace Beyer MD at [address] [place] [address] N/A     UMBILICAL HERNIA REPAIR performed by Grace Beyer MD at [facility]. 106 N/A 2021    ESOPHAGOGASTRODUODENOSCOPY performed by Eileen Alejandre MD at [place] [address] N/A 2022    EGD DIAGNOSTIC ONLY performed by Axel Gerardo MD at [facility] History   Adopted: Yes     Social History     Tobacco Use    Smoking status: Some Days     Packs/day: 0.00     Years: 30.00     Pack years: 0.00     Types: Cigars, Cigarettes     Start date: 1/3/1979     Last attempt to quit: 2019     Years since quittin.0    Smokeless tobacco: Never    Tobacco comments:     3 black and milds a week   Substance Use Topics    Alcohol use: Yes     Comment: occasional     I counseled the patient on the importance of not smoking and risks of ETOH. Allergies   Allergen Reactions    Doxazosin Nausea And Vomiting     VIOLENT VOMITTING    Cefepime Hallucinations     Causes severe hallucinations    Coconut Flavor Rash     Vitals:    23 1042   BP: 102/76   Pulse: 78   Weight: 162 lb (73.5 kg)   Height: 6' 2\" (1.88 m)       Body mass index is 20.8 kg/m².       Current Outpatient Medications:     isosorbide mononitrate (IMDUR) 60 MG extended release tablet, Take 1 tablet by mouth once daily, Disp: 90 tablet, Rfl: 3    Sucroferric Oxyhydroxide (VELPHORO) 500 MG CHEW, Take 1 tablet by mouth 3 times daily (with meals), Disp: , Rfl:     Insulin Pen Needle ([employer] PEN NEEDLES) 31G X 6 MM MISC, 1 each by Does not apply route daily, Disp: 100 each, Rfl: 3    insulin aspart (NOVOLOG FLEXPEN) 100 UNIT/ML injection pen, Inject 3 Units into the skin 3 times daily (before meals), Disp: 5 pen, Rfl: 3    atorvastatin (LIPITOR) 40 MG tablet, TAKE 1 TABLET BY MOUTH ONCE DAILY NIGHTLY, Disp: 90 tablet, Rfl: 3    Methoxy PEG-Epoetin Beta (MIRCERA IJ), Inject 75 mcg into the skin as needed, Disp: , Rfl:     calcitRIOL (ROCALTROL) 0.5 MCG capsule, Take 0.5 mcg by mouth daily, Disp: , Rfl:     NIFEdipine (PROCARDIA XL) 90 MG extended release tablet, Take 1 tablet by mouth 2 times daily, Disp: 180 tablet, Rfl: 3    sodium bicarbonate 650 MG tablet, Take 650 mg by mouth 3 times daily, Disp: , Rfl:     EQ ASPIRIN ADULT LOW DOSE 81 MG EC tablet, Take 1 tablet by mouth once daily, Disp: 90 tablet, Rfl: 3    metoprolol tartrate (LOPRESSOR) 50 MG tablet, Take twice daily (Patient taking differently: Take 75 mg by mouth 2 times daily Take twice daily), Disp: 180 tablet, Rfl: 1    cloNIDine (CATAPRES) 0.2 MG tablet, TAKE 1 TABLET BY MOUTH THREE TIMES DAILY, Disp: 90 tablet, Rfl: 0    hydrALAZINE (APRESOLINE) 100 MG tablet, Take 1 tablet by mouth 3 times daily, Disp: 90 tablet, Rfl: 0    insulin detemir (LEVEMIR FLEXTOUCH) 100 UNIT/ML injection pen, Inject 10 Units into the skin nightly, Disp: 3 mL, Rfl: 0    gabapentin (NEURONTIN) 300 MG capsule, TAKE 1 CAPSULE BY MOUTH THREE TIMES DAILY, Disp: 270 capsule, Rfl: 1    ROS  Review of Systems - History obtained from the patient  General ROS: negative  Psychological ROS: negative  Ophthalmic ROS: negative  Neurological ROS: negative  ENT ROS: negative  Allergy and Immunology ROS: negative  Hematological and Lymphatic ROS: negative  Endocrine ROS: negative  Respiratory ROS: negative  Cardiovascular ROS: negative  Gastrointestinal ROS:negative  Genito-Urinary ROS: negative  Musculoskeletal ROS: negative   Skin ROS:negative        Physical Exam   Constitutional: Patient is oriented to person, place, and time. Vital signs are normal. Patient  appears well-developed and well-nourished. Patient  is active and cooperative.  Non-toxic appearance. No distress.   HENT:   Head: Normocephalic and atraumatic. Head is without laceration.   Right Ear: External ear normal. No lacerations. No drainage, swelling or tenderness.  Left Ear: External ear normal. No lacerations. No drainage, swelling or tenderness. Nose: Nose normal. No nose lacerations or nasal deformity. Mouth/Throat: Uvula is midline, oropharynx is clear and moist and mucous membranes are normal. No oropharyngeal exudate. Eyes: Conjunctivae, EOM and lids are normal. Pupils are equal, round, and reactive to light. Right eye exhibits no discharge. No foreign body present in the right eye. Left eye exhibits no discharge. No foreign body present in the left eye. No scleral icterus. Neck: Trachea normal and normal range of motion. Neck supple. No JVD present. No tracheal tenderness present. Carotid bruit is not present. No rigidity. No tracheal deviation and no edema present. No thyromegaly present. Cardiovascular: Normal rate, regular rhythm, normal heart sounds, intact distal pulses and normal pulses. Pulmonary/Chest: Effort normal and breath sounds normal. No stridor. No respiratory distress. Patient  has no wheezes. Patient has no rales. Patient exhibits no tenderness and no crepitus. Abdominal: Soft. Normal appearance and bowel sounds are normal. Patient exhibits no distension, no abdominal bruit, no ascites and no mass. There is no hepatosplenomegaly. There is no tenderness. There is no rigidity, no rebound, no guarding and no CVA tenderness. No hernia. Hernia confirmed negative in the ventral area. Neurological: Patient is alert and oriented to person, place, and time. Patient has normal strength. Coordination and gait normal. GCS eye subscore is 4. GCS verbal subscore is 5. GCS motor subscore is 6. Skin: Skin is warm and dry. No abrasion and no rash noted. Patient  is not diaphoretic. No cyanosis or erythema. Psychiatric: Patient has a normal mood and affect.   speech is normal and behavior is normal. Cognition and memory are normal.       A/P:  Gage Ruano is a very pleasant 63 y.o. male with ESRD requiring dialysis    Patient prefers Lap PD cath placement    Risks and benefits explained and informed consent obtained. Risks include but not limited to; The possibilities of reaction to medication, pain, infection, bleeding, heart attack, death, injury to internal organs, seroma, chronic pain, nerve injury, open wound , scarring or need for further surgery. 1- Pre-op work up ordered. 2- Laparoscopic PD catheter placement  3- F/U with PCP for pre-op physical and Medical Clearance.    4- Will need to be off blood thinners for surgery for at least one week, , please discuss with your PCP or cardiologist.      Fabian Tidwell Statement Selected

## 2023-01-30 NOTE — LETTER
[address] [facility], [address]  Phone: [phone]  Fax: [phone]    Anaya Dyson MD        January 31, 2023     Patient: Nish Magallon   YOB: 1965   Date of Visit: 1/30/2023       To Whom It May Concern: It is my medical opinion that Nish Magallon may proceed with procedure ([address]) from cardiac standpoint. If you have any questions or concerns, please don't hesitate to call.     Sincerely,        Anaya Dyson MD

## 2023-01-30 NOTE — TELEPHONE ENCOUNTER
CARDIAC CLEARANCE REQUEST    What type of procedure are you having:  [address] 31  Are you taking any blood thinners: no     When is your procedure scheduled for: 2/3/23    What physician is performing your procedure: Dr. Amado [place]     Phone Number: [phone]    Fax number to send the letter: [phone]

## 2023-01-30 NOTE — PROGRESS NOTES
Place patient label inside box (if no patient label, complete below)  Name:  :  MR#:   Roland Andrew / PROCEDURE  I (we), Giana Holland (Patient Name) authorize DR Shreya Dixon (Provider / Maryuri Ward) and/or such assistants as may be selected by him/her, to perform the following operation/procedure(s): LAPAROSCOPIC PERITONEAL DIALYSIS CATHETER PLACEMENT       Note: If unable to obtain consent prior to an emergent procedure, document the emergent reason in the medical record. This procedure has been explained to my (our) satisfaction and included in the explanation was: The intended benefit, nature, and extent of the procedure to be performed; The significant risks involved and the probability of success; Alternative procedures and methods of treatment; The dangers and probable consequences of such alternatives (including no procedure or treatment); The expected consequences of the procedure on my future health; Whether other qualified individuals would be performing important surgical tasks and/or whether  would be present to advise or support the procedure. I (we) understand that there are other risks of infection and other serious complications in the pre-operative/procedural and postoperative/procedural stages of my (our) care. I (we) have asked all of the questions which I (we) thought were important in deciding whether or not to undergo treatment or diagnosis. These questions have been answered to my (our) satisfaction. I (we) understand that no assurance can be given that the procedure will be a success, and no guarantee or warranty of success has been given to me (us). It has been explained to me (us) that during the course of the operation/procedure, unforeseen conditions may be revealed that necessitate extension of the original procedure(s) or different procedure(s) than those set forth in Paragraph 1.  I (we) authorize and request that the above-named physician, his/her assistants or his/her designees, perform procedures as necessary and desirable if deemed to be in my (our) best interest.     Revised 8/2/2021                                                                          Page 1 of 2         I acknowledge that health care personnel may be observing this procedure for the purpose of medical education or other specified purposes as may be necessary as requested and/or approved by my (our) physician. I (we) consent to the disposal by the hospital Pathologist of the removed tissue, parts or organs in accordance with hospital policy. I do ____ do not ____ consent to the use of a local infiltration pain blocking agent that will be used by my provider/surgical provider to help alleviate pain during my procedure. I do ____ do not ____ consent to an emergent blood transfusion in the case of a life-threatening situation that requires blood components to be administered. This consent is valid for 24 hours from the beginning of the procedure. This patient does ____ or does not ____ currently have a DNR status/order. If DNR order is in place, obtain Addendum to the Surgical Consent for ALL Patients with a DNR Order to address tree-operative status for limited intervention or DNR suspension.      I have read and fully understand the above Consent for Operation/Procedure and that all blanks were completed before I signed the consent.   _____________________________       _____________________      ____/____am/pm  Signature of Patient or legal representative      Printed Name / Relationship            Date / Time   ____________________________       _____________________      ____/____am/pm  Witness to Signature                                    Printed Name                    Date / Time    If patient is unable to sign or is a minor, complete the following)  Patient is a minor, ____ years of age, or unable to sign because: ______________________________________________________________________________________________    If a phone consent is obtained, consent will be documented by using two health care professionals, each affirming that the consenting party has no questions and gives consent for the procedure discussed with the physician/provider.   _____________________          ____________________       _____/_____am/pm   2nd witness to phone consent        Printed name           Date / Time    Informed Consent:  I have provided the explanation described above in section 1 to the patient and/or legal representative.  I have provided the patient and/or legal representative with an opportunity to ask any questions about the proposed operation/procedure.   ___________________________          ____________________         ____/____am/pm  Provider / Proceduralist                            Printed name            Date / Time  Revised 8/2/2021                                                                      Page 2 of 2

## 2023-01-30 NOTE — PROGRESS NOTES
[facility] PRE-SURGICAL TESTING INSTRUCTIONS                      PRIOR TO PROCEDURE DATE:    1. PLEASE FOLLOW ANY INSTRUCTIONS GIVEN TO YOU PER YOUR SURGEON. 2. Arrange for someone to drive you home and be with you for the first 24 hours after discharge for your safety after your procedure for which you received sedation. Ensure it is someone we can share information with regarding your discharge. NOTE: At this time ONLY 2 ADULTS may accompany you   One person ENCOURAGED to stay at hospital entire time if outpatient surgery      3. You must contact your surgeon for instructions IF:  You are taking any blood thinners, aspirin, anti-inflammatory or vitamins. There is a change in your physical condition such as a cold, fever, rash, cuts, sores, or any other infection, especially near your surgical site. 4. Do not drink alcohol the day before or day of your procedure. Do not use any recreational marijuana at least 24 hours or street drugs (heroin, cocaine) at minimum 5 days prior to your procedure. 5. A Pre-Surgical History and Physical MUST be completed WITHIN 30 DAYS OR LESS prior to your procedure. by your Physician or an Urgent Care        THE DAY OF YOUR PROCEDURE:  1. Follow instructions for ARRIVAL TIME as DIRECTED BY YOUR SURGEON. 2. Enter the MAIN entrance from [address] and follow the signs to the free Parking [employer] (offered free of charge 7 am-5pm). 3. Enter the Main Entrance of the hospital (do not enter from the lower level of the parking garage). Upon entrance, check in with the  at the surgical information desk on your LEFT. Bring your insurance card and photo ID to register      4. DO NOT EAT ANYTHING 8 hours prior to arrival for surgery. You may have up to 8 ounces of water 4 hours prior to your arrival for surgery.    NOTE: ALL Gastric, Bariatric & Bowel surgery patients - you MUST follow your surgeon's instructions regarding eating/ drinking as you will have very specific instructions to follow. If you did not receive these, call your surgeon's office immediately. 5. MEDICATIONS:  Take the following medications with a SMALL sip of water: nifedine, metoprolol, imdur, gabapentin, clonidine, hydralazine  Use your usual dose of inhalers the morning of surgery. BRING your rescue inhaler with you to hospital.   Anesthesia does NOT want you to take insulin the morning of surgery. They will control your blood sugar while you are at the hospital. Please contact your ordering physician for instructions regarding your insulin the night before your procedure. If you have an insulin pump, please keep it set on basal rate. Bariatric patient's call your surgeon if on diabetic medications as some may need to be stopped 1 week prior to surgery    6. Do not swallow additional water when brushing teeth. No gum, candy, mints, or ice chips. Refrain from smoking or at least decrease the amount on day of surgery. 7. Morning of surgery:   Take a shower with an antibacterial soap (i.e., Safeguard or Dial) OR your physician may have instructed you to use Hibiclens. Dress in loose, comfortable clothing appropriate for redressing after your procedure. Do not wear jewelry (including body piercings), make-up (especially NO eye make-up), fingernail polish (NO toenail polish if foot/leg surgery), lotion, powders, or metal hairclips. Do not shave or wax for 72 hours prior to procedure near your operative site. Shaving with a razor can irritate your skin and make it easier to develop an infection. On the day of your procedure, any hair that needs to be removed near the surgical site will be 'clipped' by a healthcare worker using a special clipper designed to avoid skin irritation. 8. Dentures, glasses, or contacts will need to be removed before your procedure.  Bring cases for your glasses, contacts, dentures, or hearing aids to protect them while you are in surgery. 9. If you use a CPAP, please bring it with you on the day of your procedure. 10. We recommend that valuable personal belongings such as cash, cell phones, e-tablets, or jewelry, be left at home during your stay. The hospital will not be responsible for valuables that are not secured in the hospital safe. However, if your insurance requires a co-pay, you may want to bring a method of payment, i.e., Check or credit card, if you wish to pay your co-pay the day of surgery. 11. If you are to stay overnight, you may bring a bag with personal items. Please have any large items you may need brought in by your family after your arrival to your hospital room. 12. If you have a Living Will or Durable Power of , please bring a copy on the day of your procedure. How we keep you safe and work to prevent surgical site infections:   1. Health care workers should always check your ID bracelet to verify your name and birth date. You will be asked many times to state your name, date of birth, and allergies. 2. Health care workers should always clean their hands with soap or alcohol gel before providing care to you. It is okay to ask anyone if they cleaned their hands before they touch you. 3. You will be actively involved in verifying the type of procedure you are having and ensuring the correct surgical site. This will be confirmed multiple times prior to your procedure. Do NOT tari your surgery site UNLESS instructed to by your surgeon. 4. When you are in the operating room, your surgical site will be cleansed with a special soap, and in most cases, you will be given an antibiotic before the surgery begins. What to expect AFTER your procedure? 1. Immediately following your procedure, your will be taken to the PACU for the first phase of your recovery.   Your nurse will help you recover from any potential side effects of anesthesia, such as extreme drowsiness, changes in your vital signs or breathing patterns. Nausea, headache, muscle aches, or sore throat may also occur after anesthesia. Your nurse will help you manage these potential side effects. 2. For comfort and safety, arrange to have someone at home with you for the first 24 hours after discharge. 3. You and your family will be given written instructions about your diet, activity, dressing care, medications, and return visits. 4. Once at home, should issues with nausea, pain, or bleeding occur, or should you notice any signs of infection, you should call your surgeon. 5. Always clean your hands before and after caring for your wound. Do not let your family touch your surgery site without cleaning their hands. 6. Narcotic pain medications can cause significant constipation. You may want to add a stool softener to your postoperative medication schedule or speak to your surgeon on how best to manage this SIDE EFFECT. SPECIAL INSTRUCTIONS     Thank you for allowing us to care for you. We strive to exceed your expectations in the delivery of care and service provided to you and your family. If you need to contact the [place] [address] staff for any reason, please call us at [phone]    Instructions reviewed with patient during preadmission testing phone interview.   Juancarlos Manuel RN.1/30/2023 .8:09 AM      ADDITIONAL EDUCATIONAL INFORMATION REVIEWED PER PHONE WITH YOU AND/OR YOUR FAMILY:  Yes Hibiclens® Bathing Instructions   Yes Antibacterial Soap

## 2023-01-31 NOTE — TELEPHONE ENCOUNTER
Preoperative risk assessment    Patient scheduled for LAPAROSCOPIC PERITONEAL DIALYSIS CATHETER PLACEMENT      Hx: Cardiomyopathy, HTN,CVA,HLD,CKD  Last office visit 2/2022

## 2023-02-01 ENCOUNTER — TELEPHONE (OUTPATIENT)
Dept: CARDIOLOGY CLINIC | Age: 58
End: 2023-02-01

## 2023-02-01 NOTE — TELEPHONE ENCOUNTER
I called and spoke to Rolo Foy and got him scheduled with Dr. Saravanan Lake for Friday 4/7/23 at 3:00 pm at [facility].

## 2023-02-01 NOTE — TELEPHONE ENCOUNTER
Please schedule patient follow up for Dr Prashanth Mcclain. He has not been seen in 1 year. Please offer next available.

## 2023-02-02 ENCOUNTER — ANESTHESIA EVENT (OUTPATIENT)
Dept: OPERATING ROOM | Age: 58
End: 2023-02-02
Payer: COMMERCIAL

## 2023-02-02 ENCOUNTER — TELEPHONE (OUTPATIENT)
Dept: BARIATRICS/WEIGHT MGMT | Age: 58
End: 2023-02-02

## 2023-02-02 NOTE — TELEPHONE ENCOUNTER
Spoke with pt to confirm his 5:30 am arrival for his 7:30 am surgery on 2/3/23. Pt reminded to be NPO. Pt states last dose of 81 mg ASA was 1/30/23    Pt obtained medical and cardiac clearance.

## 2023-02-03 ENCOUNTER — HOSPITAL ENCOUNTER (OUTPATIENT)
Age: 58
Setting detail: OUTPATIENT SURGERY
Discharge: HOME | End: 2023-02-03
Attending: SURGERY | Admitting: SURGERY
Payer: COMMERCIAL

## 2023-02-03 ENCOUNTER — ANESTHESIA (OUTPATIENT)
Dept: OPERATING ROOM | Age: 58
End: 2023-02-03
Payer: COMMERCIAL

## 2023-02-03 VITALS
SYSTOLIC BLOOD PRESSURE: 156 MMHG | OXYGEN SATURATION: 99 % | BODY MASS INDEX: 20.51 KG/M2 | HEART RATE: 66 BPM | HEIGHT: 74 IN | RESPIRATION RATE: 16 BRPM | DIASTOLIC BLOOD PRESSURE: 77 MMHG | TEMPERATURE: 97.2 F | WEIGHT: 159.8 LBS

## 2023-02-03 DIAGNOSIS — G89.18 POST-OP PAIN: Primary | ICD-10-CM

## 2023-02-03 LAB
ANION GAP SERPL CALCULATED.3IONS-SCNC: 11 MMOL/L (ref 3–16)
BUN BLDV-MCNC: 26 MG/DL (ref 7–20)
CALCIUM SERPL-MCNC: 9.2 MG/DL (ref 8.3–10.6)
CHLORIDE BLD-SCNC: 103 MMOL/L (ref 99–110)
CO2: 25 MMOL/L (ref 21–32)
CREAT SERPL-MCNC: 4.2 MG/DL (ref 0.9–1.3)
GFR SERPL CREATININE-BSD FRML MDRD: 16 ML/MIN/{1.73_M2}
GLUCOSE BLD-MCNC: 136 MG/DL (ref 70–99)
GLUCOSE BLD-MCNC: 170 MG/DL (ref 70–99)
GLUCOSE BLD-MCNC: 181 MG/DL (ref 70–99)
PERFORMED ON: ABNORMAL
PERFORMED ON: ABNORMAL
POTASSIUM SERPL-SCNC: 4.2 MMOL/L (ref 3.5–5.1)
SODIUM BLD-SCNC: 139 MMOL/L (ref 136–145)

## 2023-02-03 PROCEDURE — 7100000001 HC PACU RECOVERY - ADDTL 15 MIN: Performed by: SURGERY

## 2023-02-03 PROCEDURE — C1750 CATH, HEMODIALYSIS,LONG-TERM: HCPCS | Performed by: SURGERY

## 2023-02-03 PROCEDURE — 6360000002 HC RX W HCPCS: Performed by: SURGERY

## 2023-02-03 PROCEDURE — 3600000014 HC SURGERY LEVEL 4 ADDTL 15MIN: Performed by: SURGERY

## 2023-02-03 PROCEDURE — 3700000000 HC ANESTHESIA ATTENDED CARE: Performed by: SURGERY

## 2023-02-03 PROCEDURE — 2580000003 HC RX 258: Performed by: ANESTHESIOLOGY

## 2023-02-03 PROCEDURE — 7100000010 HC PHASE II RECOVERY - FIRST 15 MIN: Performed by: SURGERY

## 2023-02-03 PROCEDURE — 2500000003 HC RX 250 WO HCPCS: Performed by: NURSE ANESTHETIST, CERTIFIED REGISTERED

## 2023-02-03 PROCEDURE — 2709999900 HC NON-CHARGEABLE SUPPLY: Performed by: SURGERY

## 2023-02-03 PROCEDURE — 80048 BASIC METABOLIC PNL TOTAL CA: CPT

## 2023-02-03 PROCEDURE — 7100000000 HC PACU RECOVERY - FIRST 15 MIN: Performed by: SURGERY

## 2023-02-03 PROCEDURE — 3600000004 HC SURGERY LEVEL 4 BASE: Performed by: SURGERY

## 2023-02-03 PROCEDURE — 49324 LAP INSERT TUNNEL IP CATH: CPT | Performed by: SURGERY

## 2023-02-03 PROCEDURE — A4217 STERILE WATER/SALINE, 500 ML: HCPCS | Performed by: SURGERY

## 2023-02-03 PROCEDURE — 6360000002 HC RX W HCPCS: Performed by: NURSE ANESTHETIST, CERTIFIED REGISTERED

## 2023-02-03 PROCEDURE — 2580000003 HC RX 258: Performed by: SURGERY

## 2023-02-03 PROCEDURE — 2500000003 HC RX 250 WO HCPCS: Performed by: SURGERY

## 2023-02-03 PROCEDURE — 7100000011 HC PHASE II RECOVERY - ADDTL 15 MIN: Performed by: SURGERY

## 2023-02-03 PROCEDURE — 6370000000 HC RX 637 (ALT 250 FOR IP): Performed by: SURGERY

## 2023-02-03 PROCEDURE — 3700000001 HC ADD 15 MINUTES (ANESTHESIA): Performed by: SURGERY

## 2023-02-03 RX ORDER — HEPARIN SODIUM 5000 [USP'U]/ML
5000 INJECTION, SOLUTION INTRAVENOUS; SUBCUTANEOUS ONCE
Status: COMPLETED | OUTPATIENT
Start: 2023-02-03 | End: 2023-02-03

## 2023-02-03 RX ORDER — FENTANYL CITRATE 50 UG/ML
INJECTION, SOLUTION INTRAMUSCULAR; INTRAVENOUS PRN
Status: DISCONTINUED | OUTPATIENT
Start: 2023-02-03 | End: 2023-02-03 | Stop reason: SDUPTHER

## 2023-02-03 RX ORDER — LABETALOL HYDROCHLORIDE 5 MG/ML
10 INJECTION, SOLUTION INTRAVENOUS
Status: DISCONTINUED | OUTPATIENT
Start: 2023-02-03 | End: 2023-02-03 | Stop reason: HOSPADM

## 2023-02-03 RX ORDER — FENTANYL CITRATE 50 UG/ML
50 INJECTION, SOLUTION INTRAMUSCULAR; INTRAVENOUS EVERY 5 MIN PRN
Status: DISCONTINUED | OUTPATIENT
Start: 2023-02-03 | End: 2023-02-03 | Stop reason: HOSPADM

## 2023-02-03 RX ORDER — SODIUM CHLORIDE 9 MG/ML
INJECTION, SOLUTION INTRAVENOUS PRN
Status: DISCONTINUED | OUTPATIENT
Start: 2023-02-03 | End: 2023-02-03 | Stop reason: HOSPADM

## 2023-02-03 RX ORDER — DOCUSATE SODIUM 100 MG/1
100 CAPSULE, LIQUID FILLED ORAL 2 TIMES DAILY
Qty: 30 CAPSULE | Refills: 0 | Status: SHIPPED | OUTPATIENT
Start: 2023-02-03 | End: 2023-02-17

## 2023-02-03 RX ORDER — SODIUM CHLORIDE 0.9 % (FLUSH) 0.9 %
5-40 SYRINGE (ML) INJECTION PRN
Status: DISCONTINUED | OUTPATIENT
Start: 2023-02-03 | End: 2023-02-03 | Stop reason: HOSPADM

## 2023-02-03 RX ORDER — HEPARIN SODIUM (PORCINE) LOCK FLUSH IV SOLN 100 UNIT/ML 100 UNIT/ML
SOLUTION INTRAVENOUS PRN
Status: DISCONTINUED | OUTPATIENT
Start: 2023-02-03 | End: 2023-02-03 | Stop reason: HOSPADM

## 2023-02-03 RX ORDER — BUPIVACAINE HYDROCHLORIDE 5 MG/ML
INJECTION, SOLUTION EPIDURAL; INTRACAUDAL PRN
Status: DISCONTINUED | OUTPATIENT
Start: 2023-02-03 | End: 2023-02-03 | Stop reason: HOSPADM

## 2023-02-03 RX ORDER — ONDANSETRON 2 MG/ML
4 INJECTION INTRAMUSCULAR; INTRAVENOUS
Status: DISCONTINUED | OUTPATIENT
Start: 2023-02-03 | End: 2023-02-03 | Stop reason: HOSPADM

## 2023-02-03 RX ORDER — MIDAZOLAM HYDROCHLORIDE 1 MG/ML
INJECTION INTRAMUSCULAR; INTRAVENOUS PRN
Status: DISCONTINUED | OUTPATIENT
Start: 2023-02-03 | End: 2023-02-03 | Stop reason: SDUPTHER

## 2023-02-03 RX ORDER — LIDOCAINE HYDROCHLORIDE 20 MG/ML
INJECTION, SOLUTION INTRAVENOUS PRN
Status: DISCONTINUED | OUTPATIENT
Start: 2023-02-03 | End: 2023-02-03 | Stop reason: SDUPTHER

## 2023-02-03 RX ORDER — SODIUM CHLORIDE 9 MG/ML
INJECTION, SOLUTION INTRAVENOUS CONTINUOUS
Status: DISCONTINUED | OUTPATIENT
Start: 2023-02-03 | End: 2023-02-03 | Stop reason: HOSPADM

## 2023-02-03 RX ORDER — TRAMADOL HYDROCHLORIDE 50 MG/1
50 TABLET ORAL
Status: DISCONTINUED | OUTPATIENT
Start: 2023-02-03 | End: 2023-02-03 | Stop reason: HOSPADM

## 2023-02-03 RX ORDER — OXYCODONE HYDROCHLORIDE 5 MG/1
5 TABLET ORAL EVERY 6 HOURS PRN
Qty: 28 TABLET | Refills: 0 | Status: SHIPPED | OUTPATIENT
Start: 2023-02-03 | End: 2023-02-10

## 2023-02-03 RX ORDER — ROCURONIUM BROMIDE 10 MG/ML
INJECTION, SOLUTION INTRAVENOUS PRN
Status: DISCONTINUED | OUTPATIENT
Start: 2023-02-03 | End: 2023-02-03 | Stop reason: SDUPTHER

## 2023-02-03 RX ORDER — HYDRALAZINE HYDROCHLORIDE 20 MG/ML
10 INJECTION INTRAMUSCULAR; INTRAVENOUS
Status: DISCONTINUED | OUTPATIENT
Start: 2023-02-03 | End: 2023-02-03 | Stop reason: HOSPADM

## 2023-02-03 RX ORDER — MAGNESIUM HYDROXIDE 1200 MG/15ML
LIQUID ORAL CONTINUOUS PRN
Status: DISCONTINUED | OUTPATIENT
Start: 2023-02-03 | End: 2023-02-03 | Stop reason: HOSPADM

## 2023-02-03 RX ORDER — FENTANYL CITRATE 50 UG/ML
25 INJECTION, SOLUTION INTRAMUSCULAR; INTRAVENOUS EVERY 5 MIN PRN
Status: DISCONTINUED | OUTPATIENT
Start: 2023-02-03 | End: 2023-02-03 | Stop reason: HOSPADM

## 2023-02-03 RX ORDER — SODIUM CHLORIDE 0.9 % (FLUSH) 0.9 %
5-40 SYRINGE (ML) INJECTION EVERY 12 HOURS SCHEDULED
Status: DISCONTINUED | OUTPATIENT
Start: 2023-02-03 | End: 2023-02-03 | Stop reason: HOSPADM

## 2023-02-03 RX ORDER — MAGNESIUM CARB/ALUMINUM HYDROX 105-160MG
TABLET,CHEWABLE ORAL PRN
Status: DISCONTINUED | OUTPATIENT
Start: 2023-02-03 | End: 2023-02-03 | Stop reason: HOSPADM

## 2023-02-03 RX ORDER — ONDANSETRON 2 MG/ML
INJECTION INTRAMUSCULAR; INTRAVENOUS PRN
Status: DISCONTINUED | OUTPATIENT
Start: 2023-02-03 | End: 2023-02-03 | Stop reason: SDUPTHER

## 2023-02-03 RX ORDER — PROPOFOL 10 MG/ML
INJECTION, EMULSION INTRAVENOUS PRN
Status: DISCONTINUED | OUTPATIENT
Start: 2023-02-03 | End: 2023-02-03 | Stop reason: SDUPTHER

## 2023-02-03 RX ORDER — PROCHLORPERAZINE EDISYLATE 5 MG/ML
5 INJECTION INTRAMUSCULAR; INTRAVENOUS
Status: DISCONTINUED | OUTPATIENT
Start: 2023-02-03 | End: 2023-02-03 | Stop reason: HOSPADM

## 2023-02-03 RX ADMIN — ONDANSETRON 4 MG: 2 INJECTION INTRAMUSCULAR; INTRAVENOUS at 07:52

## 2023-02-03 RX ADMIN — HEPARIN SODIUM 5000 UNITS: 5000 INJECTION INTRAVENOUS; SUBCUTANEOUS at 06:45

## 2023-02-03 RX ADMIN — SUGAMMADEX 200 MG: 100 INJECTION, SOLUTION INTRAVENOUS at 08:09

## 2023-02-03 RX ADMIN — MIDAZOLAM HYDROCHLORIDE 1 MG: 2 INJECTION, SOLUTION INTRAMUSCULAR; INTRAVENOUS at 07:14

## 2023-02-03 RX ADMIN — VANCOMYCIN HYDROCHLORIDE 1000 MG: 10 INJECTION, POWDER, LYOPHILIZED, FOR SOLUTION INTRAVENOUS at 07:12

## 2023-02-03 RX ADMIN — ROCURONIUM BROMIDE 50 MG: 10 INJECTION INTRAVENOUS at 07:27

## 2023-02-03 RX ADMIN — LIDOCAINE HYDROCHLORIDE 100 MG: 20 INJECTION, SOLUTION INTRAVENOUS at 07:27

## 2023-02-03 RX ADMIN — MIDAZOLAM HYDROCHLORIDE 1 MG: 2 INJECTION, SOLUTION INTRAMUSCULAR; INTRAVENOUS at 07:12

## 2023-02-03 RX ADMIN — FENTANYL CITRATE 50 MCG: 50 INJECTION, SOLUTION INTRAMUSCULAR; INTRAVENOUS at 07:24

## 2023-02-03 RX ADMIN — FENTANYL CITRATE 50 MCG: 50 INJECTION, SOLUTION INTRAMUSCULAR; INTRAVENOUS at 07:15

## 2023-02-03 RX ADMIN — PROPOFOL 180 MG: 10 INJECTION, EMULSION INTRAVENOUS at 07:27

## 2023-02-03 RX ADMIN — SODIUM CHLORIDE: 9 INJECTION, SOLUTION INTRAVENOUS at 06:52

## 2023-02-03 ASSESSMENT — PAIN - FUNCTIONAL ASSESSMENT: PAIN_FUNCTIONAL_ASSESSMENT: 0-10

## 2023-02-03 ASSESSMENT — PAIN SCALES - GENERAL: PAINLEVEL_OUTOF10: 0

## 2023-02-03 NOTE — PROGRESS NOTES
Ambulatory Surgery/Procedure Discharge Note    Vitals:    02/03/23 0940   BP: (!) 156/77   Pulse: 66   Resp: 16   Temp: 97.2 °F (36.2 °C)   SpO2: 99%     Patient meets criteria for discharge per Kasey score. Pre op /87. In: 310 [P.O.:60]  Out: 0     Restroom use offered before discharge. Yes    Pain assessment:  none  Pain Level: 0    Pt alert and oriented x4. I Denies N/V or pain. Abdominal dressing-C,D,I. No hematoma, bleeding, bruising, or swelling noted. Discharge instructions given to pt and wife with pt permission. Pt and wife verbalized understanding of all instructions. Patient required to void prior to discharge. Fluids encouraged.     2/3/2023 10:12 AM

## 2023-02-03 NOTE — PROGRESS NOTES
CRNA and OR nurse are aware BMP has not resulted. Called lab to check and they said that it would be a few more minutes.

## 2023-02-03 NOTE — PROGRESS NOTES
Pt and wife states \"ready to go home\". Pt alert and oriented x4. IV removed. Denies N/V or pain. Left with all belongings, 2 prescriptions, and discharge instructions.

## 2023-02-03 NOTE — PROGRESS NOTES
Patient admitted to PACU # 09 from OR at 0934 post [address] per Dr. Stephanie Padilla. Attached to PACU monitoring system and report received from anesthesia provider. Patient was reported to be hemodynamically stable during procedure. Patient arrived to pacu with oral airway in place. Pt on 3 L NC. Pt has one visible surgical lap site c/d/I with surgical glue. LLQ has dry dressing with gauze and Tegaderm. Abd binder in place. Ice pack in place. . IVF capped. Will continue to monitor.

## 2023-02-03 NOTE — PROGRESS NOTES
PACU Transfer to [facility]    Vitals:    02/03/23 0930   BP: (!) 158/90   Pulse: 65   Resp: 12   Temp: 97.4 °F (36.3 °C)   SpO2: 93%     BP within 20% of baseline.     Intake/Output Summary (Last 24 hours) at 2/3/2023 0933  Last data filed at 2/3/2023 0930  Gross per 24 hour   Intake 310 ml   Output 0 ml   Net 310 ml       Pain assessment:  none  Pain Level: 0    Patient transferred to care of RADHA RN.    2/3/2023 9:33 AM

## 2023-02-03 NOTE — BRIEF OP NOTE
Brief Postoperative Note      Patient: Miah Mesa  YOB: 1965  MRN: [id number]    Date of Procedure: 2/3/2023    Pre-Op Diagnosis: ESRD (end stage renal disease) ([facility] [place] [facility] [N18.6]    Post-Op Diagnosis: Same       Procedure(s):  LAPAROSCOPIC PERITONEAL DIALYSIS CATHETER PLACEMENT    Surgeon(s):   Cherelle Esteban DO    Assistant:  Resident: Redd Wiggins MD    Anesthesia: General    Estimated Blood Loss (mL): Minimal    Complications: None    Specimens:   * No specimens in log *    Implants:  * No implants in log *      Drains:   [REMOVED] Urinary Catheter 02/03/23 Smith (Removed)       Findings: prior omentopexy intact, minimal adhesions, left sided PD catheter placement     Electronically signed by Deann Lepe MD on 2/3/2023 at 8:23 AM

## 2023-02-03 NOTE — DISCHARGE INSTRUCTIONS
Medications  If you are taking Aspirin , please do not take it for the next 48 hours. You may re-start the Aspirin on 08/24. Wound/Dressing Care  Please do not change your dressing over the catheter. You will be educated about dressing changes at your next nephrology/HD appointment. No showering/bathing until that time. You may sponge bathe yourself, but do not get your dressing wet. Your other incisions are closed with skin glue, which will come of in about 10 days. If it does not peel off in 10 days, you may use petroleum jelly to remove. Do not soak or scrub area of incision for 7-10 days. Pain Management  You are being sent home with Oxycodone for pain management. No driving while on narcotics    Please take Colace (stool softener) while you are taking the narcotic medications. You may decrease the dose or stop if your stools become too loose. Diet:   May resume your renal diet    Activity:   No heavy lifting greater than a milk jug, or 8 lbs until follow up. Return if:   Call/ Return to ED for increased redness, worsening pain, drainage from wound, or fevers greater than 101.5 F. Please follow up with Dr. Lauren Gu in 2 weeks. You may call his office at [phone] to make the appointment. Follow up with Nephrology/HD as scheduled. Dialysis should flush your peritoneal catheter in one week. [facility]    There are potential side effects of anesthesia or sedation you may experience for the first 24 hours. These side effects include:    Confusion or Memory loss, Dizziness, or Delayed Reaction Times   [x]A responsible person should be with you for the next 24 hours. Do not operate any vehicles (automobiles, bicycles, motorcycles) or power tools or machinery for 24 hours. Do not sign any legal documents or make any legal decisions for 24 hours. Do not drink alcohol for 24 hours or while taking narcotic pain medication.       Nausea [x]Start with light diet and progress to your normal diet as you feel like eating. However, if you experience nausea or repeated episodes of vomiting which persist beyond 12-24 hours, notify your physician. Once nausea has passed, remember to keep drinking fluids. Difficulty Passing Urine  [x]Drink extra amounts of fluid today. Notify your physician if you have not urinated within 8 hours after your procedure or you feel uncomfortable. Irritated Throat from a Breathing Tube  [x]Drink extra amounts of fluid today. Lozenges may help. Muscle Aches  [x]You may experience some generalized body aches as your muscles recover from medications used to relax them during surgery. These will gradually subside. MEDICATION INSTRUCTIONS:  [x]Prescription(S) x  2   sent with you. Use as directed. When taking pain medications, you may experience the side effect of dizziness or drowsiness. Do not drink alcohol or drive when taking these medications. [x]Give the list of your medications to your primary care physician on your next visit. Keep your med list updated and carry it with in case of emergencies. [x] Narcotic pain medications can cause the side effect of significant constipation. You may want to add a stool softener to your postoperative medication schedule or speak to your surgeon on how best to manage this side effect. NARCOTIC SAFETY:  Your pain medicine is only for you to take. Safely store your medicines. Store pills up high and out of reach of children and pets. Ensure safety caps are snapped tightly  Keep track of how many pills you have left    Unused medication can be disposed of by taking them to a drop-off box or take-back program that is authorized by the [facility]. Access to a site near you can be found on the [facility] [employer] website ([address] [employer].gov).     If you have a CPAP machine, it is very important that you use it daily during all periods of sleep and daytime rest during your recovery at home. Surgery and Anesthesia place a significant amount of stress on your body. Using your CPAP will help keep you safe and lessen the negative effects of that stress. FOLLOW-UP RECOVERY CARE:  [x]Call the office at [phone]  for follow-up appointment and problems    Watch for these possible complications, symptoms, or side effects of anesthesia. Call physician if they or any other problems occur:  Signs of INFECTION   > Fever over 101°     > Redness, swelling, hardness or warmth at the operative site   >Foul smelling or cloudy drainage at the operative site   Unrelieved PAIN  Unrelieved NAUSEA  Blood soaked dressing. (Some oozing may be normal)  Inability to urinate      Numb, pale, blue, cold or tingling extremity      Physician:  Dr. Matilde Richard    The above instructions were reviewed with patient/significant other. The following additional patient specific information was reviewed with the patient/significant other:  [x]Procedure/physician specific instructions  [x]Medication information sheet(S) including potential side effects  []Pams egress test  []Pain Ball management  []FAQ Catheter associated blood stream infections  []FAQ Surgical Site Infections  []Other-    I have read and understand the instructions given to me: ____________________________________________   (Patient/S.O. Signature)            Date/time 2/3/2023 8:47 AM         PACU:  [phone]   M-F 700 AM - 7 PM      SAME DAY SERVICES:  [phone] M-F 7AM-6PM        If you smoke STOP. We care about your health!

## 2023-02-03 NOTE — H&P
Kimmy Kumar    [id number]    [facility] Same Day Surgery Update H & P  Department of General Surgery   Surgical Service   Pre-operative History and Physical      DIAGNOSIS:   ESRD (end stage renal disease) ([facility] [N18.6]    PROCEDURE:  CA LAPS INSERTION TUNNELED INTRAPERITONEAL CATHETER [74050] (LAPAROSCOPIC PERITONEAL DIALYSIS CATHETER PLACEMENT)     HISTORY OF PRESENT ILLNESS:    Patient presents for scheduled procedure. No new concerns or complaints. Prior PD catheter was removed in November. Covid 19:  Patient denies fever, chills, cough or known exposure to Covid-19.   Patient reports they have been quarantined at home since Covd-19 test.      Past Medical History:        Diagnosis Date    Cardiomyopathy [facility])     CHF (congestive heart failure) ([facility]     CVA (cerebral infarction) 05/2015    Left sided weakness (hands and legs)    Diabetes mellitus ([facility] [facility]     Foot ulcer, left ([facility] 01/14/2016    Gastroparesis     Hemodialysis patient ([facility]     raúl davidson, sat    History of blood transfusion     Hypercholesteremia     Hypertension     Kidney disease     Unspecified cerebral artery occlusion with cerebral infarction     5/15, 7/15 LEFT SIDE WEAKNESS    Weakness     left side     Past Surgical History:        Procedure Laterality Date    CATHETER INSERTION N/A 10/17/2022    TUNNEL CATHETER PLACEMENT performed by Daryn Davila MD at [address],[facility] 200 N/A 05/17/2021    COLONOSCOPY POLYPECTOMY SNARE/COLD BIOPSY performed by Betzaida Zeng MD at [address] [place] [address] N/A 10/17/2022    PERITONEAL DIALYSIS CATHETER REMOVAL performed by Daryn Davila MD at [facility] 106, COLON, DIAGNOSTIC      HC DIALYSIS CATHETER N/A 10/29/2020    PLACEMENT OF A TUNNELED DIALYSIS CATHETER WITH FLEURO AND ULTRASOUND performed by Daryn Davila MD at [address] [facility]      IR NONTUNNELED VASCULAR CATHETER  11/29/2022    IR NONTUNNELED VASCULAR CATHETER 11/29/2022 [facility]    IR TUNNELED CATHETER PLACEMENT GREATER THAN 5 YEARS  12/06/2022    IR TUNNELED CATHETER PLACEMENT GREATER THAN 5 YEARS 12/6/2022 [facility]    LAPAROSCOPY INSERTION PERITONEAL CATHETER N/A 10/29/2020    LAPAROSCOPIC PERITONEAL DIALYSIS CATHETER PLACEMENT WITH FLEURO AND ULTRASOUND performed by Bryce Gutierres MD at [facility] N/A 47/04/3870    UMBILICAL HERNIA REPAIR performed by Bryce uGtierres MD at [facility] N/A 04/26/2021    ESOPHAGOGASTRODUODENOSCOPY performed by Satnam Carpenter MD at [facility] 13 N/A 09/13/2022    EGD DIAGNOSTIC ONLY performed by Ino Beck MD at [facility] ENDOSCOPY     Past Social History:  Social History     Socioeconomic History    Marital status:      Spouse name: None    Number of children: 5    Years of education: None    Highest education level: None   Tobacco Use    Smoking status: Some Days     Types: Cigars    Smokeless tobacco: Never    Tobacco comments:     3 black and milds a week   Vaping Use    Vaping Use: Never used   Substance and Sexual Activity    Alcohol use: Yes     Comment: occasional    Drug use: Not Currently     Types: Marijuana Jasper Haicharu)     Comment: previously used cocaine, stopped May 2015 LAST USED MARIJUANA-last used September 2022    Sexual activity: Yes     Partners: Female   Social History Narrative    Lives with my spouse, cousin, daughter     Social Determinants of Health     Financial Resource Strain: Low Risk     Difficulty of Paying Living Expenses: Not very hard   Food Insecurity: No Food Insecurity    Worried About Running Out of Food in the Last Year: Never true    Ran Out of Food in the Last Year: Never true         Medications Prior to Admission:      Prior to Admission medications    Medication Sig Start Date End Date Taking?  Authorizing Provider   cloNIDine (CATAPRES) 0.2 MG tablet TAKE 1 TABLET BY MOUTH THREE TIMES DAILY 1/20/23   ROQUE Valentine CNP   hydrALAZINE (APRESOLINE) 100 MG tablet Take 1 tablet by mouth 3 times daily 1/20/23   ROQUE Valentine CNP   isosorbide mononitrate (IMDUR) 60 MG extended release tablet Take 1 tablet by mouth once daily 11/1/22   ROQUE Gordon CNP   insulin detemir (LEVEMIR FLEXTOUCH) 100 UNIT/ML injection pen Inject 10 Units into the skin nightly 10/18/22 2/3/23  Ludin Leo MD   Sucroferric Oxyhydroxide (VELPHORO) 500 MG CHEW Take 1 tablet by mouth 3 times daily (with meals)  Patient not taking: Reported on 2/3/2023 6/23/22   Historical Provider, MD   Insulin Pen Needle ([employer] PEN NEEDLES) 31G X 6 MM MISC 1 each by Does not apply route daily 8/19/22   ROQUE Gordon CNP   insulin aspart (NOVOLOG FLEXPEN) 100 UNIT/ML injection pen Inject 3 Units into the skin 3 times daily (before meals) 8/19/22   ROQUE Gordon CNP   atorvastatin (LIPITOR) 40 MG tablet TAKE 1 TABLET BY MOUTH ONCE DAILY NIGHTLY 8/4/22   ROQUE Gordon CNP   gabapentin (NEURONTIN) 300 MG capsule TAKE 1 CAPSULE BY MOUTH THREE TIMES DAILY 8/1/22 2/3/23  ROQUE Gordon CNP   Methoxy PEG-Epoetin Beta (MIRCERA IJ) Inject 75 mcg into the skin as needed  Patient not taking: Reported on 2/3/2023 5/10/21   Historical Provider, MD   calcitRIOL (ROCALTROL) 0.5 MCG capsule Take 0.5 mcg by mouth daily 4/5/22   Historical Provider, MD   iron sucrose (VENOFER) 20 MG/ML injection 200 mg daily 1/12/21 9/16/22  Historical Provider, MD   NIFEdipine (PROCARDIA XL) 90 MG extended release tablet Take 1 tablet by mouth 2 times daily 2/11/22   Amee Melton MD   sodium bicarbonate 650 MG tablet Take 650 mg by mouth 3 times daily    Historical Provider, MD DELCID ASPIRIN ADULT LOW DOSE 81 MG EC tablet Take 1 tablet by mouth once daily 9/24/21   Romelle Ricks, APRN - CNP   metoprolol tartrate (LOPRESSOR) 50 MG tablet Take twice daily  Patient taking differently: Take 75 mg by mouth 2 times daily Take twice daily 8/2/21   ROQUE Miranda - CNP         Allergies:  Doxazosin, Cefepime, and Coconut flavor    PHYSICAL EXAM:      BP (!) 157/87   Pulse 72   Temp 98.1 °F (36.7 °C) (Temporal)   Resp 16   Ht 6' 2\" (1.88 m)   Wt 159 lb 12.8 oz (72.5 kg)   SpO2 100%   BMI 20.52 kg/m²      Heart:  regular rate and rhythm, No murmur noted    Lungs:  No increased work of breathing, good air exchange    Abdomen:  soft, non-distended, non-tender, no rebound tenderness or guarding, normal active bowel sounds and no masses palpated    ASSESSMENT AND PLAN:    1. History obtained from patient and review of records. Patient seen and focused exam done today. -     2. Access to ancillary services are available per request of the provider.     Darius Rivas MD     2/3/2023

## 2023-02-03 NOTE — FLOWSHEET NOTE
Pt awake and tolerating ice chips. Pt drowsy at times and said he did not sleep last night. Pt states he wants to leave and eat. Pt is A/Ox4  and talking with writer. Wife called on cell phone and was unable to hear writer. WR called and SDS bed requested.

## 2023-02-03 NOTE — PROGRESS NOTES
Pt resting in bed. Call light in reach. Iv started with NS running. Vancomycin at bedside. Pt has a healing scab/old coccyx sore that this nurse put on a mepilex for protection. No other wounds noted. Consents signed. Pt had a sucker this morning for low blood sugar. Dr. Kameron Pandey aware. Blood sugar was 181. BMP sent to labs. Wedding ring cannot come off. Ring taped and waiver signed.

## 2023-02-03 NOTE — ANESTHESIA PRE PROCEDURE
Department of Anesthesiology  Preprocedure Note       Name:  Lex Alvarez   Age:  63 y.o.  :  1965                                          MRN:  [id number]         Date:  2/3/2023      Surgeon: Adina Ibrahim): Esdras Cummings DO    Procedure: Procedure(s):  LAPAROSCOPIC PERITONEAL DIALYSIS CATHETER PLACEMENT    Medications prior to admission:   Prior to Admission medications    Medication Sig Start Date End Date Taking?  Authorizing Provider   cloNIDine (CATAPRES) 0.2 MG tablet TAKE 1 TABLET BY MOUTH THREE TIMES DAILY 23   ROQUE Johnson CNP   hydrALAZINE (APRESOLINE) 100 MG tablet Take 1 tablet by mouth 3 times daily 23   ROQUE Johnson CNP   isosorbide mononitrate (IMDUR) 60 MG extended release tablet Take 1 tablet by mouth once daily 22   ROQUE Cook CNP   insulin detemir (LEVEMIR FLEXTOUCH) 100 UNIT/ML injection pen Inject 10 Units into the skin nightly 10/18/22 2/3/23  Zenon Lanes, MD   Sucroferric Oxyhydroxide (VELPHORO) 500 MG CHEW Take 1 tablet by mouth 3 times daily (with meals)  Patient not taking: Reported on 2/3/2023 6/23/22   Historical Provider, MD   Insulin Pen Needle ([employer] PEN NEEDLES) 31G X 6 MM MISC 1 each by Does not apply route daily 22   ROQUE Cook CNP   insulin aspart (NOVOLOG FLEXPEN) 100 UNIT/ML injection pen Inject 3 Units into the skin 3 times daily (before meals) 22   ROQUE Cook CNP   atorvastatin (LIPITOR) 40 MG tablet TAKE 1 TABLET BY MOUTH ONCE DAILY NIGHTLY 22   ROQUE Cook CNP   gabapentin (NEURONTIN) 300 MG capsule TAKE 1 CAPSULE BY MOUTH THREE TIMES DAILY 8/1/22 2/3/23  ROQUE Cook CNP   Methoxy PEG-Epoetin Beta (MIRCERA IJ) Inject 75 mcg into the skin as needed  Patient not taking: Reported on 2/3/2023 5/10/21   Historical Provider, MD   calcitRIOL (ROCALTROL) 0.5 MCG capsule Take 0.5 mcg by mouth daily 22   Historical Provider, MD   iron sucrose (VENOFER) 20 MG/ML injection 200 mg daily 1/12/21 9/16/22  Historical Provider, MD   NIFEdipine (PROCARDIA XL) 90 MG extended release tablet Take 1 tablet by mouth 2 times daily 2/11/22   Ashly De Anda MD   sodium bicarbonate 650 MG tablet Take 650 mg by mouth 3 times daily    Historical Provider, MD DELCID ASPIRIN ADULT LOW DOSE 81 MG EC tablet Take 1 tablet by mouth once daily 9/24/21   Anson Oppenheim, APRN - CNP   metoprolol tartrate (LOPRESSOR) 50 MG tablet Take twice daily  Patient taking differently: Take 75 mg by mouth 2 times daily Take twice daily 8/2/21   Anson Oppenheim, APRN - CNP       Current medications:    Current Facility-Administered Medications   Medication Dose Route Frequency Provider Last Rate Last Admin    Vancomycin HCl 1,000 mg in sodium chloride 0.9 % 250 mL IVPB  1,000 mg IntraVENous Once Cherylene Glee, DO        0.9 % sodium chloride infusion   IntraVENous Continuous Elyn Lennox,  mL/hr at 02/03/23 0656 NoRateChange at 02/03/23 0656       Allergies:     Allergies   Allergen Reactions    Doxazosin Nausea And Vomiting     VIOLENT VOMITTING    Cefepime Hallucinations     Causes severe hallucinations    Coconut Flavor Rash       Problem List:    Patient Active Problem List   Diagnosis Code    Nonischemic cardiomyopathy ([facility] I42.8    Cerebral infarction ([facility] [address] I63.9    Cigarette nicotine dependence without complication P40.519    Erectile dysfunction due to arterial insufficiency N52.01    Renal artery stenosis (HCC) I70.1    Chronic diastolic congestive heart failure (HCC) I50.32    H/O: CVA (cerebrovascular accident) Z80.78    Major depressive disorder, recurrent episode, moderate (HCC) F33.1    Pericardial effusion I31.39    Hypertension associated with diabetes (HCC) E11.59, I15.2    DM type 2 with diabetic mixed hyperlipidemia (HCC) E11.69, E78.2    Diabetes mellitus due to underlying condition with stage 4 chronic kidney disease, with long-term current use of insulin ([facility]) E08.22, N18.4, Z79.4    ESRD (end stage renal disease) on dialysis ([facility] N18.6, Z99.2    ESRD (end stage renal disease) ([facility] N18.6    GI bleed K92.2    SBP (spontaneous bacterial peritonitis) ([facility]) K65.2    Dialysis-associated peritonitis ([facility]) T85.71XA    Candida infection B37.9    Generalized abdominal pain R10.84    PKD (polycystic kidney disease) Q61.3    Bacterial pneumonia J15.9    Encephalopathy acute G93.40    Elevated lactic acid level R79.89    Hyperkalemia E87.5    Severe sepsis ([facility]) A41.9, R65.20    Staphylococcus aureus bacteremia with sepsis ([facility]) A41.01    Neutrophilia D72.9    Elevated procalcitonin R79.89    ESRD needing dialysis ([facility]) N18.6, Z99.2    Severe malnutrition ([facility]) E43       Past Medical History:        Diagnosis Date    [facility]     CHF (congestive heart failure) ([facility])     CVA (cerebral infarction) 05/2015    Left sided weakness (hands and legs)    Diabetes mellitus ([facility])     Foot ulcer, left ([facility] 01/14/2016    Gastroparesis     Hemodialysis patient ([facility]     tues, thurs, sat    History of blood transfusion     Hypercholesteremia     Hypertension     Kidney disease     Unspecified cerebral artery occlusion with cerebral infarction     5/15, 7/15 LEFT SIDE WEAKNESS    Weakness     left side       Past Surgical History:        Procedure Laterality Date    CATHETER INSERTION N/A 10/17/2022    TUNNEL CATHETER PLACEMENT performed by Jon Smith MD at [address] [facility] [address] N/A 05/17/2021    COLONOSCOPY POLYPECTOMY SNARE/COLD BIOPSY performed by Shane Betancourt MD at [facility] N/A 10/17/2022    PERITONEAL DIALYSIS CATHETER REMOVAL performed by Jon Smith MD at [address] [facility], COLON, DIAGNOSTIC      HC DIALYSIS CATHETER N/A 10/29/2020    PLACEMENT OF A TUNNELED DIALYSIS CATHETER WITH FLEURO AND ULTRASOUND performed by Jon Smith MD at [address] [facility]  IR NONTUNNELED VASCULAR CATHETER  11/29/2022    IR NONTUNNELED VASCULAR CATHETER 11/29/2022 [facility]    IR TUNNELED CATHETER PLACEMENT GREATER THAN 5 YEARS  12/06/2022    IR TUNNELED CATHETER PLACEMENT GREATER THAN 5 YEARS 12/6/2022 [facility]    LAPAROSCOPY INSERTION PERITONEAL CATHETER N/A 10/29/2020    LAPAROSCOPIC PERITONEAL DIALYSIS CATHETER PLACEMENT WITH FLEURO AND ULTRASOUND performed by Ja Serrano MD at [address] N/A 74/11/0642    UMBILICAL HERNIA REPAIR performed by Ja Serrano MD at [address] [employer] N/A 04/26/2021    ESOPHAGOGASTRODUODENOSCOPY performed by Haylee Mckeon MD at [address] [facility] [address] 09/13/2022    EGD DIAGNOSTIC ONLY performed by Rosario Johnson MD at [address] [facility] History:    Social History     Tobacco Use    Smoking status: Some Days     Types: Cigars    Smokeless tobacco: Never    Tobacco comments:     3 black and milds a week   Substance Use Topics    Alcohol use: Yes     Comment: occasional                                Ready to quit: Not Answered  Counseling given: Not Answered  Tobacco comments: 3 black and milds a week      Vital Signs (Current):   Vitals:    01/30/23 0801 02/03/23 0613   BP:  (!) 157/87   Pulse:  72   Resp:  16   Temp:  98.1 °F (36.7 °C)   TempSrc:  Temporal   SpO2:  100%   Weight: 168 lb (76.2 kg) 159 lb 12.8 oz (72.5 kg)   Height: 6' 2\" (1.88 m) 6' 2\" (1.88 m)                                              BP Readings from Last 3 Encounters:   02/03/23 (!) 157/87   01/27/23 132/84   01/20/23 136/84       NPO Status: Time of last liquid consumption: 0300 (Sips of meds)                        Time of last solid consumption: (S) 0400 (Had a sucker at 400- Dr. Jerel may)                        Date of last liquid consumption: 02/03/23                        Date of last solid food consumption: 02/03/23    BMI:   Wt Readings from Last 3 Encounters:   02/03/23 159 lb 12.8 oz (72.5 kg)   01/27/23 165 lb (74.8 kg)   01/20/23 163 lb (73.9 kg)     Body mass index is 20.52 kg/m².     CBC:   Lab Results   Component Value Date/Time    WBC 14.4 12/06/2022 06:40 AM    RBC 3.22 12/06/2022 06:40 AM    HGB 9.4 12/06/2022 06:40 AM    HCT 29.8 12/06/2022 06:40 AM    MCV 92.4 12/06/2022 06:40 AM    RDW 15.0 12/06/2022 06:40 AM     12/06/2022 06:40 AM       CMP:   Lab Results   Component Value Date/Time     01/23/2023 10:22 AM    K 4.9 01/23/2023 10:22 AM    K 4.7 11/27/2022 10:42 AM     01/23/2023 10:22 AM    CO2 23 01/23/2023 10:22 AM    BUN 38 01/23/2023 10:22 AM    CREATININE 4.4 01/23/2023 10:22 AM    GFRAA 9 10/17/2022 07:07 AM    GFRAA >60 12/14/2010 01:58 PM    AGRATIO 1.1 01/23/2023 10:22 AM    LABGLOM 15 01/23/2023 10:22 AM    GLUCOSE 231 01/23/2023 10:22 AM    PROT 7.3 01/23/2023 10:22 AM    CALCIUM 9.2 01/23/2023 10:22 AM    BILITOT <0.2 01/23/2023 10:22 AM    ALKPHOS 101 01/23/2023 10:22 AM    AST 12 01/23/2023 10:22 AM    ALT 10 01/23/2023 10:22 AM       POC Tests:   Recent Labs     02/03/23  0638   POCGLU 181*       Coags:   Lab Results   Component Value Date/Time    PROTIME 16.3 12/06/2022 08:10 AM    INR 1.32 12/06/2022 08:10 AM    APTT 30.5 09/14/2022 09:23 AM       HCG (If Applicable): No results found for: PREGTESTUR, PREGSERUM, HCG, HCGQUANT     ABGs: No results found for: PHART, PO2ART, ITC5XUN, OKG8MCU, BEART, Q9BJOYIY     Type & Screen (If Applicable):  No results found for: LABABO, LABRH    Drug/Infectious Status (If Applicable):  No results found for: HIV, HEPCAB    COVID-19 Screening (If Applicable):   Lab Results   Component Value Date/Time    COVID19 Not Detected 11/26/2022 09:26 AM           Anesthesia Evaluation  Patient summary reviewed and Nursing notes reviewed no history of anesthetic complications:   Airway: Mallampati: II  TM distance: >3 FB   Neck ROM: full  Mouth opening: > = 3 FB   Dental: normal exam         Pulmonary:normal exam    (+) pneumonia:                             Cardiovascular:    (+) hypertension:, CHF:,                   Neuro/Psych:   (+) CVA:, neuromuscular disease:, psychiatric history:            GI/Hepatic/Renal:   (+) renal disease: ESRD and dialysis,           Endo/Other:    (+) DiabetesType II DM, , .                 Abdominal:             Vascular: negative vascular ROS. Other Findings:           Anesthesia Plan      general     ASA 4       Induction: intravenous. MIPS: Postoperative opioids intended and Prophylactic antiemetics administered. Anesthetic plan and risks discussed with patient. Plan discussed with CRNA.     Attending anesthesiologist reviewed and agrees with Preprocedure content                Suzy Herron MD   2/3/2023

## 2023-02-03 NOTE — ANESTHESIA POSTPROCEDURE EVALUATION
Department of Anesthesiology  Postprocedure Note    Patient: Cheri Hernandez  MRN: [id number]  YOB: 1965  Date of evaluation: 2/3/2023      Procedure Summary     Date: 02/03/23 Room / Location: [address] [place] [address]    Anesthesia Start: 3099 Anesthesia Stop: 2384    Procedure: LAPAROSCOPIC PERITONEAL DIALYSIS CATHETER PLACEMENT Diagnosis:       ESRD (end stage renal disease) ([facility]      (ESRD (end stage renal disease) ([facility] [N18.6])    Surgeons: Lisa Peters DO Responsible Provider: Dayana Adams MD    Anesthesia Type: general ASA Status: 4          Anesthesia Type: No value filed.     Kasey Phase I: Kasey Score: 10    Kasey Phase II:        Anesthesia Post Evaluation    Patient location during evaluation: PACU  Patient participation: complete - patient participated  Level of consciousness: awake and alert  Airway patency: patent  Nausea & Vomiting: no nausea and no vomiting  Complications: no  Cardiovascular status: hemodynamically stable  Respiratory status: acceptable  Hydration status: euvolemic  Multimodal analgesia pain management approach

## 2023-02-03 NOTE — PROGRESS NOTES
This RN called into OR 4 to clarify order with Dr. France Black that patient needed to void prior to discharge. Person who answered stated they will ask and get back to me. Received call back that patient does NOT need to void prior to discharge.  Resident physician to discontinue nursing communication order

## 2023-02-05 ASSESSMENT — ENCOUNTER SYMPTOMS
CONSTIPATION: 0
BLOOD IN STOOL: 0

## 2023-02-06 NOTE — OP NOTE
DATE OF PROCEDURE:  02/03/23     PREOPERATIVE DIAGNOSIS: ESRD on dialysis     POSTOPERATIVE DIAGNOSIS: Same as pre-op     PROCEDURES PERFORMED:  1. Laparoscopic peritoneal dialysis catheter placement. SURGEON:  Melinda Romberg, DO     ASSISTANT: Yelena Mauricio     ANESTHESIA:  General endotracheal.     COMPLICATIONS:  None. CONDITION:  Stable. EBL: 10 cc     INDICATIONS FOR PROCEDURE:  The patient is a 63year old male consented for PD catheter placement     DESCRIPTION OF PROCEDURE:  The patient was brought to the operating suite, laid in supine position with arms outstretched, and after induction of general endotracheal anesthesia; tube was confirmed to be in place with end-tidal CO2, and secured. Smith catheter was placed with clear return of urine. The patient was prepped and draped in usual sterile manner with Ioban placed on top of the skin. A small incision was made at the left midclavicular line using the Veress needle. Abdomen was entered and pneumoperitoneum established with 12 mm of CO2. A 5-mm 30-degree camera housed in a 5-mm Optiview trocar was used to enter the abdomen under direct visualization in the left upper quadrant. Once inside the abdomen, an additional 5-mm trocar was placed on the right side. Attention was paid to the omentum which was seen to be adhesed to the upper abdomen, therefore left alone. After this, a 57cm dual-cuff coil-tipped catheter was then  introduced into the left rectus muscle under direct visualization. Deep cuff was positioned in the rectus muscle. Titaneum luerlocks were applied. At that point, 2 liters of fluid was instilled into the abdomen and removed without difficulty. One last look laparoscopically was performed after catheter was clipped, clamped, and heparin locked. The catheter was seen to be appropriately going down in the pelvis and omentopexy was seen to be hemostatic. Pneumoperitoneum was evacuated.       The patient tolerated the procedure well and was brought to the postanesthesia care unit in stable condition. All sponge, needle, and instrument counts were correct x2. I personally spoke to the patient's family at the end of the procedure.

## 2023-02-15 ENCOUNTER — OFFICE VISIT (OUTPATIENT)
Dept: BARIATRICS/WEIGHT MGMT | Age: 58
End: 2023-02-15

## 2023-02-15 VITALS — BODY MASS INDEX: 20.92 KG/M2 | HEIGHT: 74 IN | WEIGHT: 163 LBS

## 2023-02-15 DIAGNOSIS — Z99.2 ESRD NEEDING DIALYSIS (HCC): Primary | ICD-10-CM

## 2023-02-15 DIAGNOSIS — N18.6 ESRD NEEDING DIALYSIS (HCC): Primary | ICD-10-CM

## 2023-02-15 PROCEDURE — 99024 POSTOP FOLLOW-UP VISIT: CPT | Performed by: SURGERY

## 2023-02-16 DIAGNOSIS — I50.32 CHRONIC DIASTOLIC CONGESTIVE HEART FAILURE (HCC): ICD-10-CM

## 2023-02-16 DIAGNOSIS — I10 ESSENTIAL HYPERTENSION: ICD-10-CM

## 2023-02-16 RX ORDER — NIFEDIPINE 90 MG/1
90 TABLET, EXTENDED RELEASE ORAL 2 TIMES DAILY
Qty: 180 TABLET | Refills: 3 | Status: SHIPPED | OUTPATIENT
Start: 2023-02-16

## 2023-02-16 NOTE — TELEPHONE ENCOUNTER
LOV 02/11/22 NOV 4/7/23    Last filled # 180 with 3 refills on 02/11/22    Requesting a 90 day supply

## 2023-02-20 NOTE — PROGRESS NOTES
Patient doing well   No N/V/F/C  Tolerating diet   Having regular BM's    Incisions C/D/I    S/P PD catheter placement    Ok to use PD catheter    F/U PRN Cherylene Glee

## 2023-03-10 ENCOUNTER — HOSPITAL ENCOUNTER (OUTPATIENT)
Dept: CT IMAGING | Age: 58
Discharge: HOME | End: 2023-03-10
Payer: MEDICARE

## 2023-03-10 DIAGNOSIS — Z87.01 HX OF BACTERIAL PNEUMONIA: ICD-10-CM

## 2023-03-10 PROCEDURE — 71250 CT THORAX DX C-: CPT

## 2023-03-11 ENCOUNTER — TELEPHONE (OUTPATIENT)
Dept: PRIMARY CARE CLINIC | Age: 58
End: 2023-03-11

## 2023-03-11 DIAGNOSIS — I31.39 PERICARDIAL EFFUSION: Primary | ICD-10-CM

## 2023-03-13 ENCOUNTER — TELEPHONE (OUTPATIENT)
Dept: CARDIOLOGY CLINIC | Age: 58
End: 2023-03-13

## 2023-03-13 ENCOUNTER — TELEPHONE (OUTPATIENT)
Dept: PRIMARY CARE CLINIC | Age: 58
End: 2023-03-13

## 2023-03-13 NOTE — TELEPHONE ENCOUNTER
Please schedule patient with Dr. Magda Aldridge on Wednesday [address] after his STAT Echo is completed at [facility]. Dr. Magda Aldridge can facilitate accordingly. 3/10/23 CT chest: stable cardiomegaly, persistent mod-lg. Pericardial effusion. Any questions, please ask. Thanks.

## 2023-03-13 NOTE — TELEPHONE ENCOUNTER
Elías Lopez is on the schedule with [facility] 3/15 at 11:45 a.m. Called and spoke with Elías Lopez who v/u.

## 2023-03-13 NOTE — TELEPHONE ENCOUNTER
Lio Nguyen from [facility] office called in this morning, they scheduled patient for a STAT echo on Wed 3/15 because he  has worsening pericardial effusion around the heart. They want him to see someone in the office this week after the Echo, please advise where to add patient. Lio Nguyen can be reached at [phone].

## 2023-03-13 NOTE — TELEPHONE ENCOUNTER
Provider wanted patient to be seen for STAT Echo. Called Central scheduling as was able to get patient seen 3/13/23 at 10:30 am. Patient is unable to make that appt due to another surgery procedure happening. Asked provider if he would be okay to wait until their next available which was Wednesday March 15 at 9:30am.   She said yes, she has instructed patient  to go to ER if low BP readings occur. Patient will get Echo done March 15. Per provider, called Dr. Eduarda Cancino office to get him in to be seen after Echo is completed. The office did not have anything available but is sending message back to Dr. Eduarda Cancino nurse letting them know the situation and seeing when they can get Gwendolyn Copping into the office.

## 2023-03-15 ENCOUNTER — HOSPITAL ENCOUNTER (OUTPATIENT)
Dept: NON INVASIVE DIAGNOSTICS | Age: 58
Discharge: HOME | End: 2023-03-15
Payer: COMMERCIAL

## 2023-03-15 ENCOUNTER — OFFICE VISIT (OUTPATIENT)
Dept: CARDIOLOGY CLINIC | Age: 58
End: 2023-03-15
Payer: COMMERCIAL

## 2023-03-15 VITALS
DIASTOLIC BLOOD PRESSURE: 78 MMHG | HEART RATE: 74 BPM | OXYGEN SATURATION: 98 % | BODY MASS INDEX: 21.69 KG/M2 | HEIGHT: 74 IN | WEIGHT: 169 LBS | SYSTOLIC BLOOD PRESSURE: 136 MMHG

## 2023-03-15 DIAGNOSIS — I50.42 CHRONIC COMBINED SYSTOLIC AND DIASTOLIC CHF, NYHA CLASS 2 (HCC): ICD-10-CM

## 2023-03-15 DIAGNOSIS — I10 ESSENTIAL HYPERTENSION: ICD-10-CM

## 2023-03-15 DIAGNOSIS — I31.39 PERICARDIAL EFFUSION: ICD-10-CM

## 2023-03-15 DIAGNOSIS — Z99.2 ESRD (END STAGE RENAL DISEASE) ON DIALYSIS (HCC): ICD-10-CM

## 2023-03-15 DIAGNOSIS — E78.2 MIXED HYPERLIPIDEMIA: ICD-10-CM

## 2023-03-15 DIAGNOSIS — I31.39 PERICARDIAL EFFUSION: Primary | ICD-10-CM

## 2023-03-15 DIAGNOSIS — Z79.4 DIABETES MELLITUS, TYPE II, INSULIN DEPENDENT (HCC): ICD-10-CM

## 2023-03-15 DIAGNOSIS — E11.9 DIABETES MELLITUS, TYPE II, INSULIN DEPENDENT (HCC): ICD-10-CM

## 2023-03-15 DIAGNOSIS — Z72.0 TOBACCO ABUSE: ICD-10-CM

## 2023-03-15 DIAGNOSIS — Z86.73 H/O: CVA (CEREBROVASCULAR ACCIDENT): ICD-10-CM

## 2023-03-15 DIAGNOSIS — N18.6 ESRD (END STAGE RENAL DISEASE) ON DIALYSIS (HCC): ICD-10-CM

## 2023-03-15 LAB
LV EF: 48 %
LVEF MODALITY: NORMAL

## 2023-03-15 PROCEDURE — 3017F COLORECTAL CA SCREEN DOC REV: CPT | Performed by: INTERNAL MEDICINE

## 2023-03-15 PROCEDURE — 99214 OFFICE O/P EST MOD 30 MIN: CPT | Performed by: INTERNAL MEDICINE

## 2023-03-15 PROCEDURE — G8420 CALC BMI NORM PARAMETERS: HCPCS | Performed by: INTERNAL MEDICINE

## 2023-03-15 PROCEDURE — 93306 TTE W/DOPPLER COMPLETE: CPT

## 2023-03-15 PROCEDURE — G8427 DOCREV CUR MEDS BY ELIG CLIN: HCPCS | Performed by: INTERNAL MEDICINE

## 2023-03-15 PROCEDURE — 4004F PT TOBACCO SCREEN RCVD TLK: CPT | Performed by: INTERNAL MEDICINE

## 2023-03-15 PROCEDURE — 3046F HEMOGLOBIN A1C LEVEL >9.0%: CPT | Performed by: INTERNAL MEDICINE

## 2023-03-15 PROCEDURE — 3078F DIAST BP <80 MM HG: CPT | Performed by: INTERNAL MEDICINE

## 2023-03-15 PROCEDURE — 3075F SYST BP GE 130 - 139MM HG: CPT | Performed by: INTERNAL MEDICINE

## 2023-03-15 PROCEDURE — 2022F DILAT RTA XM EVC RTNOPTHY: CPT | Performed by: INTERNAL MEDICINE

## 2023-03-15 PROCEDURE — G8484 FLU IMMUNIZE NO ADMIN: HCPCS | Performed by: INTERNAL MEDICINE

## 2023-03-15 NOTE — PROGRESS NOTES
[facility]      Cardiology Consult    Espinoza Soria  1965    March 15, 2023    Referring Physician: Kearney Epley, CNP  Reason for Referral: Echo evaluation/pericardial effusion     CC: \"I'm okay I guess\"     HPI:  The patient is 63 y.o. male with a past medical history significant for ESRD on peritoneal dialysis, CHF, hypertension, diabetes, hyperlipidemia, CVA, and tobacco abuse. He was admitted 11/26-12/7/22 for sepsis with multiple complications. During his admission he had a stable pericardial effusion but has since worsened. He compelte a chest CT 3/13/23 that showed moderate-severe pericardial effusion. He completed a STAT echocardiogram today and presents for evaluation per PCP request. Dr. Prashanth Mcclain is his primary cardiologist. Today he presents in a wheelchair and reports occasional shortness of breath but denies any acute changes. He reports increased fatigue he feels is related to all the medications he takes. He denies any chest pains or worsening lower extremity edema. He reports he walks occasionally for short distances and uses the wheelchair for longer distances. He reports he has \"vertigo\" and will experience occasional lightheadedness with positional changes. He reports medication compliance and is tolerating. He denies any abnormal bleeding or bruising. He denies exertional chest pain/pressure, dyspnea at rest, worsening BROWN, PND, orthopnea, palpitations, weight changes, changes in LE edema, and syncope.     Past Medical History:   Diagnosis Date    Cardiomyopathy ([address]     CHF (congestive heart failure) (HCC)     CVA (cerebral infarction) 05/2015    Left sided weakness (hands and legs)    Diabetes mellitus ([address]     Foot ulcer, left ([address] 01/14/2016    Gastroparesis     Hemodialysis patient ([facility]     tues, thurs, sat    History of blood transfusion     Hypercholesteremia     Hypertension     Kidney disease     Unspecified cerebral artery occlusion with cerebral infarction 5/15, 7/15 LEFT SIDE WEAKNESS    Weakness     left side     Past Surgical History:   Procedure Laterality Date    CATHETER INSERTION N/A 10/17/2022    TUNNEL CATHETER PLACEMENT performed by Ifrah Colon MD at [address] [facility] N/A 05/17/2021    COLONOSCOPY POLYPECTOMY SNARE/COLD BIOPSY performed by Marcelino Shah MD at [address] [place] [address] 2/3/2023    LAPAROSCOPIC PERITONEAL DIALYSIS CATHETER PLACEMENT [facility] Bishop Bashir DO [facility] [address] N/A 10/17/2022    PERITONEAL DIALYSIS CATHETER REMOVAL performed by Ifrah Colon MD at [facility], COLON, DIAGNOSTIC      HC DIALYSIS CATHETER N/A 10/29/2020    PLACEMENT OF A TUNNELED DIALYSIS CATHETER WITH FLEURO AND ULTRASOUND performed by Ifrah Colon MD at [facility]      IR NONTUNNELED VASCULAR CATHETER  11/29/2022    IR NONTUNNELED VASCULAR CATHETER 11/29/2022 [facility]    IR TUNNELED CATHETER PLACEMENT GREATER THAN 5 YEARS  12/06/2022    IR TUNNELED CATHETER PLACEMENT GREATER THAN 5 YEARS 12/6/2022 [facility]    LAPAROSCOPY INSERTION PERITONEAL CATHETER N/A 10/29/2020    LAPAROSCOPIC PERITONEAL DIALYSIS CATHETER PLACEMENT WITH FLEURO AND ULTRASOUND performed by Ifrah Colon MD at [address] [place] [address] N/A 22/68/0032    UMBILICAL HERNIA REPAIR performed by Ifrah Colon MD at [address] N/A 04/26/2021    ESOPHAGOGASTRODUODENOSCOPY performed by Marcelino Shah MD at [address] [place] [address] N/A 09/13/2022    EGD DIAGNOSTIC ONLY performed by David Campos MD at [address] [facility] History   Adopted: Yes     Social History     Tobacco Use    Smoking status: Some Days     Types: Cigars    Smokeless tobacco: Never    Tobacco comments:     3 black and milds a week   Vaping Use    Vaping Use: Never used   Substance Use Topics    Alcohol use:  Yes Comment: occasional    Drug use: Not Currently     Types: Marijuana Deadra Michel)     Comment: previously used cocaine, stopped May 2015 LAST USED MARIJUANA-last used September 2022       Allergies   Allergen Reactions    Doxazosin Nausea And Vomiting     VIOLENT VOMITTING    Cefepime Hallucinations     Causes severe hallucinations    Coconut Flavor Rash     Current Outpatient Medications   Medication Sig Dispense Refill    NIFEdipine (PROCARDIA XL) 90 MG extended release tablet Take 1 tablet by mouth in the morning and 1 tablet in the evening.  180 tablet 3    cloNIDine (CATAPRES) 0.2 MG tablet TAKE 1 TABLET BY MOUTH THREE TIMES DAILY 90 tablet 0    hydrALAZINE (APRESOLINE) 100 MG tablet Take 1 tablet by mouth 3 times daily 90 tablet 0    isosorbide mononitrate (IMDUR) 60 MG extended release tablet Take 1 tablet by mouth once daily 90 tablet 3    insulin detemir (LEVEMIR FLEXTOUCH) 100 UNIT/ML injection pen Inject 10 Units into the skin nightly 3 mL 0    Sucroferric Oxyhydroxide (VELPHORO) 500 MG CHEW Take 1 tablet by mouth 3 times daily (with meals)      Insulin Pen Needle (Tattva PEN NEEDLES) 31G X 6 MM MISC 1 each by Does not apply route daily 100 each 3    insulin aspart (NOVOLOG FLEXPEN) 100 UNIT/ML injection pen Inject 3 Units into the skin 3 times daily (before meals) (Patient taking differently: Inject 3 Units into the skin 3 times daily (before meals) Now sliding scale) 5 pen 3    atorvastatin (LIPITOR) 40 MG tablet TAKE 1 TABLET BY MOUTH ONCE DAILY NIGHTLY 90 tablet 3    gabapentin (NEURONTIN) 300 MG capsule TAKE 1 CAPSULE BY MOUTH THREE TIMES DAILY 270 capsule 1    calcitRIOL (ROCALTROL) 0.5 MCG capsule Take 0.5 mcg by mouth daily      sodium bicarbonate 650 MG tablet Take 650 mg by mouth 3 times daily      EQ ASPIRIN ADULT LOW DOSE 81 MG EC tablet Take 1 tablet by mouth once daily 90 tablet 3    metoprolol tartrate (LOPRESSOR) 50 MG tablet Take twice daily (Patient taking differently: Take 75 mg by mouth 2 times daily Take twice daily) 180 tablet 1     No current facility-administered medications for this visit.       Review of Systems:  Constitutional: no unanticipated weight loss. There's been no change in energy level, sleep pattern, or activity level. No fevers, chills.   Eyes: No visual changes or diplopia. No scleral icterus.  ENT: No Headaches, hearing loss or vertigo. No mouth sores or sore throat.  Cardiovascular: as reviewed in HPI  Respiratory: No cough or wheezing, no sputum production. No hematemesis.    Gastrointestinal: No abdominal pain, appetite loss, blood in stools. No change in bowel or bladder habits.  Genitourinary: No dysuria, trouble voiding, or hematuria.  Musculoskeletal:  No gait disturbance, no joint complaints.  Integumentary: No rash or pruritis.  Neurological: No headache, diplopia, change in muscle strength, numbness or tingling.   Psychiatric: No anxiety or depression.  Endocrine: No temperature intolerance. No excessive thirst, fluid intake, or urination. No tremor.  Hematologic/Lymphatic: No abnormal bruising or bleeding, blood clots or swollen lymph nodes.  Allergic/Immunologic: No nasal congestion or hives.    Physical Exam:   /78   Pulse 74   Ht 6' 2\" (1.88 m)   Wt 169 lb (76.7 kg)   SpO2 98%   BMI 21.70 kg/m²   Wt Readings from Last 3 Encounters:   03/15/23 169 lb (76.7 kg)   02/15/23 163 lb (73.9 kg)   02/03/23 159 lb 12.8 oz (72.5 kg)     Constitutional: He is oriented to person, place, and time. He appears well-developed and well-nourished. In no acute distress.   Head: Normocephalic and atraumatic. Pupils equal and round.  Neck: Neck supple. No JVP or carotid bruit appreciated. No mass and no thyromegaly present. No lymphadenopathy present.  Cardiovascular: Normal rate. Normal heart sounds. Exam reveals no gallop and no friction rub. No murmur heard.  Pulmonary/Chest: Effort normal and breath sounds normal. No respiratory distress. He has no wheezes, rhonchi or  rales.   Abdominal: Soft, non-tender. Bowel sounds are normal. He exhibits no organomegaly, mass or bruit. Extremities: No edema, cyanosis, or clubbing. Pulses are 2+ radial/dorsalis pedis/posterior tibial/carotid bilaterally. Neurological: No gross cranial nerve deficit. Coordination normal.   Skin: Skin is warm and dry. There is no rash or diaphoresis. Psychiatric: He has a normal mood and affect. His speech is normal and behavior is normal.     Lab Review:   FLP:    Lab Results   Component Value Date/Time    TRIG 73 01/23/2023 10:22 AM    HDL 39 01/23/2023 10:22 AM    LDLCALC 58 01/23/2023 10:22 AM    LDLDIRECT 103 01/12/2017 10:57 AM    LABVLDL 15 01/23/2023 10:22 AM     BUN/Creatinine:    Lab Results   Component Value Date/Time    BUN 45 02/24/2023 04:25 PM    CREATININE 6.4 02/24/2023 04:25 PM       EKG Interpretation: 11/27/22 Sinus tachycardia. Poor data quality, interpretation may be adversely affected. Septal infarct. Image Review:     Echo Limited 11/27/22  Limited study. Overall left ventricular systolic function appears moderately reduced. Ejection fraction is visually estimated to be 35-40% with diffuse  hypokinesis. There is severely increased left ventricular wall thickness. Left ventricular cavity size is normal.  The right ventricle is not well visualized. Tricuspid aortic valve leaflets appear thickened/calcified but no clear  mobile structure to suggest a vegetation. There is a moderate pericardial effusion. Chest CT 3/10/23  1. Previously noted nodular opacity in the right lower lobe has resolved. There is slightly increasing consolidative opacity in the lingula favoring   atelectasis over pneumonia. 2. Stable cardiomegaly with persistent moderate to large pericardial effusion   increased in size from a prior exam.  Recommend correlation with any clinical   signs of tamponade. 3. Small volume ascites.   There is a few foci of extraluminal gas noted in   the upper abdomen and are most likely related to peritoneal dialysis given   patient's history. If there is concern for acute abdominal process recommend   further evaluation with CT. The findings were sent to the Radiology Results [address] at 4:01   am on 3/11/2023 to be communicated to a licensed caregiver. Echo 3/15/23. Personally reviewed  -LV function is low normal with EF of 50%. -Moderate pericardial effusion with no echocardiographic evidence of pericardial tamponade      Assessment/Plan:   1. Chronic pericardial effusion   -Patient has mild shortness of breath but appears unchanged from before  -Known condition. Moderate during hospitalization 11/2022. Echo today showed it continues to remain moderate.  -Pulsus paradoxus less than 10  -will have Dr. Anupam Mims review echocardiogram next week when he is back and defer further management to him    2. Chronic combined systolic/diastolic heart failure   -EF 50% on echo today  -continue current medical therapy  -will defer further management to primary cardiologist, Dr. Anupam Mims     3. ESRD  -on peritoneal dialysis   -following with nephrology   -on transplant list     4. Essential hypertension  -slightly elevated in office today  -manual BP is is still elevated at 160/90  -continue current medical therapy  -will defer further management to Dr. Anupam Mims     5. Hyperlipidemia  -continue statin therapy with Lipitor 40 mg daily    6. Diabetes type II  -management per PCP  -insulin dependent     7. History of CVA  -not on anticoagulation     8. Tobacco abuse   -encouraged cessation     Follow up with Dr. Anupam Mims as scheduled. Complexity of medical decision making-high    Thank you very much for allowing me to participate in the care of your patient. Please do not hesitate to contact me if you have any questions.     Sincerely,  Roseanne Pizarro MD      [facility], [address] [place] [address]  Ph: [phone]  Fax: [phone]    This note was scribed in the presence of Dr Dimple Quinn MD by John Munguia RN. Physician Attestation:  The scribes documentation has been prepared under my direction and personally reviewed by me in its entirety. I confirm that the note above accurately reflects all work, treatment, procedures, and medical decision making performed by me.

## 2023-04-05 ENCOUNTER — TELEPHONE (OUTPATIENT)
Dept: BARIATRICS/WEIGHT MGMT | Age: 58
End: 2023-04-05

## 2023-04-05 NOTE — TELEPHONE ENCOUNTER
Mauri Singleton ([phone]) from Dialysis calling in about pt,    Pt stated that he about 2wk ago stepped on his PD cath tubing and pulled the tubing really hard. All sore and tender now. Pt is at center and the area is all red and has pus coming out. They have been treating him with vancomycin for a few days. Does pt need to be seen in office or go to ER? Please advise.

## 2023-04-06 ENCOUNTER — OFFICE VISIT (OUTPATIENT)
Dept: BARIATRICS/WEIGHT MGMT | Age: 58
End: 2023-04-06
Payer: COMMERCIAL

## 2023-04-06 VITALS
HEIGHT: 74 IN | WEIGHT: 172 LBS | BODY MASS INDEX: 22.07 KG/M2 | DIASTOLIC BLOOD PRESSURE: 85 MMHG | SYSTOLIC BLOOD PRESSURE: 135 MMHG | HEART RATE: 78 BPM

## 2023-04-06 DIAGNOSIS — Z99.2 ESRD ON DIALYSIS (HCC): ICD-10-CM

## 2023-04-06 DIAGNOSIS — N18.6 ESRD ON DIALYSIS (HCC): ICD-10-CM

## 2023-04-06 DIAGNOSIS — T85.71XA PERITONEAL DIALYSIS CATHETER TUNNEL INFECTION, INITIAL ENCOUNTER (HCC): Primary | ICD-10-CM

## 2023-04-06 PROCEDURE — G8427 DOCREV CUR MEDS BY ELIG CLIN: HCPCS | Performed by: SURGERY

## 2023-04-06 PROCEDURE — G8420 CALC BMI NORM PARAMETERS: HCPCS | Performed by: SURGERY

## 2023-04-06 PROCEDURE — 4004F PT TOBACCO SCREEN RCVD TLK: CPT | Performed by: SURGERY

## 2023-04-06 PROCEDURE — 3079F DIAST BP 80-89 MM HG: CPT | Performed by: SURGERY

## 2023-04-06 PROCEDURE — 3075F SYST BP GE 130 - 139MM HG: CPT | Performed by: SURGERY

## 2023-04-06 PROCEDURE — 3017F COLORECTAL CA SCREEN DOC REV: CPT | Performed by: SURGERY

## 2023-04-06 PROCEDURE — 99215 OFFICE O/P EST HI 40 MIN: CPT | Performed by: SURGERY

## 2023-04-06 RX ORDER — DOXYCYCLINE HYCLATE 100 MG
100 TABLET ORAL 2 TIMES DAILY
Qty: 28 TABLET | Refills: 0 | Status: SHIPPED | OUTPATIENT
Start: 2023-04-06 | End: 2023-04-20

## 2023-04-06 NOTE — PROGRESS NOTES
Vital signs are normal. Patient  appears well-developed and well-nourished. Patient  is active and cooperative. Non-toxic appearance. No distress. Cardiovascular: Normal rate, regular rhythm, normal heart sounds, intact distal pulses and normal pulses. Pulmonary/Chest: Effort normal and breath sounds normal. No stridor. No respiratory distress. Patient  has no wheezes. Patient has no rales. Patient exhibits no tenderness and no crepitus. Abdominal: Soft. Normal appearance and bowel sounds are normal. Patient exhibits no distension, no abdominal bruit, no ascites and no mass. There is no hepatosplenomegaly. Drainage from PD catheter tunnel site with minor cuff exposure  Musculoskeletal: Normal range of motion. Patient exhibits no edema or tenderness. Neurological: Patient is alert and oriented to person, place, and time. Patient has normal strength. Coordination and gait normal. GCS eye subscore is 4. GCS verbal subscore is 5. GCS motor subscore is 6. Skin: Skin is warm and dry. No abrasion and no rash noted. Patient  is not diaphoretic. No cyanosis or erythema. Psychiatric: Patient has a normal mood and affect. speech is normal and behavior is normal. Cognition and memory are normal.         A/P:  Felicia Hyman is a very pleasant 63 y.o. male with ESRD on dialysis with PD tunnel infection not improving with antibiotics    Patient prefers Lap PD cath placement removal and replacement    Risks and benefits explained and informed consent obtained. Risks include but not limited to; The possibilities of reaction to medication, pain, infection, bleeding, heart attack, death, injury to internal organs, seroma, chronic pain, nerve injury, open wound , scarring or need for further surgery. 1- Pre-op work up ordered. 2- Laparoscopic PD catheter removal and replacement  3- F/U with PCP for pre-op physical and Medical & Cardiac Clearance.    4- Will need to be off blood thinners for surgery for at least one

## 2023-04-07 ENCOUNTER — OFFICE VISIT (OUTPATIENT)
Dept: CARDIOLOGY CLINIC | Age: 58
End: 2023-04-07
Payer: COMMERCIAL

## 2023-04-07 VITALS
WEIGHT: 171 LBS | DIASTOLIC BLOOD PRESSURE: 70 MMHG | OXYGEN SATURATION: 96 % | SYSTOLIC BLOOD PRESSURE: 135 MMHG | HEART RATE: 75 BPM | HEIGHT: 74 IN | BODY MASS INDEX: 21.94 KG/M2

## 2023-04-07 DIAGNOSIS — E78.2 MIXED HYPERLIPIDEMIA: ICD-10-CM

## 2023-04-07 DIAGNOSIS — I42.8 NONISCHEMIC CARDIOMYOPATHY (HCC): Primary | ICD-10-CM

## 2023-04-07 DIAGNOSIS — Z72.0 TOBACCO ABUSE: ICD-10-CM

## 2023-04-07 DIAGNOSIS — Z01.818 PREOPERATIVE CLEARANCE: ICD-10-CM

## 2023-04-07 DIAGNOSIS — I31.39 PERICARDIAL EFFUSION: ICD-10-CM

## 2023-04-07 DIAGNOSIS — I10 ESSENTIAL HYPERTENSION: ICD-10-CM

## 2023-04-07 PROCEDURE — 93000 ELECTROCARDIOGRAM COMPLETE: CPT | Performed by: INTERNAL MEDICINE

## 2023-04-07 PROCEDURE — 3017F COLORECTAL CA SCREEN DOC REV: CPT | Performed by: INTERNAL MEDICINE

## 2023-04-07 PROCEDURE — 3078F DIAST BP <80 MM HG: CPT | Performed by: INTERNAL MEDICINE

## 2023-04-07 PROCEDURE — G8427 DOCREV CUR MEDS BY ELIG CLIN: HCPCS | Performed by: INTERNAL MEDICINE

## 2023-04-07 PROCEDURE — 99214 OFFICE O/P EST MOD 30 MIN: CPT | Performed by: INTERNAL MEDICINE

## 2023-04-07 PROCEDURE — G8420 CALC BMI NORM PARAMETERS: HCPCS | Performed by: INTERNAL MEDICINE

## 2023-04-07 PROCEDURE — 3075F SYST BP GE 130 - 139MM HG: CPT | Performed by: INTERNAL MEDICINE

## 2023-04-07 PROCEDURE — 4004F PT TOBACCO SCREEN RCVD TLK: CPT | Performed by: INTERNAL MEDICINE

## 2023-04-07 RX ORDER — TORSEMIDE 20 MG/1
40 TABLET ORAL DAILY
Status: ON HOLD | COMMUNITY
Start: 2023-03-30 | End: 2023-06-05 | Stop reason: HOSPADM

## 2023-04-07 NOTE — PROGRESS NOTES
Supple. No JVD or carotid bruit appreciated  Chest:  Normal effort. Clear to auscultation, no wheezes/rhonchi/rales  Cardiovascular:  RRR, normal S1/S2. No murmur/gallop/rub; +systolic murmur  Abdomen:  Soft, nontender, +bowel sounds  Extremities:  No edema  Neurological:  Left sided weakness  Psychological: Normal mood and affect      Current Outpatient Medications   Medication Sig Dispense Refill    torsemide (DEMADEX) 20 MG tablet Take 1 tablet by mouth daily      VANCOMYCIN HCL PO Infuse 2,000 mg intravenously      doxycycline hyclate (VIBRA-TABS) 100 MG tablet Take 1 tablet by mouth 2 times daily for 14 days 28 tablet 0    NIFEdipine (PROCARDIA XL) 90 MG extended release tablet Take 1 tablet by mouth in the morning and 1 tablet in the evening.  180 tablet 3    cloNIDine (CATAPRES) 0.2 MG tablet TAKE 1 TABLET BY MOUTH THREE TIMES DAILY 90 tablet 0    hydrALAZINE (APRESOLINE) 100 MG tablet Take 1 tablet by mouth 3 times daily 90 tablet 0    isosorbide mononitrate (IMDUR) 60 MG extended release tablet Take 1 tablet by mouth once daily 90 tablet 3    Sucroferric Oxyhydroxide (VELPHORO) 500 MG CHEW Take 1 tablet by mouth 3 times daily (with meals)      Insulin Pen Needle (Optensity PEN NEEDLES) 31G X 6 MM MISC 1 each by Does not apply route daily 100 each 3    insulin aspart (NOVOLOG FLEXPEN) 100 UNIT/ML injection pen Inject 3 Units into the skin 3 times daily (before meals) (Patient taking differently: Inject 3 Units into the skin 3 times daily (before meals) Now sliding scale) 5 pen 3    atorvastatin (LIPITOR) 40 MG tablet TAKE 1 TABLET BY MOUTH ONCE DAILY NIGHTLY 90 tablet 3    calcitRIOL (ROCALTROL) 0.5 MCG capsule Take 1 capsule by mouth daily      sodium bicarbonate 650 MG tablet Take 1 tablet by mouth 3 times daily      EQ ASPIRIN ADULT LOW DOSE 81 MG EC tablet Take 1 tablet by mouth once daily 90 tablet 3    metoprolol tartrate (LOPRESSOR) 50 MG tablet Take twice daily (Patient taking differently: Take 1.5

## 2023-04-10 PROBLEM — E11.40 DIABETIC NEUROPATHY (HCC): Status: ACTIVE | Noted: 2023-04-10

## 2023-04-10 PROBLEM — T85.71XD: Status: ACTIVE | Noted: 2023-04-10

## 2023-04-11 ENCOUNTER — HOSPITAL ENCOUNTER (INPATIENT)
Age: 58
LOS: 1 days | Discharge: HOME HEALTH | DRG: 907 | End: 2023-04-12
Attending: SURGERY | Admitting: SURGERY
Payer: COMMERCIAL

## 2023-04-11 DIAGNOSIS — T85.71XD: Primary | ICD-10-CM

## 2023-04-11 DIAGNOSIS — N18.6 ESRD (END STAGE RENAL DISEASE) (HCC): ICD-10-CM

## 2023-04-11 DIAGNOSIS — G89.18 POST-OP PAIN: ICD-10-CM

## 2023-04-11 PROBLEM — N18.4 KIDNEY DISEASE, CHRONIC, STAGE IV (GFR 15-29 ML/MIN) (HCC): Status: ACTIVE | Noted: 2023-04-11

## 2023-04-11 LAB
ANION GAP SERPL CALCULATED.3IONS-SCNC: 14 MMOL/L (ref 3–16)
BUN SERPL-MCNC: 52 MG/DL (ref 7–20)
CALCIUM SERPL-MCNC: 8.8 MG/DL (ref 8.3–10.6)
CHLORIDE SERPL-SCNC: 100 MMOL/L (ref 99–110)
CO2 SERPL-SCNC: 23 MMOL/L (ref 21–32)
CREAT SERPL-MCNC: 8 MG/DL (ref 0.9–1.3)
GFR SERPLBLD CREATININE-BSD FMLA CKD-EPI: 7 ML/MIN/{1.73_M2}
GLUCOSE BLD-MCNC: 134 MG/DL (ref 70–99)
GLUCOSE BLD-MCNC: 159 MG/DL (ref 70–99)
GLUCOSE BLD-MCNC: 160 MG/DL (ref 70–99)
GLUCOSE SERPL-MCNC: 165 MG/DL (ref 70–99)
PERFORMED ON: ABNORMAL
POTASSIUM SERPL-SCNC: 4.1 MMOL/L (ref 3.5–5.1)
SODIUM SERPL-SCNC: 137 MMOL/L (ref 136–145)

## 2023-04-11 PROCEDURE — 7100000000 HC PACU RECOVERY - FIRST 15 MIN: Performed by: SURGERY

## 2023-04-11 PROCEDURE — 0W9F0ZZ DRAINAGE OF ABDOMINAL WALL, OPEN APPROACH: ICD-10-PCS | Performed by: SURGERY

## 2023-04-11 PROCEDURE — 6360000002 HC RX W HCPCS: Performed by: SURGERY

## 2023-04-11 PROCEDURE — 87075 CULTR BACTERIA EXCEPT BLOOD: CPT

## 2023-04-11 PROCEDURE — 3700000001 HC ADD 15 MINUTES (ANESTHESIA): Performed by: SURGERY

## 2023-04-11 PROCEDURE — 87116 MYCOBACTERIA CULTURE: CPT

## 2023-04-11 PROCEDURE — 3600000014 HC SURGERY LEVEL 4 ADDTL 15MIN: Performed by: SURGERY

## 2023-04-11 PROCEDURE — 49324 LAP INSERT TUNNEL IP CATH: CPT | Performed by: SURGERY

## 2023-04-11 PROCEDURE — 7100000001 HC PACU RECOVERY - ADDTL 15 MIN: Performed by: SURGERY

## 2023-04-11 PROCEDURE — 3700000000 HC ANESTHESIA ATTENDED CARE: Performed by: SURGERY

## 2023-04-11 PROCEDURE — 3600000004 HC SURGERY LEVEL 4 BASE: Performed by: SURGERY

## 2023-04-11 PROCEDURE — 87070 CULTURE OTHR SPECIMN AEROBIC: CPT

## 2023-04-11 PROCEDURE — 6360000002 HC RX W HCPCS

## 2023-04-11 PROCEDURE — A4217 STERILE WATER/SALINE, 500 ML: HCPCS | Performed by: SURGERY

## 2023-04-11 PROCEDURE — 6360000002 HC RX W HCPCS: Performed by: ANESTHESIOLOGY

## 2023-04-11 PROCEDURE — 87102 FUNGUS ISOLATION CULTURE: CPT

## 2023-04-11 PROCEDURE — C1750 CATH, HEMODIALYSIS,LONG-TERM: HCPCS | Performed by: SURGERY

## 2023-04-11 PROCEDURE — 0WHG43Z INSERTION OF INFUSION DEVICE INTO PERITONEAL CAVITY, PERCUTANEOUS ENDOSCOPIC APPROACH: ICD-10-PCS | Performed by: SURGERY

## 2023-04-11 PROCEDURE — 80048 BASIC METABOLIC PNL TOTAL CA: CPT

## 2023-04-11 PROCEDURE — 87205 SMEAR GRAM STAIN: CPT

## 2023-04-11 PROCEDURE — 96372 THER/PROPH/DIAG INJ SC/IM: CPT

## 2023-04-11 PROCEDURE — 2580000003 HC RX 258: Performed by: SURGERY

## 2023-04-11 PROCEDURE — 87077 CULTURE AEROBIC IDENTIFY: CPT

## 2023-04-11 PROCEDURE — 2709999900 HC NON-CHARGEABLE SUPPLY: Performed by: SURGERY

## 2023-04-11 PROCEDURE — 0WPG43Z REMOVAL OF INFUSION DEVICE FROM PERITONEAL CAVITY, PERCUTANEOUS ENDOSCOPIC APPROACH: ICD-10-PCS | Performed by: SURGERY

## 2023-04-11 PROCEDURE — 87015 SPECIMEN INFECT AGNT CONCNTJ: CPT

## 2023-04-11 PROCEDURE — 6370000000 HC RX 637 (ALT 250 FOR IP)

## 2023-04-11 PROCEDURE — 6370000000 HC RX 637 (ALT 250 FOR IP): Performed by: SURGERY

## 2023-04-11 PROCEDURE — 87186 SC STD MICRODIL/AGAR DIL: CPT

## 2023-04-11 PROCEDURE — 10061 I&D ABSCESS COMP/MULTIPLE: CPT | Performed by: SURGERY

## 2023-04-11 PROCEDURE — 1200000000 HC SEMI PRIVATE

## 2023-04-11 PROCEDURE — 2500000003 HC RX 250 WO HCPCS: Performed by: SURGERY

## 2023-04-11 PROCEDURE — 87158 CULTURE TYPING ADDED METHOD: CPT

## 2023-04-11 PROCEDURE — 2580000003 HC RX 258: Performed by: ANESTHESIOLOGY

## 2023-04-11 PROCEDURE — 0WPG03Z REMOVAL OF INFUSION DEVICE FROM PERITONEAL CAVITY, OPEN APPROACH: ICD-10-PCS | Performed by: SURGERY

## 2023-04-11 PROCEDURE — 5A1D70Z PERFORMANCE OF URINARY FILTRATION, INTERMITTENT, LESS THAN 6 HOURS PER DAY: ICD-10-PCS | Performed by: STUDENT IN AN ORGANIZED HEALTH CARE EDUCATION/TRAINING PROGRAM

## 2023-04-11 PROCEDURE — 87206 SMEAR FLUORESCENT/ACID STAI: CPT

## 2023-04-11 DEVICE — IMPLANTABLE DEVICE: Type: IMPLANTABLE DEVICE | Status: FUNCTIONAL

## 2023-04-11 RX ORDER — ACETAMINOPHEN 325 MG/1
650 TABLET ORAL EVERY 6 HOURS
Status: DISCONTINUED | OUTPATIENT
Start: 2023-04-11 | End: 2023-04-12 | Stop reason: HOSPADM

## 2023-04-11 RX ORDER — LABETALOL HYDROCHLORIDE 5 MG/ML
10 INJECTION, SOLUTION INTRAVENOUS
Status: DISCONTINUED | OUTPATIENT
Start: 2023-04-11 | End: 2023-04-11 | Stop reason: HOSPADM

## 2023-04-11 RX ORDER — SODIUM CHLORIDE 0.9 % (FLUSH) 0.9 %
5-40 SYRINGE (ML) INJECTION PRN
Status: DISCONTINUED | OUTPATIENT
Start: 2023-04-11 | End: 2023-04-12 | Stop reason: HOSPADM

## 2023-04-11 RX ORDER — NIFEDIPINE 90 MG/1
90 TABLET, EXTENDED RELEASE ORAL 2 TIMES DAILY
Status: DISCONTINUED | OUTPATIENT
Start: 2023-04-11 | End: 2023-04-12 | Stop reason: HOSPADM

## 2023-04-11 RX ORDER — ATORVASTATIN CALCIUM 40 MG/1
40 TABLET, FILM COATED ORAL NIGHTLY
Status: DISCONTINUED | OUTPATIENT
Start: 2023-04-11 | End: 2023-04-12 | Stop reason: HOSPADM

## 2023-04-11 RX ORDER — GABAPENTIN 300 MG/1
300 CAPSULE ORAL 3 TIMES DAILY
Status: DISCONTINUED | OUTPATIENT
Start: 2023-04-11 | End: 2023-04-12 | Stop reason: HOSPADM

## 2023-04-11 RX ORDER — OXYCODONE HYDROCHLORIDE 5 MG/1
10 TABLET ORAL EVERY 4 HOURS PRN
Status: DISCONTINUED | OUTPATIENT
Start: 2023-04-11 | End: 2023-04-12 | Stop reason: HOSPADM

## 2023-04-11 RX ORDER — PROCHLORPERAZINE EDISYLATE 5 MG/ML
5 INJECTION INTRAMUSCULAR; INTRAVENOUS
Status: DISCONTINUED | OUTPATIENT
Start: 2023-04-11 | End: 2023-04-11 | Stop reason: HOSPADM

## 2023-04-11 RX ORDER — INSULIN LISPRO 100 [IU]/ML
0-4 INJECTION, SOLUTION INTRAVENOUS; SUBCUTANEOUS NIGHTLY
Status: DISCONTINUED | OUTPATIENT
Start: 2023-04-11 | End: 2023-04-12 | Stop reason: HOSPADM

## 2023-04-11 RX ORDER — BUPIVACAINE HYDROCHLORIDE 5 MG/ML
INJECTION, SOLUTION EPIDURAL; INTRACAUDAL PRN
Status: DISCONTINUED | OUTPATIENT
Start: 2023-04-11 | End: 2023-04-11 | Stop reason: HOSPADM

## 2023-04-11 RX ORDER — INSULIN LISPRO 100 [IU]/ML
0-4 INJECTION, SOLUTION INTRAVENOUS; SUBCUTANEOUS
Status: DISCONTINUED | OUTPATIENT
Start: 2023-04-11 | End: 2023-04-12 | Stop reason: HOSPADM

## 2023-04-11 RX ORDER — ONDANSETRON 2 MG/ML
4 INJECTION INTRAMUSCULAR; INTRAVENOUS
Status: DISCONTINUED | OUTPATIENT
Start: 2023-04-11 | End: 2023-04-11 | Stop reason: HOSPADM

## 2023-04-11 RX ORDER — SODIUM CHLORIDE, SODIUM LACTATE, POTASSIUM CHLORIDE, CALCIUM CHLORIDE 600; 310; 30; 20 MG/100ML; MG/100ML; MG/100ML; MG/100ML
INJECTION, SOLUTION INTRAVENOUS CONTINUOUS
Status: DISCONTINUED | OUTPATIENT
Start: 2023-04-11 | End: 2023-04-11 | Stop reason: HOSPADM

## 2023-04-11 RX ORDER — HEPARIN SODIUM 5000 [USP'U]/ML
5000 INJECTION, SOLUTION INTRAVENOUS; SUBCUTANEOUS ONCE
Status: COMPLETED | OUTPATIENT
Start: 2023-04-11 | End: 2023-04-11

## 2023-04-11 RX ORDER — SODIUM CHLORIDE 0.9 % (FLUSH) 0.9 %
5-40 SYRINGE (ML) INJECTION PRN
Status: DISCONTINUED | OUTPATIENT
Start: 2023-04-11 | End: 2023-04-11 | Stop reason: HOSPADM

## 2023-04-11 RX ORDER — HEPARIN SODIUM 5000 [USP'U]/ML
5000 INJECTION, SOLUTION INTRAVENOUS; SUBCUTANEOUS EVERY 8 HOURS SCHEDULED
Status: COMPLETED | OUTPATIENT
Start: 2023-04-11 | End: 2023-04-11

## 2023-04-11 RX ORDER — MAGNESIUM CARB/ALUMINUM HYDROX 105-160MG
TABLET,CHEWABLE ORAL PRN
Status: DISCONTINUED | OUTPATIENT
Start: 2023-04-11 | End: 2023-04-11 | Stop reason: HOSPADM

## 2023-04-11 RX ORDER — DEXTROSE MONOHYDRATE 100 MG/ML
INJECTION, SOLUTION INTRAVENOUS CONTINUOUS PRN
Status: DISCONTINUED | OUTPATIENT
Start: 2023-04-11 | End: 2023-04-12 | Stop reason: HOSPADM

## 2023-04-11 RX ORDER — LORAZEPAM 2 MG/ML
0.5 INJECTION INTRAMUSCULAR
Status: DISCONTINUED | OUTPATIENT
Start: 2023-04-11 | End: 2023-04-11 | Stop reason: HOSPADM

## 2023-04-11 RX ORDER — SODIUM CHLORIDE 9 MG/ML
INJECTION, SOLUTION INTRAVENOUS PRN
Status: DISCONTINUED | OUTPATIENT
Start: 2023-04-11 | End: 2023-04-12 | Stop reason: HOSPADM

## 2023-04-11 RX ORDER — ONDANSETRON 4 MG/1
4 TABLET, ORALLY DISINTEGRATING ORAL EVERY 8 HOURS PRN
Status: DISCONTINUED | OUTPATIENT
Start: 2023-04-11 | End: 2023-04-12 | Stop reason: HOSPADM

## 2023-04-11 RX ORDER — CITRIC ACID/SODIUM CITRATE 334-500MG
30 SOLUTION, ORAL ORAL EVERY 12 HOURS
Status: DISCONTINUED | OUTPATIENT
Start: 2023-04-11 | End: 2023-04-12 | Stop reason: HOSPADM

## 2023-04-11 RX ORDER — SODIUM CHLORIDE 0.9 % (FLUSH) 0.9 %
5-40 SYRINGE (ML) INJECTION EVERY 12 HOURS SCHEDULED
Status: DISCONTINUED | OUTPATIENT
Start: 2023-04-11 | End: 2023-04-12 | Stop reason: HOSPADM

## 2023-04-11 RX ORDER — SODIUM CHLORIDE 9 MG/ML
25 INJECTION, SOLUTION INTRAVENOUS PRN
Status: DISCONTINUED | OUTPATIENT
Start: 2023-04-11 | End: 2023-04-11 | Stop reason: HOSPADM

## 2023-04-11 RX ORDER — IPRATROPIUM BROMIDE AND ALBUTEROL SULFATE 2.5; .5 MG/3ML; MG/3ML
1 SOLUTION RESPIRATORY (INHALATION)
Status: DISCONTINUED | OUTPATIENT
Start: 2023-04-11 | End: 2023-04-11 | Stop reason: HOSPADM

## 2023-04-11 RX ORDER — CLONIDINE HYDROCHLORIDE 0.2 MG/1
0.2 TABLET ORAL 3 TIMES DAILY
Status: DISCONTINUED | OUTPATIENT
Start: 2023-04-11 | End: 2023-04-12 | Stop reason: HOSPADM

## 2023-04-11 RX ORDER — MAGNESIUM HYDROXIDE 1200 MG/15ML
LIQUID ORAL CONTINUOUS PRN
Status: DISCONTINUED | OUTPATIENT
Start: 2023-04-11 | End: 2023-04-11 | Stop reason: HOSPADM

## 2023-04-11 RX ORDER — GLUCAGON 1 MG/ML
1 KIT INJECTION PRN
Status: DISCONTINUED | OUTPATIENT
Start: 2023-04-11 | End: 2023-04-12 | Stop reason: HOSPADM

## 2023-04-11 RX ORDER — ISOSORBIDE MONONITRATE 60 MG/1
60 TABLET, EXTENDED RELEASE ORAL DAILY
Status: DISCONTINUED | OUTPATIENT
Start: 2023-04-12 | End: 2023-04-12 | Stop reason: HOSPADM

## 2023-04-11 RX ORDER — ACETAMINOPHEN 650 MG/1
650 SUPPOSITORY RECTAL
Status: DISCONTINUED | OUTPATIENT
Start: 2023-04-11 | End: 2023-04-11 | Stop reason: HOSPADM

## 2023-04-11 RX ORDER — FENTANYL CITRATE 50 UG/ML
25 INJECTION, SOLUTION INTRAMUSCULAR; INTRAVENOUS EVERY 5 MIN PRN
Status: DISCONTINUED | OUTPATIENT
Start: 2023-04-11 | End: 2023-04-11 | Stop reason: HOSPADM

## 2023-04-11 RX ORDER — HYDRALAZINE HYDROCHLORIDE 100 MG/1
100 TABLET, FILM COATED ORAL 3 TIMES DAILY
Status: DISCONTINUED | OUTPATIENT
Start: 2023-04-11 | End: 2023-04-12 | Stop reason: HOSPADM

## 2023-04-11 RX ORDER — OXYCODONE HYDROCHLORIDE 5 MG/1
5 TABLET ORAL EVERY 4 HOURS PRN
Status: DISCONTINUED | OUTPATIENT
Start: 2023-04-11 | End: 2023-04-12 | Stop reason: HOSPADM

## 2023-04-11 RX ORDER — SODIUM CHLORIDE 0.9 % (FLUSH) 0.9 %
5-40 SYRINGE (ML) INJECTION EVERY 12 HOURS SCHEDULED
Status: DISCONTINUED | OUTPATIENT
Start: 2023-04-11 | End: 2023-04-11 | Stop reason: HOSPADM

## 2023-04-11 RX ORDER — HEPARIN SODIUM (PORCINE) LOCK FLUSH IV SOLN 100 UNIT/ML 100 UNIT/ML
SOLUTION INTRAVENOUS PRN
Status: DISCONTINUED | OUTPATIENT
Start: 2023-04-11 | End: 2023-04-11 | Stop reason: HOSPADM

## 2023-04-11 RX ORDER — ONDANSETRON 2 MG/ML
4 INJECTION INTRAMUSCULAR; INTRAVENOUS EVERY 6 HOURS PRN
Status: DISCONTINUED | OUTPATIENT
Start: 2023-04-11 | End: 2023-04-12 | Stop reason: HOSPADM

## 2023-04-11 RX ORDER — HEPARIN SODIUM 5000 [USP'U]/ML
5000 INJECTION, SOLUTION INTRAVENOUS; SUBCUTANEOUS EVERY 8 HOURS SCHEDULED
Status: DISCONTINUED | OUTPATIENT
Start: 2023-04-12 | End: 2023-04-12 | Stop reason: HOSPADM

## 2023-04-11 RX ADMIN — METOPROLOL TARTRATE 75 MG: 50 TABLET, FILM COATED ORAL at 21:08

## 2023-04-11 RX ADMIN — ACETAMINOPHEN 650 MG: 325 TABLET ORAL at 21:09

## 2023-04-11 RX ADMIN — SODIUM CHLORIDE, POTASSIUM CHLORIDE, SODIUM LACTATE AND CALCIUM CHLORIDE: 600; 310; 30; 20 INJECTION, SOLUTION INTRAVENOUS at 14:22

## 2023-04-11 RX ADMIN — HEPARIN SODIUM 5000 UNITS: 5000 INJECTION INTRAVENOUS; SUBCUTANEOUS at 14:12

## 2023-04-11 RX ADMIN — NIFEDIPINE 90 MG: 90 TABLET, FILM COATED, EXTENDED RELEASE ORAL at 21:09

## 2023-04-11 RX ADMIN — CLONIDINE HYDROCHLORIDE 0.2 MG: 0.2 TABLET ORAL at 21:09

## 2023-04-11 RX ADMIN — FENTANYL CITRATE 25 MCG: 50 INJECTION, SOLUTION INTRAMUSCULAR; INTRAVENOUS at 18:02

## 2023-04-11 RX ADMIN — HYDROMORPHONE HYDROCHLORIDE 0.5 MG: 1 INJECTION, SOLUTION INTRAMUSCULAR; INTRAVENOUS; SUBCUTANEOUS at 16:51

## 2023-04-11 RX ADMIN — GABAPENTIN 300 MG: 300 CAPSULE ORAL at 21:09

## 2023-04-11 RX ADMIN — HEPARIN SODIUM 5000 UNITS: 5000 INJECTION INTRAVENOUS; SUBCUTANEOUS at 21:09

## 2023-04-11 RX ADMIN — OXYCODONE HYDROCHLORIDE 10 MG: 5 TABLET ORAL at 20:54

## 2023-04-11 RX ADMIN — ATORVASTATIN CALCIUM 40 MG: 40 TABLET, FILM COATED ORAL at 21:09

## 2023-04-11 RX ADMIN — HYDRALAZINE HYDROCHLORIDE 100 MG: 100 TABLET, FILM COATED ORAL at 21:09

## 2023-04-11 ASSESSMENT — PAIN DESCRIPTION - LOCATION
LOCATION: ABDOMEN

## 2023-04-11 ASSESSMENT — PAIN DESCRIPTION - DESCRIPTORS
DESCRIPTORS: SHARP
DESCRIPTORS: DULL
DESCRIPTORS: STABBING
DESCRIPTORS: SHARP
DESCRIPTORS: STABBING

## 2023-04-11 ASSESSMENT — PAIN DESCRIPTION - FREQUENCY
FREQUENCY: CONTINUOUS
FREQUENCY: CONTINUOUS

## 2023-04-11 ASSESSMENT — PAIN DESCRIPTION - PAIN TYPE
TYPE: ACUTE PAIN;CHRONIC PAIN;SURGICAL PAIN
TYPE: SURGICAL PAIN
TYPE: SURGICAL PAIN
TYPE: ACUTE PAIN
TYPE: ACUTE PAIN

## 2023-04-11 ASSESSMENT — PAIN DESCRIPTION - ORIENTATION
ORIENTATION: LEFT
ORIENTATION: LEFT
ORIENTATION: RIGHT;LEFT
ORIENTATION: LEFT;OUTER
ORIENTATION: LEFT;RIGHT

## 2023-04-11 ASSESSMENT — PAIN SCALES - GENERAL
PAINLEVEL_OUTOF10: 1
PAINLEVEL_OUTOF10: 5
PAINLEVEL_OUTOF10: 4
PAINLEVEL_OUTOF10: 5
PAINLEVEL_OUTOF10: 6
PAINLEVEL_OUTOF10: 9
PAINLEVEL_OUTOF10: 7

## 2023-04-11 NOTE — H&P
Update History & Physical    The patient's History and Physical of April 6, procedure was reviewed with the patient and I examined the patient. There was no change. The surgical site was confirmed by the patient and me. He denied any new changes in health. He has continued to take his antibiotics, and has not been taking aspirin. Plan: The risks, benefits, expected outcome, and alternative to the recommended procedure have been discussed with the patient. Patient understands and wants to proceed with the procedure.      Electronically signed by Serena Martinez DO on 4/11/2023 at 1:46 PM

## 2023-04-11 NOTE — BRIEF OP NOTE
Brief Postoperative Note      Patient: Riki Cardoza  YOB: 1965  MRN: [id number]    Date of Procedure: 4/11/2023    Pre-Op Diagnosis: ESRD (end stage renal disease) [facility] [N18.6]    Post-Op Diagnosis: Same       Procedure(s):  LAPAROSCOPIC PERITONEAL DIALYSIS CATHETER REMOVAL AND REPLACEMENT  . Surgeon(s): Garry Huerta DO    Assistant:  Resident: Jo Ann Coyle DO    Anesthesia: General    Estimated Blood Loss (mL): Minimal    Complications: None    Specimens:   ID Type Source Tests Collected by Time Destination   1 : ABDOMINAL WALL ABSCESS Tissue Tissue CULTURE, FUNGUS, CULTURE, TISSUE (Canceled), CULTURE WITH SMEAR, ACID FAST BACILLIUS, CULTURE, ANAEROBIC AND AEROBIC Garry Huerta DO 4/11/2023 1546    2 : CATHETER TIP FOR CULTURE Hardware Hardware CULTURE, SURGICAL, CULTURE, ANAEROBIC AND AEROBIC Garry Huerta DO 4/11/2023 1606        Implants:  * No implants in log *      Drains:   Urinary Catheter 04/11/23 Smith (Active)       Findings: Right sided PD catheter placement. Left sided PD catheter removal, fluid culture sent, tip sent for culture. No complications.  EBL 10ml    Electronically signed by Jo Ann Coyle DO on 4/11/2023 at 4:35 PM

## 2023-04-12 ENCOUNTER — APPOINTMENT (OUTPATIENT)
Dept: INTERVENTIONAL RADIOLOGY/VASCULAR | Age: 58
DRG: 907 | End: 2023-04-12
Attending: SURGERY
Payer: COMMERCIAL

## 2023-04-12 VITALS
RESPIRATION RATE: 16 BRPM | SYSTOLIC BLOOD PRESSURE: 145 MMHG | TEMPERATURE: 98 F | HEART RATE: 86 BPM | OXYGEN SATURATION: 94 % | BODY MASS INDEX: 22.3 KG/M2 | DIASTOLIC BLOOD PRESSURE: 83 MMHG | HEIGHT: 74 IN | WEIGHT: 173.72 LBS

## 2023-04-12 LAB
ALBUMIN SERPL-MCNC: 2.8 G/DL (ref 3.4–5)
ANION GAP SERPL CALCULATED.3IONS-SCNC: 15 MMOL/L (ref 3–16)
BASOPHILS # BLD: 0.1 K/UL (ref 0–0.2)
BASOPHILS NFR BLD: 0.6 %
BUN SERPL-MCNC: 52 MG/DL (ref 7–20)
CALCIUM SERPL-MCNC: 8.3 MG/DL (ref 8.3–10.6)
CHLORIDE SERPL-SCNC: 101 MMOL/L (ref 99–110)
CO2 SERPL-SCNC: 21 MMOL/L (ref 21–32)
CREAT SERPL-MCNC: 7.7 MG/DL (ref 0.9–1.3)
DEPRECATED RDW RBC AUTO: 15.7 % (ref 12.4–15.4)
EOSINOPHIL # BLD: 0.2 K/UL (ref 0–0.6)
EOSINOPHIL NFR BLD: 1.8 %
GFR SERPLBLD CREATININE-BSD FMLA CKD-EPI: 8 ML/MIN/{1.73_M2}
GLUCOSE BLD-MCNC: 175 MG/DL (ref 70–99)
GLUCOSE SERPL-MCNC: 142 MG/DL (ref 70–99)
HCT VFR BLD AUTO: 24.8 % (ref 40.5–52.5)
HGB BLD-MCNC: 8.4 G/DL (ref 13.5–17.5)
INR PPP: 1.05 (ref 0.84–1.16)
LYMPHOCYTES # BLD: 1.9 K/UL (ref 1–5.1)
LYMPHOCYTES NFR BLD: 18.5 %
MCH RBC QN AUTO: 30.1 PG (ref 26–34)
MCHC RBC AUTO-ENTMCNC: 33.9 G/DL (ref 31–36)
MCV RBC AUTO: 88.8 FL (ref 80–100)
MONOCYTES # BLD: 0.6 K/UL (ref 0–1.3)
MONOCYTES NFR BLD: 6.4 %
NEUTROPHILS # BLD: 7.4 K/UL (ref 1.7–7.7)
NEUTROPHILS NFR BLD: 72.7 %
PERFORMED ON: ABNORMAL
PHOSPHATE SERPL-MCNC: 5.8 MG/DL (ref 2.5–4.9)
PLATELET # BLD AUTO: 138 K/UL (ref 135–450)
PMV BLD AUTO: 9.3 FL (ref 5–10.5)
POTASSIUM SERPL-SCNC: 4.3 MMOL/L (ref 3.5–5.1)
PROTHROMBIN TIME: 13.7 SEC (ref 11.5–14.8)
RBC # BLD AUTO: 2.8 M/UL (ref 4.2–5.9)
SODIUM SERPL-SCNC: 137 MMOL/L (ref 136–145)
VANCOMYCIN SERPL-MCNC: 43.4 UG/ML
WBC # BLD AUTO: 10.1 K/UL (ref 4–11)

## 2023-04-12 PROCEDURE — 80069 RENAL FUNCTION PANEL: CPT

## 2023-04-12 PROCEDURE — 3E1M39Z IRRIGATION OF PERITONEAL CAVITY USING DIALYSATE, PERCUTANEOUS APPROACH: ICD-10-PCS | Performed by: RADIOLOGY

## 2023-04-12 PROCEDURE — 36558 INSERT TUNNELED CV CATH: CPT

## 2023-04-12 PROCEDURE — 2580000003 HC RX 258

## 2023-04-12 PROCEDURE — C1769 GUIDE WIRE: HCPCS

## 2023-04-12 PROCEDURE — 36415 COLL VENOUS BLD VENIPUNCTURE: CPT

## 2023-04-12 PROCEDURE — 96372 THER/PROPH/DIAG INJ SC/IM: CPT

## 2023-04-12 PROCEDURE — 96365 THER/PROPH/DIAG IV INF INIT: CPT

## 2023-04-12 PROCEDURE — 6360000002 HC RX W HCPCS

## 2023-04-12 PROCEDURE — 90935 HEMODIALYSIS ONE EVALUATION: CPT

## 2023-04-12 PROCEDURE — 76937 US GUIDE VASCULAR ACCESS: CPT

## 2023-04-12 PROCEDURE — 51798 US URINE CAPACITY MEASURE: CPT

## 2023-04-12 PROCEDURE — 96366 THER/PROPH/DIAG IV INF ADDON: CPT

## 2023-04-12 PROCEDURE — 02HV33Z INSERTION OF INFUSION DEVICE INTO SUPERIOR VENA CAVA, PERCUTANEOUS APPROACH: ICD-10-PCS | Performed by: RADIOLOGY

## 2023-04-12 PROCEDURE — 80202 ASSAY OF VANCOMYCIN: CPT

## 2023-04-12 PROCEDURE — C1894 INTRO/SHEATH, NON-LASER: HCPCS

## 2023-04-12 PROCEDURE — 2500000003 HC RX 250 WO HCPCS

## 2023-04-12 PROCEDURE — 6370000000 HC RX 637 (ALT 250 FOR IP)

## 2023-04-12 PROCEDURE — 85610 PROTHROMBIN TIME: CPT

## 2023-04-12 PROCEDURE — 99222 1ST HOSP IP/OBS MODERATE 55: CPT | Performed by: INTERNAL MEDICINE

## 2023-04-12 PROCEDURE — 99152 MOD SED SAME PHYS/QHP 5/>YRS: CPT

## 2023-04-12 PROCEDURE — C1750 CATH, HEMODIALYSIS,LONG-TERM: HCPCS

## 2023-04-12 PROCEDURE — 77001 FLUOROGUIDE FOR VEIN DEVICE: CPT

## 2023-04-12 PROCEDURE — 0JH63XZ INSERTION OF TUNNELED VASCULAR ACCESS DEVICE INTO CHEST SUBCUTANEOUS TISSUE AND FASCIA, PERCUTANEOUS APPROACH: ICD-10-PCS | Performed by: RADIOLOGY

## 2023-04-12 PROCEDURE — 36556 INSERT NON-TUNNEL CV CATH: CPT

## 2023-04-12 PROCEDURE — 85025 COMPLETE CBC W/AUTO DIFF WBC: CPT

## 2023-04-12 PROCEDURE — 96375 TX/PRO/DX INJ NEW DRUG ADDON: CPT

## 2023-04-12 RX ORDER — TORSEMIDE 20 MG/1
20 TABLET ORAL DAILY
Status: DISCONTINUED | OUTPATIENT
Start: 2023-04-12 | End: 2023-04-12 | Stop reason: HOSPADM

## 2023-04-12 RX ORDER — HEPARIN SODIUM 5000 [USP'U]/ML
5000 INJECTION, SOLUTION INTRAVENOUS; SUBCUTANEOUS EVERY 8 HOURS SCHEDULED
Status: CANCELLED | OUTPATIENT
Start: 2023-04-12

## 2023-04-12 RX ORDER — DOCUSATE SODIUM 100 MG/1
100 CAPSULE, LIQUID FILLED ORAL 2 TIMES DAILY
Qty: 28 CAPSULE | Refills: 0 | Status: SHIPPED | OUTPATIENT
Start: 2023-04-12 | End: 2023-04-26

## 2023-04-12 RX ORDER — HEPARIN SODIUM 1000 [USP'U]/ML
3600 INJECTION, SOLUTION INTRAVENOUS; SUBCUTANEOUS PRN
Status: DISCONTINUED | OUTPATIENT
Start: 2023-04-12 | End: 2023-04-12 | Stop reason: HOSPADM

## 2023-04-12 RX ORDER — OXYCODONE HYDROCHLORIDE 5 MG/1
5 TABLET ORAL EVERY 6 HOURS PRN
Qty: 28 TABLET | Refills: 0 | Status: SHIPPED | OUTPATIENT
Start: 2023-04-12 | End: 2023-04-19

## 2023-04-12 RX ADMIN — DOXYCYCLINE 100 MG: 100 INJECTION, POWDER, LYOPHILIZED, FOR SOLUTION INTRAVENOUS at 05:05

## 2023-04-12 RX ADMIN — GABAPENTIN 300 MG: 300 CAPSULE ORAL at 09:53

## 2023-04-12 RX ADMIN — CLONIDINE HYDROCHLORIDE 0.2 MG: 0.2 TABLET ORAL at 09:53

## 2023-04-12 RX ADMIN — OXYCODONE HYDROCHLORIDE 10 MG: 5 TABLET ORAL at 04:52

## 2023-04-12 RX ADMIN — ACETAMINOPHEN 650 MG: 325 TABLET ORAL at 09:53

## 2023-04-12 RX ADMIN — ACETAMINOPHEN 650 MG: 325 TABLET ORAL at 05:04

## 2023-04-12 RX ADMIN — OXYCODONE 5 MG: 5 TABLET ORAL at 09:53

## 2023-04-12 RX ADMIN — CLONIDINE HYDROCHLORIDE 0.2 MG: 0.2 TABLET ORAL at 14:11

## 2023-04-12 RX ADMIN — SODIUM CHLORIDE: 9 INJECTION, SOLUTION INTRAVENOUS at 16:27

## 2023-04-12 RX ADMIN — SODIUM CITRATE AND CITRIC ACID MONOHYDRATE 30 ML: 500; 334 SOLUTION ORAL at 00:03

## 2023-04-12 RX ADMIN — METOPROLOL TARTRATE 75 MG: 50 TABLET, FILM COATED ORAL at 09:53

## 2023-04-12 RX ADMIN — HEPARIN SODIUM 5000 UNITS: 5000 INJECTION INTRAVENOUS; SUBCUTANEOUS at 14:11

## 2023-04-12 RX ADMIN — SODIUM CHLORIDE, PRESERVATIVE FREE 10 ML: 5 INJECTION INTRAVENOUS at 00:34

## 2023-04-12 RX ADMIN — NIFEDIPINE 90 MG: 90 TABLET, FILM COATED, EXTENDED RELEASE ORAL at 09:53

## 2023-04-12 RX ADMIN — HYDROMORPHONE HYDROCHLORIDE 0.25 MG: 1 INJECTION, SOLUTION INTRAMUSCULAR; INTRAVENOUS; SUBCUTANEOUS at 00:26

## 2023-04-12 RX ADMIN — GABAPENTIN 300 MG: 300 CAPSULE ORAL at 14:11

## 2023-04-12 RX ADMIN — SODIUM CHLORIDE, PRESERVATIVE FREE 10 ML: 5 INJECTION INTRAVENOUS at 09:53

## 2023-04-12 RX ADMIN — SODIUM CHLORIDE 10 ML/HR: 9 INJECTION, SOLUTION INTRAVENOUS at 04:55

## 2023-04-12 RX ADMIN — DOXYCYCLINE 100 MG: 100 INJECTION, POWDER, LYOPHILIZED, FOR SOLUTION INTRAVENOUS at 16:28

## 2023-04-12 RX ADMIN — HYDRALAZINE HYDROCHLORIDE 100 MG: 100 TABLET, FILM COATED ORAL at 14:11

## 2023-04-12 RX ADMIN — ISOSORBIDE MONONITRATE 60 MG: 60 TABLET, EXTENDED RELEASE ORAL at 09:53

## 2023-04-12 RX ADMIN — HYDRALAZINE HYDROCHLORIDE 100 MG: 100 TABLET, FILM COATED ORAL at 09:53

## 2023-04-12 RX ADMIN — TORSEMIDE 20 MG: 20 TABLET ORAL at 09:52

## 2023-04-12 ASSESSMENT — ENCOUNTER SYMPTOMS
VOMITING: 0
NAUSEA: 0
ABDOMINAL PAIN: 1
SHORTNESS OF BREATH: 0

## 2023-04-12 ASSESSMENT — PAIN DESCRIPTION - ONSET
ONSET: PROGRESSIVE
ONSET: GRADUAL

## 2023-04-12 ASSESSMENT — PAIN SCALES - GENERAL
PAINLEVEL_OUTOF10: 7
PAINLEVEL_OUTOF10: 5
PAINLEVEL_OUTOF10: 4

## 2023-04-12 ASSESSMENT — PAIN DESCRIPTION - ORIENTATION
ORIENTATION: LEFT
ORIENTATION: LEFT;RIGHT
ORIENTATION: RIGHT;LEFT
ORIENTATION: LEFT

## 2023-04-12 ASSESSMENT — PAIN DESCRIPTION - LOCATION
LOCATION: ABDOMEN

## 2023-04-12 ASSESSMENT — PAIN DESCRIPTION - DESCRIPTORS
DESCRIPTORS: BURNING
DESCRIPTORS: STABBING;BURNING
DESCRIPTORS: STABBING
DESCRIPTORS: DISCOMFORT

## 2023-04-12 ASSESSMENT — PAIN DESCRIPTION - PAIN TYPE
TYPE: ACUTE PAIN

## 2023-04-12 ASSESSMENT — PAIN DESCRIPTION - FREQUENCY
FREQUENCY: INTERMITTENT
FREQUENCY: INTERMITTENT

## 2023-04-12 ASSESSMENT — PAIN - FUNCTIONAL ASSESSMENT: PAIN_FUNCTIONAL_ASSESSMENT: ACTIVITIES ARE NOT PREVENTED

## 2023-04-12 NOTE — PROCEDURES
IR Procedure Note    Requested procedure: tunneled dialysis catheter placement    Procedure: Successful placement of tunneled dialysis catheter via right IJ. Tip at cavoatrial junction. Ready for use.     Anesthesia: moderate sedation    Surgeons/Assistants: Carlene Awan    Estimated Blood Loss: Minimal    Complications: None    Tiera Ortega MD MD  4/12/2023

## 2023-04-12 NOTE — CONSULTS
Clinical Pharmacy Progress Note    Vancomycin - Management by Pharmacy    Consult Date(s): 4/11/23  Consulting Provider(s): Dr. Brandie Almazan, Dr. Luis Angel Villagomez / Plan  1)  PD catheter tunnel infection - Vancomycin  Concurrent Antimicrobials: Doxycycline  Day of Vanc Therapy:  Day #2  Current Dosing Method: Intermittent Dosing by Levels  Therapeutic Goal: Trough ~ 15 mg/L  Current Dose / Plan:   Pt is ESRD, on PD as outpt. Now transitioning to HD for now. Of note, pt was receiving intraperitoneal Vancomycin prior to admission per outpt notes. Received 1000mg IV x1 pre-operatively yesterday afternoon. Random level this AM = 43.4 mcg/mL. Having HD today, but Vanc still not needed today given elevated level. Will check random level in AM tomorrow given unclear HD schedule. Will continue to monitor clinical condition and make adjustments to regimen as appropriate. Please call with questions--  Thanks--  Vladimir Flores, PharmD, BCPS, BCGP  [id number] ([facility] [place])   4/12/2023 12:36 PM      Interval update:  IR placed tunneled HD line today, and pt will get HD this afternoon. Subjective/Objective:   Kumar Flores is a 63 y.o. male with a PMHx significant for ESRD on PD, CVA, DM, gastroparesis, HF, cardiomyopathy, HTN, and HLD who underwent PD catheter placement in Feb 2023. Per notes, his PD catheter pulled out while asleep, and he developed worsening  drainage that was treated with IP Vancomycin by his nephrologist.  When pt wasn't improving, he was then seen by surgery and admitted for PD catheter removal and replacement (done 4/11/23). Admitted with PD cath tunnel infection, with plans to transition to HD for now. Pharmacy is consulted to dose Vancomycin.     Ht Readings from Last 1 Encounters:   04/11/23 6' 2\" (1.88 m)     Wt Readings from Last 1 Encounters:   04/11/23 177 lb (80.3 kg)     Current & Prior Antimicrobial Regimen(s):  Doxycycline (4/11-current)  Vancomycin - Pharmacy to
IR consult completed. See MD note.
Nephrology Consult Note        [facility]    [email or web address]       Phone: [phone]                                                  4/11/2023 2:36 PM     Patient: Carrie Haywood [id number]  OR/NONE  Date of Admit: 4/11/2023 LOS: 0 days  Referring physician:    Plan:  IR consult for tunneled dialysis catheter   HD in AM  ID consult to assist in antibiotics. Left message to [facility] PD nurse to get more information. Will continue with IV Vancomycin and oral doxycycline (Patient has allergy to cefepime)   Oral Bicitra when patient is able to take oral medications. Monitor input, output and weight  Monitor labs and vitals closely  Renally dose all medications    D/W patient and his family. Thank you for allowing us to participate in this patient's care    In case of any question please call us at our 24 hour answering service [phone] or from 7 AM to 5 PM via [employer] or cell number    Heath Melton MD  [facility]  [address], [address]  Fax: [phone]  Office: [phone]       Assessment & Plan     Renal function:  ESRD  On PD    Now will be temporarily transition to HD for tunnel infection. Per Dr Darrin Guerra PD fluid cell count and cultures were negative    Consent obtained and placed in chart. Culture was + for Corynebacterium per Dr Darrin Guerra. I cannot access myself      Electrolytes:  Lab Results   Component Value Date    CREATININE 6.4 (HH) 02/24/2023    BUN 45 (H) 02/24/2023     02/24/2023    K 4.4 02/24/2023     02/24/2023    CO2 19 (L) 02/24/2023            # CKD- MBD:   Secondary hyperparathyroidism due to renal failure  Monitor Phos level while here. Lab Results   Component Value Date    PTH 49.8 02/01/2019    CALCIUM 9.2 02/24/2023    PHOS 4.5 12/06/2022         Acid/Base status:  Bicarbonate mild low. Volume status/BP:  BP stable    No concern for volume overload.      Intake/Output Summary (Last 24 hours) at
toxicity    Discussed with pt, primary team and nephrology.    [address] Jarret Ayala MD

## 2023-04-12 NOTE — DISCHARGE INSTRUCTIONS
side effect and also help to regulate your bowels after surgery. Accordingly, if you do not experience constipation, then you do not need to take this medication. Bowel Regimen Instructions: Take one Colace pill two times each day while you are taking the narcotic pain reliever. If your stools become too loose and/or frequent, decrease the Colace to one pill one time each day. If your stools are still loose after this modification, stop taking this medication all together. This medicine was prescribed with the intention of being an as-needed medicine; accordingly, you do not have to finish the prescription or use at all if you find that you do not need it. Reasons to Return:   Some soreness and redness is common after surgery, especially for the first 24-48 hours. If, however, you experience for increasing redness, worsening pain, new and/or increasing drainage from wound, fever above 101.5 degrees Farenheit, bleeding that does not stop soon after discovery, or any other concerns about your incision or post op course, please either call the office or call/return to the Emergency Department for further evaluation. Follow up with Dr. Kirill Diop (surgery) in 1-2 weeks. Please call [phone] to schedule your appointment. Please follow up with Maria Del Carmen Coyle (kidney doctor) in 1 week. We recommend that you call your primary care physician within the first 3-5 days following discharge to inform them that you were recently hospitalized and potentially schedule a visit at their discretion.

## 2023-04-12 NOTE — PRE SEDATION
(VIBRAMYCIN) IV  100 mg IntraVENous Q12H    atorvastatin  40 mg Oral Nightly    cloNIDine  0.2 mg Oral TID    gabapentin  300 mg Oral TID    hydrALAZINE  100 mg Oral TID    isosorbide mononitrate  60 mg Oral Daily    metoprolol tartrate  75 mg Oral BID    NIFEdipine  90 mg Oral BID    sodium chloride flush  5-40 mL IntraVENous 2 times per day    acetaminophen  650 mg Oral Q6H    heparin (porcine)  5,000 Units SubCUTAneous 3 times per day    vancomycin (VANCOCIN) intermittent dosing (placeholder)   Other See Admin Instructions    insulin lispro  0-4 Units SubCUTAneous TID WC    insulin lispro  0-4 Units SubCUTAneous Nightly     Continuous Infusions:    sodium chloride 10 mL/hr (04/12/23 9176)    dextrose       PRN Meds: sodium chloride flush, sodium chloride, ondansetron **OR** ondansetron, oxyCODONE **OR** oxyCODONE, HYDROmorphone **OR** HYDROmorphone, glucose, dextrose bolus **OR** dextrose bolus, glucagon (rDNA), dextrose  Home Meds:   Prior to Admission medications    Medication Sig Start Date End Date Taking? Authorizing Provider   gabapentin (NEURONTIN) 300 MG capsule Take 1 capsule by mouth 3 times daily for 30 days. 4/10/23 5/10/23  ROQUE Sheldon CNP   torsemide (DEMADEX) 20 MG tablet Take 1 tablet by mouth daily 3/30/23   Historical Provider, MD   VANCOMYCIN HCL PO Infuse 2,000 mg intravenously 3/30/23   Historical Provider, MD   doxycycline hyclate (VIBRA-TABS) 100 MG tablet Take 1 tablet by mouth 2 times daily for 14 days 4/6/23 4/20/23  Keith Kong DO   NIFEdipine (PROCARDIA XL) 90 MG extended release tablet Take 1 tablet by mouth in the morning and 1 tablet in the evening.  2/16/23   ROQUE Sheldon CNP   cloNIDine (CATAPRES) 0.2 MG tablet TAKE 1 TABLET BY MOUTH THREE TIMES DAILY 1/20/23   ROQUE Sheldon CNP   hydrALAZINE (APRESOLINE) 100 MG tablet Take 1 tablet by mouth 3 times daily 1/20/23   ROQUE Sheldon CNP   isosorbide mononitrate (IMDUR) 60 MG extended

## 2023-04-12 NOTE — PROGRESS NOTES
1845 Ready to transfer to 6S. Report given but shift change. Will transfer at [address]. Dinner ordered.
2125 Admitted to PACU from OR. Connected to monitor. Report at bedside. Appears to be comfortable. - denies pain Plan for a maynard tomorrow. Jing Robles in place.
4 Eyes Admission Assessment     I agree as the admission nurse that 2 RN's have performed a thorough Head to Toe Skin Assessment on the patient. ALL assessment sites listed below have been assessed on admission. Areas assessed by both nurses:   [x]   Head, Face, and Ears   [x]   Shoulders, Back, and Chest  [x]   Arms, Elbows, and Hands   [x]   Coccyx, Sacrum, and Ischium  [x]   Legs, Feet, and Heels        Does the Patient have Skin Breakdown?   No         Quinton Prevention initiated:  No   Wound Care Orders initiated:  No      [facility] nurse consulted for Pressure Injury (Stage 3,4, Unstageable, DTI, NWPT, and Complex wounds) or Quinton score 18 or lower:  No      Nurse 1 eSignature: Electronically signed by Ness Garnica RN on 4/12/23 at 1:22 AM EDT    **SHARE this note so that the co-signing nurse is able to place an eSignature**    Nurse 2 eSignature: {Esignature:[id number]}
4 Eyes Admission Assessment     I agree as the admission nurse that 2 RN's have performed a thorough Head to Toe Skin Assessment on the patient. ALL assessment sites listed below have been assessed on admission. Areas assessed by both nurses: yes  [x]   Head, Face, and Ears   [x]   Shoulders, Back, and Chest  [x]   Arms, Elbows, and Hands   [x]   Coccyx, Sacrum, and Ischium  [x]   Legs, Feet, and Heels        Does the Patient have Skin Breakdown?   No         Quinton Prevention initiated:  No   Wound Care Orders initiated:  No      [facility] nurse consulted for Pressure Injury (Stage 3,4, Unstageable, DTI, NWPT, and Complex wounds) or Quinton score 18 or lower:  No      Nurse 1 eSignature: Electronically signed by Juvencio Snyder RN on 4/12/23 at 6:58 AM EDT    **SHARE this note so that the co-signing nurse is able to place an eSignature**    Nurse 2 eSignature: Electronically signed by Flynn Alvarenga RN on 4/12/23 at 7:46 AM EDT
[address] Dr Carrizales Philippi here to see patient. Has small hematoma under left dressing. Otherwise soft. To monitor.
Accu-check 134 prior to eating. Isma served Dr Faisal De Oliveira - patient on insulin at home.
Arrived to [facility] for peritoneal dialysis catheter removal see consent. LOC x 4 NPO.
Bariatric Surgery   Daily Progress Note  Patient: Velva Night      CC: infected PD catheter    SUBJECTIVE:   Patient rested well overnight. Bladder scanned last night with 420ml. Denied gregorio at that time. Freely voided 250ml since. Pain is tolerated on pain meds. Denies having bowel movements or passing gas. No nausea or vomiting. Has not been OOB except to use bathroom. Denies fevers, chills, shortness of breath or chest pain. ROS:   Review of Systems   Constitutional:  Negative for chills and fever. Respiratory:  Negative for shortness of breath. Cardiovascular:  Negative for chest pain. Gastrointestinal:  Positive for abdominal pain. Negative for nausea and vomiting. Genitourinary:  Negative for dysuria. OBJECTIVE:    PHYSICAL EXAM:    Vitals:    04/11/23 2355 04/12/23 0026 04/12/23 0056 04/12/23 0446   BP: 127/75   (!) 148/86   Pulse: 84   91   Resp: 18 18 18 18   Temp: 99 °F (37.2 °C)   98.5 °F (36.9 °C)   TempSrc: Oral   Oral   SpO2: 93%   98%   Weight:       Height:           General appearance: alert, no acute distress, grooming appropriate  Eyes: No scleral icterus, EOM grossly intact  Chest/Lungs: normal effort with no accessory muscle use on 2L NC  Cardiovascular: RRR  Abdomen: Previous PD cath site on left packed with iodoform packing. Band-aid in place with light strike-through, appropriately tender. Right site appropriately tender with dressing c/d/i  Skin: warm and dry, no rashes  Extremities: no edema, no cyanosis  Neuro: A&Ox3, no focal deficits, sensation intact    LABS:   No results for input(s): WBC, HGB, HCT, MCV, PLT in the last 72 hours. Recent Labs     04/11/23  1430      K 4.1      CO2 23   BUN 52*   CREATININE 8.0*      No results for input(s): AST, ALT, ALB, BILIDIR, BILITOT, ALKPHOS in the last 72 hours. No results for input(s): LIPASE, AMYLASE in the last 72 hours. No results for input(s): PROT, INR, APTT in the last 72 hours.    No results for
Bariatric Surgery  Post-operative Note      Procedure(s) Performed: Laparoscopic PD cath removal and replacement    Subjective:   Patient's pain is controlled. Endorsing some tenderness, particularly at PD cath removal site. Denies Nausea or vomiting. Tolerating diet. Has not been OOB. Has not voided. States he does make urine. Objective:    Vitals:   Vitals:    04/11/23 1845 04/11/23 1900 04/11/23 1915 04/11/23 1929   BP: (!) 140/81 (!) 156/82 (!) 158/90 (!) 160/93   Pulse: 77 79 78 79   Resp: 26 16 19 19   Temp:    98.8 °F (37.1 °C)   TempSrc:       SpO2: 93% 92% 92% 95%   Weight:       Height:           Physical Exam:  Post-op vital signs:  Stable   General appearance: alert, no acute distress, grooming appropriate  Eyes: No scleral icterus, EOM grossly intact  Neck: trachea midline, neck supple  Chest/Lungs: normal effort with no accessory muscle use on 3L NC  Cardiovascular: RRR  Abdomen: Previous PD cath site on left packed with Iodoform packing. Bandage in place with light strikethrough, appropriately tender. Small inferior area of induration. Surrounding abdomen soft and non-tender. Right site appropriately tender with dressing C/D/I   Skin: warm and dry, no rashes  Extremities: no edema, no cyanosis  Genitourinary: Grossly normal  Neuro: A&Ox3, no focal deficits, sensation intact    Assessment and Plan  This is a 63y.o. year old male status post laparoscopic PD cath removal and replacement secondary to ESRD. (4/11/23) POD0. Pain management: Roxicodone pain panel and scheduled tylenol PRN  Cardiovasc: hemodynamically stable, will continue to monitor  Respiratory:  IS ordered to bedside, encourage hourly IS and deep breathing, wean oxygen as tolerated  Fluids:  None, Diet: General diet ordered per patient request. Under care of a dietitian, NPO after midnight   : Patient remains a void check - will follow up in 4 hours.     Ambulation: OOB to chair, encourage ambulation  Prophylaxis:
Dr Young Lepe paged - firm area under left dressing. Will perfect serve.
PACU Transfer Note    Vitals:    04/11/23 1929   BP: (!) 160/93   Pulse: 79   Resp: 19   Temp: 98.8 °F (37.1 °C)   SpO2: 95%   BP elevated - plans to take po med when eats. Tray transferred with patient. No intake/output data recorded. Pain assessment:  receiving treatment  Pain Level: 4 (right better than left)    Will give update at bedside.     4/11/2023 7:34 PM
PS Dr. Karlene Burnette regarding pt's bladder scan results (389 ml) , continued refusal for straight cath and home medication of Torsemide 20 mg daily.  Waiting response
Patient discharged home with [facility]. IV removed. Tele removed. Discharge paperwork discussed with patient and wife. Knows to go to HD tomorrow. Prescriptions for pain medication and colcae also sent with patient. Follow up care of lap sites and HD line discussed. Wheeled patient down with belongings to vehicle.
Patient down to cath lab. IV flushed and patent, morning meds given. Vitals stable.
Patient return  from HD.  VSS
Patient return from IR, transported over to HD. Went and checked on patient, wants to eat, no other complaints.
Pt bladder scanned per Dr. Jorge Alberto Mahajan -- with 420 ml present. Dr. Jorge Alberto Mahajan notified, with verbal instructions to straight cath given. Pt repeatedly refused to be straight cath. Pt was educated on the procedure, and it's importance with negative results. Pt was able to urinate approximately 50 ml's. Requested ice water, stating that he would continue to try to urinate on his own.
Received pt from PACU. Pt A/O with eyes open, in bed talking to transferring nurse and wife. This writer and PACU RN, inspected left sided abd hematoma. ABD  binder covering surgical site with no drainage present.  Pt reports discomfort (6/10) but wants to eat first.
Reports not on low carb, phos and K diet. Unable to use artificial sweetener  (in food/drink or packets) because of gastroparesis. Not to eat whole grain because too many minerals. High protein diet. No added salt. Sees dietician at [employer]. Also lactose intolerant. Requested a regular diet to choose what he knows he can eat per Perfect serve.
failure  Monitor Phos level while here. Lab Results   Component Value Date    PTH 49.8 02/01/2019    CALCIUM 8.8 04/11/2023    PHOS 4.5 12/06/2022         Acid/Base status:  Bicarbonate better    Volume status/BP:  BP stable    No concern for volume overload. Intake/Output Summary (Last 24 hours) at 4/12/2023 0938  Last data filed at 4/12/2023 0446  Gross per 24 hour   Intake 1029 ml   Output 270 ml   Net 759 ml           Hematology:   CKD related anemia  Goal Hgb 10-11    Lab Results   Component Value Date    IRON 43 (L) 09/14/2022    TIBC 145 (L) 09/14/2022    FERRITIN 230.3 10/27/2020     Lab Results   Component Value Date    WBC 8.9 02/24/2023    HGB 11.4 (L) 02/24/2023    HCT 34.6 (L) 02/24/2023    MCV 88.6 02/24/2023     02/24/2023             Reason for Consult and Chief Complaint     Reason for consult: ESRD    Chief complaint: admitted for PD catheter exchange for peritonitis      History of Present Illness   Camille Bentley is a 63 y.o. with PMH significant for ESRD on HD will be admitted after PD catheter exchange for peritonitis. Patient is on oral doxycycline and IP vancomycin for last few days but apparently infection is not improving. Per Dr Zachery Larsen will need temporary transition to HD. Patient seen prior to procedure and he is comfortable and had routine PD last night and denied SOB. No chest pain. No abdominal pain but its is tender especially around PD catheter. No fever. BP is stable. Review of Systems   Positive in bold or unable to assess     GEN: Fever, chills, night sweats. HEENT: Changes in vision, sore throat, rhinorrhea   CVS: Chest pain, palpitations, swelling or edema in legs  Pulmonary: Cough, hemoptysis, SOB  GI: Nausea, vomiting, diarrhea, constipation, abdominal pain  : Bladder incontinence, dysuria,hematuria. MSK: Muscle or joint or bone pains  Skin: Rashes, ulcers, skin thickness  CNS: Headache, dizziness, confusion, focal weakness, seizure.    Psych:

## 2023-04-12 NOTE — OP NOTE
DATE OF PROCEDURE:  04/11/23     PREOPERATIVE DIAGNOSIS: ESRD requiring dialysis, Infected PD catheter     POSTOPERATIVE DIAGNOSIS: Same as pre-op     PROCEDURES PERFORMED:  1. Laparoscopic peritoneal dialysis catheter placement. 2. Removal of infected PD catheter  3. Incision and drainage of abdominal wall abscess     SURGEON:  Ajith Cheung DO     ASSISTANT: [place]     ANESTHESIA:  General endotracheal.     COMPLICATIONS:  None. CONDITION:  Stable. EBL: 10 cc     INDICATIONS FOR PROCEDURE:  The patient is a 63year old male consented for PD catheter removal and replacement for infected PD catheter. DESCRIPTION OF PROCEDURE:  The patient was brought to the operating suite, laid in supine position with arms outstretched, and after induction of general endotracheal anesthesia; tube was confirmed to be in place with end-tidal CO2, and secured. Smith catheter was placed with clear return of urine. The patient was prepped and draped in usual sterile manner with Ioban placed on top of the skin. A small incision was made at the left midclavicular line using the existing catheter  Abdomen was entered and pneumoperitoneum established with 12 mm of CO2. A 5-mm 30-degree camera housed in a 5-mm Optiview trocar was used to enter the abdomen under direct visualization in the left upper quadrant. Once inside the abdomen, an additional 5-mm trocar was placed on the right side. After this, a 57 cm dual-cuff coil-tipped catheter was then  introduced into the left rectus muscle under direct visualization. Deep cuff was positioned in the rectus muscle and superficial cuff in subcutaneous tissue. Titaneum luerlocks were applied. At that point, 2 liters of fluid was instilled into the abdomen and removed without difficulty. One last look laparoscopically was performed after catheter was clipped, clamped, and heparin locked.  The catheter was seen to be appropriately going down in the pelvis and omentopexy was

## 2023-04-12 NOTE — DISCHARGE INSTR - COC
Margins Other (Comment) 23   Incision Assessment Other (Comment) 23   Drainage Amount Other (Comment) 23   Drainage Description Rankin 239   Odor None 23   Number of days: 0        Elimination:  Continence: Bowel: {YES / QC:64858}  Bladder: {YES / BS:56556}  Urinary Catheter: {Urinary Catheter:141047962}   Colostomy/Ileostomy/Ileal Conduit: {YES / EC:13662}       Date of Last BM: ***    Intake/Output Summary (Last 24 hours) at 2023 1130  Last data filed at 2023 0446  Gross per 24 hour   Intake 1029 ml   Output 270 ml   Net 759 ml     I/O last 3 completed shifts:   In: 1029 [P.O.:970; I.V.:59]  Out: 270 [Urine:250; Blood:20]    Safety Concerns:     [employer] Safety Concerns:431415673}    Impairments/Disabilities:      [employer] Impairments/Disabilities:998754585}    Nutrition Therapy:  Current Nutrition Therapy:   [employer] Diet List:198858317}    Routes of Feeding: {[facility] DME Other Feedings:436883932}  Liquids: {Slp liquid thickness:19438}  Daily Fluid Restriction: {[facility] DME Yes amt example:916654468}  Last Modified Barium Swallow with Video (Video Swallowing Test): {Done Not Done FWYH:282638465}    Treatments at the Time of Hospital Discharge:   Respiratory Treatments: ***  Oxygen Therapy:  {Therapy; copd oxygen:57056}  Ventilator:    { CC Vent PGAD:201340678}    Rehab Therapies: {THERAPEUTIC INTERVENTION:8421444720}  Weight Bearing Status/Restrictions: [facility] Weight Bearin}  Other Medical Equipment (for information only, NOT a DME order):  {EQUIPMENT:696402009}  Other Treatments: ***    Patient's personal belongings (please select all that are sent with patient):  {[facility] DME Belongings:122572099}    RN SIGNATURE:  {Esignature:276001080}    CASE MANAGEMENT/SOCIAL WORK SECTION    Inpatient Status Date: 2023    Readmission Risk Assessment Score:  Readmission Risk              Risk of Unplanned Readmission:  30           Discharging to Facility/

## 2023-04-12 NOTE — PLAN OF CARE
Problem: Chronic Conditions and Co-morbidities  Goal: Patient's chronic conditions and co-morbidity symptoms are monitored and maintained or improved  Outcome: Adequate for Discharge     Problem: Discharge Planning  Goal: Discharge to home or other facility with appropriate resources  Outcome: Adequate for Discharge     Problem: Pain  Goal: Verbalizes/displays adequate comfort level or baseline comfort level  Outcome: Adequate for Discharge     Problem: Safety - Adult  Goal: Free from fall injury  Outcome: Adequate for Discharge

## 2023-04-12 NOTE — CARE COORDINATION
[facility]    Patient aware and agreeable to services.  Faxed orders to [facility] for [facility] by 4/14    Jazz Durbin LPN  Care Transition Nurse  [address]  [phone]
sodium  oxyCODONE      May26 Office Visit 10:45 AM   Desi Guillaume, [address] [facility]   [address]   [address]       COVID Result:    Lab Results   Component Value Date/Time    COVID19 Not Detected 11/26/2022 09:26 AM       The Plan for Transition of Care is related to the following treatment goals of ESRD (end stage renal disease) ([address] [N18.6]  Kidney disease, chronic, stage IV (GFR 15-29 ml/min) ([address] [N18.4]    The Patient and/or patient representative [facility] and his family were provided with a choice of provider and agrees with the discharge plan Yes    Freedom of choice list was provided with basic dialogue that supports the patient's individualized plan of care/goals and shares the quality data associated with the providers.  Yes    Care Transitions patient: No    Igor Morales RN  [facility]  Case Management Department  Ph: [phone]

## 2023-04-16 LAB
BACTERIA SPEC AEROBE CULT: NORMAL
BACTERIA SPEC ANAEROBE CULT: NORMAL
GRAM STN SPEC: NORMAL

## 2023-04-17 ENCOUNTER — TELEPHONE (OUTPATIENT)
Dept: FAMILY MEDICINE CLINIC | Age: 58
End: 2023-04-17

## 2023-04-17 ENCOUNTER — CLINICAL DOCUMENTATION (OUTPATIENT)
Dept: INFECTIOUS DISEASES | Age: 58
End: 2023-04-17

## 2023-04-17 ENCOUNTER — TELEPHONE (OUTPATIENT)
Dept: PRIMARY CARE CLINIC | Age: 58
End: 2023-04-17

## 2023-04-17 DIAGNOSIS — A31.9 MYCOBACTERIUM INFECTION: Primary | ICD-10-CM

## 2023-04-17 LAB
FUNGUS SPEC CULT: ABNORMAL
LOEFFLER MB STN SPEC: ABNORMAL
ORGANISM: ABNORMAL

## 2023-04-17 NOTE — PROGRESS NOTES
Reviewed AFB culture. Presumably drawn from old PD cath that was already removed. New PD cath placed while in hospital. Not currently in use as he is on HD. Old PD cath was on L- removed; large wound with packing in place; supposed to be changed out by RN on 4/18 at HD center    New PD cath on R- not currently in use; supposed to have test flush this week and then hopefully get the \"go\" to transition back to PD next week; now pending AFB work-up    Pt is asymptomatic per PCP note in chart. Called pt- No fevers, no abdominal pain other than adjusting the \"waist \", no N/V, no chills, still makes urine- no changes, no differences in bowel movements. Dr. Akila Shirley is nephrologist. Dr. Jaiden Lopez is gen surg. 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 Will take a while to obtain from [facility]. Request new cultures from HD center:    Peritoneal fluid AFB culture out of new R PD cath to detect any bacterium in peritoneum. AFB culture of L wound site from old PD cath. AFB culture of blood from R tunneled IJ. Will determine true pathogenicity/still present/sensis. Will continue to monitor.     Inés Jhaveri, PharmD, [address], [place] Infectious Disease  Phone: [phone] (also available on [employer])  Fax: [phone]    For Pharmacy [address] [place] [address] [address]  Only    Program: Medical Group  CPA in place: Yes  Time Spent (min): 60

## 2023-04-17 NOTE — TELEPHONE ENCOUNTER
I received a call Sunday, 4/16/23, concerning positive bacterial culture from gregorio catheter. Sensitivity was not back yet. I spoke to the patient who stated he had his catheter switched out several days ago and is on an oral antibiotic. He is following up with specialist this week. He stated he felt well and denied fever or chills etc... understood if this happened to go to the Er. Informed him he should receive a call when the culture was back. He appreciated the call.

## 2023-04-18 ENCOUNTER — TELEPHONE (OUTPATIENT)
Dept: BARIATRICS/WEIGHT MGMT | Age: 58
End: 2023-04-18

## 2023-04-18 ENCOUNTER — TELEPHONE (OUTPATIENT)
Dept: SURGERY | Age: 58
End: 2023-04-18

## 2023-04-18 ENCOUNTER — TELEPHONE (OUTPATIENT)
Dept: INFECTIOUS DISEASES | Age: 58
End: 2023-04-18

## 2023-04-18 LAB
ACID FAST STN SPEC QL: ABNORMAL
BASOPHILS ABSOLUTE: ABNORMAL
BASOPHILS RELATIVE PERCENT: 0.5 %
EOSINOPHILS ABSOLUTE: ABNORMAL
EOSINOPHILS RELATIVE PERCENT: 4.9 %
HCT VFR BLD CALC: 29.5 % (ref 41–53)
HEMOGLOBIN: 9.4 G/DL (ref 13.5–17.5)
LYMPHOCYTES ABSOLUTE: ABNORMAL
LYMPHOCYTES RELATIVE PERCENT: 14.2 %
MCH RBC QN AUTO: 29.3 PG
MCHC RBC AUTO-ENTMCNC: 31.9 G/DL
MCV RBC AUTO: 92 FL
MONOCYTES ABSOLUTE: ABNORMAL
MONOCYTES RELATIVE PERCENT: 6.8 %
MYCOBACTERIUM SPEC CULT: ABNORMAL
NEUTROPHILS ABSOLUTE: ABNORMAL
NEUTROPHILS RELATIVE PERCENT: 72.6 %
ORGANISM: ABNORMAL
PDW BLD-RTO: 15.5 %
PLATELET # BLD: 185 K/ΜL
PMV BLD AUTO: ABNORMAL FL
RBC # BLD: 3.21 10^6/ΜL
VANCOMYCIN TROUGH: 22.4
WBC # BLD: 9.54 10^3/ML

## 2023-04-18 NOTE — TELEPHONE ENCOUNTER
Patient was called and wound care was discussed. Patient has home care coming out tomorrow to assist with packing changes. Wound care supplies arrived at house today. Talked patient through how to change dressing as was discussed prior to discharge by both resident staff and nursing staff. Patient and wife voiced understanding of what is going to happen and how to change. If further questions come up patient will discuss with home care nurse tomorrow.     Vincent Lowry,  PGY-3  General Surgery  04/18/23  4:46 PM

## 2023-04-18 NOTE — TELEPHONE ENCOUNTER
Spoke with nurse Tasha Beltran at [facility]. [facility], advised of pharmacy note and need for cultures.  Orders faxed to [phone] per request

## 2023-04-18 NOTE — TELEPHONE ENCOUNTER
Kate ([phone]) from Dialysis center is calling. Asking about pt wound. Pt stated that he was to have cultures taking at the time of his HD. Dialysis center does not know anything about having to do culture, no orders, they do not have the proper things at the center to care for the wound has packing. Please advise.

## 2023-04-19 NOTE — TELEPHONE ENCOUNTER
On 4/18, HD RN lorena:    1) anaerobic and aerobic body fluid bottles from new PD cath (let fluid dwell for an hour and then pulled it back out and put in bottles)    2) anaerobic and aerobic blood bottles from R IJ    3) Fungal blood bottle from R IJ    4) fungal body fluid bottle from new PD cath

## 2023-04-20 ENCOUNTER — TELEPHONE (OUTPATIENT)
Dept: INFECTIOUS DISEASES | Age: 58
End: 2023-04-20

## 2023-04-21 ENCOUNTER — TELEPHONE (OUTPATIENT)
Dept: INFECTIOUS DISEASES | Age: 58
End: 2023-04-21

## 2023-04-21 DIAGNOSIS — T85.71XD: ICD-10-CM

## 2023-04-21 NOTE — TELEPHONE ENCOUNTER
Spoke with Laura Beckford at [employer], she states she will fax [facility] and PD fluid cx results to office. She states order was faxed to [facility] to obtain wound cx from old PD site and should be done this week. Advised of pharmacist note to decrease vac to 500mg three times weekly and she v/u.

## 2023-04-21 NOTE — TELEPHONE ENCOUNTER
----- Message from Norma Goldstein [facility] sent at 4/21/2023  9:37 AM EDT -----  Pre-HD vanc random= 22.4  Please decrease dose to 500 mg three times weekly after HD

## 2023-04-25 ENCOUNTER — CLINICAL DOCUMENTATION (OUTPATIENT)
Dept: INFECTIOUS DISEASES | Age: 58
End: 2023-04-25

## 2023-04-25 LAB
ALBUMIN SERPL-MCNC: ABNORMAL G/DL
ALP BLD-CCNC: ABNORMAL U/L
ALT SERPL-CCNC: ABNORMAL U/L
ANION GAP SERPL CALCULATED.3IONS-SCNC: ABNORMAL MMOL/L
AST SERPL-CCNC: ABNORMAL U/L
BACTERIA SPEC AEROBE CULT: ABNORMAL
BACTERIA SPEC ANAEROBE CULT: ABNORMAL
BASOPHILS ABSOLUTE: ABNORMAL
BASOPHILS RELATIVE PERCENT: 2 %
BILIRUB SERPL-MCNC: ABNORMAL MG/DL
BUN BLDV-MCNC: 46 MG/DL
CALCIUM SERPL-MCNC: 9.1 MG/DL
CHLORIDE BLD-SCNC: 99 MMOL/L
CO2: ABNORMAL
CREAT SERPL-MCNC: 7.4 MG/DL
EGFR: ABNORMAL
EOSINOPHILS ABSOLUTE: ABNORMAL
EOSINOPHILS RELATIVE PERCENT: 5.2 %
GLUCOSE BLD-MCNC: 163 MG/DL
GRAM STN SPEC: ABNORMAL
HCT VFR BLD CALC: 27.5 % (ref 41–53)
HEMOGLOBIN: 8.6 G/DL (ref 13.5–17.5)
LYMPHOCYTES ABSOLUTE: ABNORMAL
LYMPHOCYTES RELATIVE PERCENT: 17 %
MCH RBC QN AUTO: 29.8 PG
MCHC RBC AUTO-ENTMCNC: 31.4 G/DL
MCV RBC AUTO: 95 FL
MONOCYTES ABSOLUTE: ABNORMAL
MONOCYTES RELATIVE PERCENT: 6.8 %
NEUTROPHILS ABSOLUTE: ABNORMAL
NEUTROPHILS RELATIVE PERCENT: 67 %
ORGANISM: ABNORMAL
PDW BLD-RTO: 15.7 %
PLATELET # BLD: 193 K/ΜL
PMV BLD AUTO: ABNORMAL FL
POTASSIUM SERPL-SCNC: 4.4 MMOL/L
RBC # BLD: 2.91 10^6/ΜL
SODIUM BLD-SCNC: 138 MMOL/L
TOTAL PROTEIN: ABNORMAL
VANCOMYCIN TROUGH: 20.8
WBC # BLD: 9.97 10^3/ML

## 2023-04-25 NOTE — PROGRESS NOTES
On 4/18, HD RN lorena:     1) anaerobic and aerobic body fluid bottles from new PD cath (let fluid dwell for an hour and then pulled it back out and put in bottles)  2) anaerobic and aerobic blood bottles from R IJ  3) Fungal blood bottle from R IJ  4) fungal body fluid bottle from new PD cath     Currently they are NGTD.     On 4/19 [facility] RN collected abdominal swab from L side after taking packing out  Currently NGTD:        Ever Yan, PharmD, [facility] [place], [place] Infectious Disease  Phone: [phone] (also available on [employer])  Fax: [phone]    For Pharmacy Admin Tracking Only    Program: Medical Group  CPA in place: Yes  Time Spent (min): 20

## 2023-04-26 ENCOUNTER — TELEPHONE (OUTPATIENT)
Dept: INFECTIOUS DISEASES | Age: 58
End: 2023-04-26

## 2023-04-26 NOTE — TELEPHONE ENCOUNTER
Spoke with Farooq Quintanilla at [facility] requesting weekly labs, states she will have nurse call back

## 2023-04-28 ENCOUNTER — TELEPHONE (OUTPATIENT)
Dept: INFECTIOUS DISEASES | Age: 58
End: 2023-04-28

## 2023-04-28 DIAGNOSIS — T85.71XD: ICD-10-CM

## 2023-04-28 NOTE — RESULT ENCOUNTER NOTE
Called pt- he is doing well. Continue same vanc dose of 500 mg three times weekly. Check status of repeat cultures- they should still be negative.

## 2023-04-28 NOTE — TELEPHONE ENCOUNTER
Spoke with nurse Anthony Gurrola at [facility] to inquire on final cx results from PD fluid and BC, she states they have not received this but she will look into it and fax when available.  Office contact info provided

## 2023-04-28 NOTE — TELEPHONE ENCOUNTER
Spoke with Yue at [facility] [employer], second attempt to obtain weekly labs. She is unsure if they were drawn and will fax if available.

## 2023-05-01 LAB
FUNGUS SPEC CULT: ABNORMAL
FUNGUS SPEC CULT: ABNORMAL
LOEFFLER MB STN SPEC: ABNORMAL
ORGANISM: ABNORMAL

## 2023-05-02 LAB
Lab: NORMAL
REPORT: NORMAL
THIS TEST SENT TO: NORMAL
VANCOMYCIN TROUGH: 12.2

## 2023-05-03 LAB
ACID FAST STN SPEC QL: ABNORMAL
BACTERIA SPEC AEROBE CULT: ABNORMAL
BACTERIA SPEC ANAEROBE CULT: ABNORMAL
GRAM STN SPEC: ABNORMAL
MYCOBACTERIUM SPEC CULT: ABNORMAL
ORGANISM: ABNORMAL
ORGANISM: ABNORMAL

## 2023-05-04 ENCOUNTER — TELEPHONE (OUTPATIENT)
Dept: INFECTIOUS DISEASES | Age: 58
End: 2023-05-04

## 2023-05-04 NOTE — TELEPHONE ENCOUNTER
Spoke with Eden Awan at [facility] requesting weekly vanc trough results.  States she will fax now

## 2023-05-05 ENCOUNTER — TELEPHONE (OUTPATIENT)
Dept: INFECTIOUS DISEASES | Age: 58
End: 2023-05-05

## 2023-05-05 DIAGNOSIS — T85.71XD: ICD-10-CM

## 2023-05-05 NOTE — TELEPHONE ENCOUNTER
Second attempt call to [facility] to obtain vanc trough results, spoke with Ashanti Reese.  She states will fax now

## 2023-05-15 LAB
FUNGUS SPEC CULT: ABNORMAL
LOEFFLER MB STN SPEC: ABNORMAL
ORGANISM: ABNORMAL

## 2023-05-19 ENCOUNTER — HOSPITAL ENCOUNTER (INPATIENT)
Age: 58
LOS: 18 days | Discharge: NURSING, REHAB OR LONG-TERM CARE | DRG: 003 | End: 2023-06-06
Attending: EMERGENCY MEDICINE | Admitting: INTERNAL MEDICINE
Payer: COMMERCIAL

## 2023-05-19 ENCOUNTER — APPOINTMENT (OUTPATIENT)
Dept: GENERAL RADIOLOGY | Age: 58
DRG: 003 | End: 2023-05-19
Payer: COMMERCIAL

## 2023-05-19 ENCOUNTER — APPOINTMENT (OUTPATIENT)
Dept: CT IMAGING | Age: 58
DRG: 003 | End: 2023-05-19
Payer: COMMERCIAL

## 2023-05-19 DIAGNOSIS — R41.82 ALTERED MENTAL STATUS, UNSPECIFIED ALTERED MENTAL STATUS TYPE: ICD-10-CM

## 2023-05-19 DIAGNOSIS — G93.40 ACUTE ENCEPHALOPATHY: ICD-10-CM

## 2023-05-19 DIAGNOSIS — T85.71XA INFECTION ASSOCIATED WITH PERITONEAL DIALYSIS CATHETER, INITIAL ENCOUNTER (HCC): ICD-10-CM

## 2023-05-19 DIAGNOSIS — A41.9 SEPTICEMIA (HCC): ICD-10-CM

## 2023-05-19 DIAGNOSIS — J18.9 PNEUMONIA OF LEFT LUNG DUE TO INFECTIOUS ORGANISM, UNSPECIFIED PART OF LUNG: ICD-10-CM

## 2023-05-19 DIAGNOSIS — N18.6 END STAGE RENAL DISEASE (HCC): ICD-10-CM

## 2023-05-19 DIAGNOSIS — T85.611A PERITONEAL DIALYSIS CATHETER DYSFUNCTION, INITIAL ENCOUNTER (HCC): ICD-10-CM

## 2023-05-19 DIAGNOSIS — I31.39 PERICARDIAL EFFUSION: ICD-10-CM

## 2023-05-19 DIAGNOSIS — K65.2 SPONTANEOUS BACTERIAL PERITONITIS (HCC): Primary | ICD-10-CM

## 2023-05-19 PROBLEM — K65.9 PERITONITIS DUE TO INFECTED PERITONEAL DIALYSIS CATHETER (HCC): Status: ACTIVE | Noted: 2023-05-19

## 2023-05-19 PROBLEM — R50.9 FEVER AND CHILLS: Status: ACTIVE | Noted: 2023-05-19

## 2023-05-19 PROBLEM — A31.8: Status: ACTIVE | Noted: 2023-05-19

## 2023-05-19 LAB
ALBUMIN SERPL-MCNC: 3.4 G/DL (ref 3.4–5)
ALBUMIN/GLOB SERPL: 0.8 {RATIO} (ref 1.1–2.2)
ALP SERPL-CCNC: 66 U/L (ref 40–129)
ALT SERPL-CCNC: <5 U/L (ref 10–40)
ANION GAP SERPL CALCULATED.3IONS-SCNC: 15 MMOL/L (ref 3–16)
AST SERPL-CCNC: 17 U/L (ref 15–37)
BASE EXCESS BLDV CALC-SCNC: 2.2 MMOL/L
BASOPHILS # BLD: 0 K/UL (ref 0–0.2)
BASOPHILS NFR BLD: 0.3 %
BILIRUB SERPL-MCNC: 0.3 MG/DL (ref 0–1)
BUN SERPL-MCNC: 29 MG/DL (ref 7–20)
CALCIUM SERPL-MCNC: 8.7 MG/DL (ref 8.3–10.6)
CHLORIDE SERPL-SCNC: 101 MMOL/L (ref 99–110)
CO2 BLDV-SCNC: 28 MMOL/L
CO2 SERPL-SCNC: 23 MMOL/L (ref 21–32)
COHGB MFR BLDV: 2.4 %
CREAT SERPL-MCNC: 6.6 MG/DL (ref 0.9–1.3)
DEPRECATED RDW RBC AUTO: 16.2 % (ref 12.4–15.4)
EKG ATRIAL RATE: 116 BPM
EKG DIAGNOSIS: NORMAL
EKG P AXIS: 32 DEGREES
EKG P-R INTERVAL: 148 MS
EKG Q-T INTERVAL: 336 MS
EKG QRS DURATION: 88 MS
EKG QTC CALCULATION (BAZETT): 467 MS
EKG R AXIS: -17 DEGREES
EKG T AXIS: 110 DEGREES
EKG VENTRICULAR RATE: 116 BPM
EOSINOPHIL # BLD: 0 K/UL (ref 0–0.6)
EOSINOPHIL NFR BLD: 0.1 %
GFR SERPLBLD CREATININE-BSD FMLA CKD-EPI: 9 ML/MIN/{1.73_M2}
GLUCOSE BLD-MCNC: 115 MG/DL (ref 70–99)
GLUCOSE BLD-MCNC: 157 MG/DL (ref 70–99)
GLUCOSE SERPL-MCNC: 108 MG/DL (ref 70–99)
HCO3 BLDV-SCNC: 27 MMOL/L (ref 23–29)
HCT VFR BLD AUTO: 32.5 % (ref 40.5–52.5)
HGB BLD-MCNC: 10.5 G/DL (ref 13.5–17.5)
INR PPP: 1.02 (ref 0.84–1.16)
LACTATE BLDV-SCNC: 1.7 MMOL/L (ref 0.4–1.9)
LACTATE BLDV-SCNC: 1.9 MMOL/L (ref 0.4–1.9)
LIPASE SERPL-CCNC: 15 U/L (ref 13–60)
LYMPHOCYTES # BLD: 0.1 K/UL (ref 1–5.1)
LYMPHOCYTES NFR BLD: 1.3 %
MAGNESIUM SERPL-MCNC: 1.7 MG/DL (ref 1.8–2.4)
MCH RBC QN AUTO: 30.1 PG (ref 26–34)
MCHC RBC AUTO-ENTMCNC: 32.3 G/DL (ref 31–36)
MCV RBC AUTO: 93 FL (ref 80–100)
METHGB MFR BLDV: 1 %
MONOCYTES # BLD: 0.1 K/UL (ref 0–1.3)
MONOCYTES NFR BLD: 1 %
NEUTROPHILS # BLD: 7.4 K/UL (ref 1.7–7.7)
NEUTROPHILS NFR BLD: 97.3 %
O2 THERAPY: NORMAL
PCO2 BLDV: 40 MMHG (ref 40–50)
PERFORMED ON: ABNORMAL
PERFORMED ON: ABNORMAL
PH BLDV: 7.43 [PH] (ref 7.35–7.45)
PLATELET # BLD AUTO: 176 K/UL (ref 135–450)
PMV BLD AUTO: 9.2 FL (ref 5–10.5)
PO2 BLDV: 39 MMHG
POTASSIUM SERPL-SCNC: 3.1 MMOL/L (ref 3.5–5.1)
PROT SERPL-MCNC: 7.6 G/DL (ref 6.4–8.2)
PROTHROMBIN TIME: 13.4 SEC (ref 11.5–14.8)
RBC # BLD AUTO: 3.5 M/UL (ref 4.2–5.9)
SAO2 % BLDV: 73 %
SODIUM SERPL-SCNC: 139 MMOL/L (ref 136–145)
TROPONIN, HIGH SENSITIVITY: 220 NG/L (ref 0–22)
TROPONIN, HIGH SENSITIVITY: 225 NG/L (ref 0–22)
WBC # BLD AUTO: 7.6 K/UL (ref 4–11)

## 2023-05-19 PROCEDURE — 82803 BLOOD GASES ANY COMBINATION: CPT

## 2023-05-19 PROCEDURE — 99223 1ST HOSP IP/OBS HIGH 75: CPT | Performed by: INTERNAL MEDICINE

## 2023-05-19 PROCEDURE — 6360000004 HC RX CONTRAST MEDICATION: Performed by: EMERGENCY MEDICINE

## 2023-05-19 PROCEDURE — 80053 COMPREHEN METABOLIC PANEL: CPT

## 2023-05-19 PROCEDURE — 83605 ASSAY OF LACTIC ACID: CPT

## 2023-05-19 PROCEDURE — 93010 ELECTROCARDIOGRAM REPORT: CPT | Performed by: INTERNAL MEDICINE

## 2023-05-19 PROCEDURE — 2580000003 HC RX 258: Performed by: INTERNAL MEDICINE

## 2023-05-19 PROCEDURE — 96365 THER/PROPH/DIAG IV INF INIT: CPT

## 2023-05-19 PROCEDURE — 99285 EMERGENCY DEPT VISIT HI MDM: CPT

## 2023-05-19 PROCEDURE — 6360000002 HC RX W HCPCS: Performed by: INTERNAL MEDICINE

## 2023-05-19 PROCEDURE — 6370000000 HC RX 637 (ALT 250 FOR IP): Performed by: INTERNAL MEDICINE

## 2023-05-19 PROCEDURE — 6360000002 HC RX W HCPCS: Performed by: EMERGENCY MEDICINE

## 2023-05-19 PROCEDURE — 36415 COLL VENOUS BLD VENIPUNCTURE: CPT

## 2023-05-19 PROCEDURE — 83690 ASSAY OF LIPASE: CPT

## 2023-05-19 PROCEDURE — A4722 DIALYS SOL FLD VOL > 1999CC: HCPCS | Performed by: INTERNAL MEDICINE

## 2023-05-19 PROCEDURE — 83735 ASSAY OF MAGNESIUM: CPT

## 2023-05-19 PROCEDURE — 2580000003 HC RX 258: Performed by: EMERGENCY MEDICINE

## 2023-05-19 PROCEDURE — 84484 ASSAY OF TROPONIN QUANT: CPT

## 2023-05-19 PROCEDURE — 70450 CT HEAD/BRAIN W/O DYE: CPT

## 2023-05-19 PROCEDURE — 87040 BLOOD CULTURE FOR BACTERIA: CPT

## 2023-05-19 PROCEDURE — 71045 X-RAY EXAM CHEST 1 VIEW: CPT

## 2023-05-19 PROCEDURE — 2500000003 HC RX 250 WO HCPCS: Performed by: EMERGENCY MEDICINE

## 2023-05-19 PROCEDURE — 96375 TX/PRO/DX INJ NEW DRUG ADDON: CPT

## 2023-05-19 PROCEDURE — 85025 COMPLETE CBC W/AUTO DIFF WBC: CPT

## 2023-05-19 PROCEDURE — 2060000000 HC ICU INTERMEDIATE R&B

## 2023-05-19 PROCEDURE — 85610 PROTHROMBIN TIME: CPT

## 2023-05-19 PROCEDURE — 74177 CT ABD & PELVIS W/CONTRAST: CPT

## 2023-05-19 PROCEDURE — 93005 ELECTROCARDIOGRAM TRACING: CPT | Performed by: EMERGENCY MEDICINE

## 2023-05-19 RX ORDER — SODIUM CHLORIDE, SODIUM LACTATE, CALCIUM CHLORIDE, MAGNESIUM CHLORIDE AND DEXTROSE 1.5; 538; 448; 18.3; 5.08 G/100ML; MG/100ML; MG/100ML; MG/100ML; MG/100ML
6000 INJECTION, SOLUTION INTRAPERITONEAL NIGHTLY
Status: DISCONTINUED | OUTPATIENT
Start: 2023-05-19 | End: 2023-05-20 | Stop reason: ALTCHOICE

## 2023-05-19 RX ORDER — HYDRALAZINE HYDROCHLORIDE 20 MG/ML
10 INJECTION INTRAMUSCULAR; INTRAVENOUS EVERY 6 HOURS PRN
Status: DISCONTINUED | OUTPATIENT
Start: 2023-05-19 | End: 2023-05-23

## 2023-05-19 RX ORDER — SODIUM BICARBONATE 650 MG/1
650 TABLET ORAL 3 TIMES DAILY
Status: DISCONTINUED | OUTPATIENT
Start: 2023-05-19 | End: 2023-05-23

## 2023-05-19 RX ORDER — SODIUM CHLORIDE 0.9 % (FLUSH) 0.9 %
5-40 SYRINGE (ML) INJECTION PRN
Status: DISCONTINUED | OUTPATIENT
Start: 2023-05-19 | End: 2023-06-06 | Stop reason: HOSPADM

## 2023-05-19 RX ORDER — CLONIDINE HYDROCHLORIDE 0.1 MG/1
0.2 TABLET ORAL 3 TIMES DAILY
Status: DISCONTINUED | OUTPATIENT
Start: 2023-05-19 | End: 2023-05-28

## 2023-05-19 RX ORDER — SODIUM CHLORIDE, SODIUM LACTATE, CALCIUM CHLORIDE, MAGNESIUM CHLORIDE AND DEXTROSE 1.5; 538; 448; 18.3; 5.08 G/100ML; MG/100ML; MG/100ML; MG/100ML; MG/100ML
2000 INJECTION, SOLUTION INTRAPERITONEAL NIGHTLY
Status: DISCONTINUED | OUTPATIENT
Start: 2023-05-19 | End: 2023-05-20 | Stop reason: ALTCHOICE

## 2023-05-19 RX ORDER — SODIUM CHLORIDE, SODIUM LACTATE, CALCIUM CHLORIDE, MAGNESIUM CHLORIDE AND DEXTROSE 1.5; 538; 448; 18.3; 5.08 G/100ML; MG/100ML; MG/100ML; MG/100ML; MG/100ML
8000 INJECTION, SOLUTION INTRAPERITONEAL DAILY
Status: DISCONTINUED | OUTPATIENT
Start: 2023-05-19 | End: 2023-05-19 | Stop reason: SDUPTHER

## 2023-05-19 RX ORDER — GENTAMICIN SULFATE 1 MG/G
CREAM TOPICAL DAILY
Status: DISCONTINUED | OUTPATIENT
Start: 2023-05-19 | End: 2023-05-24

## 2023-05-19 RX ORDER — POLYETHYLENE GLYCOL 3350 17 G/17G
17 POWDER, FOR SOLUTION ORAL DAILY PRN
Status: DISCONTINUED | OUTPATIENT
Start: 2023-05-19 | End: 2023-06-06 | Stop reason: HOSPADM

## 2023-05-19 RX ORDER — SODIUM CHLORIDE 9 MG/ML
INJECTION, SOLUTION INTRAVENOUS PRN
Status: DISCONTINUED | OUTPATIENT
Start: 2023-05-19 | End: 2023-06-06 | Stop reason: HOSPADM

## 2023-05-19 RX ORDER — DEXTROSE MONOHYDRATE 100 MG/ML
INJECTION, SOLUTION INTRAVENOUS CONTINUOUS PRN
Status: DISCONTINUED | OUTPATIENT
Start: 2023-05-19 | End: 2023-06-06 | Stop reason: HOSPADM

## 2023-05-19 RX ORDER — LINEZOLID 2 MG/ML
600 INJECTION, SOLUTION INTRAVENOUS EVERY 12 HOURS
Status: DISCONTINUED | OUTPATIENT
Start: 2023-05-19 | End: 2023-05-24

## 2023-05-19 RX ORDER — ONDANSETRON 4 MG/1
4 TABLET, ORALLY DISINTEGRATING ORAL EVERY 8 HOURS PRN
Status: DISCONTINUED | OUTPATIENT
Start: 2023-05-19 | End: 2023-06-06 | Stop reason: HOSPADM

## 2023-05-19 RX ORDER — LABETALOL HYDROCHLORIDE 5 MG/ML
20 INJECTION, SOLUTION INTRAVENOUS ONCE
Status: COMPLETED | OUTPATIENT
Start: 2023-05-19 | End: 2023-05-19

## 2023-05-19 RX ORDER — ACETAMINOPHEN 650 MG/1
650 SUPPOSITORY RECTAL EVERY 6 HOURS PRN
Status: DISCONTINUED | OUTPATIENT
Start: 2023-05-19 | End: 2023-06-06 | Stop reason: HOSPADM

## 2023-05-19 RX ORDER — ATORVASTATIN CALCIUM 40 MG/1
40 TABLET, FILM COATED ORAL NIGHTLY
Status: DISCONTINUED | OUTPATIENT
Start: 2023-05-19 | End: 2023-06-06 | Stop reason: HOSPADM

## 2023-05-19 RX ORDER — ACETAMINOPHEN 325 MG/1
650 TABLET ORAL EVERY 6 HOURS PRN
Status: DISCONTINUED | OUTPATIENT
Start: 2023-05-19 | End: 2023-06-06 | Stop reason: HOSPADM

## 2023-05-19 RX ORDER — ACETAMINOPHEN 500 MG
1000 TABLET ORAL EVERY 12 HOURS PRN
COMMUNITY

## 2023-05-19 RX ORDER — CIPROFLOXACIN 2 MG/ML
400 INJECTION, SOLUTION INTRAVENOUS EVERY 24 HOURS
Status: DISCONTINUED | OUTPATIENT
Start: 2023-05-19 | End: 2023-05-20

## 2023-05-19 RX ORDER — HYDRALAZINE HYDROCHLORIDE 50 MG/1
100 TABLET, FILM COATED ORAL 3 TIMES DAILY
Status: DISCONTINUED | OUTPATIENT
Start: 2023-05-19 | End: 2023-06-06 | Stop reason: HOSPADM

## 2023-05-19 RX ORDER — CALCITRIOL 0.25 UG/1
0.5 CAPSULE, LIQUID FILLED ORAL DAILY
Status: DISCONTINUED | OUTPATIENT
Start: 2023-05-19 | End: 2023-05-23

## 2023-05-19 RX ORDER — HEPARIN SODIUM 5000 [USP'U]/ML
5000 INJECTION, SOLUTION INTRAVENOUS; SUBCUTANEOUS EVERY 8 HOURS SCHEDULED
Status: DISCONTINUED | OUTPATIENT
Start: 2023-05-19 | End: 2023-05-20

## 2023-05-19 RX ORDER — ISOSORBIDE MONONITRATE 60 MG/1
60 TABLET, EXTENDED RELEASE ORAL DAILY
Status: DISCONTINUED | OUTPATIENT
Start: 2023-05-19 | End: 2023-05-31

## 2023-05-19 RX ORDER — INSULIN GLARGINE 100 [IU]/ML
10 INJECTION, SOLUTION SUBCUTANEOUS NIGHTLY
Status: DISCONTINUED | OUTPATIENT
Start: 2023-05-19 | End: 2023-05-29

## 2023-05-19 RX ORDER — ASPIRIN 81 MG/1
81 TABLET ORAL DAILY
Status: DISCONTINUED | OUTPATIENT
Start: 2023-05-19 | End: 2023-06-06 | Stop reason: HOSPADM

## 2023-05-19 RX ORDER — INSULIN LISPRO 100 [IU]/ML
0-4 INJECTION, SOLUTION INTRAVENOUS; SUBCUTANEOUS EVERY 6 HOURS
Status: DISCONTINUED | OUTPATIENT
Start: 2023-05-20 | End: 2023-05-20

## 2023-05-19 RX ORDER — SEVELAMER CARBONATE 800 MG/1
800 TABLET, FILM COATED ORAL
Status: DISCONTINUED | OUTPATIENT
Start: 2023-05-19 | End: 2023-05-23

## 2023-05-19 RX ORDER — TORSEMIDE 20 MG/1
20 TABLET ORAL DAILY
Status: DISCONTINUED | OUTPATIENT
Start: 2023-05-19 | End: 2023-05-23

## 2023-05-19 RX ORDER — ONDANSETRON 2 MG/ML
4 INJECTION INTRAMUSCULAR; INTRAVENOUS EVERY 6 HOURS PRN
Status: DISCONTINUED | OUTPATIENT
Start: 2023-05-19 | End: 2023-06-06 | Stop reason: HOSPADM

## 2023-05-19 RX ORDER — INSULIN LISPRO 100 [IU]/ML
0-4 INJECTION, SOLUTION INTRAVENOUS; SUBCUTANEOUS NIGHTLY
Status: DISCONTINUED | OUTPATIENT
Start: 2023-05-20 | End: 2023-05-20

## 2023-05-19 RX ORDER — SODIUM CHLORIDE 0.9 % (FLUSH) 0.9 %
5-40 SYRINGE (ML) INJECTION EVERY 12 HOURS SCHEDULED
Status: DISCONTINUED | OUTPATIENT
Start: 2023-05-19 | End: 2023-06-06 | Stop reason: HOSPADM

## 2023-05-19 RX ORDER — NIFEDIPINE 90 MG/1
90 TABLET, EXTENDED RELEASE ORAL 2 TIMES DAILY
Status: DISCONTINUED | OUTPATIENT
Start: 2023-05-19 | End: 2023-05-28

## 2023-05-19 RX ADMIN — PIPERACILLIN AND TAZOBACTAM 3375 MG: 3; .375 INJECTION, POWDER, LYOPHILIZED, FOR SOLUTION INTRAVENOUS at 13:48

## 2023-05-19 RX ADMIN — HEPARIN SODIUM 5000 UNITS: 5000 INJECTION INTRAVENOUS; SUBCUTANEOUS at 18:48

## 2023-05-19 RX ADMIN — ACETAMINOPHEN 650 MG: 325 TABLET ORAL at 17:40

## 2023-05-19 RX ADMIN — HYDRALAZINE HYDROCHLORIDE 10 MG: 20 INJECTION INTRAMUSCULAR; INTRAVENOUS at 19:42

## 2023-05-19 RX ADMIN — LINEZOLID 600 MG: 600 INJECTION, SOLUTION INTRAVENOUS at 18:56

## 2023-05-19 RX ADMIN — ASPIRIN 81 MG: 81 TABLET, COATED ORAL at 18:47

## 2023-05-19 RX ADMIN — VANCOMYCIN HYDROCHLORIDE 1500 MG: 1.5 INJECTION, POWDER, LYOPHILIZED, FOR SOLUTION INTRAVENOUS at 16:44

## 2023-05-19 RX ADMIN — LABETALOL HYDROCHLORIDE 20 MG: 5 INJECTION, SOLUTION INTRAVENOUS at 13:37

## 2023-05-19 RX ADMIN — MEROPENEM 500 MG: 500 INJECTION, POWDER, FOR SOLUTION INTRAVENOUS at 19:06

## 2023-05-19 RX ADMIN — SODIUM CHLORIDE, PRESERVATIVE FREE 10 ML: 5 INJECTION INTRAVENOUS at 22:23

## 2023-05-19 RX ADMIN — ISOSORBIDE MONONITRATE 60 MG: 60 TABLET, EXTENDED RELEASE ORAL at 18:47

## 2023-05-19 RX ADMIN — SODIUM CHLORIDE, SODIUM LACTATE, CALCIUM CHLORIDE, MAGNESIUM CHLORIDE AND DEXTROSE 6000 ML: 1.5; 538; 448; 18.3; 5.08 INJECTION, SOLUTION INTRAPERITONEAL at 23:46

## 2023-05-19 RX ADMIN — CIPROFLOXACIN 400 MG: 2 INJECTION, SOLUTION INTRAVENOUS at 19:45

## 2023-05-19 RX ADMIN — IOPAMIDOL 75 ML: 755 INJECTION, SOLUTION INTRAVENOUS at 12:55

## 2023-05-19 RX ADMIN — GENTAMICIN SULFATE: 1 CREAM TOPICAL at 22:24

## 2023-05-19 RX ADMIN — SODIUM CHLORIDE, SODIUM LACTATE, CALCIUM CHLORIDE, MAGNESIUM CHLORIDE AND DEXTROSE 2000 ML: 1.5; 538; 448; 18.3; 5.08 INJECTION, SOLUTION INTRAPERITONEAL at 23:47

## 2023-05-19 RX ADMIN — TORSEMIDE 20 MG: 20 TABLET ORAL at 18:47

## 2023-05-19 RX ADMIN — CALCITRIOL 0.5 MCG: 0.25 CAPSULE ORAL at 18:46

## 2023-05-19 RX ADMIN — INSULIN GLARGINE 10 UNITS: 100 INJECTION, SOLUTION SUBCUTANEOUS at 22:22

## 2023-05-19 RX ADMIN — SEVELAMER CARBONATE 800 MG: 800 TABLET, FILM COATED ORAL at 18:46

## 2023-05-19 RX ADMIN — NIFEDIPINE 90 MG: 90 TABLET, EXTENDED RELEASE ORAL at 18:47

## 2023-05-19 ASSESSMENT — PAIN SCALES - WONG BAKER: WONGBAKER_NUMERICALRESPONSE: 0

## 2023-05-19 ASSESSMENT — PAIN - FUNCTIONAL ASSESSMENT: PAIN_FUNCTIONAL_ASSESSMENT: ADULT NONVERBAL PAIN SCALE (NPVS)

## 2023-05-20 ENCOUNTER — APPOINTMENT (OUTPATIENT)
Dept: GENERAL RADIOLOGY | Age: 58
DRG: 003 | End: 2023-05-20
Payer: COMMERCIAL

## 2023-05-20 PROBLEM — I46.9 CARDIAC ARREST (HCC): Status: ACTIVE | Noted: 2023-05-20

## 2023-05-20 LAB
ANION GAP SERPL CALCULATED.3IONS-SCNC: 16 MMOL/L (ref 3–16)
ANION GAP SERPL CALCULATED.3IONS-SCNC: 22 MMOL/L (ref 3–16)
ANION GAP SERPL CALCULATED.3IONS-SCNC: 23 MMOL/L (ref 3–16)
ANION GAP SERPL CALCULATED.3IONS-SCNC: 23 MMOL/L (ref 3–16)
BASE EXCESS BLDA CALC-SCNC: -6.5 MMOL/L (ref -3–3)
BASE EXCESS BLDA CALC-SCNC: -6.6 MMOL/L (ref -3–3)
BASE EXCESS BLDA CALC-SCNC: -7.7 MMOL/L (ref -3–3)
BASOPHILS # BLD: 0.1 K/UL (ref 0–0.2)
BASOPHILS NFR BLD: 0.3 %
BUN SERPL-MCNC: 25 MG/DL (ref 7–20)
BUN SERPL-MCNC: 29 MG/DL (ref 7–20)
BUN SERPL-MCNC: 39 MG/DL (ref 7–20)
BUN SERPL-MCNC: 40 MG/DL (ref 7–20)
CA-I BLD-SCNC: 0.54 MMOL/L (ref 1.12–1.32)
CA-I BLD-SCNC: 0.57 MMOL/L (ref 1.12–1.32)
CA-I BLD-SCNC: 0.95 MMOL/L (ref 1.12–1.32)
CA-I BLD-SCNC: 1.03 MMOL/L (ref 1.12–1.32)
CALCIUM SERPL-MCNC: 4.9 MG/DL (ref 8.3–10.6)
CALCIUM SERPL-MCNC: 8 MG/DL (ref 8.3–10.6)
CALCIUM SERPL-MCNC: 8.3 MG/DL (ref 8.3–10.6)
CALCIUM SERPL-MCNC: 8.4 MG/DL (ref 8.3–10.6)
CHLORIDE SERPL-SCNC: 111 MMOL/L (ref 99–110)
CHLORIDE SERPL-SCNC: 94 MMOL/L (ref 99–110)
CHLORIDE SERPL-SCNC: 97 MMOL/L (ref 99–110)
CHLORIDE SERPL-SCNC: 98 MMOL/L (ref 99–110)
CO2 BLDA-SCNC: 20.1 MMOL/L
CO2 BLDA-SCNC: 20.5 MMOL/L
CO2 BLDA-SCNC: 22 MMOL/L
CO2 SERPL-SCNC: 12 MMOL/L (ref 21–32)
CO2 SERPL-SCNC: 16 MMOL/L (ref 21–32)
CO2 SERPL-SCNC: 18 MMOL/L (ref 21–32)
CO2 SERPL-SCNC: 19 MMOL/L (ref 21–32)
COHGB MFR BLDA: 1.2 % (ref 0–1.5)
COHGB MFR BLDA: 1.3 % (ref 0–1.5)
COHGB MFR BLDA: 1.4 % (ref 0–1.5)
CREAT SERPL-MCNC: 4 MG/DL (ref 0.9–1.3)
CREAT SERPL-MCNC: 5 MG/DL (ref 0.9–1.3)
CREAT SERPL-MCNC: 8.1 MG/DL (ref 0.9–1.3)
CREAT SERPL-MCNC: 8.2 MG/DL (ref 0.9–1.3)
CRP SERPL-MCNC: 175.4 MG/L (ref 0–5.1)
DEPRECATED RDW RBC AUTO: 17.4 % (ref 12.4–15.4)
EOSINOPHIL # BLD: 0 K/UL (ref 0–0.6)
EOSINOPHIL NFR BLD: 0 %
ERYTHROCYTE [SEDIMENTATION RATE] IN BLOOD BY WESTERGREN METHOD: 55 MM/HR (ref 0–20)
GFR SERPLBLD CREATININE-BSD FMLA CKD-EPI: 13 ML/MIN/{1.73_M2}
GFR SERPLBLD CREATININE-BSD FMLA CKD-EPI: 17 ML/MIN/{1.73_M2}
GFR SERPLBLD CREATININE-BSD FMLA CKD-EPI: 7 ML/MIN/{1.73_M2}
GFR SERPLBLD CREATININE-BSD FMLA CKD-EPI: 7 ML/MIN/{1.73_M2}
GLUCOSE BLD-MCNC: 113 MG/DL (ref 70–99)
GLUCOSE BLD-MCNC: 116 MG/DL (ref 70–99)
GLUCOSE BLD-MCNC: 135 MG/DL (ref 70–99)
GLUCOSE BLD-MCNC: 137 MG/DL (ref 70–99)
GLUCOSE BLD-MCNC: 173 MG/DL (ref 70–99)
GLUCOSE BLD-MCNC: 91 MG/DL (ref 70–99)
GLUCOSE BLD-MCNC: 93 MG/DL (ref 70–99)
GLUCOSE BLD-MCNC: 98 MG/DL (ref 70–99)
GLUCOSE SERPL-MCNC: 107 MG/DL (ref 70–99)
GLUCOSE SERPL-MCNC: 114 MG/DL (ref 70–99)
GLUCOSE SERPL-MCNC: 181 MG/DL (ref 70–99)
GLUCOSE SERPL-MCNC: 98 MG/DL (ref 70–99)
HCO3 BLDA-SCNC: 18.8 MMOL/L (ref 21–29)
HCO3 BLDA-SCNC: 19.3 MMOL/L (ref 21–29)
HCO3 BLDA-SCNC: 20.6 MMOL/L (ref 21–29)
HCT VFR BLD AUTO: 35.4 % (ref 40.5–52.5)
HGB BLD-MCNC: 11.1 G/DL (ref 13.5–17.5)
HGB BLDA-MCNC: 10.4 G/DL (ref 13.5–17.5)
HGB BLDA-MCNC: 10.5 G/DL (ref 13.5–17.5)
HGB BLDA-MCNC: 12.5 G/DL (ref 13.5–17.5)
LACTATE BLDV-SCNC: 8.2 MMOL/L (ref 0.4–2)
LYMPHOCYTES # BLD: 0.8 K/UL (ref 1–5.1)
LYMPHOCYTES NFR BLD: 3.8 %
MAGNESIUM SERPL-MCNC: 1.3 MG/DL (ref 1.8–2.4)
MAGNESIUM SERPL-MCNC: 2.2 MG/DL (ref 1.8–2.4)
MAGNESIUM SERPL-MCNC: 2.3 MG/DL (ref 1.8–2.4)
MCH RBC QN AUTO: 31 PG (ref 26–34)
MCHC RBC AUTO-ENTMCNC: 31.3 G/DL (ref 31–36)
MCV RBC AUTO: 99 FL (ref 80–100)
METHGB MFR BLDA: 0.4 %
METHGB MFR BLDA: 0.5 %
METHGB MFR BLDA: 0.6 %
MONOCYTES # BLD: 0.5 K/UL (ref 0–1.3)
MONOCYTES NFR BLD: 2.3 %
NEUTROPHILS # BLD: 18.6 K/UL (ref 1.7–7.7)
NEUTROPHILS NFR BLD: 93.6 %
O2 THERAPY: ABNORMAL
PCO2 BLDA: 39.3 MMHG (ref 35–45)
PCO2 BLDA: 41.3 MMHG (ref 35–45)
PCO2 BLDA: 46.6 MMHG (ref 35–45)
PERFORMED ON: ABNORMAL
PERFORMED ON: NORMAL
PH BLDA: 7.25 [PH] (ref 7.35–7.45)
PH BLDA: 7.27 [PH] (ref 7.35–7.45)
PH BLDA: 7.3 [PH] (ref 7.35–7.45)
PH BLDV: 7.18 [PH] (ref 7.35–7.45)
PH BLDV: 7.19 [PH] (ref 7.35–7.45)
PH BLDV: 7.24 [PH] (ref 7.35–7.45)
PH BLDV: 7.33 [PH] (ref 7.35–7.45)
PHOSPHATE SERPL-MCNC: 10.3 MG/DL (ref 2.5–4.9)
PHOSPHATE SERPL-MCNC: 3.9 MG/DL (ref 2.5–4.9)
PHOSPHATE SERPL-MCNC: 5.5 MG/DL (ref 2.5–4.9)
PLATELET # BLD AUTO: 152 K/UL (ref 135–450)
PMV BLD AUTO: 10.6 FL (ref 5–10.5)
PO2 BLDA: 108 MMHG (ref 75–108)
PO2 BLDA: 129 MMHG (ref 75–108)
PO2 BLDA: 173 MMHG (ref 75–108)
POTASSIUM SERPL-SCNC: 3.2 MMOL/L (ref 3.5–5.1)
POTASSIUM SERPL-SCNC: 4.7 MMOL/L (ref 3.5–5.1)
POTASSIUM SERPL-SCNC: 5 MMOL/L (ref 3.5–5.1)
POTASSIUM SERPL-SCNC: 5.3 MMOL/L (ref 3.5–5.1)
PROCALCITONIN SERPL IA-MCNC: 82.89 NG/ML (ref 0–0.15)
RBC # BLD AUTO: 3.57 M/UL (ref 4.2–5.9)
REASON FOR REJECTION: NORMAL
REJECTED TEST: NORMAL
SAO2 % BLDA: 98.6 %
SAO2 % BLDA: 99.4 %
SAO2 % BLDA: 99.9 %
SODIUM SERPL-SCNC: 135 MMOL/L (ref 136–145)
SODIUM SERPL-SCNC: 136 MMOL/L (ref 136–145)
SODIUM SERPL-SCNC: 139 MMOL/L (ref 136–145)
SODIUM SERPL-SCNC: 139 MMOL/L (ref 136–145)
WBC # BLD AUTO: 19.9 K/UL (ref 4–11)

## 2023-05-20 PROCEDURE — 82330 ASSAY OF CALCIUM: CPT

## 2023-05-20 PROCEDURE — 90945 DIALYSIS ONE EVALUATION: CPT

## 2023-05-20 PROCEDURE — 94002 VENT MGMT INPAT INIT DAY: CPT

## 2023-05-20 PROCEDURE — 6370000000 HC RX 637 (ALT 250 FOR IP): Performed by: INTERNAL MEDICINE

## 2023-05-20 PROCEDURE — 6360000002 HC RX W HCPCS: Performed by: INTERNAL MEDICINE

## 2023-05-20 PROCEDURE — 80048 BASIC METABOLIC PNL TOTAL CA: CPT

## 2023-05-20 PROCEDURE — 99223 1ST HOSP IP/OBS HIGH 75: CPT | Performed by: INTERNAL MEDICINE

## 2023-05-20 PROCEDURE — 6370000000 HC RX 637 (ALT 250 FOR IP): Performed by: NURSE PRACTITIONER

## 2023-05-20 PROCEDURE — 2700000000 HC OXYGEN THERAPY PER DAY

## 2023-05-20 PROCEDURE — 6360000002 HC RX W HCPCS: Performed by: HOSPITALIST

## 2023-05-20 PROCEDURE — 31500 INSERT EMERGENCY AIRWAY: CPT | Performed by: INTERNAL MEDICINE

## 2023-05-20 PROCEDURE — 99292 CRITICAL CARE ADDL 30 MIN: CPT | Performed by: INTERNAL MEDICINE

## 2023-05-20 PROCEDURE — 2580000003 HC RX 258: Performed by: INTERNAL MEDICINE

## 2023-05-20 PROCEDURE — 94761 N-INVAS EAR/PLS OXIMETRY MLT: CPT

## 2023-05-20 PROCEDURE — 5A1955Z RESPIRATORY VENTILATION, GREATER THAN 96 CONSECUTIVE HOURS: ICD-10-PCS | Performed by: INTERNAL MEDICINE

## 2023-05-20 PROCEDURE — 5A12012 PERFORMANCE OF CARDIAC OUTPUT, SINGLE, MANUAL: ICD-10-PCS | Performed by: HOSPITALIST

## 2023-05-20 PROCEDURE — 2580000003 HC RX 258: Performed by: NURSE PRACTITIONER

## 2023-05-20 PROCEDURE — 0D968ZZ DRAINAGE OF STOMACH, VIA NATURAL OR ARTIFICIAL OPENING ENDOSCOPIC: ICD-10-PCS | Performed by: INTERNAL MEDICINE

## 2023-05-20 PROCEDURE — 0B9L8ZZ DRAINAGE OF LEFT LUNG, VIA NATURAL OR ARTIFICIAL OPENING ENDOSCOPIC: ICD-10-PCS | Performed by: INTERNAL MEDICINE

## 2023-05-20 PROCEDURE — 2000000000 HC ICU R&B

## 2023-05-20 PROCEDURE — 0B9F8ZZ DRAINAGE OF RIGHT LOWER LUNG LOBE, VIA NATURAL OR ARTIFICIAL OPENING ENDOSCOPIC: ICD-10-PCS | Performed by: INTERNAL MEDICINE

## 2023-05-20 PROCEDURE — C9113 INJ PANTOPRAZOLE SODIUM, VIA: HCPCS | Performed by: INTERNAL MEDICINE

## 2023-05-20 PROCEDURE — 84100 ASSAY OF PHOSPHORUS: CPT

## 2023-05-20 PROCEDURE — 2500000003 HC RX 250 WO HCPCS: Performed by: NURSE PRACTITIONER

## 2023-05-20 PROCEDURE — 6360000002 HC RX W HCPCS: Performed by: NURSE PRACTITIONER

## 2023-05-20 PROCEDURE — 85025 COMPLETE CBC W/AUTO DIFF WBC: CPT

## 2023-05-20 PROCEDURE — 85652 RBC SED RATE AUTOMATED: CPT

## 2023-05-20 PROCEDURE — 2500000003 HC RX 250 WO HCPCS: Performed by: INTERNAL MEDICINE

## 2023-05-20 PROCEDURE — 83735 ASSAY OF MAGNESIUM: CPT

## 2023-05-20 PROCEDURE — 82803 BLOOD GASES ANY COMBINATION: CPT

## 2023-05-20 PROCEDURE — 83605 ASSAY OF LACTIC ACID: CPT

## 2023-05-20 PROCEDURE — 0D9670Z DRAINAGE OF STOMACH WITH DRAINAGE DEVICE, VIA NATURAL OR ARTIFICIAL OPENING: ICD-10-PCS | Performed by: INTERNAL MEDICINE

## 2023-05-20 PROCEDURE — 03HY32Z INSERTION OF MONITORING DEVICE INTO UPPER ARTERY, PERCUTANEOUS APPROACH: ICD-10-PCS | Performed by: INTERNAL MEDICINE

## 2023-05-20 PROCEDURE — 2580000003 HC RX 258: Performed by: STUDENT IN AN ORGANIZED HEALTH CARE EDUCATION/TRAINING PROGRAM

## 2023-05-20 PROCEDURE — 37799 UNLISTED PX VASCULAR SURGERY: CPT

## 2023-05-20 PROCEDURE — 71045 X-RAY EXAM CHEST 1 VIEW: CPT

## 2023-05-20 PROCEDURE — 74018 RADEX ABDOMEN 1 VIEW: CPT

## 2023-05-20 PROCEDURE — 31622 DX BRONCHOSCOPE/WASH: CPT | Performed by: INTERNAL MEDICINE

## 2023-05-20 PROCEDURE — 36415 COLL VENOUS BLD VENIPUNCTURE: CPT

## 2023-05-20 PROCEDURE — 99291 CRITICAL CARE FIRST HOUR: CPT | Performed by: INTERNAL MEDICINE

## 2023-05-20 PROCEDURE — 84145 PROCALCITONIN (PCT): CPT

## 2023-05-20 PROCEDURE — 2580000003 HC RX 258: Performed by: HOSPITALIST

## 2023-05-20 PROCEDURE — 86140 C-REACTIVE PROTEIN: CPT

## 2023-05-20 PROCEDURE — 06HY33Z INSERTION OF INFUSION DEVICE INTO LOWER VEIN, PERCUTANEOUS APPROACH: ICD-10-PCS | Performed by: INTERNAL MEDICINE

## 2023-05-20 RX ORDER — CIPROFLOXACIN 2 MG/ML
400 INJECTION, SOLUTION INTRAVENOUS EVERY 12 HOURS
Status: DISCONTINUED | OUTPATIENT
Start: 2023-05-20 | End: 2023-05-23

## 2023-05-20 RX ORDER — ANTICOAGULANT CITRATE DEXTROSE SOLUTION FORMULA A 12.25; 11; 3.65 G/500ML; G/500ML; G/500ML
SOLUTION INTRAVENOUS CONTINUOUS
Status: DISCONTINUED | OUTPATIENT
Start: 2023-05-20 | End: 2023-05-21

## 2023-05-20 RX ORDER — CALCIUM GLUCONATE 20 MG/ML
1000 INJECTION, SOLUTION INTRAVENOUS PRN
Status: DISCONTINUED | OUTPATIENT
Start: 2023-05-20 | End: 2023-05-24

## 2023-05-20 RX ORDER — NOREPINEPHRINE BITARTRATE 0.06 MG/ML
1-100 INJECTION, SOLUTION INTRAVENOUS CONTINUOUS
Status: DISCONTINUED | OUTPATIENT
Start: 2023-05-20 | End: 2023-05-22

## 2023-05-20 RX ORDER — MAGNESIUM SULFATE 1 G/100ML
1000 INJECTION INTRAVENOUS PRN
Status: DISCONTINUED | OUTPATIENT
Start: 2023-05-20 | End: 2023-05-22

## 2023-05-20 RX ORDER — CHLORHEXIDINE GLUCONATE 0.12 MG/ML
15 RINSE ORAL 2 TIMES DAILY
Status: DISCONTINUED | OUTPATIENT
Start: 2023-05-20 | End: 2023-06-06 | Stop reason: HOSPADM

## 2023-05-20 RX ORDER — FENTANYL CITRATE-0.9 % NACL/PF 10 MCG/ML
25-300 PLASTIC BAG, INJECTION (ML) INTRAVENOUS CONTINUOUS
Status: DISCONTINUED | OUTPATIENT
Start: 2023-05-20 | End: 2023-05-22

## 2023-05-20 RX ORDER — CALCIUM GLUCONATE 20 MG/ML
2000 INJECTION, SOLUTION INTRAVENOUS
Status: COMPLETED | OUTPATIENT
Start: 2023-05-20 | End: 2023-05-20

## 2023-05-20 RX ORDER — 0.9 % SODIUM CHLORIDE 0.9 %
500 INTRAVENOUS SOLUTION INTRAVENOUS ONCE
Status: COMPLETED | OUTPATIENT
Start: 2023-05-20 | End: 2023-05-20

## 2023-05-20 RX ORDER — POTASSIUM CHLORIDE 29.8 MG/ML
20 INJECTION INTRAVENOUS PRN
Status: DISCONTINUED | OUTPATIENT
Start: 2023-05-20 | End: 2023-05-22

## 2023-05-20 RX ORDER — CALCIUM GLUCONATE 20 MG/ML
2000 INJECTION, SOLUTION INTRAVENOUS PRN
Status: DISCONTINUED | OUTPATIENT
Start: 2023-05-20 | End: 2023-05-24

## 2023-05-20 RX ORDER — INSULIN LISPRO 100 [IU]/ML
0-4 INJECTION, SOLUTION INTRAVENOUS; SUBCUTANEOUS EVERY 4 HOURS
Status: DISCONTINUED | OUTPATIENT
Start: 2023-05-20 | End: 2023-05-28

## 2023-05-20 RX ORDER — NOREPINEPHRINE BITARTRATE 0.06 MG/ML
INJECTION, SOLUTION INTRAVENOUS
Status: DISPENSED
Start: 2023-05-20 | End: 2023-05-20

## 2023-05-20 RX ADMIN — CHLORHEXIDINE GLUCONATE 0.12% ORAL RINSE 15 ML: 1.2 LIQUID ORAL at 13:42

## 2023-05-20 RX ADMIN — ANTICOAGULANT CITRATE DEXTROSE SOLUTION FORMULA A: 12.25; 11; 3.65 SOLUTION INTRAVENOUS at 23:07

## 2023-05-20 RX ADMIN — CALCIUM GLUCONATE 2000 MG: 20 INJECTION, SOLUTION INTRAVENOUS at 18:52

## 2023-05-20 RX ADMIN — METOPROLOL TARTRATE 75 MG: 25 TABLET, FILM COATED ORAL at 21:10

## 2023-05-20 RX ADMIN — ANTICOAGULANT CITRATE DEXTROSE SOLUTION FORMULA A: 12.25; 11; 3.65 SOLUTION INTRAVENOUS at 17:45

## 2023-05-20 RX ADMIN — Medication: at 22:30

## 2023-05-20 RX ADMIN — MEROPENEM 1000 MG: 1 INJECTION, POWDER, FOR SOLUTION INTRAVENOUS at 14:09

## 2023-05-20 RX ADMIN — MAGNESIUM SULFATE HEPTAHYDRATE 1000 MG: 1 INJECTION, SOLUTION INTRAVENOUS at 21:24

## 2023-05-20 RX ADMIN — Medication: at 16:11

## 2023-05-20 RX ADMIN — ANTICOAGULANT CITRATE DEXTROSE SOLUTION FORMULA A: 12.25; 11; 3.65 SOLUTION INTRAVENOUS at 12:13

## 2023-05-20 RX ADMIN — VASOPRESSIN 0.04 UNITS/MIN: 20 INJECTION INTRAVENOUS at 17:36

## 2023-05-20 RX ADMIN — CALCIUM GLUCONATE 2000 MG: 20 INJECTION, SOLUTION INTRAVENOUS at 21:22

## 2023-05-20 RX ADMIN — HYDROCORTISONE SODIUM SUCCINATE 100 MG: 100 INJECTION, POWDER, FOR SOLUTION INTRAMUSCULAR; INTRAVENOUS at 18:57

## 2023-05-20 RX ADMIN — VASOPRESSIN 0.04 UNITS/MIN: 20 INJECTION INTRAVENOUS at 10:03

## 2023-05-20 RX ADMIN — VASOPRESSIN 0.04 UNITS/MIN: 20 INJECTION INTRAVENOUS at 11:16

## 2023-05-20 RX ADMIN — Medication: at 12:14

## 2023-05-20 RX ADMIN — ACETAMINOPHEN 650 MG: 650 SUPPOSITORY RECTAL at 03:39

## 2023-05-20 RX ADMIN — PANTOPRAZOLE SODIUM 8 MG/HR: 40 INJECTION, POWDER, FOR SOLUTION INTRAVENOUS at 12:06

## 2023-05-20 RX ADMIN — ATORVASTATIN CALCIUM 40 MG: 40 TABLET, FILM COATED ORAL at 21:20

## 2023-05-20 RX ADMIN — CALCIUM GLUCONATE 1000 MG: 20 INJECTION, SOLUTION INTRAVENOUS at 17:26

## 2023-05-20 RX ADMIN — Medication 50 MCG/MIN: at 11:17

## 2023-05-20 RX ADMIN — MAGNESIUM SULFATE HEPTAHYDRATE 1000 MG: 1 INJECTION, SOLUTION INTRAVENOUS at 20:22

## 2023-05-20 RX ADMIN — MAGNESIUM SULFATE HEPTAHYDRATE 1000 MG: 1 INJECTION, SOLUTION INTRAVENOUS at 22:44

## 2023-05-20 RX ADMIN — Medication 35 MCG/MIN: at 15:48

## 2023-05-20 RX ADMIN — SODIUM CHLORIDE 100 MG: 9 INJECTION, SOLUTION INTRAVENOUS at 15:53

## 2023-05-20 RX ADMIN — PANTOPRAZOLE SODIUM 8 MG/HR: 40 INJECTION, POWDER, FOR SOLUTION INTRAVENOUS at 22:19

## 2023-05-20 RX ADMIN — CHLORHEXIDINE GLUCONATE 0.12% ORAL RINSE 15 ML: 1.2 LIQUID ORAL at 20:08

## 2023-05-20 RX ADMIN — HEPARIN SODIUM 5000 UNITS: 5000 INJECTION INTRAVENOUS; SUBCUTANEOUS at 05:38

## 2023-05-20 RX ADMIN — SODIUM CHLORIDE, PRESERVATIVE FREE 10 ML: 5 INJECTION INTRAVENOUS at 13:36

## 2023-05-20 RX ADMIN — PANTOPRAZOLE SODIUM 80 MG: 40 INJECTION, POWDER, FOR SOLUTION INTRAVENOUS at 11:16

## 2023-05-20 RX ADMIN — Medication: at 19:19

## 2023-05-20 RX ADMIN — LINEZOLID 600 MG: 600 INJECTION, SOLUTION INTRAVENOUS at 19:09

## 2023-05-20 RX ADMIN — SODIUM CHLORIDE, PRESERVATIVE FREE 10 ML: 5 INJECTION INTRAVENOUS at 22:22

## 2023-05-20 RX ADMIN — Medication: at 12:13

## 2023-05-20 RX ADMIN — CIPROFLOXACIN 400 MG: 2 INJECTION, SOLUTION INTRAVENOUS at 13:22

## 2023-05-20 RX ADMIN — LINEZOLID 600 MG: 600 INJECTION, SOLUTION INTRAVENOUS at 05:38

## 2023-05-20 RX ADMIN — HYDROCORTISONE SODIUM SUCCINATE 100 MG: 100 INJECTION, POWDER, FOR SOLUTION INTRAMUSCULAR; INTRAVENOUS at 13:41

## 2023-05-20 RX ADMIN — CALCIUM CHLORIDE INJECTION 8000 MG: 100 INJECTION, SOLUTION INTRAVENOUS at 12:15

## 2023-05-20 RX ADMIN — SODIUM CHLORIDE 500 ML: 9 INJECTION, SOLUTION INTRAVENOUS at 03:27

## 2023-05-20 ASSESSMENT — PULMONARY FUNCTION TESTS
PIF_VALUE: 33
PIF_VALUE: 34
PIF_VALUE: 18
PIF_VALUE: 27
PIF_VALUE: 26
PIF_VALUE: 28
PIF_VALUE: 26
PIF_VALUE: 31
PIF_VALUE: 29
PIF_VALUE: 26
PIF_VALUE: 28
PIF_VALUE: 26
PIF_VALUE: 24
PIF_VALUE: 18
PIF_VALUE: 29
PIF_VALUE: 28
PIF_VALUE: 29
PIF_VALUE: 29
PIF_VALUE: 27
PIF_VALUE: 32
PIF_VALUE: 26
PIF_VALUE: 32
PIF_VALUE: 31
PIF_VALUE: 28
PIF_VALUE: 28
PIF_VALUE: 26
PIF_VALUE: 32
PIF_VALUE: 25
PIF_VALUE: 18
PIF_VALUE: 31
PIF_VALUE: 28
PIF_VALUE: 31
PIF_VALUE: 29
PIF_VALUE: 36

## 2023-05-20 ASSESSMENT — PAIN SCALES - WONG BAKER: WONGBAKER_NUMERICALRESPONSE: 0

## 2023-05-21 ENCOUNTER — APPOINTMENT (OUTPATIENT)
Dept: GENERAL RADIOLOGY | Age: 58
DRG: 003 | End: 2023-05-21
Payer: COMMERCIAL

## 2023-05-21 ENCOUNTER — APPOINTMENT (OUTPATIENT)
Dept: CT IMAGING | Age: 58
DRG: 003 | End: 2023-05-21
Payer: COMMERCIAL

## 2023-05-21 LAB
ANION GAP SERPL CALCULATED.3IONS-SCNC: 10 MMOL/L (ref 3–16)
ANION GAP SERPL CALCULATED.3IONS-SCNC: 12 MMOL/L (ref 3–16)
ANION GAP SERPL CALCULATED.3IONS-SCNC: 18 MMOL/L (ref 3–16)
ANISOCYTOSIS BLD QL SMEAR: ABNORMAL
BASE EXCESS BLDA CALC-SCNC: -2.4 MMOL/L (ref -3–3)
BASOPHILS # BLD: 0 K/UL (ref 0–0.2)
BASOPHILS NFR BLD: 0 %
BUN SERPL-MCNC: 16 MG/DL (ref 7–20)
BUN SERPL-MCNC: 21 MG/DL (ref 7–20)
BUN SERPL-MCNC: 28 MG/DL (ref 7–20)
CA-I BLD-SCNC: 0.52 MMOL/L (ref 1.12–1.32)
CA-I BLD-SCNC: 0.95 MMOL/L (ref 1.12–1.32)
CA-I BLD-SCNC: 0.97 MMOL/L (ref 1.12–1.32)
CA-I BLD-SCNC: 1.1 MMOL/L (ref 1.12–1.32)
CA-I BLD-SCNC: <0.5 MMOL/L (ref 1.12–1.32)
CALCIUM SERPL-MCNC: 4.8 MG/DL (ref 8.3–10.6)
CALCIUM SERPL-MCNC: 6.1 MG/DL (ref 8.3–10.6)
CALCIUM SERPL-MCNC: 8 MG/DL (ref 8.3–10.6)
CALCIUM SERPL-MCNC: 8.1 MG/DL (ref 8.3–10.6)
CHLORIDE SERPL-SCNC: 113 MMOL/L (ref 99–110)
CHLORIDE SERPL-SCNC: 96 MMOL/L (ref 99–110)
CHLORIDE SERPL-SCNC: 97 MMOL/L (ref 99–110)
CO2 BLDA-SCNC: 24.9 MMOL/L
CO2 SERPL-SCNC: 16 MMOL/L (ref 21–32)
CO2 SERPL-SCNC: 24 MMOL/L (ref 21–32)
CO2 SERPL-SCNC: 25 MMOL/L (ref 21–32)
COHGB MFR BLDA: 1.2 % (ref 0–1.5)
CREAT SERPL-MCNC: 2.1 MG/DL (ref 0.9–1.3)
CREAT SERPL-MCNC: 2.8 MG/DL (ref 0.9–1.3)
CREAT SERPL-MCNC: 4.1 MG/DL (ref 0.9–1.3)
DEPRECATED RDW RBC AUTO: 16.9 % (ref 12.4–15.4)
EOSINOPHIL # BLD: 0 K/UL (ref 0–0.6)
EOSINOPHIL NFR BLD: 0 %
GFR SERPLBLD CREATININE-BSD FMLA CKD-EPI: 16 ML/MIN/{1.73_M2}
GFR SERPLBLD CREATININE-BSD FMLA CKD-EPI: 25 ML/MIN/{1.73_M2}
GFR SERPLBLD CREATININE-BSD FMLA CKD-EPI: 36 ML/MIN/{1.73_M2}
GLUCOSE BLD-MCNC: 135 MG/DL (ref 70–99)
GLUCOSE BLD-MCNC: 147 MG/DL (ref 70–99)
GLUCOSE BLD-MCNC: 148 MG/DL (ref 70–99)
GLUCOSE BLD-MCNC: 149 MG/DL (ref 70–99)
GLUCOSE BLD-MCNC: 156 MG/DL (ref 70–99)
GLUCOSE BLD-MCNC: 158 MG/DL (ref 70–99)
GLUCOSE BLD-MCNC: 163 MG/DL (ref 70–99)
GLUCOSE BLD-MCNC: 164 MG/DL (ref 70–99)
GLUCOSE BLD-MCNC: 168 MG/DL (ref 70–99)
GLUCOSE SERPL-MCNC: 118 MG/DL (ref 70–99)
GLUCOSE SERPL-MCNC: 159 MG/DL (ref 70–99)
GLUCOSE SERPL-MCNC: 165 MG/DL (ref 70–99)
HCO3 BLDA-SCNC: 23.5 MMOL/L (ref 21–29)
HCT VFR BLD AUTO: 28.8 % (ref 40.5–52.5)
HGB BLD-MCNC: 9.1 G/DL (ref 13.5–17.5)
HGB BLDA-MCNC: 9 G/DL (ref 13.5–17.5)
HYPOCHROMIA BLD QL SMEAR: ABNORMAL
LYMPHOCYTES # BLD: 1.1 K/UL (ref 1–5.1)
LYMPHOCYTES NFR BLD: 12 %
MACROCYTES OVAL BLD QL SMEAR: ABNORMAL
MAGNESIUM SERPL-MCNC: 1.1 MG/DL (ref 1.8–2.4)
MAGNESIUM SERPL-MCNC: 1.9 MG/DL (ref 1.8–2.4)
MAGNESIUM SERPL-MCNC: 2.1 MG/DL (ref 1.8–2.4)
MCH RBC QN AUTO: 30 PG (ref 26–34)
MCHC RBC AUTO-ENTMCNC: 31.5 G/DL (ref 31–36)
MCV RBC AUTO: 95.3 FL (ref 80–100)
METAMYELOCYTES NFR BLD MANUAL: 8 %
METHGB MFR BLDA: 0.8 %
MONOCYTES # BLD: 0.3 K/UL (ref 0–1.3)
MONOCYTES NFR BLD: 3 %
MYELOCYTES NFR BLD MANUAL: 11 %
NEUTROPHILS # BLD: 7.9 K/UL (ref 1.7–7.7)
NEUTROPHILS NFR BLD: 46 %
NEUTS BAND NFR BLD MANUAL: 20 % (ref 0–7)
O2 THERAPY: ABNORMAL
PATH INTERP BLD-IMP: YES
PCO2 BLDA: 44.8 MMHG (ref 35–45)
PERFORMED ON: ABNORMAL
PH BLDA: 7.33 [PH] (ref 7.35–7.45)
PH BLDV: 7.21 [PH] (ref 7.35–7.45)
PH BLDV: 7.25 [PH] (ref 7.35–7.45)
PH BLDV: 7.28 [PH] (ref 7.35–7.45)
PH BLDV: 7.38 [PH] (ref 7.35–7.45)
PH BLDV: 7.39 [PH] (ref 7.35–7.45)
PHOSPHATE SERPL-MCNC: 2.1 MG/DL (ref 2.5–4.9)
PHOSPHATE SERPL-MCNC: 3 MG/DL (ref 2.5–4.9)
PHOSPHATE SERPL-MCNC: 4.5 MG/DL (ref 2.5–4.9)
PLATELET # BLD AUTO: 137 K/UL (ref 135–450)
PMV BLD AUTO: 11.5 FL (ref 5–10.5)
PO2 BLDA: 129 MMHG (ref 75–108)
POTASSIUM SERPL-SCNC: 2.6 MMOL/L (ref 3.5–5.1)
POTASSIUM SERPL-SCNC: 3.5 MMOL/L (ref 3.5–5.1)
POTASSIUM SERPL-SCNC: 4.5 MMOL/L (ref 3.5–5.1)
POTASSIUM SERPL-SCNC: 4.6 MMOL/L (ref 3.5–5.1)
PROCALCITONIN SERPL IA-MCNC: >100 NG/ML (ref 0–0.15)
RBC # BLD AUTO: 3.02 M/UL (ref 4.2–5.9)
SAO2 % BLDA: 99.7 %
SCHISTOCYTES BLD QL SMEAR: ABNORMAL
SODIUM SERPL-SCNC: 133 MMOL/L (ref 136–145)
SODIUM SERPL-SCNC: 139 MMOL/L (ref 136–145)
SODIUM SERPL-SCNC: 139 MMOL/L (ref 136–145)
WBC # BLD AUTO: 9.3 K/UL (ref 4–11)

## 2023-05-21 PROCEDURE — 2580000003 HC RX 258: Performed by: INTERNAL MEDICINE

## 2023-05-21 PROCEDURE — 82310 ASSAY OF CALCIUM: CPT

## 2023-05-21 PROCEDURE — 36592 COLLECT BLOOD FROM PICC: CPT

## 2023-05-21 PROCEDURE — 94003 VENT MGMT INPAT SUBQ DAY: CPT

## 2023-05-21 PROCEDURE — 6360000002 HC RX W HCPCS: Performed by: NURSE PRACTITIONER

## 2023-05-21 PROCEDURE — 71045 X-RAY EXAM CHEST 1 VIEW: CPT

## 2023-05-21 PROCEDURE — 83735 ASSAY OF MAGNESIUM: CPT

## 2023-05-21 PROCEDURE — 94760 N-INVAS EAR/PLS OXIMETRY 1: CPT

## 2023-05-21 PROCEDURE — 37799 UNLISTED PX VASCULAR SURGERY: CPT

## 2023-05-21 PROCEDURE — 99232 SBSQ HOSP IP/OBS MODERATE 35: CPT | Performed by: INTERNAL MEDICINE

## 2023-05-21 PROCEDURE — 82330 ASSAY OF CALCIUM: CPT

## 2023-05-21 PROCEDURE — 6360000002 HC RX W HCPCS: Performed by: INTERNAL MEDICINE

## 2023-05-21 PROCEDURE — 99291 CRITICAL CARE FIRST HOUR: CPT | Performed by: INTERNAL MEDICINE

## 2023-05-21 PROCEDURE — 6360000002 HC RX W HCPCS: Performed by: HOSPITALIST

## 2023-05-21 PROCEDURE — 84145 PROCALCITONIN (PCT): CPT

## 2023-05-21 PROCEDURE — 2580000003 HC RX 258: Performed by: HOSPITALIST

## 2023-05-21 PROCEDURE — 82803 BLOOD GASES ANY COMBINATION: CPT

## 2023-05-21 PROCEDURE — 2700000000 HC OXYGEN THERAPY PER DAY

## 2023-05-21 PROCEDURE — 70450 CT HEAD/BRAIN W/O DYE: CPT

## 2023-05-21 PROCEDURE — 6370000000 HC RX 637 (ALT 250 FOR IP): Performed by: INTERNAL MEDICINE

## 2023-05-21 PROCEDURE — 5A1D90Z PERFORMANCE OF URINARY FILTRATION, CONTINUOUS, GREATER THAN 18 HOURS PER DAY: ICD-10-PCS | Performed by: INTERNAL MEDICINE

## 2023-05-21 PROCEDURE — 90945 DIALYSIS ONE EVALUATION: CPT

## 2023-05-21 PROCEDURE — 80048 BASIC METABOLIC PNL TOTAL CA: CPT

## 2023-05-21 PROCEDURE — 2000000000 HC ICU R&B

## 2023-05-21 PROCEDURE — 85025 COMPLETE CBC W/AUTO DIFF WBC: CPT

## 2023-05-21 PROCEDURE — 84100 ASSAY OF PHOSPHORUS: CPT

## 2023-05-21 PROCEDURE — 6370000000 HC RX 637 (ALT 250 FOR IP): Performed by: NURSE PRACTITIONER

## 2023-05-21 PROCEDURE — C9113 INJ PANTOPRAZOLE SODIUM, VIA: HCPCS | Performed by: INTERNAL MEDICINE

## 2023-05-21 PROCEDURE — 2500000003 HC RX 250 WO HCPCS: Performed by: INTERNAL MEDICINE

## 2023-05-21 PROCEDURE — 84132 ASSAY OF SERUM POTASSIUM: CPT

## 2023-05-21 RX ORDER — MINERAL OIL AND WHITE PETROLATUM 150; 830 MG/G; MG/G
OINTMENT OPHTHALMIC
Status: DISCONTINUED | OUTPATIENT
Start: 2023-05-21 | End: 2023-05-23

## 2023-05-21 RX ADMIN — POTASSIUM CHLORIDE 20 MEQ: 29.8 INJECTION, SOLUTION INTRAVENOUS at 15:29

## 2023-05-21 RX ADMIN — Medication: at 19:50

## 2023-05-21 RX ADMIN — CALCIUM CHLORIDE INJECTION 8000 MG: 100 INJECTION, SOLUTION INTRAVENOUS at 10:42

## 2023-05-21 RX ADMIN — CALCIUM GLUCONATE 1000 MG: 20 INJECTION, SOLUTION INTRAVENOUS at 21:06

## 2023-05-21 RX ADMIN — MEROPENEM 1000 MG: 1 INJECTION, POWDER, FOR SOLUTION INTRAVENOUS at 16:33

## 2023-05-21 RX ADMIN — POTASSIUM CHLORIDE 20 MEQ: 29.8 INJECTION, SOLUTION INTRAVENOUS at 11:05

## 2023-05-21 RX ADMIN — METOPROLOL TARTRATE 75 MG: 25 TABLET, FILM COATED ORAL at 20:47

## 2023-05-21 RX ADMIN — HYDROCORTISONE SODIUM SUCCINATE 100 MG: 100 INJECTION, POWDER, FOR SOLUTION INTRAMUSCULAR; INTRAVENOUS at 03:09

## 2023-05-21 RX ADMIN — WHITE PETROLATUM 57.7 %-MINERAL OIL 31.9 % EYE OINTMENT: at 13:30

## 2023-05-21 RX ADMIN — CALCIUM GLUCONATE 1000 MG: 20 INJECTION, SOLUTION INTRAVENOUS at 00:07

## 2023-05-21 RX ADMIN — SODIUM CHLORIDE 100 MG: 9 INJECTION, SOLUTION INTRAVENOUS at 15:50

## 2023-05-21 RX ADMIN — WHITE PETROLATUM 57.7 %-MINERAL OIL 31.9 % EYE OINTMENT: at 11:41

## 2023-05-21 RX ADMIN — WHITE PETROLATUM 57.7 %-MINERAL OIL 31.9 % EYE OINTMENT: at 18:05

## 2023-05-21 RX ADMIN — CHLORHEXIDINE GLUCONATE 0.12% ORAL RINSE 15 ML: 1.2 LIQUID ORAL at 09:12

## 2023-05-21 RX ADMIN — CIPROFLOXACIN 400 MG: 2 INJECTION, SOLUTION INTRAVENOUS at 14:47

## 2023-05-21 RX ADMIN — SODIUM CHLORIDE, PRESERVATIVE FREE 10 ML: 5 INJECTION INTRAVENOUS at 20:47

## 2023-05-21 RX ADMIN — ATORVASTATIN CALCIUM 40 MG: 40 TABLET, FILM COATED ORAL at 20:47

## 2023-05-21 RX ADMIN — Medication: at 15:00

## 2023-05-21 RX ADMIN — Medication: at 21:30

## 2023-05-21 RX ADMIN — CHLORHEXIDINE GLUCONATE 0.12% ORAL RINSE 15 ML: 1.2 LIQUID ORAL at 20:47

## 2023-05-21 RX ADMIN — LINEZOLID 600 MG: 600 INJECTION, SOLUTION INTRAVENOUS at 18:38

## 2023-05-21 RX ADMIN — HYDROCORTISONE SODIUM SUCCINATE 100 MG: 100 INJECTION, POWDER, FOR SOLUTION INTRAMUSCULAR; INTRAVENOUS at 10:47

## 2023-05-21 RX ADMIN — CIPROFLOXACIN 400 MG: 2 INJECTION, SOLUTION INTRAVENOUS at 01:14

## 2023-05-21 RX ADMIN — CALCIUM GLUCONATE 1000 MG: 20 INJECTION, SOLUTION INTRAVENOUS at 05:14

## 2023-05-21 RX ADMIN — POTASSIUM CHLORIDE 20 MEQ: 29.8 INJECTION, SOLUTION INTRAVENOUS at 12:10

## 2023-05-21 RX ADMIN — WHITE PETROLATUM 57.7 %-MINERAL OIL 31.9 % EYE OINTMENT: at 16:30

## 2023-05-21 RX ADMIN — CALCIUM GLUCONATE 2000 MG: 20 INJECTION, SOLUTION INTRAVENOUS at 13:27

## 2023-05-21 RX ADMIN — ANTICOAGULANT CITRATE DEXTROSE SOLUTION FORMULA A: 12.25; 11; 3.65 SOLUTION INTRAVENOUS at 08:31

## 2023-05-21 RX ADMIN — MEROPENEM 1000 MG: 1 INJECTION, POWDER, FOR SOLUTION INTRAVENOUS at 02:07

## 2023-05-21 RX ADMIN — WHITE PETROLATUM 57.7 %-MINERAL OIL 31.9 % EYE OINTMENT: at 22:00

## 2023-05-21 RX ADMIN — CALCIUM GLUCONATE 1000 MG: 20 INJECTION, SOLUTION INTRAVENOUS at 10:46

## 2023-05-21 RX ADMIN — HYDROCORTISONE SODIUM SUCCINATE 100 MG: 100 INJECTION, POWDER, FOR SOLUTION INTRAMUSCULAR; INTRAVENOUS at 18:36

## 2023-05-21 RX ADMIN — LINEZOLID 600 MG: 600 INJECTION, SOLUTION INTRAVENOUS at 07:03

## 2023-05-21 RX ADMIN — Medication: at 09:10

## 2023-05-21 RX ADMIN — Medication: at 09:08

## 2023-05-21 RX ADMIN — WHITE PETROLATUM 57.7 %-MINERAL OIL 31.9 % EYE OINTMENT: at 19:56

## 2023-05-21 RX ADMIN — CALCIUM GLUCONATE 2000 MG: 20 INJECTION, SOLUTION INTRAVENOUS at 11:52

## 2023-05-21 RX ADMIN — SODIUM CHLORIDE, PRESERVATIVE FREE 10 ML: 5 INJECTION INTRAVENOUS at 09:10

## 2023-05-21 RX ADMIN — PANTOPRAZOLE SODIUM 8 MG/HR: 40 INJECTION, POWDER, FOR SOLUTION INTRAVENOUS at 09:21

## 2023-05-21 RX ADMIN — PANTOPRAZOLE SODIUM 8 MG/HR: 40 INJECTION, POWDER, FOR SOLUTION INTRAVENOUS at 22:51

## 2023-05-21 ASSESSMENT — PULMONARY FUNCTION TESTS
PIF_VALUE: 21
PIF_VALUE: 22
PIF_VALUE: 20
PIF_VALUE: 21
PIF_VALUE: 26
PIF_VALUE: 21
PIF_VALUE: 20
PIF_VALUE: 19
PIF_VALUE: 20
PIF_VALUE: 15
PIF_VALUE: 19
PIF_VALUE: 19
PIF_VALUE: 32
PIF_VALUE: 21
PIF_VALUE: 19
PIF_VALUE: 20
PIF_VALUE: 18
PIF_VALUE: 20
PIF_VALUE: 16
PIF_VALUE: 18
PIF_VALUE: 20
PIF_VALUE: 22
PIF_VALUE: 20
PIF_VALUE: 20
PIF_VALUE: 18
PIF_VALUE: 25
PIF_VALUE: 19
PIF_VALUE: 20
PIF_VALUE: 20
PIF_VALUE: 17
PIF_VALUE: 19
PIF_VALUE: 17
PIF_VALUE: 21
PIF_VALUE: 23
PIF_VALUE: 19
PIF_VALUE: 21
PIF_VALUE: 21
PIF_VALUE: 29
PIF_VALUE: 17
PIF_VALUE: 21
PIF_VALUE: 19
PIF_VALUE: 20
PIF_VALUE: 17
PIF_VALUE: 19
PIF_VALUE: 21
PIF_VALUE: 20
PIF_VALUE: 19
PIF_VALUE: 22
PIF_VALUE: 16
PIF_VALUE: 19
PIF_VALUE: 17
PIF_VALUE: 19
PIF_VALUE: 19
PIF_VALUE: 21
PIF_VALUE: 20
PIF_VALUE: 21
PIF_VALUE: 17
PIF_VALUE: 22
PIF_VALUE: 8
PIF_VALUE: 24
PIF_VALUE: 29
PIF_VALUE: 25
PIF_VALUE: 16
PIF_VALUE: 20
PIF_VALUE: 19
PIF_VALUE: 20
PIF_VALUE: 20
PIF_VALUE: 21
PIF_VALUE: 19
PIF_VALUE: 24
PIF_VALUE: 20
PIF_VALUE: 20
PIF_VALUE: 16
PIF_VALUE: 21
PIF_VALUE: 16
PIF_VALUE: 20
PIF_VALUE: 19
PIF_VALUE: 20
PIF_VALUE: 20
PIF_VALUE: 21
PIF_VALUE: 24
PIF_VALUE: 25
PIF_VALUE: 27
PIF_VALUE: 21
PIF_VALUE: 20
PIF_VALUE: 21

## 2023-05-22 ENCOUNTER — APPOINTMENT (OUTPATIENT)
Dept: GENERAL RADIOLOGY | Age: 58
DRG: 003 | End: 2023-05-22
Payer: COMMERCIAL

## 2023-05-22 ENCOUNTER — APPOINTMENT (OUTPATIENT)
Dept: MRI IMAGING | Age: 58
DRG: 003 | End: 2023-05-22
Payer: COMMERCIAL

## 2023-05-22 PROBLEM — E87.20 LACTIC ACID ACIDOSIS: Status: ACTIVE | Noted: 2023-05-22

## 2023-05-22 PROBLEM — A41.9 SEPTIC SHOCK (HCC): Status: ACTIVE | Noted: 2023-05-22

## 2023-05-22 PROBLEM — R65.21 SEPTIC SHOCK (HCC): Status: ACTIVE | Noted: 2023-05-22

## 2023-05-22 PROBLEM — J69.0 ACUTE ASPIRATION PNEUMONIA (HCC): Status: ACTIVE | Noted: 2023-05-22

## 2023-05-22 LAB
ANION GAP SERPL CALCULATED.3IONS-SCNC: 12 MMOL/L (ref 3–16)
ANION GAP SERPL CALCULATED.3IONS-SCNC: 13 MMOL/L (ref 3–16)
ANISOCYTOSIS BLD QL SMEAR: ABNORMAL
APPEARANCE BRONCH: ABNORMAL
BASE EXCESS BLDA CALC-SCNC: 1.9 MMOL/L (ref -3–3)
BASOPHILS # BLD: 0.1 K/UL (ref 0–0.2)
BASOPHILS NFR BLD: 0.4 %
BUN SERPL-MCNC: 20 MG/DL (ref 7–20)
BUN SERPL-MCNC: 21 MG/DL (ref 7–20)
CA-I BLD-SCNC: 1.09 MMOL/L (ref 1.12–1.32)
CA-I BLD-SCNC: 1.13 MMOL/L (ref 1.12–1.32)
CALCIUM SERPL-MCNC: 8.2 MG/DL (ref 8.3–10.6)
CALCIUM SERPL-MCNC: 8.3 MG/DL (ref 8.3–10.6)
CHLORIDE SERPL-SCNC: 100 MMOL/L (ref 99–110)
CHLORIDE SERPL-SCNC: 97 MMOL/L (ref 99–110)
CLOT SPEC QL: ABNORMAL
CO2 BLDA-SCNC: 28.2 MMOL/L
CO2 SERPL-SCNC: 24 MMOL/L (ref 21–32)
CO2 SERPL-SCNC: 25 MMOL/L (ref 21–32)
COHGB MFR BLDA: 1.4 % (ref 0–1.5)
COLOR BRONCH: ABNORMAL
CREAT SERPL-MCNC: 2.3 MG/DL (ref 0.9–1.3)
CREAT SERPL-MCNC: 2.4 MG/DL (ref 0.9–1.3)
DEPRECATED RDW RBC AUTO: 16.7 % (ref 12.4–15.4)
EOSINOPHIL # BLD: 0.1 K/UL (ref 0–0.6)
EOSINOPHIL NFR BLD: 0.7 %
GFR SERPLBLD CREATININE-BSD FMLA CKD-EPI: 31 ML/MIN/{1.73_M2}
GFR SERPLBLD CREATININE-BSD FMLA CKD-EPI: 32 ML/MIN/{1.73_M2}
GLUCOSE BLD-MCNC: 111 MG/DL (ref 70–99)
GLUCOSE BLD-MCNC: 123 MG/DL (ref 70–99)
GLUCOSE BLD-MCNC: 134 MG/DL (ref 70–99)
GLUCOSE BLD-MCNC: 159 MG/DL (ref 70–99)
GLUCOSE BLD-MCNC: 161 MG/DL (ref 70–99)
GLUCOSE BLD-MCNC: 252 MG/DL (ref 70–99)
GLUCOSE SERPL-MCNC: 141 MG/DL (ref 70–99)
GLUCOSE SERPL-MCNC: 165 MG/DL (ref 70–99)
HCO3 BLDA-SCNC: 26.9 MMOL/L (ref 21–29)
HCT VFR BLD AUTO: 25.8 % (ref 40.5–52.5)
HGB BLD-MCNC: 8.3 G/DL (ref 13.5–17.5)
HGB BLDA-MCNC: 13 G/DL (ref 13.5–17.5)
HYPOCHROMIA BLD QL SMEAR: ABNORMAL
LACTATE BLDV-SCNC: 3.8 MMOL/L (ref 0.4–2)
LYMPHOCYTES # BLD: 0.2 K/UL (ref 1–5.1)
LYMPHOCYTES NFR BLD: 1.2 %
MAGNESIUM SERPL-MCNC: 2.1 MG/DL (ref 1.8–2.4)
MAGNESIUM SERPL-MCNC: 2.1 MG/DL (ref 1.8–2.4)
MCH RBC QN AUTO: 30.4 PG (ref 26–34)
MCHC RBC AUTO-ENTMCNC: 32.4 G/DL (ref 31–36)
MCV RBC AUTO: 94.1 FL (ref 80–100)
METHGB MFR BLDA: 0.5 %
MONOCYTES # BLD: 0.3 K/UL (ref 0–1.3)
MONOCYTES NFR BLD: 2.2 %
MONOCYTES NFR BRONCH MANUAL: 2 %
NEUTROPHILS # BLD: 14 K/UL (ref 1.7–7.7)
NEUTROPHILS NFR BLD: 95.5 %
NEUTROPHILS NFR BRONCH MANUAL: 98 % (ref 5–10)
NUC CELL # BRONCH MANUAL: 4838 /CUMM
O2 THERAPY: ABNORMAL
PATH INTERP BLD-IMP: NO
PATH INTERP BLD-IMP: NORMAL
PCO2 BLDA: 42.4 MMHG (ref 35–45)
PERFORMED ON: ABNORMAL
PH BLDA: 7.41 [PH] (ref 7.35–7.45)
PH BLDV: 7.39 [PH] (ref 7.35–7.45)
PH BLDV: 7.43 [PH] (ref 7.35–7.45)
PHOSPHATE SERPL-MCNC: 2.5 MG/DL (ref 2.5–4.9)
PHOSPHATE SERPL-MCNC: 2.6 MG/DL (ref 2.5–4.9)
PLATELET # BLD AUTO: 80 K/UL (ref 135–450)
PLATELET BLD QL SMEAR: ABNORMAL
PMV BLD AUTO: 10.7 FL (ref 5–10.5)
PNEUMONIA PANEL MOLECULAR: NORMAL
PO2 BLDA: 37.5 MMHG (ref 75–108)
POTASSIUM SERPL-SCNC: 4.6 MMOL/L (ref 3.5–5.1)
POTASSIUM SERPL-SCNC: 4.6 MMOL/L (ref 3.5–5.1)
PROCALCITONIN SERPL IA-MCNC: 73.64 NG/ML (ref 0–0.15)
RBC # BLD AUTO: 2.74 M/UL (ref 4.2–5.9)
RBC # FLD MANUAL: 750 /CUMM
REPORT: NORMAL
SAO2 % BLDA: 71.3 %
SCHISTOCYTES BLD QL SMEAR: ABNORMAL
SLIDE REVIEW: ABNORMAL
SODIUM SERPL-SCNC: 134 MMOL/L (ref 136–145)
SODIUM SERPL-SCNC: 137 MMOL/L (ref 136–145)
TOTAL CELLS COUNTED BRONCH: 100
WBC # BLD AUTO: 14.7 K/UL (ref 4–11)

## 2023-05-22 PROCEDURE — 95819 EEG AWAKE AND ASLEEP: CPT

## 2023-05-22 PROCEDURE — 2500000003 HC RX 250 WO HCPCS

## 2023-05-22 PROCEDURE — 0B9F8ZX DRAINAGE OF RIGHT LOWER LUNG LOBE, VIA NATURAL OR ARTIFICIAL OPENING ENDOSCOPIC, DIAGNOSTIC: ICD-10-PCS | Performed by: INTERNAL MEDICINE

## 2023-05-22 PROCEDURE — 84145 PROCALCITONIN (PCT): CPT

## 2023-05-22 PROCEDURE — 87205 SMEAR GRAM STAIN: CPT

## 2023-05-22 PROCEDURE — 2000000000 HC ICU R&B

## 2023-05-22 PROCEDURE — 36415 COLL VENOUS BLD VENIPUNCTURE: CPT

## 2023-05-22 PROCEDURE — 2580000003 HC RX 258: Performed by: INTERNAL MEDICINE

## 2023-05-22 PROCEDURE — 6360000002 HC RX W HCPCS: Performed by: INTERNAL MEDICINE

## 2023-05-22 PROCEDURE — 31624 DX BRONCHOSCOPE/LAVAGE: CPT | Performed by: INTERNAL MEDICINE

## 2023-05-22 PROCEDURE — 90945 DIALYSIS ONE EVALUATION: CPT

## 2023-05-22 PROCEDURE — 6370000000 HC RX 637 (ALT 250 FOR IP): Performed by: NURSE PRACTITIONER

## 2023-05-22 PROCEDURE — C9113 INJ PANTOPRAZOLE SODIUM, VIA: HCPCS | Performed by: INTERNAL MEDICINE

## 2023-05-22 PROCEDURE — 89051 BODY FLUID CELL COUNT: CPT

## 2023-05-22 PROCEDURE — 6360000002 HC RX W HCPCS: Performed by: NURSE PRACTITIONER

## 2023-05-22 PROCEDURE — 31622 DX BRONCHOSCOPE/WASH: CPT

## 2023-05-22 PROCEDURE — 6370000000 HC RX 637 (ALT 250 FOR IP): Performed by: INTERNAL MEDICINE

## 2023-05-22 PROCEDURE — 2580000003 HC RX 258: Performed by: HOSPITALIST

## 2023-05-22 PROCEDURE — C9113 INJ PANTOPRAZOLE SODIUM, VIA: HCPCS | Performed by: NURSE PRACTITIONER

## 2023-05-22 PROCEDURE — 36592 COLLECT BLOOD FROM PICC: CPT

## 2023-05-22 PROCEDURE — 2700000000 HC OXYGEN THERAPY PER DAY

## 2023-05-22 PROCEDURE — 84100 ASSAY OF PHOSPHORUS: CPT

## 2023-05-22 PROCEDURE — 94003 VENT MGMT INPAT SUBQ DAY: CPT

## 2023-05-22 PROCEDURE — 82330 ASSAY OF CALCIUM: CPT

## 2023-05-22 PROCEDURE — 6360000002 HC RX W HCPCS: Performed by: HOSPITALIST

## 2023-05-22 PROCEDURE — 87040 BLOOD CULTURE FOR BACTERIA: CPT

## 2023-05-22 PROCEDURE — 6360000002 HC RX W HCPCS

## 2023-05-22 PROCEDURE — 94640 AIRWAY INHALATION TREATMENT: CPT

## 2023-05-22 PROCEDURE — 88312 SPECIAL STAINS GROUP 1: CPT

## 2023-05-22 PROCEDURE — 99152 MOD SED SAME PHYS/QHP 5/>YRS: CPT | Performed by: INTERNAL MEDICINE

## 2023-05-22 PROCEDURE — 83605 ASSAY OF LACTIC ACID: CPT

## 2023-05-22 PROCEDURE — 70551 MRI BRAIN STEM W/O DYE: CPT

## 2023-05-22 PROCEDURE — 87070 CULTURE OTHR SPECIMN AEROBIC: CPT

## 2023-05-22 PROCEDURE — 80048 BASIC METABOLIC PNL TOTAL CA: CPT

## 2023-05-22 PROCEDURE — 83735 ASSAY OF MAGNESIUM: CPT

## 2023-05-22 PROCEDURE — 2580000003 HC RX 258: Performed by: NURSE PRACTITIONER

## 2023-05-22 PROCEDURE — 99291 CRITICAL CARE FIRST HOUR: CPT | Performed by: INTERNAL MEDICINE

## 2023-05-22 PROCEDURE — 88112 CYTOPATH CELL ENHANCE TECH: CPT

## 2023-05-22 PROCEDURE — 88305 TISSUE EXAM BY PATHOLOGIST: CPT

## 2023-05-22 PROCEDURE — 71045 X-RAY EXAM CHEST 1 VIEW: CPT

## 2023-05-22 PROCEDURE — 36600 WITHDRAWAL OF ARTERIAL BLOOD: CPT

## 2023-05-22 PROCEDURE — 82803 BLOOD GASES ANY COMBINATION: CPT

## 2023-05-22 PROCEDURE — 85025 COMPLETE CBC W/AUTO DIFF WBC: CPT

## 2023-05-22 PROCEDURE — 94761 N-INVAS EAR/PLS OXIMETRY MLT: CPT

## 2023-05-22 PROCEDURE — 87633 RESP VIRUS 12-25 TARGETS: CPT

## 2023-05-22 RX ORDER — LIDOCAINE HYDROCHLORIDE 10 MG/ML
INJECTION, SOLUTION EPIDURAL; INFILTRATION; INTRACAUDAL; PERINEURAL
Status: COMPLETED
Start: 2023-05-22 | End: 2023-05-22

## 2023-05-22 RX ORDER — FENTANYL CITRATE 50 UG/ML
INJECTION, SOLUTION INTRAMUSCULAR; INTRAVENOUS
Status: COMPLETED
Start: 2023-05-22 | End: 2023-05-22

## 2023-05-22 RX ORDER — HEPARIN SODIUM 1000 [USP'U]/ML
1000 INJECTION, SOLUTION INTRAVENOUS; SUBCUTANEOUS PRN
Status: DISCONTINUED | OUTPATIENT
Start: 2023-05-22 | End: 2023-05-28

## 2023-05-22 RX ORDER — MIDAZOLAM HYDROCHLORIDE 1 MG/ML
INJECTION INTRAMUSCULAR; INTRAVENOUS
Status: COMPLETED
Start: 2023-05-22 | End: 2023-05-22

## 2023-05-22 RX ORDER — IPRATROPIUM BROMIDE AND ALBUTEROL SULFATE 2.5; .5 MG/3ML; MG/3ML
1 SOLUTION RESPIRATORY (INHALATION) EVERY 4 HOURS
Status: DISCONTINUED | OUTPATIENT
Start: 2023-05-22 | End: 2023-06-01

## 2023-05-22 RX ORDER — HEPARIN SODIUM 1000 [USP'U]/ML
1000 INJECTION, SOLUTION INTRAVENOUS; SUBCUTANEOUS
Status: COMPLETED | OUTPATIENT
Start: 2023-05-22 | End: 2023-05-22

## 2023-05-22 RX ADMIN — FENTANYL CITRATE 50 MCG: 50 INJECTION, SOLUTION INTRAMUSCULAR; INTRAVENOUS at 14:40

## 2023-05-22 RX ADMIN — WHITE PETROLATUM 57.7 %-MINERAL OIL 31.9 % EYE OINTMENT: at 00:08

## 2023-05-22 RX ADMIN — PANTOPRAZOLE SODIUM 8 MG/HR: 40 INJECTION, POWDER, FOR SOLUTION INTRAVENOUS at 09:10

## 2023-05-22 RX ADMIN — HYDROCORTISONE SODIUM SUCCINATE 100 MG: 100 INJECTION, POWDER, FOR SOLUTION INTRAMUSCULAR; INTRAVENOUS at 03:10

## 2023-05-22 RX ADMIN — HYDROCORTISONE SODIUM SUCCINATE 50 MG: 100 INJECTION, POWDER, FOR SOLUTION INTRAMUSCULAR; INTRAVENOUS at 20:18

## 2023-05-22 RX ADMIN — WHITE PETROLATUM 57.7 %-MINERAL OIL 31.9 % EYE OINTMENT: at 16:03

## 2023-05-22 RX ADMIN — ATORVASTATIN CALCIUM 40 MG: 40 TABLET, FILM COATED ORAL at 20:53

## 2023-05-22 RX ADMIN — IPRATROPIUM BROMIDE AND ALBUTEROL SULFATE 1 AMPULE: 2.5; .5 SOLUTION RESPIRATORY (INHALATION) at 16:07

## 2023-05-22 RX ADMIN — Medication: at 01:00

## 2023-05-22 RX ADMIN — WHITE PETROLATUM 57.7 %-MINERAL OIL 31.9 % EYE OINTMENT: at 18:25

## 2023-05-22 RX ADMIN — WHITE PETROLATUM 57.7 %-MINERAL OIL 31.9 % EYE OINTMENT: at 10:11

## 2023-05-22 RX ADMIN — MEROPENEM 1000 MG: 1 INJECTION, POWDER, FOR SOLUTION INTRAVENOUS at 02:07

## 2023-05-22 RX ADMIN — METOPROLOL TARTRATE 75 MG: 25 TABLET, FILM COATED ORAL at 20:53

## 2023-05-22 RX ADMIN — LINEZOLID 600 MG: 600 INJECTION, SOLUTION INTRAVENOUS at 17:52

## 2023-05-22 RX ADMIN — WHITE PETROLATUM 57.7 %-MINERAL OIL 31.9 % EYE OINTMENT: at 14:18

## 2023-05-22 RX ADMIN — LINEZOLID 600 MG: 600 INJECTION, SOLUTION INTRAVENOUS at 07:04

## 2023-05-22 RX ADMIN — MIDAZOLAM 2 MG: 1 INJECTION INTRAMUSCULAR; INTRAVENOUS at 14:41

## 2023-05-22 RX ADMIN — HEPARIN SODIUM 3600 UNITS: 1000 INJECTION INTRAVENOUS; SUBCUTANEOUS at 11:27

## 2023-05-22 RX ADMIN — CHLORHEXIDINE GLUCONATE 0.12% ORAL RINSE 15 ML: 1.2 LIQUID ORAL at 20:19

## 2023-05-22 RX ADMIN — WHITE PETROLATUM 57.7 %-MINERAL OIL 31.9 % EYE OINTMENT: at 02:09

## 2023-05-22 RX ADMIN — WHITE PETROLATUM 57.7 %-MINERAL OIL 31.9 % EYE OINTMENT: at 13:18

## 2023-05-22 RX ADMIN — CIPROFLOXACIN 400 MG: 2 INJECTION, SOLUTION INTRAVENOUS at 13:13

## 2023-05-22 RX ADMIN — IPRATROPIUM BROMIDE AND ALBUTEROL SULFATE 1 AMPULE: 2.5; .5 SOLUTION RESPIRATORY (INHALATION) at 20:24

## 2023-05-22 RX ADMIN — WHITE PETROLATUM 57.7 %-MINERAL OIL 31.9 % EYE OINTMENT: at 07:57

## 2023-05-22 RX ADMIN — IPRATROPIUM BROMIDE AND ALBUTEROL SULFATE 1 AMPULE: 2.5; .5 SOLUTION RESPIRATORY (INHALATION) at 11:45

## 2023-05-22 RX ADMIN — SODIUM CHLORIDE, PRESERVATIVE FREE 40 MG: 5 INJECTION INTRAVENOUS at 20:52

## 2023-05-22 RX ADMIN — WHITE PETROLATUM 57.7 %-MINERAL OIL 31.9 % EYE OINTMENT: at 04:11

## 2023-05-22 RX ADMIN — CHLORHEXIDINE GLUCONATE 0.12% ORAL RINSE 15 ML: 1.2 LIQUID ORAL at 14:49

## 2023-05-22 RX ADMIN — MEROPENEM 1000 MG: 1 INJECTION, POWDER, FOR SOLUTION INTRAVENOUS at 14:20

## 2023-05-22 RX ADMIN — CIPROFLOXACIN 400 MG: 2 INJECTION, SOLUTION INTRAVENOUS at 02:08

## 2023-05-22 RX ADMIN — LIDOCAINE HYDROCHLORIDE: 10 INJECTION, SOLUTION EPIDURAL; INFILTRATION; INTRACAUDAL; PERINEURAL at 14:45

## 2023-05-22 RX ADMIN — METOPROLOL TARTRATE 75 MG: 25 TABLET, FILM COATED ORAL at 07:58

## 2023-05-22 RX ADMIN — WHITE PETROLATUM 57.7 %-MINERAL OIL 31.9 % EYE OINTMENT: at 06:11

## 2023-05-22 RX ADMIN — ACETAMINOPHEN 650 MG: 325 TABLET ORAL at 14:42

## 2023-05-22 RX ADMIN — INSULIN LISPRO 2 UNITS: 100 INJECTION, SOLUTION INTRAVENOUS; SUBCUTANEOUS at 20:18

## 2023-05-22 RX ADMIN — SODIUM CHLORIDE 100 MG: 9 INJECTION, SOLUTION INTRAVENOUS at 13:18

## 2023-05-22 RX ADMIN — HYDROCORTISONE SODIUM SUCCINATE 100 MG: 100 INJECTION, POWDER, FOR SOLUTION INTRAMUSCULAR; INTRAVENOUS at 11:27

## 2023-05-22 ASSESSMENT — PULMONARY FUNCTION TESTS
PIF_VALUE: 22
PIF_VALUE: 22
PIF_VALUE: 21
PIF_VALUE: 23
PIF_VALUE: 24
PIF_VALUE: 16
PIF_VALUE: 22
PIF_VALUE: 22
PIF_VALUE: 23
PIF_VALUE: 19
PIF_VALUE: 22
PIF_VALUE: 20
PIF_VALUE: 22
PIF_VALUE: 24
PIF_VALUE: 19
PIF_VALUE: 26
PIF_VALUE: 22
PIF_VALUE: 14
PIF_VALUE: 20
PIF_VALUE: 19
PIF_VALUE: 19
PIF_VALUE: 21
PIF_VALUE: 15
PIF_VALUE: 19
PIF_VALUE: 20
PIF_VALUE: 21
PIF_VALUE: 17
PIF_VALUE: 23
PIF_VALUE: 21
PIF_VALUE: 23
PIF_VALUE: 22
PIF_VALUE: 19
PIF_VALUE: 21
PIF_VALUE: 20
PIF_VALUE: 21
PIF_VALUE: 22
PIF_VALUE: 20
PIF_VALUE: 20
PIF_VALUE: 16
PIF_VALUE: 24
PIF_VALUE: 23
PIF_VALUE: 18
PIF_VALUE: 19
PIF_VALUE: 20
PIF_VALUE: 19
PIF_VALUE: 30

## 2023-05-23 ENCOUNTER — APPOINTMENT (OUTPATIENT)
Dept: GENERAL RADIOLOGY | Age: 58
DRG: 003 | End: 2023-05-23
Payer: COMMERCIAL

## 2023-05-23 ENCOUNTER — ANESTHESIA EVENT (OUTPATIENT)
Dept: OPERATING ROOM | Age: 58
End: 2023-05-23
Payer: COMMERCIAL

## 2023-05-23 ENCOUNTER — ANESTHESIA (OUTPATIENT)
Dept: OPERATING ROOM | Age: 58
End: 2023-05-23
Payer: COMMERCIAL

## 2023-05-23 LAB
ALBUMIN SERPL-MCNC: 2.5 G/DL (ref 3.4–5)
ANION GAP SERPL CALCULATED.3IONS-SCNC: 15 MMOL/L (ref 3–16)
ANISOCYTOSIS BLD QL SMEAR: ABNORMAL
BACTERIA BLD CULT ORG #2: NORMAL
BACTERIA BLD CULT: NORMAL
BASE EXCESS BLDA CALC-SCNC: -0.2 MMOL/L (ref -3–3)
BASOPHILS # BLD: 0 K/UL (ref 0–0.2)
BASOPHILS NFR BLD: 0 %
BUN SERPL-MCNC: 33 MG/DL (ref 7–20)
CALCIUM SERPL-MCNC: 8.3 MG/DL (ref 8.3–10.6)
CHLORIDE SERPL-SCNC: 96 MMOL/L (ref 99–110)
CO2 BLDA-SCNC: 24.9 MMOL/L
CO2 SERPL-SCNC: 22 MMOL/L (ref 21–32)
COHGB MFR BLDA: 1.2 % (ref 0–1.5)
CREAT SERPL-MCNC: 3.3 MG/DL (ref 0.9–1.3)
DEPRECATED RDW RBC AUTO: 16.3 % (ref 12.4–15.4)
EOSINOPHIL # BLD: 0 K/UL (ref 0–0.6)
EOSINOPHIL NFR BLD: 0 %
GFR SERPLBLD CREATININE-BSD FMLA CKD-EPI: 21 ML/MIN/{1.73_M2}
GLUCOSE BLD-MCNC: 137 MG/DL (ref 70–99)
GLUCOSE BLD-MCNC: 168 MG/DL (ref 70–99)
GLUCOSE BLD-MCNC: 172 MG/DL (ref 70–99)
GLUCOSE BLD-MCNC: 215 MG/DL (ref 70–99)
GLUCOSE BLD-MCNC: 230 MG/DL (ref 70–99)
GLUCOSE BLD-MCNC: 249 MG/DL (ref 70–99)
GLUCOSE SERPL-MCNC: 201 MG/DL (ref 70–99)
HCO3 BLDA-SCNC: 23.8 MMOL/L (ref 21–29)
HCT VFR BLD AUTO: 25.3 % (ref 40.5–52.5)
HGB BLD-MCNC: 7.9 G/DL (ref 13.5–17.5)
HGB BLDA-MCNC: 8.3 G/DL (ref 13.5–17.5)
HYPOCHROMIA BLD QL SMEAR: ABNORMAL
LYMPHOCYTES # BLD: 0.5 K/UL (ref 1–5.1)
LYMPHOCYTES NFR BLD: 2 %
MACROCYTES OVAL BLD QL SMEAR: ABNORMAL
MAGNESIUM SERPL-MCNC: 2.3 MG/DL (ref 1.8–2.4)
MCH RBC QN AUTO: 29.1 PG (ref 26–34)
MCHC RBC AUTO-ENTMCNC: 31.3 G/DL (ref 31–36)
MCV RBC AUTO: 92.7 FL (ref 80–100)
METHGB MFR BLDA: 0.9 %
MONOCYTES # BLD: 0 K/UL (ref 0–1.3)
MONOCYTES NFR BLD: 0 %
NEUTROPHILS # BLD: 22.3 K/UL (ref 1.7–7.7)
NEUTROPHILS NFR BLD: 98 %
O2 THERAPY: ABNORMAL
PATH INTERP BLD-IMP: NO
PCO2 BLDA: 35.1 MMHG (ref 35–45)
PERFORMED ON: ABNORMAL
PH BLDA: 7.44 [PH] (ref 7.35–7.45)
PHOSPHATE SERPL-MCNC: 3.7 MG/DL (ref 2.5–4.9)
PLATELET # BLD AUTO: 68 K/UL (ref 135–450)
PLATELET BLD QL SMEAR: ABNORMAL
PMV BLD AUTO: 10.8 FL (ref 5–10.5)
PO2 BLDA: 252 MMHG (ref 75–108)
POTASSIUM SERPL-SCNC: 4.5 MMOL/L (ref 3.5–5.1)
PROCALCITONIN SERPL IA-MCNC: 64.58 NG/ML (ref 0–0.15)
RBC # BLD AUTO: 2.73 M/UL (ref 4.2–5.9)
SAO2 % BLDA: >100 %
SLIDE REVIEW: ABNORMAL
SODIUM SERPL-SCNC: 133 MMOL/L (ref 136–145)
WBC # BLD AUTO: 22.8 K/UL (ref 4–11)

## 2023-05-23 PROCEDURE — 94640 AIRWAY INHALATION TREATMENT: CPT

## 2023-05-23 PROCEDURE — 6370000000 HC RX 637 (ALT 250 FOR IP): Performed by: INTERNAL MEDICINE

## 2023-05-23 PROCEDURE — 3600000012 HC SURGERY LEVEL 2 ADDTL 15MIN: Performed by: SURGERY

## 2023-05-23 PROCEDURE — 2709999900 HC NON-CHARGEABLE SUPPLY: Performed by: SURGERY

## 2023-05-23 PROCEDURE — 2580000003 HC RX 258: Performed by: INTERNAL MEDICINE

## 2023-05-23 PROCEDURE — 0BH17EZ INSERTION OF ENDOTRACHEAL AIRWAY INTO TRACHEA, VIA NATURAL OR ARTIFICIAL OPENING: ICD-10-PCS | Performed by: INTERNAL MEDICINE

## 2023-05-23 PROCEDURE — A4217 STERILE WATER/SALINE, 500 ML: HCPCS | Performed by: SURGERY

## 2023-05-23 PROCEDURE — 90935 HEMODIALYSIS ONE EVALUATION: CPT

## 2023-05-23 PROCEDURE — 6360000002 HC RX W HCPCS: Performed by: HOSPITALIST

## 2023-05-23 PROCEDURE — 82803 BLOOD GASES ANY COMBINATION: CPT

## 2023-05-23 PROCEDURE — 2580000003 HC RX 258: Performed by: NURSE PRACTITIONER

## 2023-05-23 PROCEDURE — 3700000000 HC ANESTHESIA ATTENDED CARE: Performed by: SURGERY

## 2023-05-23 PROCEDURE — 80069 RENAL FUNCTION PANEL: CPT

## 2023-05-23 PROCEDURE — 2500000003 HC RX 250 WO HCPCS: Performed by: NURSE ANESTHETIST, CERTIFIED REGISTERED

## 2023-05-23 PROCEDURE — 02HV33Z INSERTION OF INFUSION DEVICE INTO SUPERIOR VENA CAVA, PERCUTANEOUS APPROACH: ICD-10-PCS | Performed by: INTERNAL MEDICINE

## 2023-05-23 PROCEDURE — 2700000000 HC OXYGEN THERAPY PER DAY

## 2023-05-23 PROCEDURE — 84145 PROCALCITONIN (PCT): CPT

## 2023-05-23 PROCEDURE — 6360000002 HC RX W HCPCS: Performed by: NURSE ANESTHETIST, CERTIFIED REGISTERED

## 2023-05-23 PROCEDURE — 85025 COMPLETE CBC W/AUTO DIFF WBC: CPT

## 2023-05-23 PROCEDURE — 6360000002 HC RX W HCPCS: Performed by: INTERNAL MEDICINE

## 2023-05-23 PROCEDURE — 49422 REMOVE TUNNELED IP CATH: CPT | Performed by: SURGERY

## 2023-05-23 PROCEDURE — 6360000002 HC RX W HCPCS: Performed by: NURSE PRACTITIONER

## 2023-05-23 PROCEDURE — 6370000000 HC RX 637 (ALT 250 FOR IP): Performed by: NURSE PRACTITIONER

## 2023-05-23 PROCEDURE — 87116 MYCOBACTERIA CULTURE: CPT

## 2023-05-23 PROCEDURE — 0WPG03Z REMOVAL OF INFUSION DEVICE FROM PERITONEAL CAVITY, OPEN APPROACH: ICD-10-PCS | Performed by: SURGERY

## 2023-05-23 PROCEDURE — 2500000003 HC RX 250 WO HCPCS: Performed by: SURGERY

## 2023-05-23 PROCEDURE — 99291 CRITICAL CARE FIRST HOUR: CPT | Performed by: INTERNAL MEDICINE

## 2023-05-23 PROCEDURE — 3700000001 HC ADD 15 MINUTES (ANESTHESIA): Performed by: SURGERY

## 2023-05-23 PROCEDURE — 2580000003 HC RX 258: Performed by: SURGERY

## 2023-05-23 PROCEDURE — 87070 CULTURE OTHR SPECIMN AEROBIC: CPT

## 2023-05-23 PROCEDURE — 2580000003 HC RX 258: Performed by: HOSPITALIST

## 2023-05-23 PROCEDURE — 2000000000 HC ICU R&B

## 2023-05-23 PROCEDURE — 87102 FUNGUS ISOLATION CULTURE: CPT

## 2023-05-23 PROCEDURE — 94003 VENT MGMT INPAT SUBQ DAY: CPT

## 2023-05-23 PROCEDURE — 83735 ASSAY OF MAGNESIUM: CPT

## 2023-05-23 PROCEDURE — 71045 X-RAY EXAM CHEST 1 VIEW: CPT

## 2023-05-23 PROCEDURE — 2500000003 HC RX 250 WO HCPCS: Performed by: INTERNAL MEDICINE

## 2023-05-23 PROCEDURE — C9113 INJ PANTOPRAZOLE SODIUM, VIA: HCPCS | Performed by: NURSE PRACTITIONER

## 2023-05-23 PROCEDURE — 3600000002 HC SURGERY LEVEL 2 BASE: Performed by: SURGERY

## 2023-05-23 PROCEDURE — C1751 CATH, INF, PER/CENT/MIDLINE: HCPCS

## 2023-05-23 PROCEDURE — 36569 INSJ PICC 5 YR+ W/O IMAGING: CPT

## 2023-05-23 PROCEDURE — 87205 SMEAR GRAM STAIN: CPT

## 2023-05-23 PROCEDURE — 5A1D70Z PERFORMANCE OF URINARY FILTRATION, INTERMITTENT, LESS THAN 6 HOURS PER DAY: ICD-10-PCS | Performed by: INTERNAL MEDICINE

## 2023-05-23 PROCEDURE — 94761 N-INVAS EAR/PLS OXIMETRY MLT: CPT

## 2023-05-23 PROCEDURE — 2580000003 HC RX 258: Performed by: NURSE ANESTHETIST, CERTIFIED REGISTERED

## 2023-05-23 PROCEDURE — 76937 US GUIDE VASCULAR ACCESS: CPT

## 2023-05-23 PROCEDURE — 87206 SMEAR FLUORESCENT/ACID STAI: CPT

## 2023-05-23 RX ORDER — MIDAZOLAM HYDROCHLORIDE 1 MG/ML
INJECTION INTRAMUSCULAR; INTRAVENOUS PRN
Status: DISCONTINUED | OUTPATIENT
Start: 2023-05-23 | End: 2023-05-23 | Stop reason: SDUPTHER

## 2023-05-23 RX ORDER — PHENYLEPHRINE HCL IN 0.9% NACL 1 MG/10 ML
SYRINGE (ML) INTRAVENOUS PRN
Status: DISCONTINUED | OUTPATIENT
Start: 2023-05-23 | End: 2023-05-23 | Stop reason: SDUPTHER

## 2023-05-23 RX ORDER — LIDOCAINE HYDROCHLORIDE 10 MG/ML
INJECTION, SOLUTION EPIDURAL; INFILTRATION; INTRACAUDAL; PERINEURAL
Status: COMPLETED | OUTPATIENT
Start: 2023-05-23 | End: 2023-05-23

## 2023-05-23 RX ORDER — BUPIVACAINE HYDROCHLORIDE 5 MG/ML
INJECTION, SOLUTION EPIDURAL; INTRACAUDAL
Status: COMPLETED | OUTPATIENT
Start: 2023-05-23 | End: 2023-05-23

## 2023-05-23 RX ORDER — SODIUM CHLORIDE 9 MG/ML
INJECTION, SOLUTION INTRAVENOUS PRN
Status: DISCONTINUED | OUTPATIENT
Start: 2023-05-23 | End: 2023-05-23

## 2023-05-23 RX ORDER — FENTANYL CITRATE 50 UG/ML
50 INJECTION, SOLUTION INTRAMUSCULAR; INTRAVENOUS EVERY 5 MIN PRN
Status: DISCONTINUED | OUTPATIENT
Start: 2023-05-23 | End: 2023-05-23

## 2023-05-23 RX ORDER — ONDANSETRON 2 MG/ML
4 INJECTION INTRAMUSCULAR; INTRAVENOUS
Status: DISCONTINUED | OUTPATIENT
Start: 2023-05-23 | End: 2023-05-23

## 2023-05-23 RX ORDER — SODIUM CHLORIDE 0.9 % (FLUSH) 0.9 %
5-40 SYRINGE (ML) INJECTION EVERY 12 HOURS SCHEDULED
Status: DISCONTINUED | OUTPATIENT
Start: 2023-05-23 | End: 2023-05-23

## 2023-05-23 RX ORDER — SODIUM CHLORIDE 0.9 % (FLUSH) 0.9 %
5-40 SYRINGE (ML) INJECTION PRN
Status: DISCONTINUED | OUTPATIENT
Start: 2023-05-23 | End: 2023-05-23

## 2023-05-23 RX ORDER — FENTANYL CITRATE 50 UG/ML
25 INJECTION, SOLUTION INTRAMUSCULAR; INTRAVENOUS EVERY 5 MIN PRN
Status: DISCONTINUED | OUTPATIENT
Start: 2023-05-23 | End: 2023-05-23

## 2023-05-23 RX ORDER — LIDOCAINE HYDROCHLORIDE 10 MG/ML
5 INJECTION, SOLUTION EPIDURAL; INFILTRATION; INTRACAUDAL; PERINEURAL ONCE
Status: COMPLETED | OUTPATIENT
Start: 2023-05-23 | End: 2023-05-23

## 2023-05-23 RX ORDER — MAGNESIUM HYDROXIDE 1200 MG/15ML
LIQUID ORAL CONTINUOUS PRN
Status: COMPLETED | OUTPATIENT
Start: 2023-05-23 | End: 2023-05-23

## 2023-05-23 RX ORDER — SODIUM CHLORIDE 9 MG/ML
INJECTION, SOLUTION INTRAVENOUS CONTINUOUS PRN
Status: DISCONTINUED | OUTPATIENT
Start: 2023-05-23 | End: 2023-05-23 | Stop reason: SDUPTHER

## 2023-05-23 RX ORDER — ROCURONIUM BROMIDE 10 MG/ML
INJECTION, SOLUTION INTRAVENOUS PRN
Status: DISCONTINUED | OUTPATIENT
Start: 2023-05-23 | End: 2023-05-23 | Stop reason: SDUPTHER

## 2023-05-23 RX ORDER — CIPROFLOXACIN 2 MG/ML
400 INJECTION, SOLUTION INTRAVENOUS EVERY 24 HOURS
Status: DISCONTINUED | OUTPATIENT
Start: 2023-05-24 | End: 2023-06-01

## 2023-05-23 RX ORDER — MINERAL OIL AND WHITE PETROLATUM 150; 830 MG/G; MG/G
OINTMENT OPHTHALMIC EVERY 4 HOURS PRN
Status: DISCONTINUED | OUTPATIENT
Start: 2023-05-23 | End: 2023-06-06 | Stop reason: HOSPADM

## 2023-05-23 RX ORDER — HYDRALAZINE HYDROCHLORIDE 20 MG/ML
10 INJECTION INTRAMUSCULAR; INTRAVENOUS EVERY 6 HOURS PRN
Status: DISCONTINUED | OUTPATIENT
Start: 2023-05-23 | End: 2023-06-04

## 2023-05-23 RX ADMIN — LINEZOLID 600 MG: 600 INJECTION, SOLUTION INTRAVENOUS at 05:49

## 2023-05-23 RX ADMIN — Medication 200 MCG: at 13:59

## 2023-05-23 RX ADMIN — CIPROFLOXACIN 400 MG: 2 INJECTION, SOLUTION INTRAVENOUS at 00:21

## 2023-05-23 RX ADMIN — HYDRALAZINE HYDROCHLORIDE 100 MG: 50 TABLET, FILM COATED ORAL at 15:18

## 2023-05-23 RX ADMIN — LINEZOLID 600 MG: 600 INJECTION, SOLUTION INTRAVENOUS at 18:39

## 2023-05-23 RX ADMIN — HYDROCORTISONE SODIUM SUCCINATE 50 MG: 100 INJECTION, POWDER, FOR SOLUTION INTRAMUSCULAR; INTRAVENOUS at 04:34

## 2023-05-23 RX ADMIN — INSULIN LISPRO 1 UNITS: 100 INJECTION, SOLUTION INTRAVENOUS; SUBCUTANEOUS at 10:14

## 2023-05-23 RX ADMIN — CHLORHEXIDINE GLUCONATE 0.12% ORAL RINSE 15 ML: 1.2 LIQUID ORAL at 10:15

## 2023-05-23 RX ADMIN — WHITE PETROLATUM 57.7 %-MINERAL OIL 31.9 % EYE OINTMENT: at 20:24

## 2023-05-23 RX ADMIN — MIDAZOLAM 1 MG: 1 INJECTION INTRAMUSCULAR; INTRAVENOUS at 13:49

## 2023-05-23 RX ADMIN — MEROPENEM 1000 MG: 1 INJECTION, POWDER, FOR SOLUTION INTRAVENOUS at 01:52

## 2023-05-23 RX ADMIN — WHITE PETROLATUM 57.7 %-MINERAL OIL 31.9 % EYE OINTMENT: at 17:09

## 2023-05-23 RX ADMIN — IPRATROPIUM BROMIDE AND ALBUTEROL SULFATE 1 AMPULE: 2.5; .5 SOLUTION RESPIRATORY (INHALATION) at 08:12

## 2023-05-23 RX ADMIN — ATORVASTATIN CALCIUM 40 MG: 40 TABLET, FILM COATED ORAL at 21:08

## 2023-05-23 RX ADMIN — CIPROFLOXACIN 400 MG: 2 INJECTION, SOLUTION INTRAVENOUS at 13:00

## 2023-05-23 RX ADMIN — IPRATROPIUM BROMIDE AND ALBUTEROL SULFATE 1 AMPULE: 2.5; .5 SOLUTION RESPIRATORY (INHALATION) at 00:21

## 2023-05-23 RX ADMIN — GENTAMICIN SULFATE: 1 CREAM TOPICAL at 10:15

## 2023-05-23 RX ADMIN — IPRATROPIUM BROMIDE AND ALBUTEROL SULFATE 1 AMPULE: 2.5; .5 SOLUTION RESPIRATORY (INHALATION) at 03:31

## 2023-05-23 RX ADMIN — HYDRALAZINE HYDROCHLORIDE 100 MG: 50 TABLET, FILM COATED ORAL at 21:08

## 2023-05-23 RX ADMIN — IPRATROPIUM BROMIDE AND ALBUTEROL SULFATE 1 AMPULE: 2.5; .5 SOLUTION RESPIRATORY (INHALATION) at 16:46

## 2023-05-23 RX ADMIN — WHITE PETROLATUM 57.7 %-MINERAL OIL 31.9 % EYE OINTMENT: at 10:15

## 2023-05-23 RX ADMIN — WHITE PETROLATUM 57.7 %-MINERAL OIL 31.9 % EYE OINTMENT: at 00:30

## 2023-05-23 RX ADMIN — IPRATROPIUM BROMIDE AND ALBUTEROL SULFATE 1 AMPULE: 2.5; .5 SOLUTION RESPIRATORY (INHALATION) at 12:09

## 2023-05-23 RX ADMIN — SUGAMMADEX 200 MG: 100 INJECTION, SOLUTION INTRAVENOUS at 14:14

## 2023-05-23 RX ADMIN — IPRATROPIUM BROMIDE AND ALBUTEROL SULFATE 1 AMPULE: 2.5; .5 SOLUTION RESPIRATORY (INHALATION) at 20:07

## 2023-05-23 RX ADMIN — WHITE PETROLATUM 57.7 %-MINERAL OIL 31.9 % EYE OINTMENT: at 18:32

## 2023-05-23 RX ADMIN — SODIUM CHLORIDE: 9 INJECTION, SOLUTION INTRAVENOUS at 18:33

## 2023-05-23 RX ADMIN — INSULIN LISPRO 1 UNITS: 100 INJECTION, SOLUTION INTRAVENOUS; SUBCUTANEOUS at 20:21

## 2023-05-23 RX ADMIN — MEROPENEM 1000 MG: 1 INJECTION, POWDER, FOR SOLUTION INTRAVENOUS at 13:02

## 2023-05-23 RX ADMIN — LIDOCAINE HYDROCHLORIDE 5 ML: 10 INJECTION, SOLUTION EPIDURAL; INFILTRATION; INTRACAUDAL; PERINEURAL at 15:32

## 2023-05-23 RX ADMIN — MIDAZOLAM 1 MG: 1 INJECTION INTRAMUSCULAR; INTRAVENOUS at 13:36

## 2023-05-23 RX ADMIN — HYDRALAZINE HYDROCHLORIDE 10 MG: 20 INJECTION INTRAMUSCULAR; INTRAVENOUS at 11:17

## 2023-05-23 RX ADMIN — NIFEDIPINE 90 MG: 90 TABLET, EXTENDED RELEASE ORAL at 17:08

## 2023-05-23 RX ADMIN — SODIUM CHLORIDE: 9 INJECTION, SOLUTION INTRAVENOUS at 18:34

## 2023-05-23 RX ADMIN — HYDRALAZINE HYDROCHLORIDE 10 MG: 20 INJECTION INTRAMUSCULAR; INTRAVENOUS at 00:17

## 2023-05-23 RX ADMIN — SODIUM CHLORIDE 100 MG: 9 INJECTION, SOLUTION INTRAVENOUS at 15:20

## 2023-05-23 RX ADMIN — SODIUM CHLORIDE, PRESERVATIVE FREE 40 MG: 5 INJECTION INTRAVENOUS at 20:22

## 2023-05-23 RX ADMIN — WHITE PETROLATUM 57.7 %-MINERAL OIL 31.9 % EYE OINTMENT: at 13:02

## 2023-05-23 RX ADMIN — CLONIDINE HYDROCHLORIDE 0.2 MG: 0.1 TABLET ORAL at 15:18

## 2023-05-23 RX ADMIN — WHITE PETROLATUM 57.7 %-MINERAL OIL 31.9 % EYE OINTMENT: at 15:23

## 2023-05-23 RX ADMIN — METOPROLOL TARTRATE 75 MG: 25 TABLET, FILM COATED ORAL at 21:08

## 2023-05-23 RX ADMIN — WHITE PETROLATUM 57.7 %-MINERAL OIL 31.9 % EYE OINTMENT: at 04:35

## 2023-05-23 RX ADMIN — CHLORHEXIDINE GLUCONATE 0.12% ORAL RINSE 15 ML: 1.2 LIQUID ORAL at 20:21

## 2023-05-23 RX ADMIN — ROCURONIUM BROMIDE 50 MG: 10 INJECTION INTRAVENOUS at 13:36

## 2023-05-23 RX ADMIN — CLONIDINE HYDROCHLORIDE 0.2 MG: 0.1 TABLET ORAL at 21:08

## 2023-05-23 RX ADMIN — SODIUM CHLORIDE: 9 INJECTION, SOLUTION INTRAVENOUS at 13:34

## 2023-05-23 ASSESSMENT — PULMONARY FUNCTION TESTS
PIF_VALUE: 25
PIF_VALUE: 20
PIF_VALUE: 20
PIF_VALUE: 19
PIF_VALUE: 21
PIF_VALUE: 21
PIF_VALUE: 30
PIF_VALUE: 22
PIF_VALUE: 24
PIF_VALUE: 20
PIF_VALUE: 21
PIF_VALUE: 25
PIF_VALUE: 26
PIF_VALUE: 27
PIF_VALUE: 17
PIF_VALUE: 21
PIF_VALUE: 21
PIF_VALUE: 19
PIF_VALUE: 16
PIF_VALUE: 23
PIF_VALUE: 21
PIF_VALUE: 16
PIF_VALUE: 27
PIF_VALUE: 26
PIF_VALUE: 20
PIF_VALUE: 26
PIF_VALUE: 24
PIF_VALUE: 20
PIF_VALUE: 18
PIF_VALUE: 28
PIF_VALUE: 22
PIF_VALUE: 24
PIF_VALUE: 21
PIF_VALUE: 22
PIF_VALUE: 23
PIF_VALUE: 21
PIF_VALUE: 23
PIF_VALUE: 21
PIF_VALUE: 30
PIF_VALUE: 22
PIF_VALUE: 22

## 2023-05-23 ASSESSMENT — PAIN SCALES - GENERAL
PAINLEVEL_OUTOF10: 0
PAINLEVEL_OUTOF10: 0

## 2023-05-23 NOTE — ANESTHESIA POSTPROCEDURE EVALUATION
Department of Anesthesiology  Postprocedure Note    Patient: Jovanna Vanegas  MRN: [id number]  YOB: 1965  Date of evaluation: 5/23/2023      Procedure Summary     Date: 05/23/23 Room / Location: [address]    Anesthesia Start: 7395 Anesthesia Stop: 2790    Procedure: REMOVAL PERITONEAL DIALYSIS CATHETER (Abdomen) Diagnosis:       Infection associated with peritoneal dialysis catheter, initial encounter ([facility]      ([address] [facility])    Surgeons: Sami Maldonado MD Responsible Provider: Bronson Guaman MD    Anesthesia Type: general ASA Status: 4          Anesthesia Type: No value filed.     Kasey Phase I:      Kasey Phase II:        Anesthesia Post Evaluation    Patient location during evaluation: ICU  Patient participation: complete - patient cannot participate  Level of consciousness: sedated and ventilated  Pain score: 0  Airway patency: patent  Nausea & Vomiting: no nausea and no vomiting  Cardiovascular status: hemodynamically stable  Respiratory status: ventilator  Hydration status: euvolemic

## 2023-05-23 NOTE — ANESTHESIA PRE PROCEDURE
Department of Anesthesiology  Preprocedure Note       Name:  Kate Cavanaugh   Age:  63 y.o.  :  1965                                          MRN:  [id number]         Date:  2023      Surgeon: Li Wilson):  Viv Baird MD    Procedure: Procedure(s):  REMOVAL PERITONEAL DIALYSIS CATHETER    Medications prior to admission:   Prior to Admission medications    Medication Sig Start Date End Date Taking? Authorizing Provider   acetaminophen (TYLENOL) 500 MG tablet Take 2 tablets by mouth every 12 hours as needed for Pain    Historical Provider, MD   gabapentin (NEURONTIN) 300 MG capsule Take 1 capsule by mouth 3 times daily for 30 days. 4/10/23 5/19/23  Rhetta Loss, APRN - CNP   torsemide (DEMADEX) 20 MG tablet Take 2 tablets by mouth daily 3/30/23   Historical Provider, MD   NIFEdipine (PROCARDIA XL) 90 MG extended release tablet Take 1 tablet by mouth in the morning and 1 tablet in the evening.  23   Rhetta Loss, APRN - CNP   cloNIDine (CATAPRES) 0.2 MG tablet TAKE 1 TABLET BY MOUTH THREE TIMES DAILY 23   Rhetta Loss, APRN - CNP   hydrALAZINE (APRESOLINE) 100 MG tablet Take 1 tablet by mouth 3 times daily 23   Rhetta Loss, APRN - CNP   isosorbide mononitrate (IMDUR) 60 MG extended release tablet Take 1 tablet by mouth once daily 22   ROQUE Zavala CNP   insulin detemir (LEVEMIR FLEXTOUCH) 100 UNIT/ML injection pen Inject 10 Units into the skin nightly 10/18/22 5/19/23  Naomi Felotn MD   Sucroferric Oxyhydroxide (VELPHORO) 500 MG CHEW Take 1 tablet by mouth 3 times daily (with meals) 22   Historical Provider, MD   Insulin Pen Needle ([employer] PEN NEEDLES) 31G X 6 MM MISC 1 each by Does not apply route daily 22   ROQUE Zavala CNP   insulin aspart (NOVOLOG FLEXPEN) 100 UNIT/ML injection pen Inject 3 Units into the skin 3 times daily (before meals)  Patient taking differently: Inject into the skin 3 times daily (before

## 2023-05-24 ENCOUNTER — APPOINTMENT (OUTPATIENT)
Dept: GENERAL RADIOLOGY | Age: 58
DRG: 003 | End: 2023-05-24
Payer: COMMERCIAL

## 2023-05-24 LAB
ALBUMIN SERPL-MCNC: 2.5 G/DL (ref 3.4–5)
ANION GAP SERPL CALCULATED.3IONS-SCNC: 8 MMOL/L (ref 3–16)
BACTERIA SPEC RESP CULT: ABNORMAL
BACTERIA SPEC RESP CULT: ABNORMAL
BASOPHILS # BLD: 0 K/UL (ref 0–0.2)
BASOPHILS NFR BLD: 0.2 %
BUN SERPL-MCNC: 38 MG/DL (ref 7–20)
CALCIUM SERPL-MCNC: 8.2 MG/DL (ref 8.3–10.6)
CHLORIDE SERPL-SCNC: 96 MMOL/L (ref 99–110)
CO2 SERPL-SCNC: 26 MMOL/L (ref 21–32)
CREAT SERPL-MCNC: 3 MG/DL (ref 0.9–1.3)
DEPRECATED RDW RBC AUTO: 16.5 % (ref 12.4–15.4)
EOSINOPHIL # BLD: 0 K/UL (ref 0–0.6)
EOSINOPHIL NFR BLD: 0 %
GFR SERPLBLD CREATININE-BSD FMLA CKD-EPI: 23 ML/MIN/{1.73_M2}
GLUCOSE BLD-MCNC: 187 MG/DL (ref 70–99)
GLUCOSE BLD-MCNC: 190 MG/DL (ref 70–99)
GLUCOSE BLD-MCNC: 199 MG/DL (ref 70–99)
GLUCOSE BLD-MCNC: 220 MG/DL (ref 70–99)
GLUCOSE BLD-MCNC: 236 MG/DL (ref 70–99)
GLUCOSE SERPL-MCNC: 176 MG/DL (ref 70–99)
GRAM STN SPEC: ABNORMAL
HCT VFR BLD AUTO: 22.2 % (ref 40.5–52.5)
HGB BLD-MCNC: 7.2 G/DL (ref 13.5–17.5)
LYMPHOCYTES # BLD: 0.4 K/UL (ref 1–5.1)
LYMPHOCYTES NFR BLD: 2.1 %
MAGNESIUM SERPL-MCNC: 2.2 MG/DL (ref 1.8–2.4)
MCH RBC QN AUTO: 30 PG (ref 26–34)
MCHC RBC AUTO-ENTMCNC: 32.7 G/DL (ref 31–36)
MCV RBC AUTO: 91.8 FL (ref 80–100)
MONOCYTES # BLD: 0.5 K/UL (ref 0–1.3)
MONOCYTES NFR BLD: 2.7 %
NEUTROPHILS # BLD: 16.7 K/UL (ref 1.7–7.7)
NEUTROPHILS NFR BLD: 95 %
ORGANISM: ABNORMAL
PERFORMED ON: ABNORMAL
PHOSPHATE SERPL-MCNC: 2.5 MG/DL (ref 2.5–4.9)
PLATELET # BLD AUTO: 51 K/UL (ref 135–450)
PMV BLD AUTO: 11 FL (ref 5–10.5)
POTASSIUM SERPL-SCNC: 3.7 MMOL/L (ref 3.5–5.1)
PROCALCITONIN SERPL IA-MCNC: 40.01 NG/ML (ref 0–0.15)
RBC # BLD AUTO: 2.42 M/UL (ref 4.2–5.9)
SODIUM SERPL-SCNC: 130 MMOL/L (ref 136–145)
WBC # BLD AUTO: 17.6 K/UL (ref 4–11)

## 2023-05-24 PROCEDURE — 99291 CRITICAL CARE FIRST HOUR: CPT | Performed by: INTERNAL MEDICINE

## 2023-05-24 PROCEDURE — 6360000002 HC RX W HCPCS: Performed by: HOSPITALIST

## 2023-05-24 PROCEDURE — 6370000000 HC RX 637 (ALT 250 FOR IP): Performed by: INTERNAL MEDICINE

## 2023-05-24 PROCEDURE — 2500000003 HC RX 250 WO HCPCS: Performed by: INTERNAL MEDICINE

## 2023-05-24 PROCEDURE — APPSS15 APP SPLIT SHARED TIME 0-15 MINUTES: Performed by: PHYSICIAN ASSISTANT

## 2023-05-24 PROCEDURE — 6360000002 HC RX W HCPCS: Performed by: INTERNAL MEDICINE

## 2023-05-24 PROCEDURE — 84145 PROCALCITONIN (PCT): CPT

## 2023-05-24 PROCEDURE — 99024 POSTOP FOLLOW-UP VISIT: CPT | Performed by: PHYSICIAN ASSISTANT

## 2023-05-24 PROCEDURE — 2000000000 HC ICU R&B

## 2023-05-24 PROCEDURE — 80069 RENAL FUNCTION PANEL: CPT

## 2023-05-24 PROCEDURE — 6370000000 HC RX 637 (ALT 250 FOR IP): Performed by: NURSE PRACTITIONER

## 2023-05-24 PROCEDURE — 94003 VENT MGMT INPAT SUBQ DAY: CPT

## 2023-05-24 PROCEDURE — 71045 X-RAY EXAM CHEST 1 VIEW: CPT

## 2023-05-24 PROCEDURE — 85025 COMPLETE CBC W/AUTO DIFF WBC: CPT

## 2023-05-24 PROCEDURE — 2700000000 HC OXYGEN THERAPY PER DAY

## 2023-05-24 PROCEDURE — 83735 ASSAY OF MAGNESIUM: CPT

## 2023-05-24 PROCEDURE — 2580000003 HC RX 258: Performed by: HOSPITALIST

## 2023-05-24 PROCEDURE — 2580000003 HC RX 258: Performed by: NURSE PRACTITIONER

## 2023-05-24 PROCEDURE — 99233 SBSQ HOSP IP/OBS HIGH 50: CPT | Performed by: INTERNAL MEDICINE

## 2023-05-24 PROCEDURE — 6360000002 HC RX W HCPCS: Performed by: NURSE PRACTITIONER

## 2023-05-24 PROCEDURE — C9113 INJ PANTOPRAZOLE SODIUM, VIA: HCPCS | Performed by: NURSE PRACTITIONER

## 2023-05-24 PROCEDURE — 94760 N-INVAS EAR/PLS OXIMETRY 1: CPT

## 2023-05-24 PROCEDURE — APPNB15 APP NON BILLABLE TIME 0-15 MINS: Performed by: PHYSICIAN ASSISTANT

## 2023-05-24 PROCEDURE — 94640 AIRWAY INHALATION TREATMENT: CPT

## 2023-05-24 PROCEDURE — 2580000003 HC RX 258: Performed by: INTERNAL MEDICINE

## 2023-05-24 RX ADMIN — DOXYCYCLINE 100 MG: 100 INJECTION, POWDER, LYOPHILIZED, FOR SOLUTION INTRAVENOUS at 15:05

## 2023-05-24 RX ADMIN — SODIUM CHLORIDE 100 MG: 9 INJECTION, SOLUTION INTRAVENOUS at 16:10

## 2023-05-24 RX ADMIN — IPRATROPIUM BROMIDE AND ALBUTEROL SULFATE 1 AMPULE: 2.5; .5 SOLUTION RESPIRATORY (INHALATION) at 20:33

## 2023-05-24 RX ADMIN — SODIUM CHLORIDE, PRESERVATIVE FREE 40 MG: 5 INJECTION INTRAVENOUS at 21:12

## 2023-05-24 RX ADMIN — CLONIDINE HYDROCHLORIDE 0.2 MG: 0.1 TABLET ORAL at 13:42

## 2023-05-24 RX ADMIN — LINEZOLID 600 MG: 600 INJECTION, SOLUTION INTRAVENOUS at 06:00

## 2023-05-24 RX ADMIN — CHLORHEXIDINE GLUCONATE 0.12% ORAL RINSE 15 ML: 1.2 LIQUID ORAL at 09:33

## 2023-05-24 RX ADMIN — NIFEDIPINE 90 MG: 90 TABLET, EXTENDED RELEASE ORAL at 09:11

## 2023-05-24 RX ADMIN — INSULIN LISPRO 1 UNITS: 100 INJECTION, SOLUTION INTRAVENOUS; SUBCUTANEOUS at 09:19

## 2023-05-24 RX ADMIN — INSULIN LISPRO 1 UNITS: 100 INJECTION, SOLUTION INTRAVENOUS; SUBCUTANEOUS at 16:17

## 2023-05-24 RX ADMIN — NIFEDIPINE 90 MG: 90 TABLET, EXTENDED RELEASE ORAL at 17:38

## 2023-05-24 RX ADMIN — ISOSORBIDE MONONITRATE 60 MG: 60 TABLET, EXTENDED RELEASE ORAL at 09:11

## 2023-05-24 RX ADMIN — MEROPENEM 500 MG: 500 INJECTION, POWDER, FOR SOLUTION INTRAVENOUS at 13:41

## 2023-05-24 RX ADMIN — IPRATROPIUM BROMIDE AND ALBUTEROL SULFATE 1 AMPULE: 2.5; .5 SOLUTION RESPIRATORY (INHALATION) at 11:56

## 2023-05-24 RX ADMIN — SODIUM CHLORIDE, PRESERVATIVE FREE 40 MG: 5 INJECTION INTRAVENOUS at 09:11

## 2023-05-24 RX ADMIN — IPRATROPIUM BROMIDE AND ALBUTEROL SULFATE 1 AMPULE: 2.5; .5 SOLUTION RESPIRATORY (INHALATION) at 03:54

## 2023-05-24 RX ADMIN — CHLORHEXIDINE GLUCONATE 0.12% ORAL RINSE 15 ML: 1.2 LIQUID ORAL at 21:12

## 2023-05-24 RX ADMIN — HYDRALAZINE HYDROCHLORIDE 100 MG: 50 TABLET, FILM COATED ORAL at 21:13

## 2023-05-24 RX ADMIN — METOPROLOL TARTRATE 75 MG: 25 TABLET, FILM COATED ORAL at 21:13

## 2023-05-24 RX ADMIN — METOPROLOL TARTRATE 75 MG: 25 TABLET, FILM COATED ORAL at 09:11

## 2023-05-24 RX ADMIN — IPRATROPIUM BROMIDE AND ALBUTEROL SULFATE 1 AMPULE: 2.5; .5 SOLUTION RESPIRATORY (INHALATION) at 16:20

## 2023-05-24 RX ADMIN — IPRATROPIUM BROMIDE AND ALBUTEROL SULFATE 1 AMPULE: 2.5; .5 SOLUTION RESPIRATORY (INHALATION) at 23:54

## 2023-05-24 RX ADMIN — HYDRALAZINE HYDROCHLORIDE 100 MG: 50 TABLET, FILM COATED ORAL at 13:42

## 2023-05-24 RX ADMIN — IPRATROPIUM BROMIDE AND ALBUTEROL SULFATE 1 AMPULE: 2.5; .5 SOLUTION RESPIRATORY (INHALATION) at 08:20

## 2023-05-24 RX ADMIN — HYDRALAZINE HYDROCHLORIDE 100 MG: 50 TABLET, FILM COATED ORAL at 09:11

## 2023-05-24 RX ADMIN — CLONIDINE HYDROCHLORIDE 0.2 MG: 0.1 TABLET ORAL at 21:13

## 2023-05-24 RX ADMIN — IPRATROPIUM BROMIDE AND ALBUTEROL SULFATE 1 AMPULE: 2.5; .5 SOLUTION RESPIRATORY (INHALATION) at 00:11

## 2023-05-24 RX ADMIN — CIPROFLOXACIN 400 MG: 400 INJECTION, SOLUTION INTRAVENOUS at 13:39

## 2023-05-24 RX ADMIN — INSULIN LISPRO 1 UNITS: 100 INJECTION, SOLUTION INTRAVENOUS; SUBCUTANEOUS at 00:17

## 2023-05-24 RX ADMIN — CLONIDINE HYDROCHLORIDE 0.2 MG: 0.1 TABLET ORAL at 09:11

## 2023-05-24 RX ADMIN — ATORVASTATIN CALCIUM 40 MG: 40 TABLET, FILM COATED ORAL at 21:13

## 2023-05-24 ASSESSMENT — PULMONARY FUNCTION TESTS
PIF_VALUE: 21
PIF_VALUE: 25
PIF_VALUE: 22
PIF_VALUE: 21
PIF_VALUE: 21
PIF_VALUE: 28
PIF_VALUE: 27
PIF_VALUE: 32
PIF_VALUE: 22
PIF_VALUE: 21
PIF_VALUE: 25
PIF_VALUE: 22
PIF_VALUE: 26
PIF_VALUE: 20
PIF_VALUE: 21
PIF_VALUE: 23
PIF_VALUE: 20
PIF_VALUE: 19
PIF_VALUE: 23
PIF_VALUE: 22
PIF_VALUE: 22
PIF_VALUE: 21
PIF_VALUE: 22
PIF_VALUE: 27

## 2023-05-25 ENCOUNTER — APPOINTMENT (OUTPATIENT)
Dept: GENERAL RADIOLOGY | Age: 58
DRG: 003 | End: 2023-05-25
Payer: COMMERCIAL

## 2023-05-25 ENCOUNTER — APPOINTMENT (OUTPATIENT)
Dept: MRI IMAGING | Age: 58
DRG: 003 | End: 2023-05-25
Payer: COMMERCIAL

## 2023-05-25 LAB
ALBUMIN SERPL-MCNC: 2.9 G/DL (ref 3.4–5)
ANION GAP SERPL CALCULATED.3IONS-SCNC: 15 MMOL/L (ref 3–16)
BASOPHILS # BLD: 0 K/UL (ref 0–0.2)
BASOPHILS NFR BLD: 0.1 %
BUN SERPL-MCNC: 51 MG/DL (ref 7–20)
CALCIUM SERPL-MCNC: 8.1 MG/DL (ref 8.3–10.6)
CHLORIDE SERPL-SCNC: 96 MMOL/L (ref 99–110)
CO2 SERPL-SCNC: 22 MMOL/L (ref 21–32)
CREAT SERPL-MCNC: 4 MG/DL (ref 0.9–1.3)
DEPRECATED RDW RBC AUTO: 16.2 % (ref 12.4–15.4)
EOSINOPHIL # BLD: 0 K/UL (ref 0–0.6)
EOSINOPHIL NFR BLD: 0.2 %
GFR SERPLBLD CREATININE-BSD FMLA CKD-EPI: 17 ML/MIN/{1.73_M2}
GLUCOSE BLD-MCNC: 111 MG/DL (ref 70–99)
GLUCOSE BLD-MCNC: 193 MG/DL (ref 70–99)
GLUCOSE BLD-MCNC: 214 MG/DL (ref 70–99)
GLUCOSE BLD-MCNC: 250 MG/DL (ref 70–99)
GLUCOSE SERPL-MCNC: 168 MG/DL (ref 70–99)
HBV SURFACE AB SERPL IA-ACNC: <3.5 MIU/ML
HBV SURFACE AB SERPL IA-ACNC: <3.5 MIU/ML
HBV SURFACE AG SERPL QL IA: NORMAL
HBV SURFACE AG SERPL QL IA: NORMAL
HCT VFR BLD AUTO: 23.5 % (ref 40.5–52.5)
HGB BLD-MCNC: 7.7 G/DL (ref 13.5–17.5)
LYMPHOCYTES # BLD: 0.5 K/UL (ref 1–5.1)
LYMPHOCYTES NFR BLD: 4 %
MAGNESIUM SERPL-MCNC: 2.2 MG/DL (ref 1.8–2.4)
MCH RBC QN AUTO: 30.3 PG (ref 26–34)
MCHC RBC AUTO-ENTMCNC: 32.9 G/DL (ref 31–36)
MCV RBC AUTO: 92 FL (ref 80–100)
MONOCYTES # BLD: 0.5 K/UL (ref 0–1.3)
MONOCYTES NFR BLD: 3.7 %
NEUTROPHILS # BLD: 12.7 K/UL (ref 1.7–7.7)
NEUTROPHILS NFR BLD: 92 %
PERFORMED ON: ABNORMAL
PHOSPHATE SERPL-MCNC: 2.9 MG/DL (ref 2.5–4.9)
PLATELET # BLD AUTO: 48 K/UL (ref 135–450)
PMV BLD AUTO: 10.7 FL (ref 5–10.5)
POTASSIUM SERPL-SCNC: 3.9 MMOL/L (ref 3.5–5.1)
PROCALCITONIN SERPL IA-MCNC: 22.35 NG/ML (ref 0–0.15)
RBC # BLD AUTO: 2.56 M/UL (ref 4.2–5.9)
SODIUM SERPL-SCNC: 133 MMOL/L (ref 136–145)
WBC # BLD AUTO: 13.8 K/UL (ref 4–11)

## 2023-05-25 PROCEDURE — 87340 HEPATITIS B SURFACE AG IA: CPT

## 2023-05-25 PROCEDURE — 36415 COLL VENOUS BLD VENIPUNCTURE: CPT

## 2023-05-25 PROCEDURE — 6370000000 HC RX 637 (ALT 250 FOR IP): Performed by: INTERNAL MEDICINE

## 2023-05-25 PROCEDURE — 70551 MRI BRAIN STEM W/O DYE: CPT

## 2023-05-25 PROCEDURE — 99233 SBSQ HOSP IP/OBS HIGH 50: CPT | Performed by: INTERNAL MEDICINE

## 2023-05-25 PROCEDURE — APPSS15 APP SPLIT SHARED TIME 0-15 MINUTES: Performed by: PHYSICIAN ASSISTANT

## 2023-05-25 PROCEDURE — 84145 PROCALCITONIN (PCT): CPT

## 2023-05-25 PROCEDURE — 6370000000 HC RX 637 (ALT 250 FOR IP): Performed by: NURSE PRACTITIONER

## 2023-05-25 PROCEDURE — 83735 ASSAY OF MAGNESIUM: CPT

## 2023-05-25 PROCEDURE — APPNB15 APP NON BILLABLE TIME 0-15 MINS: Performed by: PHYSICIAN ASSISTANT

## 2023-05-25 PROCEDURE — 6360000002 HC RX W HCPCS: Performed by: NURSE PRACTITIONER

## 2023-05-25 PROCEDURE — 2700000000 HC OXYGEN THERAPY PER DAY

## 2023-05-25 PROCEDURE — 2580000003 HC RX 258: Performed by: HOSPITALIST

## 2023-05-25 PROCEDURE — 6360000002 HC RX W HCPCS: Performed by: HOSPITALIST

## 2023-05-25 PROCEDURE — 86706 HEP B SURFACE ANTIBODY: CPT

## 2023-05-25 PROCEDURE — 2580000003 HC RX 258: Performed by: INTERNAL MEDICINE

## 2023-05-25 PROCEDURE — 2580000003 HC RX 258: Performed by: NURSE PRACTITIONER

## 2023-05-25 PROCEDURE — 94003 VENT MGMT INPAT SUBQ DAY: CPT

## 2023-05-25 PROCEDURE — 71045 X-RAY EXAM CHEST 1 VIEW: CPT

## 2023-05-25 PROCEDURE — 2500000003 HC RX 250 WO HCPCS: Performed by: INTERNAL MEDICINE

## 2023-05-25 PROCEDURE — 6360000002 HC RX W HCPCS: Performed by: INTERNAL MEDICINE

## 2023-05-25 PROCEDURE — 85025 COMPLETE CBC W/AUTO DIFF WBC: CPT

## 2023-05-25 PROCEDURE — 99024 POSTOP FOLLOW-UP VISIT: CPT | Performed by: PHYSICIAN ASSISTANT

## 2023-05-25 PROCEDURE — 94760 N-INVAS EAR/PLS OXIMETRY 1: CPT

## 2023-05-25 PROCEDURE — 90935 HEMODIALYSIS ONE EVALUATION: CPT

## 2023-05-25 PROCEDURE — C9113 INJ PANTOPRAZOLE SODIUM, VIA: HCPCS | Performed by: NURSE PRACTITIONER

## 2023-05-25 PROCEDURE — 2000000000 HC ICU R&B

## 2023-05-25 PROCEDURE — 99291 CRITICAL CARE FIRST HOUR: CPT | Performed by: INTERNAL MEDICINE

## 2023-05-25 PROCEDURE — 94640 AIRWAY INHALATION TREATMENT: CPT

## 2023-05-25 PROCEDURE — 80069 RENAL FUNCTION PANEL: CPT

## 2023-05-25 RX ORDER — HEPARIN SODIUM 1000 [USP'U]/ML
3600 INJECTION, SOLUTION INTRAVENOUS; SUBCUTANEOUS PRN
Status: DISCONTINUED | OUTPATIENT
Start: 2023-05-25 | End: 2023-06-06 | Stop reason: HOSPADM

## 2023-05-25 RX ADMIN — HYDRALAZINE HYDROCHLORIDE 100 MG: 50 TABLET, FILM COATED ORAL at 14:26

## 2023-05-25 RX ADMIN — MEROPENEM 500 MG: 500 INJECTION, POWDER, FOR SOLUTION INTRAVENOUS at 14:14

## 2023-05-25 RX ADMIN — ATORVASTATIN CALCIUM 40 MG: 40 TABLET, FILM COATED ORAL at 20:11

## 2023-05-25 RX ADMIN — EPOETIN ALFA-EPBX 10000 UNITS: 10000 INJECTION, SOLUTION INTRAVENOUS; SUBCUTANEOUS at 16:57

## 2023-05-25 RX ADMIN — CLONIDINE HYDROCHLORIDE 0.2 MG: 0.1 TABLET ORAL at 14:26

## 2023-05-25 RX ADMIN — CIPROFLOXACIN 400 MG: 400 INJECTION, SOLUTION INTRAVENOUS at 14:04

## 2023-05-25 RX ADMIN — METOPROLOL TARTRATE 75 MG: 25 TABLET, FILM COATED ORAL at 14:29

## 2023-05-25 RX ADMIN — IPRATROPIUM BROMIDE AND ALBUTEROL SULFATE 1 AMPULE: 2.5; .5 SOLUTION RESPIRATORY (INHALATION) at 15:35

## 2023-05-25 RX ADMIN — DOXYCYCLINE 100 MG: 100 INJECTION, POWDER, LYOPHILIZED, FOR SOLUTION INTRAVENOUS at 14:25

## 2023-05-25 RX ADMIN — DOXYCYCLINE 100 MG: 100 INJECTION, POWDER, LYOPHILIZED, FOR SOLUTION INTRAVENOUS at 02:30

## 2023-05-25 RX ADMIN — INSULIN LISPRO 1 UNITS: 100 INJECTION, SOLUTION INTRAVENOUS; SUBCUTANEOUS at 08:48

## 2023-05-25 RX ADMIN — CHLORHEXIDINE GLUCONATE 0.12% ORAL RINSE 15 ML: 1.2 LIQUID ORAL at 08:48

## 2023-05-25 RX ADMIN — SODIUM CHLORIDE, PRESERVATIVE FREE 10 ML: 5 INJECTION INTRAVENOUS at 08:26

## 2023-05-25 RX ADMIN — IPRATROPIUM BROMIDE AND ALBUTEROL SULFATE 1 AMPULE: 2.5; .5 SOLUTION RESPIRATORY (INHALATION) at 03:30

## 2023-05-25 RX ADMIN — HYDRALAZINE HYDROCHLORIDE 100 MG: 50 TABLET, FILM COATED ORAL at 20:11

## 2023-05-25 RX ADMIN — CLONIDINE HYDROCHLORIDE 0.2 MG: 0.1 TABLET ORAL at 20:11

## 2023-05-25 RX ADMIN — INSULIN LISPRO 2 UNITS: 100 INJECTION, SOLUTION INTRAVENOUS; SUBCUTANEOUS at 00:45

## 2023-05-25 RX ADMIN — SODIUM CHLORIDE, PRESERVATIVE FREE 40 MG: 5 INJECTION INTRAVENOUS at 14:27

## 2023-05-25 RX ADMIN — CHLORHEXIDINE GLUCONATE 0.12% ORAL RINSE 15 ML: 1.2 LIQUID ORAL at 20:11

## 2023-05-25 RX ADMIN — IPRATROPIUM BROMIDE AND ALBUTEROL SULFATE 1 AMPULE: 2.5; .5 SOLUTION RESPIRATORY (INHALATION) at 23:45

## 2023-05-25 RX ADMIN — SODIUM CHLORIDE, PRESERVATIVE FREE 10 ML: 5 INJECTION INTRAVENOUS at 20:11

## 2023-05-25 RX ADMIN — METOPROLOL TARTRATE 75 MG: 25 TABLET, FILM COATED ORAL at 20:11

## 2023-05-25 RX ADMIN — IPRATROPIUM BROMIDE AND ALBUTEROL SULFATE 1 AMPULE: 2.5; .5 SOLUTION RESPIRATORY (INHALATION) at 20:12

## 2023-05-25 RX ADMIN — SODIUM CHLORIDE 100 MG: 9 INJECTION, SOLUTION INTRAVENOUS at 17:35

## 2023-05-25 RX ADMIN — IPRATROPIUM BROMIDE AND ALBUTEROL SULFATE 1 AMPULE: 2.5; .5 SOLUTION RESPIRATORY (INHALATION) at 11:49

## 2023-05-25 RX ADMIN — IPRATROPIUM BROMIDE AND ALBUTEROL SULFATE 1 AMPULE: 2.5; .5 SOLUTION RESPIRATORY (INHALATION) at 07:50

## 2023-05-25 RX ADMIN — SODIUM CHLORIDE, PRESERVATIVE FREE 40 MG: 5 INJECTION INTRAVENOUS at 20:10

## 2023-05-25 ASSESSMENT — PULMONARY FUNCTION TESTS
PIF_VALUE: 18
PIF_VALUE: 21
PIF_VALUE: 23
PIF_VALUE: 22
PIF_VALUE: 23
PIF_VALUE: 17
PIF_VALUE: 21
PIF_VALUE: 21
PIF_VALUE: 23
PIF_VALUE: 20
PIF_VALUE: 19
PIF_VALUE: 23
PIF_VALUE: 24
PIF_VALUE: 24
PIF_VALUE: 22
PIF_VALUE: 21
PIF_VALUE: 20
PIF_VALUE: 24
PIF_VALUE: 22
PIF_VALUE: 23
PIF_VALUE: 22
PIF_VALUE: 18
PIF_VALUE: 24
PIF_VALUE: 35
PIF_VALUE: 20
PIF_VALUE: 19
PIF_VALUE: 30
PIF_VALUE: 23
PIF_VALUE: 31
PIF_VALUE: 24
PIF_VALUE: 23
PIF_VALUE: 12
PIF_VALUE: 21
PIF_VALUE: 28
PIF_VALUE: 21
PIF_VALUE: 23
PIF_VALUE: 24
PIF_VALUE: 15
PIF_VALUE: 20
PIF_VALUE: 16
PIF_VALUE: 17
PIF_VALUE: 32
PIF_VALUE: 19
PIF_VALUE: 28
PIF_VALUE: 21
PIF_VALUE: 21
PIF_VALUE: 24
PIF_VALUE: 27
PIF_VALUE: 24
PIF_VALUE: 26
PIF_VALUE: 22
PIF_VALUE: 22
PIF_VALUE: 19
PIF_VALUE: 23
PIF_VALUE: 24
PIF_VALUE: 22
PIF_VALUE: 28

## 2023-05-26 ENCOUNTER — APPOINTMENT (OUTPATIENT)
Dept: GENERAL RADIOLOGY | Age: 58
DRG: 003 | End: 2023-05-26
Payer: COMMERCIAL

## 2023-05-26 PROBLEM — Z97.8 ENDOTRACHEALLY INTUBATED: Status: ACTIVE | Noted: 2023-05-26

## 2023-05-26 LAB
BACTERIA BLD CULT: NORMAL
BACTERIA SPEC AEROBE CULT: NORMAL
BASOPHILS # BLD: 0 K/UL (ref 0–0.2)
BASOPHILS NFR BLD: 0.1 %
CHOLEST SERPL-MCNC: 83 MG/DL (ref 0–199)
DEPRECATED RDW RBC AUTO: 16.1 % (ref 12.4–15.4)
EOSINOPHIL # BLD: 0 K/UL (ref 0–0.6)
EOSINOPHIL NFR BLD: 0.2 %
EST. AVERAGE GLUCOSE BLD GHB EST-MCNC: 128.4 MG/DL
GLUCOSE BLD-MCNC: 193 MG/DL (ref 70–99)
GLUCOSE BLD-MCNC: 197 MG/DL (ref 70–99)
GLUCOSE BLD-MCNC: 209 MG/DL (ref 70–99)
GLUCOSE BLD-MCNC: 210 MG/DL (ref 70–99)
GLUCOSE BLD-MCNC: 217 MG/DL (ref 70–99)
GLUCOSE BLD-MCNC: 221 MG/DL (ref 70–99)
GRAM STN SPEC: NORMAL
HBA1C MFR BLD: 6.1 %
HCT VFR BLD AUTO: 23.4 % (ref 40.5–52.5)
HDLC SERPL-MCNC: 28 MG/DL (ref 40–60)
HGB BLD-MCNC: 7.6 G/DL (ref 13.5–17.5)
LDLC SERPL CALC-MCNC: 22 MG/DL
LYMPHOCYTES # BLD: 0.6 K/UL (ref 1–5.1)
LYMPHOCYTES NFR BLD: 3.3 %
MAGNESIUM SERPL-MCNC: 2.2 MG/DL (ref 1.8–2.4)
MCH RBC QN AUTO: 30.2 PG (ref 26–34)
MCHC RBC AUTO-ENTMCNC: 32.6 G/DL (ref 31–36)
MCV RBC AUTO: 92.6 FL (ref 80–100)
MONOCYTES # BLD: 1.2 K/UL (ref 0–1.3)
MONOCYTES NFR BLD: 6.5 %
NEUTROPHILS # BLD: 16 K/UL (ref 1.7–7.7)
NEUTROPHILS NFR BLD: 89.9 %
PERFORMED ON: ABNORMAL
PLATELET # BLD AUTO: 48 K/UL (ref 135–450)
PMV BLD AUTO: 10.5 FL (ref 5–10.5)
PROCALCITONIN SERPL IA-MCNC: 11.04 NG/ML (ref 0–0.15)
RBC # BLD AUTO: 2.53 M/UL (ref 4.2–5.9)
TRIGL SERPL-MCNC: 165 MG/DL (ref 0–150)
VLDLC SERPL CALC-MCNC: 33 MG/DL
WBC # BLD AUTO: 17.8 K/UL (ref 4–11)

## 2023-05-26 PROCEDURE — 6370000000 HC RX 637 (ALT 250 FOR IP): Performed by: NURSE PRACTITIONER

## 2023-05-26 PROCEDURE — 2000000000 HC ICU R&B

## 2023-05-26 PROCEDURE — APPNB15 APP NON BILLABLE TIME 0-15 MINS: Performed by: PHYSICIAN ASSISTANT

## 2023-05-26 PROCEDURE — 6370000000 HC RX 637 (ALT 250 FOR IP): Performed by: INTERNAL MEDICINE

## 2023-05-26 PROCEDURE — 94760 N-INVAS EAR/PLS OXIMETRY 1: CPT

## 2023-05-26 PROCEDURE — 6360000002 HC RX W HCPCS: Performed by: HOSPITALIST

## 2023-05-26 PROCEDURE — 2700000000 HC OXYGEN THERAPY PER DAY

## 2023-05-26 PROCEDURE — 36592 COLLECT BLOOD FROM PICC: CPT

## 2023-05-26 PROCEDURE — 85025 COMPLETE CBC W/AUTO DIFF WBC: CPT

## 2023-05-26 PROCEDURE — 84145 PROCALCITONIN (PCT): CPT

## 2023-05-26 PROCEDURE — 94003 VENT MGMT INPAT SUBQ DAY: CPT

## 2023-05-26 PROCEDURE — 94640 AIRWAY INHALATION TREATMENT: CPT

## 2023-05-26 PROCEDURE — APPSS15 APP SPLIT SHARED TIME 0-15 MINUTES: Performed by: PHYSICIAN ASSISTANT

## 2023-05-26 PROCEDURE — 99291 CRITICAL CARE FIRST HOUR: CPT | Performed by: INTERNAL MEDICINE

## 2023-05-26 PROCEDURE — 83735 ASSAY OF MAGNESIUM: CPT

## 2023-05-26 PROCEDURE — 6360000002 HC RX W HCPCS: Performed by: NURSE PRACTITIONER

## 2023-05-26 PROCEDURE — 2580000003 HC RX 258: Performed by: INTERNAL MEDICINE

## 2023-05-26 PROCEDURE — 99024 POSTOP FOLLOW-UP VISIT: CPT | Performed by: PHYSICIAN ASSISTANT

## 2023-05-26 PROCEDURE — 71045 X-RAY EXAM CHEST 1 VIEW: CPT

## 2023-05-26 PROCEDURE — C9113 INJ PANTOPRAZOLE SODIUM, VIA: HCPCS | Performed by: NURSE PRACTITIONER

## 2023-05-26 PROCEDURE — 2500000003 HC RX 250 WO HCPCS: Performed by: INTERNAL MEDICINE

## 2023-05-26 PROCEDURE — 2580000003 HC RX 258: Performed by: HOSPITALIST

## 2023-05-26 PROCEDURE — 83036 HEMOGLOBIN GLYCOSYLATED A1C: CPT

## 2023-05-26 PROCEDURE — 80061 LIPID PANEL: CPT

## 2023-05-26 PROCEDURE — 6360000002 HC RX W HCPCS: Performed by: INTERNAL MEDICINE

## 2023-05-26 PROCEDURE — 2580000003 HC RX 258: Performed by: NURSE PRACTITIONER

## 2023-05-26 PROCEDURE — 99233 SBSQ HOSP IP/OBS HIGH 50: CPT | Performed by: INTERNAL MEDICINE

## 2023-05-26 PROCEDURE — 36415 COLL VENOUS BLD VENIPUNCTURE: CPT

## 2023-05-26 PROCEDURE — 74018 RADEX ABDOMEN 1 VIEW: CPT

## 2023-05-26 RX ORDER — SENNA AND DOCUSATE SODIUM 50; 8.6 MG/1; MG/1
2 TABLET, FILM COATED ORAL DAILY
Status: DISCONTINUED | OUTPATIENT
Start: 2023-05-26 | End: 2023-06-06 | Stop reason: HOSPADM

## 2023-05-26 RX ADMIN — INSULIN LISPRO 1 UNITS: 100 INJECTION, SOLUTION INTRAVENOUS; SUBCUTANEOUS at 08:19

## 2023-05-26 RX ADMIN — CHLORHEXIDINE GLUCONATE 0.12% ORAL RINSE 15 ML: 1.2 LIQUID ORAL at 19:54

## 2023-05-26 RX ADMIN — HYDRALAZINE HYDROCHLORIDE 100 MG: 50 TABLET, FILM COATED ORAL at 13:45

## 2023-05-26 RX ADMIN — IPRATROPIUM BROMIDE AND ALBUTEROL SULFATE 1 AMPULE: 2.5; .5 SOLUTION RESPIRATORY (INHALATION) at 03:47

## 2023-05-26 RX ADMIN — SODIUM CHLORIDE, PRESERVATIVE FREE 10 ML: 5 INJECTION INTRAVENOUS at 08:20

## 2023-05-26 RX ADMIN — IPRATROPIUM BROMIDE AND ALBUTEROL SULFATE 1 AMPULE: 2.5; .5 SOLUTION RESPIRATORY (INHALATION) at 19:54

## 2023-05-26 RX ADMIN — DOXYCYCLINE 100 MG: 100 INJECTION, POWDER, LYOPHILIZED, FOR SOLUTION INTRAVENOUS at 01:58

## 2023-05-26 RX ADMIN — HYDRALAZINE HYDROCHLORIDE 100 MG: 50 TABLET, FILM COATED ORAL at 20:57

## 2023-05-26 RX ADMIN — ATORVASTATIN CALCIUM 40 MG: 40 TABLET, FILM COATED ORAL at 20:58

## 2023-05-26 RX ADMIN — SODIUM CHLORIDE, PRESERVATIVE FREE 40 MG: 5 INJECTION INTRAVENOUS at 21:05

## 2023-05-26 RX ADMIN — CLONIDINE HYDROCHLORIDE 0.2 MG: 0.1 TABLET ORAL at 20:58

## 2023-05-26 RX ADMIN — INSULIN LISPRO 1 UNITS: 100 INJECTION, SOLUTION INTRAVENOUS; SUBCUTANEOUS at 00:13

## 2023-05-26 RX ADMIN — HYDRALAZINE HYDROCHLORIDE 100 MG: 50 TABLET, FILM COATED ORAL at 08:17

## 2023-05-26 RX ADMIN — IPRATROPIUM BROMIDE AND ALBUTEROL SULFATE 3 ML: 2.5; .5 SOLUTION RESPIRATORY (INHALATION) at 16:30

## 2023-05-26 RX ADMIN — SODIUM CHLORIDE, PRESERVATIVE FREE 10 ML: 5 INJECTION INTRAVENOUS at 21:07

## 2023-05-26 RX ADMIN — CHLORHEXIDINE GLUCONATE 0.12% ORAL RINSE 15 ML: 1.2 LIQUID ORAL at 08:19

## 2023-05-26 RX ADMIN — SODIUM CHLORIDE, PRESERVATIVE FREE 40 MG: 5 INJECTION INTRAVENOUS at 08:19

## 2023-05-26 RX ADMIN — MEROPENEM 500 MG: 500 INJECTION, POWDER, FOR SOLUTION INTRAVENOUS at 12:35

## 2023-05-26 RX ADMIN — DOXYCYCLINE 100 MG: 100 INJECTION, POWDER, LYOPHILIZED, FOR SOLUTION INTRAVENOUS at 13:45

## 2023-05-26 RX ADMIN — HYDRALAZINE HYDROCHLORIDE 10 MG: 20 INJECTION INTRAMUSCULAR; INTRAVENOUS at 16:30

## 2023-05-26 RX ADMIN — SODIUM CHLORIDE 100 MG: 9 INJECTION, SOLUTION INTRAVENOUS at 14:54

## 2023-05-26 RX ADMIN — CIPROFLOXACIN 400 MG: 400 INJECTION, SOLUTION INTRAVENOUS at 12:36

## 2023-05-26 RX ADMIN — METOPROLOL TARTRATE 75 MG: 25 TABLET, FILM COATED ORAL at 21:35

## 2023-05-26 RX ADMIN — IPRATROPIUM BROMIDE AND ALBUTEROL SULFATE 1 AMPULE: 2.5; .5 SOLUTION RESPIRATORY (INHALATION) at 12:16

## 2023-05-26 RX ADMIN — IPRATROPIUM BROMIDE AND ALBUTEROL SULFATE 1 AMPULE: 2.5; .5 SOLUTION RESPIRATORY (INHALATION) at 08:09

## 2023-05-26 RX ADMIN — CLONIDINE HYDROCHLORIDE 0.2 MG: 0.1 TABLET ORAL at 13:45

## 2023-05-26 RX ADMIN — METOPROLOL TARTRATE 75 MG: 25 TABLET, FILM COATED ORAL at 08:17

## 2023-05-26 RX ADMIN — INSULIN LISPRO 1 UNITS: 100 INJECTION, SOLUTION INTRAVENOUS; SUBCUTANEOUS at 20:54

## 2023-05-26 RX ADMIN — CLONIDINE HYDROCHLORIDE 0.2 MG: 0.1 TABLET ORAL at 08:18

## 2023-05-26 RX ADMIN — INSULIN LISPRO 1 UNITS: 100 INJECTION, SOLUTION INTRAVENOUS; SUBCUTANEOUS at 12:33

## 2023-05-26 RX ADMIN — SENNOSIDES AND DOCUSATE SODIUM 2 TABLET: 50; 8.6 TABLET ORAL at 10:44

## 2023-05-26 ASSESSMENT — PULMONARY FUNCTION TESTS
PIF_VALUE: 32
PIF_VALUE: 21
PIF_VALUE: 31
PIF_VALUE: 26
PIF_VALUE: 23
PIF_VALUE: 25
PIF_VALUE: 27
PIF_VALUE: 25
PIF_VALUE: 26
PIF_VALUE: 21
PIF_VALUE: 30
PIF_VALUE: 24
PIF_VALUE: 27
PIF_VALUE: 30
PIF_VALUE: 22
PIF_VALUE: 28
PIF_VALUE: 21
PIF_VALUE: 22
PIF_VALUE: 24
PIF_VALUE: 23
PIF_VALUE: 15
PIF_VALUE: 29
PIF_VALUE: 24
PIF_VALUE: 30
PIF_VALUE: 14
PIF_VALUE: 27
PIF_VALUE: 26
PIF_VALUE: 15
PIF_VALUE: 25
PIF_VALUE: 22
PIF_VALUE: 24
PIF_VALUE: 26
PIF_VALUE: 28
PIF_VALUE: 29
PIF_VALUE: 24
PIF_VALUE: 21
PIF_VALUE: 27
PIF_VALUE: 18
PIF_VALUE: 22
PIF_VALUE: 30
PIF_VALUE: 22
PIF_VALUE: 21

## 2023-05-26 ASSESSMENT — PAIN SCALES - GENERAL
PAINLEVEL_OUTOF10: 0

## 2023-05-27 ENCOUNTER — APPOINTMENT (OUTPATIENT)
Dept: GENERAL RADIOLOGY | Age: 58
DRG: 003 | End: 2023-05-27
Payer: COMMERCIAL

## 2023-05-27 LAB
ALBUMIN SERPL-MCNC: 2.7 G/DL (ref 3.4–5)
ANION GAP SERPL CALCULATED.3IONS-SCNC: 13 MMOL/L (ref 3–16)
BACTERIA CATH TIP CULT: NORMAL
BASOPHILS # BLD: 0 K/UL (ref 0–0.2)
BASOPHILS NFR BLD: 0.1 %
BUN SERPL-MCNC: 37 MG/DL (ref 7–20)
CALCIUM SERPL-MCNC: 8.4 MG/DL (ref 8.3–10.6)
CHLORIDE SERPL-SCNC: 99 MMOL/L (ref 99–110)
CO2 SERPL-SCNC: 24 MMOL/L (ref 21–32)
CREAT SERPL-MCNC: 3.9 MG/DL (ref 0.9–1.3)
DEPRECATED RDW RBC AUTO: 16.3 % (ref 12.4–15.4)
EOSINOPHIL # BLD: 0.1 K/UL (ref 0–0.6)
EOSINOPHIL NFR BLD: 0.8 %
GFR SERPLBLD CREATININE-BSD FMLA CKD-EPI: 17 ML/MIN/{1.73_M2}
GLUCOSE BLD-MCNC: 155 MG/DL (ref 70–99)
GLUCOSE BLD-MCNC: 226 MG/DL (ref 70–99)
GLUCOSE BLD-MCNC: 260 MG/DL (ref 70–99)
GLUCOSE BLD-MCNC: 270 MG/DL (ref 70–99)
GLUCOSE BLD-MCNC: 287 MG/DL (ref 70–99)
GLUCOSE SERPL-MCNC: 271 MG/DL (ref 70–99)
HCT VFR BLD AUTO: 22.3 % (ref 40.5–52.5)
HGB BLD-MCNC: 7.3 G/DL (ref 13.5–17.5)
LYMPHOCYTES # BLD: 0.7 K/UL (ref 1–5.1)
LYMPHOCYTES NFR BLD: 4.1 %
MCH RBC QN AUTO: 30.3 PG (ref 26–34)
MCHC RBC AUTO-ENTMCNC: 32.7 G/DL (ref 31–36)
MCV RBC AUTO: 92.8 FL (ref 80–100)
MONOCYTES # BLD: 1.3 K/UL (ref 0–1.3)
MONOCYTES NFR BLD: 8.2 %
NEUTROPHILS # BLD: 14.2 K/UL (ref 1.7–7.7)
NEUTROPHILS NFR BLD: 86.8 %
PERFORMED ON: ABNORMAL
PHOSPHATE SERPL-MCNC: 3.1 MG/DL (ref 2.5–4.9)
PLATELET # BLD AUTO: 50 K/UL (ref 135–450)
PMV BLD AUTO: 10.9 FL (ref 5–10.5)
POTASSIUM SERPL-SCNC: 4 MMOL/L (ref 3.5–5.1)
PROCALCITONIN SERPL IA-MCNC: 6.88 NG/ML (ref 0–0.15)
RBC # BLD AUTO: 2.41 M/UL (ref 4.2–5.9)
SODIUM SERPL-SCNC: 136 MMOL/L (ref 136–145)
WBC # BLD AUTO: 16.3 K/UL (ref 4–11)

## 2023-05-27 PROCEDURE — 2580000003 HC RX 258: Performed by: INTERNAL MEDICINE

## 2023-05-27 PROCEDURE — 2580000003 HC RX 258: Performed by: NURSE PRACTITIONER

## 2023-05-27 PROCEDURE — 94003 VENT MGMT INPAT SUBQ DAY: CPT

## 2023-05-27 PROCEDURE — 85025 COMPLETE CBC W/AUTO DIFF WBC: CPT

## 2023-05-27 PROCEDURE — 6370000000 HC RX 637 (ALT 250 FOR IP): Performed by: INTERNAL MEDICINE

## 2023-05-27 PROCEDURE — 2500000003 HC RX 250 WO HCPCS: Performed by: INTERNAL MEDICINE

## 2023-05-27 PROCEDURE — 6360000002 HC RX W HCPCS: Performed by: HOSPITALIST

## 2023-05-27 PROCEDURE — 6370000000 HC RX 637 (ALT 250 FOR IP): Performed by: SURGERY

## 2023-05-27 PROCEDURE — 99291 CRITICAL CARE FIRST HOUR: CPT | Performed by: INTERNAL MEDICINE

## 2023-05-27 PROCEDURE — 94761 N-INVAS EAR/PLS OXIMETRY MLT: CPT

## 2023-05-27 PROCEDURE — 71045 X-RAY EXAM CHEST 1 VIEW: CPT

## 2023-05-27 PROCEDURE — 90935 HEMODIALYSIS ONE EVALUATION: CPT

## 2023-05-27 PROCEDURE — 84145 PROCALCITONIN (PCT): CPT

## 2023-05-27 PROCEDURE — 6360000002 HC RX W HCPCS: Performed by: NURSE PRACTITIONER

## 2023-05-27 PROCEDURE — C9113 INJ PANTOPRAZOLE SODIUM, VIA: HCPCS | Performed by: NURSE PRACTITIONER

## 2023-05-27 PROCEDURE — 2700000000 HC OXYGEN THERAPY PER DAY

## 2023-05-27 PROCEDURE — 2580000003 HC RX 258: Performed by: HOSPITALIST

## 2023-05-27 PROCEDURE — 6370000000 HC RX 637 (ALT 250 FOR IP): Performed by: NURSE PRACTITIONER

## 2023-05-27 PROCEDURE — 80069 RENAL FUNCTION PANEL: CPT

## 2023-05-27 PROCEDURE — 6360000002 HC RX W HCPCS: Performed by: INTERNAL MEDICINE

## 2023-05-27 PROCEDURE — 2000000000 HC ICU R&B

## 2023-05-27 PROCEDURE — 94640 AIRWAY INHALATION TREATMENT: CPT

## 2023-05-27 RX ADMIN — DOXYCYCLINE 100 MG: 100 INJECTION, POWDER, LYOPHILIZED, FOR SOLUTION INTRAVENOUS at 01:12

## 2023-05-27 RX ADMIN — SODIUM CHLORIDE, PRESERVATIVE FREE 10 ML: 5 INJECTION INTRAVENOUS at 20:00

## 2023-05-27 RX ADMIN — HYDRALAZINE HYDROCHLORIDE 10 MG: 20 INJECTION INTRAMUSCULAR; INTRAVENOUS at 18:04

## 2023-05-27 RX ADMIN — SODIUM CHLORIDE, PRESERVATIVE FREE 40 MG: 5 INJECTION INTRAVENOUS at 20:50

## 2023-05-27 RX ADMIN — DOXYCYCLINE 100 MG: 100 INJECTION, POWDER, LYOPHILIZED, FOR SOLUTION INTRAVENOUS at 15:53

## 2023-05-27 RX ADMIN — CLONIDINE HYDROCHLORIDE 0.2 MG: 0.1 TABLET ORAL at 20:50

## 2023-05-27 RX ADMIN — IPRATROPIUM BROMIDE AND ALBUTEROL SULFATE 1 AMPULE: 2.5; .5 SOLUTION RESPIRATORY (INHALATION) at 16:07

## 2023-05-27 RX ADMIN — IPRATROPIUM BROMIDE AND ALBUTEROL SULFATE 1 AMPULE: 2.5; .5 SOLUTION RESPIRATORY (INHALATION) at 08:48

## 2023-05-27 RX ADMIN — HYDRALAZINE HYDROCHLORIDE 100 MG: 50 TABLET, FILM COATED ORAL at 14:27

## 2023-05-27 RX ADMIN — CLONIDINE HYDROCHLORIDE 0.2 MG: 0.1 TABLET ORAL at 14:27

## 2023-05-27 RX ADMIN — CHLORHEXIDINE GLUCONATE 0.12% ORAL RINSE 15 ML: 1.2 LIQUID ORAL at 19:35

## 2023-05-27 RX ADMIN — HYDRALAZINE HYDROCHLORIDE 100 MG: 50 TABLET, FILM COATED ORAL at 20:50

## 2023-05-27 RX ADMIN — INSULIN LISPRO 1 UNITS: 100 INJECTION, SOLUTION INTRAVENOUS; SUBCUTANEOUS at 09:19

## 2023-05-27 RX ADMIN — INSULIN LISPRO 2 UNITS: 100 INJECTION, SOLUTION INTRAVENOUS; SUBCUTANEOUS at 19:59

## 2023-05-27 RX ADMIN — SODIUM CHLORIDE, PRESERVATIVE FREE 10 ML: 5 INJECTION INTRAVENOUS at 09:21

## 2023-05-27 RX ADMIN — IPRATROPIUM BROMIDE AND ALBUTEROL SULFATE 1 AMPULE: 2.5; .5 SOLUTION RESPIRATORY (INHALATION) at 12:04

## 2023-05-27 RX ADMIN — INSULIN LISPRO 2 UNITS: 100 INJECTION, SOLUTION INTRAVENOUS; SUBCUTANEOUS at 03:53

## 2023-05-27 RX ADMIN — METOPROLOL TARTRATE 75 MG: 25 TABLET, FILM COATED ORAL at 20:50

## 2023-05-27 RX ADMIN — SENNOSIDES AND DOCUSATE SODIUM 2 TABLET: 50; 8.6 TABLET ORAL at 11:43

## 2023-05-27 RX ADMIN — METOPROLOL TARTRATE 75 MG: 25 TABLET, FILM COATED ORAL at 11:43

## 2023-05-27 RX ADMIN — ATORVASTATIN CALCIUM 40 MG: 40 TABLET, FILM COATED ORAL at 20:51

## 2023-05-27 RX ADMIN — CHLORHEXIDINE GLUCONATE 0.12% ORAL RINSE 15 ML: 1.2 LIQUID ORAL at 09:20

## 2023-05-27 RX ADMIN — MEROPENEM 500 MG: 500 INJECTION, POWDER, FOR SOLUTION INTRAVENOUS at 12:44

## 2023-05-27 RX ADMIN — HYDRALAZINE HYDROCHLORIDE 10 MG: 20 INJECTION INTRAMUSCULAR; INTRAVENOUS at 01:08

## 2023-05-27 RX ADMIN — IPRATROPIUM BROMIDE AND ALBUTEROL SULFATE 1 AMPULE: 2.5; .5 SOLUTION RESPIRATORY (INHALATION) at 23:16

## 2023-05-27 RX ADMIN — INSULIN LISPRO 2 UNITS: 100 INJECTION, SOLUTION INTRAVENOUS; SUBCUTANEOUS at 16:10

## 2023-05-27 RX ADMIN — SODIUM CHLORIDE 100 MG: 9 INJECTION, SOLUTION INTRAVENOUS at 14:27

## 2023-05-27 RX ADMIN — CIPROFLOXACIN 400 MG: 400 INJECTION, SOLUTION INTRAVENOUS at 12:45

## 2023-05-27 RX ADMIN — IPRATROPIUM BROMIDE AND ALBUTEROL SULFATE 1 AMPULE: 2.5; .5 SOLUTION RESPIRATORY (INHALATION) at 19:35

## 2023-05-27 RX ADMIN — EPOETIN ALFA-EPBX 10000 UNITS: 10000 INJECTION, SOLUTION INTRAVENOUS; SUBCUTANEOUS at 10:08

## 2023-05-27 RX ADMIN — IPRATROPIUM BROMIDE AND ALBUTEROL SULFATE 1 AMPULE: 2.5; .5 SOLUTION RESPIRATORY (INHALATION) at 00:36

## 2023-05-27 RX ADMIN — IPRATROPIUM BROMIDE AND ALBUTEROL SULFATE 1 AMPULE: 2.5; .5 SOLUTION RESPIRATORY (INHALATION) at 04:08

## 2023-05-27 RX ADMIN — SODIUM CHLORIDE, PRESERVATIVE FREE 40 MG: 5 INJECTION INTRAVENOUS at 09:19

## 2023-05-27 RX ADMIN — HYDRALAZINE HYDROCHLORIDE 10 MG: 20 INJECTION INTRAMUSCULAR; INTRAVENOUS at 12:36

## 2023-05-27 ASSESSMENT — PULMONARY FUNCTION TESTS
PIF_VALUE: 22
PIF_VALUE: 22
PIF_VALUE: 24
PIF_VALUE: 27
PIF_VALUE: 24
PIF_VALUE: 27
PIF_VALUE: 33
PIF_VALUE: 22
PIF_VALUE: 29
PIF_VALUE: 29
PIF_VALUE: 24
PIF_VALUE: 22
PIF_VALUE: 28
PIF_VALUE: 23
PIF_VALUE: 31
PIF_VALUE: 32
PIF_VALUE: 20
PIF_VALUE: 24
PIF_VALUE: 21
PIF_VALUE: 24
PIF_VALUE: 26
PIF_VALUE: 23
PIF_VALUE: 24
PIF_VALUE: 23
PIF_VALUE: 31
PIF_VALUE: 22
PIF_VALUE: 22

## 2023-05-27 ASSESSMENT — PAIN SCALES - GENERAL
PAINLEVEL_OUTOF10: 0
PAINLEVEL_OUTOF10: 0

## 2023-05-28 LAB
ANION GAP SERPL CALCULATED.3IONS-SCNC: 11 MMOL/L (ref 3–16)
ANISOCYTOSIS BLD QL SMEAR: ABNORMAL
BASOPHILS # BLD: 0 K/UL (ref 0–0.2)
BASOPHILS NFR BLD: 0 %
BUN SERPL-MCNC: 29 MG/DL (ref 7–20)
CALCIUM SERPL-MCNC: 8.3 MG/DL (ref 8.3–10.6)
CHLORIDE SERPL-SCNC: 97 MMOL/L (ref 99–110)
CO2 SERPL-SCNC: 25 MMOL/L (ref 21–32)
CREAT SERPL-MCNC: 2.5 MG/DL (ref 0.9–1.3)
DEPRECATED RDW RBC AUTO: 16.1 % (ref 12.4–15.4)
EOSINOPHIL # BLD: 0 K/UL (ref 0–0.6)
EOSINOPHIL NFR BLD: 0 %
GFR SERPLBLD CREATININE-BSD FMLA CKD-EPI: 29 ML/MIN/{1.73_M2}
GLUCOSE BLD-MCNC: 195 MG/DL (ref 70–99)
GLUCOSE BLD-MCNC: 219 MG/DL (ref 70–99)
GLUCOSE BLD-MCNC: 298 MG/DL (ref 70–99)
GLUCOSE BLD-MCNC: 352 MG/DL (ref 70–99)
GLUCOSE BLD-MCNC: 386 MG/DL (ref 70–99)
GLUCOSE BLD-MCNC: 402 MG/DL (ref 70–99)
GLUCOSE BLD-MCNC: 429 MG/DL (ref 70–99)
GLUCOSE SERPL-MCNC: 368 MG/DL (ref 70–99)
HCT VFR BLD AUTO: 23.4 % (ref 40.5–52.5)
HGB BLD-MCNC: 7.5 G/DL (ref 13.5–17.5)
HYPOCHROMIA BLD QL SMEAR: ABNORMAL
LYMPHOCYTES # BLD: 0.5 K/UL (ref 1–5.1)
LYMPHOCYTES NFR BLD: 3 %
MCH RBC QN AUTO: 29.4 PG (ref 26–34)
MCHC RBC AUTO-ENTMCNC: 32.1 G/DL (ref 31–36)
MCV RBC AUTO: 91.6 FL (ref 80–100)
MONOCYTES # BLD: 1 K/UL (ref 0–1.3)
MONOCYTES NFR BLD: 6 %
NEUTROPHILS # BLD: 15.5 K/UL (ref 1.7–7.7)
NEUTROPHILS NFR BLD: 85 %
NEUTS BAND NFR BLD MANUAL: 6 % (ref 0–7)
PERFORMED ON: ABNORMAL
PHOSPHATE SERPL-MCNC: 1.8 MG/DL (ref 2.5–4.9)
PLATELET # BLD AUTO: 66 K/UL (ref 135–450)
PLATELET BLD QL SMEAR: ABNORMAL
PMV BLD AUTO: 11.2 FL (ref 5–10.5)
POIKILOCYTOSIS BLD QL SMEAR: ABNORMAL
POTASSIUM SERPL-SCNC: 4.1 MMOL/L (ref 3.5–5.1)
RBC # BLD AUTO: 2.55 M/UL (ref 4.2–5.9)
SODIUM SERPL-SCNC: 133 MMOL/L (ref 136–145)
TARGETS BLD QL SMEAR: ABNORMAL
WBC # BLD AUTO: 17 K/UL (ref 4–11)

## 2023-05-28 PROCEDURE — 2580000003 HC RX 258: Performed by: SURGERY

## 2023-05-28 PROCEDURE — 6370000000 HC RX 637 (ALT 250 FOR IP): Performed by: SURGERY

## 2023-05-28 PROCEDURE — 2580000003 HC RX 258: Performed by: INTERNAL MEDICINE

## 2023-05-28 PROCEDURE — 6370000000 HC RX 637 (ALT 250 FOR IP): Performed by: NURSE PRACTITIONER

## 2023-05-28 PROCEDURE — 99223 1ST HOSP IP/OBS HIGH 75: CPT | Performed by: STUDENT IN AN ORGANIZED HEALTH CARE EDUCATION/TRAINING PROGRAM

## 2023-05-28 PROCEDURE — 80048 BASIC METABOLIC PNL TOTAL CA: CPT

## 2023-05-28 PROCEDURE — 6360000002 HC RX W HCPCS: Performed by: NURSE PRACTITIONER

## 2023-05-28 PROCEDURE — 6370000000 HC RX 637 (ALT 250 FOR IP): Performed by: INTERNAL MEDICINE

## 2023-05-28 PROCEDURE — 94761 N-INVAS EAR/PLS OXIMETRY MLT: CPT

## 2023-05-28 PROCEDURE — 2000000000 HC ICU R&B

## 2023-05-28 PROCEDURE — 2500000003 HC RX 250 WO HCPCS: Performed by: NURSE PRACTITIONER

## 2023-05-28 PROCEDURE — 6360000002 HC RX W HCPCS: Performed by: SURGERY

## 2023-05-28 PROCEDURE — 94003 VENT MGMT INPAT SUBQ DAY: CPT

## 2023-05-28 PROCEDURE — 2500000003 HC RX 250 WO HCPCS: Performed by: INTERNAL MEDICINE

## 2023-05-28 PROCEDURE — 2580000003 HC RX 258: Performed by: NURSE PRACTITIONER

## 2023-05-28 PROCEDURE — 2580000003 HC RX 258: Performed by: HOSPITALIST

## 2023-05-28 PROCEDURE — 85025 COMPLETE CBC W/AUTO DIFF WBC: CPT

## 2023-05-28 PROCEDURE — 2700000000 HC OXYGEN THERAPY PER DAY

## 2023-05-28 PROCEDURE — 84100 ASSAY OF PHOSPHORUS: CPT

## 2023-05-28 PROCEDURE — 6360000002 HC RX W HCPCS: Performed by: HOSPITALIST

## 2023-05-28 PROCEDURE — 99291 CRITICAL CARE FIRST HOUR: CPT | Performed by: INTERNAL MEDICINE

## 2023-05-28 PROCEDURE — C9113 INJ PANTOPRAZOLE SODIUM, VIA: HCPCS | Performed by: SURGERY

## 2023-05-28 PROCEDURE — 94640 AIRWAY INHALATION TREATMENT: CPT

## 2023-05-28 PROCEDURE — 36592 COLLECT BLOOD FROM PICC: CPT

## 2023-05-28 RX ORDER — SPIRONOLACTONE 25 MG/1
50 TABLET ORAL DAILY
Status: DISCONTINUED | OUTPATIENT
Start: 2023-05-28 | End: 2023-05-29

## 2023-05-28 RX ORDER — CLONIDINE HYDROCHLORIDE 0.1 MG/1
0.2 TABLET ORAL ONCE
Status: COMPLETED | OUTPATIENT
Start: 2023-05-28 | End: 2023-05-28

## 2023-05-28 RX ORDER — INSULIN LISPRO 100 [IU]/ML
0-16 INJECTION, SOLUTION INTRAVENOUS; SUBCUTANEOUS
Status: DISCONTINUED | OUTPATIENT
Start: 2023-05-28 | End: 2023-06-06 | Stop reason: HOSPADM

## 2023-05-28 RX ORDER — CLONIDINE 0.3 MG/24H
1 PATCH, EXTENDED RELEASE TRANSDERMAL WEEKLY
Status: DISCONTINUED | OUTPATIENT
Start: 2023-05-28 | End: 2023-06-06 | Stop reason: HOSPADM

## 2023-05-28 RX ORDER — AMLODIPINE BESYLATE 10 MG/1
10 TABLET ORAL DAILY
Status: DISCONTINUED | OUTPATIENT
Start: 2023-05-28 | End: 2023-06-06 | Stop reason: HOSPADM

## 2023-05-28 RX ORDER — HYDRALAZINE HYDROCHLORIDE 20 MG/ML
10 INJECTION INTRAMUSCULAR; INTRAVENOUS ONCE
Status: COMPLETED | OUTPATIENT
Start: 2023-05-28 | End: 2023-05-28

## 2023-05-28 RX ADMIN — CIPROFLOXACIN 400 MG: 400 INJECTION, SOLUTION INTRAVENOUS at 12:13

## 2023-05-28 RX ADMIN — METOPROLOL TARTRATE 75 MG: 25 TABLET, FILM COATED ORAL at 08:01

## 2023-05-28 RX ADMIN — INSULIN LISPRO 4 UNITS: 100 INJECTION, SOLUTION INTRAVENOUS; SUBCUTANEOUS at 04:06

## 2023-05-28 RX ADMIN — AMLODIPINE BESYLATE 10 MG: 10 TABLET ORAL at 08:49

## 2023-05-28 RX ADMIN — INSULIN LISPRO 4 UNITS: 100 INJECTION, SOLUTION INTRAVENOUS; SUBCUTANEOUS at 20:17

## 2023-05-28 RX ADMIN — HYDRALAZINE HYDROCHLORIDE 100 MG: 50 TABLET, FILM COATED ORAL at 13:34

## 2023-05-28 RX ADMIN — SODIUM CHLORIDE, PRESERVATIVE FREE 10 ML: 5 INJECTION INTRAVENOUS at 04:05

## 2023-05-28 RX ADMIN — MEROPENEM 500 MG: 500 INJECTION, POWDER, FOR SOLUTION INTRAVENOUS at 13:34

## 2023-05-28 RX ADMIN — SPIRONOLACTONE 50 MG: 25 TABLET ORAL at 08:49

## 2023-05-28 RX ADMIN — DOXYCYCLINE 100 MG: 100 INJECTION, POWDER, LYOPHILIZED, FOR SOLUTION INTRAVENOUS at 15:08

## 2023-05-28 RX ADMIN — CHLORHEXIDINE GLUCONATE 0.12% ORAL RINSE 15 ML: 1.2 LIQUID ORAL at 19:58

## 2023-05-28 RX ADMIN — IPRATROPIUM BROMIDE AND ALBUTEROL SULFATE 1 AMPULE: 2.5; .5 SOLUTION RESPIRATORY (INHALATION) at 15:43

## 2023-05-28 RX ADMIN — INSULIN LISPRO 16 UNITS: 100 INJECTION, SOLUTION INTRAVENOUS; SUBCUTANEOUS at 11:35

## 2023-05-28 RX ADMIN — SODIUM PHOSPHATE, MONOBASIC, MONOHYDRATE AND SODIUM PHOSPHATE, DIBASIC, ANHYDROUS 10 MMOL: 142; 276 INJECTION, SOLUTION INTRAVENOUS at 10:11

## 2023-05-28 RX ADMIN — CLONIDINE HYDROCHLORIDE 0.2 MG: 0.1 TABLET ORAL at 08:01

## 2023-05-28 RX ADMIN — HYDRALAZINE HYDROCHLORIDE 100 MG: 50 TABLET, FILM COATED ORAL at 08:01

## 2023-05-28 RX ADMIN — INSULIN LISPRO 4 UNITS: 100 INJECTION, SOLUTION INTRAVENOUS; SUBCUTANEOUS at 07:59

## 2023-05-28 RX ADMIN — HYDRALAZINE HYDROCHLORIDE 10 MG: 20 INJECTION INTRAMUSCULAR; INTRAVENOUS at 01:46

## 2023-05-28 RX ADMIN — SENNOSIDES AND DOCUSATE SODIUM 2 TABLET: 50; 8.6 TABLET ORAL at 08:01

## 2023-05-28 RX ADMIN — CHLORHEXIDINE GLUCONATE 0.12% ORAL RINSE 15 ML: 1.2 LIQUID ORAL at 08:03

## 2023-05-28 RX ADMIN — DOXYCYCLINE 100 MG: 100 INJECTION, POWDER, LYOPHILIZED, FOR SOLUTION INTRAVENOUS at 01:52

## 2023-05-28 RX ADMIN — INSULIN GLARGINE 10 UNITS: 100 INJECTION, SOLUTION SUBCUTANEOUS at 21:11

## 2023-05-28 RX ADMIN — SODIUM CHLORIDE, PRESERVATIVE FREE 10 ML: 5 INJECTION INTRAVENOUS at 08:01

## 2023-05-28 RX ADMIN — ATORVASTATIN CALCIUM 40 MG: 40 TABLET, FILM COATED ORAL at 21:17

## 2023-05-28 RX ADMIN — IPRATROPIUM BROMIDE AND ALBUTEROL SULFATE 1 AMPULE: 2.5; .5 SOLUTION RESPIRATORY (INHALATION) at 04:32

## 2023-05-28 RX ADMIN — CLONIDINE HYDROCHLORIDE 0.2 MG: 0.1 TABLET ORAL at 02:32

## 2023-05-28 RX ADMIN — IPRATROPIUM BROMIDE AND ALBUTEROL SULFATE 1 AMPULE: 2.5; .5 SOLUTION RESPIRATORY (INHALATION) at 23:16

## 2023-05-28 RX ADMIN — SODIUM CHLORIDE 5 MG/HR: 9 INJECTION, SOLUTION INTRAVENOUS at 10:14

## 2023-05-28 RX ADMIN — IPRATROPIUM BROMIDE AND ALBUTEROL SULFATE 1 AMPULE: 2.5; .5 SOLUTION RESPIRATORY (INHALATION) at 08:25

## 2023-05-28 RX ADMIN — SODIUM CHLORIDE, PRESERVATIVE FREE 40 MG: 5 INJECTION INTRAVENOUS at 08:00

## 2023-05-28 RX ADMIN — SODIUM CHLORIDE 2.5 MG/HR: 9 INJECTION, SOLUTION INTRAVENOUS at 21:12

## 2023-05-28 RX ADMIN — SODIUM CHLORIDE 100 MG: 9 INJECTION, SOLUTION INTRAVENOUS at 17:19

## 2023-05-28 RX ADMIN — IPRATROPIUM BROMIDE AND ALBUTEROL SULFATE 1 AMPULE: 2.5; .5 SOLUTION RESPIRATORY (INHALATION) at 19:50

## 2023-05-28 RX ADMIN — SODIUM CHLORIDE, PRESERVATIVE FREE 40 MG: 5 INJECTION INTRAVENOUS at 21:17

## 2023-05-28 RX ADMIN — IPRATROPIUM BROMIDE AND ALBUTEROL SULFATE 1 AMPULE: 2.5; .5 SOLUTION RESPIRATORY (INHALATION) at 12:10

## 2023-05-28 RX ADMIN — HYDRALAZINE HYDROCHLORIDE 100 MG: 50 TABLET, FILM COATED ORAL at 21:17

## 2023-05-28 RX ADMIN — SODIUM CHLORIDE, PRESERVATIVE FREE 10 ML: 5 INJECTION INTRAVENOUS at 21:15

## 2023-05-28 RX ADMIN — INSULIN LISPRO 8 UNITS: 100 INJECTION, SOLUTION INTRAVENOUS; SUBCUTANEOUS at 23:42

## 2023-05-28 RX ADMIN — INSULIN LISPRO 2 UNITS: 100 INJECTION, SOLUTION INTRAVENOUS; SUBCUTANEOUS at 00:17

## 2023-05-28 RX ADMIN — METOPROLOL TARTRATE 75 MG: 25 TABLET, FILM COATED ORAL at 21:17

## 2023-05-28 RX ADMIN — HYDRALAZINE HYDROCHLORIDE 10 MG: 20 INJECTION INTRAMUSCULAR; INTRAVENOUS at 00:18

## 2023-05-28 ASSESSMENT — PULMONARY FUNCTION TESTS
PIF_VALUE: 24
PIF_VALUE: 23
PIF_VALUE: 22
PIF_VALUE: 26
PIF_VALUE: 26
PIF_VALUE: 25
PIF_VALUE: 29
PIF_VALUE: 22
PIF_VALUE: 23
PIF_VALUE: 31
PIF_VALUE: 19
PIF_VALUE: 23
PIF_VALUE: 25
PIF_VALUE: 21
PIF_VALUE: 23
PIF_VALUE: 26
PIF_VALUE: 23
PIF_VALUE: 21
PIF_VALUE: 31
PIF_VALUE: 23
PIF_VALUE: 26
PIF_VALUE: 23
PIF_VALUE: 27
PIF_VALUE: 25
PIF_VALUE: 25
PIF_VALUE: 31
PIF_VALUE: 23
PIF_VALUE: 22
PIF_VALUE: 23
PIF_VALUE: 26
PIF_VALUE: 24
PIF_VALUE: 23
PIF_VALUE: 23
PIF_VALUE: 24
PIF_VALUE: 25
PIF_VALUE: 24
PIF_VALUE: 25
PIF_VALUE: 25
PIF_VALUE: 23
PIF_VALUE: 23
PIF_VALUE: 24
PIF_VALUE: 30
PIF_VALUE: 23
PIF_VALUE: 25
PIF_VALUE: 22
PIF_VALUE: 19
PIF_VALUE: 23
PIF_VALUE: 33
PIF_VALUE: 17
PIF_VALUE: 23
PIF_VALUE: 29
PIF_VALUE: 25
PIF_VALUE: 22
PIF_VALUE: 25
PIF_VALUE: 30
PIF_VALUE: 23
PIF_VALUE: 22
PIF_VALUE: 32
PIF_VALUE: 25
PIF_VALUE: 21
PIF_VALUE: 27
PIF_VALUE: 26
PIF_VALUE: 27
PIF_VALUE: 22
PIF_VALUE: 24

## 2023-05-29 LAB
ANION GAP SERPL CALCULATED.3IONS-SCNC: 13 MMOL/L (ref 3–16)
BASOPHILS # BLD: 0 K/UL (ref 0–0.2)
BASOPHILS NFR BLD: 0.2 %
BUN SERPL-MCNC: 41 MG/DL (ref 7–20)
CALCIUM SERPL-MCNC: 8.3 MG/DL (ref 8.3–10.6)
CHLORIDE SERPL-SCNC: 101 MMOL/L (ref 99–110)
CO2 SERPL-SCNC: 23 MMOL/L (ref 21–32)
CREAT SERPL-MCNC: 3.2 MG/DL (ref 0.9–1.3)
DEPRECATED RDW RBC AUTO: 15.8 % (ref 12.4–15.4)
EOSINOPHIL # BLD: 0.1 K/UL (ref 0–0.6)
EOSINOPHIL NFR BLD: 0.5 %
GFR SERPLBLD CREATININE-BSD FMLA CKD-EPI: 22 ML/MIN/{1.73_M2}
GLUCOSE BLD-MCNC: 135 MG/DL (ref 70–99)
GLUCOSE BLD-MCNC: 164 MG/DL (ref 70–99)
GLUCOSE BLD-MCNC: 198 MG/DL (ref 70–99)
GLUCOSE BLD-MCNC: 206 MG/DL (ref 70–99)
GLUCOSE BLD-MCNC: 251 MG/DL (ref 70–99)
GLUCOSE BLD-MCNC: 268 MG/DL (ref 70–99)
GLUCOSE BLD-MCNC: 302 MG/DL (ref 70–99)
GLUCOSE SERPL-MCNC: 193 MG/DL (ref 70–99)
HCT VFR BLD AUTO: 22.1 % (ref 40.5–52.5)
HGB BLD-MCNC: 7.2 G/DL (ref 13.5–17.5)
LYMPHOCYTES # BLD: 0.7 K/UL (ref 1–5.1)
LYMPHOCYTES NFR BLD: 3.2 %
MCH RBC QN AUTO: 29.5 PG (ref 26–34)
MCHC RBC AUTO-ENTMCNC: 32.5 G/DL (ref 31–36)
MCV RBC AUTO: 90.9 FL (ref 80–100)
MONOCYTES # BLD: 1.8 K/UL (ref 0–1.3)
MONOCYTES NFR BLD: 8.5 %
NEUTROPHILS # BLD: 18.8 K/UL (ref 1.7–7.7)
NEUTROPHILS NFR BLD: 87.6 %
PERFORMED ON: ABNORMAL
PHOSPHATE SERPL-MCNC: 1.9 MG/DL (ref 2.5–4.9)
PLATELET # BLD AUTO: 83 K/UL (ref 135–450)
PMV BLD AUTO: 10.7 FL (ref 5–10.5)
POTASSIUM SERPL-SCNC: 3.6 MMOL/L (ref 3.5–5.1)
RBC # BLD AUTO: 2.43 M/UL (ref 4.2–5.9)
SODIUM SERPL-SCNC: 137 MMOL/L (ref 136–145)
WBC # BLD AUTO: 21.5 K/UL (ref 4–11)

## 2023-05-29 PROCEDURE — 85025 COMPLETE CBC W/AUTO DIFF WBC: CPT

## 2023-05-29 PROCEDURE — 6360000002 HC RX W HCPCS: Performed by: HOSPITALIST

## 2023-05-29 PROCEDURE — 80048 BASIC METABOLIC PNL TOTAL CA: CPT

## 2023-05-29 PROCEDURE — 2580000003 HC RX 258: Performed by: HOSPITALIST

## 2023-05-29 PROCEDURE — 6370000000 HC RX 637 (ALT 250 FOR IP): Performed by: NURSE PRACTITIONER

## 2023-05-29 PROCEDURE — 2580000003 HC RX 258: Performed by: SURGERY

## 2023-05-29 PROCEDURE — 84100 ASSAY OF PHOSPHORUS: CPT

## 2023-05-29 PROCEDURE — 6370000000 HC RX 637 (ALT 250 FOR IP): Performed by: INTERNAL MEDICINE

## 2023-05-29 PROCEDURE — 2580000003 HC RX 258: Performed by: INTERNAL MEDICINE

## 2023-05-29 PROCEDURE — 94003 VENT MGMT INPAT SUBQ DAY: CPT

## 2023-05-29 PROCEDURE — 6360000002 HC RX W HCPCS: Performed by: SURGERY

## 2023-05-29 PROCEDURE — 2700000000 HC OXYGEN THERAPY PER DAY

## 2023-05-29 PROCEDURE — 2500000003 HC RX 250 WO HCPCS: Performed by: INTERNAL MEDICINE

## 2023-05-29 PROCEDURE — 6370000000 HC RX 637 (ALT 250 FOR IP): Performed by: SURGERY

## 2023-05-29 PROCEDURE — 99291 CRITICAL CARE FIRST HOUR: CPT | Performed by: INTERNAL MEDICINE

## 2023-05-29 PROCEDURE — 2000000000 HC ICU R&B

## 2023-05-29 PROCEDURE — 94640 AIRWAY INHALATION TREATMENT: CPT

## 2023-05-29 PROCEDURE — C9113 INJ PANTOPRAZOLE SODIUM, VIA: HCPCS | Performed by: SURGERY

## 2023-05-29 PROCEDURE — 36592 COLLECT BLOOD FROM PICC: CPT

## 2023-05-29 PROCEDURE — 94761 N-INVAS EAR/PLS OXIMETRY MLT: CPT

## 2023-05-29 RX ORDER — SPIRONOLACTONE 25 MG/1
50 TABLET ORAL 2 TIMES DAILY
Status: DISCONTINUED | OUTPATIENT
Start: 2023-05-29 | End: 2023-06-06 | Stop reason: HOSPADM

## 2023-05-29 RX ORDER — INSULIN GLARGINE 100 [IU]/ML
5 INJECTION, SOLUTION SUBCUTANEOUS NIGHTLY
Status: DISCONTINUED | OUTPATIENT
Start: 2023-05-29 | End: 2023-05-30

## 2023-05-29 RX ORDER — CARVEDILOL 25 MG/1
25 TABLET ORAL 2 TIMES DAILY WITH MEALS
Status: DISCONTINUED | OUTPATIENT
Start: 2023-05-29 | End: 2023-06-06 | Stop reason: HOSPADM

## 2023-05-29 RX ADMIN — SODIUM CHLORIDE 5 MG/HR: 9 INJECTION, SOLUTION INTRAVENOUS at 06:14

## 2023-05-29 RX ADMIN — DOXYCYCLINE 100 MG: 100 INJECTION, POWDER, LYOPHILIZED, FOR SOLUTION INTRAVENOUS at 01:28

## 2023-05-29 RX ADMIN — INSULIN LISPRO 8 UNITS: 100 INJECTION, SOLUTION INTRAVENOUS; SUBCUTANEOUS at 10:37

## 2023-05-29 RX ADMIN — SENNOSIDES AND DOCUSATE SODIUM 2 TABLET: 50; 8.6 TABLET ORAL at 08:09

## 2023-05-29 RX ADMIN — HYDRALAZINE HYDROCHLORIDE 100 MG: 50 TABLET, FILM COATED ORAL at 13:54

## 2023-05-29 RX ADMIN — IPRATROPIUM BROMIDE AND ALBUTEROL SULFATE 1 AMPULE: 2.5; .5 SOLUTION RESPIRATORY (INHALATION) at 03:18

## 2023-05-29 RX ADMIN — CARVEDILOL 25 MG: 25 TABLET, FILM COATED ORAL at 21:43

## 2023-05-29 RX ADMIN — INSULIN LISPRO 4 UNITS: 100 INJECTION, SOLUTION INTRAVENOUS; SUBCUTANEOUS at 15:57

## 2023-05-29 RX ADMIN — IPRATROPIUM BROMIDE AND ALBUTEROL SULFATE 1 AMPULE: 2.5; .5 SOLUTION RESPIRATORY (INHALATION) at 12:02

## 2023-05-29 RX ADMIN — SODIUM CHLORIDE 100 MG: 9 INJECTION, SOLUTION INTRAVENOUS at 15:44

## 2023-05-29 RX ADMIN — SODIUM CHLORIDE 5 MG/HR: 9 INJECTION, SOLUTION INTRAVENOUS at 18:39

## 2023-05-29 RX ADMIN — SODIUM CHLORIDE, PRESERVATIVE FREE 40 MG: 5 INJECTION INTRAVENOUS at 21:42

## 2023-05-29 RX ADMIN — IPRATROPIUM BROMIDE AND ALBUTEROL SULFATE 1 AMPULE: 2.5; .5 SOLUTION RESPIRATORY (INHALATION) at 16:17

## 2023-05-29 RX ADMIN — CIPROFLOXACIN 400 MG: 400 INJECTION, SOLUTION INTRAVENOUS at 12:25

## 2023-05-29 RX ADMIN — ATORVASTATIN CALCIUM 40 MG: 40 TABLET, FILM COATED ORAL at 21:43

## 2023-05-29 RX ADMIN — SODIUM CHLORIDE 5 MG/HR: 9 INJECTION, SOLUTION INTRAVENOUS at 13:51

## 2023-05-29 RX ADMIN — HYDRALAZINE HYDROCHLORIDE 100 MG: 50 TABLET, FILM COATED ORAL at 21:43

## 2023-05-29 RX ADMIN — CHLORHEXIDINE GLUCONATE 0.12% ORAL RINSE 15 ML: 1.2 LIQUID ORAL at 08:10

## 2023-05-29 RX ADMIN — SODIUM CHLORIDE, PRESERVATIVE FREE 40 MG: 5 INJECTION INTRAVENOUS at 08:09

## 2023-05-29 RX ADMIN — DOXYCYCLINE 100 MG: 100 INJECTION, POWDER, LYOPHILIZED, FOR SOLUTION INTRAVENOUS at 14:41

## 2023-05-29 RX ADMIN — INSULIN LISPRO 12 UNITS: 100 INJECTION, SOLUTION INTRAVENOUS; SUBCUTANEOUS at 12:23

## 2023-05-29 RX ADMIN — MEROPENEM 500 MG: 500 INJECTION, POWDER, FOR SOLUTION INTRAVENOUS at 13:31

## 2023-05-29 RX ADMIN — IPRATROPIUM BROMIDE AND ALBUTEROL SULFATE 1 AMPULE: 2.5; .5 SOLUTION RESPIRATORY (INHALATION) at 08:25

## 2023-05-29 RX ADMIN — SODIUM PHOSPHATE, MONOBASIC, MONOHYDRATE AND SODIUM PHOSPHATE, DIBASIC, ANHYDROUS 10 MMOL: 142; 276 INJECTION, SOLUTION INTRAVENOUS at 10:34

## 2023-05-29 RX ADMIN — SODIUM CHLORIDE: 9 INJECTION, SOLUTION INTRAVENOUS at 20:18

## 2023-05-29 RX ADMIN — HYDRALAZINE HYDROCHLORIDE 100 MG: 50 TABLET, FILM COATED ORAL at 08:09

## 2023-05-29 RX ADMIN — METOPROLOL TARTRATE 75 MG: 25 TABLET, FILM COATED ORAL at 08:09

## 2023-05-29 RX ADMIN — AMLODIPINE BESYLATE 10 MG: 10 TABLET ORAL at 08:09

## 2023-05-29 RX ADMIN — CHLORHEXIDINE GLUCONATE 0.12% ORAL RINSE 15 ML: 1.2 LIQUID ORAL at 20:17

## 2023-05-29 RX ADMIN — SODIUM CHLORIDE 5 MG/HR: 9 INJECTION, SOLUTION INTRAVENOUS at 12:19

## 2023-05-29 RX ADMIN — SODIUM CHLORIDE, PRESERVATIVE FREE 10 ML: 5 INJECTION INTRAVENOUS at 08:10

## 2023-05-29 RX ADMIN — INSULIN GLARGINE 5 UNITS: 100 INJECTION, SOLUTION SUBCUTANEOUS at 21:58

## 2023-05-29 RX ADMIN — SPIRONOLACTONE 50 MG: 25 TABLET ORAL at 08:09

## 2023-05-29 RX ADMIN — SODIUM CHLORIDE, PRESERVATIVE FREE 10 ML: 5 INJECTION INTRAVENOUS at 20:19

## 2023-05-29 RX ADMIN — SODIUM CHLORIDE, PRESERVATIVE FREE 10 ML: 5 INJECTION INTRAVENOUS at 04:26

## 2023-05-29 RX ADMIN — SPIRONOLACTONE 50 MG: 25 TABLET ORAL at 18:35

## 2023-05-29 ASSESSMENT — PULMONARY FUNCTION TESTS
PIF_VALUE: 27
PIF_VALUE: 23
PIF_VALUE: 30
PIF_VALUE: 33
PIF_VALUE: 21
PIF_VALUE: 20
PIF_VALUE: 20
PIF_VALUE: 21
PIF_VALUE: 30
PIF_VALUE: 24
PIF_VALUE: 20
PIF_VALUE: 30
PIF_VALUE: 24
PIF_VALUE: 26
PIF_VALUE: 31
PIF_VALUE: 21
PIF_VALUE: 22
PIF_VALUE: 23
PIF_VALUE: 31
PIF_VALUE: 30
PIF_VALUE: 23
PIF_VALUE: 25
PIF_VALUE: 18
PIF_VALUE: 21
PIF_VALUE: 27
PIF_VALUE: 22
PIF_VALUE: 26
PIF_VALUE: 29
PIF_VALUE: 27
PIF_VALUE: 33
PIF_VALUE: 26
PIF_VALUE: 22
PIF_VALUE: 30
PIF_VALUE: 31
PIF_VALUE: 28
PIF_VALUE: 18
PIF_VALUE: 26
PIF_VALUE: 26
PIF_VALUE: 32
PIF_VALUE: 23
PIF_VALUE: 27
PIF_VALUE: 25
PIF_VALUE: 27
PIF_VALUE: 19
PIF_VALUE: 27
PIF_VALUE: 38
PIF_VALUE: 31
PIF_VALUE: 24
PIF_VALUE: 26
PIF_VALUE: 24
PIF_VALUE: 29
PIF_VALUE: 29
PIF_VALUE: 32
PIF_VALUE: 22
PIF_VALUE: 23
PIF_VALUE: 27
PIF_VALUE: 24
PIF_VALUE: 31
PIF_VALUE: 26
PIF_VALUE: 17
PIF_VALUE: 28
PIF_VALUE: 24
PIF_VALUE: 24
PIF_VALUE: 23
PIF_VALUE: 25
PIF_VALUE: 26
PIF_VALUE: 25

## 2023-05-30 ENCOUNTER — ANESTHESIA (OUTPATIENT)
Dept: OPERATING ROOM | Age: 58
End: 2023-05-30
Payer: COMMERCIAL

## 2023-05-30 ENCOUNTER — ANESTHESIA EVENT (OUTPATIENT)
Dept: OPERATING ROOM | Age: 58
End: 2023-05-30
Payer: COMMERCIAL

## 2023-05-30 ENCOUNTER — APPOINTMENT (OUTPATIENT)
Dept: CT IMAGING | Age: 58
DRG: 003 | End: 2023-05-30
Payer: COMMERCIAL

## 2023-05-30 ENCOUNTER — APPOINTMENT (OUTPATIENT)
Dept: ULTRASOUND IMAGING | Age: 58
DRG: 003 | End: 2023-05-30
Payer: COMMERCIAL

## 2023-05-30 PROBLEM — T17.908A ASPIRATION INTO AIRWAY: Status: ACTIVE | Noted: 2023-05-30

## 2023-05-30 PROBLEM — G93.1 ANOXIC BRAIN INJURY (HCC): Status: ACTIVE | Noted: 2023-05-30

## 2023-05-30 PROBLEM — I46.9 CARDIAC ARREST (HCC): Status: ACTIVE | Noted: 2023-05-30

## 2023-05-30 LAB
ABO + RH BLD: NORMAL
ANION GAP SERPL CALCULATED.3IONS-SCNC: 11 MMOL/L (ref 3–16)
BASOPHILS # BLD: 0.1 K/UL (ref 0–0.2)
BASOPHILS NFR BLD: 0.3 %
BLD GP AB SCN SERPL QL: NORMAL
BLOOD BANK DISPENSE STATUS: NORMAL
BLOOD BANK PRODUCT CODE: NORMAL
BPU ID: NORMAL
BUN SERPL-MCNC: 48 MG/DL (ref 7–20)
CALCIUM SERPL-MCNC: 8.1 MG/DL (ref 8.3–10.6)
CHLORIDE SERPL-SCNC: 99 MMOL/L (ref 99–110)
CO2 SERPL-SCNC: 23 MMOL/L (ref 21–32)
CREAT SERPL-MCNC: 3.8 MG/DL (ref 0.9–1.3)
DEPRECATED RDW RBC AUTO: 16.6 % (ref 12.4–15.4)
DESCRIPTION BLOOD BANK: NORMAL
EOSINOPHIL # BLD: 0.1 K/UL (ref 0–0.6)
EOSINOPHIL NFR BLD: 0.5 %
GFR SERPLBLD CREATININE-BSD FMLA CKD-EPI: 18 ML/MIN/{1.73_M2}
GLUCOSE BLD-MCNC: 120 MG/DL (ref 70–99)
GLUCOSE BLD-MCNC: 177 MG/DL (ref 70–99)
GLUCOSE BLD-MCNC: 184 MG/DL (ref 70–99)
GLUCOSE BLD-MCNC: 206 MG/DL (ref 70–99)
GLUCOSE BLD-MCNC: 214 MG/DL (ref 70–99)
GLUCOSE BLD-MCNC: 217 MG/DL (ref 70–99)
GLUCOSE SERPL-MCNC: 188 MG/DL (ref 70–99)
HCT VFR BLD AUTO: 20.1 % (ref 40.5–52.5)
HCT VFR BLD AUTO: 25.2 % (ref 40.5–52.5)
HGB BLD-MCNC: 6.6 G/DL (ref 13.5–17.5)
HGB BLD-MCNC: 8.4 G/DL (ref 13.5–17.5)
LYMPHOCYTES # BLD: 0.7 K/UL (ref 1–5.1)
LYMPHOCYTES NFR BLD: 3.1 %
MCH RBC QN AUTO: 29.6 PG (ref 26–34)
MCHC RBC AUTO-ENTMCNC: 32.6 G/DL (ref 31–36)
MCV RBC AUTO: 90.9 FL (ref 80–100)
MONOCYTES # BLD: 1.2 K/UL (ref 0–1.3)
MONOCYTES NFR BLD: 5.3 %
NEUTROPHILS # BLD: 20 K/UL (ref 1.7–7.7)
NEUTROPHILS NFR BLD: 90.8 %
PERFORMED ON: ABNORMAL
PHOSPHATE SERPL-MCNC: 2.6 MG/DL (ref 2.5–4.9)
PLATELET # BLD AUTO: 86 K/UL (ref 135–450)
PMV BLD AUTO: 11.3 FL (ref 5–10.5)
POTASSIUM SERPL-SCNC: 3.7 MMOL/L (ref 3.5–5.1)
PROCALCITONIN SERPL IA-MCNC: 2.1 NG/ML (ref 0–0.15)
RBC # BLD AUTO: 2.21 M/UL (ref 4.2–5.9)
SODIUM SERPL-SCNC: 133 MMOL/L (ref 136–145)
WBC # BLD AUTO: 22 K/UL (ref 4–11)

## 2023-05-30 PROCEDURE — 86850 RBC ANTIBODY SCREEN: CPT

## 2023-05-30 PROCEDURE — 2580000003 HC RX 258: Performed by: INTERNAL MEDICINE

## 2023-05-30 PROCEDURE — 2580000003 HC RX 258: Performed by: HOSPITALIST

## 2023-05-30 PROCEDURE — 94640 AIRWAY INHALATION TREATMENT: CPT

## 2023-05-30 PROCEDURE — 86900 BLOOD TYPING SEROLOGIC ABO: CPT

## 2023-05-30 PROCEDURE — 76705 ECHO EXAM OF ABDOMEN: CPT

## 2023-05-30 PROCEDURE — 2580000003 HC RX 258: Performed by: SURGERY

## 2023-05-30 PROCEDURE — 36430 TRANSFUSION BLD/BLD COMPNT: CPT

## 2023-05-30 PROCEDURE — 31610 TRACHEOSTOMY FENEST SKIN FLP: CPT | Performed by: STUDENT IN AN ORGANIZED HEALTH CARE EDUCATION/TRAINING PROGRAM

## 2023-05-30 PROCEDURE — 86901 BLOOD TYPING SEROLOGIC RH(D): CPT

## 2023-05-30 PROCEDURE — 0B110F4 BYPASS TRACHEA TO CUTANEOUS WITH TRACHEOSTOMY DEVICE, OPEN APPROACH: ICD-10-PCS | Performed by: STUDENT IN AN ORGANIZED HEALTH CARE EDUCATION/TRAINING PROGRAM

## 2023-05-30 PROCEDURE — 2500000003 HC RX 250 WO HCPCS

## 2023-05-30 PROCEDURE — 2500000003 HC RX 250 WO HCPCS: Performed by: INTERNAL MEDICINE

## 2023-05-30 PROCEDURE — 84145 PROCALCITONIN (PCT): CPT

## 2023-05-30 PROCEDURE — P9016 RBC LEUKOCYTES REDUCED: HCPCS

## 2023-05-30 PROCEDURE — 99233 SBSQ HOSP IP/OBS HIGH 50: CPT | Performed by: INTERNAL MEDICINE

## 2023-05-30 PROCEDURE — 6360000002 HC RX W HCPCS

## 2023-05-30 PROCEDURE — 6370000000 HC RX 637 (ALT 250 FOR IP): Performed by: SURGERY

## 2023-05-30 PROCEDURE — 2709999900 HC NON-CHARGEABLE SUPPLY: Performed by: STUDENT IN AN ORGANIZED HEALTH CARE EDUCATION/TRAINING PROGRAM

## 2023-05-30 PROCEDURE — 6360000002 HC RX W HCPCS: Performed by: SURGERY

## 2023-05-30 PROCEDURE — 36592 COLLECT BLOOD FROM PICC: CPT

## 2023-05-30 PROCEDURE — C9113 INJ PANTOPRAZOLE SODIUM, VIA: HCPCS | Performed by: SURGERY

## 2023-05-30 PROCEDURE — 30233N1 TRANSFUSION OF NONAUTOLOGOUS RED BLOOD CELLS INTO PERIPHERAL VEIN, PERCUTANEOUS APPROACH: ICD-10-PCS | Performed by: INTERNAL MEDICINE

## 2023-05-30 PROCEDURE — 86923 COMPATIBILITY TEST ELECTRIC: CPT

## 2023-05-30 PROCEDURE — 3700000001 HC ADD 15 MINUTES (ANESTHESIA): Performed by: STUDENT IN AN ORGANIZED HEALTH CARE EDUCATION/TRAINING PROGRAM

## 2023-05-30 PROCEDURE — 6370000000 HC RX 637 (ALT 250 FOR IP): Performed by: INTERNAL MEDICINE

## 2023-05-30 PROCEDURE — 80048 BASIC METABOLIC PNL TOTAL CA: CPT

## 2023-05-30 PROCEDURE — 84100 ASSAY OF PHOSPHORUS: CPT

## 2023-05-30 PROCEDURE — 6370000000 HC RX 637 (ALT 250 FOR IP): Performed by: NURSE PRACTITIONER

## 2023-05-30 PROCEDURE — 94003 VENT MGMT INPAT SUBQ DAY: CPT

## 2023-05-30 PROCEDURE — 2700000000 HC OXYGEN THERAPY PER DAY

## 2023-05-30 PROCEDURE — 85014 HEMATOCRIT: CPT

## 2023-05-30 PROCEDURE — 87040 BLOOD CULTURE FOR BACTERIA: CPT

## 2023-05-30 PROCEDURE — 90935 HEMODIALYSIS ONE EVALUATION: CPT

## 2023-05-30 PROCEDURE — 85018 HEMOGLOBIN: CPT

## 2023-05-30 PROCEDURE — 2000000000 HC ICU R&B

## 2023-05-30 PROCEDURE — 36415 COLL VENOUS BLD VENIPUNCTURE: CPT

## 2023-05-30 PROCEDURE — 74150 CT ABDOMEN W/O CONTRAST: CPT

## 2023-05-30 PROCEDURE — 3600000002 HC SURGERY LEVEL 2 BASE: Performed by: STUDENT IN AN ORGANIZED HEALTH CARE EDUCATION/TRAINING PROGRAM

## 2023-05-30 PROCEDURE — APPNB15 APP NON BILLABLE TIME 0-15 MINS: Performed by: NURSE PRACTITIONER

## 2023-05-30 PROCEDURE — 99291 CRITICAL CARE FIRST HOUR: CPT | Performed by: INTERNAL MEDICINE

## 2023-05-30 PROCEDURE — 3700000000 HC ANESTHESIA ATTENDED CARE: Performed by: STUDENT IN AN ORGANIZED HEALTH CARE EDUCATION/TRAINING PROGRAM

## 2023-05-30 PROCEDURE — 6360000002 HC RX W HCPCS: Performed by: HOSPITALIST

## 2023-05-30 PROCEDURE — 85025 COMPLETE CBC W/AUTO DIFF WBC: CPT

## 2023-05-30 PROCEDURE — 6360000002 HC RX W HCPCS: Performed by: INTERNAL MEDICINE

## 2023-05-30 PROCEDURE — 3600000012 HC SURGERY LEVEL 2 ADDTL 15MIN: Performed by: STUDENT IN AN ORGANIZED HEALTH CARE EDUCATION/TRAINING PROGRAM

## 2023-05-30 RX ORDER — ROCURONIUM BROMIDE 10 MG/ML
INJECTION, SOLUTION INTRAVENOUS PRN
Status: DISCONTINUED | OUTPATIENT
Start: 2023-05-30 | End: 2023-05-30 | Stop reason: SDUPTHER

## 2023-05-30 RX ORDER — FENTANYL CITRATE 50 UG/ML
INJECTION, SOLUTION INTRAMUSCULAR; INTRAVENOUS PRN
Status: DISCONTINUED | OUTPATIENT
Start: 2023-05-30 | End: 2023-05-30 | Stop reason: SDUPTHER

## 2023-05-30 RX ORDER — SODIUM CHLORIDE 9 MG/ML
INJECTION, SOLUTION INTRAVENOUS PRN
Status: DISCONTINUED | OUTPATIENT
Start: 2023-05-30 | End: 2023-06-06 | Stop reason: HOSPADM

## 2023-05-30 RX ORDER — PHENYLEPHRINE HCL IN 0.9% NACL 1 MG/10 ML
SYRINGE (ML) INTRAVENOUS PRN
Status: DISCONTINUED | OUTPATIENT
Start: 2023-05-30 | End: 2023-05-30 | Stop reason: SDUPTHER

## 2023-05-30 RX ORDER — INSULIN GLARGINE 100 [IU]/ML
10 INJECTION, SOLUTION SUBCUTANEOUS NIGHTLY
Status: DISCONTINUED | OUTPATIENT
Start: 2023-05-30 | End: 2023-06-03

## 2023-05-30 RX ADMIN — INSULIN GLARGINE 10 UNITS: 100 INJECTION, SOLUTION SUBCUTANEOUS at 20:33

## 2023-05-30 RX ADMIN — INSULIN LISPRO 4 UNITS: 100 INJECTION, SOLUTION INTRAVENOUS; SUBCUTANEOUS at 17:33

## 2023-05-30 RX ADMIN — IPRATROPIUM BROMIDE AND ALBUTEROL SULFATE 1 AMPULE: 2.5; .5 SOLUTION RESPIRATORY (INHALATION) at 23:38

## 2023-05-30 RX ADMIN — DOXYCYCLINE 100 MG: 100 INJECTION, POWDER, LYOPHILIZED, FOR SOLUTION INTRAVENOUS at 02:16

## 2023-05-30 RX ADMIN — ATORVASTATIN CALCIUM 40 MG: 40 TABLET, FILM COATED ORAL at 20:32

## 2023-05-30 RX ADMIN — INSULIN LISPRO 4 UNITS: 100 INJECTION, SOLUTION INTRAVENOUS; SUBCUTANEOUS at 03:38

## 2023-05-30 RX ADMIN — CARVEDILOL 25 MG: 25 TABLET, FILM COATED ORAL at 08:51

## 2023-05-30 RX ADMIN — SODIUM CHLORIDE, PRESERVATIVE FREE 40 MG: 5 INJECTION INTRAVENOUS at 20:33

## 2023-05-30 RX ADMIN — FENTANYL CITRATE 50 MCG: 50 INJECTION INTRAMUSCULAR; INTRAVENOUS at 12:20

## 2023-05-30 RX ADMIN — ROCURONIUM BROMIDE 50 MG: 10 INJECTION INTRAVENOUS at 12:12

## 2023-05-30 RX ADMIN — SODIUM CHLORIDE 5 MG/HR: 9 INJECTION, SOLUTION INTRAVENOUS at 22:55

## 2023-05-30 RX ADMIN — CHLORHEXIDINE GLUCONATE 0.12% ORAL RINSE 15 ML: 1.2 LIQUID ORAL at 09:07

## 2023-05-30 RX ADMIN — EPOETIN ALFA-EPBX 10000 UNITS: 10000 INJECTION, SOLUTION INTRAVENOUS; SUBCUTANEOUS at 11:56

## 2023-05-30 RX ADMIN — HEPARIN SODIUM 3600 UNITS: 1000 INJECTION INTRAVENOUS; SUBCUTANEOUS at 11:59

## 2023-05-30 RX ADMIN — IPRATROPIUM BROMIDE AND ALBUTEROL SULFATE 1 AMPULE: 2.5; .5 SOLUTION RESPIRATORY (INHALATION) at 04:10

## 2023-05-30 RX ADMIN — IPRATROPIUM BROMIDE AND ALBUTEROL SULFATE 1 AMPULE: 2.5; .5 SOLUTION RESPIRATORY (INHALATION) at 11:45

## 2023-05-30 RX ADMIN — CHLORHEXIDINE GLUCONATE 0.12% ORAL RINSE 15 ML: 1.2 LIQUID ORAL at 19:53

## 2023-05-30 RX ADMIN — CIPROFLOXACIN 400 MG: 400 INJECTION, SOLUTION INTRAVENOUS at 13:54

## 2023-05-30 RX ADMIN — HYDRALAZINE HYDROCHLORIDE 100 MG: 50 TABLET, FILM COATED ORAL at 20:32

## 2023-05-30 RX ADMIN — CARVEDILOL 25 MG: 25 TABLET, FILM COATED ORAL at 17:33

## 2023-05-30 RX ADMIN — HYDRALAZINE HYDROCHLORIDE 100 MG: 50 TABLET, FILM COATED ORAL at 14:12

## 2023-05-30 RX ADMIN — FENTANYL CITRATE 50 MCG: 50 INJECTION INTRAMUSCULAR; INTRAVENOUS at 12:46

## 2023-05-30 RX ADMIN — AMLODIPINE BESYLATE 10 MG: 10 TABLET ORAL at 08:51

## 2023-05-30 RX ADMIN — IPRATROPIUM BROMIDE AND ALBUTEROL SULFATE 1 AMPULE: 2.5; .5 SOLUTION RESPIRATORY (INHALATION) at 19:44

## 2023-05-30 RX ADMIN — IPRATROPIUM BROMIDE AND ALBUTEROL SULFATE 1 AMPULE: 2.5; .5 SOLUTION RESPIRATORY (INHALATION) at 07:57

## 2023-05-30 RX ADMIN — HYDRALAZINE HYDROCHLORIDE 100 MG: 50 TABLET, FILM COATED ORAL at 08:51

## 2023-05-30 RX ADMIN — MEROPENEM 500 MG: 500 INJECTION, POWDER, FOR SOLUTION INTRAVENOUS at 13:58

## 2023-05-30 RX ADMIN — SODIUM CHLORIDE 5 MG/HR: 9 INJECTION, SOLUTION INTRAVENOUS at 00:18

## 2023-05-30 RX ADMIN — SPIRONOLACTONE 50 MG: 25 TABLET ORAL at 17:33

## 2023-05-30 RX ADMIN — Medication 100 MCG: at 12:54

## 2023-05-30 RX ADMIN — SODIUM CHLORIDE 2.5 MG/HR: 9 INJECTION, SOLUTION INTRAVENOUS at 08:21

## 2023-05-30 RX ADMIN — DOXYCYCLINE 100 MG: 100 INJECTION, POWDER, LYOPHILIZED, FOR SOLUTION INTRAVENOUS at 15:25

## 2023-05-30 RX ADMIN — SODIUM CHLORIDE, PRESERVATIVE FREE 40 MG: 5 INJECTION INTRAVENOUS at 08:52

## 2023-05-30 RX ADMIN — SODIUM CHLORIDE 100 MG: 9 INJECTION, SOLUTION INTRAVENOUS at 14:12

## 2023-05-30 RX ADMIN — SPIRONOLACTONE 50 MG: 25 TABLET ORAL at 08:52

## 2023-05-30 RX ADMIN — IPRATROPIUM BROMIDE AND ALBUTEROL SULFATE 1 AMPULE: 2.5; .5 SOLUTION RESPIRATORY (INHALATION) at 15:41

## 2023-05-30 RX ADMIN — SODIUM CHLORIDE, PRESERVATIVE FREE 10 ML: 5 INJECTION INTRAVENOUS at 19:53

## 2023-05-30 RX ADMIN — SODIUM CHLORIDE, PRESERVATIVE FREE 10 ML: 5 INJECTION INTRAVENOUS at 08:55

## 2023-05-30 RX ADMIN — INSULIN LISPRO 4 UNITS: 100 INJECTION, SOLUTION INTRAVENOUS; SUBCUTANEOUS at 08:51

## 2023-05-30 RX ADMIN — IPRATROPIUM BROMIDE AND ALBUTEROL SULFATE 1 AMPULE: 2.5; .5 SOLUTION RESPIRATORY (INHALATION) at 00:31

## 2023-05-30 ASSESSMENT — PULMONARY FUNCTION TESTS
PIF_VALUE: 21
PIF_VALUE: 22
PIF_VALUE: 20
PIF_VALUE: 21
PIF_VALUE: 20
PIF_VALUE: 22
PIF_VALUE: 19
PIF_VALUE: 23
PIF_VALUE: 28
PIF_VALUE: 23
PIF_VALUE: 21
PIF_VALUE: 23
PIF_VALUE: 23
PIF_VALUE: 25
PIF_VALUE: 19
PIF_VALUE: 25
PIF_VALUE: 18
PIF_VALUE: 27
PIF_VALUE: 21
PIF_VALUE: 28
PIF_VALUE: 24
PIF_VALUE: 21
PIF_VALUE: 23
PIF_VALUE: 26
PIF_VALUE: 22
PIF_VALUE: 19
PIF_VALUE: 27
PIF_VALUE: 20
PIF_VALUE: 25
PIF_VALUE: 34
PIF_VALUE: 22
PIF_VALUE: 24
PIF_VALUE: 21
PIF_VALUE: 23
PIF_VALUE: 24
PIF_VALUE: 20
PIF_VALUE: 20
PIF_VALUE: 27
PIF_VALUE: 25
PIF_VALUE: 21
PIF_VALUE: 20
PIF_VALUE: 22
PIF_VALUE: 20
PIF_VALUE: 21
PIF_VALUE: 22
PIF_VALUE: 36
PIF_VALUE: 22
PIF_VALUE: 30
PIF_VALUE: 21
PIF_VALUE: 22
PIF_VALUE: 22
PIF_VALUE: 23
PIF_VALUE: 21
PIF_VALUE: 22
PIF_VALUE: 24
PIF_VALUE: 21
PIF_VALUE: 23
PIF_VALUE: 20
PIF_VALUE: 28
PIF_VALUE: 26
PIF_VALUE: 21
PIF_VALUE: 26
PIF_VALUE: 21
PIF_VALUE: 25
PIF_VALUE: 21
PIF_VALUE: 19
PIF_VALUE: 22
PIF_VALUE: 26
PIF_VALUE: 30
PIF_VALUE: 23
PIF_VALUE: 25
PIF_VALUE: 25
PIF_VALUE: 22
PIF_VALUE: 20
PIF_VALUE: 21
PIF_VALUE: 28
PIF_VALUE: 22
PIF_VALUE: 22
PIF_VALUE: 23
PIF_VALUE: 26
PIF_VALUE: 27
PIF_VALUE: 23
PIF_VALUE: 23
PIF_VALUE: 21
PIF_VALUE: 26
PIF_VALUE: 23
PIF_VALUE: 24

## 2023-05-30 ASSESSMENT — PAIN SCALES - GENERAL
PAINLEVEL_OUTOF10: 0

## 2023-05-30 ASSESSMENT — LIFESTYLE VARIABLES: SMOKING_STATUS: 1

## 2023-05-30 NOTE — ANESTHESIA POSTPROCEDURE EVALUATION
Department of Anesthesiology  Postprocedure Note    Patient: Jennifer Noguera  MRN: [id number]  YOB: 1965  Date of evaluation: 5/30/2023      Procedure Summary     Date: 05/30/23 Room / Location: [facility] OR [address] [place] [address]    Anesthesia Start: 0724 Anesthesia Stop: 6171    Procedure: TRACHEOTOMY (Neck) Diagnosis:       Respiratory failure, unspecified chronicity, unspecified whether with hypoxia or hypercapnia ([address]      (RESPIRATORY FAILURE)    Surgeons: Vicki Chapman MD Responsible Provider: Marisela Em MD    Anesthesia Type: General ASA Status: 4          Anesthesia Type: General     Kasey Phase I:      Kasey Phase II:        Anesthesia Post Evaluation    Patient location during evaluation: ICU  Patient participation: complete - patient cannot participate  Level of consciousness: sedated and ventilated and obtunded/minimal responses  Airway patency: patent (tracheostomy tube in situ)  Nausea & Vomiting: no nausea and no vomiting  Complications: no  Cardiovascular status: blood pressure returned to baseline and hemodynamically stable (Nicardipine paused)  Respiratory status: intubated and ventilator  Comments: Uneventful monitored transport to room 2112 following tracheostomy tube placement. Detailed sign out to ICU team on return to unit. Per RN, plan to take to CT for imaging. No questions at this time.

## 2023-05-31 LAB
ANION GAP SERPL CALCULATED.3IONS-SCNC: 13 MMOL/L (ref 3–16)
BASE EXCESS BLDV CALC-SCNC: -0.5 MMOL/L
BASOPHILS # BLD: 0.1 K/UL (ref 0–0.2)
BASOPHILS NFR BLD: 0.3 %
BUN SERPL-MCNC: 37 MG/DL (ref 7–20)
CALCIUM SERPL-MCNC: 8 MG/DL (ref 8.3–10.6)
CHLORIDE SERPL-SCNC: 100 MMOL/L (ref 99–110)
CO2 BLDV-SCNC: 24 MMOL/L
CO2 SERPL-SCNC: 21 MMOL/L (ref 21–32)
COHGB MFR BLDV: 1.6 %
CREAT SERPL-MCNC: 3 MG/DL (ref 0.9–1.3)
DEPRECATED RDW RBC AUTO: 16.2 % (ref 12.4–15.4)
EOSINOPHIL # BLD: 0.1 K/UL (ref 0–0.6)
EOSINOPHIL NFR BLD: 0.4 %
GFR SERPLBLD CREATININE-BSD FMLA CKD-EPI: 23 ML/MIN/{1.73_M2}
GLUCOSE BLD-MCNC: 159 MG/DL (ref 70–99)
GLUCOSE BLD-MCNC: 167 MG/DL (ref 70–99)
GLUCOSE BLD-MCNC: 170 MG/DL (ref 70–99)
GLUCOSE BLD-MCNC: 176 MG/DL (ref 70–99)
GLUCOSE BLD-MCNC: 185 MG/DL (ref 70–99)
GLUCOSE BLD-MCNC: 187 MG/DL (ref 70–99)
GLUCOSE BLD-MCNC: 187 MG/DL (ref 70–99)
GLUCOSE BLD-MCNC: 195 MG/DL (ref 70–99)
GLUCOSE SERPL-MCNC: 154 MG/DL (ref 70–99)
HCO3 BLDV-SCNC: 23 MMOL/L (ref 23–29)
HCT VFR BLD AUTO: 24.2 % (ref 40.5–52.5)
HGB BLD-MCNC: 8 G/DL (ref 13.5–17.5)
LYMPHOCYTES # BLD: 0.8 K/UL (ref 1–5.1)
LYMPHOCYTES NFR BLD: 4.6 %
MAGNESIUM SERPL-MCNC: 1.6 MG/DL (ref 1.8–2.4)
MCH RBC QN AUTO: 30.3 PG (ref 26–34)
MCHC RBC AUTO-ENTMCNC: 33.1 G/DL (ref 31–36)
MCV RBC AUTO: 91.3 FL (ref 80–100)
METHGB MFR BLDV: 0.6 %
MONOCYTES # BLD: 1.3 K/UL (ref 0–1.3)
MONOCYTES NFR BLD: 7.2 %
NEUTROPHILS # BLD: 15.8 K/UL (ref 1.7–7.7)
NEUTROPHILS NFR BLD: 87.5 %
O2 THERAPY: ABNORMAL
PCO2 BLDV: 33.5 MMHG (ref 40–50)
PERFORMED ON: ABNORMAL
PH BLDV: 7.45 [PH] (ref 7.35–7.45)
PHOSPHATE SERPL-MCNC: 2.9 MG/DL (ref 2.5–4.9)
PLATELET # BLD AUTO: 96 K/UL (ref 135–450)
PMV BLD AUTO: 11.2 FL (ref 5–10.5)
PO2 BLDV: 38 MMHG
POTASSIUM SERPL-SCNC: 3.7 MMOL/L (ref 3.5–5.1)
RBC # BLD AUTO: 2.65 M/UL (ref 4.2–5.9)
SAO2 % BLDV: 72 %
SODIUM SERPL-SCNC: 134 MMOL/L (ref 136–145)
WBC # BLD AUTO: 18 K/UL (ref 4–11)

## 2023-05-31 PROCEDURE — 2700000000 HC OXYGEN THERAPY PER DAY

## 2023-05-31 PROCEDURE — 83735 ASSAY OF MAGNESIUM: CPT

## 2023-05-31 PROCEDURE — 84100 ASSAY OF PHOSPHORUS: CPT

## 2023-05-31 PROCEDURE — 99233 SBSQ HOSP IP/OBS HIGH 50: CPT | Performed by: INTERNAL MEDICINE

## 2023-05-31 PROCEDURE — 2000000000 HC ICU R&B

## 2023-05-31 PROCEDURE — 99153 MOD SED SAME PHYS/QHP EA: CPT | Performed by: INTERNAL MEDICINE

## 2023-05-31 PROCEDURE — 95819 EEG AWAKE AND ASLEEP: CPT

## 2023-05-31 PROCEDURE — C9113 INJ PANTOPRAZOLE SODIUM, VIA: HCPCS | Performed by: SURGERY

## 2023-05-31 PROCEDURE — 2580000003 HC RX 258: Performed by: SURGERY

## 2023-05-31 PROCEDURE — 2580000003 HC RX 258: Performed by: INTERNAL MEDICINE

## 2023-05-31 PROCEDURE — 93971 EXTREMITY STUDY: CPT

## 2023-05-31 PROCEDURE — 94761 N-INVAS EAR/PLS OXIMETRY MLT: CPT

## 2023-05-31 PROCEDURE — 6360000002 HC RX W HCPCS: Performed by: SURGERY

## 2023-05-31 PROCEDURE — 2500000003 HC RX 250 WO HCPCS: Performed by: INTERNAL MEDICINE

## 2023-05-31 PROCEDURE — 82803 BLOOD GASES ANY COMBINATION: CPT

## 2023-05-31 PROCEDURE — 0DC68ZZ EXTIRPATION OF MATTER FROM STOMACH, VIA NATURAL OR ARTIFICIAL OPENING ENDOSCOPIC: ICD-10-PCS | Performed by: INTERNAL MEDICINE

## 2023-05-31 PROCEDURE — 85025 COMPLETE CBC W/AUTO DIFF WBC: CPT

## 2023-05-31 PROCEDURE — 99152 MOD SED SAME PHYS/QHP 5/>YRS: CPT | Performed by: INTERNAL MEDICINE

## 2023-05-31 PROCEDURE — 6360000002 HC RX W HCPCS: Performed by: INTERNAL MEDICINE

## 2023-05-31 PROCEDURE — 2580000003 HC RX 258: Performed by: HOSPITALIST

## 2023-05-31 PROCEDURE — APPNB15 APP NON BILLABLE TIME 0-15 MINS: Performed by: NURSE PRACTITIONER

## 2023-05-31 PROCEDURE — 3609013300 HC EGD TUBE PLACEMENT: Performed by: INTERNAL MEDICINE

## 2023-05-31 PROCEDURE — 6360000002 HC RX W HCPCS: Performed by: HOSPITALIST

## 2023-05-31 PROCEDURE — 94640 AIRWAY INHALATION TREATMENT: CPT

## 2023-05-31 PROCEDURE — 6370000000 HC RX 637 (ALT 250 FOR IP): Performed by: INTERNAL MEDICINE

## 2023-05-31 PROCEDURE — 3609012900 HC EGD FOREIGN BODY REMOVAL: Performed by: INTERNAL MEDICINE

## 2023-05-31 PROCEDURE — 3E0G76Z INTRODUCTION OF NUTRITIONAL SUBSTANCE INTO UPPER GI, VIA NATURAL OR ARTIFICIAL OPENING: ICD-10-PCS | Performed by: INTERNAL MEDICINE

## 2023-05-31 PROCEDURE — 6370000000 HC RX 637 (ALT 250 FOR IP): Performed by: SURGERY

## 2023-05-31 PROCEDURE — 80048 BASIC METABOLIC PNL TOTAL CA: CPT

## 2023-05-31 PROCEDURE — 2709999900 HC NON-CHARGEABLE SUPPLY: Performed by: INTERNAL MEDICINE

## 2023-05-31 PROCEDURE — 6370000000 HC RX 637 (ALT 250 FOR IP): Performed by: NURSE PRACTITIONER

## 2023-05-31 PROCEDURE — 94003 VENT MGMT INPAT SUBQ DAY: CPT

## 2023-05-31 PROCEDURE — 0DH63UZ INSERTION OF FEEDING DEVICE INTO STOMACH, PERCUTANEOUS APPROACH: ICD-10-PCS | Performed by: INTERNAL MEDICINE

## 2023-05-31 RX ORDER — MIDAZOLAM HYDROCHLORIDE 1 MG/ML
INJECTION INTRAMUSCULAR; INTRAVENOUS PRN
Status: DISCONTINUED | OUTPATIENT
Start: 2023-05-31 | End: 2023-05-31 | Stop reason: ALTCHOICE

## 2023-05-31 RX ORDER — ISOSORBIDE DINITRATE 20 MG/1
20 TABLET ORAL 3 TIMES DAILY
Status: DISCONTINUED | OUTPATIENT
Start: 2023-05-31 | End: 2023-06-06 | Stop reason: HOSPADM

## 2023-05-31 RX ORDER — FENTANYL CITRATE 50 UG/ML
INJECTION, SOLUTION INTRAMUSCULAR; INTRAVENOUS PRN
Status: DISCONTINUED | OUTPATIENT
Start: 2023-05-31 | End: 2023-05-31 | Stop reason: ALTCHOICE

## 2023-05-31 RX ORDER — LIDOCAINE HYDROCHLORIDE 10 MG/ML
INJECTION, SOLUTION EPIDURAL; INFILTRATION; INTRACAUDAL; PERINEURAL PRN
Status: DISCONTINUED | OUTPATIENT
Start: 2023-05-31 | End: 2023-05-31 | Stop reason: ALTCHOICE

## 2023-05-31 RX ORDER — LOSARTAN POTASSIUM 50 MG/1
50 TABLET ORAL DAILY
Status: DISCONTINUED | OUTPATIENT
Start: 2023-05-31 | End: 2023-06-01

## 2023-05-31 RX ORDER — MAGNESIUM SULFATE 1 G/100ML
1000 INJECTION INTRAVENOUS ONCE
Status: COMPLETED | OUTPATIENT
Start: 2023-05-31 | End: 2023-05-31

## 2023-05-31 RX ADMIN — HYDRALAZINE HYDROCHLORIDE 100 MG: 50 TABLET, FILM COATED ORAL at 13:54

## 2023-05-31 RX ADMIN — CIPROFLOXACIN 400 MG: 400 INJECTION, SOLUTION INTRAVENOUS at 13:04

## 2023-05-31 RX ADMIN — ATORVASTATIN CALCIUM 40 MG: 40 TABLET, FILM COATED ORAL at 20:00

## 2023-05-31 RX ADMIN — SPIRONOLACTONE 50 MG: 25 TABLET ORAL at 17:00

## 2023-05-31 RX ADMIN — MEROPENEM 500 MG: 500 INJECTION, POWDER, FOR SOLUTION INTRAVENOUS at 14:27

## 2023-05-31 RX ADMIN — HYDRALAZINE HYDROCHLORIDE 100 MG: 50 TABLET, FILM COATED ORAL at 09:39

## 2023-05-31 RX ADMIN — SODIUM CHLORIDE, PRESERVATIVE FREE 10 ML: 5 INJECTION INTRAVENOUS at 09:44

## 2023-05-31 RX ADMIN — INSULIN GLARGINE 10 UNITS: 100 INJECTION, SOLUTION SUBCUTANEOUS at 21:07

## 2023-05-31 RX ADMIN — SODIUM CHLORIDE 7.5 MG/HR: 9 INJECTION, SOLUTION INTRAVENOUS at 07:53

## 2023-05-31 RX ADMIN — ISOSORBIDE DINITRATE 20 MG: 20 TABLET ORAL at 09:39

## 2023-05-31 RX ADMIN — SODIUM CHLORIDE, PRESERVATIVE FREE 40 MG: 5 INJECTION INTRAVENOUS at 20:07

## 2023-05-31 RX ADMIN — IPRATROPIUM BROMIDE AND ALBUTEROL SULFATE 1 AMPULE: 2.5; .5 SOLUTION RESPIRATORY (INHALATION) at 15:54

## 2023-05-31 RX ADMIN — SODIUM CHLORIDE, PRESERVATIVE FREE 10 ML: 5 INJECTION INTRAVENOUS at 19:58

## 2023-05-31 RX ADMIN — IPRATROPIUM BROMIDE AND ALBUTEROL SULFATE 1 AMPULE: 2.5; .5 SOLUTION RESPIRATORY (INHALATION) at 08:12

## 2023-05-31 RX ADMIN — AMLODIPINE BESYLATE 10 MG: 10 TABLET ORAL at 09:39

## 2023-05-31 RX ADMIN — CARVEDILOL 25 MG: 25 TABLET, FILM COATED ORAL at 09:40

## 2023-05-31 RX ADMIN — SODIUM CHLORIDE 7.5 MG/HR: 9 INJECTION, SOLUTION INTRAVENOUS at 03:32

## 2023-05-31 RX ADMIN — CARVEDILOL 25 MG: 25 TABLET, FILM COATED ORAL at 17:00

## 2023-05-31 RX ADMIN — ISOSORBIDE DINITRATE 20 MG: 20 TABLET ORAL at 13:54

## 2023-05-31 RX ADMIN — IPRATROPIUM BROMIDE AND ALBUTEROL SULFATE 1 AMPULE: 2.5; .5 SOLUTION RESPIRATORY (INHALATION) at 03:26

## 2023-05-31 RX ADMIN — IPRATROPIUM BROMIDE AND ALBUTEROL SULFATE 1 AMPULE: 2.5; .5 SOLUTION RESPIRATORY (INHALATION) at 20:45

## 2023-05-31 RX ADMIN — SPIRONOLACTONE 50 MG: 25 TABLET ORAL at 09:40

## 2023-05-31 RX ADMIN — SODIUM CHLORIDE 100 MG: 9 INJECTION, SOLUTION INTRAVENOUS at 14:23

## 2023-05-31 RX ADMIN — CHLORHEXIDINE GLUCONATE 0.12% ORAL RINSE 15 ML: 1.2 LIQUID ORAL at 21:05

## 2023-05-31 RX ADMIN — LOSARTAN POTASSIUM 50 MG: 50 TABLET, FILM COATED ORAL at 09:40

## 2023-05-31 RX ADMIN — HYDRALAZINE HYDROCHLORIDE 100 MG: 50 TABLET, FILM COATED ORAL at 20:00

## 2023-05-31 RX ADMIN — SODIUM CHLORIDE, PRESERVATIVE FREE 40 MG: 5 INJECTION INTRAVENOUS at 09:40

## 2023-05-31 RX ADMIN — MAGNESIUM SULFATE HEPTAHYDRATE 1000 MG: 1 INJECTION, SOLUTION INTRAVENOUS at 16:55

## 2023-05-31 RX ADMIN — CHLORHEXIDINE GLUCONATE 0.12% ORAL RINSE 15 ML: 1.2 LIQUID ORAL at 09:40

## 2023-05-31 RX ADMIN — ISOSORBIDE DINITRATE 20 MG: 20 TABLET ORAL at 20:01

## 2023-05-31 ASSESSMENT — PULMONARY FUNCTION TESTS
PIF_VALUE: 26
PIF_VALUE: 20
PIF_VALUE: 16
PIF_VALUE: 36
PIF_VALUE: 23
PIF_VALUE: 21
PIF_VALUE: 20
PIF_VALUE: 22
PIF_VALUE: 20
PIF_VALUE: 26
PIF_VALUE: 16
PIF_VALUE: 20
PIF_VALUE: 22
PIF_VALUE: 27
PIF_VALUE: 16
PIF_VALUE: 18
PIF_VALUE: 22
PIF_VALUE: 16
PIF_VALUE: 16
PIF_VALUE: 17
PIF_VALUE: 20
PIF_VALUE: 21
PIF_VALUE: 23
PIF_VALUE: 27
PIF_VALUE: 21
PIF_VALUE: 33
PIF_VALUE: 21
PIF_VALUE: 16
PIF_VALUE: 23
PIF_VALUE: 16
PIF_VALUE: 21
PIF_VALUE: 20
PIF_VALUE: 16
PIF_VALUE: 16
PIF_VALUE: 21
PIF_VALUE: 16
PIF_VALUE: 23
PIF_VALUE: 18
PIF_VALUE: 21
PIF_VALUE: 16
PIF_VALUE: 16
PIF_VALUE: 22
PIF_VALUE: 20
PIF_VALUE: 16
PIF_VALUE: 20
PIF_VALUE: 20
PIF_VALUE: 16
PIF_VALUE: 16
PIF_VALUE: 21
PIF_VALUE: 21
PIF_VALUE: 16
PIF_VALUE: 20
PIF_VALUE: 16
PIF_VALUE: 16
PIF_VALUE: 21
PIF_VALUE: 20
PIF_VALUE: 16
PIF_VALUE: 34
PIF_VALUE: 25
PIF_VALUE: 26
PIF_VALUE: 16
PIF_VALUE: 22
PIF_VALUE: 16
PIF_VALUE: 20
PIF_VALUE: 16
PIF_VALUE: 21
PIF_VALUE: 16
PIF_VALUE: 16
PIF_VALUE: 20
PIF_VALUE: 16
PIF_VALUE: 22
PIF_VALUE: 24
PIF_VALUE: 22
PIF_VALUE: 20
PIF_VALUE: 21
PIF_VALUE: 23
PIF_VALUE: 20
PIF_VALUE: 16

## 2023-05-31 ASSESSMENT — PAIN SCALES - GENERAL
PAINLEVEL_OUTOF10: 0

## 2023-06-01 LAB
ANION GAP SERPL CALCULATED.3IONS-SCNC: 10 MMOL/L (ref 3–16)
BASE EXCESS BLDV CALC-SCNC: 3.9 MMOL/L
BASOPHILS # BLD: 0.1 K/UL (ref 0–0.2)
BASOPHILS NFR BLD: 0.4 %
BUN SERPL-MCNC: 46 MG/DL (ref 7–20)
CALCIUM SERPL-MCNC: 8.1 MG/DL (ref 8.3–10.6)
CHLORIDE SERPL-SCNC: 101 MMOL/L (ref 99–110)
CO2 BLDV-SCNC: 29 MMOL/L
CO2 SERPL-SCNC: 22 MMOL/L (ref 21–32)
COHGB MFR BLDV: 1.2 %
CREAT SERPL-MCNC: 3.6 MG/DL (ref 0.9–1.3)
DEPRECATED RDW RBC AUTO: 16.3 % (ref 12.4–15.4)
EOSINOPHIL # BLD: 0.1 K/UL (ref 0–0.6)
EOSINOPHIL NFR BLD: 0.4 %
GFR SERPLBLD CREATININE-BSD FMLA CKD-EPI: 19 ML/MIN/{1.73_M2}
GLUCOSE BLD-MCNC: 116 MG/DL (ref 70–99)
GLUCOSE BLD-MCNC: 149 MG/DL (ref 70–99)
GLUCOSE BLD-MCNC: 152 MG/DL (ref 70–99)
GLUCOSE BLD-MCNC: 170 MG/DL (ref 70–99)
GLUCOSE BLD-MCNC: 178 MG/DL (ref 70–99)
GLUCOSE BLD-MCNC: 190 MG/DL (ref 70–99)
GLUCOSE SERPL-MCNC: 144 MG/DL (ref 70–99)
HCO3 BLDV-SCNC: 27 MMOL/L (ref 23–29)
HCT VFR BLD AUTO: 23.2 % (ref 40.5–52.5)
HGB BLD-MCNC: 7.8 G/DL (ref 13.5–17.5)
LYMPHOCYTES # BLD: 0.4 K/UL (ref 1–5.1)
LYMPHOCYTES NFR BLD: 2.5 %
MCH RBC QN AUTO: 30.6 PG (ref 26–34)
MCHC RBC AUTO-ENTMCNC: 33.6 G/DL (ref 31–36)
MCV RBC AUTO: 91.1 FL (ref 80–100)
METHGB MFR BLDV: 0.4 %
MONOCYTES # BLD: 1 K/UL (ref 0–1.3)
MONOCYTES NFR BLD: 6.6 %
NEUTROPHILS # BLD: 13.7 K/UL (ref 1.7–7.7)
NEUTROPHILS NFR BLD: 90.1 %
O2 THERAPY: ABNORMAL
PCO2 BLDV: 35.8 MMHG (ref 40–50)
PERFORMED ON: ABNORMAL
PH BLDV: 7.49 [PH] (ref 7.35–7.45)
PHOSPHATE SERPL-MCNC: 4.2 MG/DL (ref 2.5–4.9)
PLATELET # BLD AUTO: 126 K/UL (ref 135–450)
PMV BLD AUTO: 10.7 FL (ref 5–10.5)
PO2 BLDV: 34 MMHG
POTASSIUM SERPL-SCNC: 3.7 MMOL/L (ref 3.5–5.1)
RBC # BLD AUTO: 2.54 M/UL (ref 4.2–5.9)
SAO2 % BLDV: 67 %
SODIUM SERPL-SCNC: 133 MMOL/L (ref 136–145)
WBC # BLD AUTO: 15.2 K/UL (ref 4–11)

## 2023-06-01 PROCEDURE — 84100 ASSAY OF PHOSPHORUS: CPT

## 2023-06-01 PROCEDURE — 94761 N-INVAS EAR/PLS OXIMETRY MLT: CPT

## 2023-06-01 PROCEDURE — APPNB15 APP NON BILLABLE TIME 0-15 MINS: Performed by: NURSE PRACTITIONER

## 2023-06-01 PROCEDURE — 2700000000 HC OXYGEN THERAPY PER DAY

## 2023-06-01 PROCEDURE — 94640 AIRWAY INHALATION TREATMENT: CPT

## 2023-06-01 PROCEDURE — 94003 VENT MGMT INPAT SUBQ DAY: CPT

## 2023-06-01 PROCEDURE — 6360000002 HC RX W HCPCS: Performed by: INTERNAL MEDICINE

## 2023-06-01 PROCEDURE — 85025 COMPLETE CBC W/AUTO DIFF WBC: CPT

## 2023-06-01 PROCEDURE — 90935 HEMODIALYSIS ONE EVALUATION: CPT

## 2023-06-01 PROCEDURE — 99233 SBSQ HOSP IP/OBS HIGH 50: CPT | Performed by: INTERNAL MEDICINE

## 2023-06-01 PROCEDURE — 2580000003 HC RX 258: Performed by: SURGERY

## 2023-06-01 PROCEDURE — 82803 BLOOD GASES ANY COMBINATION: CPT

## 2023-06-01 PROCEDURE — 6370000000 HC RX 637 (ALT 250 FOR IP): Performed by: NURSE PRACTITIONER

## 2023-06-01 PROCEDURE — 6360000002 HC RX W HCPCS: Performed by: SURGERY

## 2023-06-01 PROCEDURE — 6370000000 HC RX 637 (ALT 250 FOR IP): Performed by: INTERNAL MEDICINE

## 2023-06-01 PROCEDURE — 36592 COLLECT BLOOD FROM PICC: CPT

## 2023-06-01 PROCEDURE — 80048 BASIC METABOLIC PNL TOTAL CA: CPT

## 2023-06-01 PROCEDURE — 6360000002 HC RX W HCPCS: Performed by: NURSE PRACTITIONER

## 2023-06-01 PROCEDURE — 2500000003 HC RX 250 WO HCPCS: Performed by: INTERNAL MEDICINE

## 2023-06-01 PROCEDURE — 2580000003 HC RX 258: Performed by: HOSPITALIST

## 2023-06-01 PROCEDURE — 2580000003 HC RX 258: Performed by: INTERNAL MEDICINE

## 2023-06-01 PROCEDURE — 6360000002 HC RX W HCPCS: Performed by: HOSPITALIST

## 2023-06-01 PROCEDURE — 6370000000 HC RX 637 (ALT 250 FOR IP): Performed by: SURGERY

## 2023-06-01 PROCEDURE — C9113 INJ PANTOPRAZOLE SODIUM, VIA: HCPCS | Performed by: SURGERY

## 2023-06-01 PROCEDURE — 2000000000 HC ICU R&B

## 2023-06-01 RX ORDER — HEPARIN SODIUM 5000 [USP'U]/ML
5000 INJECTION, SOLUTION INTRAVENOUS; SUBCUTANEOUS EVERY 8 HOURS SCHEDULED
Status: DISCONTINUED | OUTPATIENT
Start: 2023-06-01 | End: 2023-06-06 | Stop reason: HOSPADM

## 2023-06-01 RX ORDER — SODIUM CHLORIDE FOR INHALATION 3 %
15 VIAL, NEBULIZER (ML) INHALATION
Status: DISCONTINUED | OUTPATIENT
Start: 2023-06-01 | End: 2023-06-06 | Stop reason: HOSPADM

## 2023-06-01 RX ORDER — ALBUTEROL SULFATE 2.5 MG/3ML
2.5 SOLUTION RESPIRATORY (INHALATION)
Status: DISCONTINUED | OUTPATIENT
Start: 2023-06-01 | End: 2023-06-03

## 2023-06-01 RX ORDER — LOSARTAN POTASSIUM 100 MG/1
100 TABLET ORAL DAILY
Status: DISCONTINUED | OUTPATIENT
Start: 2023-06-02 | End: 2023-06-06 | Stop reason: HOSPADM

## 2023-06-01 RX ADMIN — SODIUM CHLORIDE 5 MG/HR: 9 INJECTION, SOLUTION INTRAVENOUS at 02:04

## 2023-06-01 RX ADMIN — ISOSORBIDE DINITRATE 20 MG: 20 TABLET ORAL at 12:40

## 2023-06-01 RX ADMIN — SODIUM CHLORIDE 10 MG/HR: 9 INJECTION, SOLUTION INTRAVENOUS at 07:27

## 2023-06-01 RX ADMIN — LOSARTAN POTASSIUM 50 MG: 50 TABLET, FILM COATED ORAL at 07:58

## 2023-06-01 RX ADMIN — HEPARIN SODIUM 5000 UNITS: 5000 INJECTION INTRAVENOUS; SUBCUTANEOUS at 22:10

## 2023-06-01 RX ADMIN — ISOSORBIDE DINITRATE 20 MG: 20 TABLET ORAL at 07:58

## 2023-06-01 RX ADMIN — CHLORHEXIDINE GLUCONATE 0.12% ORAL RINSE 15 ML: 1.2 LIQUID ORAL at 20:03

## 2023-06-01 RX ADMIN — SPIRONOLACTONE 50 MG: 25 TABLET ORAL at 07:58

## 2023-06-01 RX ADMIN — EPOETIN ALFA-EPBX 10000 UNITS: 10000 INJECTION, SOLUTION INTRAVENOUS; SUBCUTANEOUS at 11:22

## 2023-06-01 RX ADMIN — SODIUM CHLORIDE 100 MG: 9 INJECTION, SOLUTION INTRAVENOUS at 14:41

## 2023-06-01 RX ADMIN — ALBUTEROL SULFATE 2.5 MG: 2.5 SOLUTION RESPIRATORY (INHALATION) at 13:41

## 2023-06-01 RX ADMIN — IPRATROPIUM BROMIDE AND ALBUTEROL SULFATE 1 AMPULE: 2.5; .5 SOLUTION RESPIRATORY (INHALATION) at 04:47

## 2023-06-01 RX ADMIN — CHLORHEXIDINE GLUCONATE 0.12% ORAL RINSE 15 ML: 1.2 LIQUID ORAL at 08:17

## 2023-06-01 RX ADMIN — HYDRALAZINE HYDROCHLORIDE 100 MG: 50 TABLET, FILM COATED ORAL at 20:06

## 2023-06-01 RX ADMIN — SPIRONOLACTONE 50 MG: 25 TABLET ORAL at 17:55

## 2023-06-01 RX ADMIN — INSULIN GLARGINE 10 UNITS: 100 INJECTION, SOLUTION SUBCUTANEOUS at 20:56

## 2023-06-01 RX ADMIN — SODIUM CHLORIDE, PRESERVATIVE FREE 40 MG: 5 INJECTION INTRAVENOUS at 20:03

## 2023-06-01 RX ADMIN — SODIUM CHLORIDE SOLN NEBU 3% 15 ML: 3 NEBU SOLN at 10:35

## 2023-06-01 RX ADMIN — IPRATROPIUM BROMIDE AND ALBUTEROL SULFATE 1 AMPULE: 2.5; .5 SOLUTION RESPIRATORY (INHALATION) at 00:22

## 2023-06-01 RX ADMIN — SODIUM CHLORIDE 5 MG/HR: 9 INJECTION, SOLUTION INTRAVENOUS at 18:52

## 2023-06-01 RX ADMIN — SODIUM CHLORIDE, PRESERVATIVE FREE 40 MG: 5 INJECTION INTRAVENOUS at 07:59

## 2023-06-01 RX ADMIN — ALBUTEROL SULFATE 2.5 MG: 2.5 SOLUTION RESPIRATORY (INHALATION) at 19:51

## 2023-06-01 RX ADMIN — SODIUM CHLORIDE SOLN NEBU 3% 15 ML: 3 NEBU SOLN at 13:41

## 2023-06-01 RX ADMIN — SODIUM CHLORIDE SOLN NEBU 3% 15 ML: 3 NEBU SOLN at 19:51

## 2023-06-01 RX ADMIN — CIPROFLOXACIN 400 MG: 400 INJECTION, SOLUTION INTRAVENOUS at 12:31

## 2023-06-01 RX ADMIN — SODIUM CHLORIDE, PRESERVATIVE FREE 10 ML: 5 INJECTION INTRAVENOUS at 20:03

## 2023-06-01 RX ADMIN — SENNOSIDES AND DOCUSATE SODIUM 2 TABLET: 50; 8.6 TABLET ORAL at 07:58

## 2023-06-01 RX ADMIN — CARVEDILOL 25 MG: 25 TABLET, FILM COATED ORAL at 17:55

## 2023-06-01 RX ADMIN — HEPARIN SODIUM 5000 UNITS: 5000 INJECTION INTRAVENOUS; SUBCUTANEOUS at 14:41

## 2023-06-01 RX ADMIN — IPRATROPIUM BROMIDE AND ALBUTEROL SULFATE 1 AMPULE: 2.5; .5 SOLUTION RESPIRATORY (INHALATION) at 08:26

## 2023-06-01 RX ADMIN — CARVEDILOL 25 MG: 25 TABLET, FILM COATED ORAL at 07:58

## 2023-06-01 RX ADMIN — ATORVASTATIN CALCIUM 40 MG: 40 TABLET, FILM COATED ORAL at 20:06

## 2023-06-01 RX ADMIN — ISOSORBIDE DINITRATE 20 MG: 20 TABLET ORAL at 20:06

## 2023-06-01 RX ADMIN — SODIUM CHLORIDE, PRESERVATIVE FREE 10 ML: 5 INJECTION INTRAVENOUS at 08:16

## 2023-06-01 RX ADMIN — HYDRALAZINE HYDROCHLORIDE 100 MG: 50 TABLET, FILM COATED ORAL at 12:40

## 2023-06-01 RX ADMIN — AMLODIPINE BESYLATE 10 MG: 10 TABLET ORAL at 07:58

## 2023-06-01 RX ADMIN — MEROPENEM 500 MG: 500 INJECTION, POWDER, FOR SOLUTION INTRAVENOUS at 12:49

## 2023-06-01 RX ADMIN — HYDRALAZINE HYDROCHLORIDE 100 MG: 50 TABLET, FILM COATED ORAL at 07:58

## 2023-06-01 ASSESSMENT — PULMONARY FUNCTION TESTS
PIF_VALUE: 11
PIF_VALUE: 11
PIF_VALUE: 16
PIF_VALUE: 11
PIF_VALUE: 17
PIF_VALUE: 11
PIF_VALUE: 16
PIF_VALUE: 16
PIF_VALUE: 11
PIF_VALUE: 11
PIF_VALUE: 16
PIF_VALUE: 16
PIF_VALUE: 11
PIF_VALUE: 16
PIF_VALUE: 16
PIF_VALUE: 15
PIF_VALUE: 15
PIF_VALUE: 11
PIF_VALUE: 16
PIF_VALUE: 11
PIF_VALUE: 16
PIF_VALUE: 15
PIF_VALUE: 16
PIF_VALUE: 16
PIF_VALUE: 11
PIF_VALUE: 16
PIF_VALUE: 11
PIF_VALUE: 16
PIF_VALUE: 11
PIF_VALUE: 16
PIF_VALUE: 11
PIF_VALUE: 15
PIF_VALUE: 16
PIF_VALUE: 12
PIF_VALUE: 11
PIF_VALUE: 12
PIF_VALUE: 11
PIF_VALUE: 17
PIF_VALUE: 16
PIF_VALUE: 11
PIF_VALUE: 16
PIF_VALUE: 11
PIF_VALUE: 12
PIF_VALUE: 11
PIF_VALUE: 16
PIF_VALUE: 11
PIF_VALUE: 11
PIF_VALUE: 16
PIF_VALUE: 12

## 2023-06-01 ASSESSMENT — PAIN SCALES - GENERAL
PAINLEVEL_OUTOF10: 0

## 2023-06-02 LAB
ANION GAP SERPL CALCULATED.3IONS-SCNC: 15 MMOL/L (ref 3–16)
BASE EXCESS BLDV CALC-SCNC: 1.4 MMOL/L
BASOPHILS # BLD: 0 K/UL (ref 0–0.2)
BASOPHILS NFR BLD: 0.3 %
BUN SERPL-MCNC: 27 MG/DL (ref 7–20)
CALCIUM SERPL-MCNC: 7.8 MG/DL (ref 8.3–10.6)
CHLORIDE SERPL-SCNC: 96 MMOL/L (ref 99–110)
CO2 BLDV-SCNC: 26 MMOL/L
CO2 SERPL-SCNC: 21 MMOL/L (ref 21–32)
COHGB MFR BLDV: 0.9 %
CREAT SERPL-MCNC: 2.9 MG/DL (ref 0.9–1.3)
DEPRECATED RDW RBC AUTO: 16 % (ref 12.4–15.4)
EOSINOPHIL # BLD: 0 K/UL (ref 0–0.6)
EOSINOPHIL NFR BLD: 0.3 %
GFR SERPLBLD CREATININE-BSD FMLA CKD-EPI: 24 ML/MIN/{1.73_M2}
GLUCOSE BLD-MCNC: 163 MG/DL (ref 70–99)
GLUCOSE BLD-MCNC: 167 MG/DL (ref 70–99)
GLUCOSE BLD-MCNC: 214 MG/DL (ref 70–99)
GLUCOSE BLD-MCNC: 228 MG/DL (ref 70–99)
GLUCOSE BLD-MCNC: 243 MG/DL (ref 70–99)
GLUCOSE SERPL-MCNC: 172 MG/DL (ref 70–99)
HCO3 BLDV-SCNC: 25 MMOL/L (ref 23–29)
HCT VFR BLD AUTO: 25.2 % (ref 40.5–52.5)
HGB BLD-MCNC: 8.4 G/DL (ref 13.5–17.5)
LYMPHOCYTES # BLD: 0.7 K/UL (ref 1–5.1)
LYMPHOCYTES NFR BLD: 4.6 %
MCH RBC QN AUTO: 29.7 PG (ref 26–34)
MCHC RBC AUTO-ENTMCNC: 33.3 G/DL (ref 31–36)
MCV RBC AUTO: 89.1 FL (ref 80–100)
METHGB MFR BLDV: 0.3 %
MONOCYTES # BLD: 1 K/UL (ref 0–1.3)
MONOCYTES NFR BLD: 6.3 %
NEUTROPHILS # BLD: 13.4 K/UL (ref 1.7–7.7)
NEUTROPHILS NFR BLD: 88.5 %
O2 THERAPY: ABNORMAL
PCO2 BLDV: 32.6 MMHG (ref 40–50)
PERFORMED ON: ABNORMAL
PH BLDV: 7.49 [PH] (ref 7.35–7.45)
PHOSPHATE SERPL-MCNC: 3.8 MG/DL (ref 2.5–4.9)
PLATELET # BLD AUTO: 164 K/UL (ref 135–450)
PMV BLD AUTO: 10.6 FL (ref 5–10.5)
PO2 BLDV: 92 MMHG
POTASSIUM SERPL-SCNC: 3.7 MMOL/L (ref 3.5–5.1)
RBC # BLD AUTO: 2.83 M/UL (ref 4.2–5.9)
SAO2 % BLDV: 98 %
SODIUM SERPL-SCNC: 132 MMOL/L (ref 136–145)
WBC # BLD AUTO: 15.2 K/UL (ref 4–11)

## 2023-06-02 PROCEDURE — 2700000000 HC OXYGEN THERAPY PER DAY

## 2023-06-02 PROCEDURE — 2580000003 HC RX 258: Performed by: SURGERY

## 2023-06-02 PROCEDURE — 6370000000 HC RX 637 (ALT 250 FOR IP): Performed by: STUDENT IN AN ORGANIZED HEALTH CARE EDUCATION/TRAINING PROGRAM

## 2023-06-02 PROCEDURE — 31502 CHANGE OF WINDPIPE AIRWAY: CPT

## 2023-06-02 PROCEDURE — 6370000000 HC RX 637 (ALT 250 FOR IP): Performed by: INTERNAL MEDICINE

## 2023-06-02 PROCEDURE — 2580000003 HC RX 258: Performed by: HOSPITALIST

## 2023-06-02 PROCEDURE — 99233 SBSQ HOSP IP/OBS HIGH 50: CPT | Performed by: INTERNAL MEDICINE

## 2023-06-02 PROCEDURE — 6360000002 HC RX W HCPCS: Performed by: INTERNAL MEDICINE

## 2023-06-02 PROCEDURE — 6370000000 HC RX 637 (ALT 250 FOR IP): Performed by: SURGERY

## 2023-06-02 PROCEDURE — 85025 COMPLETE CBC W/AUTO DIFF WBC: CPT

## 2023-06-02 PROCEDURE — C9113 INJ PANTOPRAZOLE SODIUM, VIA: HCPCS | Performed by: SURGERY

## 2023-06-02 PROCEDURE — 2580000003 HC RX 258: Performed by: INTERNAL MEDICINE

## 2023-06-02 PROCEDURE — 36592 COLLECT BLOOD FROM PICC: CPT

## 2023-06-02 PROCEDURE — 94640 AIRWAY INHALATION TREATMENT: CPT

## 2023-06-02 PROCEDURE — 2500000003 HC RX 250 WO HCPCS: Performed by: INTERNAL MEDICINE

## 2023-06-02 PROCEDURE — 6360000002 HC RX W HCPCS: Performed by: SURGERY

## 2023-06-02 PROCEDURE — 80048 BASIC METABOLIC PNL TOTAL CA: CPT

## 2023-06-02 PROCEDURE — 94003 VENT MGMT INPAT SUBQ DAY: CPT

## 2023-06-02 PROCEDURE — 94760 N-INVAS EAR/PLS OXIMETRY 1: CPT

## 2023-06-02 PROCEDURE — 6370000000 HC RX 637 (ALT 250 FOR IP): Performed by: NURSE PRACTITIONER

## 2023-06-02 PROCEDURE — 6360000002 HC RX W HCPCS: Performed by: NURSE PRACTITIONER

## 2023-06-02 PROCEDURE — 84100 ASSAY OF PHOSPHORUS: CPT

## 2023-06-02 PROCEDURE — 82803 BLOOD GASES ANY COMBINATION: CPT

## 2023-06-02 PROCEDURE — 2000000000 HC ICU R&B

## 2023-06-02 PROCEDURE — 6360000002 HC RX W HCPCS: Performed by: HOSPITALIST

## 2023-06-02 RX ORDER — MINOXIDIL 2.5 MG/1
2.5 TABLET ORAL 2 TIMES DAILY
Status: DISCONTINUED | OUTPATIENT
Start: 2023-06-02 | End: 2023-06-02

## 2023-06-02 RX ORDER — MINOXIDIL 2.5 MG/1
2.5 TABLET ORAL 2 TIMES DAILY
Status: DISCONTINUED | OUTPATIENT
Start: 2023-06-02 | End: 2023-06-06 | Stop reason: HOSPADM

## 2023-06-02 RX ADMIN — ALBUTEROL SULFATE 2.5 MG: 2.5 SOLUTION RESPIRATORY (INHALATION) at 12:31

## 2023-06-02 RX ADMIN — SODIUM CHLORIDE 7.5 MG/HR: 9 INJECTION, SOLUTION INTRAVENOUS at 07:02

## 2023-06-02 RX ADMIN — CHLORHEXIDINE GLUCONATE 0.12% ORAL RINSE 15 ML: 1.2 LIQUID ORAL at 07:29

## 2023-06-02 RX ADMIN — CHLORHEXIDINE GLUCONATE 0.12% ORAL RINSE 15 ML: 1.2 LIQUID ORAL at 20:49

## 2023-06-02 RX ADMIN — SPIRONOLACTONE 50 MG: 25 TABLET ORAL at 08:42

## 2023-06-02 RX ADMIN — HYDRALAZINE HYDROCHLORIDE 100 MG: 50 TABLET, FILM COATED ORAL at 14:16

## 2023-06-02 RX ADMIN — INSULIN LISPRO 4 UNITS: 100 INJECTION, SOLUTION INTRAVENOUS; SUBCUTANEOUS at 11:30

## 2023-06-02 RX ADMIN — SODIUM CHLORIDE SOLN NEBU 3% 15 ML: 3 NEBU SOLN at 19:35

## 2023-06-02 RX ADMIN — SODIUM CHLORIDE 2.5 MG/HR: 9 INJECTION, SOLUTION INTRAVENOUS at 19:28

## 2023-06-02 RX ADMIN — SODIUM CHLORIDE SOLN NEBU 3% 15 ML: 3 NEBU SOLN at 12:31

## 2023-06-02 RX ADMIN — HYDRALAZINE HYDROCHLORIDE 100 MG: 50 TABLET, FILM COATED ORAL at 08:42

## 2023-06-02 RX ADMIN — INSULIN LISPRO 4 UNITS: 100 INJECTION, SOLUTION INTRAVENOUS; SUBCUTANEOUS at 17:05

## 2023-06-02 RX ADMIN — HEPARIN SODIUM 5000 UNITS: 5000 INJECTION INTRAVENOUS; SUBCUTANEOUS at 06:00

## 2023-06-02 RX ADMIN — SODIUM CHLORIDE, PRESERVATIVE FREE 10 ML: 5 INJECTION INTRAVENOUS at 08:42

## 2023-06-02 RX ADMIN — MINOXIDIL 2.5 MG: 2.5 TABLET ORAL at 11:37

## 2023-06-02 RX ADMIN — INSULIN GLARGINE 10 UNITS: 100 INJECTION, SOLUTION SUBCUTANEOUS at 21:14

## 2023-06-02 RX ADMIN — INSULIN LISPRO 4 UNITS: 100 INJECTION, SOLUTION INTRAVENOUS; SUBCUTANEOUS at 08:41

## 2023-06-02 RX ADMIN — SODIUM CHLORIDE 7.5 MG/HR: 9 INJECTION, SOLUTION INTRAVENOUS at 00:29

## 2023-06-02 RX ADMIN — HEPARIN SODIUM 5000 UNITS: 5000 INJECTION INTRAVENOUS; SUBCUTANEOUS at 14:16

## 2023-06-02 RX ADMIN — SODIUM CHLORIDE: 9 INJECTION, SOLUTION INTRAVENOUS at 14:35

## 2023-06-02 RX ADMIN — LOSARTAN POTASSIUM 100 MG: 100 TABLET, FILM COATED ORAL at 08:42

## 2023-06-02 RX ADMIN — SODIUM CHLORIDE, PRESERVATIVE FREE 40 MG: 5 INJECTION INTRAVENOUS at 08:41

## 2023-06-02 RX ADMIN — ISOSORBIDE DINITRATE 20 MG: 20 TABLET ORAL at 08:42

## 2023-06-02 RX ADMIN — ALBUTEROL SULFATE 2.5 MG: 2.5 SOLUTION RESPIRATORY (INHALATION) at 19:35

## 2023-06-02 RX ADMIN — CARVEDILOL 25 MG: 25 TABLET, FILM COATED ORAL at 08:42

## 2023-06-02 RX ADMIN — CARVEDILOL 25 MG: 25 TABLET, FILM COATED ORAL at 17:48

## 2023-06-02 RX ADMIN — ATORVASTATIN CALCIUM 40 MG: 40 TABLET, FILM COATED ORAL at 20:49

## 2023-06-02 RX ADMIN — HEPARIN SODIUM 5000 UNITS: 5000 INJECTION INTRAVENOUS; SUBCUTANEOUS at 20:49

## 2023-06-02 RX ADMIN — SODIUM CHLORIDE 100 MG: 9 INJECTION, SOLUTION INTRAVENOUS at 14:40

## 2023-06-02 RX ADMIN — MEROPENEM 500 MG: 500 INJECTION, POWDER, FOR SOLUTION INTRAVENOUS at 14:36

## 2023-06-02 RX ADMIN — ISOSORBIDE DINITRATE 20 MG: 20 TABLET ORAL at 20:49

## 2023-06-02 RX ADMIN — SODIUM CHLORIDE, PRESERVATIVE FREE 10 ML: 5 INJECTION INTRAVENOUS at 21:13

## 2023-06-02 RX ADMIN — SODIUM CHLORIDE, PRESERVATIVE FREE 40 MG: 5 INJECTION INTRAVENOUS at 20:50

## 2023-06-02 RX ADMIN — HYDRALAZINE HYDROCHLORIDE 100 MG: 50 TABLET, FILM COATED ORAL at 20:49

## 2023-06-02 RX ADMIN — ISOSORBIDE DINITRATE 20 MG: 20 TABLET ORAL at 14:16

## 2023-06-02 RX ADMIN — SPIRONOLACTONE 50 MG: 25 TABLET ORAL at 17:48

## 2023-06-02 RX ADMIN — ALBUTEROL SULFATE 2.5 MG: 2.5 SOLUTION RESPIRATORY (INHALATION) at 08:24

## 2023-06-02 RX ADMIN — AMLODIPINE BESYLATE 10 MG: 10 TABLET ORAL at 08:42

## 2023-06-02 RX ADMIN — SODIUM CHLORIDE SOLN NEBU 3% 15 ML: 3 NEBU SOLN at 08:24

## 2023-06-02 RX ADMIN — MINOXIDIL 2.5 MG: 2.5 TABLET ORAL at 20:49

## 2023-06-02 ASSESSMENT — PULMONARY FUNCTION TESTS
PIF_VALUE: 11
PIF_VALUE: 22
PIF_VALUE: 22
PIF_VALUE: 11
PIF_VALUE: 27
PIF_VALUE: 22
PIF_VALUE: 22
PIF_VALUE: 12
PIF_VALUE: 27
PIF_VALUE: 22
PIF_VALUE: 11
PIF_VALUE: 22
PIF_VALUE: 22
PIF_VALUE: 11
PIF_VALUE: 27
PIF_VALUE: 11
PIF_VALUE: 11
PIF_VALUE: 22
PIF_VALUE: 27
PIF_VALUE: 22
PIF_VALUE: 12
PIF_VALUE: 11
PIF_VALUE: 27
PIF_VALUE: 27
PIF_VALUE: 12
PIF_VALUE: 11
PIF_VALUE: 27
PIF_VALUE: 11
PIF_VALUE: 27
PIF_VALUE: 22
PIF_VALUE: 11
PIF_VALUE: 27
PIF_VALUE: 11
PIF_VALUE: 22
PIF_VALUE: 27
PIF_VALUE: 11
PIF_VALUE: 27
PIF_VALUE: 11
PIF_VALUE: 27
PIF_VALUE: 11
PIF_VALUE: 12
PIF_VALUE: 11
PIF_VALUE: 22
PIF_VALUE: 11
PIF_VALUE: 22
PIF_VALUE: 10
PIF_VALUE: 11
PIF_VALUE: 22
PIF_VALUE: 27
PIF_VALUE: 11
PIF_VALUE: 27
PIF_VALUE: 11
PIF_VALUE: 27
PIF_VALUE: 27
PIF_VALUE: 11

## 2023-06-02 ASSESSMENT — PAIN SCALES - GENERAL: PAINLEVEL_OUTOF10: 0

## 2023-06-03 LAB
ANION GAP SERPL CALCULATED.3IONS-SCNC: 12 MMOL/L (ref 3–16)
BACTERIA BLD CULT: NORMAL
BASE EXCESS BLDV CALC-SCNC: 5.8 MMOL/L
BASOPHILS # BLD: 0.1 K/UL (ref 0–0.2)
BASOPHILS NFR BLD: 0.4 %
BUN SERPL-MCNC: 31 MG/DL (ref 7–20)
CALCIUM SERPL-MCNC: 8.1 MG/DL (ref 8.3–10.6)
CHLORIDE SERPL-SCNC: 96 MMOL/L (ref 99–110)
CO2 BLDV-SCNC: 31 MMOL/L
CO2 SERPL-SCNC: 22 MMOL/L (ref 21–32)
COHGB MFR BLDV: 1.2 %
CREAT SERPL-MCNC: 3.3 MG/DL (ref 0.9–1.3)
DEPRECATED RDW RBC AUTO: 15.9 % (ref 12.4–15.4)
EOSINOPHIL # BLD: 0 K/UL (ref 0–0.6)
EOSINOPHIL NFR BLD: 0.2 %
GFR SERPLBLD CREATININE-BSD FMLA CKD-EPI: 21 ML/MIN/{1.73_M2}
GLUCOSE BLD-MCNC: 132 MG/DL (ref 70–99)
GLUCOSE BLD-MCNC: 193 MG/DL (ref 70–99)
GLUCOSE BLD-MCNC: 253 MG/DL (ref 70–99)
GLUCOSE BLD-MCNC: 258 MG/DL (ref 70–99)
GLUCOSE BLD-MCNC: 281 MG/DL (ref 70–99)
GLUCOSE BLD-MCNC: 284 MG/DL (ref 70–99)
GLUCOSE SERPL-MCNC: 280 MG/DL (ref 70–99)
HCO3 BLDV-SCNC: 30 MMOL/L (ref 23–29)
HCT VFR BLD AUTO: 23.2 % (ref 40.5–52.5)
HGB BLD-MCNC: 7.7 G/DL (ref 13.5–17.5)
LYMPHOCYTES # BLD: 0.4 K/UL (ref 1–5.1)
LYMPHOCYTES NFR BLD: 2.5 %
MCH RBC QN AUTO: 30.3 PG (ref 26–34)
MCHC RBC AUTO-ENTMCNC: 33.2 G/DL (ref 31–36)
MCV RBC AUTO: 91.1 FL (ref 80–100)
METHGB MFR BLDV: 0.5 %
MONOCYTES # BLD: 0.7 K/UL (ref 0–1.3)
MONOCYTES NFR BLD: 4.9 %
NEUTROPHILS # BLD: 13.2 K/UL (ref 1.7–7.7)
NEUTROPHILS NFR BLD: 92 %
O2 THERAPY: ABNORMAL
PCO2 BLDV: 39.1 MMHG (ref 40–50)
PERFORMED ON: ABNORMAL
PH BLDV: 7.49 [PH] (ref 7.35–7.45)
PHOSPHATE SERPL-MCNC: 4.2 MG/DL (ref 2.5–4.9)
PLATELET # BLD AUTO: 160 K/UL (ref 135–450)
PMV BLD AUTO: 10.5 FL (ref 5–10.5)
PO2 BLDV: 36 MMHG
POTASSIUM SERPL-SCNC: 3.8 MMOL/L (ref 3.5–5.1)
RBC # BLD AUTO: 2.55 M/UL (ref 4.2–5.9)
SAO2 % BLDV: 68 %
SODIUM SERPL-SCNC: 130 MMOL/L (ref 136–145)
WBC # BLD AUTO: 14.4 K/UL (ref 4–11)

## 2023-06-03 PROCEDURE — 6370000000 HC RX 637 (ALT 250 FOR IP): Performed by: INTERNAL MEDICINE

## 2023-06-03 PROCEDURE — C9113 INJ PANTOPRAZOLE SODIUM, VIA: HCPCS | Performed by: SURGERY

## 2023-06-03 PROCEDURE — 2580000003 HC RX 258: Performed by: HOSPITALIST

## 2023-06-03 PROCEDURE — 80048 BASIC METABOLIC PNL TOTAL CA: CPT

## 2023-06-03 PROCEDURE — 6370000000 HC RX 637 (ALT 250 FOR IP): Performed by: SURGERY

## 2023-06-03 PROCEDURE — 2580000003 HC RX 258: Performed by: SURGERY

## 2023-06-03 PROCEDURE — 99233 SBSQ HOSP IP/OBS HIGH 50: CPT | Performed by: INTERNAL MEDICINE

## 2023-06-03 PROCEDURE — 6360000002 HC RX W HCPCS: Performed by: HOSPITALIST

## 2023-06-03 PROCEDURE — 2580000003 HC RX 258: Performed by: INTERNAL MEDICINE

## 2023-06-03 PROCEDURE — 6370000000 HC RX 637 (ALT 250 FOR IP): Performed by: STUDENT IN AN ORGANIZED HEALTH CARE EDUCATION/TRAINING PROGRAM

## 2023-06-03 PROCEDURE — 6360000002 HC RX W HCPCS: Performed by: INTERNAL MEDICINE

## 2023-06-03 PROCEDURE — 94761 N-INVAS EAR/PLS OXIMETRY MLT: CPT

## 2023-06-03 PROCEDURE — 2000000000 HC ICU R&B

## 2023-06-03 PROCEDURE — 85025 COMPLETE CBC W/AUTO DIFF WBC: CPT

## 2023-06-03 PROCEDURE — 90935 HEMODIALYSIS ONE EVALUATION: CPT

## 2023-06-03 PROCEDURE — 6360000002 HC RX W HCPCS: Performed by: SURGERY

## 2023-06-03 PROCEDURE — 82803 BLOOD GASES ANY COMBINATION: CPT

## 2023-06-03 PROCEDURE — 84100 ASSAY OF PHOSPHORUS: CPT

## 2023-06-03 PROCEDURE — 2500000003 HC RX 250 WO HCPCS: Performed by: INTERNAL MEDICINE

## 2023-06-03 PROCEDURE — 6360000002 HC RX W HCPCS: Performed by: NURSE PRACTITIONER

## 2023-06-03 PROCEDURE — 6370000000 HC RX 637 (ALT 250 FOR IP): Performed by: NURSE PRACTITIONER

## 2023-06-03 PROCEDURE — 2700000000 HC OXYGEN THERAPY PER DAY

## 2023-06-03 PROCEDURE — 94640 AIRWAY INHALATION TREATMENT: CPT

## 2023-06-03 RX ORDER — INSULIN GLARGINE 100 [IU]/ML
15 INJECTION, SOLUTION SUBCUTANEOUS NIGHTLY
Status: DISCONTINUED | OUTPATIENT
Start: 2023-06-03 | End: 2023-06-06 | Stop reason: HOSPADM

## 2023-06-03 RX ORDER — IPRATROPIUM BROMIDE AND ALBUTEROL SULFATE 2.5; .5 MG/3ML; MG/3ML
1 SOLUTION RESPIRATORY (INHALATION)
Status: DISCONTINUED | OUTPATIENT
Start: 2023-06-03 | End: 2023-06-06 | Stop reason: HOSPADM

## 2023-06-03 RX ADMIN — SODIUM CHLORIDE, PRESERVATIVE FREE 10 ML: 5 INJECTION INTRAVENOUS at 09:09

## 2023-06-03 RX ADMIN — ISOSORBIDE DINITRATE 20 MG: 20 TABLET ORAL at 20:05

## 2023-06-03 RX ADMIN — HEPARIN SODIUM 5000 UNITS: 5000 INJECTION INTRAVENOUS; SUBCUTANEOUS at 14:08

## 2023-06-03 RX ADMIN — AMLODIPINE BESYLATE 10 MG: 10 TABLET ORAL at 09:09

## 2023-06-03 RX ADMIN — INSULIN LISPRO 8 UNITS: 100 INJECTION, SOLUTION INTRAVENOUS; SUBCUTANEOUS at 16:29

## 2023-06-03 RX ADMIN — INSULIN LISPRO 8 UNITS: 100 INJECTION, SOLUTION INTRAVENOUS; SUBCUTANEOUS at 20:26

## 2023-06-03 RX ADMIN — SODIUM CHLORIDE SOLN NEBU 3% 15 ML: 3 NEBU SOLN at 20:21

## 2023-06-03 RX ADMIN — MINOXIDIL 2.5 MG: 2.5 TABLET ORAL at 20:27

## 2023-06-03 RX ADMIN — INSULIN GLARGINE 15 UNITS: 100 INJECTION, SOLUTION SUBCUTANEOUS at 20:26

## 2023-06-03 RX ADMIN — INSULIN LISPRO 8 UNITS: 100 INJECTION, SOLUTION INTRAVENOUS; SUBCUTANEOUS at 00:52

## 2023-06-03 RX ADMIN — MEROPENEM 500 MG: 500 INJECTION, POWDER, FOR SOLUTION INTRAVENOUS at 14:05

## 2023-06-03 RX ADMIN — HYDRALAZINE HYDROCHLORIDE 100 MG: 50 TABLET, FILM COATED ORAL at 14:07

## 2023-06-03 RX ADMIN — HYDRALAZINE HYDROCHLORIDE 10 MG: 20 INJECTION INTRAMUSCULAR; INTRAVENOUS at 09:25

## 2023-06-03 RX ADMIN — CARVEDILOL 25 MG: 25 TABLET, FILM COATED ORAL at 09:31

## 2023-06-03 RX ADMIN — ISOSORBIDE DINITRATE 20 MG: 20 TABLET ORAL at 09:08

## 2023-06-03 RX ADMIN — SODIUM CHLORIDE 5 MG/HR: 9 INJECTION, SOLUTION INTRAVENOUS at 00:59

## 2023-06-03 RX ADMIN — ATORVASTATIN CALCIUM 40 MG: 40 TABLET, FILM COATED ORAL at 20:04

## 2023-06-03 RX ADMIN — SODIUM CHLORIDE, PRESERVATIVE FREE 40 MG: 5 INJECTION INTRAVENOUS at 20:05

## 2023-06-03 RX ADMIN — SODIUM CHLORIDE, PRESERVATIVE FREE 40 MG: 5 INJECTION INTRAVENOUS at 09:09

## 2023-06-03 RX ADMIN — LOSARTAN POTASSIUM 100 MG: 100 TABLET, FILM COATED ORAL at 09:08

## 2023-06-03 RX ADMIN — HYDRALAZINE HYDROCHLORIDE 10 MG: 20 INJECTION INTRAMUSCULAR; INTRAVENOUS at 14:06

## 2023-06-03 RX ADMIN — EPOETIN ALFA-EPBX 10000 UNITS: 10000 INJECTION, SOLUTION INTRAVENOUS; SUBCUTANEOUS at 05:26

## 2023-06-03 RX ADMIN — HYDRALAZINE HYDROCHLORIDE 100 MG: 50 TABLET, FILM COATED ORAL at 20:05

## 2023-06-03 RX ADMIN — IPRATROPIUM BROMIDE AND ALBUTEROL SULFATE 1 DOSE: 2.5; .5 SOLUTION RESPIRATORY (INHALATION) at 20:21

## 2023-06-03 RX ADMIN — HYDRALAZINE HYDROCHLORIDE 100 MG: 50 TABLET, FILM COATED ORAL at 09:09

## 2023-06-03 RX ADMIN — SODIUM CHLORIDE SOLN NEBU 3% 15 ML: 3 NEBU SOLN at 14:22

## 2023-06-03 RX ADMIN — SENNOSIDES AND DOCUSATE SODIUM 2 TABLET: 50; 8.6 TABLET ORAL at 09:09

## 2023-06-03 RX ADMIN — SODIUM CHLORIDE SOLN NEBU 3% 15 ML: 3 NEBU SOLN at 08:40

## 2023-06-03 RX ADMIN — SPIRONOLACTONE 50 MG: 25 TABLET ORAL at 09:08

## 2023-06-03 RX ADMIN — SODIUM CHLORIDE, PRESERVATIVE FREE 10 ML: 5 INJECTION INTRAVENOUS at 20:20

## 2023-06-03 RX ADMIN — CHLORHEXIDINE GLUCONATE 0.12% ORAL RINSE 15 ML: 1.2 LIQUID ORAL at 20:04

## 2023-06-03 RX ADMIN — CHLORHEXIDINE GLUCONATE 0.12% ORAL RINSE 15 ML: 1.2 LIQUID ORAL at 09:09

## 2023-06-03 RX ADMIN — CARVEDILOL 25 MG: 25 TABLET, FILM COATED ORAL at 17:49

## 2023-06-03 RX ADMIN — SPIRONOLACTONE 50 MG: 25 TABLET ORAL at 17:49

## 2023-06-03 RX ADMIN — IPRATROPIUM BROMIDE AND ALBUTEROL SULFATE 1 DOSE: 2.5; .5 SOLUTION RESPIRATORY (INHALATION) at 08:40

## 2023-06-03 RX ADMIN — MINOXIDIL 2.5 MG: 2.5 TABLET ORAL at 09:08

## 2023-06-03 RX ADMIN — HEPARIN SODIUM 5000 UNITS: 5000 INJECTION INTRAVENOUS; SUBCUTANEOUS at 06:15

## 2023-06-03 RX ADMIN — SODIUM CHLORIDE 100 MG: 9 INJECTION, SOLUTION INTRAVENOUS at 14:06

## 2023-06-03 RX ADMIN — INSULIN LISPRO 8 UNITS: 100 INJECTION, SOLUTION INTRAVENOUS; SUBCUTANEOUS at 03:57

## 2023-06-03 RX ADMIN — HEPARIN SODIUM 5000 UNITS: 5000 INJECTION INTRAVENOUS; SUBCUTANEOUS at 22:38

## 2023-06-03 RX ADMIN — IPRATROPIUM BROMIDE AND ALBUTEROL SULFATE 1 DOSE: 2.5; .5 SOLUTION RESPIRATORY (INHALATION) at 14:22

## 2023-06-03 RX ADMIN — ISOSORBIDE DINITRATE 20 MG: 20 TABLET ORAL at 14:08

## 2023-06-03 ASSESSMENT — PAIN SCALES - GENERAL
PAINLEVEL_OUTOF10: 0

## 2023-06-04 LAB
ANION GAP SERPL CALCULATED.3IONS-SCNC: 8 MMOL/L (ref 3–16)
BASOPHILS # BLD: 0.1 K/UL (ref 0–0.2)
BASOPHILS NFR BLD: 0.3 %
BUN SERPL-MCNC: 27 MG/DL (ref 7–20)
CALCIUM SERPL-MCNC: 8.2 MG/DL (ref 8.3–10.6)
CHLORIDE SERPL-SCNC: 98 MMOL/L (ref 99–110)
CO2 SERPL-SCNC: 25 MMOL/L (ref 21–32)
CREAT SERPL-MCNC: 2.6 MG/DL (ref 0.9–1.3)
DEPRECATED RDW RBC AUTO: 16.3 % (ref 12.4–15.4)
EOSINOPHIL # BLD: 0 K/UL (ref 0–0.6)
EOSINOPHIL NFR BLD: 0.1 %
GFR SERPLBLD CREATININE-BSD FMLA CKD-EPI: 28 ML/MIN/{1.73_M2}
GLUCOSE BLD-MCNC: 204 MG/DL (ref 70–99)
GLUCOSE BLD-MCNC: 205 MG/DL (ref 70–99)
GLUCOSE BLD-MCNC: 214 MG/DL (ref 70–99)
GLUCOSE BLD-MCNC: 214 MG/DL (ref 70–99)
GLUCOSE BLD-MCNC: 218 MG/DL (ref 70–99)
GLUCOSE BLD-MCNC: 225 MG/DL (ref 70–99)
GLUCOSE BLD-MCNC: 247 MG/DL (ref 70–99)
GLUCOSE SERPL-MCNC: 203 MG/DL (ref 70–99)
HCT VFR BLD AUTO: 24.7 % (ref 40.5–52.5)
HGB BLD-MCNC: 8.1 G/DL (ref 13.5–17.5)
LYMPHOCYTES # BLD: 0.6 K/UL (ref 1–5.1)
LYMPHOCYTES NFR BLD: 3.3 %
MCH RBC QN AUTO: 29.5 PG (ref 26–34)
MCHC RBC AUTO-ENTMCNC: 33 G/DL (ref 31–36)
MCV RBC AUTO: 89.6 FL (ref 80–100)
MONOCYTES # BLD: 0.9 K/UL (ref 0–1.3)
MONOCYTES NFR BLD: 5.3 %
NEUTROPHILS # BLD: 15.3 K/UL (ref 1.7–7.7)
NEUTROPHILS NFR BLD: 91 %
ORGANISM: ABNORMAL
PERFORMED ON: ABNORMAL
PHOSPHATE SERPL-MCNC: 3.5 MG/DL (ref 2.5–4.9)
PLATELET # BLD AUTO: 189 K/UL (ref 135–450)
PMV BLD AUTO: 10.2 FL (ref 5–10.5)
PNEUMONIA PANEL MOLECULAR: ABNORMAL
POTASSIUM SERPL-SCNC: 4 MMOL/L (ref 3.5–5.1)
RBC # BLD AUTO: 2.75 M/UL (ref 4.2–5.9)
REPORT: NORMAL
SODIUM SERPL-SCNC: 131 MMOL/L (ref 136–145)
WBC # BLD AUTO: 16.8 K/UL (ref 4–11)

## 2023-06-04 PROCEDURE — 6360000002 HC RX W HCPCS: Performed by: SURGERY

## 2023-06-04 PROCEDURE — 6360000002 HC RX W HCPCS: Performed by: HOSPITALIST

## 2023-06-04 PROCEDURE — 2580000003 HC RX 258: Performed by: INTERNAL MEDICINE

## 2023-06-04 PROCEDURE — 94761 N-INVAS EAR/PLS OXIMETRY MLT: CPT

## 2023-06-04 PROCEDURE — 80048 BASIC METABOLIC PNL TOTAL CA: CPT

## 2023-06-04 PROCEDURE — 2580000003 HC RX 258: Performed by: HOSPITALIST

## 2023-06-04 PROCEDURE — 2580000003 HC RX 258: Performed by: SURGERY

## 2023-06-04 PROCEDURE — 2500000003 HC RX 250 WO HCPCS: Performed by: INTERNAL MEDICINE

## 2023-06-04 PROCEDURE — 6370000000 HC RX 637 (ALT 250 FOR IP): Performed by: SURGERY

## 2023-06-04 PROCEDURE — 6370000000 HC RX 637 (ALT 250 FOR IP): Performed by: INTERNAL MEDICINE

## 2023-06-04 PROCEDURE — C9113 INJ PANTOPRAZOLE SODIUM, VIA: HCPCS | Performed by: SURGERY

## 2023-06-04 PROCEDURE — APPNB15 APP NON BILLABLE TIME 0-15 MINS: Performed by: NURSE PRACTITIONER

## 2023-06-04 PROCEDURE — 6370000000 HC RX 637 (ALT 250 FOR IP): Performed by: STUDENT IN AN ORGANIZED HEALTH CARE EDUCATION/TRAINING PROGRAM

## 2023-06-04 PROCEDURE — 85025 COMPLETE CBC W/AUTO DIFF WBC: CPT

## 2023-06-04 PROCEDURE — 87633 RESP VIRUS 12-25 TARGETS: CPT

## 2023-06-04 PROCEDURE — 6360000002 HC RX W HCPCS: Performed by: NURSE PRACTITIONER

## 2023-06-04 PROCEDURE — 6360000002 HC RX W HCPCS: Performed by: INTERNAL MEDICINE

## 2023-06-04 PROCEDURE — 2700000000 HC OXYGEN THERAPY PER DAY

## 2023-06-04 PROCEDURE — 2000000000 HC ICU R&B

## 2023-06-04 PROCEDURE — 84100 ASSAY OF PHOSPHORUS: CPT

## 2023-06-04 PROCEDURE — 99233 SBSQ HOSP IP/OBS HIGH 50: CPT | Performed by: INTERNAL MEDICINE

## 2023-06-04 PROCEDURE — 94640 AIRWAY INHALATION TREATMENT: CPT

## 2023-06-04 PROCEDURE — 6370000000 HC RX 637 (ALT 250 FOR IP): Performed by: NURSE PRACTITIONER

## 2023-06-04 RX ORDER — HYDRALAZINE HYDROCHLORIDE 20 MG/ML
10 INJECTION INTRAMUSCULAR; INTRAVENOUS EVERY 6 HOURS PRN
Status: DISCONTINUED | OUTPATIENT
Start: 2023-06-04 | End: 2023-06-06 | Stop reason: HOSPADM

## 2023-06-04 RX ORDER — VANCOMYCIN 1.75 G/350ML
1250 INJECTION, SOLUTION INTRAVENOUS ONCE
Status: COMPLETED | OUTPATIENT
Start: 2023-06-04 | End: 2023-06-04

## 2023-06-04 RX ADMIN — CARVEDILOL 25 MG: 25 TABLET, FILM COATED ORAL at 08:21

## 2023-06-04 RX ADMIN — AMLODIPINE BESYLATE 10 MG: 10 TABLET ORAL at 08:21

## 2023-06-04 RX ADMIN — CARVEDILOL 25 MG: 25 TABLET, FILM COATED ORAL at 17:43

## 2023-06-04 RX ADMIN — SPIRONOLACTONE 50 MG: 25 TABLET ORAL at 08:21

## 2023-06-04 RX ADMIN — SODIUM CHLORIDE, PRESERVATIVE FREE 10 ML: 5 INJECTION INTRAVENOUS at 08:54

## 2023-06-04 RX ADMIN — SODIUM CHLORIDE 12.5 MG/HR: 9 INJECTION, SOLUTION INTRAVENOUS at 06:44

## 2023-06-04 RX ADMIN — CHLORHEXIDINE GLUCONATE 0.12% ORAL RINSE 15 ML: 1.2 LIQUID ORAL at 08:22

## 2023-06-04 RX ADMIN — INSULIN LISPRO 4 UNITS: 100 INJECTION, SOLUTION INTRAVENOUS; SUBCUTANEOUS at 12:28

## 2023-06-04 RX ADMIN — SODIUM CHLORIDE SOLN NEBU 3% 15 ML: 3 NEBU SOLN at 08:11

## 2023-06-04 RX ADMIN — SPIRONOLACTONE 50 MG: 25 TABLET ORAL at 17:42

## 2023-06-04 RX ADMIN — HEPARIN SODIUM 5000 UNITS: 5000 INJECTION INTRAVENOUS; SUBCUTANEOUS at 06:33

## 2023-06-04 RX ADMIN — INSULIN GLARGINE 15 UNITS: 100 INJECTION, SOLUTION SUBCUTANEOUS at 21:47

## 2023-06-04 RX ADMIN — INSULIN LISPRO 4 UNITS: 100 INJECTION, SOLUTION INTRAVENOUS; SUBCUTANEOUS at 08:56

## 2023-06-04 RX ADMIN — SODIUM CHLORIDE, PRESERVATIVE FREE 10 ML: 5 INJECTION INTRAVENOUS at 19:38

## 2023-06-04 RX ADMIN — SODIUM CHLORIDE SOLN NEBU 3% 15 ML: 3 NEBU SOLN at 19:55

## 2023-06-04 RX ADMIN — SODIUM CHLORIDE 7.5 MG/HR: 9 INJECTION, SOLUTION INTRAVENOUS at 08:48

## 2023-06-04 RX ADMIN — INSULIN LISPRO 4 UNITS: 100 INJECTION, SOLUTION INTRAVENOUS; SUBCUTANEOUS at 16:11

## 2023-06-04 RX ADMIN — SODIUM CHLORIDE 7.5 MG/HR: 9 INJECTION, SOLUTION INTRAVENOUS at 15:33

## 2023-06-04 RX ADMIN — ISOSORBIDE DINITRATE 20 MG: 20 TABLET ORAL at 13:41

## 2023-06-04 RX ADMIN — ISOSORBIDE DINITRATE 20 MG: 20 TABLET ORAL at 08:21

## 2023-06-04 RX ADMIN — SODIUM CHLORIDE, PRESERVATIVE FREE 40 MG: 5 INJECTION INTRAVENOUS at 08:22

## 2023-06-04 RX ADMIN — INSULIN LISPRO 4 UNITS: 100 INJECTION, SOLUTION INTRAVENOUS; SUBCUTANEOUS at 00:14

## 2023-06-04 RX ADMIN — LOSARTAN POTASSIUM 100 MG: 100 TABLET, FILM COATED ORAL at 08:21

## 2023-06-04 RX ADMIN — MINOXIDIL 2.5 MG: 2.5 TABLET ORAL at 08:21

## 2023-06-04 RX ADMIN — SODIUM CHLORIDE 12.5 MG/HR: 9 INJECTION, SOLUTION INTRAVENOUS at 03:22

## 2023-06-04 RX ADMIN — CHLORHEXIDINE GLUCONATE 0.12% ORAL RINSE 15 ML: 1.2 LIQUID ORAL at 19:52

## 2023-06-04 RX ADMIN — HYDRALAZINE HYDROCHLORIDE 100 MG: 50 TABLET, FILM COATED ORAL at 13:41

## 2023-06-04 RX ADMIN — SODIUM CHLORIDE: 9 INJECTION, SOLUTION INTRAVENOUS at 19:35

## 2023-06-04 RX ADMIN — HYDRALAZINE HYDROCHLORIDE 10 MG: 20 INJECTION INTRAMUSCULAR; INTRAVENOUS at 02:17

## 2023-06-04 RX ADMIN — ISOSORBIDE DINITRATE 20 MG: 20 TABLET ORAL at 19:52

## 2023-06-04 RX ADMIN — IPRATROPIUM BROMIDE AND ALBUTEROL SULFATE 1 DOSE: 2.5; .5 SOLUTION RESPIRATORY (INHALATION) at 08:11

## 2023-06-04 RX ADMIN — SODIUM CHLORIDE SOLN NEBU 3% 15 ML: 3 NEBU SOLN at 12:03

## 2023-06-04 RX ADMIN — HYDRALAZINE HYDROCHLORIDE 10 MG: 20 INJECTION INTRAMUSCULAR; INTRAVENOUS at 13:38

## 2023-06-04 RX ADMIN — IPRATROPIUM BROMIDE AND ALBUTEROL SULFATE 1 DOSE: 2.5; .5 SOLUTION RESPIRATORY (INHALATION) at 12:03

## 2023-06-04 RX ADMIN — INSULIN LISPRO 4 UNITS: 100 INJECTION, SOLUTION INTRAVENOUS; SUBCUTANEOUS at 04:31

## 2023-06-04 RX ADMIN — HEPARIN SODIUM 5000 UNITS: 5000 INJECTION INTRAVENOUS; SUBCUTANEOUS at 13:40

## 2023-06-04 RX ADMIN — HYDRALAZINE HYDROCHLORIDE 100 MG: 50 TABLET, FILM COATED ORAL at 08:21

## 2023-06-04 RX ADMIN — SODIUM CHLORIDE 100 MG: 9 INJECTION, SOLUTION INTRAVENOUS at 13:54

## 2023-06-04 RX ADMIN — INSULIN LISPRO 4 UNITS: 100 INJECTION, SOLUTION INTRAVENOUS; SUBCUTANEOUS at 19:52

## 2023-06-04 RX ADMIN — HYDRALAZINE HYDROCHLORIDE 100 MG: 50 TABLET, FILM COATED ORAL at 19:52

## 2023-06-04 RX ADMIN — ATORVASTATIN CALCIUM 40 MG: 40 TABLET, FILM COATED ORAL at 19:52

## 2023-06-04 RX ADMIN — MINOXIDIL 2.5 MG: 2.5 TABLET ORAL at 19:52

## 2023-06-04 RX ADMIN — VANCOMYCIN 1250 MG: 1.75 INJECTION, SOLUTION INTRAVENOUS at 18:01

## 2023-06-04 RX ADMIN — INSULIN LISPRO 4 UNITS: 100 INJECTION, SOLUTION INTRAVENOUS; SUBCUTANEOUS at 23:30

## 2023-06-04 RX ADMIN — SODIUM CHLORIDE 7.5 MG/HR: 9 INJECTION, SOLUTION INTRAVENOUS at 19:23

## 2023-06-04 RX ADMIN — SODIUM CHLORIDE 7.5 MG/HR: 9 INJECTION, SOLUTION INTRAVENOUS at 12:05

## 2023-06-04 RX ADMIN — IPRATROPIUM BROMIDE AND ALBUTEROL SULFATE 1 DOSE: 2.5; .5 SOLUTION RESPIRATORY (INHALATION) at 19:55

## 2023-06-04 RX ADMIN — HEPARIN SODIUM 5000 UNITS: 5000 INJECTION INTRAVENOUS; SUBCUTANEOUS at 21:48

## 2023-06-04 RX ADMIN — SODIUM CHLORIDE, PRESERVATIVE FREE 40 MG: 5 INJECTION INTRAVENOUS at 19:52

## 2023-06-04 RX ADMIN — SENNOSIDES AND DOCUSATE SODIUM 2 TABLET: 50; 8.6 TABLET ORAL at 08:22

## 2023-06-04 RX ADMIN — MEROPENEM 500 MG: 500 INJECTION, POWDER, FOR SOLUTION INTRAVENOUS at 12:34

## 2023-06-04 ASSESSMENT — PAIN SCALES - GENERAL: PAINLEVEL_OUTOF10: 0

## 2023-06-05 ENCOUNTER — APPOINTMENT (OUTPATIENT)
Dept: GENERAL RADIOLOGY | Age: 58
DRG: 003 | End: 2023-06-05
Payer: COMMERCIAL

## 2023-06-05 PROBLEM — J15.212 PNEUMONIA OF BOTH LOWER LOBES DUE TO METHICILLIN RESISTANT STAPHYLOCOCCUS AUREUS (MRSA) (HCC): Status: ACTIVE | Noted: 2023-06-05

## 2023-06-05 LAB
ANION GAP SERPL CALCULATED.3IONS-SCNC: 12 MMOL/L (ref 3–16)
BASE EXCESS BLDA CALC-SCNC: 1.8 MMOL/L (ref -3–3)
BASOPHILS # BLD: 0.1 K/UL (ref 0–0.2)
BASOPHILS NFR BLD: 0.4 %
BUN SERPL-MCNC: 36 MG/DL (ref 7–20)
CALCIUM SERPL-MCNC: 8.2 MG/DL (ref 8.3–10.6)
CHLORIDE SERPL-SCNC: 96 MMOL/L (ref 99–110)
CO2 BLDA-SCNC: 26.9 MMOL/L
CO2 SERPL-SCNC: 22 MMOL/L (ref 21–32)
COHGB MFR BLDA: 1.7 % (ref 0–1.5)
CREAT SERPL-MCNC: 3.5 MG/DL (ref 0.9–1.3)
DEPRECATED RDW RBC AUTO: 16.1 % (ref 12.4–15.4)
EOSINOPHIL # BLD: 0.1 K/UL (ref 0–0.6)
EOSINOPHIL NFR BLD: 0.4 %
FUNGUS SPEC CULT: NORMAL
GFR SERPLBLD CREATININE-BSD FMLA CKD-EPI: 19 ML/MIN/{1.73_M2}
GLUCOSE BLD-MCNC: 164 MG/DL (ref 70–99)
GLUCOSE BLD-MCNC: 176 MG/DL (ref 70–99)
GLUCOSE BLD-MCNC: 194 MG/DL (ref 70–99)
GLUCOSE BLD-MCNC: 202 MG/DL (ref 70–99)
GLUCOSE BLD-MCNC: 224 MG/DL (ref 70–99)
GLUCOSE SERPL-MCNC: 174 MG/DL (ref 70–99)
HCO3 BLDA-SCNC: 25.8 MMOL/L (ref 21–29)
HCT VFR BLD AUTO: 22.7 % (ref 40.5–52.5)
HGB BLD-MCNC: 7.4 G/DL (ref 13.5–17.5)
HGB BLDA-MCNC: 11.9 G/DL (ref 13.5–17.5)
LOEFFLER MB STN SPEC: NORMAL
LYMPHOCYTES # BLD: 1 K/UL (ref 1–5.1)
LYMPHOCYTES NFR BLD: 5.5 %
MCH RBC QN AUTO: 29.3 PG (ref 26–34)
MCHC RBC AUTO-ENTMCNC: 32.6 G/DL (ref 31–36)
MCV RBC AUTO: 89.7 FL (ref 80–100)
METHGB MFR BLDA: 0.3 %
MONOCYTES # BLD: 1.4 K/UL (ref 0–1.3)
MONOCYTES NFR BLD: 7.5 %
NEUTROPHILS # BLD: 15.9 K/UL (ref 1.7–7.7)
NEUTROPHILS NFR BLD: 86.2 %
O2 THERAPY: ABNORMAL
PCO2 BLDA: 37.2 MMHG (ref 35–45)
PERFORMED ON: ABNORMAL
PH BLDA: 7.45 [PH] (ref 7.35–7.45)
PHOSPHATE SERPL-MCNC: 3.9 MG/DL (ref 2.5–4.9)
PLATELET # BLD AUTO: 245 K/UL (ref 135–450)
PMV BLD AUTO: 10.5 FL (ref 5–10.5)
PO2 BLDA: 50 MMHG (ref 75–108)
POTASSIUM SERPL-SCNC: 4.2 MMOL/L (ref 3.5–5.1)
RBC # BLD AUTO: 2.53 M/UL (ref 4.2–5.9)
SAO2 % BLDA: 85.8 %
SODIUM SERPL-SCNC: 130 MMOL/L (ref 136–145)
VANCOMYCIN SERPL-MCNC: 14.6 UG/ML
WBC # BLD AUTO: 18.4 K/UL (ref 4–11)

## 2023-06-05 PROCEDURE — 2000000000 HC ICU R&B

## 2023-06-05 PROCEDURE — 82803 BLOOD GASES ANY COMBINATION: CPT

## 2023-06-05 PROCEDURE — 36600 WITHDRAWAL OF ARTERIAL BLOOD: CPT

## 2023-06-05 PROCEDURE — 6360000002 HC RX W HCPCS: Performed by: HOSPITALIST

## 2023-06-05 PROCEDURE — 51798 US URINE CAPACITY MEASURE: CPT

## 2023-06-05 PROCEDURE — 94640 AIRWAY INHALATION TREATMENT: CPT

## 2023-06-05 PROCEDURE — 84100 ASSAY OF PHOSPHORUS: CPT

## 2023-06-05 PROCEDURE — 99233 SBSQ HOSP IP/OBS HIGH 50: CPT | Performed by: INTERNAL MEDICINE

## 2023-06-05 PROCEDURE — 31624 DX BRONCHOSCOPE/LAVAGE: CPT | Performed by: INTERNAL MEDICINE

## 2023-06-05 PROCEDURE — 6370000000 HC RX 637 (ALT 250 FOR IP): Performed by: INTERNAL MEDICINE

## 2023-06-05 PROCEDURE — 2500000003 HC RX 250 WO HCPCS

## 2023-06-05 PROCEDURE — 6360000002 HC RX W HCPCS: Performed by: SURGERY

## 2023-06-05 PROCEDURE — 31645 BRNCHSC W/THER ASPIR 1ST: CPT | Performed by: INTERNAL MEDICINE

## 2023-06-05 PROCEDURE — 85025 COMPLETE CBC W/AUTO DIFF WBC: CPT

## 2023-06-05 PROCEDURE — 80048 BASIC METABOLIC PNL TOTAL CA: CPT

## 2023-06-05 PROCEDURE — 6360000002 HC RX W HCPCS

## 2023-06-05 PROCEDURE — 71045 X-RAY EXAM CHEST 1 VIEW: CPT

## 2023-06-05 PROCEDURE — 51701 INSERT BLADDER CATHETER: CPT

## 2023-06-05 PROCEDURE — 2580000003 HC RX 258: Performed by: INTERNAL MEDICINE

## 2023-06-05 PROCEDURE — 2700000000 HC OXYGEN THERAPY PER DAY

## 2023-06-05 PROCEDURE — 2500000003 HC RX 250 WO HCPCS: Performed by: INTERNAL MEDICINE

## 2023-06-05 PROCEDURE — 80202 ASSAY OF VANCOMYCIN: CPT

## 2023-06-05 PROCEDURE — 6360000002 HC RX W HCPCS: Performed by: INTERNAL MEDICINE

## 2023-06-05 PROCEDURE — APPNB15 APP NON BILLABLE TIME 0-15 MINS: Performed by: NURSE PRACTITIONER

## 2023-06-05 PROCEDURE — 2580000003 HC RX 258: Performed by: HOSPITALIST

## 2023-06-05 PROCEDURE — 6370000000 HC RX 637 (ALT 250 FOR IP): Performed by: SURGERY

## 2023-06-05 PROCEDURE — C9113 INJ PANTOPRAZOLE SODIUM, VIA: HCPCS | Performed by: SURGERY

## 2023-06-05 PROCEDURE — 2580000003 HC RX 258: Performed by: SURGERY

## 2023-06-05 PROCEDURE — 6370000000 HC RX 637 (ALT 250 FOR IP): Performed by: STUDENT IN AN ORGANIZED HEALTH CARE EDUCATION/TRAINING PROGRAM

## 2023-06-05 PROCEDURE — 94761 N-INVAS EAR/PLS OXIMETRY MLT: CPT

## 2023-06-05 PROCEDURE — 31622 DX BRONCHOSCOPE/WASH: CPT

## 2023-06-05 PROCEDURE — 6360000002 HC RX W HCPCS: Performed by: NURSE PRACTITIONER

## 2023-06-05 PROCEDURE — 94003 VENT MGMT INPAT SUBQ DAY: CPT

## 2023-06-05 PROCEDURE — 6370000000 HC RX 637 (ALT 250 FOR IP): Performed by: NURSE PRACTITIONER

## 2023-06-05 RX ORDER — FUROSEMIDE 10 MG/ML
100 INJECTION INTRAMUSCULAR; INTRAVENOUS ONCE
Status: COMPLETED | OUTPATIENT
Start: 2023-06-05 | End: 2023-06-05

## 2023-06-05 RX ORDER — LIDOCAINE HYDROCHLORIDE 10 MG/ML
INJECTION, SOLUTION EPIDURAL; INFILTRATION; INTRACAUDAL; PERINEURAL
Status: COMPLETED
Start: 2023-06-05 | End: 2023-06-05

## 2023-06-05 RX ORDER — CARVEDILOL 25 MG/1
25 TABLET ORAL 2 TIMES DAILY WITH MEALS
Qty: 60 TABLET | Refills: 3
Start: 2023-06-05

## 2023-06-05 RX ORDER — LOSARTAN POTASSIUM 100 MG/1
100 TABLET ORAL DAILY
Qty: 30 TABLET | Refills: 3
Start: 2023-06-06

## 2023-06-05 RX ORDER — INSULIN GLARGINE 100 [IU]/ML
15 INJECTION, SOLUTION SUBCUTANEOUS NIGHTLY
Qty: 10 ML | Refills: 3 | Status: SHIPPED | OUTPATIENT
Start: 2023-06-05

## 2023-06-05 RX ORDER — FENTANYL CITRATE 50 UG/ML
50 INJECTION, SOLUTION INTRAMUSCULAR; INTRAVENOUS ONCE
Status: COMPLETED | OUTPATIENT
Start: 2023-06-05 | End: 2023-06-05

## 2023-06-05 RX ORDER — IPRATROPIUM BROMIDE AND ALBUTEROL SULFATE 2.5; .5 MG/3ML; MG/3ML
3 SOLUTION RESPIRATORY (INHALATION)
Qty: 360 ML | Refills: 1
Start: 2023-06-05

## 2023-06-05 RX ORDER — INSULIN LISPRO 100 [IU]/ML
0-16 INJECTION, SOLUTION INTRAVENOUS; SUBCUTANEOUS
Qty: 1 ML | Refills: 1
Start: 2023-06-05

## 2023-06-05 RX ORDER — HEPARIN SODIUM 5000 [USP'U]/ML
5000 INJECTION, SOLUTION INTRAVENOUS; SUBCUTANEOUS EVERY 8 HOURS SCHEDULED
Qty: 10 EACH | Refills: 1
Start: 2023-06-05

## 2023-06-05 RX ORDER — FENTANYL CITRATE 50 UG/ML
INJECTION, SOLUTION INTRAMUSCULAR; INTRAVENOUS
Status: COMPLETED
Start: 2023-06-05 | End: 2023-06-05

## 2023-06-05 RX ORDER — AMLODIPINE BESYLATE 10 MG/1
10 TABLET ORAL DAILY
Qty: 30 TABLET | Refills: 3 | Status: SHIPPED | OUTPATIENT
Start: 2023-06-06

## 2023-06-05 RX ORDER — MIDAZOLAM HYDROCHLORIDE 1 MG/ML
2 INJECTION INTRAMUSCULAR; INTRAVENOUS ONCE
Status: COMPLETED | OUTPATIENT
Start: 2023-06-05 | End: 2023-06-05

## 2023-06-05 RX ORDER — PANTOPRAZOLE SODIUM 40 MG/1
40 TABLET, DELAYED RELEASE ORAL 2 TIMES DAILY
Qty: 30 TABLET | Refills: 0
Start: 2023-06-05

## 2023-06-05 RX ORDER — HYDRALAZINE HYDROCHLORIDE 100 MG/1
100 TABLET, FILM COATED ORAL 3 TIMES DAILY
Qty: 90 TABLET | Refills: 3 | Status: SHIPPED | OUTPATIENT
Start: 2023-06-05

## 2023-06-05 RX ORDER — MINERAL OIL AND WHITE PETROLATUM 150; 830 MG/G; MG/G
OINTMENT OPHTHALMIC EVERY 4 HOURS PRN
Qty: 1 EACH | Refills: 1
Start: 2023-06-05

## 2023-06-05 RX ORDER — CLONIDINE 0.3 MG/24H
1 PATCH, EXTENDED RELEASE TRANSDERMAL WEEKLY
Qty: 4 PATCH | Refills: 1 | Status: SHIPPED | OUTPATIENT
Start: 2023-06-11

## 2023-06-05 RX ORDER — MINOXIDIL 2.5 MG/1
2.5 TABLET ORAL 2 TIMES DAILY
Qty: 30 TABLET | Refills: 3
Start: 2023-06-05

## 2023-06-05 RX ORDER — MIDAZOLAM HYDROCHLORIDE 1 MG/ML
INJECTION INTRAMUSCULAR; INTRAVENOUS
Status: COMPLETED
Start: 2023-06-05 | End: 2023-06-05

## 2023-06-05 RX ORDER — ISOSORBIDE DINITRATE 20 MG/1
20 TABLET ORAL 3 TIMES DAILY
Qty: 90 TABLET | Refills: 3 | Status: SHIPPED | OUTPATIENT
Start: 2023-06-05

## 2023-06-05 RX ADMIN — MINOXIDIL 2.5 MG: 2.5 TABLET ORAL at 21:08

## 2023-06-05 RX ADMIN — FENTANYL CITRATE 50 MCG: 50 INJECTION, SOLUTION INTRAMUSCULAR; INTRAVENOUS at 10:15

## 2023-06-05 RX ADMIN — SODIUM CHLORIDE, PRESERVATIVE FREE 40 MG: 5 INJECTION INTRAVENOUS at 21:09

## 2023-06-05 RX ADMIN — IPRATROPIUM BROMIDE AND ALBUTEROL SULFATE 1 DOSE: 2.5; .5 SOLUTION RESPIRATORY (INHALATION) at 08:13

## 2023-06-05 RX ADMIN — MINOXIDIL 2.5 MG: 2.5 TABLET ORAL at 09:04

## 2023-06-05 RX ADMIN — MIDAZOLAM HYDROCHLORIDE 2 MG: 1 INJECTION INTRAMUSCULAR; INTRAVENOUS at 10:15

## 2023-06-05 RX ADMIN — SENNOSIDES AND DOCUSATE SODIUM 2 TABLET: 50; 8.6 TABLET ORAL at 09:03

## 2023-06-05 RX ADMIN — SODIUM CHLORIDE SOLN NEBU 3% 15 ML: 3 NEBU SOLN at 20:06

## 2023-06-05 RX ADMIN — INSULIN LISPRO 4 UNITS: 100 INJECTION, SOLUTION INTRAVENOUS; SUBCUTANEOUS at 15:48

## 2023-06-05 RX ADMIN — CHLORHEXIDINE GLUCONATE 0.12% ORAL RINSE 15 ML: 1.2 LIQUID ORAL at 09:03

## 2023-06-05 RX ADMIN — HEPARIN SODIUM 5000 UNITS: 5000 INJECTION INTRAVENOUS; SUBCUTANEOUS at 21:21

## 2023-06-05 RX ADMIN — FUROSEMIDE 100 MG: 10 INJECTION, SOLUTION INTRAMUSCULAR; INTRAVENOUS at 06:10

## 2023-06-05 RX ADMIN — AMLODIPINE BESYLATE 10 MG: 10 TABLET ORAL at 09:04

## 2023-06-05 RX ADMIN — CARVEDILOL 25 MG: 25 TABLET, FILM COATED ORAL at 09:04

## 2023-06-05 RX ADMIN — ATORVASTATIN CALCIUM 40 MG: 40 TABLET, FILM COATED ORAL at 21:08

## 2023-06-05 RX ADMIN — HYDRALAZINE HYDROCHLORIDE 100 MG: 50 TABLET, FILM COATED ORAL at 09:03

## 2023-06-05 RX ADMIN — HYDRALAZINE HYDROCHLORIDE 100 MG: 50 TABLET, FILM COATED ORAL at 13:27

## 2023-06-05 RX ADMIN — IPRATROPIUM BROMIDE AND ALBUTEROL SULFATE 1 DOSE: 2.5; .5 SOLUTION RESPIRATORY (INHALATION) at 20:06

## 2023-06-05 RX ADMIN — SODIUM CHLORIDE 7.5 MG/HR: 9 INJECTION, SOLUTION INTRAVENOUS at 04:33

## 2023-06-05 RX ADMIN — CHLORHEXIDINE GLUCONATE 0.12% ORAL RINSE 15 ML: 1.2 LIQUID ORAL at 21:08

## 2023-06-05 RX ADMIN — SPIRONOLACTONE 50 MG: 25 TABLET ORAL at 17:21

## 2023-06-05 RX ADMIN — SODIUM CHLORIDE SOLN NEBU 3% 15 ML: 3 NEBU SOLN at 08:13

## 2023-06-05 RX ADMIN — ISOSORBIDE DINITRATE 20 MG: 20 TABLET ORAL at 13:27

## 2023-06-05 RX ADMIN — SODIUM CHLORIDE 2.5 MG/HR: 9 INJECTION, SOLUTION INTRAVENOUS at 10:10

## 2023-06-05 RX ADMIN — IPRATROPIUM BROMIDE AND ALBUTEROL SULFATE 1 DOSE: 2.5; .5 SOLUTION RESPIRATORY (INHALATION) at 13:59

## 2023-06-05 RX ADMIN — SPIRONOLACTONE 50 MG: 25 TABLET ORAL at 09:03

## 2023-06-05 RX ADMIN — CARVEDILOL 25 MG: 25 TABLET, FILM COATED ORAL at 17:21

## 2023-06-05 RX ADMIN — HYDRALAZINE HYDROCHLORIDE 100 MG: 50 TABLET, FILM COATED ORAL at 21:08

## 2023-06-05 RX ADMIN — SODIUM CHLORIDE, PRESERVATIVE FREE 40 MG: 5 INJECTION INTRAVENOUS at 09:04

## 2023-06-05 RX ADMIN — SODIUM CHLORIDE, PRESERVATIVE FREE 10 ML: 5 INJECTION INTRAVENOUS at 21:09

## 2023-06-05 RX ADMIN — MEROPENEM 500 MG: 500 INJECTION, POWDER, FOR SOLUTION INTRAVENOUS at 13:02

## 2023-06-05 RX ADMIN — ISOSORBIDE DINITRATE 20 MG: 20 TABLET ORAL at 09:08

## 2023-06-05 RX ADMIN — INSULIN LISPRO 4 UNITS: 100 INJECTION, SOLUTION INTRAVENOUS; SUBCUTANEOUS at 12:57

## 2023-06-05 RX ADMIN — HEPARIN SODIUM 5000 UNITS: 5000 INJECTION INTRAVENOUS; SUBCUTANEOUS at 13:27

## 2023-06-05 RX ADMIN — LIDOCAINE HYDROCHLORIDE: 10 INJECTION, SOLUTION EPIDURAL; INFILTRATION; INTRACAUDAL; PERINEURAL at 10:22

## 2023-06-05 RX ADMIN — SODIUM CHLORIDE, PRESERVATIVE FREE 10 ML: 5 INJECTION INTRAVENOUS at 09:08

## 2023-06-05 RX ADMIN — LOSARTAN POTASSIUM 100 MG: 100 TABLET, FILM COATED ORAL at 09:08

## 2023-06-05 RX ADMIN — SODIUM CHLORIDE SOLN NEBU 3% 15 ML: 3 NEBU SOLN at 13:59

## 2023-06-05 RX ADMIN — HEPARIN SODIUM 5000 UNITS: 5000 INJECTION INTRAVENOUS; SUBCUTANEOUS at 06:11

## 2023-06-05 RX ADMIN — MIDAZOLAM 2 MG: 1 INJECTION INTRAMUSCULAR; INTRAVENOUS at 10:15

## 2023-06-05 RX ADMIN — ISOSORBIDE DINITRATE 20 MG: 20 TABLET ORAL at 21:08

## 2023-06-05 RX ADMIN — INSULIN GLARGINE 15 UNITS: 100 INJECTION, SOLUTION SUBCUTANEOUS at 21:08

## 2023-06-05 ASSESSMENT — PULMONARY FUNCTION TESTS
PIF_VALUE: 21
PIF_VALUE: 20
PIF_VALUE: 21
PIF_VALUE: 20
PIF_VALUE: 20
PIF_VALUE: 21
PIF_VALUE: 21

## 2023-06-05 ASSESSMENT — PAIN SCALES - GENERAL
PAINLEVEL_OUTOF10: 0

## 2023-06-05 NOTE — CARE COORDINATION
DC order noted  Attempting to schedule ALS transport  [facility]- #[phone] no availability today- will need to call tomorrow to schedule and will depend on staffing   [facility] -#[phone]- they do not accept patient insurance  [facility] - they do not accept pt insurance   [facility] #[phone]- they do not provide pressure support for transport and pt is currently on pressure support of 15.  [facility] #[phone]- they do not have availability today- will need to call in AM to check availability. Will follow.  Bedside RN Larry Drummond notified- she will inform family     Electronically signed by Mary Vernon on 6/5/2023 at 1:23 PM  #[phone]

## 2023-06-05 NOTE — CARE COORDINATION
Derick w/ [facility] w/ [facility] #[phone] - precert has been obtained - will follow for discharge planning .   Electronically signed by Desmond Palm on 6/5/2023 at 9:58 AM  #[phone]

## 2023-06-05 NOTE — PROCEDURES
secretions and right lower lobe segmental airways as well as left lower lobe segmental airways. Multiple suctioning maneuvers performed bilaterally until all airways were patent and free of mucus.     BAL of the right lower lobe with 50 mL in and ~30 mL out      The Patient remains in the ICU in critical condition        Lani Barnes MD

## 2023-06-06 VITALS
RESPIRATION RATE: 14 BRPM | DIASTOLIC BLOOD PRESSURE: 66 MMHG | SYSTOLIC BLOOD PRESSURE: 169 MMHG | BODY MASS INDEX: 21.98 KG/M2 | OXYGEN SATURATION: 100 % | HEART RATE: 97 BPM | WEIGHT: 171.3 LBS | HEIGHT: 74 IN | TEMPERATURE: 97.7 F

## 2023-06-06 LAB
ACID FAST STN SPEC QL: NORMAL
ACID FAST STN SPEC QL: NORMAL
ANION GAP SERPL CALCULATED.3IONS-SCNC: 10 MMOL/L (ref 3–16)
BASOPHILS # BLD: 0.1 K/UL (ref 0–0.2)
BASOPHILS NFR BLD: 0.5 %
BUN SERPL-MCNC: 47 MG/DL (ref 7–20)
CALCIUM SERPL-MCNC: 8.5 MG/DL (ref 8.3–10.6)
CHLORIDE SERPL-SCNC: 94 MMOL/L (ref 99–110)
CO2 SERPL-SCNC: 23 MMOL/L (ref 21–32)
CREAT SERPL-MCNC: 3.9 MG/DL (ref 0.9–1.3)
DEPRECATED RDW RBC AUTO: 16.5 % (ref 12.4–15.4)
EOSINOPHIL # BLD: 0.1 K/UL (ref 0–0.6)
EOSINOPHIL NFR BLD: 0.8 %
GFR SERPLBLD CREATININE-BSD FMLA CKD-EPI: 17 ML/MIN/{1.73_M2}
GLUCOSE BLD-MCNC: 173 MG/DL (ref 70–99)
GLUCOSE BLD-MCNC: 175 MG/DL (ref 70–99)
GLUCOSE BLD-MCNC: 186 MG/DL (ref 70–99)
GLUCOSE BLD-MCNC: 219 MG/DL (ref 70–99)
GLUCOSE BLD-MCNC: 229 MG/DL (ref 70–99)
GLUCOSE SERPL-MCNC: 171 MG/DL (ref 70–99)
HCT VFR BLD AUTO: 22.4 % (ref 40.5–52.5)
HGB BLD-MCNC: 7.5 G/DL (ref 13.5–17.5)
LYMPHOCYTES # BLD: 0.7 K/UL (ref 1–5.1)
LYMPHOCYTES NFR BLD: 6.1 %
MCH RBC QN AUTO: 30.8 PG (ref 26–34)
MCHC RBC AUTO-ENTMCNC: 33.6 G/DL (ref 31–36)
MCV RBC AUTO: 91.7 FL (ref 80–100)
MONOCYTES # BLD: 0.8 K/UL (ref 0–1.3)
MONOCYTES NFR BLD: 7.8 %
MYCOBACTERIUM SPEC CULT: NORMAL
MYCOBACTERIUM SPEC CULT: NORMAL
NEUTROPHILS # BLD: 9.1 K/UL (ref 1.7–7.7)
NEUTROPHILS NFR BLD: 84.8 %
PERFORMED ON: ABNORMAL
PHOSPHATE SERPL-MCNC: 4.8 MG/DL (ref 2.5–4.9)
PLATELET # BLD AUTO: 191 K/UL (ref 135–450)
PMV BLD AUTO: 9.8 FL (ref 5–10.5)
POTASSIUM SERPL-SCNC: 4.3 MMOL/L (ref 3.5–5.1)
RBC # BLD AUTO: 2.44 M/UL (ref 4.2–5.9)
SODIUM SERPL-SCNC: 127 MMOL/L (ref 136–145)
WBC # BLD AUTO: 10.7 K/UL (ref 4–11)

## 2023-06-06 PROCEDURE — 6360000002 HC RX W HCPCS: Performed by: HOSPITALIST

## 2023-06-06 PROCEDURE — 94761 N-INVAS EAR/PLS OXIMETRY MLT: CPT

## 2023-06-06 PROCEDURE — 6370000000 HC RX 637 (ALT 250 FOR IP): Performed by: INTERNAL MEDICINE

## 2023-06-06 PROCEDURE — 6370000000 HC RX 637 (ALT 250 FOR IP): Performed by: SURGERY

## 2023-06-06 PROCEDURE — 6360000002 HC RX W HCPCS: Performed by: SURGERY

## 2023-06-06 PROCEDURE — 99232 SBSQ HOSP IP/OBS MODERATE 35: CPT | Performed by: INTERNAL MEDICINE

## 2023-06-06 PROCEDURE — 85025 COMPLETE CBC W/AUTO DIFF WBC: CPT

## 2023-06-06 PROCEDURE — C9113 INJ PANTOPRAZOLE SODIUM, VIA: HCPCS | Performed by: SURGERY

## 2023-06-06 PROCEDURE — 6370000000 HC RX 637 (ALT 250 FOR IP): Performed by: NURSE PRACTITIONER

## 2023-06-06 PROCEDURE — 94003 VENT MGMT INPAT SUBQ DAY: CPT

## 2023-06-06 PROCEDURE — 2580000003 HC RX 258: Performed by: INTERNAL MEDICINE

## 2023-06-06 PROCEDURE — 6360000002 HC RX W HCPCS: Performed by: INTERNAL MEDICINE

## 2023-06-06 PROCEDURE — 80048 BASIC METABOLIC PNL TOTAL CA: CPT

## 2023-06-06 PROCEDURE — 2700000000 HC OXYGEN THERAPY PER DAY

## 2023-06-06 PROCEDURE — 94640 AIRWAY INHALATION TREATMENT: CPT

## 2023-06-06 PROCEDURE — 99233 SBSQ HOSP IP/OBS HIGH 50: CPT | Performed by: INTERNAL MEDICINE

## 2023-06-06 PROCEDURE — 6360000002 HC RX W HCPCS: Performed by: NURSE PRACTITIONER

## 2023-06-06 PROCEDURE — 6370000000 HC RX 637 (ALT 250 FOR IP): Performed by: STUDENT IN AN ORGANIZED HEALTH CARE EDUCATION/TRAINING PROGRAM

## 2023-06-06 PROCEDURE — 90935 HEMODIALYSIS ONE EVALUATION: CPT

## 2023-06-06 PROCEDURE — 2580000003 HC RX 258: Performed by: SURGERY

## 2023-06-06 PROCEDURE — 36556 INSERT NON-TUNNEL CV CATH: CPT

## 2023-06-06 PROCEDURE — 84100 ASSAY OF PHOSPHORUS: CPT

## 2023-06-06 PROCEDURE — 2580000003 HC RX 258: Performed by: HOSPITALIST

## 2023-06-06 RX ORDER — VANCOMYCIN 1.75 G/350ML
1250 INJECTION, SOLUTION INTRAVENOUS ONCE
Status: COMPLETED | OUTPATIENT
Start: 2023-06-06 | End: 2023-06-06

## 2023-06-06 RX ADMIN — INSULIN LISPRO 4 UNITS: 100 INJECTION, SOLUTION INTRAVENOUS; SUBCUTANEOUS at 00:14

## 2023-06-06 RX ADMIN — CARVEDILOL 25 MG: 25 TABLET, FILM COATED ORAL at 16:57

## 2023-06-06 RX ADMIN — ISOSORBIDE DINITRATE 20 MG: 20 TABLET ORAL at 14:10

## 2023-06-06 RX ADMIN — EPOETIN ALFA-EPBX 10000 UNITS: 10000 INJECTION, SOLUTION INTRAVENOUS; SUBCUTANEOUS at 13:17

## 2023-06-06 RX ADMIN — HEPARIN SODIUM 5000 UNITS: 5000 INJECTION INTRAVENOUS; SUBCUTANEOUS at 14:10

## 2023-06-06 RX ADMIN — INSULIN LISPRO 4 UNITS: 100 INJECTION, SOLUTION INTRAVENOUS; SUBCUTANEOUS at 16:57

## 2023-06-06 RX ADMIN — LOSARTAN POTASSIUM 100 MG: 100 TABLET, FILM COATED ORAL at 08:25

## 2023-06-06 RX ADMIN — IPRATROPIUM BROMIDE AND ALBUTEROL SULFATE 1 DOSE: 2.5; .5 SOLUTION RESPIRATORY (INHALATION) at 13:04

## 2023-06-06 RX ADMIN — HEPARIN SODIUM 5000 UNITS: 5000 INJECTION INTRAVENOUS; SUBCUTANEOUS at 05:44

## 2023-06-06 RX ADMIN — HYDRALAZINE HYDROCHLORIDE 100 MG: 50 TABLET, FILM COATED ORAL at 08:25

## 2023-06-06 RX ADMIN — HEPARIN SODIUM 3600 UNITS: 1000 INJECTION INTRAVENOUS; SUBCUTANEOUS at 13:18

## 2023-06-06 RX ADMIN — SODIUM CHLORIDE, PRESERVATIVE FREE 10 ML: 5 INJECTION INTRAVENOUS at 08:38

## 2023-06-06 RX ADMIN — ISOSORBIDE DINITRATE 20 MG: 20 TABLET ORAL at 08:26

## 2023-06-06 RX ADMIN — CARVEDILOL 25 MG: 25 TABLET, FILM COATED ORAL at 08:26

## 2023-06-06 RX ADMIN — IPRATROPIUM BROMIDE AND ALBUTEROL SULFATE 1 DOSE: 2.5; .5 SOLUTION RESPIRATORY (INHALATION) at 07:56

## 2023-06-06 RX ADMIN — MEROPENEM 500 MG: 500 INJECTION, POWDER, FOR SOLUTION INTRAVENOUS at 14:06

## 2023-06-06 RX ADMIN — SODIUM CHLORIDE SOLN NEBU 3% 15 ML: 3 NEBU SOLN at 07:56

## 2023-06-06 RX ADMIN — VANCOMYCIN 1250 MG: 1.75 INJECTION, SOLUTION INTRAVENOUS at 14:09

## 2023-06-06 RX ADMIN — AMLODIPINE BESYLATE 10 MG: 10 TABLET ORAL at 08:25

## 2023-06-06 RX ADMIN — HYDRALAZINE HYDROCHLORIDE 10 MG: 20 INJECTION INTRAMUSCULAR; INTRAVENOUS at 14:21

## 2023-06-06 RX ADMIN — CHLORHEXIDINE GLUCONATE 0.12% ORAL RINSE 15 ML: 1.2 LIQUID ORAL at 08:24

## 2023-06-06 RX ADMIN — HYDRALAZINE HYDROCHLORIDE 100 MG: 50 TABLET, FILM COATED ORAL at 14:09

## 2023-06-06 RX ADMIN — SPIRONOLACTONE 50 MG: 25 TABLET ORAL at 08:25

## 2023-06-06 RX ADMIN — SENNOSIDES AND DOCUSATE SODIUM 2 TABLET: 50; 8.6 TABLET ORAL at 08:25

## 2023-06-06 RX ADMIN — SODIUM CHLORIDE, PRESERVATIVE FREE 40 MG: 5 INJECTION INTRAVENOUS at 08:24

## 2023-06-06 RX ADMIN — SODIUM CHLORIDE SOLN NEBU 3% 15 ML: 3 NEBU SOLN at 13:04

## 2023-06-06 RX ADMIN — MINOXIDIL 2.5 MG: 2.5 TABLET ORAL at 08:25

## 2023-06-06 ASSESSMENT — PULMONARY FUNCTION TESTS
PIF_VALUE: 21
PIF_VALUE: 10
PIF_VALUE: 21
PIF_VALUE: 11
PIF_VALUE: 21

## 2023-06-06 ASSESSMENT — PAIN SCALES - GENERAL
PAINLEVEL_OUTOF10: 0

## 2023-06-06 NOTE — PROGRESS NOTES
4 Eyes Skin Assessment     NAME:  Wenceslao Allred  YOB: 1965  MEDICAL RECORD NUMBER:  [id number]    The patient is being assessed for  Shift Handoff    I agree that at least one RN has performed a thorough Head to Toe Skin Assessment on the patient. ALL assessment sites listed below have been assessed. Areas assessed by both nurses:    Head, Face, Ears, Shoulders, Back, Chest, Arms, Elbows, Hands, Sacrum. Buttock, Coccyx, Ischium, Legs. Feet and Heels, and Under Medical Devices         Does the Patient have a Wound?  No noted wound(s)       [place] Prevention initiated by RN: Yes  Wound Care Orders initiated by RN: No    Pressure Injury (Stage 3,4, Unstageable, DTI, NWPT, and Complex wounds) if present, place Wound referral order by RN under : No    New Ostomies, if present place, Ostomy referral order under : No     Nurse 1 eSignature: Electronically signed by Selena Renae RN on 6/4/23 at 11:02 PM EDT    **SHARE this note so that the co-signing nurse can place an eSignature**    Nurse 2 eSignature: Electronically signed by Florecita Flores RN on 6/5/23 at 7:50 AM EDT
Assisted MD in bedside bronchoscopy. No complications noted.
Clinical Pharmacy Note  Vancomycin Consult    Nona Vargas is a 63 y.o. male ordered Vancomycin for CAP; consult received from Dr. Luz Marina Shine to manage therapy. Also receiving meropenem. Allergies:  Doxazosin, Cefepime, and Coconut flavor     Temp max:  Temp (24hrs), Av.9 °F (36.6 °C), Min:97.2 °F (36.2 °C), Max:98.3 °F (36.8 °C)      Recent Labs     23  0400 23  0350 23  0445   WBC 16.8* 18.4* 10.7       Culture Results:  Pneumonia Panel, Molecular [[id number]] (Abnormal) Collected: 23 1333   Order Status: Completed Specimen: Sputum, Suctioned Updated: 23 1633    Organism Staph aureus MRSA DNA Detected Abnormal     Pneumonia Panel Molecular -- Abnormal     >=10,000,000 copies/mL   CONTACT PRECAUTIONS INDICATED   See additional report for complete Pneumonia panel. mecA/C gene and MREJ gene Detected. Abnormal    Narrative:     ORDER#: [id number]                          ORDERED BY: NELL CAVANAUGH   SOURCE: Sputum Suctioned                   COLLECTED:  23 13:33   ANTIBIOTICS AT [facility]                      RECEIVED :  23 13:48   Learta Sink tel. [phone],   Microbiology results called to and read back by Cande Boyle RN, 2023        Ht Readings from Last 1 Encounters:   23 6' 2\" (1.88 m)        Wt Readings from Last 1 Encounters:   23 176 lb 12.9 oz (80.2 kg)       Assessment:  Day # 3 of vancomycin. Current regimen: Intermittent dosing based on levels due to ESRD/HD  Random level: 14.6 mg/L yesterday    Plan:  Vancomycin 1250 mg IV x 1 today after dialysis     Thank you for the consult.      Narda Cruz [facility], PharmD, BCCCP
Clinical Pharmacy Note  Vancomycin Consult    Sarwat Wilson is a 63 y.o. male ordered Vancomycin for CAP; consult received from Dr. Aldo Rolon to manage therapy. Also receiving meropenem. Allergies:  Doxazosin, Cefepime, and Coconut flavor     Temp max:  Temp (24hrs), Av.3 °F (36.8 °C), Min:95.4 °F (35.2 °C), Max:99.9 °F (37.7 °C)      Recent Labs     23  0215 23  0400 23  0350   WBC 14.4* 16.8* 18.4*       Culture Results:  Pneumonia Panel, Molecular [[id number]] (Abnormal) Collected: 23 1333   Order Status: Completed Specimen: Sputum, Suctioned Updated: 23 1633    Organism Staph aureus MRSA DNA Detected Abnormal     Pneumonia Panel Molecular -- Abnormal     >=10,000,000 copies/mL   CONTACT PRECAUTIONS INDICATED   See additional report for complete Pneumonia panel. mecA/C gene and MREJ gene Detected. Abnormal    Narrative:     ORDER#: [id number]                          ORDERED BY: NELL CAVANAUGH   SOURCE: Sputum Suctioned                   COLLECTED:  23 13:33   ANTIBIOTICS AT [facility]                      RECEIVED :  23 13:48   Dawson Valladares tel. [phone],   Microbiology results called to and read back by Yumiko Zarate RN, 2023        Ht Readings from Last 1 Encounters:   23 6' 2\" (1.88 m)        Wt Readings from Last 1 Encounters:   23 177 lb 7.5 oz (80.5 kg)       Assessment:  Day # 2 of vancomycin. Current regimen: Intermittent dosing based on levels due to ESRD/HD  Random level: 14.6 mg/L    Plan:  Plan to redose with next HD. Thank you for the consult.      Camilla Childress, [facility], PharmD, BCCCP
Comprehensive Nutrition Assessment    Type and Reason for Visit:  Reassess    Nutrition Recommendations/Plan:   Continue TF Nepro @ 60 ml/hr x 20 hrs + FW flush per provider     Malnutrition Assessment:  Malnutrition Status: At risk for malnutrition (Comment) (05/25/23 7601)    Context:  Acute Illness     Findings of the 6 clinical characteristics of malnutrition:  Energy Intake:  Mild decrease in energy intake (Comment)  Weight Loss:  No significant weight loss     Body Fat Loss:  Unable to assess     Muscle Mass Loss:  Unable to assess    Fluid Accumulation:  No significant fluid accumulation     Strength:  Not Performed    Nutrition Assessment:    Follow up. Pt with nepro running @ goal rate of 60 ml/hr via PEG. No GI symptoms noted. Continue to monitor. Nutrition Related Findings:    175 glucose, +2 RUE edema, 6/6 BM Wound Type: Surgical Incision (right abdomen with no issues noted)       Current Nutrition Intake & Therapies:    Average Meal Intake: NPO  Average Supplements Intake: NPO  ADULT TUBE FEEDING; PEG; Renal Formula; Continuous; 20; Yes; 10; Q 4 hours; 60; 60; Q 4 hours  Current Tube Feeding (TF) Orders:  Feeding Route: Nasogastric  Formula: Renal Formula  Schedule: Continuous  Feeding Regimen: Nepro at 30 mL/hr  Additives/Modulars: None  Water Flushes: 30 mL q 4 hrs for tube maintenance or per provider  Current TF & Flush Orders Provides: Nepro at 30 mL/hr provides 600 mL TV, 1080 kcal, 49 gm protein, and 436 mL free fl. Goal TF & Flush Orders Provides: Recommend Nepro at 60 mL/hr (over 20 hrs) to provide 1200 mL TV, 2160 kcal, 97 gm protein, and 872 mL free fl. Flush per MD.    Anthropometric Measures:  Height: 6' 2\" (188 cm)  Ideal Body Weight (IBW): 190 lbs (86 kg)    Admission Body Weight: 166 lb (75.3 kg)  Current Body Weight: 176 lb 5 oz (80 kg),   IBW. Weight Source: Bed Scale  Current BMI (kg/m2): 22.6                          BMI Categories: Normal Weight (BMI 18.5-24. 9)    Estimated
Dr. Montelongo Pac and Bell RT at bedside for bedside bronchoscopy. Consent signed by patient's wife, Ford Castillo. Ford Castillo left bedside for a few hours. Will be back later this afternoon.  RN will call with any updates
Due to increased WOB and constant, persistent desaturations, pt placed back on mechanical ventilation as follows:     06/05/23 0347   Vent Information   Vent Mode CPAP/PS   Ventilator Settings   FiO2  40 %   PEEP/CPAP (cmH2O) 5   Pressure Support (cm H2O) 15 cm H2O     Electronically signed by Jesenia Hua RCP on 6/5/2023 at 3:52 AM
[facility]                                               Progress note    CC: septic shock, anoxic brain injury  Summary:   Brenna Beltran is being seen by nephrology for ESRD management. Admitted with septic shock from peritonitis presumably. He presented with with altered mental status. Patient has been off and on PD over the past few years and was most recently finishing treatment for a tunneled line infection and had just started PD with a new PD catheter. Had a cardiac arrest on the floor. Reason for visit:   ESRD   Cardiac arrest  Septic shock secondary to presumed peritonitis  Encephalopathy      Interval History  Seen in ICU  /53 - 190/68. Last /65  Na 130  K, bicarbonate fine  Hb 7.4 stable overall. Last HD was on Saturday with 4.5 liter UF  Patient is ventilator dependant with FiO2 40 % and PEEP of 5  Not responsive        Plan:   Patient does not look volume overloaded and will plan for HD in AM per his schedule  Epogen with dialysis  Renal diet  Monitor vitals and I/O    Discussed with ICU team.             Mark Ladd, [facility]  Office: [phone]    Assessment:   #ESRD  As needed electrolyte replacement and dialysis intermittently on a TTS schedule. Had just resumed PD prior to admission. Had tunneled line and PD catheter. PD catheter removed  Using tunneled line for dialysis. -Currently on a TTS schedule, had been running at [facility]       #Septic shock   Resolved    #Cardiac arrest   In hospital arrest with 16 minutes of down time. Bradycardia preceded the event and found to have aspirated gastric contents in the lung it seems.    No invasive cardiac evaluation       #HTN  Blood pressures been difficult to control  Currently is on amlodipine 10 mg daily, carvedilol 25 mg twice daily, clonidine 0.3 mg/h patch, hydralazine 100 mg 3 times daily, Isordil 20 mg 3 times daily, losartan 50 mg daily, Aldactone 50 mg twice daily,
[facility]                                               Progress note    CC: septic shock, anoxic brain injury  Summary:   Savannah Ayala is being seen by nephrology for ESRD management. Admitted with septic shock from peritonitis presumably. He presented with with altered mental status. Patient has been off and on PD over the past few years and was most recently finishing treatment for a tunneled line infection and had just started PD with a new PD catheter. Had a cardiac arrest on the floor. Reason for visit:   ESRD   Cardiac arrest  Septic shock secondary to presumed peritonitis  Encephalopathy      Interval History  Seen in ICU during dialysis  BP was elevated but better with UF  Na 127  K, bicarbonate OK  Hb stable 7.5      Plan  HD today with 2- 3 liter UF as tolerated. Epogen with dialysis  Renal diet  Monitor vitals and I/O    Discussed with dialysis nurse            Tiera Chase MD  [facility]  Office: [phone]    Assessment:   #ESRD  As needed electrolyte replacement and dialysis intermittently on a TTS schedule. Had just resumed PD prior to admission. Had tunneled line and PD catheter. PD catheter removed  Using tunneled line for dialysis. -Currently on a TTS schedule, had been running at [facility]       #Septic shock   Resolved    #Cardiac arrest   In hospital arrest with 16 minutes of down time. Bradycardia preceded the event and found to have aspirated gastric contents in the lung it seems. No invasive cardiac evaluation       #HTN  Blood pressures been difficult to control  Currently is on amlodipine 10 mg daily, carvedilol 25 mg twice daily, clonidine 0.3 mg/h patch, hydralazine 100 mg 3 times daily, Isordil 20 mg 3 times daily, losartan 50 mg daily, Aldactone 50 mg twice daily, minoxidil 2.5 mg BID   -Off and on nicardipine infusion, wean off for systolic blood pressure less than 180      # acute encephalopathy.    Presented with this but
[facility]  Palliative Care   Progress Note    NAME:  Jacqueline Eid  MEDICAL RECORD NUMBER:  [id number]  AGE: 63 y.o. GENDER: male  : 1965  TODAY'S DATE:  2023    Subjective: Patient stable on vent, and PEG. Objective:    Vitals:    23 1421   BP: (!) 191/75   Pulse:    Resp:    Temp:    SpO2:      Lab Results   Component Value Date    WBC 10.7 2023    HGB 7.5 (L) 2023    HCT 22.4 (L) 2023    MCV 91.7 2023     2023     Lab Results   Component Value Date    CREATININE 3.9 (H) 2023    BUN 47 (H) 2023     (L) 2023    K 4.3 2023    CL 94 (L) 2023    CO2 23 2023     Lab Results   Component Value Date    ALT <5 (L) 2023    AST 17 2023    ALKPHOS 66 2023    BILITOT 0.3 2023       Plan: His wife was her briefly to check on her  prior to discharge. We discussed code status again and while she felt CPR would not be in his best interest she wants to discuss with his family before making a final decision. Code Status: Full Code  Discharge Environment:  [] Hospice Consult Agency:  [] Inpatient Hospice    [] Home with [facility]   [] ECF with Hospice  [] ECF skilled care with Hospice to follow   [] Other:    Teaching Time:  0hours  30 min     I will continue to follow Mr. Emmanuel's care as needed. Thank you for allowing me to participate in the care of Mr. Jareth Lee .      Electronically signed by Reynaldo Rogers RN, BSN,CHPN on 2023 at 2:36 PM  [address] [place] [address]  Office: [phone]
[facility] discharged to [facility] with [employer] [facility]. Patient on 28% 5L on T-piece. Wife took all of patient's belongings home. Wife at bedside at time of discharge.
Patient placed on 28% t piece. Saturating 100%.
Patient's wife, Rene Hurley, updated on discharge plan and no transportation available today.
Pulmonary Progress Note    Date of Admission: 5/19/2023   LOS: 17 days     Chief Complaint   Patient presents with    Altered Mental Status     Not responding appropriately to family today. A&Ox4 yesterday. PD only completed one out of three times yesterday due to one port being clogged. Tachycardia       Assessment/Plan:       Cardiac arrest  Anoxic brain injury  -Has made no meaningful recovery since arrest  -Status post tracheostomy    Ventilator dependent respiratory failure  -Status post tracheostomy, doing trach collar trials but required pressure support last night  -Continue to wean as tolerated    MRSA pneumonia  -MRSA on pneumonia panel  -On vancomycin  -Bronchoscopy today for therapeutic aspiration    PPI  Heparin  -Awaiting dispo to LTAC      24 Hour Events/Subjective  Some increased shortness of breath and labored breathing overnight so placed back on pressure support. Appears comfortable this morning. Continue pressure support trials    ROS:  Unable to obtain due to mechanical ventilation    Intake/Output Summary (Last 24 hours) at 6/5/2023 0831  Last data filed at 6/5/2023 0716  Gross per 24 hour   Intake 3959.12 ml   Output 200 ml   Net 3759.12 ml         PHYSICAL EXAM:   Blood pressure (!) 177/69, pulse 96, temperature 98.5 °F (36.9 °C), temperature source Rectal, resp. rate 23, height 6' 2\" (1.88 m), weight 177 lb 7.5 oz (80.5 kg), SpO2 93 %.'  Gen:  No acute distress. Eyes: PERRL. Anicteric sclera. No conjunctival injection. ENT: No discharge. Posterior oropharynx clear. External appearance of ears and nose normal.  Neck: Trachea midline. No mass tracheostomy in place. Resp:  No crackles. No wheezes. No rhonchi. No dullness on percussion. CV: Regular rate. Regular rhythm. No murmur or rub. No edema. GI: Soft, Non-tender. Non-distended. +BS  Skin: Warm, dry, w/o erythema. Lymph: No cervical or supraclavicular LAD. M/S: No cyanosis. No clubbing.     Neuro: Eyes open spontaneous but
Report called to Christina RN, nurse receiving patient at [address] [place] [address] at [facility].
Shift summary:    Neuro status remains unchanged. Pt will flinch slightly to nailbed stimulation of LUE at times but no other response to painful stimuli with other 3 extremities. Opens eyes spontaneously on occasion but does not make eye contact, track or focus. Smith removed yesterday am about 0400 and pt has ahd no urine output from external catheter at all. Bladder scan performed, showed 424 ml. NP notified. PRN bladder scan order and PRN straight cath order for volume > 250. Pt straight cathed and 200 ml returned. Cardene weaned off but had to be restarted @ 0246 for SBP >180. Cardene infusion currently @ 5 mg/hr. Pt been tolerating t-piece 28%, 5 liters since Friday. Pt started desaturating high 70s-low 80s @ 0337, pt suctioned and came up to 95% but did it again a few minutes later. Breathing pattern shallow and pt gasping, oxygen saturation continued to drop into 70s. RT called to bedside pt placed back on ventilator at previous CPAP/PS settings. Dr. Kasey Hernandez also notified and at bedside. ABG obtained which was WDL except PO2 50 and oxygen saturation 86%. Chest x-ray obtained and showed increased atelectasis from previous. Renal messaged and notified as well, 100 mg Lasix given IVP. External male catheter placed, no output as of yet. AM labs reviewed, Hgb this am 7.4.     Venessa Landeros, BSN, CCRN
Trach care completed.
Trach care completed.
Treatment time: 3.5 hours  Net UF: 2814 ml    Pre weight: 80.2 kg   Post weight: 77.7 kg  EDW: tbd kg    Access used: RIJ tunneled line  Access function: good with  ml/min    Medications or blood products given: [id number]    Regular outpatient schedule: [facility] TTS    Summary of response to treatment: Tolerated good. VSS. Cirt Line: Initial Hct: 21.1;   End Profile : B; Refill ( Hct.1 -22.7  subtract Hct 2- 22.6): 0.1 (no Refill); BV: -7.2 %    Copy of dialysis treatment record placed in chart, to be scanned into EMR.
Cardiomyopathy ([facility]     CHF (congestive heart failure) (HCC)     CVA (cerebral infarction) 05/2015    Left sided weakness (hands and legs)    Diabetes mellitus ([facility]     Foot ulcer, left ([facility] 01/14/2016    Gastroparesis     Hemodialysis patient ([facility]     tues, thurs, sat    History of blood transfusion     Hypercholesteremia     Hypertension     Kidney disease     Unspecified cerebral artery occlusion with cerebral infarction     5/15, 7/15 LEFT SIDE WEAKNESS    Weakness     left side    Wears glasses        Past Surgical History:    Past Surgical History:   Procedure Laterality Date    CATHETER INSERTION N/A 10/17/2022    TUNNEL CATHETER PLACEMENT performed by Daryn Davila MD at [address] [facility] N/A 05/17/2021    COLONOSCOPY POLYPECTOMY SNARE/COLD BIOPSY performed by Betzaida Zeng MD at [address] [place] [address] 2/3/2023    LAPAROSCOPIC [address] performed by Elise Patel DO at [address] N/A 4/11/2023    .  performed by Elise Patel DO at [address] N/A 10/17/2022    PERITONEAL DIALYSIS CATHETER REMOVAL performed by Daryn Davila MD at [address] N/A 4/11/2023    LAPAROSCOPIC PERITONEAL DIALYSIS CATHETER REMOVAL AND REPLACEMENT; INCISION AND DRAINAGE ABDOMINAL WALL ABSCESS performed by Elise Patel DO at [address] N/A 5/23/2023    REMOVAL PERITONEAL DIALYSIS CATHETER performed by Zakia Rinaldi MD at [facility] [place] [place] [address] N/A 5/31/2023    ESOPHAGOGASTRODUODENOSCOPY PEG TUBE INSERTION performed by Dewey Nelson MD at [address] 10/29/2020    PLACEMENT OF A TUNNELED DIALYSIS CATHETER WITH FLEURO AND ULTRASOUND performed by Daryn Davila MD at [facility]      IR NONTUNNELED VASCULAR CATHETER  11/29/2022    IR NONTUNNELED
Placeholder    insulin glargine  15 Units SubCUTAneous Nightly    ipratropium 0.5 mg-albuterol 2.5 mg  1 Dose Inhalation Q6H WA    minoxidil  2.5 mg PEG Tube BID    heparin (porcine)  5,000 Units SubCUTAneous 3 times per day    sodium chloride (Inhalant)  15 mL Nebulization Q6H WA    losartan  100 mg Oral Daily    isosorbide dinitrate  20 mg Oral TID    carvedilol  25 mg Oral BID WC    spironolactone  50 mg Oral BID    cloNIDine  1 patch TransDERmal Weekly    amLODIPine  10 mg Oral Daily    insulin lispro  0-16 Units SubCUTAneous 6 times per day    sennosides-docusate sodium  2 tablet Oral Daily    epoetin robert-epbx  10,000 Units SubCUTAneous Once per day on Tue Thu Sat    meropenem  500 mg IntraVENous Q24H    pantoprazole (PROTONIX) 40 mg injection  40 mg IntraVENous Q12H    chlorhexidine  15 mL Mouth/Throat BID    atorvastatin  40 mg Oral Nightly    [Held by provider] aspirin  81 mg Oral Daily    hydrALAZINE  100 mg Oral TID    sodium chloride flush  5-40 mL IntraVENous 2 times per day       Continuous Infusions:   niCARdipine Stopped (06/05/23 1108)    sodium chloride      sodium chloride 5 mL/hr at 06/04/23 1935    dextrose         PRN Meds:  hydrALAZINE, sodium chloride, heparin (porcine), artificial tears, sodium chloride flush, sodium chloride, ondansetron **OR** ondansetron, polyethylene glycol, acetaminophen **OR** acetaminophen, glucose, dextrose bolus **OR** dextrose bolus, glucagon (rDNA), dextrose    Labs reviewed:  CBC:   Recent Labs     06/04/23  0400 06/05/23  0350 06/06/23  0445   WBC 16.8* 18.4* 10.7   HGB 8.1* 7.4* 7.5*   HCT 24.7* 22.7* 22.4*   MCV 89.6 89.7 91.7    245 191     BMP:   Recent Labs     06/04/23  0400 06/05/23  0350 06/06/23  0445   * 130* 127*   K 4.0 4.2 4.3   CL 98* 96* 94*   CO2 25 22 23   PHOS 3.5 3.9 4.8   BUN 27* 36* 47*   CREATININE 2.6* 3.5* 3.9*     LIVER PROFILE: No results for input(s): AST, ALT, LIPASE, BILIDIR, BILITOT, ALKPHOS in the last 72
disease) on dialysis ([facility]    ESRD (end stage renal disease) ([facility]    GI bleed    Spontaneous bacterial peritonitis ([facility]    Dialysis-associated peritonitis ([facility]    Candida infection    Generalized abdominal pain    PKD (polycystic kidney disease)    Bacterial pneumonia    Encephalopathy acute    Elevated lactic acid level    Hyperkalemia    Severe sepsis (HCC)    Staphylococcus aureus bacteremia with sepsis (HCC)    Neutrophilia    Elevated procalcitonin    ESRD needing dialysis (HCC)    Severe malnutrition (HCC)    Infection of peritoneal dialysis catheter site, subsequent encounter ([facility]    Diabetic neuropathy ([facility]    Kidney disease, chronic, stage IV (GFR 15-29 ml/min) (HCC)    PD catheter dysfunction (HCC)    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)    Endotracheally intubated    Anoxic brain injury ([facility]    Aspiration into airway    Cardiac arrest ([facility]    Pneumonia of both lower lobes due to methicillin resistant Staphylococcus aureus (MRSA) ([facility])       Allergies  Doxazosin, Cefepime, and Coconut flavor    Medications    Scheduled Meds:   vancomycin (VANCOCIN) intermittent dosing (placeholder)   Other RX Placeholder    insulin glargine  15 Units SubCUTAneous Nightly    ipratropium 0.5 mg-albuterol 2.5 mg  1 Dose Inhalation Q6H WA    minoxidil  2.5 mg PEG Tube BID    heparin (porcine)  5,000 Units SubCUTAneous 3 times per day    sodium chloride (Inhalant)  15 mL Nebulization Q6H WA    losartan  100 mg Oral Daily    isosorbide dinitrate  20 mg Oral TID    carvedilol  25 mg Oral BID WC    spironolactone  50 mg Oral BID    cloNIDine  1 patch TransDERmal Weekly    amLODIPine  10 mg Oral Daily    insulin lispro  0-16 Units SubCUTAneous 6
hydrALAZINE (APRESOLINE) 100 MG tablet Take 1 tablet by mouth 3 times daily 90 tablet 3    losartan (COZAAR) 100 MG tablet Take 1 tablet by mouth daily 30 tablet 3    minoxidil (LONITEN) 2.5 MG tablet 1 tablet by PEG Tube route 2 times daily 30 tablet 3    carvedilol (COREG) 25 MG tablet Take 1 tablet by mouth 2 times daily (with meals) 60 tablet 3    amLODIPine (NORVASC) 10 MG tablet Take 1 tablet by mouth daily 30 tablet 3    lubrifresh P.M. (ARTIFICIAL TEARS) ophthalmic ointment Place into both eyes every 4 hours as needed (dryness) 1 each 1    pantoprazole (PROTONIX) 40 MG tablet Take 1 tablet by mouth 2 times daily 30 tablet 0    acetaminophen (TYLENOL) 500 MG tablet Take 2 tablets by mouth every 12 hours as needed for Pain         Allergies:  Doxazosin, Cefepime, and Coconut flavor    Immunizations :   Immunization History   Administered Date(s) Administered    Hep A, HAVRIX, VAQTA, (age 19y+), IM, 1mL 03/10/2021, 09/23/2021    Hep B, HEPLISAV-B, (age 18y+), IM, 0.5mL 05/06/2022    Hep B, RECOMBIVAX-HB, (age 20y+), IM, 1mL 11/05/2020    Hepatitis B 05/06/2022    Hepatitis B vaccine 11/05/2020    Influenza A ([id number]) Vaccine PF IM 11/04/2009    Influenza Vaccine, unspecified formulation 12/05/2013    Influenza Virus Vaccine 12/05/2013, 12/04/2015, 11/05/2020, 10/11/2021    Influenza, FLUARIX, FLULAVAL, FLUZONE (age 10 mo+) AND AFLURIA, (age 1 y+), PF, 0.5mL 10/15/2018, 11/05/2020    Influenza, Triv, 3 Years and older, IM, PF (Afluria 5yrs and older) 10/11/2021    Pneumococcal Conjugate Vaccine 02/13/2015    Pneumococcal, PCV-13, PREVNAR 13, (age 6w+), IM, 0.5mL 11/10/2020    Pneumococcal, PPSV23, PNEUMOVAX 21, (age 2y+), SC/IM, 0.5mL 11/02/2009, 03/10/2021    TDaP, ADACEL (age 10y-63y), BOOSTRIX (age 10y+), IM, 0.5mL 08/27/2021    Zoster Recombinant (Shingrix) 03/10/2021, 08/27/2021         Social History:    Social History     Tobacco Use    Smoking status: Some Days     Types: Cigars    Smokeless tobacco:
family were informed of the results of any tests, a time was given to answer questions, a plan was proposed and they agreed with plan. Whit Gonzales MD    This note was transcribed using [id number] [facility]. Please disregard any translational errors.

## 2023-06-06 NOTE — CARE COORDINATION
Wound referral for possible stage 2 to coccyx. Pt  has a past medical history of Cardiomyopathy ([facility] [address] CHF (congestive heart failure) ([facility] [address] CVA (cerebral infarction), Diabetes mellitus ([facility] [address] Foot ulcer, left ([facility] [address] Gastroparesis, Hemodialysis patient ([facility] [address] History of blood transfusion, Hypercholesteremia, Hypertension, Kidney disease, Unspecified cerebral artery occlusion with cerebral infarction, Weakness, and Wears glasses. Pt had cardiac arrest 5/20/23, removal of PD cath 5/23, trach placed 5/30, PEG placed 5/31. Has anoxic brain injury. Pt does have hx of pressure injury to buttock. Does not have stage 2; pt has dry, flaking skin to coccyx and sacral area. Bilat heels are intact, off loaded with heel boots. Has been on low air loss mattress. Mary tube feeds. Using zinc barrier to buttocks. REC: cont current tx and prevention.  Mayank Wisdom, MSN, RN, [facility]

## 2023-06-06 NOTE — CARE COORDINATION
Transportation Attempts:      [facility]:  [phone]  Does not have [facility] available for [address] [facility]: [phone]   Does not have staffing today, but suggested trying again tomorrow morning. [facility]:  [phone]  Does not have vent medics available today    [facility]:  [phone]  Does not have available  vents today    Debbi Shaw:  [phone]  Does not have staffing in [facility] today    [facility] [address] [facility]:  [phone]  Will only set up transport if set up through patient's insurance. Candia Fleischer, Sr.  Administrative Assist, Case Management  [phone]  Electronically signed by Candia Fleischer on 6/6/2023 at 7:47 AM

## 2023-06-06 NOTE — CARE COORDINATION
DISCHARGE SUMMARY     DATE OF DISCHARGE: 6/6/23    DISCHARGE DESTINATION: [facility] location      FACILITY    Level of Care: [address] [place] [address]    Report Number: [phone]    Fax Number:  [phone]    Precert Obtained: yes    Hens Completed: N/A    PASARR: N/A    Notified: RN, Family, and Facility/Agency  Wife Jair Cheung notified    HEMODIALYSIS: Yes  Had HD today 6/6/23    [place] [address] Name: [facility]    Ezequiel Estevez up Time: 3:30PM    Phone Number: [phone]    Received auth from  [employer] [facility] [employer] administration for [facility] for $450.00 for transport to  [facility] DME ORDERED: no    Electronically signed by Kristian Haque on 6/6/2023 at 1:56 PM  #[phone]

## 2023-06-06 NOTE — CARE COORDINATION
06/06/23 1347   IMM Letter   IMM Letter given to Patient/Family/Significant other/Guardian/POA/by: IMM explained to pt wife Serge Tamayo and copy provided per JENNIFER Youngblood RN   IMM Letter date given: 06/06/23   IMM Letter time given: 0234     Education provided to patient. Patient reported no questions and verbalized understanding. Patient made aware that he/she has 4 hours prior to discharging from hospital to decide if he/she wishes to pursue the Medicare appeal process.

## 2023-06-06 NOTE — FLOWSHEET NOTE
Blood returned;Catheter capped, clamped and heparinized x 2 ports   Machine Disinfection Process  --  Exterior Machine Disinfection   Rinseback Volume (ml)  --  400 ml   Blood Volume Processed (Liters)  --  68.7 l/min   Dialyzer Clearance  --  Heavily streaked   Duration of Treatment (minutes)  --  210 minutes   Hemodialysis Intake (ml)  --  400 ml   Hemodialysis Output (ml)  --  3214 ml   NET Removed (ml)  --  2814   Tolerated Treatment  --  Good   Dialysis Bath   K+ (Potassium) 3  --    Ca+ (Calcium) 2.5  --    Na+ (Sodium) 138  --    HCO3 (Bicarb) 35  --

## 2023-06-06 NOTE — PLAN OF CARE
Problem: Discharge Planning  Goal: Discharge to home or other facility with appropriate resources  Outcome: Progressing     Problem: Pain  Goal: Verbalizes/displays adequate comfort level or baseline comfort level  Outcome: Progressing     Problem: Safety - Adult  Goal: Free from fall injury  Outcome: Progressing     Problem: ABCDS Injury Assessment  Goal: Absence of physical injury  Outcome: Progressing     Problem: Skin/Tissue Integrity  Goal: Absence of new skin breakdown  Description: 1. Monitor for areas of redness and/or skin breakdown  2. Assess vascular access sites hourly  3. Every 4-6 hours minimum:  Change oxygen saturation probe site  4. Every 4-6 hours:  If on nasal continuous positive airway pressure, respiratory therapy assess nares and determine need for appliance change or resting period.   Outcome: Progressing     Problem: Nutrition Deficit:  Goal: Optimize nutritional status  Outcome: Progressing  Flowsheets (Taken 6/6/2023 1341 by Carlotta Wick RD)  Nutrient intake appropriate for improving, restoring, or maintaining nutritional needs: Assess nutritional status and recommend course of action     Problem: Chronic Conditions and Co-morbidities  Goal: Patient's chronic conditions and co-morbidity symptoms are monitored and maintained or improved  Outcome: Progressing
Nutrition Deficit:  Goal: Optimize nutritional status  6/4/2023 2301 by Radha Ovalles RN  Outcome: Progressing  6/4/2023 1547 by Connor Batres, RN  Outcome: Progressing     Problem: Chronic Conditions and Co-morbidities  Goal: Patient's chronic conditions and co-morbidity symptoms are monitored and maintained or improved  6/4/2023 2301 by Radha Ovalles RN  Outcome: Progressing  Flowsheets (Taken 6/4/2023 1931)  Care Plan - Patient's Chronic Conditions and Co-Morbidity Symptoms are Monitored and Maintained or Improved: Monitor and assess patient's chronic conditions and comorbid symptoms for stability, deterioration, or improvement  6/4/2023 1547 by Connor Batres, RN  Outcome: Júnior Batista, BSN, CCRN

## 2023-06-07 NOTE — ADT AUTH CERT
Q6  > Aldactone 50 mg PO (NGT) BID  > Nicardipine  ml IV titration Continuous      +++++++++++++++++++++++  ORDERS:  > Contact isolation   > ADULT TUBE FEEDING; PEG; Renal Formula; Continuous; 20; Yes; 10; Q 4 hours; 60; 60; Q 4 hours   > Straight cath PRN  > Catheter Care   > Apply cooling blanket   > Maintain INT pneumatic compression device  > Spontaneous Awakening Trial   > Elevate Head of Bed   > Maintain heels off of bed at all times  > Assess skin per unit guidelines   > Maintain HOB at the lowest elevation consistent with medical plan of care   > Turn or assist with turn approximately every 2 hours if patient is unable to turn self. Remind patient to turn if necessary   > Monitor I&O Q8  > Trach collar oxygen  > Spontaneous Breathing Trial   > Bed rest           06/04  by Lon Gtz RN       Review Status Review Entered   In Primary 6/6/2023 1641       Created By   Lon Gtz RN      Criteria Review   DATE: 06/04 ICU     +++++++++++++++++++++++++  RELEVANT BASELINES: (lab values, vitals, o2 amount/delivery, etc.)  > RA  > Tunneled Hemodialysis Catheter Right Subclavian         ++++++++++++++++++++++++  PERTINENT UPDATES:  > On Telemetry monitoring  > level of conciousness: 1 responds to voice  > Still on T-piece with FIO2 of 28 and SPO2 of 97%  > On NGT feeding  > Still Hyertensive given Nicardipine  ml IV titration Continuous   > IFC removed today but no urine output obtained  > Tachypneic  ++++++++++++++++++++++++  VITALS:  Temp: 95.4  RR: 20-28  MO:  88  BP:  169/76, 171/82, 174/77     ++++++++++++++++++++++  ABNL/PERTINENT LABS/RADIOLOGY/DIAGNOSTIC STUDIES:     Sodium: 131 (L)  Potassium: 4.0  Chloride: 98 (L)  BUN,BUNPL: 27 (H)  Creatinine: 2.6 (H)  Est, Glom Filt Rate: 28 !   Glucose, Random: 203 (H)  CALCIUM, SERUM, 281541: 8.2 (L)     POC Glucose: 204 (H) - 247 (H)        +++++++++++++++++++++++++++++  PHYSICAL EXAM:  > HEENT: + Periorbital edema, Eyes open but not

## 2023-06-19 LAB
FUNGUS SPEC CULT: NORMAL
LOEFFLER MB STN SPEC: NORMAL

## 2023-06-20 LAB
ACID FAST STN SPEC QL: ABNORMAL
ACID FAST STN SPEC QL: NORMAL
MYCOBACTERIUM SPEC CULT: ABNORMAL
MYCOBACTERIUM SPEC CULT: NORMAL
ORGANISM: ABNORMAL

## 2023-06-26 LAB
FUNGUS SPEC CULT: NORMAL
LOEFFLER MB STN SPEC: NORMAL

## 2023-07-04 LAB
ACID FAST STN SPEC QL: NORMAL
MYCOBACTERIUM SPEC CULT: NORMAL

## 2023-07-11 LAB
ACID FAST STN SPEC QL: NORMAL
MYCOBACTERIUM SPEC CULT: NORMAL

## 2023-08-22 ENCOUNTER — TELEPHONE (OUTPATIENT)
Dept: PRIMARY CARE CLINIC | Age: 58
End: 2023-08-22

## 2023-08-22 NOTE — TELEPHONE ENCOUNTER
----- Message from Steph Dahl sent at 8/22/2023 11:42 AM EDT -----  Subject: Message to Provider    QUESTIONS  Information for Provider? Guille Guerra from [place] wanted PCP to know that Pt   passed away 8/11/23 while in hospice. No return call needed  ---------------------------------------------------------------------------  --------------  Milagro EDWARD  [phone]; Do not leave any message, patient will call back for answer  ---------------------------------------------------------------------------  --------------  SCRIPT ANSWERS  Relationship to Patient? Covered Entity  Covered Entity Type? Health Insurance? Representative Name?  Guille Guerra

## 2024-07-29 NOTE — PROGRESS NOTES
MEDICARE WELLNESS VISIT NOTE    HISTORY OF PRESENT ILLNESS:   Gabino presents for his Subsequent Annual Medicare Wellness Visit.   He has no current complaints or concerns.      Patient Care Team:  Kirill Adams MD as PCP - General        Patient Active Problem List   Diagnosis    Gout, unspecified         Past Medical History:   Diagnosis Date    Depression     Gout, unspecified     Wears prescription eyeglasses          Past Surgical History:   Procedure Laterality Date    Cataract extraction w/  intraocular lens implant Left 09/01/2016    by Dr. Mcgrath at [facility]    Hernia repair  1980s    Repair ing hernia,6+y/o,reducibl           Social History     Tobacco Use    Smoking status: Never    Smokeless tobacco: Never   Vaping Use    Vaping status: never used   Substance Use Topics    Alcohol use: Not Currently     Alcohol/week: 0.0 standard drinks of alcohol    Drug use: No     Drug use:    Drug Use:    No              Family History   Problem Relation Age of Onset    Cancer Mother     Diabetes Son     Aneurysm Sister        Current Outpatient Medications   Medication Sig Dispense Refill    donepezil (ARICEPT) 5 MG tablet Take 1 tablet by mouth nightly. 90 tablet 3    benzonatate (TESSALON PERLES) 100 MG capsule Take 2 capsules by mouth 3 times daily as needed for Cough. (Patient not taking: Reported on 7/29/2024) 60 capsule 0     No current facility-administered medications for this visit.        The following items on the Medicare Health Risk Assessment were found to be positive  1.) Do you have an Advance directive, living will, or power of  for health care document that contains your wishes for end of life care?: No     4.) During the past 4 weeks, what was the hardest physical activity you could do for at least 2 minutes?: Very Light     5.) Do you do moderate to strenuous exercise (brisk walk) for about 20 minutes for 3 or more days per week?: No, I usually do not exercise this much     6 a.) How  [facility]                                               Progress note    CC: fever, sepsis  Summary:   Cheri Hernandez is being seen by nephrology for ERD. He is a 65 y.o. male with a PMH significant for ESRD, CHF, ADPKD, fungal peritonitis 2/2 PD catheter who presented to the ED 11/26/2022 with fever, hyperglycemia, and AMS. He was noted to be hyperkalemic on admission. Blood cx positive for MSSA    Interval History  Seen on HD   /85  Repeat cx froom 11/28/2022 NGTD  TDC catheter tip cx positive for MSSA      Plan: On  HD   Remove minimal ,   temp HD catheter. - will hold off on putting TDC back in until fevers resolve. - cefazolin per ID        Renata Bee MD  [facility]  Office: [phone]    Assessment:   ESRD:  - on HD TTS at [facility]  - recent fungal PD peritonitis  - now access is a [facility] which might be the cause of MRSA bacteremia  - TDC was removed 11/29/2022 and a temp HD catheter was placed for continued dialysis while the infection is addressed  - EDW 69.5 kg    Hypertension:    Continue current medicatiosn    MSSA bacteremia:  - suspect the [facility] to be the source  - remove [facility] 11/29/2022, tip cx positive for MSSA also. - TTE negative for vegetations. LVEF 35-40%, severely increased LV wall thickness  - repeat blood c 11/28/2022 NGTD  - on cefazolin       -Right lower lobe bacterial pneumonia. CT chest reviewed continue current antibiotics per ID     -Chronic stable moderate pericardial effusion  -evident on prior echos and current CT chest... Limited echo pending     -Chronic systolic/ diastolic heart failure-  Echo 2/2022 showed grade 2 diastolic dysfunction, EF 84%, moderate pericardial effusion--repeat echo pending    ROS:    Positives Listed Bold. All other remaining systems are negative. Constitutional:  fever, chills, weakness, weight change, fatigue,      Skin:  rash, pruritus, hair loss, bruising, dry skin, petechiae.   Head, Face, Neck   headaches, many servings of Fruits and Vegetables do you have each day ( 1 serving = 1 piece of fruit, 1/2 cup fruits or vegetables): 1 per day     6 c.) How many servings of Fried or High Fat Foods do you have each day (1 serving = 1 Torres, French Fries, chips, doughnut, fried chicken/fish): 4 or more per day     7a.) Have you had a fall in the past year?: Yes     7b.) Do you feel unsteady when standing or walking?: Yes     7c.) Do you worry about falling?: Yes     8.) During the past 4 weeks, has your physical and emotional health limited your social activities with family, friends, neighbors, or other groups?: Moderately     11g.) Anger or frustration: Often     11h.) Problems with your hearing: Often         Vision and Hearing screens: Not applicable    Advance care planning documents on file - no     Cognitive/Functional Status: preexisting cognitive issues - stable    Opioid Review: Gabino is not taking opioid medications.    Recent PHQ 2/9 Score:    PHQ 2:  PHQ 2 Score Adult PHQ 2 Score Adult PHQ 2 Interpretation Little interest or pleasure in activity?   7/29/2024   9:40 AM 0 No further screening needed 0       PHQ 9:       DEPRESSION ASSESSMENT/PLAN:  Depression screening is negative no further plan needed.     Body mass index is 26.75 kg/m².    BMI FOLLOW-UP/PLAN:  BMI is in overweight range.    15 minutes of physical activity a day       Needed Screening/Treatment:   Immunizations reviewed and patient needs: COVID-19, Herpes Zoster, and TDAP  Needed follow up:  None    See orders.   See Patient Instructions section.   Return in about 1 year (around 7/29/2025), or mwv.     SUBJECTIVE:  CC/HPI:  Gabino Alvarez is a 86 year old male who presents for physical exam. Pt is accompanied by wife. Had recent bunion surgery on both feet    Dizziness-has been ongoing no falls had work up in the past    MCi-wife said he is more irritable and frustrated when he can't remember things. Has been on Zoloft but stopped the  swelling,  cervical adenopathy. Respiratory: shortness of breath, cough, or wheezing  Cardiovascular: chest pain, palpitations, dizzy, edema  Gastrointestinal: nausea, vomiting, diarrhea, constipation,belly pain    Yellow skin, blood in stool  Musculoskeletal:  back pain, muscle weakness, gait problems,       joint pain or swelling. Genitourinary:  dysuria, poor urine flow, flank pain, blood in urine  Neurologic:  vertigo, TIA'S, syncope, seizures, focal weakness  Psychosocial:  insomnia, anxiety, or depression. Additional positive findings: -     PMH:   Past medical history, surgical history, social history, family history are reviewed and updated as appropriate. Reviewed current medication list.   Allergies reviewed and updated as needed. PE:   Vitals:    12/03/22 0918   BP:    Pulse:    Resp:    Temp:    SpO2: 96%       General appearance: in NAD, fully alert and oriented. Comfortable. HEENT: EOM intact, no icterus. Trachea is midline. Neck : No masses, appears symmetrical, no JVD  Respiratory: Respiratory effort appears normal, bilateral equal chest rise, no wheeze, no crackles   Cardiovascular: Ausculation shows RRR no edema  Abdomen: No visible mass or tenderness, non distended. Musculoskeletal:  Joints with no swelling or deformity. Skin:no rashes, ulcers, induration, no jaundice. Neuro: face symmetric, no focal deficits. Appropriate responses.        Lab Results   Component Value Date    CREATININE 7.4 (HH) 12/01/2022    BUN 47 (H) 12/01/2022     (L) 12/01/2022    K 4.0 12/01/2022    CL 97 (L) 12/01/2022    CO2 21 12/01/2022      Lab Results   Component Value Date    WBC 16.3 (H) 12/01/2022    HGB 9.8 (L) 12/01/2022    HCT 31.0 (L) 12/01/2022    MCV 94.3 12/01/2022     12/01/2022     Lab Results   Component Value Date    PTH 49.8 02/01/2019    CALCIUM 9.1 12/01/2022    PHOS 3.4 12/01/2022 medications. Also was on Aricept which wife thought had mild improvements but pt stopped the medication    Past Medical History:   Diagnosis Date    Depression     Gout, unspecified     Wears prescription eyeglasses        Past Surgical History:   Procedure Laterality Date    Cataract extraction w/  intraocular lens implant Left 09/01/2016    by Dr. Mcgrath at [facility]    Hernia repair  1980s    Repair ing hernia,5+y/o,reducibl         ALLERGIES:  No Known Allergies    Current Outpatient Medications   Medication Sig Dispense Refill    donepezil (ARICEPT) 5 MG tablet Take 1 tablet by mouth nightly. 90 tablet 3    benzonatate (TESSALON PERLES) 100 MG capsule Take 2 capsules by mouth 3 times daily as needed for Cough. (Patient not taking: Reported on 7/29/2024) 60 capsule 0     No current facility-administered medications for this visit.       Family History   Problem Relation Age of Onset    Cancer Mother     Diabetes Son     Aneurysm Sister        Social History     Tobacco Use    Smoking status: Never    Smokeless tobacco: Never   Vaping Use    Vaping status: never used   Substance Use Topics    Alcohol use: Not Currently     Alcohol/week: 0.0 standard drinks of alcohol    Drug use: No       REVIEW OF SYSTEMS:   GENERAL:  Patient denies fever, chills, tiredness, malaise, weight loss, or weight gain.  EYES:  Patient denies blurred vision, double vision, or pain.   NEUROLOGIC:  Patient denies tremors, dizzy spells, numbness, or tingling.  ENDOCRINE:  Patient denies excessive thirst, heat intolerance, or tired/sluggishness.  GASTROINTESTINAL:  Patient denies abdominal pain, nausea/vomiting, indigestion/heartburn, or constipation.  CARDIOVASCULAR:  Patient denies chest pain, shortness of breath, or palpitations.  SKIN:  Patient denies skin rashes, boils, or persistent itching.  MUSCULOSKELETAL:  Patient denies joint pain, neck pain, or back pain.  ENT/MOUTH:  Patient denies ear infections, sore throats, or sinus  problems.  :  Patient denies urine retention, painful urination, urinary frequency, or blood in urine.  RESPIRATORY:  Patient denies wheezing, frequent cough, or shortness of breath.    PHYSICAL EXAMINATION:   Vitals:  Visit Vitals  /62   Pulse 73   Ht 5' 3\" (1.6 m)   Wt 68.5 kg (151 lb)   SpO2 98%   BMI 26.75 kg/m²     General:  Awake, alert and oriented and in no acute distress  HEENT:  Normocephalic, atraumatic. Pupils equal, round and reactive to light and accommodation. Tympanic membranes clear, nasal mucosa normal and pharynx normal  Neck:  No carotid bruits, no thyromegaly, no JVD and no carotid bruits  Lungs:  Clear to auscultation, good air entry, no rales or wheezes and no rhonchi  Cardiovascular:  No JVD, S1 and S2 normal, no S3 or S4. No clicks, rubs or murmurs. PMI nondisplaced, no abdomen bruit and pulses equal to dorsalis pedis  Abdomen:  Soft, nontender, nondistended, no hepatosplenomegaly, normal active bowel sounds and no masses palpated  Extremities:  No erythema or induration, no evidence of joint effusion, ROM of all joints is normal  Neuro:  Awake, alert, oriented x3, gait normal, balance normal, coordination normal, cranial nerves intact and strength normal throughout  Skin:  Color, texture, turgor are normal. No bruises, rashes or lesions             ASSESSMENT: Medicare annual wellness visit, subsequent  (primary encounter diagnosis)  Plan:  - Subsequent Medicare Wellness Visit -         Select if billing add'l E/M    Annual physical exam    Generalized anxiety disorder -supportive care    Dizzy-ongoing conservative management    MCI (mild cognitive impairment)-trial of Aricept

## 2025-06-04 NOTE — PROGRESS NOTES
Received report from ICU Vyvanse 50 mg prescribed. - PDMP reviewed; no aberrant behavior identified, prescription authorized.     n/a

## (undated) DEVICE — 3M™ IOBAN™ 2 ANTIMICROBIAL INCISE DRAPE 6648EZ: Brand: IOBAN™ 2

## (undated) DEVICE — SOLUTION IV IRRIG POUR BRL 0.9% SODIUM CHL 2F7124

## (undated) DEVICE — DRAPE,LAP,CHOLE,W/TROUGHS,STERILE: Brand: MEDLINE

## (undated) DEVICE — SUTURE PDS II SZ 2-0 L27IN ABSRB VLT L26MM CT-2 1/2 CIR Z333H

## (undated) DEVICE — PAD PREP L 2 PLY 70% ISO ALC NONWOVEN SFT ABSRB TOP ANTISEP

## (undated) DEVICE — SUTURE VCRL + SZ 3-0 L18IN ABSRB UD SH 1/2 CIR TAPERCUT NDL VCP864D

## (undated) DEVICE — STRIP,CLOSURE,WOUND,MEDI-STRIP,1/2X4: Brand: MEDLINE

## (undated) DEVICE — ENDOSCOPY KIT: Brand: MEDLINE INDUSTRIES, INC.

## (undated) DEVICE — ENT: Brand: MEDLINE INDUSTRIES, INC.

## (undated) DEVICE — SUTURE VCRL + SZ 4-0 L18IN ABSRB UD L19MM PS-2 3/8 CIR PRIM VCP496H

## (undated) DEVICE — CANISTER, RIGID, 1200CC: Brand: MEDLINE INDUSTRIES, INC.

## (undated) DEVICE — CATHETER PERI DLYS L62CM 2 CUF CURLED ARGY

## (undated) DEVICE — PREMIUM DRY TRAY LF: Brand: MEDLINE INDUSTRIES, INC.

## (undated) DEVICE — SUTURE VCRL SZ 0 L54IN ABSRB VLT W/O NDL POLYGLACTIN 910 J616H

## (undated) DEVICE — ADHESIVE SKIN CLSR 0.7ML TOP DERMBND ADV

## (undated) DEVICE — [HIGH FLOW INSUFFLATOR,  DO NOT USE IF PACKAGE IS DAMAGED,  KEEP DRY,  KEEP AWAY FROM SUNLIGHT,  PROTECT FROM HEAT AND RADIOACTIVE SOURCES.]: Brand: PNEUMOSURE

## (undated) DEVICE — GLOVE SURG SZ 75 L12IN THK75MIL DK GRN LTX FREE

## (undated) DEVICE — GLOVE ORANGE PI 7   MSG9070

## (undated) DEVICE — TROCAR: Brand: KII FIOS FIRST ENTRY

## (undated) DEVICE — SUTURE PDS + SZ 2 0 L27IN ABSRB VLT L26MM CT 2 1 2 CIR PDP333H

## (undated) DEVICE — 3M™ STERI-DRAPE™ INCISE DRAPE 1050 (60CM X 45CM): Brand: STERI-DRAPE™

## (undated) DEVICE — DRAPE,T,LAPARO,TRANS,STERILE: Brand: MEDLINE

## (undated) DEVICE — TUBING IRRIG L77IN DIA0.241IN L BOR FOR CYSTO W/ NVENT

## (undated) DEVICE — GARMENT COMPR STD FOR 17IN CALF UNIF THER FLOTRN

## (undated) DEVICE — SOLUTION IV 1000ML 0.9% SOD CHL PH 5 INJ USP VIAFLX PLAS

## (undated) DEVICE — MINOR SET UP: Brand: MEDLINE INDUSTRIES, INC.

## (undated) DEVICE — 3M™ TEGADERM™ TRANSPARENT FILM DRESSING FRAME STYLE, 1626W, 4 IN X 4-3/4 IN (10 CM X 12 CM), 50/CT 4CT/CASE: Brand: 3M™ TEGADERM™

## (undated) DEVICE — SOLUTION IRRIG 3000ML 0.9% SOD CHL USP UROMATIC PLAS CONT

## (undated) DEVICE — SUTURE NONABSORBABLE MONOFILAMENT 3-0 PS-1 18 IN BLK ETHILON 1663H

## (undated) DEVICE — TOWEL,STOP FLAG GOLD N-W: Brand: MEDLINE

## (undated) DEVICE — INSUFFLATION NEEDLE TO ESTABLISH PNEUMOPERITONEUM.: Brand: INSUFFLATION NEEDLE

## (undated) DEVICE — SOLUTION PREP PAINT POV IOD FOR SKIN MUCOUS MEM

## (undated) DEVICE — SYRINGE TB 1ML NDL 27GA L0.5IN PLAS W/ SFTY LOK PERM NDL

## (undated) DEVICE — BASIC SINGLE BASIN 1-LF: Brand: MEDLINE INDUSTRIES, INC.

## (undated) DEVICE — DRAPE,MINOR PROC,6X6 FEN, STER: Brand: MEDLINE

## (undated) DEVICE — APPLICATOR MEDICATED 26 CC SOLUTION HI LT ORNG CHLORAPREP

## (undated) DEVICE — TOTAL TRAY, 16FR 10ML SIL FOLEY, URN: Brand: MEDLINE

## (undated) DEVICE — SPONGE GZ W4XL4IN COT 12 PLY TYP VII WVN C FLD DSGN STERILE

## (undated) DEVICE — GLOVE SURG SZ 8 CRM LTX FREE POLYISOPRENE POLYMER BEAD ANTI

## (undated) DEVICE — DRESSING GRMCDL 6 12FR D1N CNTR HOLE 4MM ANTMCRBL PRTCTVE DI

## (undated) DEVICE — DECANTER BAG 9": Brand: MEDLINE INDUSTRIES, INC.

## (undated) DEVICE — THIS ADAPTER IS A DOUBLE SEALING FEMALE LUER LOCK ADAPTER WITH A 2-PIECE, COMBINATION COMPRESSION FIT/BARBED CATHETER CONNECTOR. THE ADAPTER IS USED TO CONNECT THE PD CATHETER TO A SOLUTION TRANSFER SET WITH LOCKING CONNECTOR.: Brand: LOCKING TITANIUM ADAPTER FOR PERITONEAL DIALYSIS CATHETER

## (undated) DEVICE — MERCY HEALTH WEST TURNOVER: Brand: MEDLINE INDUSTRIES, INC.

## (undated) DEVICE — SUTURE VCRL SZ 4-0 L18IN ABSRB UD L19MM PS-2 3/8 CIR PRIM J496H

## (undated) DEVICE — RETRIEVER ENDOSCP L230CM SHTH DIA2.5MM NET 3X6CM STD FOR

## (undated) DEVICE — SUTURE PROL SZ 3-0 L36IN NONABSORBABLE BLU V-7 L26MM 1/2 8976H

## (undated) DEVICE — 3M™ STERI-STRIP™ COMPOUND BENZOIN TINCTURE 40 BAGS/CARTON 4 CARTONS/CASE C1544: Brand: 3M™ STERI-STRIP™

## (undated) DEVICE — CLEANER,CAUTERY TIP,2X2",STERILE: Brand: MEDLINE

## (undated) DEVICE — SUTURE VCRL + SZ 3-0 L27IN ABSRB UD L26MM SH 1/2 CIR VCP416H

## (undated) DEVICE — GLOVE ORTHO 7 1/2   MSG9475

## (undated) DEVICE — SYRINGE MED 10ML LUERLOCK TIP W/O SFTY DISP

## (undated) DEVICE — DRAPE THYROID

## (undated) DEVICE — 3M™ TEGADERM™ TRANSPARENT FILM DRESSING FRAME STYLE, 1626, 4 IN X 4-3/4 IN (10 CM X 12 CM), 50/CT 4CT/CASE: Brand: 3M™ TEGADERM™

## (undated) DEVICE — BINDER ABD SM/M H9IN FOR 30-45IN WHT 3 SLD PNL DSGN HOOP

## (undated) DEVICE — CATHETER PERI DLYS AD L57CM DIA15FR DBL CUF COILED LIN

## (undated) DEVICE — SUTURE VCRL SZ 3-0 L27IN ABSRB UD L26MM SH 1/2 CIR J416H

## (undated) DEVICE — SYRINGE 20ML LL S/C 50

## (undated) DEVICE — NEEDLE HYPO 25GA L1.5IN BVL ORIENTED ECLIPSE

## (undated) DEVICE — PENROSE DRAIN 18 X .5" SILICONE: Brand: MEDLINE

## (undated) DEVICE — 15.5F X 28CM HEMO-FLOW® XF DOUBLE LUMEN CATHETER FULL SET (CUFF 23CM FROM TIP): Brand: HEMO-FLOW® XF

## (undated) DEVICE — PENCIL SMK EVAC TELSCP 3 M TBNG

## (undated) DEVICE — NEEDLE INSUF L120MM DIA2MM DISP FOR PNEUMOPERI ENDOPATH

## (undated) DEVICE — SPONGE GZ W4XL8IN COT WVN 12 PLY

## (undated) DEVICE — DRAPE C ARM UNIV W41XL74IN CLR PLAS XR VELC CLSR POLY STRP

## (undated) DEVICE — TROCAR: Brand: KII SHIELDED BLADED ACCESS SYSTEM

## (undated) DEVICE — BLADE ES ELASTOMERIC COAT INSUL DURABLE BEND UPTO 90DEG

## (undated) DEVICE — GOWN SIRUS NONREIN XL W/TWL: Brand: MEDLINE INDUSTRIES, INC.

## (undated) DEVICE — DRESSING GERM DIA1IN CNTR H DIA7MM BLU CHG ANTIMIC PROTCT

## (undated) DEVICE — SOLUTION SCRB 4OZ 10% POVIDONE IOD ANTIMIC BTL

## (undated) DEVICE — SPONGE GZ W4XL4IN COT 12 PLY TYP VII WVN C FLD DSGN

## (undated) DEVICE — THIS DEVICE IS A DOUBLE SEALING MALE LUER LOCK INTENDED FOR USE WITH THE BAXTER LOCKING TITANIUM ADAPTER FOR PERITONEAL DIALYSIS.: Brand: LOCKING CAP FOR PERITONEAL DIALYSIS CATHETER ADAPTER

## (undated) DEVICE — SUTURE PROL SZ 3-0 L18IN NONABSORBABLE BLU L30MM FS-1 3/8 8663G

## (undated) DEVICE — MINOR SET UP PK

## (undated) DEVICE — TOWEL,OR,DSP,ST,BLUE,STD,4/PK,20PK/CS: Brand: MEDLINE

## (undated) DEVICE — GENERAL: Brand: MEDLINE INDUSTRIES, INC.

## (undated) DEVICE — TUBE TRACH CUF 7.5X10.8X74 MM 6.5 MM FLX SHILEY REUSE

## (undated) DEVICE — SPONGE,DRAIN,NONWVN,4"X4",6PLY,STRL,LF: Brand: MEDLINE

## (undated) DEVICE — PENCIL ES L3M BTTN SWCH S STL HEX LOK BLDE ELECTRD HOLSTER

## (undated) DEVICE — BITE BLOCK ENDOSCP AD 60 FR W/ ADJ STRP PLAS GRN BLOX

## (undated) DEVICE — 3M™ TEGADERM™ TRANSPARENT FILM DRESSING FRAME STYLE, 1628, 6 IN X 8 IN (15 CM X 20 CM), 10/CT 8CT/CASE: Brand: 3M™ TEGADERM™

## (undated) DEVICE — KIT,ANTI FOG,W/SPONGE & FLUID,SOFT PACK: Brand: MEDLINE

## (undated) DEVICE — ELECTRODE PT RET AD L9FT HI MOIST COND ADH HYDRGEL CORDED

## (undated) DEVICE — LAPAROSCOPY PK

## (undated) DEVICE — SYRINGE IRRIG 60ML SFT PLIABLE BLB EZ TO GRP 1 HND USE W/

## (undated) DEVICE — Z INACTIVE USE 2735373 APPLICATOR FBR LAIN COT WOOD TIP ECONOMICAL

## (undated) DEVICE — Device

## (undated) DEVICE — GENERAL LAPAROSCOPIC: Brand: MEDLINE INDUSTRIES, INC.

## (undated) DEVICE — TUBING, SUCTION, 1/4" X 12', STRAIGHT: Brand: MEDLINE

## (undated) DEVICE — SYRINGE MED 10ML TRNSLUC BRL PLUNG BLK MRK POLYPR CTRL

## (undated) DEVICE — COVER LT HNDL BLU PLAS

## (undated) DEVICE — NEPTUNE E-SEP SMOKE EVACUATION PENCIL, COATED, 70MM BLADE, PUSH BUTTON SWITCH: Brand: NEPTUNE E-SEP

## (undated) DEVICE — SPONGE,DISSECTOR,K,XRAY,9/16"X1/4",STRL: Brand: MEDLINE

## (undated) DEVICE — C-ARM: Brand: UNBRANDED

## (undated) DEVICE — TROCAR ENDOSCP L100MM DIA12MM DIL TIP STBL SL ENDOPATH XCEL

## (undated) DEVICE — SET ADMIN NEEDLESS Y SITE RLER CLMP M SPIN LOK 10 GTT LEN

## (undated) DEVICE — SUTURE COAT VCRL SZ 4-0 L18IN ABSRB UD L19MM PS-2 1/2 CIR J496G

## (undated) DEVICE — TROCAR: Brand: KII® SHEILDED BLADED ACCESS SYSEM

## (undated) DEVICE — HOLDER TRACH TB W1IN FIT UPTO 19.5IN NK 2 PC ADJ BLU

## (undated) DEVICE — FLUID TRAP FOR MINIVAC ES EQUIP FLD TRAP

## (undated) DEVICE — Z DUP USE 2257490 ADHESIVE SKIN CLSRE 036ML TPCL 2CTL CNCRLTE HIGH VSCSTY DRMB